# Patient Record
Sex: MALE | Race: WHITE | Employment: OTHER | URBAN - METROPOLITAN AREA
[De-identification: names, ages, dates, MRNs, and addresses within clinical notes are randomized per-mention and may not be internally consistent; named-entity substitution may affect disease eponyms.]

---

## 2017-01-03 ENCOUNTER — ALLIED HEALTH/NURSE VISIT (OUTPATIENT)
Dept: CARDIOLOGY | Facility: CLINIC | Age: 82
End: 2017-01-03
Payer: COMMERCIAL

## 2017-01-03 DIAGNOSIS — Z95.0 CARDIAC PACEMAKER IN SITU: Primary | ICD-10-CM

## 2017-01-03 PROCEDURE — 93293 PM PHONE R-STRIP DEVICE EVAL: CPT | Performed by: INTERNAL MEDICINE

## 2017-01-03 NOTE — PROGRESS NOTES
~ 90 day office teletrace ~   and VS at time of check. Chronic Afib, taking Pradaxa. Magnet response WNL. Annual threshold scheduled in March. Justin VEGA

## 2017-01-04 DIAGNOSIS — N40.0 BENIGN PROSTATIC HYPERPLASIA WITHOUT LOWER URINARY TRACT SYMPTOMS, UNSPECIFIED MORPHOLOGY: Primary | ICD-10-CM

## 2017-01-05 ENCOUNTER — OFFICE VISIT (OUTPATIENT)
Dept: UROLOGY | Facility: CLINIC | Age: 82
End: 2017-01-05
Payer: COMMERCIAL

## 2017-01-05 VITALS
DIASTOLIC BLOOD PRESSURE: 64 MMHG | HEART RATE: 62 BPM | WEIGHT: 230 LBS | BODY MASS INDEX: 30.48 KG/M2 | SYSTOLIC BLOOD PRESSURE: 122 MMHG | HEIGHT: 73 IN

## 2017-01-05 DIAGNOSIS — N40.0 BENIGN PROSTATIC HYPERPLASIA WITHOUT LOWER URINARY TRACT SYMPTOMS, UNSPECIFIED MORPHOLOGY: ICD-10-CM

## 2017-01-05 LAB
ALBUMIN UR-MCNC: ABNORMAL MG/DL
APPEARANCE UR: CLEAR
BILIRUB UR QL STRIP: NEGATIVE
COLOR UR AUTO: YELLOW
GLUCOSE UR STRIP-MCNC: NEGATIVE MG/DL
HGB UR QL STRIP: NEGATIVE
KETONES UR STRIP-MCNC: NEGATIVE MG/DL
LEUKOCYTE ESTERASE UR QL STRIP: NEGATIVE
NITRATE UR QL: NEGATIVE
PH UR STRIP: 6 PH (ref 5–7)
RESIDUAL VOLUME (RV) (EXTERNAL): 20
SP GR UR STRIP: 1.01 (ref 1–1.03)
URN SPEC COLLECT METH UR: ABNORMAL
UROBILINOGEN UR STRIP-ACNC: 0.2 EU/DL (ref 0.2–1)

## 2017-01-05 PROCEDURE — 99213 OFFICE O/P EST LOW 20 MIN: CPT | Mod: 25 | Performed by: UROLOGY

## 2017-01-05 PROCEDURE — 51798 US URINE CAPACITY MEASURE: CPT | Performed by: UROLOGY

## 2017-01-05 PROCEDURE — 81003 URINALYSIS AUTO W/O SCOPE: CPT | Performed by: UROLOGY

## 2017-01-05 ASSESSMENT — PAIN SCALES - GENERAL: PAINLEVEL: NO PAIN (0)

## 2017-01-05 NOTE — PROGRESS NOTES
Office Visit Note  [facility]  [phone]    UROLOGIC DIAGNOSES:   BPH    CURRENT INTERVENTIONS:   Flomax and finasteride, S/P TUNA    HISTORY:   omayra is an 84-year-old gentleman who had a TUNA performed in 1998 and now takes Flomax and finasteride.  He was previously seeing Dr. Chavez.  He has had stable symptoms for many years.  His PSAs were stable and he has now stopped checking the PSA.  He has had problems with incomplete bladder emptying in the past but today he is emptying his bladder well. He has no complaints today.  He has no nocturia.  He reports normal urinary stream.   He is very happy with his urinary symptoms.      PAST MEDICAL HISTORY:   Past Medical History   Diagnosis Date     Inguinal hernia without mention of obstruction or gangrene, recurrent unilateral or unspecified      Other lymphedema      GROIN AND THIGHS     Hydrocele, unspecified      Unilateral or unspecified femoral hernia without mention of obstruction or gangrene, unilateral or unspecified      Scrotal varices      Hypersomnia with sleep apnea, unspecified      Prostatic hypertrophy, benign      Unspecified hypothyroidism      Acute, but ill-defined, cerebrovascular disease      NO RESIDUALS     Esophageal reflux      Other and unspecified hyperlipidemia      Coronary atherosclerosis of unspecified type of vessel, native or graft      S/P PTCA, EF 50%     Thrombocytopenia, unspecified (H)      Other specified anemias      Other pancytopenia (H)      Persistent atrial fibrillation (H)      VVI PM for long pauses       PAST SURGICAL HISTORY:   Past Surgical History   Procedure Laterality Date     Surgical history of -        Thumb surgery     Surgical history of -   1990s     Left total knee replacement     Surgical history of -   3/11     [facility] total knee replacement     Eye surgery       CATARACT/BLEPHAROPLASTY     Genitourinary surgery       TUNA     Implant pacemaker  8/2009     single chamber     Hernia repair        RIGHT INGUINAL     Herniorrhaphy inguinal  8/14/2012     Procedure: HERNIORRHAPHY INGUINAL;  right inguinal hernia repair;  Surgeon: Kareem Torrez MD;  Location: [facility]     Orthopedic surgery       [facility]     Orthopedic surgery  2010     right TKR, left TKR       FAMILY HISTORY:   Family History   Problem Relation Age of Onset     Cancer - colorectal No family hx of      DIABETES No family hx of      Coronary Artery Disease No family hx of      Hypertension No family hx of      Hyperlipidemia No family hx of      CEREBROVASCULAR DISEASE No family hx of      Breast Cancer No family hx of      Colon Cancer No family hx of      Prostate Cancer No family hx of      Other Cancer No family hx of      Depression No family hx of      Anxiety Disorder No family hx of      MENTAL ILLNESS No family hx of      Substance Abuse No family hx of      Anesthesia Reaction No family hx of      Asthma No family hx of      OSTEOPOROSIS No family hx of      Genetic Disorder No family hx of      Thyroid Disease No family hx of      Obesity No family hx of      Cardiovascular Father      C.A.D. Father      CANCER Sister      lung     Unknown/Adopted Mother        SOCIAL HISTORY:   Social History   Substance Use Topics     Smoking status: Never Smoker      Smokeless tobacco: Never Used      Comment: per encounter 2/12/07     Alcohol Use: 0.0 oz/week     0 Standard drinks or equivalent per week      Comment: occasionally       Current Outpatient Prescriptions   Medication     clindamycin (CLINDAMAX) 1 % lotion     furosemide (LASIX) 40 MG tablet     methylcellulose, laxative, POWD     levothyroxine (SYNTHROID,LEVOTHROID) 100 MCG tablet     tamsulosin (FLOMAX) 0.4 MG 24 hr capsule     simvastatin (ZOCOR) 20 MG tablet     carvedilol (COREG) 6.25 MG tablet     potassium chloride SA (POTASSIUM CHLORIDE) 20 MEQ tablet     omeprazole (PRILOSEC) 20 MG capsule     sucralfate (CARAFATE) 1 GM tablet     finasteride (PROSCAR) 5 MG tablet      "furosemide (LASIX) 20 MG tablet     buPROPion (WELLBUTRIN XL) 150 MG 24 hr tablet     HYDROcodone-acetaminophen (NORCO) 5-325 MG per tablet     LORazepam (ATIVAN) 1 MG tablet     Multiple Vitamins-Minerals (MULTIVITAMIN OR)     ORDER FOR DME     dabigatran ANTICOAGULANT (PRADAXA ANTICOAGULANT) 75 MG CAPS BLISTER PACK     Glucosamine-Chondroitin 3008-5243 MG/30ML LIQD     Omega-3 Fatty Acids (FISH OIL) 1200 MG capsule     Cholecalciferol (VITAMIN D3) 5000 UNIT TABS     No current facility-administered medications for this visit.         PHYSICAL EXAM:    /64 mmHg  Pulse 62  Ht 1.854 m (6' 1\")  Wt 104.327 kg (230 lb)  BMI 30.35 kg/m2    HEENT: Normocephalic and atraumatic   Cardiac: Not done  Back/Flank: Not done  CNS/PNS: Not done  Respiratory: Normal non-labored breathing  Abdomen: Soft nontender and nondistended  Peripheral Vascular: Not done  Mental Status: Not done    Penis: Not done  Scrotal Skin: Not done  Testicles: Not done  Epididymis: Not done  Digital Rectal Exam:     Cystoscopy: Not done    Imaging: None    Urinalysis: UA RESULTS:  Recent Labs   Lab Test  01/05/17   0909  08/25/16   0931   COLOR  Yellow  Yellow   APPEARANCE  Clear  Clear   URINEGLC  Negative  Negative   URINEBILI  Negative  Negative   URINEKETONE  Negative  Negative   SG  1.015  1.020   UBLD  Negative  Negative   URINEPH  6.0  6.0   PROTEIN  Trace*  Negative   UROBILINOGEN  0.2  0.2   NITRITE  Negative  Negative   LEUKEST  Negative  Negative   RBCU   --   O - 2   WBCU   --   O - 2       PSA:     Post Void Residual: 20 mL postvoid    Other labs: None today      IMPRESSION:  Doing well    PLAN:  He is doing very well.  He has had no problems for years and has had stable symptoms for years. He will continue the Flomax and the finasteride. He will continues to see his primary care provider annually and he is welcome to come back and see us if any problems should arise in the future.    Total Time: 15 min                              "         Total in Consultation: 15 min      Cooper Gallego M.D.

## 2017-01-05 NOTE — Clinical Note
1/5/2017       RE: Bairon Foster  [address]    [facility] [address]     Dear Colleague,    Thank you for referring your patient, Bairon Foster, to the [facility] [place] [facility]. Please see a copy of my visit note below.    Office Visit Note  [facility]  [phone]    UROLOGIC DIAGNOSES:   BPH    CURRENT INTERVENTIONS:   Flomax and finasteride, S/P TUNA    HISTORY:   omayra is an 84-year-old gentleman who had a TUNA performed in 1998 and now takes Flomax and finasteride.  He was previously seeing Dr. Chavez.  He has had stable symptoms for many years.  His PSAs were stable and he has now stopped checking the PSA.  He has had problems with incomplete bladder emptying in the past but today he is emptying his bladder well. He has no complaints today.  He has no nocturia.  He reports normal urinary stream.   He is very happy with his urinary symptoms.      PAST MEDICAL HISTORY:   Past Medical History   Diagnosis Date     Inguinal hernia without mention of obstruction or gangrene, recurrent unilateral or unspecified      Other lymphedema      GROIN AND THIGHS     Hydrocele, unspecified      Unilateral or unspecified femoral hernia without mention of obstruction or gangrene, unilateral or unspecified      Scrotal varices      Hypersomnia with sleep apnea, unspecified      Prostatic hypertrophy, benign      Unspecified hypothyroidism      Acute, but ill-defined, cerebrovascular disease      NO RESIDUALS     Esophageal reflux      Other and unspecified hyperlipidemia      Coronary atherosclerosis of unspecified type of vessel, native or graft      S/P PTCA, EF 50%     Thrombocytopenia, unspecified (H)      Other specified anemias      Other pancytopenia (H)      Persistent atrial fibrillation (H)      VVI PM for long pauses       PAST SURGICAL HISTORY:   Past Surgical History   Procedure Laterality Date      Surgical history of -        Thumb surgery     Surgical history of -   1990s     Left total knee replacement     Surgical history of -   3/11     [facility] total knee replacement     Eye surgery       CATARACT/BLEPHAROPLASTY     Genitourinary surgery       TUNA     Implant pacemaker  8/2009     single chamber     Hernia repair       RIGHT INGUINAL     Herniorrhaphy inguinal  8/14/2012     Procedure: HERNIORRHAPHY INGUINAL;  right inguinal hernia repair;  Surgeon: Kareem Torrez MD;  Location: [facility]     Orthopedic surgery       [facility] [place]     Orthopedic surgery  2010     right TKR, left TKR       FAMILY HISTORY:   Family History   Problem Relation Age of Onset     Cancer - colorectal No family hx of      DIABETES No family hx of      Coronary Artery Disease No family hx of      Hypertension No family hx of      Hyperlipidemia No family hx of      CEREBROVASCULAR DISEASE No family hx of      Breast Cancer No family hx of      Colon Cancer No family hx of      Prostate Cancer No family hx of      Other Cancer No family hx of      Depression No family hx of      Anxiety Disorder No family hx of      MENTAL ILLNESS No family hx of      Substance Abuse No family hx of      Anesthesia Reaction No family hx of      Asthma No family hx of      OSTEOPOROSIS No family hx of      Genetic Disorder No family hx of      Thyroid Disease No family hx of      Obesity No family hx of      Cardiovascular Father      C.A.D. Father      CANCER Sister      lung     Unknown/Adopted Mother        SOCIAL HISTORY:   Social History   Substance Use Topics     Smoking status: Never Smoker      Smokeless tobacco: Never Used      Comment: per encounter 2/12/07     Alcohol Use: 0.0 oz/week     0 Standard drinks or equivalent per week      Comment: occasionally       Current Outpatient Prescriptions   Medication     clindamycin (CLINDAMAX) 1 % lotion     furosemide (LASIX) 40 MG tablet     methylcellulose, laxative, POWD     levothyroxine  "(SYNTHROID,LEVOTHROID) 100 MCG tablet     tamsulosin (FLOMAX) 0.4 MG 24 hr capsule     simvastatin (ZOCOR) 20 MG tablet     carvedilol (COREG) 6.25 MG tablet     potassium chloride SA (POTASSIUM CHLORIDE) 20 MEQ tablet     omeprazole (PRILOSEC) 20 MG capsule     sucralfate (CARAFATE) 1 GM tablet     finasteride (PROSCAR) 5 MG tablet     furosemide (LASIX) 20 MG tablet     buPROPion (WELLBUTRIN XL) 150 MG 24 hr tablet     HYDROcodone-acetaminophen (NORCO) 5-325 MG per tablet     LORazepam (ATIVAN) 1 MG tablet     Multiple Vitamins-Minerals (MULTIVITAMIN OR)     ORDER FOR DME     dabigatran ANTICOAGULANT (PRADAXA ANTICOAGULANT) 75 MG CAPS BLISTER PACK     Glucosamine-Chondroitin 6132-5496 MG/30ML LIQD     Omega-3 Fatty Acids (FISH OIL) 1200 MG capsule     Cholecalciferol (VITAMIN D3) 5000 UNIT TABS     No current facility-administered medications for this visit.         PHYSICAL EXAM:    /64 mmHg  Pulse 62  Ht 1.854 m (6' 1\")  Wt 104.327 kg (230 lb)  BMI 30.35 kg/m2    HEENT: Normocephalic and atraumatic   Cardiac: Not done  Back/Flank: Not done  CNS/PNS: Not done  Respiratory: Normal non-labored breathing  Abdomen: Soft nontender and nondistended  Peripheral Vascular: Not done  Mental Status: Not done    Penis: Not done  Scrotal Skin: Not done  Testicles: Not done  Epididymis: Not done  Digital Rectal Exam:     Cystoscopy: Not done    Imaging: None    Urinalysis: UA RESULTS:  Recent Labs   Lab Test  01/05/17   0909  08/25/16   0931   COLOR  Yellow  Yellow   APPEARANCE  Clear  Clear   URINEGLC  Negative  Negative   URINEBILI  Negative  Negative   URINEKETONE  Negative  Negative   SG  1.015  1.020   UBLD  Negative  Negative   URINEPH  6.0  6.0   PROTEIN  Trace*  Negative   UROBILINOGEN  0.2  0.2   NITRITE  Negative  Negative   LEUKEST  Negative  Negative   RBCU   --   O - 2   WBCU   --   O - 2       PSA:     Post Void Residual: 20 mL postvoid    Other labs: None today      IMPRESSION:  Doing well    PLAN:  He " is doing very well.  He has had no problems for years and has had stable symptoms for years. He will continue the Flomax and the finasteride. He will continues to see his primary care provider annually and he is welcome to come back and see us if any problems should arise in the future.    Total Time: 15 min                                      Total in Consultation: 15 min      Cooper Gallego M.D.

## 2017-01-12 DIAGNOSIS — I25.5 ISCHEMIC CARDIOMYOPATHY: Primary | ICD-10-CM

## 2017-01-12 DIAGNOSIS — E78.00 HYPERCHOLESTEROLEMIA: ICD-10-CM

## 2017-01-12 DIAGNOSIS — K21.9 ESOPHAGEAL REFLUX: ICD-10-CM

## 2017-01-13 RX ORDER — SIMVASTATIN 20 MG
TABLET ORAL
Qty: 90 TABLET | Refills: 3 | Status: SHIPPED | OUTPATIENT
Start: 2017-01-13 | End: 2017-10-30

## 2017-01-13 RX ORDER — SUCRALFATE 1 G/1
TABLET ORAL
Qty: 180 TABLET | Refills: 3 | Status: SHIPPED | OUTPATIENT
Start: 2017-01-13 | End: 2017-02-20

## 2017-01-13 RX ORDER — POTASSIUM CHLORIDE 1.5 G/1.58G
POWDER, FOR SOLUTION ORAL
Qty: 90 PACKET | Refills: 3 | Status: SHIPPED | OUTPATIENT
Start: 2017-01-13 | End: 2017-03-10

## 2017-01-13 RX ORDER — LEVOTHYROXINE SODIUM 100 UG/1
TABLET ORAL
Qty: 90 TABLET | Refills: 0 | OUTPATIENT
Start: 2017-01-13

## 2017-01-13 NOTE — TELEPHONE ENCOUNTER
Levothyroxine     Last Written Prescription Date: 5/3/16  Last Quantity: 90, # refills: 3  Last Office Visit with [facility], [facility] or [facility] prescribing provider: 1/5/17  Next 5 appointments (look out 90 days)     Mar 10, 2017  7:45 AM   Return Visit with Segundo Hope MD   [facility])    [address] [place] [address] [address]  [facility] [address]   [phone]                   TSH   Date Value Ref Range Status   08/25/2016 1.20 0.40 - 5.00 mU/L Final   Declined- has refills until May 2017    Carafate     Last Written Prescription Date: 1/6/16  Last Fill Quantity: 180,  # refills: 3   Last Office Visit with [facility], [facility] or [facility] prescribing provider: 1/5/17                                         Next 5 appointments (look out 90 days)     Mar 10, 2017  7:45 AM   Return Visit with Segundo Hope MD   [facility] ([facility])    [address] [place], [place] [address]  [facility] [address]   [phone]                  potassium chloride SA (POTASSIUM CHLORIDE) 20 MEQ tablet  Last Written Prescription Date: 1/19/16  Last Fill Quantity: 90, # refills: 3  Last Office Visit with [facility], [facility] or [facility] prescribing provider: 1/5/17   Next 5 appointments (look out 90 days)     Mar 10, 2017  7:45 AM   Return Visit with Segundo Hope MD   [facility]    [address] [place] [address] [phone]                   POTASSIUM   Date Value Ref Range Status   12/30/2016 4.0 3.4 - 5.3 mmol/L Final     CREATININE   Date Value Ref Range Status   08/25/2016 1.02 0.66 - 1.25 mg/dL Final     BP Readings from Last 3 Encounters:   01/05/17 122/64   12/30/16 110/70   12/10/16 130/78     Simvastatin       Last Written Prescription Date: 4/18/16  Last Fill Quantity: 90, # refills: 2    Last Office Visit with [facility], [facility] or [facility] prescribing  provider:  1/5/17   Future Office Visit:    Next 5 appointments (look out 90 days)     Mar 10, 2017  7:45 AM   Return Visit with Segundo Hope MD   [facility] ([facility])    [address] [place] [address] [phone]                  CHOLESTEROL   Date Value Ref Range Status   12/30/2016 107 <200 mg/dL Final     HDL CHOLESTEROL   Date Value Ref Range Status   12/30/2016 54 >39 mg/dL Final     LDL CHOLESTEROL CALCULATED   Date Value Ref Range Status   12/30/2016 48 <100 mg/dL Final     Comment:     Desirable:       <100 mg/dl     LDL CHOLESTEROL DIRECT   Date Value Ref Range Status   01/25/2012 62.4 0.0-100.0 mg/dL Final     TRIGLYCERIDES   Date Value Ref Range Status   12/30/2016 24 <150 mg/dL Final     Comment:     Non Fasting     CHOLESTEROL/HDL RATIO   Date Value Ref Range Status   06/15/2015 2.1 0.0 - 5.0 Final     ALT   Date Value Ref Range Status   12/30/2016 26 0 - 70 U/L Final        Prescription approved per [facility] Refill Protocol.  Felicia Payne RN- Triage [employer]

## 2017-01-21 DIAGNOSIS — K21.9 GASTROESOPHAGEAL REFLUX DISEASE WITHOUT ESOPHAGITIS: Primary | Chronic | ICD-10-CM

## 2017-01-21 NOTE — TELEPHONE ENCOUNTER
Reason for Call:  Medication or medication refill:  Do you use a [facility]?  Name of the pharmacy and phone number for the current request:  [facility] - [place], [place] - [address]  Name of the medication requested: omeprazole (PRILOSEC) 20 MG capsule  Other request: none  Can we leave a detailed message on this number? YES  Phone number patient can be reached at: Home number on file [phone] (home)  Best Time: any  Call taken on 1/21/2017 at 10:20 AM by JENIFER THOMPSON

## 2017-01-23 NOTE — TELEPHONE ENCOUNTER
omeprazole (PRILOSEC) 20 MG capsule     Last Written Prescription Date: 1/11/16  Last Fill Quantity: 180,  # refills: 3   Last Office Visit with [facility] or [facility] prescribing provider: 1/5/17                                         Next 5 appointments (look out 90 days)     Mar 10, 2017  7:45 AM   Return Visit with Segundo Hope MD   [facility] ([facility])    [address] [place] [address] [phone]

## 2017-01-24 DIAGNOSIS — I25.5 ISCHEMIC CARDIOMYOPATHY: Primary | ICD-10-CM

## 2017-01-24 NOTE — TELEPHONE ENCOUNTER
Reason for Call:  Medication or medication refill:    Do you use a [facility]?  Name of the pharmacy and phone number for the current request:  [facility]    Name of the medication requested: potassium chloride SA (POTASSIUM CHLORIDE) 20 MEQ tablet      Other request:    none    Can we leave a detailed message on this number? YES    Phone number patient can be reached at: Home number on file [phone] (home)    Best Time: Anytime    Call taken on 1/24/2017 at 12:00 PM by ADA DELAROSA

## 2017-01-25 RX ORDER — POTASSIUM CHLORIDE 1500 MG/1
20 TABLET, EXTENDED RELEASE ORAL DAILY
Qty: 90 TABLET | Refills: 3 | OUTPATIENT
Start: 2017-01-25

## 2017-01-25 NOTE — TELEPHONE ENCOUNTER
Potassium Chloride SA (Potassium chloride ) 20 meq    Last Written Prescription Date: 1/13/17  Last Fill Quantity: 90  # refills: 3Last Office Visit with [facility], [facility] or [facility] prescribing provider:1/5/17   Next 5 appointments (look out 90 days)     Mar 10, 2017  7:45 AM   Return Visit with Segundo Hope MD   [facility] ([facility])    [address] [place] [address] [phone]                   CREATININE   Date Value Ref Range Status   08/25/2016 1.02 0.66 - 1.25 mg/dL Final

## 2017-02-06 DIAGNOSIS — N40.0 BPH (BENIGN PROSTATIC HYPERPLASIA): Primary | ICD-10-CM

## 2017-02-07 NOTE — TELEPHONE ENCOUNTER
TAMSULOSIN HCL 0.4 MG Capsule         Last Written Prescription Date: 4/18/2016  Last Fill Quantity: 90, # refills: 2    Last Office Visit with [facility], [facility] or [facility] prescribing provider:  1/5/2017   Future Office Visit:    Next 5 appointments (look out 90 days)     Mar 10, 2017  7:45 AM   Return Visit with Segundo Hope MD   [facility]IC[facility] ([facility])    [address] [place] [id number] [phone]                  BP Readings from Last 3 Encounters:   01/05/17 122/64   12/30/16 110/70   12/10/16 130/78

## 2017-02-09 RX ORDER — TAMSULOSIN HYDROCHLORIDE 0.4 MG/1
CAPSULE ORAL
Qty: 90 CAPSULE | Refills: 2 | Status: SHIPPED | OUTPATIENT
Start: 2017-02-09 | End: 2017-10-30

## 2017-02-15 DIAGNOSIS — I25.5 ISCHEMIC CARDIOMYOPATHY: ICD-10-CM

## 2017-02-15 RX ORDER — FUROSEMIDE 20 MG
TABLET ORAL
Qty: 45 TABLET | Refills: 2 | Status: SHIPPED | OUTPATIENT
Start: 2017-02-15 | End: 2017-08-30 | Stop reason: DRUGHIGH

## 2017-02-15 RX ORDER — POTASSIUM CHLORIDE 1500 MG/1
TABLET, EXTENDED RELEASE ORAL
Qty: 90 TABLET | Refills: 2 | Status: SHIPPED | OUTPATIENT
Start: 2017-02-15 | End: 2017-10-30

## 2017-02-15 NOTE — TELEPHONE ENCOUNTER
furosemide (LASIX) 20 MG     Last Written Prescription Date: 12/8/16  Last Fill Quantity: 45, # refills: 2  Last Office Visit with [facility], [facility] or  Health prescribing provider: 12/30/16  Next 5 appointments (look out 90 days)     Mar 10, 2017  7:45 AM CST   Return Visit with Segundo Hope MD   [facility] ([facility])    [address] [place] [address] [address]  [facility] [address]   [phone]                   Potassium   Date Value Ref Range Status   12/30/2016 4.0 3.4 - 5.3 mmol/L Final     Creatinine   Date Value Ref Range Status   08/25/2016 1.02 0.66 - 1.25 mg/dL Final     BP Readings from Last 3 Encounters:   01/05/17 122/64   12/30/16 110/70   12/10/16 130/78     potassium chloride SA (K-DUR/KLOR-CON M) 20 MEQ      Last Written Prescription Date: 1/19/16  Last Fill Quantity: 90, # refills: 3  Last Office Visit with [facility], [facility] or [facility] prescribing provider: 12/30/16  Next 5 appointments (look out 90 days)     Mar 10, 2017  7:45 AM CST   Return Visit with Segundo Hope MD   [facility] ([facility])    [address] [place] [id number] [phone]                   Potassium   Date Value Ref Range Status   12/30/2016 4.0 3.4 - 5.3 mmol/L Final     Creatinine   Date Value Ref Range Status   08/25/2016 1.02 0.66 - 1.25 mg/dL Final     BP Readings from Last 3 Encounters:   01/05/17 122/64   12/30/16 110/70   12/10/16 130/78

## 2017-02-15 NOTE — TELEPHONE ENCOUNTER
Prescription approved per [facility] Refill Protocol.  Felicia Payne RN- Triage [employer]

## 2017-02-20 DIAGNOSIS — N40.0 BPH (BENIGN PROSTATIC HYPERPLASIA): ICD-10-CM

## 2017-02-20 DIAGNOSIS — K21.9 ESOPHAGEAL REFLUX: ICD-10-CM

## 2017-02-20 DIAGNOSIS — I25.5 ISCHEMIC CARDIOMYOPATHY: ICD-10-CM

## 2017-02-20 DIAGNOSIS — E03.9 HYPOTHYROIDISM: Primary | ICD-10-CM

## 2017-02-20 NOTE — TELEPHONE ENCOUNTER
Reason for Call:  Medication or medication refill:  Do you use a [facility]?  Name of the pharmacy and phone number for the current request:  [facility] - [place], [place] - [address]  Name of the medication requested: carvedilol 6.25mg, finasteride 5 mg, levothyroxine 100 mg and sucralfate 1 gm tablet  Other request: none  Can we leave a detailed message on this number? YES  Phone number patient can be reached at: Home number on file [phone] (home)  Best Time: any  Call taken on 2/20/2017 at 1:32 PM by JENIFER THOMPSON

## 2017-02-21 RX ORDER — LEVOTHYROXINE SODIUM 100 UG/1
TABLET ORAL
Qty: 90 TABLET | Refills: 1 | Status: SHIPPED | OUTPATIENT
Start: 2017-02-21 | End: 2017-05-15

## 2017-02-21 RX ORDER — FINASTERIDE 5 MG/1
5 TABLET, FILM COATED ORAL DAILY
Qty: 90 TABLET | Refills: 1 | Status: SHIPPED | OUTPATIENT
Start: 2017-02-21 | End: 2017-05-15

## 2017-02-21 RX ORDER — CARVEDILOL 6.25 MG/1
6.25 TABLET ORAL 2 TIMES DAILY WITH MEALS
Qty: 180 TABLET | Refills: 1 | Status: SHIPPED | OUTPATIENT
Start: 2017-02-21 | End: 2017-09-13

## 2017-02-21 RX ORDER — SUCRALFATE 1 G/1
1 TABLET ORAL 2 TIMES DAILY
Qty: 180 TABLET | Refills: 1 | Status: SHIPPED | OUTPATIENT
Start: 2017-02-21 | End: 2018-04-18

## 2017-03-09 ENCOUNTER — HOSPITAL ENCOUNTER (OUTPATIENT)
Dept: CARDIOLOGY | Facility: CLINIC | Age: 82
Discharge: HOME | End: 2017-03-09
Attending: INTERNAL MEDICINE | Admitting: INTERNAL MEDICINE
Payer: MEDICARE

## 2017-03-09 DIAGNOSIS — I25.5 ISCHEMIC CARDIOMYOPATHY: ICD-10-CM

## 2017-03-09 DIAGNOSIS — I25.10 CORONARY ARTERY DISEASE INVOLVING NATIVE CORONARY ARTERY OF NATIVE HEART WITHOUT ANGINA PECTORIS: ICD-10-CM

## 2017-03-09 DIAGNOSIS — Z95.0 CARDIAC PACEMAKER IN SITU: ICD-10-CM

## 2017-03-09 DIAGNOSIS — I48.19 PERSISTENT ATRIAL FIBRILLATION (H): ICD-10-CM

## 2017-03-09 PROCEDURE — 93306 TTE W/DOPPLER COMPLETE: CPT

## 2017-03-09 PROCEDURE — 93306 TTE W/DOPPLER COMPLETE: CPT | Mod: 26 | Performed by: INTERNAL MEDICINE

## 2017-03-10 ENCOUNTER — OFFICE VISIT (OUTPATIENT)
Dept: CARDIOLOGY | Facility: CLINIC | Age: 82
End: 2017-03-10
Attending: INTERNAL MEDICINE
Payer: COMMERCIAL

## 2017-03-10 ENCOUNTER — ALLIED HEALTH/NURSE VISIT (OUTPATIENT)
Dept: CARDIOLOGY | Facility: CLINIC | Age: 82
End: 2017-03-10
Payer: COMMERCIAL

## 2017-03-10 VITALS
HEIGHT: 73 IN | BODY MASS INDEX: 29.16 KG/M2 | HEART RATE: 68 BPM | WEIGHT: 220 LBS | DIASTOLIC BLOOD PRESSURE: 70 MMHG | SYSTOLIC BLOOD PRESSURE: 120 MMHG

## 2017-03-10 DIAGNOSIS — I25.10 CORONARY ARTERY DISEASE INVOLVING NATIVE CORONARY ARTERY OF NATIVE HEART WITHOUT ANGINA PECTORIS: ICD-10-CM

## 2017-03-10 DIAGNOSIS — Z95.0 CARDIAC PACEMAKER IN SITU: ICD-10-CM

## 2017-03-10 DIAGNOSIS — I25.5 ISCHEMIC CARDIOMYOPATHY: ICD-10-CM

## 2017-03-10 DIAGNOSIS — Z95.0 CARDIAC PACEMAKER IN SITU: Primary | ICD-10-CM

## 2017-03-10 DIAGNOSIS — R07.89 CHEST TIGHTNESS OR PRESSURE: Primary | ICD-10-CM

## 2017-03-10 DIAGNOSIS — I48.19 PERSISTENT ATRIAL FIBRILLATION (H): ICD-10-CM

## 2017-03-10 PROCEDURE — 99214 OFFICE O/P EST MOD 30 MIN: CPT | Performed by: INTERNAL MEDICINE

## 2017-03-10 PROCEDURE — 93279 PRGRMG DEV EVAL PM/LDLS PM: CPT

## 2017-03-10 NOTE — LETTER
3/10/2017    Mg Nelson MD  [facility]   [address]  [place] [address]    RE: Bairon DOWNS Cristian       Dear Colleague,    I had the pleasure of seeing Bairon Foster in the [facility].    Mr. Foster is a very pleasant 86-year-old gentleman with history of ischemic cardiomyopathy, ventricular tachycardia, coronary artery disease, history of coronary stenting of the obtuse marginal branch back in 2009 with mitral regurgitation.  He comes to clinic today with complaints of some exertional chest tightness over the past couple of months.  He says if he walks down a long hallway, he will get some chest tightness and shortness of breath and then need to stop.  This is new onset, but it is always relieved with rest.  He has had no syncope, near-syncope, PND or orthopnea.  He does have pedal edema and has had his Lasix increased by his primary physician.  He is also complaining of some paresthesias and tingling in his bilateral feet.      He has been compliant with his medications.  His echocardiogram shows normal left ventricular ejection fraction with mild to moderate mitral regurgitation.  His fasting lipid profile appears excellent.     Outpatient Encounter Prescriptions as of 3/10/2017   Medication Sig Dispense Refill     Misc Natural Products (FIBER 7) POWD As directed       carvedilol (COREG) 6.25 MG tablet Take 1 tablet (6.25 mg) by mouth 2 times daily (with meals) 180 tablet 1     finasteride (PROSCAR) 5 MG tablet Take 1 tablet (5 mg) by mouth daily 90 tablet 1     levothyroxine (SYNTHROID/LEVOTHROID) 100 MCG tablet TAKE 1 TABLET EVERY DAY (NEED LAB APPOINTMENT) 90 tablet 1     sucralfate (CARAFATE) 1 GM tablet Take 1 tablet (1 g) by mouth 2 times daily 180 tablet 1     furosemide (LASIX) 20 MG tablet TAKE 1 TABLET EVERY OTHER DAY (ALTERNATE WITH LASIX 40MG) 45 tablet 2     potassium chloride SA (K-DUR/KLOR-CON M) 20 MEQ CR tablet TAKE 1 TABLET (20  MEQ) BY MOUTH DAILY 90 tablet 2     tamsulosin (FLOMAX) 0.4 MG capsule TAKE 1 CAPSULE (0.4 MG) BY MOUTH DAILY 90 capsule 2     omeprazole (PRILOSEC) 20 MG CR capsule Take 1 capsule (20 mg) by mouth 2 times daily 180 capsule 3     simvastatin (ZOCOR) 20 MG tablet TAKE 1 TABLET AT BEDTIME 90 tablet 3     clindamycin (CLINDAMAX) 1 % lotion Apply topically 2 times [phone], re faxed order from 12/15/16. 60 mL 1     furosemide (LASIX) 40 MG tablet TAKE 1 TABLET EVERY OTHER DAY  (ALTERNATE  WITH  LASIX 20MG TABLET) 45 tablet 2     buPROPion (WELLBUTRIN XL) 150 MG 24 hr tablet TAKE ONE TABLET BY MOUTH IN THE MORNING 90 tablet 3     LORazepam (ATIVAN) 1 MG tablet Take 0.5-1 tablets (0.5-1 mg) by mouth every 8 hours as needed for anxiety 30 tablet 5     Multiple Vitamins-Minerals (MULTIVITAMIN OR) Take  by mouth.       dabigatran ANTICOAGULANT (PRADAXA ANTICOAGULANT) 75 MG CAPS BLISTER PACK Take 150 mg by mouth 2 times daily. Store in original 's bottle or blister pack; use within 120 days of opening.       Glucosamine-Chondroitin 7650-9415 MG/30ML LIQD Take 2 tablets by mouth daily        Omega-3 Fatty Acids (FISH OIL) 1200 MG capsule Take 1 capsule by mouth 2 times daily.       [DISCONTINUED] potassium chloride (KLOR-CON) 20 MEQ Packet MIX 1 PACKET IN LIQUID AS DIRECTED AND DRINK DAILY 90 packet 3     [DISCONTINUED] methylcellulose, laxative, POWD Take 0.85 g (3 teaspoonful) by mouth daily       HYDROcodone-acetaminophen (NORCO) 5-325 MG per tablet Take 1-2 tablets by mouth every 4 hours as needed for other (Moderate to Severe Pain) 40 tablet 0     ORDER FOR DME Auto-CPAP:Max 9 cm H2OMin 9 cm H2O  Continuous Lifetime need and heated humidity.    1 Device 0     [DISCONTINUED] Cholecalciferol (VITAMIN D3) 5000 UNIT TABS Take  by mouth daily.       No facility-administered encounter medications on file as of 3/10/2017.       ASSESSMENT/PLAN:  New exertional chest discomfort consistent with  progressive angina pectoris.  I am having him undergo a nuclear stress test.  I talked about proceeding directly with angiography, but we will screen him with the stress test first, then proceed from there.        Regarding his paresthesias, I am wondering whether or not this might be due to potential venous stasis changes.  He certainly has skin changes as well as some edema.  I am sending him to vascular specialists to give us their opinion and recommendations.     Again, thank you for allowing me to participate in the care of your patient.      Sincerely,    SUE BOONE MD     [facility]

## 2017-03-10 NOTE — PROGRESS NOTES
HISTORY OF PRESENT ILLNESS:  Mr. Ibarra is a very pleasant 86-year-old gentleman with history of ischemic cardiomyopathy, ventricular tachycardia, coronary artery disease, history of coronary stenting of the obtuse marginal branch back in  with mitral regurgitation.  He comes to clinic today with complaints of some exertional chest tightness over the past couple of months.  He says if he walks down a long hallway, he will get some chest tightness and shortness of breath and then need to stop.  This is new onset, but it is always relieved with rest.  He has had no syncope, near-syncope, PND or orthopnea.  He does have pedal edema and has had his Lasix increased by his primary physician.  He is also complaining of some paresthesias and tingling in his bilateral feet.      He has been compliant with his medications.  His echocardiogram shows normal left ventricular ejection fraction with mild to moderate mitral regurgitation.  His fasting lipid profile appears excellent.      ASSESSMENT/PLAN:  New exertional chest discomfort consistent with progressive angina pectoris.  I am having him undergo a nuclear stress test.  I talked about proceeding directly with angiography, but we will screen him with the stress test first, then proceed from there.        Regarding his paresthesias, I am wondering whether or not this might be due to potential venous stasis changes.  He certainly has skin changes as well as some edema.  I am sending him to vascular specialists to give us their opinion and recommendations.         SUE BOONE MD [facility]             D: 03/10/2017 08:23   T: 03/10/2017 13:48   MT: JORGE      Name:     HIRO IBARRA   MRN:      [id number]        Account:      [id number]   :      1931           Service Date: 03/10/2017      Document: [id number]

## 2017-03-10 NOTE — PROGRESS NOTES
HPI and Plan:   See dictation    No orders of the defined types were placed in this encounter.    Orders Placed This Encounter   Medications     Misc Natural Products (FIBER 7) POWD     Sig: As directed     Medications Discontinued During This Encounter   Medication Reason     Cholecalciferol (VITAMIN D3) 5000 UNIT TABS Stopped by Patient     potassium chloride (KLOR-CON) 20 MEQ Packet Medication Reconciliation Clean Up     methylcellulose, laxative, POWD Stopped by Patient         Encounter Diagnoses   Name Primary?     Cardiac pacemaker in situ      Ischemic cardiomyopathy      Coronary artery disease involving native coronary artery of native heart without angina pectoris      Persistent atrial fibrillation (H)      Chest tightness or pressure Yes       CURRENT MEDICATIONS:  Current Outpatient Prescriptions   Medication Sig Dispense Refill     Misc Natural Products (FIBER 7) POWD As directed       carvedilol (COREG) 6.25 MG tablet Take 1 tablet (6.25 mg) by mouth 2 times daily (with meals) 180 tablet 1     finasteride (PROSCAR) 5 MG tablet Take 1 tablet (5 mg) by mouth daily 90 tablet 1     levothyroxine (SYNTHROID/LEVOTHROID) 100 MCG tablet TAKE 1 TABLET EVERY DAY (NEED LAB APPOINTMENT) 90 tablet 1     sucralfate (CARAFATE) 1 GM tablet Take 1 tablet (1 g) by mouth 2 times daily 180 tablet 1     furosemide (LASIX) 20 MG tablet TAKE 1 TABLET EVERY OTHER DAY (ALTERNATE WITH LASIX 40MG) 45 tablet 2     potassium chloride SA (K-DUR/KLOR-CON M) 20 MEQ CR tablet TAKE 1 TABLET (20 MEQ) BY MOUTH DAILY 90 tablet 2     tamsulosin (FLOMAX) 0.4 MG capsule TAKE 1 CAPSULE (0.4 MG) BY MOUTH DAILY 90 capsule 2     omeprazole (PRILOSEC) 20 MG CR capsule Take 1 capsule (20 mg) by mouth 2 times daily 180 capsule 3     simvastatin (ZOCOR) 20 MG tablet TAKE 1 TABLET AT BEDTIME 90 tablet 3     clindamycin (CLINDAMAX) 1 % lotion Apply topically 2 times daily [phone], re faxed order from 12/15/16. 60 mL 1     furosemide  (LASIX) 40 MG tablet TAKE 1 TABLET EVERY OTHER DAY  (ALTERNATE  WITH  LASIX 20MG TABLET) 45 tablet 2     buPROPion (WELLBUTRIN XL) 150 MG 24 hr tablet TAKE ONE TABLET BY MOUTH IN THE MORNING 90 tablet 3     LORazepam (ATIVAN) 1 MG tablet Take 0.5-1 tablets (0.5-1 mg) by mouth every 8 hours as needed for anxiety 30 tablet 5     Multiple Vitamins-Minerals (MULTIVITAMIN OR) Take  by mouth.       dabigatran ANTICOAGULANT (PRADAXA ANTICOAGULANT) 75 MG CAPS BLISTER PACK Take 150 mg by mouth 2 times daily. Store in original 's bottle or blister pack; use within 120 days of opening.       Glucosamine-Chondroitin 3658-4820 MG/30ML LIQD Take 2 tablets by mouth daily        Omega-3 Fatty Acids (FISH OIL) 1200 MG capsule Take 1 capsule by mouth 2 times daily.       HYDROcodone-acetaminophen (NORCO) 5-325 MG per tablet Take 1-2 tablets by mouth every 4 hours as needed for other (Moderate to Severe Pain) (Patient not taking: Reported on 3/10/2017) 40 tablet 0     ORDER FOR DME Auto-CPAP:Max 9 cm H2OMin 9 cm H2O  Continuous Lifetime need and heated humidity.    1 Device 0       ALLERGIES   No Known Allergies    PAST MEDICAL HISTORY:  Past Medical History   Diagnosis Date     Acute, but ill-defined, cerebrovascular disease      NO RESIDUALS     Coronary atherosclerosis of unspecified type of vessel, native or graft      S/P PTCA, EF 50%     Esophageal reflux      Hydrocele, unspecified      Hypersomnia with sleep apnea, unspecified      Inguinal hernia without mention of obstruction or gangrene, recurrent unilateral or unspecified      Other and unspecified hyperlipidemia      Other lymphedema      GROIN AND THIGHS     Other pancytopenia (H)      Other specified anemias      Persistent atrial fibrillation (H)      VVI PM for long pauses     Prostatic hypertrophy, benign      Scrotal varices      Thrombocytopenia, unspecified (H)      Unilateral or unspecified femoral hernia without mention of obstruction or gangrene,  unilateral or unspecified      Unspecified hypothyroidism        PAST SURGICAL HISTORY:  Past Surgical History   Procedure Laterality Date     Surgical history of -        Thumb surgery     Surgical history of -   1990s     Left total knee replacement     Surgical history of -   3/11     [facility] total knee replacement     Eye surgery       CATARACT/BLEPHAROPLASTY     Genitourinary surgery       TUNA     Implant pacemaker  8/2009     single chamber     Hernia repair       RIGHT INGUINAL     Herniorrhaphy inguinal  8/14/2012     Procedure: HERNIORRHAPHY INGUINAL;  right inguinal hernia repair;  Surgeon: Kareem Torrez MD;  Location: [facility]     Orthopedic surgery       [facility]     Orthopedic surgery  2010     right TKR, left TKR       FAMILY HISTORY:  Family History   Problem Relation Age of Onset     Cancer - colorectal No family hx of      DIABETES No family hx of      Coronary Artery Disease No family hx of      Hypertension No family hx of      Hyperlipidemia No family hx of      CEREBROVASCULAR DISEASE No family hx of      Breast Cancer No family hx of      Colon Cancer No family hx of      Prostate Cancer No family hx of      Other Cancer No family hx of      Depression No family hx of      Anxiety Disorder No family hx of      MENTAL ILLNESS No family hx of      Substance Abuse No family hx of      Anesthesia Reaction No family hx of      Asthma No family hx of      OSTEOPOROSIS No family hx of      Genetic Disorder No family hx of      Thyroid Disease No family hx of      Obesity No family hx of      Cardiovascular Father      C.A.D. Father      CANCER Sister      lung     Unknown/Adopted Mother        SOCIAL HISTORY:  Social History     Social History     Marital status:      Spouse name: N/A     Number of children: N/A     Years of education: N/A     Occupational History     Teacher, author, speaker Retired     Social History Main Topics     Smoking status: Never Smoker     Smokeless tobacco: Never Used      " Comment: per encounter 2/12/07     Alcohol use 0.0 oz/week     0 Standard drinks or equivalent per week      Comment: occasionally     Drug use: No     Sexual activity: No     Other Topics Concern     Parent/Sibling W/ Cabg, Mi Or Angioplasty Before 65f 55m? No     Caffeine Concern No     1-2 cups coffee a day     Sleep Concern No     Stress Concern Yes     due to wifes health condition     Weight Concern No     Special Diet No     Exercise Yes     states he uses his tredmill on and off     Seat Belt Yes     Social History Narrative       Review of Systems:  Skin:  Negative     Eyes:  Positive for glasses;cataracts  ENT:  Negative    Respiratory:  Positive for CPAP;sleep apnea  Cardiovascular:    Positive for (heaviness when walking a distance)  Gastroenterology: Negative    Genitourinary:  not assessed    Musculoskeletal:  Negative    Neurologic:  Negative    Psychiatric:  Negative    Heme/Lymph/Imm:  Negative    Endocrine:  Positive for thyroid disorder    Physical Exam:  Vitals: /70  Pulse 68  Ht 1.854 m (6' 1\")  Wt 99.8 kg (220 lb)  BMI 29.03 kg/m2    Constitutional:           Skin:           Head:           Eyes:           ENT:           Neck:           Chest:           Cardiac:                    Abdomen:           Vascular:                                        Extremities and Back:           Neurological:             Recent Lab Results:  LIPID RESULTS:  Lab Results   Component Value Date    CHOL 107 12/30/2016    HDL 54 12/30/2016    LDL 48 12/30/2016    LDL 62.4 01/25/2012    TRIG 24 12/30/2016    CHOLHDLRATIO 2.1 06/15/2015       LIVER ENZYME RESULTS:  Lab Results   Component Value Date    AST 25 12/08/2015    ALT 26 12/30/2016       CBC RESULTS:  Lab Results   Component Value Date    WBC 3.0 (L) 08/25/2016    RBC 3.55 (L) 08/25/2016    HGB 11.5 (L) 08/25/2016    HCT 35.4 (L) 08/25/2016     08/25/2016    MCH 32.4 08/25/2016    MCHC 32.5 08/25/2016    RDW 14.7 08/25/2016    PLT 80 (L) " 08/25/2016       BMP RESULTS:  Lab Results   Component Value Date     08/25/2016    POTASSIUM 4.0 12/30/2016    CHLORIDE 105 08/25/2016    CO2 31 08/25/2016    ANIONGAP 5.5 08/25/2016     (H) 08/25/2016    BUN 20 08/25/2016    BUN 18.2 09/05/2012    CR 1.02 08/25/2016    GFRESTIMATED 69 08/25/2016    GFRESTBLACK 84 08/25/2016    TEODORO 9.0 08/25/2016        A1C RESULTS:  No results found for: A1C    INR RESULTS:  Lab Results   Component Value Date    INR 1.38 (H) 03/19/2011    INR 1.40 (H) 03/18/2011           CC  Segundo Hope MD   [facility]  [address]  [place] [id number]

## 2017-03-10 NOTE — MR AVS SNAPSHOT
After Visit Summary   3/10/2017    Bairon Foster    MRN: [id number]           Patient Information     Date Of Birth          2/6/1931        Visit Information        Provider Department      3/10/2017 7:45 AM Segundo Hope MD [facility] [place]        Today's Diagnoses     Chest tightness or pressure    -  1    Cardiac pacemaker in situ        Ischemic cardiomyopathy        Coronary artery disease involving native coronary artery of native heart without angina pectoris        Persistent atrial fibrillation (H)           Follow-ups after your visit        Additional Services     Follow-Up with Vascular Cardiologist       parathesias of lower extremities with evidence of edema and venous stasis changes                  Your next 10 appointments already scheduled     Mar 20, 2017 11:00 AM CDT   New Vascular Visit with London Carrizales MD   [facility] ([facility])    [address] [place], [place] [address]  [facility] [id number] [phone]            Jun 13, 2017 10:30 AM CDT   Phone Device Check with ARRIETA TECH1   [facility] ([facility])    [address] [place], [place] [address]  [facility] [id number] [phone]              Who to contact     If you have questions or need follow up information about today's clinic visit or your schedule please contact [facility] directly at [phone].  Normal or non-critical lab and imaging results will be communicated to you by MyChart, letter or phone within 4 business days after the clinic has received the results. If you do not hear from us within 7 days, please contact the clinic through MyChart or phone. If you have a critical or abnormal lab result, we will notify you by phone as soon as possible.  Submit refill requests through Node[employer] or call your pharmacy and they will forward the  "refill request to us. Please allow 3 business days for your refill to be completed.          Additional Information About Your Visit        revoPThart Information     [employer] gives you secure access to your electronic health record. If you see a primary care provider, you can also send messages to your care team and make appointments. If you have questions, please call your primary care clinic.  If you do not have a primary care provider, please call [phone] and they will assist you.        Care EveryWhere ID     This is your Care EveryWhere ID. This could be used by other organizations to access your [place] medical records  [id number]        Your Vitals Were     Pulse Height BMI (Body Mass Index)             68 1.854 m (6' 1\") 29.03 kg/m2          Blood Pressure from Last 3 Encounters:   No data found for BP    Weight from Last 3 Encounters:   No data found for Wt              Today, you had the following     No orders found for display       Primary Care Provider Office Phone # Fax #    Mg Nelson [phone]       [facility] [address] [facility] [address]        Goals        General    I will find a caregiver support group to attend (pt-stated)     Notes - Note created  4/17/2014  8:49 AM by Beth Morales RN    As of today's date 4/17/2014 goal is met at 0 - 25%.   Goal Status:  Active        Thank you!     Thank you for choosing [facility] [place]  for your care. Our goal is always to provide you with excellent care. Hearing back from our patients is one way we can continue to improve our services. Please take a few minutes to complete the written survey that you may receive in the mail after your visit with us. Thank you!             Your Updated Medication List - Protect others around you: Learn how to safely use, store and throw away your medicines at www.disposemymeds.org.          This list is accurate as of: 3/10/17 " 11:59 PM.  Always use your most recent med list.                   Brand Name Dispense Instructions for use    buPROPion 150 MG 24 hr tablet    WELLBUTRIN XL    90 tablet    TAKE ONE TABLET BY MOUTH IN THE MORNING       carvedilol 6.25 MG tablet    COREG    180 tablet    Take 1 tablet (6.25 mg) by mouth 2 times daily (with meals)       clindamycin 1 % lotion    CLINDAMAX    60 mL    Apply topically 2 times daily [phone], re faxed order from 12/15/16.       FIBER 7 Powd      As directed       finasteride 5 MG tablet    PROSCAR    90 tablet    Take 1 tablet (5 mg) by mouth daily       * furosemide 40 MG tablet    LASIX    45 tablet    TAKE 1 TABLET EVERY OTHER DAY  (ALTERNATE  WITH  LASIX 20MG TABLET)       * furosemide 20 MG tablet    LASIX    45 tablet    TAKE 1 TABLET EVERY OTHER DAY (ALTERNATE WITH LASIX 40MG)       glucosamine-chondroitinoitin 8969-0601 MG/30ML Liqd      Take 2 tablets by mouth daily       HYDROcodone-acetaminophen 5-325 MG per tablet    NORCO    40 tablet    Take 1-2 tablets by mouth every 4 hours as needed for other (Moderate to Severe Pain)       levothyroxine 100 MCG tablet    SYNTHROID/LEVOTHROID    90 tablet    TAKE 1 TABLET EVERY DAY (NEED LAB APPOINTMENT)       LORazepam 1 MG tablet    ATIVAN    30 tablet    Take 0.5-1 tablets (0.5-1 mg) by mouth every 8 hours as needed for anxiety       MULTIVITAMIN PO      Take  by mouth.       omega-3 fatty acids 1200 MG capsule      Take 1 capsule by mouth 2 times daily.       omeprazole 20 MG CR capsule    priLOSEC    180 capsule    Take 1 capsule (20 mg) by mouth 2 times daily       order for DME     1 Device    Auto-CPAP:Max 9 cm H2OMin 9 cm H2O Continuous Lifetime need and heated humidity.       potassium chloride SA 20 MEQ CR tablet    K-DUR/KLOR-CON M    90 tablet    TAKE 1 TABLET (20 MEQ) BY MOUTH DAILY       PRADAXA ANTICOAGULANT 75 MG Caps capsule   Generic drug:  dabigatran ANTICOAGULANT      Take 150 mg by mouth 2 times  daily. Store in original 's bottle or blister pack; use within 120 days of opening.       simvastatin 20 MG tablet    ZOCOR    90 tablet    TAKE 1 TABLET AT BEDTIME       sucralfate 1 GM tablet    CARAFATE    180 tablet    Take 1 tablet (1 g) by mouth 2 times daily       tamsulosin 0.4 MG capsule    FLOMAX    90 capsule    TAKE 1 CAPSULE (0.4 MG) BY MOUTH DAILY       * Notice:  This list has 2 medication(s) that are the same as other medications prescribed for you. Read the directions carefully, and ask your doctor or other care provider to review them with you.

## 2017-03-10 NOTE — PROGRESS NOTES
[employer] (S) Pacemaker Device Check  AP: NA % : 76 %  Mode: SSIR  bpm        Underlying Rhythm: Chronic AF with a controlled ventricular rate.  Heart Rate: Heart rate stable with adequate variability. Patient denies any activity intolerance.  Sensing: WNL    Pacing Threshold: WNL   Impedance: WNL  Battery Status: Approximately 2.5 years longevity.  Atrial Arrhythmia: Chronic Af. Patient is on Pradaxa.  Ventricular Arrhythmia: 3 episodes logged 3 stored EGMs for review showed 10-19 beats of VS, rate 200 bpm suggestive of NSVT.  Setting Change: 0    Care Plan: Saw Dr. Hope is morning. Follow up with Q 3 month phone teletrace. LYUDMILA Cuevas RN

## 2017-03-10 NOTE — MR AVS SNAPSHOT
After Visit Summary   3/10/2017    Bairon Foster    MRN: [id number]           Patient Information     Date Of Birth          2/6/1931        Visit Information        Provider Department      3/10/2017 8:15 AM BERNY YA [facility] PHYSICIANS HEART AT [place]        Today's Diagnoses     Cardiac pacemaker in situ    -  1       Follow-ups after your visit        Your next 10 appointments already scheduled     Mar 16, 2017 12:00 PM CDT   NM SH CV MPI WITH CHEIKH 1 DAY with SCINM1   [facility] CV Nuclear Medicine (Cardiovascular Imaging at [facility])    [address]  [address]  [place] [address] [phone]           For a ONE day exam: Allow 3-4 hours for test. For a TWO day exam: Allow 2 hours PER day for test.  You may need to stop some medicines before the test. Follow your doctor s orders. - If you take a beta blocker: Follow your doctor s specific instructions on taking it prior to and on the day of your exam. - If you take Aggrenox or dipyridamole (Persantine, Permole), stop taking it 48 hours before your test. - If you take Viagra, Cialis or Levitra, stop taking it 48 hours before your test. - If you take theophylline or aminophylline, stop taking it 12 hours before your test.  For patients with diabetes: - If you take insulin, call your diabetes care team. Ask if you should take a 1/2 dose the morning of your test. - If you take diabetes medicine by mouth, don t take it on the morning of your test. Bring it with you to take after the test. (If you have questions, call your diabetes care team.)  Do not take nitrates on the day of your test. Do not wear your Nitro-Patch.  Stop all caffeine 12 hours before the test. This includes coffee, tea, soda pop, chocolate and certain medicines (such as Anacin, Excedrin and NoDoz). Also avoid decaf coffee and tea, as these contain small amounts of caffeine.  No alcohol, smoking or other tobacco for 12 hours  before the test.  Stop eating 3 hours before the test. You may drink water.  Please wear a loose two-piece outfit. If you will have an exercise test, bring rubber-soled walking shoes.  When you arrive, please tell us if you: - Have diabetes - Are breastfeeding - May be pregnant - Have a pacemaker of ICD (implantable defibrillator).  Please call your Imaging Department at your exam site with any questions.            Mar 20, 2017 11:00 AM CDT   New Visit with London Carrizales MD   [facility] HEART AT [place] ([facility])    [address]  [place] [address] [phone]            Jun 13, 2017 10:30 AM CDT   Phone Device Check with BERNY DELEON   [facility] ([facility])    [address]  [place] [address] [phone]              Future tests that were ordered for you today     Open Future Orders        Priority Expected Expires Ordered    Follow-Up with Vascular Cardiologist Routine 3/24/2017 3/10/2018 3/10/2017    NM Lexiscan stress test (nuc card) Routine 3/17/2017 3/10/2018 3/10/2017            Who to contact     If you have questions or need follow up information about today's clinic visit or your schedule please contact [facility] directly at [phone].  Normal or non-critical lab and imaging results will be communicated to you by Extreme DAhart, letter or phone within 4 business days after the clinic has received the results. If you do not hear from us within 7 days, please contact the clinic through Extreme DAhart or phone. If you have a critical or abnormal lab result, we will notify you by phone as soon as possible.  Submit refill requests through [email or web address] or call your pharmacy and they will forward the refill request to us. Please allow 3 business days for your refill to be completed.          Additional Information About Your Visit        [email or web address]  Information     [employer] gives you secure access to your electronic health record. If you see a primary care provider, you can also send messages to your care team and make appointments. If you have questions, please call your primary care clinic.  If you do not have a primary care provider, please call [phone] and they will assist you.        Care EveryWhere ID     This is your Care EveryWhere ID. This could be used by other organizations to access your [place] medical records  [id number]         Blood Pressure from Last 3 Encounters:   03/10/17 120/70   01/05/17 122/64   12/30/16 110/70    Weight from Last 3 Encounters:   03/10/17 99.8 kg (220 lb)   01/05/17 104.3 kg (230 lb)   12/30/16 103 kg (227 lb)              We Performed the Following     PM DEVICE PROGRAMMING EVAL, SINGLE LEAD PACER ([id number])        Primary Care Provider Office Phone # Fax #    Mg Bairon Nelson [phone]       [facility] [address] [facility] [address]        Goals        General    I will find a caregiver support group to attend (pt-stated)     Notes - Note created  4/17/2014  8:49 AM by Beth Morales RN    As of today's date 4/17/2014 goal is met at 0 - 25%.   Goal Status:  Active        Thank you!     Thank you for choosing [facility] PHYSICIANS HEART AT [place]  for your care. Our goal is always to provide you with excellent care. Hearing back from our patients is one way we can continue to improve our services. Please take a few minutes to complete the written survey that you may receive in the mail after your visit with us. Thank you!             Your Updated Medication List - Protect others around you: Learn how to safely use, store and throw away your medicines at www.disposemymeds.org.          This list is accurate as of: 3/10/17  8:53 AM.  Always use your most recent med list.                   Brand Name Dispense Instructions for use    buPROPion 150 MG 24 hr  tablet    WELLBUTRIN XL    90 tablet    TAKE ONE TABLET BY MOUTH IN THE MORNING       carvedilol 6.25 MG tablet    COREG    180 tablet    Take 1 tablet (6.25 mg) by mouth 2 times daily (with meals)       clindamycin 1 % lotion    CLINDAMAX    60 mL    Apply topically 2 times daily [phone], re faxed order from 12/15/16.       FIBER 7 Powd      As directed       finasteride 5 MG tablet    PROSCAR    90 tablet    Take 1 tablet (5 mg) by mouth daily       * furosemide 40 MG tablet    LASIX    45 tablet    TAKE 1 TABLET EVERY OTHER DAY  (ALTERNATE  WITH  LASIX 20MG TABLET)       * furosemide 20 MG tablet    LASIX    45 tablet    TAKE 1 TABLET EVERY OTHER DAY (ALTERNATE WITH LASIX 40MG)       glucosamine-chondroitinoitin 0118-1550 MG/30ML Liqd      Take 2 tablets by mouth daily       HYDROcodone-acetaminophen 5-325 MG per tablet    NORCO    40 tablet    Take 1-2 tablets by mouth every 4 hours as needed for other (Moderate to Severe Pain)       levothyroxine 100 MCG tablet    SYNTHROID/LEVOTHROID    90 tablet    TAKE 1 TABLET EVERY DAY (NEED LAB APPOINTMENT)       LORazepam 1 MG tablet    ATIVAN    30 tablet    Take 0.5-1 tablets (0.5-1 mg) by mouth every 8 hours as needed for anxiety       MULTIVITAMIN PO      Take  by mouth.       omega-3 fatty acids 1200 MG capsule      Take 1 capsule by mouth 2 times daily.       omeprazole 20 MG CR capsule    priLOSEC    180 capsule    Take 1 capsule (20 mg) by mouth 2 times daily       order for DME     1 Device    Auto-CPAP:Max 9 cm H2OMin 9 cm H2O Continuous Lifetime need and heated humidity.       potassium chloride SA 20 MEQ CR tablet    K-DUR/KLOR-CON M    90 tablet    TAKE 1 TABLET (20 MEQ) BY MOUTH DAILY       PRADAXA ANTICOAGULANT 75 MG Caps capsule   Generic drug:  dabigatran ANTICOAGULANT      Take 150 mg by mouth 2 times daily. Store in original 's bottle or blister pack; use within 120 days of opening.       simvastatin 20 MG tablet    ZOCOR     90 tablet    TAKE 1 TABLET AT BEDTIME       sucralfate 1 GM tablet    CARAFATE    180 tablet    Take 1 tablet (1 g) by mouth 2 times daily       tamsulosin 0.4 MG capsule    FLOMAX    90 capsule    TAKE 1 CAPSULE (0.4 MG) BY MOUTH DAILY       * Notice:  This list has 2 medication(s) that are the same as other medications prescribed for you. Read the directions carefully, and ask your doctor or other care provider to review them with you.

## 2017-03-16 ENCOUNTER — HOSPITAL ENCOUNTER (OUTPATIENT)
Dept: CARDIOLOGY | Facility: CLINIC | Age: 82
Discharge: HOME | End: 2017-03-16
Attending: INTERNAL MEDICINE | Admitting: INTERNAL MEDICINE
Payer: MEDICARE

## 2017-03-16 DIAGNOSIS — I48.19 PERSISTENT ATRIAL FIBRILLATION (H): ICD-10-CM

## 2017-03-16 DIAGNOSIS — I25.5 ISCHEMIC CARDIOMYOPATHY: ICD-10-CM

## 2017-03-16 DIAGNOSIS — I25.10 CORONARY ARTERY DISEASE INVOLVING NATIVE CORONARY ARTERY OF NATIVE HEART WITHOUT ANGINA PECTORIS: ICD-10-CM

## 2017-03-16 DIAGNOSIS — Z95.0 CARDIAC PACEMAKER IN SITU: ICD-10-CM

## 2017-03-16 DIAGNOSIS — R07.89 CHEST TIGHTNESS OR PRESSURE: ICD-10-CM

## 2017-03-16 PROCEDURE — 34300033 ZZH RX 343: Performed by: INTERNAL MEDICINE

## 2017-03-16 PROCEDURE — A9502 TC99M TETROFOSMIN: HCPCS | Performed by: INTERNAL MEDICINE

## 2017-03-16 PROCEDURE — 25000128 H RX IP 250 OP 636: Performed by: INTERNAL MEDICINE

## 2017-03-16 PROCEDURE — 93018 CV STRESS TEST I&R ONLY: CPT | Performed by: INTERNAL MEDICINE

## 2017-03-16 PROCEDURE — 93017 CV STRESS TEST TRACING ONLY: CPT

## 2017-03-16 PROCEDURE — 93016 CV STRESS TEST SUPVJ ONLY: CPT | Performed by: INTERNAL MEDICINE

## 2017-03-16 PROCEDURE — 78452 HT MUSCLE IMAGE SPECT MULT: CPT | Mod: 26 | Performed by: INTERNAL MEDICINE

## 2017-03-16 RX ORDER — ALBUTEROL SULFATE 90 UG/1
2 AEROSOL, METERED RESPIRATORY (INHALATION) EVERY 5 MIN PRN
Status: DISCONTINUED | OUTPATIENT
Start: 2017-03-16 | End: 2017-03-17 | Stop reason: HOSPADM

## 2017-03-16 RX ORDER — AMINOPHYLLINE 25 MG/ML
50-100 INJECTION, SOLUTION INTRAVENOUS
Status: DISCONTINUED | OUTPATIENT
Start: 2017-03-16 | End: 2017-03-17 | Stop reason: HOSPADM

## 2017-03-16 RX ORDER — REGADENOSON 0.08 MG/ML
0.4 INJECTION, SOLUTION INTRAVENOUS ONCE
Status: COMPLETED | OUTPATIENT
Start: 2017-03-16 | End: 2017-03-16

## 2017-03-16 RX ORDER — ACYCLOVIR 200 MG/1
0-1 CAPSULE ORAL
Status: DISCONTINUED | OUTPATIENT
Start: 2017-03-16 | End: 2017-03-17 | Stop reason: HOSPADM

## 2017-03-16 RX ADMIN — TETROFOSMIN 4.53 MCI.: 0.23 INJECTION, POWDER, LYOPHILIZED, FOR SOLUTION INTRAVENOUS at 11:48

## 2017-03-16 RX ADMIN — TETROFOSMIN 13.03 MCI.: 0.23 INJECTION, POWDER, LYOPHILIZED, FOR SOLUTION INTRAVENOUS at 13:44

## 2017-03-16 RX ADMIN — REGADENOSON 0.4 MG: 0.08 INJECTION, SOLUTION INTRAVENOUS at 13:39

## 2017-03-20 ENCOUNTER — OFFICE VISIT (OUTPATIENT)
Dept: CARDIOLOGY | Facility: CLINIC | Age: 82
End: 2017-03-20
Attending: INTERNAL MEDICINE
Payer: COMMERCIAL

## 2017-03-20 VITALS
SYSTOLIC BLOOD PRESSURE: 110 MMHG | DIASTOLIC BLOOD PRESSURE: 68 MMHG | WEIGHT: 221 LBS | HEART RATE: 60 BPM | HEIGHT: 73 IN | BODY MASS INDEX: 29.29 KG/M2

## 2017-03-20 DIAGNOSIS — I48.19 PERSISTENT ATRIAL FIBRILLATION (H): ICD-10-CM

## 2017-03-20 DIAGNOSIS — Z95.0 CARDIAC PACEMAKER IN SITU: ICD-10-CM

## 2017-03-20 DIAGNOSIS — I25.10 CORONARY ARTERY DISEASE INVOLVING NATIVE CORONARY ARTERY OF NATIVE HEART WITHOUT ANGINA PECTORIS: ICD-10-CM

## 2017-03-20 DIAGNOSIS — R07.89 CHEST TIGHTNESS OR PRESSURE: ICD-10-CM

## 2017-03-20 DIAGNOSIS — I25.5 ISCHEMIC CARDIOMYOPATHY: ICD-10-CM

## 2017-03-20 DIAGNOSIS — I83.813 VARICOSE VEINS OF BILATERAL LOWER EXTREMITIES WITH PAIN: Primary | ICD-10-CM

## 2017-03-20 PROCEDURE — 99214 OFFICE O/P EST MOD 30 MIN: CPT | Performed by: INTERNAL MEDICINE

## 2017-03-20 NOTE — MR AVS SNAPSHOT
After Visit Summary   3/20/2017    Bairon Foster    MRN: [id number]           Patient Information     Date Of Birth          2/6/1931        Visit Information        Provider Department      3/20/2017 11:00 AM London Carrizales MD [facility]        Today's Diagnoses     Varicose 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    -  1    Cardiac pacemaker in situ        Ischemic cardiomyopathy        Coronary artery disease involving native coronary artery of native heart without angina pectoris        Persistent atrial fibrillation (H)        Chest tightness or pressure          Care Instructions    We can check the artery flow to the legs at any time, especially if you notice heaviness in the legs when walking in the future. For now, I think it's okay.    Let's check the veins with an ultrasound to see if leakage in the veins from valves that don't seal properly are contributing. We will do compression stockings as well and have you see us back in vein clinic 2-3 months with Anushka, my nurse practitioner.    Try a barrier cream like AQUAPHOR for the dry skin. You might consider LAMASIL either cream or pills (prescription for the pills) with Dr. Villegas for the toenails.            Follow-ups after your visit        Additional Services     Follow-Up with Cardiac Advanced Practice Provider       SCHEDULE IN VEIN CLINIC                  Your next 10 appointments already scheduled     Jun 13, 2017 10:30 AM CDT   Phone Device Check with ARRIETA TECH1   [facility] ([facility])    [address] [place] [id number] [phone]              Future tests that were ordered for you today     Open Future Orders        Priority Expected Expires Ordered    Follow-Up with Cardiac Advanced Practice Provider Routine 6/18/2017 3/20/2018 3/20/2017    US Venous Competency Bilateral Routine 4/19/2017 3/20/2018  "3/20/2017            Who to contact     If you have questions or need follow up information about today's clinic visit or your schedule please contact [facility] PHYSICIANS HEART AT [place] directly at [phone].  Normal or non-critical lab and imaging results will be communicated to you by MyChart, letter or phone within 4 business days after the clinic has received the results. If you do not hear from us within 7 days, please contact the clinic through MyChart or phone. If you have a critical or abnormal lab result, we will notify you by phone as soon as possible.  Submit refill requests through [email or web address] or call your pharmacy and they will forward the refill request to us. Please allow 3 business days for your refill to be completed.          Additional Information About Your Visit        "[employer] Information     [email or web address] gives you secure access to your electronic health record. If you see a primary care provider, you can also send messages to your care team and make appointments. If you have questions, please call your primary care clinic.  If you do not have a primary care provider, please call [phone] and they will assist you.        Care EveryWhere ID     This is your Care EveryWhere ID. This could be used by other organizations to access your [place] medical records  [id number]        Your Vitals Were     Pulse Height BMI (Body Mass Index)             60 1.854 m (6' 0.99\") 29.16 kg/m2          Blood Pressure from Last 3 Encounters:   03/20/17 110/68   03/10/17 120/70   01/05/17 122/64    Weight from Last 3 Encounters:   03/20/17 100.2 kg (221 lb)   03/10/17 99.8 kg (220 lb)   01/05/17 104.3 kg (230 lb)              We Performed the Following     Follow-Up with Vascular Cardiologist        Primary Care Provider Office Phone # Fax #    Mg Nelson [phone]       [facility] [address] [facility] [address]        Goals        General    I " will find a caregiver support group to attend (pt-stated)     Notes - Note created  4/17/2014  8:49 AM by Beth Morales RN    As of today's date 4/17/2014 goal is met at 0 - 25%.   Goal Status:  Active        Thank you!     Thank you for choosing [facility] PHYSICIANS HEART AT [place]  for your care. Our goal is always to provide you with excellent care. Hearing back from our patients is one way we can continue to improve our services. Please take a few minutes to complete the written survey that you may receive in the mail after your visit with us. Thank you!             Your Updated Medication List - Protect others around you: Learn how to safely use, store and throw away your medicines at www.disposemymeds.org.          This list is accurate as of: 3/20/17 11:29 AM.  Always use your most recent med list.                   Brand Name Dispense Instructions for use    buPROPion 150 MG 24 hr tablet    WELLBUTRIN XL    90 tablet    TAKE ONE TABLET BY MOUTH IN THE MORNING       carvedilol 6.25 MG tablet    COREG    180 tablet    Take 1 tablet (6.25 mg) by mouth 2 times daily (with meals)       clindamycin 1 % lotion    CLINDAMAX    60 mL    Apply topically 2 times daily [facility], [phone], re faxed order from 12/15/16.       FIBER 7 Powd      As directed       finasteride 5 MG tablet    PROSCAR    90 tablet    Take 1 tablet (5 mg) by mouth daily       * furosemide 40 MG tablet    LASIX    45 tablet    TAKE 1 TABLET EVERY OTHER DAY  (ALTERNATE  WITH  LASIX 20MG TABLET)       * furosemide 20 MG tablet    LASIX    45 tablet    TAKE 1 TABLET EVERY OTHER DAY (ALTERNATE WITH LASIX 40MG)       glucosamine-chondroitinoitin 5666-5602 MG/30ML Liqd      Take 2 tablets by mouth daily       HYDROcodone-acetaminophen 5-325 MG per tablet    NORCO    40 tablet    Take 1-2 tablets by mouth every 4 hours as needed for other (Moderate to Severe Pain)       levothyroxine 100 MCG tablet    SYNTHROID/LEVOTHROID    90  tablet    TAKE 1 TABLET EVERY DAY (NEED LAB APPOINTMENT)       LORazepam 1 MG tablet    ATIVAN    30 tablet    Take 0.5-1 tablets (0.5-1 mg) by mouth every 8 hours as needed for anxiety       MULTIVITAMIN PO      Take  by mouth.       omega-3 fatty acids 1200 MG capsule      Take 1 capsule by mouth 2 times daily.       omeprazole 20 MG CR capsule    priLOSEC    180 capsule    Take 1 capsule (20 mg) by mouth 2 times daily       order for DME     1 Device    Auto-CPAP:Max 9 cm H2OMin 9 cm H2O Continuous Lifetime need and heated humidity.       potassium chloride SA 20 MEQ CR tablet    K-DUR/KLOR-CON M    90 tablet    TAKE 1 TABLET (20 MEQ) BY MOUTH DAILY       PRADAXA ANTICOAGULANT 75 MG Caps capsule   Generic drug:  dabigatran ANTICOAGULANT      Take 150 mg by mouth 2 times daily. Store in original 's bottle or blister pack; use within 120 days of opening.       simvastatin 20 MG tablet    ZOCOR    90 tablet    TAKE 1 TABLET AT BEDTIME       sucralfate 1 GM tablet    CARAFATE    180 tablet    Take 1 tablet (1 g) by mouth 2 times daily       tamsulosin 0.4 MG capsule    FLOMAX    90 capsule    TAKE 1 CAPSULE (0.4 MG) BY MOUTH DAILY       * Notice:  This list has 2 medication(s) that are the same as other medications prescribed for you. Read the directions carefully, and ask your doctor or other care provider to review them with you.

## 2017-03-20 NOTE — LETTER
3/20/2017    Mg Neslon MD  [facility]   [address]  [place] [address]    RE: Bairon Foster       Dear Colleague,    I had the pleasure of seeing Bairon Foster in the [facility].    Vascular Cardiology Progress Note          Assessment and Plan:     1. Venous varicosities and lower extremity discomfort, nonexertional with edema and pigmentation, CEAP class IV    Trial of compression stockings, measured and given today.    Venous competency testing and follow-up in 2-3 months with Anushka Tejada to consider venous ablation.  If venous ablation considered, may do rest JACY prior to procedure to rule out severe PAD.    2. Ischemic cardiomyopathy, followed by Dr. Hope    3. Onychomycosis bilateral lower extremities    Follow with Dr. Villegas to discuss if oral terbinafine might be an option.    This note was transcribed using electronic voice recognition software and there may be typographical errors present.                Interval History:   The patient is a very pleasant and sharp 86 year old whom I'm meeting for the first time.  He has ischemic cardiomyopathy and some lower extremity discomfort and skin changes.  He gets some intermittent edema that improves with elevation.  He has some numbness and tingling of his feet as well.  Doppler portable suggest triphasic right DP and biphasic left DP.  Patient denies any claudication with exertion.  He has concerns about his toenails which appear to have onychomycosis.                      Review of Systems:   Review of Systems:  Skin:  Positive for     Eyes:  Positive for glasses;cataracts  ENT:  Negative    Respiratory:  Positive for CPAP;sleep apnea  Cardiovascular:    Positive for;edema (heaviness when walking a distance)  Gastroenterology: Negative    Genitourinary:  Negative    Musculoskeletal:  Positive for arthritis  Neurologic:  Positive for stroke;numbness or tingling of feet  Psychiatric:   "Positive for anxiety  Heme/Lymph/Imm:  Negative    Endocrine:  Positive for thyroid disorder              Physical Exam:     Vitals: /68  Pulse 60  Ht 1.854 m (6' 0.99\")  Wt 100.2 kg (221 lb)  BMI 29.16 kg/m2  Constitutional:  cooperative, alert and oriented, well developed, well nourished, in no acute distress        Skin:  warm and dry to the touch, no apparent skin lesions or masses noted        Head:  normocephalic, no masses or lesions        Eyes:  pupils equal and round, conjunctivae and lids unremarkable, sclera white, no xanthalasma, EOMS intact, no nystagmus        ENT:  no pallor or cyanosis, dentition good        Neck:  carotid pulses are full and equal bilaterally, JVP normal, no carotid bruit, no thyromegaly        Chest:  normal breath sounds, clear to auscultation, normal A-P diameter, normal symmetry, normal respiratory excursion, no use of accessory muscles        Cardiac: regular rhythm       grade 1;LLSB;early systolic murmur          Abdomen:      benign    Vascular:                                   Doppler triphasic R DP, Blunted biphasic L DP on portable to auscultation.    Extremities and Back:  no deformities, clubbing, cyanosis, erythema observed stasis pigmentation venous varicosities    Neurological:  affect appropriate, oriented to time, person and place;no gross motor deficits                 Medications:     Current Outpatient Prescriptions   Medication Sig Dispense Refill     Misc Natural Products (FIBER 7) POWD As directed       carvedilol (COREG) 6.25 MG tablet Take 1 tablet (6.25 mg) by mouth 2 times daily (with meals) 180 tablet 1     finasteride (PROSCAR) 5 MG tablet Take 1 tablet (5 mg) by mouth daily 90 tablet 1     levothyroxine (SYNTHROID/LEVOTHROID) 100 MCG tablet TAKE 1 TABLET EVERY DAY (NEED LAB APPOINTMENT) 90 tablet 1     sucralfate (CARAFATE) 1 GM tablet Take 1 tablet (1 g) by mouth 2 times daily 180 tablet 1     furosemide (LASIX) 20 MG tablet TAKE 1 TABLET " EVERY OTHER DAY (ALTERNATE WITH LASIX 40MG) 45 tablet 2     potassium chloride SA (K-DUR/KLOR-CON M) 20 MEQ CR tablet TAKE 1 TABLET (20 MEQ) BY MOUTH DAILY 90 tablet 2     tamsulosin (FLOMAX) 0.4 MG capsule TAKE 1 CAPSULE (0.4 MG) BY MOUTH DAILY 90 capsule 2     omeprazole (PRILOSEC) 20 MG CR capsule Take 1 capsule (20 mg) by mouth 2 times daily 180 capsule 3     simvastatin (ZOCOR) 20 MG tablet TAKE 1 TABLET AT BEDTIME 90 tablet 3     clindamycin (CLINDAMAX) 1 % lotion Apply topically 2 times daily [facility], [phone], re faxed order from 12/15/16. 60 mL 1     furosemide (LASIX) 40 MG tablet TAKE 1 TABLET EVERY OTHER DAY  (ALTERNATE  WITH  LASIX 20MG TABLET) 45 tablet 2     buPROPion (WELLBUTRIN XL) 150 MG 24 hr tablet TAKE ONE TABLET BY MOUTH IN THE MORNING 90 tablet 3     HYDROcodone-acetaminophen (NORCO) 5-325 MG per tablet Take 1-2 tablets by mouth every 4 hours as needed for other (Moderate to Severe Pain) 40 tablet 0     LORazepam (ATIVAN) 1 MG tablet Take 0.5-1 tablets (0.5-1 mg) by mouth every 8 hours as needed for anxiety 30 tablet 5     Multiple Vitamins-Minerals (MULTIVITAMIN OR) Take  by mouth.       dabigatran ANTICOAGULANT (PRADAXA ANTICOAGULANT) 75 MG CAPS BLISTER PACK Take 150 mg by mouth 2 times daily. Store in original 's bottle or blister pack; use within 120 days of opening.       Glucosamine-Chondroitin 5941-9294 MG/30ML LIQD Take 2 tablets by mouth daily        Omega-3 Fatty Acids (FISH OIL) 1200 MG capsule Take 1 capsule by mouth 2 times daily.       ORDER FOR DME Auto-CPAP:Max 9 cm H2OMin 9 cm H2O  Continuous Lifetime need and heated humidity.    1 Device 0                Data:   All laboratory data reviewed  No results found for this or any previous visit (from the past 24 hour(s)).    All laboratory data reviewed  Lab Results   Component Value Date    CHOL 107 12/30/2016     Lab Results   Component Value Date    HDL 54 12/30/2016     Lab Results   Component Value Date     LDL 48 12/30/2016    LDL 62.4 01/25/2012     Lab Results   Component Value Date    TRIG 24 12/30/2016     Lab Results   Component Value Date    CHOLHDLRATIO 2.1 06/15/2015     TSH   Date Value Ref Range Status   08/25/2016 1.20 0.40 - 5.00 mU/L Final     Last Basic Metabolic Panel:  Lab Results   Component Value Date     08/25/2016      Lab Results   Component Value Date    POTASSIUM 4.0 12/30/2016     Lab Results   Component Value Date    CHLORIDE 105 08/25/2016     Lab Results   Component Value Date    TEODORO 9.0 08/25/2016     Lab Results   Component Value Date    CO2 31 08/25/2016     Lab Results   Component Value Date    BUN 20 08/25/2016    BUN 18.2 09/05/2012     Lab Results   Component Value Date    CR 1.02 08/25/2016     Lab Results   Component Value Date     08/25/2016     Lab Results   Component Value Date    WBC 3.0 08/25/2016     Lab Results   Component Value Date    RBC 3.55 08/25/2016     Lab Results   Component Value Date    HGB 11.5 08/25/2016     Lab Results   Component Value Date    HCT 35.4 08/25/2016     Lab Results   Component Value Date     08/25/2016     Lab Results   Component Value Date    MCH 32.4 08/25/2016     Lab Results   Component Value Date    MCHC 32.5 08/25/2016     Lab Results   Component Value Date    RDW 14.7 08/25/2016     Lab Results   Component Value Date    PLT 80 08/25/2016     Thank you for allowing me to participate in the care of your patient.    Sincerely,     London Carrizales MD     [facility]

## 2017-03-20 NOTE — PROGRESS NOTES
"Vascular Cardiology Progress Note          Assessment and Plan:     1. Venous varicosities and lower extremity discomfort, nonexertional with edema and pigmentation, CEAP class IV    Trial of compression stockings, measured and given today.    Venous competency testing and follow-up in 2-3 months with Anushka Tejada to consider venous ablation.  If venous ablation considered, may do rest JACY prior to procedure to rule out severe PAD.    2. Ischemic cardiomyopathy, followed by Dr. Hope    3. Onychomycosis bilateral lower extremities    Follow with Dr. Villegas to discuss if oral terbinafine might be an option.    This note was transcribed using electronic voice recognition software and there may be typographical errors present.                Interval History:   The patient is a very pleasant and sharp 86 year old whom I'm meeting for the first time.  He has ischemic cardiomyopathy and some lower extremity discomfort and skin changes.  He gets some intermittent edema that improves with elevation.  He has some numbness and tingling of his feet as well.  Doppler portable suggest triphasic right DP and biphasic left DP.  Patient denies any claudication with exertion.  He has concerns about his toenails which appear to have onychomycosis.                      Review of Systems:   Review of Systems:  Skin:  Positive for     Eyes:  Positive for glasses;cataracts  ENT:  Negative    Respiratory:  Positive for CPAP;sleep apnea  Cardiovascular:    Positive for;edema (heaviness when walking a distance)  Gastroenterology: Negative    Genitourinary:  Negative    Musculoskeletal:  Positive for arthritis  Neurologic:  Positive for stroke;numbness or tingling of feet  Psychiatric:  Positive for anxiety  Heme/Lymph/Imm:  Negative    Endocrine:  Positive for thyroid disorder              Physical Exam:     Vitals: /68  Pulse 60  Ht 1.854 m (6' 0.99\")  Wt 100.2 kg (221 lb)  BMI 29.16 kg/m2  Constitutional:  cooperative, alert " and oriented, well developed, well nourished, in no acute distress        Skin:  warm and dry to the touch, no apparent skin lesions or masses noted        Head:  normocephalic, no masses or lesions        Eyes:  pupils equal and round, conjunctivae and lids unremarkable, sclera white, no xanthalasma, EOMS intact, no nystagmus        ENT:  no pallor or cyanosis, dentition good        Neck:  carotid pulses are full and equal bilaterally, JVP normal, no carotid bruit, no thyromegaly        Chest:  normal breath sounds, clear to auscultation, normal A-P diameter, normal symmetry, normal respiratory excursion, no use of accessory muscles        Cardiac: regular rhythm       grade 1;LLSB;early systolic murmur          Abdomen:      benign    Vascular:                                   Doppler triphasic R DP, Blunted biphasic L DP on portable to auscultation.    Extremities and Back:  no deformities, clubbing, cyanosis, erythema observed stasis pigmentation venous varicosities    Neurological:  affect appropriate, oriented to time, person and place;no gross motor deficits                 Medications:     Current Outpatient Prescriptions   Medication Sig Dispense Refill     Misc Natural Products (FIBER 7) POWD As directed       carvedilol (COREG) 6.25 MG tablet Take 1 tablet (6.25 mg) by mouth 2 times daily (with meals) 180 tablet 1     finasteride (PROSCAR) 5 MG tablet Take 1 tablet (5 mg) by mouth daily 90 tablet 1     levothyroxine (SYNTHROID/LEVOTHROID) 100 MCG tablet TAKE 1 TABLET EVERY DAY (NEED LAB APPOINTMENT) 90 tablet 1     sucralfate (CARAFATE) 1 GM tablet Take 1 tablet (1 g) by mouth 2 times daily 180 tablet 1     furosemide (LASIX) 20 MG tablet TAKE 1 TABLET EVERY OTHER DAY (ALTERNATE WITH LASIX 40MG) 45 tablet 2     potassium chloride SA (K-DUR/KLOR-CON M) 20 MEQ CR tablet TAKE 1 TABLET (20 MEQ) BY MOUTH DAILY 90 tablet 2     tamsulosin (FLOMAX) 0.4 MG capsule TAKE 1 CAPSULE (0.4 MG) BY MOUTH DAILY 90 capsule  2     omeprazole (PRILOSEC) 20 MG CR capsule Take 1 capsule (20 mg) by mouth 2 times daily 180 capsule 3     simvastatin (ZOCOR) 20 MG tablet TAKE 1 TABLET AT BEDTIME 90 tablet 3     clindamycin (CLINDAMAX) 1 % lotion Apply topically 2 times [phone], re faxed order from 12/15/16. 60 mL 1     furosemide (LASIX) 40 MG tablet TAKE 1 TABLET EVERY OTHER DAY  (ALTERNATE  WITH  LASIX 20MG TABLET) 45 tablet 2     buPROPion (WELLBUTRIN XL) 150 MG 24 hr tablet TAKE ONE TABLET BY MOUTH IN THE MORNING 90 tablet 3     HYDROcodone-acetaminophen (NORCO) 5-325 MG per tablet Take 1-2 tablets by mouth every 4 hours as needed for other (Moderate to Severe Pain) 40 tablet 0     LORazepam (ATIVAN) 1 MG tablet Take 0.5-1 tablets (0.5-1 mg) by mouth every 8 hours as needed for anxiety 30 tablet 5     Multiple Vitamins-Minerals (MULTIVITAMIN OR) Take  by mouth.       dabigatran ANTICOAGULANT (PRADAXA ANTICOAGULANT) 75 MG CAPS BLISTER PACK Take 150 mg by mouth 2 times daily. Store in original 's bottle or blister pack; use within 120 days of opening.       Glucosamine-Chondroitin 3124-9937 MG/30ML LIQD Take 2 tablets by mouth daily        Omega-3 Fatty Acids (FISH OIL) 1200 MG capsule Take 1 capsule by mouth 2 times daily.       ORDER FOR DME Auto-CPAP:Max 9 cm H2OMin 9 cm H2O  Continuous Lifetime need and heated humidity.    1 Device 0                Data:   All laboratory data reviewed  No results found for this or any previous visit (from the past 24 hour(s)).    All laboratory data reviewed  Lab Results   Component Value Date    CHOL 107 12/30/2016     Lab Results   Component Value Date    HDL 54 12/30/2016     Lab Results   Component Value Date    LDL 48 12/30/2016    LDL 62.4 01/25/2012     Lab Results   Component Value Date    TRIG 24 12/30/2016     Lab Results   Component Value Date    CHOLHDLRATIO 2.1 06/15/2015     TSH   Date Value Ref Range Status   08/25/2016 1.20 0.40 - 5.00 mU/L Final      Last Basic Metabolic Panel:  Lab Results   Component Value Date     08/25/2016      Lab Results   Component Value Date    POTASSIUM 4.0 12/30/2016     Lab Results   Component Value Date    CHLORIDE 105 08/25/2016     Lab Results   Component Value Date    TEODORO 9.0 08/25/2016     Lab Results   Component Value Date    CO2 31 08/25/2016     Lab Results   Component Value Date    BUN 20 08/25/2016    BUN 18.2 09/05/2012     Lab Results   Component Value Date    CR 1.02 08/25/2016     Lab Results   Component Value Date     08/25/2016     Lab Results   Component Value Date    WBC 3.0 08/25/2016     Lab Results   Component Value Date    RBC 3.55 08/25/2016     Lab Results   Component Value Date    HGB 11.5 08/25/2016     Lab Results   Component Value Date    HCT 35.4 08/25/2016     Lab Results   Component Value Date     08/25/2016     Lab Results   Component Value Date    MCH 32.4 08/25/2016     Lab Results   Component Value Date    MCHC 32.5 08/25/2016     Lab Results   Component Value Date    RDW 14.7 08/25/2016     Lab Results   Component Value Date    PLT 80 08/25/2016

## 2017-03-20 NOTE — PATIENT INSTRUCTIONS
We can check the artery flow to the legs at any time, especially if you notice heaviness in the legs when walking in the future. For now, I think it's okay.    Let's check the veins with an ultrasound to see if leakage in the veins from valves that don't seal properly are contributing. We will do compression stockings as well and have you see us back in vein clinic 2-3 months with Anushka, my nurse practitioner.    Try a barrier cream like AQUAPHOR for the dry skin. You might consider LAMASIL either cream or pills (prescription for the pills) with Dr. Villegas for the toenails.

## 2017-03-22 ENCOUNTER — TELEPHONE (OUTPATIENT)
Dept: CARDIOLOGY | Facility: CLINIC | Age: 82
End: 2017-03-22

## 2017-03-22 NOTE — TELEPHONE ENCOUNTER
"Patient calling for NM lexiscan results..   \" Tomographic Findings  Overall, the study quality is adequate . On stress images there is  mild intensity distal inferolateral wall photopenic defect. On both  stress and rest images there is mild to moderate intensity basal  inferior wall photopenic defect. The basal inferior wall photopenic  defect appears worse on rest images compared to stress images  indicating this is likely attenuation artifact. Gated images  demonstrated no regional wall motion abnormalities. The left  ventricular ejection fraction was calculated to be 47% with stress.  TID was visually absent.\"      Review results with the patient..  Informed patient that I will review the results with Dr. Hope and update him if there are any changes. Patient requesting a emailed/mychart result of the test.     Dr. Hope has reviewed nuc results. No cardiac issues.  Patient states he has been having problems walking a straight line.  I instructed him to go see his PCP.  "

## 2017-03-24 ENCOUNTER — OFFICE VISIT (OUTPATIENT)
Dept: FAMILY MEDICINE | Facility: CLINIC | Age: 82
End: 2017-03-24
Payer: COMMERCIAL

## 2017-03-24 VITALS
HEART RATE: 60 BPM | DIASTOLIC BLOOD PRESSURE: 84 MMHG | SYSTOLIC BLOOD PRESSURE: 132 MMHG | BODY MASS INDEX: 29.09 KG/M2 | RESPIRATION RATE: 14 BRPM | OXYGEN SATURATION: 96 % | HEIGHT: 73 IN | TEMPERATURE: 97.9 F | WEIGHT: 219.5 LBS

## 2017-03-24 DIAGNOSIS — B35.1 ONYCHOMYCOSIS: Primary | ICD-10-CM

## 2017-03-24 DIAGNOSIS — E66.3 OVERWEIGHT (BMI 25.0-29.9): ICD-10-CM

## 2017-03-24 PROCEDURE — 99213 OFFICE O/P EST LOW 20 MIN: CPT | Performed by: FAMILY MEDICINE

## 2017-03-24 NOTE — PROGRESS NOTES
"  SUBJECTIVE:                                                    Bairon Foster is a 86 year old male who presents to clinic today for the following health issues:        Toe nail Fungus and dry skin      Duration: Ongoing    Description (location/character/radiation): Bilateral feet- all toes -thick nails     Intensity:  moderate    Accompanying signs and symptoms: Toes are numb    History (similar episodes/previous evaluation): None    Precipitating or alleviating factors: None    Therapies tried and outcome: None       OVERWEIGHT    -has considerable comorbidities   -sedentary from them     Problem list and histories reviewed & adjusted, as indicated.  Additional history: as documented    Labs reviewed in EPIC    Reviewed and updated as needed this visit by clinical staff  Tobacco  Allergies  Meds  Med Hx  Surg Hx  Fam Hx  Soc Hx      Reviewed and updated as needed this visit by Provider         ROS:  C: NEGATIVE for fever, chills, change in weight  I: NEGATIVE for worrisome rashes, moles or lesions--all toes- severe nail fungus   E: NEGATIVE for vision changes or irritation  E/M: NEGATIVE for ear, mouth and throat problems  R: NEGATIVE for significant cough or SOB  B: NEGATIVE for masses, tenderness or discharge  CV: NEGATIVE for chest pain, palpitations or peripheral edema  GI: NEGATIVE for nausea, abdominal pain, heartburn, or change in bowel habits  : NEGATIVE for frequency, dysuria, or hematuria  M: NEGATIVE for significant arthralgias or myalgia  N: NEGATIVE for weakness, dizziness or paresthesias  E: NEGATIVE for temperature intolerance, skin/hair changes  H: NEGATIVE for bleeding problems  P: NEGATIVE for changes in mood or affect    OBJECTIVE:                                                    /84 (BP Location: Left arm, Patient Position: Chair, Cuff Size: Adult Regular)  Pulse 60  Temp 97.9  F (36.6  C)  Resp 14  Ht 6' 1\" (1.854 m)  Wt 219 lb 8 oz (99.6 kg)  SpO2 96%  BMI 28.96 " kg/m2  Body mass index is 28.96 kg/(m^2).  GENERAL: healthy, alert and no distress  EYES: Eyes grossly normal to inspection, PERRL and conjunctivae and sclerae normal  RESP: lungs clear to auscultation - no rales, rhonchi or wheezes  MS: no gross musculoskeletal defects noted, no edema  SKIN: no suspicious lesions or rashes-- all toes : severe thickened nails   NEURO: Normal strength and tone, mentation intact and speech normal  PSYCH: mentation appears normal, affect normal/bright    Diagnostic Test Results:  none      ASSESSMENT/PLAN:                                                              ICD-10-CM    1. Onychomycosis B35.1    2. Overweight (BMI 25.0-29.9) E66.3        Patient Instructions   1. Apply mentholatum ie Deacon's vaporub  2 x  A day  And cover --sock  Discussed all with pt  And the patient expresses understanding  The risk of taking the antifungals manju with his mult medical diseases and that they are not that effectiv   2.  Weight Loss Tips  1. Do not eat after 6 hrs before your expected bedtime  2. Have your heaviest meal for breakfast, a slightly lighter meal at lunch and a snack 6 hrs before bed  3. No sugar/calorie drinks except milk ie no fruit juice, pop, alcohol.  4. Drink milk 30min before meals to decrease your hunger. Also it is excellent as part of your last meal of the day snack  5. Drink lots of water  6. Increase fiber in diet: all bran cereal, salads, popcorn etc  7. Have only one small serving of fruit a day about 1/2 cup (as this is high in sugar)  8. EXERCISE is the bottom line. Without it, you will gain weight even on a low calorie diet. Best if done 2-3X a day as can    Being overweight contributes to high blood pressure and high cholesterol, both of which cause heart attacks, strokes and kidney failure, prediabetes and diabetes, arthritis, and liver disease         Aura Villegas MD  [facility]

## 2017-03-24 NOTE — PATIENT INSTRUCTIONS
1. Apply mentholatum ie Deacon's vaporub  2 x  A day  And cover --sock    2.  Weight Loss Tips  1. Do not eat after 6 hrs before your expected bedtime  2. Have your heaviest meal for breakfast, a slightly lighter meal at lunch and a snack 6 hrs before bed  3. No sugar/calorie drinks except milk ie no fruit juice, pop, alcohol.  4. Drink milk 30min before meals to decrease your hunger. Also it is excellent as part of your last meal of the day snack  5. Drink lots of water  6. Increase fiber in diet: all bran cereal, salads, popcorn etc  7. Have only one small serving of fruit a day about 1/2 cup (as this is high in sugar)  8. EXERCISE is the bottom line. Without it, you will gain weight even on a low calorie diet. Best if done 2-3X a day as can    Being overweight contributes to high blood pressure and high cholesterol, both of which cause heart attacks, strokes and kidney failure, prediabetes and diabetes, arthritis, and liver disease

## 2017-03-24 NOTE — MR AVS SNAPSHOT
After Visit Summary   3/24/2017    Bairon Foster    MRN: [id number]           Patient Information     Date Of Birth          2/6/1931        Visit Information        Provider Department      3/24/2017 4:00 PM Aura Villegas MD [facility]        Today's Diagnoses     Onychomycosis    -  1      Care Instructions    1. Apply mentholatum ie Deacon's vaporub  2 x  A day  And cover --sock    2.  Weight Loss Tips  1. Do not eat after 6 hrs before your expected bedtime  2. Have your heaviest meal for breakfast, a slightly lighter meal at lunch and a snack 6 hrs before bed  3. No sugar/calorie drinks except milk ie no fruit juice, pop, alcohol.  4. Drink milk 30min before meals to decrease your hunger. Also it is excellent as part of your last meal of the day snack  5. Drink lots of water  6. Increase fiber in diet: all bran cereal, salads, popcorn etc  7. Have only one small serving of fruit a day about 1/2 cup (as this is high in sugar)  8. EXERCISE is the bottom line. Without it, you will gain weight even on a low calorie diet. Best if done 2-3X a day as can    Being overweight contributes to high blood pressure and high cholesterol, both of which cause heart attacks, strokes and kidney failure, prediabetes and diabetes, arthritis, and liver disease           Follow-ups after your visit        Your next 10 appointments already scheduled     Mar 28, 2017  1:00 PM CDT   US LOWER EXTREMITY VENOUS COMPETENCY BILATERAL with SUVUS1   [facility] AT [place] ([facility])    [address] [place] [address] [phone]           Please bring a list of your medicines (including vitamins, minerals and over-the-counter drugs). Also, tell your doctor about any allergies you may have. Wear comfortable clothes and leave your valuables at home.  You do not need to do anything special to prepare for your exam.  Please  call the Imaging Department at your exam site with any questions.            Jun 13, 2017 10:30 AM CDT   Phone Device Check with ARRIETA TECH1   [facility] AT [place] ([facility])    [address]  [place] [id number] [phone]            Jun 16, 2017 10:10 AM CDT   Return Visit with WILLIS Spence CNP   [facility]ART AT [place] ([facility])    [address] [place] [address] [address]  [place] [id number] [phone]              Who to contact     If you have questions or need follow up information about today's clinic visit or your schedule please contact [facility] directly at [phone].  Normal or non-critical lab and imaging results will be communicated to you by Covertixhart, letter or phone within 4 business days after the clinic has received the results. If you do not hear from us within 7 days, please contact the clinic through Covertixhart or phone. If you have a critical or abnormal lab result, we will notify you by phone as soon as possible.  Submit refill requests through [email or web address] or call your pharmacy and they will forward the refill request to us. Please allow 3 business days for your refill to be completed.          Additional Information About Your Visit        [email or web address] Information     [email or web address] gives you secure access to your electronic health record. If you see a primary care provider, you can also send messages to your care team and make appointments. If you have questions, please call your primary care clinic.  If you do not have a primary care provider, please call [phone] and they will assist you.        Care EveryWhere ID     This is your Care EveryWhere ID. This could be used by other organizations to access your [facility] medical records  [id number]        Your Vitals Were     Pulse Temperature Respirations Height Pulse Oximetry BMI (Body Mass Index)    60  "97.9  F (36.6  C) 14 6' 1\" (1.854 m) 96% 28.96 kg/m2       Blood Pressure from Last 3 Encounters:   03/24/17 132/84   03/20/17 110/68   03/10/17 120/70    Weight from Last 3 Encounters:   03/24/17 219 lb 8 oz (99.6 kg)   03/20/17 221 lb (100.2 kg)   03/10/17 220 lb (99.8 kg)              Today, you had the following     No orders found for display       Primary Care Provider Office Phone # Fax #    Mg Nelson [phone]       [facility] [address] [facility] [address]        Goals        General    I will find a caregiver support group to attend (pt-stated)     Notes - Note created  4/17/2014  8:49 AM by Beth Morales RN    As of today's date 4/17/2014 goal is met at 0 - 25%.   Goal Status:  Active        Thank you!     Thank you for choosing [facility]  for your care. Our goal is always to provide you with excellent care. Hearing back from our patients is one way we can continue to improve our services. Please take a few minutes to complete the written survey that you may receive in the mail after your visit with us. Thank you!             Your Updated Medication List - Protect others around you: Learn how to safely use, store and throw away your medicines at www.disposemymeds.org.          This list is accurate as of: 3/24/17  4:41 PM.  Always use your most recent med list.                   Brand Name Dispense Instructions for use    buPROPion 150 MG 24 hr tablet    WELLBUTRIN XL    90 tablet    TAKE ONE TABLET BY MOUTH IN THE MORNING       carvedilol 6.25 MG tablet    COREG    180 tablet    Take 1 tablet (6.25 mg) by mouth 2 times daily (with meals)       clindamycin 1 % lotion    CLINDAMAX    60 mL    Apply topically 2 times daily [phone], re faxed order from 12/15/16.       COMPRESSION STOCKINGS     2 each    1 each daily       FIBER 7 Powd      As directed       finasteride 5 MG tablet    PROSCAR    90 tablet    " Take 1 tablet (5 mg) by mouth daily       * furosemide 40 MG tablet    LASIX    45 tablet    TAKE 1 TABLET EVERY OTHER DAY  (ALTERNATE  WITH  LASIX 20MG TABLET)       * furosemide 20 MG tablet    LASIX    45 tablet    TAKE 1 TABLET EVERY OTHER DAY (ALTERNATE WITH LASIX 40MG)       glucosamine-chondroitinoitin 5066-7496 MG/30ML Liqd      Take 2 tablets by mouth daily       HYDROcodone-acetaminophen 5-325 MG per tablet    NORCO    40 tablet    Take 1-2 tablets by mouth every 4 hours as needed for other (Moderate to Severe Pain)       levothyroxine 100 MCG tablet    SYNTHROID/LEVOTHROID    90 tablet    TAKE 1 TABLET EVERY DAY (NEED LAB APPOINTMENT)       LORazepam 1 MG tablet    ATIVAN    30 tablet    Take 0.5-1 tablets (0.5-1 mg) by mouth every 8 hours as needed for anxiety       MULTIVITAMIN PO      Take  by mouth.       omega-3 fatty acids 1200 MG capsule      Take 1 capsule by mouth 2 times daily.       omeprazole 20 MG CR capsule    priLOSEC    180 capsule    Take 1 capsule (20 mg) by mouth 2 times daily       order for DME     1 Device    Auto-CPAP:Max 9 cm H2OMin 9 cm H2O Continuous Lifetime need and heated humidity.       potassium chloride SA 20 MEQ CR tablet    K-DUR/KLOR-CON M    90 tablet    TAKE 1 TABLET (20 MEQ) BY MOUTH DAILY       PRADAXA ANTICOAGULANT 75 MG Caps capsule   Generic drug:  dabigatran ANTICOAGULANT      Take 150 mg by mouth 2 times daily. Store in original 's bottle or blister pack; use within 120 days of opening.       simvastatin 20 MG tablet    ZOCOR    90 tablet    TAKE 1 TABLET AT BEDTIME       sucralfate 1 GM tablet    CARAFATE    180 tablet    Take 1 tablet (1 g) by mouth 2 times daily       tamsulosin 0.4 MG capsule    FLOMAX    90 capsule    TAKE 1 CAPSULE (0.4 MG) BY MOUTH DAILY       * Notice:  This list has 2 medication(s) that are the same as other medications prescribed for you. Read the directions carefully, and ask your doctor or other care provider to review  them with you.

## 2017-03-24 NOTE — NURSING NOTE
"Chief Complaint   Patient presents with     Toenail     Toenail fungus and dry skin, ongoing       Initial /84 (BP Location: Left arm, Patient Position: Chair, Cuff Size: Adult Regular)  Pulse 60  Temp 97.9  F (36.6  C)  Resp 14  Ht 6' 1\" (1.854 m)  Wt 219 lb 8 oz (99.6 kg)  SpO2 96%  BMI 28.96 kg/m2 Estimated body mass index is 28.96 kg/(m^2) as calculated from the following:    Height as of this encounter: 6' 1\" (1.854 m).    Weight as of this encounter: 219 lb 8 oz (99.6 kg).  Medication Reconciliation: complete     Roxanna Ocampo LPN  "

## 2017-03-28 ENCOUNTER — RADIANT APPOINTMENT (OUTPATIENT)
Dept: VASCULAR ULTRASOUND | Facility: CLINIC | Age: 82
End: 2017-03-28
Attending: INTERNAL MEDICINE
Payer: COMMERCIAL

## 2017-03-28 DIAGNOSIS — I83.813 VARICOSE VEINS OF BILATERAL LOWER EXTREMITIES WITH PAIN: ICD-10-CM

## 2017-03-28 PROCEDURE — 93970 EXTREMITY STUDY: CPT | Performed by: INTERNAL MEDICINE

## 2017-05-15 DIAGNOSIS — N40.0 BPH (BENIGN PROSTATIC HYPERPLASIA): ICD-10-CM

## 2017-05-15 DIAGNOSIS — E03.9 HYPOTHYROIDISM: ICD-10-CM

## 2017-05-15 NOTE — TELEPHONE ENCOUNTER
FINASTERIDE 5 MG Tablet  Last Written Prescription Date: 02/21/17  Last Fill Quantity: 90,  # refills: 1   Last Office Visit with [facility], [facility] or [facility] prescribing provider: 03/24/17                                         Next 5 appointments (look out 90 days)     Jun 16, 2017 10:10 AM CDT   Return Visit with WILLIS Spence CNP   [facility] ([facility])    [id number]  [facility] [address]   [phone]                  LEVOTHYROXINE SODIUM 100 MCG Tablet  Last Written Prescription Date: 02/21/17  Last Quantity: 90, # refills: 1  Last Office Visit with [facility], [facility] or [facility] prescribing provider: 03/24/17   Next 5 appointments (look out 90 days)     Jun 16, 2017 10:10 AM CDT   Return Visit with WILLIS Spence CNP   [facility] AT [place] ([facility])    [facility] [id number]  [place] [address]   [phone]                   TSH   Date Value Ref Range Status   08/25/2016 1.20 0.40 - 5.00 mU/L Final

## 2017-05-16 DIAGNOSIS — N40.0 BPH (BENIGN PROSTATIC HYPERPLASIA): ICD-10-CM

## 2017-05-16 RX ORDER — FINASTERIDE 5 MG/1
TABLET, FILM COATED ORAL
Qty: 90 TABLET | Refills: 2 | Status: SHIPPED | OUTPATIENT
Start: 2017-05-16 | End: 2017-05-16

## 2017-05-16 RX ORDER — LEVOTHYROXINE SODIUM 100 UG/1
TABLET ORAL
Qty: 90 TABLET | Refills: 0 | Status: SHIPPED | OUTPATIENT
Start: 2017-05-16 | End: 2017-09-21

## 2017-05-16 NOTE — TELEPHONE ENCOUNTER
Prescription approved per [facility] Refill Protocol.  Felicia Payne RN- Triage [employer]

## 2017-05-17 RX ORDER — FINASTERIDE 5 MG/1
TABLET, FILM COATED ORAL
Qty: 90 TABLET | Refills: 2 | Status: SHIPPED | OUTPATIENT
Start: 2017-05-17 | End: 2017-10-30

## 2017-05-17 NOTE — TELEPHONE ENCOUNTER
finasteride (PROSCAR) 5 MG     Last Written Prescription Date: 5/16/17  Last Fill Quantity: 90, # refills: 2    Last Office Visit with [facility] [place] prescribing provider:  3/24/17   Future Office Visit:    Next 5 appointments (look out 90 days)     Jun 16, 2017 10:10 AM CDT   Return Visit with WLILIS Spence CNP   [facility] [place] ([facility])    [address] [place] [id number] [phone]                  BP Readings from Last 3 Encounters:   03/24/17 132/84   03/20/17 110/68   03/10/17 120/70

## 2017-06-13 ENCOUNTER — ALLIED HEALTH/NURSE VISIT (OUTPATIENT)
Dept: CARDIOLOGY | Facility: CLINIC | Age: 82
End: 2017-06-13
Payer: COMMERCIAL

## 2017-06-13 DIAGNOSIS — Z95.0 CARDIAC PACEMAKER IN SITU: Primary | ICD-10-CM

## 2017-06-13 PROCEDURE — 93293 PM PHONE R-STRIP DEVICE EVAL: CPT | Performed by: INTERNAL MEDICINE

## 2017-06-13 NOTE — PROGRESS NOTES
~ 90 day phone teletrace ~  Appropriate  at time of check. Chronic Afib, taking Pradaxa. Magnet response WNL. F/U scheduled q 3 months. Justin VEGA

## 2017-06-13 NOTE — MR AVS SNAPSHOT
After Visit Summary   6/13/2017    Bairon Foster    MRN: [id number]           Patient Information     Date Of Birth          2/6/1931        Visit Information        Provider Department      6/13/2017 10:30 AM BERNY DELEON [facility]        Today's Diagnoses     Cardiac pacemaker in situ    -  1       Follow-ups after your visit        Your next 10 appointments already scheduled     Jun 16, 2017 10:10 AM CDT   Return Visit with WILLIS Spence CNP   [facility] ([facility])    [address] [place] [address] [address]  [facility] [id number] [phone]            Sep 19, 2017 10:30 AM CDT   Phone Device Check with BERNY DELEON   [facility] ([facility])    [address] [place] [address] [address]  [facility] [id number] [phone]              Who to contact     If you have questions or need follow up information about today's clinic visit or your schedule please contact [facility] directly at [phone].  Normal or non-critical lab and imaging results will be communicated to you by Today Tixhart, letter or phone within 4 business days after the clinic has received the results. If you do not hear from us within 7 days, please contact the clinic through [employer] or phone. If you have a critical or abnormal lab result, we will notify you by phone as soon as possible.  Submit refill requests through Stockle[employer] or call your pharmacy and they will forward the refill request to us. Please allow 3 business days for your refill to be completed.          Additional Information About Your Visit        Today Tixhart Information     Stockleap gives you secure access to your electronic health record. If you see a primary care provider, you can also send messages to your care team and make appointments. If you have questions, please call your primary care  clinic.  If you do not have a primary care provider, please call [phone] and they will assist you.        Care EveryWhere ID     This is your Care EveryWhere ID. This could be used by other organizations to access your [facility] medical records  [id number]         Blood Pressure from Last 3 Encounters:   03/24/17 132/84   03/20/17 110/68   03/10/17 120/70    Weight from Last 3 Encounters:   03/24/17 99.6 kg (219 lb 8 oz)   03/20/17 100.2 kg (221 lb)   03/10/17 99.8 kg (220 lb)              We Performed the Following     PM PHONE R STRIP EVAL UP TO 90 DAYS ([phone])        Primary Care Provider Office Phone # Fax #    Mg Bairon Nelson [phone]       [facility] [address]  [facility] [address]        Goals        General    I will find a caregiver support group to attend (pt-stated)     Notes - Note created  4/17/2014  8:49 AM by Beth Morales RN    As of today's date 4/17/2014 goal is met at 0 - 25%.   Goal Status:  Active        Thank you!     Thank you for choosing [facility] [place]  for your care. Our goal is always to provide you with excellent care. Hearing back from our patients is one way we can continue to improve our services. Please take a few minutes to complete the written survey that you may receive in the mail after your visit with us. Thank you!             Your Updated Medication List - Protect others around you: Learn how to safely use, store and throw away your medicines at www.disposemymeds.org.          This list is accurate as of: 6/13/17 10:55 AM.  Always use your most recent med list.                   Brand Name Dispense Instructions for use    buPROPion 150 MG 24 hr tablet    WELLBUTRIN XL    90 tablet    TAKE ONE TABLET BY MOUTH IN THE MORNING       carvedilol 6.25 MG tablet    COREG    180 tablet    Take 1 tablet (6.25 mg) by mouth 2 times daily (with meals)       clindamycin 1 % lotion    CLINDAMAX    60  mL    Apply topically 2 times daily [facility], [phone], re faxed order from 12/15/16.       COMPRESSION STOCKINGS     2 each    1 each daily       FIBER 7 Powd      As directed       finasteride 5 MG tablet    PROSCAR    90 tablet    TAKE 1 TABLET EVERY DAY       * furosemide 40 MG tablet    LASIX    45 tablet    TAKE 1 TABLET EVERY OTHER DAY  (ALTERNATE  WITH  LASIX 20MG TABLET)       * furosemide 20 MG tablet    LASIX    45 tablet    TAKE 1 TABLET EVERY OTHER DAY (ALTERNATE WITH LASIX 40MG)       glucosamine-chondroitinoitin 6036-6962 MG/30ML Liqd      Take 2 tablets by mouth daily       HYDROcodone-acetaminophen 5-325 MG per tablet    NORCO    40 tablet    Take 1-2 tablets by mouth every 4 hours as needed for other (Moderate to Severe Pain)       levothyroxine 100 MCG tablet    SYNTHROID/LEVOTHROID    90 tablet    TAKE 1 TABLET EVERY DAY (NEED LAB APPOINTMENT)       LORazepam 1 MG tablet    ATIVAN    30 tablet    Take 0.5-1 tablets (0.5-1 mg) by mouth every 8 hours as needed for anxiety       MULTIVITAMIN PO      Take  by mouth.       omega-3 fatty acids 1200 MG capsule      Take 1 capsule by mouth 2 times daily.       omeprazole 20 MG CR capsule    priLOSEC    180 capsule    Take 1 capsule (20 mg) by mouth 2 times daily       order for DME     1 Device    Auto-CPAP:Max 9 cm H2OMin 9 cm H2O Continuous Lifetime need and heated humidity.       potassium chloride SA 20 MEQ CR tablet    K-DUR/KLOR-CON M    90 tablet    TAKE 1 TABLET (20 MEQ) BY MOUTH DAILY       PRADAXA ANTICOAGULANT 75 MG capsule   Generic drug:  dabigatran ANTICOAGULANT      Take 150 mg by mouth 2 times daily. Store in original 's bottle or blister pack; use within 120 days of opening.       simvastatin 20 MG tablet    ZOCOR    90 tablet    TAKE 1 TABLET AT BEDTIME       sucralfate 1 GM tablet    CARAFATE    180 tablet    Take 1 tablet (1 g) by mouth 2 times daily       tamsulosin 0.4 MG capsule    FLOMAX    90 capsule    TAKE 1  CAPSULE (0.4 MG) BY MOUTH DAILY       * Notice:  This list has 2 medication(s) that are the same as other medications prescribed for you. Read the directions carefully, and ask your doctor or other care provider to review them with you.

## 2017-06-16 ENCOUNTER — OFFICE VISIT (OUTPATIENT)
Dept: CARDIOLOGY | Facility: CLINIC | Age: 82
End: 2017-06-16
Attending: INTERNAL MEDICINE
Payer: COMMERCIAL

## 2017-06-16 VITALS
HEART RATE: 64 BPM | SYSTOLIC BLOOD PRESSURE: 118 MMHG | HEIGHT: 73 IN | BODY MASS INDEX: 29.01 KG/M2 | DIASTOLIC BLOOD PRESSURE: 66 MMHG | WEIGHT: 218.9 LBS

## 2017-06-16 DIAGNOSIS — I25.10 CORONARY ARTERY DISEASE INVOLVING NATIVE CORONARY ARTERY OF NATIVE HEART WITHOUT ANGINA PECTORIS: Primary | ICD-10-CM

## 2017-06-16 DIAGNOSIS — I83.813 VARICOSE VEINS OF BILATERAL LOWER EXTREMITIES WITH PAIN: ICD-10-CM

## 2017-06-16 PROCEDURE — 99213 OFFICE O/P EST LOW 20 MIN: CPT | Performed by: NURSE PRACTITIONER

## 2017-06-16 NOTE — PROGRESS NOTES
HPI and Plan:   See dictation    Orders Placed This Encounter   Procedures     Follow-Up with Cardiologist       No orders of the defined types were placed in this encounter.      Medications Discontinued During This Encounter   Medication Reason     clindamycin (CLINDAMAX) 1 % lotion Stopped by Patient         Encounter Diagnoses   Name Primary?     Varicose 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      Coronary artery disease involving native coronary artery of native heart without angina pectoris Yes     Atrial fibrillation (H)        CURRENT MEDICATIONS:  Current Outpatient Prescriptions   Medication Sig Dispense Refill     finasteride (PROSCAR) 5 MG tablet TAKE 1 TABLET EVERY DAY 90 tablet 2     levothyroxine (SYNTHROID/LEVOTHROID) 100 MCG tablet TAKE 1 TABLET EVERY DAY (NEED LAB APPOINTMENT) 90 tablet 0     COMPRESSION STOCKINGS 1 each daily 2 each 0     Misc Natural Products (FIBER 7) POWD As directed       carvedilol (COREG) 6.25 MG tablet Take 1 tablet (6.25 mg) by mouth 2 times daily (with meals) 180 tablet 1     sucralfate (CARAFATE) 1 GM tablet Take 1 tablet (1 g) by mouth 2 times daily 180 tablet 1     furosemide (LASIX) 20 MG tablet TAKE 1 TABLET EVERY OTHER DAY (ALTERNATE WITH LASIX 40MG) 45 tablet 2     potassium chloride SA (K-DUR/KLOR-CON M) 20 MEQ CR tablet TAKE 1 TABLET (20 MEQ) BY MOUTH DAILY 90 tablet 2     tamsulosin (FLOMAX) 0.4 MG capsule TAKE 1 CAPSULE (0.4 MG) BY MOUTH DAILY 90 capsule 2     omeprazole (PRILOSEC) 20 MG CR capsule Take 1 capsule (20 mg) by mouth 2 times daily 180 capsule 3     simvastatin (ZOCOR) 20 MG tablet TAKE 1 TABLET AT BEDTIME 90 tablet 3     furosemide (LASIX) 40 MG tablet TAKE 1 TABLET EVERY OTHER DAY  (ALTERNATE  WITH  LASIX 20MG TABLET) 45 tablet 2     buPROPion (WELLBUTRIN XL) 150 MG 24 hr tablet TAKE ONE TABLET BY MOUTH IN THE MORNING 90 tablet 3     HYDROcodone-acetaminophen (NORCO) 5-325 MG per tablet Take 1-2 tablets by mouth every 4 hours as needed  for other (Moderate to Severe Pain) 40 tablet 0     LORazepam (ATIVAN) 1 MG tablet Take 0.5-1 tablets (0.5-1 mg) by mouth every 8 hours as needed for anxiety 30 tablet 5     Multiple Vitamins-Minerals (MULTIVITAMIN OR) Take  by mouth.       dabigatran ANTICOAGULANT (PRADAXA ANTICOAGULANT) 75 MG CAPS BLISTER PACK Take 150 mg by mouth 2 times daily. Store in original 's bottle or blister pack; use within 120 days of opening.       Glucosamine-Chondroitin 2293-9064 MG/30ML LIQD Take 2 tablets by mouth daily        Omega-3 Fatty Acids (FISH OIL) 1200 MG capsule Take 1 capsule by mouth 2 times daily.       ORDER FOR DME Auto-CPAP:Max 9 cm H2OMin 9 cm H2O  Continuous Lifetime need and heated humidity.    1 Device 0       ALLERGIES   No Known Allergies    PAST MEDICAL HISTORY:  Past Medical History:   Diagnosis Date     Acute, but ill-defined, cerebrovascular disease     NO RESIDUALS     Coronary atherosclerosis of unspecified type of vessel, native or graft     S/P PTCA, EF 50%     Esophageal reflux      Hydrocele, unspecified      Hypersomnia with sleep apnea, unspecified      Inguinal hernia without mention of obstruction or gangrene, recurrent unilateral or unspecified      Other and unspecified hyperlipidemia      Other lymphedema     GROIN AND THIGHS     Other pancytopenia (H)      Other specified anemias      Persistent atrial fibrillation (H)     VVI PM for long pauses     Prostatic hypertrophy, benign      Scrotal varices      Thrombocytopenia, unspecified (H)      Unilateral or unspecified femoral hernia without mention of obstruction or gangrene, unilateral or unspecified      Unspecified hypothyroidism        PAST SURGICAL HISTORY:  Past Surgical History:   Procedure Laterality Date     EYE SURGERY      CATARACT/BLEPHAROPLASTY     GENITOURINARY SURGERY      TUNA     HERNIA REPAIR      RIGHT INGUINAL     HERNIORRHAPHY INGUINAL  8/14/2012    Procedure: HERNIORRHAPHY INGUINAL;  right inguinal hernia  repair;  Surgeon: Kareem Torrez MD;  Location:  [facility]     IMPLANT PACEMAKER  8/2009    single chamber     ORTHOPEDIC SURGERY      [facility] RIGHT     ORTHOPEDIC SURGERY  2010    right TKR, left TKR     SURGICAL HISTORY OF -       Thumb surgery     SURGICAL HISTORY OF -   1990s    Left total knee replacement     SURGICAL HISTORY OF -   3/11    Rig total knee replacement       FAMILY HISTORY:  Family History   Problem Relation Age of Onset     Cardiovascular Father      C.A.D. Father      CANCER Sister      lung     Unknown/Adopted Mother      Cancer - colorectal No family hx of      DIABETES No family hx of      Coronary Artery Disease No family hx of      Hypertension No family hx of      Hyperlipidemia No family hx of      CEREBROVASCULAR DISEASE No family hx of      Breast Cancer No family hx of      Colon Cancer No family hx of      Prostate Cancer No family hx of      Other Cancer No family hx of      Depression No family hx of      Anxiety Disorder No family hx of      MENTAL ILLNESS No family hx of      Substance Abuse No family hx of      Anesthesia Reaction No family hx of      Asthma No family hx of      OSTEOPOROSIS No family hx of      Genetic Disorder No family hx of      Thyroid Disease No family hx of      Obesity No family hx of        SOCIAL HISTORY:  Social History     Social History     Marital status:      Spouse name: N/A     Number of children: N/A     Years of education: N/A     Occupational History     Teacher, author, speaker Retired     Social History Main Topics     Smoking status: Never Smoker     Smokeless tobacco: Never Used      Comment: per encounter 2/12/07     Alcohol use 0.0 oz/week     0 Standard drinks or equivalent per week      Comment: occasionally     Drug use: No     Sexual activity: No     Other Topics Concern     Parent/Sibling W/ Cabg, Mi Or Angioplasty Before 65f 55m? No     Caffeine Concern No     1-2 cups coffee a day     Sleep Concern No     Stress Concern Yes     due  "to wifes health condition     Weight Concern No     Special Diet No     Exercise Yes     states he uses his tredmill on and off     Seat Belt Yes     Social History Narrative       Review of Systems:  Skin:  Positive for   dry skin    Eyes:  Positive for glasses;cataracts cataract surgery on both eyes  ENT:  Negative      Respiratory:  Positive for CPAP;sleep apnea     Cardiovascular:  Negative for;edema  (heaviness when walking a distance)    Gastroenterology: Negative      Genitourinary:  Negative      Musculoskeletal:  Positive for arthritis    Neurologic:  Positive for stroke;numbness or tingling of feet stroke 15 years ago; numbness in toes  Psychiatric:  Positive for anxiety stress due to wifes health   Heme/Lymph/Imm:  Negative      Endocrine:  Positive for thyroid disorder      Physical Exam:  Vitals: /66  Pulse 64  Ht 1.854 m (6' 0.99\")  Wt 99.3 kg (218 lb 14.4 oz)  BMI 28.89 kg/m2    Constitutional:  cooperative, alert and oriented, well developed, well nourished, in no acute distress        Skin:  warm and dry to the touch, no apparent skin lesions or masses noted        Head:  normocephalic, no masses or lesions        Eyes:  pupils equal and round, conjunctivae and lids unremarkable, sclera white, no xanthalasma, EOMS intact, no nystagmus        ENT:  no pallor or cyanosis, dentition good        Neck:  JVP normal        Chest:  normal breath sounds, clear to auscultation, normal A-P diameter, normal symmetry, normal respiratory excursion, no use of accessory muscles          Cardiac: regular rhythm       grade 1;LLSB;early systolic murmur          Abdomen:      benign    Vascular:                                          Extremities and Back:  no deformities, clubbing, cyanosis, erythema observed;no edema stasis pigmentation       venous varicosities    Neurological:  affect appropriate, oriented to time, person and place;no gross motor deficits              CC  London Carrizales MD  [facility]  [address]  [place] [address]

## 2017-06-16 NOTE — LETTER
6/16/2017    Mg Nelson MD  [address]  [facility] [address]    RE: Bairon DOWNS Cristian       Dear Colleague,    I had the pleasure of seeing Bairon Foster in the [facility].    I had the pleasure of meeting Mr. Bairon Foster today to discuss results of his venous competency studies and possible venous ablation.  He is a very pleasant 86-year-old patient who follows with Dr. Hope for Cardiology.  He was referred to us for venous varicosities and lower extremity edema.  He saw Dr. Carrizales on 03/20 for this, at which time he was given compression stockings and recommended to have venous competency studies.      Today we reviewed the results of his venous competency studies which do show venous insufficiency bilaterally.  On the left he had 4.9 seconds of reflux in the distal thigh, and on the right he had up to 4 seconds of reflux in his proximal calf.  Since he saw Dr. Carrizales and actually since January of this year, he has had a significant weight loss of nearly 15 pounds.  He started wearing the compression stockings and has had an enormous amount of relief with them to the extent that his swelling has gone down and he has had to buy a smaller size because the ones that we provided for him started to fall off.  He does have some venous stasis changes, but they are not particularly concerning to him.  He is mostly just satisfied that the lower extremity edema is gone.  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      PHYSICAL EXAMINATION:   GENERAL:  An 86-year-old male, appears comfortable.   VITAL SIGNS:  Blood pressure 118/66, heart rate 64, weight 218 pounds, BMI 29.   LUNGS:  Bilaterally clear to auscultation.   CARDIAC:  Rate regular, normal S1, S2.   EXTREMITIES:  No lower extremity edema.  He has venous stasis changes and mild varicosities.     Outpatient Encounter Prescriptions as of 6/16/2017   Medication Sig Dispense  Refill     finasteride (PROSCAR) 5 MG tablet TAKE 1 TABLET EVERY DAY 90 tablet 2     levothyroxine (SYNTHROID/LEVOTHROID) 100 MCG tablet TAKE 1 TABLET EVERY DAY (NEED LAB APPOINTMENT) 90 tablet 0     COMPRESSION STOCKINGS 1 each daily 2 each 0     Misc Natural Products (FIBER 7) POWD As directed       carvedilol (COREG) 6.25 MG tablet Take 1 tablet (6.25 mg) by mouth 2 times daily (with meals) 180 tablet 1     sucralfate (CARAFATE) 1 GM tablet Take 1 tablet (1 g) by mouth 2 times daily 180 tablet 1     potassium chloride SA (K-DUR/KLOR-CON M) 20 MEQ CR tablet TAKE 1 TABLET (20 MEQ) BY MOUTH DAILY 90 tablet 2     [DISCONTINUED] furosemide (LASIX) 20 MG tablet TAKE 1 TABLET EVERY OTHER DAY (ALTERNATE WITH LASIX 40MG) 45 tablet 2     tamsulosin (FLOMAX) 0.4 MG capsule TAKE 1 CAPSULE (0.4 MG) BY MOUTH DAILY 90 capsule 2     omeprazole (PRILOSEC) 20 MG CR capsule Take 1 capsule (20 mg) by mouth 2 times daily 180 capsule 3     simvastatin (ZOCOR) 20 MG tablet TAKE 1 TABLET AT BEDTIME 90 tablet 3     [DISCONTINUED] furosemide (LASIX) 40 MG tablet TAKE 1 TABLET EVERY OTHER DAY  (ALTERNATE  WITH  LASIX 20MG TABLET) 45 tablet 2     buPROPion (WELLBUTRIN XL) 150 MG 24 hr tablet TAKE ONE TABLET BY MOUTH IN THE MORNING 90 tablet 3     HYDROcodone-acetaminophen (NORCO) 5-325 MG per tablet Take 1-2 tablets by mouth every 4 hours as needed for other (Moderate to Severe Pain) 40 tablet 0     LORazepam (ATIVAN) 1 MG tablet Take 0.5-1 tablets (0.5-1 mg) by mouth every 8 hours as needed for anxiety 30 tablet 5     Multiple Vitamins-Minerals (MULTIVITAMIN OR) Take  by mouth.       dabigatran ANTICOAGULANT (PRADAXA ANTICOAGULANT) 75 MG CAPS BLISTER PACK Take 150 mg by mouth 2 times daily. Store in original 's bottle or blister pack; use within 120 days of opening.       Glucosamine-Chondroitin 0460-7926 MG/30ML LIQD Take 2 tablets by mouth daily        Omega-3 Fatty Acids (FISH OIL) 1200 MG capsule Take 1 capsule by mouth 2  times daily.       [DISCONTINUED] clindamycin (CLINDAMAX) 1 % lotion Apply topically 2 times daily [facility], [phone], re faxed order from 12/15/16. 60 mL 1     ORDER FOR DME Auto-CPAP:Max 9 cm H2OMin 9 cm H2O  Continuous Lifetime need and heated humidity.    1 Device 0     No facility-administered encounter medications on file as of 6/16/2017.       ASSESSMENT AND PLAN:   1.  Venous insufficiency.  Venous competency studies confirm bilateral venous insufficiency, left greater than right.  He has been wearing compression stockings for nearly 3 months, and he has had excellent results with them.  He has ended up buying more stockings online that her toeless because he finds them to be a little bit more comfortable.  He is not having any difficulty pulling them up.  We talked about the procedure for radiofrequency ablation of the veins, and at this point he is satisfied with his results from compression stockings and prefers to continue with conservative management.  He has lost, like I said, 15 pounds and I suspect that also helps with his reflux.  If he has worsening symptoms or in the future would like to meet to discuss with Dr. Carrizales or myself a venous ablation, that would be appropriate and we would be happy to see him.  For now he can just continue to wear compression.  He should follow up with Dr. Hope as previously recommended.     Again, thank you for allowing me to participate in the care of your patient.      Sincerely,    WILLIS Smith [facility]

## 2017-06-16 NOTE — MR AVS SNAPSHOT
After Visit Summary   6/16/2017    Bairon Foster    MRN: [id number]           Patient Information     Date Of Birth          2/6/1931        Visit Information        Provider Department      6/16/2017 10:10 AM Mackenzie Tejada APRN CNP [facility] HEART AT [place]        Today's Diagnoses     Coronary artery disease involving native coronary artery of native heart without angina pectoris    -  1    Varicose 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           Follow-ups after your visit        Additional Services     Follow-Up with Cardiologist                 Your next 10 appointments already scheduled     Sep 19, 2017 10:30 AM CDT   Phone Device Check with ARRIETA TECH1   [facility] ([facility])    [address] [place], [place] [id number]  [facility] [address] [phone]              Future tests that were ordered for you today     Open Future Orders        Priority Expected Expires Ordered    Follow-Up with Cardiologist Routine 3/13/2018 6/16/2018 6/16/2017            Who to contact     If you have questions or need follow up information about today's clinic visit or your schedule please contact [facility] directly at [phone].  Normal or non-critical lab and imaging results will be communicated to you by MyChart, letter or phone within 4 business days after the clinic has received the results. If you do not hear from us within 7 days, please contact the clinic through MyChart or phone. If you have a critical or abnormal lab result, we will notify you by phone as soon as possible.  Submit refill requests through Netaplan or call your pharmacy and they will forward the refill request to us. Please allow 3 business days for your refill to be completed.          Additional Information About Your Visit        MyChart Information     Netaplan gives you secure access to your  "electronic health record. If you see a primary care provider, you can also send messages to your care team and make appointments. If you have questions, please call your primary care clinic.  If you do not have a primary care provider, please call [phone] and they will assist you.        Care EveryWhere ID     This is your Care EveryWhere ID. This could be used by other organizations to access your [facility] medical records  [id number]        Your Vitals Were     Pulse Height BMI (Body Mass Index)             64 1.854 m (6' 0.99\") 28.89 kg/m2          Blood Pressure from Last 3 Encounters:   06/16/17 118/66   03/24/17 132/84   03/20/17 110/68    Weight from Last 3 Encounters:   06/16/17 99.3 kg (218 lb 14.4 oz)   03/24/17 99.6 kg (219 lb 8 oz)   03/20/17 100.2 kg (221 lb)              We Performed the Following     Follow-Up with Cardiac Advanced Practice Provider        Primary Care Provider Office Phone # Fax #    Mg Bairon Nelson [phone]       [facility] [address] [address] [facility] [address]        Goals        General    I will find a caregiver support group to attend (pt-stated)     Notes - Note created  4/17/2014  8:49 AM by Beth Morales RN    As of today's date 4/17/2014 goal is met at 0 - 25%.   Goal Status:  Active        Thank you!     Thank you for choosing [facility] AT [place]  for your care. Our goal is always to provide you with excellent care. Hearing back from our patients is one way we can continue to improve our services. Please take a few minutes to complete the written survey that you may receive in the mail after your visit with us. Thank you!             Your Updated Medication List - Protect others around you: Learn how to safely use, store and throw away your medicines at www.disposemymeds.org.          This list is accurate as of: 6/16/17 11:09 AM.  Always use your most recent med list.                   Brand " Name Dispense Instructions for use    buPROPion 150 MG 24 hr tablet    WELLBUTRIN XL    90 tablet    TAKE ONE TABLET BY MOUTH IN THE MORNING       carvedilol 6.25 MG tablet    COREG    180 tablet    Take 1 tablet (6.25 mg) by mouth 2 times daily (with meals)       COMPRESSION STOCKINGS     2 each    1 each daily       FIBER 7 Powd      As directed       finasteride 5 MG tablet    PROSCAR    90 tablet    TAKE 1 TABLET EVERY DAY       * furosemide 40 MG tablet    LASIX    45 tablet    TAKE 1 TABLET EVERY OTHER DAY  (ALTERNATE  WITH  LASIX 20MG TABLET)       * furosemide 20 MG tablet    LASIX    45 tablet    TAKE 1 TABLET EVERY OTHER DAY (ALTERNATE WITH LASIX 40MG)       glucosamine-chondroitinoitin 0018-4348 MG/30ML Liqd      Take 2 tablets by mouth daily       HYDROcodone-acetaminophen 5-325 MG per tablet    NORCO    40 tablet    Take 1-2 tablets by mouth every 4 hours as needed for other (Moderate to Severe Pain)       levothyroxine 100 MCG tablet    SYNTHROID/LEVOTHROID    90 tablet    TAKE 1 TABLET EVERY DAY (NEED LAB APPOINTMENT)       LORazepam 1 MG tablet    ATIVAN    30 tablet    Take 0.5-1 tablets (0.5-1 mg) by mouth every 8 hours as needed for anxiety       MULTIVITAMIN PO      Take  by mouth.       omega-3 fatty acids 1200 MG capsule      Take 1 capsule by mouth 2 times daily.       omeprazole 20 MG CR capsule    priLOSEC    180 capsule    Take 1 capsule (20 mg) by mouth 2 times daily       order for DME     1 Device    Auto-CPAP:Max 9 cm H2OMin 9 cm H2O Continuous Lifetime need and heated humidity.       potassium chloride SA 20 MEQ CR tablet    K-DUR/KLOR-CON M    90 tablet    TAKE 1 TABLET (20 MEQ) BY MOUTH DAILY       PRADAXA ANTICOAGULANT 75 MG capsule   Generic drug:  dabigatran ANTICOAGULANT      Take 150 mg by mouth 2 times daily. Store in original 's bottle or blister pack; use within 120 days of opening.       simvastatin 20 MG tablet    ZOCOR    90 tablet    TAKE 1 TABLET AT BEDTIME        sucralfate 1 GM tablet    CARAFATE    180 tablet    Take 1 tablet (1 g) by mouth 2 times daily       tamsulosin 0.4 MG capsule    FLOMAX    90 capsule    TAKE 1 CAPSULE (0.4 MG) BY MOUTH DAILY       * Notice:  This list has 2 medication(s) that are the same as other medications prescribed for you. Read the directions carefully, and ask your doctor or other care provider to review them with you.

## 2017-06-17 NOTE — PROGRESS NOTES
HISTORY OF PRESENT ILLNESS:  I had the pleasure of meeting Mr. Bairon Foster today to discuss results of his venous competency studies and possible venous ablation.  He is a very pleasant 86-year-old patient who follows with Dr. Hope for Cardiology.  He was referred to us for venous varicosities and lower extremity edema.  He saw Dr. Carrizales on 03/20 for this, at which time he was given compression stockings and recommended to have venous competency studies.      Today we reviewed the results of his venous competency studies which do show venous insufficiency bilaterally.  On the left he had 4.9 seconds of reflux in the distal thigh, and on the right he had up to 4 seconds of reflux in his proximal calf.  Since he saw Dr. Carrizales and actually since January of this year, he has had a significant weight loss of nearly 15 pounds.  He started wearing the compression stockings and has had an enormous amount of relief with them to the extent that his swelling has gone down and he has had to buy a smaller size because the ones that we provided for him started to fall off.  He does have some venous stasis changes, but they are not particularly concerning to him.  He is mostly just satisfied that the lower extremity edema is gone.  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      PHYSICAL EXAMINATION:   GENERAL:  An 86-year-old male, appears comfortable.   VITAL SIGNS:  Blood pressure 118/66, heart rate 64, weight 218 pounds, BMI 29.   LUNGS:  Bilaterally clear to auscultation.   CARDIAC:  Rate regular, normal S1, S2.   EXTREMITIES:  No lower extremity edema.  He has venous stasis changes and mild varicosities.      ASSESSMENT AND PLAN:   1.  Venous insufficiency.  Venous competency studies confirm bilateral venous insufficiency, left greater than right.  He has been wearing compression stockings for nearly 3 months, and he has had excellent results with them.  He has ended up buying more  stockings online that her toeless because he finds them to be a little bit more comfortable.  He is not having any difficulty pulling them up.  We talked about the procedure for radiofrequency ablation of the veins, and at this point he is satisfied with his results from compression stockings and prefers to continue with conservative management.  He has lost, like I said, 15 pounds and I suspect that also helps with his reflux.  If he has worsening symptoms or in the future would like to meet to discuss with Dr. Carrizales or myself a venous ablation, that would be appropriate and we would be happy to see him.  For now he can just continue to wear compression.  He should follow up with Dr. Hope as previously recommended.      WILLIS CR, CNP             D: 2017 11:09   T: 2017 21:02   MT: MORENO      Name:     HIRO IBARRA   MRN:      [id number]        Account:      [id number]   :      1931           Service Date: 2017      Document: [id number]

## 2017-06-26 ENCOUNTER — TELEPHONE (OUTPATIENT)
Dept: CARDIOLOGY | Facility: CLINIC | Age: 82
End: 2017-06-26

## 2017-06-26 NOTE — TELEPHONE ENCOUNTER
Pt called stating that he would like to make an appointment to discuss ablation. Pt scheduled for OV 7/10/17 with Dr. Carrizales.     OV 3/16/17 with Anushka OLGUIN   ASSESSMENT AND PLAN:   1.  Venous insufficiency.  Venous competency studies confirm bilateral venous insufficiency, left greater than right.  He has been wearing compression stockings for nearly 3 months, and he has had excellent results with them.  He has ended up buying more stockings online that her toeless because he finds them to be a little bit more comfortable.  He is not having any difficulty pulling them up.  We talked about the procedure for radiofrequency ablation of the veins, and at this point he is satisfied with his results from compression stockings and prefers to continue with conservative management.  He has lost, like I said, 15 pounds and I suspect that also helps with his reflux.  If he has worsening symptoms or in the future would like to meet to discuss with Dr. Carrizales or myself a venous ablation, that would be appropriate and we would be happy to see him.  For now he can just continue to wear compression.  He should follow up with Dr. Hope as previously recommended.

## 2017-07-10 ENCOUNTER — OFFICE VISIT (OUTPATIENT)
Dept: CARDIOLOGY | Facility: CLINIC | Age: 82
End: 2017-07-10
Payer: COMMERCIAL

## 2017-07-10 VITALS
HEART RATE: 59 BPM | HEIGHT: 72 IN | WEIGHT: 225 LBS | DIASTOLIC BLOOD PRESSURE: 75 MMHG | SYSTOLIC BLOOD PRESSURE: 131 MMHG | BODY MASS INDEX: 30.48 KG/M2

## 2017-07-10 DIAGNOSIS — I83.892 SYMPTOMATIC VARICOSE VEINS OF LEFT LOWER EXTREMITY: ICD-10-CM

## 2017-07-10 DIAGNOSIS — R06.09 DYSPNEA ON EXERTION: ICD-10-CM

## 2017-07-10 DIAGNOSIS — I25.83 CORONARY ARTERY DISEASE DUE TO LIPID RICH PLAQUE: ICD-10-CM

## 2017-07-10 DIAGNOSIS — I27.20 PULMONARY HYPERTENSION (H): ICD-10-CM

## 2017-07-10 DIAGNOSIS — Z98.890 STATUS POST ENDOVENOUS RADIOFREQUENCY ABLATION (RFA) OF SAPHENOUS VEIN: ICD-10-CM

## 2017-07-10 DIAGNOSIS — I25.10 CORONARY ARTERY DISEASE DUE TO LIPID RICH PLAQUE: ICD-10-CM

## 2017-07-10 DIAGNOSIS — E78.00 HYPERCHOLESTEROLEMIA: ICD-10-CM

## 2017-07-10 DIAGNOSIS — I25.5 ISCHEMIC CARDIOMYOPATHY: Primary | ICD-10-CM

## 2017-07-10 PROCEDURE — 99214 OFFICE O/P EST MOD 30 MIN: CPT | Performed by: INTERNAL MEDICINE

## 2017-07-10 RX ORDER — DIAZEPAM 5 MG
5 TABLET ORAL SEE ADMIN INSTRUCTIONS
Qty: 2 TABLET | Refills: 0 | Status: SHIPPED | OUTPATIENT
Start: 2017-07-10 | End: 2017-08-30

## 2017-07-10 NOTE — PATIENT INSTRUCTIONS
Try increasing the FUROSEMIDE to 40mg daily.  Try looser belt until the swelling improves.  If the shortness of breath gets worse, please let us know.  Let's check labs in a week.  Please schedule the ablation for the left leg with me at your convenience.  I will give you a prescription for 2 pills of Valium that you'll bring with you: DO NOT TAKE THE PILLS BEFOREHAND, BUT BRING IT WITH YOU.    You may skip the FUROSEMIDE that day of the procedure.  Please keep taking your PRADAXA / dabigatran.

## 2017-07-10 NOTE — MR AVS SNAPSHOT
After Visit Summary   7/10/2017    Bairon Foster    MRN: [id number]           Patient Information     Date Of Birth          2/6/1931        Visit Information        Provider Department      7/10/2017 7:45 AM London Carrizales MD [facility] HEART [facility]        Today's Diagnoses     Ischemic cardiomyopathy    -  1    Hypercholesterolemia        Coronary artery disease due to lipid rich plaque        Pulmonary hypertension (H)        Dyspnea on exertion        Status post endovenous radiofrequency ablation (RFA) of saphenous vein        Symptomatic varicose veins of left lower extremity          Care Instructions    Try increasing the FUROSEMIDE to 40mg daily.  Try looser belt until the swelling improves.  If the shortness of breath gets worse, please let us know.  Let's check labs in a week.  Please schedule the ablation for the left leg with me at your convenience.  I will give you a prescription for 2 pills of Valium that you'll bring with you: DO NOT TAKE THE PILLS BEFOREHAND, BUT BRING IT WITH YOU.    You may skip the FUROSEMIDE that day of the procedure.  Please keep taking your PRADAXA / dabigatran.             Follow-ups after your visit        Your next 10 appointments already scheduled     Sep 19, 2017 10:30 AM CDT   Phone Device Check with ARRIETA TECH1   [facility] HEART [facility] ([facility])    [address] [place] [id number] [phone]              Future tests that were ordered for you today     Open Future Orders        Priority Expected Expires Ordered    US Endovenous Ablat Thpy Incmpnt 1Vein L Routine 7/17/2017 7/10/2018 7/10/2017    US Lower Extremity Venous Duplex Left Routine 7/19/2017 7/10/2018 7/10/2017    Basic metabolic panel Routine 7/17/2017 7/10/2018 7/10/2017            Who to contact     If you have questions or need follow up information about today's clinic visit or your schedule  please contact [facility] PHYS[facility] HE[facility] AT [place] directly at [phone].  Normal or non-critical lab and imaging results will be communicated to you by MyChart, letter or phone within 4 business days after the clinic has received the results. If you do not hear from us within 7 days, please contact the clinic through Daily Secrethart or phone. If you have a critical or abnormal lab result, we will notify you by phone as soon as possible.  Submit refill requests through IRI or call your pharmacy and they will forward the refill request to us. Please allow 3 business days for your refill to be completed.          Additional Information About Your Visit        Daily [email or web address] Information     IRI gives you secure access to your electronic health record. If you see a primary care provider, you can also send messages to your care team and make appointments. If you have questions, please call your primary care clinic.  If you do not have a primary care provider, please call [phone] and they will assist you.        Care EveryWhere ID     This is your Care EveryWhere ID. This could be used by other organizations to access your [facility] medical records  [id number]        Your Vitals Were     Pulse Height BMI (Body Mass Index)             59 1.829 m (6') 30.52 kg/m2          Blood Pressure from Last 3 Encounters:   07/10/17 131/75   06/16/17 118/66   03/24/17 132/84    Weight from Last 3 Encounters:   07/10/17 102.1 kg (225 lb)   06/16/17 99.3 kg (218 lb 14.4 oz)   03/24/17 99.6 kg (219 lb 8 oz)                 Today's Medication Changes          These changes are accurate as of: 7/10/17  8:27 AM.  If you have any questions, ask your nurse or doctor.               Start taking these medicines.        Dose/Directions    diazepam 5 MG tablet   Commonly known as:  VALIUM   Used for:  Status post endovenous radiofrequency ablation (RFA) of saphenous vein   Started by:  London Carrizales MD        Dose:  5 mg    Take 1 tablet (5 mg) by mouth See Admin Instructions Bring with you to the procedure   Quantity:  2 tablet   Refills:  0            Where to get your medicines      Some of these will need a paper prescription and others can be bought over the counter.  Ask your nurse if you have questions.     Bring a paper prescription for each of these medications     diazepam 5 MG tablet                Primary Care Provider Office Phone # Fax #    Mg Bairon Nelson [phone]       [facility] [address] [address] [facility] [address]        Goals        General    I will find a caregiver support group to attend (pt-stated)     Notes - Note created  4/17/2014  8:49 AM by Beth Morales RN    As of today's date 4/17/2014 goal is met at 0 - 25%.   Goal Status:  Active        Equal Access to Services     [facility]IST [facility] : Hadii silvestre padilla hadasho Soomaali, waaxda luqadaha, qaybta kaalmada adeegyada, kathryn camilo . So [facility] [phone].    ATENCIÓN: Si habla español, tiene a malone disposición servicios gratuitos de asistencia lingüística. Jose al [phone].    We comply with applicable federal civil rights laws and [place] laws. We do not discriminate on the basis of race, color, national origin, age, disability sex, sexual orientation or gender identity.            Thank you!     Thank you for choosing [facility] PHYSICIANS HEART AT [place]  for your care. Our goal is always to provide you with excellent care. Hearing back from our patients is one way we can continue to improve our services. Please take a few minutes to complete the written survey that you may receive in the mail after your visit with us. Thank you!             Your Updated Medication List - Protect others around you: Learn how to safely use, store and throw away your medicines at www.disposemymeds.org.          This list is accurate as of: 7/10/17  8:27 AM.  Always use your most recent med  list.                   Brand Name Dispense Instructions for use Diagnosis    buPROPion 150 MG 24 hr tablet    WELLBUTRIN XL    90 tablet    TAKE ONE TABLET BY MOUTH IN THE MORNING    Dysthymic disorder       carvedilol 6.25 MG tablet    COREG    180 tablet    Take 1 tablet (6.25 mg) by mouth 2 times daily (with meals)    Ischemic cardiomyopathy       COMPRESSION STOCKINGS     2 each    1 each daily    Varicose 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       diazepam 5 MG tablet    VALIUM    2 tablet    Take 1 tablet (5 mg) by mouth See Admin Instructions Bring with you to the procedure    Status post endovenous radiofrequency ablation (RFA) of saphenous vein       FIBER 7 Powd      As directed        finasteride 5 MG tablet    PROSCAR    90 tablet    TAKE 1 TABLET EVERY DAY    BPH (benign prostatic hyperplasia)       * furosemide 20 MG tablet    LASIX    45 tablet    TAKE 1 TABLET EVERY OTHER DAY (ALTERNATE WITH LASIX 40MG)    Ischemic cardiomyopathy       * furosemide 40 MG tablet    LASIX    45 tablet    TAKE 1 TABLET EVERY OTHER DAY (ALTERNATE  WITH FUROSEMIDE 20MG TABLET)    Ischemic cardiomyopathy       glucosamine-chondroitinoitin 5062-1666 MG/30ML Liqd      Take 2 tablets by mouth daily        HYDROcodone-acetaminophen 5-325 MG per tablet    NORCO    40 tablet    Take 1-2 tablets by mouth every 4 hours as needed for other (Moderate to Severe Pain)    Inguinal hernia       levothyroxine 100 MCG tablet    SYNTHROID/LEVOTHROID    90 tablet    TAKE 1 TABLET EVERY DAY (NEED LAB APPOINTMENT)    Hypothyroidism       LORazepam 1 MG tablet    ATIVAN    30 tablet    Take 0.5-1 tablets (0.5-1 mg) by mouth every 8 hours as needed for anxiety    Acute stress reaction       MULTIVITAMIN PO      Take  by mouth.        omega-3 fatty acids 1200 MG capsule      Take 1 capsule by mouth 2 times daily.        omeprazole 20 MG CR capsule    priLOSEC    180 capsule    Take 1 capsule (20 mg) by mouth 2 times daily     Gastroesophageal reflux disease without esophagitis       order for DME     1 Device    Auto-CPAP:Max 9 cm H2OMin 9 cm H2O Continuous Lifetime need and heated humidity.    Obstructive sleep apnea (adult) (pediatric), Other dyspnea and respiratory abnormality       potassium chloride SA 20 MEQ CR tablet    K-DUR/KLOR-CON M    90 tablet    TAKE 1 TABLET (20 MEQ) BY MOUTH DAILY    Ischemic cardiomyopathy       PRADAXA ANTICOAGULANT 75 MG capsule   Generic drug:  dabigatran ANTICOAGULANT      Take 150 mg by mouth 2 times daily. Store in original 's bottle or blister pack; use within 120 days of opening.        simvastatin 20 MG tablet    ZOCOR    90 tablet    TAKE 1 TABLET AT BEDTIME    Hypercholesterolemia       sucralfate 1 GM tablet    CARAFATE    180 tablet    Take 1 tablet (1 g) by mouth 2 times daily    Esophageal reflux       tamsulosin 0.4 MG capsule    FLOMAX    90 capsule    TAKE 1 CAPSULE (0.4 MG) BY MOUTH DAILY    BPH (benign prostatic hyperplasia)       * Notice:  This list has 2 medication(s) that are the same as other medications prescribed for you. Read the directions carefully, and ask your doctor or other care provider to review them with you.

## 2017-07-10 NOTE — LETTER
7/10/2017    Mg Nelson MD  [facility]   [address]  [facility] [address]    RE: Bairon Foster       Dear Colleague,    I had the pleasure of seeing Bairon Foster in the [facility].  Vascular Cardiology Progress Note          Assessment and Plan:     1. 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  Patient is more symptomatic on the left, we'll start with radiofrequency ablation of the left greater saphenous vein.    Increase furosemide from 20 mg alternating with 40 mg up to 40 mg daily and labs in one week given his increased swelling and mild dyspnea.  He will notify us if any additional dyspnea.  He has had relatively stable nuclear stress test in March.      This note was transcribed using electronic voice recognition software and there may be typographical errors present.                Interval History:   The patient is a very pleasant 86 year old whom I'm following for lower extremity edema.  He uses the compression stockings that are calf high.  He has found this has increased the amount of thigh and scrotal edema.  He is interested in having more definitive treatment for his venous disease.  He does have nocturnal discomfort and skin changes.                       Review of Systems:   Review of Systems:  Skin:  Positive for     Eyes:  Positive for glasses;cataracts  ENT:  Negative    Respiratory:  Positive for    Cardiovascular:    Positive for;edema (bilateral legs. wears  compression socks.)  Gastroenterology: Negative    Genitourinary:  not assessed    Musculoskeletal:  Positive for arthritis  Neurologic:  Positive for stroke;numbness or tingling of feet  Psychiatric:  Positive for anxiety  Heme/Lymph/Imm:  Negative    Endocrine:  Positive for thyroid disorder              Physical Exam:      Vitals: /75  Pulse 59  Ht 1.829 m (6')  Wt 102.1 kg (225 lb)  BMI 30.52 kg/m2  Constitutional:  cooperative, alert and oriented, well developed, well nourished, in no acute distress        Skin:  warm and dry to the touch, no apparent skin lesions or masses noted        Head:  normocephalic, no masses or lesions        Eyes:  pupils equal and round, conjunctivae and lids unremarkable, sclera white, no xanthalasma, EOMS intact, no nystagmus        ENT:  no pallor or cyanosis, dentition good        Neck:  JVP normal        Chest:  normal breath sounds, clear to auscultation, normal A-P diameter, normal symmetry, normal respiratory excursion, no use of accessory muscles        Cardiac: regular rhythm       grade 1;LLSB;early systolic murmur          Abdomen:      benign      Extremities and Back:  no deformities, clubbing, cyanosis, erythema observed;1+ edema, stasis pigmentation venous varicosities    Neurological:  affect appropriate, oriented to time, person and place;no gross motor deficits               Medications:     Current Outpatient Prescriptions   Medication Sig Dispense Refill     diazepam (VALIUM) 5 MG tablet Take 1 tablet (5 mg) by mouth See Admin Instructions Bring with you to the procedure 2 tablet 0     furosemide (LASIX) 40 MG tablet TAKE 1 TABLET EVERY OTHER DAY (ALTERNATE  WITH FUROSEMIDE 20MG TABLET) 45 tablet 2     finasteride (PROSCAR) 5 MG tablet TAKE 1 TABLET EVERY DAY 90 tablet 2     levothyroxine (SYNTHROID/LEVOTHROID) 100 MCG tablet TAKE 1 TABLET EVERY DAY (NEED LAB APPOINTMENT) 90 tablet 0     Misc Natural Products (FIBER 7) POWD As directed       carvedilol (COREG) 6.25 MG tablet Take 1 tablet (6.25 mg) by mouth 2 times daily (with meals) 180 tablet 1     sucralfate (CARAFATE) 1 GM tablet Take 1 tablet (1 g) by mouth 2 times daily 180 tablet 1     furosemide (LASIX) 20 MG tablet TAKE 1 TABLET EVERY OTHER DAY (ALTERNATE WITH LASIX 40MG) 45 tablet 2     potassium chloride SA  (K-DUR/KLOR-CON M) 20 MEQ CR tablet TAKE 1 TABLET (20 MEQ) BY MOUTH DAILY 90 tablet 2     tamsulosin (FLOMAX) 0.4 MG capsule TAKE 1 CAPSULE (0.4 MG) BY MOUTH DAILY 90 capsule 2     omeprazole (PRILOSEC) 20 MG CR capsule Take 1 capsule (20 mg) by mouth 2 times daily 180 capsule 3     simvastatin (ZOCOR) 20 MG tablet TAKE 1 TABLET AT BEDTIME 90 tablet 3     buPROPion (WELLBUTRIN XL) 150 MG 24 hr tablet TAKE ONE TABLET BY MOUTH IN THE MORNING 90 tablet 3     HYDROcodone-acetaminophen (NORCO) 5-325 MG per tablet Take 1-2 tablets by mouth every 4 hours as needed for other (Moderate to Severe Pain) 40 tablet 0     LORazepam (ATIVAN) 1 MG tablet Take 0.5-1 tablets (0.5-1 mg) by mouth every 8 hours as needed for anxiety 30 tablet 5     Multiple Vitamins-Minerals (MULTIVITAMIN OR) Take  by mouth.       dabigatran ANTICOAGULANT (PRADAXA ANTICOAGULANT) 75 MG CAPS BLISTER PACK Take 150 mg by mouth 2 times daily. Store in original 's bottle or blister pack; use within 120 days of opening.       Glucosamine-Chondroitin 2701-6085 MG/30ML LIQD Take 2 tablets by mouth daily        Omega-3 Fatty Acids (FISH OIL) 1200 MG capsule Take 1 capsule by mouth 2 times daily.       COMPRESSION STOCKINGS 1 each daily 2 each 0     ORDER FOR DME Auto-CPAP:Max 9 cm H2OMin 9 cm H2O  Continuous Lifetime need and heated humidity.    1 Device 0              Data:   All laboratory data reviewed  No results found for this or any previous visit (from the past 24 hour(s)).    All laboratory data reviewed  Lab Results   Component Value Date    CHOL 107 12/30/2016     Lab Results   Component Value Date    HDL 54 12/30/2016     Lab Results   Component Value Date    LDL 48 12/30/2016     Lab Results   Component Value Date    TRIG 24 12/30/2016     Lab Results   Component Value Date    CHOLHDLRATIO 2.1 06/15/2015     TSH   Date Value Ref Range Status   08/25/2016 1.20 0.40 - 5.00 mU/L Final     Last Basic Metabolic Panel:  Lab Results   Component  Value Date     08/25/2016      Lab Results   Component Value Date    POTASSIUM 4.0 12/30/2016     Lab Results   Component Value Date    CHLORIDE 105 08/25/2016     Lab Results   Component Value Date    TEODORO 9.0 08/25/2016     Lab Results   Component Value Date    CO2 31 08/25/2016     Lab Results   Component Value Date    BUN 20 08/25/2016     Lab Results   Component Value Date    CR 1.02 08/25/2016     Lab Results   Component Value Date     08/25/2016     Lab Results   Component Value Date    WBC 3.0 08/25/2016     Lab Results   Component Value Date    RBC 3.55 08/25/2016     Lab Results   Component Value Date    HGB 11.5 08/25/2016     Lab Results   Component Value Date    HCT 35.4 08/25/2016     Lab Results   Component Value Date     08/25/2016     Lab Results   Component Value Date    MCH 32.4 08/25/2016     Lab Results   Component Value Date    MCHC 32.5 08/25/2016     Lab Results   Component Value Date    RDW 14.7 08/25/2016     Lab Results   Component Value Date    PLT 80 08/25/2016     Thank you for allowing me to participate in the care of your patient.    Sincerely,     London Carrizales MD     [facility]

## 2017-07-10 NOTE — PROGRESS NOTES
Vascular Cardiology Progress Note          Assessment and Plan:     1. 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  Patient is more symptomatic on the left, we'll start with radiofrequency ablation of the left greater saphenous vein.    Increase furosemide from 20 mg alternating with 40 mg up to 40 mg daily and labs in one week given his increased swelling and mild dyspnea.  He will notify us if any additional dyspnea.  He has had relatively stable nuclear stress test in March.      This note was transcribed using electronic voice recognition software and there may be typographical errors present.                Interval History:   The patient is a very pleasant 86 year old whom I'm following for lower extremity edema.  He uses the compression stockings that are calf high.  He has found this has increased the amount of thigh and scrotal edema.  He is interested in having more definitive treatment for his venous disease.  He does have nocturnal discomfort and skin changes.                       Review of Systems:   Review of Systems:  Skin:  Positive for     Eyes:  Positive for glasses;cataracts  ENT:  Negative    Respiratory:  Positive for    Cardiovascular:    Positive for;edema (bilateral legs. wears  compression socks.)  Gastroenterology: Negative    Genitourinary:  not assessed    Musculoskeletal:  Positive for arthritis  Neurologic:  Positive for stroke;numbness or tingling of feet  Psychiatric:  Positive for anxiety  Heme/Lymph/Imm:  Negative    Endocrine:  Positive for thyroid disorder              Physical Exam:     Vitals: /75  Pulse 59  Ht 1.829 m (6')  Wt 102.1 kg (225 lb)  BMI 30.52 kg/m2  Constitutional:  cooperative, alert and oriented, well developed, well nourished, in no acute distress        Skin:  warm and dry to the touch, no apparent skin  lesions or masses noted        Head:  normocephalic, no masses or lesions        Eyes:  pupils equal and round, conjunctivae and lids unremarkable, sclera white, no xanthalasma, EOMS intact, no nystagmus        ENT:  no pallor or cyanosis, dentition good        Neck:  JVP normal        Chest:  normal breath sounds, clear to auscultation, normal A-P diameter, normal symmetry, normal respiratory excursion, no use of accessory muscles        Cardiac: regular rhythm       grade 1;LLSB;early systolic murmur          Abdomen:      benign      Extremities and Back:  no deformities, clubbing, cyanosis, erythema observed;1+ edema, stasis pigmentation venous varicosities    Neurological:  affect appropriate, oriented to time, person and place;no gross motor deficits                 Medications:     Current Outpatient Prescriptions   Medication Sig Dispense Refill     diazepam (VALIUM) 5 MG tablet Take 1 tablet (5 mg) by mouth See Admin Instructions Bring with you to the procedure 2 tablet 0     furosemide (LASIX) 40 MG tablet TAKE 1 TABLET EVERY OTHER DAY (ALTERNATE  WITH FUROSEMIDE 20MG TABLET) 45 tablet 2     finasteride (PROSCAR) 5 MG tablet TAKE 1 TABLET EVERY DAY 90 tablet 2     levothyroxine (SYNTHROID/LEVOTHROID) 100 MCG tablet TAKE 1 TABLET EVERY DAY (NEED LAB APPOINTMENT) 90 tablet 0     Misc Natural Products (FIBER 7) POWD As directed       carvedilol (COREG) 6.25 MG tablet Take 1 tablet (6.25 mg) by mouth 2 times daily (with meals) 180 tablet 1     sucralfate (CARAFATE) 1 GM tablet Take 1 tablet (1 g) by mouth 2 times daily 180 tablet 1     furosemide (LASIX) 20 MG tablet TAKE 1 TABLET EVERY OTHER DAY (ALTERNATE WITH LASIX 40MG) 45 tablet 2     potassium chloride SA (K-DUR/KLOR-CON M) 20 MEQ CR tablet TAKE 1 TABLET (20 MEQ) BY MOUTH DAILY 90 tablet 2     tamsulosin (FLOMAX) 0.4 MG capsule TAKE 1 CAPSULE (0.4 MG) BY MOUTH DAILY 90 capsule 2     omeprazole (PRILOSEC) 20 MG CR capsule Take 1 capsule (20 mg) by mouth 2  times daily 180 capsule 3     simvastatin (ZOCOR) 20 MG tablet TAKE 1 TABLET AT BEDTIME 90 tablet 3     buPROPion (WELLBUTRIN XL) 150 MG 24 hr tablet TAKE ONE TABLET BY MOUTH IN THE MORNING 90 tablet 3     HYDROcodone-acetaminophen (NORCO) 5-325 MG per tablet Take 1-2 tablets by mouth every 4 hours as needed for other (Moderate to Severe Pain) 40 tablet 0     LORazepam (ATIVAN) 1 MG tablet Take 0.5-1 tablets (0.5-1 mg) by mouth every 8 hours as needed for anxiety 30 tablet 5     Multiple Vitamins-Minerals (MULTIVITAMIN OR) Take  by mouth.       dabigatran ANTICOAGULANT (PRADAXA ANTICOAGULANT) 75 MG CAPS BLISTER PACK Take 150 mg by mouth 2 times daily. Store in original 's bottle or blister pack; use within 120 days of opening.       Glucosamine-Chondroitin 8465-2978 MG/30ML LIQD Take 2 tablets by mouth daily        Omega-3 Fatty Acids (FISH OIL) 1200 MG capsule Take 1 capsule by mouth 2 times daily.       COMPRESSION STOCKINGS 1 each daily 2 each 0     ORDER FOR DME Auto-CPAP:Max 9 cm H2OMin 9 cm H2O  Continuous Lifetime need and heated humidity.    1 Device 0                Data:   All laboratory data reviewed  No results found for this or any previous visit (from the past 24 hour(s)).    All laboratory data reviewed  Lab Results   Component Value Date    CHOL 107 12/30/2016     Lab Results   Component Value Date    HDL 54 12/30/2016     Lab Results   Component Value Date    LDL 48 12/30/2016     Lab Results   Component Value Date    TRIG 24 12/30/2016     Lab Results   Component Value Date    CHOLHDLRATIO 2.1 06/15/2015     TSH   Date Value Ref Range Status   08/25/2016 1.20 0.40 - 5.00 mU/L Final     Last Basic Metabolic Panel:  Lab Results   Component Value Date     08/25/2016      Lab Results   Component Value Date    POTASSIUM 4.0 12/30/2016     Lab Results   Component Value Date    CHLORIDE 105 08/25/2016     Lab Results   Component Value Date    TEODORO 9.0 08/25/2016     Lab Results    Component Value Date    CO2 31 08/25/2016     Lab Results   Component Value Date    BUN 20 08/25/2016     Lab Results   Component Value Date    CR 1.02 08/25/2016     Lab Results   Component Value Date     08/25/2016     Lab Results   Component Value Date    WBC 3.0 08/25/2016     Lab Results   Component Value Date    RBC 3.55 08/25/2016     Lab Results   Component Value Date    HGB 11.5 08/25/2016     Lab Results   Component Value Date    HCT 35.4 08/25/2016     Lab Results   Component Value Date     08/25/2016     Lab Results   Component Value Date    MCH 32.4 08/25/2016     Lab Results   Component Value Date    MCHC 32.5 08/25/2016     Lab Results   Component Value Date    RDW 14.7 08/25/2016     Lab Results   Component Value Date    PLT 80 08/25/2016

## 2017-07-20 ENCOUNTER — CARE COORDINATION (OUTPATIENT)
Dept: CARDIOLOGY | Facility: CLINIC | Age: 82
End: 2017-07-20

## 2017-07-20 DIAGNOSIS — I25.10 CORONARY ARTERY DISEASE DUE TO LIPID RICH PLAQUE: ICD-10-CM

## 2017-07-20 DIAGNOSIS — I27.20 PULMONARY HYPERTENSION (H): ICD-10-CM

## 2017-07-20 DIAGNOSIS — E78.00 HYPERCHOLESTEROLEMIA: ICD-10-CM

## 2017-07-20 DIAGNOSIS — I25.5 ISCHEMIC CARDIOMYOPATHY: ICD-10-CM

## 2017-07-20 DIAGNOSIS — R06.09 DYSPNEA ON EXERTION: ICD-10-CM

## 2017-07-20 DIAGNOSIS — I25.83 CORONARY ARTERY DISEASE DUE TO LIPID RICH PLAQUE: ICD-10-CM

## 2017-07-20 LAB
ANION GAP SERPL CALCULATED.3IONS-SCNC: 9.1 MMOL/L (ref 6–17)
BUN SERPL-MCNC: 14 MG/DL (ref 7–30)
CALCIUM SERPL-MCNC: 9.8 MG/DL (ref 8.5–10.5)
CHLORIDE SERPL-SCNC: 104 MMOL/L (ref 98–107)
CO2 SERPL-SCNC: 30 MMOL/L (ref 23–29)
CREAT SERPL-MCNC: 1.15 MG/DL (ref 0.7–1.3)
GFR SERPL CREATININE-BSD FRML MDRD: 60 ML/MIN/1.7M2
GLUCOSE SERPL-MCNC: 104 MG/DL (ref 70–105)
POTASSIUM SERPL-SCNC: 4.1 MMOL/L (ref 3.5–5.1)
SODIUM SERPL-SCNC: 139 MMOL/L (ref 136–145)

## 2017-07-20 PROCEDURE — 36415 COLL VENOUS BLD VENIPUNCTURE: CPT | Performed by: INTERNAL MEDICINE

## 2017-07-20 PROCEDURE — 80048 BASIC METABOLIC PNL TOTAL CA: CPT | Performed by: INTERNAL MEDICINE

## 2017-07-20 NOTE — PROGRESS NOTES
Patient walking from [facility] to our office for lab draw was SOB on exertion.  Once he stopped he felt fine.  States these are the same symptoms he experienced prior to stenting the OM in 8/20/2009.  He states this has been happening more often.  Had nuclear stress test 3/2017 showing small size mild intensity distal inferolateral ischemia. Compared to the prior study dated 01/28/2015, in prior study a  small-to-moderate size inferior wall ischemia was reported. In present study in addition to mild intensity distal inferolateral reversible  defect a fixed basal inferior wall defect is also seen.   The latter defect likely represents attenuation artifact.  He has not tried NTG.  Does not have current date of NTG at home.  Will have him see PA tomorrow.   Instructed him to go to ER is symptoms develope and do not go away with rest.

## 2017-07-21 ENCOUNTER — OFFICE VISIT (OUTPATIENT)
Dept: CARDIOLOGY | Facility: CLINIC | Age: 82
End: 2017-07-21
Payer: COMMERCIAL

## 2017-07-21 VITALS
BODY MASS INDEX: 29.8 KG/M2 | WEIGHT: 220 LBS | HEART RATE: 62 BPM | HEIGHT: 72 IN | SYSTOLIC BLOOD PRESSURE: 110 MMHG | DIASTOLIC BLOOD PRESSURE: 68 MMHG

## 2017-07-21 DIAGNOSIS — I34.0 MITRAL VALVE INSUFFICIENCY, UNSPECIFIED ETIOLOGY: ICD-10-CM

## 2017-07-21 DIAGNOSIS — I27.20 PULMONARY HYPERTENSION (H): ICD-10-CM

## 2017-07-21 DIAGNOSIS — E78.00 HYPERCHOLESTEROLEMIA: ICD-10-CM

## 2017-07-21 DIAGNOSIS — R06.02 SOB (SHORTNESS OF BREATH): Primary | ICD-10-CM

## 2017-07-21 DIAGNOSIS — I25.10 CORONARY ARTERY DISEASE INVOLVING NATIVE CORONARY ARTERY OF NATIVE HEART WITHOUT ANGINA PECTORIS: ICD-10-CM

## 2017-07-21 DIAGNOSIS — I25.5 ISCHEMIC CARDIOMYOPATHY: ICD-10-CM

## 2017-07-21 PROCEDURE — 99214 OFFICE O/P EST MOD 30 MIN: CPT | Performed by: PHYSICIAN ASSISTANT

## 2017-07-21 RX ORDER — ISOSORBIDE MONONITRATE 30 MG/1
TABLET, EXTENDED RELEASE ORAL
Qty: 10 TABLET | Refills: 0 | Status: SHIPPED | OUTPATIENT
Start: 2017-07-21 | End: 2017-07-21

## 2017-07-21 RX ORDER — ISOSORBIDE MONONITRATE 30 MG/1
30 TABLET, EXTENDED RELEASE ORAL DAILY
Qty: 12 TABLET | Refills: 0 | Status: SHIPPED | OUTPATIENT
Start: 2017-07-21 | End: 2017-07-27

## 2017-07-21 NOTE — MR AVS SNAPSHOT
After Visit Summary   7/21/2017    Bairon Foster    MRN: [id number]           Patient Information     Date Of Birth          2/6/1931        Visit Information        Provider Department      7/21/2017 12:30 PM Rocky Platt PA-C [facility] AT [place]        Today's Diagnoses     SOB (shortness of breath)    -  1      Care Instructions    Today's Plan:   1) I discussed your case with Dr. Hope and he does not recommend angiogram at this time.  2) Let's try small dose of Isosorbide - take one tablet one time daily.   3) I will have my nurse, Leyla, give you a call next week to see if this improves your SOB.   4) If your shortness of breath does not improve, we can talk about these other studies.     If you have questions or concerns please call my nurse at [phone].     Scheduling phone number: [phone]  Reminder: Please bring in all current medications, over the counter supplements and vitamin bottles to your next appointment.    It was a pleasure seeing you today!     Rocky Platt  7/21/2017              Follow-ups after your visit        Your next 10 appointments already scheduled     Aug 07, 2017 12:30 PM CDT   US ENDOVENOUS ABLATION THERAPY INCOMPETENT VEIN LT with SUVUS1   [facility] ([facility])    [address] [place], [place] [address]  [facility] [address] [phone]            Aug 09, 2017 10:00 AM CDT   US LOWER EXTREMITY VENOUS DUPLEX LEFT with SUVUS1   [facility] ([facility])    [address] [place], [place] [address]  [facility] [id number] [phone]           Please bring a list of your medicines (including vitamins, minerals and over-the-counter drugs). Also, tell your doctor about any allergies you may have. Wear comfortable clothes and leave your valuables at home.  You do not need to do anything special to prepare for your exam.  Please  call the Imaging Department at your exam site with any questions.            Sep 19, 2017 10:30 AM CDT   Phone Device Check with ARRIETA TECH1   [facility] [place] ([facility])    [id number]  [place] [address] [phone]              Who to contact     If you have questions or need follow up information about today's clinic visit or your schedule please contact [facility] [place] directly at [phone].  Normal or non-critical lab and imaging results will be communicated to you by [employer], letter or phone within 4 business days after the clinic has received the results. If you do not hear from us within 7 days, please contact the clinic through [employer] or phone. If you have a critical or abnormal lab result, we will notify you by phone as soon as possible.  Submit refill requests through [employer] or call your pharmacy and they will forward the refill request to us. Please allow 3 business days for your refill to be completed.          Additional Information About Your Visit        Overwatch Information     [employer] gives you secure access to your electronic health record. If you see a primary care provider, you can also send messages to your care team and make appointments. If you have questions, please call your primary care clinic.  If you do not have a primary care provider, please call [phone] and they will assist you.        Care EveryWhere ID     This is your Care EveryWhere ID. This could be used by other organizations to access your [place] medical records  [id number]        Your Vitals Were     Pulse Height BMI (Body Mass Index)             62 1.829 m (6') 29.84 kg/m2          Blood Pressure from Last 3 Encounters:   07/21/17 110/68   07/10/17 131/75   06/16/17 118/66    Weight from Last 3 Encounters:   07/21/17 99.8 kg (220 lb)   07/10/17 102.1 kg (225 lb)   06/16/17 99.3 kg (218 lb 14.4 oz)               Today, you had the following     No orders found for display         Today's Medication Changes          These changes are accurate as of: 7/21/17  1:31 PM.  If you have any questions, ask your nurse or doctor.               Start taking these medicines.        Dose/Directions    isosorbide mononitrate 30 MG 24 hr tablet   Commonly known as:  IMDUR   Used for:  SOB (shortness of breath)   Started by:  Rocky Platt PA-C        Take half tablet one time daily.   Quantity:  10 tablet   Refills:  0            Where to get your medicines      These medications were sent to [facility] [address] [place] [address] [address] [facility]  [address], [facility] [address]    Hours:  24-hours Phone:  [phone]     isosorbide mononitrate 30 MG 24 hr tablet                Primary Care Provider Office Phone # Fax #    Mg Nelson [phone]       [facility] [address] [facility] [address]        Goals        General    I will find a caregiver support group to attend (pt-stated)     Notes - Note created  4/17/2014  8:49 AM by Beth Morales RN    As of today's date 4/17/2014 goal is met at 0 - 25%.   Goal Status:  Active        Equal Access to Services     [facility] AH: Hadii aad ku hadasho Soomaali, waaxda luqadaha, qaybta kaalmada adeegyada, waxay fady haylaura camilo . So [facility] [phone].    ATENCIÓN: Si habla español, tiene a malone disposición servicios gratuitos de asistencia lingüística. Llame al [phone].    We comply with applicable federal civil rights laws and [place] laws. We do not discriminate on the basis of race, color, national origin, age, disability sex, sexual orientation or gender identity.            Thank you!     Thank you for choosing [facility] [place]  for your care. Our goal is always to provide you with excellent care. Hearing back from our patients is one  way we can continue to improve our services. Please take a few minutes to complete the written survey that you may receive in the mail after your visit with us. Thank you!             Your Updated Medication List - Protect others around you: Learn how to safely use, store and throw away your medicines at www.disposemymeds.org.          This list is accurate as of: 7/21/17  1:31 PM.  Always use your most recent med list.                   Brand Name Dispense Instructions for use Diagnosis    buPROPion 150 MG 24 hr tablet    WELLBUTRIN XL    90 tablet    TAKE ONE TABLET BY MOUTH IN THE MORNING    Dysthymic disorder       carvedilol 6.25 MG tablet    COREG    180 tablet    Take 1 tablet (6.25 mg) by mouth 2 times daily (with meals)    Ischemic cardiomyopathy       COMPRESSION STOCKINGS     2 each    1 each daily    Varicose 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       diazepam 5 MG tablet    VALIUM    2 tablet    Take 1 tablet (5 mg) by mouth See Admin Instructions Bring with you to the procedure    Status post endovenous radiofrequency ablation (RFA) of saphenous vein       FIBER 7 Powd      As directed        finasteride 5 MG tablet    PROSCAR    90 tablet    TAKE 1 TABLET EVERY DAY    BPH (benign prostatic hyperplasia)       * furosemide 20 MG tablet    LASIX    45 tablet    TAKE 1 TABLET EVERY OTHER DAY (ALTERNATE WITH LASIX 40MG)    Ischemic cardiomyopathy       * furosemide 40 MG tablet    LASIX    45 tablet    TAKE 1 TABLET EVERY OTHER DAY (ALTERNATE  WITH FUROSEMIDE 20MG TABLET)    Ischemic cardiomyopathy       glucosamine-chondroitinoitin 2086-3548 MG/30ML Liqd      Take 2 tablets by mouth daily        HYDROcodone-acetaminophen 5-325 MG per tablet    NORCO    40 tablet    Take 1-2 tablets by mouth every 4 hours as needed for other (Moderate to Severe Pain)    Inguinal hernia       isosorbide mononitrate 30 MG 24 hr tablet    IMDUR    10 tablet    Take half tablet one time daily.    SOB (shortness of  breath)       levothyroxine 100 MCG tablet    SYNTHROID/LEVOTHROID    90 tablet    TAKE 1 TABLET EVERY DAY (NEED LAB APPOINTMENT)    Hypothyroidism       LORazepam 1 MG tablet    ATIVAN    30 tablet    Take 0.5-1 tablets (0.5-1 mg) by mouth every 8 hours as needed for anxiety    Acute stress reaction       MULTIVITAMIN PO      Take  by mouth.        omega-3 fatty acids 1200 MG capsule      Take 1 capsule by mouth 2 times daily.        omeprazole 20 MG CR capsule    priLOSEC    180 capsule    Take 1 capsule (20 mg) by mouth 2 times daily    Gastroesophageal reflux disease without esophagitis       order for DME     1 Device    Auto-CPAP:Max 9 cm H2OMin 9 cm H2O Continuous Lifetime need and heated humidity.    Obstructive sleep apnea (adult) (pediatric), Other dyspnea and respiratory abnormality       potassium chloride SA 20 MEQ CR tablet    K-DUR/KLOR-CON M    90 tablet    TAKE 1 TABLET (20 MEQ) BY MOUTH DAILY    Ischemic cardiomyopathy       PRADAXA ANTICOAGULANT 75 MG capsule   Generic drug:  dabigatran ANTICOAGULANT      Take 150 mg by mouth 2 times daily. Store in original 's bottle or blister pack; use within 120 days of opening.        simvastatin 20 MG tablet    ZOCOR    90 tablet    TAKE 1 TABLET AT BEDTIME    Hypercholesterolemia       sucralfate 1 GM tablet    CARAFATE    180 tablet    Take 1 tablet (1 g) by mouth 2 times daily    Esophageal reflux       tamsulosin 0.4 MG capsule    FLOMAX    90 capsule    TAKE 1 CAPSULE (0.4 MG) BY MOUTH DAILY    BPH (benign prostatic hyperplasia)       * Notice:  This list has 2 medication(s) that are the same as other medications prescribed for you. Read the directions carefully, and ask your doctor or other care provider to review them with you.

## 2017-07-21 NOTE — LETTER
7/21/2017    Mg Nelson MD  [address]  [facility] [address]    RE: Bairon DOWNS Cristian       Dear Colleague,    I had the pleasure of seeing Bairon Foster in the [facility].    History of present illness:  This is a pleasant 86-year-old male who comes in for evaluation of shortness of breath.  He follows with Dr. Hope.  His past medical history is notable for coronary artery disease (stenting to obtuse marginal branch in 2009), mitral regurgitation, ischemic cardiomyopathy (preserved ejection fraction on most recent echocardiogram), hyperlipidemia, venous insufficiency (follows with Dr. Carrizales), persistent atrial fibrillation, and sick sinus syndrome (single-chamber pacemaker implantation in 2009).    The patient saw Dr. Hope 4 months ago for routine follow up. At that time he had reports of exertional shortness of breath and chest discomfort.  He was set up for nuclear stress study which showed small size mild intensity distal inferolateral ischemia.  There were no regional wall motion abnormalities.  There was also fixed basal inferior wall defect noted which likely represented an attenuation artifact (this was noted to be of reversible defect in nuclear stress study that was performed in 2015).  ECG demonstrated atrial fibrillation with frequent PVCs during stress.  Patient experienced lightheadedness during stress.    His most recent echocardiogram was performed in 3/2017 showing EF at 55-60% with mild to moderately dilated right ventricle and elevated pressures suggesting moderate pulmonary hypertension.  Compared to his previous study in 2016 his LVEF appeared to have improved.    His most recent remote device check was performed in 6/2017 without any abnormalities. His full interrogation was checked at the time of his visit with Dr. Hope.  At that time the patient had ventricle pacing of 76%.  He was also noted to have 3 episodes of 10-19 beats of NSVT with  rate of 200 bpm. At that time patient was having symptoms of palpitations.  In the past his carvedilol was increased by Dr. Rico to suppress these short bursts of NSVT.    About a week ago the patient was seen by Dr. Carrizales for evaluation of venous insufficiency.  His Lasix was increased to 40 mg daily due to significant edema and slight dyspnea.  Patient was encouraged to contact us if shortness of breath worsened.    The patient presents to clinic today due to persistent shortness of breath.  He states that he gets short of breath after walking long distances such as walking from the parking lot across from the hospital.  He denies chest discomfort, nausea, or diaphoresis.  He denies any resting symptoms.  He continues to have edema which is slightly better with increase in Lasix.  He denies any palpitations or fluttering sensation.  No lightheadedness dizziness or near-syncope or syncope.  No bleeding issues.  No fever or chills.  He does not feel the increase in Lasix has helped with his shortness of breath. His weight has come down by 5 Ibs.    Physical examination:   General-NAD, appears to be younger than stated age  Neck-normal JVP no carotid bruit  Respiratory-clear to auscultation bilaterally  Cardiac- irregularly irregular rhythm with 1/6 holosystolic murmur auscultated along the left sternal border  Abdomen-soft nontender  Extremities-1+ pitting edema of lower extremities bilaterally  Skin-venous stasis changes to his lower extremities bilaterally with some venous varicosities    Assessment and plan:  This is a delightful 86-year-old male who comes in for evaluation of shortness of breath.  His past medical history is notable for coronary artery disease, persistent atrial fibrillation, history of sick sinus syndrome, ischemic cardiomyopathy, venous insufficiency, hyperlipidemia, and mitral regurgitation.    His most recent nuclear study was performed 4 months ago when he was seen by Dr. Zhang for routine  follow-up and symptoms of exertional chest discomfort and shortness of breath.  This showed small area of distal lateral ischemia.  This was reviewed with Dr. Hope and medical therapy was recommended.  Given patient's persistent peripheral edema he was also referred to vascular clinic.  He was seen by Dr. Carrizales last week for evaluation of this.  At that time his Lasix was up titrated to 40 mg daily due to persistent edema as well as dyspnea.    The patient tells me that he continues to have shortness of breath.  This will come on after a long distance of walk.  He tells me that he usually parry across the street from her hospital and by the time he gets here is quite short of breath.  He denies any chest discomfort palpitations fluttering lightheadedness dizziness near syncope or syncope.  He feels that increased the Lasix has helped slightly with the edema but not with shortness of breath.  He denies any resting symptoms.  He denies orthopnea or PND.  His weight has come down by 5 pounds since last week.    I did review his case with Dr. Hope today.  He did not recommend proceeding with coronary angiogram at this time given the fairly stable findings from nuclear stress study a few months ago.  If dyspnea persists patient was recommended to undergo chest CT scan.  At this time we will add 30 mg of Imdur to his regimen to see if this improves his symptoms.  I will have our nurse team contact patient next week to assess his symptoms.  On exam he does not have overt signs of heart failure.  His weight has come down by 5 pounds.  It is unclear if his shortness of breath is related to hypervolemia or ischemia.  We will continue with Imdur trial.  If his symptoms persist we may continue workup with chest x-ray, hemoglobin, chest CT scan, and possibly a repeat nuclear stress study/coronary angiogram.    Patient verbalized understanding.    Thank you for allowing me to participate in the care of this delightful patient  today.    This note was completed in part using Dragon voice recognition software. Although reviewed after completion, some word and grammatical errors may occur.    Orders this Visit:  No orders of the defined types were placed in this encounter.    Orders Placed This Encounter   Medications     DISCONTD: isosorbide mononitrate (IMDUR) 30 MG 24 hr tablet     Sig: Take half tablet one time daily.     Dispense:  10 tablet     Refill:  0     isosorbide mononitrate (IMDUR) 30 MG 24 hr tablet     Sig: Take 1 tablet (30 mg) by mouth daily     Dispense:  12 tablet     Refill:  0     Medications Discontinued During This Encounter   Medication Reason     isosorbide mononitrate (IMDUR) 30 MG 24 hr tablet          Encounter Diagnosis   Name Primary?     SOB (shortness of breath) Yes       CURRENT MEDICATIONS:  Current Outpatient Prescriptions   Medication Sig Dispense Refill     isosorbide mononitrate (IMDUR) 30 MG 24 hr tablet Take 1 tablet (30 mg) by mouth daily 12 tablet 0     diazepam (VALIUM) 5 MG tablet Take 1 tablet (5 mg) by mouth See Admin Instructions Bring with you to the procedure 2 tablet 0     furosemide (LASIX) 40 MG tablet TAKE 1 TABLET EVERY OTHER DAY (ALTERNATE  WITH FUROSEMIDE 20MG TABLET) 45 tablet 2     finasteride (PROSCAR) 5 MG tablet TAKE 1 TABLET EVERY DAY 90 tablet 2     levothyroxine (SYNTHROID/LEVOTHROID) 100 MCG tablet TAKE 1 TABLET EVERY DAY (NEED LAB APPOINTMENT) 90 tablet 0     COMPRESSION STOCKINGS 1 each daily 2 each 0     Misc Natural Products (FIBER 7) POWD As directed       carvedilol (COREG) 6.25 MG tablet Take 1 tablet (6.25 mg) by mouth 2 times daily (with meals) 180 tablet 1     sucralfate (CARAFATE) 1 GM tablet Take 1 tablet (1 g) by mouth 2 times daily 180 tablet 1     furosemide (LASIX) 20 MG tablet TAKE 1 TABLET EVERY OTHER DAY (ALTERNATE WITH LASIX 40MG) 45 tablet 2     potassium chloride SA (K-DUR/KLOR-CON M) 20 MEQ CR tablet TAKE 1 TABLET (20 MEQ) BY MOUTH DAILY 90 tablet 2      tamsulosin (FLOMAX) 0.4 MG capsule TAKE 1 CAPSULE (0.4 MG) BY MOUTH DAILY 90 capsule 2     omeprazole (PRILOSEC) 20 MG CR capsule Take 1 capsule (20 mg) by mouth 2 times daily 180 capsule 3     simvastatin (ZOCOR) 20 MG tablet TAKE 1 TABLET AT BEDTIME 90 tablet 3     buPROPion (WELLBUTRIN XL) 150 MG 24 hr tablet TAKE ONE TABLET BY MOUTH IN THE MORNING 90 tablet 3     HYDROcodone-acetaminophen (NORCO) 5-325 MG per tablet Take 1-2 tablets by mouth every 4 hours as needed for other (Moderate to Severe Pain) 40 tablet 0     LORazepam (ATIVAN) 1 MG tablet Take 0.5-1 tablets (0.5-1 mg) by mouth every 8 hours as needed for anxiety 30 tablet 5     Multiple Vitamins-Minerals (MULTIVITAMIN OR) Take  by mouth.       ORDER FOR DME Auto-CPAP:Max 9 cm H2OMin 9 cm H2O  Continuous Lifetime need and heated humidity.    1 Device 0     dabigatran ANTICOAGULANT (PRADAXA ANTICOAGULANT) 75 MG CAPS BLISTER PACK Take 150 mg by mouth 2 times daily. Store in original 's bottle or blister pack; use within 120 days of opening.       Glucosamine-Chondroitin 0016-4770 MG/30ML LIQD Take 2 tablets by mouth daily        Omega-3 Fatty Acids (FISH OIL) 1200 MG capsule Take 1 capsule by mouth 2 times daily.         ALLERGIES   No Known Allergies    PAST MEDICAL HISTORY:  Past Medical History:   Diagnosis Date     Acute, but ill-defined, cerebrovascular disease     NO RESIDUALS     Coronary atherosclerosis of unspecified type of vessel, native or graft     S/P PTCA, EF 50%     Esophageal reflux      Hydrocele, unspecified      Hypersomnia with sleep apnea, unspecified      Inguinal hernia without mention of obstruction or gangrene, recurrent unilateral or unspecified      Other and unspecified hyperlipidemia      Other lymphedema     GROIN AND THIGHS     Other pancytopenia (H)      Other specified anemias      Persistent atrial fibrillation (H)     VVI PM for long pauses     Prostatic hypertrophy, benign      Scrotal varices       Thrombocytopenia, unspecified (H)      Unilateral or unspecified femoral hernia without mention of obstruction or gangrene, unilateral or unspecified      Unspecified hypothyroidism        PAST SURGICAL HISTORY:  Past Surgical History:   Procedure Laterality Date     EYE SURGERY      CATARACT/BLEPHAROPLASTY     GENITOURINARY SURGERY      TUNA     HERNIA REPAIR      RIGHT INGUINAL     HERNIORRHAPHY INGUINAL  8/14/2012    Procedure: HERNIORRHAPHY INGUINAL;  right inguinal hernia repair;  Surgeon: Kareem Torrez MD;  Location: [facility]     IMPLANT PACEMAKER  8/2009    single chamber     ORTHOPEDIC SURGERY      [facility] RIGHT     ORTHOPEDIC SURGERY  2010    right TKR, left TKR     SURGICAL HISTORY OF -       Thumb surgery     SURGICAL HISTORY OF -   1990s    Left total knee replacement     SURGICAL HISTORY OF -   3/11    [facility] total knee replacement       FAMILY HISTORY:  Family History   Problem Relation Age of Onset     Cardiovascular Father      C.A.D. Father      CANCER Sister      lung     Unknown/Adopted Mother      Cancer - colorectal No family hx of      DIABETES No family hx of      Coronary Artery Disease No family hx of      Hypertension No family hx of      Hyperlipidemia No family hx of      CEREBROVASCULAR DISEASE No family hx of      Breast Cancer No family hx of      Colon Cancer No family hx of      Prostate Cancer No family hx of      Other Cancer No family hx of      Depression No family hx of      Anxiety Disorder No family hx of      MENTAL ILLNESS No family hx of      Substance Abuse No family hx of      Anesthesia Reaction No family hx of      Asthma No family hx of      OSTEOPOROSIS No family hx of      Genetic Disorder No family hx of      Thyroid Disease No family hx of      Obesity No family hx of        SOCIAL HISTORY:  Social History     Social History     Marital status:      Spouse name: N/A     Number of children: N/A     Years of education: N/A     Occupational History     Teacher, author,  speaker Retired     Social History Main Topics     Smoking status: Never Smoker     Smokeless tobacco: Never Used      Comment: per encounter 2/12/07     Alcohol use 0.0 oz/week     0 Standard drinks or equivalent per week      Comment: occasionally     Drug use: No     Sexual activity: No     Other Topics Concern     Parent/Sibling W/ Cabg, Mi Or Angioplasty Before 65f 55m? No     Caffeine Concern No     1-2 cups coffee a day     Sleep Concern No     Stress Concern Yes     due to wifes health condition     Weight Concern No     Special Diet No     Exercise Yes     states he uses his tredmill on and off     Seat Belt Yes     Social History Narrative       Review of Systems:  Skin:  Positive for     Eyes:  Positive for glasses;cataracts  ENT:  Negative    Respiratory:  Positive for CPAP;sleep apnea;dyspnea on exertion (sob with walking)  Cardiovascular:    Positive for;edema;lightheadedness  Gastroenterology: Negative    Genitourinary:       Musculoskeletal:  Positive for arthritis  Neurologic:  Positive for stroke;numbness or tingling of feet  Psychiatric:  Positive for anxiety  Heme/Lymph/Imm:  Negative    Endocrine:  Positive for thyroid disorder    Physical Exam:  Vitals: /68  Pulse 62  Ht 1.829 m (6')  Wt 99.8 kg (220 lb)  BMI 29.84 kg/m2   Please refer to dictation for physical exam    Recent Lab Results:  LIPID RESULTS:  Lab Results   Component Value Date    CHOL 107 12/30/2016    HDL 54 12/30/2016    LDL 48 12/30/2016    TRIG 24 12/30/2016    CHOLHDLRATIO 2.1 06/15/2015       LIVER ENZYME RESULTS:  Lab Results   Component Value Date    AST 25 12/08/2015    ALT 26 12/30/2016       CBC RESULTS:  Lab Results   Component Value Date    WBC 3.0 (L) 08/25/2016    RBC 3.55 (L) 08/25/2016    HGB 11.5 (L) 08/25/2016    HCT 35.4 (L) 08/25/2016     08/25/2016    MCH 32.4 08/25/2016    MCHC 32.5 08/25/2016    RDW 14.7 08/25/2016    PLT 80 (L) 08/25/2016       BMP RESULTS:  Lab Results   Component Value  Date     07/20/2017    POTASSIUM 4.1 07/20/2017    CHLORIDE 104 07/20/2017    CO2 30 (H) 07/20/2017    ANIONGAP 9.1 07/20/2017     07/20/2017    BUN 14 07/20/2017    CR 1.15 07/20/2017    GFRESTIMATED 60 (L) 07/20/2017    GFRESTBLACK 73 07/20/2017    TEODORO 9.8 07/20/2017        A1C RESULTS:  No results found for: A1C    INR RESULTS:  Lab Results   Component Value Date    INR 1.38 (H) 03/19/2011    INR 1.40 (H) 03/18/2011       Thank you for allowing me to participate in the care of your patient.    Sincerely,     Rocky Platt PA-C     [facility]

## 2017-07-21 NOTE — PATIENT INSTRUCTIONS
Today's Plan:   1) I discussed your case with Dr. Hope and he does not recommend angiogram at this time.  2) Let's try small dose of Isosorbide - take one tablet one time daily.   3) I will have my nurse, Leyla, give you a call next week to see if this improves your SOB.   4) If your shortness of breath does not improve, we can talk about these other studies.     If you have questions or concerns please call my nurse at [phone].     Scheduling phone number: [phone]  Reminder: Please bring in all current medications, over the counter supplements and vitamin bottles to your next appointment.    It was a pleasure seeing you today!     Rocky Platt  7/21/2017

## 2017-07-25 NOTE — PROGRESS NOTES
History of present illness:  This is a pleasant 86-year-old male who comes in for evaluation of shortness of breath.  He follows with Dr. Hope.  His past medical history is notable for coronary artery disease (stenting to obtuse marginal branch in 2009), mitral regurgitation, ischemic cardiomyopathy (preserved ejection fraction on most recent echocardiogram), hyperlipidemia, venous insufficiency (follows with Dr. Carrizales), persistent atrial fibrillation, and sick sinus syndrome (single-chamber pacemaker implantation in 2009).    The patient saw Dr. Hope 4 months ago for routine follow up. At that time he had reports of exertional shortness of breath and chest discomfort.  He was set up for nuclear stress study which showed small size mild intensity distal inferolateral ischemia.  There were no regional wall motion abnormalities.  There was also fixed basal inferior wall defect noted which likely represented an attenuation artifact (this was noted to be of reversible defect in nuclear stress study that was performed in 2015).  ECG demonstrated atrial fibrillation with frequent PVCs during stress.  Patient experienced lightheadedness during stress.    His most recent echocardiogram was performed in 3/2017 showing EF at 55-60% with mild to moderately dilated right ventricle and elevated pressures suggesting moderate pulmonary hypertension.  Compared to his previous study in 2016 his LVEF appeared to have improved.    His most recent remote device check was performed in 6/2017 without any abnormalities. His full interrogation was checked at the time of his visit with Dr. Hope.  At that time the patient had ventricle pacing of 76%.  He was also noted to have 3 episodes of 10-19 beats of NSVT with rate of 200 bpm. At that time patient was having symptoms of palpitations.  In the past his carvedilol was increased by Dr. Rico to suppress these short bursts of NSVT.    About a week ago the patient was seen by Dr. Carrizales for  evaluation of venous insufficiency.  His Lasix was increased to 40 mg daily due to significant edema and slight dyspnea.  Patient was encouraged to contact us if shortness of breath worsened.    The patient presents to clinic today due to persistent shortness of breath.  He states that he gets short of breath after walking long distances such as walking from the parking lot across from the hospital.  He denies chest discomfort, nausea, or diaphoresis.  He denies any resting symptoms.  He continues to have edema which is slightly better with increase in Lasix.  He denies any palpitations or fluttering sensation.  No lightheadedness dizziness or near-syncope or syncope.  No bleeding issues.  No fever or chills.  He does not feel the increase in Lasix has helped with his shortness of breath. His weight has come down by 5 Ibs.    Physical examination:   General-NAD, appears to be younger than stated age  Neck-normal JVP no carotid bruit  Respiratory-clear to auscultation bilaterally  Cardiac- irregularly irregular rhythm with 1/6 holosystolic murmur auscultated along the left sternal border  Abdomen-soft nontender  Extremities-1+ pitting edema of lower extremities bilaterally  Skin-venous stasis changes to his lower extremities bilaterally with some venous varicosities    Assessment and plan:  This is a delightful 86-year-old male who comes in for evaluation of shortness of breath.  His past medical history is notable for coronary artery disease, persistent atrial fibrillation, history of sick sinus syndrome, ischemic cardiomyopathy, venous insufficiency, hyperlipidemia, and mitral regurgitation.    His most recent nuclear study was performed 4 months ago when he was seen by Dr. Zhang for routine follow-up and symptoms of exertional chest discomfort and shortness of breath.  This showed small area of distal lateral ischemia.  This was reviewed with Dr. Hope and medical therapy was recommended.  Given patient's  persistent peripheral edema he was also referred to vascular clinic.  He was seen by Dr. Carrizales last week for evaluation of this.  At that time his Lasix was up titrated to 40 mg daily due to persistent edema as well as dyspnea.    The patient tells me that he continues to have shortness of breath.  This will come on after a long distance of walk.  He tells me that he usually parry across the street from her hospital and by the time he gets here is quite short of breath.  He denies any chest discomfort palpitations fluttering lightheadedness dizziness near syncope or syncope.  He feels that increased the Lasix has helped slightly with the edema but not with shortness of breath.  He denies any resting symptoms.  He denies orthopnea or PND.  His weight has come down by 5 pounds since last week.    I did review his case with Dr. Hope today.  He did not recommend proceeding with coronary angiogram at this time given the fairly stable findings from nuclear stress study a few months ago.  If dyspnea persists patient was recommended to undergo chest CT scan.  At this time we will add 30 mg of Imdur to his regimen to see if this improves his symptoms.  I will have our nurse team contact patient next week to assess his symptoms.  On exam he does not have overt signs of heart failure.  His weight has come down by 5 pounds.  It is unclear if his shortness of breath is related to hypervolemia or ischemia.  We will continue with Imdur trial.  If his symptoms persist we may continue workup with chest x-ray, hemoglobin, chest CT scan, and possibly a repeat nuclear stress study/coronary angiogram.    Patient verbalized understanding.    Thank you for allowing me to participate in the care of this delightful patient today.    This note was completed in part using Dragon voice recognition software. Although reviewed after completion, some word and grammatical errors may occur.    Orders this Visit:  No orders of the defined types were  placed in this encounter.    Orders Placed This Encounter   Medications     DISCONTD: isosorbide mononitrate (IMDUR) 30 MG 24 hr tablet     Sig: Take half tablet one time daily.     Dispense:  10 tablet     Refill:  0     isosorbide mononitrate (IMDUR) 30 MG 24 hr tablet     Sig: Take 1 tablet (30 mg) by mouth daily     Dispense:  12 tablet     Refill:  0     Medications Discontinued During This Encounter   Medication Reason     isosorbide mononitrate (IMDUR) 30 MG 24 hr tablet          Encounter Diagnosis   Name Primary?     SOB (shortness of breath) Yes       CURRENT MEDICATIONS:  Current Outpatient Prescriptions   Medication Sig Dispense Refill     isosorbide mononitrate (IMDUR) 30 MG 24 hr tablet Take 1 tablet (30 mg) by mouth daily 12 tablet 0     diazepam (VALIUM) 5 MG tablet Take 1 tablet (5 mg) by mouth See Admin Instructions Bring with you to the procedure 2 tablet 0     furosemide (LASIX) 40 MG tablet TAKE 1 TABLET EVERY OTHER DAY (ALTERNATE  WITH FUROSEMIDE 20MG TABLET) 45 tablet 2     finasteride (PROSCAR) 5 MG tablet TAKE 1 TABLET EVERY DAY 90 tablet 2     levothyroxine (SYNTHROID/LEVOTHROID) 100 MCG tablet TAKE 1 TABLET EVERY DAY (NEED LAB APPOINTMENT) 90 tablet 0     COMPRESSION STOCKINGS 1 each daily 2 each 0     Misc Natural Products (FIBER 7) POWD As directed       carvedilol (COREG) 6.25 MG tablet Take 1 tablet (6.25 mg) by mouth 2 times daily (with meals) 180 tablet 1     sucralfate (CARAFATE) 1 GM tablet Take 1 tablet (1 g) by mouth 2 times daily 180 tablet 1     furosemide (LASIX) 20 MG tablet TAKE 1 TABLET EVERY OTHER DAY (ALTERNATE WITH LASIX 40MG) 45 tablet 2     potassium chloride SA (K-DUR/KLOR-CON M) 20 MEQ CR tablet TAKE 1 TABLET (20 MEQ) BY MOUTH DAILY 90 tablet 2     tamsulosin (FLOMAX) 0.4 MG capsule TAKE 1 CAPSULE (0.4 MG) BY MOUTH DAILY 90 capsule 2     omeprazole (PRILOSEC) 20 MG CR capsule Take 1 capsule (20 mg) by mouth 2 times daily 180 capsule 3     simvastatin (ZOCOR) 20 MG  tablet TAKE 1 TABLET AT BEDTIME 90 tablet 3     buPROPion (WELLBUTRIN XL) 150 MG 24 hr tablet TAKE ONE TABLET BY MOUTH IN THE MORNING 90 tablet 3     HYDROcodone-acetaminophen (NORCO) 5-325 MG per tablet Take 1-2 tablets by mouth every 4 hours as needed for other (Moderate to Severe Pain) 40 tablet 0     LORazepam (ATIVAN) 1 MG tablet Take 0.5-1 tablets (0.5-1 mg) by mouth every 8 hours as needed for anxiety 30 tablet 5     Multiple Vitamins-Minerals (MULTIVITAMIN OR) Take  by mouth.       ORDER FOR DME Auto-CPAP:Max 9 cm H2OMin 9 cm H2O  Continuous Lifetime need and heated humidity.    1 Device 0     dabigatran ANTICOAGULANT (PRADAXA ANTICOAGULANT) 75 MG CAPS BLISTER PACK Take 150 mg by mouth 2 times daily. Store in original 's bottle or blister pack; use within 120 days of opening.       Glucosamine-Chondroitin 6884-2600 MG/30ML LIQD Take 2 tablets by mouth daily        Omega-3 Fatty Acids (FISH OIL) 1200 MG capsule Take 1 capsule by mouth 2 times daily.         ALLERGIES   No Known Allergies    PAST MEDICAL HISTORY:  Past Medical History:   Diagnosis Date     Acute, but ill-defined, cerebrovascular disease     NO RESIDUALS     Coronary atherosclerosis of unspecified type of vessel, native or graft     S/P PTCA, EF 50%     Esophageal reflux      Hydrocele, unspecified      Hypersomnia with sleep apnea, unspecified      Inguinal hernia without mention of obstruction or gangrene, recurrent unilateral or unspecified      Other and unspecified hyperlipidemia      Other lymphedema     GROIN AND THIGHS     Other pancytopenia (H)      Other specified anemias      Persistent atrial fibrillation (H)     VVI PM for long pauses     Prostatic hypertrophy, benign      Scrotal varices      Thrombocytopenia, unspecified (H)      Unilateral or unspecified femoral hernia without mention of obstruction or gangrene, unilateral or unspecified      Unspecified hypothyroidism        PAST SURGICAL HISTORY:  Past Surgical  History:   Procedure Laterality Date     EYE SURGERY      CATARACT/BLEPHAROPLASTY     GENITOURINARY SURGERY      TUNA     HERNIA REPAIR      RIGHT INGUINAL     HERNIORRHAPHY INGUINAL  8/14/2012    Procedure: HERNIORRHAPHY INGUINAL;  right inguinal hernia repair;  Surgeon: Kareem Torrez MD;  Location: [facility]     IMPLANT PACEMAKER  8/2009    single chamber     ORTHOPEDIC SURGERY      [facility] RIGHT     ORTHOPEDIC SURGERY  2010    right TKR, left TKR     SURGICAL HISTORY OF -       Thumb surgery     SURGICAL HISTORY OF -   1990s    Left total knee replacement     SURGICAL HISTORY OF -   3/11    Rig total knee replacement       FAMILY HISTORY:  Family History   Problem Relation Age of Onset     Cardiovascular Father      C.A.D. Father      CANCER Sister      lung     Unknown/Adopted Mother      Cancer - colorectal No family hx of      DIABETES No family hx of      Coronary Artery Disease No family hx of      Hypertension No family hx of      Hyperlipidemia No family hx of      CEREBROVASCULAR DISEASE No family hx of      Breast Cancer No family hx of      Colon Cancer No family hx of      Prostate Cancer No family hx of      Other Cancer No family hx of      Depression No family hx of      Anxiety Disorder No family hx of      MENTAL ILLNESS No family hx of      Substance Abuse No family hx of      Anesthesia Reaction No family hx of      Asthma No family hx of      OSTEOPOROSIS No family hx of      Genetic Disorder No family hx of      Thyroid Disease No family hx of      Obesity No family hx of        SOCIAL HISTORY:  Social History     Social History     Marital status:      Spouse name: N/A     Number of children: N/A     Years of education: N/A     Occupational History     Teacher, author, speaker Retired     Social History Main Topics     Smoking status: Never Smoker     Smokeless tobacco: Never Used      Comment: per encounter 2/12/07     Alcohol use 0.0 oz/week     0 Standard drinks or equivalent per week       Comment: occasionally     Drug use: No     Sexual activity: No     Other Topics Concern     Parent/Sibling W/ Cabg, Mi Or Angioplasty Before 65f 55m? No     Caffeine Concern No     1-2 cups coffee a day     Sleep Concern No     Stress Concern Yes     due to wifes health condition     Weight Concern No     Special Diet No     Exercise Yes     states he uses his tredmill on and off     Seat Belt Yes     Social History Narrative       Review of Systems:  Skin:  Positive for     Eyes:  Positive for glasses;cataracts  ENT:  Negative    Respiratory:  Positive for CPAP;sleep apnea;dyspnea on exertion (sob with walking)  Cardiovascular:    Positive for;edema;lightheadedness  Gastroenterology: Negative    Genitourinary:       Musculoskeletal:  Positive for arthritis  Neurologic:  Positive for stroke;numbness or tingling of feet  Psychiatric:  Positive for anxiety  Heme/Lymph/Imm:  Negative    Endocrine:  Positive for thyroid disorder    Physical Exam:  Vitals: /68  Pulse 62  Ht 1.829 m (6')  Wt 99.8 kg (220 lb)  BMI 29.84 kg/m2   Please refer to dictation for physical exam    Recent Lab Results:  LIPID RESULTS:  Lab Results   Component Value Date    CHOL 107 12/30/2016    HDL 54 12/30/2016    LDL 48 12/30/2016    TRIG 24 12/30/2016    CHOLHDLRATIO 2.1 06/15/2015       LIVER ENZYME RESULTS:  Lab Results   Component Value Date    AST 25 12/08/2015    ALT 26 12/30/2016       CBC RESULTS:  Lab Results   Component Value Date    WBC 3.0 (L) 08/25/2016    RBC 3.55 (L) 08/25/2016    HGB 11.5 (L) 08/25/2016    HCT 35.4 (L) 08/25/2016     08/25/2016    MCH 32.4 08/25/2016    MCHC 32.5 08/25/2016    RDW 14.7 08/25/2016    PLT 80 (L) 08/25/2016       BMP RESULTS:  Lab Results   Component Value Date     07/20/2017    POTASSIUM 4.1 07/20/2017    CHLORIDE 104 07/20/2017    CO2 30 (H) 07/20/2017    ANIONGAP 9.1 07/20/2017     07/20/2017    BUN 14 07/20/2017    CR 1.15 07/20/2017    GFRESTIMATED 60 (L)  07/20/2017    GFRESTBLACK 73 07/20/2017    TEODORO 9.8 07/20/2017        A1C RESULTS:  No results found for: A1C    INR RESULTS:  Lab Results   Component Value Date    INR 1.38 (H) 03/19/2011    INR 1.40 (H) 03/18/2011           Rocky Platt PA-C   July 25, 2017

## 2017-07-27 DIAGNOSIS — R06.02 SOB (SHORTNESS OF BREATH): ICD-10-CM

## 2017-07-27 RX ORDER — ISOSORBIDE MONONITRATE 30 MG/1
30 TABLET, EXTENDED RELEASE ORAL DAILY
Qty: 90 TABLET | Refills: 3 | Status: SHIPPED | OUTPATIENT
Start: 2017-07-27 | End: 2017-08-03

## 2017-07-27 NOTE — NURSING NOTE
Called patient to see how he is doing since starting Imdur 30 mg daily.  He states he feels much better.  No longer SOB with activity.  Will escribe 3 months supple to [facility] per patient request.

## 2017-08-02 ENCOUNTER — TELEPHONE (OUTPATIENT)
Dept: CARDIOLOGY | Facility: CLINIC | Age: 82
End: 2017-08-02

## 2017-08-02 DIAGNOSIS — I83.813 VARICOSE VEINS OF BILATERAL LOWER EXTREMITIES WITH PAIN: Primary | ICD-10-CM

## 2017-08-02 DIAGNOSIS — R06.02 SOB (SHORTNESS OF BREATH): ICD-10-CM

## 2017-08-02 RX ORDER — DIAZEPAM 5 MG
5 TABLET ORAL SEE ADMIN INSTRUCTIONS
Qty: 2 TABLET | Refills: 0 | Status: SHIPPED | OUTPATIENT
Start: 2017-08-02 | End: 2017-08-30

## 2017-08-02 NOTE — TELEPHONE ENCOUNTER
Pt scheduled for venous ablation 8/7/17. Will route to Dr. Carrizales for valium prescription.     Will call patient with instructions and see what pharmacy patient would like valium faxed to. Pt also needs 1 mo f/u scheduled. Order placed.

## 2017-08-03 RX ORDER — ISOSORBIDE MONONITRATE 30 MG/1
30 TABLET, EXTENDED RELEASE ORAL DAILY
Qty: 90 TABLET | Refills: 3 | Status: SHIPPED | OUTPATIENT
Start: 2017-08-03 | End: 2017-08-07

## 2017-08-03 NOTE — TELEPHONE ENCOUNTER
Spoke to patient about pre EVLA instructions. Pt verbalized understanding. Pt has prescription for Valium from OV 7/10/17 so does not need new prescription sent. Pt aware to bring pills with and not take prior. Pt aware of time and locations. Pt stated he will set up 1 mo OV on 8/7/17

## 2017-08-07 ENCOUNTER — RADIANT APPOINTMENT (OUTPATIENT)
Dept: VASCULAR ULTRASOUND | Facility: CLINIC | Age: 82
End: 2017-08-07
Attending: INTERNAL MEDICINE
Payer: COMMERCIAL

## 2017-08-07 VITALS
DIASTOLIC BLOOD PRESSURE: 83 MMHG | RESPIRATION RATE: 18 BRPM | OXYGEN SATURATION: 97 % | SYSTOLIC BLOOD PRESSURE: 168 MMHG | HEART RATE: 69 BPM

## 2017-08-07 DIAGNOSIS — R06.02 SOB (SHORTNESS OF BREATH): ICD-10-CM

## 2017-08-07 DIAGNOSIS — I83.892 SYMPTOMATIC VARICOSE VEINS OF LEFT LOWER EXTREMITY: ICD-10-CM

## 2017-08-07 PROCEDURE — 36475 ENDOVENOUS RF 1ST VEIN: CPT | Mod: LT | Performed by: INTERNAL MEDICINE

## 2017-08-07 RX ORDER — ISOSORBIDE MONONITRATE 30 MG/1
30 TABLET, EXTENDED RELEASE ORAL DAILY
Qty: 30 TABLET | Refills: 11 | Status: SHIPPED | OUTPATIENT
Start: 2017-08-07 | End: 2018-04-12

## 2017-08-09 ENCOUNTER — RADIANT APPOINTMENT (OUTPATIENT)
Dept: VASCULAR ULTRASOUND | Facility: CLINIC | Age: 82
End: 2017-08-09
Attending: INTERNAL MEDICINE
Payer: COMMERCIAL

## 2017-08-09 DIAGNOSIS — I83.892 SYMPTOMATIC VARICOSE VEINS OF LEFT LOWER EXTREMITY: ICD-10-CM

## 2017-08-09 PROCEDURE — 93971 EXTREMITY STUDY: CPT | Mod: LT | Performed by: INTERNAL MEDICINE

## 2017-08-30 ENCOUNTER — OFFICE VISIT (OUTPATIENT)
Dept: CARDIOLOGY | Facility: CLINIC | Age: 82
End: 2017-08-30
Attending: INTERNAL MEDICINE
Payer: COMMERCIAL

## 2017-08-30 VITALS
BODY MASS INDEX: 27.98 KG/M2 | SYSTOLIC BLOOD PRESSURE: 132 MMHG | WEIGHT: 206.6 LBS | HEART RATE: 80 BPM | HEIGHT: 72 IN | DIASTOLIC BLOOD PRESSURE: 72 MMHG

## 2017-08-30 DIAGNOSIS — I25.10 CORONARY ARTERY DISEASE INVOLVING NATIVE CORONARY ARTERY OF NATIVE HEART WITHOUT ANGINA PECTORIS: Primary | ICD-10-CM

## 2017-08-30 DIAGNOSIS — I25.5 ISCHEMIC CARDIOMYOPATHY: ICD-10-CM

## 2017-08-30 DIAGNOSIS — I83.813 VARICOSE VEINS OF BILATERAL LOWER EXTREMITIES WITH PAIN: ICD-10-CM

## 2017-08-30 PROCEDURE — 99214 OFFICE O/P EST MOD 30 MIN: CPT | Performed by: NURSE PRACTITIONER

## 2017-08-30 RX ORDER — FUROSEMIDE 40 MG
40 TABLET ORAL DAILY
COMMUNITY
End: 2018-01-09

## 2017-08-30 NOTE — LETTER
8/30/2017    Mg Nelson MD  [address]  [facility] [address]    RE: Bairon Foster       Dear Colleague,    I had the pleasure of seeing Bairon Foster in the [facility].    HPI and Plan:   Vein and Edema Clinic Progress Note  Bairon Foster MRN# [id number]   YOB: 1931 Age: 86 year old     Reason for visit: Follow-up of a left GSV ablation.              Assessment and Plan:     1. Venous insufficiency of the left lower extremity    He is venous competency studies demonstrated up to 4.9 seconds of reflux in the left GSV.    Status post radiofrequency ablation of the great saphenous vein on August 7.    He has had good results following his ablation, he reports his left leg feels lighter, he has less swelling, he notices less numbness in his toes.      2. Venous insufficiency of the right lower extremity    Venous competency studies demonstrated up to four seconds of reflux in the proximal calf    He has done well with compression therapy, he would like to try ablation.    CEAP 4    We will go ahead and schedule him for a radiofrequency ablation of the right great saphenous vein      3. Ischemic cardiomyopathy    He has lost about 14 pounds since mid July when his diuretics were adjusted    He is not having any signs of heart failure today    He does have some mitral regurgitation    Continue current medications including carvedilol, Imdur, Lasix      4. Coronary artery disease    Previous stenting of the OM in 2009    Stress test in March of this year demonstrated a fixed basal wall inferior defect likely attenuation, and a stable mild intensity distal inferior lateral reversible defect with EF of 47%    Continue medical management               History of Presenting Illness:    Bairon Foster is a  pleasant 86 year old patient of Dr. Hope who has more recently been seen in pain clinic by Dr. Carrizales, he underwent a left great saphenous vein ablation  after three minutes of compression therapy following venous competency studies which demonstrated significant venous insufficiency and bilateral reflux.  He sees Dr. Kasper for his ischemic cardiomyopathy and his coronary disease, I would refer the reader to Dr. Charles last note for more in-depth discussion of his coronary disease and cardiomyopathy.    Today Mr. Foster is very happy with the results of his venous ablation.  He feels that his left leg is feeling later, he has decreased swelling, and he notices improvement in the numbness in his toes.  He also says his shortness of breath which she had when he saw Dr. Carrizales in clinic has significantly improved since his diuretic dose was changed.  In that same period time he has lost about 19 pounds, some of this he thinks his due to decreased appetite, though slow but massively due to diuretic use.            This note was completed in part using Dragon voice recognition software. Although reviewed after completion, some word and grammatical errors may occur       Review of Systems:   Review of Systems:  Skin:  Positive for     Eyes:  Positive for glasses;cataracts  ENT:  Negative    Respiratory:  Positive for CPAP;sleep apnea  Cardiovascular:  Negative    Gastroenterology: Negative    Genitourinary:  Negative    Musculoskeletal:  Positive for arthritis  Neurologic:  Positive for stroke;numbness or tingling of feet  Psychiatric:  Positive for anxiety  Heme/Lymph/Imm:  Negative    Endocrine:  Positive for thyroid disorder              Physical Exam:     Vitals: /72  Pulse 80  Ht 1.829 m (6')  Wt 93.7 kg (206 lb 9.6 oz)  BMI 28.02 kg/m2  Constitutional:  cooperative, alert and oriented, well developed, well nourished, in no acute distress        Skin:  warm and dry to the touch, no apparent skin lesions or masses noted        Head:  normocephalic, no masses or lesions        Eyes:  pupils equal and round, conjunctivae and lids unremarkable, sclera white, no  xanthalasma, EOMS intact, no nystagmus        ENT:  no pallor or cyanosis, dentition good        Neck:  JVP normal        Chest:  normal breath sounds, clear to auscultation, normal A-P diameter, normal symmetry, normal respiratory excursion, no use of accessory muscles        Cardiac: regular rhythm       grade 1;LLSB;early systolic murmur          Abdomen:  abdomen soft   benign    Vascular: pulses full and equal venous stasis changes bilaterally                                    Extremities and Back:  no deformities, clubbing, cyanosis, erythema observed;no edema stasis pigmentation venous varicosities    Neurological:  affect appropriate, oriented to time, person and place;no gross motor deficits               Medications:     Current Outpatient Prescriptions   Medication Sig Dispense Refill     furosemide (LASIX) 40 MG tablet Take 40 mg by mouth daily       isosorbide mononitrate (IMDUR) 30 MG 24 hr tablet Take 1 tablet (30 mg) by mouth daily 30 tablet 11     finasteride (PROSCAR) 5 MG tablet TAKE 1 TABLET EVERY DAY 90 tablet 2     levothyroxine (SYNTHROID/LEVOTHROID) 100 MCG tablet TAKE 1 TABLET EVERY DAY (NEED LAB APPOINTMENT) 90 tablet 0     Misc Natural Products (FIBER 7) POWD As directed       carvedilol (COREG) 6.25 MG tablet Take 1 tablet (6.25 mg) by mouth 2 times daily (with meals) 180 tablet 1     sucralfate (CARAFATE) 1 GM tablet Take 1 tablet (1 g) by mouth 2 times daily 180 tablet 1     potassium chloride SA (K-DUR/KLOR-CON M) 20 MEQ CR tablet TAKE 1 TABLET (20 MEQ) BY MOUTH DAILY 90 tablet 2     tamsulosin (FLOMAX) 0.4 MG capsule TAKE 1 CAPSULE (0.4 MG) BY MOUTH DAILY 90 capsule 2     omeprazole (PRILOSEC) 20 MG CR capsule Take 1 capsule (20 mg) by mouth 2 times daily 180 capsule 3     simvastatin (ZOCOR) 20 MG tablet TAKE 1 TABLET AT BEDTIME 90 tablet 3     buPROPion (WELLBUTRIN XL) 150 MG 24 hr tablet TAKE ONE TABLET BY MOUTH IN THE MORNING 90 tablet 3     HYDROcodone-acetaminophen (NORCO)  5-325 MG per tablet Take 1-2 tablets by mouth every 4 hours as needed for other (Moderate to Severe Pain) 40 tablet 0     LORazepam (ATIVAN) 1 MG tablet Take 0.5-1 tablets (0.5-1 mg) by mouth every 8 hours as needed for anxiety 30 tablet 5     Multiple Vitamins-Minerals (MULTIVITAMIN OR) Take  by mouth.       dabigatran ANTICOAGULANT (PRADAXA ANTICOAGULANT) 75 MG CAPS BLISTER PACK Take 150 mg by mouth 2 times daily. Store in original 's bottle or blister pack; use within 120 days of opening.       Glucosamine-Chondroitin 1204-9273 MG/30ML LIQD Take 2 tablets by mouth daily        Omega-3 Fatty Acids (FISH OIL) 1200 MG capsule Take 1 capsule by mouth 2 times daily.       COMPRESSION STOCKINGS 1 each daily 2 each 0     ORDER FOR DME Auto-CPAP:Max 9 cm H2OMin 9 cm H2O  Continuous Lifetime need and heated humidity.    1 Device 0           Family History   Problem Relation Age of Onset     Cardiovascular Father      C.A.D. Father      CANCER Sister      lung     Unknown/Adopted Mother      Cancer - colorectal No family hx of      DIABETES No family hx of      Coronary Artery Disease No family hx of      Hypertension No family hx of      Hyperlipidemia No family hx of      CEREBROVASCULAR DISEASE No family hx of      Breast Cancer No family hx of      Colon Cancer No family hx of      Prostate Cancer No family hx of      Other Cancer No family hx of      Depression No family hx of      Anxiety Disorder No family hx of      MENTAL ILLNESS No family hx of      Substance Abuse No family hx of      Anesthesia Reaction No family hx of      Asthma No family hx of      OSTEOPOROSIS No family hx of      Genetic Disorder No family hx of      Thyroid Disease No family hx of      Obesity No family hx of        Social History     Social History     Marital status:      Spouse name: N/A     Number of children: N/A     Years of education: N/A     Occupational History     Teacher, author, speaker Retired     Social  History Main Topics     Smoking status: Never Smoker     Smokeless tobacco: Never Used      Comment: per encounter 2/12/07     Alcohol use 0.0 oz/week     0 Standard drinks or equivalent per week      Comment: occasionally     Drug use: No     Sexual activity: No     Other Topics Concern     Parent/Sibling W/ Cabg, Mi Or Angioplasty Before 65f 55m? No     Caffeine Concern No     1-2 cups coffee a day     Sleep Concern No     Stress Concern Yes     due to wifes health condition     Weight Concern No     Special Diet No     Exercise Yes     states he uses his tredmill on and off     Seat Belt Yes     Social History Narrative            Past Medical History:     Past Medical History:   Diagnosis Date     Acute, but ill-defined, cerebrovascular disease     NO RESIDUALS     Coronary atherosclerosis of unspecified type of vessel, native or graft     S/P PTCA, EF 50%     Esophageal reflux      Hydrocele, unspecified      Hypersomnia with sleep apnea, unspecified      Inguinal hernia without mention of obstruction or gangrene, recurrent unilateral or unspecified      Other and unspecified hyperlipidemia      Other lymphedema     GROIN AND THIGHS     Other pancytopenia (H)      Other specified anemias      Persistent atrial fibrillation (H)     VVI PM for long pauses     Prostatic hypertrophy, benign      Scrotal varices      Thrombocytopenia, unspecified (H)      Unilateral or unspecified femoral hernia without mention of obstruction or gangrene, unilateral or unspecified      Unspecified hypothyroidism               Past Surgical History:     Past Surgical History:   Procedure Laterality Date     EYE SURGERY      CATARACT/BLEPHAROPLASTY     GENITOURINARY SURGERY      TUNA     HERNIA REPAIR      RIGHT INGUINAL     HERNIORRHAPHY INGUINAL  8/14/2012    Procedure: HERNIORRHAPHY INGUINAL;  right inguinal hernia repair;  Surgeon: Kareem Torrez MD;  Location: [facility]     IMPLANT PACEMAKER  8/2009    single chamber     ORTHOPEDIC  SURGERY      [facility] RIGHT     ORTHOPEDIC SURGERY  2010    right TKR, left TKR     SURGICAL HISTORY OF -       Thumb surgery     SURGICAL HISTORY OF -   1990s    Left total knee replacement     SURGICAL HISTORY OF -   3/11    Rig total knee replacement              Allergies:   Review of patient's allergies indicates no known allergies.       Data:   All laboratory data reviewed:    LAST CHOLESTEROL:  Lab Results   Component Value Date    CHOL 107 12/30/2016     Lab Results   Component Value Date    HDL 54 12/30/2016     Lab Results   Component Value Date    LDL 48 12/30/2016     Lab Results   Component Value Date    TRIG 24 12/30/2016     Lab Results   Component Value Date    CHOLHDLRATIO 2.1 06/15/2015       LAST BMP:  Lab Results   Component Value Date     07/20/2017      Lab Results   Component Value Date    POTASSIUM 4.1 07/20/2017     Lab Results   Component Value Date    CHLORIDE 104 07/20/2017     Lab Results   Component Value Date    TEODORO 9.8 07/20/2017     Lab Results   Component Value Date    CO2 30 07/20/2017     Lab Results   Component Value Date    BUN 14 07/20/2017     Lab Results   Component Value Date    CR 1.15 07/20/2017     Lab Results   Component Value Date     07/20/2017       LAST CBC:  Lab Results   Component Value Date    WBC 3.0 08/25/2016     Lab Results   Component Value Date    RBC 3.55 08/25/2016     Lab Results   Component Value Date    HGB 11.5 08/25/2016     Lab Results   Component Value Date    HCT 35.4 08/25/2016     Lab Results   Component Value Date     08/25/2016     Lab Results   Component Value Date    MCH 32.4 08/25/2016     Lab Results   Component Value Date    MCHC 32.5 08/25/2016     Lab Results   Component Value Date    RDW 14.7 08/25/2016     Lab Results   Component Value Date    PLT 80 08/25/2016         Mackenzie Tejada, APRN, CNP    Orders Placed This Encounter   Procedures     US Endovenous Ablat Thpy Incmpnt 1Vein R1Vn     US Lower Extremity  Venous Duplex Right       Orders Placed This Encounter   Medications     furosemide (LASIX) 40 MG tablet     Sig: Take 40 mg by mouth daily       Medications Discontinued During This Encounter   Medication Reason     diazepam (VALIUM) 5 MG tablet Therapy completed     diazepam (VALIUM) 5 MG tablet Therapy completed     furosemide (LASIX) 20 MG tablet Dose adjustment     furosemide (LASIX) 40 MG tablet Dose adjustment         Encounter Diagnoses   Name Primary?     Varicose 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      Coronary artery disease involving native coronary artery of native heart without angina pectoris Yes     Ischemic cardiomyopathy        CURRENT MEDICATIONS:  Current Outpatient Prescriptions   Medication Sig Dispense Refill     furosemide (LASIX) 40 MG tablet Take 40 mg by mouth daily       isosorbide mononitrate (IMDUR) 30 MG 24 hr tablet Take 1 tablet (30 mg) by mouth daily 30 tablet 11     finasteride (PROSCAR) 5 MG tablet TAKE 1 TABLET EVERY DAY 90 tablet 2     levothyroxine (SYNTHROID/LEVOTHROID) 100 MCG tablet TAKE 1 TABLET EVERY DAY (NEED LAB APPOINTMENT) 90 tablet 0     Misc Natural Products (FIBER 7) POWD As directed       carvedilol (COREG) 6.25 MG tablet Take 1 tablet (6.25 mg) by mouth 2 times daily (with meals) 180 tablet 1     sucralfate (CARAFATE) 1 GM tablet Take 1 tablet (1 g) by mouth 2 times daily 180 tablet 1     potassium chloride SA (K-DUR/KLOR-CON M) 20 MEQ CR tablet TAKE 1 TABLET (20 MEQ) BY MOUTH DAILY 90 tablet 2     tamsulosin (FLOMAX) 0.4 MG capsule TAKE 1 CAPSULE (0.4 MG) BY MOUTH DAILY 90 capsule 2     omeprazole (PRILOSEC) 20 MG CR capsule Take 1 capsule (20 mg) by mouth 2 times daily 180 capsule 3     simvastatin (ZOCOR) 20 MG tablet TAKE 1 TABLET AT BEDTIME 90 tablet 3     buPROPion (WELLBUTRIN XL) 150 MG 24 hr tablet TAKE ONE TABLET BY MOUTH IN THE MORNING 90 tablet 3     HYDROcodone-acetaminophen (NORCO) 5-325 MG per tablet Take 1-2 tablets by mouth every 4  hours as needed for other (Moderate to Severe Pain) 40 tablet 0     LORazepam (ATIVAN) 1 MG tablet Take 0.5-1 tablets (0.5-1 mg) by mouth every 8 hours as needed for anxiety 30 tablet 5     Multiple Vitamins-Minerals (MULTIVITAMIN OR) Take  by mouth.       dabigatran ANTICOAGULANT (PRADAXA ANTICOAGULANT) 75 MG CAPS BLISTER PACK Take 150 mg by mouth 2 times daily. Store in original 's bottle or blister pack; use within 120 days of opening.       Glucosamine-Chondroitin 3061-5204 MG/30ML LIQD Take 2 tablets by mouth daily        Omega-3 Fatty Acids (FISH OIL) 1200 MG capsule Take 1 capsule by mouth 2 times daily.       COMPRESSION STOCKINGS 1 each daily 2 each 0     ORDER FOR DME Auto-CPAP:Max 9 cm H2OMin 9 cm H2O  Continuous Lifetime need and heated humidity.    1 Device 0       ALLERGIES   No Known Allergies    PAST MEDICAL HISTORY:  Past Medical History:   Diagnosis Date     Acute, but ill-defined, cerebrovascular disease     NO RESIDUALS     Coronary atherosclerosis of unspecified type of vessel, native or graft     S/P PTCA, EF 50%     Esophageal reflux      Hydrocele, unspecified      Hypersomnia with sleep apnea, unspecified      Inguinal hernia without mention of obstruction or gangrene, recurrent unilateral or unspecified      Other and unspecified hyperlipidemia      Other lymphedema     GROIN AND THIGHS     Other pancytopenia (H)      Other specified anemias      Persistent atrial fibrillation (H)     VVI PM for long pauses     Prostatic hypertrophy, benign      Scrotal varices      Thrombocytopenia, unspecified (H)      Unilateral or unspecified femoral hernia without mention of obstruction or gangrene, unilateral or unspecified      Unspecified hypothyroidism        PAST SURGICAL HISTORY:  Past Surgical History:   Procedure Laterality Date     EYE SURGERY      CATARACT/BLEPHAROPLASTY     GENITOURINARY SURGERY      TUNA     HERNIA REPAIR      RIGHT INGUINAL     HERNIORRHAPHY INGUINAL  8/14/2012     Procedure: HERNIORRHAPHY INGUINAL;  right inguinal hernia repair;  Surgeon: Kareem Torrez MD;  Location:  [facility]     IMPLANT PACEMAKER  8/2009    single chamber     ORTHOPEDIC SURGERY      [facility] RIGHT     ORTHOPEDIC SURGERY  2010    right TKR, left TKR     SURGICAL HISTORY OF -       Thumb surgery     SURGICAL HISTORY OF -   1990s    Left total knee replacement     SURGICAL HISTORY OF -   3/11    Rig total knee replacement       FAMILY HISTORY:  Family History   Problem Relation Age of Onset     Cardiovascular Father      C.A.D. Father      CANCER Sister      lung     Unknown/Adopted Mother      Cancer - colorectal No family hx of      DIABETES No family hx of      Coronary Artery Disease No family hx of      Hypertension No family hx of      Hyperlipidemia No family hx of      CEREBROVASCULAR DISEASE No family hx of      Breast Cancer No family hx of      Colon Cancer No family hx of      Prostate Cancer No family hx of      Other Cancer No family hx of      Depression No family hx of      Anxiety Disorder No family hx of      MENTAL ILLNESS No family hx of      Substance Abuse No family hx of      Anesthesia Reaction No family hx of      Asthma No family hx of      OSTEOPOROSIS No family hx of      Genetic Disorder No family hx of      Thyroid Disease No family hx of      Obesity No family hx of        SOCIAL HISTORY:  Social History     Social History     Marital status:      Spouse name: N/A     Number of children: N/A     Years of education: N/A     Occupational History     Teacher, author, speaker Retired     Social History Main Topics     Smoking status: Never Smoker     Smokeless tobacco: Never Used      Comment: per encounter 2/12/07     Alcohol use 0.0 oz/week     0 Standard drinks or equivalent per week      Comment: occasionally     Drug use: No     Sexual activity: No     Other Topics Concern     Parent/Sibling W/ Cabg, Mi Or Angioplasty Before 65f 55m? No     Caffeine Concern No     1-2 cups  coffee a day     Sleep Concern No     Stress Concern Yes     due to wifes health condition     Weight Concern No     Special Diet No     Exercise Yes     states he uses his tredmill on and off     Seat Belt Yes     Social History Narrative       Review of Systems:  Skin:  Positive for   dry skin    Eyes:  Positive for glasses;cataracts cataract surgery on both eyes  ENT:  Negative      Respiratory:  Positive for CPAP;sleep apnea     Cardiovascular:  Negative      Gastroenterology: Negative      Genitourinary:  Negative      Musculoskeletal:  Positive for arthritis    Neurologic:  Positive for stroke;numbness or tingling of feet stroke 15 years ago; numbness in toes  Psychiatric:  Positive for anxiety stress due to wifes health   Heme/Lymph/Imm:  Negative      Endocrine:  Positive for thyroid disorder      Physical Exam:  Vitals: /72  Pulse 80  Ht 1.829 m (6')  Wt 93.7 kg (206 lb 9.6 oz)  BMI 28.02 kg/m2    Constitutional:  cooperative, alert and oriented, well developed, well nourished, in no acute distress        Skin:  warm and dry to the touch, no apparent skin lesions or masses noted        Head:  normocephalic, no masses or lesions        Eyes:  pupils equal and round, conjunctivae and lids unremarkable, sclera white, no xanthalasma, EOMS intact, no nystagmus        ENT:  no pallor or cyanosis, dentition good        Neck:  JVP normal        Chest:  normal breath sounds, clear to auscultation, normal A-P diameter, normal symmetry, normal respiratory excursion, no use of accessory muscles          Cardiac: regular rhythm       grade 1;LLSB;early systolic murmur          Abdomen:  abdomen soft   benign    Vascular: pulses full and equal venous stasis changes bilaterally                                      Extremities and Back:  no deformities, clubbing, cyanosis, erythema observed;no edema stasis pigmentation   1+ trace venous varicosities    Neurological:  affect appropriate, oriented to time, person  and place;no gross motor deficits        Thank you for allowing me to participate in the care of your patient.    Sincerely,     WILLIS Smith [facility]

## 2017-08-30 NOTE — PROGRESS NOTES
HPI and Plan:   [facility] Progress Note  Bairon Foster MRN# [id number]   YOB: 1931 Age: 86 year old     Reason for visit: Follow-up of a left GSV ablation.              Assessment and Plan:     1. Venous insufficiency of the left lower extremity    He is venous competency studies demonstrated up to 4.9 seconds of reflux in the left GSV.    Status post radiofrequency ablation of the great saphenous vein on August 7.    He has had good results following his ablation, he reports his left leg feels lighter, he has less swelling, he notices less numbness in his toes.      2. Venous insufficiency of the right lower extremity    Venous competency studies demonstrated up to four seconds of reflux in the proximal calf    He has done well with compression therapy, he would like to try ablation.    CEAP 4    We will go ahead and schedule him for a radiofrequency ablation of the right great saphenous vein      3. Ischemic cardiomyopathy    He has lost about 14 pounds since mid July when his diuretics were adjusted    He is not having any signs of heart failure today    He does have some mitral regurgitation    Continue current medications including carvedilol, Imdur, Lasix      4. Coronary artery disease    Previous stenting of the OM in 2009    Stress test in March of this year demonstrated a fixed basal wall inferior defect likely attenuation, and a stable mild intensity distal inferior lateral reversible defect with EF of 47%    Continue medical management               History of Presenting Illness:    Bairon Foster is a  pleasant 86 year old patient of Dr. Hope who has more recently been seen in pain clinic by Dr. Carrizales, he underwent a left great saphenous vein ablation after three minutes of compression therapy following venous competency studies which demonstrated significant venous insufficiency and bilateral reflux.  He sees Dr. Kasper for his ischemic cardiomyopathy and his coronary  disease, I would refer the reader to Dr. Charles last note for more in-depth discussion of his coronary disease and cardiomyopathy.    Today Mr. Foster is very happy with the results of his venous ablation.  He feels that his left leg is feeling later, he has decreased swelling, and he notices improvement in the numbness in his toes.  He also says his shortness of breath which she had when he saw Dr. Carrizales in clinic has significantly improved since his diuretic dose was changed.  In that same period time he has lost about 19 pounds, some of this he thinks his due to decreased appetite, though slow but massively due to diuretic use.            This note was completed in part using Dragon voice recognition software. Although reviewed after completion, some word and grammatical errors may occur       Review of Systems:   Review of Systems:  Skin:  Positive for     Eyes:  Positive for glasses;cataracts  ENT:  Negative    Respiratory:  Positive for CPAP;sleep apnea  Cardiovascular:  Negative    Gastroenterology: Negative    Genitourinary:  Negative    Musculoskeletal:  Positive for arthritis  Neurologic:  Positive for stroke;numbness or tingling of feet  Psychiatric:  Positive for anxiety  Heme/Lymph/Imm:  Negative    Endocrine:  Positive for thyroid disorder              Physical Exam:     Vitals: /72  Pulse 80  Ht 1.829 m (6')  Wt 93.7 kg (206 lb 9.6 oz)  BMI 28.02 kg/m2  Constitutional:  cooperative, alert and oriented, well developed, well nourished, in no acute distress        Skin:  warm and dry to the touch, no apparent skin lesions or masses noted        Head:  normocephalic, no masses or lesions        Eyes:  pupils equal and round, conjunctivae and lids unremarkable, sclera white, no xanthalasma, EOMS intact, no nystagmus        ENT:  no pallor or cyanosis, dentition good        Neck:  JVP normal        Chest:  normal breath sounds, clear to auscultation, normal A-P diameter, normal symmetry, normal  respiratory excursion, no use of accessory muscles        Cardiac: regular rhythm       grade 1;LLSB;early systolic murmur          Abdomen:  abdomen soft   benign    Vascular: pulses full and equal venous stasis changes bilaterally                                    Extremities and Back:  no deformities, clubbing, cyanosis, erythema observed;no edema stasis pigmentation venous varicosities    Neurological:  affect appropriate, oriented to time, person and place;no gross motor deficits               Medications:     Current Outpatient Prescriptions   Medication Sig Dispense Refill     furosemide (LASIX) 40 MG tablet Take 40 mg by mouth daily       isosorbide mononitrate (IMDUR) 30 MG 24 hr tablet Take 1 tablet (30 mg) by mouth daily 30 tablet 11     finasteride (PROSCAR) 5 MG tablet TAKE 1 TABLET EVERY DAY 90 tablet 2     levothyroxine (SYNTHROID/LEVOTHROID) 100 MCG tablet TAKE 1 TABLET EVERY DAY (NEED LAB APPOINTMENT) 90 tablet 0     Misc Natural Products (FIBER 7) POWD As directed       carvedilol (COREG) 6.25 MG tablet Take 1 tablet (6.25 mg) by mouth 2 times daily (with meals) 180 tablet 1     sucralfate (CARAFATE) 1 GM tablet Take 1 tablet (1 g) by mouth 2 times daily 180 tablet 1     potassium chloride SA (K-DUR/KLOR-CON M) 20 MEQ CR tablet TAKE 1 TABLET (20 MEQ) BY MOUTH DAILY 90 tablet 2     tamsulosin (FLOMAX) 0.4 MG capsule TAKE 1 CAPSULE (0.4 MG) BY MOUTH DAILY 90 capsule 2     omeprazole (PRILOSEC) 20 MG CR capsule Take 1 capsule (20 mg) by mouth 2 times daily 180 capsule 3     simvastatin (ZOCOR) 20 MG tablet TAKE 1 TABLET AT BEDTIME 90 tablet 3     buPROPion (WELLBUTRIN XL) 150 MG 24 hr tablet TAKE ONE TABLET BY MOUTH IN THE MORNING 90 tablet 3     HYDROcodone-acetaminophen (NORCO) 5-325 MG per tablet Take 1-2 tablets by mouth every 4 hours as needed for other (Moderate to Severe Pain) 40 tablet 0     LORazepam (ATIVAN) 1 MG tablet Take 0.5-1 tablets (0.5-1 mg) by mouth every 8 hours as needed for  anxiety 30 tablet 5     Multiple Vitamins-Minerals (MULTIVITAMIN OR) Take  by mouth.       dabigatran ANTICOAGULANT (PRADAXA ANTICOAGULANT) 75 MG CAPS BLISTER PACK Take 150 mg by mouth 2 times daily. Store in original 's bottle or blister pack; use within 120 days of opening.       Glucosamine-Chondroitin 9922-0585 MG/30ML LIQD Take 2 tablets by mouth daily        Omega-3 Fatty Acids (FISH OIL) 1200 MG capsule Take 1 capsule by mouth 2 times daily.       COMPRESSION STOCKINGS 1 each daily 2 each 0     ORDER FOR DME Auto-CPAP:Max 9 cm H2OMin 9 cm H2O  Continuous Lifetime need and heated humidity.    1 Device 0           Family History   Problem Relation Age of Onset     Cardiovascular Father      C.A.D. Father      CANCER Sister      lung     Unknown/Adopted Mother      Cancer - colorectal No family hx of      DIABETES No family hx of      Coronary Artery Disease No family hx of      Hypertension No family hx of      Hyperlipidemia No family hx of      CEREBROVASCULAR DISEASE No family hx of      Breast Cancer No family hx of      Colon Cancer No family hx of      Prostate Cancer No family hx of      Other Cancer No family hx of      Depression No family hx of      Anxiety Disorder No family hx of      MENTAL ILLNESS No family hx of      Substance Abuse No family hx of      Anesthesia Reaction No family hx of      Asthma No family hx of      OSTEOPOROSIS No family hx of      Genetic Disorder No family hx of      Thyroid Disease No family hx of      Obesity No family hx of        Social History     Social History     Marital status:      Spouse name: N/A     Number of children: N/A     Years of education: N/A     Occupational History     Teacher, author, speaker Retired     Social History Main Topics     Smoking status: Never Smoker     Smokeless tobacco: Never Used      Comment: per encounter 2/12/07     Alcohol use 0.0 oz/week     0 Standard drinks or equivalent per week      Comment: occasionally      Drug use: No     Sexual activity: No     Other Topics Concern     Parent/Sibling W/ Cabg, Mi Or Angioplasty Before 65f 55m? No     Caffeine Concern No     1-2 cups coffee a day     Sleep Concern No     Stress Concern Yes     due to wifes health condition     Weight Concern No     Special Diet No     Exercise Yes     states he uses his tredmill on and off     Seat Belt Yes     Social History Narrative            Past Medical History:     Past Medical History:   Diagnosis Date     Acute, but ill-defined, cerebrovascular disease     NO RESIDUALS     Coronary atherosclerosis of unspecified type of vessel, native or graft     S/P PTCA, EF 50%     Esophageal reflux      Hydrocele, unspecified      Hypersomnia with sleep apnea, unspecified      Inguinal hernia without mention of obstruction or gangrene, recurrent unilateral or unspecified      Other and unspecified hyperlipidemia      Other lymphedema     GROIN AND THIGHS     Other pancytopenia (H)      Other specified anemias      Persistent atrial fibrillation (H)     VVI PM for long pauses     Prostatic hypertrophy, benign      Scrotal varices      Thrombocytopenia, unspecified (H)      Unilateral or unspecified femoral hernia without mention of obstruction or gangrene, unilateral or unspecified      Unspecified hypothyroidism               Past Surgical History:     Past Surgical History:   Procedure Laterality Date     EYE SURGERY      CATARACT/BLEPHAROPLASTY     GENITOURINARY SURGERY      TUNA     HERNIA REPAIR      RIGHT INGUINAL     HERNIORRHAPHY INGUINAL  8/14/2012    Procedure: HERNIORRHAPHY INGUINAL;  right inguinal hernia repair;  Surgeon: Kareem Torrez MD;  Location: [facility]     IMPLANT PACEMAKER  8/2009    single chamber     ORTHOPEDIC SURGERY      [facility] [place] RIGHT     ORTHOPEDIC SURGERY  2010    right TKR, left TKR     SURGICAL HISTORY OF -       Thumb surgery     SURGICAL HISTORY OF -   1990s    Left total knee replacement     SURGICAL HISTORY OF -   3/11    [facility]  total knee replacement              Allergies:   Review of patient's allergies indicates no known allergies.       Data:   All laboratory data reviewed:    LAST CHOLESTEROL:  Lab Results   Component Value Date    CHOL 107 12/30/2016     Lab Results   Component Value Date    HDL 54 12/30/2016     Lab Results   Component Value Date    LDL 48 12/30/2016     Lab Results   Component Value Date    TRIG 24 12/30/2016     Lab Results   Component Value Date    CHOLHDLRATIO 2.1 06/15/2015       LAST BMP:  Lab Results   Component Value Date     07/20/2017      Lab Results   Component Value Date    POTASSIUM 4.1 07/20/2017     Lab Results   Component Value Date    CHLORIDE 104 07/20/2017     Lab Results   Component Value Date    TEODORO 9.8 07/20/2017     Lab Results   Component Value Date    CO2 30 07/20/2017     Lab Results   Component Value Date    BUN 14 07/20/2017     Lab Results   Component Value Date    CR 1.15 07/20/2017     Lab Results   Component Value Date     07/20/2017       LAST CBC:  Lab Results   Component Value Date    WBC 3.0 08/25/2016     Lab Results   Component Value Date    RBC 3.55 08/25/2016     Lab Results   Component Value Date    HGB 11.5 08/25/2016     Lab Results   Component Value Date    HCT 35.4 08/25/2016     Lab Results   Component Value Date     08/25/2016     Lab Results   Component Value Date    MCH 32.4 08/25/2016     Lab Results   Component Value Date    MCHC 32.5 08/25/2016     Lab Results   Component Value Date    RDW 14.7 08/25/2016     Lab Results   Component Value Date    PLT 80 08/25/2016         Mackenzie Tejada, APRN, CNP    Orders Placed This Encounter   Procedures     US Endovenous Ablat Thpy Incmpnt 1Vein R1Vn     US Lower Extremity Venous Duplex Right       Orders Placed This Encounter   Medications     furosemide (LASIX) 40 MG tablet     Sig: Take 40 mg by mouth daily       Medications Discontinued During This Encounter   Medication Reason     diazepam  (VALIUM) 5 MG tablet Therapy completed     diazepam (VALIUM) 5 MG tablet Therapy completed     furosemide (LASIX) 20 MG tablet Dose adjustment     furosemide (LASIX) 40 MG tablet Dose adjustment         Encounter Diagnoses   Name Primary?     Varicose 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      Coronary artery disease involving native coronary artery of native heart without angina pectoris Yes     Ischemic cardiomyopathy        CURRENT MEDICATIONS:  Current Outpatient Prescriptions   Medication Sig Dispense Refill     furosemide (LASIX) 40 MG tablet Take 40 mg by mouth daily       isosorbide mononitrate (IMDUR) 30 MG 24 hr tablet Take 1 tablet (30 mg) by mouth daily 30 tablet 11     finasteride (PROSCAR) 5 MG tablet TAKE 1 TABLET EVERY DAY 90 tablet 2     levothyroxine (SYNTHROID/LEVOTHROID) 100 MCG tablet TAKE 1 TABLET EVERY DAY (NEED LAB APPOINTMENT) 90 tablet 0     Misc Natural Products (FIBER 7) POWD As directed       carvedilol (COREG) 6.25 MG tablet Take 1 tablet (6.25 mg) by mouth 2 times daily (with meals) 180 tablet 1     sucralfate (CARAFATE) 1 GM tablet Take 1 tablet (1 g) by mouth 2 times daily 180 tablet 1     potassium chloride SA (K-DUR/KLOR-CON M) 20 MEQ CR tablet TAKE 1 TABLET (20 MEQ) BY MOUTH DAILY 90 tablet 2     tamsulosin (FLOMAX) 0.4 MG capsule TAKE 1 CAPSULE (0.4 MG) BY MOUTH DAILY 90 capsule 2     omeprazole (PRILOSEC) 20 MG CR capsule Take 1 capsule (20 mg) by mouth 2 times daily 180 capsule 3     simvastatin (ZOCOR) 20 MG tablet TAKE 1 TABLET AT BEDTIME 90 tablet 3     buPROPion (WELLBUTRIN XL) 150 MG 24 hr tablet TAKE ONE TABLET BY MOUTH IN THE MORNING 90 tablet 3     HYDROcodone-acetaminophen (NORCO) 5-325 MG per tablet Take 1-2 tablets by mouth every 4 hours as needed for other (Moderate to Severe Pain) 40 tablet 0     LORazepam (ATIVAN) 1 MG tablet Take 0.5-1 tablets (0.5-1 mg) by mouth every 8 hours as needed for anxiety 30 tablet 5     Multiple Vitamins-Minerals (MULTIVITAMIN  OR) Take  by mouth.       dabigatran ANTICOAGULANT (PRADAXA ANTICOAGULANT) 75 MG CAPS BLISTER PACK Take 150 mg by mouth 2 times daily. Store in original 's bottle or blister pack; use within 120 days of opening.       Glucosamine-Chondroitin 4542-5475 MG/30ML LIQD Take 2 tablets by mouth daily        Omega-3 Fatty Acids (FISH OIL) 1200 MG capsule Take 1 capsule by mouth 2 times daily.       COMPRESSION STOCKINGS 1 each daily 2 each 0     ORDER FOR DME Auto-CPAP:Max 9 cm H2OMin 9 cm H2O  Continuous Lifetime need and heated humidity.    1 Device 0       ALLERGIES   No Known Allergies    PAST MEDICAL HISTORY:  Past Medical History:   Diagnosis Date     Acute, but ill-defined, cerebrovascular disease     NO RESIDUALS     Coronary atherosclerosis of unspecified type of vessel, native or graft     S/P PTCA, EF 50%     Esophageal reflux      Hydrocele, unspecified      Hypersomnia with sleep apnea, unspecified      Inguinal hernia without mention of obstruction or gangrene, recurrent unilateral or unspecified      Other and unspecified hyperlipidemia      Other lymphedema     GROIN AND THIGHS     Other pancytopenia (H)      Other specified anemias      Persistent atrial fibrillation (H)     VVI PM for long pauses     Prostatic hypertrophy, benign      Scrotal varices      Thrombocytopenia, unspecified (H)      Unilateral or unspecified femoral hernia without mention of obstruction or gangrene, unilateral or unspecified      Unspecified hypothyroidism        PAST SURGICAL HISTORY:  Past Surgical History:   Procedure Laterality Date     EYE SURGERY      CATARACT/BLEPHAROPLASTY     GENITOURINARY SURGERY      TUNA     HERNIA REPAIR      RIGHT INGUINAL     HERNIORRHAPHY INGUINAL  8/14/2012    Procedure: HERNIORRHAPHY INGUINAL;  right inguinal hernia repair;  Surgeon: Kareem Torrez MD;  Location: [facility]     IMPLANT PACEMAKER  8/2009    single chamber     [place]      [facility] [place]THOPEDIC SURGERY  2010     right TKR, left TKR     SURGICAL HISTORY OF -       Thumb surgery     SURGICAL HISTORY OF -   1990s    Left total knee replacement     SURGICAL HISTORY OF -   3/11    [facility] total knee replacement       FAMILY HISTORY:  Family History   Problem Relation Age of Onset     Cardiovascular Father      C.A.D. Father      CANCER Sister      lung     Unknown/Adopted Mother      Cancer - colorectal No family hx of      DIABETES No family hx of      Coronary Artery Disease No family hx of      Hypertension No family hx of      Hyperlipidemia No family hx of      CEREBROVASCULAR DISEASE No family hx of      Breast Cancer No family hx of      Colon Cancer No family hx of      Prostate Cancer No family hx of      Other Cancer No family hx of      Depression No family hx of      Anxiety Disorder No family hx of      MENTAL ILLNESS No family hx of      Substance Abuse No family hx of      Anesthesia Reaction No family hx of      Asthma No family hx of      OSTEOPOROSIS No family hx of      Genetic Disorder No family hx of      Thyroid Disease No family hx of      Obesity No family hx of        SOCIAL HISTORY:  Social History     Social History     Marital status:      Spouse name: N/A     Number of children: N/A     Years of education: N/A     Occupational History     Teacher, author, speaker Retired     Social History Main Topics     Smoking status: Never Smoker     Smokeless tobacco: Never Used      Comment: per encounter 2/12/07     Alcohol use 0.0 oz/week     0 Standard drinks or equivalent per week      Comment: occasionally     Drug use: No     Sexual activity: No     Other Topics Concern     Parent/Sibling W/ Cabg, Mi Or Angioplasty Before 65f 55m? No     Caffeine Concern No     1-2 cups coffee a day     Sleep Concern No     Stress Concern Yes     due to wifes health condition     Weight Concern No     Special Diet No     Exercise Yes     states he uses his tredmill on and off     Seat Belt Yes     Social History  Narrative       Review of Systems:  Skin:  Positive for   dry skin    Eyes:  Positive for glasses;cataracts cataract surgery on both eyes  ENT:  Negative      Respiratory:  Positive for CPAP;sleep apnea     Cardiovascular:  Negative      Gastroenterology: Negative      Genitourinary:  Negative      Musculoskeletal:  Positive for arthritis    Neurologic:  Positive for stroke;numbness or tingling of feet stroke 15 years ago; numbness in toes  Psychiatric:  Positive for anxiety stress due to wifes health   Heme/Lymph/Imm:  Negative      Endocrine:  Positive for thyroid disorder      Physical Exam:  Vitals: /72  Pulse 80  Ht 1.829 m (6')  Wt 93.7 kg (206 lb 9.6 oz)  BMI 28.02 kg/m2    Constitutional:  cooperative, alert and oriented, well developed, well nourished, in no acute distress        Skin:  warm and dry to the touch, no apparent skin lesions or masses noted        Head:  normocephalic, no masses or lesions        Eyes:  pupils equal and round, conjunctivae and lids unremarkable, sclera white, no xanthalasma, EOMS intact, no nystagmus        ENT:  no pallor or cyanosis, dentition good        Neck:  JVP normal        Chest:  normal breath sounds, clear to auscultation, normal A-P diameter, normal symmetry, normal respiratory excursion, no use of accessory muscles          Cardiac: regular rhythm       grade 1;LLSB;early systolic murmur          Abdomen:  abdomen soft   benign    Vascular: pulses full and equal venous stasis changes bilaterally                                      Extremities and Back:  no deformities, clubbing, cyanosis, erythema observed;no edema stasis pigmentation   1+ trace venous varicosities    Neurological:  affect appropriate, oriented to time, person and place;no gross motor deficits              CC  London Carrizales MD  [id number]  [place] [id number]

## 2017-08-30 NOTE — MR AVS SNAPSHOT
After Visit Summary   8/30/2017    Bairon Foster    MRN: [id number]           Patient Information     Date Of Birth          2/6/1931        Visit Information        Provider Department      8/30/2017 10:10 AM Mackenzie Tejada APRN CNP [facility]        Today's Diagnoses     Coronary artery disease involving native coronary artery of native heart without angina pectoris    -  1    Varicose 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        Ischemic cardiomyopathy           Follow-ups after your visit        Your next 10 appointments already scheduled     Sep 19, 2017 10:30 AM CDT   Phone Device Check with ARRIETA TECH1   [facility] ([facility])    [address]  [facility] [address] [phone]            Oct 16, 2017 12:30 PM CDT   US ENDOVENOUS ABLATION THERAPY INCOMPETENT VEIN RT with SUVUS1   [facility] ([facility])    [address]  [facility] [id number] [phone]              Future tests that were ordered for you today     Open Future Orders        Priority Expected Expires Ordered    US Endovenous Ablat Thpy Incmpnt 1Vein R1Vn Routine 9/6/2017 8/30/2018 8/30/2017    US Lower Extremity Venous Duplex Right Routine 9/8/2017 8/30/2018 8/30/2017            Who to contact     If you have questions or need follow up information about today's clinic visit or your schedule please contact [facility] directly at [phone].  Normal or non-critical lab and imaging results will be communicated to you by MyChart, letter or phone within 4 business days after the clinic has received the results. If you do not hear from us within 7 days, please contact the clinic through MyChart or phone. If you have a critical or abnormal lab result, we will notify you by phone as soon as  possible.  Submit refill requests through [employer] or call your pharmacy and they will forward the refill request to us. Please allow 3 business days for your refill to be completed.          Additional Information About Your Visit        [id number] Information     [employer] gives you secure access to your electronic health record. If you see a primary care provider, you can also send messages to your care team and make appointments. If you have questions, please call your primary care clinic.  If you do not have a primary care provider, please call [phone] and they will assist you.        Care EveryWhere ID     This is your Care EveryWhere ID. This could be used by other organizations to access your [facility] medical records  [id number]        Your Vitals Were     Pulse Height BMI (Body Mass Index)             80 1.829 m (6') 28.02 kg/m2          Blood Pressure from Last 3 Encounters:   08/30/17 132/72   08/07/17 168/83   07/21/17 110/68    Weight from Last 3 Encounters:   08/30/17 93.7 kg (206 lb 9.6 oz)   07/21/17 99.8 kg (220 lb)   07/10/17 102.1 kg (225 lb)              We Performed the Following     Follow-Up with Cardiac Advanced Practice Provider          Today's Medication Changes          These changes are accurate as of: 8/30/17 10:56 AM.  If you have any questions, ask your nurse or doctor.               These medicines have changed or have updated prescriptions.        Dose/Directions    LASIX 40 MG tablet   This may have changed:  Another medication with the same name was removed. Continue taking this medication, and follow the directions you see here.   Generic drug:  furosemide   Changed by:  Mackenzie Tejada APRN CNP        Dose:  40 mg   Take 40 mg by mouth daily   Refills:  0                Primary Care Provider Office Phone # Fax #    Mg Nelson [phone] [address]  [facility] [address]        Goals        General    I will find a  caregiver support group to attend (pt-stated)     Notes - Note created  4/17/2014  8:49 AM by Beth Morales RN    As of today's date 4/17/2014 goal is met at 0 - 25%.   Goal Status:  Active        Equal Access to Services     EVANGELIST KRISTEN : Hadtonny silvestre padilla macrina Sosuman, waaxda luqadaha, qaybta kaalmada adelee, kathryn acevedo christinekurt farris laLoydlaura . So [facility] [phone].    ATENCIÓN: Si habla español, tiene a malone disposición servicios gratuitos de asistencia lingüística. Llame al [phone].    We comply with applicable federal civil rights laws and [place] laws. We do not discriminate on the basis of race, color, national origin, age, disability sex, sexual orientation or gender identity.            Thank you!     Thank you for choosing [facility] [place]  for your care. Our goal is always to provide you with excellent care. Hearing back from our patients is one way we can continue to improve our services. Please take a few minutes to complete the written survey that you may receive in the mail after your visit with us. Thank you!             Your Updated Medication List - Protect others around you: Learn how to safely use, store and throw away your medicines at www.disposemymeds.org.          This list is accurate as of: 8/30/17 10:56 AM.  Always use your most recent med list.                   Brand Name Dispense Instructions for use Diagnosis    buPROPion 150 MG 24 hr tablet    WELLBUTRIN XL    90 tablet    TAKE ONE TABLET BY MOUTH IN THE MORNING    Dysthymic disorder       carvedilol 6.25 MG tablet    COREG    180 tablet    Take 1 tablet (6.25 mg) by mouth 2 times daily (with meals)    Ischemic cardiomyopathy       COMPRESSION STOCKINGS     2 each    1 each daily    Varicose 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       FIBER 7 Powd      As directed        finasteride 5 MG tablet    PROSCAR    90 tablet    TAKE 1 TABLET EVERY DAY    BPH (benign prostatic  hyperplasia)       glucosamine-chondroitinoitin 3214-9952 MG/30ML Liqd      Take 2 tablets by mouth daily        HYDROcodone-acetaminophen 5-325 MG per tablet    NORCO    40 tablet    Take 1-2 tablets by mouth every 4 hours as needed for other (Moderate to Severe Pain)    Inguinal hernia       isosorbide mononitrate 30 MG 24 hr tablet    IMDUR    30 tablet    Take 1 tablet (30 mg) by mouth daily    SOB (shortness of breath)       LASIX 40 MG tablet   Generic drug:  furosemide      Take 40 mg by mouth daily        levothyroxine 100 MCG tablet    SYNTHROID/LEVOTHROID    90 tablet    TAKE 1 TABLET EVERY DAY (NEED LAB APPOINTMENT)    Hypothyroidism       LORazepam 1 MG tablet    ATIVAN    30 tablet    Take 0.5-1 tablets (0.5-1 mg) by mouth every 8 hours as needed for anxiety    Acute stress reaction       MULTIVITAMIN PO      Take  by mouth.        omega-3 fatty acids 1200 MG capsule      Take 1 capsule by mouth 2 times daily.        omeprazole 20 MG CR capsule    priLOSEC    180 capsule    Take 1 capsule (20 mg) by mouth 2 times daily    Gastroesophageal reflux disease without esophagitis       order for DME     1 Device    Auto-CPAP:Max 9 cm H2OMin 9 cm H2O Continuous Lifetime need and heated humidity.    Obstructive sleep apnea (adult) (pediatric), Other dyspnea and respiratory abnormality       potassium chloride SA 20 MEQ CR tablet    K-DUR/KLOR-CON M    90 tablet    TAKE 1 TABLET (20 MEQ) BY MOUTH DAILY    Ischemic cardiomyopathy       PRADAXA ANTICOAGULANT 75 MG capsule   Generic drug:  dabigatran ANTICOAGULANT      Take 150 mg by mouth 2 times daily. Store in original 's bottle or blister pack; use within 120 days of opening.        simvastatin 20 MG tablet    ZOCOR    90 tablet    TAKE 1 TABLET AT BEDTIME    Hypercholesterolemia       sucralfate 1 GM tablet    CARAFATE    180 tablet    Take 1 tablet (1 g) by mouth 2 times daily    Esophageal reflux       tamsulosin 0.4 MG capsule    FLOMAX    90  capsule    TAKE 1 CAPSULE (0.4 MG) BY MOUTH DAILY    BPH (benign prostatic hyperplasia)

## 2017-09-08 ENCOUNTER — OFFICE VISIT (OUTPATIENT)
Dept: FAMILY MEDICINE | Facility: CLINIC | Age: 82
End: 2017-09-08
Payer: COMMERCIAL

## 2017-09-08 VITALS
BODY MASS INDEX: 27.5 KG/M2 | WEIGHT: 203 LBS | TEMPERATURE: 96.8 F | SYSTOLIC BLOOD PRESSURE: 120 MMHG | HEIGHT: 72 IN | OXYGEN SATURATION: 98 % | DIASTOLIC BLOOD PRESSURE: 80 MMHG | RESPIRATION RATE: 14 BRPM | HEART RATE: 61 BPM

## 2017-09-08 DIAGNOSIS — E03.9 ACQUIRED HYPOTHYROIDISM: ICD-10-CM

## 2017-09-08 DIAGNOSIS — Z12.11 SPECIAL SCREENING FOR MALIGNANT NEOPLASMS, COLON: ICD-10-CM

## 2017-09-08 DIAGNOSIS — E78.00 HYPERCHOLESTEROLEMIA: ICD-10-CM

## 2017-09-08 DIAGNOSIS — K59.00 CONSTIPATION, UNSPECIFIED CONSTIPATION TYPE: ICD-10-CM

## 2017-09-08 DIAGNOSIS — Z79.01 LONG TERM CURRENT USE OF ANTICOAGULANT THERAPY: ICD-10-CM

## 2017-09-08 DIAGNOSIS — K92.1 BLOOD IN STOOL: ICD-10-CM

## 2017-09-08 DIAGNOSIS — R19.4 CHANGE IN STOOL HABITS: ICD-10-CM

## 2017-09-08 DIAGNOSIS — I25.10 CORONARY ARTERY DISEASE INVOLVING NATIVE CORONARY ARTERY OF NATIVE HEART WITHOUT ANGINA PECTORIS: ICD-10-CM

## 2017-09-08 DIAGNOSIS — Z12.5 SCREENING FOR PROSTATE CANCER: ICD-10-CM

## 2017-09-08 DIAGNOSIS — E66.3 OVERWEIGHT (BMI 25.0-29.9): ICD-10-CM

## 2017-09-08 DIAGNOSIS — K42.9 UMBILICAL HERNIA WITHOUT OBSTRUCTION AND WITHOUT GANGRENE: Primary | ICD-10-CM

## 2017-09-08 DIAGNOSIS — F33.1 MODERATE EPISODE OF RECURRENT MAJOR DEPRESSIVE DISORDER (H): ICD-10-CM

## 2017-09-08 DIAGNOSIS — R73.01 IMPAIRED FASTING GLUCOSE: ICD-10-CM

## 2017-09-08 DIAGNOSIS — I48.19 PERSISTENT ATRIAL FIBRILLATION (H): ICD-10-CM

## 2017-09-08 PROBLEM — I73.9 PAD (PERIPHERAL ARTERY DISEASE) (H): Status: ACTIVE | Noted: 2017-09-08

## 2017-09-08 PROCEDURE — G0103 PSA SCREENING: HCPCS | Performed by: FAMILY MEDICINE

## 2017-09-08 PROCEDURE — 80048 BASIC METABOLIC PNL TOTAL CA: CPT | Performed by: FAMILY MEDICINE

## 2017-09-08 PROCEDURE — 99214 OFFICE O/P EST MOD 30 MIN: CPT | Performed by: FAMILY MEDICINE

## 2017-09-08 PROCEDURE — 80061 LIPID PANEL: CPT | Performed by: FAMILY MEDICINE

## 2017-09-08 PROCEDURE — 84460 ALANINE AMINO (ALT) (SGPT): CPT | Performed by: FAMILY MEDICINE

## 2017-09-08 PROCEDURE — 36415 COLL VENOUS BLD VENIPUNCTURE: CPT | Performed by: FAMILY MEDICINE

## 2017-09-08 PROCEDURE — 84443 ASSAY THYROID STIM HORMONE: CPT | Performed by: FAMILY MEDICINE

## 2017-09-08 RX ORDER — BUPROPION HYDROCHLORIDE 300 MG/1
TABLET ORAL
COMMUNITY
Start: 2017-09-08 | End: 2017-10-30

## 2017-09-08 ASSESSMENT — PATIENT HEALTH QUESTIONNAIRE - PHQ9: SUM OF ALL RESPONSES TO PHQ QUESTIONS 1-9: 11

## 2017-09-08 NOTE — PATIENT INSTRUCTIONS
1.  Weight Loss Tips  1. Do not eat after 6 hrs before your expected bedtime  2. Have your heaviest meal for breakfast, a slightly lighter meal at lunch and a snack 6 hrs before bed  3. No sugar/calorie drinks except milk ie no fruit juice, pop, alcohol.  4. Drink milk 30min before meals to decrease your hunger. Also it is excellent as part of your last meal of the day snack  5. Drink lots of water  6. Increase fiber in diet: all bran cereal, salads, popcorn etc  7. Have only one small serving of fruit a day about 1/2 cup (as this is high in sugar)  8. EXERCISE is the bottom line. Without it, you will gain weight even on a low calorie diet. Best if done 2-3X a day as can    Being overweight contributes to high blood pressure and high cholesterol, both of which cause heart attacks, strokes and kidney failure, prediabetes and diabetes, arthritis, and liver disease     2. No difference was  Noted by patients in a double blind study when given codeine, tylenol ( acetaminophen) or ibuprofen (all in identical pills). They felt no difference in pain relief. Since ibuprofen and the NSAIDs  causes kidney damage, esophageal damage with heartburn, and can increase the risk of esophageal and stomach cancer and ulcers,and colonic strictures. They also cause increased risk of heart attack .   I recommend that you use tylenol(acetaminophen) for pain. Use the acetaminophen ES  Which has 500mgm/tablet You can take up to 2 tablets 4 times a day as need for pain.  If this is not enough, you can add in ibuprofen or aleve(naprosyn) with 2 glasses of fluid and some food-to protect the stomach and esophagus. Please let us know if you are continuing to take ibuprofen or aleve, as we will need to periodically check your kidney function with a blood test.    3. miralax   A dose 2 x  A DAY    And increase to 2  twic to 3 dose twice -till good     4. Increase the bupropion 150 mgm to 2 tabs a day  And send me a PHQ-9 in 3-5 weeks      Then get  the Rx  Per [facility]      If not enuff for the depression , the I would add citalopram

## 2017-09-08 NOTE — LETTER
September 11, 2017      Bairon DOWNS Cristian  [address]   [facility] [address]        Dear ,    We are writing to inform you of your test results.    They are all normal     THE FOLLOWING ARE EXPLANATIONS OF SOME OF OUR LAB TESTS     YOU DID NOT NECESSARILY HAVE ALL OF THESE DONE     Hgb is the blood iron level   WBC means White Blood Cells   Platelets are small blood cells that help with forming the blood clots along with other blood factors.   Electrolytes are Sodium, Potassium, Calcium, Magnesium, Phosphorus.   Liver tests are: AST, ALT, Bilirubin, Alkaline Phosphatase.   Kidney tests are Creatinine, GFR.   HDL Cholesterol - is the good cholesterol and it is good to have it high.   LDL cholesterol is the bad cholesterol and it is good to have it low.   It is recommended to have LDL less than 130 for people with hypertension and to have it less than 100 for people with heart disease, diabetes and chronic kidney disease.   Triglycerides are another type of lipid that can cause heart disease, like the cholesterol and should be kept low   Thyroid tests are TSH, T4, T3   Glucose is sugar.##just a little high###   A1c is a test that gives us an idea about how well was controlled the diabetes for the last 3 months.   PSA stands for Prostate Specific Antigen and it can be elevated with prostate cancer or prostate inflammation.     Please continue on the same medications     Resulted Orders   Prostate spec antigen screen   Result Value Ref Range    PSA 1.29 0 - 4 ug/L      Comment:      Assay Method:  Chemiluminescence using Siemens Vista analyzer   Lipid panel reflex to direct LDL   Result Value Ref Range    Cholesterol 106 <200 mg/dL    Triglycerides 71 <150 mg/dL      Comment:      Non Fasting    HDL Cholesterol 54 >39 mg/dL    LDL Cholesterol Calculated 38 <100 mg/dL      Comment:      Desirable:       <100 mg/dl    Non HDL Cholesterol 52 <130 mg/dL   ALT   Result Value Ref Range    ALT 22 0 - 70  U/L   TSH with free T4 reflex   Result Value Ref Range    TSH 1.08 0.40 - 4.00 mU/L   Basic metabolic panel   Result Value Ref Range    Sodium 141 133 - 144 mmol/L    Potassium 3.7 3.4 - 5.3 mmol/L    Chloride 105 94 - 109 mmol/L    Carbon Dioxide 31 20 - 32 mmol/L    Anion Gap 5 3 - 14 mmol/L    Glucose 102 (H) 70 - 99 mg/dL      Comment:      Non Fasting    Urea Nitrogen 17 7 - 30 mg/dL    Creatinine 0.98 0.66 - 1.25 mg/dL    GFR Estimate 73 >60 mL/min/1.7m2      Comment:      Non  GFR Calc    GFR Estimate If Black 88 >60 mL/min/1.7m2      Comment:       GFR Calc    Calcium 9.2 8.5 - 10.1 mg/dL       If you have any questions or concerns, please call the clinic at the number listed above.       Sincerely,        Aura Villegas MD

## 2017-09-08 NOTE — LETTER
My Depression Action Plan  Name: Bairon Foster   Date of Birth 2/6/1931  Date: 9/8/2017    My doctor: Mg Nelson   My clinic: [address] [address]  [phone]          GREEN    ZONE   Good Control    What it looks like:     Things are going generally well. You have normal up s and down s. You may even feel depressed from time to time, but bad moods usually last less than a day.   What you need to do:  1. Continue to care for yourself (see self care plan)  2. Check your depression survival kit and update it as needed  3. Follow your physician s recommendations including any medication.  4. Do not stop taking medication unless you consult with your physician first.           YELLOW         ZONE Getting Worse    What it looks like:     Depression is starting to interfere with your life.     It may be hard to get out of bed; you may be starting to isolate yourself from others.    Symptoms of depression are starting to last most all day and this has happened for several days.     You may have suicidal thoughts but they are not constant.   What you need to do:     1. Call your care team, your response to treatment will improve if you keep your care team informed of your progress. Yellow periods are signs an adjustment may need to be made.     2. Continue your self-care, even if you have to fake it!    3. Talk to someone in your support network    4. Open up your depression survival kit           RED    ZONE Medical Alert - Get Help    What it looks like:     Depression is seriously interfering with your life.     You may experience these or other symptoms: You can t get out of bed most days, can t work or engage in other necessary activities, you have trouble taking care of basic hygiene, or basic responsibilities, thoughts of suicide or death that will not go away, self-injurious behavior.     What you need to  do:  1. Call your care team and request a same-day appointment. If they are not available (weekends or after hours) call your local crisis line, emergency room or 911.      Electronically signed by: Aura Villegas, September 8, 2017    Depression Self Care Plan / Survival Kit    Self-Care for Depression  Here s the deal. Your body and mind are really not as separate as most people think.  What you do and think affects how you feel and how you feel influences what you do and think. This means if you do things that people who feel good do, it will help you feel better.  Sometimes this is all it takes.  There is also a place for medication and therapy depending on how severe your depression is, so be sure to consult with your medical provider and/ or Behavioral Health Consultant if your symptoms are worsening or not improving.     In order to better manage my stress, I will:    Exercise  Get some form of exercise, every day. This will help reduce pain and release endorphins, the  feel good  chemicals in your brain. This is almost as good as taking antidepressants!  This is not the same as joining a gym and then never going! (they count on that by the way ) It can be as simple as just going for a walk or doing some gardening, anything that will get you moving.      Hygiene   Maintain good hygiene (Get out of bed in the morning, Make your bed, Brush your teeth, Take a shower, and Get dressed like you were going to work, even if you are unemployed).  If your clothes don't fit try to get ones that do.    Diet  I will strive to eat foods that are good for me, drink plenty of water, and avoid excessive sugar, caffeine, alcohol, and other mood-altering substances.  Some foods that are helpful in depression are: complex carbohydrates, B vitamins, flaxseed, fish or fish oil, fresh fruits and vegetables.    Psychotherapy  I agree to participate in Individual Therapy (if recommended).    Medication  If prescribed medications, I  agree to take them.  Missing doses can result in serious side effects.  I understand that drinking alcohol, or other illicit drug use, may cause potential side effects.  I will not stop my medication abruptly without first discussing it with my provider.    Staying Connected With Others  I will stay in touch with my friends, family members, and my primary care provider/team.    Use your imagination  Be creative.  We all have a creative side; it doesn t matter if it s oil painting, sand castles, or mud pies! This will also kick up the endorphins.    Witness Beauty  (AKA stop and smell the roses) Take a look outside, even in mid-winter. Notice colors, textures. Watch the squirrels and birds.     Service to others  Be of service to others.  There is always someone else in need.  By helping others we can  get out of ourselves  and remember the really important things.  This also provides opportunities for practicing all the other parts of the program.    Humor  Laugh and be silly!  Adjust your TV habits for less news and crime-drama and more comedy.    Control your stress  Try breathing deep, massage therapy, biofeedback, and meditation. Find time to relax each day.     My support system    Clinic Contact:  Phone number:    Contact 1:  Phone number:    Contact 2:  Phone number:    Jehovah's witness/:  Phone number:    Therapist:  Phone number:    Local crisis center:    Phone number:    Other community support:  Phone number:

## 2017-09-08 NOTE — NURSING NOTE
Chief Complaint   Patient presents with     Derm Problem     /80  Pulse 61  Temp 96.8  F (36  C) (Tympanic)  Resp 14  Ht 6' (1.829 m)  Wt 203 lb (92.1 kg)  SpO2 98%  BMI 27.53 kg/m2 Estimated body mass index is 27.53 kg/(m^2) as calculated from the following:    Height as of this encounter: 6' (1.829 m).    Weight as of this encounter: 203 lb (92.1 kg).  BP completed using cuff size: regular   Yamileth Allen CMA    Health Maintenance Due   Topic Date Due     PHQ-9 Q6 MONTHS  06/30/2017     TSH W/ FREE T4 REFLEX Q1 YEAR  08/25/2017     INFLUENZA VACCINE (SYSTEM ASSIGNED)  09/01/2017     Health Maintenance reviewed at today's visit patient asked to schedule/complete:   Depression:  Patient agrees to schedule  Immunizations:  Patient agrees to schedule

## 2017-09-08 NOTE — MR AVS SNAPSHOT
After Visit Summary   9/8/2017    Bairon Foster    MRN: [id number]           Patient Information     Date Of Birth          2/6/1931        Visit Information        Provider Department      9/8/2017 2:40 PM Aura Villegas MD [facility]        Today's Diagnoses     Umbilical hernia without obstruction and without gangrene    -  1    Coronary artery disease involving native coronary artery of native heart without angina pectoris        Persistent atrial fibrillation (H)        Long term current use of anticoagulant therapy        Screening for prostate cancer        Hypercholesterolemia        Acquired hypothyroidism        Impaired fasting glucose        Constipation, unspecified constipation type since 2015        Blood in stool        Change in stool habits          Care Instructions    1.  Weight Loss Tips  1. Do not eat after 6 hrs before your expected bedtime  2. Have your heaviest meal for breakfast, a slightly lighter meal at lunch and a snack 6 hrs before bed  3. No sugar/calorie drinks except milk ie no fruit juice, pop, alcohol.  4. Drink milk 30min before meals to decrease your hunger. Also it is excellent as part of your last meal of the day snack  5. Drink lots of water  6. Increase fiber in diet: all bran cereal, salads, popcorn etc  7. Have only one small serving of fruit a day about 1/2 cup (as this is high in sugar)  8. EXERCISE is the bottom line. Without it, you will gain weight even on a low calorie diet. Best if done 2-3X a day as can    Being overweight contributes to high blood pressure and high cholesterol, both of which cause heart attacks, strokes and kidney failure, prediabetes and diabetes, arthritis, and liver disease     2. No difference was  Noted by patients in a double blind study when given codeine, tylenol ( acetaminophen) or ibuprofen (all in identical pills). They felt no difference in pain relief. Since ibuprofen and the  NSAIDs  causes kidney damage, esophageal damage with heartburn, and can increase the risk of esophageal and stomach cancer and ulcers,and colonic strictures. They also cause increased risk of heart attack .   I recommend that you use tylenol(acetaminophen) for pain. Use the acetaminophen ES  Which has 500mgm/tablet You can take up to 2 tablets 4 times a day as need for pain.  If this is not enough, you can add in ibuprofen or aleve(naprosyn) with 2 glasses of fluid and some food-to protect the stomach and esophagus. Please let us know if you are continuing to take ibuprofen or aleve, as we will need to periodically check your kidney function with a blood test.    3. miralax   A dose 2 x  A DAY    And increase to 2  twic to 3 dose twice -till good     4. Increase the bupropion 150 mgm to 2 tabs a day  And send me a PHQ-9 in 3-5 weeks      Then get the Rx  Per [facility]      If not enuff for the depression , the I would add citalopram               Follow-ups after your visit        Follow-up notes from your care team     Return in about 1 month (around 10/8/2017) for PHQ-9.      Your next 10 appointments already scheduled     Sep 19, 2017 10:30 AM CDT   Phone Device Check with ARRIETA TECH1   [facility] PHYSICIANS HEART [facility] ([facility])    [address] [place] [address] [address]  [place] [address]   [phone]            Oct 16, 2017 12:30 PM CDT   US ENDOVENOUS ABLATION THERAPY INCOMPETENT VEIN RT with SUVUS1   [facility] [place] ([facility])    [address] [place], [place] [id number]  [facility] [address]   [phone]            Oct 18, 2017 11:00 AM CDT   US LOWER EXTREMITY VENOUS DUPLEX RIGHT with SUVUS1   [facility] ([facility])    [id number]  [place] [address]   [phone]           Please bring a list of your medicines (including vitamins, minerals and over-the-counter drugs). Also,  tell your doctor about any allergies you may have. Wear comfortable clothes and leave your valuables at home.  You do not need to do anything special to prepare for your exam.  Please call the Imaging Department at your exam site with any questions.              Who to contact     If you have questions or need follow up information about today's clinic visit or your schedule please contact [facility] directly at [phone].  Normal or non-critical lab and imaging results will be communicated to you by [facility] [employer], letter or phone within 4 business days after the clinic has received the results. If you do not hear from us within 7 days, please contact the clinic through [facility] [place] or phone. If you have a critical or abnormal lab result, we will notify you by phone as soon as possible.  Submit refill requests through [facility] [place] or call your pharmacy and they will forward the refill request to us. Please allow 3 business days for your refill to be completed.          Additional Information About Your Visit        [facility] [place] Information     [facility] gives you secure access to your electronic health record. If you see a primary care provider, you can also send messages to your care team and make appointments. If you have questions, please call your primary care clinic.  If you do not have a primary care provider, please call [phone] and they will assist you.        Care EveryWhere ID     This is your Care EveryWhere ID. This could be used by other organizations to access your [facility] medical records  [id number]        Your Vitals Were     Pulse Temperature Respirations Height Pulse Oximetry BMI (Body Mass Index)    61 96.8  F (36  C) (Tympanic) 14 6' (1.829 m) 98% 27.53 kg/m2       Blood Pressure from Last 3 Encounters:   09/08/17 120/80   08/30/17 132/72   08/07/17 168/83    Weight from Last 3 Encounters:   09/08/17 203 lb (92.1 kg)   08/30/17 206 lb 9.6 oz (93.7 kg)   07/21/17 220 lb (99.8 kg)               We Performed the Following     ALT     Basic metabolic panel     DEPRESSION ACTION PLAN (DAP)     Lipid panel reflex to direct LDL     Prostate spec antigen screen     TSH with free T4 reflex          Today's Medication Changes          These changes are accurate as of: 9/8/17  3:57 PM.  If you have any questions, ask your nurse or doctor.               Start taking these medicines.        Dose/Directions    ASPIRIN NOT PRESCRIBED   Commonly known as:  INTENTIONAL   Used for:  Coronary artery disease involving native coronary artery of native heart without angina pectoris, Persistent atrial fibrillation (H), Long term current use of anticoagulant therapy   Started by:  Aura Villegas MD        Please choose reason not prescribed, below   Quantity:  1 each   Refills:  0         These medicines have changed or have updated prescriptions.        Dose/Directions    buPROPion 300 MG 24 hr tablet   Commonly known as:  WELLBUTRIN XL   This may have changed:  medication strength   Changed by:  Aura Villegas MD        TAKE ONE TABLET BY MOUTH IN THE MORNING   Refills:  0            Where to get your medicines      Some of these will need a paper prescription and others can be bought over the counter.  Ask your nurse if you have questions.     You don't need a prescription for these medications     ASPIRIN NOT PRESCRIBED                Primary Care Provider Office Phone # Fax #    Mg Nelson [phone] [address] [facility] [address]        Goals        General    I will find a caregiver support group to attend (pt-stated)     Notes - Note created  4/17/2014  8:49 AM by Beth Morales RN    As of today's date 4/17/2014 goal is met at 0 - 25%.   Goal Status:  Active        Equal Access to Services     [facility] : Hadtonny sterlingo Sosuman, waaxda luqadaha, qaybta kaalmada felicitas, kathryn camilo . So [facility]  [phone].    ATENCIÓN: Si magda pradhan, tiene a malone disposición servicios gratuitos de asistencia lingüística. Jose terry [phone].    We comply with applicable federal civil rights laws and [place] laws. We do not discriminate on the basis of race, color, national origin, age, disability sex, sexual orientation or gender identity.            Thank you!     Thank you for choosing [facility]  for your care. Our goal is always to provide you with excellent care. Hearing back from our patients is one way we can continue to improve our services. Please take a few minutes to complete the written survey that you may receive in the mail after your visit with us. Thank you!             Your Updated Medication List - Protect others around you: Learn how to safely use, store and throw away your medicines at www.disposemymeds.org.          This list is accurate as of: 9/8/17  3:57 PM.  Always use your most recent med list.                   Brand Name Dispense Instructions for use Diagnosis    ASPIRIN NOT PRESCRIBED    INTENTIONAL    1 each    Please choose reason not prescribed, below    Coronary artery disease involving native coronary artery of native heart without angina pectoris, Persistent atrial fibrillation (H), Long term current use of anticoagulant therapy       buPROPion 300 MG 24 hr tablet    WELLBUTRIN XL     TAKE ONE TABLET BY MOUTH IN THE MORNING        carvedilol 6.25 MG tablet    COREG    180 tablet    Take 1 tablet (6.25 mg) by mouth 2 times daily (with meals)    Ischemic cardiomyopathy       COMPRESSION STOCKINGS     2 each    1 each daily    Varicose 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       FIBER 7 Powd      As directed        finasteride 5 MG tablet    PROSCAR    90 tablet    TAKE 1 TABLET EVERY DAY    BPH (benign prostatic hyperplasia)       glucosamine-chondroitinoitin 8692-0399 MG/30ML Liqd      Take 2 tablets by mouth daily         HYDROcodone-acetaminophen 5-325 MG per tablet    NORCO    40 tablet    Take 1-2 tablets by mouth every 4 hours as needed for other (Moderate to Severe Pain)    Inguinal hernia       isosorbide mononitrate 30 MG 24 hr tablet    IMDUR    30 tablet    Take 1 tablet (30 mg) by mouth daily    SOB (shortness of breath)       LASIX 40 MG tablet   Generic drug:  furosemide      Take 40 mg by mouth daily        levothyroxine 100 MCG tablet    SYNTHROID/LEVOTHROID    90 tablet    TAKE 1 TABLET EVERY DAY (NEED LAB APPOINTMENT)    Hypothyroidism       LORazepam 1 MG tablet    ATIVAN    30 tablet    Take 0.5-1 tablets (0.5-1 mg) by mouth every 8 hours as needed for anxiety    Acute stress reaction       MULTIVITAMIN PO      Take  by mouth.        omega-3 fatty acids 1200 MG capsule      Take 1 capsule by mouth 2 times daily.        omeprazole 20 MG CR capsule    priLOSEC    180 capsule    Take 1 capsule (20 mg) by mouth 2 times daily    Gastroesophageal reflux disease without esophagitis       order for DME     1 Device    Auto-CPAP:Max 9 cm H2OMin 9 cm H2O Continuous Lifetime need and heated humidity.    Obstructive sleep apnea (adult) (pediatric), Other dyspnea and respiratory abnormality       potassium chloride SA 20 MEQ CR tablet    K-DUR/KLOR-CON M    90 tablet    TAKE 1 TABLET (20 MEQ) BY MOUTH DAILY    Ischemic cardiomyopathy       PRADAXA ANTICOAGULANT 75 MG capsule   Generic drug:  dabigatran ANTICOAGULANT      Take 150 mg by mouth 2 times daily. Store in original 's bottle or blister pack; use within 120 days of opening.        simvastatin 20 MG tablet    ZOCOR    90 tablet    TAKE 1 TABLET AT BEDTIME    Hypercholesterolemia       sucralfate 1 GM tablet    CARAFATE    180 tablet    Take 1 tablet (1 g) by mouth 2 times daily    Esophageal reflux       tamsulosin 0.4 MG capsule    FLOMAX    90 capsule    TAKE 1 CAPSULE (0.4 MG) BY MOUTH DAILY    BPH (benign prostatic hyperplasia)

## 2017-09-08 NOTE — PROGRESS NOTES
SUBJECTIVE:   Bairon Foster is a 86 year old male who presents to clinic today for the following health issues:    Umbilical Hernia       Duration: 1st noted  X 1 WEEK    Description (location/character/radiation): Lump of belly button  Not tender or red     Intensity:  moderate    Accompanying signs and symptoms: 0    History (similar episodes/previous evaluation): None    Precipitating or alleviating factors: None    Normal consitipatin since 2011    Therapies tried and outcome: None     OVERWEIGHT    - sedentary   - comorbid glu intol, hi LDL , CAD     Constipation      Duration: since 2011     Description:       Frequency of bowel movements: 2-3 days       Consistency of stool: hard    Intensity:  moderate    Accompanying signs and symptoms: 0       Abdominal pain: no        Rectal pain: no        Blood in stool: YES       Nausea/vomitting: no     History:        Similar problems in past: no     Precipitating or alleviating factors: 0       Medications worsening symptoms: no     Therapies tried and outcome: None       Chronic laxative use: no; also has hemorrhoids     Glucose Intolerance      Patient is checking blood sugars: not at all      hi FBS     Diabetic concerns: None     Symptoms of hypoglycemia (low blood sugar): none     Paresthesias (numbness or burning in feet) or sores: No     Date of last diabetic eye exam: 2016    Hyperlipidemia:LDL Follow-Up      Rate your low fat/cholesterol diet?: not monitoring fat    Taking statin?  Yes, no muscle aches from 20mgm simvastatin    Other lipid medications/supplements?:  Fish oil/Omega 3, dose 1000 without side effects    Vascular Disease Follow-up:  Coronary Artery Disease (CAD)      Chest pain or pressure, left side neck or arm pain: No    Shortness of breath/increased sweats/nausea with exertion: No    Pain in calves walking 1-2 blocks: No    Worsened or new symptoms since last visit: No    Nitroglycerin use: no    Daily aspirin use: No     On coumadin  for A Fib     Depression and Anxiety Follow-Up      Status since last visit: Worsened on 150mgm XR wellbutrin     Other associated symptoms:None    Complicating factors:     Significant life event: No     Current substance abuse: None    PHQ-9 SCORE 8/24/2016 12/30/2016 9/8/2017   Total Score - - -   Total Score 4 4 11     No flowsheet data found.    PHQ-9  English  PHQ-9   Any Language  GAD7      Hypothyroidism Follow-up      Since last visit, patient describes the following symptoms: Weight stable, no hair loss, no skin changes, no constipation, no loose stools    On levothyroxine 100mcg    Last TSH > a yr ago = wnl              Problem list and histories reviewed & adjusted, as indicated.  Additional history: as documented    Labs reviewed in EPIC    Reviewed and updated as needed this visit by clinical staffTobacco  Allergies  Meds  Problems  Med Hx  Surg Hx  Fam Hx  Soc Hx        Reviewed and updated as needed this visit by Provider         ROS:  C: NEGATIVE for fever, chills, change in weight  I: NEGATIVE for worrisome rashes, moles or lesions  E: NEGATIVE for vision changes or irritation  E/M: NEGATIVE for ear, mouth and throat problems  R: NEGATIVE for significant cough or SOB  B: NEGATIVE for masses, tenderness or discharge  CV: NEGATIVE for chest pain, palpitations or peripheral edema  GI: NEGATIVE for nausea, abdominal pain, heartburn, or change in bowel habits--constipation and umbil hernia  : NEGATIVE for frequency, dysuria, or hematuria  M: NEGATIVE for significant arthralgias or myalgia  N: NEGATIVE for weakness, dizziness or paresthesias  E: NEGATIVE for temperature intolerance, skin/hair changes  H: NEGATIVE for bleeding problems  P: NEGATIVE for changes in mood or affect    OBJECTIVE:     /80  Pulse 61  Temp 96.8  F (36  C) (Tympanic)  Resp 14  Ht 6' (1.829 m)  Wt 203 lb (92.1 kg)  SpO2 98%  BMI 27.53 kg/m2  Body mass index is 27.53 kg/(m^2).  GENERAL: healthy, alert, no  distress, over weight and elderly  EYES: Eyes grossly normal to inspection, PERRL and conjunctivae and sclerae normal  RESP: lungs clear to auscultation - no rales, rhonchi or wheezes  CV: regular rate and rhythm, normal S1 S2, no S3 or S4, no murmur, click or rub, no peripheral edema and peripheral pulses strong  ABDOMEN: soft, nontender, no hepatosplenomegaly, no masses and bowel sounds normal; umbilicus nontender bulging with spongy feel x 3 cm diam slightly reducible with wnl skin overlying  MS: no gross musculoskeletal defects noted, no edema  SKIN: no suspicious lesions or rashes  NEURO: Normal strength and tone, mentation intact and speech normal  PSYCH: mentation appears normal, affect normal/bright    Diagnostic Test Results:  Results for orders placed or performed in visit on 09/08/17   Prostate spec antigen screen   Result Value Ref Range    PSA 1.29 0 - 4 ug/L   Lipid panel reflex to direct LDL   Result Value Ref Range    Cholesterol 106 <200 mg/dL    Triglycerides 71 <150 mg/dL    HDL Cholesterol 54 >39 mg/dL    LDL Cholesterol Calculated 38 <100 mg/dL    Non HDL Cholesterol 52 <130 mg/dL   ALT   Result Value Ref Range    ALT 22 0 - 70 U/L   TSH with free T4 reflex   Result Value Ref Range    TSH 1.08 0.40 - 4.00 mU/L   Basic metabolic panel   Result Value Ref Range    Sodium 141 133 - 144 mmol/L    Potassium 3.7 3.4 - 5.3 mmol/L    Chloride 105 94 - 109 mmol/L    Carbon Dioxide 31 20 - 32 mmol/L    Anion Gap 5 3 - 14 mmol/L    Glucose 102 (H) 70 - 99 mg/dL    Urea Nitrogen 17 7 - 30 mg/dL    Creatinine 0.98 0.66 - 1.25 mg/dL    GFR Estimate 73 >60 mL/min/1.7m2    GFR Estimate If Black 88 >60 mL/min/1.7m2    Calcium 9.2 8.5 - 10.1 mg/dL       ASSESSMENT/PLAN:               ICD-10-CM    1. Umbilical hernia without obstruction and without gangrene K42.9    2. Constipation, unspecified constipation type since 2015 K59.00    3. Change in stool habits R19.4 Occult blood fecal HGB immuno   4. Blood in stool  K92.1 Occult blood fecal HGB immuno   5. Moderate episode of recurrent major depressive disorder (H) isssue of increasing meds  F33.1    6. Coronary artery disease involving native coronary artery of native heart without angina pectoris I25.10 ASPIRIN NOT PRESCRIBED (INTENTIONAL)     Basic metabolic panel   7. Persistent atrial fibrillation (H) I48.1 ASPIRIN NOT PRESCRIBED (INTENTIONAL)   8. Hypercholesterolemia E78.00 Lipid panel reflex to direct LDL     ALT   9. Impaired fasting glucose R73.01 Basic metabolic panel   10. Acquired hypothyroidism E03.9 TSH with free T4 reflex   11. Long term current use of anticoagulant therapy Z79.01 ASPIRIN NOT PRESCRIBED (INTENTIONAL)   12. Overweight (BMI 25.0-29.9) E66.3    13. Screening for prostate cancer Z12.5 Prostate spec antigen screen   14. Special screening for malignant neoplasms, colon Z12.11        Patient Instructions   1.  Weight Loss Tips  1. Do not eat after 6 hrs before your expected bedtime  2. Have your heaviest meal for breakfast, a slightly lighter meal at lunch and a snack 6 hrs before bed  3. No sugar/calorie drinks except milk ie no fruit juice, pop, alcohol.  4. Drink milk 30min before meals to decrease your hunger. Also it is excellent as part of your last meal of the day snack  5. Drink lots of water  6. Increase fiber in diet: all bran cereal, salads, popcorn etc  7. Have only one small serving of fruit a day about 1/2 cup (as this is high in sugar)  8. EXERCISE is the bottom line. Without it, you will gain weight even on a low calorie diet. Best if done 2-3X a day as can    Being overweight contributes to high blood pressure and high cholesterol, both of which cause heart attacks, strokes and kidney failure, prediabetes and diabetes, arthritis, and liver disease     2. No difference was  Noted by patients in a double blind study when given codeine, tylenol ( acetaminophen) or ibuprofen (all in identical pills). They felt no difference in pain relief.  Since ibuprofen and the NSAIDs  causes kidney damage, esophageal damage with heartburn, and can increase the risk of esophageal and stomach cancer and ulcers,and colonic strictures. They also cause increased risk of heart attack .   I recommend that you use tylenol(acetaminophen) for pain. Use the acetaminophen ES  Which has 500mgm/tablet You can take up to 2 tablets 4 times a day as need for pain.  If this is not enough, you can add in ibuprofen or aleve(naprosyn) with 2 glasses of fluid and some food-to protect the stomach and esophagus. Please let us know if you are continuing to take ibuprofen or aleve, as we will need to periodically check your kidney function with a blood test.    3. miralax   A dose 2 x  A DAY    And increase to 2  twic to 3 dose twice -till good     4. Increase the bupropion 150 mgm to 2 tabs a day  And send me a PHQ-9 in 3-5 weeks      Then get the Rx  Per [facility]      If not enuff for the depression , the I would add citalopram           Aura Villegas MD  [facility]    Weight management plan: Discussed healthy diet and exercise guidelines and patient will follow up in 3 months in clinic to re-evaluate.    Aura Villegas MD

## 2017-09-09 PROBLEM — K42.9 UMBILICAL HERNIA WITHOUT OBSTRUCTION AND WITHOUT GANGRENE: Status: ACTIVE | Noted: 2017-09-09

## 2017-09-09 LAB
ALT SERPL W P-5'-P-CCNC: 22 U/L (ref 0–70)
ANION GAP SERPL CALCULATED.3IONS-SCNC: 5 MMOL/L (ref 3–14)
BUN SERPL-MCNC: 17 MG/DL (ref 7–30)
CALCIUM SERPL-MCNC: 9.2 MG/DL (ref 8.5–10.1)
CHLORIDE SERPL-SCNC: 105 MMOL/L (ref 94–109)
CHOLEST SERPL-MCNC: 106 MG/DL
CO2 SERPL-SCNC: 31 MMOL/L (ref 20–32)
CREAT SERPL-MCNC: 0.98 MG/DL (ref 0.66–1.25)
GFR SERPL CREATININE-BSD FRML MDRD: 73 ML/MIN/1.7M2
GLUCOSE SERPL-MCNC: 102 MG/DL (ref 70–99)
HDLC SERPL-MCNC: 54 MG/DL
LDLC SERPL CALC-MCNC: 38 MG/DL
NONHDLC SERPL-MCNC: 52 MG/DL
POTASSIUM SERPL-SCNC: 3.7 MMOL/L (ref 3.4–5.3)
PSA SERPL-ACNC: 1.29 UG/L (ref 0–4)
SODIUM SERPL-SCNC: 141 MMOL/L (ref 133–144)
TRIGL SERPL-MCNC: 71 MG/DL
TSH SERPL DL<=0.005 MIU/L-ACNC: 1.08 MU/L (ref 0.4–4)

## 2017-09-11 NOTE — PROGRESS NOTES
.  Please see attached lab results  They are all normal     THE FOLLOWING ARE EXPLANATIONS OF SOME OF OUR LAB TESTS     YOU DID NOT NECESSARILY HAVE ALL OF THESE DONE     Hgb is the blood iron level  WBC means White Blood Cells  Platelets are small blood cells that help with forming the blood clots along with other blood factors.  Electrolytes are Sodium, Potassium, Calcium, Magnesium, Phosphorus.  Liver tests are: AST, ALT, Bilirubin, Alkaline Phosphatase.  Kidney tests are Creatinine, GFR.  HDL Cholesterol - is the good cholesterol and it is good to have it high.  LDL cholesterol is the bad cholesterol and it is good to have it low.  It is recommended to have LDL less than 130 for people with hypertension and to have it less than 100 for people with heart disease, diabetes and chronic kidney disease.  Triglycerides are another type of lipid that can cause heart disease, like the cholesterol and should be kept low   Thyroid tests are TSH, T4, T3  Glucose is sugar.##just a little high###  A1c is a test that gives us an idea about how well was controlled the diabetes for the last 3 months.   PSA stands for Prostate Specific Antigen and it can be elevated with prostate cancer or prostate inflammation.    Please continue on the same medications

## 2017-09-12 DIAGNOSIS — K92.1 BLOOD IN STOOL: ICD-10-CM

## 2017-09-12 DIAGNOSIS — R19.4 CHANGE IN STOOL HABITS: ICD-10-CM

## 2017-09-12 PROCEDURE — 82274 ASSAY TEST FOR BLOOD FECAL: CPT | Performed by: FAMILY MEDICINE

## 2017-09-13 ENCOUNTER — OFFICE VISIT (OUTPATIENT)
Dept: FAMILY MEDICINE | Facility: CLINIC | Age: 82
End: 2017-09-13
Payer: COMMERCIAL

## 2017-09-13 VITALS
TEMPERATURE: 98 F | HEART RATE: 71 BPM | SYSTOLIC BLOOD PRESSURE: 110 MMHG | BODY MASS INDEX: 27.67 KG/M2 | DIASTOLIC BLOOD PRESSURE: 60 MMHG | RESPIRATION RATE: 16 BRPM | WEIGHT: 204 LBS

## 2017-09-13 DIAGNOSIS — K42.9 UMBILICAL HERNIA WITHOUT OBSTRUCTION AND WITHOUT GANGRENE: Primary | ICD-10-CM

## 2017-09-13 DIAGNOSIS — I25.5 ISCHEMIC CARDIOMYOPATHY: ICD-10-CM

## 2017-09-13 LAB — HEMOCCULT STL QL IA: NEGATIVE

## 2017-09-13 PROCEDURE — 99213 OFFICE O/P EST LOW 20 MIN: CPT | Performed by: FAMILY MEDICINE

## 2017-09-13 NOTE — MR AVS SNAPSHOT
After Visit Summary   9/13/2017    Bairon Foster    MRN: [id number]           Patient Information     Date Of Birth          2/6/1931        Visit Information        Provider Department      9/13/2017 1:30 PM Mg Nelson MD [facility]        Today's Diagnoses     Umbilical hernia without obstruction and without gangrene    -  1       Follow-ups after your visit        Additional Services     GENERAL SURG ADULT REFERRAL       Your provider has referred you to: [facility]: [facility] [place] [phone]   [email or web address]    Please be aware that coverage of these services is subject to the terms and limitations of your health insurance plan.  Call member services at your health plan with any benefit or coverage questions.      Please bring the following with you to your appointment:    (1) Any X-Rays, CTs or MRIs which have been performed.  Contact the facility where they were done to arrange for  prior to your scheduled appointment.   (2) List of current medications   (3) This referral request   (4) Any documents/labs given to you for this referral                  Your next 10 appointments already scheduled     Sep 19, 2017 10:30 AM CDT   Phone Device Check with ARRIETA TECH1   [facility]    [address]  [place] [address] [phone]            Oct 16, 2017 12:30 PM CDT   US ENDOVENOUS ABLATION THERAPY INCOMPETENT VEIN RT with SUVUS1   [facility])    [address]  [place] [address] [phone]            Oct 18, 2017 11:00 AM CDT   US LOWER EXTREMITY VENOUS DUPLEX RIGHT with SUVUS1   [facility] ([facility])    [address]  [place] [address] [phone]            Please bring a list of your medicines (including vitamins, minerals and over-the-counter drugs). Also, tell your doctor about any allergies you may have. Wear comfortable clothes and leave your valuables at home.  You do not need to do anything special to prepare for your exam.  Please call the Imaging Department at your exam site with any questions.              Who to contact     If you have questions or need follow up information about today's clinic visit or your schedule please contact [facility] directly at [phone].  Normal or non-critical lab and imaging results will be communicated to you by [employer], letter or phone within 4 business days after the clinic has received the results. If you do not hear from us within 7 days, please contact the clinic through [employer] or phone. If you have a critical or abnormal lab result, we will notify you by phone as soon as possible.  Submit refill requests through [employer] or call your pharmacy and they will forward the refill request to us. Please allow 3 business days for your refill to be completed.          Additional Information About Your Visit        [employer] Information     [employer] gives you secure access to your electronic health record. If you see a primary care provider, you can also send messages to your care team and make appointments. If you have questions, please call your primary care clinic.  If you do not have a primary care provider, please call [phone] and they will assist you.        Care EveryWhere ID     This is your Care EveryWhere ID. This could be used by other organizations to access your [place] medical records  [id number]        Your Vitals Were     Pulse Temperature Respirations BMI (Body Mass Index)          71 98  F (36.7  C) (Tympanic) 16 27.67 kg/m2         Blood Pressure from Last 3 Encounters:   09/13/17 110/60   09/08/17 120/80   08/30/17 132/72    Weight from Last 3 Encounters:   09/13/17 204  lb (92.5 kg)   09/08/17 203 lb (92.1 kg)   08/30/17 206 lb 9.6 oz (93.7 kg)              We Performed the Following     GENERAL SURG ADULT REFERRAL        Primary Care Provider Office Phone # Fax #    Mg Nelson [phone] [address]  [facility] [address]        Goals        General    I will find a caregiver support group to attend (pt-stated)     Notes - Note created  4/17/2014  8:49 AM by Beth Morales, RN    As of today's date 4/17/2014 goal is met at 0 - 25%.   Goal Status:  Active        Equal Access to Services     DEJAH [facility] Hospital[facility]AKIRA : Hadii silvestre padilla hadjino Sosuman, waaxda luqadaha, qaybta kaalmada adekurtyada, kathryn camilo . So [facility] [phone].    ATENCIÓN: Si habla español, tiene a malone disposición servicios gratuitos de asistencia lingüística. Llame al [phone].    We comply with applicable federal civil rights laws and [place] laws. We do not discriminate on the basis of race, color, national origin, age, disability sex, sexual orientation or gender identity.            Thank you!     Thank you for choosing [facility]  for your care. Our goal is always to provide you with excellent care. Hearing back from our patients is one way we can continue to improve our services. Please take a few minutes to complete the written survey that you may receive in the mail after your visit with us. Thank you!             Your Updated Medication List - Protect others around you: Learn how to safely use, store and throw away your medicines at www.disposemymeds.org.          This list is accurate as of: 9/13/17  1:51 PM.  Always use your most recent med list.                   Brand Name Dispense Instructions for use Diagnosis    ASPIRIN NOT PRESCRIBED    INTENTIONAL    1 each    Please choose reason not prescribed, below    Coronary artery disease involving native coronary artery of native heart without angina pectoris,  Persistent atrial fibrillation (H), Long term current use of anticoagulant therapy       buPROPion 300 MG 24 hr tablet    WELLBUTRIN XL     TAKE ONE TABLET BY MOUTH IN THE MORNING        carvedilol 6.25 MG tablet    COREG    180 tablet    Take 1 tablet (6.25 mg) by mouth 2 times daily (with meals)    Ischemic cardiomyopathy       COMPRESSION STOCKINGS     2 each    1 each daily    Varicose 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       FIBER 7 Powd      As directed        finasteride 5 MG tablet    PROSCAR    90 tablet    TAKE 1 TABLET EVERY DAY    BPH (benign prostatic hyperplasia)       glucosamine-chondroitinoitin 1457-7813 MG/30ML Liqd      Take 2 tablets by mouth daily        HYDROcodone-acetaminophen 5-325 MG per tablet    NORCO    40 tablet    Take 1-2 tablets by mouth every 4 hours as needed for other (Moderate to Severe Pain)    Inguinal hernia       isosorbide mononitrate 30 MG 24 hr tablet    IMDUR    30 tablet    Take 1 tablet (30 mg) by mouth daily    SOB (shortness of breath)       LASIX 40 MG tablet   Generic drug:  furosemide      Take 40 mg by mouth daily        levothyroxine 100 MCG tablet    SYNTHROID/LEVOTHROID    90 tablet    TAKE 1 TABLET EVERY DAY (NEED LAB APPOINTMENT)    Hypothyroidism       LORazepam 1 MG tablet    ATIVAN    30 tablet    Take 0.5-1 tablets (0.5-1 mg) by mouth every 8 hours as needed for anxiety    Acute stress reaction       MULTIVITAMIN PO      Take  by mouth.        omega-3 fatty acids 1200 MG capsule      Take 1 capsule by mouth 2 times daily.        omeprazole 20 MG CR capsule    priLOSEC    180 capsule    Take 1 capsule (20 mg) by mouth 2 times daily    Gastroesophageal reflux disease without esophagitis       order for DME     1 Device    Auto-CPAP:Max 9 cm H2OMin 9 cm H2O Continuous Lifetime need and heated humidity.    Obstructive sleep apnea (adult) (pediatric), Other dyspnea and respiratory abnormality       potassium chloride SA 20 MEQ CR tablet     K-DUR/KLOR-CON M    90 tablet    TAKE 1 TABLET (20 MEQ) BY MOUTH DAILY    Ischemic cardiomyopathy       PRADAXA ANTICOAGULANT 75 MG capsule   Generic drug:  dabigatran ANTICOAGULANT      Take 150 mg by mouth 2 times daily. Store in original 's bottle or blister pack; use within 120 days of opening.        simvastatin 20 MG tablet    ZOCOR    90 tablet    TAKE 1 TABLET AT BEDTIME    Hypercholesterolemia       sucralfate 1 GM tablet    CARAFATE    180 tablet    Take 1 tablet (1 g) by mouth 2 times daily    Esophageal reflux       tamsulosin 0.4 MG capsule    FLOMAX    90 capsule    TAKE 1 CAPSULE (0.4 MG) BY MOUTH DAILY    BPH (benign prostatic hyperplasia)

## 2017-09-13 NOTE — PROGRESS NOTES
SUBJECTIVE:   Bairon Foster is a 86 year old male who presents to clinic today for the following health issues:      Hernia ??      Duration: 1 month    Description (location/character/radiation): possible umbilical hernia    Intensity:  moderate    Accompanying signs and symptoms: gradually swelling    History (similar episodes/previous evaluation): None    Precipitating or alleviating factors: None    Therapies tried and outcome: None             Problem list and histories reviewed & adjusted, as indicated.  Additional history: as documented    Patient Active Problem List   Diagnosis     Atrial fibrillation (H)     Pulmonary hypertension (H)     CAD (coronary artery disease)     BPH (benign prostatic hyperplasia)     Ischemic cardiomyopathy     Pancytopenia (H)     JIM (obstructive sleep apnea)- mild/moderate     Acute stress reaction     Health Care Home     Vitamin D deficiency     Advance Care Planning     Gastroesophageal reflux disease without esophagitis     Hydrocele, unspecified hydrocele type     Slow transit constipation     Hypercholesterolemia     Non morbid obesity due to excess calories w comorbid CAD< hi LDL      Venous stasis dermatitis of both lower extremities     Lymphedema of both lower extremities     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      Coronary artery disease due to lipid rich plaque     Acquired hypothyroidism     Onychomycosis     Overweight (BMI 25.0-29.9)     PAD (peripheral artery disease) (H)     Screening for prostate cancer     Blood in stool     Umbilical hernia without obstruction and without gangrene     Past Surgical History:   Procedure Laterality Date     EYE SURGERY      CATARACT/BLEPHAROPLASTY     GENITOURINARY SURGERY      TUNA     HERNIA REPAIR      RIGHT INGUINAL     HERNIORRHAPHY INGUINAL  8/14/2012    Procedure: HERNIORRHAPHY INGUINAL;  right inguinal hernia repair;  Surgeon: Kareem Torrez MD;  [facility]     IMPLANT PACEMAKER  8/2009    single  [facility]     ORTHOPEDIC SURGERY      [facility] RIGHT     ORTHOPEDIC SURGERY  2010    right TKR, left TKR     SURGICAL HISTORY OF -       Thumb surgery     SURGICAL HISTORY OF -   1990s    Left total knee replacement     SURGICAL HISTORY OF -   3/11    Righ total knee replacement       Social History   Substance Use Topics     Smoking status: Never Smoker     Smokeless tobacco: Never Used      Comment: per encounter 2/12/07     Alcohol use 0.0 oz/week     0 Standard drinks or equivalent per week      Comment: occasionally     Family History   Problem Relation Age of Onset     Cardiovascular Father      C.A.D. Father      CANCER Sister      lung     Unknown/Adopted Mother      Cancer - colorectal No family hx of      DIABETES No family hx of      Coronary Artery Disease No family hx of      Hypertension No family hx of      Hyperlipidemia No family hx of      CEREBROVASCULAR DISEASE No family hx of      Breast Cancer No family hx of      Colon Cancer No family hx of      Prostate Cancer No family hx of      Other Cancer No family hx of      Depression No family hx of      Anxiety Disorder No family hx of      MENTAL ILLNESS No family hx of      Substance Abuse No family hx of      Anesthesia Reaction No family hx of      Asthma No family hx of      OSTEOPOROSIS No family hx of      Genetic Disorder No family hx of      Thyroid Disease No family hx of      Obesity No family hx of              Reviewed and updated as needed this visit by clinical staff     Reviewed and updated as needed this visit by Provider         ROS:  Constitutional, HEENT, cardiovascular, pulmonary, gi and gu systems are negative, except as otherwise noted.  GI: NEGATIVE for nausea, abdominal pain, heartburn, or change in bowel habits and POSITIVE for bulge around the belly button      OBJECTIVE:                                                    /60  Pulse 71  Temp 98  F (36.7  C) (Tympanic)  Resp 16  Wt 204 lb (92.5 kg)  BMI 27.67  kg/m2  Body mass index is 27.67 kg/(m^2).  GENERAL APPEARANCE: healthy, alert and no distress  ABDOMEN: bowel sounds normal and  There is a very easily reducible umbilical hernia present.  There is no tenderness to it.         ASSESSMENT/PLAN:                                                        ICD-10-CM    1. Umbilical hernia without obstruction and without gangrene K42.9 GENERAL SURG ADULT REFERRAL       There are no Patient Instructions on file for this visit.    Mg Nelson MD  [facility]

## 2017-09-13 NOTE — NURSING NOTE
Chief Complaint   Patient presents with     Hernia       Initial /60  Pulse 71  Temp 98  F (36.7  C) (Tympanic)  Resp 16  Wt 204 lb (92.5 kg)  BMI 27.67 kg/m2 Estimated body mass index is 27.67 kg/(m^2) as calculated from the following:    Height as of 9/8/17: 6' (1.829 m).    Weight as of this encounter: 204 lb (92.5 kg).  Medication Reconciliation: complete     Anabel Rinaldi, CMA

## 2017-09-13 NOTE — TELEPHONE ENCOUNTER
carvedilol (COREG) 6.25 MG tablet      Last Written Prescription Date: 2/21/17  Last Fill Quantity: 180, # refills: 1    Last Office Visit with [facility], [facility] or [facility] prescribing provider:  9/13/17   Future Office Visit:        BP Readings from Last 3 Encounters:   09/13/17 110/60   09/08/17 120/80   08/30/17 132/72

## 2017-09-13 NOTE — PATIENT INSTRUCTIONS
I referred the patient to general surgery for a consult about his umbilical hernia.  He will follow-up with us as needed most likely for his preop.

## 2017-09-13 NOTE — TELEPHONE ENCOUNTER
coreg      Last Written Prescription Date: 2-21-17  Last Fill Quantity: 180, # refills: 1    Last Office Visit with [facility] or [facility] prescribing provider:  9-13-17   Future Office Visit:        BP Readings from Last 3 Encounters:   09/13/17 110/60   09/08/17 120/80   08/30/17 132/72     Routing refill request to provider for review/approval because:  Drug interaction warning betw Coreg and Pradaxa

## 2017-09-15 RX ORDER — CARVEDILOL 6.25 MG/1
6.25 TABLET ORAL 2 TIMES DAILY WITH MEALS
Qty: 180 TABLET | Refills: 3 | Status: SHIPPED | OUTPATIENT
Start: 2017-09-15 | End: 2018-09-02

## 2017-09-19 ENCOUNTER — ALLIED HEALTH/NURSE VISIT (OUTPATIENT)
Dept: CARDIOLOGY | Facility: CLINIC | Age: 82
End: 2017-09-19
Payer: COMMERCIAL

## 2017-09-19 DIAGNOSIS — Z95.0 CARDIAC PACEMAKER IN SITU: Primary | ICD-10-CM

## 2017-09-19 PROCEDURE — 93293 PM PHONE R-STRIP DEVICE EVAL: CPT | Performed by: INTERNAL MEDICINE

## 2017-09-19 NOTE — MR AVS SNAPSHOT
After Visit Summary   9/19/2017    Bairon Foster    MRN: [id number]           Patient Information     Date Of Birth          2/6/1931        Visit Information        Provider Department      9/19/2017 10:30 AM ARRIETA TECH1 [facility]        Today's Diagnoses     Cardiac pacemaker in situ    -  1       Follow-ups after your visit        Your next 10 appointments already scheduled     Sep 19, 2017 10:30 AM CDT   Phone Device Check with ARRIETA TECH1   [facility]    [address]  [place] [address]   [phone]            Sep 25, 2017 10:00 AM CDT   CONSULT with Scar Harrington MD   [facility] [place] [facility] [place]    [address] [place] [address] [address]  [place] [address]   [phone]            Oct 16, 2017 12:30 PM CDT   US ENDOVENOUS ABLATION THERAPY INCOMPETENT VEIN RT with SUVUS1   [facility]    [address] [place] [address] [address]  [place] [address]   [phone]            Oct 18, 2017 11:00 AM CDT   US LOWER EXTREMITY VENOUS DUPLEX RIGHT with SUVUS1   [facility]    [address]  [place] [address] [phone]           Please bring a list of your medicines (including vitamins, minerals and over-the-counter drugs). Also, tell your doctor about any allergies you may have. Wear comfortable clothes and leave your valuables at home.  You do not need to do anything special to prepare for your exam.  Please call the Imaging Department at your exam site with any questions.            Dec 19, 2017  9:30 AM CST   Phone Device Check with ARRIETA TECH1   [facility]    [address]  [place] [address] [phone]              Who to contact      If you have questions or need follow up information about today's clinic visit or your schedule please contact [facility] [place] directly at [phone].  Normal or non-critical lab and imaging results will be communicated to you by MyChart, letter or phone within 4 business days after the clinic has received the results. If you do not hear from us within 7 days, please contact the clinic through [employer] or phone. If you have a critical or abnormal lab result, we will notify you by phone as soon as possible.  Submit refill requests through [employer] or call your pharmacy and they will forward the refill request to us. Please allow 3 business days for your refill to be completed.          Additional Information About Your Visit        [employer] Information     [employer] gives you secure access to your electronic health record. If you see a primary care provider, you can also send messages to your care team and make appointments. If you have questions, please call your primary care clinic.  If you do not have a primary care provider, please call [phone] and they will assist you.        Care EveryWhere ID     This is your Care EveryWhere ID. This could be used by other organizations to access your [place] medical records  [id number]         Blood Pressure from Last 3 Encounters:   09/13/17 110/60   09/08/17 120/80   08/30/17 132/72    Weight from Last 3 Encounters:   09/13/17 92.5 kg (204 lb)   09/08/17 92.1 kg (203 lb)   08/30/17 93.7 kg (206 lb 9.6 oz)              We Performed the Following     PM PHONE R STRIP EVAL UP TO 90 DAYS ([phone])        Primary Care Provider Office Phone # Fax #    Mg Nelson [phone]       [address] [facility] [address]        Goals        General    I will find a caregiver support group to attend (pt-stated)     Notes - Note created  4/17/2014  8:49 AM by Beth Morales, RN    As of today's date 4/17/2014 goal  is met at 0 - 25%.   Goal Status:  Active        Equal Access to Services     SHAHEENDEJAH KRISTEN : Hadii silvestre padilla macrina Bairdali, ariajennifer ambrzisiriaha, hyun siennaafiajennifer castlecarojennifer, waxay idiin haysabinaharini hensleynolanelliott camilo . So [facility] [phone].    ATENCIÓN: Si habla español, tiene a malone disposición servicios gratuitos de asistencia lingüística. Llame al [phone].    We comply with applicable federal civil rights laws and [place] laws. We do not discriminate on the basis of race, color, national origin, age, disability sex, sexual orientation or gender identity.            Thank you!     Thank you for choosing [facility] [place]  for your care. Our goal is always to provide you with excellent care. Hearing back from our patients is one way we can continue to improve our services. Please take a few minutes to complete the written survey that you may receive in the mail after your visit with us. Thank you!             Your Updated Medication List - Protect others around you: Learn how to safely use, store and throw away your medicines at www.disposemymeds.org.          This list is accurate as of: 9/19/17 10:23 AM.  Always use your most recent med list.                   Brand Name Dispense Instructions for use Diagnosis    ASPIRIN NOT PRESCRIBED    INTENTIONAL    1 each    Please choose reason not prescribed, below    Coronary artery disease involving native coronary artery of native heart without angina pectoris, Persistent atrial fibrillation (H), Long term current use of anticoagulant therapy       buPROPion 300 MG 24 hr tablet    WELLBUTRIN XL     TAKE ONE TABLET BY MOUTH IN THE MORNING        carvedilol 6.25 MG tablet    COREG    180 tablet    Take 1 tablet (6.25 mg) by mouth 2 times daily (with meals)    Ischemic cardiomyopathy       COMPRESSION STOCKINGS     2 each    1 each daily    Varicose 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       FIBER 7 Powd      As directed         finasteride 5 MG tablet    PROSCAR    90 tablet    TAKE 1 TABLET EVERY DAY    BPH (benign prostatic hyperplasia)       glucosamine-chondroitinoitin 7531-8603 MG/30ML Liqd      Take 2 tablets by mouth daily        HYDROcodone-acetaminophen 5-325 MG per tablet    NORCO    40 tablet    Take 1-2 tablets by mouth every 4 hours as needed for other (Moderate to Severe Pain)    Inguinal hernia       isosorbide mononitrate 30 MG 24 hr tablet    IMDUR    30 tablet    Take 1 tablet (30 mg) by mouth daily    SOB (shortness of breath)       LASIX 40 MG tablet   Generic drug:  furosemide      Take 40 mg by mouth daily        levothyroxine 100 MCG tablet    SYNTHROID/LEVOTHROID    90 tablet    TAKE 1 TABLET EVERY DAY (NEED LAB APPOINTMENT)    Hypothyroidism       LORazepam 1 MG tablet    ATIVAN    30 tablet    Take 0.5-1 tablets (0.5-1 mg) by mouth every 8 hours as needed for anxiety    Acute stress reaction       MULTIVITAMIN PO      Take  by mouth.        omega-3 fatty acids 1200 MG capsule      Take 1 capsule by mouth 2 times daily.        omeprazole 20 MG CR capsule    priLOSEC    180 capsule    Take 1 capsule (20 mg) by mouth 2 times daily    Gastroesophageal reflux disease without esophagitis       order for DME     1 Device    Auto-CPAP:Max 9 cm H2OMin 9 cm H2O Continuous Lifetime need and heated humidity.    Obstructive sleep apnea (adult) (pediatric), Other dyspnea and respiratory abnormality       potassium chloride SA 20 MEQ CR tablet    K-DUR/KLOR-CON M    90 tablet    TAKE 1 TABLET (20 MEQ) BY MOUTH DAILY    Ischemic cardiomyopathy       PRADAXA ANTICOAGULANT 75 MG capsule   Generic drug:  dabigatran ANTICOAGULANT      Take 150 mg by mouth 2 times daily. Store in original 's bottle or blister pack; use within 120 days of opening.        simvastatin 20 MG tablet    ZOCOR    90 tablet    TAKE 1 TABLET AT BEDTIME    Hypercholesterolemia       sucralfate 1 GM tablet    CARAFATE    180 tablet    Take 1 tablet  (1 g) by mouth 2 times daily    Esophageal reflux       tamsulosin 0.4 MG capsule    FLOMAX    90 capsule    TAKE 1 CAPSULE (0.4 MG) BY MOUTH DAILY    BPH (benign prostatic hyperplasia)

## 2017-09-19 NOTE — PROGRESS NOTES
~90 day phone teletrace  Appropriate  at time of check.  Magnet response WNL. F/U scheduled q 3 months. SANJUANA HamiltonT

## 2017-09-21 DIAGNOSIS — E03.9 ACQUIRED HYPOTHYROIDISM: Primary | ICD-10-CM

## 2017-09-21 RX ORDER — LEVOTHYROXINE SODIUM 100 UG/1
100 TABLET ORAL DAILY
Qty: 90 TABLET | Refills: 3 | Status: SHIPPED | OUTPATIENT
Start: 2017-09-21 | End: 2018-09-02

## 2017-09-21 NOTE — TELEPHONE ENCOUNTER
levothyroxine (SYNTHROID/LEVOTHROID) 100 MCG tablet     Last Written Prescription Date: 5/16/17  Last Quantity: 90, # refills: 0  Last Office Visit with [facility], [facility] or [facility] prescribing provider: 9/13/17        TSH   Date Value Ref Range Status   09/08/2017 1.08 0.40 - 4.00 mU/L Final

## 2017-09-25 ENCOUNTER — OFFICE VISIT (OUTPATIENT)
Dept: SURGERY | Facility: CLINIC | Age: 82
End: 2017-09-25
Payer: COMMERCIAL

## 2017-09-25 VITALS
WEIGHT: 204 LBS | SYSTOLIC BLOOD PRESSURE: 114 MMHG | HEIGHT: 72 IN | BODY MASS INDEX: 27.63 KG/M2 | HEART RATE: 60 BPM | DIASTOLIC BLOOD PRESSURE: 60 MMHG

## 2017-09-25 DIAGNOSIS — K42.9 UMBILICAL HERNIA WITHOUT OBSTRUCTION AND WITHOUT GANGRENE: Primary | ICD-10-CM

## 2017-09-25 PROCEDURE — 99203 OFFICE O/P NEW LOW 30 MIN: CPT | Performed by: SURGERY

## 2017-09-25 NOTE — MR AVS SNAPSHOT
After Visit Summary   9/25/2017    Bairon Foster    MRN: [id number]           Patient Information     Date Of Birth          2/6/1931        Visit Information        Provider Department      9/25/2017 10:00 AM Scar Harrington MD [facility] [place] [facility]       Follow-ups after your visit        Your next 10 appointments already scheduled     Oct 16, 2017 12:30 PM CDT   US ENDOVENOUS ABLATION THERAPY INCOMPETENT VEIN RT with SUVUS1   [facility] ([facility])    [address] [place] [address] [address]  [place] [address]   [phone]            Oct 18, 2017 11:00 AM CDT   US LOWER EXTREMITY VENOUS DUPLEX RIGHT with SUVUS1   [facility] ([facility])    [address] [place] [address] [address]  [place] [address] [phone]           Please bring a list of your medicines (including vitamins, minerals and over-the-counter drugs). Also, tell your doctor about any allergies you may have. Wear comfortable clothes and leave your valuables at home.  You do not need to do anything special to prepare for your exam.  Please call the Imaging Department at your exam site with any questions.            Dec 19, 2017  9:30 AM CST   Phone Device Check with ARRIETA TECH1   [facility] ([facility])    [address] [place] [address] [address]  [place] [address]   [phone]              Who to contact     If you have questions or need follow up information about today's clinic visit or your schedule please contact [facility] [place] directly at [phone].  Normal or non-critical lab and imaging results will be communicated to you by MyChart, letter or phone within 4 business days after the clinic has received the results. If you do not hear from us within 7 days, please contact the clinic through MyChart or phone. If you have a critical  or abnormal lab result, we will notify you by phone as soon as possible.  Submit refill requests through [employer] or call your pharmacy and they will forward the refill request to us. Please allow 3 business days for your refill to be completed.          Additional Information About Your Visit        [employer] Information     [employer] gives you secure access to your electronic health record. If you see a primary care provider, you can also send messages to your care team and make appointments. If you have questions, please call your primary care clinic.  If you do not have a primary care provider, please call [phone] and they will assist you.        Care EveryWhere ID     This is your Care EveryWhere ID. This could be used by other organizations to access your [place] medical records  [id number]        Your Vitals Were     Pulse Height BMI (Body Mass Index)             60 6' (1.829 m) 27.67 kg/m2          Blood Pressure from Last 3 Encounters:   09/25/17 114/60   09/13/17 110/60   09/08/17 120/80    Weight from Last 3 Encounters:   09/25/17 204 lb (92.5 kg)   09/13/17 204 lb (92.5 kg)   09/08/17 203 lb (92.1 kg)              Today, you had the following     No orders found for display       Primary Care Provider Office Phone # Fax #    Mg Nelson [phone] [address] [facility] [address]        Goals        General    I will find a caregiver support group to attend (pt-stated)     Notes - Note created  4/17/2014  8:49 AM by Beth Morales RN    As of today's date 4/17/2014 goal is met at 0 - 25%.   Goal Status:  Active        Equal Access to Services     EVANGELIST PETERSON : Hadii silvestre Viramontes, ariada karolina, qaybta kathryn oquendo . So [facility] [phone].    ATENCIÓN: Si habla español, tiene a malone disposición servicios gratuitos de asistencia lingüística. Llame al [phone].    We comply with applicable federal  civil rights laws and [place] laws. We do not discriminate on the basis of race, color, national origin, age, disability sex, sexual orientation or gender identity.            Thank you!     Thank you for choosing [facility] [place]  for your care. Our goal is always to provide you with excellent care. Hearing back from our patients is one way we can continue to improve our services. Please take a few minutes to complete the written survey that you may receive in the mail after your visit with us. Thank you!             Your Updated Medication List - Protect others around you: Learn how to safely use, store and throw away your medicines at www.disposemymeds.org.          This list is accurate as of: 9/25/17 11:35 AM.  Always use your most recent med list.                   Brand Name Dispense Instructions for use Diagnosis    ASPIRIN NOT PRESCRIBED    INTENTIONAL    1 each    Please choose reason not prescribed, below    Coronary artery disease involving native coronary artery of native heart without angina pectoris, Persistent atrial fibrillation (H), Long term current use of anticoagulant therapy       buPROPion 300 MG 24 hr tablet    WELLBUTRIN XL     TAKE ONE TABLET BY MOUTH IN THE MORNING        carvedilol 6.25 MG tablet    COREG    180 tablet    Take 1 tablet (6.25 mg) by mouth 2 times daily (with meals)    Ischemic cardiomyopathy       COMPRESSION STOCKINGS     2 each    1 each daily    Varicose 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       FIBER 7 Powd      As directed        finasteride 5 MG tablet    PROSCAR    90 tablet    TAKE 1 TABLET EVERY DAY    BPH (benign prostatic hyperplasia)       glucosamine-chondroitinoitin 2886-3415 MG/30ML Liqd      Take 2 tablets by mouth daily        HYDROcodone-acetaminophen 5-325 MG per tablet    NORCO    40 tablet    Take 1-2 tablets by mouth every 4 hours as needed for other (Moderate to Severe Pain)    Inguinal hernia       isosorbide mononitrate 30 MG  24 hr tablet    IMDUR    30 tablet    Take 1 tablet (30 mg) by mouth daily    SOB (shortness of breath)       LASIX 40 MG tablet   Generic drug:  furosemide      Take 40 mg by mouth daily        levothyroxine 100 MCG tablet    SYNTHROID/LEVOTHROID    90 tablet    Take 1 tablet (100 mcg) by mouth daily    Acquired hypothyroidism       LORazepam 1 MG tablet    ATIVAN    30 tablet    Take 0.5-1 tablets (0.5-1 mg) by mouth every 8 hours as needed for anxiety    Acute stress reaction       MULTIVITAMIN PO      Take  by mouth.        omega-3 fatty acids 1200 MG capsule      Take 1 capsule by mouth 2 times daily.        omeprazole 20 MG CR capsule    priLOSEC    180 capsule    Take 1 capsule (20 mg) by mouth 2 times daily    Gastroesophageal reflux disease without esophagitis       order for DME     1 Device    Auto-CPAP:Max 9 cm H2OMin 9 cm H2O Continuous Lifetime need and heated humidity.    Obstructive sleep apnea (adult) (pediatric), Other dyspnea and respiratory abnormality       potassium chloride SA 20 MEQ CR tablet    K-DUR/KLOR-CON M    90 tablet    TAKE 1 TABLET (20 MEQ) BY MOUTH DAILY    Ischemic cardiomyopathy       PRADAXA ANTICOAGULANT 75 MG capsule   Generic drug:  dabigatran ANTICOAGULANT      Take 150 mg by mouth 2 times daily. Store in original 's bottle or blister pack; use within 120 days of opening.        simvastatin 20 MG tablet    ZOCOR    90 tablet    TAKE 1 TABLET AT BEDTIME    Hypercholesterolemia       sucralfate 1 GM tablet    CARAFATE    180 tablet    Take 1 tablet (1 g) by mouth 2 times daily    Esophageal reflux       tamsulosin 0.4 MG capsule    FLOMAX    90 capsule    TAKE 1 CAPSULE (0.4 MG) BY MOUTH DAILY    BPH (benign prostatic hyperplasia)

## 2017-09-25 NOTE — LETTER
2017    RE:  Bairon Foster-:  [birth year]    HISTORY OF PRESENT ILLNESS:  Bairon Foster is a 86 year old male who is seen in consultation at the request of Dr. Nelson for evaluation of enlarging umbilical hernia.  He has had this hernia for many years, it remain unchanged until recently when it grew in size.  His gastrointestinal function has remain proper and pain has not being an issue.     REVIEW OF SYSTEMS:  Constitutional:  Negative for chills, fatigue, fever and has lost some weight recently partly due to decrease caloric intake.  Eyes:  Negative for new vision problems.  ENT:  Negative for ENT pain.  Cardiovascular:  Negative for chest pain, palpitations.  Respiratory:  Negative for cough, dyspnea.  Gastrointestinal:  Negative for abdominal pain.  Musculoskeletal:  Negative for new arthralgias or myalgias.     Vitals: /60  Pulse 60  Ht 6' (1.829 m)  Wt 204 lb (92.5 kg)  BMI 27.67 kg/m2  BMI= Body mass index is 27.67 kg/(m^2).     EXAM:  GENERAL: healthy, alert and no distress   HEENT: moist mucus membranes, no scleral icterus  CARDIOVASCULAR:  RRR, No JVD  RESPIRATORY: non labored breathing  NECK: Neck supple. No noticeable masses.  ABDOMEN: soft, nontender, nondistended, reducible hernia, about 2.5 cm in size.  Extremities: warm and well perfused, no edema  SKIN: No suspicious lesions or rashes .        LABS/Imaging: none at this time.     ASSESSMENT:  Bairon Foster suffers from enlarging umbilical hernia.     PLAN:  Open umbilical hernia repair.  Bairon Foster understands the risk, benefits, hopeful outcomes, and possible complications, both in the short and in the long term.  All his questions answered, he will like to proceed with the propose procedure in the near future.     It is my pleasure to participate in the care of Bairon Foster. Thank you for this consultation.      If you have any questions please give me a call.     Best regards,    Scar [place] MD

## 2017-09-26 NOTE — PROGRESS NOTES
[facility] Consultation    CHIEF COMPLAINT:  Chief Complaint   Patient presents with     Consult     Umbilical hernia        HISTORY OF PRESENT ILLNESS:  Bairon Foster is a 86 year old male who is seen in consultation at the request of Dr. Nelson for evaluation of enlarging umbilical hernia.  He has had this hernia for many years, it remain unchanged until recently when it grew in size.  His gastrointestinal function has remain proper and pain has not being an issue.    REVIEW OF SYSTEMS:  Constitutional:  Negative for chills, fatigue, fever and has lost some weight recently partly due to decrease caloric intake.  Eyes:  Negative for new vision problems.  ENT:  Negative for ENT pain.  Cardiovascular:  Negative for chest pain, palpitations.  Respiratory:  Negative for cough, dyspnea.  Gastrointestinal:  Negative for abdominal pain.  Musculoskeletal:  Negative for new arthralgias or myalgias.    Past Medical History:   Diagnosis Date     Acute, but ill-defined, cerebrovascular disease     NO RESIDUALS     Coronary atherosclerosis of unspecified type of vessel, native or graft     S/P PTCA, EF 50%     Esophageal reflux      Hydrocele, unspecified      Hypersomnia with sleep apnea, unspecified      Inguinal hernia without mention of obstruction or gangrene, recurrent unilateral or unspecified      Other and unspecified hyperlipidemia      Other lymphedema     GROIN AND THIGHS     Other pancytopenia (H)      Other specified anemias      Persistent atrial fibrillation (H)     VVI PM for long pauses     Prostatic hypertrophy, benign      Scrotal varices      Thrombocytopenia, unspecified (H)      Unilateral or unspecified femoral hernia without mention of obstruction or gangrene, unilateral or unspecified      Unspecified hypothyroidism        Past Surgical History:   Procedure Laterality Date     EYE SURGERY      CATARACT/BLEPHAROPLASTY     GENITOURINARY SURGERY      TUNA     HERNIA REPAIR       RIGHT INGUINAL     HERNIORRHAPHY INGUINAL  8/14/2012    Procedure: HERNIORRHAPHY INGUINAL;  right inguinal hernia repair;  Surgeon: Kareem Torrez MD;  Location:  [facility]     IMPLANT PACEMAKER  8/2009    single chamber     ORTHOPEDIC SURGERY      [facility] RIGHT     ORTHOPEDIC SURGERY  2010    right TKR, left TKR     SURGICAL HISTORY OF -       Thumb surgery     SURGICAL HISTORY OF -   1990s    Left total knee replacement     SURGICAL HISTORY OF -   3/11    Righ total knee replacement       Family History   Problem Relation Age of Onset     Cardiovascular Father      C.A.D. Father      CANCER Sister      lung     Unknown/Adopted Mother      Cancer - colorectal No family hx of      DIABETES No family hx of      Coronary Artery Disease No family hx of      Hypertension No family hx of      Hyperlipidemia No family hx of      CEREBROVASCULAR DISEASE No family hx of      Breast Cancer No family hx of      Colon Cancer No family hx of      Prostate Cancer No family hx of      Other Cancer No family hx of      Depression No family hx of      Anxiety Disorder No family hx of      MENTAL ILLNESS No family hx of      Substance Abuse No family hx of      Anesthesia Reaction No family hx of      Asthma No family hx of      OSTEOPOROSIS No family hx of      Genetic Disorder No family hx of      Thyroid Disease No family hx of      Obesity No family hx of        Social History   Substance Use Topics     Smoking status: Never Smoker     Smokeless tobacco: Never Used      Comment: per encounter 2/12/07     Alcohol use 0.0 oz/week     0 Standard drinks or equivalent per week      Comment: occasionally       Patient Active Problem List   Diagnosis     Atrial fibrillation (H)     Pulmonary hypertension (H)     CAD (coronary artery disease)     BPH (benign prostatic hyperplasia)     Ischemic cardiomyopathy     Pancytopenia (H)     JIM (obstructive sleep apnea)- mild/moderate     Acute stress reaction     Health Care Home     Vitamin D deficiency      Advance Care Planning     Gastroesophageal reflux disease without esophagitis     Hydrocele, unspecified hydrocele type     Slow transit constipation     Hypercholesterolemia     Non morbid obesity due to excess calories w comorbid CAD< hi LDL      Venous stasis dermatitis of both lower extremities     Lymphedema of both lower extremities     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      Coronary artery disease due to lipid rich plaque     Acquired hypothyroidism     Onychomycosis     Overweight (BMI 25.0-29.9)     PAD (peripheral artery disease) (H)     Screening for prostate cancer     Blood in stool     Umbilical hernia without obstruction and without gangrene       No Known Allergies    Current Outpatient Prescriptions   Medication Sig Dispense Refill     levothyroxine (SYNTHROID/LEVOTHROID) 100 MCG tablet Take 1 tablet (100 mcg) by mouth daily 90 tablet 3     carvedilol (COREG) 6.25 MG tablet Take 1 tablet (6.25 mg) by mouth 2 times daily (with meals) 180 tablet 3     ASPIRIN NOT PRESCRIBED (INTENTIONAL) Please choose reason not prescribed, below 1 each 0     buPROPion (WELLBUTRIN XL) 300 MG 24 hr tablet TAKE ONE TABLET BY MOUTH IN THE MORNING       furosemide (LASIX) 40 MG tablet Take 40 mg by mouth daily       isosorbide mononitrate (IMDUR) 30 MG 24 hr tablet Take 1 tablet (30 mg) by mouth daily 30 tablet 11     finasteride (PROSCAR) 5 MG tablet TAKE 1 TABLET EVERY DAY 90 tablet 2     COMPRESSION STOCKINGS 1 each daily 2 each 0     Misc Natural Products (FIBER 7) POWD As directed       sucralfate (CARAFATE) 1 GM tablet Take 1 tablet (1 g) by mouth 2 times daily 180 tablet 1     potassium chloride SA (K-DUR/KLOR-CON M) 20 MEQ CR tablet TAKE 1 TABLET (20 MEQ) BY MOUTH DAILY 90 tablet 2     tamsulosin (FLOMAX) 0.4 MG capsule TAKE 1 CAPSULE (0.4 MG) BY MOUTH DAILY 90 capsule 2     omeprazole (PRILOSEC) 20 MG CR capsule Take 1 capsule (20 mg) by mouth 2 times daily 180 capsule 3     simvastatin  (ZOCOR) 20 MG tablet TAKE 1 TABLET AT BEDTIME 90 tablet 3     HYDROcodone-acetaminophen (NORCO) 5-325 MG per tablet Take 1-2 tablets by mouth every 4 hours as needed for other (Moderate to Severe Pain) 40 tablet 0     LORazepam (ATIVAN) 1 MG tablet Take 0.5-1 tablets (0.5-1 mg) by mouth every 8 hours as needed for anxiety 30 tablet 5     Multiple Vitamins-Minerals (MULTIVITAMIN OR) Take  by mouth.       ORDER FOR DME Auto-CPAP:Max 9 cm H2OMin 9 cm H2O  Continuous Lifetime need and heated humidity.    1 Device 0     dabigatran ANTICOAGULANT (PRADAXA ANTICOAGULANT) 75 MG CAPS BLISTER PACK Take 150 mg by mouth 2 times daily. Store in original 's bottle or blister pack; use within 120 days of opening.       Glucosamine-Chondroitin 8775-3485 MG/30ML LIQD Take 2 tablets by mouth daily        Omega-3 Fatty Acids (FISH OIL) 1200 MG capsule Take 1 capsule by mouth 2 times daily.         Vitals: /60  Pulse 60  Ht 6' (1.829 m)  Wt 204 lb (92.5 kg)  BMI 27.67 kg/m2  BMI= Body mass index is 27.67 kg/(m^2).    EXAM:  GENERAL: healthy, alert and no distress   HEENT: moist mucus membranes, no scleral icterus  CARDIOVASCULAR:  RRR, No JVD  RESPIRATORY: non labored breathing  NECK: Neck supple. No noticeable masses.  ABDOMEN: soft, nontender, nondistended, reducible hernia, about 2.5 cm in size.  Extremities: warm and well perfused, no edema  SKIN: No suspicious lesions or rashes .      LABS/Imaging: none at this time.    ASSESSMENT:  Bairon Foster suffers from enlarging umbilical hernia.    PLAN:  Open umbilical hernia repair.  Bairon Foster understands the risk, benefits, hopeful outcomes, and possible complications, both in the short and in the long term.  All his questions answered, he will like to proceed with the propose procedure in the near future.    It is my pleasure to participate in the care of Bairon Foster. Thank you for this consultation.     If you have any questions please give me a  call.    Best regards,  Scar [place] MD    Please route or send letter to:  Primary Care Provider (PCP), Referring Provider and Include Progress Note    Total time with patient visit: 30 minutes more than half spent in counseling, explanation of procedures and coordination of care.

## 2017-10-06 ENCOUNTER — TELEPHONE (OUTPATIENT)
Dept: CARDIOLOGY | Facility: CLINIC | Age: 82
End: 2017-10-06

## 2017-10-06 NOTE — LETTER
October 6, 2017       TO: Bairon DOWNS Cristian   [address]   [facility] [address]       Dear Mr. Foster,    You are scheduled for the Venous Ablation on October 16, 2017 at 12:30 pm.  This will take place in the [facility], same as the previous venous ablation.    You have a follow-up ultrasound October 18, 2017 at 11:00 am in the same location.    Please do the following as you prepare for the procedure:  - Increase fluid intake Sunday  -Do not wear compression stockings Sunday or Monday   -Do not take your Lasix Monday until you return home from the procedure   -Take Tylenol before coming to the procedure Monday  -Make sure you have a drive from the procedure  -Pick-up the Valium and bring it to the procedure with you.  Dr. Carrizales will do the consent and then we will have you take the Valium.   -Please schedule a 1 month follow-up with Mackenzie Calvin CNP following the procedure.    The Valium will be called into [facility] and you will be notified when it is ready for pick-up.  If you have any questions or concerns please call [phone].       Sincerely,    Sofiya BLOUNT  [facility]

## 2017-10-06 NOTE — TELEPHONE ENCOUNTER
Patient called and requested a letter with the pre-procedure instructions for his venous ablation to be typed up and mail to him.  Letter written and mailed. Informed him writer would call him next week with the Valium order. Patient states understanding. No further questions. Letter sent.

## 2017-10-10 ENCOUNTER — TELEPHONE (OUTPATIENT)
Dept: CARDIOLOGY | Facility: CLINIC | Age: 82
End: 2017-10-10

## 2017-10-10 DIAGNOSIS — Z98.890 STATUS POST ENDOVENOUS RADIOFREQUENCY ABLATION (RFA) OF SAPHENOUS VEIN: Primary | ICD-10-CM

## 2017-10-11 RX ORDER — DIAZEPAM 5 MG
5 TABLET ORAL SEE ADMIN INSTRUCTIONS
Qty: 2 TABLET | Refills: 0 | Status: ON HOLD | OUTPATIENT
Start: 2017-10-11 | End: 2017-10-31

## 2017-10-12 ENCOUNTER — TRANSFERRED RECORDS (OUTPATIENT)
Dept: HEALTH INFORMATION MANAGEMENT | Facility: CLINIC | Age: 82
End: 2017-10-12

## 2017-10-12 NOTE — TELEPHONE ENCOUNTER
Tried to call patient to see where he would like the Valium script sent. Pre procedures instructions previous reviewed (see tele enc 10/6/17). No answer. Phone kept ringing. Will try to call back later.

## 2017-10-12 NOTE — TELEPHONE ENCOUNTER
Spoke to patient and valium faxed to pharmacy of choice. Pt stated that he has not received the instructions in the mail yet but will check the mail for today and give team 4 a call back if he would like them read to him over the phone. Pt verbalized time and location.

## 2017-10-16 ENCOUNTER — RADIANT APPOINTMENT (OUTPATIENT)
Dept: VASCULAR ULTRASOUND | Facility: CLINIC | Age: 82
End: 2017-10-16
Attending: NURSE PRACTITIONER
Payer: COMMERCIAL

## 2017-10-16 VITALS
DIASTOLIC BLOOD PRESSURE: 87 MMHG | RESPIRATION RATE: 16 BRPM | SYSTOLIC BLOOD PRESSURE: 135 MMHG | OXYGEN SATURATION: 94 % | HEART RATE: 60 BPM

## 2017-10-16 DIAGNOSIS — I83.813 VARICOSE VEINS OF BILATERAL LOWER EXTREMITIES WITH PAIN: ICD-10-CM

## 2017-10-16 PROCEDURE — 36475 ENDOVENOUS RF 1ST VEIN: CPT | Mod: RT | Performed by: INTERNAL MEDICINE

## 2017-10-16 NOTE — PROGRESS NOTES
Pt stable throughout right GSV ablation. Writer reviewed post procedure instructions. Pts verbalized understanding. Post procedure US scheduled. Order placed for 1 month KI OV. Pt will scheduled prior to leaving for clinic.  No further questions or concerns.

## 2017-10-18 ENCOUNTER — RADIANT APPOINTMENT (OUTPATIENT)
Dept: VASCULAR ULTRASOUND | Facility: CLINIC | Age: 82
End: 2017-10-18
Attending: NURSE PRACTITIONER
Payer: COMMERCIAL

## 2017-10-18 DIAGNOSIS — I83.813 VARICOSE VEINS OF BILATERAL LOWER EXTREMITIES WITH PAIN: ICD-10-CM

## 2017-10-18 PROCEDURE — 93971 EXTREMITY STUDY: CPT | Mod: RT | Performed by: INTERNAL MEDICINE

## 2017-10-23 ENCOUNTER — OFFICE VISIT (OUTPATIENT)
Dept: FAMILY MEDICINE | Facility: CLINIC | Age: 82
End: 2017-10-23
Payer: COMMERCIAL

## 2017-10-23 VITALS
TEMPERATURE: 98 F | BODY MASS INDEX: 27.9 KG/M2 | RESPIRATION RATE: 16 BRPM | DIASTOLIC BLOOD PRESSURE: 70 MMHG | WEIGHT: 206 LBS | HEART RATE: 60 BPM | OXYGEN SATURATION: 98 % | SYSTOLIC BLOOD PRESSURE: 120 MMHG | HEIGHT: 72 IN

## 2017-10-23 DIAGNOSIS — K42.9 UMBILICAL HERNIA WITHOUT OBSTRUCTION AND WITHOUT GANGRENE: ICD-10-CM

## 2017-10-23 DIAGNOSIS — Z01.818 PREOP GENERAL PHYSICAL EXAM: Primary | ICD-10-CM

## 2017-10-23 DIAGNOSIS — G47.33 OSA (OBSTRUCTIVE SLEEP APNEA): ICD-10-CM

## 2017-10-23 DIAGNOSIS — I25.10 CORONARY ARTERY DISEASE INVOLVING NATIVE CORONARY ARTERY OF NATIVE HEART WITHOUT ANGINA PECTORIS: ICD-10-CM

## 2017-10-23 DIAGNOSIS — F32.5 MAJOR DEPRESSION IN COMPLETE REMISSION (H): ICD-10-CM

## 2017-10-23 DIAGNOSIS — I25.5 ISCHEMIC CARDIOMYOPATHY: ICD-10-CM

## 2017-10-23 LAB
ALBUMIN UR-MCNC: NEGATIVE MG/DL
APPEARANCE UR: CLEAR
BASOPHILS # BLD AUTO: 0.1 10E9/L (ref 0–0.2)
BASOPHILS NFR BLD AUTO: 2 %
BILIRUB UR QL STRIP: NEGATIVE
COLOR UR AUTO: YELLOW
DIFFERENTIAL METHOD BLD: ABNORMAL
EOSINOPHIL # BLD AUTO: 0.2 10E9/L (ref 0–0.7)
EOSINOPHIL NFR BLD AUTO: 7.4 %
ERYTHROCYTE [DISTWIDTH] IN BLOOD BY AUTOMATED COUNT: 15.6 % (ref 10–15)
GLUCOSE UR STRIP-MCNC: NEGATIVE MG/DL
HCT VFR BLD AUTO: 34 % (ref 40–53)
HGB BLD-MCNC: 11.1 G/DL (ref 13.3–17.7)
HGB UR QL STRIP: NEGATIVE
KETONES UR STRIP-MCNC: NEGATIVE MG/DL
LEUKOCYTE ESTERASE UR QL STRIP: NEGATIVE
LYMPHOCYTES # BLD AUTO: 0.8 10E9/L (ref 0.8–5.3)
LYMPHOCYTES NFR BLD AUTO: 30.5 %
MCH RBC QN AUTO: 32.7 PG (ref 26.5–33)
MCHC RBC AUTO-ENTMCNC: 32.6 G/DL (ref 31.5–36.5)
MCV RBC AUTO: 100 FL (ref 78–100)
MONOCYTES # BLD AUTO: 0.3 10E9/L (ref 0–1.3)
MONOCYTES NFR BLD AUTO: 11.7 %
NEUTROPHILS # BLD AUTO: 1.2 10E9/L (ref 1.6–8.3)
NEUTROPHILS NFR BLD AUTO: 48.4 %
NITRATE UR QL: NEGATIVE
PH UR STRIP: 5 PH (ref 5–7)
PLATELET # BLD AUTO: 73 10E9/L (ref 150–450)
RBC # BLD AUTO: 3.39 10E12/L (ref 4.4–5.9)
RBC #/AREA URNS AUTO: NORMAL /HPF
SOURCE: NORMAL
SP GR UR STRIP: <=1.005 (ref 1–1.03)
UROBILINOGEN UR STRIP-ACNC: 0.2 EU/DL (ref 0.2–1)
WBC # BLD AUTO: 2.6 10E9/L (ref 4–11)
WBC #/AREA URNS AUTO: NORMAL /HPF

## 2017-10-23 PROCEDURE — 99214 OFFICE O/P EST MOD 30 MIN: CPT | Performed by: FAMILY MEDICINE

## 2017-10-23 PROCEDURE — 82565 ASSAY OF CREATININE: CPT | Performed by: FAMILY MEDICINE

## 2017-10-23 PROCEDURE — 84520 ASSAY OF UREA NITROGEN: CPT | Performed by: FAMILY MEDICINE

## 2017-10-23 PROCEDURE — 93000 ELECTROCARDIOGRAM COMPLETE: CPT | Performed by: FAMILY MEDICINE

## 2017-10-23 PROCEDURE — 81001 URINALYSIS AUTO W/SCOPE: CPT | Performed by: FAMILY MEDICINE

## 2017-10-23 PROCEDURE — 80051 ELECTROLYTE PANEL: CPT | Performed by: FAMILY MEDICINE

## 2017-10-23 PROCEDURE — 36415 COLL VENOUS BLD VENIPUNCTURE: CPT | Performed by: FAMILY MEDICINE

## 2017-10-23 PROCEDURE — 85025 COMPLETE CBC W/AUTO DIFF WBC: CPT | Performed by: FAMILY MEDICINE

## 2017-10-23 NOTE — MR AVS SNAPSHOT
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 This includes signs of a cold or flu (such as a sore throat, runny nose, cough, rash or fever).    Do not smoke, drink alcohol or take over the counter medicine (unless your surgeon or primary care doctor tells you to) for the 24 hours before and after surgery.    If you take prescribed drugs: Follow your doctor s orders about which medicines to take and which to stop until after surgery.    Eating and drinking prior to surgery: follow the instructions from your surgeon    Take a shower or bath the night before surgery. Use the soap your surgeon gave you to gently clean your skin. If you do not have soap from your surgeon, use your regular soap. Do not shave or scrub the surgery site.  Wear clean pajamas and have clean sheets on your bed.           Follow-ups after your visit        Your next 10 appointments already scheduled     Oct 31, 2017 12:00 PM CDT   [facility] Same Day Surgery with Scar Harrington MD   [facility] ([facility])    [facility] ([facility])   [phone]            Oct 31, 2017   Procedure with Sacr Harrington MD   [facility]  [facility] (--)    [address]  [place] [address]   [phone]            Nov 16, 2017  2:30 PM CST   Return Visit with WILLIS Spence CNP   [facility] [place] ([facility])    [address]  [place] [address] [phone]            Dec 19, 2017  9:30 AM CST   Phone Device Check with ARRIETA TECH1   [facility] [place] ([facility])    [address]  [place] [address] [phone]              Who to contact     If you have questions or need follow up information about today's clinic visit or your schedule please contact [facility] directly at [phone].  Normal or non-critical lab and imaging results will be communicated to you by MyChart, letter or phone within 4 business days after the clinic has received the results. If you do not hear from us within 7 days, please contact the clinic through [employer] or phone. If you have a critical or abnormal lab result, we will notify you by phone as soon as possible.  Submit refill requests through [employer] or call your pharmacy and they will forward the refill request to us. Please allow 3 business days for your refill to be completed.          Additional Information About Your Visit        MyChart Information     [employer] gives you secure access to your electronic health record. If you see a primary care provider, you can also send messages to your care team and make appointments. If you have questions, please call your primary care clinic.  If you do not have a primary care provider, please call [phone] and they will assist you.        Care EveryWhere ID     This is your Care EveryWhere ID. This could be used by other organizations to access your [facility] medical records  [id number]        Your Vitals Were     Pulse Temperature Respirations Height Pulse Oximetry BMI (Body  Mass Index)    60 98  F (36.7  C) (Tympanic) 16 6' (1.829 m) 98% 27.94 kg/m2       Blood Pressure from Last 3 Encounters:   10/23/17 120/70   10/16/17 135/87   09/25/17 114/60    Weight from Last 3 Encounters:   10/23/17 206 lb (93.4 kg)   09/25/17 204 lb (92.5 kg)   09/13/17 204 lb (92.5 kg)              We Performed the Following     CBC with platelets differential     Creatinine     EKG 12-lead complete w/read - Clinics     Electrolyte panel (Na, K, Cl, CO2, Anion gap)     UA with Microscopic     Urea nitrogen        Primary Care Provider Office Phone # Fax #    Mg Nelson [phone]       [address] [address]        Goals        General    I will find a caregiver support group to attend (pt-stated)     Notes - Note created  4/17/2014  8:49 AM by Beth Morales RN    As of today's date 4/17/2014 goal is met at 0 - 25%.   Goal Status:  Active        Equal Access to Services     [facility] AH: Hadii aad ku hadasho Soomaali, waaxda luqadaha, qaybta kaalmada adeegyada, kathryn camilo . So [facility] [phone].    ATENCIÓN: Si habla español, tiene a malone disposición servicios gratuitos de asistencia lingüística. [facility] [phone].    We comply with applicable federal civil rights laws and [place] laws. We do not discriminate on the basis of race, color, national origin, age, disability, sex, sexual orientation, or gender identity.            Thank you!     Thank you for choosing [facility]  for your care. Our goal is always to provide you with excellent care. Hearing back from our patients is one way we can continue to improve our services. Please take a few minutes to complete the written survey that you may receive in the mail after your visit with us. Thank you!             Your Updated Medication List - Protect others around you: Learn how to safely use, store and throw away your medicines at  www.disposemymeds.org.          This list is accurate as of: 10/23/17  5:25 PM.  Always use your most recent med list.                   Brand Name Dispense Instructions for use Diagnosis    ASPIRIN NOT PRESCRIBED    INTENTIONAL    1 each    Please choose reason not prescribed, below    Coronary artery disease involving native coronary artery of native heart without angina pectoris, Persistent atrial fibrillation (H), Long term current use of anticoagulant therapy       buPROPion 300 MG 24 hr tablet    WELLBUTRIN XL     TAKE ONE TABLET BY MOUTH IN THE MORNING        carvedilol 6.25 MG tablet    COREG    180 tablet    Take 1 tablet (6.25 mg) by mouth 2 times daily (with meals)    Ischemic cardiomyopathy       COMPRESSION STOCKINGS     2 each    1 each daily    Varicose 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       diazepam 5 MG tablet    VALIUM    2 tablet    Take 1 tablet (5 mg) by mouth See Admin Instructions Please bring to procedure    Status post endovenous radiofrequency ablation (RFA) of saphenous vein       FIBER 7 Powd      As directed        finasteride 5 MG tablet    PROSCAR    90 tablet    TAKE 1 TABLET EVERY DAY    BPH (benign prostatic hyperplasia)       glucosamine-chondroitinoitin 9572-4988 MG/30ML Liqd      Take 2 tablets by mouth daily        HYDROcodone-acetaminophen 5-325 MG per tablet    NORCO    40 tablet    Take 1-2 tablets by mouth every 4 hours as needed for other (Moderate to Severe Pain)    Inguinal hernia       isosorbide mononitrate 30 MG 24 hr tablet    IMDUR    30 tablet    Take 1 tablet (30 mg) by mouth daily    SOB (shortness of breath)       LASIX 40 MG tablet   Generic drug:  furosemide      Take 40 mg by mouth daily        levothyroxine 100 MCG tablet    SYNTHROID/LEVOTHROID    90 tablet    Take 1 tablet (100 mcg) by mouth daily    Acquired hypothyroidism       LORazepam 1 MG tablet    ATIVAN    30 tablet    Take 0.5-1 tablets (0.5-1 mg) by mouth every 8 hours as needed for  anxiety    Acute stress reaction       MULTIVITAMIN PO      Take  by mouth.        omega-3 fatty acids 1200 MG capsule      Take 1 capsule by mouth 2 times daily.        omeprazole 20 MG CR capsule    priLOSEC    180 capsule    Take 1 capsule (20 mg) by mouth 2 times daily    Gastroesophageal reflux disease without esophagitis       order for DME     1 Device    Auto-CPAP:Max 9 cm H2OMin 9 cm H2O Continuous Lifetime need and heated humidity.    Obstructive sleep apnea (adult) (pediatric), Other dyspnea and respiratory abnormality       potassium chloride SA 20 MEQ CR tablet    K-DUR/KLOR-CON M    90 tablet    TAKE 1 TABLET (20 MEQ) BY MOUTH DAILY    Ischemic cardiomyopathy       PRADAXA ANTICOAGULANT 75 MG capsule   Generic drug:  dabigatran ANTICOAGULANT      Take 150 mg by mouth 2 times daily. Store in original 's bottle or blister pack; use within 120 days of opening.        simvastatin 20 MG tablet    ZOCOR    90 tablet    TAKE 1 TABLET AT BEDTIME    Hypercholesterolemia       sucralfate 1 GM tablet    CARAFATE    180 tablet    Take 1 tablet (1 g) by mouth 2 times daily    Esophageal reflux       tamsulosin 0.4 MG capsule    FLOMAX    90 capsule    TAKE 1 CAPSULE (0.4 MG) BY MOUTH DAILY    BPH (benign prostatic hyperplasia)

## 2017-10-23 NOTE — NURSING NOTE
Chief Complaint   Patient presents with     Pre-Op Exam       Initial /70  Pulse 60  Temp 98  F (36.7  C) (Tympanic)  Resp 16  Ht 6' (1.829 m)  Wt 206 lb (93.4 kg)  SpO2 98%  BMI 27.94 kg/m2 Estimated body mass index is 27.94 kg/(m^2) as calculated from the following:    Height as of this encounter: 6' (1.829 m).    Weight as of this encounter: 206 lb (93.4 kg).  Medication Reconciliation: complete     Anabel Rinaldi, ALVERTO

## 2017-10-23 NOTE — PROGRESS NOTES
[facility]  [address]  [facility] [place] [address]  [phone]  Dept: [phone]    PRE-OP EVALUATION:  Today's date: 10/23/2017    Bairon Foster (: 1931) presents for pre-operative evaluation assessment as requested by Dr. Harrington.  He requires evaluation and anesthesia risk assessment prior to undergoing surgery/procedure for treatment of umbilical hernia .  Proposed procedure: HERNIORRHAPHY UMBILICAL    Date of Surgery/ Procedure: 10/31/17  Time of Surgery/ Procedure:   Hospital/Surgical Facility: [facility]  Fax number for surgical facility:   Primary Physician: Mg Nelson  Type of Anesthesia Anticipated: General    Patient has a Health Care Directive or Living Will:  YES     Preop Questions 10/23/2017   1.  Do you have a history of heart attack, stroke, stent, bypass or surgery on an artery in the head, neck, heart or legs? No   2.  Do you ever have any pain or discomfort in your chest? No   3.  Do you have a history of  Heart Failure? No   4.   Are you troubled by shortness of breath when:  walking on a level surface, or up a slight hill, or at night? No   5.  Do you currently have a cold, bronchitis or other respiratory infection? No   6.  Do you have a cough, shortness of breath, or wheezing? No   7.  Do you sometimes get pains in the calves of your legs when you walk? No   8. Do you or anyone in your family have previous history of blood clots? No   9.  Do you or does anyone in your family have a serious bleeding problem such as prolonged bleeding following surgeries or cuts? No   10. Have you ever had problems with anemia or been told to take iron pills? YES - many years ago   11. Have you had any abnormal blood loss such as black, tarry or bloody stools? No   12. Have you ever had a blood transfusion? No   13. Have you or any of your relatives ever had problems with anesthesia? No   14. Do you have sleep apnea, excessive snoring or  daytime drowsiness? YES - JIM on CPAP   15. Do you have any prosthetic heart valves? No   16. Do you have prosthetic joints? YES - 2 knees           HPI:                                                      Brief HPI related to upcoming procedure: umbilical hernia is causing discomfort interrmittently      See problem list for active medical problems.  Problems all longstanding and stable, except as noted/documented.  See ROS for pertinent symptoms related to these conditions.                                                                                                  .    MEDICAL HISTORY:                                                    Patient Active Problem List    Diagnosis Date Noted     CAD (coronary artery disease) 02/08/2012     Priority: High     [place] s/p Stent 8/09       Ischemic cardiomyopathy 02/08/2012     Priority: High     No hospitalizations for CHF. EF 40-45% by stress echo 12/09. Echo 1/12- EF 45-50%, severe pulmonary hypertension, afib/paced, moderate MR.        Umbilical hernia without obstruction and without gangrene 09/09/2017     Priority: Medium     PAD (peripheral artery disease) (H) 09/08/2017     Priority: Medium     Screening for prostate cancer 09/08/2017     Priority: Medium     Blood in stool 09/08/2017     Priority: Medium     Onychomycosis 03/24/2017     Priority: Medium     Overweight (BMI 25.0-29.9) 03/24/2017     Priority: Medium     Hypercholesterolemia 12/30/2016     Priority: Medium     Non morbid obesity due to excess calories w comorbid CAD< hi LDL  12/30/2016     Priority: Medium     Venous stasis dermatitis of both lower extremities 12/30/2016     Priority: Medium     Lymphedema of both lower extremities 12/30/2016     Priority: Medium     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  12/30/2016     Priority: Medium     Coronary artery disease due to lipid rich plaque 12/30/2016     Priority: Medium     Acquired hypothyroidism 12/30/2016     Priority: Medium      Hydrocele, unspecified hydrocele type 08/24/2016     Priority: Medium     Slow transit constipation 08/24/2016     Priority: Medium     Gastroesophageal reflux disease without esophagitis 01/11/2016     Priority: Medium     Advance Care Planning 11/10/2015     Priority: Medium     Advance Care Planning 11/10/2015: Receipt of ACP document:  Received: Health Care Directive which was witnessed or notarized on 5/5/10.  Document previously scanned on 10/20/15.  Validation form completed and sent to be scanned.  Code Status needs to be updated to reflect choices in most recent ACP document.  Confirmed/documented designated decision maker(s).  Added by ANJEL INTERIANO             Vitamin D deficiency 12/31/2014     Priority: Medium     Problem list name updated by automated process. Provider to review       Health Care Home 04/17/2014     Priority: Medium     State Tier Level:  Tier 1  Status:  Closed  Care Coordinator:  Beth Morales RN CCM    See Letters for [facility] Care Plan           Acute stress reaction 02/13/2013     Priority: Medium     JIM (obstructive sleep apnea)- mild/moderate 02/29/2012     Priority: Medium     Sleep study 2/14- AHI 16.4 and RDI of 17.4. Lo2 73%. This study is suggestive of moderate obstructive sleep apnea, worse while in the supine position. The patient did have some evidence of oxygen desaturations into the upper 80s without discrete respiratory events. PLM index was 87.4. CPAP titration 3/12:   CPAP titrated to 9 cm with elimination of apneas, hypopneas and desaturations. Supine REM was noted at this pressure briefly. He had mild desaturations to 89% on CPAP 9cm, without clear respiratory events.  PLMI 127.2       Atrial fibrillation (H) 02/08/2012     Priority: Medium     Dx 1970s, cardioversion 1990s complicated by CVA. Now chronic. 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 Started nightime oxygen 1/12       Pancytopenia (H) 02/08/2012     Priority: Medium     BPH (benign prostatic hyperplasia) 02/08/2012     Priority: Low      Past Medical History:   Diagnosis Date     Acute, but ill-defined, cerebrovascular disease     NO RESIDUALS     Coronary atherosclerosis of unspecified type of vessel, native or graft     S/P PTCA, EF 50%     Esophageal reflux      Hydrocele, unspecified      Hypersomnia with sleep apnea, unspecified      Inguinal hernia without mention of obstruction or gangrene, recurrent unilateral or unspecified      Other and unspecified hyperlipidemia      Other lymphedema     GROIN AND THIGHS     Other pancytopenia (H)      Other specified anemias      Persistent atrial fibrillation (H)     VVI PM for long pauses     Prostatic hypertrophy, benign      Scrotal varices      Thrombocytopenia, unspecified      Unilateral or unspecified femoral hernia without mention of obstruction or gangrene, unilateral or unspecified      Unspecified hypothyroidism      Past Surgical History:   Procedure Laterality Date     EYE SURGERY      CATARACT/BLEPHAROPLASTY     GENITOURINARY SURGERY      TUNA     HERNIA REPAIR      RIGHT INGUINAL     HERNIORRHAPHY INGUINAL  8/14/2012    Procedure: HERNIORRHAPHY INGUINAL;  right inguinal hernia repair;  Surgeon: Kareem Torrez MD;  [facility]: [facility]     IMPLANT PACEMAKER  8/2009    single chamber     ORTHOPEDIC SURGERY      [facility] RIGHT     ORTHOPEDIC SURGERY  2010    right TKR, left TKR     SURGICAL HISTORY OF -       Thumb surgery     SURGICAL HISTORY OF -   1990s    Left total knee replacement     SURGICAL HISTORY OF -   3/11    [facility] total knee replacement     Current Outpatient Prescriptions   Medication Sig Dispense Refill     diazepam (VALIUM) 5 MG tablet Take 1 tablet (5 mg) by mouth See Admin Instructions Please bring to procedure 2 tablet 0     levothyroxine (SYNTHROID/LEVOTHROID) 100 MCG tablet Take 1 tablet (100 mcg) by mouth daily 90  tablet 3     carvedilol (COREG) 6.25 MG tablet Take 1 tablet (6.25 mg) by mouth 2 times daily (with meals) 180 tablet 3     ASPIRIN NOT PRESCRIBED (INTENTIONAL) Please choose reason not prescribed, below 1 each 0     buPROPion (WELLBUTRIN XL) 300 MG 24 hr tablet TAKE ONE TABLET BY MOUTH IN THE MORNING       furosemide (LASIX) 40 MG tablet Take 40 mg by mouth daily       isosorbide mononitrate (IMDUR) 30 MG 24 hr tablet Take 1 tablet (30 mg) by mouth daily 30 tablet 11     finasteride (PROSCAR) 5 MG tablet TAKE 1 TABLET EVERY DAY 90 tablet 2     COMPRESSION STOCKINGS 1 each daily 2 each 0     Misc Natural Products (FIBER 7) POWD As directed       sucralfate (CARAFATE) 1 GM tablet Take 1 tablet (1 g) by mouth 2 times daily 180 tablet 1     potassium chloride SA (K-DUR/KLOR-CON M) 20 MEQ CR tablet TAKE 1 TABLET (20 MEQ) BY MOUTH DAILY 90 tablet 2     tamsulosin (FLOMAX) 0.4 MG capsule TAKE 1 CAPSULE (0.4 MG) BY MOUTH DAILY 90 capsule 2     omeprazole (PRILOSEC) 20 MG CR capsule Take 1 capsule (20 mg) by mouth 2 times daily 180 capsule 3     simvastatin (ZOCOR) 20 MG tablet TAKE 1 TABLET AT BEDTIME 90 tablet 3     HYDROcodone-acetaminophen (NORCO) 5-325 MG per tablet Take 1-2 tablets by mouth every 4 hours as needed for other (Moderate to Severe Pain) 40 tablet 0     LORazepam (ATIVAN) 1 MG tablet Take 0.5-1 tablets (0.5-1 mg) by mouth every 8 hours as needed for anxiety 30 tablet 5     Multiple Vitamins-Minerals (MULTIVITAMIN OR) Take  by mouth.       ORDER FOR DME Auto-CPAP:Max 9 cm H2OMin 9 cm H2O  Continuous Lifetime need and heated humidity.    1 Device 0     dabigatran ANTICOAGULANT (PRADAXA ANTICOAGULANT) 75 MG CAPS BLISTER PACK Take 150 mg by mouth 2 times daily. Store in original 's bottle or blister pack; use within 120 days of opening.       Glucosamine-Chondroitin 3919-0915 MG/30ML LIQD Take 2 tablets by mouth daily        Omega-3 Fatty Acids (FISH OIL) 1200 MG capsule Take 1 capsule by mouth 2  times daily.       OTC products: None, except as noted above    No Known Allergies   Latex Allergy: NO    Social History   Substance Use Topics     Smoking status: Never Smoker     Smokeless tobacco: Never Used      Comment: per encounter 2/12/07     Alcohol use 0.0 oz/week     0 Standard drinks or equivalent per week      Comment: occasionally     History   Drug Use No       REVIEW OF SYSTEMS:                                                    C: NEGATIVE for fever, chills, change in weight  I: NEGATIVE for worrisome rashes, moles or lesions  E: NEGATIVE for vision changes or irritation  E/M: NEGATIVE for ear, mouth and throat problems  R: NEGATIVE for significant cough or SOB  B: NEGATIVE for masses, tenderness or discharge  CV: NEGATIVE for chest pain, palpitations or peripheral edema  GI: POSITIVE for umbilical hernia  : NEGATIVE for frequency, dysuria, or hematuria  M: NEGATIVE for significant arthralgias or myalgia  N: NEGATIVE for weakness, dizziness or paresthesias  E: NEGATIVE for temperature intolerance, skin/hair changes  H: NEGATIVE for bleeding problems  P: NEGATIVE for changes in mood or affect    EXAM:                                                    /70  Pulse 60  Temp 98  F (36.7  C) (Tympanic)  Resp 16  Ht 6' (1.829 m)  Wt 206 lb (93.4 kg)  SpO2 98%  BMI 27.94 kg/m2    GENERAL APPEARANCE: healthy, alert and no distress     EYES: EOMI,  PERRL     HENT: ear canals and TM's normal and nose and mouth without ulcers or lesions     NECK: no adenopathy, no asymmetry, masses, or scars and thyroid normal to palpation     RESP: lungs clear to auscultation - no rales, rhonchi or wheezes     CV: regular rates and rhythm, normal S1 S2, no S3 or S4 and no murmur, click or rub     ABDOMEN:  soft, nontender, no HSM or masses and bowel sounds normal     ABDOMEN: umbilical hernia     MS: extremities normal- no gross deformities noted, no evidence of inflammation in joints, FROM in all extremities.      SKIN: no suspicious lesions or rashes     NEURO: Normal strength and tone, sensory exam grossly normal, mentation intact and speech normal     PSYCH: mentation appears normal. and affect normal/bright     LYMPHATICS: No axillary, cervical, or supraclavicular nodes    DIAGNOSTICS:                                                    EKG was done and seems unchanged from the one described at his nuclear stress test.    Recent Labs   Lab Test  09/08/17   1600  07/20/17   1352   08/25/16   0931  12/08/15   0904   03/19/11   0617   03/18/11   0633   HGB   --    --    --   11.5*  12.7*   < >   --    < >  9.5*   PLT   --    --    --   80*  92*   < >   --    < >   --    INR   --    --    --    --    --    --   1.38*   --   1.40*   NA  141  139   --   141  145*   < >   --    < >   --    POTASSIUM  3.7  4.1   < >  3.6  3.7   < >   --    < >   --    CR  0.98  1.15   --   1.02  1.20   < >   --    < >   --     < > = values in this interval not displayed.        IMPRESSION:                                                    Reason for surgery/procedure: symptomatic umbilical hernia    The proposed surgical procedure is considered INTERMEDIATE risk.    REVISED CARDIAC RISK INDEX  The patient has the following serious cardiovascular risks for perioperative complications such as (MI, PE, VFib and 3  AV Block):  No serious cardiac risks  INTERPRETATION: 1 risks: Class II (low risk - 0.9% complication rate)    The patient has the following additional risks for perioperative complications:  No identified additional risks      ICD-10-CM    1. Preop general physical exam Z01.818    2. Umbilical hernia without obstruction and without gangrene K42.9    3. JIM (obstructive sleep apnea)- mild/moderate G47.33    4. 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 Coronary artery disease involving native coronary artery of native heart without angina pectoris I25.10    6. Ischemic cardiomyopathy I25.5        RECOMMENDATIONS:                                                               APPROVAL GIVEN to proceed with proposed procedure, without further diagnostic evaluation       Signed Electronically by: Mg Nelson MD    Copy of this evaluation report is provided to requesting physician.    [place] Preop Guidelines

## 2017-10-24 LAB
ANION GAP SERPL CALCULATED.3IONS-SCNC: 6 MMOL/L (ref 3–14)
BUN SERPL-MCNC: 14 MG/DL (ref 7–30)
CHLORIDE SERPL-SCNC: 105 MMOL/L (ref 94–109)
CO2 SERPL-SCNC: 30 MMOL/L (ref 20–32)
CREAT SERPL-MCNC: 0.97 MG/DL (ref 0.66–1.25)
GFR SERPL CREATININE-BSD FRML MDRD: 73 ML/MIN/1.7M2
POTASSIUM SERPL-SCNC: 3.9 MMOL/L (ref 3.4–5.3)
SODIUM SERPL-SCNC: 141 MMOL/L (ref 133–144)

## 2017-10-26 NOTE — H&P (VIEW-ONLY)
[facility]  [address]  [facility] [address]  [phone]  Dept: [phone]    PRE-OP EVALUATION:  Today's date: 10/23/2017    Bairon Foster (: 1931) presents for pre-operative evaluation assessment as requested by Dr. Harrington.  He requires evaluation and anesthesia risk assessment prior to undergoing surgery/procedure for treatment of umbilical hernia .  Proposed procedure: HERNIORRHAPHY UMBILICAL    Date of Surgery/ Procedure: 10/31/17  Time of Surgery/ Procedure:   Hospital/Surgical Facility: [facility]  Fax number for surgical facility:   Primary Physician: Mg Nelson  Type of Anesthesia Anticipated: General    Patient has a Health Care Directive or Living Will:  YES     Preop Questions 10/23/2017   1.  Do you have a history of heart attack, stroke, stent, bypass or surgery on an artery in the head, neck, heart or legs? No   2.  Do you ever have any pain or discomfort in your chest? No   3.  Do you have a history of  Heart Failure? No   4.   Are you troubled by shortness of breath when:  walking on a level surface, or up a slight hill, or at night? No   5.  Do you currently have a cold, bronchitis or other respiratory infection? No   6.  Do you have a cough, shortness of breath, or wheezing? No   7.  Do you sometimes get pains in the calves of your legs when you walk? No   8. Do you or anyone in your family have previous history of blood clots? No   9.  Do you or does anyone in your family have a serious bleeding problem such as prolonged bleeding following surgeries or cuts? No   10. Have you ever had problems with anemia or been told to take iron pills? YES - many years ago   11. Have you had any abnormal blood loss such as black, tarry or bloody stools? No   12. Have you ever had a blood transfusion? No   13. Have you or any of your relatives ever had problems with anesthesia? No   14. Do you have sleep apnea, excessive snoring or  daytime drowsiness? YES - JIM on CPAP   15. Do you have any prosthetic heart valves? No   16. Do you have prosthetic joints? YES - 2 knees           HPI:                                                      Brief HPI related to upcoming procedure: umbilical hernia is causing discomfort interrmittently      See problem list for active medical problems.  Problems all longstanding and stable, except as noted/documented.  See ROS for pertinent symptoms related to these conditions.                                                                                                  .    MEDICAL HISTORY:                                                    Patient Active Problem List    Diagnosis Date Noted     CAD (coronary artery disease) 02/08/2012     Priority: High     [place] s/p Stent 8/09       Ischemic cardiomyopathy 02/08/2012     Priority: High     No hospitalizations for CHF. EF 40-45% by stress echo 12/09. Echo 1/12- EF 45-50%, severe pulmonary hypertension, afib/paced, moderate MR.        Umbilical hernia without obstruction and without gangrene 09/09/2017     Priority: Medium     PAD (peripheral artery disease) (H) 09/08/2017     Priority: Medium     Screening for prostate cancer 09/08/2017     Priority: Medium     Blood in stool 09/08/2017     Priority: Medium     Onychomycosis 03/24/2017     Priority: Medium     Overweight (BMI 25.0-29.9) 03/24/2017     Priority: Medium     Hypercholesterolemia 12/30/2016     Priority: Medium     Non morbid obesity due to excess calories w comorbid CAD< hi LDL  12/30/2016     Priority: Medium     Venous stasis dermatitis of both lower extremities 12/30/2016     Priority: Medium     Lymphedema of both lower extremities 12/30/2016     Priority: Medium     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  12/30/2016     Priority: Medium     Coronary artery disease due to lipid rich plaque 12/30/2016     Priority: Medium     Acquired hypothyroidism 12/30/2016     Priority: Medium      Hydrocele, unspecified hydrocele type 08/24/2016     Priority: Medium     Slow transit constipation 08/24/2016     Priority: Medium     Gastroesophageal reflux disease without esophagitis 01/11/2016     Priority: Medium     Advance Care Planning 11/10/2015     Priority: Medium     Advance Care Planning 11/10/2015: Receipt of ACP document:  Received: Health Care Directive which was witnessed or notarized on 5/5/10.  Document previously scanned on 10/20/15.  Validation form completed and sent to be scanned.  Code Status needs to be updated to reflect choices in most recent ACP document.  Confirmed/documented designated decision maker(s).  Added by ANJEL INTERIANO             Vitamin D deficiency 12/31/2014     Priority: Medium     Problem list name updated by automated process. Provider to review       Health Care Home 04/17/2014     Priority: Medium     State Tier Level:  Tier 1  Status:  Closed  Care Coordinator:  Beth Morales RN CCM    See Letters for [facility] Care Plan           Acute stress reaction 02/13/2013     Priority: Medium     JIM (obstructive sleep apnea)- mild/moderate 02/29/2012     Priority: Medium     Sleep study 2/14- AHI 16.4 and RDI of 17.4. Lo2 73%. This study is suggestive of moderate obstructive sleep apnea, worse while in the supine position. The patient did have some evidence of oxygen desaturations into the upper 80s without discrete respiratory events. PLM index was 87.4. CPAP titration 3/12:   CPAP titrated to 9 cm with elimination of apneas, hypopneas and desaturations. Supine REM was noted at this pressure briefly. He had mild desaturations to 89% on CPAP 9cm, without clear respiratory events.  PLMI 127.2       Atrial fibrillation (H) 02/08/2012     Priority: Medium     Dx 1970s, cardioversion 1990s complicated by CVA. Now chronic. 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 Started nightime oxygen 1/12       Pancytopenia (H) 02/08/2012     Priority: Medium     BPH (benign prostatic hyperplasia) 02/08/2012     Priority: Low      Past Medical History:   Diagnosis Date     Acute, but ill-defined, cerebrovascular disease     NO RESIDUALS     Coronary atherosclerosis of unspecified type of vessel, native or graft     S/P PTCA, EF 50%     Esophageal reflux      Hydrocele, unspecified      Hypersomnia with sleep apnea, unspecified      Inguinal hernia without mention of obstruction or gangrene, recurrent unilateral or unspecified      Other and unspecified hyperlipidemia      Other lymphedema     GROIN AND THIGHS     Other pancytopenia (H)      Other specified anemias      Persistent atrial fibrillation (H)     VVI PM for long pauses     Prostatic hypertrophy, benign      Scrotal varices      Thrombocytopenia, unspecified      Unilateral or unspecified femoral hernia without mention of obstruction or gangrene, unilateral or unspecified      Unspecified hypothyroidism      Past Surgical History:   Procedure Laterality Date     EYE SURGERY      CATARACT/BLEPHAROPLASTY     GENITOURINARY SURGERY      TUNA     HERNIA REPAIR      RIGHT INGUINAL     HERNIORRHAPHY INGUINAL  8/14/2012    Procedure: HERNIORRHAPHY INGUINAL;  right inguinal hernia repair;  Surgeon: Kareem Torrez MD;  Location: [facility]     IMPLANT PACEMAKER  8/2009    single chamber     [place]      [facility] [place]     ORTHOPEDIC SURGERY  2010    right TKR, left TKR     SURGICAL HISTORY OF -       Thumb surgery     SURGICAL HISTORY OF -   1990s    Left total knee replacement     SURGICAL HISTORY OF -   3/11    [facility] total knee replacement     Current Outpatient Prescriptions   Medication Sig Dispense Refill     diazepam (VALIUM) 5 MG tablet Take 1 tablet (5 mg) by mouth See Admin Instructions Please bring to procedure 2 tablet 0     levothyroxine (SYNTHROID/LEVOTHROID) 100 MCG tablet Take 1 tablet (100 mcg) by mouth daily 90  tablet 3     carvedilol (COREG) 6.25 MG tablet Take 1 tablet (6.25 mg) by mouth 2 times daily (with meals) 180 tablet 3     ASPIRIN NOT PRESCRIBED (INTENTIONAL) Please choose reason not prescribed, below 1 each 0     buPROPion (WELLBUTRIN XL) 300 MG 24 hr tablet TAKE ONE TABLET BY MOUTH IN THE MORNING       furosemide (LASIX) 40 MG tablet Take 40 mg by mouth daily       isosorbide mononitrate (IMDUR) 30 MG 24 hr tablet Take 1 tablet (30 mg) by mouth daily 30 tablet 11     finasteride (PROSCAR) 5 MG tablet TAKE 1 TABLET EVERY DAY 90 tablet 2     COMPRESSION STOCKINGS 1 each daily 2 each 0     Misc Natural Products (FIBER 7) POWD As directed       sucralfate (CARAFATE) 1 GM tablet Take 1 tablet (1 g) by mouth 2 times daily 180 tablet 1     potassium chloride SA (K-DUR/KLOR-CON M) 20 MEQ CR tablet TAKE 1 TABLET (20 MEQ) BY MOUTH DAILY 90 tablet 2     tamsulosin (FLOMAX) 0.4 MG capsule TAKE 1 CAPSULE (0.4 MG) BY MOUTH DAILY 90 capsule 2     omeprazole (PRILOSEC) 20 MG CR capsule Take 1 capsule (20 mg) by mouth 2 times daily 180 capsule 3     simvastatin (ZOCOR) 20 MG tablet TAKE 1 TABLET AT BEDTIME 90 tablet 3     HYDROcodone-acetaminophen (NORCO) 5-325 MG per tablet Take 1-2 tablets by mouth every 4 hours as needed for other (Moderate to Severe Pain) 40 tablet 0     LORazepam (ATIVAN) 1 MG tablet Take 0.5-1 tablets (0.5-1 mg) by mouth every 8 hours as needed for anxiety 30 tablet 5     Multiple Vitamins-Minerals (MULTIVITAMIN OR) Take  by mouth.       ORDER FOR DME Auto-CPAP:Max 9 cm H2OMin 9 cm H2O  Continuous Lifetime need and heated humidity.    1 Device 0     dabigatran ANTICOAGULANT (PRADAXA ANTICOAGULANT) 75 MG CAPS BLISTER PACK Take 150 mg by mouth 2 times daily. Store in original 's bottle or blister pack; use within 120 days of opening.       Glucosamine-Chondroitin 3075-5130 MG/30ML LIQD Take 2 tablets by mouth daily        Omega-3 Fatty Acids (FISH OIL) 1200 MG capsule Take 1 capsule by mouth 2  times daily.       OTC products: None, except as noted above    No Known Allergies   Latex Allergy: NO    Social History   Substance Use Topics     Smoking status: Never Smoker     Smokeless tobacco: Never Used      Comment: per encounter 2/12/07     Alcohol use 0.0 oz/week     0 Standard drinks or equivalent per week      Comment: occasionally     History   Drug Use No       REVIEW OF SYSTEMS:                                                    C: NEGATIVE for fever, chills, change in weight  I: NEGATIVE for worrisome rashes, moles or lesions  E: NEGATIVE for vision changes or irritation  E/M: NEGATIVE for ear, mouth and throat problems  R: NEGATIVE for significant cough or SOB  B: NEGATIVE for masses, tenderness or discharge  CV: NEGATIVE for chest pain, palpitations or peripheral edema  GI: POSITIVE for umbilical hernia  : NEGATIVE for frequency, dysuria, or hematuria  M: NEGATIVE for significant arthralgias or myalgia  N: NEGATIVE for weakness, dizziness or paresthesias  E: NEGATIVE for temperature intolerance, skin/hair changes  H: NEGATIVE for bleeding problems  P: NEGATIVE for changes in mood or affect    EXAM:                                                    /70  Pulse 60  Temp 98  F (36.7  C) (Tympanic)  Resp 16  Ht 6' (1.829 m)  Wt 206 lb (93.4 kg)  SpO2 98%  BMI 27.94 kg/m2    GENERAL APPEARANCE: healthy, alert and no distress     EYES: EOMI,  PERRL     HENT: ear canals and TM's normal and nose and mouth without ulcers or lesions     NECK: no adenopathy, no asymmetry, masses, or scars and thyroid normal to palpation     RESP: lungs clear to auscultation - no rales, rhonchi or wheezes     CV: regular rates and rhythm, normal S1 S2, no S3 or S4 and no murmur, click or rub     ABDOMEN:  soft, nontender, no HSM or masses and bowel sounds normal     ABDOMEN: umbilical hernia     MS: extremities normal- no gross deformities noted, no evidence of inflammation in joints, FROM in all extremities.      SKIN: no suspicious lesions or rashes     NEURO: Normal strength and tone, sensory exam grossly normal, mentation intact and speech normal     PSYCH: mentation appears normal. and affect normal/bright     LYMPHATICS: No axillary, cervical, or supraclavicular nodes    DIAGNOSTICS:                                                    EKG was done and seems unchanged from the one described at his nuclear stress test.    Recent Labs   Lab Test  09/08/17   1600  07/20/17   1352   08/25/16   0931  12/08/15   0904   03/19/11   0617   03/18/11   0633   HGB   --    --    --   11.5*  12.7*   < >   --    < >  9.5*   PLT   --    --    --   80*  92*   < >   --    < >   --    INR   --    --    --    --    --    --   1.38*   --   1.40*   NA  141  139   --   141  145*   < >   --    < >   --    POTASSIUM  3.7  4.1   < >  3.6  3.7   < >   --    < >   --    CR  0.98  1.15   --   1.02  1.20   < >   --    < >   --     < > = values in this interval not displayed.        IMPRESSION:                                                    Reason for surgery/procedure: symptomatic umbilical hernia    The proposed surgical procedure is considered INTERMEDIATE risk.    REVISED CARDIAC RISK INDEX  The patient has the following serious cardiovascular risks for perioperative complications such as (MI, PE, VFib and 3  AV Block):  No serious cardiac risks  INTERPRETATION: 1 risks: Class II (low risk - 0.9% complication rate)    The patient has the following additional risks for perioperative complications:  No identified additional risks      ICD-10-CM    1. Preop general physical exam Z01.818    2. Umbilical hernia without obstruction and without gangrene K42.9    3. JIM (obstructive sleep apnea)- mild/moderate G47.33    4. 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 Coronary artery disease involving native coronary artery of native heart without angina pectoris I25.10    6. Ischemic cardiomyopathy I25.5        RECOMMENDATIONS:                                                               APPROVAL GIVEN to proceed with proposed procedure, without further diagnostic evaluation       Signed Electronically by: Mg Nelson MD    Copy of this evaluation report is provided to requesting physician.    [facility] Preop Guidelines

## 2017-10-31 ENCOUNTER — HOSPITAL ENCOUNTER (OUTPATIENT)
Facility: CLINIC | Age: 82
Discharge: HOME | End: 2017-10-31
Attending: SURGERY | Admitting: SURGERY
Payer: MEDICARE

## 2017-10-31 ENCOUNTER — ANESTHESIA EVENT (OUTPATIENT)
Dept: SURGERY | Facility: CLINIC | Age: 82
End: 2017-10-31
Payer: MEDICARE

## 2017-10-31 ENCOUNTER — APPOINTMENT (OUTPATIENT)
Dept: SURGERY | Facility: PHYSICIAN GROUP | Age: 82
End: 2017-10-31
Payer: COMMERCIAL

## 2017-10-31 ENCOUNTER — SURGERY (OUTPATIENT)
Age: 82
End: 2017-10-31

## 2017-10-31 ENCOUNTER — ANESTHESIA (OUTPATIENT)
Dept: SURGERY | Facility: CLINIC | Age: 82
End: 2017-10-31
Payer: MEDICARE

## 2017-10-31 VITALS
SYSTOLIC BLOOD PRESSURE: 123 MMHG | OXYGEN SATURATION: 95 % | WEIGHT: 204.2 LBS | HEART RATE: 54 BPM | HEIGHT: 74 IN | TEMPERATURE: 97.4 F | BODY MASS INDEX: 26.21 KG/M2 | DIASTOLIC BLOOD PRESSURE: 70 MMHG | RESPIRATION RATE: 16 BRPM

## 2017-10-31 DIAGNOSIS — G89.18 POST-OP PAIN: Primary | ICD-10-CM

## 2017-10-31 PROCEDURE — 25000125 ZZHC RX 250: Performed by: NURSE ANESTHETIST, CERTIFIED REGISTERED

## 2017-10-31 PROCEDURE — 25000125 ZZHC RX 250: Performed by: SURGERY

## 2017-10-31 PROCEDURE — 36000052 ZZH SURGERY LEVEL 2 EA 15 ADDTL MIN: Performed by: SURGERY

## 2017-10-31 PROCEDURE — 49585 ZZHC REPAIR UMBILICAL HERN,5+Y/O,REDUC: CPT | Mod: AS | Performed by: PHYSICIAN ASSISTANT

## 2017-10-31 PROCEDURE — 25000128 H RX IP 250 OP 636: Performed by: NURSE ANESTHETIST, CERTIFIED REGISTERED

## 2017-10-31 PROCEDURE — 36000050 ZZH SURGERY LEVEL 2 1ST 30 MIN: Performed by: SURGERY

## 2017-10-31 PROCEDURE — 25000132 ZZH RX MED GY IP 250 OP 250 PS 637: Mod: GY | Performed by: SURGERY

## 2017-10-31 PROCEDURE — 40000170 ZZH STATISTIC PRE-PROCEDURE ASSESSMENT II: Performed by: SURGERY

## 2017-10-31 PROCEDURE — 37000008 ZZH ANESTHESIA TECHNICAL FEE, 1ST 30 MIN: Performed by: SURGERY

## 2017-10-31 PROCEDURE — 71000027 ZZH RECOVERY PHASE 2 EACH 15 MINS: Performed by: SURGERY

## 2017-10-31 PROCEDURE — 25000128 H RX IP 250 OP 636: Performed by: SURGERY

## 2017-10-31 PROCEDURE — 71000012 ZZH RECOVERY PHASE 1 LEVEL 1 FIRST HR: Performed by: SURGERY

## 2017-10-31 PROCEDURE — A9270 NON-COVERED ITEM OR SERVICE: HCPCS | Mod: GY | Performed by: SURGERY

## 2017-10-31 PROCEDURE — 37000009 ZZH ANESTHESIA TECHNICAL FEE, EACH ADDTL 15 MIN: Performed by: SURGERY

## 2017-10-31 PROCEDURE — 49585 ZZHC REPAIR UMBILICAL HERN,5+Y/O,REDUC: CPT | Performed by: SURGERY

## 2017-10-31 PROCEDURE — 27210794 ZZH OR GENERAL SUPPLY STERILE: Performed by: SURGERY

## 2017-10-31 RX ORDER — SODIUM CHLORIDE, SODIUM LACTATE, POTASSIUM CHLORIDE, CALCIUM CHLORIDE 600; 310; 30; 20 MG/100ML; MG/100ML; MG/100ML; MG/100ML
INJECTION, SOLUTION INTRAVENOUS CONTINUOUS PRN
Status: DISCONTINUED | OUTPATIENT
Start: 2017-10-31 | End: 2017-10-31

## 2017-10-31 RX ORDER — MULTIVIT WITH MINERALS/LUTEIN
1 TABLET ORAL DAILY
COMMUNITY

## 2017-10-31 RX ORDER — FENTANYL CITRATE 50 UG/ML
25-50 INJECTION, SOLUTION INTRAMUSCULAR; INTRAVENOUS EVERY 5 MIN PRN
Status: DISCONTINUED | OUTPATIENT
Start: 2017-10-31 | End: 2017-10-31 | Stop reason: HOSPADM

## 2017-10-31 RX ORDER — ONDANSETRON 2 MG/ML
INJECTION INTRAMUSCULAR; INTRAVENOUS PRN
Status: DISCONTINUED | OUTPATIENT
Start: 2017-10-31 | End: 2017-10-31

## 2017-10-31 RX ORDER — CEFAZOLIN SODIUM 2 G/100ML
2 INJECTION, SOLUTION INTRAVENOUS
Status: DISCONTINUED | OUTPATIENT
Start: 2017-10-31 | End: 2017-10-31 | Stop reason: HOSPADM

## 2017-10-31 RX ORDER — SODIUM CHLORIDE, SODIUM LACTATE, POTASSIUM CHLORIDE, CALCIUM CHLORIDE 600; 310; 30; 20 MG/100ML; MG/100ML; MG/100ML; MG/100ML
INJECTION, SOLUTION INTRAVENOUS CONTINUOUS
Status: DISCONTINUED | OUTPATIENT
Start: 2017-10-31 | End: 2017-10-31 | Stop reason: HOSPADM

## 2017-10-31 RX ORDER — BUPIVACAINE HYDROCHLORIDE 2.5 MG/ML
INJECTION, SOLUTION EPIDURAL; INFILTRATION; INTRACAUDAL
Status: DISCONTINUED
Start: 2017-10-31 | End: 2017-10-31 | Stop reason: HOSPADM

## 2017-10-31 RX ORDER — HYDROMORPHONE HYDROCHLORIDE 1 MG/ML
.3-.5 INJECTION, SOLUTION INTRAMUSCULAR; INTRAVENOUS; SUBCUTANEOUS EVERY 10 MIN PRN
Status: DISCONTINUED | OUTPATIENT
Start: 2017-10-31 | End: 2017-10-31 | Stop reason: HOSPADM

## 2017-10-31 RX ORDER — ONDANSETRON 4 MG/1
4 TABLET, ORALLY DISINTEGRATING ORAL EVERY 30 MIN PRN
Status: DISCONTINUED | OUTPATIENT
Start: 2017-10-31 | End: 2017-10-31 | Stop reason: HOSPADM

## 2017-10-31 RX ORDER — NALOXONE HYDROCHLORIDE 0.4 MG/ML
.1-.4 INJECTION, SOLUTION INTRAMUSCULAR; INTRAVENOUS; SUBCUTANEOUS
Status: DISCONTINUED | OUTPATIENT
Start: 2017-10-31 | End: 2017-10-31 | Stop reason: HOSPADM

## 2017-10-31 RX ORDER — LIDOCAINE HYDROCHLORIDE 10 MG/ML
INJECTION, SOLUTION INFILTRATION; PERINEURAL
Status: DISCONTINUED
Start: 2017-10-31 | End: 2017-10-31 | Stop reason: HOSPADM

## 2017-10-31 RX ORDER — MEPERIDINE HYDROCHLORIDE 25 MG/ML
12.5 INJECTION INTRAMUSCULAR; INTRAVENOUS; SUBCUTANEOUS
Status: DISCONTINUED | OUTPATIENT
Start: 2017-10-31 | End: 2017-10-31 | Stop reason: HOSPADM

## 2017-10-31 RX ORDER — CEFAZOLIN SODIUM 1 G/3ML
1 INJECTION, POWDER, FOR SOLUTION INTRAMUSCULAR; INTRAVENOUS SEE ADMIN INSTRUCTIONS
Status: DISCONTINUED | OUTPATIENT
Start: 2017-10-31 | End: 2017-10-31 | Stop reason: HOSPADM

## 2017-10-31 RX ORDER — LIDOCAINE HYDROCHLORIDE 20 MG/ML
INJECTION, SOLUTION INFILTRATION; PERINEURAL PRN
Status: DISCONTINUED | OUTPATIENT
Start: 2017-10-31 | End: 2017-10-31

## 2017-10-31 RX ORDER — HYDROCODONE BITARTRATE AND ACETAMINOPHEN 5; 325 MG/1; MG/1
1-2 TABLET ORAL
Status: COMPLETED | OUTPATIENT
Start: 2017-10-31 | End: 2017-10-31

## 2017-10-31 RX ORDER — PROPOFOL 10 MG/ML
INJECTION, EMULSION INTRAVENOUS CONTINUOUS PRN
Status: DISCONTINUED | OUTPATIENT
Start: 2017-10-31 | End: 2017-10-31

## 2017-10-31 RX ORDER — HYDROCODONE BITARTRATE AND ACETAMINOPHEN 5; 325 MG/1; MG/1
1-2 TABLET ORAL EVERY 4 HOURS PRN
Qty: 20 TABLET | Refills: 0 | Status: SHIPPED | OUTPATIENT
Start: 2017-10-31 | End: 2017-11-16

## 2017-10-31 RX ORDER — FENTANYL CITRATE 50 UG/ML
INJECTION, SOLUTION INTRAMUSCULAR; INTRAVENOUS PRN
Status: DISCONTINUED | OUTPATIENT
Start: 2017-10-31 | End: 2017-10-31

## 2017-10-31 RX ORDER — EPINEPHRINE 1 MG/ML
INJECTION, SOLUTION INTRAMUSCULAR; SUBCUTANEOUS
Status: DISCONTINUED
Start: 2017-10-31 | End: 2017-10-31 | Stop reason: HOSPADM

## 2017-10-31 RX ORDER — FENTANYL CITRATE 50 UG/ML
25-50 INJECTION, SOLUTION INTRAMUSCULAR; INTRAVENOUS
Status: DISCONTINUED | OUTPATIENT
Start: 2017-10-31 | End: 2017-10-31 | Stop reason: HOSPADM

## 2017-10-31 RX ORDER — EPHEDRINE SULFATE 50 MG/ML
INJECTION, SOLUTION INTRAMUSCULAR; INTRAVENOUS; SUBCUTANEOUS PRN
Status: DISCONTINUED | OUTPATIENT
Start: 2017-10-31 | End: 2017-10-31

## 2017-10-31 RX ORDER — ONDANSETRON 2 MG/ML
4 INJECTION INTRAMUSCULAR; INTRAVENOUS EVERY 30 MIN PRN
Status: DISCONTINUED | OUTPATIENT
Start: 2017-10-31 | End: 2017-10-31 | Stop reason: HOSPADM

## 2017-10-31 RX ORDER — DEXAMETHASONE SODIUM PHOSPHATE 4 MG/ML
INJECTION, SOLUTION INTRA-ARTICULAR; INTRALESIONAL; INTRAMUSCULAR; INTRAVENOUS; SOFT TISSUE PRN
Status: DISCONTINUED | OUTPATIENT
Start: 2017-10-31 | End: 2017-10-31

## 2017-10-31 RX ADMIN — PROPOFOL 60 MCG/KG/MIN: 10 INJECTION, EMULSION INTRAVENOUS at 12:20

## 2017-10-31 RX ADMIN — FENTANYL CITRATE 50 MCG: 50 INJECTION, SOLUTION INTRAMUSCULAR; INTRAVENOUS at 12:17

## 2017-10-31 RX ADMIN — ONDANSETRON 4 MG: 2 INJECTION INTRAMUSCULAR; INTRAVENOUS at 12:35

## 2017-10-31 RX ADMIN — DEXAMETHASONE SODIUM PHOSPHATE 4 MG: 4 INJECTION, SOLUTION INTRA-ARTICULAR; INTRALESIONAL; INTRAMUSCULAR; INTRAVENOUS; SOFT TISSUE at 12:41

## 2017-10-31 RX ADMIN — SODIUM CHLORIDE, POTASSIUM CHLORIDE, SODIUM LACTATE AND CALCIUM CHLORIDE: 600; 310; 30; 20 INJECTION, SOLUTION INTRAVENOUS at 12:14

## 2017-10-31 RX ADMIN — LIDOCAINE HYDROCHLORIDE 20 ML: 10 INJECTION, SOLUTION EPIDURAL; INFILTRATION; INTRACAUDAL; PERINEURAL at 13:11

## 2017-10-31 RX ADMIN — Medication 5 MG: at 12:53

## 2017-10-31 RX ADMIN — Medication 5 MG: at 13:07

## 2017-10-31 RX ADMIN — DEXMEDETOMIDINE HYDROCHLORIDE 4 MCG: 100 INJECTION, SOLUTION INTRAVENOUS at 12:24

## 2017-10-31 RX ADMIN — Medication 5 MG: at 13:00

## 2017-10-31 RX ADMIN — HYDROCODONE BITARTRATE AND ACETAMINOPHEN 1 TABLET: 5; 325 TABLET ORAL at 13:50

## 2017-10-31 RX ADMIN — LIDOCAINE HYDROCHLORIDE 60 MG: 20 INJECTION, SOLUTION INFILTRATION; PERINEURAL at 12:20

## 2017-10-31 RX ADMIN — CEFAZOLIN 2 G: 1 INJECTION, POWDER, FOR SOLUTION INTRAMUSCULAR; INTRAVENOUS at 12:25

## 2017-10-31 ASSESSMENT — ENCOUNTER SYMPTOMS: DYSRHYTHMIAS: 1

## 2017-10-31 NOTE — ANESTHESIA CARE TRANSFER NOTE
Patient: Bairon Foster    Procedure(s):  UMBILICAL HERNIA REPAIR WITH MESH - Wound Class: I-Clean    Diagnosis: umbilical hernia   Diagnosis Additional Information: No value filed.    Anesthesia Type:   MAC     Note:  Airway :Face Mask  Patient transferred to:PACU  Comments: Pt to PACU with O2 via mask, airway patent, VSS.  Report to RN.Handoff Report: Identifed the Patient, Identified the Reponsible Provider, Reviewed the pertinent medical history, Discussed the surgical course, Reviewed Intra-OP anesthesia mangement and issues during anesthesia, Set expectations for post-procedure period and Allowed opportunity for questions and acknowledgement of understanding      Vitals: (Last set prior to Anesthesia Care Transfer)    CRNA VITALS  10/31/2017 1245 - 10/31/2017 1321      10/31/2017             Pulse: 60    SpO2: 95 %    Resp Rate (set): 10    EKG: V Paced;Atrial fibrillation                Electronically Signed By: WILLIS Ibanez CRNA  October 31, 2017  1:21 PM

## 2017-10-31 NOTE — OR NURSING
In PACU had an 11 beat run V Tach- completely asymtomatic- denies CP SOB or other c/o- Dr Jackson aware- OK to discharge to home. Dr Harrington called- restart ASA and pradaza blood thinner medications tomorrow

## 2017-10-31 NOTE — ANESTHESIA POSTPROCEDURE EVALUATION
Patient: Bairon Foster    Procedure(s):  UMBILICAL HERNIA REPAIR WITH MESH - Wound Class: I-Clean    Diagnosis:umbilical hernia   Diagnosis Additional Information: No value filed.    Anesthesia Type:  MAC    Note:  Anesthesia Post Evaluation    Patient location during evaluation: PACU  Patient participation: Able to fully participate in evaluation  Level of consciousness: awake  Pain management: adequate  Airway patency: patent  Cardiovascular status: acceptable  Respiratory status: acceptable  Hydration status: acceptable  PONV: controlled     Anesthetic complications: None          Last vitals:  Vitals:    10/31/17 1105 10/31/17 1318 10/31/17 1330   BP: 126/70 101/65 115/72   Pulse: 54     Resp: 16 14 15   Temp: 36.5  C (97.7  F) 36.3  C (97.4  F)    SpO2: 96% 96% 94%         Electronically Signed By: Cooper Armenta MD  October 31, 2017  1:33 PM

## 2017-10-31 NOTE — OP NOTE
Surgeon: Scar Harrington MD  1st Assistant: Khushi Purvis PA-C, The physicians assistant was medically necessary for their expertise in hemostasis, suturing, and retraction.    PREOPERATIVE DIAGNOSIS: Umbilical hernia.   POSTOPERATIVE DIAGNOSIS: Umbilical hernia.   PROCEDURES:   1. Umbilical hernia repair with mesh.    ANESTHESIA: MAC plus local.   OPERATIVE PROCEDURE: After intravenous sedation was provided, Bairon Foster abdomen was prepped and draped in the usual sterile fashion. Sharp infraumbilical incision was made, sharp dissection was utilized to dissect the subcutaneous tissue down to the abdominal wall fascia. At this point, we noted a single defect at the umbilicus.  Preperitoneal dissection carried under the hernia defect bluntly, the defect measured about 2 cm. We then closed the hernia with multiple interrupted 0 Nurolon sutures. The tissue came together nicely. Hemostasis was secured with bipolar and neli. The wound was then closed in layers with 3-0 Vicryl and 4-0 vicryl and Steri-Strips. Sterile dressing applied. Sponge and needle count reported correct. No immediate complications.

## 2017-10-31 NOTE — ANESTHESIA PREPROCEDURE EVALUATION
Anesthesia Evaluation     . Pt has had prior anesthetic. Type: General and MAC    No history of anesthetic complications          ROS/MED HX    ENT/Pulmonary:     (+)sleep apnea, uses CPAP , . .    Neurologic:       Cardiovascular:     (+) Dyslipidemia, -Peripheral Vascular Disease-CAD, --. Taking blood thinners : . . . pacemaker :. dysrhythmias a-fib, . pulmonary hypertension,       METS/Exercise Tolerance:     Hematologic:         Musculoskeletal:         GI/Hepatic:     (+) GERD       Renal/Genitourinary:     (+) BPH,       Endo:     (+) thyroid problem hypothyroidism, Obesity, .      Psychiatric:     (+) psychiatric history depression      Infectious Disease:         Malignancy:         Other:                     Physical Exam  Normal systems: cardiovascular    Airway   Mallampati: II  TM distance: >3 FB  Neck ROM: full    Dental     Cardiovascular   Rhythm and rate: irregular and normal      Pulmonary    breath sounds clear to auscultation                    Anesthesia Plan      History & Physical Review  History and physical reviewed and following examination; no interval change.    ASA Status:  3 .    NPO Status:  > 8 hours    Plan for MAC with Intravenous induction.   PONV prophylaxis:  Ondansetron (or other 5HT-3) and Dexamethasone or Solumedrol       Postoperative Care  Postoperative pain management:  IV analgesics and Oral pain medications.      Consents  Anesthetic plan, risks, benefits and alternatives discussed with:  Patient..                          .

## 2017-10-31 NOTE — IP AVS SNAPSHOT
[facility]    [address]    [place] [address]    Phone:  [phone]    Fax:  [phone]                                       After Visit Summary   10/31/2017    Bairon Foster    MRN: [id number]           After Visit Summary Signature Page     I have received my discharge instructions, and my questions have been answered. I have discussed any challenges I see with this plan with the nurse or doctor.    ..........................................................................................................................................  Patient/Patient Representative Signature      ..........................................................................................................................................  Patient Representative Print Name and Relationship to Patient    ..................................................               ................................................  Date                                            Time    ..........................................................................................................................................  Reviewed by Signature/Title    ...................................................              ..............................................  Date                                                            Time

## 2017-10-31 NOTE — IP AVS SNAPSHOT
MRN:[id number]                      After Visit Summary   10/31/2017    Bairon Foster    MRN: [id number]           Thank you!     Thank you for choosing [facility] for your care. Our goal is always to provide you with excellent care. Hearing back from our patients is one way we can continue to improve our services. Please take a few minutes to complete the written survey that you may receive in the mail after you visit with us. Thank you!        Patient Information     Date Of Birth          2/6/1931        About your hospital stay     You were admitted on:  October 31, 2017 You last received care in the:  [facility]    You were discharged on:  October 31, 2017       Who to Call     For medical emergencies, please call 911.  For non-urgent questions about your medical care, please call your primary care provider or clinic, [phone]  For questions related to your surgery, please call your surgery clinic        Attending Provider     Provider Specialty    Scar Harrington MD Surgery       Primary Care Provider Office Phone # Fax #    Mg Nelson [phone]      After Care Instructions     Diet Instructions       Resume pre-procedure diet            Ice to affected area       Ice to operative site PRN            No lifting        No lifting over 20 lbs and no strenuous physical activity for 4 weeks                  Your next 10 appointments already scheduled     Nov 16, 2017  2:30 PM CST   Return Visit with WILLIS [facility] ([facility])    [address] [place] [id number] [phone]            Dec 19, 2017  9:30 AM CST   Phone Device Check with ARRIETA TECH1   [facility] ([facility])    [address] [place] [id number] [phone]              Further instructions from your  care team       You may restart your aspirin and pradaxa blood thinner medications tomorrow    Same Day Surgery Discharge Instructions for  Sedation and General Anesthesia       It's not unusual to feel dizzy, light-headed or faint for up to 24 hours after surgery or while taking pain medication.  If you have these symptoms: sit for a few minutes before standing and have someone assist you when you get up to walk or use the bathroom.      You should rest and relax for the next 24 hours. We recommend you make arrangements to have an adult stay with you for at least 24 hours after your discharge.  Avoid hazardous and strenuous activity.      DO NOT DRIVE any vehicle or operate mechanical equipment for 24 hours following the end of your surgery.  Even though you may feel normal, your reactions may be affected by the medication you have received.      Do not drink alcoholic beverages for 24 hours following surgery.       Slowly progress to your regular diet as you feel able. It's not unusual to feel nauseated and/or vomit after receiving anesthesia.  If you develop these symptoms, drink clear liquids (apple juice, ginger ale, broth, 7-up, etc. ) until you feel better.  If your nausea and vomiting persists for 24 hours, please notify your surgeon.        All narcotic pain medications, along with inactivity and anesthesia, can cause constipation. Drinking plenty of liquids and increasing fiber intake will help.      For any questions of a medical nature, call your surgeon.      Do not make important decisions for 24 hours.      If you had general anesthesia, you may have a sore throat for a couple of days related to the breathing tube used during surgery.  You may use Cepacol lozenges to help with this discomfort.  If it worsens or if you develop a fever, contact your surgeon.       If you feel your pain is not well managed with the pain medications prescribed by your surgeon, please contact your surgeon's office to let  them know so they can address your concerns.                 **Because you had anesthesia today and your history of sleep apnea, it is extremely important that you use your CPAP machine for the next 24 hours while napping or sleeping.**      [facility]  Discharge Instructions: Post-Operative Open Inguinal Hernia    ACTIVITY    Increase your activity gradually.  Avoid strenuous physical activity or heavy lifting greater than 15 lbs. for 3-4 weeks.  You may climb stairs.    You may drive without restrictions when you are not using any prescription pain medication and comfortable in a car.    You may return to work/school when you are comfortable without any prescription pain medication.    WOUND CARE    You may remove your bandage and shower 48 hours after the surgery.  Pat your incisions dry and leave open to air.  Re-apply dressing (Band-aid or gauze/tape) as needed for drainage.    You may have steri-strips (looks like tape) or Dermabond (looks like glue) on your incision.  Leave it alone, it will peel up and fall off on its own.     Do not soak your incisions in a tub or pool for 2 weeks.     DIET    Return to the diet you were on before surgery.    Pain medications can cause constipation.  Limit use when possible.  Take over the counter stool softener/stimulant, such as Colace or Senna, with plenty of water.      PAIN    Expect some tenderness and discomfort at the incision site(s).  Use the prescribed pain medication at your discretion.  Expect gradual resolution of your pain over several days.    You may take ibuprofen with food (unless you have been told not to) instead of or in addition to your prescribed pain medication.  If you are taking Norco or Percocet, do not take any additional acetaminophen/APAP/Tylenol.    Do not drink alcohol or drive while you are taking pain medications.    You may apply ice to your incisions in 20 minute intervals as needed for the next 48  "hours.  After that time, consider switching to heat if you prefer.    EXPECTATIONS    A lump or ridge under the incision is normal and will gradually resolve.    Male patients can expect some swelling, bruising possibly involving the testicles and penis, and/or some numbness.  These symptoms are expected.  Use ice to help with the swelling.  Try placing a rolled hand towel below your scrotum to help alleviate your scrotal discomfort.     RETURN APPOINTMENT    Follow up with your surgeon or a PA in 2 weeks.  Please call the office at [phone] to schedule your appointment.    CALL OUR OFFICE IF YOU HAVE:     Chills or fever above 101.5 F.    Increased redness or drainage at your incisions.    Significant bleeding.    Pain not relieved by your pain medication or rest.    Increasing pain after the first 48 hours.    Any other concerns or questions.    Revised August 2017      **If you have questions or concerns about your procedure,  call Dr. Harrington at [phone]**    Pending Results     No orders found from 10/29/2017 to 11/1/2017.            Admission Information     Date & Time Provider Department Dept. Phone    10/31/2017 Scar Harrington MD [facility] Same Day Surgery [phone]      Your Vitals Were     Blood Pressure Pulse Temperature Respirations Height Weight    126/77 54 97.4  F (36.3  C) 18 1.88 m (6' 2\") 92.6 kg (204 lb 3.2 oz)    Pulse Oximetry BMI (Body Mass Index)                94% 26.22 kg/m2          MyChart Information     [employer] gives you secure access to your electronic health record. If you see a primary care provider, you can also send messages to your care team and make appointments. If you have questions, please call your primary care clinic.  If you do not have a primary care provider, please call [phone] and they will assist you.        Care EveryWhere ID     This is your Care EveryWhere ID. This could be used by other organizations to access your [facility]" records  [id number]        Equal Access to Services     EVANGELIST PETERSON : Hadii aad ku hadjinjameson Viramontes, waaxda lulance, qaybta jackie polk, kathryn pathak. So [facility] [phone].    ATENCIÓN: Si habla español, tiene a malone disposición servicios gratuitos de asistencia lingüística. Llame al [phone].    We comply with applicable federal civil rights laws and [place] laws. We do not discriminate on the basis of race, color, national origin, age, disability, sex, sexual orientation, or gender identity.               Review of your medicines      START taking        Dose / Directions    HYDROcodone-acetaminophen 5-325 MG per tablet   Commonly known as:  NORCO   Used for:  Post-op pain   Notes to Patient:  One norco pain pill given at 1:50 PM        Dose:  1-2 tablet   Take 1-2 tablets by mouth every 4 hours as needed for other (Moderate to Severe Pain)   Quantity:  20 tablet   Refills:  0         CONTINUE these medicines which have NOT CHANGED        Dose / Directions    ASPIRIN NOT PRESCRIBED   Commonly known as:  INTENTIONAL   Used for:  Coronary artery disease involving native coronary artery of native heart without angina pectoris, Persistent atrial fibrillation (H), Long term current use of anticoagulant therapy   Notes to Patient:  restart tomorrow        Please choose reason not prescribed, below   Quantity:  1 each   Refills:  0       Carboxymethylcellulose Sod PF 1 % ophthalmic solution   Commonly known as:  CELLUVISC/REFRESH LIQUIGEL        Dose:  1 drop   Place 1 drop into both eyes 3 times daily as needed for dry eyes   Refills:  0       carvedilol 6.25 MG tablet   Commonly known as:  COREG   Used for:  Ischemic cardiomyopathy        Dose:  6.25 mg   Take 1 tablet (6.25 mg) by mouth 2 times daily (with meals)   Quantity:  180 tablet   Refills:  3       CENTRUM SILVER per tablet        Dose:  1 tablet   Take 1 tablet by mouth daily   Refills:  0       COMPRESSION  STOCKINGS   Used for:  Varicose 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        Dose:  1 each   1 each daily   Quantity:  2 each   Refills:  0       FINASTERIDE PO        Dose:  5 mg   Take 5 mg by mouth daily   Refills:  0       FLOMAX PO        Dose:  0.4 mg   Take 0.4 mg by mouth daily   Refills:  0       GLUCOSAMINE-CHONDROITIN PO        Dose:  2 tablet   Take 2 tablets by mouth daily   Refills:  0       isosorbide mononitrate 30 MG 24 hr tablet   Commonly known as:  IMDUR   Used for:  SOB (shortness of breath)        Dose:  30 mg   Take 1 tablet (30 mg) by mouth daily   Quantity:  30 tablet   Refills:  11       K-DUR PO        Dose:  20 mEq   Take 20 mEq by mouth daily   Refills:  0       LASIX 40 MG tablet   Generic drug:  furosemide        Dose:  40 mg   Take 40 mg by mouth daily   Refills:  0       levothyroxine 100 MCG tablet   Commonly known as:  SYNTHROID/LEVOTHROID   Used for:  Acquired hypothyroidism        Dose:  100 mcg   Take 1 tablet (100 mcg) by mouth daily   Quantity:  90 tablet   Refills:  3       LORazepam 1 MG tablet   Commonly known as:  ATIVAN   Used for:  Acute stress reaction        Dose:  0.5-1 mg   Take 0.5-1 tablets (0.5-1 mg) by mouth every 8 hours as needed for anxiety   Quantity:  30 tablet   Refills:  5       MIRALAX PO        Dose:  1 packet   Take 1 packet by mouth At Bedtime   Refills:  0       omega-3 fatty acids 1200 MG capsule        Dose:  1 capsule   Take 1 capsule by mouth daily   Refills:  0       omeprazole 20 MG CR capsule   Commonly known as:  priLOSEC   Used for:  Gastroesophageal reflux disease without esophagitis        Dose:  20 mg   Take 1 capsule (20 mg) by mouth 2 times daily   Quantity:  180 capsule   Refills:  3       order for DME   Used for:  Obstructive sleep apnea (adult) (pediatric), Other dyspnea and respiratory abnormality        Auto-CPAP:Max 9 cm H2OMin 9 cm H2O Continuous Lifetime need and heated humidity.   Quantity:  1 Device   Refills:  0        PRADAXA ANTICOAGULANT 75 MG capsule   Generic drug:  dabigatran ANTICOAGULANT   Notes to Patient:  Restart tomorrow        Dose:  150 mg   Take 150 mg by mouth 2 times daily. Store in original 's bottle or blister pack; use within 120 days of opening.   Refills:  0       SIMVASTATIN PO        Dose:  20 mg   Take 20 mg by mouth daily   Refills:  0       sucralfate 1 GM tablet   Commonly known as:  CARAFATE   Used for:  Esophageal reflux        Dose:  1 g   Take 1 tablet (1 g) by mouth 2 times daily   Quantity:  180 tablet   Refills:  1       UNKNOWN TO PATIENT        Dose:  1 drop   Place 1 drop Into the left eye daily EYE DROP PRESCRIBED FOR CONJUNCTIVITIS   Refills:  0       WELLBUTRIN XL PO        Dose:  150 mg   Take 150 mg by mouth every morning   Refills:  0            Where to get your medicines      Some of these will need a paper prescription and others can be bought over the counter. Ask your nurse if you have questions.     Bring a paper prescription for each of these medications     HYDROcodone-acetaminophen 5-325 MG per tablet                Protect others around you: Learn how to safely use, store and throw away your medicines at www.disposemymeds.org.             Medication List: This is a list of all your medications and when to take them. Check marks below indicate your daily home schedule. Keep this list as a reference.      Medications           Morning Afternoon Evening Bedtime As Needed    ASPIRIN NOT PRESCRIBED   Commonly known as:  INTENTIONAL   Please choose reason not prescribed, below   Notes to Patient:  restart tomorrow                                Carboxymethylcellulose Sod PF 1 % ophthalmic solution   Commonly known as:  CELLUVISC/REFRESH LIQUIGEL   Place 1 drop into both eyes 3 times daily as needed for dry eyes                                carvedilol 6.25 MG tablet   Commonly known as:  COREG   Take 1 tablet (6.25 mg) by mouth 2 times daily (with meals)                                 CENTRUM SILVER per tablet   Take 1 tablet by mouth daily                                COMPRESSION STOCKINGS   1 each daily                                FINASTERIDE PO   Take 5 mg by mouth daily                                FLOMAX PO   Take 0.4 mg by mouth daily                                GLUCOSAMINE-CHONDROITIN PO   Take 2 tablets by mouth daily                                HYDROcodone-acetaminophen 5-325 MG per tablet   Commonly known as:  NORCO   Take 1-2 tablets by mouth every 4 hours as needed for other (Moderate to Severe Pain)   Last time this was given:  1 tablet on 10/31/2017  1:50 PM   Notes to Patient:  One norco pain pill given at 1:50 PM                                isosorbide mononitrate 30 MG 24 hr tablet   Commonly known as:  IMDUR   Take 1 tablet (30 mg) by mouth daily                                K-DUR PO   Take 20 mEq by mouth daily                                LASIX 40 MG tablet   Take 40 mg by mouth daily   Generic drug:  furosemide                                levothyroxine 100 MCG tablet   Commonly known as:  SYNTHROID/LEVOTHROID   Take 1 tablet (100 mcg) by mouth daily                                LORazepam 1 MG tablet   Commonly known as:  ATIVAN   Take 0.5-1 tablets (0.5-1 mg) by mouth every 8 hours as needed for anxiety                                MIRALAX PO   Take 1 packet by mouth At Bedtime                                omega-3 fatty acids 1200 MG capsule   Take 1 capsule by mouth daily                                omeprazole 20 MG CR capsule   Commonly known as:  priLOSEC   Take 1 capsule (20 mg) by mouth 2 times daily                                order for DME   Auto-CPAP:Max 9 cm H2OMin 9 cm H2O Continuous Lifetime need and heated humidity.                                PRADAXA ANTICOAGULANT 75 MG capsule   Take 150 mg by mouth 2 times daily. Store in original 's bottle or blister pack; use within 120 days of opening.    Generic drug:  dabigatran ANTICOAGULANT   Notes to Patient:  Restart tomorrow                                SIMVASTATIN PO   Take 20 mg by mouth daily                                sucralfate 1 GM tablet   Commonly known as:  CARAFATE   Take 1 tablet (1 g) by mouth 2 times daily                                UNKNOWN TO PATIENT   Place 1 drop Into the left eye daily EYE DROP PRESCRIBED FOR CONJUNCTIVITIS                                WELLBUTRIN XL PO   Take 150 mg by mouth every morning

## 2017-10-31 NOTE — PROGRESS NOTES
Admission medication history interview status for the 10/31/2017  admission is complete. See EPIC admission navigator for prior to admission medications     Medication history source reliability:Moderate    Medication history interview source(s):Patient    Medication history resources (including written lists, pill bottles, clinic record):Patient mailed in a medication list prior to surgery    [facility]    Additional medication history information not noted on PTA med list :None    Time spent in this activity: 60 minutes    Prior to Admission medications    Medication Sig Last Dose Taking? Auth Provider   Multiple Vitamins-Minerals (CENTRUM SILVER) per tablet Take 1 tablet by mouth daily 10/31/2017 at 0745 Yes Reported, Patient   Carboxymethylcellulose Sod PF (CELLUVISC/REFRESH LIQUIGEL) 1 % ophthalmic solution Place 1 drop into both eyes 3 times daily as needed for dry eyes 10/31/2017 at 0745 Yes Reported, Patient   UNKNOWN TO PATIENT Place 1 drop Into the left eye daily EYE DROP PRESCRIBED FOR CONJUNCTIVITIS 10/30/2017 at AM Yes Reported, Patient   BuPROPion HCl (WELLBUTRIN XL PO) Take 150 mg by mouth every morning 10/31/2017 at 0745 Yes Reported, Patient   FINASTERIDE PO Take 5 mg by mouth daily 10/31/2017 at 0745 Yes Reported, Patient   GLUCOSAMINE-CHONDROITIN PO Take 2 tablets by mouth daily 10/31/2017 at 0745 Yes Reported, Patient   Polyethylene Glycol 3350 (MIRALAX PO) Take 1 packet by mouth At Bedtime  10/30/2017 at PM Yes Reported, Patient   Potassium Chloride Sylvia ER (K-DUR PO) Take 20 mEq by mouth daily 10/31/2017 at 0745 Yes Reported, Patient   SIMVASTATIN PO Take 20 mg by mouth daily 10/31/2017 at 0745 Yes Reported, Patient   Tamsulosin HCl (FLOMAX PO) Take 0.4 mg by mouth daily 10/31/2017 at 0745 Yes Reported, Patient   levothyroxine (SYNTHROID/LEVOTHROID) 100 MCG tablet Take 1 tablet (100 mcg) by mouth daily 10/31/2017 at 0745 Yes Leslie  Mg Waddell MD   carvedilol (COREG) 6.25 MG tablet Take 1 tablet (6.25 mg) by mouth 2 times daily (with meals) 10/31/2017 at 0745 Yes Mg Nelson MD   furosemide (LASIX) 40 MG tablet Take 40 mg by mouth daily 10/31/2017 at 0745 Yes Reported, Patient   isosorbide mononitrate (IMDUR) 30 MG 24 hr tablet Take 1 tablet (30 mg) by mouth daily 10/31/2017 at 0745 Yes London Carrizales MD   sucralfate (CARAFATE) 1 GM tablet Take 1 tablet (1 g) by mouth 2 times daily 10/31/2017 at 0745 Yes Mg Nelson MD   omeprazole (PRILOSEC) 20 MG CR capsule Take 1 capsule (20 mg) by mouth 2 times daily 10/31/2017 at 0745 Yes Mg Nelson MD   LORazepam (ATIVAN) 1 MG tablet Take 0.5-1 tablets (0.5-1 mg) by mouth every 8 hours as needed for anxiety One Month Ago at PRN Yes Lilian Bailey MD   Omega-3 Fatty Acids (FISH OIL) 1200 MG capsule Take 1 capsule by mouth daily  10/31/2017 at 0745 Yes Reported, Patient   ASPIRIN NOT PRESCRIBED (INTENTIONAL) Please choose reason not prescribed, below   Aura Villegas MD   COMPRESSION STOCKINGS 1 each daily   London Carrizales MD   ORDER FOR DME Auto-CPAP:Max 9 cm H2OMin 9 cm H2O  Continuous Lifetime need and heated humidity.      Alan Estrada MD   dabigatran ANTICOAGULANT (PRADAXA ANTICOAGULANT) 75 MG CAPS BLISTER PACK Take 150 mg by mouth 2 times daily. Store in original 's bottle or blister pack; use within 120 days of opening. 10/29/2017 at PM  Reported, Patient

## 2017-10-31 NOTE — DISCHARGE INSTRUCTIONS
You may restart your aspirin and pradaxa blood thinner medications tomorrow    Same Day Surgery Discharge Instructions for  Sedation and General Anesthesia       It's not unusual to feel dizzy, light-headed or faint for up to 24 hours after surgery or while taking pain medication.  If you have these symptoms: sit for a few minutes before standing and have someone assist you when you get up to walk or use the bathroom.      You should rest and relax for the next 24 hours. We recommend you make arrangements to have an adult stay with you for at least 24 hours after your discharge.  Avoid hazardous and strenuous activity.      DO NOT DRIVE any vehicle or operate mechanical equipment for 24 hours following the end of your surgery.  Even though you may feel normal, your reactions may be affected by the medication you have received.      Do not drink alcoholic beverages for 24 hours following surgery.       Slowly progress to your regular diet as you feel able. It's not unusual to feel nauseated and/or vomit after receiving anesthesia.  If you develop these symptoms, drink clear liquids (apple juice, ginger ale, broth, 7-up, etc. ) until you feel better.  If your nausea and vomiting persists for 24 hours, please notify your surgeon.        All narcotic pain medications, along with inactivity and anesthesia, can cause constipation. Drinking plenty of liquids and increasing fiber intake will help.      For any questions of a medical nature, call your surgeon.      Do not make important decisions for 24 hours.      If you had general anesthesia, you may have a sore throat for a couple of days related to the breathing tube used during surgery.  You may use Cepacol lozenges to help with this discomfort.  If it worsens or if you develop a fever, contact your surgeon.       If you feel your pain is not well managed with the pain medications prescribed by your surgeon, please contact your surgeon's office to let them know so they  can address your concerns.                 **Because you had anesthesia today and your history of sleep apnea, it is extremely important that you use your CPAP machine for the next 24 hours while napping or sleeping.**      [facility]  Discharge Instructions: Post-Operative Open Inguinal Hernia    ACTIVITY    Increase your activity gradually.  Avoid strenuous physical activity or heavy lifting greater than 15 lbs. for 3-4 weeks.  You may climb stairs.    You may drive without restrictions when you are not using any prescription pain medication and comfortable in a car.    You may return to work/school when you are comfortable without any prescription pain medication.    WOUND CARE    You may remove your bandage and shower 48 hours after the surgery.  Pat your incisions dry and leave open to air.  Re-apply dressing (Band-aid or gauze/tape) as needed for drainage.    You may have steri-strips (looks like tape) or Dermabond (looks like glue) on your incision.  Leave it alone, it will peel up and fall off on its own.     Do not soak your incisions in a tub or pool for 2 weeks.     DIET    Return to the diet you were on before surgery.    Pain medications can cause constipation.  Limit use when possible.  Take over the counter stool softener/stimulant, such as Colace or Senna, with plenty of water.      PAIN    Expect some tenderness and discomfort at the incision site(s).  Use the prescribed pain medication at your discretion.  Expect gradual resolution of your pain over several days.    You may take ibuprofen with food (unless you have been told not to) instead of or in addition to your prescribed pain medication.  If you are taking Norco or Percocet, do not take any additional acetaminophen/APAP/Tylenol.    Do not drink alcohol or drive while you are taking pain medications.    You may apply ice to your incisions in 20 minute intervals as needed for the next 48 hours.  After  that time, consider switching to heat if you prefer.    EXPECTATIONS    A lump or ridge under the incision is normal and will gradually resolve.    Male patients can expect some swelling, bruising possibly involving the testicles and penis, and/or some numbness.  These symptoms are expected.  Use ice to help with the swelling.  Try placing a rolled hand towel below your scrotum to help alleviate your scrotal discomfort.     RETURN APPOINTMENT    Follow up with your surgeon or a PA in 2 weeks.  Please call the office at [phone] to schedule your appointment.    CALL OUR OFFICE IF YOU HAVE:     Chills or fever above 101.5 F.    Increased redness or drainage at your incisions.    Significant bleeding.    Pain not relieved by your pain medication or rest.    Increasing pain after the first 48 hours.    Any other concerns or questions.    Revised August 2017      **If you have questions or concerns about your procedure,  call Dr. Harrington at [phone]**

## 2017-11-03 ENCOUNTER — TELEPHONE (OUTPATIENT)
Dept: SURGERY | Facility: CLINIC | Age: 82
End: 2017-11-03

## 2017-11-03 NOTE — TELEPHONE ENCOUNTER
Name of caller: Patient    Reason for Call:  questions    Surgeon:  Dr. Harrington    Recent Surgery:  Yes.    If yes, when & what type:  Umbilical hernia repair      Best phone number to reach pt at is: [phone]  Ok to leave a message with medical info? Yes.    Pharmacy preferred (if calling for a refill): na

## 2017-11-03 NOTE — TELEPHONE ENCOUNTER
Called patient back and discussed removal of Tegaderm and umbilical dressing today and that he could shower normally, do not scrub area or apply any lotions. Patient verbalized understanding of this and agreed. Encouraged them to call back if they had further questions or concerns.    Cherri Martino RN BSN

## 2017-11-10 ENCOUNTER — OFFICE VISIT (OUTPATIENT)
Dept: URGENT CARE | Facility: URGENT CARE | Age: 82
End: 2017-11-10
Payer: COMMERCIAL

## 2017-11-10 VITALS
DIASTOLIC BLOOD PRESSURE: 75 MMHG | BODY MASS INDEX: 26.76 KG/M2 | SYSTOLIC BLOOD PRESSURE: 132 MMHG | HEART RATE: 62 BPM | TEMPERATURE: 98.8 F | WEIGHT: 208.4 LBS | RESPIRATION RATE: 16 BRPM | OXYGEN SATURATION: 97 %

## 2017-11-10 DIAGNOSIS — S61.239A PUNCTURE WOUND OF FINGER OF LEFT HAND, INITIAL ENCOUNTER: Primary | ICD-10-CM

## 2017-11-10 DIAGNOSIS — Z79.01 CHRONIC ANTICOAGULATION: ICD-10-CM

## 2017-11-10 PROCEDURE — 99213 OFFICE O/P EST LOW 20 MIN: CPT | Performed by: FAMILY MEDICINE

## 2017-11-10 NOTE — MR AVS SNAPSHOT
After Visit Summary   11/10/2017    Bairon Foster    MRN: [id number]           Patient Information     Date Of Birth          2/6/1931        Visit Information        Provider Department      11/10/2017 7:45 PM Edilia Serrano MD [facility]        Care Instructions      Keep dressing in place overnight, remove in am, return if start bleeding again, redness, increase pain/swelling                 Wound Closure and Wound Care  What is wound closure?   Wounds heal more quickly and with less risk of infection and scarring when the wound is cleaned and the wound edges are held together (closed). Scrapes, scratches, puncture wounds, and shallow cuts may need only cleaning, ointment, and a bandage. Some cuts may need to be closed with tape strips called Steri-Strips or tissue adhesive liquid (skin glue). If a cut or surgical incision is deep, very long, jagged, or under a lot of tension (such as a cut over a joint), stitches (also called sutures) or staples may be needed to close the wound.   How do I take care of my wound and sutures?   If you get an accidental cut, put pressure on the wound with a gauze pad or clean cloth right away to stop the bleeding. Then gently but thoroughly wash it with soap and cool water. Soapy water can be used around, but not in the cut. Try to remove all dirt and debris but do not scrub vigorously. If you decide to get medical treatment, cover the wound and apply pressure as needed to control bleeding while traveling to your healthcare provider's office, urgent care clinic, or emergency room.   After a wound is closed by your healthcare provider, the wound and the area around it must be kept clean and dry. The care of a stapled wound is similar to the care of a sutured wound. There are minor differences in caring for a wound closed with skin glue.   Do not let a wound closed with stitches or staples get wet for the first 24 hours. After 24 hours,  "you can shower or you can clean the wound with hydrogen peroxide or gently wash it with soap and warm water twice a day.   If your wound was closed with skin glue, keep the wound dry for the first 4 hours after the skin glue was put on. After the first 4 hours, you may occasionally and briefly wet the wound in the shower. You can clean the wound with hydrogen peroxide or gently wash it with soap and water twice a day.   If your wound is closed with Steri-Strips, they may be more likely to separate if they get wet. Keep them dry for the first few days while you're in the shower or bath.   Do not soak or scrub the wound. Do not take a bath, go swimming, or use a hot tub.   If recommended by your healthcare provider, you may put a small amount of antibiotic ointment on the wound each time you clean it. This can prevent infection. It will also help keep bandages from sticking to the wound. If a rash appears, stop using the ointment. If your wound is closed with skin glue, do not put liquid, antibiotic ointment, or any other product on the wound while the adhesive is in place. It may loosen the film before the wound is healed.   Make sure the wound is kept dry between washings. After showering or bathing, gently pat the wound dry with a soft towel.   Your healthcare provider may recommend that you cover the wound with gauze or a new, bandage to keep it from getting dirty. Be sure to keep the bandage dry. Put on a new bandage after cleaning the wound of if the old one gets dirty or wet.   Your healthcare provider may recommend leaving the wound \"open to air\" and not covered by a bandage while you sleep to help speed up the healing process. If the wound was closed with skin glue, you do not need a bandage.   For the first 1 or 2 days keep the area propped up higher than your heart. This will help lessen your pain and any swelling.   Protect the wound from repeat injury until the skin has had time to heal.   Protect the " wound from a lot of exposure to sunlight or tanning lamps while skin glue is in place. Wounds exposed to the sun can become red, while scars that have not been exposed to the sun usually turn white after a period of time.   Do not scratch, rub, or pick at your stitches, staples, or skin glue. This may cause them to loosen before the wound is healed.   Avoid activities that will make you sweat a lot until the skin glue has naturally fallen off or the stitches or staples have been removed.   Any wound can become infected. When you are cleaning your wound, look for these signs of infection:   increased redness   red streaks   increased swelling   increased pain or tenderness   pus or other drainage   warmth in the area of the wound   fever.   Contact your provider if you see any signs of infection.   If your wound was accidental, be sure to ask if a tetanus booster is needed. Treatment of accidental wounds may include taking an oral antibiotic to help prevent infection. Be sure to take the medicine until it is completely gone. Do not stop taking it just because the wound looks like it is healing well.   When are stitches, staples, or other types of wound closures removed?   Steri-Strips are usually left on until they fall off. If they have not fallen off after 2 weeks, they should be removed. Skin glue usually falls off on its own in 5 to 10 days.   For deep cuts the first stitches are placed under the skin. These stitches are made of materials that dissolve and do not need to be removed. Sutures or staples on the surface of the skin need to be removed by your healthcare provider 5 to 14 days after they are put in. The length of time depends on where the cut is. Sutures in wounds on the face usually can be removed after 5 to 7 days. In areas of high stress, such as hands, knees, or elbows, the sutures must stay in 10 to 14 days. Your provider will tell you when you should come to the office for removal of your sutures  or staples. Do NOT remove sutures or staples yourself unless your provider instructs you to do so. Staples are removed using a special tool. If you don't have the tool, don't try to remove the staples.   When should I call my healthcare provider?   Some swelling, redness, and pain are common with all wounds and normally go away as the wound heals.   Call your provider right away if:   You start to have any signs or symptoms of infection. These include:   Your skin is redder or more painful.   You have red streaks from the wound going toward your heart.   The wound area is very warm to touch.   You have pus or other fluid coming from the wound area.   You have a fever higher than 101.5? F (38.6? C).   You have chills, nausea, vomiting, or muscle aches.   The wound seems to be opening up or you notice any drainage.   The wound bleeds for more than 10 minutes.   The stitches or staples are loose.   The skin glue is loosening before it is supposed to.   You have any symptoms that worry you.     Published by "[employer].  This content is reviewed periodically and is subject to change as new health information becomes available. The information is intended to inform and educate and is not a replacement for medical evaluation, advice, diagnosis or treatment by a healthcare professional.   Written by Jose Blanco MD.   ? 2010 expressor softwareUniversity [facility]als [facility] and/or its affiliates. All Rights Reserved.   Copyright   [employer] 2011              Follow-ups after your visit        Your next 10 appointments already scheduled     Nov 16, 2017  2:30 PM CST   Return Visit with WILLIS Spence [facility] ([facility])    [address] [place] [address] [address]  [facility] [address]   [phone]            Dec 19, 2017  9:30 AM CST   Phone Device Check with ARRIETA TECH1   [facility] ([facility])    [address] [place] [address]  [address]  [place] [address] [phone]              Who to contact     If you have questions or need follow up information about today's clinic visit or your schedule please contact [facility] directly at [phone].  Normal or non-critical lab and imaging results will be communicated to you by MyChart, letter or phone within 4 business days after the clinic has received the results. If you do not hear from us within 7 days, please contact the clinic through [employer] or phone. If you have a critical or abnormal lab result, we will notify you by phone as soon as possible.  Submit refill requests through [employer] or call your pharmacy and they will forward the refill request to us. Please allow 3 business days for your refill to be completed.          Additional Information About Your Visit        MyChart Information     [employer] gives you secure access to your electronic health record. If you see a primary care provider, you can also send messages to your care team and make appointments. If you have questions, please call your primary care clinic.  If you do not have a primary care provider, please call [phone] and they will assist you.        Care EveryWhere ID     This is your Care EveryWhere ID. This could be used by other organizations to access your [place] medical records  [id number]        Your Vitals Were     Pulse Temperature Respirations Pulse Oximetry BMI (Body Mass Index)       62 98.8  F (37.1  C) (Oral) 16 97% 26.76 kg/m2        Blood Pressure from Last 3 Encounters:   11/10/17 132/75   10/31/17 123/70   10/23/17 120/70    Weight from Last 3 Encounters:   11/10/17 208 lb 6.4 oz (94.5 kg)   10/31/17 204 lb 3.2 oz (92.6 kg)   10/23/17 206 lb (93.4 kg)              Today, you had the following     No orders found for display       Primary Care Provider Office Phone # Fax #    Mg Nelson [phone] [address]  [facility]  [id number]        Goals        General    I will find a caregiver support group to attend (pt-stated)     Notes - Note created  4/17/2014  8:49 AM by Beth Morales RN    As of today's date 4/17/2014 goal is met at 0 - 25%.   Goal Status:  Active        Equal Access to Services     EVANGELIST PETERSON : Hadii aad ku hadasho Soomaali, waaxda luqadaha, qaybta kaalmada adeegyada, waxdanielle cyrusin hayaan christinekurt farris layogeshharini . So [facility] [phone].    ATENCIÓN: Si habla español, tiene a malone disposición servicios gratuitos de asistencia lingüística. Llame al [phone].    We comply with applicable federal civil rights laws and [place] laws. We do not discriminate on the basis of race, color, national origin, age, disability, sex, sexual orientation, or gender identity.            Thank you!     Thank you for choosing [facility]  for your care. Our goal is always to provide you with excellent care. Hearing back from our patients is one way we can continue to improve our services. Please take a few minutes to complete the written survey that you may receive in the mail after your visit with us. Thank you!             Your Updated Medication List - Protect others around you: Learn how to safely use, store and throw away your medicines at www.disposemymeds.org.          This list is accurate as of: 11/10/17  8:25 PM.  Always use your most recent med list.                   Brand Name Dispense Instructions for use Diagnosis    ASPIRIN NOT PRESCRIBED    INTENTIONAL    1 each    Please choose reason not prescribed, below    Coronary artery disease involving native coronary artery of native heart without angina pectoris, Persistent atrial fibrillation (H), Long term current use of anticoagulant therapy       Carboxymethylcellulose Sod PF 1 % ophthalmic solution    CELLUVISC/REFRESH LIQUIGEL     Place 1 drop into both eyes 3 times daily as needed for dry eyes        carvedilol 6.25 MG tablet    COREG    180 tablet     Take 1 tablet (6.25 mg) by mouth 2 times daily (with meals)    Ischemic cardiomyopathy       CENTRUM SILVER per tablet      Take 1 tablet by mouth daily        COMPRESSION STOCKINGS     2 each    1 each daily    Varicose 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       FINASTERIDE PO      Take 5 mg by mouth daily        FLOMAX PO      Take 0.4 mg by mouth daily        GLUCOSAMINE-CHONDROITIN PO      Take 2 tablets by mouth daily        HYDROcodone-acetaminophen 5-325 MG per tablet    NORCO    20 tablet    Take 1-2 tablets by mouth every 4 hours as needed for other (Moderate to Severe Pain)    Post-op pain       isosorbide mononitrate 30 MG 24 hr tablet    IMDUR    30 tablet    Take 1 tablet (30 mg) by mouth daily    SOB (shortness of breath)       K-DUR PO      Take 20 mEq by mouth daily        LASIX 40 MG tablet   Generic drug:  furosemide      Take 40 mg by mouth daily        levothyroxine 100 MCG tablet    SYNTHROID/LEVOTHROID    90 tablet    Take 1 tablet (100 mcg) by mouth daily    Acquired hypothyroidism       LORazepam 1 MG tablet    ATIVAN    30 tablet    Take 0.5-1 tablets (0.5-1 mg) by mouth every 8 hours as needed for anxiety    Acute stress reaction       MIRALAX PO      Take 1 packet by mouth At Bedtime        omega-3 fatty acids 1200 MG capsule      Take 1 capsule by mouth daily        omeprazole 20 MG CR capsule    priLOSEC    180 capsule    Take 1 capsule (20 mg) by mouth 2 times daily    Gastroesophageal reflux disease without esophagitis       order for DME     1 Device    Auto-CPAP:Max 9 cm H2OMin 9 cm H2O Continuous Lifetime need and heated humidity.    Obstructive sleep apnea (adult) (pediatric), Other dyspnea and respiratory abnormality       PRADAXA ANTICOAGULANT 75 MG capsule   Generic drug:  dabigatran ANTICOAGULANT      Take 150 mg by mouth 2 times daily. Store in original 's bottle or blister pack; use within 120 days of opening.        SIMVASTATIN PO      Take 20 mg by  mouth daily        sucralfate 1 GM tablet    CARAFATE    180 tablet    Take 1 tablet (1 g) by mouth 2 times daily    Esophageal reflux       UNKNOWN TO PATIENT      Place 1 drop Into the left eye daily EYE DROP PRESCRIBED FOR CONJUNCTIVITIS        WELLBUTRIN XL PO      Take 150 mg by mouth every morning

## 2017-11-11 NOTE — NURSING NOTE
"Chief Complaint   Patient presents with     Urgent Care     pt states laceration right hand index finger x 15 min        Initial /75  Pulse 62  Temp 98.8  F (37.1  C) (Oral)  Resp 16  Wt 208 lb 6.4 oz (94.5 kg)  SpO2 97%  BMI 26.76 kg/m2 Estimated body mass index is 26.76 kg/(m^2) as calculated from the following:    Height as of 10/31/17: 6' 2\" (1.88 m).    Weight as of this encounter: 208 lb 6.4 oz (94.5 kg).  Medication Reconciliation: complete      "

## 2017-11-11 NOTE — PATIENT INSTRUCTIONS
Keep dressing in place overnight, remove in am, return if start bleeding again, redness, increase pain/swelling                 Wound Closure and Wound Care  What is wound closure?   Wounds heal more quickly and with less risk of infection and scarring when the wound is cleaned and the wound edges are held together (closed). Scrapes, scratches, puncture wounds, and shallow cuts may need only cleaning, ointment, and a bandage. Some cuts may need to be closed with tape strips called Steri-Strips or tissue adhesive liquid (skin glue). If a cut or surgical incision is deep, very long, jagged, or under a lot of tension (such as a cut over a joint), stitches (also called sutures) or staples may be needed to close the wound.   How do I take care of my wound and sutures?   If you get an accidental cut, put pressure on the wound with a gauze pad or clean cloth right away to stop the bleeding. Then gently but thoroughly wash it with soap and cool water. Soapy water can be used around, but not in the cut. Try to remove all dirt and debris but do not scrub vigorously. If you decide to get medical treatment, cover the wound and apply pressure as needed to control bleeding while traveling to your healthcare provider's office, urgent care clinic, or emergency room.   After a wound is closed by your healthcare provider, the wound and the area around it must be kept clean and dry. The care of a stapled wound is similar to the care of a sutured wound. There are minor differences in caring for a wound closed with skin glue.   Do not let a wound closed with stitches or staples get wet for the first 24 hours. After 24 hours, you can shower or you can clean the wound with hydrogen peroxide or gently wash it with soap and warm water twice a day.   If your wound was closed with skin glue, keep the wound dry for the first 4 hours after the skin glue was put on. After the first 4 hours, you may occasionally and briefly wet the wound in the  "shower. You can clean the wound with hydrogen peroxide or gently wash it with soap and water twice a day.   If your wound is closed with Steri-Strips, they may be more likely to separate if they get wet. Keep them dry for the first few days while you're in the shower or bath.   Do not soak or scrub the wound. Do not take a bath, go swimming, or use a hot tub.   If recommended by your healthcare provider, you may put a small amount of antibiotic ointment on the wound each time you clean it. This can prevent infection. It will also help keep bandages from sticking to the wound. If a rash appears, stop using the ointment. If your wound is closed with skin glue, do not put liquid, antibiotic ointment, or any other product on the wound while the adhesive is in place. It may loosen the film before the wound is healed.   Make sure the wound is kept dry between washings. After showering or bathing, gently pat the wound dry with a soft towel.   Your healthcare provider may recommend that you cover the wound with gauze or a new, bandage to keep it from getting dirty. Be sure to keep the bandage dry. Put on a new bandage after cleaning the wound of if the old one gets dirty or wet.   Your healthcare provider may recommend leaving the wound \"open to air\" and not covered by a bandage while you sleep to help speed up the healing process. If the wound was closed with skin glue, you do not need a bandage.   For the first 1 or 2 days keep the area propped up higher than your heart. This will help lessen your pain and any swelling.   Protect the wound from repeat injury until the skin has had time to heal.   Protect the wound from a lot of exposure to sunlight or tanning lamps while skin glue is in place. Wounds exposed to the sun can become red, while scars that have not been exposed to the sun usually turn white after a period of time.   Do not scratch, rub, or pick at your stitches, staples, or skin glue. This may cause them to " loosen before the wound is healed.   Avoid activities that will make you sweat a lot until the skin glue has naturally fallen off or the stitches or staples have been removed.   Any wound can become infected. When you are cleaning your wound, look for these signs of infection:   increased redness   red streaks   increased swelling   increased pain or tenderness   pus or other drainage   warmth in the area of the wound   fever.   Contact your provider if you see any signs of infection.   If your wound was accidental, be sure to ask if a tetanus booster is needed. Treatment of accidental wounds may include taking an oral antibiotic to help prevent infection. Be sure to take the medicine until it is completely gone. Do not stop taking it just because the wound looks like it is healing well.   When are stitches, staples, or other types of wound closures removed?   Steri-Strips are usually left on until they fall off. If they have not fallen off after 2 weeks, they should be removed. Skin glue usually falls off on its own in 5 to 10 days.   For deep cuts the first stitches are placed under the skin. These stitches are made of materials that dissolve and do not need to be removed. Sutures or staples on the surface of the skin need to be removed by your healthcare provider 5 to 14 days after they are put in. The length of time depends on where the cut is. Sutures in wounds on the face usually can be removed after 5 to 7 days. In areas of high stress, such as hands, knees, or elbows, the sutures must stay in 10 to 14 days. Your provider will tell you when you should come to the office for removal of your sutures or staples. Do NOT remove sutures or staples yourself unless your provider instructs you to do so. Staples are removed using a special tool. If you don't have the tool, don't try to remove the staples.   When should I call my healthcare provider?   Some swelling, redness, and pain are common with all wounds and  normally go away as the wound heals.   Call your provider right away if:   You start to have any signs or symptoms of infection. These include:   Your skin is redder or more painful.   You have red streaks from the wound going toward your heart.   The wound area is very warm to touch.   You have pus or other fluid coming from the wound area.   You have a fever higher than 101.5? F (38.6? C).   You have chills, nausea, vomiting, or muscle aches.   The wound seems to be opening up or you notice any drainage.   The wound bleeds for more than 10 minutes.   The stitches or staples are loose.   The skin glue is loosening before it is supposed to.   You have any symptoms that worry you.     Published by [employer].  This content is reviewed periodically and is subject to change as new health information becomes available. The information is intended to inform and educate and is not a replacement for medical evaluation, advice, diagnosis or treatment by a healthcare professional.   Written by Jose Blanco MD.   ? 2010 [employer] and/or its affiliates. All Rights Reserved.   Copyright   [employer] 2011

## 2017-11-11 NOTE — PROGRESS NOTES
SUBJECTIVE:   Bairon Foster is a 86 year old male who presents to clinic today for the following health issues:      Puncture wound      Duration: 15 minutes prior to arrival    Description  Location: base of left second digit of hand, punctured with a paring knife, patient on chronic anticoagulation/pradaxa, copious amount of bleeding at time of injury, patient applied direct pressure, bleeding did not stop, and to urgent care for evaluation, no longer bleeding when arrived, kept direct pressure on wound en route to clinic    Intensity:  moderate    Accompanying signs and symptoms: None-no weakness/deformity/numbness of finger    History (similar episodes/previous evaluation): None    Precipitating or alleviating factors:  As noted    Therapies tried and outcome: as noted          Problem list and histories reviewed & adjusted, as indicated.  Additional history: as documented    Patient Active Problem List   Diagnosis     Atrial fibrillation (H)     Pulmonary hypertension     CAD (coronary artery disease)     BPH (benign prostatic hyperplasia)     Ischemic cardiomyopathy     Pancytopenia (H)     JIM (obstructive sleep apnea)- mild/moderate     Acute stress reaction     Health Care Home     Vitamin D deficiency     Advance Care Planning     Gastroesophageal reflux disease without esophagitis     Hydrocele, unspecified hydrocele type     Slow transit constipation     Hypercholesterolemia     Non morbid obesity due to excess calories w comorbid CAD< hi LDL      Venous stasis dermatitis of both lower extremities     Lymphedema of both lower extremities     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      Coronary artery disease due to lipid rich plaque     Acquired hypothyroidism     Onychomycosis     Overweight (BMI 25.0-29.9)     PAD (peripheral artery disease) (H)     Screening for prostate cancer     Blood in stool     Umbilical hernia without obstruction and without gangrene     Past Surgical History:    Procedure Laterality Date     EYE SURGERY      CATARACT/BLEPHAROPLASTY     GENITOURINARY SURGERY      TUNA     HERNIA REPAIR      RIGHT INGUINAL     HERNIORRHAPHY INGUINAL  8/14/2012    Procedure: HERNIORRHAPHY INGUINAL;  right inguinal hernia repair;  Surgeon: Kareem Torrez MD;  Location:  [facility]     HERNIORRHAPHY UMBILICAL N/A 10/31/2017    Procedure: HERNIORRHAPHY UMBILICAL;  UMBILICAL HERNIA REPAIR WITH MESH;  Surgeon: Scar Harrington MD;  Location: [facility]     IMPLANT PACEMAKER  8/2009    single chamber     ORTHOPEDIC SURGERY      [facility] RIGHT     ORTHOPEDIC SURGERY  2010    right TKR, left TKR     SURGICAL HISTORY OF -       Thumb surgery     SURGICAL HISTORY OF -   1990s    Left total knee replacement     SURGICAL HISTORY OF -   3/11    [facility] total knee replacement       Social History   Substance Use Topics     Smoking status: Never Smoker     Smokeless tobacco: Never Used      Comment: per encounter 2/12/07     Alcohol use 0.0 oz/week     0 Standard drinks or equivalent per week      Comment: 1/month     Family History   Problem Relation Age of Onset     Cardiovascular Father      C.A.D. Father      CANCER Sister      lung     Unknown/Adopted Mother      Cancer - colorectal No family hx of      DIABETES No family hx of      Coronary Artery Disease No family hx of      Hypertension No family hx of      Hyperlipidemia No family hx of      CEREBROVASCULAR DISEASE No family hx of      Breast Cancer No family hx of      Colon Cancer No family hx of      Prostate Cancer No family hx of      Other Cancer No family hx of      Depression No family hx of      Anxiety Disorder No family hx of      MENTAL ILLNESS No family hx of      Substance Abuse No family hx of      Anesthesia Reaction No family hx of      Asthma No family hx of      OSTEOPOROSIS No family hx of      Genetic Disorder No family hx of      Thyroid Disease No family hx of      Obesity No family hx of          Current Outpatient Prescriptions   Medication Sig  Dispense Refill     Carboxymethylcellulose Sod PF (CELLUVISC/REFRESH LIQUIGEL) 1 % ophthalmic solution Place 1 drop into both eyes 3 times daily as needed for dry eyes       HYDROcodone-acetaminophen (NORCO) 5-325 MG per tablet Take 1-2 tablets by mouth every 4 hours as needed for other (Moderate to Severe Pain) 20 tablet 0     BuPROPion HCl (WELLBUTRIN XL PO) Take 150 mg by mouth every morning       GLUCOSAMINE-CHONDROITIN PO Take 2 tablets by mouth daily       Polyethylene Glycol 3350 (MIRALAX PO) Take 1 packet by mouth At Bedtime        Potassium Chloride Sylvia ER (K-DUR PO) Take 20 mEq by mouth daily       SIMVASTATIN PO Take 20 mg by mouth daily       Tamsulosin HCl (FLOMAX PO) Take 0.4 mg by mouth daily       levothyroxine (SYNTHROID/LEVOTHROID) 100 MCG tablet Take 1 tablet (100 mcg) by mouth daily 90 tablet 3     carvedilol (COREG) 6.25 MG tablet Take 1 tablet (6.25 mg) by mouth 2 times daily (with meals) 180 tablet 3     ASPIRIN NOT PRESCRIBED (INTENTIONAL) Please choose reason not prescribed, below 1 each 0     furosemide (LASIX) 40 MG tablet Take 40 mg by mouth daily       isosorbide mononitrate (IMDUR) 30 MG 24 hr tablet Take 1 tablet (30 mg) by mouth daily 30 tablet 11     LORazepam (ATIVAN) 1 MG tablet Take 0.5-1 tablets (0.5-1 mg) by mouth every 8 hours as needed for anxiety 30 tablet 5     dabigatran ANTICOAGULANT (PRADAXA ANTICOAGULANT) 75 MG CAPS BLISTER PACK Take 150 mg by mouth 2 times daily. Store in original 's bottle or blister pack; use within 120 days of opening.       Multiple Vitamins-Minerals (CENTRUM SILVER) per tablet Take 1 tablet by mouth daily       UNKNOWN TO PATIENT Place 1 drop Into the left eye daily EYE DROP PRESCRIBED FOR CONJUNCTIVITIS       FINASTERIDE PO Take 5 mg by mouth daily       COMPRESSION STOCKINGS 1 each daily 2 each 0     sucralfate (CARAFATE) 1 GM tablet Take 1 tablet (1 g) by mouth 2 times daily 180 tablet 1     omeprazole (PRILOSEC) 20 MG CR capsule  Take 1 capsule (20 mg) by mouth 2 times daily 180 capsule 3     ORDER FOR DME Auto-CPAP:Max 9 cm H2OMin 9 cm H2O  Continuous Lifetime need and heated humidity.    1 Device 0     Omega-3 Fatty Acids (FISH OIL) 1200 MG capsule Take 1 capsule by mouth daily        No Known Allergies  Recent Labs   Lab Test  10/23/17   1411  09/08/17   1600   12/30/16   1021  08/25/16   0931   12/08/15   0904   LDL   --   38   --   48   --    --   68   HDL   --   54   --   54   --    --   59   TRIG   --   71   --   24   --    --   44   ALT   --   22   --   26   --    --   24   CR  0.97  0.98   < >   --   1.02   --   1.20   GFRESTIMATED  73  73   < >   --   69   --   58*   GFRESTBLACK  88  88   < >   --   84   --   70   POTASSIUM  3.9  3.7   < >  4.0  3.6   --   3.7   TSH   --   1.08   --    --   1.20   < >   --     < > = values in this interval not displayed.      BP Readings from Last 3 Encounters:   11/10/17 132/75   10/31/17 123/70   10/23/17 120/70    Wt Readings from Last 3 Encounters:   11/10/17 208 lb 6.4 oz (94.5 kg)   10/31/17 204 lb 3.2 oz (92.6 kg)   10/23/17 206 lb (93.4 kg)                  Labs reviewed in EPIC          Reviewed and updated as needed this visit by clinical staffTobacco  Allergies  Meds       Reviewed and updated as needed this visit by Provider         ROS:  Constitutional, HEENT, cardiovascular, pulmonary, gi and gu systems are negative, except as otherwise noted.      OBJECTIVE:   /75  Pulse 62  Temp 98.8  F (37.1  C) (Oral)  Resp 16  Wt 208 lb 6.4 oz (94.5 kg)  SpO2 97%  BMI 26.76 kg/m2  Body mass index is 26.76 kg/(m^2).   GENERAL: healthy, alert and no distress  MS: left hand second digit, puncture wound at base of finger above the mcp on radial side, no active bleeding, full motion of finger, no obvious tendon laceration or defect, normal sensation    Diagnostic Test Results:  none     ASSESSMENT/PLAN:     Problem List Items Addressed This Visit     None      Visit Diagnoses      Puncture wound of finger of left hand, initial encounter    -  Primary    Chronic anticoagulation             Area washed, pressure dressing placed by nursing staff      ASSESSMENT/PLAN:      ICD-10-CM    1. Puncture wound of finger of left hand, initial encounter S61.239A    2. Chronic anticoagulation Z79.01        Patient Instructions     Keep dressing in place overnight, remove in am, return if start bleeding again, redness, increase pain/swelling                 Wound Closure and Wound Care  What is wound closure?   Wounds heal more quickly and with less risk of infection and scarring when the wound is cleaned and the wound edges are held together (closed). Scrapes, scratches, puncture wounds, and shallow cuts may need only cleaning, ointment, and a bandage. Some cuts may need to be closed with tape strips called Steri-Strips or tissue adhesive liquid (skin glue). If a cut or surgical incision is deep, very long, jagged, or under a lot of tension (such as a cut over a joint), stitches (also called sutures) or staples may be needed to close the wound.   How do I take care of my wound and sutures?   If you get an accidental cut, put pressure on the wound with a gauze pad or clean cloth right away to stop the bleeding. Then gently but thoroughly wash it with soap and cool water. Soapy water can be used around, but not in the cut. Try to remove all dirt and debris but do not scrub vigorously. If you decide to get medical treatment, cover the wound and apply pressure as needed to control bleeding while traveling to your healthcare provider's office, urgent care clinic, or emergency room.   After a wound is closed by your healthcare provider, the wound and the area around it must be kept clean and dry. The care of a stapled wound is similar to the care of a sutured wound. There are minor differences in caring for a wound closed with skin glue.   Do not let a wound closed with stitches or staples get wet for the first  "24 hours. After 24 hours, you can shower or you can clean the wound with hydrogen peroxide or gently wash it with soap and warm water twice a day.   If your wound was closed with skin glue, keep the wound dry for the first 4 hours after the skin glue was put on. After the first 4 hours, you may occasionally and briefly wet the wound in the shower. You can clean the wound with hydrogen peroxide or gently wash it with soap and water twice a day.   If your wound is closed with Steri-Strips, they may be more likely to separate if they get wet. Keep them dry for the first few days while you're in the shower or bath.   Do not soak or scrub the wound. Do not take a bath, go swimming, or use a hot tub.   If recommended by your healthcare provider, you may put a small amount of antibiotic ointment on the wound each time you clean it. This can prevent infection. It will also help keep bandages from sticking to the wound. If a rash appears, stop using the ointment. If your wound is closed with skin glue, do not put liquid, antibiotic ointment, or any other product on the wound while the adhesive is in place. It may loosen the film before the wound is healed.   Make sure the wound is kept dry between washings. After showering or bathing, gently pat the wound dry with a soft towel.   Your healthcare provider may recommend that you cover the wound with gauze or a new, bandage to keep it from getting dirty. Be sure to keep the bandage dry. Put on a new bandage after cleaning the wound of if the old one gets dirty or wet.   Your healthcare provider may recommend leaving the wound \"open to air\" and not covered by a bandage while you sleep to help speed up the healing process. If the wound was closed with skin glue, you do not need a bandage.   For the first 1 or 2 days keep the area propped up higher than your heart. This will help lessen your pain and any swelling.   Protect the wound from repeat injury until the skin has had time " to heal.   Protect the wound from a lot of exposure to sunlight or tanning lamps while skin glue is in place. Wounds exposed to the sun can become red, while scars that have not been exposed to the sun usually turn white after a period of time.   Do not scratch, rub, or pick at your stitches, staples, or skin glue. This may cause them to loosen before the wound is healed.   Avoid activities that will make you sweat a lot until the skin glue has naturally fallen off or the stitches or staples have been removed.   Any wound can become infected. When you are cleaning your wound, look for these signs of infection:   increased redness   red streaks   increased swelling   increased pain or tenderness   pus or other drainage   warmth in the area of the wound   fever.   Contact your provider if you see any signs of infection.   If your wound was accidental, be sure to ask if a tetanus booster is needed. Treatment of accidental wounds may include taking an oral antibiotic to help prevent infection. Be sure to take the medicine until it is completely gone. Do not stop taking it just because the wound looks like it is healing well.   When are stitches, staples, or other types of wound closures removed?   Steri-Strips are usually left on until they fall off. If they have not fallen off after 2 weeks, they should be removed. Skin glue usually falls off on its own in 5 to 10 days.   For deep cuts the first stitches are placed under the skin. These stitches are made of materials that dissolve and do not need to be removed. Sutures or staples on the surface of the skin need to be removed by your healthcare provider 5 to 14 days after they are put in. The length of time depends on where the cut is. Sutures in wounds on the face usually can be removed after 5 to 7 days. In areas of high stress, such as hands, knees, or elbows, the sutures must stay in 10 to 14 days. Your provider will tell you when you should come to the office for  removal of your sutures or staples. Do NOT remove sutures or staples yourself unless your provider instructs you to do so. Staples are removed using a special tool. If you don't have the tool, don't try to remove the staples.   When should I call my healthcare provider?   Some swelling, redness, and pain are common with all wounds and normally go away as the wound heals.   Call your provider right away if:   You start to have any signs or symptoms of infection. These include:   Your skin is redder or more painful.   You have red streaks from the wound going toward your heart.   The wound area is very warm to touch.   You have pus or other fluid coming from the wound area.   You have a fever higher than 101.5? F (38.6? C).   You have chills, nausea, vomiting, or muscle aches.   The wound seems to be opening up or you notice any drainage.   The wound bleeds for more than 10 minutes.   The stitches or staples are loose.   The skin glue is loosening before it is supposed to.   You have any symptoms that worry you.     Published by [employer].  This content is reviewed periodically and is subject to change as new health information becomes available. The information is intended to inform and educate and is not a replacement for medical evaluation, advice, diagnosis or treatment by a healthcare professional.   Written by Jose Blanco MD.   ? 2010 [employer] and/or its affiliates. All Rights Reserved.   Copyright   [employer] 2011              Edilia Serrano MD  [facility]

## 2017-11-16 ENCOUNTER — OFFICE VISIT (OUTPATIENT)
Dept: CARDIOLOGY | Facility: CLINIC | Age: 82
End: 2017-11-16
Attending: NURSE PRACTITIONER
Payer: COMMERCIAL

## 2017-11-16 VITALS
DIASTOLIC BLOOD PRESSURE: 73 MMHG | HEIGHT: 72 IN | SYSTOLIC BLOOD PRESSURE: 123 MMHG | WEIGHT: 204 LBS | HEART RATE: 60 BPM | BODY MASS INDEX: 27.63 KG/M2

## 2017-11-16 DIAGNOSIS — I73.9 PAD (PERIPHERAL ARTERY DISEASE) (H): ICD-10-CM

## 2017-11-16 DIAGNOSIS — I83.813 VARICOSE VEINS OF BILATERAL LOWER EXTREMITIES WITH PAIN: ICD-10-CM

## 2017-11-16 DIAGNOSIS — I25.5 ISCHEMIC CARDIOMYOPATHY: Primary | ICD-10-CM

## 2017-11-16 PROCEDURE — 99213 OFFICE O/P EST LOW 20 MIN: CPT | Performed by: NURSE PRACTITIONER

## 2017-11-16 NOTE — LETTER
11/16/2017    Mg Nelson MD  [address]  [facility] [address]    RE: Bairon DOWNS Cirstian       Dear Colleague,    I had the pleasure of seeing Bairon Foster in the [facility].    HPI and Plan:   I had the pleasure of seeing Mr. Bairon Foster today in cardiology in vascular clinic following his most recent venous ablation of his right great saphenous vein.  He is a pleasant 86-year-old patient of Dr. Hope's of the history of ischemic cardiomyopathy, ventricular tachycardia, coronary disease with stenting of the OM in 2009 and mitral regurgitation.      More recently he has been seen in vein clinic for his venous insufficiency.  He underwent radiofrequency ablation of the left GSV in August and had good results and felt significantly better with less numbness in his toes.  More recently he underwent an ablation of the right GSV October 16.  Follow-up ultrasound demonstrated the GSV was closed.  The proximal tributary off the GSV was not closed with ablation pf the proximal segment of the GSV. It was recommended that if he still had symptoms of reflux sclerotherapy be considered.      Mr. Foster is doing well.  He has moved and is not really liking his new situation.  The move was quite stressful and he lost about 19 pounds which he has been trying to keep off since that time and has been successful with this.  Since his ablation he had an hernia repair which went well for him.  He thinks that his legs feel great, he is not having any more muscle aches swelling or leg pain.  He still has some discoloration which we discussed may or may not ever go away.  Overall he is very satisfied also feels his left lower extremity edema.  He denies any exertional chest pain, shortness of breath, PND or orthopnea.    Physical Exam  Please see below    Assessment and plan  1. Bilateral venous insufficiency now status post right and left G S/P ablations.  He is doing very well post  procedure and not having any symptoms of leg fatigue or muscle aches and the tingling is improved.  We discussed the possibility of sclerotherapy, but as his symptoms have all but resolved this is something he does not want to pursue at this time.    Thank you for allowing us to see Mr. Foster, he should follow-up with his primary cardiologist in February or March.\    [facility]    No orders of the defined types were placed in this encounter.      No orders of the defined types were placed in this encounter.      Medications Discontinued During This Encounter   Medication Reason     HYDROcodone-acetaminophen (NORCO) 5-325 MG per tablet Therapy completed         Encounter Diagnoses   Name Primary?     Varicose 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      Ischemic cardiomyopathy Yes     PAD (peripheral artery disease) (H)        CURRENT MEDICATIONS:  Current Outpatient Prescriptions   Medication Sig Dispense Refill     Multiple Vitamins-Minerals (CENTRUM SILVER) per tablet Take 1 tablet by mouth daily       BuPROPion HCl (WELLBUTRIN XL PO) Take 150 mg by mouth every morning       FINASTERIDE PO Take 5 mg by mouth daily       GLUCOSAMINE-CHONDROITIN PO Take 2 tablets by mouth daily       Polyethylene Glycol 3350 (MIRALAX PO) Take 1 packet by mouth At Bedtime        Potassium Chloride Sylvia ER (K-DUR PO) Take 20 mEq by mouth daily       SIMVASTATIN PO Take 20 mg by mouth daily       Tamsulosin HCl (FLOMAX PO) Take 0.4 mg by mouth daily       levothyroxine (SYNTHROID/LEVOTHROID) 100 MCG tablet Take 1 tablet (100 mcg) by mouth daily 90 tablet 3     carvedilol (COREG) 6.25 MG tablet Take 1 tablet (6.25 mg) by mouth 2 times daily (with meals) 180 tablet 3     furosemide (LASIX) 40 MG tablet Take 40 mg by mouth daily       isosorbide mononitrate (IMDUR) 30 MG 24 hr tablet Take 1 tablet (30 mg) by mouth daily 30 tablet 11     sucralfate (CARAFATE) 1 GM tablet Take 1 tablet (1 g) by mouth 2 times daily 180 tablet 1      omeprazole (PRILOSEC) 20 MG CR capsule Take 1 capsule (20 mg) by mouth 2 times daily 180 capsule 3     LORazepam (ATIVAN) 1 MG tablet Take 0.5-1 tablets (0.5-1 mg) by mouth every 8 hours as needed for anxiety 30 tablet 5     dabigatran ANTICOAGULANT (PRADAXA ANTICOAGULANT) 75 MG CAPS BLISTER PACK Take 150 mg by mouth 2 times daily. Store in original 's bottle or blister pack; use within 120 days of opening.       Omega-3 Fatty Acids (FISH OIL) 1200 MG capsule Take 1 capsule by mouth daily        Carboxymethylcellulose Sod PF (CELLUVISC/REFRESH LIQUIGEL) 1 % ophthalmic solution Place 1 drop into both eyes 3 times daily as needed for dry eyes       UNKNOWN TO PATIENT Place 1 drop Into the left eye daily EYE DROP PRESCRIBED FOR CONJUNCTIVITIS       ASPIRIN NOT PRESCRIBED (INTENTIONAL) Please choose reason not prescribed, below 1 each 0     COMPRESSION STOCKINGS 1 each daily 2 each 0     ORDER FOR DME Auto-CPAP:Max 9 cm H2OMin 9 cm H2O  Continuous Lifetime need and heated humidity.    1 Device 0       ALLERGIES   No Known Allergies    PAST MEDICAL HISTORY:  Past Medical History:   Diagnosis Date     Acute, but ill-defined, cerebrovascular disease     NO RESIDUALS     Antiplatelet or antithrombotic long-term use      Arrhythmia     a fib     Blood in stool      Coronary atherosclerosis of unspecified type of vessel, native or graft     S/P PTCA, EF 50%     Esophageal reflux      Hydrocele, unspecified      Hypercholesterolemia      Hypersomnia with sleep apnea, unspecified      Inguinal hernia without mention of obstruction or gangrene, recurrent unilateral or unspecified      Ischemic cardiomyopathy      Major depression in complete remission (H)      Obese      Onychomycosis      JIM on CPAP      Other and unspecified hyperlipidemia      Other lymphedema     GROIN AND THIGHS     Other pancytopenia (H)      Other specified anemias      Overweight      Pacemaker      PAD (peripheral artery disease) (H)       Pancytopenia (H)      Persistent atrial fibrillation (H)     VVI PM for long pauses     Prostatic hypertrophy, benign      Pulmonary hypertension      Scrotal varices      Thrombocytopenia, unspecified      Umbilical hernia without obstruction and without gangrene      Unilateral or unspecified femoral hernia without mention of obstruction or gangrene, unilateral or unspecified      Unspecified hypothyroidism      Venous stasis dermatitis of both lower extremities      Vitamin D deficiency        PAST SURGICAL HISTORY:  Past Surgical History:   Procedure Laterality Date     EYE SURGERY      CATARACT/BLEPHAROPLASTY     GENITOURINARY SURGERY      TUNA     HERNIA REPAIR      RIGHT INGUINAL     HERNIORRHAPHY INGUINAL  8/14/2012    Procedure: HERNIORRHAPHY INGUINAL;  right inguinal hernia repair;  Surgeon: Kareem Torrez MD;  Location: [facility]     HERNIORRHAPHY UMBILICAL N/A 10/31/2017    Procedure: HERNIORRHAPHY UMBILICAL;  UMBILICAL HERNIA REPAIR WITH MESH;  Surgeon: Scar Harrington MD;  Location: [facility]     IMPLANT PACEMAKER  8/2009    single chamber     ORTHOPEDIC SURGERY      [facility] RIGHT     ORTHOPEDIC SURGERY  2010    right TKR, left TKR     SURGICAL HISTORY OF -       Thumb surgery     SURGICAL HISTORY OF -   1990s    Left total knee replacement     SURGICAL HISTORY OF -   3/11    [facility] total knee replacement       FAMILY HISTORY:  Family History   Problem Relation Age of Onset     Cardiovascular Father      C.A.D. Father      CANCER Sister      lung     Unknown/Adopted Mother      Cancer - colorectal No family hx of      DIABETES No family hx of      Coronary Artery Disease No family hx of      Hypertension No family hx of      Hyperlipidemia No family hx of      CEREBROVASCULAR DISEASE No family hx of      Breast Cancer No family hx of      Colon Cancer No family hx of      Prostate Cancer No family hx of      Other Cancer No family hx of      Depression No family hx of      Anxiety Disorder No family hx of       MENTAL ILLNESS No family hx of      Substance Abuse No family hx of      Anesthesia Reaction No family hx of      Asthma No family hx of      OSTEOPOROSIS No family hx of      Genetic Disorder No family hx of      Thyroid Disease No family hx of      Obesity No family hx of        SOCIAL HISTORY:  Social History     Social History     Marital status:      Spouse name: N/A     Number of children: N/A     Years of education: N/A     Occupational History     Teacher, author, speaker Retired     Social History Main Topics     Smoking status: Never Smoker     Smokeless tobacco: Never Used      Comment: per encounter 2/12/07     Alcohol use 0.0 oz/week     0 Standard drinks or equivalent per week      Comment: 1/month     Drug use: No     Sexual activity: No     Other Topics Concern     Parent/Sibling W/ Cabg, Mi Or Angioplasty Before 65f 55m? No     Caffeine Concern No     1-2 cups coffee a day     Sleep Concern No     Stress Concern Yes     due to wifes health condition     Weight Concern No     Special Diet No     Exercise Yes     states he uses his tredmill on and off     Seat Belt Yes     Social History Narrative       Review of Systems:  Skin:  Positive for   dry skin    Eyes:  Positive for glasses;cataracts cataract surgery on both eyes  ENT:  Negative      Respiratory:  Positive for CPAP;sleep apnea     Cardiovascular:  Negative      Gastroenterology: Negative      Genitourinary:  not assessed      Musculoskeletal:  Positive for arthritis    Neurologic:  Positive for stroke;numbness or tingling of feet  (balance problems)  Psychiatric:  Positive for anxiety stress due to wifes health   Heme/Lymph/Imm:  Negative      Endocrine:  Positive for thyroid disorder      Physical Exam:  Vitals: /73  Pulse 60  Ht 1.829 m (6')  Wt 92.5 kg (204 lb)  BMI 27.67 kg/m2    Constitutional:  cooperative, alert and oriented, well developed, well nourished, in no acute distress        Skin:  warm and dry to the touch,  no apparent skin lesions or masses noted          Head:  normocephalic, no masses or lesions        Eyes:  pupils equal and round        Lymph:      ENT:  no pallor or cyanosis        Neck:  JVP normal        Respiratory:            Cardiac: regular rhythm, normal S1/S2, no S3 or S4, apical impulse not displaced, no murmurs, gallops or rubs                  venous stasis changes bilaterally                                      GI:  abdomen soft        Extremities and Muscular Skeletal:    stasis pigmentation bilateral LE edema;trace          Neurological:  no gross motor deficits        Psych:  Alert and Oriented x 3      Thank you for allowing me to participate in the care of your patient.    Sincerely,     WILLIS Smith [facility]

## 2017-11-16 NOTE — PROGRESS NOTES
HPI and Plan:   I had the pleasure of seeing Mr. Bairon Foster today in cardiology in vascular clinic following his most recent venous ablation of his right great saphenous vein.  He is a pleasant 86-year-old patient of Dr. Hope's of the history of ischemic cardiomyopathy, ventricular tachycardia, coronary disease with stenting of the OM in 2009 and mitral regurgitation.      More recently he has been seen in vein clinic for his venous insufficiency.  He underwent radiofrequency ablation of the left GSV in August and had good results and felt significantly better with less numbness in his toes.  More recently he underwent an ablation of the right GSV October 16.  Follow-up ultrasound demonstrated the GSV was closed.  The proximal tributary off the GSV was not closed with ablation pf the proximal segment of the GSV. It was recommended that if he still had symptoms of reflux sclerotherapy be considered.      Mr. Foster is doing well.  He has moved and is not really liking his new situation.  The move was quite stressful and he lost about 19 pounds which he has been trying to keep off since that time and has been successful with this.  Since his ablation he had an hernia repair which went well for him.  He thinks that his legs feel great, he is not having any more muscle aches swelling or leg pain.  He still has some discoloration which we discussed may or may not ever go away.  Overall he is very satisfied also feels his left lower extremity edema.  He denies any exertional chest pain, shortness of breath, PND or orthopnea.    Physical Exam  Please see below    Assessment and plan  1. Bilateral venous insufficiency now status post right and left G S/P ablations.  He is doing very well post procedure and not having any symptoms of leg fatigue or muscle aches and the tingling is improved.  We discussed the possibility of sclerotherapy, but as his symptoms have all but resolved this is something he does not want to  pursue at this time.    Thank you for allowing us to see Mr. Foster, he should follow-up with his primary cardiologist in February or March.\    [facility]    No orders of the defined types were placed in this encounter.      No orders of the defined types were placed in this encounter.      Medications Discontinued During This Encounter   Medication Reason     HYDROcodone-acetaminophen (NORCO) 5-325 MG per tablet Therapy completed         Encounter Diagnoses   Name Primary?     Varicose 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      Ischemic cardiomyopathy Yes     PAD (peripheral artery disease) (H)        CURRENT MEDICATIONS:  Current Outpatient Prescriptions   Medication Sig Dispense Refill     Multiple Vitamins-Minerals (CENTRUM SILVER) per tablet Take 1 tablet by mouth daily       BuPROPion HCl (WELLBUTRIN XL PO) Take 150 mg by mouth every morning       FINASTERIDE PO Take 5 mg by mouth daily       GLUCOSAMINE-CHONDROITIN PO Take 2 tablets by mouth daily       Polyethylene Glycol 3350 (MIRALAX PO) Take 1 packet by mouth At Bedtime        Potassium Chloride Sylvia ER (K-DUR PO) Take 20 mEq by mouth daily       SIMVASTATIN PO Take 20 mg by mouth daily       Tamsulosin HCl (FLOMAX PO) Take 0.4 mg by mouth daily       levothyroxine (SYNTHROID/LEVOTHROID) 100 MCG tablet Take 1 tablet (100 mcg) by mouth daily 90 tablet 3     carvedilol (COREG) 6.25 MG tablet Take 1 tablet (6.25 mg) by mouth 2 times daily (with meals) 180 tablet 3     furosemide (LASIX) 40 MG tablet Take 40 mg by mouth daily       isosorbide mononitrate (IMDUR) 30 MG 24 hr tablet Take 1 tablet (30 mg) by mouth daily 30 tablet 11     sucralfate (CARAFATE) 1 GM tablet Take 1 tablet (1 g) by mouth 2 times daily 180 tablet 1     omeprazole (PRILOSEC) 20 MG CR capsule Take 1 capsule (20 mg) by mouth 2 times daily 180 capsule 3     LORazepam (ATIVAN) 1 MG tablet Take 0.5-1 tablets (0.5-1 mg) by mouth every 8 hours as needed for anxiety 30 tablet 5      dabigatran ANTICOAGULANT (PRADAXA ANTICOAGULANT) 75 MG CAPS BLISTER PACK Take 150 mg by mouth 2 times daily. Store in original 's bottle or blister pack; use within 120 days of opening.       Omega-3 Fatty Acids (FISH OIL) 1200 MG capsule Take 1 capsule by mouth daily        Carboxymethylcellulose Sod PF (CELLUVISC/REFRESH LIQUIGEL) 1 % ophthalmic solution Place 1 drop into both eyes 3 times daily as needed for dry eyes       UNKNOWN TO PATIENT Place 1 drop Into the left eye daily EYE DROP PRESCRIBED FOR CONJUNCTIVITIS       ASPIRIN NOT PRESCRIBED (INTENTIONAL) Please choose reason not prescribed, below 1 each 0     COMPRESSION STOCKINGS 1 each daily 2 each 0     ORDER FOR DME Auto-CPAP:Max 9 cm H2OMin 9 cm H2O  Continuous Lifetime need and heated humidity.    1 Device 0       ALLERGIES   No Known Allergies    PAST MEDICAL HISTORY:  Past Medical History:   Diagnosis Date     Acute, but ill-defined, cerebrovascular disease     NO RESIDUALS     Antiplatelet or antithrombotic long-term use      Arrhythmia     a fib     Blood in stool      Coronary atherosclerosis of unspecified type of vessel, native or graft     S/P PTCA, EF 50%     Esophageal reflux      Hydrocele, unspecified      Hypercholesterolemia      Hypersomnia with sleep apnea, unspecified      Inguinal hernia without mention of obstruction or gangrene, recurrent unilateral or unspecified      Ischemic cardiomyopathy      Major depression in complete remission (H)      Obese      Onychomycosis      JIM on CPAP      Other and unspecified hyperlipidemia      Other lymphedema     GROIN AND THIGHS     Other pancytopenia (H)      Other specified anemias      Overweight      Pacemaker      PAD (peripheral artery disease) (H)      Pancytopenia (H)      Persistent atrial fibrillation (H)     VVI PM for long pauses     Prostatic hypertrophy, benign      Pulmonary hypertension      Scrotal varices      Thrombocytopenia, unspecified      Umbilical  hernia without obstruction and without gangrene      Unilateral or unspecified femoral hernia without mention of obstruction or gangrene, unilateral or unspecified      Unspecified hypothyroidism      Venous stasis dermatitis of both lower extremities      Vitamin D deficiency        PAST SURGICAL HISTORY:  Past Surgical History:   Procedure Laterality Date     EYE SURGERY      CATARACT/BLEPHAROPLASTY     GENITOURINARY SURGERY      TUNA     HERNIA REPAIR      RIGHT INGUINAL     HERNIORRHAPHY INGUINAL  8/14/2012    Procedure: HERNIORRHAPHY INGUINAL;  right inguinal hernia repair;  Surgeon: Kareem Torrez MD;  Location: [facility]     HERNIORRHAPHY UMBILICAL N/A 10/31/2017    Procedure: HERNIORRHAPHY UMBILICAL;  UMBILICAL HERNIA REPAIR WITH MESH;  Surgeon: Scar Harrington MD;  Location: [facility]     IMPLANT PACEMAKER  8/2009    single chamber     ORTHOPEDIC SURGERY      [facility] RIGHT     ORTHOPEDIC SURGERY  2010    right TKR, left TKR     SURGICAL HISTORY OF -       Thumb surgery     SURGICAL HISTORY OF -   1990s    Left total knee replacement     SURGICAL HISTORY OF -   3/11    [facility] total knee replacement       FAMILY HISTORY:  Family History   Problem Relation Age of Onset     Cardiovascular Father      C.A.D. Father      CANCER Sister      lung     Unknown/Adopted Mother      Cancer - colorectal No family hx of      DIABETES No family hx of      Coronary Artery Disease No family hx of      Hypertension No family hx of      Hyperlipidemia No family hx of      CEREBROVASCULAR DISEASE No family hx of      Breast Cancer No family hx of      Colon Cancer No family hx of      Prostate Cancer No family hx of      Other Cancer No family hx of      Depression No family hx of      Anxiety Disorder No family hx of      MENTAL ILLNESS No family hx of      Substance Abuse No family hx of      Anesthesia Reaction No family hx of      Asthma No family hx of      OSTEOPOROSIS No family hx of      Genetic Disorder No family hx of      Thyroid  Disease No family hx of      Obesity No family hx of        SOCIAL HISTORY:  Social History     Social History     Marital status:      Spouse name: N/A     Number of children: N/A     Years of education: N/A     Occupational History     Teacher, author, speaker Retired     Social History Main Topics     Smoking status: Never Smoker     Smokeless tobacco: Never Used      Comment: per encounter 2/12/07     Alcohol use 0.0 oz/week     0 Standard drinks or equivalent per week      Comment: 1/month     Drug use: No     Sexual activity: No     Other Topics Concern     Parent/Sibling W/ Cabg, Mi Or Angioplasty Before 65f 55m? No     Caffeine Concern No     1-2 cups coffee a day     Sleep Concern No     Stress Concern Yes     due to wifes health condition     Weight Concern No     Special Diet No     Exercise Yes     states he uses his tredmill on and off     Seat Belt Yes     Social History Narrative       Review of Systems:  Skin:  Positive for   dry skin    Eyes:  Positive for glasses;cataracts cataract surgery on both eyes  ENT:  Negative      Respiratory:  Positive for CPAP;sleep apnea     Cardiovascular:  Negative      Gastroenterology: Negative      Genitourinary:  not assessed      Musculoskeletal:  Positive for arthritis    Neurologic:  Positive for stroke;numbness or tingling of feet  (balance problems)  Psychiatric:  Positive for anxiety stress due to wifes health   Heme/Lymph/Imm:  Negative      Endocrine:  Positive for thyroid disorder      Physical Exam:  Vitals: /73  Pulse 60  Ht 1.829 m (6')  Wt 92.5 kg (204 lb)  BMI 27.67 kg/m2    Constitutional:  cooperative, alert and oriented, well developed, well nourished, in no acute distress        Skin:  warm and dry to the touch, no apparent skin lesions or masses noted          Head:  normocephalic, no masses or lesions        Eyes:  pupils equal and round        Lymph:      ENT:  no pallor or cyanosis        Neck:  JVP normal        Respiratory:             Cardiac: regular rhythm, normal S1/S2, no S3 or S4, apical impulse not displaced, no murmurs, gallops or rubs                  venous stasis changes bilaterally                                      GI:  abdomen soft        Extremities and Muscular Skeletal:    stasis pigmentation bilateral LE edema;trace          Neurological:  no gross motor deficits        Psych:  Alert and Oriented x 3        CC  Mackenzie Tejada, WILLIS CNP  [address]  [place] [address]

## 2017-11-16 NOTE — MR AVS SNAPSHOT
After Visit Summary   11/16/2017    Bairon Foster    MRN: [id number]           Patient Information     Date Of Birth          2/6/1931        Visit Information        Provider Department      11/16/2017 2:30 PM Mackenzie Tejada APRN CNP [facility]   [facility]        Today's Diagnoses     Ischemic cardiomyopathy    -  1    Varicose 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        PAD (peripheral artery disease) (H)           Follow-ups after your visit        Your next 10 appointments already scheduled     Dec 19, 2017  9:30 AM CST   Phone Device Check with ARRIETA TECH1   [facility]   [facility] ([facility])    [facility] [facility] [address]  [facility] [address] [phone]              Who to contact     If you have questions or need follow up information about today's clinic visit or your schedule please contact [facility] directly at [phone].  Normal or non-critical lab and imaging results will be communicated to you by [employer], letter or phone within 4 business days after the clinic has received the results. If you do not hear from us within 7 days, please contact the clinic through [employer] or phone. If you have a critical or abnormal lab result, we will notify you by phone as soon as possible.  Submit refill requests through Listen Up or call your pharmacy and they will forward the refill request to us. Please allow 3 business days for your refill to be completed.          Additional Information About Your Visit        The Shared [employer] Information     Listen Up gives you secure access to your electronic health record. If you see a primary care provider, you can also send messages to your care team and make appointments. If you have questions, please call your primary care clinic.  If you do not have a primary care provider, please call [phone] and they will assist you.        Care  EveryWhere ID     This is your Care EveryWhere ID. This could be used by other organizations to access your [facility] medical records  [id number]        Your Vitals Were     Pulse Height BMI (Body Mass Index)             60 1.829 m (6') 27.67 kg/m2          Blood Pressure from Last 3 Encounters:   11/16/17 123/73   11/10/17 132/75   10/31/17 123/70    Weight from Last 3 Encounters:   11/16/17 92.5 kg (204 lb)   11/10/17 94.5 kg (208 lb 6.4 oz)   10/31/17 92.6 kg (204 lb 3.2 oz)              We Performed the Following     Follow-Up with Cardiac Advanced Practice Provider        Primary Care Provider Office Phone # Fax #    Mg Nelson [phone] [address] [facility] [address]        Goals        General    I will find a caregiver support group to attend (pt-stated)     Notes - Note created  4/17/2014  8:49 AM by Beth Morales RN    As of today's date 4/17/2014 goal is met at 0 - 25%.   Goal Status:  Active        Equal Access to Services     [facility] AH: Hadii aad ku hadasho Soomaali, waaxda luqadaha, qaybta kaalmada adeegyada, kathryn shrestha haysabinan tin camilo . So [facility] [phone].    ATENCIÓN: Si habla español, tiene a malone disposición servicios gratuitos de asistencia lingüística. Llame al [phone].    We comply with applicable federal civil rights laws and [place] laws. We do not discriminate on the basis of race, color, national origin, age, disability, sex, sexual orientation, or gender identity.            Thank you!     Thank you for choosing [facility]  for your care. Our goal is always to provide you with excellent care. Hearing back from our patients is one way we can continue to improve our services. Please take a few minutes to complete the written survey that you may receive in the mail after your visit with us. Thank you!             Your Updated Medication List - Protect others around you: Learn how to  safely use, store and throw away your medicines at www.disposemymeds.org.          This list is accurate as of: 11/16/17  3:06 PM.  Always use your most recent med list.                   Brand Name Dispense Instructions for use Diagnosis    ASPIRIN NOT PRESCRIBED    INTENTIONAL    1 each    Please choose reason not prescribed, below    Coronary artery disease involving native coronary artery of native heart without angina pectoris, Persistent atrial fibrillation (H), Long term current use of anticoagulant therapy       Carboxymethylcellulose Sod PF 1 % ophthalmic solution    CELLUVISC/REFRESH LIQUIGEL     Place 1 drop into both eyes 3 times daily as needed for dry eyes        carvedilol 6.25 MG tablet    COREG    180 tablet    Take 1 tablet (6.25 mg) by mouth 2 times daily (with meals)    Ischemic cardiomyopathy       CENTRUM SILVER per tablet      Take 1 tablet by mouth daily        COMPRESSION STOCKINGS     2 each    1 each daily    Varicose 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       FINASTERIDE PO      Take 5 mg by mouth daily        FLOMAX PO      Take 0.4 mg by mouth daily        GLUCOSAMINE-CHONDROITIN PO      Take 2 tablets by mouth daily        isosorbide mononitrate 30 MG 24 hr tablet    IMDUR    30 tablet    Take 1 tablet (30 mg) by mouth daily    SOB (shortness of breath)       K-DUR PO      Take 20 mEq by mouth daily        LASIX 40 MG tablet   Generic drug:  furosemide      Take 40 mg by mouth daily        levothyroxine 100 MCG tablet    SYNTHROID/LEVOTHROID    90 tablet    Take 1 tablet (100 mcg) by mouth daily    Acquired hypothyroidism       LORazepam 1 MG tablet    ATIVAN    30 tablet    Take 0.5-1 tablets (0.5-1 mg) by mouth every 8 hours as needed for anxiety    Acute stress reaction       MIRALAX PO      Take 1 packet by mouth At Bedtime        omega-3 fatty acids 1200 MG capsule      Take 1 capsule by mouth daily        omeprazole 20 MG CR capsule    priLOSEC    180 capsule    Take 1  capsule (20 mg) by mouth 2 times daily    Gastroesophageal reflux disease without esophagitis       order for DME     1 Device    Auto-CPAP:Max 9 cm H2OMin 9 cm H2O Continuous Lifetime need and heated humidity.    Obstructive sleep apnea (adult) (pediatric), Other dyspnea and respiratory abnormality       PRADAXA ANTICOAGULANT 75 MG capsule   Generic drug:  dabigatran ANTICOAGULANT      Take 150 mg by mouth 2 times daily. Store in original 's bottle or blister pack; use within 120 days of opening.        SIMVASTATIN PO      Take 20 mg by mouth daily        sucralfate 1 GM tablet    CARAFATE    180 tablet    Take 1 tablet (1 g) by mouth 2 times daily    Esophageal reflux       UNKNOWN TO PATIENT      Place 1 drop Into the left eye daily EYE DROP PRESCRIBED FOR CONJUNCTIVITIS        WELLBUTRIN XL PO      Take 150 mg by mouth every morning

## 2017-12-19 ENCOUNTER — ALLIED HEALTH/NURSE VISIT (OUTPATIENT)
Dept: CARDIOLOGY | Facility: CLINIC | Age: 82
End: 2017-12-19
Payer: COMMERCIAL

## 2017-12-19 DIAGNOSIS — Z95.0 CARDIAC PACEMAKER IN SITU: Primary | ICD-10-CM

## 2017-12-19 PROCEDURE — 93293 PM PHONE R-STRIP DEVICE EVAL: CPT | Performed by: INTERNAL MEDICINE

## 2017-12-19 NOTE — MR AVS SNAPSHOT
After Visit Summary   12/19/2017    Bairon Foster    MRN: [id number]           Patient Information     Date Of Birth          2/6/1931        Visit Information        Provider Department      12/19/2017 9:30 AM BERNY TECH1 [facility]        Today's Diagnoses     Cardiac pacemaker in situ    -  1       Follow-ups after your visit        Your next 10 appointments already scheduled     Mar 27, 2018 10:30 AM CDT   Pacemaker Check with BERNY LEIN   [facility] ([facility])    [address] [place] [address] [address]  [facility] [address] [phone]              Who to contact     If you have questions or need follow up information about today's clinic visit or your schedule please contact [facility] directly at [phone].  Normal or non-critical lab and imaging results will be communicated to you by [employer], letter or phone within 4 business days after the clinic has received the results. If you do not hear from us within 7 days, please contact the clinic through [employer] or phone. If you have a critical or abnormal lab result, we will notify you by phone as soon as possible.  Submit refill requests through Almondy or call your pharmacy and they will forward the refill request to us. Please allow 3 business days for your refill to be completed.          Additional Information About Your Visit        [employer] Information     Almond[employer] gives you secure access to your electronic health record. If you see a primary care provider, you can also send messages to your care team and make appointments. If you have questions, please call your primary care clinic.  If you do not have a primary care provider, please call [phone] and they will assist you.        Care EveryWhere ID     This is your Care EveryWhere ID. This could be used by other organizations to access your [facility]  records  [id number]         Blood Pressure from Last 3 Encounters:   11/16/17 123/73   11/10/17 132/75   10/31/17 123/70    Weight from Last 3 Encounters:   11/16/17 92.5 kg (204 lb)   11/10/17 94.5 kg (208 lb 6.4 oz)   10/31/17 92.6 kg (204 lb 3.2 oz)              We Performed the Following     PM PHONE R STRIP EVAL UP TO 90 DAYS ([id number])        Primary Care Provider Office Phone # Fax #    Mg Bairon Nelson [phone]       [address] [facility] [address]        Goals        General    I will find a caregiver support group to attend (pt-stated)     Notes - Note created  4/17/2014  8:49 AM by Beth Morales RN    As of today's date 4/17/2014 goal is met at 0 - 25%.   Goal Status:  Active        Equal Access to Services     [facility] AH: Hadii aad ku hadasho Soomaali, waaxda luqadaha, qaybta kaalmada adeegyada, kathryn camilo . So [facility] [phone].    ATENCIÓN: Si habla español, tiene a malone disposición servicios gratuitos de asistencia lingüística. Llame al [phone].    We comply with applicable federal civil rights laws and [place] laws. We do not discriminate on the basis of race, color, national origin, age, disability, sex, sexual orientation, or gender identity.            Thank you!     Thank you for choosing [facility]  for your care. Our goal is always to provide you with excellent care. Hearing back from our patients is one way we can continue to improve our services. Please take a few minutes to complete the written survey that you may receive in the mail after your visit with us. Thank you!             Your Updated Medication List - Protect others around you: Learn how to safely use, store and throw away your medicines at www.disposemymeds.org.          This list is accurate as of: 12/19/17  9:40 AM.  Always use your most recent med list.                   Brand Name Dispense Instructions for use  Diagnosis    ASPIRIN NOT PRESCRIBED    INTENTIONAL    1 each    Please choose reason not prescribed, below    Coronary artery disease involving native coronary artery of native heart without angina pectoris, Persistent atrial fibrillation (H), Long term current use of anticoagulant therapy       Carboxymethylcellulose Sod PF 1 % ophthalmic solution    CELLUVISC/REFRESH LIQUIGEL     Place 1 drop into both eyes 3 times daily as needed for dry eyes        carvedilol 6.25 MG tablet    COREG    180 tablet    Take 1 tablet (6.25 mg) by mouth 2 times daily (with meals)    Ischemic cardiomyopathy       CENTRUM SILVER per tablet      Take 1 tablet by mouth daily        COMPRESSION STOCKINGS     2 each    1 each daily    Varicose 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       FINASTERIDE PO      Take 5 mg by mouth daily        FLOMAX PO      Take 0.4 mg by mouth daily        GLUCOSAMINE-CHONDROITIN PO      Take 2 tablets by mouth daily        isosorbide mononitrate 30 MG 24 hr tablet    IMDUR    30 tablet    Take 1 tablet (30 mg) by mouth daily    SOB (shortness of breath)       K-DUR PO      Take 20 mEq by mouth daily        LASIX 40 MG tablet   Generic drug:  furosemide      Take 40 mg by mouth daily        levothyroxine 100 MCG tablet    SYNTHROID/LEVOTHROID    90 tablet    Take 1 tablet (100 mcg) by mouth daily    Acquired hypothyroidism       LORazepam 1 MG tablet    ATIVAN    30 tablet    Take 0.5-1 tablets (0.5-1 mg) by mouth every 8 hours as needed for anxiety    Acute stress reaction       MIRALAX PO      Take 1 packet by mouth At Bedtime        omega-3 fatty acids 1200 MG capsule      Take 1 capsule by mouth daily        omeprazole 20 MG CR capsule    priLOSEC    180 capsule    Take 1 capsule (20 mg) by mouth 2 times daily    Gastroesophageal reflux disease without esophagitis       order for DME     1 Device    Auto-CPAP:Max 9 cm H2OMin 9 cm H2O Continuous Lifetime need and heated humidity.    Obstructive  sleep apnea (adult) (pediatric), Other dyspnea and respiratory abnormality       PRADAXA ANTICOAGULANT 75 MG capsule   Generic drug:  dabigatran ANTICOAGULANT      Take 150 mg by mouth 2 times daily. Store in original 's bottle or blister pack; use within 120 days of opening.        SIMVASTATIN PO      Take 20 mg by mouth daily        sucralfate 1 GM tablet    CARAFATE    180 tablet    Take 1 tablet (1 g) by mouth 2 times daily    Esophageal reflux       UNKNOWN TO PATIENT      Place 1 drop Into the left eye daily EYE DROP PRESCRIBED FOR CONJUNCTIVITIS        WELLBUTRIN XL PO      Take 150 mg by mouth every morning

## 2017-12-19 NOTE — PROGRESS NOTES
~90 day phone teletrace ~  Appropriate VS &  at time of check. Chronic Afib, taking Pradaxa. Magnet response WNL. Annual threshold scheduled in March. Justin VEGA

## 2018-01-08 ENCOUNTER — OFFICE VISIT (OUTPATIENT)
Dept: FAMILY MEDICINE | Facility: CLINIC | Age: 83
End: 2018-01-08
Payer: COMMERCIAL

## 2018-01-08 VITALS
HEART RATE: 62 BPM | TEMPERATURE: 97 F | SYSTOLIC BLOOD PRESSURE: 124 MMHG | BODY MASS INDEX: 29.16 KG/M2 | WEIGHT: 215 LBS | DIASTOLIC BLOOD PRESSURE: 70 MMHG | RESPIRATION RATE: 16 BRPM

## 2018-01-08 DIAGNOSIS — R60.9 DEPENDENT EDEMA: Primary | ICD-10-CM

## 2018-01-08 DIAGNOSIS — L85.3 XEROSIS CUTIS: ICD-10-CM

## 2018-01-08 PROCEDURE — 36415 COLL VENOUS BLD VENIPUNCTURE: CPT | Performed by: FAMILY MEDICINE

## 2018-01-08 PROCEDURE — 82565 ASSAY OF CREATININE: CPT | Performed by: FAMILY MEDICINE

## 2018-01-08 PROCEDURE — 83880 ASSAY OF NATRIURETIC PEPTIDE: CPT | Performed by: FAMILY MEDICINE

## 2018-01-08 PROCEDURE — 80051 ELECTROLYTE PANEL: CPT | Performed by: FAMILY MEDICINE

## 2018-01-08 PROCEDURE — 84520 ASSAY OF UREA NITROGEN: CPT | Performed by: FAMILY MEDICINE

## 2018-01-08 PROCEDURE — 99213 OFFICE O/P EST LOW 20 MIN: CPT | Performed by: FAMILY MEDICINE

## 2018-01-08 NOTE — PROGRESS NOTES
SUBJECTIVE:   Bairon Foster is a 86 year old male who presents to clinic today for the following health issues:      Musculoskeletal problem/pain      Duration: 2-3 weeks    Description  Location: both legs    Intensity:  moderate    Accompanying signs and symptoms: swelling    History  Previous similar problem: no   Previous evaluation:  Had an ablation in both legs this past fall    Precipitating or alleviating factors:  Trauma or overuse: no   Aggravating factors include: none    Therapies tried and outcome: none    Rash      Duration: 1 year    Description  Location: all over body  Itching: moderate    Intensity:  moderate    Accompanying signs and symptoms: very irritating    History (similar episodes/previous evaluation): Seen at Dermatology given an ointment which didn't work    Precipitating or alleviating factors:  New exposures:  None  Recent travel: no      Therapies tried and outcome: ointment               Problem list and histories reviewed & adjusted, as indicated.  Additional history: as documented    Patient Active Problem List   Diagnosis     Atrial fibrillation (H)     Pulmonary hypertension     CAD (coronary artery disease)     BPH (benign prostatic hyperplasia)     Ischemic cardiomyopathy     Pancytopenia (H)     JIM (obstructive sleep apnea)- mild/moderate     Acute stress reaction     Health Care Home     Vitamin D deficiency     Advance Care Planning     Gastroesophageal reflux disease without esophagitis     Hydrocele, unspecified hydrocele type     Slow transit constipation     Hypercholesterolemia     Non morbid obesity due to excess calories w comorbid CAD< hi LDL      Venous stasis dermatitis of both lower extremities     Lymphedema of both lower extremities     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      Coronary artery disease due to lipid rich plaque     Acquired hypothyroidism     Onychomycosis     Overweight (BMI 25.0-29.9)     PAD (peripheral artery disease) (H)      Screening for prostate cancer     Blood in stool     Umbilical hernia without obstruction and without gangrene     Xerosis cutis     Past Surgical History:   Procedure Laterality Date     EYE SURGERY      CATARACT/BLEPHAROPLASTY     GENITOURINARY SURGERY      TUNA     HERNIA REPAIR      RIGHT INGUINAL     HERNIORRHAPHY INGUINAL  8/14/2012    Procedure: HERNIORRHAPHY INGUINAL;  right inguinal hernia repair;  Surgeon: Kareem Torrez MD;  Location: [facility]     HERNIORRHAPHY UMBILICAL N/A 10/31/2017    Procedure: HERNIORRHAPHY UMBILICAL;  UMBILICAL HERNIA REPAIR WITH MESH;  Surgeon: Scar Harrington MD;  Location: [facility]     IMPLANT PACEMAKER  8/2009    single chamber     [place]      [facility] [place]     ORTHOPEDIC SURGERY  2010    right TKR, left TKR     SURGICAL HISTORY OF -       Thumb surgery     SURGICAL HISTORY OF -   1990s    Left total knee replacement     SURGICAL HISTORY OF -   3/11    [facility] total knee replacement       Social History   Substance Use Topics     Smoking status: Never Smoker     Smokeless tobacco: Never Used      Comment: per encounter 2/12/07     Alcohol use 0.0 oz/week     0 Standard drinks or equivalent per week      Comment: 1/month     Family History   Problem Relation Age of Onset     Cardiovascular Father      C.A.D. Father      CANCER Sister      lung     Unknown/Adopted Mother      Cancer - colorectal No family hx of      DIABETES No family hx of      Coronary Artery Disease No family hx of      Hypertension No family hx of      Hyperlipidemia No family hx of      CEREBROVASCULAR DISEASE No family hx of      Breast Cancer No family hx of      Colon Cancer No family hx of      Prostate Cancer No family hx of      Other Cancer No family hx of      Depression No family hx of      Anxiety Disorder No family hx of      MENTAL ILLNESS No family hx of      Substance Abuse No family hx of      Anesthesia Reaction No family hx of      Asthma No family hx of      OSTEOPOROSIS No family hx of       Genetic Disorder No family hx of      Thyroid Disease No family hx of      Obesity No family hx of              Reviewed and updated as needed this visit by clinical staffTobacco  Allergies  Meds  Med Hx  Surg Hx  Fam Hx  Soc Hx      Reviewed and updated as needed this visit by Provider         ROS:  Constitutional, HEENT, cardiovascular, pulmonary, gi and gu systems are negative, except as otherwise noted.      OBJECTIVE:                                                    /70  Pulse 62  Temp 97  F (36.1  C) (Tympanic)  Resp 16  Wt 215 lb (97.5 kg)  BMI 29.16 kg/m2  Body mass index is 29.16 kg/(m^2).  GENERAL APPEARANCE: healthy, alert and no distress  RESP: lungs clear to auscultation - no rales, rhonchi or wheezes  CV: regular rates and rhythm, normal S1 S2, no S3 or S4 and no murmur, click or rub  MS: There is pitting edema of both lower extremities left greater than right.  2+ on the right 3+ on the left.  The left goes up just above mid calf the right goes just below mid calf.  SKIN: diffuse, patchy erythematous  areas     ASSESSMENT/PLAN:                                                        ICD-10-CM    1. Dependent edema R60.9 Electrolyte panel (Na, K, Cl, CO2, Anion gap)     Creatinine     Urea nitrogen     BNP-N terminal pro     furosemide (LASIX) 40 MG tablet   2. Xerosis cutis L85.3        Patient Instructions   We will increase furosemide to 60 mg daily.  We will check an electrolyte panel, BUN and creatinine and a BNP.  Follow-up will be in 1 week, sooner as needed if he has further issues.  He will continue with support stockings which he is currently wearing.  I increased his furosemide prescription at the pharmacy.    I recommended Cerave and 1%HC cream for his rash.      Mg Nelson MD  [facility]

## 2018-01-08 NOTE — NURSING NOTE
Chief Complaint   Patient presents with     Derm Problem     Lipids     ? about statins     Leg Swelling     bilateral       Initial /70  Pulse 62  Temp 97  F (36.1  C) (Tympanic)  Resp 16  Wt 215 lb (97.5 kg)  BMI 29.16 kg/m2 Estimated body mass index is 29.16 kg/(m^2) as calculated from the following:    Height as of 11/16/17: 6' (1.829 m).    Weight as of this encounter: 215 lb (97.5 kg).  Medication Reconciliation: complete     Anabel Rinaldi, ALVERTO

## 2018-01-09 ENCOUNTER — TELEPHONE (OUTPATIENT)
Dept: FAMILY MEDICINE | Facility: CLINIC | Age: 83
End: 2018-01-09

## 2018-01-09 LAB
ANION GAP SERPL CALCULATED.3IONS-SCNC: 8 MMOL/L (ref 3–14)
BUN SERPL-MCNC: 17 MG/DL (ref 7–30)
CHLORIDE SERPL-SCNC: 108 MMOL/L (ref 94–109)
CO2 SERPL-SCNC: 27 MMOL/L (ref 20–32)
CREAT SERPL-MCNC: 0.97 MG/DL (ref 0.66–1.25)
GFR SERPL CREATININE-BSD FRML MDRD: 73 ML/MIN/1.7M2
NT-PROBNP SERPL-MCNC: 356 PG/ML (ref 0–450)
POTASSIUM SERPL-SCNC: 3.9 MMOL/L (ref 3.4–5.3)
SODIUM SERPL-SCNC: 143 MMOL/L (ref 133–144)

## 2018-01-09 RX ORDER — FUROSEMIDE 40 MG
60 TABLET ORAL DAILY
Qty: 45 TABLET | Refills: 3 | Status: SHIPPED | OUTPATIENT
Start: 2018-01-09 | End: 2018-03-27

## 2018-01-09 NOTE — TELEPHONE ENCOUNTER
Pt called and states that a script for furosemide was never sent to his [facility] on [address]. Please advise.

## 2018-01-10 ENCOUNTER — TELEPHONE (OUTPATIENT)
Dept: CARDIOLOGY | Facility: CLINIC | Age: 83
End: 2018-01-10

## 2018-01-10 ENCOUNTER — OFFICE VISIT (OUTPATIENT)
Dept: CARDIOLOGY | Facility: CLINIC | Age: 83
End: 2018-01-10
Payer: COMMERCIAL

## 2018-01-10 VITALS
DIASTOLIC BLOOD PRESSURE: 69 MMHG | WEIGHT: 218 LBS | HEIGHT: 72 IN | SYSTOLIC BLOOD PRESSURE: 123 MMHG | BODY MASS INDEX: 29.53 KG/M2 | HEART RATE: 69 BPM

## 2018-01-10 DIAGNOSIS — R06.02 SOB (SHORTNESS OF BREATH): Primary | ICD-10-CM

## 2018-01-10 PROCEDURE — 99214 OFFICE O/P EST MOD 30 MIN: CPT | Mod: 24 | Performed by: NURSE PRACTITIONER

## 2018-01-10 NOTE — MR AVS SNAPSHOT
After Visit Summary   1/10/2018    Bairon Foster    MRN: [id number]           Patient Information     Date Of Birth          2/6/1931        Visit Information        Provider Department      1/10/2018 3:30 PM Mackenzie Tejada APRN CNP [facility]        Today's Diagnoses     SOB (shortness of breath)    -  1      Care Instructions    Continue with the lasix 60 and follow up with Dr. Nelson.    If you still have swelling in the legs, then keep your follow up office visit with me and we can talk about foam sclerotherapy.    [facility]  [phone]          Follow-ups after your visit        Your next 10 appointments already scheduled     Reynaldo 15, 2018  1:45 PM CST   SHORT with Mg Nelson MD   [facility]    [address] [place] [id number]   [phone]            Jan 24, 2018  1:30 PM CST   Return Visit with WILLIS Spence [facility] ([facility])    [address] [place], [place] [id number]  [facility] [id number] [phone]            Mar 06, 2018  1:45 PM CST   Return Visit with Segundo Hope MD   [facility] ([facility])    [address] [place], [place] [address]  [facility] [id number] [phone]            Mar 06, 2018  3:15 PM CST   Pacemaker Check with BERNY YA   [facility] ([facility])    [address] [place] [id number] [phone]              Who to contact     If you have questions or need follow up information about today's clinic visit or your schedule please contact [facility] directly at [phone].  Normal or non-critical lab and imaging results will be communicated to you by MyChart, letter or phone  within 4 business days after the clinic has received the results. If you do not hear from us within 7 days, please contact the clinic through [employer] or phone. If you have a critical or abnormal lab result, we will notify you by phone as soon as possible.  Submit refill requests through [employer] or call your pharmacy and they will forward the refill request to us. Please allow 3 business days for your refill to be completed.          Additional Information About Your Visit        [employer] Information     [employer] gives you secure access to your electronic health record. If you see a primary care provider, you can also send messages to your care team and make appointments. If you have questions, please call your primary care clinic.  If you do not have a primary care provider, please call [phone] and they will assist you.        Care EveryWhere ID     This is your Care EveryWhere ID. This could be used by other organizations to access your [facility] medical records  [id number]        Your Vitals Were     Pulse Height BMI (Body Mass Index)             69 1.829 m (6') 29.57 kg/m2          Blood Pressure from Last 3 Encounters:   01/10/18 123/69   01/08/18 124/70   11/16/17 123/73    Weight from Last 3 Encounters:   01/10/18 98.9 kg (218 lb)   01/08/18 97.5 kg (215 lb)   11/16/17 92.5 kg (204 lb)              Today, you had the following     No orders found for display       Primary Care Provider Office Phone # Fax #    Mg Bairon Nelson [phone]       [address] [facility] [address]        Goals        General    I will find a caregiver support group to attend (pt-stated)     Notes - Note created  4/17/2014  8:49 AM by Beth Morales, RN    As of today's date 4/17/2014 goal is met at 0 - 25%.   Goal Status:  Active        Equal Access to Services     EVANGELIST PETERSON AH: Leydi Viramontes, waaxda luqadaha, qaybta kaalmada felicitas, kathryn pathak. So  [facility] [phone].    ATENCIÓN: Si magda pradhan, tiene a malone disposición servicios gratuitos de asistencia lingüística. Jose terry [phone].    We comply with applicable federal civil rights laws and [place] laws. We do not discriminate on the basis of race, color, national origin, age, disability, sex, sexual orientation, or gender identity.            Thank you!     Thank you for choosing [facility]  for your care. Our goal is always to provide you with excellent care. Hearing back from our patients is one way we can continue to improve our services. Please take a few minutes to complete the written survey that you may receive in the mail after your visit with us. Thank you!             Your Updated Medication List - Protect others around you: Learn how to safely use, store and throw away your medicines at www.disposemymeds.org.          This list is accurate as of: 1/10/18  4:21 PM.  Always use your most recent med list.                   Brand Name Dispense Instructions for use Diagnosis    ASPIRIN NOT PRESCRIBED    INTENTIONAL    1 each    Please choose reason not prescribed, below    Coronary artery disease involving native coronary artery of native heart without angina pectoris, Persistent atrial fibrillation (H), Long term current use of anticoagulant therapy       Carboxymethylcellulose Sod PF 1 % ophthalmic solution    CELLUVISC/REFRESH LIQUIGEL     Place 1 drop into both eyes 3 times daily as needed for dry eyes        carvedilol 6.25 MG tablet    COREG    180 tablet    Take 1 tablet (6.25 mg) by mouth 2 times daily (with meals)    Ischemic cardiomyopathy       CENTRUM SILVER per tablet      Take 1 tablet by mouth daily        COMPRESSION STOCKINGS     2 each    1 each daily    Varicose 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       FINASTERIDE PO      Take 5 mg by mouth daily        FLOMAX PO      Take 0.4 mg by mouth daily        furosemide 40 MG tablet     LASIX    45 tablet    Take 1.5 tablets (60 mg) by mouth daily    Dependent edema       GLUCOSAMINE-CHONDROITIN PO      Take 2 tablets by mouth daily        isosorbide mononitrate 30 MG 24 hr tablet    IMDUR    30 tablet    Take 1 tablet (30 mg) by mouth daily    SOB (shortness of breath)       K-DUR PO      Take 20 mEq by mouth daily        levothyroxine 100 MCG tablet    SYNTHROID/LEVOTHROID    90 tablet    Take 1 tablet (100 mcg) by mouth daily    Acquired hypothyroidism       LORazepam 1 MG tablet    ATIVAN    30 tablet    Take 0.5-1 tablets (0.5-1 mg) by mouth every 8 hours as needed for anxiety    Acute stress reaction       MIRALAX PO      Take 1 packet by mouth At Bedtime        omega-3 fatty acids 1200 MG capsule      Take 1 capsule by mouth daily        omeprazole 20 MG CR capsule    priLOSEC    180 capsule    Take 1 capsule (20 mg) by mouth 2 times daily    Gastroesophageal reflux disease without esophagitis       order for DME     1 Device    Auto-CPAP:Max 9 cm H2OMin 9 cm H2O Continuous Lifetime need and heated humidity.    Obstructive sleep apnea (adult) (pediatric), Other dyspnea and respiratory abnormality       PRADAXA ANTICOAGULANT 75 MG capsule   Generic drug:  dabigatran ANTICOAGULANT      Take 150 mg by mouth 2 times daily. Store in original 's bottle or blister pack; use within 120 days of opening.        SIMVASTATIN PO      Take 20 mg by mouth daily        sucralfate 1 GM tablet    CARAFATE    180 tablet    Take 1 tablet (1 g) by mouth 2 times daily    Esophageal reflux       UNKNOWN TO PATIENT      Place 1 drop Into the left eye daily EYE DROP PRESCRIBED FOR CONJUNCTIVITIS        WELLBUTRIN XL PO      Take 150 mg by mouth every morning

## 2018-01-10 NOTE — PROGRESS NOTES
Dear Bairon,    Your tests were all normal. A copy of your tests are available in My Chart.    Glad to see you at your appointment.  If you have any questions feel free to call.      Sincerely,      SHADY Ashraf.

## 2018-01-10 NOTE — TELEPHONE ENCOUNTER
Writer spoke to patient to see why he made an OV with Anushka as he has been seen since his EVLA. Pt stated that his legs have been swelling again and would like to get them looked at. Writer agreed patient should come in for OV. Pt confirmed time and location.

## 2018-01-10 NOTE — PATIENT INSTRUCTIONS
Continue with the lasix 60 and follow up with Dr. Nelson.    If you still have swelling in the legs, then keep your follow up office visit with me and we can talk about foam sclerotherapy.    [facility]  [phone]

## 2018-01-10 NOTE — PATIENT INSTRUCTIONS
We will increase furosemide to 60 mg daily.  We will check an electrolyte panel, BUN and creatinine and a BNP.  Follow-up will be in 1 week, sooner as needed if he has further issues.  He will continue with support stockings which he is currently wearing.  I increased his furosemide prescription at the pharmacy.    I recommended Cerave and 1%HC cream for his rash.

## 2018-01-10 NOTE — LETTER
1/10/2018    Mg Nelson MD  [address]  [facility] [address]    RE: Bairon DOWNS Cristian       Dear Colleague,    I had the pleasure of seeing Bairon Foster in the [facility].    HPI and Plan:   I had the pleasure of seeing Bairon Foster today at his request in vascular clinic for increased lower extremity edema.  He follows with Dr. Hope for his general cardiology, he has a history of ischemic cardiomyopathy, ventricular tachycardia, mitral regurgitation and coronary artery disease with OM stenting in 2009.  More recently he's undergone a left and right GSV ablation with good results.  I saw him in follow-up of his ablations and his edema had subsided and he was feeling quite well.  After his right GSV ablation, the follow-up ultrasound demonstrated the proximal tributary off the GSV was not closed with the ablation and it was recommended, if he still had symptoms, that foam sclerotherapy could be considered.  As I said, in follow-up he was doing well and did not want to pursue further treatment.  His last echocardiogram in March 2017 showed EF of 55-60% his RV was mild to moderately dilated, he had moderate pulmonary hypertension.  His nuclear stress test in 2017 demonstrated no wall motion abnormalities, he had a small mild intensity distal inferior lateral area of ischemia.    On Monday Mr. Foster tells me he saw his primary care physician Dr. Nelson for lower extremity edema.  His Lasix was increased from 40-60 mg daily.  Labs from that day shows sodium 143, potassium 3.9, creatinine 0.97, NT proBNP of 356.    Mr. Foster asked to be seen in vascular clinic today to see if his symptoms could be return of his venous insufficiency.  Since starting the Lasix dose increased, his weight has come down three pounds on his home scale from 211 to 208 pounds.  He does not notice much change in his lower extremity edema.  Today in our clinic, his weight is still up.  On  November 16 when I saw him he was 204 pounds, today he is 215 pounds.  He has noticed the last couple of months some dyspnea on exertion such that he had to stop in the tunel to rest a little bit when he walked over.  He has not had any chest pain, PND or orthopnea.      Physical exam  Please see below.  Briefly he does have 1+ bilateral lower extremity edema to the upper shin.  His lungs are clear without wheezing, crackles.    Assessment and plan  1. Lower extremity edema.  The last couple of months he's had what appears to be an 11 pound weight gain.  He's had an increase in his lower extremity edema, it's better in the morning and worse at the end of the day.  He does have some shortness of breath but denies PND and chest pain.  He started to respond to the diuretic with a 3 pound weight loss in three days on his home scale, though personally he does not notice an improvement.  At this point I recommended that he continue to follow with Dr. Nelson who plans to see him next Monday.  We tentatively set up a follow-up appointment with me in a couple of weeks.  I think that this is unlikely to be heart failure given his completely normal nt BNP and normal respiratory exam.  If he continues to have lower extremity edema, I will see him and we can talk about foam scleotherapyl.  However at this point I'm hopeful that a little bit of diuretic will help with his symptoms and he can cancel his follow-up appointment.  He should see Dr. Hope in March.    WILLIS Bullard, CNP        No orders of the defined types were placed in this encounter.      No orders of the defined types were placed in this encounter.      There are no discontinued medications.      Encounter Diagnosis   Name Primary?     SOB (shortness of breath) Yes       CURRENT MEDICATIONS:  Current Outpatient Prescriptions   Medication Sig Dispense Refill     furosemide (LASIX) 40 MG tablet Take 1.5 tablets (60 mg) by mouth daily 45 tablet 3      Multiple Vitamins-Minerals (CENTRUM SILVER) per tablet Take 1 tablet by mouth daily       Carboxymethylcellulose Sod PF (CELLUVISC/REFRESH LIQUIGEL) 1 % ophthalmic solution Place 1 drop into both eyes 3 times daily as needed for dry eyes       UNKNOWN TO PATIENT Place 1 drop Into the left eye daily EYE DROP PRESCRIBED FOR CONJUNCTIVITIS       BuPROPion HCl (WELLBUTRIN XL PO) Take 150 mg by mouth every morning       FINASTERIDE PO Take 5 mg by mouth daily       GLUCOSAMINE-CHONDROITIN PO Take 2 tablets by mouth daily       Polyethylene Glycol 3350 (MIRALAX PO) Take 1 packet by mouth At Bedtime        Potassium Chloride Sylvia ER (K-DUR PO) Take 20 mEq by mouth daily       SIMVASTATIN PO Take 20 mg by mouth daily       Tamsulosin HCl (FLOMAX PO) Take 0.4 mg by mouth daily       levothyroxine (SYNTHROID/LEVOTHROID) 100 MCG tablet Take 1 tablet (100 mcg) by mouth daily 90 tablet 3     carvedilol (COREG) 6.25 MG tablet Take 1 tablet (6.25 mg) by mouth 2 times daily (with meals) 180 tablet 3     ASPIRIN NOT PRESCRIBED (INTENTIONAL) Please choose reason not prescribed, below 1 each 0     isosorbide mononitrate (IMDUR) 30 MG 24 hr tablet Take 1 tablet (30 mg) by mouth daily 30 tablet 11     COMPRESSION STOCKINGS 1 each daily 2 each 0     sucralfate (CARAFATE) 1 GM tablet Take 1 tablet (1 g) by mouth 2 times daily 180 tablet 1     omeprazole (PRILOSEC) 20 MG CR capsule Take 1 capsule (20 mg) by mouth 2 times daily 180 capsule 3     LORazepam (ATIVAN) 1 MG tablet Take 0.5-1 tablets (0.5-1 mg) by mouth every 8 hours as needed for anxiety 30 tablet 5     ORDER FOR DME Auto-CPAP:Max 9 cm H2OMin 9 cm H2O  Continuous Lifetime need and heated humidity.    1 Device 0     dabigatran ANTICOAGULANT (PRADAXA ANTICOAGULANT) 75 MG CAPS BLISTER PACK Take 150 mg by mouth 2 times daily. Store in original 's bottle or blister pack; use within 120 days of opening.       Omega-3 Fatty Acids (FISH OIL) 1200 MG capsule Take 1 capsule by  mouth daily          ALLERGIES   No Known Allergies    PAST MEDICAL HISTORY:  Past Medical History:   Diagnosis Date     Acute, but ill-defined, cerebrovascular disease     NO RESIDUALS     Antiplatelet or antithrombotic long-term use      Arrhythmia     a fib     Blood in stool      Coronary atherosclerosis of unspecified type of vessel, native or graft     S/P PTCA, EF 50%     Esophageal reflux      Hydrocele, unspecified      Hypercholesterolemia      Hypersomnia with sleep apnea, unspecified      Inguinal hernia without mention of obstruction or gangrene, recurrent unilateral or unspecified      Ischemic cardiomyopathy      Major depression in complete remission (H)      Obese      Onychomycosis      JIM on CPAP      Other and unspecified hyperlipidemia      Other lymphedema     GROIN AND THIGHS     Other pancytopenia (H)      Other specified anemias      Overweight      Pacemaker      PAD (peripheral artery disease) (H)      Pancytopenia (H)      Persistent atrial fibrillation (H)     VVI PM for long pauses     Prostatic hypertrophy, benign      Pulmonary hypertension      Scrotal varices      Thrombocytopenia, unspecified      Umbilical hernia without obstruction and without gangrene      Unilateral or unspecified femoral hernia without mention of obstruction or gangrene, unilateral or unspecified      Unspecified hypothyroidism      Venous stasis dermatitis of both lower extremities      Vitamin D deficiency        PAST SURGICAL HISTORY:  Past Surgical History:   Procedure Laterality Date     EYE SURGERY      CATARACT/BLEPHAROPLASTY     GENITOURINARY SURGERY      TUNA     HERNIA REPAIR      RIGHT INGUINAL     HERNIORRHAPHY INGUINAL  8/14/2012    Procedure: HERNIORRHAPHY INGUINAL;  right inguinal hernia repair;  Surgeon: Kareem Torrez MD;  Location: [facility]     HERNIORRHAPHY UMBILICAL N/A 10/31/2017    Procedure: HERNIORRHAPHY UMBILICAL;  UMBILICAL HERNIA REPAIR WITH MESH;  Surgeon: Scar Harrington MD;  Location:  [facility]     IMPLANT PACEMAKER  8/2009    single chamber     ORTHOPEDIC SURGERY      [facility] RIGHT     ORTHOPEDIC SURGERY  2010    right TKR, left TKR     SURGICAL HISTORY OF -       Thumb surgery     SURGICAL HISTORY OF -   1990s    Left total knee replacement     SURGICAL HISTORY OF -   3/11    [facility] total knee replacement       FAMILY HISTORY:  Family History   Problem Relation Age of Onset     Cardiovascular Father      C.A.D. Father      CANCER Sister      lung     Unknown/Adopted Mother      Cancer - colorectal No family hx of      DIABETES No family hx of      Coronary Artery Disease No family hx of      Hypertension No family hx of      Hyperlipidemia No family hx of      CEREBROVASCULAR DISEASE No family hx of      Breast Cancer No family hx of      Colon Cancer No family hx of      Prostate Cancer No family hx of      Other Cancer No family hx of      Depression No family hx of      Anxiety Disorder No family hx of      MENTAL ILLNESS No family hx of      Substance Abuse No family hx of      Anesthesia Reaction No family hx of      Asthma No family hx of      OSTEOPOROSIS No family hx of      Genetic Disorder No family hx of      Thyroid Disease No family hx of      Obesity No family hx of        SOCIAL HISTORY:  Social History     Social History     Marital status:      Spouse name: N/A     Number of children: N/A     Years of education: N/A     Occupational History     Teacher, author, speaker Retired     Social History Main Topics     Smoking status: Never Smoker     Smokeless tobacco: Never Used      Comment: per encounter 2/12/07     Alcohol use 0.0 oz/week     0 Standard drinks or equivalent per week      Comment: 1/month     Drug use: No     Sexual activity: No     Other Topics Concern     Parent/Sibling W/ Cabg, Mi Or Angioplasty Before 65f 55m? No     Caffeine Concern No     1-2 cups coffee a day     Sleep Concern No     Stress Concern Yes     due to wifes health condition     Weight Concern No      Special Diet No     Exercise Yes     states he uses his tredmill on and off     Seat Belt Yes     Social History Narrative       Review of Systems:  Skin:  not assessed       Eyes:  Positive for glasses;cataracts    ENT:  Negative      Respiratory:  Positive for sleep apnea;CPAP;dyspnea on exertion     Cardiovascular:    Positive for;edema;lightheadedness    Gastroenterology: Negative      Genitourinary:  not assessed      Musculoskeletal:  Positive for arthritis    Neurologic:  Positive for stroke;numbness or tingling of feet    Psychiatric:  Positive for anxiety    Heme/Lymph/Imm:  Negative      Endocrine:  Positive for thyroid disorder      Physical Exam:  Vitals: /69  Pulse 69  Ht 1.829 m (6')  Wt 98.9 kg (218 lb)  BMI 29.57 kg/m2    Constitutional:  cooperative, alert and oriented, well developed, well nourished, in no acute distress        Skin:  warm and dry to the touch, no apparent skin lesions or masses noted          Head:  normocephalic, no masses or lesions        Eyes:  pupils equal and round        Lymph:      ENT:  no pallor or cyanosis        Neck:  JVP normal        Respiratory:            Cardiac: regular rhythm, normal S1/S2, no S3 or S4, apical impulse not displaced, no murmurs, gallops or rubs                  venous stasis changes bilaterally                                      GI:  abdomen soft        Extremities and Muscular Skeletal:    stasis pigmentation bilateral LE edema;1+;pitting          Neurological:  no gross motor deficits        Psych:  Alert and Oriented x 3        Thank you for allowing me to participate in the care of your patient.    Sincerely,     WILLIS Smith [facility]

## 2018-01-10 NOTE — PROGRESS NOTES
HPI and Plan:   I had the pleasure of seeing Bairon Foster today at his request in vascular clinic for increased lower extremity edema.  He follows with Dr. Hope for his general cardiology, he has a history of ischemic cardiomyopathy, ventricular tachycardia, mitral regurgitation and coronary artery disease with OM stenting in 2009.  More recently he's undergone a left and right GSV ablation with good results.  I saw him in follow-up of his ablations and his edema had subsided and he was feeling quite well.  After his right GSV ablation, the follow-up ultrasound demonstrated the proximal tributary off the GSV was not closed with the ablation and it was recommended, if he still had symptoms, that foam sclerotherapy could be considered.  As I said, in follow-up he was doing well and did not want to pursue further treatment.  His last echocardiogram in March 2017 showed EF of 55-60% his RV was mild to moderately dilated, he had moderate pulmonary hypertension.  His nuclear stress test in 2017 demonstrated no wall motion abnormalities, he had a small mild intensity distal inferior lateral area of ischemia.    On Monday Mr. Foster tells me he saw his primary care physician Dr. Nelson for lower extremity edema.  His Lasix was increased from 40-60 mg daily.  Labs from that day shows sodium 143, potassium 3.9, creatinine 0.97, NT proBNP of 356.    Mr. Foster asked to be seen in vascular clinic today to see if his symptoms could be return of his venous insufficiency.  Since starting the Lasix dose increased, his weight has come down three pounds on his home scale from 211 to 208 pounds.  He does not notice much change in his lower extremity edema.  Today in our clinic, his weight is still up.  On November 16 when I saw him he was 204 pounds, today he is 215 pounds.  He has noticed the last couple of months some dyspnea on exertion such that he had to stop in the tunel to rest a little bit when he walked over.  He has  not had any chest pain, PND or orthopnea.      Physical exam  Please see below.  Briefly he does have 1+ bilateral lower extremity edema to the upper shin.  His lungs are clear without wheezing, crackles.    Assessment and plan  1. Lower extremity edema.  The last couple of months he's had what appears to be an 11 pound weight gain.  He's had an increase in his lower extremity edema, it's better in the morning and worse at the end of the day.  He does have some shortness of breath but denies PND and chest pain.  He started to respond to the diuretic with a 3 pound weight loss in three days on his home scale, though personally he does not notice an improvement.  At this point I recommended that he continue to follow with Dr. Nelson who plans to see him next Monday.  We tentatively set up a follow-up appointment with me in a couple of weeks.  I think that this is unlikely to be heart failure given his completely normal nt BNP and normal respiratory exam.  If he continues to have lower extremity edema, I will see him and we can talk about foam scleotherapyl.  However at this point I'm hopeful that a little bit of diuretic will help with his symptoms and he can cancel his follow-up appointment.  He should see Dr. Hope in March.    WILLIS Bullard, CNP        No orders of the defined types were placed in this encounter.      No orders of the defined types were placed in this encounter.      There are no discontinued medications.      Encounter Diagnosis   Name Primary?     SOB (shortness of breath) Yes       CURRENT MEDICATIONS:  Current Outpatient Prescriptions   Medication Sig Dispense Refill     furosemide (LASIX) 40 MG tablet Take 1.5 tablets (60 mg) by mouth daily 45 tablet 3     Multiple Vitamins-Minerals (CENTRUM SILVER) per tablet Take 1 tablet by mouth daily       Carboxymethylcellulose Sod PF (CELLUVISC/REFRESH LIQUIGEL) 1 % ophthalmic solution Place 1 drop into both eyes 3 times daily as needed for  dry eyes       UNKNOWN TO PATIENT Place 1 drop Into the left eye daily EYE DROP PRESCRIBED FOR CONJUNCTIVITIS       BuPROPion HCl (WELLBUTRIN XL PO) Take 150 mg by mouth every morning       FINASTERIDE PO Take 5 mg by mouth daily       GLUCOSAMINE-CHONDROITIN PO Take 2 tablets by mouth daily       Polyethylene Glycol 3350 (MIRALAX PO) Take 1 packet by mouth At Bedtime        Potassium Chloride Sylvia ER (K-DUR PO) Take 20 mEq by mouth daily       SIMVASTATIN PO Take 20 mg by mouth daily       Tamsulosin HCl (FLOMAX PO) Take 0.4 mg by mouth daily       levothyroxine (SYNTHROID/LEVOTHROID) 100 MCG tablet Take 1 tablet (100 mcg) by mouth daily 90 tablet 3     carvedilol (COREG) 6.25 MG tablet Take 1 tablet (6.25 mg) by mouth 2 times daily (with meals) 180 tablet 3     ASPIRIN NOT PRESCRIBED (INTENTIONAL) Please choose reason not prescribed, below 1 each 0     isosorbide mononitrate (IMDUR) 30 MG 24 hr tablet Take 1 tablet (30 mg) by mouth daily 30 tablet 11     COMPRESSION STOCKINGS 1 each daily 2 each 0     sucralfate (CARAFATE) 1 GM tablet Take 1 tablet (1 g) by mouth 2 times daily 180 tablet 1     omeprazole (PRILOSEC) 20 MG CR capsule Take 1 capsule (20 mg) by mouth 2 times daily 180 capsule 3     LORazepam (ATIVAN) 1 MG tablet Take 0.5-1 tablets (0.5-1 mg) by mouth every 8 hours as needed for anxiety 30 tablet 5     ORDER FOR DME Auto-CPAP:Max 9 cm H2OMin 9 cm H2O  Continuous Lifetime need and heated humidity.    1 Device 0     dabigatran ANTICOAGULANT (PRADAXA ANTICOAGULANT) 75 MG CAPS BLISTER PACK Take 150 mg by mouth 2 times daily. Store in original 's bottle or blister pack; use within 120 days of opening.       Omega-3 Fatty Acids (FISH OIL) 1200 MG capsule Take 1 capsule by mouth daily          ALLERGIES   No Known Allergies    PAST MEDICAL HISTORY:  Past Medical History:   Diagnosis Date     Acute, but ill-defined, cerebrovascular disease     NO RESIDUALS     Antiplatelet or antithrombotic  long-term use      Arrhythmia     a fib     Blood in stool      Coronary atherosclerosis of unspecified type of vessel, native or graft     S/P PTCA, EF 50%     Esophageal reflux      Hydrocele, unspecified      Hypercholesterolemia      Hypersomnia with sleep apnea, unspecified      Inguinal hernia without mention of obstruction or gangrene, recurrent unilateral or unspecified      Ischemic cardiomyopathy      Major depression in complete remission (H)      Obese      Onychomycosis      JIM on CPAP      Other and unspecified hyperlipidemia      Other lymphedema     GROIN AND THIGHS     Other pancytopenia (H)      Other specified anemias      Overweight      Pacemaker      PAD (peripheral artery disease) (H)      Pancytopenia (H)      Persistent atrial fibrillation (H)     VVI PM for long pauses     Prostatic hypertrophy, benign      Pulmonary hypertension      Scrotal varices      Thrombocytopenia, unspecified      Umbilical hernia without obstruction and without gangrene      Unilateral or unspecified femoral hernia without mention of obstruction or gangrene, unilateral or unspecified      Unspecified hypothyroidism      Venous stasis dermatitis of both lower extremities      Vitamin D deficiency        PAST SURGICAL HISTORY:  Past Surgical History:   Procedure Laterality Date     EYE SURGERY      CATARACT/BLEPHAROPLASTY     GENITOURINARY SURGERY      TUNA     HERNIA REPAIR      RIGHT INGUINAL     HERNIORRHAPHY INGUINAL  8/14/2012    Procedure: HERNIORRHAPHY INGUINAL;  right inguinal hernia repair;  Surgeon: Kareem Torrez MD;  Location:  [facility]     HERNIORRHAPHY UMBILICAL N/A 10/31/2017    Procedure: HERNIORRHAPHY UMBILICAL;  UMBILICAL HERNIA REPAIR WITH MESH;  Surgeon: Scar Harrington MD;  Location: [facility]     IMPLANT PACEMAKER  8/2009    single chamber     ORTHOPEDIC SURGERY      [facility] [place] [place]     ORTHOPEDIC SURGERY  2010    right TKR, left TKR     SURGICAL HISTORY OF -       Thumb surgery     SURGICAL HISTORY OF -    1990s    Left total knee replacement     SURGICAL HISTORY OF -   3/11    [facility] total knee replacement       FAMILY HISTORY:  Family History   Problem Relation Age of Onset     Cardiovascular Father      C.A.D. Father      CANCER Sister      lung     Unknown/Adopted Mother      Cancer - colorectal No family hx of      DIABETES No family hx of      Coronary Artery Disease No family hx of      Hypertension No family hx of      Hyperlipidemia No family hx of      CEREBROVASCULAR DISEASE No family hx of      Breast Cancer No family hx of      Colon Cancer No family hx of      Prostate Cancer No family hx of      Other Cancer No family hx of      Depression No family hx of      Anxiety Disorder No family hx of      MENTAL ILLNESS No family hx of      Substance Abuse No family hx of      Anesthesia Reaction No family hx of      Asthma No family hx of      OSTEOPOROSIS No family hx of      Genetic Disorder No family hx of      Thyroid Disease No family hx of      Obesity No family hx of        SOCIAL HISTORY:  Social History     Social History     Marital status:      Spouse name: N/A     Number of children: N/A     Years of education: N/A     Occupational History     Teacher, author, speaker Retired     Social History Main Topics     Smoking status: Never Smoker     Smokeless tobacco: Never Used      Comment: per encounter 2/12/07     Alcohol use 0.0 oz/week     0 Standard drinks or equivalent per week      Comment: 1/month     Drug use: No     Sexual activity: No     Other Topics Concern     Parent/Sibling W/ Cabg, Mi Or Angioplasty Before 65f 55m? No     Caffeine Concern No     1-2 cups coffee a day     Sleep Concern No     Stress Concern Yes     due to wifes health condition     Weight Concern No     Special Diet No     Exercise Yes     states he uses his tredmill on and off     Seat Belt Yes     Social History Narrative       Review of Systems:  Skin:  not assessed       Eyes:  Positive for glasses;cataracts     ENT:  Negative      Respiratory:  Positive for sleep apnea;CPAP;dyspnea on exertion     Cardiovascular:    Positive for;edema;lightheadedness    Gastroenterology: Negative      Genitourinary:  not assessed      Musculoskeletal:  Positive for arthritis    Neurologic:  Positive for stroke;numbness or tingling of feet    Psychiatric:  Positive for anxiety    Heme/Lymph/Imm:  Negative      Endocrine:  Positive for thyroid disorder      Physical Exam:  Vitals: /69  Pulse 69  Ht 1.829 m (6')  Wt 98.9 kg (218 lb)  BMI 29.57 kg/m2    Constitutional:  cooperative, alert and oriented, well developed, well nourished, in no acute distress        Skin:  warm and dry to the touch, no apparent skin lesions or masses noted          Head:  normocephalic, no masses or lesions        Eyes:  pupils equal and round        Lymph:      ENT:  no pallor or cyanosis        Neck:  JVP normal        Respiratory:            Cardiac: regular rhythm, normal S1/S2, no S3 or S4, apical impulse not displaced, no murmurs, gallops or rubs                  venous stasis changes bilaterally                                      GI:  abdomen soft        Extremities and Muscular Skeletal:    stasis pigmentation bilateral LE edema;1+;pitting          Neurological:  no gross motor deficits        Psych:  Alert and Oriented x 3        CC  No referring provider defined for this encounter.

## 2018-01-12 PROBLEM — L85.3 XEROSIS CUTIS: Status: ACTIVE | Noted: 2018-01-12

## 2018-01-15 ENCOUNTER — OFFICE VISIT (OUTPATIENT)
Dept: FAMILY MEDICINE | Facility: CLINIC | Age: 83
End: 2018-01-15
Payer: COMMERCIAL

## 2018-01-15 VITALS
TEMPERATURE: 96.4 F | BODY MASS INDEX: 28.62 KG/M2 | SYSTOLIC BLOOD PRESSURE: 120 MMHG | DIASTOLIC BLOOD PRESSURE: 70 MMHG | RESPIRATION RATE: 16 BRPM | HEART RATE: 66 BPM | WEIGHT: 211 LBS

## 2018-01-15 DIAGNOSIS — I87.2 VENOUS STASIS DERMATITIS OF BOTH LOWER EXTREMITIES: Primary | ICD-10-CM

## 2018-01-15 DIAGNOSIS — I89.0 LYMPHEDEMA OF BOTH LOWER EXTREMITIES: ICD-10-CM

## 2018-01-15 PROCEDURE — 82565 ASSAY OF CREATININE: CPT | Performed by: FAMILY MEDICINE

## 2018-01-15 PROCEDURE — 84520 ASSAY OF UREA NITROGEN: CPT | Performed by: FAMILY MEDICINE

## 2018-01-15 PROCEDURE — 36415 COLL VENOUS BLD VENIPUNCTURE: CPT | Performed by: FAMILY MEDICINE

## 2018-01-15 PROCEDURE — 99213 OFFICE O/P EST LOW 20 MIN: CPT | Performed by: FAMILY MEDICINE

## 2018-01-15 PROCEDURE — 80051 ELECTROLYTE PANEL: CPT | Performed by: FAMILY MEDICINE

## 2018-01-15 NOTE — MR AVS SNAPSHOT
After Visit Summary   1/15/2018    Bairon Foster    MRN: [id number]           Patient Information     Date Of Birth          2/6/1931        Visit Information        Provider Department      1/15/2018 1:45 PM Mg Nelson MD [facility]        Today's Diagnoses     Venous stasis dermatitis of both lower extremities    -  1    Lymphedema of both lower extremities          Care Instructions    Will check labs. Follow up will be determined after review.          Follow-ups after your visit        Your next 10 appointments already scheduled     Jan 24, 2018  1:30 PM CST   Return Visit with WILLIS Spence CNP   [facility] ([facility])    [address]  [place] [address] [phone]            Mar 06, 2018  1:45 PM CST   Return Visit with Segundo Hope MD   [facility] ([facility])    [address]  [place] [address] [phone]            Mar 06, 2018  3:15 PM CST   Pacemaker Check with BERNY YA   [facility] ([facility])    [address]  [place] [address] [phone]              Who to contact     If you have questions or need follow up information about today's clinic visit or your schedule please contact [facility] directly at [phone].  Normal or non-critical lab and imaging results will be communicated to you by MyChart, letter or phone within 4 business days after the clinic has received the results. If you do not hear from us within 7 days, please contact the clinic through MyChart or phone. If you have a critical or abnormal lab result, we will notify you by phone as soon as possible.  Submit refill requests through [employer] or call your pharmacy and they will forward the refill request to  us. Please allow 3 business days for your refill to be completed.          Additional Information About Your Visit        MyChart Information     [employer] gives you secure access to your electronic health record. If you see a primary care provider, you can also send messages to your care team and make appointments. If you have questions, please call your primary care clinic.  If you do not have a primary care provider, please call [phone] and they will assist you.        Care EveryWhere ID     This is your Care EveryWhere ID. This could be used by other organizations to access your [place] medical records  [id number]        Your Vitals Were     Pulse Temperature Respirations BMI (Body Mass Index)          66 96.4  F (35.8  C) (Tympanic) 16 28.62 kg/m2         Blood Pressure from Last 3 Encounters:   01/15/18 120/70   01/10/18 123/69   01/08/18 124/70    Weight from Last 3 Encounters:   01/15/18 211 lb (95.7 kg)   01/10/18 218 lb (98.9 kg)   01/08/18 215 lb (97.5 kg)              We Performed the Following     Creatinine     Electrolyte panel (Na, K, Cl, CO2, Anion gap)     Urea nitrogen        Primary Care Provider Office Phone # Fax #    Mg Nelson [phone]       [address] [facility] [address]        Goals        General    I will find a caregiver support group to attend (pt-stated)     Notes - Note created  4/17/2014  8:49 AM by Beth Morales RN    As of today's date 4/17/2014 goal is met at 0 - 25%.   Goal Status:  Active        Equal Access to Services     [facility]: Hadii aad ku hadasho Soomaali, waaxda luqadaha, qaybta kaalmada kathryn polk . So [facility] [phone].    ATENCIÓN: Si habla español, tiene a malone disposición servicios gratuitos de asistencia lingüística. Llame al [phone].    We comply with applicable federal civil rights laws and [place] laws. We do not discriminate on the basis of race, color, national  origin, age, disability, sex, sexual orientation, or gender identity.            Thank you!     Thank you for choosing [facility]  for your care. Our goal is always to provide you with excellent care. Hearing back from our patients is one way we can continue to improve our services. Please take a few minutes to complete the written survey that you may receive in the mail after your visit with us. Thank you!             Your Updated Medication List - Protect others around you: Learn how to safely use, store and throw away your medicines at www.disposemymeds.org.          This list is accurate as of: 1/15/18  2:43 PM.  Always use your most recent med list.                   Brand Name Dispense Instructions for use Diagnosis    ASPIRIN NOT PRESCRIBED    INTENTIONAL    1 each    Please choose reason not prescribed, below    Coronary artery disease involving native coronary artery of native heart without angina pectoris, Persistent atrial fibrillation (H), Long term current use of anticoagulant therapy       Carboxymethylcellulose Sod PF 1 % ophthalmic solution    CELLUVISC/REFRESH LIQUIGEL     Place 1 drop into both eyes 3 times daily as needed for dry eyes        carvedilol 6.25 MG tablet    COREG    180 tablet    Take 1 tablet (6.25 mg) by mouth 2 times daily (with meals)    Ischemic cardiomyopathy       CENTRUM SILVER per tablet      Take 1 tablet by mouth daily        COMPRESSION STOCKINGS     2 each    1 each daily    Varicose 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       FINASTERIDE PO      Take 5 mg by mouth daily        FLOMAX PO      Take 0.4 mg by mouth daily        furosemide 40 MG tablet    LASIX    45 tablet    Take 1.5 tablets (60 mg) by mouth daily    Dependent edema       GLUCOSAMINE-CHONDROITIN PO      Take 2 tablets by mouth daily        isosorbide mononitrate 30 MG 24 hr tablet    IMDUR    30 tablet    Take 1 tablet (30 mg) by mouth daily    SOB (shortness of  breath)       K-DUR PO      Take 20 mEq by mouth daily        levothyroxine 100 MCG tablet    SYNTHROID/LEVOTHROID    90 tablet    Take 1 tablet (100 mcg) by mouth daily    Acquired hypothyroidism       LORazepam 1 MG tablet    ATIVAN    30 tablet    Take 0.5-1 tablets (0.5-1 mg) by mouth every 8 hours as needed for anxiety    Acute stress reaction       MIRALAX PO      Take 1 packet by mouth At Bedtime        omega-3 fatty acids 1200 MG capsule      Take 1 capsule by mouth daily        omeprazole 20 MG CR capsule    priLOSEC    180 capsule    Take 1 capsule (20 mg) by mouth 2 times daily    Gastroesophageal reflux disease without esophagitis       order for DME     1 Device    Auto-CPAP:Max 9 cm H2OMin 9 cm H2O Continuous Lifetime need and heated humidity.    Obstructive sleep apnea (adult) (pediatric), Other dyspnea and respiratory abnormality       PRADAXA ANTICOAGULANT 75 MG capsule   Generic drug:  dabigatran ANTICOAGULANT      Take 150 mg by mouth 2 times daily. Store in original 's bottle or blister pack; use within 120 days of opening.        SIMVASTATIN PO      Take 20 mg by mouth daily        sucralfate 1 GM tablet    CARAFATE    180 tablet    Take 1 tablet (1 g) by mouth 2 times daily    Esophageal reflux       UNKNOWN TO PATIENT      Place 1 drop Into the left eye daily EYE DROP PRESCRIBED FOR CONJUNCTIVITIS        WELLBUTRIN XL PO      Take 150 mg by mouth every morning

## 2018-01-15 NOTE — PROGRESS NOTES
SUBJECTIVE:   Bairon Foster is a 86 year old male who presents to clinic today for the following health issues:      Edema      Duration: 3 weeks    Description (location/character/radiation): swelling in both legs    Intensity:  mild    Accompanying signs and symptoms: improving since ov 1/8/18    History (similar episodes/previous evaluation): wt down 7 lbs    Precipitating or alleviating factors: None    Therapies tried and outcome: Furosemide- effective             Problem list and histories reviewed & adjusted, as indicated.  Additional history: as documented    Patient Active Problem List   Diagnosis     Atrial fibrillation (H)     Pulmonary hypertension     CAD (coronary artery disease)     BPH (benign prostatic hyperplasia)     Ischemic cardiomyopathy     Pancytopenia (H)     JIM (obstructive sleep apnea)- mild/moderate     Acute stress reaction     Health Care Home     Vitamin D deficiency     Advance Care Planning     Gastroesophageal reflux disease without esophagitis     Hydrocele, unspecified hydrocele type     Slow transit constipation     Hypercholesterolemia     Non morbid obesity due to excess calories w comorbid CAD< hi LDL      Venous stasis dermatitis of both lower extremities     Lymphedema of both lower extremities     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      Coronary artery disease due to lipid rich plaque     Acquired hypothyroidism     Onychomycosis     Overweight (BMI 25.0-29.9)     PAD (peripheral artery disease) (H)     Screening for prostate cancer     Blood in stool     Umbilical hernia without obstruction and without gangrene     Xerosis cutis     Past Surgical History:   Procedure Laterality Date     EYE SURGERY      CATARACT/BLEPHAROPLASTY     GENITOURINARY SURGERY      TUNA     HERNIA REPAIR      RIGHT INGUINAL     HERNIORRHAPHY INGUINAL  8/14/2012    Procedure: HERNIORRHAPHY INGUINAL;  right inguinal hernia repair;  Surgeon: Kareem Torrez MD;  Location: [facility]      HERNIORRHAPHY UMBILICAL N/A 10/31/2017    Procedure: HERNIORRHAPHY UMBILICAL;  UMBILICAL HERNIA REPAIR WITH MESH;  Surgeon: Scar Harrington MD;  Location:  [facility]     IMPLANT PACEMAKER  8/2009    single chamber     ORTHOPEDIC SURGERY      [facility] RIGHT     ORTHOPEDIC SURGERY  2010    right TKR, left TKR     SURGICAL HISTORY OF -       Thumb surgery     SURGICAL HISTORY OF -   1990s    Left total knee replacement     SURGICAL HISTORY OF -   3/11    Rig total knee replacement       Social History   Substance Use Topics     Smoking status: Never Smoker     Smokeless tobacco: Never Used      Comment: per encounter 2/12/07     Alcohol use 0.0 oz/week     0 Standard drinks or equivalent per week      Comment: 1/month     Family History   Problem Relation Age of Onset     Cardiovascular Father      C.A.D. Father      CANCER Sister      lung     Unknown/Adopted Mother      Cancer - colorectal No family hx of      DIABETES No family hx of      Coronary Artery Disease No family hx of      Hypertension No family hx of      Hyperlipidemia No family hx of      CEREBROVASCULAR DISEASE No family hx of      Breast Cancer No family hx of      Colon Cancer No family hx of      Prostate Cancer No family hx of      Other Cancer No family hx of      Depression No family hx of      Anxiety Disorder No family hx of      MENTAL ILLNESS No family hx of      Substance Abuse No family hx of      Anesthesia Reaction No family hx of      Asthma No family hx of      OSTEOPOROSIS No family hx of      Genetic Disorder No family hx of      Thyroid Disease No family hx of      Obesity No family hx of              Reviewed and updated as needed this visit by clinical staffTobacco  Allergies  Meds  Med Hx  Surg Hx  Fam Hx  Soc Hx      Reviewed and updated as needed this visit by Provider         ROS:  Constitutional, HEENT, cardiovascular, pulmonary, gi and gu systems are negative, except as otherwise noted.      OBJECTIVE:                                                     /70  Pulse 66  Temp 96.4  F (35.8  C) (Tympanic)  Resp 16  Wt 211 lb (95.7 kg)  BMI 28.62 kg/m2  Body mass index is 28.62 kg/(m^2).  GENERAL APPEARANCE: healthy, alert and no distress  MS: pitting 1+ lower extremity right and 2+ edema left  Diagnostic test results:  Results for orders placed or performed in visit on 01/08/18   Electrolyte panel (Na, K, Cl, CO2, Anion gap)   Result Value Ref Range    Sodium 143 133 - 144 mmol/L    Potassium 3.9 3.4 - 5.3 mmol/L    Chloride 108 94 - 109 mmol/L    Carbon Dioxide 27 20 - 32 mmol/L    Anion Gap 8 3 - 14 mmol/L   Creatinine   Result Value Ref Range    Creatinine 0.97 0.66 - 1.25 mg/dL    GFR Estimate 73 >60 mL/min/1.7m2    GFR Estimate If Black 89 >60 mL/min/1.7m2   Urea nitrogen   Result Value Ref Range    Urea Nitrogen 17 7 - 30 mg/dL   BNP-N terminal pro   Result Value Ref Range    N-Terminal Pro Bnp 356 0 - 450 pg/mL          ASSESSMENT/PLAN:                                                        ICD-10-CM    1. Venous stasis dermatitis of both lower extremities I87.2 Electrolyte panel (Na, K, Cl, CO2, Anion gap)     Creatinine     Urea nitrogen   2. Lymphedema of both lower extremities I89.0 Electrolyte panel (Na, K, Cl, CO2, Anion gap)     Creatinine     Urea nitrogen       Patient Instructions   Will check labs. Follow up will be determined after review.      Mg Nelson MD  [facility]

## 2018-01-16 LAB
ANION GAP SERPL CALCULATED.3IONS-SCNC: 9 MMOL/L (ref 3–14)
BUN SERPL-MCNC: 15 MG/DL (ref 7–30)
CHLORIDE SERPL-SCNC: 107 MMOL/L (ref 94–109)
CO2 SERPL-SCNC: 27 MMOL/L (ref 20–32)
CREAT SERPL-MCNC: 1.01 MG/DL (ref 0.66–1.25)
GFR SERPL CREATININE-BSD FRML MDRD: 70 ML/MIN/1.7M2
POTASSIUM SERPL-SCNC: 3.6 MMOL/L (ref 3.4–5.3)
SODIUM SERPL-SCNC: 143 MMOL/L (ref 133–144)

## 2018-01-16 NOTE — PROGRESS NOTES
Dear Alfa,    Your tests were all normal. A copy of your tests are available in My Chart.    Glad to see you at your appointment.  If you have any questions feel free to call.      Sincerely,      SHADY Ashraf.

## 2018-01-18 DIAGNOSIS — R39.11 BENIGN PROSTATIC HYPERPLASIA WITH URINARY HESITANCY: Primary | ICD-10-CM

## 2018-01-18 DIAGNOSIS — N40.1 BENIGN PROSTATIC HYPERPLASIA WITH URINARY HESITANCY: Primary | ICD-10-CM

## 2018-01-18 DIAGNOSIS — I27.20 PULMONARY HYPERTENSION (H): ICD-10-CM

## 2018-01-18 RX ORDER — POTASSIUM CHLORIDE 1500 MG/1
20 TABLET, EXTENDED RELEASE ORAL DAILY
Qty: 90 TABLET | Refills: 3 | Status: SHIPPED | OUTPATIENT
Start: 2018-01-18 | End: 2018-07-25

## 2018-01-18 RX ORDER — TAMSULOSIN HYDROCHLORIDE 0.4 MG/1
0.4 CAPSULE ORAL DAILY
Qty: 30 CAPSULE | Refills: 3 | Status: SHIPPED | OUTPATIENT
Start: 2018-01-18 | End: 2018-04-13

## 2018-01-18 NOTE — TELEPHONE ENCOUNTER
Requested Prescriptions   Pending Prescriptions Disp Refills     potassium chloride SA (KLOR-CON) 20 MEQ CR tablet  Last Written Prescription Date:     Last Fill Quantity:  ,  # refills:  Reported by patient   Last Office Visit  1/15/2018        with  [facility] or [facility] prescribing provider:     Future Office Visit:    Next 5 appointments (look out 90 days)     Jan 24, 2018  1:30 PM CST   Return Visit with WILLIS Spence CNP   [facility])    [address] [place] [address]   [phone]            Mar 06, 2018  1:45 PM CST   Return Visit with Segundo Hope MD   [facility] ([facility])    [address] [place] [address]   [phone]                    90 tablet 3     Sig: Take 1 tablet (20 mEq) by mouth daily    There is no refill protocol information for this order

## 2018-01-18 NOTE — TELEPHONE ENCOUNTER
Pt left message with Edilia at receptions stating he is out of medication.  Routing refill request to provider for review/approval because:  Medication is reported/historical      Last OV 01/15/2018

## 2018-01-18 NOTE — TELEPHONE ENCOUNTER
"Requested Prescriptions   Pending Prescriptions Disp Refills     potassium chloride SA (KLOR-CON) 20 MEQ CR tablet 90 tablet 3     Sig: Take 1 tablet (20 mEq) by mouth daily    Potassium Supplements Protocol Passed    1/18/2018  2:56 PM       Passed - Recent or future visit with authorizing provider's specialty    Patient had office visit in the last year or has a visit in the next 30 days with authorizing provider.  See \"Patient Info\" tab in inbasket, or \"Choose Columns\" in Meds & Orders section of the refill encounter.            Passed - Patient is age 18 or older       Passed - Normal serum potassium in past 12 months    Recent Labs   Lab Test  01/15/18   1445   POTASSIUM  3.6                    tamsulosin (FLOMAX) 0.4 MG capsule 30 capsule 1     Sig: Take 1 capsule (0.4 mg) by mouth daily    Alpha Blockers Passed    1/18/2018  2:56 PM       Passed - BP is less than 140/90    BP Readings from Last 3 Encounters:   01/15/18 120/70   01/10/18 123/69   01/08/18 124/70                Passed - Recent or future visit with authorizing provider's specialty    Patient had office visit in the last year or has a visit in the next 30 days with authorizing provider.  See \"Patient Info\" tab in inbasket, or \"Choose Columns\" in Meds & Orders section of the refill encounter.            Passed - Patient does not have Tadalafil, Vardenafil, or Sildenafil on their medication list       Passed - Patient is 18 years of age or older          "

## 2018-01-22 ENCOUNTER — TELEPHONE (OUTPATIENT)
Dept: CARDIOLOGY | Facility: CLINIC | Age: 83
End: 2018-01-22

## 2018-01-22 NOTE — TELEPHONE ENCOUNTER
Pt called stating that he would like to cancel his OV with Anushka for 1/24/18 as he states that everything is looking good and he is feeling well. Pt confirmed OV with Dr. Hope 3/6/18.     OV 1/10/18 with Anushka NP   We tentatively set up a follow-up appointment with me in a couple of weeks.  I think that this is unlikely to be heart failure given his completely normal nt BNP and normal respiratory exam.  If he continues to have lower extremity edema, I will see him and we can talk about foam scleotherapyl.  However at this point I'm hopeful that a little bit of diuretic will help with his symptoms and he can cancel his follow-up appointment.  He should see Dr. Hope in March.

## 2018-03-01 ENCOUNTER — PRE VISIT (OUTPATIENT)
Dept: CARDIOLOGY | Facility: CLINIC | Age: 83
End: 2018-03-01

## 2018-03-06 ENCOUNTER — OFFICE VISIT (OUTPATIENT)
Dept: CARDIOLOGY | Facility: CLINIC | Age: 83
End: 2018-03-06
Attending: NURSE PRACTITIONER
Payer: COMMERCIAL

## 2018-03-06 VITALS
DIASTOLIC BLOOD PRESSURE: 77 MMHG | SYSTOLIC BLOOD PRESSURE: 127 MMHG | HEART RATE: 62 BPM | BODY MASS INDEX: 28.92 KG/M2 | WEIGHT: 213.5 LBS | HEIGHT: 72 IN

## 2018-03-06 DIAGNOSIS — I83.813 VARICOSE VEINS OF BILATERAL LOWER EXTREMITIES WITH PAIN: ICD-10-CM

## 2018-03-06 DIAGNOSIS — I27.20 PULMONARY HYPERTENSION (H): ICD-10-CM

## 2018-03-06 DIAGNOSIS — I25.10 CORONARY ARTERY DISEASE INVOLVING NATIVE CORONARY ARTERY OF NATIVE HEART WITHOUT ANGINA PECTORIS: ICD-10-CM

## 2018-03-06 DIAGNOSIS — Z95.0 CARDIAC PACEMAKER IN SITU: ICD-10-CM

## 2018-03-06 DIAGNOSIS — R06.02 SOB (SHORTNESS OF BREATH): Primary | ICD-10-CM

## 2018-03-06 DIAGNOSIS — I20.0 UNSTABLE ANGINA (H): ICD-10-CM

## 2018-03-06 PROCEDURE — 93005 ELECTROCARDIOGRAM TRACING: CPT | Performed by: INTERNAL MEDICINE

## 2018-03-06 PROCEDURE — 99215 OFFICE O/P EST HI 40 MIN: CPT | Performed by: INTERNAL MEDICINE

## 2018-03-06 NOTE — PROGRESS NOTES
Service Date: 03/06/2018      HISTORY OF PRESENT ILLNESS:  Mr. Foster is a very pleasant 87-year-old gentleman with history of known coronary artery disease, ischemic cardiomyopathy, ventricular tachycardia, history of coronary stenting back in 2009 to the obtuse marginal branch.  He also has venous insufficiency for which he has seen Dr. London Carrizales and Mackenzie.  He has 1-2+ mitral regurgitation.  He comes in with complaints today of exertional chest tightness when walking to the tunnels to get to the hospital or clinic.  He does not go for walks otherwise.  It is described as a tightness or pressure sensation across his chest with some lightheadedness.  He has had no syncope or near-syncope.  He denies any PND or orthopnea.  He does have pedal edema which has somewhat increased as well and he has been seeing Mackenzie for this as well.  He has otherwise been compliant with his medications.  His review of systems is otherwise positive for some dyspnea on exertion as well as some rash which he is referred to Dermatology for.  We placed him on Imdur last year for similar symptoms where he had some temporary improvement; however, it has now only returned with mild activity.      ASSESSMENT AND PLAN:  Overall, Mr. Foster is a very pleasant gentleman with known history of coronary disease, history of ventricular arrhythmia status post permanent pacer, venous insufficiency, mild to moderate mitral regurgitation who presents with crescendo type symptoms consistent with possible angina pectoris.  A previous nuclear stress test showed only a small area of ischemia.  Given his symptoms, I feel it is most prudent to proceed with cardiac catheterization, coronary angiogram and possible coronary intervention.  The risks and benefits of the procedure have been explained to the patient who agrees to proceed.  I will also have him follow back up with Mackenzie and Dr. Carrizales to see whether or not he needs recurrent venous therapy as his  edema is clearly increased.         SUE BOONE MD [facility]             D: 2018   T: 2018   MT: JORGE      Name:     HIRO IBARRA   MRN:      [id number]        Account:      [id number]   :      1931           Service Date: 2018      Document: [id number]

## 2018-03-06 NOTE — MR AVS SNAPSHOT
After Visit Summary   3/6/2018    Bairon Foster    MRN: [id number]           Patient Information     Date Of Birth          2/6/1931        Visit Information        Provider Department      3/6/2018 1:45 PM Segundo Hope MD [facility]        Today's Diagnoses     SOB (shortness of breath)    -  1    Coronary artery disease involving native coronary artery of native heart without angina pectoris        Cardiac pacemaker in situ        Varicose 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        Pulmonary hypertension        Unstable angina (H)           Follow-ups after your visit        Additional Services     Follow-Up with Cardiac Advanced Practice Provider       Mackenzie for venous disease                  Your next 10 appointments already scheduled     Mar 21, 2018  8:30 AM CDT   Cath 90 Minute with SHCVR1   [facility] ([facility])    [address]  [place] [address] [phone]            Apr 12, 2018  8:30 AM CDT   Return Visit with WILLIS Spence [facility] ([facility])    [address]  [place] [address] [phone]            Jun 14, 2018 10:15 AM CDT   Phone Device Check with ARRIETA TECH2   [facility] ([facility])    [id number]  [facility] [address] [phone]              Who to contact     If you have questions or need follow up information about today's clinic visit or your schedule please contact [facility] directly at [phone].  Normal or non-critical lab and imaging results will be communicated to you by MyChart, letter or phone within 4 business days after the clinic has received the results. If you do not hear from us within 7 days, please contact the clinic through  "MyChart or phone. If you have a critical or abnormal lab result, we will notify you by phone as soon as possible.  Submit refill requests through drumbi or call your pharmacy and they will forward the refill request to us. Please allow 3 business days for your refill to be completed.          Additional Information About Your Visit        RegaloCardhart Information     drumbi gives you secure access to your electronic health record. If you see a primary care provider, you can also send messages to your care team and make appointments. If you have questions, please call your primary care clinic.  If you do not have a primary care provider, please call [phone] and they will assist you.        Care EveryWhere ID     This is your Care EveryWhere ID. This could be used by other organizations to access your [facility] medical records  [id number]        Your Vitals Were     Pulse Height BMI (Body Mass Index)             62 1.829 m (6' 0.01\") 28.95 kg/m2          Blood Pressure from Last 3 Encounters:   No data found for BP    Weight from Last 3 Encounters:   No data found for Wt              Today, you had the following     No orders found for display       Primary Care Provider Office Phone # Fax #    Mg Nelson [phone]       [address] [facility] [address]        Goals        General    I will find a caregiver support group to attend (pt-stated)     Notes - Note created  4/17/2014  8:49 AM by Beth Morales RN    As of today's date 4/17/2014 goal is met at 0 - 25%.   Goal Status:  Active        Equal Access to Services     [facility] AH: Hadii aad ku hadasho Soomaali, waaxda luqadaha, qaybta kaalmada adeegyada, kathryn pathak. So [facility] [phone].    ATENCIÓN: Si habla español, tiene a malone disposición servicios gratuitos de asistencia lingüística. Llame al [phone].    We comply with applicable federal civil rights laws and [place] laws. We do not " discriminate on the basis of race, color, national origin, age, disability, sex, sexual orientation, or gender identity.            Thank you!     Thank you for choosing [facility]  for your care. Our goal is always to provide you with excellent care. Hearing back from our patients is one way we can continue to improve our services. Please take a few minutes to complete the written survey that you may receive in the mail after your visit with us. Thank you!             Your Updated Medication List - Protect others around you: Learn how to safely use, store and throw away your medicines at www.disposemymeds.org.          This list is accurate as of 3/6/18 11:59 PM.  Always use your most recent med list.                   Brand Name Dispense Instructions for use Diagnosis    Carboxymethylcellulose Sod PF 1 % ophthalmic solution    CELLUVISC/REFRESH LIQUIGEL     Place 1 drop into both eyes 3 times daily as needed for dry eyes        carvedilol 6.25 MG tablet    COREG    180 tablet    Take 1 tablet (6.25 mg) by mouth 2 times daily (with meals)    Ischemic cardiomyopathy       CENTRUM SILVER per tablet      Take 1 tablet by mouth daily        COMPRESSION STOCKINGS     2 each    1 each daily    Varicose 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       FINASTERIDE PO      Take 5 mg by mouth daily        furosemide 40 MG tablet    LASIX    45 tablet    Take 1.5 tablets (60 mg) by mouth daily    Dependent edema       GLUCOSAMINE-CHONDROITIN PO      Take 2 tablets by mouth daily        isosorbide mononitrate 30 MG 24 hr tablet    IMDUR    30 tablet    Take 1 tablet (30 mg) by mouth daily    SOB (shortness of breath)       levothyroxine 100 MCG tablet    SYNTHROID/LEVOTHROID    90 tablet    Take 1 tablet (100 mcg) by mouth daily    Acquired hypothyroidism       LORazepam 1 MG tablet    ATIVAN    30 tablet    Take 0.5-1 tablets (0.5-1 mg) by mouth every 8 hours as needed for anxiety     Acute stress reaction       MIRALAX PO      Take 1 packet by mouth At Bedtime        omega-3 fatty acids 1200 MG capsule      Take 1 capsule by mouth daily        omeprazole 20 MG CR capsule    priLOSEC    180 capsule    Take 1 capsule (20 mg) by mouth 2 times daily    Gastroesophageal reflux disease without esophagitis       order for DME     1 Device    Auto-CPAP:Max 9 cm H2OMin 9 cm H2O Continuous Lifetime need and heated humidity.    Obstructive sleep apnea (adult) (pediatric), Other dyspnea and respiratory abnormality       potassium chloride SA 20 MEQ CR tablet    KLOR-CON    90 tablet    Take 1 tablet (20 mEq) by mouth daily    Pulmonary hypertension       PRADAXA ANTICOAGULANT 75 MG capsule   Generic drug:  dabigatran ANTICOAGULANT      Take 150 mg by mouth 2 times daily. Store in original 's bottle or blister pack; use within 120 days of opening.        SIMVASTATIN PO      Take 20 mg by mouth daily        sucralfate 1 GM tablet    CARAFATE    180 tablet    Take 1 tablet (1 g) by mouth 2 times daily    Esophageal reflux       tamsulosin 0.4 MG capsule    FLOMAX    30 capsule    Take 1 capsule (0.4 mg) by mouth daily    Benign prostatic hyperplasia with urinary hesitancy       UNKNOWN TO PATIENT      Place 1 drop Into the left eye daily EYE DROP PRESCRIBED FOR CONJUNCTIVITIS        WELLBUTRIN XL PO      Take 150 mg by mouth every morning

## 2018-03-06 NOTE — LETTER
3/6/2018      Mg Nelson MD  [address]  [facility] [address]      RE: Bairon DOWNS Cristian       Dear Colleague,    I had the pleasure of seeing Bairon Foster in the [facility].    Service Date: 03/06/2018      HISTORY OF PRESENT ILLNESS:  Mr. Foster is a very pleasant 87-year-old gentleman with history of known coronary artery disease, ischemic cardiomyopathy, ventricular tachycardia, history of coronary stenting back in 2009 to the obtuse marginal branch.  He also has venous insufficiency for which he has seen Dr. London Carrizales and Mackenzie.  He has 1-2+ mitral regurgitation.  He comes in with complaints today of exertional chest tightness when walking to the tunnels to get to the hospital or clinic.  He does not go for walks otherwise.  It is described as a tightness or pressure sensation across his chest with some lightheadedness.  He has had no syncope or near-syncope.  He denies any PND or orthopnea.  He does have pedal edema which has somewhat increased as well and he has been seeing Mackenzie for this as well.  He has otherwise been compliant with his medications.  His review of systems is otherwise positive for some dyspnea on exertion as well as some rash which he is referred to Dermatology for.  We placed him on Imdur last year for similar symptoms where he had some temporary improvement; however, it has now only returned with mild activity.      ASSESSMENT AND PLAN:  Overall, Mr. Foster is a very pleasant gentleman with known history of coronary disease, history of ventricular arrhythmia status post permanent pacer, venous insufficiency, mild to moderate mitral regurgitation who presents with crescendo type symptoms consistent with possible angina pectoris.  A previous nuclear stress test showed only a small area of ischemia.  Given his symptoms, I feel it is most prudent to proceed with cardiac catheterization, coronary angiogram and possible coronary  intervention.  The risks and benefits of the procedure have been explained to the patient who agrees to proceed.  I will also have him follow back up with Mackenzie and Dr. Carrizales to see whether or not he needs recurrent venous therapy as his edema is clearly increased.         SUE BOONE MD [facility]             D: 2018   T: 2018   MT: JORGE      Name:     HIRO IBARRA   MRN:      [id number]        Account:      [id number]   :      1931           Service Date: 2018      Document: [id number]         Outpatient Encounter Prescriptions as of 3/6/2018   Medication Sig Dispense Refill     potassium chloride SA (KLOR-CON) 20 MEQ CR tablet Take 1 tablet (20 mEq) by mouth daily 90 tablet 3     tamsulosin (FLOMAX) 0.4 MG capsule Take 1 capsule (0.4 mg) by mouth daily 30 capsule 3     furosemide (LASIX) 40 MG tablet Take 1.5 tablets (60 mg) by mouth daily 45 tablet 3     Multiple Vitamins-Minerals (CENTRUM SILVER) per tablet Take 1 tablet by mouth daily       Carboxymethylcellulose Sod PF (CELLUVISC/REFRESH LIQUIGEL) 1 % ophthalmic solution Place 1 drop into both eyes 3 times daily as needed for dry eyes       BuPROPion HCl (WELLBUTRIN XL PO) Take 150 mg by mouth every morning       FINASTERIDE PO Take 5 mg by mouth daily       GLUCOSAMINE-CHONDROITIN PO Take 2 tablets by mouth daily       Polyethylene Glycol 3350 (MIRALAX PO) Take 1 packet by mouth At Bedtime        SIMVASTATIN PO Take 20 mg by mouth daily       levothyroxine (SYNTHROID/LEVOTHROID) 100 MCG tablet Take 1 tablet (100 mcg) by mouth daily 90 tablet 3     carvedilol (COREG) 6.25 MG tablet Take 1 tablet (6.25 mg) by mouth 2 times daily (with meals) 180 tablet 3     isosorbide mononitrate (IMDUR) 30 MG 24 hr tablet Take 1 tablet (30 mg) by mouth daily 30 tablet 11     sucralfate (CARAFATE) 1 GM tablet Take 1 tablet (1 g) by mouth 2 times daily 180 tablet 1     omeprazole (PRILOSEC) 20 MG CR capsule Take 1 capsule (20 mg) by mouth 2 times  daily 180 capsule 3     LORazepam (ATIVAN) 1 MG tablet Take 0.5-1 tablets (0.5-1 mg) by mouth every 8 hours as needed for anxiety 30 tablet 5     dabigatran ANTICOAGULANT (PRADAXA ANTICOAGULANT) 75 MG CAPS BLISTER PACK Take 150 mg by mouth 2 times daily. Store in original 's bottle or blister pack; use within 120 days of opening.       Omega-3 Fatty Acids (FISH OIL) 1200 MG capsule Take 1 capsule by mouth daily        UNKNOWN TO PATIENT Place 1 drop Into the left eye daily EYE DROP PRESCRIBED FOR CONJUNCTIVITIS       [DISCONTINUED] Potassium Chloride Sylvia ER (K-DUR PO) Take 20 mEq by mouth daily       [DISCONTINUED] Tamsulosin HCl (FLOMAX PO) Take 0.4 mg by mouth daily       [DISCONTINUED] ASPIRIN NOT PRESCRIBED (INTENTIONAL) Please choose reason not prescribed, below 1 each 0     COMPRESSION STOCKINGS 1 each daily 2 each 0     ORDER FOR DME Auto-CPAP:Max 9 cm H2OMin 9 cm H2O  Continuous Lifetime need and heated humidity.    1 Device 0     No facility-administered encounter medications on file as of 3/6/2018.        Again, thank you for allowing me to participate in the care of your patient.      Sincerely,    SUE BOONE MD     [facility]

## 2018-03-06 NOTE — PROGRESS NOTES
HPI and Plan:   See dictation    Orders Placed This Encounter   Procedures     Follow-Up with Cardiac Advanced Practice Provider     EKG 12-lead complete w/read - Clinics (performed today)     No orders of the defined types were placed in this encounter.    Medications Discontinued During This Encounter   Medication Reason     Potassium Chloride Sylvia ER (K-DUR PO) Medication Reconciliation Clean Up     Tamsulosin HCl (FLOMAX PO) Medication Reconciliation Clean Up         Encounter Diagnoses   Name Primary?     Coronary artery disease involving native coronary artery of native heart without angina pectoris      SOB (shortness of breath) Yes     Cardiac pacemaker in situ      Varicose 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      Pulmonary hypertension      Unstable angina (H)        CURRENT MEDICATIONS:  Current Outpatient Prescriptions   Medication Sig Dispense Refill     potassium chloride SA (KLOR-CON) 20 MEQ CR tablet Take 1 tablet (20 mEq) by mouth daily 90 tablet 3     tamsulosin (FLOMAX) 0.4 MG capsule Take 1 capsule (0.4 mg) by mouth daily 30 capsule 3     furosemide (LASIX) 40 MG tablet Take 1.5 tablets (60 mg) by mouth daily 45 tablet 3     Multiple Vitamins-Minerals (CENTRUM SILVER) per tablet Take 1 tablet by mouth daily       Carboxymethylcellulose Sod PF (CELLUVISC/REFRESH LIQUIGEL) 1 % ophthalmic solution Place 1 drop into both eyes 3 times daily as needed for dry eyes       BuPROPion HCl (WELLBUTRIN XL PO) Take 150 mg by mouth every morning       FINASTERIDE PO Take 5 mg by mouth daily       GLUCOSAMINE-CHONDROITIN PO Take 2 tablets by mouth daily       Polyethylene Glycol 3350 (MIRALAX PO) Take 1 packet by mouth At Bedtime        SIMVASTATIN PO Take 20 mg by mouth daily       levothyroxine (SYNTHROID/LEVOTHROID) 100 MCG tablet Take 1 tablet (100 mcg) by mouth daily 90 tablet 3     carvedilol (COREG) 6.25 MG tablet Take 1 tablet (6.25 mg) by mouth 2 times daily (with meals) 180 tablet 3      isosorbide mononitrate (IMDUR) 30 MG 24 hr tablet Take 1 tablet (30 mg) by mouth daily 30 tablet 11     sucralfate (CARAFATE) 1 GM tablet Take 1 tablet (1 g) by mouth 2 times daily 180 tablet 1     omeprazole (PRILOSEC) 20 MG CR capsule Take 1 capsule (20 mg) by mouth 2 times daily 180 capsule 3     LORazepam (ATIVAN) 1 MG tablet Take 0.5-1 tablets (0.5-1 mg) by mouth every 8 hours as needed for anxiety 30 tablet 5     dabigatran ANTICOAGULANT (PRADAXA ANTICOAGULANT) 75 MG CAPS BLISTER PACK Take 150 mg by mouth 2 times daily. Store in original 's bottle or blister pack; use within 120 days of opening.       Omega-3 Fatty Acids (FISH OIL) 1200 MG capsule Take 1 capsule by mouth daily        UNKNOWN TO PATIENT Place 1 drop Into the left eye daily EYE DROP PRESCRIBED FOR CONJUNCTIVITIS       ASPIRIN NOT PRESCRIBED (INTENTIONAL) Please choose reason not prescribed, below 1 each 0     COMPRESSION STOCKINGS 1 each daily 2 each 0     ORDER FOR DME Auto-CPAP:Max 9 cm H2OMin 9 cm H2O  Continuous Lifetime need and heated humidity.    1 Device 0       ALLERGIES   No Known Allergies    PAST MEDICAL HISTORY:  Past Medical History:   Diagnosis Date     Acute, but ill-defined, cerebrovascular disease     NO RESIDUALS     Antiplatelet or antithrombotic long-term use      Arrhythmia     a fib     Blood in stool      Coronary atherosclerosis of unspecified type of vessel, native or graft     S/P PTCA, EF 50%     Esophageal reflux      Hydrocele, unspecified      Hypercholesterolemia      Hypersomnia with sleep apnea, unspecified      Inguinal hernia without mention of obstruction or gangrene, recurrent unilateral or unspecified      Ischemic cardiomyopathy      Major depression in complete remission (H)      Obese      Onychomycosis      JIM on CPAP      Other and unspecified hyperlipidemia      Other lymphedema     GROIN AND THIGHS     Other pancytopenia (H)      Other specified anemias      Overweight      Pacemaker       PAD (peripheral artery disease) (H)      Pancytopenia (H)      Persistent atrial fibrillation (H)     VVI PM for long pauses     Prostatic hypertrophy, benign      Pulmonary hypertension      Scrotal varices      Thrombocytopenia, unspecified      Umbilical hernia without obstruction and without gangrene      Unilateral or unspecified femoral hernia without mention of obstruction or gangrene, unilateral or unspecified      Unspecified hypothyroidism      Venous stasis dermatitis of both lower extremities      Vitamin D deficiency        PAST SURGICAL HISTORY:  Past Surgical History:   Procedure Laterality Date     EYE SURGERY      CATARACT/BLEPHAROPLASTY     GENITOURINARY SURGERY      TUNA     HERNIA REPAIR      RIGHT INGUINAL     HERNIORRHAPHY INGUINAL  8/14/2012    Procedure: HERNIORRHAPHY INGUINAL;  right inguinal hernia repair;  Surgeon: Kareem Torrez MD;  Location: [facility]     HERNIORRHAPHY UMBILICAL N/A 10/31/2017    Procedure: HERNIORRHAPHY UMBILICAL;  UMBILICAL HERNIA REPAIR WITH MESH;  Surgeon: Scar Harrington MD;  Location: [facility]     IMPLANT PACEMAKER  8/2009    single chamber     ORTHOPEDIC SURGERY      [facility] [place] RIGHT     ORTHOPEDIC SURGERY  2010    right TKR, left TKR     SURGICAL HISTORY OF -       Thumb surgery     SURGICAL HISTORY OF -   1990s    Left total knee replacement     SURGICAL HISTORY OF -   3/11    [facility] total knee replacement       FAMILY HISTORY:  Family History   Problem Relation Age of Onset     Cardiovascular Father      C.A.D. Father      CANCER Sister      lung     Unknown/Adopted Mother      Cancer - colorectal No family hx of      DIABETES No family hx of      Coronary Artery Disease No family hx of      Hypertension No family hx of      Hyperlipidemia No family hx of      CEREBROVASCULAR DISEASE No family hx of      Breast Cancer No family hx of      Colon Cancer No family hx of      Prostate Cancer No family hx of      Other Cancer No family hx of      Depression No family hx of       "Anxiety Disorder No family hx of      MENTAL ILLNESS No family hx of      Substance Abuse No family hx of      Anesthesia Reaction No family hx of      Asthma No family hx of      OSTEOPOROSIS No family hx of      Genetic Disorder No family hx of      Thyroid Disease No family hx of      Obesity No family hx of        SOCIAL HISTORY:  Social History     Social History     Marital status:      Spouse name: N/A     Number of children: N/A     Years of education: N/A     Occupational History     Teacher, author, speaker Retired     Social History Main Topics     Smoking status: Never Smoker     Smokeless tobacco: Never Used      Comment: per encounter 2/12/07     Alcohol use 0.0 oz/week     0 Standard drinks or equivalent per week      Comment: 1/month     Drug use: No     Sexual activity: No     Other Topics Concern     Parent/Sibling W/ Cabg, Mi Or Angioplasty Before 65f 55m? No     Caffeine Concern No     1-2 cups coffee a day     Sleep Concern No     Stress Concern Yes     due to wifes health condition     Weight Concern No     Special Diet No     Exercise Yes     states he uses his tredmill on and off     Seat Belt Yes     Social History Narrative       Review of Systems:  Skin:  not assessed     Eyes:  Positive for glasses;cataracts  ENT:  Negative    Respiratory:  Positive for sleep apnea;CPAP;dyspnea on exertion  Cardiovascular:    Positive for;edema;heaviness  Gastroenterology: Negative    Genitourinary:  not assessed    Musculoskeletal:  Positive for arthritis  Neurologic:  Positive for stroke;numbness or tingling of feet  Psychiatric:  Positive for anxiety  Heme/Lymph/Imm:  Negative    Endocrine:  Positive for thyroid disorder    Physical Exam:  Vitals: /77  Pulse 62  Ht 1.829 m (6' 0.01\")  Wt 96.8 kg (213 lb 8 oz)  BMI 28.95 kg/m2    Constitutional:  cooperative, alert and oriented, well developed, well nourished, in no acute distress        Skin:  warm and dry to the touch, no apparent " skin lesions or masses noted          Head:  normocephalic, no masses or lesions        Eyes:  pupils equal and round        Lymph:      ENT:  no pallor or cyanosis        Neck:  JVP normal        Respiratory:  normal breath sounds, clear to auscultation, normal A-P diameter, normal symmetry, normal respiratory excursion, no use of accessory muscles         Cardiac: regular rhythm, normal S1/S2, no S3 or S4, apical impulse not displaced, no murmurs, gallops or rubs       grade 1;LLSB;early systolic murmur        pulses full and equal venous stasis changes bilaterally                                 Doppler triphasic R DP, Blunted biphasic L DP on portable to auscultation.    GI:  abdomen soft   benign    Extremities and Muscular Skeletal:  no deformities, clubbing, cyanosis, erythema observed;no edema stasis pigmentation bilateral LE edema;pitting;2+;L greater than R     venous varicosities    Neurological:  no gross motor deficits        Psych:  Alert and Oriented x 3        Recent Lab Results:  LIPID RESULTS:  Lab Results   Component Value Date    CHOL 106 09/08/2017    HDL 54 09/08/2017    LDL 38 09/08/2017    TRIG 71 09/08/2017    CHOLHDLRATIO 2.1 06/15/2015       LIVER ENZYME RESULTS:  Lab Results   Component Value Date    AST 25 12/08/2015    ALT 22 09/08/2017       CBC RESULTS:  Lab Results   Component Value Date    WBC 2.6 (L) 10/23/2017    RBC 3.39 (L) 10/23/2017    HGB 11.1 (L) 10/23/2017    HCT 34.0 (L) 10/23/2017     10/23/2017    MCH 32.7 10/23/2017    MCHC 32.6 10/23/2017    RDW 15.6 (H) 10/23/2017    PLT 73 (L) 10/23/2017       BMP RESULTS:  Lab Results   Component Value Date     01/15/2018    POTASSIUM 3.6 01/15/2018    CHLORIDE 107 01/15/2018    CO2 27 01/15/2018    ANIONGAP 9 01/15/2018     (H) 09/08/2017    BUN 15 01/15/2018    CR 1.01 01/15/2018    GFRESTIMATED 70 01/15/2018    GFRESTBLACK 85 01/15/2018    TEODORO 9.2 09/08/2017        A1C RESULTS:  No results found for:  A1C    INR RESULTS:  Lab Results   Component Value Date    INR 1.38 (H) 03/19/2011    INR 1.40 (H) 03/18/2011           CC  Mackenzie Tejada, APRN CNP  [address]  [place] [phone]

## 2018-03-06 NOTE — LETTER
3/6/2018    Mg Nelson MD  [address]  [facility] [address]    RE: Bairon Foster       Dear Colleague,    I had the pleasure of seeing Bairon Foster in the [facility].    HPI and Plan:   See dictation    Orders Placed This Encounter   Procedures     Follow-Up with Cardiac Advanced Practice Provider     EKG 12-lead complete w/read - Clinics (performed today)     No orders of the defined types were placed in this encounter.    Medications Discontinued During This Encounter   Medication Reason     Potassium Chloride Sylvia ER (K-DUR PO) Medication Reconciliation Clean Up     Tamsulosin HCl (FLOMAX PO) Medication Reconciliation Clean Up         Encounter Diagnoses   Name Primary?     Coronary artery disease involving native coronary artery of native heart without angina pectoris      SOB (shortness of breath) Yes     Cardiac pacemaker in situ      Varicose 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      Pulmonary hypertension      Unstable angina (H)        CURRENT MEDICATIONS:  Current Outpatient Prescriptions   Medication Sig Dispense Refill     potassium chloride SA (KLOR-CON) 20 MEQ CR tablet Take 1 tablet (20 mEq) by mouth daily 90 tablet 3     tamsulosin (FLOMAX) 0.4 MG capsule Take 1 capsule (0.4 mg) by mouth daily 30 capsule 3     furosemide (LASIX) 40 MG tablet Take 1.5 tablets (60 mg) by mouth daily 45 tablet 3     Multiple Vitamins-Minerals (CENTRUM SILVER) per tablet Take 1 tablet by mouth daily       Carboxymethylcellulose Sod PF (CELLUVISC/REFRESH LIQUIGEL) 1 % ophthalmic solution Place 1 drop into both eyes 3 times daily as needed for dry eyes       BuPROPion HCl (WELLBUTRIN XL PO) Take 150 mg by mouth every morning       FINASTERIDE PO Take 5 mg by mouth daily       GLUCOSAMINE-CHONDROITIN PO Take 2 tablets by mouth daily       Polyethylene Glycol 3350 (MIRALAX PO) Take 1 packet by mouth At Bedtime        SIMVASTATIN PO Take 20 mg by mouth daily        levothyroxine (SYNTHROID/LEVOTHROID) 100 MCG tablet Take 1 tablet (100 mcg) by mouth daily 90 tablet 3     carvedilol (COREG) 6.25 MG tablet Take 1 tablet (6.25 mg) by mouth 2 times daily (with meals) 180 tablet 3     isosorbide mononitrate (IMDUR) 30 MG 24 hr tablet Take 1 tablet (30 mg) by mouth daily 30 tablet 11     sucralfate (CARAFATE) 1 GM tablet Take 1 tablet (1 g) by mouth 2 times daily 180 tablet 1     omeprazole (PRILOSEC) 20 MG CR capsule Take 1 capsule (20 mg) by mouth 2 times daily 180 capsule 3     LORazepam (ATIVAN) 1 MG tablet Take 0.5-1 tablets (0.5-1 mg) by mouth every 8 hours as needed for anxiety 30 tablet 5     dabigatran ANTICOAGULANT (PRADAXA ANTICOAGULANT) 75 MG CAPS BLISTER PACK Take 150 mg by mouth 2 times daily. Store in original 's bottle or blister pack; use within 120 days of opening.       Omega-3 Fatty Acids (FISH OIL) 1200 MG capsule Take 1 capsule by mouth daily        UNKNOWN TO PATIENT Place 1 drop Into the left eye daily EYE DROP PRESCRIBED FOR CONJUNCTIVITIS       ASPIRIN NOT PRESCRIBED (INTENTIONAL) Please choose reason not prescribed, below 1 each 0     COMPRESSION STOCKINGS 1 each daily 2 each 0     ORDER FOR DME Auto-CPAP:Max 9 cm H2OMin 9 cm H2O  Continuous Lifetime need and heated humidity.    1 Device 0       ALLERGIES   No Known Allergies    PAST MEDICAL HISTORY:  Past Medical History:   Diagnosis Date     Acute, but ill-defined, cerebrovascular disease     NO RESIDUALS     Antiplatelet or antithrombotic long-term use      Arrhythmia     a fib     Blood in stool      Coronary atherosclerosis of unspecified type of vessel, native or graft     S/P PTCA, EF 50%     Esophageal reflux      Hydrocele, unspecified      Hypercholesterolemia      Hypersomnia with sleep apnea, unspecified      Inguinal hernia without mention of obstruction or gangrene, recurrent unilateral or unspecified      Ischemic cardiomyopathy      Major depression in complete remission (H)       Obese      Onychomycosis      JIM on CPAP      Other and unspecified hyperlipidemia      Other lymphedema     GROIN AND THIGHS     Other pancytopenia (H)      Other specified anemias      Overweight      Pacemaker      PAD (peripheral artery disease) (H)      Pancytopenia (H)      Persistent atrial fibrillation (H)     VVI PM for long pauses     Prostatic hypertrophy, benign      Pulmonary hypertension      Scrotal varices      Thrombocytopenia, unspecified      Umbilical hernia without obstruction and without gangrene      Unilateral or unspecified femoral hernia without mention of obstruction or gangrene, unilateral or unspecified      Unspecified hypothyroidism      Venous stasis dermatitis of both lower extremities      Vitamin D deficiency        PAST SURGICAL HISTORY:  Past Surgical History:   Procedure Laterality Date     EYE SURGERY      CATARACT/BLEPHAROPLASTY     GENITOURINARY SURGERY      TUNA     HERNIA REPAIR      RIGHT INGUINAL     HERNIORRHAPHY INGUINAL  8/14/2012    Procedure: HERNIORRHAPHY INGUINAL;  right inguinal hernia repair;  Surgeon: Kareem Torrez MD;  Location: [facility]     HERNIORRHAPHY UMBILICAL N/A 10/31/2017    Procedure: HERNIORRHAPHY UMBILICAL;  UMBILICAL HERNIA REPAIR WITH MESH;  Surgeon: Scar Harrington MD;  Location: [facility]     IMPLANT PACEMAKER  8/2009    single chamber     ORTHOPEDIC SURGERY      [facility] RIGHT     ORTHOPEDIC SURGERY  2010    right TKR, left TKR     SURGICAL HISTORY OF -       Thumb surgery     SURGICAL HISTORY OF -   1990s    Left total knee replacement     SURGICAL HISTORY OF -   3/11    [facility] total knee replacement       FAMILY HISTORY:  Family History   Problem Relation Age of Onset     Cardiovascular Father      C.A.D. Father      CANCER Sister      lung     Unknown/Adopted Mother      Cancer - colorectal No family hx of      DIABETES No family hx of      Coronary Artery Disease No family hx of      Hypertension No family hx of      Hyperlipidemia No family hx of       CEREBROVASCULAR DISEASE No family hx of      Breast Cancer No family hx of      Colon Cancer No family hx of      Prostate Cancer No family hx of      Other Cancer No family hx of      Depression No family hx of      Anxiety Disorder No family hx of      MENTAL ILLNESS No family hx of      Substance Abuse No family hx of      Anesthesia Reaction No family hx of      Asthma No family hx of      OSTEOPOROSIS No family hx of      Genetic Disorder No family hx of      Thyroid Disease No family hx of      Obesity No family hx of        SOCIAL HISTORY:  Social History     Social History     Marital status:      Spouse name: N/A     Number of children: N/A     Years of education: N/A     Occupational History     Teacher, author, speaker Retired     Social History Main Topics     Smoking status: Never Smoker     Smokeless tobacco: Never Used      Comment: per encounter 2/12/07     Alcohol use 0.0 oz/week     0 Standard drinks or equivalent per week      Comment: 1/month     Drug use: No     Sexual activity: No     Other Topics Concern     Parent/Sibling W/ Cabg, Mi Or Angioplasty Before 65f 55m? No     Caffeine Concern No     1-2 cups coffee a day     Sleep Concern No     Stress Concern Yes     due to wifes health condition     Weight Concern No     Special Diet No     Exercise Yes     states he uses his tredmill on and off     Seat Belt Yes     Social History Narrative       Review of Systems:  Skin:  not assessed     Eyes:  Positive for glasses;cataracts  ENT:  Negative    Respiratory:  Positive for sleep apnea;CPAP;dyspnea on exertion  Cardiovascular:    Positive for;edema;heaviness  Gastroenterology: Negative    Genitourinary:  not assessed    Musculoskeletal:  Positive for arthritis  Neurologic:  Positive for stroke;numbness or tingling of feet  Psychiatric:  Positive for anxiety  Heme/Lymph/Imm:  Negative    Endocrine:  Positive for thyroid disorder    Physical Exam:  Vitals: /77  Pulse 62  Ht 1.829 m  "(6' 0.01\")  Wt 96.8 kg (213 lb 8 oz)  BMI 28.95 kg/m2    Constitutional:  cooperative, alert and oriented, well developed, well nourished, in no acute distress        Skin:  warm and dry to the touch, no apparent skin lesions or masses noted          Head:  normocephalic, no masses or lesions        Eyes:  pupils equal and round        Lymph:      ENT:  no pallor or cyanosis        Neck:  JVP normal        Respiratory:  normal breath sounds, clear to auscultation, normal A-P diameter, normal symmetry, normal respiratory excursion, no use of accessory muscles         Cardiac: regular rhythm, normal S1/S2, no S3 or S4, apical impulse not displaced, no murmurs, gallops or rubs       grade 1;LLSB;early systolic murmur        pulses full and equal venous stasis changes bilaterally                                 Doppler triphasic R DP, Blunted biphasic L DP on portable to auscultation.    GI:  abdomen soft   benign    Extremities and Muscular Skeletal:  no deformities, clubbing, cyanosis, erythema observed;no edema stasis pigmentation bilateral LE edema;pitting;2+;L greater than R     venous varicosities    Neurological:  no gross motor deficits        Psych:  Alert and Oriented x 3        Recent Lab Results:  LIPID RESULTS:  Lab Results   Component Value Date    CHOL 106 09/08/2017    HDL 54 09/08/2017    LDL 38 09/08/2017    TRIG 71 09/08/2017    CHOLHDLRATIO 2.1 06/15/2015       LIVER ENZYME RESULTS:  Lab Results   Component Value Date    AST 25 12/08/2015    ALT 22 09/08/2017       CBC RESULTS:  Lab Results   Component Value Date    WBC 2.6 (L) 10/23/2017    RBC 3.39 (L) 10/23/2017    HGB 11.1 (L) 10/23/2017    HCT 34.0 (L) 10/23/2017     10/23/2017    MCH 32.7 10/23/2017    MCHC 32.6 10/23/2017    RDW 15.6 (H) 10/23/2017    PLT 73 (L) 10/23/2017       BMP RESULTS:  Lab Results   Component Value Date     01/15/2018    POTASSIUM 3.6 01/15/2018    CHLORIDE 107 01/15/2018    CO2 27 01/15/2018    ANIONGAP " 9 01/15/2018     (H) 09/08/2017    BUN 15 01/15/2018    CR 1.01 01/15/2018    GFRESTIMATED 70 01/15/2018    GFRESTBLACK 85 01/15/2018    TEODORO 9.2 09/08/2017        A1C RESULTS:  No results found for: A1C    INR RESULTS:  Lab Results   Component Value Date    INR 1.38 (H) 03/19/2011    INR 1.40 (H) 03/18/2011           CC  Mackenzie Tejada, WILLIS CNP  [address]  [place] [id number]                  Thank you for allowing me to participate in the care of your patient.      Sincerely,     SUE BOONE MD     [facility]    cc:   WILLIS Spence CNP  [address]  [place] [id number]

## 2018-03-09 ENCOUNTER — ALLIED HEALTH/NURSE VISIT (OUTPATIENT)
Dept: CARDIOLOGY | Facility: CLINIC | Age: 83
End: 2018-03-09
Payer: COMMERCIAL

## 2018-03-09 DIAGNOSIS — Z95.0 CARDIAC PACEMAKER IN SITU: Primary | ICD-10-CM

## 2018-03-09 PROCEDURE — 93279 PRGRMG DEV EVAL PM/LDLS PM: CPT | Performed by: INTERNAL MEDICINE

## 2018-03-09 NOTE — MR AVS SNAPSHOT
After Visit Summary   3/9/2018    Bairon Foster    MRN: [id number]           Patient Information     Date Of Birth          2/6/1931        Visit Information        Provider Department      3/9/2018 11:15 AM ARRIETA DON [facility]        Today's Diagnoses     Cardiac pacemaker in situ    -  1       Follow-ups after your visit        Your next 10 appointments already scheduled     Mar 21, 2018  8:30 AM CDT   Cath 90 Minute with [email or web address]   [facility] Cardiac Catheterization [facility] ([facility])    [address] [place] [address] [phone]            Apr 12, 2018  8:30 AM CDT   Return Visit with WILLIS Spence CNP   [facility] ([facility])    [address]  [facility] [address] [phone]            Jun 14, 2018 10:15 AM CDT   Phone Device Check with ARRIETA TECH2   [facility] ([facility])    [address]  [facility] [address] [phone]              Who to contact     If you have questions or need follow up information about today's clinic visit or your schedule please contact [facility] directly at [phone].  Normal or non-critical lab and imaging results will be communicated to you by Gametimehart, letter or phone within 4 business days after the clinic has received the results. If you do not hear from us within 7 days, please contact the clinic through MyChart or phone. If you have a critical or abnormal lab result, we will notify you by phone as soon as possible.  Submit refill requests through beatlab or call your pharmacy and they will forward the refill request to us. Please allow 3 business days for your refill to be completed.          Additional Information About Your Visit        beatlab Information     beatlab gives you secure access to  your electronic health record. If you see a primary care provider, you can also send messages to your care team and make appointments. If you have questions, please call your primary care clinic.  If you do not have a primary care provider, please call [phone] and they will assist you.        Care EveryWhere ID     This is your Care EveryWhere ID. This could be used by other organizations to access your [place] medical records  [id number]         Blood Pressure from Last 3 Encounters:   03/06/18 127/77   01/15/18 120/70   01/10/18 123/69    Weight from Last 3 Encounters:   03/06/18 96.8 kg (213 lb 8 oz)   01/15/18 95.7 kg (211 lb)   01/10/18 98.9 kg (218 lb)              We Performed the Following     PM DEVICE PROGRAMMING EVAL, SINGLE LEAD PACER ([id number])        Primary Care Provider Office Phone # Fax #    Mg Nelson [phone]       [address] [facility] [address]        Goals        General    I will find a caregiver support group to attend (pt-stated)     Notes - Note created  4/17/2014  8:49 AM by Beth Morales RN    As of today's date 4/17/2014 goal is met at 0 - 25%.   Goal Status:  Active        Equal Access to Services     EVANGELIST PETERSON AH: Hadii aad ku hadasho Soomaali, waaxda luqadaha, qaybta kaalmada adeegyada, kathryn shrestha haysabinan tin camilo . So [facility] [phone].    ATENCIÓN: Si habla español, tiene a malone disposición servicios gratuitos de asistencia lingüística. Llame al [phone].    We comply with applicable federal civil rights laws and [place] laws. We do not discriminate on the basis of race, color, national origin, age, disability, sex, sexual orientation, or gender identity.            Thank you!     Thank you for choosing [facility] HEART [facility]  for your care. Our goal is always to provide you with excellent care. Hearing back from our patients is one way we can continue to improve our services. Please take a  few minutes to complete the written survey that you may receive in the mail after your visit with us. Thank you!             Your Updated Medication List - Protect others around you: Learn how to safely use, store and throw away your medicines at www.disposemymeds.org.          This list is accurate as of 3/9/18 11:37 AM.  Always use your most recent med list.                   Brand Name Dispense Instructions for use Diagnosis    ASPIRIN NOT PRESCRIBED    INTENTIONAL    1 each    Please choose reason not prescribed, below    Coronary artery disease involving native coronary artery of native heart without angina pectoris, Persistent atrial fibrillation (H), Long term current use of anticoagulant therapy       Carboxymethylcellulose Sod PF 1 % ophthalmic solution    CELLUVISC/REFRESH LIQUIGEL     Place 1 drop into both eyes 3 times daily as needed for dry eyes        carvedilol 6.25 MG tablet    COREG    180 tablet    Take 1 tablet (6.25 mg) by mouth 2 times daily (with meals)    Ischemic cardiomyopathy       CENTRUM SILVER per tablet      Take 1 tablet by mouth daily        COMPRESSION STOCKINGS     2 each    1 each daily    Varicose 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       FINASTERIDE PO      Take 5 mg by mouth daily        furosemide 40 MG tablet    LASIX    45 tablet    Take 1.5 tablets (60 mg) by mouth daily    Dependent edema       GLUCOSAMINE-CHONDROITIN PO      Take 2 tablets by mouth daily        isosorbide mononitrate 30 MG 24 hr tablet    IMDUR    30 tablet    Take 1 tablet (30 mg) by mouth daily    SOB (shortness of breath)       levothyroxine 100 MCG tablet    SYNTHROID/LEVOTHROID    90 tablet    Take 1 tablet (100 mcg) by mouth daily    Acquired hypothyroidism       LORazepam 1 MG tablet    ATIVAN    30 tablet    Take 0.5-1 tablets (0.5-1 mg) by mouth every 8 hours as needed for anxiety    Acute stress reaction       MIRALAX PO      Take 1 packet by mouth At Bedtime        omega-3 fatty  acids 1200 MG capsule      Take 1 capsule by mouth daily        omeprazole 20 MG CR capsule    priLOSEC    180 capsule    Take 1 capsule (20 mg) by mouth 2 times daily    Gastroesophageal reflux disease without esophagitis       order for DME     1 Device    Auto-CPAP:Max 9 cm H2OMin 9 cm H2O Continuous Lifetime need and heated humidity.    Obstructive sleep apnea (adult) (pediatric), Other dyspnea and respiratory abnormality       potassium chloride SA 20 MEQ CR tablet    KLOR-CON    90 tablet    Take 1 tablet (20 mEq) by mouth daily    Pulmonary hypertension       PRADAXA ANTICOAGULANT 75 MG capsule   Generic drug:  dabigatran ANTICOAGULANT      Take 150 mg by mouth 2 times daily. Store in original 's bottle or blister pack; use within 120 days of opening.        SIMVASTATIN PO      Take 20 mg by mouth daily        sucralfate 1 GM tablet    CARAFATE    180 tablet    Take 1 tablet (1 g) by mouth 2 times daily    Esophageal reflux       tamsulosin 0.4 MG capsule    FLOMAX    30 capsule    Take 1 capsule (0.4 mg) by mouth daily    Benign prostatic hyperplasia with urinary hesitancy       UNKNOWN TO PATIENT      Place 1 drop Into the left eye daily EYE DROP PRESCRIBED FOR CONJUNCTIVITIS        WELLBUTRIN XL PO      Take 150 mg by mouth every morning

## 2018-03-09 NOTE — PROGRESS NOTES
Daniel Ford (S) Pacemaker Device Check  : 100 %  Mode: VVIR        Underlying Rhythm: AF/ slow VR in the 40's  Heart Rate: Histogram is stable with adequate HR  Sensing: Stable  Pacing Threshold: Stable   Impedance: WNL  Battery Status: 1.5 years estimated longevity  Device Site: No issues  Atrial Arrhythmia: permanent AF/ Warfarin  Ventricular Arrhythmia: NONE  Setting Change: NONE    Care Plan: Home teletrace in 3 months [facility]

## 2018-03-12 DIAGNOSIS — I25.10 CORONARY ARTERY DISEASE: Primary | ICD-10-CM

## 2018-03-12 DIAGNOSIS — I27.20 PULMONARY HYPERTENSION (H): ICD-10-CM

## 2018-03-12 RX ORDER — POTASSIUM CHLORIDE 1500 MG/1
20 TABLET, EXTENDED RELEASE ORAL
Status: CANCELLED | OUTPATIENT
Start: 2018-03-12

## 2018-03-12 RX ORDER — SODIUM CHLORIDE 9 MG/ML
INJECTION, SOLUTION INTRAVENOUS CONTINUOUS
Status: CANCELLED | OUTPATIENT
Start: 2018-03-12

## 2018-03-12 RX ORDER — LIDOCAINE 40 MG/G
CREAM TOPICAL
Status: CANCELLED | OUTPATIENT
Start: 2018-03-12

## 2018-03-20 ENCOUNTER — OFFICE VISIT (OUTPATIENT)
Dept: FAMILY MEDICINE | Facility: CLINIC | Age: 83
End: 2018-03-20
Payer: COMMERCIAL

## 2018-03-20 VITALS
RESPIRATION RATE: 16 BRPM | DIASTOLIC BLOOD PRESSURE: 70 MMHG | TEMPERATURE: 97.1 F | HEART RATE: 60 BPM | SYSTOLIC BLOOD PRESSURE: 116 MMHG | WEIGHT: 226 LBS | BODY MASS INDEX: 30.64 KG/M2

## 2018-03-20 DIAGNOSIS — N50.89 SCROTAL EDEMA: ICD-10-CM

## 2018-03-20 DIAGNOSIS — I89.0 LYMPHEDEMA OF BOTH LOWER EXTREMITIES: Primary | ICD-10-CM

## 2018-03-20 PROCEDURE — 99213 OFFICE O/P EST LOW 20 MIN: CPT | Performed by: FAMILY MEDICINE

## 2018-03-20 NOTE — MR AVS SNAPSHOT
After Visit Summary   3/20/2018    Bairon Foster    MRN: [id number]           Patient Information     Date Of Birth          2/6/1931        Visit Information        Provider Department      3/20/2018 1:30 PM Mg Nelson MD [facility]        Today's Diagnoses     Lymphedema of both lower extremities    -  1    Scrotal edema          Care Instructions    Will increase furosemide to 80 mg daily and return in one week.          Follow-ups after your visit        Follow-up notes from your care team     Return in about 1 week (around 3/27/2018) for Routine Visit.      Your next 10 appointments already scheduled     Mar 21, 2018  8:30 AM CDT   Cath 90 Minute with [email or web address]   [facility] Cardiac Catheterization Lab ([facility])    [address]  [place] [address] [phone]            Mar 27, 2018  1:45 PM CDT   SHORT with Mg Nelson MD   [facility] ([facility])    [address] [address] [phone]            Apr 12, 2018  8:30 AM CDT   Return Visit with WILLIS Spence [facility] ([facility])    [id number]  [facility] [address] [phone]            Jun 14, 2018 10:15 AM CDT   Phone Device Check with ARRIETA TECH2   [facility] ([facility])    [address] [place], [place] [address]  [facility] [address] [phone]              Who to contact     If you have questions or need follow up information about today's clinic visit or your schedule please contact [facility] directly at [phone].  Normal or non-critical lab and imaging results will be communicated to you by MyChart, letter or phone within 4 business days after the clinic has received the results. If  you do not hear from us within 7 days, please contact the clinic through Krauttools or phone. If you have a critical or abnormal lab result, we will notify you by phone as soon as possible.  Submit refill requests through Krauttools or call your pharmacy and they will forward the refill request to us. Please allow 3 business days for your refill to be completed.          Additional Information About Your Visit        Kolo Technologieshart Information     Krauttools gives you secure access to your electronic health record. If you see a primary care provider, you can also send messages to your care team and make appointments. If you have questions, please call your primary care clinic.  If you do not have a primary care provider, please call [phone] and they will assist you.        Care EveryWhere ID     This is your Care EveryWhere ID. This could be used by other organizations to access your [place] medical records  [id number]        Your Vitals Were     Pulse Temperature Respirations BMI (Body Mass Index)          60 97.1  F (36.2  C) (Tympanic) 16 30.64 kg/m2         Blood Pressure from Last 3 Encounters:   03/20/18 116/70   03/06/18 127/77   01/15/18 120/70    Weight from Last 3 Encounters:   03/20/18 226 lb (102.5 kg)   03/06/18 213 lb 8 oz (96.8 kg)   01/15/18 211 lb (95.7 kg)              Today, you had the following     No orders found for display       Primary Care Provider Office Phone # Fax #    Mg Nelson [phone]       [address] [facility] [address]        Goals        General    I will find a caregiver support group to attend (pt-stated)     Notes - Note created  4/17/2014  8:49 AM by Beth Morales, RN    As of today's date 4/17/2014 goal is met at 0 - 25%.   Goal Status:  Active        Equal Access to Services     [facility] KRISTEN : Leydi Viramontes, waaxda luqadaha, qaybta kaalmada felicitas, kathryn pathak. So [facility] [phone].    ATENCIÓN:  Si habla lorne, tiene a malone disposición servicios gratuitos de asistencia lingüística. Jose terry [phone].    We comply with applicable federal civil rights laws and [place] laws. We do not discriminate on the basis of race, color, national origin, age, disability, sex, sexual orientation, or gender identity.            Thank you!     Thank you for choosing [facility]  for your care. Our goal is always to provide you with excellent care. Hearing back from our patients is one way we can continue to improve our services. Please take a few minutes to complete the written survey that you may receive in the mail after your visit with us. Thank you!             Your Updated Medication List - Protect others around you: Learn how to safely use, store and throw away your medicines at www.disposemymeds.org.          This list is accurate as of 3/20/18  4:48 PM.  Always use your most recent med list.                   Brand Name Dispense Instructions for use Diagnosis    Carboxymethylcellulose Sod PF 1 % ophthalmic solution    CELLUVISC/REFRESH LIQUIGEL     Place 1 drop into both eyes 3 times daily as needed for dry eyes        carvedilol 6.25 MG tablet    COREG    180 tablet    Take 1 tablet (6.25 mg) by mouth 2 times daily (with meals)    Ischemic cardiomyopathy       CENTRUM SILVER per tablet      Take 1 tablet by mouth daily        COMPRESSION STOCKINGS     2 each    1 each daily    Varicose 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       FINASTERIDE PO      Take 5 mg by mouth daily        furosemide 40 MG tablet    LASIX    45 tablet    Take 1.5 tablets (60 mg) by mouth daily    Dependent edema       GLUCOSAMINE-CHONDROITIN PO      Take 2 tablets by mouth daily        isosorbide mononitrate 30 MG 24 hr tablet    IMDUR    30 tablet    Take 1 tablet (30 mg) by mouth daily    SOB (shortness of breath)       levothyroxine 100 MCG tablet    SYNTHROID/LEVOTHROID    90 tablet    Take 1  tablet (100 mcg) by mouth daily    Acquired hypothyroidism       LORazepam 1 MG tablet    ATIVAN    30 tablet    Take 0.5-1 tablets (0.5-1 mg) by mouth every 8 hours as needed for anxiety    Acute stress reaction       MIRALAX PO      Take 1 packet by mouth At Bedtime        omega-3 fatty acids 1200 MG capsule      Take 1 capsule by mouth daily        omeprazole 20 MG CR capsule    priLOSEC    180 capsule    Take 1 capsule (20 mg) by mouth 2 times daily    Gastroesophageal reflux disease without esophagitis       order for DME     1 Device    Auto-CPAP:Max 9 cm H2OMin 9 cm H2O Continuous Lifetime need and heated humidity.    Obstructive sleep apnea (adult) (pediatric), Other dyspnea and respiratory abnormality       potassium chloride SA 20 MEQ CR tablet    KLOR-CON    90 tablet    Take 1 tablet (20 mEq) by mouth daily    Pulmonary hypertension       PRADAXA ANTICOAGULANT 75 MG capsule   Generic drug:  dabigatran ANTICOAGULANT      Take 150 mg by mouth 2 times daily. Store in original 's bottle or blister pack; use within 120 days of opening.        SIMVASTATIN PO      Take 20 mg by mouth daily        sucralfate 1 GM tablet    CARAFATE    180 tablet    Take 1 tablet (1 g) by mouth 2 times daily    Esophageal reflux       tamsulosin 0.4 MG capsule    FLOMAX    30 capsule    Take 1 capsule (0.4 mg) by mouth daily    Benign prostatic hyperplasia with urinary hesitancy       UNKNOWN TO PATIENT      Place 1 drop Into the left eye daily EYE DROP PRESCRIBED FOR CONJUNCTIVITIS        WELLBUTRIN XL PO      Take 150 mg by mouth every morning

## 2018-03-20 NOTE — NURSING NOTE
"Chief Complaint   Patient presents with     Swelling       Initial /70 (Cuff Size: Adult Large)  Pulse 60  Temp 97.1  F (36.2  C) (Tympanic)  Resp 16  Wt 226 lb (102.5 kg)  BMI 30.64 kg/m2 Estimated body mass index is 30.64 kg/(m^2) as calculated from the following:    Height as of 3/6/18: 6' 0.01\" (1.829 m).    Weight as of this encounter: 226 lb (102.5 kg).  Medication Reconciliation: complete     Anabel Rinaldi CMA      "

## 2018-03-20 NOTE — PROGRESS NOTES
SUBJECTIVE:   Bairon Foster is a 87 year old male who presents to clinic today for the following health issues:      Genitourinary symptoms      Duration: 2 months    Description:  Testicular swelling     Intensity:  mild    Accompanying signs and symptoms (fever/discharge/nausea/vomiting/back or abdominal pain):  None    History (frequent UTI's/kidney stones/prostate problems): None  Sexually active: no     Precipitating or alleviating factors: None    Therapies tried and outcome: none   Outcome: na      Weight gain      Duration: Past 1-2 months    Description (location/character/radiation): N/A    Intensity:  moderate    Accompanying signs and symptoms: None    History (similar episodes/previous evaluation): None    Precipitating or alleviating factors: None    Therapies tried and outcome: Recent increase in furosemide by cardiology to 60 mg daily.       Problem list and histories reviewed & adjusted, as indicated.  Additional history: as documented    Patient Active Problem List   Diagnosis     Atrial fibrillation (H)     Pulmonary hypertension     CAD (coronary artery disease)     BPH (benign prostatic hyperplasia)     Ischemic cardiomyopathy     Pancytopenia (H)     JIM (obstructive sleep apnea)- mild/moderate     Acute stress reaction     Health Care Home     Vitamin D deficiency     Advance Care Planning     Gastroesophageal reflux disease without esophagitis     Hydrocele, unspecified hydrocele type     Slow transit constipation     Hypercholesterolemia     Non morbid obesity due to excess calories w comorbid CAD< hi LDL      Venous stasis dermatitis of both lower extremities     Lymphedema of both lower extremities     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      Coronary artery disease due to lipid rich plaque     Acquired hypothyroidism     Onychomycosis     Overweight (BMI 25.0-29.9)     PAD (peripheral artery disease) (H)     Screening for prostate cancer     Blood in stool     Umbilical  hernia without obstruction and without gangrene     Xerosis cutis     Past Surgical History:   Procedure Laterality Date     EYE SURGERY      CATARACT/BLEPHAROPLASTY     GENITOURINARY SURGERY      TUNA     HERNIA REPAIR      RIGHT INGUINAL     HERNIORRHAPHY INGUINAL  8/14/2012    Procedure: HERNIORRHAPHY INGUINAL;  right inguinal hernia repair;  Surgeon: Kareem Torrez MD;  Location: [facility]     HERNIORRHAPHY UMBILICAL N/A 10/31/2017    Procedure: HERNIORRHAPHY UMBILICAL;  UMBILICAL HERNIA REPAIR WITH MESH;  Surgeon: Scar Harrington MD;  Location: [facility]     IMPLANT PACEMAKER  8/2009    single chamber     ORTHOPEDIC SURGERY      [facility] RIGHT     ORTHOPEDIC SURGERY  2010    right TKR, left TKR     SURGICAL HISTORY OF -       Thumb surgery     SURGICAL HISTORY OF -   1990s    Left total knee replacement     SURGICAL HISTORY OF -   3/11    [facility] total knee replacement       Social History   Substance Use Topics     Smoking status: Never Smoker     Smokeless tobacco: Never Used      Comment: per encounter 2/12/07     Alcohol use 0.0 oz/week     0 Standard drinks or equivalent per week      Comment: 1/month     Family History   Problem Relation Age of Onset     Cardiovascular Father      C.A.D. Father      CANCER Sister      lung     Unknown/Adopted Mother      Cancer - colorectal No family hx of            Reviewed and updated as needed this visit by clinical staff  Tobacco  Allergies  Meds       Reviewed and updated as needed this visit by Provider         ROS:  Constitutional, HEENT, cardiovascular, pulmonary, gi and gu systems are negative, except as otherwise noted.    OBJECTIVE:                                                    /70 (Cuff Size: Adult Large)  Pulse 60  Temp 97.1  F (36.2  C) (Tympanic)  Resp 16  Wt 226 lb (102.5 kg)  BMI 30.64 kg/m2  Body mass index is 30.64 kg/(m^2).  GENERAL APPEARANCE: healthy, alert and no distress  RESP: lungs clear to auscultation - no rales, rhonchi or wheezes  CV:  regular rates and rhythm, normal S1 S2, no S3 or S4 and no murmur, click or rub   (male): penis normal without urethral discharge and marked scrotal edema  SKIN: There is edema from the waist on down in both legs.         ASSESSMENT/PLAN:                                                        ICD-10-CM    1. Lymphedema of both lower extremities I89.0    2. Scrotal edema N50.89        Patient Instructions   Will increase furosemide to 80 mg daily and return in one week.      Mg Nelson MD  [facility]

## 2018-03-21 ENCOUNTER — HOSPITAL ENCOUNTER (OUTPATIENT)
Facility: CLINIC | Age: 83
Discharge: HOME | End: 2018-03-21
Attending: INTERNAL MEDICINE | Admitting: INTERNAL MEDICINE
Payer: MEDICARE

## 2018-03-21 ENCOUNTER — APPOINTMENT (OUTPATIENT)
Dept: CARDIOLOGY | Facility: CLINIC | Age: 83
End: 2018-03-21
Attending: INTERNAL MEDICINE
Payer: MEDICARE

## 2018-03-21 VITALS
SYSTOLIC BLOOD PRESSURE: 110 MMHG | TEMPERATURE: 98 F | RESPIRATION RATE: 16 BRPM | WEIGHT: 225.6 LBS | HEART RATE: 60 BPM | DIASTOLIC BLOOD PRESSURE: 72 MMHG | HEIGHT: 74 IN | OXYGEN SATURATION: 95 % | BODY MASS INDEX: 28.95 KG/M2

## 2018-03-21 DIAGNOSIS — I83.813 VARICOSE VEINS OF BILATERAL LOWER EXTREMITIES WITH PAIN: ICD-10-CM

## 2018-03-21 DIAGNOSIS — Z98.890 STATUS POST CORONARY ANGIOGRAM: Primary | ICD-10-CM

## 2018-03-21 DIAGNOSIS — I25.10 CORONARY ARTERY DISEASE INVOLVING NATIVE CORONARY ARTERY OF NATIVE HEART WITHOUT ANGINA PECTORIS: ICD-10-CM

## 2018-03-21 DIAGNOSIS — Z95.0 CARDIAC PACEMAKER IN SITU: ICD-10-CM

## 2018-03-21 DIAGNOSIS — I27.20 PULMONARY HYPERTENSION (H): ICD-10-CM

## 2018-03-21 DIAGNOSIS — R06.02 SOB (SHORTNESS OF BREATH): ICD-10-CM

## 2018-03-21 DIAGNOSIS — I20.0 UNSTABLE ANGINA (H): ICD-10-CM

## 2018-03-21 LAB
ALBUMIN SERPL-MCNC: 3.6 G/DL (ref 3.4–5)
ANION GAP SERPL CALCULATED.3IONS-SCNC: 7 MMOL/L (ref 3–14)
APTT PPP: 34 SEC (ref 22–37)
BUN SERPL-MCNC: 19 MG/DL (ref 7–30)
CALCIUM SERPL-MCNC: 9 MG/DL (ref 8.5–10.1)
CHLORIDE SERPL-SCNC: 107 MMOL/L (ref 94–109)
CO2 SERPL-SCNC: 27 MMOL/L (ref 20–32)
CREAT SERPL-MCNC: 0.96 MG/DL (ref 0.66–1.25)
ERYTHROCYTE [DISTWIDTH] IN BLOOD BY AUTOMATED COUNT: 16.3 % (ref 10–15)
GFR SERPL CREATININE-BSD FRML MDRD: 75 ML/MIN/1.7M2
GLUCOSE SERPL-MCNC: 91 MG/DL (ref 70–99)
HCT VFR BLD AUTO: 31.2 % (ref 40–53)
HGB BLD-MCNC: 10.2 G/DL (ref 13.3–17.7)
INR PPP: 1.48 (ref 0.86–1.14)
MCH RBC QN AUTO: 31.6 PG (ref 26.5–33)
MCHC RBC AUTO-ENTMCNC: 32.7 G/DL (ref 31.5–36.5)
MCV RBC AUTO: 97 FL (ref 78–100)
PLATELET # BLD AUTO: 75 10E9/L (ref 150–450)
POTASSIUM SERPL-SCNC: 3.6 MMOL/L (ref 3.4–5.3)
PROT SERPL-MCNC: 7.5 G/DL (ref 6.8–8.8)
RBC # BLD AUTO: 3.23 10E12/L (ref 4.4–5.9)
SODIUM SERPL-SCNC: 141 MMOL/L (ref 133–144)
WBC # BLD AUTO: 2.8 10E9/L (ref 4–11)

## 2018-03-21 PROCEDURE — 99152 MOD SED SAME PHYS/QHP 5/>YRS: CPT

## 2018-03-21 PROCEDURE — 27210946 ZZH KIT HC TOTES DISP CR8

## 2018-03-21 PROCEDURE — 85027 COMPLETE CBC AUTOMATED: CPT | Performed by: INTERNAL MEDICINE

## 2018-03-21 PROCEDURE — 80048 BASIC METABOLIC PNL TOTAL CA: CPT | Performed by: INTERNAL MEDICINE

## 2018-03-21 PROCEDURE — 40000852 ZZH STATISTIC HEART CATH LAB OR EP LAB

## 2018-03-21 PROCEDURE — 99152 MOD SED SAME PHYS/QHP 5/>YRS: CPT | Mod: GC | Performed by: INTERNAL MEDICINE

## 2018-03-21 PROCEDURE — 82040 ASSAY OF SERUM ALBUMIN: CPT | Performed by: INTERNAL MEDICINE

## 2018-03-21 PROCEDURE — C1769 GUIDE WIRE: HCPCS

## 2018-03-21 PROCEDURE — 84155 ASSAY OF PROTEIN SERUM: CPT | Performed by: INTERNAL MEDICINE

## 2018-03-21 PROCEDURE — 25000132 ZZH RX MED GY IP 250 OP 250 PS 637: Mod: GY | Performed by: INTERNAL MEDICINE

## 2018-03-21 PROCEDURE — 93005 ELECTROCARDIOGRAM TRACING: CPT

## 2018-03-21 PROCEDURE — 93010 ELECTROCARDIOGRAM REPORT: CPT | Mod: 59 | Performed by: INTERNAL MEDICINE

## 2018-03-21 PROCEDURE — 25000128 H RX IP 250 OP 636: Performed by: INTERNAL MEDICINE

## 2018-03-21 PROCEDURE — 85730 THROMBOPLASTIN TIME PARTIAL: CPT | Performed by: INTERNAL MEDICINE

## 2018-03-21 PROCEDURE — 25000125 ZZHC RX 250: Performed by: INTERNAL MEDICINE

## 2018-03-21 PROCEDURE — 40000235 ZZH STATISTIC TELEMETRY

## 2018-03-21 PROCEDURE — A9270 NON-COVERED ITEM OR SERVICE: HCPCS | Mod: GY | Performed by: INTERNAL MEDICINE

## 2018-03-21 PROCEDURE — 93458 L HRT ARTERY/VENTRICLE ANGIO: CPT

## 2018-03-21 PROCEDURE — 27210856 ZZH ACCESS HEART CATH CR2

## 2018-03-21 PROCEDURE — 27211089 ZZH KIT ACIST INJECTOR CR3

## 2018-03-21 PROCEDURE — 85610 PROTHROMBIN TIME: CPT | Performed by: INTERNAL MEDICINE

## 2018-03-21 PROCEDURE — 27210914 ZZH SHEATH CR8

## 2018-03-21 PROCEDURE — 99153 MOD SED SAME PHYS/QHP EA: CPT

## 2018-03-21 PROCEDURE — 40000065 ZZH STATISTIC EKG NON-CHARGEABLE

## 2018-03-21 PROCEDURE — 27210787 ZZH MANIFOLD CR2

## 2018-03-21 PROCEDURE — 93458 L HRT ARTERY/VENTRICLE ANGIO: CPT | Mod: 26 | Performed by: INTERNAL MEDICINE

## 2018-03-21 PROCEDURE — 27210795 ZZH PAD DEFIB QUICK CR4

## 2018-03-21 RX ORDER — FLUMAZENIL 0.1 MG/ML
0.2 INJECTION, SOLUTION INTRAVENOUS
Status: DISCONTINUED | OUTPATIENT
Start: 2018-03-21 | End: 2018-03-21 | Stop reason: HOSPADM

## 2018-03-21 RX ORDER — PROTAMINE SULFATE 10 MG/ML
25-100 INJECTION, SOLUTION INTRAVENOUS EVERY 5 MIN PRN
Status: DISCONTINUED | OUTPATIENT
Start: 2018-03-21 | End: 2018-03-21 | Stop reason: HOSPADM

## 2018-03-21 RX ORDER — POTASSIUM CHLORIDE 29.8 MG/ML
20 INJECTION INTRAVENOUS
Status: DISCONTINUED | OUTPATIENT
Start: 2018-03-21 | End: 2018-03-21 | Stop reason: HOSPADM

## 2018-03-21 RX ORDER — LIDOCAINE 40 MG/G
CREAM TOPICAL
Status: DISCONTINUED | OUTPATIENT
Start: 2018-03-21 | End: 2018-03-21 | Stop reason: HOSPADM

## 2018-03-21 RX ORDER — NICARDIPINE HYDROCHLORIDE 2.5 MG/ML
100 INJECTION INTRAVENOUS
Status: DISCONTINUED | OUTPATIENT
Start: 2018-03-21 | End: 2018-03-21 | Stop reason: HOSPADM

## 2018-03-21 RX ORDER — NITROGLYCERIN 5 MG/ML
100-500 VIAL (ML) INTRAVENOUS
Status: COMPLETED | OUTPATIENT
Start: 2018-03-21 | End: 2018-03-21

## 2018-03-21 RX ORDER — NITROGLYCERIN 0.4 MG/1
0.4 TABLET SUBLINGUAL EVERY 5 MIN PRN
Status: DISCONTINUED | OUTPATIENT
Start: 2018-03-21 | End: 2018-03-21 | Stop reason: HOSPADM

## 2018-03-21 RX ORDER — LIDOCAINE HYDROCHLORIDE 10 MG/ML
30 INJECTION, SOLUTION EPIDURAL; INFILTRATION; INTRACAUDAL; PERINEURAL
Status: DISCONTINUED | OUTPATIENT
Start: 2018-03-21 | End: 2018-03-21 | Stop reason: HOSPADM

## 2018-03-21 RX ORDER — SPIRONOLACTONE 25 MG/1
25 TABLET ORAL DAILY
Qty: 90 TABLET | Refills: 3 | Status: SHIPPED | OUTPATIENT
Start: 2018-03-21 | End: 2018-04-02

## 2018-03-21 RX ORDER — LORAZEPAM 2 MG/ML
.5-2 INJECTION INTRAMUSCULAR EVERY 4 HOURS PRN
Status: DISCONTINUED | OUTPATIENT
Start: 2018-03-21 | End: 2018-03-21 | Stop reason: HOSPADM

## 2018-03-21 RX ORDER — ENALAPRILAT 1.25 MG/ML
1.25-2.5 INJECTION INTRAVENOUS
Status: DISCONTINUED | OUTPATIENT
Start: 2018-03-21 | End: 2018-03-21 | Stop reason: HOSPADM

## 2018-03-21 RX ORDER — NIFEDIPINE 10 MG/1
10 CAPSULE ORAL
Status: DISCONTINUED | OUTPATIENT
Start: 2018-03-21 | End: 2018-03-21 | Stop reason: HOSPADM

## 2018-03-21 RX ORDER — ADENOSINE 3 MG/ML
12-12000 INJECTION, SOLUTION INTRAVENOUS
Status: DISCONTINUED | OUTPATIENT
Start: 2018-03-21 | End: 2018-03-21 | Stop reason: HOSPADM

## 2018-03-21 RX ORDER — HYDROCODONE BITARTRATE AND ACETAMINOPHEN 5; 325 MG/1; MG/1
1-2 TABLET ORAL EVERY 4 HOURS PRN
Status: DISCONTINUED | OUTPATIENT
Start: 2018-03-21 | End: 2018-03-21 | Stop reason: HOSPADM

## 2018-03-21 RX ORDER — HEPARIN SODIUM 1000 [USP'U]/ML
1000-10000 INJECTION, SOLUTION INTRAVENOUS; SUBCUTANEOUS EVERY 5 MIN PRN
Status: DISCONTINUED | OUTPATIENT
Start: 2018-03-21 | End: 2018-03-21 | Stop reason: HOSPADM

## 2018-03-21 RX ORDER — SODIUM NITROPRUSSIDE 25 MG/ML
100-200 INJECTION INTRAVENOUS
Status: DISCONTINUED | OUTPATIENT
Start: 2018-03-21 | End: 2018-03-21 | Stop reason: HOSPADM

## 2018-03-21 RX ORDER — POTASSIUM CHLORIDE 7.45 MG/ML
10 INJECTION INTRAVENOUS
Status: DISCONTINUED | OUTPATIENT
Start: 2018-03-21 | End: 2018-03-21 | Stop reason: HOSPADM

## 2018-03-21 RX ORDER — ASPIRIN 325 MG
325 TABLET ORAL
Status: DISCONTINUED | OUTPATIENT
Start: 2018-03-21 | End: 2018-03-21 | Stop reason: HOSPADM

## 2018-03-21 RX ORDER — ATROPINE SULFATE 0.1 MG/ML
0.5 INJECTION INTRAVENOUS EVERY 5 MIN PRN
Status: DISCONTINUED | OUTPATIENT
Start: 2018-03-21 | End: 2018-03-21 | Stop reason: HOSPADM

## 2018-03-21 RX ORDER — DIPHENHYDRAMINE HYDROCHLORIDE 50 MG/ML
25-50 INJECTION INTRAMUSCULAR; INTRAVENOUS
Status: DISCONTINUED | OUTPATIENT
Start: 2018-03-21 | End: 2018-03-21 | Stop reason: HOSPADM

## 2018-03-21 RX ORDER — PHENYLEPHRINE HCL IN 0.9% NACL 1 MG/10 ML
20-100 SYRINGE (ML) INTRAVENOUS
Status: DISCONTINUED | OUTPATIENT
Start: 2018-03-21 | End: 2018-03-21 | Stop reason: HOSPADM

## 2018-03-21 RX ORDER — DEXTROSE MONOHYDRATE 25 G/50ML
12.5-5 INJECTION, SOLUTION INTRAVENOUS EVERY 30 MIN PRN
Status: DISCONTINUED | OUTPATIENT
Start: 2018-03-21 | End: 2018-03-21 | Stop reason: HOSPADM

## 2018-03-21 RX ORDER — NALOXONE HYDROCHLORIDE 0.4 MG/ML
.2-.4 INJECTION, SOLUTION INTRAMUSCULAR; INTRAVENOUS; SUBCUTANEOUS
Status: DISCONTINUED | OUTPATIENT
Start: 2018-03-21 | End: 2018-03-21 | Stop reason: HOSPADM

## 2018-03-21 RX ORDER — EPINEPHRINE 1 MG/ML
0.3 INJECTION, SOLUTION, CONCENTRATE INTRAVENOUS
Status: DISCONTINUED | OUTPATIENT
Start: 2018-03-21 | End: 2018-03-21 | Stop reason: HOSPADM

## 2018-03-21 RX ORDER — IOPAMIDOL 755 MG/ML
105 INJECTION, SOLUTION INTRAVASCULAR ONCE
Status: COMPLETED | OUTPATIENT
Start: 2018-03-21 | End: 2018-03-21

## 2018-03-21 RX ORDER — SODIUM CHLORIDE 9 MG/ML
INJECTION, SOLUTION INTRAVENOUS CONTINUOUS
Status: DISCONTINUED | OUTPATIENT
Start: 2018-03-21 | End: 2018-03-21 | Stop reason: HOSPADM

## 2018-03-21 RX ORDER — PROTAMINE SULFATE 10 MG/ML
1-5 INJECTION, SOLUTION INTRAVENOUS
Status: DISCONTINUED | OUTPATIENT
Start: 2018-03-21 | End: 2018-03-21 | Stop reason: HOSPADM

## 2018-03-21 RX ORDER — ONDANSETRON 2 MG/ML
4 INJECTION INTRAMUSCULAR; INTRAVENOUS EVERY 4 HOURS PRN
Status: DISCONTINUED | OUTPATIENT
Start: 2018-03-21 | End: 2018-03-21 | Stop reason: HOSPADM

## 2018-03-21 RX ORDER — DOPAMINE HYDROCHLORIDE 160 MG/100ML
2-20 INJECTION, SOLUTION INTRAVENOUS CONTINUOUS PRN
Status: DISCONTINUED | OUTPATIENT
Start: 2018-03-21 | End: 2018-03-21 | Stop reason: HOSPADM

## 2018-03-21 RX ORDER — FENTANYL CITRATE 50 UG/ML
25-50 INJECTION, SOLUTION INTRAMUSCULAR; INTRAVENOUS
Status: DISCONTINUED | OUTPATIENT
Start: 2018-03-21 | End: 2018-03-21 | Stop reason: HOSPADM

## 2018-03-21 RX ORDER — CLOPIDOGREL 300 MG/1
300-600 TABLET, FILM COATED ORAL
Status: DISCONTINUED | OUTPATIENT
Start: 2018-03-21 | End: 2018-03-21 | Stop reason: HOSPADM

## 2018-03-21 RX ORDER — VERAPAMIL HYDROCHLORIDE 2.5 MG/ML
1-2.5 INJECTION, SOLUTION INTRAVENOUS
Status: COMPLETED | OUTPATIENT
Start: 2018-03-21 | End: 2018-03-21

## 2018-03-21 RX ORDER — POTASSIUM CHLORIDE 1500 MG/1
20 TABLET, EXTENDED RELEASE ORAL
Status: COMPLETED | OUTPATIENT
Start: 2018-03-21 | End: 2018-03-21

## 2018-03-21 RX ORDER — MORPHINE SULFATE 2 MG/ML
1-2 INJECTION, SOLUTION INTRAMUSCULAR; INTRAVENOUS EVERY 5 MIN PRN
Status: DISCONTINUED | OUTPATIENT
Start: 2018-03-21 | End: 2018-03-21 | Stop reason: HOSPADM

## 2018-03-21 RX ORDER — NALOXONE HYDROCHLORIDE 0.4 MG/ML
0.4 INJECTION, SOLUTION INTRAMUSCULAR; INTRAVENOUS; SUBCUTANEOUS EVERY 5 MIN PRN
Status: DISCONTINUED | OUTPATIENT
Start: 2018-03-21 | End: 2018-03-21 | Stop reason: HOSPADM

## 2018-03-21 RX ORDER — DOBUTAMINE HYDROCHLORIDE 200 MG/100ML
2-20 INJECTION INTRAVENOUS CONTINUOUS PRN
Status: DISCONTINUED | OUTPATIENT
Start: 2018-03-21 | End: 2018-03-21 | Stop reason: HOSPADM

## 2018-03-21 RX ORDER — NALOXONE HYDROCHLORIDE 0.4 MG/ML
.1-.4 INJECTION, SOLUTION INTRAMUSCULAR; INTRAVENOUS; SUBCUTANEOUS
Status: DISCONTINUED | OUTPATIENT
Start: 2018-03-21 | End: 2018-03-21 | Stop reason: HOSPADM

## 2018-03-21 RX ORDER — ACETAMINOPHEN 325 MG/1
325-650 TABLET ORAL EVERY 4 HOURS PRN
Status: DISCONTINUED | OUTPATIENT
Start: 2018-03-21 | End: 2018-03-21 | Stop reason: HOSPADM

## 2018-03-21 RX ORDER — METOPROLOL TARTRATE 1 MG/ML
5 INJECTION, SOLUTION INTRAVENOUS EVERY 5 MIN PRN
Status: DISCONTINUED | OUTPATIENT
Start: 2018-03-21 | End: 2018-03-21 | Stop reason: HOSPADM

## 2018-03-21 RX ORDER — PRASUGREL 10 MG/1
10-60 TABLET, FILM COATED ORAL
Status: DISCONTINUED | OUTPATIENT
Start: 2018-03-21 | End: 2018-03-21 | Stop reason: HOSPADM

## 2018-03-21 RX ORDER — FUROSEMIDE 10 MG/ML
20-100 INJECTION INTRAMUSCULAR; INTRAVENOUS
Status: DISCONTINUED | OUTPATIENT
Start: 2018-03-21 | End: 2018-03-21 | Stop reason: HOSPADM

## 2018-03-21 RX ORDER — PROMETHAZINE HYDROCHLORIDE 25 MG/ML
6.25-25 INJECTION, SOLUTION INTRAMUSCULAR; INTRAVENOUS EVERY 4 HOURS PRN
Status: DISCONTINUED | OUTPATIENT
Start: 2018-03-21 | End: 2018-03-21 | Stop reason: HOSPADM

## 2018-03-21 RX ORDER — ATROPINE SULFATE 0.1 MG/ML
.5-1 INJECTION INTRAVENOUS
Status: DISCONTINUED | OUTPATIENT
Start: 2018-03-21 | End: 2018-03-21 | Stop reason: HOSPADM

## 2018-03-21 RX ORDER — BUPIVACAINE HYDROCHLORIDE 2.5 MG/ML
1-10 INJECTION, SOLUTION EPIDURAL; INFILTRATION; INTRACAUDAL
Status: DISCONTINUED | OUTPATIENT
Start: 2018-03-21 | End: 2018-03-21 | Stop reason: HOSPADM

## 2018-03-21 RX ORDER — NITROGLYCERIN 5 MG/ML
100-200 VIAL (ML) INTRAVENOUS
Status: DISCONTINUED | OUTPATIENT
Start: 2018-03-21 | End: 2018-03-21 | Stop reason: HOSPADM

## 2018-03-21 RX ORDER — ASPIRIN 81 MG/1
81-324 TABLET, CHEWABLE ORAL
Status: DISCONTINUED | OUTPATIENT
Start: 2018-03-21 | End: 2018-03-21 | Stop reason: HOSPADM

## 2018-03-21 RX ORDER — NITROGLYCERIN 20 MG/100ML
.07-2 INJECTION INTRAVENOUS CONTINUOUS PRN
Status: DISCONTINUED | OUTPATIENT
Start: 2018-03-21 | End: 2018-03-21 | Stop reason: HOSPADM

## 2018-03-21 RX ORDER — HYDRALAZINE HYDROCHLORIDE 20 MG/ML
10-20 INJECTION INTRAMUSCULAR; INTRAVENOUS
Status: DISCONTINUED | OUTPATIENT
Start: 2018-03-21 | End: 2018-03-21 | Stop reason: HOSPADM

## 2018-03-21 RX ORDER — METHYLPREDNISOLONE SODIUM SUCCINATE 125 MG/2ML
125 INJECTION, POWDER, LYOPHILIZED, FOR SOLUTION INTRAMUSCULAR; INTRAVENOUS
Status: DISCONTINUED | OUTPATIENT
Start: 2018-03-21 | End: 2018-03-21 | Stop reason: HOSPADM

## 2018-03-21 RX ORDER — CLOPIDOGREL BISULFATE 75 MG/1
75 TABLET ORAL
Status: DISCONTINUED | OUTPATIENT
Start: 2018-03-21 | End: 2018-03-21 | Stop reason: HOSPADM

## 2018-03-21 RX ORDER — LIDOCAINE HYDROCHLORIDE 10 MG/ML
1-10 INJECTION, SOLUTION EPIDURAL; INFILTRATION; INTRACAUDAL; PERINEURAL
Status: COMPLETED | OUTPATIENT
Start: 2018-03-21 | End: 2018-03-21

## 2018-03-21 RX ADMIN — NITROGLYCERIN 200 MCG: 5 INJECTION, SOLUTION INTRAVENOUS at 08:58

## 2018-03-21 RX ADMIN — SODIUM CHLORIDE: 9 INJECTION, SOLUTION INTRAVENOUS at 07:30

## 2018-03-21 RX ADMIN — VERAPAMIL HYDROCHLORIDE 2.5 MG: 2.5 INJECTION, SOLUTION INTRAVENOUS at 08:58

## 2018-03-21 RX ADMIN — IOPAMIDOL 105 ML: 755 INJECTION, SOLUTION INTRAVASCULAR at 09:30

## 2018-03-21 RX ADMIN — MIDAZOLAM 1 MG: 1 INJECTION INTRAMUSCULAR; INTRAVENOUS at 08:57

## 2018-03-21 RX ADMIN — FENTANYL CITRATE 50 MCG: 50 INJECTION, SOLUTION INTRAMUSCULAR; INTRAVENOUS at 09:20

## 2018-03-21 RX ADMIN — LIDOCAINE HYDROCHLORIDE 1 ML: 10 INJECTION, SOLUTION EPIDURAL; INFILTRATION; INTRACAUDAL; PERINEURAL at 08:57

## 2018-03-21 RX ADMIN — FENTANYL CITRATE 50 MCG: 50 INJECTION, SOLUTION INTRAMUSCULAR; INTRAVENOUS at 08:57

## 2018-03-21 RX ADMIN — MIDAZOLAM 1 MG: 1 INJECTION INTRAMUSCULAR; INTRAVENOUS at 09:20

## 2018-03-21 RX ADMIN — Medication 5000 UNITS: at 09:00

## 2018-03-21 RX ADMIN — POTASSIUM CHLORIDE 20 MEQ: 1500 TABLET, EXTENDED RELEASE ORAL at 08:08

## 2018-03-21 ASSESSMENT — PATIENT HEALTH QUESTIONNAIRE - PHQ9: SUM OF ALL RESPONSES TO PHQ QUESTIONS 1-9: 3

## 2018-03-21 NOTE — PROGRESS NOTES
Discharged to home per w/c with family. (L) radial site has remained stable. VSS. Ambulated to BR to void. Instructions were reviewed and take home med given by Birgit BLOUNT.

## 2018-03-21 NOTE — DISCHARGE INSTRUCTIONS
Cardiac Angiogram Discharge Instructions - Radial    After you go home:      Have an adult stay with you until tomorrow.    Drink extra fluids for 2 days.    You may resume your normal diet.    No smoking       For 24 hours - due to the sedation you received:    Relax and take it easy.    Do NOT make any important or legal decisions.    Do NOT drive or operate machines at home or at work.    Do NOT drink alcohol.    Care of Wrist Puncture Site:      For the first 24 hrs - check the puncture site every 1-2 hours while awake.    It is normal to have soreness at the puncture site and mild tingling in your hand for up to 3 days.    Remove the bandaid after 24 hours. If there is minor oozing, apply another bandaid and remove it after 12 hours.    You may shower tomorrow.  Do NOT take a bath, or use a hot tub or pool for at least 3 days. Do NOT scrub the site. Do not use lotion or powder near the puncture site.           Activity:        For 2 days:     do not use your hand or arm to support your weight (such as rising from a chair)     do not bend your wrist (such as lifting a garage door).    do not lift more than 5 pounds or exercise your arm (such as tennis, golf or bowling).    Do NOT do any heavy activity such as exercise, lifting, or straining.     Bleeding:      If you start bleeding from the site in your wrist, sit down and press firmly on/above the site for 10 minutes.     Once bleeding stops, keep arm still for 2 hours.     Call [facility] as soon as you can.       Call 911 right away if you have heavy bleeding or bleeding that does not stop.      Medicines:      If you are taking antiplatelet medications such as Plavix, Brilinta or Effient, do not stop taking it until you talk to your cardiologist.        If you are on Metformin (Glucophage), do not restart it until you have blood tests (within 2 to 3 days after discharge).  After you have your blood drawn, you may restart the Metformin.     Take your  medications, including blood thinners, unless your provider tells you not to.  If you take Coumadin (Warfarin), have your INR checked by your provider in  3-5 days. Call your clinic to schedule this.    If you have stopped any medicines, check with your provider about when to restart them.    Follow Up Appointments:      Follow up with [facility] Nurse Practitioner at [facility] of patient preference in 7-10 days.    Call the clinic if:      You have a large or growing hard lump around the site.    The site is red, swollen, hot or tender.    Blood or fluid is draining from the site.    You have chills or a fever greater than 101 F (38 C).    Your arm feels numb, cool or changes color.    You have hives, a rash or unusual itching.    Any questions or concerns.          [facility] [place]:    [phone] [facility] (7 days a week)

## 2018-03-21 NOTE — PROGRESS NOTES
Pt back from cath lab at 0940, left radial CDI, no hematoma no bleeding, pt denies pain, VSS, discharge instructions reviewed with pt and wife, questions answered and both state understanding. Spironolactone script sent to pharmacy to be filled.

## 2018-03-21 NOTE — IP AVS SNAPSHOT
MRN:[id number]                      After Visit Summary   3/21/2018    Bairon Foster    MRN: [id number]           Visit Information        Department      3/21/2018  6:27 AM [facility]          Review of your medicines      UNREVIEWED medicines. Ask your doctor about these medicines        Dose / Directions    ASPIRIN PO   Indication:  pt staes takes twice a day        Dose:  81 mg   Take 81 mg by mouth daily   Refills:  0       Carboxymethylcellulose Sod PF 1 % ophthalmic solution   Commonly known as:  CELLUVISC/REFRESH LIQUIGEL        Dose:  1 drop   Place 1 drop into both eyes 3 times daily as needed for dry eyes   Refills:  0       carvedilol 6.25 MG tablet   Commonly known as:  COREG   Used for:  Ischemic cardiomyopathy        Dose:  6.25 mg   Take 1 tablet (6.25 mg) by mouth 2 times daily (with meals)   Quantity:  180 tablet   Refills:  3       CENTRUM SILVER per tablet        Dose:  1 tablet   Take 1 tablet by mouth daily   Refills:  0       FINASTERIDE PO        Dose:  5 mg   Take 5 mg by mouth daily   Refills:  0       furosemide 40 MG tablet   Commonly known as:  LASIX   Used for:  Dependent edema        Dose:  60 mg   Take 1.5 tablets (60 mg) by mouth daily   Quantity:  45 tablet   Refills:  3       GLUCOSAMINE-CHONDROITIN PO        Dose:  2 tablet   Take 2 tablets by mouth daily   Refills:  0       isosorbide mononitrate 30 MG 24 hr tablet   Commonly known as:  IMDUR   Used for:  SOB (shortness of breath)        Dose:  30 mg   Take 1 tablet (30 mg) by mouth daily   Quantity:  30 tablet   Refills:  11       levothyroxine 100 MCG tablet   Commonly known as:  SYNTHROID/LEVOTHROID   Used for:  Acquired hypothyroidism        Dose:  100 mcg   Take 1 tablet (100 mcg) by mouth daily   Quantity:  90 tablet   Refills:  3       LORazepam 1 MG tablet   Commonly known as:  ATIVAN   Used for:  Acute stress reaction        Dose:  0.5-1 mg   Take 0.5-1 tablets (0.5-1 mg) by mouth  every 8 hours as needed for anxiety   Quantity:  30 tablet   Refills:  5       MIRALAX PO        Dose:  1 packet   Take 1 packet by mouth At Bedtime   Refills:  0       omega-3 fatty acids 1200 MG capsule        Dose:  1 capsule   Take 1 capsule by mouth daily   Refills:  0       omeprazole 20 MG CR capsule   Commonly known as:  priLOSEC   Used for:  Gastroesophageal reflux disease without esophagitis        Dose:  20 mg   Take 1 capsule (20 mg) by mouth 2 times daily   Quantity:  180 capsule   Refills:  3       potassium chloride SA 20 MEQ CR tablet   Commonly known as:  KLOR-CON   Used for:  Pulmonary hypertension        Dose:  20 mEq   Take 1 tablet (20 mEq) by mouth daily   Quantity:  90 tablet   Refills:  3       PRADAXA ANTICOAGULANT 75 MG capsule   Generic drug:  dabigatran ANTICOAGULANT        Dose:  150 mg   Take 150 mg by mouth 2 times daily. Store in original 's bottle or blister pack; use within 120 days of opening.   Refills:  0       SIMVASTATIN PO        Dose:  20 mg   Take 20 mg by mouth daily   Refills:  0       sucralfate 1 GM tablet   Commonly known as:  CARAFATE   Used for:  Esophageal reflux        Dose:  1 g   Take 1 tablet (1 g) by mouth 2 times daily   Quantity:  180 tablet   Refills:  1       tamsulosin 0.4 MG capsule   Commonly known as:  FLOMAX   Used for:  Benign prostatic hyperplasia with urinary hesitancy        Dose:  0.4 mg   Take 1 capsule (0.4 mg) by mouth daily   Quantity:  30 capsule   Refills:  3       UNKNOWN TO PATIENT        Dose:  1 drop   Place 1 drop Into the left eye daily EYE DROP PRESCRIBED FOR CONJUNCTIVITIS   Refills:  0       WELLBUTRIN XL PO        Dose:  150 mg   Take 150 mg by mouth every morning   Refills:  0         START taking        Dose / Directions    spironolactone 25 MG tablet   Commonly known as:  ALDACTONE   Used for:  SOB (shortness of breath)        Dose:  25 mg   Take 1 tablet (25 mg) by mouth daily   Quantity:  90 tablet   Refills:  3          CONTINUE these medicines which have NOT CHANGED        Dose / Directions    COMPRESSION STOCKINGS   Used for:  Varicose 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        Dose:  1 each   1 each daily   Quantity:  2 each   Refills:  0       order for DME   Used for:  Obstructive sleep apnea (adult) (pediatric), Other dyspnea and respiratory abnormality        Auto-CPAP:Max 9 cm H2OMin 9 cm H2O Continuous Lifetime need and heated humidity.   Quantity:  1 Device   Refills:  0            Where to get your medicines      Some of these will need a paper prescription and others can be bought over the counter. Ask your nurse if you have questions.     Bring a paper prescription for each of these medications     spironolactone 25 MG tablet               Prescriptions were sent or printed at these locations (1 Prescription)                   Other Prescriptions                Printed at Department/Unit printer (1 of 1)         spironolactone (ALDACTONE) 25 MG tablet                 Protect others around you: Learn how to safely use, store and throw away your medicines at www.disposemymeds.org.         Follow-ups after your visit        Your next 10 appointments already scheduled     Mar 27, 2018  1:45 PM CDT   [facility] with Mg Nelson MD   [facility]    [address] [address]   [phone]            Apr 12, 2018  8:30 AM CDT   Return Visit with WILLIS Spence CNP   [facility] ([facility])    [address] [place] [id number] [phone] OPT 2            Jun 14, 2018 10:15 AM CDT   Phone Device Check with ARRIETA TECH2   [facility] ([facility])    [address] [place] [id number] [phone] OPT 2              Future tests that were ordered for  you     Echocardiogram       Administration of IV contrast will be tailored to this examination per the appropriate written protocol listed in the Echocardiography department Protocol Book, or by the supervising Cardiologist. This may result in an order change.    Use of contrast is at the discretion of the supervising Cardiologist.                   Care Instructions        After Care Instructions     Discharge Instructions - IF on Metformin (Glucophage or Glucovance) or Metformin containing medications       IF on Metformin (Glucophage or Glucovance) or Metformin containing medications , schedule a Basic Metabolic Panel at [facility] or Primary Clinic in 48 - 72 hours post procedure and PRIOR TO resuming the Metformin or Metformin containing medications.  Hold Metformin (Glucophage or Glucovance) or Metformin containing medications until after the Basic Metabolic Panel on the 2nd or 3rd day following the procedure.  May resume after blood draw is complete.                  Further instructions from your care team       Cardiac Angiogram Discharge Instructions - Radial    After you go home:      Have an adult stay with you until tomorrow.    Drink extra fluids for 2 days.    You may resume your normal diet.    No smoking       For 24 hours - due to the sedation you received:    Relax and take it easy.    Do NOT make any important or legal decisions.    Do NOT drive or operate machines at home or at work.    Do NOT drink alcohol.    Care of Wrist Puncture Site:      For the first 24 hrs - check the puncture site every 1-2 hours while awake.    It is normal to have soreness at the puncture site and mild tingling in your hand for up to 3 days.    Remove the bandaid after 24 hours. If there is minor oozing, apply another bandaid and remove it after 12 hours.    You may shower tomorrow.  Do NOT take a bath, or use a hot tub or pool for at least 3 days. Do NOT scrub the site. Do not use lotion or powder near the puncture  site.           Activity:        For 2 days:     do not use your hand or arm to support your weight (such as rising from a chair)     do not bend your wrist (such as lifting a garage door).    do not lift more than 5 pounds or exercise your arm (such as tennis, golf or bowling).    Do NOT do any heavy activity such as exercise, lifting, or straining.     Bleeding:      If you start bleeding from the site in your wrist, sit down and press firmly on/above the site for 10 minutes.     Once bleeding stops, keep arm still for 2 hours.     Call [facility] as soon as you can.       Call 911 right away if you have heavy bleeding or bleeding that does not stop.      Medicines:      If you are taking antiplatelet medications such as Plavix, Brilinta or Effient, do not stop taking it until you talk to your cardiologist.        If you are on Metformin (Glucophage), do not restart it until you have blood tests (within 2 to 3 days after discharge).  After you have your blood drawn, you may restart the Metformin.     Take your medications, including blood thinners, unless your provider tells you not to.  If you take Coumadin (Warfarin), have your INR checked by your provider in  3-5 days. Call your clinic to schedule this.    If you have stopped any medicines, check with your provider about when to restart them.    Follow Up Appointments:      Follow up with [facility] Nurse Practitioner at [facility] of patient preference in 7-10 days.    Call the clinic if:      You have a large or growing hard lump around the site.    The site is red, swollen, hot or tender.    Blood or fluid is draining from the site.    You have chills or a fever greater than 101 F (38 C).    Your arm feels numb, cool or changes color.    You have hives, a rash or unusual itching.    Any questions or concerns.          [facility] [place]:    [phone] [facility] (7 days a week)             Additional Information About Your  "Visit        [employer] Information     [employer] gives you secure access to your electronic health record. If you see a primary care provider, you can also send messages to your care team and make appointments. If you have questions, please call your primary care clinic.  If you do not have a primary care provider, please call [phone] and they will assist you.        Care EveryWhere ID     This is your Care EveryWhere ID. This could be used by other organizations to access your [facility] medical records  [id number]        Your Vitals Were     Blood Pressure Pulse Temperature Respirations Height Weight    147/49 60 98  F (36.7  C) (Oral) 16 1.88 m (6' 2\") 102.3 kg (225 lb 9.6 oz)    Pulse Oximetry BMI (Body Mass Index)                97% 28.97 kg/m2           Primary Care Provider Office Phone # Fax #    Mg Nelson [phone]      Equal Access to Services     EVANGELIST PETERSON : Hadii silvestre sterlingo Sosuman, waaxda luqadaha, qaybta kaalmada adeegyada, kathryn camilo . So [facility] [phone].    ATENCIÓN: Si habla español, tiene a malone disposición servicios gratuitos de asistencia lingüística. Llame al [phone].    We comply with applicable federal civil rights laws and [place] laws. We do not discriminate on the basis of race, color, national origin, age, disability, sex, sexual orientation, or gender identity.            Thank you!     Thank you for choosing [facility] for your care. Our goal is always to provide you with excellent care. Hearing back from our patients is one way we can continue to improve our services. Please take a few minutes to complete the written survey that you may receive in the mail after you visit with us. Thank you!             Medication List: This is a list of all your medications and when to take them. Check marks below indicate your daily home schedule. Keep this list as a reference.      Medications           Morning Afternoon " Evening Bedtime As Needed    ASPIRIN PO   Take 81 mg by mouth daily                                Carboxymethylcellulose Sod PF 1 % ophthalmic solution   Commonly known as:  CELLUVISC/REFRESH LIQUIGEL   Place 1 drop into both eyes 3 times daily as needed for dry eyes                                carvedilol 6.25 MG tablet   Commonly known as:  COREG   Take 1 tablet (6.25 mg) by mouth 2 times daily (with meals)                                CENTRUM SILVER per tablet   Take 1 tablet by mouth daily                                COMPRESSION STOCKINGS   1 each daily                                FINASTERIDE PO   Take 5 mg by mouth daily                                furosemide 40 MG tablet   Commonly known as:  LASIX   Take 1.5 tablets (60 mg) by mouth daily                                GLUCOSAMINE-CHONDROITIN PO   Take 2 tablets by mouth daily                                isosorbide mononitrate 30 MG 24 hr tablet   Commonly known as:  IMDUR   Take 1 tablet (30 mg) by mouth daily                                levothyroxine 100 MCG tablet   Commonly known as:  SYNTHROID/LEVOTHROID   Take 1 tablet (100 mcg) by mouth daily                                LORazepam 1 MG tablet   Commonly known as:  ATIVAN   Take 0.5-1 tablets (0.5-1 mg) by mouth every 8 hours as needed for anxiety                                MIRALAX PO   Take 1 packet by mouth At Bedtime                                omega-3 fatty acids 1200 MG capsule   Take 1 capsule by mouth daily                                omeprazole 20 MG CR capsule   Commonly known as:  priLOSEC   Take 1 capsule (20 mg) by mouth 2 times daily                                order for DME   Auto-CPAP:Max 9 cm H2OMin 9 cm H2O Continuous Lifetime need and heated humidity.                                potassium chloride SA 20 MEQ CR tablet   Commonly known as:  KLOR-CON   Take 1 tablet (20 mEq) by mouth daily   Last time this was given:  20 mEq on 3/21/2018  8:08 AM                                 PRADAXA ANTICOAGULANT 75 MG capsule   Take 150 mg by mouth 2 times daily. Store in original 's bottle or blister pack; use within 120 days of opening.   Generic drug:  dabigatran ANTICOAGULANT                                SIMVASTATIN PO   Take 20 mg by mouth daily                                spironolactone 25 MG tablet   Commonly known as:  ALDACTONE   Take 1 tablet (25 mg) by mouth daily                                sucralfate 1 GM tablet   Commonly known as:  CARAFATE   Take 1 tablet (1 g) by mouth 2 times daily                                tamsulosin 0.4 MG capsule   Commonly known as:  FLOMAX   Take 1 capsule (0.4 mg) by mouth daily                                UNKNOWN TO PATIENT   Place 1 drop Into the left eye daily EYE DROP PRESCRIBED FOR CONJUNCTIVITIS                                WELLBUTRIN XL PO   Take 150 mg by mouth every morning

## 2018-03-21 NOTE — IP AVS SNAPSHOT
[place] [address] [place] [address]    Phone:  [phone]                                       After Visit Summary   3/21/2018    Bairon Foster    MRN: [id number]           After Visit Summary Signature Page     I have received my discharge instructions, and my questions have been answered. I have discussed any challenges I see with this plan with the nurse or doctor.    ..........................................................................................................................................  Patient/Patient Representative Signature      ..........................................................................................................................................  Patient Representative Print Name and Relationship to Patient    ..................................................               ................................................  Date                                            Time    ..........................................................................................................................................  Reviewed by Signature/Title    ...................................................              ..............................................  Date                                                            Time

## 2018-03-21 NOTE — PROGRESS NOTES
Pt being admitted for heart angiogram, VSS, denies pain, pre procedure instructions  Reviewed questions answered and pt and wife state understanding.

## 2018-03-23 LAB — INTERPRETATION ECG - MUSE: NORMAL

## 2018-03-27 ENCOUNTER — OFFICE VISIT (OUTPATIENT)
Dept: FAMILY MEDICINE | Facility: CLINIC | Age: 83
End: 2018-03-27
Payer: COMMERCIAL

## 2018-03-27 ENCOUNTER — TELEPHONE (OUTPATIENT)
Dept: CARDIOLOGY | Facility: CLINIC | Age: 83
End: 2018-03-27

## 2018-03-27 VITALS
SYSTOLIC BLOOD PRESSURE: 112 MMHG | WEIGHT: 217 LBS | HEART RATE: 60 BPM | BODY MASS INDEX: 27.85 KG/M2 | DIASTOLIC BLOOD PRESSURE: 76 MMHG | HEIGHT: 74 IN | OXYGEN SATURATION: 97 %

## 2018-03-27 DIAGNOSIS — R60.9 DEPENDENT EDEMA: ICD-10-CM

## 2018-03-27 PROCEDURE — 99213 OFFICE O/P EST LOW 20 MIN: CPT | Performed by: FAMILY MEDICINE

## 2018-03-27 RX ORDER — FUROSEMIDE 40 MG
80 TABLET ORAL DAILY
Qty: 60 TABLET | Refills: 3 | Status: SHIPPED | OUTPATIENT
Start: 2018-03-27 | End: 2018-03-29

## 2018-03-27 NOTE — TELEPHONE ENCOUNTER
"Patient called to report he was seen by his PMD today.  His weight was down but his \"water retention was up\".  His PMD suggested that he move up his 4/11/18 OV with Anushka to be seen this week.  Informed him Anushka is on vacation for 2 weeks but I could schedule him with a different KI, but instead he'd like to see an MD if possible.  Patient's Cardiologist is Dr. Hope however he can't get into him until the end of April therefore he stated he'd like to see Dr. Carrizales.  OV scheduled. Team 4 number given for further questions or concerns.   "

## 2018-03-27 NOTE — PATIENT INSTRUCTIONS
"The patient has lost a significant amount of weight since last visit.  However, he cannot remember if he had his coat on last time or not.  I will see him back in 1 week and he will try and address the same way as he is today to see if his weight in fact is starting to come down.  He has an appointment set up next week with cardiology for some sort of what he is calling a \"body scan\".  "

## 2018-03-27 NOTE — MR AVS SNAPSHOT
"              After Visit Summary   3/27/2018    Bairon Foster    MRN: [id number]           Patient Information     Date Of Birth          2/6/1931        Visit Information        Provider Department      3/27/2018 1:45 PM Mg Nelson MD [facility]        Today's Diagnoses     Dependent edema          Care Instructions    The patient has lost a significant amount of weight since last visit.  However, he cannot remember if he had his coat on last time or not.  I will see him back in 1 week and he will try and address the same way as he is today to see if his weight in fact is starting to come down.  He has an appointment set up next week with cardiology for some sort of what he is calling a \"body scan\".          Follow-ups after your visit        Your next 10 appointments already scheduled     Apr 02, 2018  2:00 PM CDT   SHORT with Mg Nelson MD   [facility] ([facility]    [address]  [facility] [address]   [phone]            Apr 04, 2018 12:45 PM CDT   Ech Complete with [id number]   [facility]ch[facility] ([facility])    [address] [place] [address]  [address]  [facility] [address] [phone]           1. Please bring or wear a comfortable two-piece outfit. 2. You may eat, drink and take your normal medicines. 3. For any questions that cannot be answered, please contact the ordering physician            Apr 12, 2018  8:30 AM CDT   Return Visit with WILLIS Spence [facility] ([facility])    [address] [place] [address] [address]  [facility] [address] [phone] OPT 2            Jun 14, 2018 10:15 AM CDT   Phone Device Check with ARRIETA TECH2   [facility] ([facility] [place] [facility])    [address] [place] [address] " "[address]  [place] [id number] [phone] OPT 2              Who to contact     If you have questions or need follow up information about today's clinic visit or your schedule please contact [facility] directly at [phone].  Normal or non-critical lab and imaging results will be communicated to you by MyChart, letter or phone within 4 business days after the clinic has received the results. If you do not hear from us within 7 days, please contact the clinic through [employer] or phone. If you have a critical or abnormal lab result, we will notify you by phone as soon as possible.  Submit refill requests through [employer] or call your pharmacy and they will forward the refill request to us. Please allow 3 business days for your refill to be completed.          Additional Information About Your Visit        MyChart Information     [employer] gives you secure access to your electronic health record. If you see a primary care provider, you can also send messages to your care team and make appointments. If you have questions, please call your primary care clinic.  If you do not have a primary care provider, please call [phone] and they will assist you.        Care EveryWhere ID     This is your Care EveryWhere ID. This could be used by other organizations to access your [place] medical records  [id number]        Your Vitals Were     Pulse Height Pulse Oximetry BMI (Body Mass Index)          60 6' 2\" (1.88 m) 97% 27.86 kg/m2         Blood Pressure from Last 3 Encounters:   03/27/18 112/76   03/21/18 110/72   03/20/18 116/70    Weight from Last 3 Encounters:   03/27/18 217 lb (98.4 kg)   03/21/18 225 lb 9.6 oz (102.3 kg)   03/20/18 226 lb (102.5 kg)              Today, you had the following     No orders found for display         Today's Medication Changes          These changes are accurate as of 3/27/18  2:35 PM.  If you have any questions, ask your nurse or doctor.               These " medicines have changed or have updated prescriptions.        Dose/Directions    furosemide 40 MG tablet   Commonly known as:  LASIX   This may have changed:  how much to take   Used for:  Dependent edema   Changed by:  Mg Nelson MD        Dose:  80 mg   Take 2 tablets (80 mg) by mouth daily   Quantity:  60 tablet   Refills:  3            Where to get your medicines      These medications were sent to [id number] - [facility] [address], [facility] [address]    Hours:  24-hours Phone:  [phone]     furosemide 40 MG tablet                Primary Care Provider Office Phone # Fax #    Mg Nelson [phone] [address]  [facility] [address]        Goals        General    I will find a caregiver support group to attend (pt-stated)     Notes - Note created  4/17/2014  8:49 AM by Beth Morales RN    As of today's date 4/17/2014 goal is met at 0 - 25%.   Goal Status:  Active        Equal Access to Services     [facility] AH: Hadii aad ku hadasho Soomaali, waaxda luqadaha, qaybta kaalmada adekurtyada, kathryn camilo . So [facility] [phone].    ATENCIÓN: Si habla español, tiene a malone disposición servicios gratuitos de asistencia lingüística. [facility] [phone].    We comply with applicable federal civil rights laws and [place] laws. We do not discriminate on the basis of race, color, national origin, age, disability, sex, sexual orientation, or gender identity.            Thank you!     Thank you for choosing [facility]  for your care. Our goal is always to provide you with excellent care. Hearing back from our patients is one way we can continue to improve our services. Please take a few minutes to complete the written survey that you may receive in the mail after your visit with us. Thank you!             Your Updated Medication List  - Protect others around you: Learn how to safely use, store and throw away your medicines at www.disposemymeds.org.          This list is accurate as of 3/27/18  2:35 PM.  Always use your most recent med list.                   Brand Name Dispense Instructions for use Diagnosis    ASPIRIN PO      Take 81 mg by mouth daily        Carboxymethylcellulose Sod PF 1 % ophthalmic solution    CELLUVISC/REFRESH LIQUIGEL     Place 1 drop into both eyes 3 times daily as needed for dry eyes        carvedilol 6.25 MG tablet    COREG    180 tablet    Take 1 tablet (6.25 mg) by mouth 2 times daily (with meals)    Ischemic cardiomyopathy       CENTRUM SILVER per tablet      Take 1 tablet by mouth daily        COMPRESSION STOCKINGS     2 each    1 each daily    Varicose 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       FINASTERIDE PO      Take 5 mg by mouth daily        furosemide 40 MG tablet    LASIX    60 tablet    Take 2 tablets (80 mg) by mouth daily    Dependent edema       GLUCOSAMINE-CHONDROITIN PO      Take 2 tablets by mouth daily        isosorbide mononitrate 30 MG 24 hr tablet    IMDUR    30 tablet    Take 1 tablet (30 mg) by mouth daily    SOB (shortness of breath)       levothyroxine 100 MCG tablet    SYNTHROID/LEVOTHROID    90 tablet    Take 1 tablet (100 mcg) by mouth daily    Acquired hypothyroidism       LORazepam 1 MG tablet    ATIVAN    30 tablet    Take 0.5-1 tablets (0.5-1 mg) by mouth every 8 hours as needed for anxiety    Acute stress reaction       MIRALAX PO      Take 1 packet by mouth At Bedtime        omega-3 fatty acids 1200 MG capsule      Take 1 capsule by mouth daily        omeprazole 20 MG CR capsule    priLOSEC    180 capsule    Take 1 capsule (20 mg) by mouth 2 times daily    Gastroesophageal reflux disease without esophagitis       order for DME     1 Device    Auto-CPAP:Max 9 cm H2OMin 9 cm H2O Continuous Lifetime need and heated humidity.    Obstructive sleep apnea (adult) (pediatric), Other  dyspnea and respiratory abnormality       potassium chloride SA 20 MEQ CR tablet    KLOR-CON    90 tablet    Take 1 tablet (20 mEq) by mouth daily    Pulmonary hypertension       PRADAXA ANTICOAGULANT 75 MG capsule   Generic drug:  dabigatran ANTICOAGULANT      Take 150 mg by mouth 2 times daily. Store in original 's bottle or blister pack; use within 120 days of opening.        SIMVASTATIN PO      Take 20 mg by mouth daily        spironolactone 25 MG tablet    ALDACTONE    90 tablet    Take 1 tablet (25 mg) by mouth daily    SOB (shortness of breath)       sucralfate 1 GM tablet    CARAFATE    180 tablet    Take 1 tablet (1 g) by mouth 2 times daily    Esophageal reflux       tamsulosin 0.4 MG capsule    FLOMAX    30 capsule    Take 1 capsule (0.4 mg) by mouth daily    Benign prostatic hyperplasia with urinary hesitancy       UNKNOWN TO PATIENT      Place 1 drop Into the left eye daily EYE DROP PRESCRIBED FOR CONJUNCTIVITIS        WELLBUTRIN XL PO      Take 150 mg by mouth every morning

## 2018-03-27 NOTE — PROGRESS NOTES
SUBJECTIVE:   Bairon Foster is a 87 year old male who presents to clinic today for the following health issues:    Hypertension Follow-up      Outpatient blood pressures are not being checked.    Low Salt Diet: low salt      Amount of exercise or physical activity: None    Problems taking medications regularly: No    Medication side effects: none    Diet: regular (no restrictions) and low salt        Edema      Duration: Months    Description (location/character/radiation): Edema in the legs and scrotum.    Intensity:  moderate    Accompanying signs and symptoms: None    History (similar episodes/previous evaluation): None    Precipitating or alleviating factors: Furosemide is may be helped a little tiny bit.    Therapies tried and outcome: See above       Problem list and histories reviewed & adjusted, as indicated.  Additional history: as documented    Patient Active Problem List   Diagnosis     Atrial fibrillation (H)     Pulmonary hypertension     CAD (coronary artery disease)     BPH (benign prostatic hyperplasia)     Ischemic cardiomyopathy     Pancytopenia (H)     JIM (obstructive sleep apnea)- mild/moderate     Acute stress reaction     Health Care Home     Vitamin D deficiency     Advance Care Planning     Gastroesophageal reflux disease without esophagitis     Hydrocele, unspecified hydrocele type     Slow transit constipation     Hypercholesterolemia     Non morbid obesity due to excess calories w comorbid CAD< hi LDL      Venous stasis dermatitis of both lower extremities     Lymphedema of both lower extremities     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      Coronary artery disease due to lipid rich plaque     Acquired hypothyroidism     Onychomycosis     Overweight (BMI 25.0-29.9)     PAD (peripheral artery disease) (H)     Screening for prostate cancer     Blood in stool     Umbilical hernia without obstruction and without gangrene     Xerosis cutis     Status post coronary angiogram  "    Past Surgical History:   Procedure Laterality Date     EYE SURGERY      CATARACT/BLEPHAROPLASTY     GENITOURINARY SURGERY      TUNA     HERNIA REPAIR      RIGHT INGUINAL     HERNIORRHAPHY INGUINAL  8/14/2012    Procedure: HERNIORRHAPHY INGUINAL;  right inguinal hernia repair;  Surgeon: Kareem Torrez MD;  Location: [facility]     HERNIORRHAPHY UMBILICAL N/A 10/31/2017    Procedure: HERNIORRHAPHY UMBILICAL;  UMBILICAL HERNIA REPAIR WITH MESH;  Surgeon: Scar Harrington MD;  Location: [facility]     IMPLANT PACEMAKER  8/2009    single chamber     ORTHOPEDIC SURGERY      [facility] RIGHT     ORTHOPEDIC SURGERY  2010    right TKR, left TKR     SURGICAL HISTORY OF -       Thumb surgery     SURGICAL HISTORY OF -   1990s    Left total knee replacement     SURGICAL HISTORY OF -   3/11    [facility] total knee replacement       Social History   Substance Use Topics     Smoking status: Never Smoker     Smokeless tobacco: Never Used      Comment: per encounter 2/12/07     Alcohol use 0.0 oz/week     0 Standard drinks or equivalent per week      Comment: 1/month     Family History   Problem Relation Age of Onset     Cardiovascular Father      C.A.D. Father      CANCER Sister      lung     Unknown/Adopted Mother      Cancer - colorectal No family hx of            Reviewed and updated as needed this visit by clinical staff  Tobacco  Allergies  Meds  Med Hx  Surg Hx  Fam Hx  Soc Hx      Reviewed and updated as needed this visit by Provider         ROS:  Constitutional, HEENT, cardiovascular, pulmonary, gi and gu systems are negative, except as otherwise noted.    OBJECTIVE:                                                    /76 (BP Location: Right arm, Patient Position: Chair, Cuff Size: Adult Regular)  Pulse 60  Ht 6' 2\" (1.88 m)  Wt 217 lb (98.4 kg)  SpO2 97%  BMI 27.86 kg/m2  Body mass index is 27.86 kg/(m^2).  GENERAL APPEARANCE: healthy, alert and no distress         ASSESSMENT/PLAN:                                                  " "      ICD-10-CM    1. Dependent edema R60.9 furosemide (LASIX) 40 MG tablet       Patient Instructions   The patient has lost a significant amount of weight since last visit.  However, he cannot remember if he had his code on last time or not.  I will see him back in 1 week and he will try and address the same way as he is today to see if his weight in fact is starting to come down.  He has an appointment set up next week with cardiology for some sort of what he is calling a \"body scan\".      Mg Nelson MD  [facility]  "

## 2018-03-29 DIAGNOSIS — R60.9 DEPENDENT EDEMA: ICD-10-CM

## 2018-03-29 RX ORDER — FUROSEMIDE 40 MG
80 TABLET ORAL DAILY
Qty: 180 TABLET | Refills: 2 | Status: SHIPPED | OUTPATIENT
Start: 2018-03-29 | End: 2018-03-29

## 2018-03-29 RX ORDER — FUROSEMIDE 40 MG
80 TABLET ORAL DAILY
Qty: 28 TABLET | Refills: 0 | Status: SHIPPED | OUTPATIENT
Start: 2018-03-29 | End: 2018-04-02

## 2018-03-29 NOTE — TELEPHONE ENCOUNTER
"Requested Prescriptions   Pending Prescriptions Disp Refills     furosemide (LASIX) 40 MG tablet  Last Written Prescription Date:  3/27/18  Last Fill Quantity: 60 TABLET,  # refills: 3   Last office visit: 3/27/2018 with prescribing provider:  RICA   Future Office Visit:   Next 5 appointments (look out 90 days)     Mar 30, 2018 12:45 PM CDT   Return Visit with London Carrizales MD   [facility] ([facility])    [address] [place] [address]   [phone] OPT 2            Apr 02, 2018  2:00 PM CDT   SHORT with Mg Nelson MD   [facility]    [address] [facility]  [facility] [address]   [phone]            Apr 12, 2018  8:30 AM CDT   Return Visit with WILLIS Spence CNP   [facility] ([facility])    [id number]  [facility] [address]   [phone] OPT 2                  60 tablet 3     Sig: Take 2 tablets (80 mg) by mouth daily    Diuretics (Including Combos) Protocol Passed    3/29/2018  3:17 PM       Passed - Blood pressure under 140/90 in past 12 months    BP Readings from Last 3 Encounters:   03/27/18 112/76   03/21/18 110/72   03/20/18 116/70                Passed - Recent (12 mo) or future (30 days) visit within the authorizing provider's specialty    Patient had office visit in the last 12 months or has a visit in the next 30 days with authorizing provider or within the authorizing provider's specialty.  See \"Patient Info\" tab in inbasket, or \"Choose Columns\" in Meds & Orders section of the refill encounter.           Passed - Patient is age 18 or older       Passed - Normal serum creatinine on file in past 12 months    Recent Labs   Lab Test  03/21/18   0714   CR  0.96             Passed - Normal serum potassium on file in past 12 months    " Recent Labs   Lab Test  03/21/18   0714   POTASSIUM  3.6                   Passed - Normal serum sodium on file in past 12 months    Recent Labs   Lab Test  03/21/18   0714   NA  141

## 2018-03-29 NOTE — TELEPHONE ENCOUNTER
Prescription approved per [facility] Refill Protocol for 12 months of refills since last appointment, which was 3/27/18

## 2018-03-29 NOTE — TELEPHONE ENCOUNTER
Patient is out of prescription and asks that a mail order amount be sent to [facility] to get him through until his prescription can be mailed to him.

## 2018-03-30 ENCOUNTER — OFFICE VISIT (OUTPATIENT)
Dept: CARDIOLOGY | Facility: CLINIC | Age: 83
End: 2018-03-30
Payer: COMMERCIAL

## 2018-03-30 VITALS
HEART RATE: 62 BPM | SYSTOLIC BLOOD PRESSURE: 104 MMHG | DIASTOLIC BLOOD PRESSURE: 64 MMHG | BODY MASS INDEX: 27.59 KG/M2 | WEIGHT: 215 LBS | HEIGHT: 74 IN

## 2018-03-30 DIAGNOSIS — E78.00 HYPERCHOLESTEROLEMIA: ICD-10-CM

## 2018-03-30 DIAGNOSIS — I73.9 PAD (PERIPHERAL ARTERY DISEASE) (H): ICD-10-CM

## 2018-03-30 DIAGNOSIS — I25.83 CORONARY ARTERY DISEASE DUE TO LIPID RICH PLAQUE: ICD-10-CM

## 2018-03-30 DIAGNOSIS — I25.5 ISCHEMIC CARDIOMYOPATHY: Primary | ICD-10-CM

## 2018-03-30 DIAGNOSIS — I27.20 PULMONARY HYPERTENSION (H): ICD-10-CM

## 2018-03-30 DIAGNOSIS — I25.10 CORONARY ARTERY DISEASE DUE TO LIPID RICH PLAQUE: ICD-10-CM

## 2018-03-30 DIAGNOSIS — R60.0 LOWER EXTREMITY EDEMA: ICD-10-CM

## 2018-03-30 PROCEDURE — 99214 OFFICE O/P EST MOD 30 MIN: CPT | Performed by: INTERNAL MEDICINE

## 2018-03-30 NOTE — PATIENT INSTRUCTIONS
We can try decreasing the FUROSEMIDE and increasing the SPIRONOLACTONE.  Take 40mg of the FUROSEMIDE and 50mg of the SPIRONOLACTONE.    Check labs in a week and see Anushka.    If the scrotum doesn't reduce and feel better, then we might do a CT scan of the belly at that time.

## 2018-03-30 NOTE — MR AVS SNAPSHOT
After Visit Summary   3/30/2018    Bairon Foster    MRN: [id number]           Patient Information     Date Of Birth          2/6/1931        Visit Information        Provider Department      3/30/2018 12:45 PM London Carrizales MD [facility]        Today's Diagnoses     Ischemic cardiomyopathy    -  1    PAD (peripheral artery disease) (H)        Hypercholesterolemia        Coronary artery disease due to lipid rich plaque        Pulmonary hypertension        Lower extremity edema          Care Instructions    We can try decreasing the FUROSEMIDE and increasing the SPIRONOLACTONE.  Take 40mg of the FUROSEMIDE and 50mg of the SPIRONOLACTONE.    Check labs in a week and see Anushka.    If the scrotum doesn't reduce and feel better, then we might do a CT scan of the belly at that time.                Follow-ups after your visit        Additional Services     Follow-Up with Cardiac Advanced Practice Provider                 Your next 10 appointments already scheduled     Apr 02, 2018  2:00 PM CDT   SHORT with Mg Nelson MD   [facility] ([facility])    [address]  [place] [place] [facility] [place] [address]   [phone]            Apr 04, 2018 12:45 PM CDT   Ech Complete with [id number]   [facility] CV Echocardiography ([facility] at [facility])    [address] [place] [id number] [phone]           1. Please bring or wear a comfortable two-piece outfit. 2. You may eat, drink and take your normal medicines. 3. For any questions that cannot be answered, please contact the ordering physician            Apr 12, 2018  8:30 AM CDT   Return Visit with WILLIS Spence [facility] ([facility])    [address]  [facility] [place] [id number] [phone]  "OPT 2            Jun 14, 2018 10:15 AM CDT   Phone Device Check with ARRIETA TECH2   [facility]   [place] ([facility])    [address]  [place] [address] [phone] OPT 2              Future tests that were ordered for you today     Open Future Orders        Priority Expected Expires Ordered    Basic metabolic panel Routine 4/6/2018 3/30/2019 3/30/2018    Follow-Up with Cardiac Advanced Practice Provider Routine 4/6/2018 3/30/2019 3/30/2018            Who to contact     If you have questions or need follow up information about today's clinic visit or your schedule please contact [facility] directly at [phone].  Normal or non-critical lab and imaging results will be communicated to you by Keycoopthart, letter or phone within 4 business days after the clinic has received the results. If you do not hear from us within 7 days, please contact the clinic through [employer]coopthart or phone. If you have a critical or abnormal lab result, we will notify you by phone as soon as possible.  Submit refill requests through ZAPR or call your pharmacy and they will forward the refill request to us. Please allow 3 business days for your refill to be completed.          Additional Information About Your Visit        ZAPR Information     ZAPR gives you secure access to your electronic health record. If you see a primary care provider, you can also send messages to your care team and make appointments. If you have questions, please call your primary care clinic.  If you do not have a primary care provider, please call [phone] and they will assist you.        Care EveryWhere ID     This is your Care EveryWhere ID. This could be used by other organizations to access your [facility] medical records  [id number]        Your Vitals Were     Pulse Height BMI (Body Mass Index)             62 1.88 m (6' 2\") 27.6 kg/m2          Blood Pressure " from Last 3 Encounters:   03/30/18 104/64   03/27/18 112/76   03/21/18 110/72    Weight from Last 3 Encounters:   03/30/18 97.5 kg (215 lb)   03/27/18 98.4 kg (217 lb)   03/21/18 102.3 kg (225 lb 9.6 oz)               Primary Care Provider Office Phone # Fax #    Mg Bairon Nelson [phone]       [address] [facility] [address]        Goals        General    I will find a caregiver support group to attend (pt-stated)     Notes - Note created  4/17/2014  8:49 AM by Beth Morales RN    As of today's date 4/17/2014 goal is met at 0 - 25%.   Goal Status:  Active        Equal Access to Services     [facility] : Hadii silvestre schrader Sosuman, waaxda luqadaha, qaybta kaalmada felicitas, kathryn camilo . So [facility] [phone].    ATENCIÓN: Si habla español, tiene a malone disposición servicios gratuitos de asistencia lingüística. Jose al [phone].    We comply with applicable federal civil rights laws and [place] laws. We do not discriminate on the basis of race, color, national origin, age, disability, sex, sexual orientation, or gender identity.            Thank you!     Thank you for choosing [facility]  for your care. Our goal is always to provide you with excellent care. Hearing back from our patients is one way we can continue to improve our services. Please take a few minutes to complete the written survey that you may receive in the mail after your visit with us. Thank you!             Your Updated Medication List - Protect others around you: Learn how to safely use, store and throw away your medicines at www.disposemymeds.org.          This list is accurate as of 3/30/18  1:11 PM.  Always use your most recent med list.                   Brand Name Dispense Instructions for use Diagnosis    Carboxymethylcellulose Sod PF 1 % ophthalmic solution    CELLUVISC/REFRESH LIQUIGEL     Place 1 drop into both eyes 3 times  daily as needed for dry eyes        carvedilol 6.25 MG tablet    COREG    180 tablet    Take 1 tablet (6.25 mg) by mouth 2 times daily (with meals)    Ischemic cardiomyopathy       CENTRUM SILVER per tablet      Take 1 tablet by mouth daily        COMPRESSION STOCKINGS     2 each    1 each daily    Varicose 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       FINASTERIDE PO      Take 5 mg by mouth daily        furosemide 40 MG tablet    LASIX    28 tablet    Take 2 tablets (80 mg) by mouth daily    Dependent edema       GLUCOSAMINE-CHONDROITIN PO      Take 2 tablets by mouth daily        isosorbide mononitrate 30 MG 24 hr tablet    IMDUR    30 tablet    Take 1 tablet (30 mg) by mouth daily    SOB (shortness of breath)       levothyroxine 100 MCG tablet    SYNTHROID/LEVOTHROID    90 tablet    Take 1 tablet (100 mcg) by mouth daily    Acquired hypothyroidism       LORazepam 1 MG tablet    ATIVAN    30 tablet    Take 0.5-1 tablets (0.5-1 mg) by mouth every 8 hours as needed for anxiety    Acute stress reaction       MIRALAX PO      Take 1 packet by mouth At Bedtime        omega-3 fatty acids 1200 MG capsule      Take 1 capsule by mouth daily        omeprazole 20 MG CR capsule    priLOSEC    180 capsule    Take 1 capsule (20 mg) by mouth 2 times daily    Gastroesophageal reflux disease without esophagitis       order for DME     1 Device    Auto-CPAP:Max 9 cm H2OMin 9 cm H2O Continuous Lifetime need and heated humidity.    Obstructive sleep apnea (adult) (pediatric), Other dyspnea and respiratory abnormality       potassium chloride SA 20 MEQ CR tablet    KLOR-CON    90 tablet    Take 1 tablet (20 mEq) by mouth daily    Pulmonary hypertension       PRADAXA ANTICOAGULANT 75 MG capsule   Generic drug:  dabigatran ANTICOAGULANT      Take 150 mg by mouth 2 times daily. Store in original 's bottle or blister pack; use within 120 days of opening.        SIMVASTATIN PO      Take 20 mg by mouth daily         spironolactone 25 MG tablet    ALDACTONE    90 tablet    Take 1 tablet (25 mg) by mouth daily    SOB (shortness of breath)       sucralfate 1 GM tablet    CARAFATE    180 tablet    Take 1 tablet (1 g) by mouth 2 times daily    Esophageal reflux       tamsulosin 0.4 MG capsule    FLOMAX    30 capsule    Take 1 capsule (0.4 mg) by mouth daily    Benign prostatic hyperplasia with urinary hesitancy       UNKNOWN TO PATIENT      Place 1 drop Into the left eye daily EYE DROP PRESCRIBED FOR CONJUNCTIVITIS        WELLBUTRIN XL PO      Take 150 mg by mouth every morning

## 2018-03-30 NOTE — PROGRESS NOTES
Cardiology Progress Note          Assessment and Plan:     1. Lower extremity edema, possibly from tricuspid regurgitation and dietary indiscretion    Echo in the near future to see if pacemaker-induced tricuspid regurgitation is contributing.  Reiterated the importance of avoiding high salt foods.  He will also decreased the amount of fluid he drinks to help as well.    To new compression stockings.  Status post GSV bilateral ablation.    Increase the spironolactone 50 mg daily and decrease the furosemide to 40 mg daily to try and help with his inconvenience of frequent urination.  We did discuss that if he gets more scrotal edema, we may consider a short course of very aggressive diuresis in which we even add a thiazide diuretic to potentiate the loop diuretic.      We will have him get basic metabolic panel in 1-2 weeks and keep his follow-up appointment with Anushka.    I am concerned that some of his edema is secondary to a central source given the scrotal edema and large V wave on physical exam.  We will pay close attention to the tricuspid valve on the upcoming echocardiogram.    Future follow-up to revisit edema and medication titration.      2. Chronic atrial fibrillation, pacemaker implantation.  Now 100% paced which may be contributing.      3. Coronary artery disease with stable disease recent catheterization, LV function 55-60% on LV gram      This note was transcribed using electronic voice recognition software and there may be typographical errors present.                Interval History:   The patient is a very pleasant 87 year old whom I saw in vein clinic for lower extremity edema.  He had left and right GSV ablation.  Currently, he is having lower extremity and scrotal edema that may be secondary to dietary indiscretion.    He admits to eating a large amounts of garlic toast.  He has since cut back.  He also had cardiac catheterization without any further obstruction.  Recent device check showed  "chronic atrial fibrillation.  He is anticoagulated on Pradaxa.    He has been on furosemide 80 mg daily and spironolactone 25 mg daily.  He finds the frequent urination very bothersome to him.  The scrotal edema is the most uncomfortable for him.  He states it does not reduce very much even overnight.  He has echocardiogram and follow-up with Anushka already scheduled in the near future.                     Review of Systems:   Review of Systems:  Skin:  Negative     Eyes:  Positive for glasses;cataracts  ENT:  Negative    Respiratory:  Positive for sleep apnea;CPAP;dyspnea on exertion  Cardiovascular:    Positive for;edema;lower extremity symptoms  Gastroenterology: Negative    Genitourinary:  not assessed    Musculoskeletal:  Positive for arthritis  Neurologic:  Positive for stroke;numbness or tingling of feet  Psychiatric:  Positive for anxiety;excessive stress  Heme/Lymph/Imm:  Negative    Endocrine:  Positive for thyroid disorder              Physical Exam:     Vitals: /64  Pulse 62  Ht 1.88 m (6' 2\")  Wt 97.5 kg (215 lb)  BMI 27.6 kg/m2  Constitutional:  cooperative, alert and oriented, well developed, well nourished, in no acute distress        Skin:  warm and dry to the touch, no apparent skin lesions or masses noted        Head:  normocephalic, no masses or lesions        Eyes:  pupils equal and round        ENT:  no pallor or cyanosis        Neck:      Large V wave consistent with tricuspid regurgitation    Chest:  normal breath sounds, clear to auscultation, normal A-P diameter, normal symmetry, normal respiratory excursion, no use of accessory muscles        Cardiac: regular rhythm       grade 1;LLSB;early systolic murmur     Paced    Abdomen:      benign    Vascular:   venous stasis changes bilaterally                                   Extremities and Back:  no deformities, clubbing, cyanosis, erythema observed;no edema stasis pigmentation venous varicosities, scrotal edema    Neurological:  no " gross motor deficits;affect appropriate                 Medications:     Current Outpatient Prescriptions   Medication Sig Dispense Refill     furosemide (LASIX) 40 MG tablet Take 2 tablets (80 mg) by mouth daily 28 tablet 0     spironolactone (ALDACTONE) 25 MG tablet Take 1 tablet (25 mg) by mouth daily 90 tablet 3     potassium chloride SA (KLOR-CON) 20 MEQ CR tablet Take 1 tablet (20 mEq) by mouth daily 90 tablet 3     tamsulosin (FLOMAX) 0.4 MG capsule Take 1 capsule (0.4 mg) by mouth daily 30 capsule 3     Multiple Vitamins-Minerals (CENTRUM SILVER) per tablet Take 1 tablet by mouth daily       Carboxymethylcellulose Sod PF (CELLUVISC/REFRESH LIQUIGEL) 1 % ophthalmic solution Place 1 drop into both eyes 3 times daily as needed for dry eyes       BuPROPion HCl (WELLBUTRIN XL PO) Take 150 mg by mouth every morning       FINASTERIDE PO Take 5 mg by mouth daily       GLUCOSAMINE-CHONDROITIN PO Take 2 tablets by mouth daily       Polyethylene Glycol 3350 (MIRALAX PO) Take 1 packet by mouth At Bedtime        SIMVASTATIN PO Take 20 mg by mouth daily       levothyroxine (SYNTHROID/LEVOTHROID) 100 MCG tablet Take 1 tablet (100 mcg) by mouth daily 90 tablet 3     carvedilol (COREG) 6.25 MG tablet Take 1 tablet (6.25 mg) by mouth 2 times daily (with meals) 180 tablet 3     isosorbide mononitrate (IMDUR) 30 MG 24 hr tablet Take 1 tablet (30 mg) by mouth daily 30 tablet 11     COMPRESSION STOCKINGS 1 each daily 2 each 0     sucralfate (CARAFATE) 1 GM tablet Take 1 tablet (1 g) by mouth 2 times daily 180 tablet 1     omeprazole (PRILOSEC) 20 MG CR capsule Take 1 capsule (20 mg) by mouth 2 times daily 180 capsule 3     LORazepam (ATIVAN) 1 MG tablet Take 0.5-1 tablets (0.5-1 mg) by mouth every 8 hours as needed for anxiety 30 tablet 5     dabigatran ANTICOAGULANT (PRADAXA ANTICOAGULANT) 75 MG CAPS BLISTER PACK Take 150 mg by mouth 2 times daily. Store in original 's bottle or blister pack; use within 120 days of  opening.       Omega-3 Fatty Acids (FISH OIL) 1200 MG capsule Take 1 capsule by mouth daily        UNKNOWN TO PATIENT Place 1 drop Into the left eye daily EYE DROP PRESCRIBED FOR CONJUNCTIVITIS       ORDER FOR DME Auto-CPAP:Max 9 cm H2OMin 9 cm H2O  Continuous Lifetime need and heated humidity.    1 Device 0                Data:   All laboratory data reviewed  No results found for this or any previous visit (from the past 24 hour(s)).    All laboratory data reviewed  Lab Results   Component Value Date    CHOL 106 09/08/2017     Lab Results   Component Value Date    HDL 54 09/08/2017     Lab Results   Component Value Date    LDL 38 09/08/2017     Lab Results   Component Value Date    TRIG 71 09/08/2017     Lab Results   Component Value Date    CHOLHDLRATIO 2.1 06/15/2015     TSH   Date Value Ref Range Status   09/08/2017 1.08 0.40 - 4.00 mU/L Final     Last Basic Metabolic Panel:  Lab Results   Component Value Date     03/21/2018      Lab Results   Component Value Date    POTASSIUM 3.6 03/21/2018     Lab Results   Component Value Date    CHLORIDE 107 03/21/2018     Lab Results   Component Value Date    TEODORO 9.0 03/21/2018     Lab Results   Component Value Date    CO2 27 03/21/2018     Lab Results   Component Value Date    BUN 19 03/21/2018     Lab Results   Component Value Date    CR 0.96 03/21/2018     Lab Results   Component Value Date    GLC 91 03/21/2018     Lab Results   Component Value Date    WBC 2.8 03/21/2018     Lab Results   Component Value Date    RBC 3.23 03/21/2018     Lab Results   Component Value Date    HGB 10.2 03/21/2018     Lab Results   Component Value Date    HCT 31.2 03/21/2018     Lab Results   Component Value Date    MCV 97 03/21/2018     Lab Results   Component Value Date    MCH 31.6 03/21/2018     Lab Results   Component Value Date    MCHC 32.7 03/21/2018     Lab Results   Component Value Date    RDW 16.3 03/21/2018     Lab Results   Component Value Date    PLT 75 03/21/2018

## 2018-03-30 NOTE — LETTER
3/30/2018    Mg Nelson MD  [address]  [facility] [address]    RE: Bairon HIMANSHU Foster       Dear Colleague,    I had the pleasure of seeing Bairon Foster in the [facility].    Cardiology Progress Note          Assessment and Plan:     1. Lower extremity edema, possibly from tricuspid regurgitation and dietary indiscretion    Echo in the near future to see if pacemaker-induced tricuspid regurgitation is contributing.  Reiterated the importance of avoiding high salt foods.  He will also decreased the amount of fluid he drinks to help as well.    To new compression stockings.  Status post GSV bilateral ablation.    Increase the spironolactone 50 mg daily and decrease the furosemide to 40 mg daily to try and help with his inconvenience of frequent urination.  We did discuss that if he gets more scrotal edema, we may consider a short course of very aggressive diuresis in which we even add a thiazide diuretic to potentiate the loop diuretic.      We will have him get basic metabolic panel in 1-2 weeks and keep his follow-up appointment with Anushka.    I am concerned that some of his edema is secondary to a central source given the scrotal edema and large V wave on physical exam.  We will pay close attention to the tricuspid valve on the upcoming echocardiogram.    Future follow-up to revisit edema and medication titration.      2. Chronic atrial fibrillation, pacemaker implantation.  Now 100% paced which may be contributing.      3. Coronary artery disease with stable disease recent catheterization, LV function 55-60% on LV gram      This note was transcribed using electronic voice recognition software and there may be typographical errors present.                Interval History:   The patient is a very pleasant 87 year old whom I saw in vein clinic for lower extremity edema.  He had left and right GSV ablation.  Currently, he is having lower extremity and scrotal edema  "that may be secondary to dietary indiscretion.    He admits to eating a large amounts of garlic toast.  He has since cut back.  He also had cardiac catheterization without any further obstruction.  Recent device check showed chronic atrial fibrillation.  He is anticoagulated on Pradaxa.    He has been on furosemide 80 mg daily and spironolactone 25 mg daily.  He finds the frequent urination very bothersome to him.  The scrotal edema is the most uncomfortable for him.  He states it does not reduce very much even overnight.  He has echocardiogram and follow-up with Anushka already scheduled in the near future.                     Review of Systems:   Review of Systems:  Skin:  Negative     Eyes:  Positive for glasses;cataracts  ENT:  Negative    Respiratory:  Positive for sleep apnea;CPAP;dyspnea on exertion  Cardiovascular:    Positive for;edema;lower extremity symptoms  Gastroenterology: Negative    Genitourinary:  not assessed    Musculoskeletal:  Positive for arthritis  Neurologic:  Positive for stroke;numbness or tingling of feet  Psychiatric:  Positive for anxiety;excessive stress  Heme/Lymph/Imm:  Negative    Endocrine:  Positive for thyroid disorder              Physical Exam:     Vitals: /64  Pulse 62  Ht 1.88 m (6' 2\")  Wt 97.5 kg (215 lb)  BMI 27.6 kg/m2  Constitutional:  cooperative, alert and oriented, well developed, well nourished, in no acute distress        Skin:  warm and dry to the touch, no apparent skin lesions or masses noted        Head:  normocephalic, no masses or lesions        Eyes:  pupils equal and round        ENT:  no pallor or cyanosis        Neck:      Large V wave consistent with tricuspid regurgitation    Chest:  normal breath sounds, clear to auscultation, normal A-P diameter, normal symmetry, normal respiratory excursion, no use of accessory muscles        Cardiac: regular rhythm       grade 1;LLSB;early systolic murmur     Paced    Abdomen:      benign    Vascular:   " venous stasis changes bilaterally                                   Extremities and Back:  no deformities, clubbing, cyanosis, erythema observed;no edema stasis pigmentation venous varicosities, scrotal edema    Neurological:  no gross motor deficits;affect appropriate                 Medications:     Current Outpatient Prescriptions   Medication Sig Dispense Refill     furosemide (LASIX) 40 MG tablet Take 2 tablets (80 mg) by mouth daily 28 tablet 0     spironolactone (ALDACTONE) 25 MG tablet Take 1 tablet (25 mg) by mouth daily 90 tablet 3     potassium chloride SA (KLOR-CON) 20 MEQ CR tablet Take 1 tablet (20 mEq) by mouth daily 90 tablet 3     tamsulosin (FLOMAX) 0.4 MG capsule Take 1 capsule (0.4 mg) by mouth daily 30 capsule 3     Multiple Vitamins-Minerals (CENTRUM SILVER) per tablet Take 1 tablet by mouth daily       Carboxymethylcellulose Sod PF (CELLUVISC/REFRESH LIQUIGEL) 1 % ophthalmic solution Place 1 drop into both eyes 3 times daily as needed for dry eyes       BuPROPion HCl (WELLBUTRIN XL PO) Take 150 mg by mouth every morning       FINASTERIDE PO Take 5 mg by mouth daily       GLUCOSAMINE-CHONDROITIN PO Take 2 tablets by mouth daily       Polyethylene Glycol 3350 (MIRALAX PO) Take 1 packet by mouth At Bedtime        SIMVASTATIN PO Take 20 mg by mouth daily       levothyroxine (SYNTHROID/LEVOTHROID) 100 MCG tablet Take 1 tablet (100 mcg) by mouth daily 90 tablet 3     carvedilol (COREG) 6.25 MG tablet Take 1 tablet (6.25 mg) by mouth 2 times daily (with meals) 180 tablet 3     isosorbide mononitrate (IMDUR) 30 MG 24 hr tablet Take 1 tablet (30 mg) by mouth daily 30 tablet 11     COMPRESSION STOCKINGS 1 each daily 2 each 0     sucralfate (CARAFATE) 1 GM tablet Take 1 tablet (1 g) by mouth 2 times daily 180 tablet 1     omeprazole (PRILOSEC) 20 MG CR capsule Take 1 capsule (20 mg) by mouth 2 times daily 180 capsule 3     LORazepam (ATIVAN) 1 MG tablet Take 0.5-1 tablets (0.5-1 mg) by mouth every 8  hours as needed for anxiety 30 tablet 5     dabigatran ANTICOAGULANT (PRADAXA ANTICOAGULANT) 75 MG CAPS BLISTER PACK Take 150 mg by mouth 2 times daily. Store in original 's bottle or blister pack; use within 120 days of opening.       Omega-3 Fatty Acids (FISH OIL) 1200 MG capsule Take 1 capsule by mouth daily        UNKNOWN TO PATIENT Place 1 drop Into the left eye daily EYE DROP PRESCRIBED FOR CONJUNCTIVITIS       ORDER FOR DME Auto-CPAP:Max 9 cm H2OMin 9 cm H2O  Continuous Lifetime need and heated humidity.    1 Device 0                Data:   All laboratory data reviewed  No results found for this or any previous visit (from the past 24 hour(s)).    All laboratory data reviewed  Lab Results   Component Value Date    CHOL 106 09/08/2017     Lab Results   Component Value Date    HDL 54 09/08/2017     Lab Results   Component Value Date    LDL 38 09/08/2017     Lab Results   Component Value Date    TRIG 71 09/08/2017     Lab Results   Component Value Date    CHOLHDLRATIO 2.1 06/15/2015     TSH   Date Value Ref Range Status   09/08/2017 1.08 0.40 - 4.00 mU/L Final     Last Basic Metabolic Panel:  Lab Results   Component Value Date     03/21/2018      Lab Results   Component Value Date    POTASSIUM 3.6 03/21/2018     Lab Results   Component Value Date    CHLORIDE 107 03/21/2018     Lab Results   Component Value Date    TEODORO 9.0 03/21/2018     Lab Results   Component Value Date    CO2 27 03/21/2018     Lab Results   Component Value Date    BUN 19 03/21/2018     Lab Results   Component Value Date    CR 0.96 03/21/2018     Lab Results   Component Value Date    GLC 91 03/21/2018     Lab Results   Component Value Date    WBC 2.8 03/21/2018     Lab Results   Component Value Date    RBC 3.23 03/21/2018     Lab Results   Component Value Date    HGB 10.2 03/21/2018     Lab Results   Component Value Date    HCT 31.2 03/21/2018     Lab Results   Component Value Date    MCV 97 03/21/2018     Lab Results    Component Value Date    MCH 31.6 03/21/2018     Lab Results   Component Value Date    MCHC 32.7 03/21/2018     Lab Results   Component Value Date    RDW 16.3 03/21/2018     Lab Results   Component Value Date    PLT 75 03/21/2018       Thank you for allowing me to participate in the care of your patient.    Sincerely,     London Carrizales MD     [facility]

## 2018-04-02 ENCOUNTER — OFFICE VISIT (OUTPATIENT)
Dept: FAMILY MEDICINE | Facility: CLINIC | Age: 83
End: 2018-04-02
Payer: COMMERCIAL

## 2018-04-02 VITALS
HEART RATE: 60 BPM | RESPIRATION RATE: 16 BRPM | TEMPERATURE: 98 F | BODY MASS INDEX: 27.6 KG/M2 | WEIGHT: 215 LBS | DIASTOLIC BLOOD PRESSURE: 74 MMHG | SYSTOLIC BLOOD PRESSURE: 122 MMHG

## 2018-04-02 DIAGNOSIS — N50.89 SCROTAL EDEMA: Primary | ICD-10-CM

## 2018-04-02 DIAGNOSIS — R60.9 DEPENDENT EDEMA: ICD-10-CM

## 2018-04-02 DIAGNOSIS — R06.02 SOB (SHORTNESS OF BREATH): ICD-10-CM

## 2018-04-02 PROCEDURE — 99213 OFFICE O/P EST LOW 20 MIN: CPT | Performed by: FAMILY MEDICINE

## 2018-04-02 RX ORDER — FUROSEMIDE 40 MG
40 TABLET ORAL DAILY
Qty: 90 TABLET | Refills: 1 | Status: SHIPPED | OUTPATIENT
Start: 2018-04-02 | End: 2018-07-05 | Stop reason: ALTCHOICE

## 2018-04-02 RX ORDER — SPIRONOLACTONE 50 MG/1
50 TABLET, FILM COATED ORAL DAILY
Qty: 90 TABLET | Refills: 1 | Status: SHIPPED | OUTPATIENT
Start: 2018-04-02 | End: 2018-05-25

## 2018-04-02 NOTE — PROGRESS NOTES
SUBJECTIVE:   Bairon Foster is a 87 year old male who presents to clinic today for the following health issues:      Edema      Duration: follow up from last ov    Description (location/character/radiation): weight down 2 pounds, slight improvement    Intensity:  mild    Accompanying signs and symptoms: see above    History (similar episodes/previous evaluation): None     Precipitating or alleviating factors: None    Therapies tried and outcome: spironolactone, furosemide and compression stocking- effective           Problem list and histories reviewed & adjusted, as indicated.  Additional history: The patient has had improvement in his swelling in the scrotum and his legs.  He is having much less chafing.    Patient Active Problem List   Diagnosis     Atrial fibrillation (H)     Pulmonary hypertension     CAD (coronary artery disease)     BPH (benign prostatic hyperplasia)     Ischemic cardiomyopathy     Pancytopenia (H)     JIM (obstructive sleep apnea)- mild/moderate     Acute stress reaction     Health Care Home     Vitamin D deficiency     Advance Care Planning     Gastroesophageal reflux disease without esophagitis     Hydrocele, unspecified hydrocele type     Slow transit constipation     Hypercholesterolemia     Non morbid obesity due to excess calories w comorbid CAD< hi LDL      Venous stasis dermatitis of both lower extremities     Lymphedema of both lower extremities     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      Coronary artery disease due to lipid rich plaque     Acquired hypothyroidism     Onychomycosis     Overweight (BMI 25.0-29.9)     PAD (peripheral artery disease) (H)     Screening for prostate cancer     Blood in stool     Umbilical hernia without obstruction and without gangrene     Xerosis cutis     Status post coronary angiogram     Past Surgical History:   Procedure Laterality Date     EYE SURGERY      CATARACT/BLEPHAROPLASTY     GENITOURINARY SURGERY      TUNA     HERNIA  REPAIR      RIGHT INGUINAL     HERNIORRHAPHY INGUINAL  8/14/2012    Procedure: HERNIORRHAPHY INGUINAL;  right inguinal hernia repair;  Surgeon: Kareem Torrez MD;  Location: [facility]     HERNIORRHAPHY UMBILICAL N/A 10/31/2017    Procedure: HERNIORRHAPHY UMBILICAL;  UMBILICAL HERNIA REPAIR WITH MESH;  Surgeon: Scar Harrington MD;  Location: [facility]     IMPLANT PACEMAKER  8/2009    single chamber     ORTHOPEDIC SURGERY      [facility] RIGHT     ORTHOPEDIC SURGERY  2010    right TKR, left TKR     SURGICAL HISTORY OF -       Thumb surgery     SURGICAL HISTORY OF -   1990s    Left total knee replacement     SURGICAL HISTORY OF -   3/11    Righ total knee replacement       Social History   Substance Use Topics     Smoking status: Never Smoker     Smokeless tobacco: Never Used      Comment: per encounter 2/12/07     Alcohol use 0.0 oz/week     0 Standard drinks or equivalent per week      Comment: 1/month     Family History   Problem Relation Age of Onset     Cardiovascular Father      C.A.D. Father      CANCER Sister      lung     Unknown/Adopted Mother      Cancer - colorectal No family hx of            Reviewed and updated as needed this visit by clinical staff  Tobacco  Allergies  Meds  Med Hx  Surg Hx  Fam Hx  Soc Hx      Reviewed and updated as needed this visit by Provider         ROS:  Constitutional, HEENT, cardiovascular, pulmonary, gi and gu systems are negative, except as otherwise noted.    OBJECTIVE:                                                    /74 (Cuff Size: Adult Regular)  Pulse 60  Temp 98  F (36.7  C) (Tympanic)  Resp 16  Wt 215 lb (97.5 kg)  BMI 27.6 kg/m2  Body mass index is 27.6 kg/(m^2).  GENERAL APPEARANCE: healthy, alert and no distress  RESP: lungs clear to auscultation - no rales, rhonchi or wheezes  CV: regular rates and rhythm, normal S1 S2, no S3 or S4 and no murmur, click or rub  MS: pitting 1+ lower extremity edema bilaterally         ASSESSMENT/PLAN:                                                         ICD-10-CM    1. Scrotal edema N50.89    2. Dependent edema R60.9 furosemide (LASIX) 40 MG tablet   3. SOB (shortness of breath) R06.02 spironolactone (ALDACTONE) 50 MG tablet       Patient Instructions   In reviewing the notes from cardiology last visit on Friday, his Spironolactone was increased to 50 mg and his furosemide was decreased to 40 mg.  That was not noted on his medication sheet.  I did write him a new prescription for Spironolactone 50 mg to take 1 daily.  I also change the medication sheet to reflect his going back to the 40 mg daily of the furosemide.  He will follow-up with me in about a month.  He sees cardiology next week for an echocardiogram.  He has been feeling fair amount better even though his weight is only down 2 pounds on our scale he is not having the chafing that he was getting from his scrotal edema.  He thinks the improvement has been pretty good.  His breathing is actually been very good of late.  He is not complaining of being short of breath.  We will need to check his kidney function and electrolytes in the next week or so.      Mg Nelson MD  [facility]

## 2018-04-02 NOTE — NURSING NOTE
"Chief Complaint   Patient presents with     Edema     weight check       Initial /74 (Cuff Size: Adult Regular)  Pulse 60  Temp 98  F (36.7  C) (Tympanic)  Resp 16  Wt 215 lb (97.5 kg)  BMI 27.6 kg/m2 Estimated body mass index is 27.6 kg/(m^2) as calculated from the following:    Height as of 3/30/18: 6' 2\" (1.88 m).    Weight as of this encounter: 215 lb (97.5 kg).  Medication Reconciliation: complete     Anabel Rinaldi CMA      "

## 2018-04-02 NOTE — MR AVS SNAPSHOT
After Visit Summary   4/2/2018    Bairon Foster    MRN: [id number]           Patient Information     Date Of Birth          2/6/1931        Visit Information        Provider Department      4/2/2018 2:00 PM Mg Nelson MD [facility]        Today's Diagnoses     Scrotal edema    -  1    Dependent edema        SOB (shortness of breath)          Care Instructions    In reviewing the notes from cardiology last visit on Friday, his Spironolactone was increased to 50 mg and his furosemide was decreased to 40 mg.  That was not noted on his medication sheet.  I did write him a new prescription for Spironolactone 50 mg to take 1 daily.  I also change the medication sheet to reflect his going back to the 40 mg daily of the furosemide.  He will follow-up with me in about a month.  He sees cardiology next week for an echocardiogram.  He has been feeling fair amount better even though his weight is only down 2 pounds on our scale he is not having the chafing that he was getting from his scrotal edema.  He thinks the improvement has been pretty good.  His breathing is actually been very good of late.  He is not complaining of being short of breath.  We will need to check his kidney function and electrolytes in the next week or so.          Follow-ups after your visit        Your next 10 appointments already scheduled     Apr 04, 2018 12:45 PM CDT   Ech Complete with [id number]   [facility] CV Echocardiography (Card[facility] at [facility])    [address] [place] [id number] [phone]           1. Please bring or wear a comfortable two-piece outfit. 2. You may eat, drink and take your normal medicines. 3. For any questions that cannot be answered, please contact the ordering physician            Apr 04, 2018  1:50 PM CDT   LAB with [facility]   [facility] [place] ([facility])    [id number]  [facility] [address]   [phone]           Please do not eat 10-12 hours before your appointment if you are coming in fasting for labs on lipids, cholesterol, or glucose (sugar). This does not apply to pregnant women. Water, hot tea and black coffee (with nothing added) are okay. Do not drink other fluids, diet soda or chew gum.            Apr 12, 2018  8:30 AM CDT   Return Visit with WILLIS Spence CNP   [facility] ([facility])    [address] [place], [place] [id number]  [facility] [address]   [phone] OPT 2            Apr 24, 2018  2:30 PM CDT   SHORT with Mg Nelson MD   [facility]    [address] [address]   [phone]            Jun 14, 2018 10:15 AM CDT   Phone Device Check with ARRIETA TECH2   [facility] ([facility])    [address] [place], [place] [id number]  [facility] [address]   [phone] OPT 2              Who to contact     If you have questions or need follow up information about today's clinic visit or your schedule please contact [facility] directly at [phone].  Normal or non-critical lab and imaging results will be communicated to you by MyChart, letter or phone within 4 business days after the clinic has received the results. If you do not hear from us within 7 days, please contact the clinic through [employer] or phone. If you have a critical or abnormal lab result, we will notify you by phone as soon as possible.  Submit refill requests through [employer] or call your pharmacy and they will forward the refill request to us. Please allow 3 business days for your refill to be completed.          Additional Information About Your Visit        [employer] Information     [employer] gives you secure access to your  electronic health record. If you see a primary care provider, you can also send messages to your care team and make appointments. If you have questions, please call your primary care clinic.  If you do not have a primary care provider, please call [phone] and they will assist you.        Care EveryWhere ID     This is your Care EveryWhere ID. This could be used by other organizations to access your [facility] medical records  [id number]        Your Vitals Were     Pulse Temperature Respirations BMI (Body Mass Index)          60 98  F (36.7  C) (Tympanic) 16 27.6 kg/m2         Blood Pressure from Last 3 Encounters:   04/02/18 122/74   03/30/18 104/64   03/27/18 112/76    Weight from Last 3 Encounters:   04/02/18 215 lb (97.5 kg)   03/30/18 215 lb (97.5 kg)   03/27/18 217 lb (98.4 kg)              Today, you had the following     No orders found for display         Today's Medication Changes          These changes are accurate as of 4/2/18  4:19 PM.  If you have any questions, ask your nurse or doctor.               These medicines have changed or have updated prescriptions.        Dose/Directions    furosemide 40 MG tablet   Commonly known as:  LASIX   This may have changed:  how much to take   Used for:  Dependent edema   Changed by:  Mg Nelson MD        Dose:  40 mg   Take 1 tablet (40 mg) by mouth daily   Quantity:  90 tablet   Refills:  1       spironolactone 50 MG tablet   Commonly known as:  ALDACTONE   This may have changed:    - medication strength  - how much to take   Used for:  SOB (shortness of breath)   Changed by:  Mg Nelson MD        Dose:  50 mg   Take 1 tablet (50 mg) by mouth daily   Quantity:  90 tablet   Refills:  1            Where to get your medicines      These medications were sent to [facility] [address] [address]     Phone:  [phone]     furosemide 40 MG tablet    spironolactone 50  MG tablet                Primary Care Provider Office Phone # Fax #    Mg Nelson [phone]       [address] [facility] [address]        Goals        General    I will find a caregiver support group to attend (pt-stated)     Notes - Note created  4/17/2014  8:49 AM by Beth Morales RN    As of today's date 4/17/2014 goal is met at 0 - 25%.   Goal Status:  Active        Equal Access to Services     DEJAH [facility] Center[facility]IRA : Hadii aad ku hadasho Soomaali, waaxda luqadaha, qaybta kaalmada adeegyada, waxay idiin hayaan adeeg kharash la'aan . So [facility] [phone].    ATENCIÓN: Si habla español, tiene a malone disposición servicios gratuitos de asistencia lingüística. Ll[facility] [phone].    We comply with applicable federal civil rights laws and [place] laws. We do not discriminate on the basis of race, color, national origin, age, disability, sex, sexual orientation, or gender identity.            Thank you!     Thank you for choosing [facility]  for your care. Our goal is always to provide you with excellent care. Hearing back from our patients is one way we can continue to improve our services. Please take a few minutes to complete the written survey that you may receive in the mail after your visit with us. Thank you!             Your Updated Medication List - Protect others around you: Learn how to safely use, store and throw away your medicines at www.disposemymeds.org.          This list is accurate as of 4/2/18  4:19 PM.  Always use your most recent med list.                   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       FINASTERIDE PO      Take 5 mg by mouth daily        furosemide 40 MG tablet    LASIX    90 tablet    Take 1 tablet (40 mg) by mouth daily    Dependent edema       GLUCOSAMINE-CHONDROITIN PO      Take 2 tablets by mouth daily        isosorbide mononitrate 30 MG 24 hr tablet    IMDUR    30 tablet    Take 1 tablet (30 mg) by mouth daily    SOB (shortness of breath)       levothyroxine 100 MCG tablet    SYNTHROID/LEVOTHROID    90 tablet    Take 1 tablet (100 mcg) by mouth daily    Acquired hypothyroidism       LORazepam 1 MG tablet    ATIVAN    30 tablet    Take 0.5-1 tablets (0.5-1 mg) by mouth every 8 hours as needed for anxiety    Acute stress reaction       MIRALAX PO      Take 1 packet by mouth At Bedtime        omega-3 fatty acids 1200 MG capsule      Take 1 capsule by mouth daily        omeprazole 20 MG CR capsule    priLOSEC    180 capsule    Take 1 capsule (20 mg) by mouth 2 times daily    Gastroesophageal reflux disease without esophagitis       order for DME     1 Device    Auto-CPAP:Max 9 cm H2OMin 9 cm H2O Continuous Lifetime need and heated humidity.    Obstructive sleep apnea (adult) (pediatric), Other dyspnea and respiratory abnormality       potassium chloride SA 20 MEQ CR tablet    KLOR-CON    90 tablet    Take 1 tablet (20 mEq) by mouth daily    Pulmonary hypertension       PRADAXA ANTICOAGULANT 75 MG capsule   Generic drug:  dabigatran ANTICOAGULANT      Take 150 mg by mouth 2 times daily. Store in original 's bottle or blister pack; use within 120 days of opening.        SIMVASTATIN PO      Take 20 mg by mouth daily        spironolactone 50 MG tablet    ALDACTONE    90 tablet    Take 1 tablet (50 mg) by mouth daily    SOB (shortness of breath)       sucralfate 1 GM tablet    CARAFATE    180 tablet    Take 1 tablet (1 g) by mouth 2  times daily    Esophageal reflux       tamsulosin 0.4 MG capsule    FLOMAX    30 capsule    Take 1 capsule (0.4 mg) by mouth daily    Benign prostatic hyperplasia with urinary hesitancy       UNKNOWN TO PATIENT      Place 1 drop Into the left eye daily EYE DROP PRESCRIBED FOR CONJUNCTIVITIS        WELLBUTRIN XL PO      Take 150 mg by mouth every morning

## 2018-04-04 ENCOUNTER — HOSPITAL ENCOUNTER (OUTPATIENT)
Dept: CARDIOLOGY | Facility: CLINIC | Age: 83
Discharge: HOME | End: 2018-04-04
Payer: MEDICARE

## 2018-04-04 DIAGNOSIS — R06.02 SOB (SHORTNESS OF BREATH): ICD-10-CM

## 2018-04-04 DIAGNOSIS — R60.0 LOWER EXTREMITY EDEMA: ICD-10-CM

## 2018-04-04 LAB
ANION GAP SERPL CALCULATED.3IONS-SCNC: 11.4 MMOL/L (ref 6–17)
BUN SERPL-MCNC: 16 MG/DL (ref 7–30)
CALCIUM SERPL-MCNC: 9.9 MG/DL (ref 8.5–10.5)
CHLORIDE SERPL-SCNC: 102 MMOL/L (ref 98–107)
CO2 SERPL-SCNC: 31 MMOL/L (ref 23–29)
CREAT SERPL-MCNC: 1.33 MG/DL (ref 0.7–1.3)
GFR SERPL CREATININE-BSD FRML MDRD: 51 ML/MIN/1.7M2
GLUCOSE SERPL-MCNC: 89 MG/DL (ref 70–105)
POTASSIUM SERPL-SCNC: 4.4 MMOL/L (ref 3.5–5.1)
SODIUM SERPL-SCNC: 140 MMOL/L (ref 136–145)

## 2018-04-04 PROCEDURE — 80048 BASIC METABOLIC PNL TOTAL CA: CPT | Performed by: INTERNAL MEDICINE

## 2018-04-04 PROCEDURE — 93306 TTE W/DOPPLER COMPLETE: CPT

## 2018-04-04 PROCEDURE — 36415 COLL VENOUS BLD VENIPUNCTURE: CPT | Performed by: INTERNAL MEDICINE

## 2018-04-04 PROCEDURE — 93306 TTE W/DOPPLER COMPLETE: CPT | Mod: 26 | Performed by: INTERNAL MEDICINE

## 2018-04-12 ENCOUNTER — OFFICE VISIT (OUTPATIENT)
Dept: CARDIOLOGY | Facility: CLINIC | Age: 83
End: 2018-04-12
Attending: INTERNAL MEDICINE
Payer: COMMERCIAL

## 2018-04-12 ENCOUNTER — TELEPHONE (OUTPATIENT)
Dept: CARDIOLOGY | Facility: CLINIC | Age: 83
End: 2018-04-12

## 2018-04-12 VITALS
BODY MASS INDEX: 25.91 KG/M2 | WEIGHT: 201.9 LBS | HEIGHT: 74 IN | HEART RATE: 60 BPM | SYSTOLIC BLOOD PRESSURE: 133 MMHG | DIASTOLIC BLOOD PRESSURE: 73 MMHG

## 2018-04-12 DIAGNOSIS — R06.02 SOB (SHORTNESS OF BREATH): ICD-10-CM

## 2018-04-12 DIAGNOSIS — I83.813 VARICOSE VEINS OF BILATERAL LOWER EXTREMITIES WITH PAIN: ICD-10-CM

## 2018-04-12 LAB
ANION GAP SERPL CALCULATED.3IONS-SCNC: 10.2 MMOL/L (ref 6–17)
BUN SERPL-MCNC: 21 MG/DL (ref 7–30)
CALCIUM SERPL-MCNC: 9.4 MG/DL (ref 8.5–10.5)
CHLORIDE SERPL-SCNC: 103 MMOL/L (ref 98–107)
CO2 SERPL-SCNC: 30 MMOL/L (ref 23–29)
CREAT SERPL-MCNC: 1.19 MG/DL (ref 0.7–1.3)
GFR SERPL CREATININE-BSD FRML MDRD: 58 ML/MIN/1.7M2
GLUCOSE SERPL-MCNC: 113 MG/DL (ref 70–105)
HGB BLD-MCNC: 10.4 G/DL (ref 13.3–17.7)
NT-PROBNP SERPL-MCNC: 357 PG/ML (ref 0–450)
POTASSIUM SERPL-SCNC: 4.2 MMOL/L (ref 3.5–5.1)
SODIUM SERPL-SCNC: 139 MMOL/L (ref 136–145)

## 2018-04-12 PROCEDURE — 99215 OFFICE O/P EST HI 40 MIN: CPT | Performed by: NURSE PRACTITIONER

## 2018-04-12 PROCEDURE — 36415 COLL VENOUS BLD VENIPUNCTURE: CPT | Performed by: NURSE PRACTITIONER

## 2018-04-12 PROCEDURE — 85018 HEMOGLOBIN: CPT | Performed by: NURSE PRACTITIONER

## 2018-04-12 PROCEDURE — 80048 BASIC METABOLIC PNL TOTAL CA: CPT | Performed by: NURSE PRACTITIONER

## 2018-04-12 PROCEDURE — 83880 ASSAY OF NATRIURETIC PEPTIDE: CPT | Performed by: NURSE PRACTITIONER

## 2018-04-12 RX ORDER — SIMVASTATIN 20 MG
20 TABLET ORAL DAILY
COMMUNITY
End: 2020-06-02

## 2018-04-12 RX ORDER — ISOSORBIDE MONONITRATE 30 MG/1
30 TABLET, EXTENDED RELEASE ORAL DAILY
Qty: 90 TABLET | Refills: 3 | Status: ON HOLD | OUTPATIENT
Start: 2018-04-12 | End: 2019-04-22

## 2018-04-12 NOTE — PROGRESS NOTES
HPI and Plan:   The pleasure of seeing Bairon Foster today to review the results of his echocardiogram.  He is a pleasant 87-year-old patient who follows with Dr. Hope for general cardiology and Dr. Carrizales for his venous insufficiency.  He has a history of known coronary artery disease, ischemic cardiomyopathy, ventricular tachycardia, atrial fibrillation anticoagulated with Pradaxa, coronary stenting in 2009 to the OM and venous insufficiency status post bilateral lower extremity ablations.  He has 1-2+ mitral regurgitation.  His device check demonstrated that he continues in atrial fibrillation.    He saw Dr. Hope in March and complained of exertional chest pain and dyspnea.  He went on to have a coronary angiogram which showed no flow-limiting disease, his OM stent was patent.  His LVEF was 55-60%, the LV end-diastolic pressure was 23 mmHg and he had trace MR.  And follow-up Dr. Carrizales ordered echocardiogram and adjusted his diuretics, he decrease his Lasix to 40 mg daily and increased his Spironolactone to 50 mg daily.  Of note Mr. Foster has had a significant weight gain in the previous couple of months.    His echocardiogram done April 4 was overall stable compared to the previous, though he had more MR described.  He had mild to moderate tricuspid regurgitation with RVSP stable at 23 mmHg plus right atrial pressure.  The IVC was dilated and failed to collapse with respiration, suggesting elevated central venous pressure.  He had moderate to severe 3+ MR, this was previously described as mild to moderate at 1-2+.  He has subsequently lost 14 pounds since the change in his diuretics.  Today Mr. Foster continues to say he has some shortness of breath when he walks through the tunnel to clinic, such that he stops and sits in the hallway to rest where in the past he did not have to do that.  He describes some chest heaviness as well.  He says he was told to try Imdur 30 mg as needed for shortness of breath, he has  not really used it or noticed any difference when he does use it.  He does think the edema, including the scrotal edema is significantly improved and on exam he appears close to euvolemic.  He complains of nosebleeds, he has had one to 2 a week for a while now, he has had one for the last 3 hours, it appears packed and a slow trickle.  He uses a nasal lubrication with his CPAP at night.  He is on Pradaxa.    Labs Reviewed: Sodium 140, potassium 4.4, creatinine 1.33, GFR 51, hemoglobin 10.2    Physical Exam  Please see Below     Assessment and Plan  1.  Lower extremity edema.  This appears to have resolved almost completely with 15 pound weight loss he has had in the last 2 weeks.  His legs look back to near normal by my exam.  His labs suggest he is tolerating the diuretics.  I do not believe this lower extremity edema was due to venous insufficiency but more likely due to diastolic heart failure which appears to have resolved.  I did recommend we recheck a BMP, NT proBNP and a hemoglobin before he leaves today.  He continues to wear the compression stockings.  2.  Exertional shortness of breath.  Mr. Foster continues to say he gets a little short of breath walking through the tunnel, he is admittedly not very active and at times he appears mildly confused, not remembering a conversation we had moments before.  His coronary angiogram showed no flow-limiting disease.  He was recommended to try M Flora, he has been taking it as needed and cannot really say if it has helped or not.  I recommended that he use it daily and then he come back and see me in a couple weeks and see how he is doing.  3.  Nosebleeds.  He is on Pradaxa.  He is complained of 1-2 nosebleeds a week, he says he is using a nasal lubricant when he wears his CPAP at night.  His nose is currently packed and has no obvious bleeding, but he says he has had a nosebleed since earlier this morning.  His hemoglobin did drop one-point, I will recheck it again  today.  4.  Coronary artery disease.  He just had an angiogram last month which showed no flow-limiting disease and a patent OM stent.  5. Mitral regurgitation.  On his echocardiogram it looks like this is may be increased over the last year, though his coronary angiogram describes only mild MR.  He has since diuresed quite significantly.  We will continue to monitor this with echocardiograms.  6.  Atrial fibrillation.  Status post pacemaker implantation, he is 100% paced.  He is anticoagulated and I said having nosebleeds.  It does not look like the TR has significantly changed based on his echo report.    Greater than half of this 45 minute appointment was spent in counseling and coronation of care  WILLIS Bullard CNP      Orders Placed This Encounter   Procedures     Basic metabolic panel     Hemoglobin     N terminal pro BNP outpatient     Follow-Up with Cardiac Advanced Practice Provider     Orders Placed This Encounter   Medications     simvastatin (ZOCOR) 20 MG tablet     Sig: Take by mouth At Bedtime     isosorbide mononitrate (IMDUR) 30 MG 24 hr tablet     Sig: Take 1 tablet (30 mg) by mouth daily     Dispense:  90 tablet     Refill:  3     Medications Discontinued During This Encounter   Medication Reason     SIMVASTATIN PO Medication Reconciliation Clean Up     isosorbide mononitrate (IMDUR) 30 MG 24 hr tablet Reorder         CURRENT MEDICATIONS:  Current Outpatient Prescriptions   Medication Sig Dispense Refill     simvastatin (ZOCOR) 20 MG tablet Take by mouth At Bedtime       isosorbide mononitrate (IMDUR) 30 MG 24 hr tablet Take 1 tablet (30 mg) by mouth daily 90 tablet 3     furosemide (LASIX) 40 MG tablet Take 1 tablet (40 mg) by mouth daily 90 tablet 1     spironolactone (ALDACTONE) 50 MG tablet Take 1 tablet (50 mg) by mouth daily 90 tablet 1     potassium chloride SA (KLOR-CON) 20 MEQ CR tablet Take 1 tablet (20 mEq) by mouth daily 90 tablet 3     tamsulosin (FLOMAX) 0.4 MG capsule Take 1  capsule (0.4 mg) by mouth daily 30 capsule 3     Multiple Vitamins-Minerals (CENTRUM SILVER) per tablet Take 1 tablet by mouth daily       Carboxymethylcellulose Sod PF (CELLUVISC/REFRESH LIQUIGEL) 1 % ophthalmic solution Place 1 drop into both eyes 3 times daily as needed for dry eyes       UNKNOWN TO PATIENT Place 1 drop Into the left eye daily EYE DROP PRESCRIBED FOR CONJUNCTIVITIS       BuPROPion HCl (WELLBUTRIN XL PO) Take 150 mg by mouth every morning       FINASTERIDE PO Take 5 mg by mouth daily       GLUCOSAMINE-CHONDROITIN PO Take 2 tablets by mouth daily       Polyethylene Glycol 3350 (MIRALAX PO) Take 1 packet by mouth At Bedtime        levothyroxine (SYNTHROID/LEVOTHROID) 100 MCG tablet Take 1 tablet (100 mcg) by mouth daily 90 tablet 3     carvedilol (COREG) 6.25 MG tablet Take 1 tablet (6.25 mg) by mouth 2 times daily (with meals) 180 tablet 3     COMPRESSION STOCKINGS 1 each daily 2 each 0     sucralfate (CARAFATE) 1 GM tablet Take 1 tablet (1 g) by mouth 2 times daily 180 tablet 1     omeprazole (PRILOSEC) 20 MG CR capsule Take 1 capsule (20 mg) by mouth 2 times daily 180 capsule 3     LORazepam (ATIVAN) 1 MG tablet Take 0.5-1 tablets (0.5-1 mg) by mouth every 8 hours as needed for anxiety 30 tablet 5     ORDER FOR DME Auto-CPAP:Max 9 cm H2OMin 9 cm H2O  Continuous Lifetime need and heated humidity.    1 Device 0     dabigatran ANTICOAGULANT (PRADAXA ANTICOAGULANT) 75 MG CAPS BLISTER PACK Take 150 mg by mouth 2 times daily. Store in original 's bottle or blister pack; use within 120 days of opening.       Omega-3 Fatty Acids (FISH OIL) 1200 MG capsule Take 1 capsule by mouth daily        [DISCONTINUED] isosorbide mononitrate (IMDUR) 30 MG 24 hr tablet Take 1 tablet (30 mg) by mouth daily (Patient taking differently: Take 30 mg by mouth daily as needed ) 30 tablet 11       ALLERGIES   No Known Allergies    PAST MEDICAL HISTORY:  Past Medical History:   Diagnosis Date     Acute, but  ill-defined, cerebrovascular disease     NO RESIDUALS     Antiplatelet or antithrombotic long-term use      Arrhythmia     a fib     Blood in stool      Coronary atherosclerosis of unspecified type of vessel, native or graft     S/P PTCA, EF 50%     Esophageal reflux      Hydrocele, unspecified      Hypercholesterolemia      Hypersomnia with sleep apnea, unspecified      Inguinal hernia without mention of obstruction or gangrene, recurrent unilateral or unspecified      Ischemic cardiomyopathy      Major depression in complete remission (H)      Obese      Onychomycosis      JIM on CPAP      Other and unspecified hyperlipidemia      Other lymphedema     GROIN AND THIGHS     Other pancytopenia (H)      Other specified anemias      Overweight      Pacemaker      PAD (peripheral artery disease) (H)      Pancytopenia (H)      Persistent atrial fibrillation (H)     VVI PM for long pauses     Prostatic hypertrophy, benign      Pulmonary hypertension      Scrotal varices      Thrombocytopenia, unspecified      Umbilical hernia without obstruction and without gangrene      Unilateral or unspecified femoral hernia without mention of obstruction or gangrene, unilateral or unspecified      Unspecified hypothyroidism      Venous stasis dermatitis of both lower extremities      Vitamin D deficiency        PAST SURGICAL HISTORY:  Past Surgical History:   Procedure Laterality Date     EYE SURGERY      CATARACT/BLEPHAROPLASTY     GENITOURINARY SURGERY      TUNA     HERNIA REPAIR      RIGHT INGUINAL     HERNIORRHAPHY INGUINAL  8/14/2012    Procedure: HERNIORRHAPHY INGUINAL;  right inguinal hernia repair;  Surgeon: Kareem Torrez MD;  Location: [facility]     HERNIORRHAPHY UMBILICAL N/A 10/31/2017    Procedure: HERNIORRHAPHY UMBILICAL;  UMBILICAL HERNIA REPAIR WITH MESH;  Surgeon: Scar Harrington MD;  Location: [facility]     IMPLANT PACEMAKER  8/2009    single chamber     ORTHOPEDIC SURGERY      [facility]     ORTHOPEDIC SURGERY  2010    right  TKR, left TKR     SURGICAL HISTORY OF -       Thumb surgery     SURGICAL HISTORY OF -   1990s    Left total knee replacement     SURGICAL HISTORY OF -   3/11    [facility] total knee replacement       FAMILY HISTORY:  Family History   Problem Relation Age of Onset     Cardiovascular Father      C.A.D. Father      CANCER Sister      lung     Unknown/Adopted Mother      Cancer - colorectal No family hx of        SOCIAL HISTORY:  Social History     Social History     Marital status:      Spouse name: N/A     Number of children: N/A     Years of education: N/A     Occupational History     Teacher, author, speaker Retired     Social History Main Topics     Smoking status: Never Smoker     Smokeless tobacco: Never Used      Comment: per encounter 2/12/07     Alcohol use 0.0 oz/week     0 Standard drinks or equivalent per week      Comment: 1/month     Drug use: No     Sexual activity: No     Other Topics Concern     Parent/Sibling W/ Cabg, Mi Or Angioplasty Before 65f 55m? No     Caffeine Concern No     1-2 cups coffee a day     Sleep Concern No     Stress Concern Yes     due to wifes health condition     Weight Concern No     Special Diet No     Exercise Yes     states he uses his tredmill on and off     Seat Belt Yes     Social History Narrative       Review of Systems:  Skin:  Negative       Eyes:  Positive for glasses    ENT:  Positive for   nosebleed 1-2X week  Respiratory:  Positive for sleep apnea;CPAP;dyspnea on exertion     Cardiovascular:  Negative;syncope or near-syncope;lightheadedness;dizziness;cyanosis Positive for;fatigue chest heaviness when walking, edema, improved  Gastroenterology:        Genitourinary:  Positive for urinary frequency    Musculoskeletal:  Positive for arthritis    Neurologic:  Positive for stroke    Psychiatric:  Positive for anxiety;depression;excessive stress    Heme/Lymph/Imm:  Positive for weight loss;easy bruising    Endocrine:  Positive for thyroid disorder      Physical  "Exam:  Vitals: /73 (BP Location: Right arm, Cuff Size: Adult Large)  Pulse 60  Ht 1.88 m (6' 2\")  Wt 91.6 kg (201 lb 14.4 oz)  BMI 25.92 kg/m2    Constitutional:  cooperative, alert and oriented, well developed, well nourished, in no acute distress        Skin:  warm and dry to the touch, no apparent skin lesions or masses noted          Head:  normocephalic, no masses or lesions        Eyes:  pupils equal and round        Lymph:      ENT:  no pallor or cyanosis        Neck:      Large V wave consistent with tricuspid regurgitation    Respiratory:  normal breath sounds, clear to auscultation, normal A-P diameter, normal symmetry, normal respiratory excursion, no use of accessory muscles         Cardiac: regular rhythm       grade 1;LLSB;early systolic murmur     Paced    venous stasis changes bilaterally                                      GI:  abdomen soft   benign    Extremities and Muscular Skeletal:  no deformities, clubbing, cyanosis, erythema observed stasis pigmentation bilateral LE edema;L greater than R;1+;trace     venous varicosities, scrotal edema    Neurological:  no gross motor deficits;affect appropriate        Psych:  Alert and Oriented x 3    Encounter Diagnoses   Name Primary?     Varicose 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      SOB (shortness of breath)        Recent Lab Results:  LIPID RESULTS:  Lab Results   Component Value Date    CHOL 106 09/08/2017    HDL 54 09/08/2017    LDL 38 09/08/2017    TRIG 71 09/08/2017    CHOLHDLRATIO 2.1 06/15/2015       LIVER ENZYME RESULTS:  Lab Results   Component Value Date    AST 25 12/08/2015    ALT 22 09/08/2017       CBC RESULTS:  Lab Results   Component Value Date    WBC 2.8 (L) 03/21/2018    RBC 3.23 (L) 03/21/2018    HGB 10.2 (L) 03/21/2018    HCT 31.2 (L) 03/21/2018    MCV 97 03/21/2018    MCH 31.6 03/21/2018    MCHC 32.7 03/21/2018    RDW 16.3 (H) 03/21/2018    PLT 75 (L) 03/21/2018       BMP RESULTS:  Lab Results   Component " Value Date     04/04/2018    POTASSIUM 4.4 04/04/2018    CHLORIDE 102 04/04/2018    CO2 31 (H) 04/04/2018    ANIONGAP 11.4 04/04/2018    GLC 89 04/04/2018    BUN 16 04/04/2018    CR 1.33 (H) 04/04/2018    GFRESTIMATED 51 (L) 04/04/2018    GFRESTBLACK 62 04/04/2018    TEODORO 9.9 04/04/2018        A1C RESULTS:  No results found for: A1C    INR RESULTS:  Lab Results   Component Value Date    INR 1.48 (H) 03/21/2018    INR 1.38 (H) 03/19/2011           CC  Segundo Hope MD  [address]  [place] [id number]

## 2018-04-12 NOTE — LETTER
4/12/2018    Mg Nelson MD  [address]  [facility] [address]    RE: Bairon DOWNS Cristian       Dear Colleague,    I had the pleasure of seeing Bairon Foster in the [facility].    HPI and Plan:   The pleasure of seeing Bairon Foster today to review the results of his echocardiogram.  He is a pleasant 87-year-old patient who follows with Dr. Hope for general cardiology and Dr. Carrizales for his venous insufficiency.  He has a history of known coronary artery disease, ischemic cardiomyopathy, ventricular tachycardia, atrial fibrillation anticoagulated with Pradaxa, coronary stenting in 2009 to the OM and venous insufficiency status post bilateral lower extremity ablations.  He has 1-2+ mitral regurgitation.  His device check demonstrated that he continues in atrial fibrillation.    He saw Dr. Hope in March and complained of exertional chest pain and dyspnea.  He went on to have a coronary angiogram which showed no flow-limiting disease, his OM stent was patent.  His LVEF was 55-60%, the LV end-diastolic pressure was 23 mmHg and he had trace MR.  And follow-up Dr. Carrizales ordered echocardiogram and adjusted his diuretics, he decrease his Lasix to 40 mg daily and increased his Spironolactone to 50 mg daily.  Of note Mr. Foster has had a significant weight gain in the previous couple of months.    His echocardiogram done April 4 was overall stable compared to the previous, though he had more MR described.  He had mild to moderate tricuspid regurgitation with RVSP stable at 23 mmHg plus right atrial pressure.  The IVC was dilated and failed to collapse with respiration, suggesting elevated central venous pressure.  He had moderate to severe 3+ MR, this was previously described as mild to moderate at 1-2+.  He has subsequently lost 14 pounds since the change in his diuretics.  Today Mr. Foster continues to say he has some shortness of breath when he walks through the tunnel to  clinic, such that he stops and sits in the hallway to rest where in the past he did not have to do that.  He describes some chest heaviness as well.  He says he was told to try Imdur 30 mg as needed for shortness of breath, he has not really used it or noticed any difference when he does use it.  He does think the edema, including the scrotal edema is significantly improved and on exam he appears close to euvolemic.  He complains of nosebleeds, he has had one to 2 a week for a while now, he has had one for the last 3 hours, it appears packed and a slow trickle.  He uses a nasal lubrication with his CPAP at night.  He is on Pradaxa.    Labs Reviewed: Sodium 140, potassium 4.4, creatinine 1.33, GFR 51, hemoglobin 10.2    Physical Exam  Please see Below     Assessment and Plan  1.  Lower extremity edema.  This appears to have resolved almost completely with 15 pound weight loss he has had in the last 2 weeks.  His legs look back to near normal by my exam.  His labs suggest he is tolerating the diuretics.  I do not believe this lower extremity edema was due to venous insufficiency but more likely due to diastolic heart failure which appears to have resolved.  I did recommend we recheck a BMP, NT proBNP and a hemoglobin before he leaves today.  He continues to wear the compression stockings.  2.  Exertional shortness of breath.  Mr. Foster continues to say he gets a little short of breath walking through the tunnel, he is admittedly not very active and at times he appears mildly confused, not remembering a conversation we had moments before.  His coronary angiogram showed no flow-limiting disease.  He was recommended to try M Flora, he has been taking it as needed and cannot really say if it has helped or not.  I recommended that he use it daily and then he come back and see me in a couple weeks and see how he is doing.  3.  Nosebleeds.  He is on Pradaxa.  He is complained of 1-2 nosebleeds a week, he says he is using a  nasal lubricant when he wears his CPAP at night.  His nose is currently packed and has no obvious bleeding, but he says he has had a nosebleed since earlier this morning.  His hemoglobin did drop one-point, I will recheck it again today.  4.  Coronary artery disease.  He just had an angiogram last month which showed no flow-limiting disease and a patent OM stent.  5. Mitral regurgitation.  On his echocardiogram it looks like this is may be increased over the last year, though his coronary angiogram describes only mild MR.  He has since diuresed quite significantly.  We will continue to monitor this with echocardiograms.  6.  Atrial fibrillation.  Status post pacemaker implantation, he is 100% paced.  He is anticoagulated and I said having nosebleeds.  It does not look like the TR has significantly changed based on his echo report.    Greater than half of this 45 minute appointment was spent in counseling and coronation of care  WILLIS Bullard CNP      Orders Placed This Encounter   Procedures     Basic metabolic panel     Hemoglobin     N terminal pro BNP outpatient     Follow-Up with Cardiac Advanced Practice Provider     Orders Placed This Encounter   Medications     simvastatin (ZOCOR) 20 MG tablet     Sig: Take by mouth At Bedtime     isosorbide mononitrate (IMDUR) 30 MG 24 hr tablet     Sig: Take 1 tablet (30 mg) by mouth daily     Dispense:  90 tablet     Refill:  3     Medications Discontinued During This Encounter   Medication Reason     SIMVASTATIN PO Medication Reconciliation Clean Up     isosorbide mononitrate (IMDUR) 30 MG 24 hr tablet Reorder         CURRENT MEDICATIONS:  Current Outpatient Prescriptions   Medication Sig Dispense Refill     simvastatin (ZOCOR) 20 MG tablet Take by mouth At Bedtime       isosorbide mononitrate (IMDUR) 30 MG 24 hr tablet Take 1 tablet (30 mg) by mouth daily 90 tablet 3     furosemide (LASIX) 40 MG tablet Take 1 tablet (40 mg) by mouth daily 90 tablet 1      spironolactone (ALDACTONE) 50 MG tablet Take 1 tablet (50 mg) by mouth daily 90 tablet 1     potassium chloride SA (KLOR-CON) 20 MEQ CR tablet Take 1 tablet (20 mEq) by mouth daily 90 tablet 3     tamsulosin (FLOMAX) 0.4 MG capsule Take 1 capsule (0.4 mg) by mouth daily 30 capsule 3     Multiple Vitamins-Minerals (CENTRUM SILVER) per tablet Take 1 tablet by mouth daily       Carboxymethylcellulose Sod PF (CELLUVISC/REFRESH LIQUIGEL) 1 % ophthalmic solution Place 1 drop into both eyes 3 times daily as needed for dry eyes       UNKNOWN TO PATIENT Place 1 drop Into the left eye daily EYE DROP PRESCRIBED FOR CONJUNCTIVITIS       BuPROPion HCl (WELLBUTRIN XL PO) Take 150 mg by mouth every morning       FINASTERIDE PO Take 5 mg by mouth daily       GLUCOSAMINE-CHONDROITIN PO Take 2 tablets by mouth daily       Polyethylene Glycol 3350 (MIRALAX PO) Take 1 packet by mouth At Bedtime        levothyroxine (SYNTHROID/LEVOTHROID) 100 MCG tablet Take 1 tablet (100 mcg) by mouth daily 90 tablet 3     carvedilol (COREG) 6.25 MG tablet Take 1 tablet (6.25 mg) by mouth 2 times daily (with meals) 180 tablet 3     COMPRESSION STOCKINGS 1 each daily 2 each 0     sucralfate (CARAFATE) 1 GM tablet Take 1 tablet (1 g) by mouth 2 times daily 180 tablet 1     omeprazole (PRILOSEC) 20 MG CR capsule Take 1 capsule (20 mg) by mouth 2 times daily 180 capsule 3     LORazepam (ATIVAN) 1 MG tablet Take 0.5-1 tablets (0.5-1 mg) by mouth every 8 hours as needed for anxiety 30 tablet 5     ORDER FOR DME Auto-CPAP:Max 9 cm H2OMin 9 cm H2O  Continuous Lifetime need and heated humidity.    1 Device 0     dabigatran ANTICOAGULANT (PRADAXA ANTICOAGULANT) 75 MG CAPS BLISTER PACK Take 150 mg by mouth 2 times daily. Store in original 's bottle or blister pack; use within 120 days of opening.       Omega-3 Fatty Acids (FISH OIL) 1200 MG capsule Take 1 capsule by mouth daily        [DISCONTINUED] isosorbide mononitrate (IMDUR) 30 MG 24 hr tablet  Take 1 tablet (30 mg) by mouth daily (Patient taking differently: Take 30 mg by mouth daily as needed ) 30 tablet 11       ALLERGIES   No Known Allergies    PAST MEDICAL HISTORY:  Past Medical History:   Diagnosis Date     Acute, but ill-defined, cerebrovascular disease     NO RESIDUALS     Antiplatelet or antithrombotic long-term use      Arrhythmia     a fib     Blood in stool      Coronary atherosclerosis of unspecified type of vessel, native or graft     S/P PTCA, EF 50%     Esophageal reflux      Hydrocele, unspecified      Hypercholesterolemia      Hypersomnia with sleep apnea, unspecified      Inguinal hernia without mention of obstruction or gangrene, recurrent unilateral or unspecified      Ischemic cardiomyopathy      Major depression in complete remission (H)      Obese      Onychomycosis      JIM on CPAP      Other and unspecified hyperlipidemia      Other lymphedema     GROIN AND THIGHS     Other pancytopenia (H)      Other specified anemias      Overweight      Pacemaker      PAD (peripheral artery disease) (H)      Pancytopenia (H)      Persistent atrial fibrillation (H)     VVI PM for long pauses     Prostatic hypertrophy, benign      Pulmonary hypertension      Scrotal varices      Thrombocytopenia, unspecified      Umbilical hernia without obstruction and without gangrene      Unilateral or unspecified femoral hernia without mention of obstruction or gangrene, unilateral or unspecified      Unspecified hypothyroidism      Venous stasis dermatitis of both lower extremities      Vitamin D deficiency        PAST SURGICAL HISTORY:  Past Surgical History:   Procedure Laterality Date     EYE SURGERY      CATARACT/BLEPHAROPLASTY     GENITOURINARY SURGERY      TUNA     HERNIA REPAIR      RIGHT INGUINAL     HERNIORRHAPHY INGUINAL  8/14/2012    Procedure: HERNIORRHAPHY INGUINAL;  right inguinal hernia repair;  Surgeon: Kareem Torrez MD;  Location: [facility]     HERNIORRHAPHY UMBILICAL N/A 10/31/2017    Procedure:  HERNIORRHAPHY UMBILICAL;  UMBILICAL HERNIA REPAIR WITH MESH;  Surgeon: Scar Harrington MD;  Location: [facility]     IMPLANT PACEMAKER  8/2009    single chamber     ORTHOPEDIC SURGERY      [facility] RIGHT     ORTHOPEDIC SURGERY  2010    right TKR, left TKR     SURGICAL HISTORY OF -       Thumb surgery     SURGICAL HISTORY OF -   1990s    Left total knee replacement     SURGICAL HISTORY OF -   3/11    Rig total knee replacement       FAMILY HISTORY:  Family History   Problem Relation Age of Onset     Cardiovascular Father      C.A.D. Father      CANCER Sister      lung     Unknown/Adopted Mother      Cancer - colorectal No family hx of        SOCIAL HISTORY:  Social History     Social History     Marital status:      Spouse name: N/A     Number of children: N/A     Years of education: N/A     Occupational History     Teacher, author, speaker Retired     Social History Main Topics     Smoking status: Never Smoker     Smokeless tobacco: Never Used      Comment: per encounter 2/12/07     Alcohol use 0.0 oz/week     0 Standard drinks or equivalent per week      Comment: 1/month     Drug use: No     Sexual activity: No     Other Topics Concern     Parent/Sibling W/ Cabg, Mi Or Angioplasty Before 65f 55m? No     Caffeine Concern No     1-2 cups coffee a day     Sleep Concern No     Stress Concern Yes     due to wifes health condition     Weight Concern No     Special Diet No     Exercise Yes     states he uses his tredmill on and off     Seat Belt Yes     Social History Narrative       Review of Systems:  Skin:  Negative       Eyes:  Positive for glasses    ENT:  Positive for   nosebleed 1-2X week  Respiratory:  Positive for sleep apnea;CPAP;dyspnea on exertion     Cardiovascular:  Negative;syncope or near-syncope;lightheadedness;dizziness;cyanosis Positive for;fatigue chest heaviness when walking, edema, improved  Gastroenterology:        Genitourinary:  Positive for urinary frequency    Musculoskeletal:  Positive for  "arthritis    Neurologic:  Positive for stroke    Psychiatric:  Positive for anxiety;depression;excessive stress    Heme/Lymph/Imm:  Positive for weight loss;easy bruising    Endocrine:  Positive for thyroid disorder      Physical Exam:  Vitals: /73 (BP Location: Right arm, Cuff Size: Adult Large)  Pulse 60  Ht 1.88 m (6' 2\")  Wt 91.6 kg (201 lb 14.4 oz)  BMI 25.92 kg/m2    Constitutional:  cooperative, alert and oriented, well developed, well nourished, in no acute distress        Skin:  warm and dry to the touch, no apparent skin lesions or masses noted          Head:  normocephalic, no masses or lesions        Eyes:  pupils equal and round        Lymph:      ENT:  no pallor or cyanosis        Neck:      Large V wave consistent with tricuspid regurgitation    Respiratory:  normal breath sounds, clear to auscultation, normal A-P diameter, normal symmetry, normal respiratory excursion, no use of accessory muscles         Cardiac: regular rhythm       grade 1;LLSB;early systolic murmur     Paced    venous stasis changes bilaterally                                      GI:  abdomen soft   benign    Extremities and Muscular Skeletal:  no deformities, clubbing, cyanosis, erythema observed stasis pigmentation bilateral LE edema;L greater than R;1+;trace     venous varicosities, scrotal edema    Neurological:  no gross motor deficits;affect appropriate        Psych:  Alert and Oriented x 3    Encounter Diagnoses   Name Primary?     Varicose 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      SOB (shortness of breath)        Recent Lab Results:  LIPID RESULTS:  Lab Results   Component Value Date    CHOL 106 09/08/2017    HDL 54 09/08/2017    LDL 38 09/08/2017    TRIG 71 09/08/2017    CHOLHDLRATIO 2.1 06/15/2015       LIVER ENZYME RESULTS:  Lab Results   Component Value Date    AST 25 12/08/2015    ALT 22 09/08/2017       CBC RESULTS:  Lab Results   Component Value Date    WBC 2.8 (L) 03/21/2018    RBC 3.23 (L) " 03/21/2018    HGB 10.2 (L) 03/21/2018    HCT 31.2 (L) 03/21/2018    MCV 97 03/21/2018    MCH 31.6 03/21/2018    MCHC 32.7 03/21/2018    RDW 16.3 (H) 03/21/2018    PLT 75 (L) 03/21/2018       BMP RESULTS:  Lab Results   Component Value Date     04/04/2018    POTASSIUM 4.4 04/04/2018    CHLORIDE 102 04/04/2018    CO2 31 (H) 04/04/2018    ANIONGAP 11.4 04/04/2018    GLC 89 04/04/2018    BUN 16 04/04/2018    CR 1.33 (H) 04/04/2018    GFRESTIMATED 51 (L) 04/04/2018    GFRESTBLACK 62 04/04/2018    TEODORO 9.9 04/04/2018        A1C RESULTS:  No results found for: A1C    INR RESULTS:  Lab Results   Component Value Date    INR 1.48 (H) 03/21/2018    INR 1.38 (H) 03/19/2011         Thank you for allowing me to participate in the care of your patient.    Sincerely,     WILLIS Smith [facility]

## 2018-04-12 NOTE — PATIENT INSTRUCTIONS
Please take Imdur/Isosorbide mononitrate once a day in the am and see if that helps with your shortness of breath when you walk/exert yourself.    Continue lasix 40 and spironolactone 50.    We will check some labs before you leave, including hemaglobin and electrolytes and kidney function    If your nose continues to bleed, see if Dr. Nelson can treat it in his clinic or if he thinks ED/Urgent care is better.    See me in a couple of weeks to see how you are doing on Imdur.    Anushka

## 2018-04-12 NOTE — MR AVS SNAPSHOT
After Visit Summary   4/12/2018    Bairon Foster    MRN: [id number]           Patient Information     Date Of Birth          2/6/1931        Visit Information        Provider Department      4/12/2018 8:30 AM Mackenzie Tejada APRN [facility]        Today's Diagnoses     Varicose 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        SOB (shortness of breath)          Care Instructions    Please take Imdur/Isosorbide mononitrate once a day in the am and see if that helps with your shortness of breath when you walk/exert yourself.    Continue lasix 40 and spironolactone 50.    We will check some labs before you leave, including hemaglobin and electrolytes and kidney function    If your nose continues to bleed, see if Dr. Nelson can treat it in his clinic or if he thinks ED/Urgent care is better.    See me in a couple of weeks to see how you are doing on Imdur.    Anushka            Follow-ups after your visit        Additional Services     Follow-Up with Cardiac Advanced Practice Provider                 Your next 10 appointments already scheduled     Apr 24, 2018  2:30 PM CDT   SHORT with Mg Nelson MD   [facility] ([facility])    [address]  [place] [address]   [phone]            Jun 14, 2018 10:15 AM CDT   Phone Device Check with ARRIETA TECH2   [facility] ([facility])    [address]  [place] [address] [phone] [address]              Future tests that were ordered for you today     Open Future Orders        Priority Expected Expires Ordered    Follow-Up with Cardiac Advanced Practice Provider Routine 4/30/2018 4/12/2019 4/12/2018    Basic metabolic panel Routine 4/12/2018 4/12/2019 4/12/2018    Hemoglobin Routine 4/12/2018 4/12/2019 4/12/2018    N terminal pro BNP  "outpatient Routine 4/12/2018 4/12/2019 4/12/2018            Who to contact     If you have questions or need follow up information about today's clinic visit or your schedule please contact [facility]   SUSHMA directly at [phone].  Normal or non-critical lab and imaging results will be communicated to you by MyChart, letter or phone within 4 business days after the clinic has received the results. If you do not hear from us within 7 days, please contact the clinic through [employer] or phone. If you have a critical or abnormal lab result, we will notify you by phone as soon as possible.  Submit refill requests through [employer] or call your pharmacy and they will forward the refill request to us. Please allow 3 business days for your refill to be completed.          Additional Information About Your Visit        [employer] Information     [employer] gives you secure access to your electronic health record. If you see a primary care provider, you can also send messages to your care team and make appointments. If you have questions, please call your primary care clinic.  If you do not have a primary care provider, please call [phone] and they will assist you.        Care EveryWhere ID     This is your Care EveryWhere ID. This could be used by other organizations to access your [facility] medical records  [id number]        Your Vitals Were     Pulse Height BMI (Body Mass Index)             60 1.88 m (6' 2\") 25.92 kg/m2          Blood Pressure from Last 3 Encounters:   04/12/18 133/73   04/02/18 122/74   03/30/18 104/64    Weight from Last 3 Encounters:   04/12/18 91.6 kg (201 lb 14.4 oz)   04/02/18 97.5 kg (215 lb)   03/30/18 97.5 kg (215 lb)              We Performed the Following     Follow-Up with Cardiac Advanced Practice Provider          Today's Medication Changes          These changes are accurate as of 4/12/18  9:08 AM.  If you have any questions, ask your nurse or doctor.          "      These medicines have changed or have updated prescriptions.        Dose/Directions    isosorbide mononitrate 30 MG 24 hr tablet   Commonly known as:  IMDUR   This may have changed:    - when to take this  - reasons to take this   Used for:  SOB (shortness of breath)        Dose:  30 mg   Take 1 tablet (30 mg) by mouth daily   Quantity:  90 tablet   Refills:  3            Where to get your medicines      These medications were sent to [facility] - [place], [place] - [address]  [address] [place] [address]     Phone:  [phone]     isosorbide mononitrate 30 MG 24 hr tablet                Primary Care Provider Office Phone # Fax #    Mg Bairon Nelson [phone] [address]  [facility] [address]        Goals        General    I will find a caregiver support group to attend (pt-stated)     Notes - Note created  4/17/2014  8:49 AM by Beth Morales RN    As of today's date 4/17/2014 goal is met at 0 - 25%.   Goal Status:  Active        Equal Access to Services     [facility]: Hadii silvestre ku hadasho Sosuman, waaxda luqadaha, qaybta kaalmada adeegyajennifer, kathryn camilo . So [facility] [phone].    ATENCIÓN: Si habla español, tiene a malone disposición servicios gratuitos de asistencia lingüística. Ll[facility] [phone].    We comply with applicable federal civil rights laws and [place] laws. We do not discriminate on the basis of race, color, national origin, age, disability, sex, sexual orientation, or gender identity.            Thank you!     Thank you for choosing [facility]  for your care. Our goal is always to provide you with excellent care. Hearing back from our patients is one way we can continue to improve our services. Please take a few minutes to complete the written survey that you may receive in the mail after your visit with us. Thank you!             Your Updated  Medication List - Protect others around you: Learn how to safely use, store and throw away your medicines at www.disposemymeds.org.          This list is accurate as of 4/12/18  9:08 AM.  Always use your most recent med list.                   Brand Name Dispense Instructions for use Diagnosis    Carboxymethylcellulose Sod PF 1 % ophthalmic solution    CELLUVISC/REFRESH LIQUIGEL     Place 1 drop into both eyes 3 times daily as needed for dry eyes        carvedilol 6.25 MG tablet    COREG    180 tablet    Take 1 tablet (6.25 mg) by mouth 2 times daily (with meals)    Ischemic cardiomyopathy       CENTRUM SILVER per tablet      Take 1 tablet by mouth daily        COMPRESSION STOCKINGS     2 each    1 each daily    Varicose 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       FINASTERIDE PO      Take 5 mg by mouth daily        furosemide 40 MG tablet    LASIX    90 tablet    Take 1 tablet (40 mg) by mouth daily    Dependent edema       GLUCOSAMINE-CHONDROITIN PO      Take 2 tablets by mouth daily        isosorbide mononitrate 30 MG 24 hr tablet    IMDUR    90 tablet    Take 1 tablet (30 mg) by mouth daily    SOB (shortness of breath)       levothyroxine 100 MCG tablet    SYNTHROID/LEVOTHROID    90 tablet    Take 1 tablet (100 mcg) by mouth daily    Acquired hypothyroidism       LORazepam 1 MG tablet    ATIVAN    30 tablet    Take 0.5-1 tablets (0.5-1 mg) by mouth every 8 hours as needed for anxiety    Acute stress reaction       MIRALAX PO      Take 1 packet by mouth At Bedtime        omega-3 fatty acids 1200 MG capsule      Take 1 capsule by mouth daily        omeprazole 20 MG CR capsule    priLOSEC    180 capsule    Take 1 capsule (20 mg) by mouth 2 times daily    Gastroesophageal reflux disease without esophagitis       order for DME     1 Device    Auto-CPAP:Max 9 cm H2OMin 9 cm H2O Continuous Lifetime need and heated humidity.    Obstructive sleep apnea (adult) (pediatric), Other dyspnea and respiratory  abnormality       potassium chloride SA 20 MEQ CR tablet    KLOR-CON    90 tablet    Take 1 tablet (20 mEq) by mouth daily    Pulmonary hypertension       PRADAXA ANTICOAGULANT 75 MG capsule   Generic drug:  dabigatran ANTICOAGULANT      Take 150 mg by mouth 2 times daily. Store in original 's bottle or blister pack; use within 120 days of opening.        simvastatin 20 MG tablet    ZOCOR     Take by mouth At Bedtime        spironolactone 50 MG tablet    ALDACTONE    90 tablet    Take 1 tablet (50 mg) by mouth daily    SOB (shortness of breath)       sucralfate 1 GM tablet    CARAFATE    180 tablet    Take 1 tablet (1 g) by mouth 2 times daily    Esophageal reflux       tamsulosin 0.4 MG capsule    FLOMAX    30 capsule    Take 1 capsule (0.4 mg) by mouth daily    Benign prostatic hyperplasia with urinary hesitancy       UNKNOWN TO PATIENT      Place 1 drop Into the left eye daily EYE DROP PRESCRIBED FOR CONJUNCTIVITIS        WELLBUTRIN XL PO      Take 150 mg by mouth every morning

## 2018-04-12 NOTE — LETTER
4/12/2018    Mg Nelson MD  [address]  [facility] [address]    RE: Bairon DOWNS Cristian       Dear Colleague,    I had the pleasure of seeing Bairon Foster in the [facility].    HPI and Plan:   The pleasure of seeing Bairon Foster today to review the results of his echocardiogram.  He is a pleasant 87-year-old patient who follows with Dr. Hope for general cardiology and Dr. Carrizales for his venous insufficiency.  He has a history of known coronary artery disease, ischemic cardiomyopathy, ventricular tachycardia, atrial fibrillation anticoagulated with Pradaxa, coronary stenting in 2009 to the OM and venous insufficiency status post bilateral lower extremity ablations.  He has 1-2+ mitral regurgitation.  His device check demonstrated that he continues in atrial fibrillation.    He saw Dr. Hope in March and complained of exertional chest pain and dyspnea.  He went on to have a coronary angiogram which showed no flow-limiting disease, his OM stent was patent.  His LVEF was 55-60%, the LV end-diastolic pressure was 23 mmHg and he had trace MR.  And follow-up Dr. Carrizales ordered echocardiogram and adjusted his diuretics, he decrease his Lasix to 40 mg daily and increased his Spironolactone to 50 mg daily.  Of note Mr. Foster has had a significant weight gain in the previous couple of months.    His echocardiogram done April 4 was overall stable compared to the previous, though he had more MR described.  He had mild to moderate tricuspid regurgitation with RVSP stable at 23 mmHg plus right atrial pressure.  The IVC was dilated and failed to collapse with respiration, suggesting elevated central venous pressure.  He had moderate to severe 3+ MR, this was previously described as mild to moderate at 1-2+.  He has subsequently lost 14 pounds since the change in his diuretics.  Today Mr. Foster continues to say he has some shortness of breath when he walks through the tunnel to  clinic, such that he stops and sits in the hallway to rest where in the past he did not have to do that.  He describes some chest heaviness as well.  He says he was told to try Imdur 30 mg as needed for shortness of breath, he has not really used it or noticed any difference when he does use it.  He does think the edema, including the scrotal edema is significantly improved and on exam he appears close to euvolemic.  He complains of nosebleeds, he has had one to 2 a week for a while now, he has had one for the last 3 hours, it appears packed and a slow trickle.  He uses a nasal lubrication with his CPAP at night.  He is on Pradaxa.    Labs Reviewed: Sodium 140, potassium 4.4, creatinine 1.33, GFR 51, hemoglobin 10.2    Physical Exam  Please see Below     Assessment and Plan  1.  Lower extremity edema.  This appears to have resolved almost completely with 15 pound weight loss he has had in the last 2 weeks.  His legs look back to near normal by my exam.  His labs suggest he is tolerating the diuretics.  I do not believe this lower extremity edema was due to venous insufficiency but more likely due to diastolic heart failure which appears to have resolved.  I did recommend we recheck a BMP, NT proBNP and a hemoglobin before he leaves today.  He continues to wear the compression stockings.  2.  Exertional shortness of breath.  Mr. Foster continues to say he gets a little short of breath walking through the tunnel, he is admittedly not very active and at times he appears mildly confused, not remembering a conversation we had moments before.  His coronary angiogram showed no flow-limiting disease.  He was recommended to try M Flora, he has been taking it as needed and cannot really say if it has helped or not.  I recommended that he use it daily and then he come back and see me in a couple weeks and see how he is doing.  3.  Nosebleeds.  He is on Pradaxa.  He is complained of 1-2 nosebleeds a week, he says he is using a  nasal lubricant when he wears his CPAP at night.  His nose is currently packed and has no obvious bleeding, but he says he has had a nosebleed since earlier this morning.  His hemoglobin did drop one-point, I will recheck it again today.  4.  Coronary artery disease.  He just had an angiogram last month which showed no flow-limiting disease and a patent OM stent.  5. Mitral regurgitation.  On his echocardiogram it looks like this is may be increased over the last year, though his coronary angiogram describes only mild MR.  He has since diuresed quite significantly.  We will continue to monitor this with echocardiograms.  6.  Atrial fibrillation.  Status post pacemaker implantation, he is 100% paced.  He is anticoagulated and I said having nosebleeds.  It does not look like the TR has significantly changed based on his echo report.    Greater than half of this 45 minute appointment was spent in counseling and coronation of care  WILLIS Bullard CNP      Orders Placed This Encounter   Procedures     Basic metabolic panel     Hemoglobin     N terminal pro BNP outpatient     Follow-Up with Cardiac Advanced Practice Provider     Orders Placed This Encounter   Medications     simvastatin (ZOCOR) 20 MG tablet     Sig: Take by mouth At Bedtime     isosorbide mononitrate (IMDUR) 30 MG 24 hr tablet     Sig: Take 1 tablet (30 mg) by mouth daily     Dispense:  90 tablet     Refill:  3     Medications Discontinued During This Encounter   Medication Reason     SIMVASTATIN PO Medication Reconciliation Clean Up     isosorbide mononitrate (IMDUR) 30 MG 24 hr tablet Reorder         CURRENT MEDICATIONS:  Current Outpatient Prescriptions   Medication Sig Dispense Refill     simvastatin (ZOCOR) 20 MG tablet Take by mouth At Bedtime       isosorbide mononitrate (IMDUR) 30 MG 24 hr tablet Take 1 tablet (30 mg) by mouth daily 90 tablet 3     furosemide (LASIX) 40 MG tablet Take 1 tablet (40 mg) by mouth daily 90 tablet 1      spironolactone (ALDACTONE) 50 MG tablet Take 1 tablet (50 mg) by mouth daily 90 tablet 1     potassium chloride SA (KLOR-CON) 20 MEQ CR tablet Take 1 tablet (20 mEq) by mouth daily 90 tablet 3     tamsulosin (FLOMAX) 0.4 MG capsule Take 1 capsule (0.4 mg) by mouth daily 30 capsule 3     Multiple Vitamins-Minerals (CENTRUM SILVER) per tablet Take 1 tablet by mouth daily       Carboxymethylcellulose Sod PF (CELLUVISC/REFRESH LIQUIGEL) 1 % ophthalmic solution Place 1 drop into both eyes 3 times daily as needed for dry eyes       UNKNOWN TO PATIENT Place 1 drop Into the left eye daily EYE DROP PRESCRIBED FOR CONJUNCTIVITIS       BuPROPion HCl (WELLBUTRIN XL PO) Take 150 mg by mouth every morning       FINASTERIDE PO Take 5 mg by mouth daily       GLUCOSAMINE-CHONDROITIN PO Take 2 tablets by mouth daily       Polyethylene Glycol 3350 (MIRALAX PO) Take 1 packet by mouth At Bedtime        levothyroxine (SYNTHROID/LEVOTHROID) 100 MCG tablet Take 1 tablet (100 mcg) by mouth daily 90 tablet 3     carvedilol (COREG) 6.25 MG tablet Take 1 tablet (6.25 mg) by mouth 2 times daily (with meals) 180 tablet 3     COMPRESSION STOCKINGS 1 each daily 2 each 0     sucralfate (CARAFATE) 1 GM tablet Take 1 tablet (1 g) by mouth 2 times daily 180 tablet 1     omeprazole (PRILOSEC) 20 MG CR capsule Take 1 capsule (20 mg) by mouth 2 times daily 180 capsule 3     LORazepam (ATIVAN) 1 MG tablet Take 0.5-1 tablets (0.5-1 mg) by mouth every 8 hours as needed for anxiety 30 tablet 5     ORDER FOR DME Auto-CPAP:Max 9 cm H2OMin 9 cm H2O  Continuous Lifetime need and heated humidity.    1 Device 0     dabigatran ANTICOAGULANT (PRADAXA ANTICOAGULANT) 75 MG CAPS BLISTER PACK Take 150 mg by mouth 2 times daily. Store in original 's bottle or blister pack; use within 120 days of opening.       Omega-3 Fatty Acids (FISH OIL) 1200 MG capsule Take 1 capsule by mouth daily        [DISCONTINUED] isosorbide mononitrate (IMDUR) 30 MG 24 hr tablet  Take 1 tablet (30 mg) by mouth daily (Patient taking differently: Take 30 mg by mouth daily as needed ) 30 tablet 11       ALLERGIES   No Known Allergies    PAST MEDICAL HISTORY:  Past Medical History:   Diagnosis Date     Acute, but ill-defined, cerebrovascular disease     NO RESIDUALS     Antiplatelet or antithrombotic long-term use      Arrhythmia     a fib     Blood in stool      Coronary atherosclerosis of unspecified type of vessel, native or graft     S/P PTCA, EF 50%     Esophageal reflux      Hydrocele, unspecified      Hypercholesterolemia      Hypersomnia with sleep apnea, unspecified      Inguinal hernia without mention of obstruction or gangrene, recurrent unilateral or unspecified      Ischemic cardiomyopathy      Major depression in complete remission (H)      Obese      Onychomycosis      JIM on CPAP      Other and unspecified hyperlipidemia      Other lymphedema     GROIN AND THIGHS     Other pancytopenia (H)      Other specified anemias      Overweight      Pacemaker      PAD (peripheral artery disease) (H)      Pancytopenia (H)      Persistent atrial fibrillation (H)     VVI PM for long pauses     Prostatic hypertrophy, benign      Pulmonary hypertension      Scrotal varices      Thrombocytopenia, unspecified      Umbilical hernia without obstruction and without gangrene      Unilateral or unspecified femoral hernia without mention of obstruction or gangrene, unilateral or unspecified      Unspecified hypothyroidism      Venous stasis dermatitis of both lower extremities      Vitamin D deficiency        PAST SURGICAL HISTORY:  Past Surgical History:   Procedure Laterality Date     EYE SURGERY      CATARACT/BLEPHAROPLASTY     GENITOURINARY SURGERY      TUNA     HERNIA REPAIR      RIGHT INGUINAL     HERNIORRHAPHY INGUINAL  8/14/2012    Procedure: HERNIORRHAPHY INGUINAL;  right inguinal hernia repair;  Surgeon: Kareem Torrez MD;  Location: [facility]     HERNIORRHAPHY UMBILICAL N/A 10/31/2017    Procedure:  HERNIORRHAPHY UMBILICAL;  UMBILICAL HERNIA REPAIR WITH MESH;  Surgeon: Scar Harrington MD;  Location: [facility]     IMPLANT PACEMAKER  8/2009    single chamber     ORTHOPEDIC SURGERY      [facility] RIGHT     ORTHOPEDIC SURGERY  2010    right TKR, left TKR     SURGICAL HISTORY OF -       Thumb surgery     SURGICAL HISTORY OF -   1990s    Left total knee replacement     SURGICAL HISTORY OF -   3/11    Rig total knee replacement       FAMILY HISTORY:  Family History   Problem Relation Age of Onset     Cardiovascular Father      C.A.D. Father      CANCER Sister      lung     Unknown/Adopted Mother      Cancer - colorectal No family hx of        SOCIAL HISTORY:  Social History     Social History     Marital status:      Spouse name: N/A     Number of children: N/A     Years of education: N/A     Occupational History     Teacher, author, speaker Retired     Social History Main Topics     Smoking status: Never Smoker     Smokeless tobacco: Never Used      Comment: per encounter 2/12/07     Alcohol use 0.0 oz/week     0 Standard drinks or equivalent per week      Comment: 1/month     Drug use: No     Sexual activity: No     Other Topics Concern     Parent/Sibling W/ Cabg, Mi Or Angioplasty Before 65f 55m? No     Caffeine Concern No     1-2 cups coffee a day     Sleep Concern No     Stress Concern Yes     due to wifes health condition     Weight Concern No     Special Diet No     Exercise Yes     states he uses his tredmill on and off     Seat Belt Yes     Social History Narrative       Review of Systems:  Skin:  Negative       Eyes:  Positive for glasses    ENT:  Positive for   nosebleed 1-2X week  Respiratory:  Positive for sleep apnea;CPAP;dyspnea on exertion     Cardiovascular:  Negative;syncope or near-syncope;lightheadedness;dizziness;cyanosis Positive for;fatigue chest heaviness when walking, edema, improved  Gastroenterology:        Genitourinary:  Positive for urinary frequency    Musculoskeletal:  Positive for  "arthritis    Neurologic:  Positive for stroke    Psychiatric:  Positive for anxiety;depression;excessive stress    Heme/Lymph/Imm:  Positive for weight loss;easy bruising    Endocrine:  Positive for thyroid disorder      Physical Exam:  Vitals: /73 (BP Location: Right arm, Cuff Size: Adult Large)  Pulse 60  Ht 1.88 m (6' 2\")  Wt 91.6 kg (201 lb 14.4 oz)  BMI 25.92 kg/m2    Constitutional:  cooperative, alert and oriented, well developed, well nourished, in no acute distress        Skin:  warm and dry to the touch, no apparent skin lesions or masses noted          Head:  normocephalic, no masses or lesions        Eyes:  pupils equal and round        Lymph:      ENT:  no pallor or cyanosis        Neck:      Large V wave consistent with tricuspid regurgitation    Respiratory:  normal breath sounds, clear to auscultation, normal A-P diameter, normal symmetry, normal respiratory excursion, no use of accessory muscles         Cardiac: regular rhythm       grade 1;LLSB;early systolic murmur     Paced    venous stasis changes bilaterally                                      GI:  abdomen soft   benign    Extremities and Muscular Skeletal:  no deformities, clubbing, cyanosis, erythema observed stasis pigmentation bilateral LE edema;L greater than R;1+;trace     venous varicosities, scrotal edema    Neurological:  no gross motor deficits;affect appropriate        Psych:  Alert and Oriented x 3    Encounter Diagnoses   Name Primary?     Varicose 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      SOB (shortness of breath)        Recent Lab Results:  LIPID RESULTS:  Lab Results   Component Value Date    CHOL 106 09/08/2017    HDL 54 09/08/2017    LDL 38 09/08/2017    TRIG 71 09/08/2017    CHOLHDLRATIO 2.1 06/15/2015       LIVER ENZYME RESULTS:  Lab Results   Component Value Date    AST 25 12/08/2015    ALT 22 09/08/2017       CBC RESULTS:  Lab Results   Component Value Date    WBC 2.8 (L) 03/21/2018    RBC 3.23 (L) " 03/21/2018    HGB 10.2 (L) 03/21/2018    HCT 31.2 (L) 03/21/2018    MCV 97 03/21/2018    MCH 31.6 03/21/2018    MCHC 32.7 03/21/2018    RDW 16.3 (H) 03/21/2018    PLT 75 (L) 03/21/2018       BMP RESULTS:  Lab Results   Component Value Date     04/04/2018    POTASSIUM 4.4 04/04/2018    CHLORIDE 102 04/04/2018    CO2 31 (H) 04/04/2018    ANIONGAP 11.4 04/04/2018    GLC 89 04/04/2018    BUN 16 04/04/2018    CR 1.33 (H) 04/04/2018    GFRESTIMATED 51 (L) 04/04/2018    GFRESTBLACK 62 04/04/2018    TEODORO 9.9 04/04/2018        A1C RESULTS:  No results found for: A1C    INR RESULTS:  Lab Results   Component Value Date    INR 1.48 (H) 03/21/2018    INR 1.38 (H) 03/19/2011           CC  Segundo Hope MD  [address]  [place] [id number]                  Thank you for allowing me to participate in the care of your patient.      Sincerely,     WILLIS [facility]    cc:   Segundo Hope MD  [address]  [place] [id number]

## 2018-04-13 DIAGNOSIS — N40.1 BENIGN PROSTATIC HYPERPLASIA WITH URINARY HESITANCY: ICD-10-CM

## 2018-04-13 DIAGNOSIS — R39.11 BENIGN PROSTATIC HYPERPLASIA WITH URINARY HESITANCY: ICD-10-CM

## 2018-04-13 RX ORDER — TAMSULOSIN HYDROCHLORIDE 0.4 MG/1
0.4 CAPSULE ORAL DAILY
Qty: 30 CAPSULE | Refills: 3 | Status: SHIPPED | OUTPATIENT
Start: 2018-04-13 | End: 2018-07-08

## 2018-04-13 NOTE — TELEPHONE ENCOUNTER
"Requested Prescriptions   Pending Prescriptions Disp Refills     tamsulosin (FLOMAX) 0.4 MG capsule  Last Written Prescription Date:  1/18/2018  Last Fill Quantity: 30,  # refills: 3   Last office visit: 4/2/2018 with prescribing provider:  Dr Nelson   Future Office Visit:   Next 5 appointments (look out 90 days)     Apr 24, 2018  2:30 PM CDT   SHORT with Mg Nelson MD   [facility] ([facility])    [address]  [place] [address]   [phone]            Apr 27, 2018  3:00 PM CDT   Return Visit with WILLIS Spence CNP   [facility] ([facility])    [address]  [place] [address] [phone] OPT 2                  30 capsule 3     Sig: Take 1 capsule (0.4 mg) by mouth daily    Alpha Blockers Passed    4/13/2018  3:11 PM       Passed - Blood pressure under 140/90 in past 12 months    BP Readings from Last 3 Encounters:   04/12/18 133/73   04/02/18 122/74   03/30/18 104/64                Passed - Recent (12 mo) or future (30 days) visit within the authorizing provider's specialty    Patient had office visit in the last 12 months or has a visit in the next 30 days with authorizing provider or within the authorizing provider's specialty.  See \"Patient Info\" tab in inbasket, or \"Choose Columns\" in Meds & Orders section of the refill encounter.           Passed - Patient does not have Tadalafil, Vardenafil, or Sildenafil on their medication list       Passed - Patient is 18 years of age or older          "

## 2018-04-16 DIAGNOSIS — K21.9 GASTROESOPHAGEAL REFLUX DISEASE WITHOUT ESOPHAGITIS: Chronic | ICD-10-CM

## 2018-04-16 DIAGNOSIS — K21.9 ESOPHAGEAL REFLUX: ICD-10-CM

## 2018-04-16 NOTE — TELEPHONE ENCOUNTER
Reason for Call:  Medication or medication refill:    Do you use a [facility]?  Name of the pharmacy and phone number for the current request:  [facility]     Name of the medication requested:   omeprazole (PRILOSEC) 20 MG CR capsule    Other request:   Patient states [facility] has been contacting us Cannot find documentation    He is out of this medication    You can fax refill to [phone]    Can we leave a detailed message on this number? YES    Phone number patient can be reached at: Home number on file [phone] (home)    Best Time:  anytime    Call taken on 4/16/2018 at 2:19 PM by ADA DELAROSA

## 2018-04-16 NOTE — TELEPHONE ENCOUNTER
"Requested Prescriptions   Pending Prescriptions Disp Refills     omeprazole (PRILOSEC) 20 MG CR capsule  Last Written Prescription Date:  1/23/17  Last Fill Quantity: 180,  # refills: 3   Last office visit: 4/2/2018 with prescribing provider:  Leslie   Future Office Visit:   Next 5 appointments (look out 90 days)     Apr 24, 2018  2:30 PM CDT   SHORT with Mg Nelson MD   [facility] ([facility])    [address]  [place] [address]   [phone]            Apr 27, 2018  3:00 PM CDT   Return Visit with WILLIS Spence CNP   [facility] ([facility])    [address]  [place] [facility] [address] [phone] OPT 2                  180 capsule 3     Sig: Take 1 capsule (20 mg) by mouth 2 times daily    PPI Protocol Passed    4/16/2018  2:22 PM       Passed - Not on Clopidogrel (unless Pantoprazole ordered)       Passed - No diagnosis of osteoporosis on record       Passed - Recent (12 mo) or future (30 days) visit within the authorizing provider's specialty    Patient had office visit in the last 12 months or has a visit in the next 30 days with authorizing provider or within the authorizing provider's specialty.  See \"Patient Info\" tab in inbasket, or \"Choose Columns\" in Meds & Orders section of the refill encounter.           Passed - Patient is age 18 or older          "

## 2018-04-18 DIAGNOSIS — K21.9 ESOPHAGEAL REFLUX: ICD-10-CM

## 2018-04-18 RX ORDER — SUCRALFATE 1 G/1
1 TABLET ORAL 2 TIMES DAILY
Qty: 180 TABLET | Refills: 3 | Status: SHIPPED | OUTPATIENT
Start: 2018-04-18 | End: 2019-05-14

## 2018-04-18 RX ORDER — SUCRALFATE 1 G/1
1 TABLET ORAL 2 TIMES DAILY
Qty: 180 TABLET | Refills: 1 | Status: CANCELLED | OUTPATIENT
Start: 2018-04-18

## 2018-04-18 NOTE — TELEPHONE ENCOUNTER
Reason for Call:  Medication or medication refill:  Do you use a [facility]?  Name of the pharmacy and phone number for the current request:  [facility] - [place], [place] - [address]  Name of the medication requested: sucralfate (CARAFATE) 1 GM tablet  Other request: none  Can we leave a detailed message on this number? YES  Phone number patient can be reached at: Home number on file [phone] (home)  Best Time: any  Call taken on 4/18/2018 at 12:33 PM by JENIFER THOMPSON

## 2018-04-18 NOTE — TELEPHONE ENCOUNTER
"Prescription approved per [facility] Refill Protocol.    sucralfate (CARAFATE) 1 GM tablet  Last Written Prescription Date:  2/21/17  Last Fill Quantity: 180,  # refills: 1   Last office visit: 4/2/2018 with prescribing provider:  Dr. Nelson    Future Office Visit:   Next 5 appointments (look out 90 days)     Apr 24, 2018  2:30 PM CDT   SHORT with Mg Nelson MD   [facility] ([facility])    [address]  [place] [address]   [phone]            Apr 27, 2018  3:00 PM CDT   Return Visit with WILLIS Spence CNP   [facility] ([facility])    [address]  [place] [address] [phone] OPT 2                 Requested Prescriptions   Pending Prescriptions Disp Refills     sucralfate (CARAFATE) 1 GM tablet 180 tablet 1     Sig: Take 1 tablet (1 g) by mouth 2 times daily    Miscellaneous Gastrointestinal Agents Passed    4/18/2018 12:35 PM       Passed - Recent (12 mo) or future (30 days) visit within the authorizing provider's specialty    Patient had office visit in the last 12 months or has a visit in the next 30 days with authorizing provider or within the authorizing provider's specialty.  See \"Patient Info\" tab in inbasket, or \"Choose Columns\" in Meds & Orders section of the refill encounter.           Passed - Patient is 18 years of age or older          "

## 2018-04-24 ENCOUNTER — OFFICE VISIT (OUTPATIENT)
Dept: FAMILY MEDICINE | Facility: CLINIC | Age: 83
End: 2018-04-24
Payer: COMMERCIAL

## 2018-04-24 VITALS
DIASTOLIC BLOOD PRESSURE: 72 MMHG | SYSTOLIC BLOOD PRESSURE: 114 MMHG | OXYGEN SATURATION: 97 % | WEIGHT: 189 LBS | RESPIRATION RATE: 14 BRPM | HEIGHT: 74 IN | TEMPERATURE: 98 F | BODY MASS INDEX: 24.26 KG/M2 | HEART RATE: 61 BPM

## 2018-04-24 DIAGNOSIS — I87.2 VENOUS STASIS DERMATITIS OF BOTH LOWER EXTREMITIES: Primary | ICD-10-CM

## 2018-04-24 DIAGNOSIS — N50.89 SCROTAL EDEMA: ICD-10-CM

## 2018-04-24 DIAGNOSIS — D18.01 ANGIOMA OF SKIN: ICD-10-CM

## 2018-04-24 DIAGNOSIS — I89.0 LYMPHEDEMA OF BOTH LOWER EXTREMITIES: ICD-10-CM

## 2018-04-24 DIAGNOSIS — L57.0 AK (ACTINIC KERATOSIS): ICD-10-CM

## 2018-04-24 PROCEDURE — 99214 OFFICE O/P EST MOD 30 MIN: CPT | Performed by: FAMILY MEDICINE

## 2018-04-24 NOTE — PATIENT INSTRUCTIONS
I opted not to change any of the patient's medications today.  He is not due to have any blood testing done as he was just at cardiologists and had his tests completed there.  He has an appointment tomorrow with [facility] dermatology department.  I asked him to ask them to send us copies of his notes.  Apparently they are intending to do some biopsies of some of his AK's.  I will see the patient back in 3 months sooner as needed.  I suspect that the patient is currently just about at his dry weight at 189.  He still complaining of a decrease in his appetite so we will have to monitor his weight closely over the next few months.  If he continues to have weight loss that we do not think his fluids he will need further workup.

## 2018-04-24 NOTE — MR AVS SNAPSHOT
After Visit Summary   4/24/2018    Bairon Foster    MRN: [id number]           Patient Information     Date Of Birth          2/6/1931        Visit Information        Provider Department      4/24/2018 2:30 PM Mg Nelson MD [facility]        Today's Diagnoses     Venous stasis dermatitis of both lower extremities    -  1    Lymphedema of both lower extremities        Scrotal edema        AK (actinic keratosis)        Angioma of skin          Care Instructions    I opted not to change any of the patient's medications today.  He is not due to have any blood testing done as he was just at cardiologists and had his tests completed there.  He has an appointment tomorrow with [facility] dermatology department.  I asked him to ask them to send us copies of his notes.  Apparently they are intending to do some biopsies of some of his AK's.  I will see the patient back in 3 months sooner as needed.  I suspect that the patient is currently just about at his dry weight at 189.  He still complaining of a decrease in his appetite so we will have to monitor his weight closely over the next few months.  If he continues to have weight loss that we do not think his fluids he will need further workup.          Follow-ups after your visit        Your next 10 appointments already scheduled     Apr 27, 2018  3:00 PM CDT   Return Visit with WILLIS Spence CNP   [facility] ([facility])    [address] [place] [id number]   [phone] OPT 2            Jun 14, 2018 10:15 AM CDT   Phone Device Check with ARRIETA TECH2   [facility] ([facility])    [address] [place] [id number]   [phone] OPT 2            Jul 17, 2018 11:00 AM CDT   SHORT with Mg Nelson MD   [facility] ([facility]  "[facility])    [address] [id number] [phone]              Who to contact     If you have questions or need follow up information about today's clinic visit or your schedule please contact [facility] directly at [phone].  Normal or non-critical lab and imaging results will be communicated to you by MyChart, letter or phone within 4 business days after the clinic has received the results. If you do not hear from us within 7 days, please contact the clinic through MyChart or phone. If you have a critical or abnormal lab result, we will notify you by phone as soon as possible.  Submit refill requests through [employer] or call your pharmacy and they will forward the refill request to us. Please allow 3 business days for your refill to be completed.          Additional Information About Your Visit        brand eins [employer] Information     [employer] gives you secure access to your electronic health record. If you see a primary care provider, you can also send messages to your care team and make appointments. If you have questions, please call your primary care clinic.  If you do not have a primary care provider, please call [phone] and they will assist you.        Care EveryWhere ID     This is your Care EveryWhere ID. This could be used by other organizations to access your [facility] medical records  [id number]        Your Vitals Were     Pulse Temperature Respirations Height Pulse Oximetry BMI (Body Mass Index)    61 98  F (36.7  C) (Tympanic) 14 6' 2\" (1.88 m) 97% 24.27 kg/m2       Blood Pressure from Last 3 Encounters:   04/24/18 114/72   04/12/18 133/73   04/02/18 122/74    Weight from Last 3 Encounters:   04/24/18 189 lb (85.7 kg)   04/12/18 201 lb 14.4 oz (91.6 kg)   04/02/18 215 lb (97.5 kg)              Today, you had the following     No orders found for display       Primary Care Provider Office Phone # Fax #    Mg Waddell " MD Leslie [phone]       [address]  [place] [address]        Goals        General    I will find a caregiver support group to attend (pt-stated)     Notes - Note created  4/17/2014  8:49 AM by Beth Morales RN    As of today's date 4/17/2014 goal is met at 0 - 25%.   Goal Status:  Active        Equal Access to Services     EVANGELIST PETERSON AH: Hadii aad ku hadasho Soomaali, waaxda luqadaha, qaybta kaalmada adeegyada, waxay idiin hayaan adeeg khnolansh layogeshn . So [facility] [phone].    ATENCIÓN: Si habla español, tiene a malone disposición servicios gratuitos de asistencia lingüística. Llame al [phone].    We comply with applicable federal civil rights laws and [place] laws. We do not discriminate on the basis of race, color, national origin, age, disability, sex, sexual orientation, or gender identity.            Thank you!     [facility] you for [facility] your [facility]. Our goal is always to provide you with excellent care. Hearing back from our patients is one way we can continue to improve our services. Please take a few minutes to complete the written survey that you may receive in the mail after your visit with us. Thank you!             Your Updated Medication List - Protect others around you: Learn how to safely use, store and throw away your medicines at www.disposemymeds.org.          This list is accurate as of 4/24/18  4:35 PM.  Always use your most recent med list.                   Brand Name Dispense Instructions for use Diagnosis    Carboxymethylcellulose Sod PF 1 % ophthalmic solution    CELLUVISC/REFRESH LIQUIGEL     Place 1 drop into both eyes 3 times daily as needed for dry eyes        carvedilol 6.25 MG tablet    COREG    180 tablet    Take 1 tablet (6.25 mg) by mouth 2 times daily (with meals)    Ischemic cardiomyopathy       CENTRUM SILVER per tablet      Take 1 tablet by mouth daily        COMPRESSION STOCKINGS     2 each    1  each daily    Varicose 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       FINASTERIDE PO      Take 5 mg by mouth daily        furosemide 40 MG tablet    LASIX    90 tablet    Take 1 tablet (40 mg) by mouth daily    Dependent edema       GLUCOSAMINE-CHONDROITIN PO      Take 2 tablets by mouth daily        isosorbide mononitrate 30 MG 24 hr tablet    IMDUR    90 tablet    Take 1 tablet (30 mg) by mouth daily    SOB (shortness of breath)       levothyroxine 100 MCG tablet    SYNTHROID/LEVOTHROID    90 tablet    Take 1 tablet (100 mcg) by mouth daily    Acquired hypothyroidism       LORazepam 1 MG tablet    ATIVAN    30 tablet    Take 0.5-1 tablets (0.5-1 mg) by mouth every 8 hours as needed for anxiety    Acute stress reaction       MIRALAX PO      Take 1 packet by mouth At Bedtime        omega-3 fatty acids 1200 MG capsule      Take 1 capsule by mouth daily        omeprazole 20 MG CR capsule    priLOSEC    180 capsule    Take 1 capsule (20 mg) by mouth 2 times daily    Gastroesophageal reflux disease without esophagitis       order for DME     1 Device    Auto-CPAP:Max 9 cm H2OMin 9 cm H2O Continuous Lifetime need and heated humidity.    Obstructive sleep apnea (adult) (pediatric), Other dyspnea and respiratory abnormality       potassium chloride SA 20 MEQ CR tablet    KLOR-CON    90 tablet    Take 1 tablet (20 mEq) by mouth daily    Pulmonary hypertension       PRADAXA ANTICOAGULANT 75 MG capsule   Generic drug:  dabigatran ANTICOAGULANT      Take 150 mg by mouth 2 times daily. Store in original 's bottle or blister pack; use within 120 days of opening.        simvastatin 20 MG tablet    ZOCOR     Take by mouth At Bedtime        spironolactone 50 MG tablet    ALDACTONE    90 tablet    Take 1 tablet (50 mg) by mouth daily    SOB (shortness of breath)       sucralfate 1 GM tablet    CARAFATE    180 tablet    Take 1 tablet (1 g) by mouth 2 times daily    Esophageal reflux       tamsulosin 0.4 MG capsule     FLOMAX    30 capsule    Take 1 capsule (0.4 mg) by mouth daily    Benign prostatic hyperplasia with urinary hesitancy       UNKNOWN TO PATIENT      Place 1 drop Into the left eye daily EYE DROP PRESCRIBED FOR CONJUNCTIVITIS        WELLBUTRIN XL PO      Take 150 mg by mouth every morning

## 2018-04-24 NOTE — NURSING NOTE
"Chief Complaint   Patient presents with     Edema     Follow up on leg and ankle edema     Derm Problem     Follow up on rash.       Initial /72 (BP Location: Right arm, Patient Position: Sitting, Cuff Size: Adult Regular)  Pulse 61  Temp 98  F (36.7  C) (Tympanic)  Resp 14  Ht 6' 2\" (1.88 m)  Wt 189 lb (85.7 kg)  SpO2 97%  BMI 24.27 kg/m2 Estimated body mass index is 24.27 kg/(m^2) as calculated from the following:    Height as of this encounter: 6' 2\" (1.88 m).    Weight as of this encounter: 189 lb (85.7 kg).  Medication Reconciliation: complete     Roxanna Ocampo LPN  "

## 2018-04-24 NOTE — PROGRESS NOTES
SUBJECTIVE:   Bairon Foster is a 87 year old male who presents to clinic today for the following health issues:        Rash      Duration: Ongoing O2sulsdf    Description  Location: Upper Chest  Itching: mild    Intensity:  mild    Accompanying signs and symptoms: None    History (similar episodes/previous evaluation): Yes    Precipitating or alleviating factors:  New exposures:  None  Recent travel: no      Therapies tried and outcome: hydrocortisone cream -  not effective but then he went to the [facility] and they give him a stronger cream which has worked very well.  We do not have any notes in our [facility] from the [facility].    Follow up on Edema      Duration: Edema started last fall    Description (location/character/radiation): Lower extremeties and scrotum    Intensity:  resolved    Accompanying signs and symptoms: None    History (similar episodes/previous evaluation): Yes    Precipitating or alleviating factors: None    Therapies tried and outcome: meds           Problem list and histories reviewed & adjusted, as indicated.  Additional history: as documented    Patient Active Problem List   Diagnosis     Atrial fibrillation (H)     Pulmonary hypertension     CAD (coronary artery disease)     BPH (benign prostatic hyperplasia)     Ischemic cardiomyopathy     Pancytopenia (H)     JIM (obstructive sleep apnea)- mild/moderate     Acute stress reaction     Health Care Home     Vitamin D deficiency     Advance Care Planning     Gastroesophageal reflux disease without esophagitis     Hydrocele, unspecified hydrocele type     Slow transit constipation     Hypercholesterolemia     Non morbid obesity due to excess calories w comorbid CAD< hi LDL      Venous stasis dermatitis of both lower extremities     Lymphedema of both lower extremities     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      Coronary artery disease due to lipid rich plaque     Acquired hypothyroidism     Onychomycosis      Overweight (BMI 25.0-29.9)     PAD (peripheral artery disease) (H)     Screening for prostate cancer     Blood in stool     Umbilical hernia without obstruction and without gangrene     Xerosis cutis     Status post coronary angiogram     AK (actinic keratosis)     Angioma of skin     Past Surgical History:   Procedure Laterality Date     EYE SURGERY      CATARACT/BLEPHAROPLASTY     GENITOURINARY SURGERY      TUNA     HERNIA REPAIR      RIGHT INGUINAL     HERNIORRHAPHY INGUINAL  8/14/2012    Procedure: HERNIORRHAPHY INGUINAL;  right inguinal hernia repair;  Surgeon: Kareem Torrez MD;  Location: [facility]     HERNIORRHAPHY UMBILICAL N/A 10/31/2017    Procedure: HERNIORRHAPHY UMBILICAL;  UMBILICAL HERNIA REPAIR WITH MESH;  Surgeon: Scar Harrington MD;  Location: [facility]     IMPLANT PACEMAKER  8/2009    single chamber     ORTHOPEDIC SURGERY      [facility] RIGHT     ORTHOPEDIC SURGERY  2010    right TKR, left TKR     SURGICAL HISTORY OF -       Thumb surgery     SURGICAL HISTORY OF -   1990s    Left total knee replacement     SURGICAL HISTORY OF -   3/11    Rig total knee replacement       Social History   Substance Use Topics     Smoking status: Never Smoker     Smokeless tobacco: Never Used      Comment: per encounter 2/12/07     Alcohol use 0.0 oz/week     0 Standard drinks or equivalent per week      Comment: 1/month     Family History   Problem Relation Age of Onset     Cardiovascular Father      C.A.D. Father      CANCER Sister      lung     Unknown/Adopted Mother      Cancer - colorectal No family hx of            Reviewed and updated as needed this visit by clinical staff  Tobacco  Allergies  Meds  Med Hx  Surg Hx  Fam Hx  Soc Hx      Reviewed and updated as needed this visit by Provider         ROS:  Constitutional, HEENT, cardiovascular, pulmonary, gi and gu systems are negative, except as otherwise noted.    OBJECTIVE:                                                    /72 (BP Location: Right arm, Patient  "Position: Sitting, Cuff Size: Adult Regular)  Pulse 61  Temp 98  F (36.7  C) (Tympanic)  Resp 14  Ht 6' 2\" (1.88 m)  Wt 189 lb (85.7 kg)  SpO2 97%  BMI 24.27 kg/m2  Body mass index is 24.27 kg/(m^2).  GENERAL APPEARANCE: healthy, alert and no distress  RESP: lungs clear to auscultation - no rales, rhonchi or wheezes  SKIN: There are numerous capillary angiomata on the trunk.  Just below the left nipple and to the left a little bit there is a very friable, almost appearing, like a blood blister that measures 1 cm x 2 cm.  There are numerous actinic keratoses on both arms.         ASSESSMENT/PLAN:                                                        ICD-10-CM    1. Venous stasis dermatitis of both lower extremities I87.2    2. Lymphedema of both lower extremities I89.0    3. Scrotal edema N50.89     Resolved   4. AK (actinic keratosis) L57.0    5. Angioma of skin D18.01        Patient Instructions   I opted not to change any of the patient's medications today.  He is not due to have any blood testing done as he was just at cardiologists and had his tests completed there.  He has an appointment tomorrow with [facility] dermatology department.  I asked him to ask them to send us copies of his notes.  Apparently they are intending to do some biopsies of some of his AK's.  I will see the patient back in 3 months sooner as needed.  I suspect that the patient is currently just about at his dry weight at 189.  He still complaining of a decrease in his appetite so we will have to monitor his weight closely over the next few months.  If he continues to have weight loss that we do not think his fluids he will need further workup.      Mg Nelson MD  [facility]  "

## 2018-05-10 ENCOUNTER — DOCUMENTATION ONLY (OUTPATIENT)
Dept: CARDIOLOGY | Facility: CLINIC | Age: 83
End: 2018-05-10

## 2018-05-10 ENCOUNTER — OFFICE VISIT (OUTPATIENT)
Dept: CARDIOLOGY | Facility: CLINIC | Age: 83
End: 2018-05-10
Payer: COMMERCIAL

## 2018-05-10 VITALS
BODY MASS INDEX: 24.23 KG/M2 | HEART RATE: 62 BPM | WEIGHT: 188.8 LBS | DIASTOLIC BLOOD PRESSURE: 60 MMHG | SYSTOLIC BLOOD PRESSURE: 116 MMHG | HEIGHT: 74 IN

## 2018-05-10 DIAGNOSIS — I25.83 CORONARY ARTERY DISEASE DUE TO LIPID RICH PLAQUE: ICD-10-CM

## 2018-05-10 DIAGNOSIS — I25.5 ISCHEMIC CARDIOMYOPATHY: Primary | ICD-10-CM

## 2018-05-10 DIAGNOSIS — Z98.890 STATUS POST CORONARY ANGIOGRAM: ICD-10-CM

## 2018-05-10 DIAGNOSIS — R06.02 SOB (SHORTNESS OF BREATH): ICD-10-CM

## 2018-05-10 DIAGNOSIS — E78.00 HYPERCHOLESTEROLEMIA: ICD-10-CM

## 2018-05-10 DIAGNOSIS — I27.20 PULMONARY HYPERTENSION (H): ICD-10-CM

## 2018-05-10 DIAGNOSIS — I73.9 PAD (PERIPHERAL ARTERY DISEASE) (H): ICD-10-CM

## 2018-05-10 DIAGNOSIS — I25.10 CORONARY ARTERY DISEASE DUE TO LIPID RICH PLAQUE: ICD-10-CM

## 2018-05-10 DIAGNOSIS — I25.5 ISCHEMIC CARDIOMYOPATHY: ICD-10-CM

## 2018-05-10 LAB
ANION GAP SERPL CALCULATED.3IONS-SCNC: 10.3 MMOL/L (ref 6–17)
BUN SERPL-MCNC: 19 MG/DL (ref 7–30)
CALCIUM SERPL-MCNC: 9.9 MG/DL (ref 8.5–10.5)
CHLORIDE SERPL-SCNC: 104 MMOL/L (ref 98–107)
CO2 SERPL-SCNC: 29 MMOL/L (ref 23–29)
CREAT SERPL-MCNC: 1.23 MG/DL (ref 0.7–1.3)
GFR SERPL CREATININE-BSD FRML MDRD: 56 ML/MIN/1.7M2
GLUCOSE SERPL-MCNC: 101 MG/DL (ref 70–105)
POTASSIUM SERPL-SCNC: 4.3 MMOL/L (ref 3.5–5.1)
SODIUM SERPL-SCNC: 139 MMOL/L (ref 136–145)

## 2018-05-10 PROCEDURE — 99214 OFFICE O/P EST MOD 30 MIN: CPT | Performed by: NURSE PRACTITIONER

## 2018-05-10 PROCEDURE — 80048 BASIC METABOLIC PNL TOTAL CA: CPT | Performed by: NURSE PRACTITIONER

## 2018-05-10 PROCEDURE — 36415 COLL VENOUS BLD VENIPUNCTURE: CPT | Performed by: NURSE PRACTITIONER

## 2018-05-10 NOTE — PROGRESS NOTES
HPI and Plan:   I had the pleasure of seeing Bairon Foster today in cardiology clinic follow up. He is a pleasant 87 year old patient of Dr. Hope for his general cardiology and Dr. Carrizales for his venous insufficiency.    Mr. Foster has a history of coronary artery disease, atrial fibrillation, ventricular tachycardia and mitral insufficiency.  His last echocardiogram in April 2018 showed EF of 53%, he had moderately severe 3+ mitral regurgitation and 1-2+ tricuspid regurgitation with RVSP of 23+ right atrial pressure.  He had a coronary angiogram on March 21, 2018 which demonstrated no flow-limiting lesions.  He had single-vessel coronary artery disease with a stent in 2009 in the OM, his LV end-diastolic pressure was 23 mmHg, he was started on spironolactone 25 mg daily.  Following his coronary angiogram he develops with symptoms of heart failure with weight gain.  He was subsequently diuresed, his spironolactone increased to 50 mg daily and his Lasix dose was adjusted, and one time he was on 80 mg daily.  He subsequently lost  nearly 30 pounds.    He underwent radiofrequency ablation of his right and left lower extremities, he has had good results since then,   Apart from in March of this year when he was in some heart failure.    Today Mr. Foster is doing okay.  At her last visit I started him on Imdur as he complained of some exertional shortness of breath.  He says that that has all resolved, he was able to walk through the tunnel today without any shortness of breath.  [facility] azucena chi and has been started on antidepressant.  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      Labs Reviewed: Sodium 139, potassium 4.2, creatinine 1.19, GFR 58    Physical Exam  Please see Below   He appears euvolemic on exam    Assessment and Plan  1.   Venous Insufficiency status post bilateral radiofrequency ablation of the lower extremities.  His legs look good today, they are significantly more skinny than they were when I first met him for evaluation over a year ago.  2.  Coronary artery disease.  He had a coronary angiogram in March 2018 which demonstrated no flow-limiting disease.  He has single-vessel stenting.  He continues on good medical management with statin and aspirin therapy and Imdur.  3.  Atrial fibrillation.  His last device check showed permanent A. fib with a slow ventricular response in the 40s.  He has an estimated 1-1/2 years left of battery on his device, he is scheduled to have it checked in a couple of months.  He continues to be anticoagulated and rate controlled   With beta-blocker.  4.  Mitral regurgitation.  This is described as moderate to severe MR and his echocardiogram this year, this was more than it was a year ago at 1-2+.  However he has diuresed significantly since then.  We will continue to monitor this closely, I will repeat echocardiogram before he sees Dr. Hope next year.  5.  Diastolic heart failure.  He has had symptomatic improvement since being put on Spironolactone he continues on, carvedilol 6.25 twice daily, Fuller 30 and Lasix 40.  His weight is down 30 pounds.  I did ask him to repeat a BMP today.    He will follow-up next year with Dr. Hope with echocardiogram, BNP and lipids prior.    Thank you for allowing me to care for Bairon Foster today.  I am happy to see him on an as-needed basis.    WILLIS Bullard, CNP  Cardiology    Voice recognition software was used for this note, I have reviewed this note, but errors may have been missed.    Orders Placed This Encounter   Procedures     Basic metabolic panel     Basic metabolic panel     Lipid Profile     ALT     Follow-Up with Cardiologist     No orders of the defined types were placed in this encounter.    There are no discontinued  medications.      CURRENT MEDICATIONS:  Current Outpatient Prescriptions   Medication Sig Dispense Refill     BuPROPion HCl (WELLBUTRIN XL PO) Take 150 mg by mouth every morning       Carboxymethylcellulose Sod PF (CELLUVISC/REFRESH LIQUIGEL) 1 % ophthalmic solution Place 1 drop into both eyes 3 times daily as needed for dry eyes       carvedilol (COREG) 6.25 MG tablet Take 1 tablet (6.25 mg) by mouth 2 times daily (with meals) 180 tablet 3     COMPRESSION STOCKINGS 1 each daily 2 each 0     dabigatran ANTICOAGULANT (PRADAXA ANTICOAGULANT) 75 MG CAPS BLISTER PACK Take 150 mg by mouth 2 times daily. Store in original 's bottle or blister pack; use within 120 days of opening.       FINASTERIDE PO Take 5 mg by mouth daily       furosemide (LASIX) 40 MG tablet Take 1 tablet (40 mg) by mouth daily 90 tablet 1     GLUCOSAMINE-CHONDROITIN PO Take 2 tablets by mouth daily       isosorbide mononitrate (IMDUR) 30 MG 24 hr tablet Take 1 tablet (30 mg) by mouth daily 90 tablet 3     levothyroxine (SYNTHROID/LEVOTHROID) 100 MCG tablet Take 1 tablet (100 mcg) by mouth daily 90 tablet 3     LORazepam (ATIVAN) 1 MG tablet Take 0.5-1 tablets (0.5-1 mg) by mouth every 8 hours as needed for anxiety 30 tablet 5     Multiple Vitamins-Minerals (CENTRUM SILVER) per tablet Take 1 tablet by mouth daily       Omega-3 Fatty Acids (FISH OIL) 1200 MG capsule Take 1 capsule by mouth daily        omeprazole (PRILOSEC) 20 MG CR capsule Take 1 capsule (20 mg) by mouth 2 times daily 180 capsule 3     Polyethylene Glycol 3350 (MIRALAX PO) Take 1 packet by mouth At Bedtime        potassium chloride SA (KLOR-CON) 20 MEQ CR tablet Take 1 tablet (20 mEq) by mouth daily 90 tablet 3     simvastatin (ZOCOR) 20 MG tablet Take by mouth At Bedtime       spironolactone (ALDACTONE) 50 MG tablet Take 1 tablet (50 mg) by mouth daily 90 tablet 1     sucralfate (CARAFATE) 1 GM tablet Take 1 tablet (1 g) by mouth 2 times daily 180 tablet 3     tamsulosin  (FLOMAX) 0.4 MG capsule Take 1 capsule (0.4 mg) by mouth daily 30 capsule 3     UNKNOWN TO PATIENT Place 1 drop Into the left eye daily EYE DROP PRESCRIBED FOR CONJUNCTIVITIS       ORDER FOR DME Auto-CPAP:Max 9 cm H2OMin 9 cm H2O  Continuous Lifetime need and heated humidity.    1 Device 0       ALLERGIES   No Known Allergies    PAST MEDICAL HISTORY:  Past Medical History:   Diagnosis Date     Acute, but ill-defined, cerebrovascular disease     NO RESIDUALS     Antiplatelet or antithrombotic long-term use      Arrhythmia     a fib     Blood in stool      Coronary atherosclerosis of unspecified type of vessel, native or graft     S/P PTCA, EF 50%     Esophageal reflux      Hydrocele, unspecified      Hypercholesterolemia      Hypersomnia with sleep apnea, unspecified      Inguinal hernia without mention of obstruction or gangrene, recurrent unilateral or unspecified      Ischemic cardiomyopathy      Major depression in complete remission (H)      Obese      Onychomycosis      JIM on CPAP      Other and unspecified hyperlipidemia      Other lymphedema     GROIN AND THIGHS     Other pancytopenia (H)      Other specified anemias      Overweight      Pacemaker      PAD (peripheral artery disease) (H)      Pancytopenia (H)      Persistent atrial fibrillation (H)     VVI PM for long pauses     Prostatic hypertrophy, benign      Pulmonary hypertension      Scrotal varices      Thrombocytopenia, unspecified      Umbilical hernia without obstruction and without gangrene      Unilateral or unspecified femoral hernia without mention of obstruction or gangrene, unilateral or unspecified      Unspecified hypothyroidism      Venous stasis dermatitis of both lower extremities      Vitamin D deficiency        PAST SURGICAL HISTORY:  Past Surgical History:   Procedure Laterality Date     EYE SURGERY      CATARACT/BLEPHAROPLASTY     GENITOURINARY SURGERY      TUNA     HERNIA REPAIR      RIGHT INGUINAL     HERNIORRHAPHY INGUINAL   8/14/2012    Procedure: HERNIORRHAPHY INGUINAL;  right inguinal hernia repair;  Surgeon: Kareem Torrez MD;  Location: [facility]     HERNIORRHAPHY UMBILICAL N/A 10/31/2017    Procedure: HERNIORRHAPHY UMBILICAL;  UMBILICAL HERNIA REPAIR WITH MESH;  Surgeon: Scar Harrington MD;  Location: [facility]     IMPLANT PACEMAKER  8/2009    single chamber     ORTHOPEDIC SURGERY      [facility] RIGHT     ORTHOPEDIC SURGERY  2010    right TKR, left TKR     SURGICAL HISTORY OF -       Thumb surgery     SURGICAL HISTORY OF -   1990s    Left total knee replacement     SURGICAL HISTORY OF -   3/11    Righ total knee replacement       FAMILY HISTORY:  Family History   Problem Relation Age of Onset     Cardiovascular Father      C.A.D. Father      CANCER Sister      lung     Unknown/Adopted Mother      Cancer - colorectal No family hx of        SOCIAL HISTORY:  Social History     Social History     Marital status:      Spouse name: N/A     Number of children: N/A     Years of education: N/A     Occupational History     Teacher, author, speaker Retired     Social History Main Topics     Smoking status: Never Smoker     Smokeless tobacco: Never Used      Comment: per encounter 2/12/07     Alcohol use 0.0 oz/week     0 Standard drinks or equivalent per week      Comment: 1/month     Drug use: No     Sexual activity: No     Other Topics Concern     Parent/Sibling W/ Cabg, Mi Or Angioplasty Before 65f 55m? No     Caffeine Concern No     1-2 cups coffee a day     Sleep Concern No     Stress Concern Yes     due to wifes health condition     Weight Concern No     Special Diet No     Exercise Yes     states he uses his tredmill on and off     Seat Belt Yes     Social History Narrative       Review of Systems:  Skin:  Positive for bruising     Eyes:  Positive for glasses    ENT:  Negative   nosebleed   Respiratory:  Positive for sleep apnea;CPAP SOB has subsided   Cardiovascular:  Negative      Gastroenterology: Negative      Genitourinary:  Negative     "  Musculoskeletal:  Positive for arthritis    Neurologic:  Positive for stroke    Psychiatric:  Positive for anxiety;depression    Heme/Lymph/Imm:  Positive for weight loss;easy bruising    Endocrine:  Positive for thyroid disorder      Physical Exam:  Vitals: /60  Pulse 62  Ht 1.88 m (6' 2\")  Wt 85.6 kg (188 lb 12.8 oz)  BMI 24.24 kg/m2    Constitutional:  cooperative, alert and oriented, well developed, well nourished, in no acute distress        Skin:  warm and dry to the touch, no apparent skin lesions or masses noted          Head:  normocephalic, no masses or lesions        Eyes:  pupils equal and round        Lymph:      ENT:  no pallor or cyanosis        Neck:  JVP normal   Large V wave consistent with tricuspid regurgitation    Respiratory:  normal breath sounds, clear to auscultation, normal A-P diameter, normal symmetry, normal respiratory excursion, no use of accessory muscles         Cardiac: regular rhythm       grade 1;LLSB;early systolic murmur     Paced    venous stasis changes bilaterally                                      GI:  abdomen soft   benign    Extremities and Muscular Skeletal:  no deformities, clubbing, cyanosis, erythema observed stasis pigmentation bilateral LE edema;L greater than R;1+;trace     venous varicosities, scrotal edema    Neurological:  no gross motor deficits;affect appropriate        Psych:  Alert and Oriented x 3    Encounter Diagnoses   Name Primary?     SOB (shortness of breath)      Ischemic cardiomyopathy Yes     PAD (peripheral artery disease) (H)      Hypercholesterolemia      Coronary artery disease due to lipid rich plaque      Pulmonary hypertension      Status post coronary angiogram        Recent Lab Results:  LIPID RESULTS:  Lab Results   Component Value Date    CHOL 106 09/08/2017    HDL 54 09/08/2017    LDL 38 09/08/2017    TRIG 71 09/08/2017    CHOLHDLRATIO 2.1 06/15/2015       LIVER ENZYME RESULTS:  Lab Results   Component Value Date    AST 25 " 12/08/2015    ALT 22 09/08/2017       CBC RESULTS:  Lab Results   Component Value Date    WBC 2.8 (L) 03/21/2018    RBC 3.23 (L) 03/21/2018    HGB 10.4 (L) 04/12/2018    HCT 31.2 (L) 03/21/2018    MCV 97 03/21/2018    MCH 31.6 03/21/2018    MCHC 32.7 03/21/2018    RDW 16.3 (H) 03/21/2018    PLT 75 (L) 03/21/2018       BMP RESULTS:  Lab Results   Component Value Date     04/12/2018    POTASSIUM 4.2 04/12/2018    CHLORIDE 103 04/12/2018    CO2 30 (H) 04/12/2018    ANIONGAP 10.2 04/12/2018     (H) 04/12/2018    BUN 21 04/12/2018    CR 1.19 04/12/2018    GFRESTIMATED 58 (L) 04/12/2018    GFRESTBLACK 70 04/12/2018    TEODORO 9.4 04/12/2018        A1C RESULTS:  No results found for: A1C    INR RESULTS:  Lab Results   Component Value Date    INR 1.48 (H) 03/21/2018    INR 1.38 (H) 03/19/2011           CC  Mackenzie Tejada, APRN CNP  [address]  [place] [id number]

## 2018-05-10 NOTE — PROGRESS NOTES
Notes Recorded by Mackenzie Tejada, WILLIS CNP on 5/10/2018 at 2:39 PM  Please mail him his labs, tell him they look good, no change in plan. Thanks, Anushka    Results letter prepared and sent to patient.

## 2018-05-10 NOTE — MR AVS SNAPSHOT
After Visit Summary   5/10/2018    Bairon Foster    MRN: [id number]           Patient Information     Date Of Birth          2/6/1931        Visit Information        Provider Department      5/10/2018 1:00 PM Mackenzie Tejada APRN CNP [facility]        Today's Diagnoses     Ischemic cardiomyopathy    -  1    SOB (shortness of breath)        PAD (peripheral artery disease) (H)        Hypercholesterolemia        Coronary artery disease due to lipid rich plaque        Pulmonary hypertension        Status post coronary angiogram           Follow-ups after your visit        Additional Services     Follow-Up with Cardiologist                 Your next 10 appointments already scheduled     Jun 14, 2018 10:15 AM CDT   Phone Device Check with ARRIETA TECH2   [facility]   [facility] ([facility])    [address]  [facility] [address] [phone] OPT 2            Jul 17, 2018 11:00 AM CDT   SHORT with Mg Nelson MD   [facility] ([facility]    [address]  [place] [address] [phone]              Future tests that were ordered for you today     Open Future Orders        Priority Expected Expires Ordered    Follow-Up with Cardiologist Routine 5/10/2019 5/11/2019 5/10/2018    Basic metabolic panel Routine 5/10/2019 5/11/2019 5/10/2018    Lipid Profile Routine 5/10/2019 5/10/2019 5/10/2018    ALT Routine 5/10/2019 5/10/2019 5/10/2018            Who to contact     If you have questions or need follow up information about today's clinic visit or your schedule please contact [facility]   [facility] directly at [phone].  Normal or non-critical lab and imaging results will be communicated to you by MyChart, letter or phone within 4 business days after the clinic has received the  "results. If you do not hear from us within 7 days, please contact the clinic through [employer] or phone. If you have a critical or abnormal lab result, we will notify you by phone as soon as possible.  Submit refill requests through [employer] or call your pharmacy and they will forward the refill request to us. Please allow 3 business days for your refill to be completed.          Additional Information About Your Visit        [facility] Health Information     [employer]i gives you secure access to your electronic health record. If you see a primary care provider, you can also send messages to your care team and make appointments. If you have questions, please call your primary care clinic.  If you do not have a primary care provider, please call [phone] and they will assist you.        Care EveryWhere ID     This is your Care EveryWhere ID. This could be used by other organizations to access your [place] medical records  [id number]        Your Vitals Were     Pulse Height BMI (Body Mass Index)             62 1.88 m (6' 2\") 24.24 kg/m2          Blood Pressure from Last 3 Encounters:   05/10/18 116/60   04/24/18 114/72   04/12/18 133/73    Weight from Last 3 Encounters:   05/10/18 85.6 kg (188 lb 12.8 oz)   04/24/18 85.7 kg (189 lb)   04/12/18 91.6 kg (201 lb 14.4 oz)              We Performed the Following     Follow-Up with Cardiac Advanced Practice Provider        Primary Care Provider Office Phone # Fax #    Mg Nelson [phone]       [address] [address]        Goals        General    I will find a caregiver support group to attend (pt-stated)     Notes - Note created  4/17/2014  8:49 AM by Beth Morales RN    As of today's date 4/17/2014 goal is met at 0 - 25%.   Goal Status:  Active        Equal Access to Services     EVANGELIST PETERSON AH: Leydi Viramontes, waaxda luqadaha, qaybta kaalmada felicitas, kathryn pathak. So wac " [phone].    ATENCIÓN: Si magda pradhan, tiene a malone disposición servicios gratuitos de asistencia lingüística. Jose terry [phone].    We comply with applicable federal civil rights laws and [place] laws. We do not discriminate on the basis of race, color, national origin, age, disability, sex, sexual orientation, or gender identity.            Thank you!     Thank you for choosing [facility]  for your care. Our goal is always to provide you with excellent care. Hearing back from our patients is one way we can continue to improve our services. Please take a few minutes to complete the written survey that you may receive in the mail after your visit with us. Thank you!             Your Updated Medication List - Protect others around you: Learn how to safely use, store and throw away your medicines at www.disposemymeds.org.          This list is accurate as of 5/10/18  2:36 PM.  Always use your most recent med list.                   Brand Name Dispense Instructions for use Diagnosis    Carboxymethylcellulose Sod PF 1 % ophthalmic solution    CELLUVISC/REFRESH LIQUIGEL     Place 1 drop into both eyes 3 times daily as needed for dry eyes        carvedilol 6.25 MG tablet    COREG    180 tablet    Take 1 tablet (6.25 mg) by mouth 2 times daily (with meals)    Ischemic cardiomyopathy       CENTRUM SILVER per tablet      Take 1 tablet by mouth daily        COMPRESSION STOCKINGS     2 each    1 each daily    Varicose 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       FINASTERIDE PO      Take 5 mg by mouth daily        furosemide 40 MG tablet    LASIX    90 tablet    Take 1 tablet (40 mg) by mouth daily    Dependent edema       GLUCOSAMINE-CHONDROITIN PO      Take 2 tablets by mouth daily        isosorbide mononitrate 30 MG 24 hr tablet    IMDUR    90 tablet    Take 1 tablet (30 mg) by mouth daily    SOB (shortness of breath)       levothyroxine 100 MCG tablet     SYNTHROID/LEVOTHROID    90 tablet    Take 1 tablet (100 mcg) by mouth daily    Acquired hypothyroidism       LORazepam 1 MG tablet    ATIVAN    30 tablet    Take 0.5-1 tablets (0.5-1 mg) by mouth every 8 hours as needed for anxiety    Acute stress reaction       MIRALAX PO      Take 1 packet by mouth At Bedtime        omega-3 fatty acids 1200 MG capsule      Take 1 capsule by mouth daily        omeprazole 20 MG CR capsule    priLOSEC    180 capsule    Take 1 capsule (20 mg) by mouth 2 times daily    Gastroesophageal reflux disease without esophagitis       order for DME     1 Device    Auto-CPAP:Max 9 cm H2OMin 9 cm H2O Continuous Lifetime need and heated humidity.    Obstructive sleep apnea (adult) (pediatric), Other dyspnea and respiratory abnormality       potassium chloride SA 20 MEQ CR tablet    KLOR-CON    90 tablet    Take 1 tablet (20 mEq) by mouth daily    Pulmonary hypertension       PRADAXA ANTICOAGULANT 75 MG capsule   Generic drug:  dabigatran ANTICOAGULANT      Take 150 mg by mouth 2 times daily. Store in original 's bottle or blister pack; use within 120 days of opening.        simvastatin 20 MG tablet    ZOCOR     Take by mouth At Bedtime        spironolactone 50 MG tablet    ALDACTONE    90 tablet    Take 1 tablet (50 mg) by mouth daily    SOB (shortness of breath)       sucralfate 1 GM tablet    CARAFATE    180 tablet    Take 1 tablet (1 g) by mouth 2 times daily    Esophageal reflux       tamsulosin 0.4 MG capsule    FLOMAX    30 capsule    Take 1 capsule (0.4 mg) by mouth daily    Benign prostatic hyperplasia with urinary hesitancy       UNKNOWN TO PATIENT      Place 1 drop Into the left eye daily EYE DROP PRESCRIBED FOR CONJUNCTIVITIS        WELLBUTRIN XL PO      Take 150 mg by mouth every morning

## 2018-05-10 NOTE — LETTER
May 10, 2018       TO: Bairon DOWNS Cristian   [address]   [facility] [facility] [address]       Dear Mr. Foster,    The results of your recent labs are below.    Results for orders placed or performed in visit on 05/10/18   Basic metabolic panel   Result Value Ref Range    Sodium 139 136 - 145 mmol/L    Potassium 4.3 3.5 - 5.1 mmol/L    Chloride 104 98 - 107 mmol/L    Carbon Dioxide 29 23 - 29 mmol/L    Anion Gap 10.3 6 - 17 mmol/L    Glucose 101 70 - 105 mg/dL    Urea Nitrogen 19 7 - 30 mg/dL    Creatinine 1.23 0.70 - 1.30 mg/dL    GFR Estimate 56 (L) >60 mL/min/1.7m2    GFR Estimate If Black 67 >60 mL/min/1.7m2    Calcium 9.9 8.5 - 10.5 mg/dL     Anushka reviewed your labs and said they look great! She does not want to make any changes at this point. If you have any additional questions, please feel free to call [phone].        Sincerely,    Team 4 Nurses  [facility]

## 2018-05-10 NOTE — LETTER
5/10/2018    Mg Nelson MD  [address]  [facility] [address]    RE: Bairon DOWNS Cristian       Dear Colleague,    I had the pleasure of seeing Bairon Foster in the [facility].    HPI and Plan:   I had the pleasure of seeing Bairon Foster today in cardiology clinic follow up. He is a pleasant 87 year old patient of Dr. Hope for his general cardiology and Dr. Carrizales for his venous insufficiency.    Mr. Foster has a history of coronary artery disease, atrial fibrillation, ventricular tachycardia and mitral insufficiency.  His last echocardiogram in April 2018 showed EF of 53%, he had moderately severe 3+ mitral regurgitation and 1-2+ tricuspid regurgitation with RVSP of 23+ right atrial pressure.  He had a coronary angiogram on March 21, 2018 which demonstrated no flow-limiting lesions.  He had single-vessel coronary artery disease with a stent in 2009 in the [facility], his LV end-diastolic pressure was 23 mmHg, he was started on spironolactone 25 mg daily.  Following his coronary angiogram he develops with symptoms of heart failure with weight gain.  He was subsequently diuresed, his spironolactone increased to 50 mg daily and his Lasix dose was adjusted, and one time he was on 80 mg daily.  He subsequently lost  nearly 30 pounds.    He underwent radiofrequency ablation of his right and left lower extremities, he has had good results since then,   Apart from in March of this year when he was in some heart failure.    Today Mr. Foster is doing okay.  At her last visit I started him on Imdur as he complained of some exertional shortness of breath.  He says that that has all resolved, he was able to walk through the tunnel today without any shortness of breath.  [facility] azucena [facility] and has been started on antidepressant.  He and his wife moved into their new assisted living last fall, since then he has been her primary caretaker and  has had struggling with some anxiety and depression over this.      Labs Reviewed: Sodium 139, potassium 4.2, creatinine 1.19, GFR 58    Physical Exam  Please see Below   He appears euvolemic on exam    Assessment and Plan  1.  Venous Insufficiency status post bilateral radiofrequency ablation of the lower extremities.  His legs look good today, they are significantly more skinny than they were when I first met him for evaluation over a year ago.  2.  Coronary artery disease.  He had a coronary angiogram in March 2018 which demonstrated no flow-limiting disease.  He has single-vessel stenting.  He continues on good medical management with statin and aspirin therapy and Imdur.  3.  Atrial fibrillation.  His last device check showed permanent A. fib with a slow ventricular response in the 40s.  He has an estimated 1-1/2 years left of battery on his device, he is scheduled to have it checked in a couple of months.  He continues to be anticoagulated and rate controlled   With beta-blocker.  4.  Mitral regurgitation.  This is described as moderate to severe MR and his echocardiogram this year, this was more than it was a year ago at 1-2+.  However he has diuresed significantly since then.  We will continue to monitor this closely, I will repeat echocardiogram before he sees Dr. Hope next year.  5.  Diastolic heart failure.  He has had symptomatic improvement since being put on Spironolactone he continues on, carvedilol 6.25 twice daily, Fuller 30 and Lasix 40.  His weight is down 30 pounds.  I did ask him to repeat a BMP today.    He will follow-up next year with Dr. Hope with echocardiogram, BNP and lipids prior.    Thank you for allowing me to care for Bairon Foster today.  I am happy to see him on an as-needed basis.    WILLIS Bullard, CNP  Cardiology    Voice recognition software was used for this note, I have reviewed this note, but errors may have been missed.    Orders Placed This Encounter    Procedures     Basic metabolic panel     Basic metabolic panel     Lipid Profile     ALT     Follow-Up with Cardiologist     No orders of the defined types were placed in this encounter.    There are no discontinued medications.      CURRENT MEDICATIONS:  Current Outpatient Prescriptions   Medication Sig Dispense Refill     BuPROPion HCl (WELLBUTRIN XL PO) Take 150 mg by mouth every morning       Carboxymethylcellulose Sod PF (CELLUVISC/REFRESH LIQUIGEL) 1 % ophthalmic solution Place 1 drop into both eyes 3 times daily as needed for dry eyes       carvedilol (COREG) 6.25 MG tablet Take 1 tablet (6.25 mg) by mouth 2 times daily (with meals) 180 tablet 3     COMPRESSION STOCKINGS 1 each daily 2 each 0     dabigatran ANTICOAGULANT (PRADAXA ANTICOAGULANT) 75 MG CAPS BLISTER PACK Take 150 mg by mouth 2 times daily. Store in original 's bottle or blister pack; use within 120 days of opening.       FINASTERIDE PO Take 5 mg by mouth daily       furosemide (LASIX) 40 MG tablet Take 1 tablet (40 mg) by mouth daily 90 tablet 1     GLUCOSAMINE-CHONDROITIN PO Take 2 tablets by mouth daily       isosorbide mononitrate (IMDUR) 30 MG 24 hr tablet Take 1 tablet (30 mg) by mouth daily 90 tablet 3     levothyroxine (SYNTHROID/LEVOTHROID) 100 MCG tablet Take 1 tablet (100 mcg) by mouth daily 90 tablet 3     LORazepam (ATIVAN) 1 MG tablet Take 0.5-1 tablets (0.5-1 mg) by mouth every 8 hours as needed for anxiety 30 tablet 5     Multiple Vitamins-Minerals (CENTRUM SILVER) per tablet Take 1 tablet by mouth daily       Omega-3 Fatty Acids (FISH OIL) 1200 MG capsule Take 1 capsule by mouth daily        omeprazole (PRILOSEC) 20 MG CR capsule Take 1 capsule (20 mg) by mouth 2 times daily 180 capsule 3     Polyethylene Glycol 3350 (MIRALAX PO) Take 1 packet by mouth At Bedtime        potassium chloride SA (KLOR-CON) 20 MEQ CR tablet Take 1 tablet (20 mEq) by mouth daily 90 tablet 3     simvastatin (ZOCOR) 20 MG tablet Take by  mouth At Bedtime       spironolactone (ALDACTONE) 50 MG tablet Take 1 tablet (50 mg) by mouth daily 90 tablet 1     sucralfate (CARAFATE) 1 GM tablet Take 1 tablet (1 g) by mouth 2 times daily 180 tablet 3     tamsulosin (FLOMAX) 0.4 MG capsule Take 1 capsule (0.4 mg) by mouth daily 30 capsule 3     UNKNOWN TO PATIENT Place 1 drop Into the left eye daily EYE DROP PRESCRIBED FOR CONJUNCTIVITIS       ORDER FOR DME Auto-CPAP:Max 9 cm H2OMin 9 cm H2O  Continuous Lifetime need and heated humidity.    1 Device 0       ALLERGIES   No Known Allergies    PAST MEDICAL HISTORY:  Past Medical History:   Diagnosis Date     Acute, but ill-defined, cerebrovascular disease     NO RESIDUALS     Antiplatelet or antithrombotic long-term use      Arrhythmia     a fib     Blood in stool      Coronary atherosclerosis of unspecified type of vessel, native or graft     S/P PTCA, EF 50%     Esophageal reflux      Hydrocele, unspecified      Hypercholesterolemia      Hypersomnia with sleep apnea, unspecified      Inguinal hernia without mention of obstruction or gangrene, recurrent unilateral or unspecified      Ischemic cardiomyopathy      Major depression in complete remission (H)      Obese      Onychomycosis      JIM on CPAP      Other and unspecified hyperlipidemia      Other lymphedema     GROIN AND THIGHS     Other pancytopenia (H)      Other specified anemias      Overweight      Pacemaker      PAD (peripheral artery disease) (H)      Pancytopenia (H)      Persistent atrial fibrillation (H)     VVI PM for long pauses     Prostatic hypertrophy, benign      Pulmonary hypertension      Scrotal varices      Thrombocytopenia, unspecified      Umbilical hernia without obstruction and without gangrene      Unilateral or unspecified femoral hernia without mention of obstruction or gangrene, unilateral or unspecified      Unspecified hypothyroidism      Venous stasis dermatitis of both lower extremities      Vitamin D deficiency        PAST  SURGICAL HISTORY:  Past Surgical History:   Procedure Laterality Date     EYE SURGERY      CATARACT/BLEPHAROPLASTY     GENITOURINARY SURGERY      TUNA     HERNIA REPAIR      RIGHT INGUINAL     HERNIORRHAPHY INGUINAL  8/14/2012    Procedure: HERNIORRHAPHY INGUINAL;  right inguinal hernia repair;  Surgeon: Kareem Torrez MD;  Location: [facility]     HERNIORRHAPHY UMBILICAL N/A 10/31/2017    Procedure: HERNIORRHAPHY UMBILICAL;  UMBILICAL HERNIA REPAIR WITH MESH;  Surgeon: Scar Harrington MD;  Location: [facility]     IMPLANT PACEMAKER  8/2009    single chamber     ORTHOPEDIC SURGERY      [facility] RIGHT     ORTHOPEDIC SURGERY  2010    right TKR, left TKR     SURGICAL HISTORY OF -       Thumb surgery     SURGICAL HISTORY OF -   1990s    Left total knee replacement     SURGICAL HISTORY OF -   3/11    [facility] total knee replacement       FAMILY HISTORY:  Family History   Problem Relation Age of Onset     Cardiovascular Father      C.A.D. Father      CANCER Sister      lung     Unknown/Adopted Mother      Cancer - colorectal No family hx of        SOCIAL HISTORY:  Social History     Social History     Marital status:      Spouse name: N/A     Number of children: N/A     Years of education: N/A     Occupational History     Teacher, author, speaker Retired     Social History Main Topics     Smoking status: Never Smoker     Smokeless tobacco: Never Used      Comment: per encounter 2/12/07     Alcohol use 0.0 oz/week     0 Standard drinks or equivalent per week      Comment: 1/month     Drug use: No     Sexual activity: No     Other Topics Concern     Parent/Sibling W/ Cabg, Mi Or Angioplasty Before 65f 55m? No     Caffeine Concern No     1-2 cups coffee a day     Sleep Concern No     Stress Concern Yes     due to wifes health condition     Weight Concern No     Special Diet No     Exercise Yes     states he uses his tredmill on and off     Seat Belt Yes     Social History Narrative       Review of Systems:  Skin:  Positive for bruising    "  Eyes:  Positive for glasses    ENT:  Negative   nosebleed   Respiratory:  Positive for sleep apnea;CPAP SOB has subsided   Cardiovascular:  Negative      Gastroenterology: Negative      Genitourinary:  Negative      Musculoskeletal:  Positive for arthritis    Neurologic:  Positive for stroke    Psychiatric:  Positive for anxiety;depression    Heme/Lymph/Imm:  Positive for weight loss;easy bruising    Endocrine:  Positive for thyroid disorder      Physical Exam:  Vitals: /60  Pulse 62  Ht 1.88 m (6' 2\")  Wt 85.6 kg (188 lb 12.8 oz)  BMI 24.24 kg/m2    Constitutional:  cooperative, alert and oriented, well developed, well nourished, in no acute distress        Skin:  warm and dry to the touch, no apparent skin lesions or masses noted          Head:  normocephalic, no masses or lesions        Eyes:  pupils equal and round        Lymph:      ENT:  no pallor or cyanosis        Neck:  JVP normal   Large V wave consistent with tricuspid regurgitation    Respiratory:  normal breath sounds, clear to auscultation, normal A-P diameter, normal symmetry, normal respiratory excursion, no use of accessory muscles         Cardiac: regular rhythm       grade 1;LLSB;early systolic murmur     Paced    venous stasis changes bilaterally                                      GI:  abdomen soft   benign    Extremities and Muscular Skeletal:  no deformities, clubbing, cyanosis, erythema observed stasis pigmentation bilateral LE edema;L greater than R;1+;trace     venous varicosities, scrotal edema    Neurological:  no gross motor deficits;affect appropriate        Psych:  Alert and Oriented x 3    Encounter Diagnoses   Name Primary?     SOB (shortness of breath)      Ischemic cardiomyopathy Yes     PAD (peripheral artery disease) (H)      Hypercholesterolemia      Coronary artery disease due to lipid rich plaque      Pulmonary hypertension      Status post coronary angiogram        Recent Lab Results:  LIPID RESULTS:  Lab Results "   Component Value Date    CHOL 106 09/08/2017    HDL 54 09/08/2017    LDL 38 09/08/2017    TRIG 71 09/08/2017    CHOLHDLRATIO 2.1 06/15/2015       LIVER ENZYME RESULTS:  Lab Results   Component Value Date    AST 25 12/08/2015    ALT 22 09/08/2017       CBC RESULTS:  Lab Results   Component Value Date    WBC 2.8 (L) 03/21/2018    RBC 3.23 (L) 03/21/2018    HGB 10.4 (L) 04/12/2018    HCT 31.2 (L) 03/21/2018    MCV 97 03/21/2018    MCH 31.6 03/21/2018    MCHC 32.7 03/21/2018    RDW 16.3 (H) 03/21/2018    PLT 75 (L) 03/21/2018       BMP RESULTS:  Lab Results   Component Value Date     04/12/2018    POTASSIUM 4.2 04/12/2018    CHLORIDE 103 04/12/2018    CO2 30 (H) 04/12/2018    ANIONGAP 10.2 04/12/2018     (H) 04/12/2018    BUN 21 04/12/2018    CR 1.19 04/12/2018    GFRESTIMATED 58 (L) 04/12/2018    GFRESTBLACK 70 04/12/2018    TEODORO 9.4 04/12/2018        A1C RESULTS:  No results found for: A1C    INR RESULTS:  Lab Results   Component Value Date    INR 1.48 (H) 03/21/2018    INR 1.38 (H) 03/19/2011       Thank you for allowing me to participate in the care of your patient.    Sincerely,     WILLIS Smith CNP     [facility]

## 2018-05-10 NOTE — LETTER
5/10/2018    Mg Nelson MD  [address]  [facility] [address]    RE: Bairon DOWNS Cristian       Dear Colleague,    I had the pleasure of seeing Bairon Foster in the [facility].    HPI and Plan:   I had the pleasure of seeing Bairon Foster today in cardiology clinic follow up. He is a pleasant 87 year old patient of Dr. Hope for his general cardiology and Dr. Carrizales for his venous insufficiency.    Mr. Foster has a history of coronary artery disease, atrial fibrillation, ventricular tachycardia and mitral insufficiency.  His last echocardiogram in April 2018 showed EF of 53%, he had moderately severe 3+ mitral regurgitation and 1-2+ tricuspid regurgitation with RVSP of 23+ right atrial pressure.  He had a coronary angiogram on March 21, 2018 which demonstrated no flow-limiting lesions.  He had single-vessel coronary artery disease with a stent in 2009 in the OM, his LV end-diastolic pressure was 23 mmHg, he was started on spironolactone 25 mg daily.  Following his coronary angiogram he develops with symptoms of heart failure with weight gain.  He was subsequently diuresed, his spironolactone increased to 50 mg daily and his Lasix dose was adjusted, and one time he was on 80 mg daily.  He subsequently lost  nearly 30 pounds.    He underwent radiofrequency ablation of his right and left lower extremities, he has had good results since then,   Apart from in March of this year when he was in some heart failure.    Today Mr. Foster is doing okay.  At her last visit I started him on Imdur as he complained of some exertional shortness of breath.  He says that that has all resolved, he was able to walk through the tunnel today without any shortness of breath.  [facility] azucena [facility] and has been started on antidepressant.  He and his wife moved into their new assisted living last fall, since then he has been her primary caretaker and  has had struggling with some anxiety and depression over this.      Labs Reviewed: Sodium 139, potassium 4.2, creatinine 1.19, GFR 58    Physical Exam  Please see Below   He appears euvolemic on exam    Assessment and Plan  1.  Venous Insufficiency status post bilateral radiofrequency ablation of the lower extremities.  His legs look good today, they are significantly more skinny than they were when I first met him for evaluation over a year ago.  2.  Coronary artery disease.  He had a coronary angiogram in March 2018 which demonstrated no flow-limiting disease.  He has single-vessel stenting.  He continues on good medical management with statin and aspirin therapy and Imdur.  3.  Atrial fibrillation.  His last device check showed permanent A. fib with a slow ventricular response in the 40s.  He has an estimated 1-1/2 years left of battery on his device, he is scheduled to have it checked in a couple of months.  He continues to be anticoagulated and rate controlled   With beta-blocker.  4.  Mitral regurgitation.  This is described as moderate to severe MR and his echocardiogram this year, this was more than it was a year ago at 1-2+.  However he has diuresed significantly since then.  We will continue to monitor this closely, I will repeat echocardiogram before he sees Dr. Hope next year.  5.  Diastolic heart failure.  He has had symptomatic improvement since being put on Spironolactone he continues on, carvedilol 6.25 twice daily, Fuller 30 and Lasix 40.  His weight is down 30 pounds.  I did ask him to repeat a BMP today.    He will follow-up next year with Dr. Hope with echocardiogram, BNP and lipids prior.    Thank you for allowing me to care for Bairon Foster today.  I am happy to see him on an as-needed basis.    WILLIS Bullard, CNP  Cardiology    Voice recognition software was used for this note, I have reviewed this note, but errors may have been missed.    Orders Placed This Encounter    Procedures     Basic metabolic panel     Basic metabolic panel     Lipid Profile     ALT     Follow-Up with Cardiologist     No orders of the defined types were placed in this encounter.    There are no discontinued medications.      CURRENT MEDICATIONS:  Current Outpatient Prescriptions   Medication Sig Dispense Refill     BuPROPion HCl (WELLBUTRIN XL PO) Take 150 mg by mouth every morning       Carboxymethylcellulose Sod PF (CELLUVISC/REFRESH LIQUIGEL) 1 % ophthalmic solution Place 1 drop into both eyes 3 times daily as needed for dry eyes       carvedilol (COREG) 6.25 MG tablet Take 1 tablet (6.25 mg) by mouth 2 times daily (with meals) 180 tablet 3     COMPRESSION STOCKINGS 1 each daily 2 each 0     dabigatran ANTICOAGULANT (PRADAXA ANTICOAGULANT) 75 MG CAPS BLISTER PACK Take 150 mg by mouth 2 times daily. Store in original 's bottle or blister pack; use within 120 days of opening.       FINASTERIDE PO Take 5 mg by mouth daily       furosemide (LASIX) 40 MG tablet Take 1 tablet (40 mg) by mouth daily 90 tablet 1     GLUCOSAMINE-CHONDROITIN PO Take 2 tablets by mouth daily       isosorbide mononitrate (IMDUR) 30 MG 24 hr tablet Take 1 tablet (30 mg) by mouth daily 90 tablet 3     levothyroxine (SYNTHROID/LEVOTHROID) 100 MCG tablet Take 1 tablet (100 mcg) by mouth daily 90 tablet 3     LORazepam (ATIVAN) 1 MG tablet Take 0.5-1 tablets (0.5-1 mg) by mouth every 8 hours as needed for anxiety 30 tablet 5     Multiple Vitamins-Minerals (CENTRUM SILVER) per tablet Take 1 tablet by mouth daily       Omega-3 Fatty Acids (FISH OIL) 1200 MG capsule Take 1 capsule by mouth daily        omeprazole (PRILOSEC) 20 MG CR capsule Take 1 capsule (20 mg) by mouth 2 times daily 180 capsule 3     Polyethylene Glycol 3350 (MIRALAX PO) Take 1 packet by mouth At Bedtime        potassium chloride SA (KLOR-CON) 20 MEQ CR tablet Take 1 tablet (20 mEq) by mouth daily 90 tablet 3     simvastatin (ZOCOR) 20 MG tablet Take by  mouth At Bedtime       spironolactone (ALDACTONE) 50 MG tablet Take 1 tablet (50 mg) by mouth daily 90 tablet 1     sucralfate (CARAFATE) 1 GM tablet Take 1 tablet (1 g) by mouth 2 times daily 180 tablet 3     tamsulosin (FLOMAX) 0.4 MG capsule Take 1 capsule (0.4 mg) by mouth daily 30 capsule 3     UNKNOWN TO PATIENT Place 1 drop Into the left eye daily EYE DROP PRESCRIBED FOR CONJUNCTIVITIS       ORDER FOR DME Auto-CPAP:Max 9 cm H2OMin 9 cm H2O  Continuous Lifetime need and heated humidity.    1 Device 0       ALLERGIES   No Known Allergies    PAST MEDICAL HISTORY:  Past Medical History:   Diagnosis Date     Acute, but ill-defined, cerebrovascular disease     NO RESIDUALS     Antiplatelet or antithrombotic long-term use      Arrhythmia     a fib     Blood in stool      Coronary atherosclerosis of unspecified type of vessel, native or graft     S/P PTCA, EF 50%     Esophageal reflux      Hydrocele, unspecified      Hypercholesterolemia      Hypersomnia with sleep apnea, unspecified      Inguinal hernia without mention of obstruction or gangrene, recurrent unilateral or unspecified      Ischemic cardiomyopathy      Major depression in complete remission (H)      Obese      Onychomycosis      JIM on CPAP      Other and unspecified hyperlipidemia      Other lymphedema     GROIN AND THIGHS     Other pancytopenia (H)      Other specified anemias      Overweight      Pacemaker      PAD (peripheral artery disease) (H)      Pancytopenia (H)      Persistent atrial fibrillation (H)     VVI PM for long pauses     Prostatic hypertrophy, benign      Pulmonary hypertension      Scrotal varices      Thrombocytopenia, unspecified      Umbilical hernia without obstruction and without gangrene      Unilateral or unspecified femoral hernia without mention of obstruction or gangrene, unilateral or unspecified      Unspecified hypothyroidism      Venous stasis dermatitis of both lower extremities      Vitamin D deficiency        PAST  SURGICAL HISTORY:  Past Surgical History:   Procedure Laterality Date     EYE SURGERY      CATARACT/BLEPHAROPLASTY     GENITOURINARY SURGERY      TUNA     HERNIA REPAIR      RIGHT INGUINAL     HERNIORRHAPHY INGUINAL  8/14/2012    Procedure: HERNIORRHAPHY INGUINAL;  right inguinal hernia repair;  Surgeon: Kareem Torrez MD;  Location: [facility]     HERNIORRHAPHY UMBILICAL N/A 10/31/2017    Procedure: HERNIORRHAPHY UMBILICAL;  UMBILICAL HERNIA REPAIR WITH MESH;  Surgeon: Scar Harrington MD;  Location: [facility]     IMPLANT PACEMAKER  8/2009    single chamber     ORTHOPEDIC SURGERY      [facility] RIGHT     ORTHOPEDIC SURGERY  2010    right TKR, left TKR     SURGICAL HISTORY OF -       Thumb surgery     SURGICAL HISTORY OF -   1990s    Left total knee replacement     SURGICAL HISTORY OF -   3/11    [facility] total knee replacement       FAMILY HISTORY:  Family History   Problem Relation Age of Onset     Cardiovascular Father      C.A.D. Father      CANCER Sister      lung     Unknown/Adopted Mother      Cancer - colorectal No family hx of        SOCIAL HISTORY:  Social History     Social History     Marital status:      Spouse name: N/A     Number of children: N/A     Years of education: N/A     Occupational History     Teacher, author, speaker Retired     Social History Main Topics     Smoking status: Never Smoker     Smokeless tobacco: Never Used      Comment: per encounter 2/12/07     Alcohol use 0.0 oz/week     0 Standard drinks or equivalent per week      Comment: 1/month     Drug use: No     Sexual activity: No     Other Topics Concern     Parent/Sibling W/ Cabg, Mi Or Angioplasty Before 65f 55m? No     Caffeine Concern No     1-2 cups coffee a day     Sleep Concern No     Stress Concern Yes     due to wifes health condition     Weight Concern No     Special Diet No     Exercise Yes     states he uses his tredmill on and off     Seat Belt Yes     Social History Narrative       Review of Systems:  Skin:  Positive for bruising    "  Eyes:  Positive for glasses    ENT:  Negative   nosebleed   Respiratory:  Positive for sleep apnea;CPAP SOB has subsided   Cardiovascular:  Negative      Gastroenterology: Negative      Genitourinary:  Negative      Musculoskeletal:  Positive for arthritis    Neurologic:  Positive for stroke    Psychiatric:  Positive for anxiety;depression    Heme/Lymph/Imm:  Positive for weight loss;easy bruising    Endocrine:  Positive for thyroid disorder      Physical Exam:  Vitals: /60  Pulse 62  Ht 1.88 m (6' 2\")  Wt 85.6 kg (188 lb 12.8 oz)  BMI 24.24 kg/m2    Constitutional:  cooperative, alert and oriented, well developed, well nourished, in no acute distress        Skin:  warm and dry to the touch, no apparent skin lesions or masses noted          Head:  normocephalic, no masses or lesions        Eyes:  pupils equal and round        Lymph:      ENT:  no pallor or cyanosis        Neck:  JVP normal   Large V wave consistent with tricuspid regurgitation    Respiratory:  normal breath sounds, clear to auscultation, normal A-P diameter, normal symmetry, normal respiratory excursion, no use of accessory muscles         Cardiac: regular rhythm       grade 1;LLSB;early systolic murmur     Paced    venous stasis changes bilaterally                                      GI:  abdomen soft   benign    Extremities and Muscular Skeletal:  no deformities, clubbing, cyanosis, erythema observed stasis pigmentation bilateral LE edema;L greater than R;1+;trace     venous varicosities, scrotal edema    Neurological:  no gross motor deficits;affect appropriate        Psych:  Alert and Oriented x 3    Encounter Diagnoses   Name Primary?     SOB (shortness of breath)      Ischemic cardiomyopathy Yes     PAD (peripheral artery disease) (H)      Hypercholesterolemia      Coronary artery disease due to lipid rich plaque      Pulmonary hypertension      Status post coronary angiogram        Recent Lab Results:  LIPID RESULTS:  Lab Results "   Component Value Date    CHOL 106 09/08/2017    HDL 54 09/08/2017    LDL 38 09/08/2017    TRIG 71 09/08/2017    CHOLHDLRATIO 2.1 06/15/2015       LIVER ENZYME RESULTS:  Lab Results   Component Value Date    AST 25 12/08/2015    ALT 22 09/08/2017       CBC RESULTS:  Lab Results   Component Value Date    WBC 2.8 (L) 03/21/2018    RBC 3.23 (L) 03/21/2018    HGB 10.4 (L) 04/12/2018    HCT 31.2 (L) 03/21/2018    MCV 97 03/21/2018    MCH 31.6 03/21/2018    MCHC 32.7 03/21/2018    RDW 16.3 (H) 03/21/2018    PLT 75 (L) 03/21/2018       BMP RESULTS:  Lab Results   Component Value Date     04/12/2018    POTASSIUM 4.2 04/12/2018    CHLORIDE 103 04/12/2018    CO2 30 (H) 04/12/2018    ANIONGAP 10.2 04/12/2018     (H) 04/12/2018    BUN 21 04/12/2018    CR 1.19 04/12/2018    GFRESTIMATED 58 (L) 04/12/2018    GFRESTBLACK 70 04/12/2018    TEODORO 9.4 04/12/2018        A1C RESULTS:  No results found for: A1C    INR RESULTS:  Lab Results   Component Value Date    INR 1.48 (H) 03/21/2018    INR 1.38 (H) 03/19/2011           CC  WILLIS Spence CNP  [address]  [place] [id number]                    Thank you for allowing me to participate in the care of your patient.      Sincerely,     WILLIS Smith CNP     [facility]    cc:   WILLIS Spence CNP  [address]  [place] [address]

## 2018-05-21 DIAGNOSIS — N40.0 BENIGN PROSTATIC HYPERPLASIA, UNSPECIFIED WHETHER LOWER URINARY TRACT SYMPTOMS PRESENT: Primary | ICD-10-CM

## 2018-05-21 NOTE — TELEPHONE ENCOUNTER
FINASTERIDE 5 MG Tablet  Last Written Prescription Date:  Reported Historical  Last Fill Quantity: na,   # refills: na  Last Office Visit: 04/24/2018  Future Office visit:    Next 5 appointments (look out 90 days)     May 25, 2018  2:30 PM CDT   Return Visit with WILLIS Spence CNP   [facility] ([facility])    [address]  [place] [address]   [phone] OPT 2            Jul 17, 2018 11:00 AM CDT   SHORT with Mg Nelson MD   [facility]    [address]  [place] [facility] [address] [phone]                   Routing refill request to provider for review/approval because:  Medication is reported/historical

## 2018-05-22 RX ORDER — FINASTERIDE 5 MG/1
TABLET, FILM COATED ORAL
Qty: 90 TABLET | Refills: 3 | Status: SHIPPED | OUTPATIENT
Start: 2018-05-22 | End: 2019-07-17

## 2018-05-25 ENCOUNTER — OFFICE VISIT (OUTPATIENT)
Dept: CARDIOLOGY | Facility: CLINIC | Age: 83
End: 2018-05-25
Payer: COMMERCIAL

## 2018-05-25 VITALS
DIASTOLIC BLOOD PRESSURE: 74 MMHG | BODY MASS INDEX: 24.13 KG/M2 | WEIGHT: 188 LBS | SYSTOLIC BLOOD PRESSURE: 136 MMHG | HEART RATE: 60 BPM | HEIGHT: 74 IN

## 2018-05-25 DIAGNOSIS — I25.5 ISCHEMIC CARDIOMYOPATHY: ICD-10-CM

## 2018-05-25 DIAGNOSIS — I25.10 CORONARY ARTERY DISEASE DUE TO LIPID RICH PLAQUE: Primary | ICD-10-CM

## 2018-05-25 DIAGNOSIS — I25.83 CORONARY ARTERY DISEASE DUE TO LIPID RICH PLAQUE: Primary | ICD-10-CM

## 2018-05-25 PROCEDURE — 99214 OFFICE O/P EST MOD 30 MIN: CPT | Performed by: NURSE PRACTITIONER

## 2018-05-25 NOTE — LETTER
5/25/2018    Mg Nelson MD  [address]  [facility] [id number]    RE: Bairon DOWNS Cristian       Dear Colleague,    I had the pleasure of seeing Bairon Foster in the [facility].    HPI and Plan:   I had the pleasure of seeing Bairon Foster today in cardiology clinic follow up. He is a pleasant 87 year old patient of Dr. Hope for his general cardiology and Dr. Carrizales for his venous insufficiency.     Mr. Foster has a history of coronary artery disease, atrial fibrillation, ventricular tachycardia and mitral insufficiency.  His last echocardiogram in April 2018 showed EF of 53%, he had moderately severe 3+ mitral regurgitation and 1-2+ tricuspid regurgitation with RVSP of 23+ right atrial pressure.  He had a coronary angiogram on March 21, 2018 which demonstrated no flow-limiting lesions.  He had single-vessel coronary artery disease with a stent in 2009 in the [facility], his LV end-diastolic pressure was 23 mmHg, he was started on spironolactone 25 mg daily.  Following his coronary angiogram he develops with symptoms of heart failure with weight gain.  He was subsequently diuresed, his spironolactone increased to 50 mg daily and his Lasix dose was adjusted, and one time he was on 80 mg daily.  He subsequently lost  nearly 30 pounds.     He underwent radiofrequency ablation of his right and left lower extremities, he has had good results since then,   Apart from in March of this year when he was in some heart failure.     Today Mr. Foster recently took his wife to see Dr. Carrizales, he mentioned to Dr. Carrizales that he keeps losing weight and was recommended to see me today.  Bairon has discussed his weight loss with me and also with his primary Dr. Nelson now for little while.  He has also been on spironolactone and Lasix for his weight increase that he had earlier in the year.  Today he just says he is not very hungry.  Today he also mentioned that he has really bad nipple tenderness  when he washes himself and he is developed a rash that is covering his upper torso front and back and onto his arms a little bit, [facility] and  prescribed cream for this which he thinks might be helping.  On exam he has what looks like a resolving uretic rash.     Physical Exam  Please see Below     Assessment and Plan  1.  Diastolic heart failure.  He is not in heart failure today.  Since being put on spironolactone 50, beta-blocker, Imdur and Lasix,   His shortness of breath and edema have gone away and he has lost a significant amount of weight, about 30 pounds.  He appears euvolemic today.  He is complaining of rash and nipple tenderness.  My recommendation is as we discontinue the spironolactone.  I have asked him to continue to monitor his edema and weight and see me back in a few weeks.   3.  Venous Insufficiency status post bilateral radiofrequency ablation of the lower extremities.  His legs look good today, they are significantly more skinny than they were when I first met him for evaluation over a year ago.  3.  Coronary artery disease.  He had a coronary angiogram in March 2018 which demonstrated no flow-limiting disease.  He has single-vessel stenting.  He continues on good medical management with statin and aspirin therapy and Imdur.  4.  Atrial fibrillation.  His last device check showed permanent A. fib with a slow ventricular response in the 40s.  He has an estimated 1-1/2 years left of battery on his device, he is scheduled to have it checked in a couple of months.  He continues to be anticoagulated and rate controlled   With beta-blocker.  5.  Mitral regurgitation.  This is described as moderate to severe MR and his echocardiogram this year, this was more than it was a year ago at 1-2+.  However he has diuresed significantly since then.  We will continue to monitor this closely, I will repeat echocardiogram before he sees Dr. Hope next year.    Thank you for  allowing me to care for Bairon Foster today.    WILLIS Bullard, CNP  Cardiology    Voice recognition software was used for this note, I have reviewed this note, but errors may have been missed.    Orders Placed This Encounter   Procedures     Follow-Up with Cardiac Advanced Practice Provider     No orders of the defined types were placed in this encounter.    Medications Discontinued During This Encounter   Medication Reason     spironolactone (ALDACTONE) 50 MG tablet          CURRENT MEDICATIONS:  Current Outpatient Prescriptions   Medication Sig Dispense Refill     BuPROPion HCl (WELLBUTRIN XL PO) Take 150 mg by mouth every morning       Carboxymethylcellulose Sod PF (CELLUVISC/REFRESH LIQUIGEL) 1 % ophthalmic solution Place 1 drop into both eyes 3 times daily as needed for dry eyes       carvedilol (COREG) 6.25 MG tablet Take 1 tablet (6.25 mg) by mouth 2 times daily (with meals) 180 tablet 3     COMPRESSION STOCKINGS 1 each daily 2 each 0     dabigatran ANTICOAGULANT (PRADAXA ANTICOAGULANT) 75 MG CAPS BLISTER PACK Take 150 mg by mouth 2 times daily. Store in original 's bottle or blister pack; use within 120 days of opening.       finasteride (PROSCAR) 5 MG tablet TAKE 1 TABLET EVERY DAY 90 tablet 3     furosemide (LASIX) 40 MG tablet Take 1 tablet (40 mg) by mouth daily 90 tablet 1     GLUCOSAMINE-CHONDROITIN PO Take 2 tablets by mouth daily       isosorbide mononitrate (IMDUR) 30 MG 24 hr tablet Take 1 tablet (30 mg) by mouth daily 90 tablet 3     levothyroxine (SYNTHROID/LEVOTHROID) 100 MCG tablet Take 1 tablet (100 mcg) by mouth daily 90 tablet 3     LORazepam (ATIVAN) 1 MG tablet Take 0.5-1 tablets (0.5-1 mg) by mouth every 8 hours as needed for anxiety 30 tablet 5     Multiple Vitamins-Minerals (CENTRUM SILVER) per tablet Take 1 tablet by mouth daily       Omega-3 Fatty Acids (FISH OIL) 1200 MG capsule Take 1 capsule by mouth daily        omeprazole (PRILOSEC) 20 MG CR capsule Take 1  capsule (20 mg) by mouth 2 times daily 180 capsule 3     ORDER FOR DME Auto-CPAP:Max 9 cm H2OMin 9 cm H2O  Continuous Lifetime need and heated humidity.    1 Device 0     Polyethylene Glycol 3350 (MIRALAX PO) Take 1 packet by mouth At Bedtime        potassium chloride SA (KLOR-CON) 20 MEQ CR tablet Take 1 tablet (20 mEq) by mouth daily 90 tablet 3     simvastatin (ZOCOR) 20 MG tablet Take by mouth At Bedtime       sucralfate (CARAFATE) 1 GM tablet Take 1 tablet (1 g) by mouth 2 times daily 180 tablet 3     tamsulosin (FLOMAX) 0.4 MG capsule Take 1 capsule (0.4 mg) by mouth daily 30 capsule 3     UNKNOWN TO PATIENT Place 1 drop Into the left eye daily EYE DROP PRESCRIBED FOR CONJUNCTIVITIS         ALLERGIES   No Known Allergies    PAST MEDICAL HISTORY:  Past Medical History:   Diagnosis Date     Acute, but ill-defined, cerebrovascular disease     NO RESIDUALS     Antiplatelet or antithrombotic long-term use      Arrhythmia     a fib     Blood in stool      Coronary atherosclerosis of unspecified type of vessel, native or graft     S/P PTCA, EF 50%     Esophageal reflux      Hydrocele, unspecified      Hypercholesterolemia      Hypersomnia with sleep apnea, unspecified      Inguinal hernia without mention of obstruction or gangrene, recurrent unilateral or unspecified      Ischemic cardiomyopathy      Major depression in complete remission (H)      Obese      Onychomycosis      JIM on CPAP      Other and unspecified hyperlipidemia      Other lymphedema     GROIN AND THIGHS     Other pancytopenia (H)      Other specified anemias      Overweight      Pacemaker      PAD (peripheral artery disease) (H)      Pancytopenia (H)      Persistent atrial fibrillation (H)     VVI PM for long pauses     Prostatic hypertrophy, benign      Pulmonary hypertension      Scrotal varices      Thrombocytopenia, unspecified      Umbilical hernia without obstruction and without gangrene      Unilateral or unspecified femoral hernia without  mention of obstruction or gangrene, unilateral or unspecified      Unspecified hypothyroidism      Venous stasis dermatitis of both lower extremities      Vitamin D deficiency        PAST SURGICAL HISTORY:  Past Surgical History:   Procedure Laterality Date     EYE SURGERY      CATARACT/BLEPHAROPLASTY     GENITOURINARY SURGERY      TUNA     HERNIA REPAIR      RIGHT INGUINAL     HERNIORRHAPHY INGUINAL  8/14/2012    Procedure: HERNIORRHAPHY INGUINAL;  right inguinal hernia repair;  Surgeon: Kareem Torrez MD;  Location: [facility]     HERNIORRHAPHY UMBILICAL N/A 10/31/2017    Procedure: HERNIORRHAPHY UMBILICAL;  UMBILICAL HERNIA REPAIR WITH MESH;  Surgeon: Scar Harrington MD;  Location: [facility]     IMPLANT PACEMAKER  8/2009    single chamber     ORTHOPEDIC SURGERY      [facility] RIGHT     ORTHOPEDIC SURGERY  2010    right TKR, left TKR     SURGICAL HISTORY OF -       Thumb surgery     SURGICAL HISTORY OF -   1990s    Left total knee replacement     SURGICAL HISTORY OF -   3/11    [facility] total knee replacement       FAMILY HISTORY:  Family History   Problem Relation Age of Onset     Cardiovascular Father      C.A.D. Father      CANCER Sister      lung     Unknown/Adopted Mother      Cancer - colorectal No family hx of        SOCIAL HISTORY:  Social History     Social History     Marital status:      Spouse name: N/A     Number of children: N/A     Years of education: N/A     Occupational History     Teacher, author, speaker Retired     Social History Main Topics     Smoking status: Never Smoker     Smokeless tobacco: Never Used      Comment: per encounter 2/12/07     Alcohol use 0.0 oz/week     0 Standard drinks or equivalent per week      Comment: 1/month     Drug use: No     Sexual activity: No     Other Topics Concern     Parent/Sibling W/ Cabg, Mi Or Angioplasty Before 65f 55m? No     Caffeine Concern No     1-2 cups coffee a day     Sleep Concern No     Stress Concern Yes     due to wifes health condition     Weight Concern  "No     Special Diet No     Exercise Yes     states he uses his tredmill on and off     Seat Belt Yes     Social History Narrative       Review of Systems:  Skin:  Negative       Eyes:  Positive for glasses    ENT:  Negative      Respiratory:  Negative       Cardiovascular:  Negative      Gastroenterology: Positive for poor appetite lossing weight.  Genitourinary:  Negative      Musculoskeletal:  Negative      Neurologic:  Negative      Psychiatric:  Positive for sleep disturbances;anxiety;excessive stress    Heme/Lymph/Imm:  Negative      Endocrine:  Positive for thyroid disorder      Physical Exam:  Vitals: /74  Pulse 60  Ht 1.88 m (6' 2\")  Wt 85.3 kg (188 lb)  BMI 24.14 kg/m2    Constitutional:  cooperative, alert and oriented, well developed, well nourished, in no acute distress        Skin:  warm and dry to the touch, no apparent skin lesions or masses noted          Head:  normocephalic, no masses or lesions        Eyes:  pupils equal and round        Lymph:      ENT:  no pallor or cyanosis        Neck:  JVP normal   Large V wave consistent with tricuspid regurgitation    Respiratory:  normal breath sounds, clear to auscultation, normal A-P diameter, normal symmetry, normal respiratory excursion, no use of accessory muscles         Cardiac: regular rhythm       grade 1;LLSB;early systolic murmur     Paced    venous stasis changes bilaterally                                      GI:  abdomen soft   benign    Extremities and Muscular Skeletal:  no deformities, clubbing, cyanosis, erythema observed;no edema stasis pigmentation       venous varicosities, scrotal edema    Neurological:  no gross motor deficits;affect appropriate        Psych:  Alert and Oriented x 3    Encounter Diagnoses   Name Primary?     Coronary artery disease due to lipid rich plaque Yes     Ischemic cardiomyopathy        Recent Lab Results:  LIPID RESULTS:  Lab Results   Component Value Date    CHOL 106 09/08/2017    HDL 54 " 09/08/2017    LDL 38 09/08/2017    TRIG 71 09/08/2017    CHOLHDLRATIO 2.1 06/15/2015       LIVER ENZYME RESULTS:  Lab Results   Component Value Date    AST 25 12/08/2015    ALT 22 09/08/2017       CBC RESULTS:  Lab Results   Component Value Date    WBC 2.8 (L) 03/21/2018    RBC 3.23 (L) 03/21/2018    HGB 10.4 (L) 04/12/2018    HCT 31.2 (L) 03/21/2018    MCV 97 03/21/2018    MCH 31.6 03/21/2018    MCHC 32.7 03/21/2018    RDW 16.3 (H) 03/21/2018    PLT 75 (L) 03/21/2018       BMP RESULTS:  Lab Results   Component Value Date     05/10/2018    POTASSIUM 4.3 05/10/2018    CHLORIDE 104 05/10/2018    CO2 29 05/10/2018    ANIONGAP 10.3 05/10/2018     05/10/2018    BUN 19 05/10/2018    CR 1.23 05/10/2018    GFRESTIMATED 56 (L) 05/10/2018    GFRESTBLACK 67 05/10/2018    TEODORO 9.9 05/10/2018        A1C RESULTS:  No results found for: A1C    INR RESULTS:  Lab Results   Component Value Date    INR 1.48 (H) 03/21/2018    INR 1.38 (H) 03/19/2011           CC  No referring provider defined for this encounter.                    Thank you for allowing me to participate in the care of your patient.      Sincerely,     WILLIS Smith [facility]    cc:   No referring provider defined for this encounter.

## 2018-05-25 NOTE — LETTER
5/25/2018    Mg Nelson MD  [address]  [facility] [address]    RE: Bairon DOWNS Crisitan       Dear Colleague,    I had the pleasure of seeing Bairon Foster in the [facility].    HPI and Plan:   I had the pleasure of seeing Bairon Foster today in cardiology clinic follow up. He is a pleasant 87 year old patient of Dr. Hope for his general cardiology and Dr. Carrizales for his venous insufficiency.     Mr. Foster has a history of coronary artery disease, atrial fibrillation, ventricular tachycardia and mitral insufficiency.  His last echocardiogram in April 2018 showed EF of 53%, he had moderately severe 3+ mitral regurgitation and 1-2+ tricuspid regurgitation with RVSP of 23+ right atrial pressure.  He had a coronary angiogram on March 21, 2018 which demonstrated no flow-limiting lesions.  He had single-vessel coronary artery disease with a stent in 2009 in the OM, his LV end-diastolic pressure was 23 mmHg, he was started on spironolactone 25 mg daily.  Following his coronary angiogram he develops with symptoms of heart failure with weight gain.  He was subsequently diuresed, his spironolactone increased to 50 mg daily and his Lasix dose was adjusted, and one time he was on 80 mg daily.  He subsequently lost  nearly 30 pounds.     He underwent radiofrequency ablation of his right and left lower extremities, he has had good results since then,   Apart from in March of this year when he was in some heart failure.     Today Mr. Foster recently took his wife to see Dr. Carrizales, he mentioned to Dr. Carrizales that he keeps losing weight and was recommended to see me today.  Bairon has discussed his weight loss with me and also with his primary Dr. Nelson now for little while.  He has also been on spironolactone and Lasix for his weight increase that he had earlier in the year.  Today he just says he is not very hungry.  Today he also mentioned that he has really bad nipple tenderness  when he washes himself and he is developed a rash that is covering his upper torso front and back and onto his arms a little bit, [facility]  prescribed cream for this which he thinks might be helping.  On exam he has what looks like a resolving uretic rash.     Physical Exam  Please see Below     Assessment and Plan  1.  Diastolic heart failure.  He is not in heart failure today.  Since being put on spironolactone 50, beta-blocker, Imdur and Lasix,   His shortness of breath and edema have gone away and he has lost a significant amount of weight, about 30 pounds.  He appears euvolemic today.  He is complaining of rash and nipple tenderness.  My recommendation is as we discontinue the spironolactone.  I have asked him to continue to monitor his edema and weight and see me back in a few weeks.   3.  Venous Insufficiency status post bilateral radiofrequency ablation of the lower extremities.  His legs look good today, they are significantly more skinny than they were when I first met him for evaluation over a year ago.  3.  Coronary artery disease.  He had a coronary angiogram in March 2018 which demonstrated no flow-limiting disease.  He has single-vessel stenting.  He continues on good medical management with statin and aspirin therapy and Imdur.  4.  Atrial fibrillation.  His last device check showed permanent A. fib with a slow ventricular response in the 40s.  He has an estimated 1-1/2 years left of battery on his device, he is scheduled to have it checked in a couple of months.  He continues to be anticoagulated and rate controlled   With beta-blocker.  5.  Mitral regurgitation.  This is described as moderate to severe MR and his echocardiogram this year, this was more than it was a year ago at 1-2+.  However he has diuresed significantly since then.  We will continue to monitor this closely, I will repeat echocardiogram before he sees Dr. Hope next year.    Thank you for  allowing me to care for Bairon Foster today.    WILLIS Bullard, CNP  Cardiology    Voice recognition software was used for this note, I have reviewed this note, but errors may have been missed.    Orders Placed This Encounter   Procedures     Follow-Up with Cardiac Advanced Practice Provider     No orders of the defined types were placed in this encounter.    Medications Discontinued During This Encounter   Medication Reason     spironolactone (ALDACTONE) 50 MG tablet          CURRENT MEDICATIONS:  Current Outpatient Prescriptions   Medication Sig Dispense Refill     BuPROPion HCl (WELLBUTRIN XL PO) Take 150 mg by mouth every morning       Carboxymethylcellulose Sod PF (CELLUVISC/REFRESH LIQUIGEL) 1 % ophthalmic solution Place 1 drop into both eyes 3 times daily as needed for dry eyes       carvedilol (COREG) 6.25 MG tablet Take 1 tablet (6.25 mg) by mouth 2 times daily (with meals) 180 tablet 3     COMPRESSION STOCKINGS 1 each daily 2 each 0     dabigatran ANTICOAGULANT (PRADAXA ANTICOAGULANT) 75 MG CAPS BLISTER PACK Take 150 mg by mouth 2 times daily. Store in original 's bottle or blister pack; use within 120 days of opening.       finasteride (PROSCAR) 5 MG tablet TAKE 1 TABLET EVERY DAY 90 tablet 3     furosemide (LASIX) 40 MG tablet Take 1 tablet (40 mg) by mouth daily 90 tablet 1     GLUCOSAMINE-CHONDROITIN PO Take 2 tablets by mouth daily       isosorbide mononitrate (IMDUR) 30 MG 24 hr tablet Take 1 tablet (30 mg) by mouth daily 90 tablet 3     levothyroxine (SYNTHROID/LEVOTHROID) 100 MCG tablet Take 1 tablet (100 mcg) by mouth daily 90 tablet 3     LORazepam (ATIVAN) 1 MG tablet Take 0.5-1 tablets (0.5-1 mg) by mouth every 8 hours as needed for anxiety 30 tablet 5     Multiple Vitamins-Minerals (CENTRUM SILVER) per tablet Take 1 tablet by mouth daily       Omega-3 Fatty Acids (FISH OIL) 1200 MG capsule Take 1 capsule by mouth daily        omeprazole (PRILOSEC) 20 MG CR capsule Take 1  capsule (20 mg) by mouth 2 times daily 180 capsule 3     ORDER FOR DME Auto-CPAP:Max 9 cm H2OMin 9 cm H2O  Continuous Lifetime need and heated humidity.    1 Device 0     Polyethylene Glycol 3350 (MIRALAX PO) Take 1 packet by mouth At Bedtime        potassium chloride SA (KLOR-CON) 20 MEQ CR tablet Take 1 tablet (20 mEq) by mouth daily 90 tablet 3     simvastatin (ZOCOR) 20 MG tablet Take by mouth At Bedtime       sucralfate (CARAFATE) 1 GM tablet Take 1 tablet (1 g) by mouth 2 times daily 180 tablet 3     tamsulosin (FLOMAX) 0.4 MG capsule Take 1 capsule (0.4 mg) by mouth daily 30 capsule 3     UNKNOWN TO PATIENT Place 1 drop Into the left eye daily EYE DROP PRESCRIBED FOR CONJUNCTIVITIS         ALLERGIES   No Known Allergies    PAST MEDICAL HISTORY:  Past Medical History:   Diagnosis Date     Acute, but ill-defined, cerebrovascular disease     NO RESIDUALS     Antiplatelet or antithrombotic long-term use      Arrhythmia     a fib     Blood in stool      Coronary atherosclerosis of unspecified type of vessel, native or graft     S/P PTCA, EF 50%     Esophageal reflux      Hydrocele, unspecified      Hypercholesterolemia      Hypersomnia with sleep apnea, unspecified      Inguinal hernia without mention of obstruction or gangrene, recurrent unilateral or unspecified      Ischemic cardiomyopathy      Major depression in complete remission (H)      Obese      Onychomycosis      JIM on CPAP      Other and unspecified hyperlipidemia      Other lymphedema     GROIN AND THIGHS     Other pancytopenia (H)      Other specified anemias      Overweight      Pacemaker      PAD (peripheral artery disease) (H)      Pancytopenia (H)      Persistent atrial fibrillation (H)     VVI PM for long pauses     Prostatic hypertrophy, benign      Pulmonary hypertension      Scrotal varices      Thrombocytopenia, unspecified      Umbilical hernia without obstruction and without gangrene      Unilateral or unspecified femoral hernia without  mention of obstruction or gangrene, unilateral or unspecified      Unspecified hypothyroidism      Venous stasis dermatitis of both lower extremities      Vitamin D deficiency        PAST SURGICAL HISTORY:  Past Surgical History:   Procedure Laterality Date     EYE SURGERY      CATARACT/BLEPHAROPLASTY     GENITOURINARY SURGERY      TUNA     HERNIA REPAIR      RIGHT INGUINAL     HERNIORRHAPHY INGUINAL  8/14/2012    Procedure: HERNIORRHAPHY INGUINAL;  right inguinal hernia repair;  Surgeon: Kareem Torrez MD;  Location: [facility]     HERNIORRHAPHY UMBILICAL N/A 10/31/2017    Procedure: HERNIORRHAPHY UMBILICAL;  UMBILICAL HERNIA REPAIR WITH MESH;  Surgeon: Scar Harrington MD;  Location: [facility]     IMPLANT PACEMAKER  8/2009    single chamber     ORTHOPEDIC SURGERY      [facility] RIGHT     ORTHOPEDIC SURGERY  2010    right TKR, left TKR     SURGICAL HISTORY OF -       Thumb surgery     SURGICAL HISTORY OF -   1990s    Left total knee replacement     SURGICAL HISTORY OF -   3/11    [facility] total knee replacement       FAMILY HISTORY:  Family History   Problem Relation Age of Onset     Cardiovascular Father      C.A.D. Father      CANCER Sister      lung     Unknown/Adopted Mother      Cancer - colorectal No family hx of        SOCIAL HISTORY:  Social History     Social History     Marital status:      Spouse name: N/A     Number of children: N/A     Years of education: N/A     Occupational History     Teacher, author, speaker Retired     Social History Main Topics     Smoking status: Never Smoker     Smokeless tobacco: Never Used      Comment: per encounter 2/12/07     Alcohol use 0.0 oz/week     0 Standard drinks or equivalent per week      Comment: 1/month     Drug use: No     Sexual activity: No     Other Topics Concern     Parent/Sibling W/ Cabg, Mi Or Angioplasty Before 65f 55m? No     Caffeine Concern No     1-2 cups coffee a day     Sleep Concern No     Stress Concern Yes     due to wifes health condition     Weight Concern  "No     Special Diet No     Exercise Yes     states he uses his tredmill on and off     Seat Belt Yes     Social History Narrative       Review of Systems:  Skin:  Negative       Eyes:  Positive for glasses    ENT:  Negative      Respiratory:  Negative       Cardiovascular:  Negative      Gastroenterology: Positive for poor appetite lossing weight.  Genitourinary:  Negative      Musculoskeletal:  Negative      Neurologic:  Negative      Psychiatric:  Positive for sleep disturbances;anxiety;excessive stress    Heme/Lymph/Imm:  Negative      Endocrine:  Positive for thyroid disorder      Physical Exam:  Vitals: /74  Pulse 60  Ht 1.88 m (6' 2\")  Wt 85.3 kg (188 lb)  BMI 24.14 kg/m2    Constitutional:  cooperative, alert and oriented, well developed, well nourished, in no acute distress        Skin:  warm and dry to the touch, no apparent skin lesions or masses noted          Head:  normocephalic, no masses or lesions        Eyes:  pupils equal and round        Lymph:      ENT:  no pallor or cyanosis        Neck:  JVP normal   Large V wave consistent with tricuspid regurgitation    Respiratory:  normal breath sounds, clear to auscultation, normal A-P diameter, normal symmetry, normal respiratory excursion, no use of accessory muscles         Cardiac: regular rhythm       grade 1;LLSB;early systolic murmur     Paced    venous stasis changes bilaterally                                      GI:  abdomen soft   benign    Extremities and Muscular Skeletal:  no deformities, clubbing, cyanosis, erythema observed;no edema stasis pigmentation       venous varicosities, scrotal edema    Neurological:  no gross motor deficits;affect appropriate        Psych:  Alert and Oriented x 3    Encounter Diagnoses   Name Primary?     Coronary artery disease due to lipid rich plaque Yes     Ischemic cardiomyopathy        Recent Lab Results:  LIPID RESULTS:  Lab Results   Component Value Date    CHOL 106 09/08/2017    HDL 54 " 09/08/2017    LDL 38 09/08/2017    TRIG 71 09/08/2017    CHOLHDLRATIO 2.1 06/15/2015       LIVER ENZYME RESULTS:  Lab Results   Component Value Date    AST 25 12/08/2015    ALT 22 09/08/2017       CBC RESULTS:  Lab Results   Component Value Date    WBC 2.8 (L) 03/21/2018    RBC 3.23 (L) 03/21/2018    HGB 10.4 (L) 04/12/2018    HCT 31.2 (L) 03/21/2018    MCV 97 03/21/2018    MCH 31.6 03/21/2018    MCHC 32.7 03/21/2018    RDW 16.3 (H) 03/21/2018    PLT 75 (L) 03/21/2018       BMP RESULTS:  Lab Results   Component Value Date     05/10/2018    POTASSIUM 4.3 05/10/2018    CHLORIDE 104 05/10/2018    CO2 29 05/10/2018    ANIONGAP 10.3 05/10/2018     05/10/2018    BUN 19 05/10/2018    CR 1.23 05/10/2018    GFRESTIMATED 56 (L) 05/10/2018    GFRESTBLACK 67 05/10/2018    TEODORO 9.9 05/10/2018        A1C RESULTS:  No results found for: A1C    INR RESULTS:  Lab Results   Component Value Date    INR 1.48 (H) 03/21/2018    INR 1.38 (H) 03/19/2011       Thank you for allowing me to participate in the care of your patient.    Sincerely,     WILLIS Smith CNP     [facility]

## 2018-05-25 NOTE — PATIENT INSTRUCTIONS
Stop spironolactone, see if this helps with the nipple tenderness and the rash. This medicine has a little diuretic in it, keep an eye on any swelling, a little swelling is expected.    See me in two-three weeks to see how you are doing.    Anushka  [phone]

## 2018-05-25 NOTE — MR AVS SNAPSHOT
After Visit Summary   5/25/2018    Bairon Foster    MRN: [id number]           Patient Information     Date Of Birth          2/6/1931        Visit Information        Provider Department      5/25/2018 2:30 PM Mackenzie Tejada APRN CNP [facility]        Today's Diagnoses     Coronary artery disease due to lipid rich plaque    -  1    Ischemic cardiomyopathy          Care Instructions    Stop spironolactone, see if this helps with the nipple tenderness and the rash. This medicine has a little diuretic in it, keep an eye on any swelling, a little swelling is expected.    See me in two-three weeks to see how you are doing.    Anushka  [phone]                  Follow-ups after your visit        Additional Services     Follow-Up with Cardiac Advanced Practice Provider                 Your next 10 appointments already scheduled     Jun 14, 2018 10:15 AM CDT   Phone Device Check with ARRIETA TECH2   [facility] ([facility])    [address] [place] [address] [address]  [facility] [address] [phone] OPT 2            Jul 17, 2018 11:00 AM CDT   [facility] with Mg Nelson MD   [facility]    [address]  [address]  [facility] [address] [phone]              Future tests that were ordered for you today     Open Future Orders        Priority Expected Expires Ordered    Follow-Up with Cardiac Advanced Practice Provider Routine 6/24/2018 5/25/2019 5/25/2018            Who to contact     If you have questions or need follow up information about today's clinic visit or your schedule please contact [facility] directly at [phone].  Normal or non-critical lab and imaging results will be communicated to you by MyChart, letter or phone within 4 business days after the clinic has  "received the results. If you do not hear from us within 7 days, please contact the clinic through [employer] or phone. If you have a critical or abnormal lab result, we will notify you by phone as soon as possible.  Submit refill requests through [employer] or call your pharmacy and they will forward the refill request to us. Please allow 3 business days for your refill to be completed.          Additional Information About Your Visit        [facility] Information     [employer] gives you secure access to your electronic health record. If you see a primary care provider, you can also send messages to your care team and make appointments. If you have questions, please call your primary care clinic.  If you do not have a primary care provider, please call [phone] and they will assist you.        Care EveryWhere ID     This is your Care EveryWhere ID. This could be used by other organizations to access your [facility] medical records  [id number]        Your Vitals Were     Pulse Height BMI (Body Mass Index)             60 1.88 m (6' 2\") 24.14 kg/m2          Blood Pressure from Last 3 Encounters:   05/25/18 136/74   05/10/18 116/60   04/24/18 114/72    Weight from Last 3 Encounters:   05/25/18 85.3 kg (188 lb)   05/10/18 85.6 kg (188 lb 12.8 oz)   04/24/18 85.7 kg (189 lb)                 Today's Medication Changes          These changes are accurate as of 5/25/18  2:56 PM.  If you have any questions, ask your nurse or doctor.               Stop taking these medicines if you haven't already. Please contact your care team if you have questions.     spironolactone 50 MG tablet   Commonly known as:  ALDACTONE   Stopped by:  Mackenzie Tejada APRN CNP                    Primary Care Provider Office Phone # Fax #    Mg Nelson [phone] [address]  [facility] [address]        Goals        General    I will find a caregiver support group to attend (pt-stated)     Notes - Note " created  4/17/2014  8:49 AM by Beth Morales, RN    As of today's date 4/17/2014 goal is met at 0 - 25%.   Goal Status:  Active        Equal Access to Services     EVANGELIST PETERSON : Leydi silvestre padilla macrina Viramontes, waganeshda luqdanny, qaybta kaglenda polk, kathryn acevedo christinekurt farris laLoydlaura . So [facility] [phone].    ATENCIÓN: Si habla español, tiene a malone disposición servicios gratuitos de asistencia lingüística. Llame al [phone].    We comply with applicable federal civil rights laws and [place] laws. We do not discriminate on the basis of race, color, national origin, age, disability, sex, sexual orientation, or gender identity.            Thank you!     Thank you for choosing [facility]  for your care. Our goal is always to provide you with excellent care. Hearing back from our patients is one way we can continue to improve our services. Please take a few minutes to complete the written survey that you may receive in the mail after your visit with us. Thank you!             Your Updated Medication List - Protect others around you: Learn how to safely use, store and throw away your medicines at www.disposemymeds.org.          This list is accurate as of 5/25/18  2:56 PM.  Always use your most recent med list.                   Brand Name Dispense Instructions for use Diagnosis    Carboxymethylcellulose Sod PF 1 % ophthalmic solution    CELLUVISC/REFRESH LIQUIGEL     Place 1 drop into both eyes 3 times daily as needed for dry eyes        carvedilol 6.25 MG tablet    COREG    180 tablet    Take 1 tablet (6.25 mg) by mouth 2 times daily (with meals)    Ischemic cardiomyopathy       CENTRUM SILVER per tablet      Take 1 tablet by mouth daily        COMPRESSION STOCKINGS     2 each    1 each daily    Varicose 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       finasteride 5 MG tablet    PROSCAR    90 tablet    TAKE 1 TABLET EVERY DAY    Benign prostatic hyperplasia,  unspecified whether lower urinary tract symptoms present       furosemide 40 MG tablet    LASIX    90 tablet    Take 1 tablet (40 mg) by mouth daily    Dependent edema       GLUCOSAMINE-CHONDROITIN PO      Take 2 tablets by mouth daily        isosorbide mononitrate 30 MG 24 hr tablet    IMDUR    90 tablet    Take 1 tablet (30 mg) by mouth daily    SOB (shortness of breath)       levothyroxine 100 MCG tablet    SYNTHROID/LEVOTHROID    90 tablet    Take 1 tablet (100 mcg) by mouth daily    Acquired hypothyroidism       LORazepam 1 MG tablet    ATIVAN    30 tablet    Take 0.5-1 tablets (0.5-1 mg) by mouth every 8 hours as needed for anxiety    Acute stress reaction       MIRALAX PO      Take 1 packet by mouth At Bedtime        omega-3 fatty acids 1200 MG capsule      Take 1 capsule by mouth daily        omeprazole 20 MG CR capsule    priLOSEC    180 capsule    Take 1 capsule (20 mg) by mouth 2 times daily    Gastroesophageal reflux disease without esophagitis       order for DME     1 Device    Auto-CPAP:Max 9 cm H2OMin 9 cm H2O Continuous Lifetime need and heated humidity.    Obstructive sleep apnea (adult) (pediatric), Other dyspnea and respiratory abnormality       potassium chloride SA 20 MEQ CR tablet    KLOR-CON    90 tablet    Take 1 tablet (20 mEq) by mouth daily    Pulmonary hypertension       PRADAXA ANTICOAGULANT 75 MG capsule   Generic drug:  dabigatran ANTICOAGULANT      Take 150 mg by mouth 2 times daily. Store in original 's bottle or blister pack; use within 120 days of opening.        simvastatin 20 MG tablet    ZOCOR     Take by mouth At Bedtime        sucralfate 1 GM tablet    CARAFATE    180 tablet    Take 1 tablet (1 g) by mouth 2 times daily    Esophageal reflux       tamsulosin 0.4 MG capsule    FLOMAX    30 capsule    Take 1 capsule (0.4 mg) by mouth daily    Benign prostatic hyperplasia with urinary hesitancy       UNKNOWN TO PATIENT      Place 1 drop Into the left eye daily EYE  DROP PRESCRIBED FOR CONJUNCTIVITIS        WELLBUTRIN XL PO      Take 150 mg by mouth every morning

## 2018-05-25 NOTE — PROGRESS NOTES
HPI and Plan:   I had the pleasure of seeing Bairon Foster today in cardiology clinic follow up. He is a pleasant 87 year old patient of Dr. Hope for his general cardiology and Dr. Carrizales for his venous insufficiency.     Mr. Foster has a history of coronary artery disease, atrial fibrillation, ventricular tachycardia and mitral insufficiency.  His last echocardiogram in April 2018 showed EF of 53%, he had moderately severe 3+ mitral regurgitation and 1-2+ tricuspid regurgitation with RVSP of 23+ right atrial pressure.  He had a coronary angiogram on March 21, 2018 which demonstrated no flow-limiting lesions.  He had single-vessel coronary artery disease with a stent in 2009 in the , his LV end-diastolic pressure was 23 mmHg, he was started on spironolactone 25 mg daily.  Following his coronary angiogram he develops with symptoms of heart failure with weight gain.  He was subsequently diuresed, his spironolactone increased to 50 mg daily and his Lasix dose was adjusted, and one time he was on 80 mg daily.  He subsequently lost  nearly 30 pounds.     He underwent radiofrequency ablation of his right and left lower extremities, he has had good results since then,   Apart from in March of this year when he was in some heart failure.     Today Mr. Foster recently took his wife to see Dr. Carrizales, he mentioned to Dr. Carrizales that he keeps losing weight and was recommended to see me today.  Bairon has discussed his weight loss with me and also with his primary Dr. Nelson now for little while.  He has also been on spironolactone and Lasix for his weight increase that he had earlier in the year.  Today he just says he is not very hungry.  Today he also mentioned that he has really bad nipple tenderness when he washes himself and he is developed a rash that is covering his upper torso front and back and onto his arms a little bit, [facility]  prescribed cream for this which he thinks might be  helping.  On exam he has what looks like a resolving uretic rash.     Physical Exam  Please see Below     Assessment and Plan  1.  Diastolic heart failure.  He is not in heart failure today.  Since being put on spironolactone 50, beta-blocker, Imdur and Lasix,   His shortness of breath and edema have gone away and he has lost a significant amount of weight, about 30 pounds.  He appears euvolemic today.  He is complaining of rash and nipple tenderness.  My recommendation is as we discontinue the spironolactone.  I have asked him to continue to monitor his edema and weight and see me back in a few weeks.   3.  Venous Insufficiency status post bilateral radiofrequency ablation of the lower extremities.  His legs look good today, they are significantly more skinny than they were when I first met him for evaluation over a year ago.  3.  Coronary artery disease.  He had a coronary angiogram in March 2018 which demonstrated no flow-limiting disease.  He has single-vessel stenting.  He continues on good medical management with statin and aspirin therapy and Imdur.  4.  Atrial fibrillation.  His last device check showed permanent A. fib with a slow ventricular response in the 40s.  He has an estimated 1-1/2 years left of battery on his device, he is scheduled to have it checked in a couple of months.  He continues to be anticoagulated and rate controlled   With beta-blocker.  5.  Mitral regurgitation.  This is described as moderate to severe MR and his echocardiogram this year, this was more than it was a year ago at 1-2+.  However he has diuresed significantly since then.  We will continue to monitor this closely, I will repeat echocardiogram before he sees Dr. Hope next year.    Thank you for allowing me to care for Bairon Foster today.    WILLIS Bullard, CNP  Cardiology    Voice recognition software was used for this note, I have reviewed this note, but errors may have been missed.    Orders Placed This  Encounter   Procedures     Follow-Up with Cardiac Advanced Practice Provider     No orders of the defined types were placed in this encounter.    Medications Discontinued During This Encounter   Medication Reason     spironolactone (ALDACTONE) 50 MG tablet          CURRENT MEDICATIONS:  Current Outpatient Prescriptions   Medication Sig Dispense Refill     BuPROPion HCl (WELLBUTRIN XL PO) Take 150 mg by mouth every morning       Carboxymethylcellulose Sod PF (CELLUVISC/REFRESH LIQUIGEL) 1 % ophthalmic solution Place 1 drop into both eyes 3 times daily as needed for dry eyes       carvedilol (COREG) 6.25 MG tablet Take 1 tablet (6.25 mg) by mouth 2 times daily (with meals) 180 tablet 3     COMPRESSION STOCKINGS 1 each daily 2 each 0     dabigatran ANTICOAGULANT (PRADAXA ANTICOAGULANT) 75 MG CAPS BLISTER PACK Take 150 mg by mouth 2 times daily. Store in original 's bottle or blister pack; use within 120 days of opening.       finasteride (PROSCAR) 5 MG tablet TAKE 1 TABLET EVERY DAY 90 tablet 3     furosemide (LASIX) 40 MG tablet Take 1 tablet (40 mg) by mouth daily 90 tablet 1     GLUCOSAMINE-CHONDROITIN PO Take 2 tablets by mouth daily       isosorbide mononitrate (IMDUR) 30 MG 24 hr tablet Take 1 tablet (30 mg) by mouth daily 90 tablet 3     levothyroxine (SYNTHROID/LEVOTHROID) 100 MCG tablet Take 1 tablet (100 mcg) by mouth daily 90 tablet 3     LORazepam (ATIVAN) 1 MG tablet Take 0.5-1 tablets (0.5-1 mg) by mouth every 8 hours as needed for anxiety 30 tablet 5     Multiple Vitamins-Minerals (CENTRUM SILVER) per tablet Take 1 tablet by mouth daily       Omega-3 Fatty Acids (FISH OIL) 1200 MG capsule Take 1 capsule by mouth daily        omeprazole (PRILOSEC) 20 MG CR capsule Take 1 capsule (20 mg) by mouth 2 times daily 180 capsule 3     ORDER FOR DME Auto-CPAP:Max 9 cm H2OMin 9 cm H2O  Continuous Lifetime need and heated humidity.    1 Device 0     Polyethylene Glycol 3350 (MIRALAX PO) Take 1 packet by  mouth At Bedtime        potassium chloride SA (KLOR-CON) 20 MEQ CR tablet Take 1 tablet (20 mEq) by mouth daily 90 tablet 3     simvastatin (ZOCOR) 20 MG tablet Take by mouth At Bedtime       sucralfate (CARAFATE) 1 GM tablet Take 1 tablet (1 g) by mouth 2 times daily 180 tablet 3     tamsulosin (FLOMAX) 0.4 MG capsule Take 1 capsule (0.4 mg) by mouth daily 30 capsule 3     UNKNOWN TO PATIENT Place 1 drop Into the left eye daily EYE DROP PRESCRIBED FOR CONJUNCTIVITIS         ALLERGIES   No Known Allergies    PAST MEDICAL HISTORY:  Past Medical History:   Diagnosis Date     Acute, but ill-defined, cerebrovascular disease     NO RESIDUALS     Antiplatelet or antithrombotic long-term use      Arrhythmia     a fib     Blood in stool      Coronary atherosclerosis of unspecified type of vessel, native or graft     S/P PTCA, EF 50%     Esophageal reflux      Hydrocele, unspecified      Hypercholesterolemia      Hypersomnia with sleep apnea, unspecified      Inguinal hernia without mention of obstruction or gangrene, recurrent unilateral or unspecified      Ischemic cardiomyopathy      Major depression in complete remission (H)      Obese      Onychomycosis      JIM on CPAP      Other and unspecified hyperlipidemia      Other lymphedema     GROIN AND THIGHS     Other pancytopenia (H)      Other specified anemias      Overweight      Pacemaker      PAD (peripheral artery disease) (H)      Pancytopenia (H)      Persistent atrial fibrillation (H)     VVI PM for long pauses     Prostatic hypertrophy, benign      Pulmonary hypertension      Scrotal varices      Thrombocytopenia, unspecified      Umbilical hernia without obstruction and without gangrene      Unilateral or unspecified femoral hernia without mention of obstruction or gangrene, unilateral or unspecified      Unspecified hypothyroidism      Venous stasis dermatitis of both lower extremities      Vitamin D deficiency        PAST SURGICAL HISTORY:  Past Surgical  History:   Procedure Laterality Date     EYE SURGERY      CATARACT/BLEPHAROPLASTY     GENITOURINARY SURGERY      TUNA     HERNIA REPAIR      RIGHT INGUINAL     HERNIORRHAPHY INGUINAL  8/14/2012    Procedure: HERNIORRHAPHY INGUINAL;  right inguinal hernia repair;  Surgeon: Kareem Torrez MD;  Location: [facility]     HERNIORRHAPHY UMBILICAL N/A 10/31/2017    Procedure: HERNIORRHAPHY UMBILICAL;  UMBILICAL HERNIA REPAIR WITH MESH;  Surgeon: Scar Harrington MD;  Location: [facility]     IMPLANT PACEMAKER  8/2009    single chamber     ORTHOPEDIC SURGERY      [facility] RIGHT     ORTHOPEDIC SURGERY  2010    right TKR, left TKR     SURGICAL HISTORY OF -       Thumb surgery     SURGICAL HISTORY OF -   1990s    Left total knee replacement     SURGICAL HISTORY OF -   3/11    Rig total knee replacement       FAMILY HISTORY:  Family History   Problem Relation Age of Onset     Cardiovascular Father      C.A.D. Father      CANCER Sister      lung     Unknown/Adopted Mother      Cancer - colorectal No family hx of        SOCIAL HISTORY:  Social History     Social History     Marital status:      Spouse name: N/A     Number of children: N/A     Years of education: N/A     Occupational History     Teacher, author, speaker Retired     Social History Main Topics     Smoking status: Never Smoker     Smokeless tobacco: Never Used      Comment: per encounter 2/12/07     Alcohol use 0.0 oz/week     0 Standard drinks or equivalent per week      Comment: 1/month     Drug use: No     Sexual activity: No     Other Topics Concern     Parent/Sibling W/ Cabg, Mi Or Angioplasty Before 65f 55m? No     Caffeine Concern No     1-2 cups coffee a day     Sleep Concern No     Stress Concern Yes     due to wifes health condition     Weight Concern No     Special Diet No     Exercise Yes     states he uses his tredmill on and off     Seat Belt Yes     Social History Narrative       Review of Systems:  Skin:  Negative       Eyes:  Positive for glasses    ENT:   "Negative      Respiratory:  Negative       Cardiovascular:  Negative      Gastroenterology: Positive for poor appetite lossing weight.  Genitourinary:  Negative      Musculoskeletal:  Negative      Neurologic:  Negative      Psychiatric:  Positive for sleep disturbances;anxiety;excessive stress    Heme/Lymph/Imm:  Negative      Endocrine:  Positive for thyroid disorder      Physical Exam:  Vitals: /74  Pulse 60  Ht 1.88 m (6' 2\")  Wt 85.3 kg (188 lb)  BMI 24.14 kg/m2    Constitutional:  cooperative, alert and oriented, well developed, well nourished, in no acute distress        Skin:  warm and dry to the touch, no apparent skin lesions or masses noted          Head:  normocephalic, no masses or lesions        Eyes:  pupils equal and round        Lymph:      ENT:  no pallor or cyanosis        Neck:  JVP normal   Large V wave consistent with tricuspid regurgitation    Respiratory:  normal breath sounds, clear to auscultation, normal A-P diameter, normal symmetry, normal respiratory excursion, no use of accessory muscles         Cardiac: regular rhythm       grade 1;LLSB;early systolic murmur     Paced    venous stasis changes bilaterally                                      GI:  abdomen soft   benign    Extremities and Muscular Skeletal:  no deformities, clubbing, cyanosis, erythema observed;no edema stasis pigmentation       venous varicosities, scrotal edema    Neurological:  no gross motor deficits;affect appropriate        Psych:  Alert and Oriented x 3    Encounter Diagnoses   Name Primary?     Coronary artery disease due to lipid rich plaque Yes     Ischemic cardiomyopathy        Recent Lab Results:  LIPID RESULTS:  Lab Results   Component Value Date    CHOL 106 09/08/2017    HDL 54 09/08/2017    LDL 38 09/08/2017    TRIG 71 09/08/2017    CHOLHDLRATIO 2.1 06/15/2015       LIVER ENZYME RESULTS:  Lab Results   Component Value Date    AST 25 12/08/2015    ALT 22 09/08/2017       CBC RESULTS:  Lab Results "   Component Value Date    WBC 2.8 (L) 03/21/2018    RBC 3.23 (L) 03/21/2018    HGB 10.4 (L) 04/12/2018    HCT 31.2 (L) 03/21/2018    MCV 97 03/21/2018    MCH 31.6 03/21/2018    MCHC 32.7 03/21/2018    RDW 16.3 (H) 03/21/2018    PLT 75 (L) 03/21/2018       BMP RESULTS:  Lab Results   Component Value Date     05/10/2018    POTASSIUM 4.3 05/10/2018    CHLORIDE 104 05/10/2018    CO2 29 05/10/2018    ANIONGAP 10.3 05/10/2018     05/10/2018    BUN 19 05/10/2018    CR 1.23 05/10/2018    GFRESTIMATED 56 (L) 05/10/2018    GFRESTBLACK 67 05/10/2018    TEODORO 9.9 05/10/2018        A1C RESULTS:  No results found for: A1C    INR RESULTS:  Lab Results   Component Value Date    INR 1.48 (H) 03/21/2018    INR 1.38 (H) 03/19/2011           CC  No referring provider defined for this encounter.

## 2018-06-14 ENCOUNTER — ALLIED HEALTH/NURSE VISIT (OUTPATIENT)
Dept: CARDIOLOGY | Facility: CLINIC | Age: 83
End: 2018-06-14
Payer: COMMERCIAL

## 2018-06-14 DIAGNOSIS — Z95.0 CARDIAC PACEMAKER IN SITU: Primary | ICD-10-CM

## 2018-06-14 PROCEDURE — 93293 PM PHONE R-STRIP DEVICE EVAL: CPT | Performed by: INTERNAL MEDICINE

## 2018-06-14 NOTE — MR AVS SNAPSHOT
After Visit Summary   6/14/2018    Bairon Foster    MRN: [id number]           Patient Information     Date Of Birth          2/6/1931        Visit Information        Provider Department      6/14/2018 10:15 AM BERNY LOPEZ [facility]        Today's Diagnoses     Cardiac pacemaker in situ    -  1       Follow-ups after your visit        Your next 10 appointments already scheduled     Jun 22, 2018  2:30 PM CDT   Return Visit with WILLIS Spence CNP   [facility] ([facility])    [id number]  [facility] [address]   [phone] OPT 2            Jul 17, 2018 11:00 AM CDT   SHORT with Mg Nelson MD   [facility]    [address]  [address]  [place] [facility] [place] [address]   [phone]            Sep 20, 2018 10:15 AM CDT   Phone Device Check with BERNY LOPEZ   [facility] ([facility])    [address]  [facility] [address]   [phone] OPT 2              Who to contact     If you have questions or need follow up information about today's clinic visit or your schedule please contact [facility] directly at [phone].  Normal or non-critical lab and imaging results will be communicated to you by MyChart, letter or phone within 4 business days after the clinic has received the results. If you do not hear from us within 7 days, please contact the clinic through MyChart or phone. If you have a critical or abnormal lab result, we will notify you by phone as soon as possible.  Submit refill requests through [employer] or call your pharmacy and they will forward the refill request to us. Please allow 3 business days for your refill to be completed.          Additional Information About Your Visit         [employer] Information     [employer] gives you secure access to your electronic health record. If you see a primary care provider, you can also send messages to your care team and make appointments. If you have questions, please call your primary care clinic.  If you do not have a primary care provider, please call [phone] and they will assist you.        Care EveryWhere ID     This is your Care EveryWhere ID. This could be used by other organizations to access your [place] medical records  [id number]         Blood Pressure from Last 3 Encounters:   05/25/18 136/74   05/10/18 116/60   04/24/18 114/72    Weight from Last 3 Encounters:   05/25/18 85.3 kg (188 lb)   05/10/18 85.6 kg (188 lb 12.8 oz)   04/24/18 85.7 kg (189 lb)              We Performed the Following     PM PHONE R STRIP EVAL UP TO 90 DAYS ([phone])        Primary Care Provider Office Phone # Fax #    Mg Nelson [phone] [address] [facility] [address]        Goals        General    I will find a caregiver support group to attend (pt-stated)     Notes - Note created  4/17/2014  8:49 AM by Beth Morales RN    As of today's date 4/17/2014 goal is met at 0 - 25%.   Goal Status:  Active        Equal Access to Services     EVANGELIST PETERSON AH: Hadii aad ku hadasho Soomaali, waaxda luqadaha, qaybta kaalmada laryda, kathryn pathak. So [facility] [phone].    ATENCIÓN: Si habla español, tiene a malone disposición servicios gratuitos de asistencia lingüística. Llame al [phone].    We comply with applicable federal civil rights laws and [place] laws. We do not discriminate on the basis of race, color, national origin, age, disability, sex, sexual orientation, or gender identity.            Thank you!     Thank you for choosing [facility]  for your care. Our goal is always to provide you with excellent care. Hearing back from our patients is one way we  can continue to improve our services. Please take a few minutes to complete the written survey that you may receive in the mail after your visit with us. Thank you!             Your Updated Medication List - Protect others around you: Learn how to safely use, store and throw away your medicines at www.disposemymeds.org.          This list is accurate as of 6/14/18 10:34 AM.  Always use your most recent med list.                   Brand Name Dispense Instructions for use Diagnosis    Carboxymethylcellulose Sod PF 1 % ophthalmic solution    CELLUVISC/REFRESH LIQUIGEL     Place 1 drop into both eyes 3 times daily as needed for dry eyes        carvedilol 6.25 MG tablet    COREG    180 tablet    Take 1 tablet (6.25 mg) by mouth 2 times daily (with meals)    Ischemic cardiomyopathy       CENTRUM SILVER per tablet      Take 1 tablet by mouth daily        COMPRESSION STOCKINGS     2 each    1 each daily    Varicose 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       finasteride 5 MG tablet    PROSCAR    90 tablet    TAKE 1 TABLET EVERY DAY    Benign prostatic hyperplasia, unspecified whether lower urinary tract symptoms present       furosemide 40 MG tablet    LASIX    90 tablet    Take 1 tablet (40 mg) by mouth daily    Dependent edema       GLUCOSAMINE-CHONDROITIN PO      Take 2 tablets by mouth daily        isosorbide mononitrate 30 MG 24 hr tablet    IMDUR    90 tablet    Take 1 tablet (30 mg) by mouth daily    SOB (shortness of breath)       levothyroxine 100 MCG tablet    SYNTHROID/LEVOTHROID    90 tablet    Take 1 tablet (100 mcg) by mouth daily    Acquired hypothyroidism       LORazepam 1 MG tablet    ATIVAN    30 tablet    Take 0.5-1 tablets (0.5-1 mg) by mouth every 8 hours as needed for anxiety    Acute stress reaction       MIRALAX PO      Take 1 packet by mouth At Bedtime        omega-3 fatty acids 1200 MG capsule      Take 1 capsule by mouth daily        omeprazole 20 MG CR capsule    priLOSEC    180 capsule     Take 1 capsule (20 mg) by mouth 2 times daily    Gastroesophageal reflux disease without esophagitis       order for DME     1 Device    Auto-CPAP:Max 9 cm H2OMin 9 cm H2O Continuous Lifetime need and heated humidity.    Obstructive sleep apnea (adult) (pediatric), Other dyspnea and respiratory abnormality       potassium chloride SA 20 MEQ CR tablet    KLOR-CON    90 tablet    Take 1 tablet (20 mEq) by mouth daily    Pulmonary hypertension       PRADAXA ANTICOAGULANT 75 MG capsule   Generic drug:  dabigatran ANTICOAGULANT      Take 150 mg by mouth 2 times daily. Store in original 's bottle or blister pack; use within 120 days of opening.        simvastatin 20 MG tablet    ZOCOR     Take by mouth At Bedtime        sucralfate 1 GM tablet    CARAFATE    180 tablet    Take 1 tablet (1 g) by mouth 2 times daily    Esophageal reflux       tamsulosin 0.4 MG capsule    FLOMAX    30 capsule    Take 1 capsule (0.4 mg) by mouth daily    Benign prostatic hyperplasia with urinary hesitancy       UNKNOWN TO PATIENT      Place 1 drop Into the left eye daily EYE DROP PRESCRIBED FOR CONJUNCTIVITIS        WELLBUTRIN XL PO      Take 150 mg by mouth every morning

## 2018-06-22 ENCOUNTER — OFFICE VISIT (OUTPATIENT)
Dept: CARDIOLOGY | Facility: CLINIC | Age: 83
End: 2018-06-22
Attending: NURSE PRACTITIONER
Payer: COMMERCIAL

## 2018-06-22 VITALS
BODY MASS INDEX: 26.31 KG/M2 | HEART RATE: 60 BPM | HEIGHT: 74 IN | SYSTOLIC BLOOD PRESSURE: 124 MMHG | DIASTOLIC BLOOD PRESSURE: 64 MMHG | WEIGHT: 205 LBS

## 2018-06-22 DIAGNOSIS — I25.10 CORONARY ARTERY DISEASE DUE TO LIPID RICH PLAQUE: ICD-10-CM

## 2018-06-22 DIAGNOSIS — I25.5 ISCHEMIC CARDIOMYOPATHY: ICD-10-CM

## 2018-06-22 DIAGNOSIS — I50.32 CHRONIC DIASTOLIC CONGESTIVE HEART FAILURE (H): Primary | ICD-10-CM

## 2018-06-22 DIAGNOSIS — I25.83 CORONARY ARTERY DISEASE DUE TO LIPID RICH PLAQUE: ICD-10-CM

## 2018-06-22 PROCEDURE — 99214 OFFICE O/P EST MOD 30 MIN: CPT | Performed by: NURSE PRACTITIONER

## 2018-06-22 RX ORDER — FUROSEMIDE 20 MG
20 TABLET ORAL DAILY
Qty: 90 TABLET | Refills: 3 | Status: SHIPPED | OUTPATIENT
Start: 2018-06-22 | End: 2018-07-05 | Stop reason: ALTCHOICE

## 2018-06-22 NOTE — LETTER
6/22/2018    Mg Nelson MD  [address]  [facility] [address]    RE: Bairon DOWNS Cristian       Dear Colleague,    I had the pleasure of seeing Bairon Foster in the [facility].    HPI and Plan:   I had the pleasure of seeing Bairon Foster today in cardiology clinic follow up. He is a pleasant 87 year old patient of Dr. Hope for his general cardiology and Dr. Carrizales for his venous insufficiency.      Mr. Foster has a history of coronary artery disease, atrial fibrillation, ventricular tachycardia and mitral insufficiency.  His last echocardiogram in April 2018 showed EF of 53%, he had moderately severe 3+ mitral regurgitation and 1-2+ tricuspid regurgitation with RVSP of 23+ right atrial pressure.  He had a coronary angiogram on March 21, 2018 which demonstrated no flow-limiting lesions.  He had single-vessel coronary artery disease with a stent in 2009 in the OM, his LV end-diastolic pressure was 23 mmHg, he was started on spironolactone 25 mg daily.  Following his coronary angiogram he develops with symptoms of heart failure with weight gain.  He was subsequently diuresed, his spironolactone increased to 50 mg daily and his Lasix dose was adjusted, and one time he was on 80 mg daily.  He subsequently lost  nearly 30 pounds.      He underwent radiofrequency ablation of his right and left lower extremities, he has had good results since then,   Apart from in March of this year when he was in some heart failure.      At her last visit Mr. Foster complained of nipple tenderness, and upper body rash and decreased appetite and weight.  I asked him to stop his spironolactone, since doing that his nipple tenderness has resolved.  [facility], he does not think that improved very much.  He has been closely weighing himself and monitoring his weight.  In the last 3-1/2 weeks he has gained 12 pounds since stopping the  spironolactone, despite continuing Lasix 40.  He denies any shortness of breath, PND or orthopnea.  He has more lower extremity edema and abdominal fullness today on exam. He is not having any chest pain    Physical Exam  Please see Below   2+ lower extremity edema, mildly distended abdomen    Assessment and Plan  1.  Diastolic heart failure.  He developed nipple tenderness on Spironolactone, this was subsequently stopped but unfortunately resulted in a 12 pound weight gain in less than 4 weeks.  He is not having symptoms of heart failure, but on exam he is demonstrating worsening lower extremity edema and abdominal fullness.  I have asked him to continue his Lasix 40 in the morning, I like to add an additional 20 in the afternoon.  I gave him written instructions, if his weight does not start to come back down into the 180s, then I have asked him to call my office and I would switch him to  torsemide, I would give him 2 doses of 40 mg and then reduce it to 20 mg daily.  I plan to see him back in 3 weeks time with a BMP prior.  2. Coronary artery disease.  Stable both by symptoms and by his coronary angiogram in March which demonstrated no flow-limiting disease.  He is on medical management with statin, aspirin, beta-blocker, Imdur.  3.  Permanent atrial fibrillation.  His last device check shows A. fib with a slow ventricular response in the 40s and estimated battery life of 1-1/2 years.  He continues to be rate controlled and anticoagulated with  Pradaxa.  4.  Mitral regurgitation.  This was moderate to severe on his echocardiogram, which was a progression from the previous year.  He has diuresed since then.  We will monitor him closely and repeat the echo before he follows up with Dr. Hope next year.    Thank you for allowing me to care for Bairon Foster today.    WILLIS Bullard, CNP  Cardiology    Voice recognition software was used for this note, I have reviewed this note, but errors may have been  missed.    Orders Placed This Encounter   Procedures     Basic metabolic panel     Follow-Up with Cardiac Advanced Practice Provider     Orders Placed This Encounter   Medications     furosemide (LASIX) 20 MG tablet     Sig: Take 1 tablet (20 mg) by mouth daily At noon or early afternoon     Dispense:  90 tablet     Refill:  3     There are no discontinued medications.      CURRENT MEDICATIONS:  Current Outpatient Prescriptions   Medication Sig Dispense Refill     BuPROPion HCl (WELLBUTRIN XL PO) Take 150 mg by mouth every morning       Carboxymethylcellulose Sod PF (CELLUVISC/REFRESH LIQUIGEL) 1 % ophthalmic solution Place 1 drop into both eyes 3 times daily as needed for dry eyes       carvedilol (COREG) 6.25 MG tablet Take 1 tablet (6.25 mg) by mouth 2 times daily (with meals) 180 tablet 3     COMPRESSION STOCKINGS 1 each daily 2 each 0     dabigatran ANTICOAGULANT (PRADAXA ANTICOAGULANT) 75 MG CAPS BLISTER PACK Take 150 mg by mouth 2 times daily. Store in original 's bottle or blister pack; use within 120 days of opening.       finasteride (PROSCAR) 5 MG tablet TAKE 1 TABLET EVERY DAY 90 tablet 3     furosemide (LASIX) 20 MG tablet Take 1 tablet (20 mg) by mouth daily At noon or early afternoon 90 tablet 3     furosemide (LASIX) 40 MG tablet Take 1 tablet (40 mg) by mouth daily 90 tablet 1     GLUCOSAMINE-CHONDROITIN PO Take 2 tablets by mouth daily       isosorbide mononitrate (IMDUR) 30 MG 24 hr tablet Take 1 tablet (30 mg) by mouth daily 90 tablet 3     levothyroxine (SYNTHROID/LEVOTHROID) 100 MCG tablet Take 1 tablet (100 mcg) by mouth daily 90 tablet 3     LORazepam (ATIVAN) 1 MG tablet Take 0.5-1 tablets (0.5-1 mg) by mouth every 8 hours as needed for anxiety 30 tablet 5     Multiple Vitamins-Minerals (CENTRUM SILVER) per tablet Take 1 tablet by mouth daily       Omega-3 Fatty Acids (FISH OIL) 1200 MG capsule Take 1 capsule by mouth daily        omeprazole (PRILOSEC) 20 MG CR capsule Take 1  capsule (20 mg) by mouth 2 times daily 180 capsule 3     Polyethylene Glycol 3350 (MIRALAX PO) Take 1 packet by mouth At Bedtime        potassium chloride SA (KLOR-CON) 20 MEQ CR tablet Take 1 tablet (20 mEq) by mouth daily 90 tablet 3     simvastatin (ZOCOR) 20 MG tablet Take by mouth At Bedtime       sucralfate (CARAFATE) 1 GM tablet Take 1 tablet (1 g) by mouth 2 times daily 180 tablet 3     tamsulosin (FLOMAX) 0.4 MG capsule Take 1 capsule (0.4 mg) by mouth daily 30 capsule 3     ORDER FOR DME Auto-CPAP:Max 9 cm H2OMin 9 cm H2O  Continuous Lifetime need and heated humidity.    1 Device 0     UNKNOWN TO PATIENT Place 1 drop Into the left eye daily EYE DROP PRESCRIBED FOR CONJUNCTIVITIS         ALLERGIES   No Known Allergies    PAST MEDICAL HISTORY:  Past Medical History:   Diagnosis Date     Acute, but ill-defined, cerebrovascular disease     NO RESIDUALS     Antiplatelet or antithrombotic long-term use      Arrhythmia     a fib     Blood in stool      Coronary atherosclerosis of unspecified type of vessel, native or graft     S/P PTCA, EF 50%     Esophageal reflux      Hydrocele, unspecified      Hypercholesterolemia      Hypersomnia with sleep apnea, unspecified      Inguinal hernia without mention of obstruction or gangrene, recurrent unilateral or unspecified      Ischemic cardiomyopathy      Major depression in complete remission (H)      Obese      Onychomycosis      JIM on CPAP      Other and unspecified hyperlipidemia      Other lymphedema     GROIN AND THIGHS     Other pancytopenia (H)      Other specified anemias      Overweight      Pacemaker      PAD (peripheral artery disease) (H)      Pancytopenia (H)      Persistent atrial fibrillation (H)     VVI PM for long pauses     Prostatic hypertrophy, benign      Pulmonary hypertension      Scrotal varices      Thrombocytopenia, unspecified      Umbilical hernia without obstruction and without gangrene      Unilateral or unspecified femoral hernia without  mention of obstruction or gangrene, unilateral or unspecified      Unspecified hypothyroidism      Venous stasis dermatitis of both lower extremities      Vitamin D deficiency        PAST SURGICAL HISTORY:  Past Surgical History:   Procedure Laterality Date     EYE SURGERY      CATARACT/BLEPHAROPLASTY     GENITOURINARY SURGERY      TUNA     HERNIA REPAIR      RIGHT INGUINAL     HERNIORRHAPHY INGUINAL  8/14/2012    Procedure: HERNIORRHAPHY INGUINAL;  right inguinal hernia repair;  Surgeon: Kareem Torrez MD;  Location: [facility]     HERNIORRHAPHY UMBILICAL N/A 10/31/2017    Procedure: HERNIORRHAPHY UMBILICAL;  UMBILICAL HERNIA REPAIR WITH MESH;  Surgeon: Scar Harrington MD;  Location: [facility]     IMPLANT PACEMAKER  8/2009    single chamber     ORTHOPEDIC SURGERY      [facility] RIGHT     ORTHOPEDIC SURGERY  2010    right TKR, left TKR     SURGICAL HISTORY OF -       Thumb surgery     SURGICAL HISTORY OF -   1990s    Left total knee replacement     SURGICAL HISTORY OF -   3/11    [facility] total knee replacement       FAMILY HISTORY:  Family History   Problem Relation Age of Onset     Cardiovascular Father      C.A.D. Father      Cancer Sister      lung     Unknown/Adopted Mother      Cancer - colorectal No family hx of        SOCIAL HISTORY:  Social History     Social History     Marital status:      Spouse name: N/A     Number of children: N/A     Years of education: N/A     Occupational History     Teacher, author, speaker Retired     Social History Main Topics     Smoking status: Never Smoker     Smokeless tobacco: Never Used      Comment: per encounter 2/12/07     Alcohol use 0.0 oz/week     0 Standard drinks or equivalent per week      Comment: 1/month     Drug use: No     Sexual activity: No     Other Topics Concern     Parent/Sibling W/ Cabg, Mi Or Angioplasty Before 65f 55m? No     Caffeine Concern No     1-2 cups coffee a day     Sleep Concern No     Stress Concern Yes     due to wifes health condition     Weight Concern  "No     Special Diet No     Exercise Yes     states he uses his tredmill on and off     Seat Belt Yes     Social History Narrative       Review of Systems:  Skin:  Negative       Eyes:  Positive for glasses    ENT:  Negative      Respiratory:  Positive for sleep apnea;CPAP     Cardiovascular:    Positive for;edema    Gastroenterology: Negative      Genitourinary:  not assessed      Musculoskeletal:  Negative      Neurologic:  Negative      Psychiatric:  Positive for   stress due to wifes health   Heme/Lymph/Imm:  Negative      Endocrine:  Positive for thyroid disorder      Physical Exam:  Vitals: /64  Pulse 60  Ht 1.88 m (6' 2\")  Wt 93 kg (205 lb)  BMI 26.32 kg/m2    Constitutional:  cooperative, alert and oriented, well developed, well nourished, in no acute distress        Skin:  warm and dry to the touch, no apparent skin lesions or masses noted          Head:  normocephalic, no masses or lesions        Eyes:  pupils equal and round        Lymph:      ENT:  no pallor or cyanosis        Neck:  JVP normal   Large V wave consistent with tricuspid regurgitation    Respiratory:  normal breath sounds, clear to auscultation, normal A-P diameter, normal symmetry, normal respiratory excursion, no use of accessory muscles         Cardiac: regular rhythm       grade 1;LLSB;early systolic murmur     Paced    venous stasis changes bilaterally                                      GI:  abdomen soft   benign    Extremities and Muscular Skeletal:  no deformities, clubbing, cyanosis, erythema observed stasis pigmentation 1+;bilateral LE edema;2+          Neurological:  no gross motor deficits;affect appropriate        Psych:  Alert and Oriented x 3    Encounter Diagnoses   Name Primary?     Coronary artery disease due to lipid rich plaque      Ischemic cardiomyopathy      Chronic diastolic congestive heart failure (H) Yes       Recent Lab Results:  LIPID RESULTS:  Lab Results   Component Value Date    CHOL 106 09/08/2017 "    HDL 54 09/08/2017    LDL 38 09/08/2017    TRIG 71 09/08/2017    CHOLHDLRATIO 2.1 06/15/2015       LIVER ENZYME RESULTS:  Lab Results   Component Value Date    AST 25 12/08/2015    ALT 22 09/08/2017       CBC RESULTS:  Lab Results   Component Value Date    WBC 2.8 (L) 03/21/2018    RBC 3.23 (L) 03/21/2018    HGB 10.4 (L) 04/12/2018    HCT 31.2 (L) 03/21/2018    MCV 97 03/21/2018    MCH 31.6 03/21/2018    MCHC 32.7 03/21/2018    RDW 16.3 (H) 03/21/2018    PLT 75 (L) 03/21/2018       BMP RESULTS:  Lab Results   Component Value Date     05/10/2018    POTASSIUM 4.3 05/10/2018    CHLORIDE 104 05/10/2018    CO2 29 05/10/2018    ANIONGAP 10.3 05/10/2018     05/10/2018    BUN 19 05/10/2018    CR 1.23 05/10/2018    GFRESTIMATED 56 (L) 05/10/2018    GFRESTBLACK 67 05/10/2018    TEODORO 9.9 05/10/2018        A1C RESULTS:  No results found for: A1C    INR RESULTS:  Lab Results   Component Value Date    INR 1.48 (H) 03/21/2018    INR 1.38 (H) 03/19/2011           CC  WILLIS Spence CNP  [address]  [place] [address]                    Thank you for allowing me to participate in the care of your patient.      Sincerely,     WILLIS Smith CNP     [facility]    cc:   WILLIS Spence CNP  [address]  [place] [address]

## 2018-06-22 NOTE — LETTER
6/22/2018    Mg Nelson MD  [address]  [facility] [address]    RE: Bairon DOWNS Cristian       Dear Colleague,    I had the pleasure of seeing Bairon Foster in the [facility].    HPI and Plan:   I had the pleasure of seeing Bairon Foster today in cardiology clinic follow up. He is a pleasant 87 year old patient of Dr. Hope for his general cardiology and Dr. Carrizales for his venous insufficiency.      Mr. Foster has a history of coronary artery disease, atrial fibrillation, ventricular tachycardia and mitral insufficiency.  His last echocardiogram in April 2018 showed EF of 53%, he had moderately severe 3+ mitral regurgitation and 1-2+ tricuspid regurgitation with RVSP of 23+ right atrial pressure.  He had a coronary angiogram on March 21, 2018 which demonstrated no flow-limiting lesions.  He had single-vessel coronary artery disease with a stent in 2009 in the OM, his LV end-diastolic pressure was 23 mmHg, he was started on spironolactone 25 mg daily.  Following his coronary angiogram he develops with symptoms of heart failure with weight gain.  He was subsequently diuresed, his spironolactone increased to 50 mg daily and his Lasix dose was adjusted, and one time he was on 80 mg daily.  He subsequently lost  nearly 30 pounds.      He underwent radiofrequency ablation of his right and left lower extremities, he has had good results since then,   Apart from in March of this year when he was in some heart failure.      At her last visit Mr. Foster complained of nipple tenderness, and upper body rash and decreased appetite and weight.  I asked him to stop his spironolactone, since doing that his nipple tenderness has resolved.  [facility], he does not think that improved very much.  He has been closely weighing himself and monitoring his weight.  In the last 3-1/2 weeks he has gained 12 pounds since stopping the  spironolactone, despite continuing Lasix 40.  He denies any shortness of breath, PND or orthopnea.  He has more lower extremity edema and abdominal fullness today on exam. He is not having any chest pain    Physical Exam  Please see Below   2+ lower extremity edema, mildly distended abdomen    Assessment and Plan  1.  Diastolic heart failure.  He developed nipple tenderness on Spironolactone, this was subsequently stopped but unfortunately resulted in a 12 pound weight gain in less than 4 weeks.  He is not having symptoms of heart failure, but on exam he is demonstrating worsening lower extremity edema and abdominal fullness.  I have asked him to continue his Lasix 40 in the morning, I like to add an additional 20 in the afternoon.  I gave him written instructions, if his weight does not start to come back down into the 180s, then I have asked him to call my office and I would switch him to  torsemide, I would give him 2 doses of 40 mg and then reduce it to 20 mg daily.  I plan to see him back in 3 weeks time with a BMP prior.  2. Coronary artery disease.  Stable both by symptoms and by his coronary angiogram in March which demonstrated no flow-limiting disease.  He is on medical management with statin, aspirin, beta-blocker, Imdur.  3.  Permanent atrial fibrillation.  His last device check shows A. fib with a slow ventricular response in the 40s and estimated battery life of 1-1/2 years.  He continues to be rate controlled and anticoagulated with  Pradaxa.  4.  Mitral regurgitation.  This was moderate to severe on his echocardiogram, which was a progression from the previous year.  He has diuresed since then.  We will monitor him closely and repeat the echo before he follows up with Dr. Hope next year.    Thank you for allowing me to care for Bairon Foster today.    WILLIS Bullard, CNP  Cardiology    Voice recognition software was used for this note, I have reviewed this note, but errors may have been  missed.    Orders Placed This Encounter   Procedures     Basic metabolic panel     Follow-Up with Cardiac Advanced Practice Provider     Orders Placed This Encounter   Medications     furosemide (LASIX) 20 MG tablet     Sig: Take 1 tablet (20 mg) by mouth daily At noon or early afternoon     Dispense:  90 tablet     Refill:  3     There are no discontinued medications.      CURRENT MEDICATIONS:  Current Outpatient Prescriptions   Medication Sig Dispense Refill     BuPROPion HCl (WELLBUTRIN XL PO) Take 150 mg by mouth every morning       Carboxymethylcellulose Sod PF (CELLUVISC/REFRESH LIQUIGEL) 1 % ophthalmic solution Place 1 drop into both eyes 3 times daily as needed for dry eyes       carvedilol (COREG) 6.25 MG tablet Take 1 tablet (6.25 mg) by mouth 2 times daily (with meals) 180 tablet 3     COMPRESSION STOCKINGS 1 each daily 2 each 0     dabigatran ANTICOAGULANT (PRADAXA ANTICOAGULANT) 75 MG CAPS BLISTER PACK Take 150 mg by mouth 2 times daily. Store in original 's bottle or blister pack; use within 120 days of opening.       finasteride (PROSCAR) 5 MG tablet TAKE 1 TABLET EVERY DAY 90 tablet 3     furosemide (LASIX) 20 MG tablet Take 1 tablet (20 mg) by mouth daily At noon or early afternoon 90 tablet 3     furosemide (LASIX) 40 MG tablet Take 1 tablet (40 mg) by mouth daily 90 tablet 1     GLUCOSAMINE-CHONDROITIN PO Take 2 tablets by mouth daily       isosorbide mononitrate (IMDUR) 30 MG 24 hr tablet Take 1 tablet (30 mg) by mouth daily 90 tablet 3     levothyroxine (SYNTHROID/LEVOTHROID) 100 MCG tablet Take 1 tablet (100 mcg) by mouth daily 90 tablet 3     LORazepam (ATIVAN) 1 MG tablet Take 0.5-1 tablets (0.5-1 mg) by mouth every 8 hours as needed for anxiety 30 tablet 5     Multiple Vitamins-Minerals (CENTRUM SILVER) per tablet Take 1 tablet by mouth daily       Omega-3 Fatty Acids (FISH OIL) 1200 MG capsule Take 1 capsule by mouth daily        omeprazole (PRILOSEC) 20 MG CR capsule Take 1  capsule (20 mg) by mouth 2 times daily 180 capsule 3     Polyethylene Glycol 3350 (MIRALAX PO) Take 1 packet by mouth At Bedtime        potassium chloride SA (KLOR-CON) 20 MEQ CR tablet Take 1 tablet (20 mEq) by mouth daily 90 tablet 3     simvastatin (ZOCOR) 20 MG tablet Take by mouth At Bedtime       sucralfate (CARAFATE) 1 GM tablet Take 1 tablet (1 g) by mouth 2 times daily 180 tablet 3     tamsulosin (FLOMAX) 0.4 MG capsule Take 1 capsule (0.4 mg) by mouth daily 30 capsule 3     ORDER FOR DME Auto-CPAP:Max 9 cm H2OMin 9 cm H2O  Continuous Lifetime need and heated humidity.    1 Device 0     UNKNOWN TO PATIENT Place 1 drop Into the left eye daily EYE DROP PRESCRIBED FOR CONJUNCTIVITIS         ALLERGIES   No Known Allergies    PAST MEDICAL HISTORY:  Past Medical History:   Diagnosis Date     Acute, but ill-defined, cerebrovascular disease     NO RESIDUALS     Antiplatelet or antithrombotic long-term use      Arrhythmia     a fib     Blood in stool      Coronary atherosclerosis of unspecified type of vessel, native or graft     S/P PTCA, EF 50%     Esophageal reflux      Hydrocele, unspecified      Hypercholesterolemia      Hypersomnia with sleep apnea, unspecified      Inguinal hernia without mention of obstruction or gangrene, recurrent unilateral or unspecified      Ischemic cardiomyopathy      Major depression in complete remission (H)      Obese      Onychomycosis      JIM on CPAP      Other and unspecified hyperlipidemia      Other lymphedema     GROIN AND THIGHS     Other pancytopenia (H)      Other specified anemias      Overweight      Pacemaker      PAD (peripheral artery disease) (H)      Pancytopenia (H)      Persistent atrial fibrillation (H)     VVI PM for long pauses     Prostatic hypertrophy, benign      Pulmonary hypertension      Scrotal varices      Thrombocytopenia, unspecified      Umbilical hernia without obstruction and without gangrene      Unilateral or unspecified femoral hernia without  mention of obstruction or gangrene, unilateral or unspecified      Unspecified hypothyroidism      Venous stasis dermatitis of both lower extremities      Vitamin D deficiency        PAST SURGICAL HISTORY:  Past Surgical History:   Procedure Laterality Date     EYE SURGERY      CATARACT/BLEPHAROPLASTY     GENITOURINARY SURGERY      TUNA     HERNIA REPAIR      RIGHT INGUINAL     HERNIORRHAPHY INGUINAL  8/14/2012    Procedure: HERNIORRHAPHY INGUINAL;  right inguinal hernia repair;  Surgeon: Kareem Torrez MD;  Location: [facility]     HERNIORRHAPHY UMBILICAL N/A 10/31/2017    Procedure: HERNIORRHAPHY UMBILICAL;  UMBILICAL HERNIA REPAIR WITH MESH;  Surgeon: Scar Harrington MD;  Location: [facility]     IMPLANT PACEMAKER  8/2009    single chamber     ORTHOPEDIC SURGERY      [facility] RIGHT     ORTHOPEDIC SURGERY  2010    right TKR, left TKR     SURGICAL HISTORY OF -       Thumb surgery     SURGICAL HISTORY OF -   1990s    Left total knee replacement     SURGICAL HISTORY OF -   3/11    [facility] total knee replacement       FAMILY HISTORY:  Family History   Problem Relation Age of Onset     Cardiovascular Father      C.A.D. Father      Cancer Sister      lung     Unknown/Adopted Mother      Cancer - colorectal No family hx of        SOCIAL HISTORY:  Social History     Social History     Marital status:      Spouse name: N/A     Number of children: N/A     Years of education: N/A     Occupational History     Teacher, author, speaker Retired     Social History Main Topics     Smoking status: Never Smoker     Smokeless tobacco: Never Used      Comment: per encounter 2/12/07     Alcohol use 0.0 oz/week     0 Standard drinks or equivalent per week      Comment: 1/month     Drug use: No     Sexual activity: No     Other Topics Concern     Parent/Sibling W/ Cabg, Mi Or Angioplasty Before 65f 55m? No     Caffeine Concern No     1-2 cups coffee a day     Sleep Concern No     Stress Concern Yes     due to wifes health condition     Weight Concern  "No     Special Diet No     Exercise Yes     states he uses his tredmill on and off     Seat Belt Yes     Social History Narrative       Review of Systems:  Skin:  Negative       Eyes:  Positive for glasses    ENT:  Negative      Respiratory:  Positive for sleep apnea;CPAP     Cardiovascular:    Positive for;edema    Gastroenterology: Negative      Genitourinary:  not assessed      Musculoskeletal:  Negative      Neurologic:  Negative      Psychiatric:  Positive for   stress due to wifes health   Heme/Lymph/Imm:  Negative      Endocrine:  Positive for thyroid disorder      Physical Exam:  Vitals: /64  Pulse 60  Ht 1.88 m (6' 2\")  Wt 93 kg (205 lb)  BMI 26.32 kg/m2    Constitutional:  cooperative, alert and oriented, well developed, well nourished, in no acute distress        Skin:  warm and dry to the touch, no apparent skin lesions or masses noted          Head:  normocephalic, no masses or lesions        Eyes:  pupils equal and round        Lymph:      ENT:  no pallor or cyanosis        Neck:  JVP normal   Large V wave consistent with tricuspid regurgitation    Respiratory:  normal breath sounds, clear to auscultation, normal A-P diameter, normal symmetry, normal respiratory excursion, no use of accessory muscles         Cardiac: regular rhythm       grade 1;LLSB;early systolic murmur     Paced    venous stasis changes bilaterally                                      GI:  abdomen soft   benign    Extremities and Muscular Skeletal:  no deformities, clubbing, cyanosis, erythema observed stasis pigmentation 1+;bilateral LE edema;2+          Neurological:  no gross motor deficits;affect appropriate        Psych:  Alert and Oriented x 3    Encounter Diagnoses   Name Primary?     Coronary artery disease due to lipid rich plaque      Ischemic cardiomyopathy      Chronic diastolic congestive heart failure (H) Yes       Recent Lab Results:  LIPID RESULTS:  Lab Results   Component Value Date    CHOL 106 09/08/2017 "    HDL 54 09/08/2017    LDL 38 09/08/2017    TRIG 71 09/08/2017    CHOLHDLRATIO 2.1 06/15/2015       LIVER ENZYME RESULTS:  Lab Results   Component Value Date    AST 25 12/08/2015    ALT 22 09/08/2017       CBC RESULTS:  Lab Results   Component Value Date    WBC 2.8 (L) 03/21/2018    RBC 3.23 (L) 03/21/2018    HGB 10.4 (L) 04/12/2018    HCT 31.2 (L) 03/21/2018    MCV 97 03/21/2018    MCH 31.6 03/21/2018    MCHC 32.7 03/21/2018    RDW 16.3 (H) 03/21/2018    PLT 75 (L) 03/21/2018       BMP RESULTS:  Lab Results   Component Value Date     05/10/2018    POTASSIUM 4.3 05/10/2018    CHLORIDE 104 05/10/2018    CO2 29 05/10/2018    ANIONGAP 10.3 05/10/2018     05/10/2018    BUN 19 05/10/2018    CR 1.23 05/10/2018    GFRESTIMATED 56 (L) 05/10/2018    GFRESTBLACK 67 05/10/2018    TEODORO 9.9 05/10/2018        A1C RESULTS:  No results found for: A1C    INR RESULTS:  Lab Results   Component Value Date    INR 1.48 (H) 03/21/2018    INR 1.38 (H) 03/19/2011         Thank you for allowing me to participate in the care of your patient.    Sincerely,     WILLIS Smith CNP     [facility]

## 2018-06-22 NOTE — PATIENT INSTRUCTIONS
Continue lasix 40 mg in the morning.    Take an additional 20 mg in the afternoon (about 6 hours after first pill)    Monitor your weight, continue to record it, I want it to start going down again, if it doesn't go down, then call my office and I'll switch you to a different diuretic.    See me in a month, we will check your labs again then.    Anushka [id number]

## 2018-06-22 NOTE — PROGRESS NOTES
HPI and Plan:   I had the pleasure of seeing Bairon Foster today in cardiology clinic follow up. He is a pleasant 87 year old patient of Dr. Hope for his general cardiology and Dr. Carrizales for his venous insufficiency.      Mr. Foster has a history of coronary artery disease, atrial fibrillation, ventricular tachycardia and mitral insufficiency.  His last echocardiogram in April 2018 showed EF of 53%, he had moderately severe 3+ mitral regurgitation and 1-2+ tricuspid regurgitation with RVSP of 23+ right atrial pressure.  He had a coronary angiogram on March 21, 2018 which demonstrated no flow-limiting lesions.  He had single-vessel coronary artery disease with a stent in 2009 in the OM, his LV end-diastolic pressure was 23 mmHg, he was started on spironolactone 25 mg daily.  Following his coronary angiogram he develops with symptoms of heart failure with weight gain.  He was subsequently diuresed, his spironolactone increased to 50 mg daily and his Lasix dose was adjusted, and one time he was on 80 mg daily.  He subsequently lost  nearly 30 pounds.      He underwent radiofrequency ablation of his right and left lower extremities, he has had good results since then,   Apart from in March of this year when he was in some heart failure.      At her last visit Mr. Foster complained of nipple tenderness, and upper body rash and decreased appetite and weight.  I asked him to stop his spironolactone, since doing that his nipple tenderness has resolved.  [facility], he does not think that improved very much.  He has been closely weighing himself and monitoring his weight.  In the last 3-1/2 weeks he has gained 12 pounds since stopping the spironolactone, despite continuing Lasix 40.  He denies any shortness of breath, PND or orthopnea.  He has more lower extremity edema and abdominal fullness today on exam. He is not having any chest pain    Physical Exam  Please see  Below   2+ lower extremity edema, mildly distended abdomen    Assessment and Plan  1.  Diastolic heart failure.  He developed nipple tenderness on Spironolactone, this was subsequently stopped but unfortunately resulted in a 12 pound weight gain in less than 4 weeks.  He is not having symptoms of heart failure, but on exam he is demonstrating worsening lower extremity edema and abdominal fullness.  I have asked him to continue his Lasix 40 in the morning, I like to add an additional 20 in the afternoon.  I gave him written instructions, if his weight does not start to come back down into the 180s, then I have asked him to call my office and I would switch him to  torsemide, I would give him 2 doses of 40 mg and then reduce it to 20 mg daily.  I plan to see him back in 3 weeks time with a BMP prior.  2. Coronary artery disease.  Stable both by symptoms and by his coronary angiogram in March which demonstrated no flow-limiting disease.  He is on medical management with statin, aspirin, beta-blocker, Imdur.  3.  Permanent atrial fibrillation.  His last device check shows A. fib with a slow ventricular response in the 40s and estimated battery life of 1-1/2 years.  He continues to be rate controlled and anticoagulated with  Pradaxa.  4.  Mitral regurgitation.  This was moderate to severe on his echocardiogram, which was a progression from the previous year.  He has diuresed since then.  We will monitor him closely and repeat the echo before he follows up with Dr. Hope next year.    Thank you for allowing me to care for Bairon Foster today.    WILLIS Bullard, CNP  Cardiology    Voice recognition software was used for this note, I have reviewed this note, but errors may have been missed.    Orders Placed This Encounter   Procedures     Basic metabolic panel     Follow-Up with Cardiac Advanced Practice Provider     Orders Placed This Encounter   Medications     furosemide (LASIX) 20 MG tablet     Sig: Take 1  tablet (20 mg) by mouth daily At noon or early afternoon     Dispense:  90 tablet     Refill:  3     There are no discontinued medications.      CURRENT MEDICATIONS:  Current Outpatient Prescriptions   Medication Sig Dispense Refill     BuPROPion HCl (WELLBUTRIN XL PO) Take 150 mg by mouth every morning       Carboxymethylcellulose Sod PF (CELLUVISC/REFRESH LIQUIGEL) 1 % ophthalmic solution Place 1 drop into both eyes 3 times daily as needed for dry eyes       carvedilol (COREG) 6.25 MG tablet Take 1 tablet (6.25 mg) by mouth 2 times daily (with meals) 180 tablet 3     COMPRESSION STOCKINGS 1 each daily 2 each 0     dabigatran ANTICOAGULANT (PRADAXA ANTICOAGULANT) 75 MG CAPS BLISTER PACK Take 150 mg by mouth 2 times daily. Store in original 's bottle or blister pack; use within 120 days of opening.       finasteride (PROSCAR) 5 MG tablet TAKE 1 TABLET EVERY DAY 90 tablet 3     furosemide (LASIX) 20 MG tablet Take 1 tablet (20 mg) by mouth daily At noon or early afternoon 90 tablet 3     furosemide (LASIX) 40 MG tablet Take 1 tablet (40 mg) by mouth daily 90 tablet 1     GLUCOSAMINE-CHONDROITIN PO Take 2 tablets by mouth daily       isosorbide mononitrate (IMDUR) 30 MG 24 hr tablet Take 1 tablet (30 mg) by mouth daily 90 tablet 3     levothyroxine (SYNTHROID/LEVOTHROID) 100 MCG tablet Take 1 tablet (100 mcg) by mouth daily 90 tablet 3     LORazepam (ATIVAN) 1 MG tablet Take 0.5-1 tablets (0.5-1 mg) by mouth every 8 hours as needed for anxiety 30 tablet 5     Multiple Vitamins-Minerals (CENTRUM SILVER) per tablet Take 1 tablet by mouth daily       Omega-3 Fatty Acids (FISH OIL) 1200 MG capsule Take 1 capsule by mouth daily        omeprazole (PRILOSEC) 20 MG CR capsule Take 1 capsule (20 mg) by mouth 2 times daily 180 capsule 3     Polyethylene Glycol 3350 (MIRALAX PO) Take 1 packet by mouth At Bedtime        potassium chloride SA (KLOR-CON) 20 MEQ CR tablet Take 1 tablet (20 mEq) by mouth daily 90 tablet 3      simvastatin (ZOCOR) 20 MG tablet Take by mouth At Bedtime       sucralfate (CARAFATE) 1 GM tablet Take 1 tablet (1 g) by mouth 2 times daily 180 tablet 3     tamsulosin (FLOMAX) 0.4 MG capsule Take 1 capsule (0.4 mg) by mouth daily 30 capsule 3     ORDER FOR DME Auto-CPAP:Max 9 cm H2OMin 9 cm H2O  Continuous Lifetime need and heated humidity.    1 Device 0     UNKNOWN TO PATIENT Place 1 drop Into the left eye daily EYE DROP PRESCRIBED FOR CONJUNCTIVITIS         ALLERGIES   No Known Allergies    PAST MEDICAL HISTORY:  Past Medical History:   Diagnosis Date     Acute, but ill-defined, cerebrovascular disease     NO RESIDUALS     Antiplatelet or antithrombotic long-term use      Arrhythmia     a fib     Blood in stool      Coronary atherosclerosis of unspecified type of vessel, native or graft     S/P PTCA, EF 50%     Esophageal reflux      Hydrocele, unspecified      Hypercholesterolemia      Hypersomnia with sleep apnea, unspecified      Inguinal hernia without mention of obstruction or gangrene, recurrent unilateral or unspecified      Ischemic cardiomyopathy      Major depression in complete remission (H)      Obese      Onychomycosis      JIM on CPAP      Other and unspecified hyperlipidemia      Other lymphedema     GROIN AND THIGHS     Other pancytopenia (H)      Other specified anemias      Overweight      Pacemaker      PAD (peripheral artery disease) (H)      Pancytopenia (H)      Persistent atrial fibrillation (H)     VVI PM for long pauses     Prostatic hypertrophy, benign      Pulmonary hypertension      Scrotal varices      Thrombocytopenia, unspecified      Umbilical hernia without obstruction and without gangrene      Unilateral or unspecified femoral hernia without mention of obstruction or gangrene, unilateral or unspecified      Unspecified hypothyroidism      Venous stasis dermatitis of both lower extremities      Vitamin D deficiency        PAST SURGICAL HISTORY:  Past Surgical History:    Procedure Laterality Date     EYE SURGERY      CATARACT/BLEPHAROPLASTY     GENITOURINARY SURGERY      TUNA     HERNIA REPAIR      RIGHT INGUINAL     HERNIORRHAPHY INGUINAL  8/14/2012    Procedure: HERNIORRHAPHY INGUINAL;  right inguinal hernia repair;  Surgeon: Kareem Torrez MD;  Location: [facility]     HERNIORRHAPHY UMBILICAL N/A 10/31/2017    Procedure: HERNIORRHAPHY UMBILICAL;  UMBILICAL HERNIA REPAIR WITH MESH;  Surgeon: Scar Harrington MD;  Location: [facility]     IMPLANT PACEMAKER  8/2009    single chamber     ORTHOPEDIC SURGERY      [facility] RIGHT     ORTHOPEDIC SURGERY  2010    right TKR, left TKR     SURGICAL HISTORY OF -       Thumb surgery     SURGICAL HISTORY OF -   1990s    Left total knee replacement     SURGICAL HISTORY OF -   3/11    [facility] total knee replacement       FAMILY HISTORY:  Family History   Problem Relation Age of Onset     Cardiovascular Father      C.A.D. Father      Cancer Sister      lung     Unknown/Adopted Mother      Cancer - colorectal No family hx of        SOCIAL HISTORY:  Social History     Social History     Marital status:      Spouse name: N/A     Number of children: N/A     Years of education: N/A     Occupational History     Teacher, author, speaker Retired     Social History Main Topics     Smoking status: Never Smoker     Smokeless tobacco: Never Used      Comment: per encounter 2/12/07     Alcohol use 0.0 oz/week     0 Standard drinks or equivalent per week      Comment: 1/month     Drug use: No     Sexual activity: No     Other Topics Concern     Parent/Sibling W/ Cabg, Mi Or Angioplasty Before 65f 55m? No     Caffeine Concern No     1-2 cups coffee a day     Sleep Concern No     Stress Concern Yes     due to wifes health condition     Weight Concern No     Special Diet No     Exercise Yes     states he uses his tredmill on and off     Seat Belt Yes     Social History Narrative       Review of Systems:  Skin:  Negative       Eyes:  Positive for glasses    ENT:  Negative     "  Respiratory:  Positive for sleep apnea;CPAP     Cardiovascular:    Positive for;edema    Gastroenterology: Negative      Genitourinary:  not assessed      Musculoskeletal:  Negative      Neurologic:  Negative      Psychiatric:  Positive for   stress due to wifes health   Heme/Lymph/Imm:  Negative      Endocrine:  Positive for thyroid disorder      Physical Exam:  Vitals: /64  Pulse 60  Ht 1.88 m (6' 2\")  Wt 93 kg (205 lb)  BMI 26.32 kg/m2    Constitutional:  cooperative, alert and oriented, well developed, well nourished, in no acute distress        Skin:  warm and dry to the touch, no apparent skin lesions or masses noted          Head:  normocephalic, no masses or lesions        Eyes:  pupils equal and round        Lymph:      ENT:  no pallor or cyanosis        Neck:  JVP normal   Large V wave consistent with tricuspid regurgitation    Respiratory:  normal breath sounds, clear to auscultation, normal A-P diameter, normal symmetry, normal respiratory excursion, no use of accessory muscles         Cardiac: regular rhythm       grade 1;LLSB;early systolic murmur     Paced    venous stasis changes bilaterally                                      GI:  abdomen soft   benign    Extremities and Muscular Skeletal:  no deformities, clubbing, cyanosis, erythema observed stasis pigmentation 1+;bilateral LE edema;2+          Neurological:  no gross motor deficits;affect appropriate        Psych:  Alert and Oriented x 3    Encounter Diagnoses   Name Primary?     Coronary artery disease due to lipid rich plaque      Ischemic cardiomyopathy      Chronic diastolic congestive heart failure (H) Yes       Recent Lab Results:  LIPID RESULTS:  Lab Results   Component Value Date    CHOL 106 09/08/2017    HDL 54 09/08/2017    LDL 38 09/08/2017    TRIG 71 09/08/2017    CHOLHDLRATIO 2.1 06/15/2015       LIVER ENZYME RESULTS:  Lab Results   Component Value Date    AST 25 12/08/2015    ALT 22 09/08/2017       CBC RESULTS:  Lab " Results   Component Value Date    WBC 2.8 (L) 03/21/2018    RBC 3.23 (L) 03/21/2018    HGB 10.4 (L) 04/12/2018    HCT 31.2 (L) 03/21/2018    MCV 97 03/21/2018    MCH 31.6 03/21/2018    MCHC 32.7 03/21/2018    RDW 16.3 (H) 03/21/2018    PLT 75 (L) 03/21/2018       BMP RESULTS:  Lab Results   Component Value Date     05/10/2018    POTASSIUM 4.3 05/10/2018    CHLORIDE 104 05/10/2018    CO2 29 05/10/2018    ANIONGAP 10.3 05/10/2018     05/10/2018    BUN 19 05/10/2018    CR 1.23 05/10/2018    GFRESTIMATED 56 (L) 05/10/2018    GFRESTBLACK 67 05/10/2018    TEODORO 9.9 05/10/2018        A1C RESULTS:  No results found for: A1C    INR RESULTS:  Lab Results   Component Value Date    INR 1.48 (H) 03/21/2018    INR 1.38 (H) 03/19/2011           GEENA AlcazarTr[facility]-St[facility], APRN CNP  [address]  [place] [address]

## 2018-06-22 NOTE — MR AVS SNAPSHOT
After Visit Summary   6/22/2018    Bairon Foster    MRN: [id number]           Patient Information     Date Of Birth          2/6/1931        Visit Information        Provider Department      6/22/2018 2:30 PM Mackenzie Tejada APRN [facility]        Today's Diagnoses     Chronic diastolic congestive heart failure (H)    -  1    Coronary artery disease due to lipid rich plaque        Ischemic cardiomyopathy          Care Instructions    Continue lasix 40 mg in the morning.    Take an additional 20 mg in the afternoon (about 6 hours after first pill)    Monitor your weight, continue to record it, I want it to start going down again, if it doesn't go down, then call my office and I'll switch you to a different diuretic.    See me in a month, we will check your labs again then.    Anushka  [phone]          Follow-ups after your visit        Additional Services     Follow-Up with Cardiac Advanced Practice Provider                 Your next 10 appointments already scheduled     Jul 17, 2018 11:00 AM CDT   SHORT with Mg Nelson MD   [facility]    [address] [place] [address]  [address]  [facility] [address] [phone]            Sep 20, 2018 10:15 AM CDT   Phone Device Check with ARRIETA TECH2   [facility] ([facility])    [address] [place] [address] [address]  [facility] [address] [phone] OPT 2              Future tests that were ordered for you today     Open Future Orders        Priority Expected Expires Ordered    Basic metabolic panel Routine 7/22/2018 6/22/2019 6/22/2018    Follow-Up with Cardiac Advanced Practice Provider Routine 7/22/2018 6/22/2019 6/22/2018            Who to contact     If you have questions or need follow up information about today's clinic visit or your schedule please contact [place]  [facility] directly at [phone].  Normal or non-critical lab and imaging results will be communicated to you by MyChart, letter or phone within 4 business days after the clinic has received the results. If you do not hear from us within 7 days, please contact the clinic through Muzeek[facility] or phone. If you have a critical or abnormal lab result, we will notify you by phone as soon as possible.  Submit refill requests through ClientShow or call your pharmacy and they will forward the refill request to us. Please allow 3 business days for your refill to be completed.          Additional Information About Your Visit        Muzeekhar[facility]AR Information     ClientShow gives you secure access to your electronic health record. If you see a primary care provider, you can also send messages to your care team and make appointments. If you have questions, please call your primary care clinic.  If you do not have a primary care provider, please call [phone] and they will assist you.        Care EveryWhere ID     This is your Care EveryWhere ID. This could be used by other organizations to access your [facility] medical records  [id number]        Your Vitals Were     Pulse Height BMI (Body Mass Index)             60 1.88 m (6' 2\") 26.32 kg/m2          Blood Pressure from Last 3 Encounters:   06/22/18 124/64   05/25/18 136/74   05/10/18 116/60    Weight from Last 3 Encounters:   06/22/18 93 kg (205 lb)   05/25/18 85.3 kg (188 lb)   05/10/18 85.6 kg (188 lb 12.8 oz)              We Performed the Following     Follow-Up with Cardiac Advanced Practice Provider          Today's Medication Changes          These changes are accurate as of 6/22/18  3:00 PM.  If you have any questions, ask your nurse or doctor.               These medicines have changed or have updated prescriptions.        Dose/Directions    * furosemide 40 MG tablet   Commonly known as:  LASIX   This may have changed:  Another medication with the " same name was added. Make sure you understand how and when to take each.   Used for:  Dependent edema   Changed by:  Mackenzie Tejada APRN CNP        Dose:  40 mg   Take 1 tablet (40 mg) by mouth daily   Quantity:  90 tablet   Refills:  1       * furosemide 20 MG tablet   Commonly known as:  LASIX   This may have changed:  You were already taking a medication with the same name, and this prescription was added. Make sure you understand how and when to take each.   Used for:  Chronic diastolic congestive heart failure (H)   Changed by:  Mackenzie Tejada APRN CNP        Dose:  20 mg   Take 1 tablet (20 mg) by mouth daily At noon or early afternoon   Quantity:  90 tablet   Refills:  3       * Notice:  This list has 2 medication(s) that are the same as other medications prescribed for you. Read the directions carefully, and ask your doctor or other care provider to review them with you.         Where to get your medicines      These medications were sent to [facility] - [facility] [address] [facility]  [address] [facility] [address]     Phone:  [phone]     furosemide 20 MG tablet                Primary Care Provider Office Phone # Fax #    Mg Nelson [phone] [address] [facility] [address]        Goals        General    I will find a caregiver support group to attend (pt-stated)     Notes - Note created  4/17/2014  8:49 AM by Beth Morales RN    As of today's date 4/17/2014 goal is met at 0 - 25%.   Goal Status:  Active        Equal Access to Services     [facility] AH: Hadii silvestre padilla hadjino Sosuman, waaxda luqadaha, qaybta kaalmada adeegyajennifer, kathryn pathak. So [facility] [phone].    ATENCIÓN: Si habla español, tiene a malone disposición servicios gratuitos de asistencia lingüística. Llame al [phone].    We comply with applicable federal civil rights laws and [place] laws. We do not  discriminate on the basis of race, color, national origin, age, disability, sex, sexual orientation, or gender identity.            Thank you!     Thank you for choosing [facility]  for your care. Our goal is always to provide you with excellent care. Hearing back from our patients is one way we can continue to improve our services. Please take a few minutes to complete the written survey that you may receive in the mail after your visit with us. Thank you!             Your Updated Medication List - Protect others around you: Learn how to safely use, store and throw away your medicines at www.disposemymeds.org.          This list is accurate as of 6/22/18  3:00 PM.  Always use your most recent med list.                   Brand Name Dispense Instructions for use Diagnosis    Carboxymethylcellulose Sod PF 1 % ophthalmic solution    CELLUVISC/REFRESH LIQUIGEL     Place 1 drop into both eyes 3 times daily as needed for dry eyes        carvedilol 6.25 MG tablet    COREG    180 tablet    Take 1 tablet (6.25 mg) by mouth 2 times daily (with meals)    Ischemic cardiomyopathy       CENTRUM SILVER per tablet      Take 1 tablet by mouth daily        COMPRESSION STOCKINGS     2 each    1 each daily    Varicose 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       finasteride 5 MG tablet    PROSCAR    90 tablet    TAKE 1 TABLET EVERY DAY    Benign prostatic hyperplasia, unspecified whether lower urinary tract symptoms present       * furosemide 40 MG tablet    LASIX    90 tablet    Take 1 tablet (40 mg) by mouth daily    Dependent edema       * furosemide 20 MG tablet    LASIX    90 tablet    Take 1 tablet (20 mg) by mouth daily At noon or early afternoon    Chronic diastolic congestive heart failure (H)       GLUCOSAMINE-CHONDROITIN PO      Take 2 tablets by mouth daily        isosorbide mononitrate 30 MG 24 hr tablet    IMDUR    90 tablet    Take 1 tablet (30 mg) by mouth daily    SOB  (shortness of breath)       levothyroxine 100 MCG tablet    SYNTHROID/LEVOTHROID    90 tablet    Take 1 tablet (100 mcg) by mouth daily    Acquired hypothyroidism       LORazepam 1 MG tablet    ATIVAN    30 tablet    Take 0.5-1 tablets (0.5-1 mg) by mouth every 8 hours as needed for anxiety    Acute stress reaction       MIRALAX PO      Take 1 packet by mouth At Bedtime        omega-3 fatty acids 1200 MG capsule      Take 1 capsule by mouth daily        omeprazole 20 MG CR capsule    priLOSEC    180 capsule    Take 1 capsule (20 mg) by mouth 2 times daily    Gastroesophageal reflux disease without esophagitis       order for DME     1 Device    Auto-CPAP:Max 9 cm H2OMin 9 cm H2O Continuous Lifetime need and heated humidity.    Obstructive sleep apnea (adult) (pediatric), Other dyspnea and respiratory abnormality       potassium chloride SA 20 MEQ CR tablet    KLOR-CON    90 tablet    Take 1 tablet (20 mEq) by mouth daily    Pulmonary hypertension       PRADAXA ANTICOAGULANT 75 MG capsule   Generic drug:  dabigatran ANTICOAGULANT      Take 150 mg by mouth 2 times daily. Store in original 's bottle or blister pack; use within 120 days of opening.        simvastatin 20 MG tablet    ZOCOR     Take by mouth At Bedtime        sucralfate 1 GM tablet    CARAFATE    180 tablet    Take 1 tablet (1 g) by mouth 2 times daily    Esophageal reflux       tamsulosin 0.4 MG capsule    FLOMAX    30 capsule    Take 1 capsule (0.4 mg) by mouth daily    Benign prostatic hyperplasia with urinary hesitancy       UNKNOWN TO PATIENT      Place 1 drop Into the left eye daily EYE DROP PRESCRIBED FOR CONJUNCTIVITIS        WELLBUTRIN XL PO      Take 150 mg by mouth every morning        * Notice:  This list has 2 medication(s) that are the same as other medications prescribed for you. Read the directions carefully, and ask your doctor or other care provider to review them with you.

## 2018-06-27 ENCOUNTER — OFFICE VISIT (OUTPATIENT)
Dept: URGENT CARE | Facility: URGENT CARE | Age: 83
End: 2018-06-27
Payer: COMMERCIAL

## 2018-06-27 VITALS
OXYGEN SATURATION: 97 % | BODY MASS INDEX: 26.72 KG/M2 | SYSTOLIC BLOOD PRESSURE: 112 MMHG | HEART RATE: 60 BPM | TEMPERATURE: 97.6 F | WEIGHT: 208.1 LBS | RESPIRATION RATE: 16 BRPM | DIASTOLIC BLOOD PRESSURE: 66 MMHG

## 2018-06-27 DIAGNOSIS — T14.8XXA BLOOD BLISTER: Primary | ICD-10-CM

## 2018-06-27 PROCEDURE — 99213 OFFICE O/P EST LOW 20 MIN: CPT | Performed by: PHYSICIAN ASSISTANT

## 2018-06-27 NOTE — PROGRESS NOTES
SUBJECTIVE:  Bairon Foster is a 87 year old male who presents to the clinic today for a blister on his right thigh, onset one day ago.  He states that he drained it with a home sterilized needle.  No the area is full of blood.  He has been treated with a steroid cream for a similar rash on his back, which has been helping.      He denies any fevers, joint pain or other rash.      Past Medical History:   Diagnosis Date     Acute, but ill-defined, cerebrovascular disease     NO RESIDUALS     Antiplatelet or antithrombotic long-term use      Arrhythmia     a fib     Blood in stool      Coronary atherosclerosis of unspecified type of vessel, native or graft     S/P PTCA, EF 50%     Esophageal reflux      Hydrocele, unspecified      Hypercholesterolemia      Hypersomnia with sleep apnea, unspecified      Inguinal hernia without mention of obstruction or gangrene, recurrent unilateral or unspecified      Ischemic cardiomyopathy      Major depression in complete remission (H)      Obese      Onychomycosis      JIM on CPAP      Other and unspecified hyperlipidemia      Other lymphedema     GROIN AND THIGHS     Other pancytopenia (H)      Other specified anemias      Overweight      Pacemaker      PAD (peripheral artery disease) (H)      Pancytopenia (H)      Persistent atrial fibrillation (H)     VVI PM for long pauses     Prostatic hypertrophy, benign      Pulmonary hypertension      Scrotal varices      Thrombocytopenia, unspecified      Umbilical hernia without obstruction and without gangrene      Unilateral or unspecified femoral hernia without mention of obstruction or gangrene, unilateral or unspecified      Unspecified hypothyroidism      Venous stasis dermatitis of both lower extremities      Vitamin D deficiency       No Known Allergies  Social History   Substance Use Topics     Smoking status: Never Smoker     Smokeless tobacco: Never Used      Comment: per encounter 2/12/07     Alcohol use 0.0 oz/week     0  Standard drinks or equivalent per week      Comment: 1/month       ROS:  CONSTITUTIONAL:NEGATIVE for fever, chills, change in weight  INTEGUMENTARY/SKIN: as per HPI  MUSCULOSKELETAL: NEGATIVE for significant arthralgias or myalgia  NEURO: NEGATIVE for weakness, dizziness or paresthesias    EXAM:   /66  Pulse 60  Temp 97.6  F (36.4  C) (Oral)  Resp 16  Wt 208 lb 1.6 oz (94.4 kg)  SpO2 97%  BMI 26.72 kg/m2  GENERAL: alert, no acute distress.  SKIN: ruptured vesicle with blood.  Approximately 1.5 cm in diamter  No other rashes or lesions.    EXT: left upper leg is non tender.       (T14.8XXA) Blood blister  (primary encounter diagnosis)  Comment: currently draining  Plan: local wound care until healed.  [facility]  Advised patient to avoid rupturing vesicles at home.      Patient expresses understanding and agreement with the assessment and plan as above.

## 2018-06-27 NOTE — PATIENT INSTRUCTIONS
(T14.8XXA) Blood blister  (primary encounter diagnosis)  Comment: currently draining  Plan: local wound care until healed.  [facility]

## 2018-06-27 NOTE — MR AVS SNAPSHOT
After Visit Summary   6/27/2018    Bairon Foster    MRN: [id number]           Patient Information     Date Of Birth          2/6/1931        Visit Information        Provider Department      6/27/2018 2:30 PM Roberta Wan PA-C [facility]        Today's Diagnoses     Blood blister    -  1      Care Instructions    (T14.8XXA) Blood blister  (primary encounter diagnosis)  Comment: currently draining  Plan: local wound care until healed.  [facility]              Follow-ups after your visit        Your next 10 appointments already scheduled     Jul 17, 2018 11:00 AM CDT   SHORT with Mg Nelson MD   [facility]    [address] [place] [id number]   [phone]            Jul 25, 2018  2:50 PM CDT   LAB with AR[facility]ETA [facility]   [facility] ([facility])    [address] [place] [id number] [phone]           Please do not eat 10-12 hours before your appointment if you are coming in fasting for labs on lipids, cholesterol, or glucose (sugar). This does not apply to pregnant women. Water, hot tea and black coffee (with nothing added) are okay. Do not drink other fluids, diet soda or chew gum.            Jul 25, 2018  3:50 PM CDT   Return Visit with WILLIS Spence CNP   [facility] ([facility])    [address] [place] [id number] [phone] OPT 2            Sep 20, 2018 10:15 AM CDT   Phone Device Check with ARRIETA TECH2   [facility] ([facility])    [address] [place] [id number] [phone] OPT 2              Who to contact     If you have questions or need follow up information about today's clinic  visit or your schedule please contact [facility] directly at [phone].  Normal or non-critical lab and imaging results will be communicated to you by MyChart, letter or phone within 4 business days after the clinic has received the results. If you do not hear from us within 7 days, please contact the clinic through [employer] or phone. If you have a critical or abnormal lab result, we will notify you by phone as soon as possible.  Submit refill requests through [employer] or call your pharmacy and they will forward the refill request to us. Please allow 3 business days for your refill to be completed.          Additional Information About Your Visit        [employer] Information     [employer] gives you secure access to your electronic health record. If you see a primary care provider, you can also send messages to your care team and make appointments. If you have questions, please call your primary care clinic.  If you do not have a primary care provider, please call [phone] and they will assist you.        Care EveryWhere ID     This is your Care EveryWhere ID. This could be used by other organizations to access your [facility] medical records  [id number]        Your Vitals Were     Pulse Temperature Respirations Pulse Oximetry BMI (Body Mass Index)       60 97.6  F (36.4  C) (Oral) 16 97% 26.72 kg/m2        Blood Pressure from Last 3 Encounters:   06/27/18 112/66   06/22/18 124/64   05/25/18 136/74    Weight from Last 3 Encounters:   06/27/18 208 lb 1.6 oz (94.4 kg)   06/22/18 205 lb (93 kg)   05/25/18 188 lb (85.3 kg)              Today, you had the following     No orders found for display       Primary Care Provider Office Phone # Fax #    Mg Nelson [phone]       [address] [facility] [address]        Goals        General    I will find a caregiver support group to attend (pt-stated)     Notes - Note created  4/17/2014  8:49 AM by Andrew  MINE Campos    As of today's date 4/17/2014 goal is met at 0 - 25%.   Goal Status:  Active        Equal Access to Services     EVANGELIST KRISTEN : Hadii silvestre padilla macrina Viramontes, ariada karolina, hyun kaafiada felicitas, kathryn pathak. So [facility] [phone].    ATENCIÓN: Si habla español, tiene a malone disposición servicios gratuitos de asistencia lingüística. Llame al [phone].    We comply with applicable federal civil rights laws and [place] laws. We do not discriminate on the basis of race, color, national origin, age, disability, sex, sexual orientation, or gender identity.            Thank you!     Thank you for choosing [facility]  for your care. Our goal is always to provide you with excellent care. Hearing back from our patients is one way we can continue to improve our services. Please take a few minutes to complete the written survey that you may receive in the mail after your visit with us. Thank you!             Your Updated Medication List - Protect others around you: Learn how to safely use, store and throw away your medicines at www.disposemymeds.org.          This list is accurate as of 6/27/18  3:46 PM.  Always use your most recent med list.                   Brand Name Dispense Instructions for use Diagnosis    Carboxymethylcellulose Sod PF 1 % ophthalmic solution    CELLUVISC/REFRESH LIQUIGEL     Place 1 drop into both eyes 3 times daily as needed for dry eyes        carvedilol 6.25 MG tablet    COREG    180 tablet    Take 1 tablet (6.25 mg) by mouth 2 times daily (with meals)    Ischemic cardiomyopathy       CENTRUM SILVER per tablet      Take 1 tablet by mouth daily        COMPRESSION STOCKINGS     2 each    1 each daily    Varicose 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       finasteride 5 MG tablet    PROSCAR    90 tablet    TAKE 1 TABLET EVERY DAY    Benign prostatic hyperplasia, unspecified whether lower urinary tract symptoms  present       * furosemide 40 MG tablet    LASIX    90 tablet    Take 1 tablet (40 mg) by mouth daily    Dependent edema       * furosemide 20 MG tablet    LASIX    90 tablet    Take 1 tablet (20 mg) by mouth daily At noon or early afternoon    Chronic diastolic congestive heart failure (H)       GLUCOSAMINE-CHONDROITIN PO      Take 2 tablets by mouth daily        isosorbide mononitrate 30 MG 24 hr tablet    IMDUR    90 tablet    Take 1 tablet (30 mg) by mouth daily    SOB (shortness of breath)       levothyroxine 100 MCG tablet    SYNTHROID/LEVOTHROID    90 tablet    Take 1 tablet (100 mcg) by mouth daily    Acquired hypothyroidism       LORazepam 1 MG tablet    ATIVAN    30 tablet    Take 0.5-1 tablets (0.5-1 mg) by mouth every 8 hours as needed for anxiety    Acute stress reaction       MIRALAX PO      Take 1 packet by mouth At Bedtime        omega-3 fatty acids 1200 MG capsule      Take 1 capsule by mouth daily        omeprazole 20 MG CR capsule    priLOSEC    180 capsule    Take 1 capsule (20 mg) by mouth 2 times daily    Gastroesophageal reflux disease without esophagitis       order for DME     1 Device    Auto-CPAP:Max 9 cm H2OMin 9 cm H2O Continuous Lifetime need and heated humidity.    Obstructive sleep apnea (adult) (pediatric), Other dyspnea and respiratory abnormality       potassium chloride SA 20 MEQ CR tablet    KLOR-CON    90 tablet    Take 1 tablet (20 mEq) by mouth daily    Pulmonary hypertension       PRADAXA ANTICOAGULANT 75 MG capsule   Generic drug:  dabigatran ANTICOAGULANT      Take 150 mg by mouth 2 times daily. Store in original 's bottle or blister pack; use within 120 days of opening.        simvastatin 20 MG tablet    ZOCOR     Take by mouth At Bedtime        sucralfate 1 GM tablet    CARAFATE    180 tablet    Take 1 tablet (1 g) by mouth 2 times daily    Esophageal reflux       tamsulosin 0.4 MG capsule    FLOMAX    30 capsule    Take 1 capsule (0.4 mg) by mouth daily     Benign prostatic hyperplasia with urinary hesitancy       UNKNOWN TO PATIENT      Place 1 drop Into the left eye daily EYE DROP PRESCRIBED FOR CONJUNCTIVITIS        WELLBUTRIN XL PO      Take 150 mg by mouth every morning        * Notice:  This list has 2 medication(s) that are the same as other medications prescribed for you. Read the directions carefully, and ask your doctor or other care provider to review them with you.

## 2018-07-05 ENCOUNTER — TELEPHONE (OUTPATIENT)
Dept: CARDIOLOGY | Facility: CLINIC | Age: 83
End: 2018-07-05

## 2018-07-05 DIAGNOSIS — I50.32 CHRONIC DIASTOLIC CONGESTIVE HEART FAILURE (H): Primary | ICD-10-CM

## 2018-07-05 RX ORDER — TORSEMIDE 20 MG/1
TABLET ORAL
Qty: 92 TABLET | Refills: 3 | Status: SHIPPED | OUTPATIENT
Start: 2018-07-05 | End: 2018-08-22

## 2018-07-05 NOTE — TELEPHONE ENCOUNTER
Spoke to patient and he stated his weight is hanging around 202-199 lbs. Pt does not feel that the lasix is working well and would like to try the other diuretic Anushka had mentioned. Writer ordered Torsemide 40 mg for 2 days and then 20 mg daily. Pt confirmed follow up with BMP for 7/25/18. Prescription for torsemide sent to pharmacy of choice.

## 2018-07-05 NOTE — TELEPHONE ENCOUNTER
Received VM from pt stating that he is on the Lasix his weight has not gone down. Requested a call back. Tried to call patient back. No answer. Left VM to call team 4 back with direct number.     Pt scheduled for BMP and OV with Anushka 7/25/18 6/22/18 OV with Anushka NP   1.  Diastolic heart failure.  He developed nipple tenderness on Spironolactone, this was subsequently stopped but unfortunately resulted in a 12 pound weight gain in less than 4 weeks.  He is not having symptoms of heart failure, but on exam he is demonstrating worsening lower extremity edema and abdominal fullness.  I have asked him to continue his Lasix 40 in the morning, I like to add an additional 20 in the afternoon.  I gave him written instructions, if his weight does not start to come back down into the 180s, then I have asked him to call my office and I would switch him to  torsemide, I would give him 2 doses of 40 mg and then reduce it to 20 mg daily.  I plan to see him back in 3 weeks time with a BMP prior.

## 2018-07-08 DIAGNOSIS — N40.1 BENIGN PROSTATIC HYPERPLASIA WITH URINARY HESITANCY: ICD-10-CM

## 2018-07-08 DIAGNOSIS — R39.11 BENIGN PROSTATIC HYPERPLASIA WITH URINARY HESITANCY: ICD-10-CM

## 2018-07-09 NOTE — TELEPHONE ENCOUNTER
"Requested Prescriptions   Pending Prescriptions Disp Refills     tamsulosin (FLOMAX) 0.4 MG capsule [Pharmacy Med Name: TAMSULOSIN HCL 0.4 MG Capsule]  Last Written Prescription Date:  4/13/2018  Last Fill Quantity: 30,  # refills: 3   Last office visit: 4/24/2018 with prescribing provider:  Dr Nelson   Future Office Visit:   Next 5 appointments (look out 90 days)     Jul 17, 2018 11:00 AM CDT   SHORT with Mg Nelson MD   [facility]    [address]  [facility] [address]   [phone]            Jul 25, 2018  3:50 PM CDT   Return Visit with WILLIS Spence CNP   [facility] ([facility])    [address] [place] [address] [address]  [facility] [id number] [phone] OPT 2                  90 capsule 3     Sig: TAKE 1 CAPSULE (0.4 MG) BY MOUTH DAILY    Alpha Blockers Passed    7/8/2018 11:48 PM       Passed - Blood pressure under 140/90 in past 12 months    BP Readings from Last 3 Encounters:   06/27/18 112/66   06/22/18 124/64   05/25/18 136/74                Passed - Recent (12 mo) or future (30 days) visit within the authorizing provider's specialty    Patient had office visit in the last 12 months or has a visit in the next 30 days with authorizing provider or within the authorizing provider's specialty.  See \"Patient Info\" tab in inbasket, or \"Choose Columns\" in Meds & Orders section of the refill encounter.           Passed - Patient does not have Tadalafil, Vardenafil, or Sildenafil on their medication list       Passed - Patient is 18 years of age or older          "

## 2018-07-11 RX ORDER — TAMSULOSIN HYDROCHLORIDE 0.4 MG/1
CAPSULE ORAL
Qty: 90 CAPSULE | Refills: 1 | Status: SHIPPED | OUTPATIENT
Start: 2018-07-11 | End: 2019-01-21

## 2018-07-24 ENCOUNTER — OFFICE VISIT (OUTPATIENT)
Dept: FAMILY MEDICINE | Facility: CLINIC | Age: 83
End: 2018-07-24
Payer: COMMERCIAL

## 2018-07-24 VITALS
RESPIRATION RATE: 16 BRPM | TEMPERATURE: 98.9 F | BODY MASS INDEX: 26.45 KG/M2 | OXYGEN SATURATION: 92 % | SYSTOLIC BLOOD PRESSURE: 110 MMHG | DIASTOLIC BLOOD PRESSURE: 60 MMHG | HEART RATE: 61 BPM | WEIGHT: 206 LBS

## 2018-07-24 DIAGNOSIS — I89.0 LYMPHEDEMA OF BOTH LOWER EXTREMITIES: Primary | ICD-10-CM

## 2018-07-24 DIAGNOSIS — I87.2 VENOUS STASIS DERMATITIS OF BOTH LOWER EXTREMITIES: ICD-10-CM

## 2018-07-24 PROCEDURE — 99213 OFFICE O/P EST LOW 20 MIN: CPT | Performed by: FAMILY MEDICINE

## 2018-07-24 NOTE — PROGRESS NOTES
SUBJECTIVE:   Bairon Foster is a 87 year old male who presents to clinic today for the following health issues:      Hypertension/Edema Follow-up      Outpatient blood pressures are not being checked. Still having problems with water retention    Low Salt Diet: low salt      Amount of exercise or physical activity: 2-3 days a week    Problems taking medications regularly: No    Medication side effects: none    Diet: regular (no restrictions)            Problem list and histories reviewed & adjusted, as indicated.  Additional history: The patient's switch to torsemide is had no effect on his edema.  His weight has stayed the same.  He is up 12 pounds.    Patient Active Problem List   Diagnosis     Atrial fibrillation (H)     Pulmonary hypertension     CAD (coronary artery disease)     BPH (benign prostatic hyperplasia)     Ischemic cardiomyopathy     Pancytopenia (H)     JIM (obstructive sleep apnea)- mild/moderate     Acute stress reaction     Health Care Home     Vitamin D deficiency     Advance Care Planning     Gastroesophageal reflux disease without esophagitis     Hydrocele, unspecified hydrocele type     Slow transit constipation     Hypercholesterolemia     Non morbid obesity due to excess calories w comorbid CAD< hi LDL      Venous stasis dermatitis of both lower extremities     Lymphedema of both lower extremities     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      Coronary artery disease due to lipid rich plaque     Acquired hypothyroidism     Onychomycosis     Overweight (BMI 25.0-29.9)     PAD (peripheral artery disease) (H)     Screening for prostate cancer     Blood in stool     Umbilical hernia without obstruction and without gangrene     Xerosis cutis     Status post coronary angiogram     AK (actinic keratosis)     Angioma of skin     Past Surgical History:   Procedure Laterality Date     EYE SURGERY      CATARACT/BLEPHAROPLASTY     GENITOURINARY SURGERY      TUNA     HERNIA REPAIR       RIGHT INGUINAL     HERNIORRHAPHY INGUINAL  8/14/2012    Procedure: HERNIORRHAPHY INGUINAL;  right inguinal hernia repair;  Surgeon: Kareem Torrez MD;  Location:  [facility]     HERNIORRHAPHY UMBILICAL N/A 10/31/2017    Procedure: HERNIORRHAPHY UMBILICAL;  UMBILICAL HERNIA REPAIR WITH MESH;  Surgeon: Scar Harrington MD;  Location: [facility]     IMPLANT PACEMAKER  8/2009    single chamber     ORTHOPEDIC SURGERY      [facility] RIGHT     ORTHOPEDIC SURGERY  2010    right TKR, left TKR     SURGICAL HISTORY OF -       Thumb surgery     SURGICAL HISTORY OF -   1990s    Left total knee replacement     SURGICAL HISTORY OF -   3/11    Righ total knee replacement       Social History   Substance Use Topics     Smoking status: Never Smoker     Smokeless tobacco: Never Used      Comment: per encounter 2/12/07     Alcohol use 0.0 oz/week     0 Standard drinks or equivalent per week      Comment: 1/month     Family History   Problem Relation Age of Onset     Cardiovascular Father      C.A.D. Father      Cancer Sister      lung     Unknown/Adopted Mother      Cancer - colorectal No family hx of            Reviewed and updated as needed this visit by clinical staff  Tobacco  Allergies  Meds  Med Hx  Surg Hx  Fam Hx  Soc Hx      Reviewed and updated as needed this visit by Provider         ROS:  Constitutional, HEENT, cardiovascular, pulmonary, gi and gu systems are negative, except as otherwise noted.  INTEGUMENTARY/SKIN: POSITIVE for non-healing lesion in her side of the right lower extremity.  Because of that he has stopped wearing his compression stocking on that side.    OBJECTIVE:                                                    /60 (Cuff Size: Adult Regular)  Pulse 61  Temp 98.9  F (37.2  C) (Tympanic)  Resp 16  Wt 206 lb (93.4 kg)  SpO2 92%  BMI 26.45 kg/m2  Body mass index is 26.45 kg/(m^2).  GENERAL APPEARANCE: healthy, alert and no distress  RESP: lungs clear to auscultation - no rales, rhonchi or wheezes  CV: regular  rates and rhythm, normal S1 S2, no S3 or S4, no murmur, click or rub and has pitting B/L LE edema   SKIN: There is a superficial wound on the right lower extremity medially just above the ankle.         ASSESSMENT/PLAN:                                                        ICD-10-CM    1. Lymphedema of both lower extremities I89.0    2. Venous stasis dermatitis of both lower extremities I87.2        Patient Instructions   The patient had nipple tenderness and diarrhea when he was on Spironolactone.  However, furosemide and torsemide have not worked singularly to decrease his edema.  He has an appointment on 7/25 with cardiology.  I did discuss with him possibly adding back a smaller dose of Spironolactone to see if he does not get side effects and if that does assist with diuresing his excess fluid.  I will leave that decision to cardiology.  He will follow-up with us in 2 weeks.      Mg Nelson MD  [facility]

## 2018-07-25 ENCOUNTER — OFFICE VISIT (OUTPATIENT)
Dept: CARDIOLOGY | Facility: CLINIC | Age: 83
End: 2018-07-25
Attending: NURSE PRACTITIONER
Payer: COMMERCIAL

## 2018-07-25 VITALS
HEIGHT: 74 IN | SYSTOLIC BLOOD PRESSURE: 112 MMHG | OXYGEN SATURATION: 96 % | BODY MASS INDEX: 26.56 KG/M2 | HEART RATE: 60 BPM | DIASTOLIC BLOOD PRESSURE: 66 MMHG | WEIGHT: 207 LBS

## 2018-07-25 DIAGNOSIS — I25.5 ISCHEMIC CARDIOMYOPATHY: Primary | ICD-10-CM

## 2018-07-25 DIAGNOSIS — I50.32 CHRONIC DIASTOLIC CONGESTIVE HEART FAILURE (H): ICD-10-CM

## 2018-07-25 DIAGNOSIS — I27.20 PULMONARY HYPERTENSION (H): ICD-10-CM

## 2018-07-25 DIAGNOSIS — E78.00 HYPERCHOLESTEROLEMIA: ICD-10-CM

## 2018-07-25 LAB
ANION GAP SERPL CALCULATED.3IONS-SCNC: 11 MMOL/L (ref 6–17)
BUN SERPL-MCNC: 21 MG/DL (ref 7–30)
CALCIUM SERPL-MCNC: 9.2 MG/DL (ref 8.5–10.5)
CHLORIDE SERPL-SCNC: 105 MMOL/L (ref 98–107)
CO2 SERPL-SCNC: 30 MMOL/L (ref 23–29)
CREAT SERPL-MCNC: 1.44 MG/DL (ref 0.7–1.3)
GFR SERPL CREATININE-BSD FRML MDRD: 46 ML/MIN/1.7M2
GLUCOSE SERPL-MCNC: 119 MG/DL (ref 70–105)
POTASSIUM SERPL-SCNC: 4 MMOL/L (ref 3.5–5.1)
SODIUM SERPL-SCNC: 142 MMOL/L (ref 136–145)

## 2018-07-25 PROCEDURE — 99214 OFFICE O/P EST MOD 30 MIN: CPT | Performed by: NURSE PRACTITIONER

## 2018-07-25 PROCEDURE — 36415 COLL VENOUS BLD VENIPUNCTURE: CPT | Performed by: NURSE PRACTITIONER

## 2018-07-25 PROCEDURE — 80048 BASIC METABOLIC PNL TOTAL CA: CPT | Performed by: NURSE PRACTITIONER

## 2018-07-25 RX ORDER — EPLERENONE 25 MG/1
25 TABLET, FILM COATED ORAL DAILY
Qty: 30 TABLET | Refills: 3 | Status: SHIPPED | OUTPATIENT
Start: 2018-07-25 | End: 2018-08-07 | Stop reason: ALTCHOICE

## 2018-07-25 RX ORDER — TRIAMCINOLONE ACETONIDE 0.25 MG/G
CREAM TOPICAL 2 TIMES DAILY PRN
COMMUNITY

## 2018-07-25 NOTE — MR AVS SNAPSHOT
After Visit Summary   7/25/2018    Bairon Foster    MRN: [id number]           Patient Information     Date Of Birth          2/6/1931        Visit Information        Provider Department      7/25/2018 3:50 PM Mackenzie Tejada APRN CNP [facility]   [place]        Today's Diagnoses     Ischemic cardiomyopathy    -  1    Chronic diastolic congestive heart failure (H)        Pulmonary hypertension        Hypercholesterolemia          Care Instructions    [facility] 25 [facility] daily[facility] If this is affordable, then start it and we should repeat labs in 7-10 days.    If eplerenone is not affordable, call my nurse and restart spironolactone at a lower dose of 25 mg daily.    Continue to weight yourself daily, hopefully this new med will bring your weight down. If your weight gets back in the 180s, then we can start to wean off torsemide/demadex.    See me in a month.    [place]ka          Follow-ups after your visit        Your next 10 appointments already scheduled     Aug 07, 2018  2:30 PM CDT   Office Visit with Mg Nelson MD   [facility]    [address]  [place] [id number] [phone]           Bring a current list of meds and any records pertaining to this visit. For Physicals, please bring immunization records and any forms needing to be filled out. Please arrive 10 minutes early to complete paperwork.            Sep 20, 2018 10:15 AM CDT   Phone Device Check with ARRIETA TECH2   [facility]   [place] ([facility])    [address]  [place] [id number] [phone] OPT 2              Who to contact     If you have questions or need follow up information about today's clinic visit or your schedule please contact [facility]   [place] directly at  "[phone].  Normal or non-critical lab and imaging results will be communicated to you by MyChart, letter or phone within 4 business days after the clinic has received the results. If you do not hear from us within 7 days, please contact the clinic through Consumer Agent Portal (CAP)hart or phone. If you have a critical or abnormal lab result, we will notify you by phone as soon as possible.  Submit refill requests through [employer] or call your pharmacy and they will forward the refill request to us. Please allow 3 business days for your refill to be completed.          Additional Information About Your Visit        Consumer Agent Portal (CAP)hart Information     [employer] gives you secure access to your electronic health record. If you see a primary care provider, you can also send messages to your care team and make appointments. If you have questions, please call your primary care clinic.  If you do not have a primary care provider, please call [phone] and they will assist you.        Care EveryWhere ID     This is your Care EveryWhere ID. This could be used by other organizations to access your [place] medical records  [id number]        Your Vitals Were     Pulse Height Pulse Oximetry BMI (Body Mass Index)          60 1.88 m (6' 2\") 96% 26.58 kg/m2         Blood Pressure from Last 3 Encounters:   07/25/18 112/66   07/24/18 110/60   06/27/18 112/66    Weight from Last 3 Encounters:   07/25/18 93.9 kg (207 lb)   07/24/18 93.4 kg (206 lb)   06/27/18 94.4 kg (208 lb 1.6 oz)              We Performed the Following     Follow-Up with Cardiac Advanced Practice Provider          Today's Medication Changes          These changes are accurate as of 7/25/18  4:24 PM.  If you have any questions, ask your nurse or doctor.               Start taking these medicines.        Dose/Directions    eplerenone 25 MG tablet   Commonly known as:  INSPRA   Used for:  Chronic diastolic congestive heart failure (H)   Started by:  Mackenzie Tejada APRN CNP        " Dose:  25 mg   Take 1 tablet (25 mg) by mouth daily   Quantity:  30 tablet   Refills:  3         Stop taking these medicines if you haven't already. Please contact your care team if you have questions.     potassium chloride SA 20 MEQ CR tablet   Commonly known as:  KLOR-CON   Stopped by:  Mackenzie Tejada APRN CNP                Where to get your medicines      Some of these will need a paper prescription and others can be bought over the counter.  Ask your nurse if you have questions.     Bring a paper prescription for each of these medications     eplerenone 25 MG tablet                Primary Care Provider Office Phone # Fax #    Mg Bairon Nelson [phone] [address] [facility] [address]        Goals        General    I will find a caregiver support group to attend (pt-stated)     Notes - Note created  4/17/2014  8:49 AM by Beth Morales RN    As of today's date 4/17/2014 goal is met at 0 - 25%.   Goal Status:  Active        Equal Access to Services     [facility]: Hadii aad ku hadasho Soomaali, waaxda luqadaha, qaybta kaalmada adeegyada, waxay idiin hayaan tin camilo . So [facility] [phone].    ATENCIÓN: Si habla español, tiene a malone disposición servicios gratuitos de asistencia lingüística. [facility] [phone].    We comply with applicable federal civil rights laws and [place] laws. We do not discriminate on the basis of race, color, national origin, age, disability, sex, sexual orientation, or gender identity.            Thank you!     Thank you for choosing [facility]  for your care. Our goal is always to provide you with excellent care. Hearing back from our patients is one way we can continue to improve our services. Please take a few minutes to complete the written survey that you may receive in the mail after your visit with us. Thank you!             Your Updated Medication List - Protect others  around you: Learn how to safely use, store and throw away your medicines at www.disposemymeds.org.          This list is accurate as of 7/25/18  4:24 PM.  Always use your most recent med list.                   Brand Name Dispense Instructions for use Diagnosis    Carboxymethylcellulose Sod PF 1 % ophthalmic solution    CELLUVISC/REFRESH LIQUIGEL     Place 1 drop into both eyes 3 times daily as needed for dry eyes        carvedilol 6.25 MG tablet    COREG    180 tablet    Take 1 tablet (6.25 mg) by mouth 2 times daily (with meals)    Ischemic cardiomyopathy       CENTRUM SILVER per tablet      Take 1 tablet by mouth daily        COMPRESSION STOCKINGS     2 each    1 each daily    Varicose 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       eplerenone 25 MG tablet    INSPRA    30 tablet    Take 1 tablet (25 mg) by mouth daily    Chronic diastolic congestive heart failure (H)       finasteride 5 MG tablet    PROSCAR    90 tablet    TAKE 1 TABLET EVERY DAY    Benign prostatic hyperplasia, unspecified whether lower urinary tract symptoms present       GLUCOSAMINE-CHONDROITIN PO      Take 2 tablets by mouth daily        isosorbide mononitrate 30 MG 24 hr tablet    IMDUR    90 tablet    Take 1 tablet (30 mg) by mouth daily    SOB (shortness of breath)       ketotifen 0.025 % Soln ophthalmic solution    ZADITOR    1 Bottle    Place 1 drop into both eyes every 12 hours        levothyroxine 100 MCG tablet    SYNTHROID/LEVOTHROID    90 tablet    Take 1 tablet (100 mcg) by mouth daily    Acquired hypothyroidism       LORazepam 1 MG tablet    ATIVAN    30 tablet    Take 0.5-1 tablets (0.5-1 mg) by mouth every 8 hours as needed for anxiety    Acute stress reaction       MIRALAX PO      Take 1 packet by mouth At Bedtime        omega-3 fatty acids 1200 MG capsule      Take 1 capsule by mouth daily        omeprazole 20 MG CR capsule    priLOSEC    180 capsule    Take 1 capsule (20 mg) by mouth 2 times daily    Gastroesophageal  reflux disease without esophagitis       order for DME     1 Device    Auto-CPAP:Max 9 cm H2OMin 9 cm H2O Continuous Lifetime need and heated humidity.    Obstructive sleep apnea (adult) (pediatric), Other dyspnea and respiratory abnormality       PRADAXA ANTICOAGULANT 75 MG capsule   Generic drug:  dabigatran ANTICOAGULANT      Take 150 mg by mouth 2 times daily. Store in original 's bottle or blister pack; use within 120 days of opening.        simvastatin 20 MG tablet    ZOCOR     Take by mouth At Bedtime        sucralfate 1 GM tablet    CARAFATE    180 tablet    Take 1 tablet (1 g) by mouth 2 times daily    Esophageal reflux       tamsulosin 0.4 MG capsule    FLOMAX    90 capsule    TAKE 1 CAPSULE (0.4 MG) BY MOUTH DAILY    Benign prostatic hyperplasia with urinary hesitancy       torsemide 20 MG tablet    DEMADEX    92 tablet    Take 2 tablets (40 mg) for 2 days. Then take 1 tablet (20 mg) daily    Chronic diastolic congestive heart failure (H)       triamcinolone 0.025 % cream    KENALOG     Apply topically 2 times daily        UNKNOWN TO PATIENT      Place 1 drop Into the left eye daily EYE DROP PRESCRIBED FOR CONJUNCTIVITIS        WELLBUTRIN XL PO      Take 150 mg by mouth every morning

## 2018-07-25 NOTE — PATIENT INSTRUCTIONS
The patient had nipple tenderness and diarrhea when he was on Spironolactone.  However, furosemide and torsemide have not worked singularly to decrease his edema.  He has an appointment on 7/25 with cardiology.  I did discuss with him possibly adding back a smaller dose of Spironolactone to see if he does not get side effects and if that does assist with diuresing his excess fluid.  I will leave that decision to cardiology.  He will follow-up with us in 2 weeks.

## 2018-07-25 NOTE — PROGRESS NOTES
HPI and Plan:   I had the pleasure of seeing Bairon Foster today in cardiology clinic follow up. He is a pleasant 87 year old patient of Dr. Hope and Dr. Carrizales who I am seeing for lower extremity edema.    Mr. Foster has a history of coronary artery disease, atrial fibrillation, ventricular tachycardia and mitral insufficiency.  His echocardiogram in April 2018 showed a EF of 53%, 3+ mitral regurgitation, 1-2+ tricuspid regurgitation and an RVSP of 23+ right atrial pressure.  His coronary angiogram in March of this year showed no flow-limiting lesions.  He has single-vessel coronary artery disease with a stent in 2009 placed in his OM.  His LV end-diastolic pressure during his coronary angiogram was 23 mmHg and he was started on Spironolactone.  Following the coronary angiogram he developed heart failure symptoms.  With diuretic adjustments he lost about 30 pounds.  Unfortunately on 50 mg of spironolactone he developed nipple tenderness.  The nipple tenderness resolved when he stopped the spironolactone but he had a subsequent weight gain of about 12 pounds in 4 weeks.  With this he has had increased lower extremity edema.  He denies any chest pain or shortness of breath.  He has had this rash ongoing now back to March, currently [facility] [place]    At his last visit I attempted to diurese him first by escalating his Lasix, when that did not result in weight loss I switched him to torsemide, but again he did not have any weight loss and he continues today weighing  207 pounds on her scale here.  His home weight in May was 180, today his home weight was 198.  Of note he did have a successful  bilateral radiofrequency ablation in October 2017.     Labs Reviewed: Sodium 142, potassium 4, BUN 21, creatinine 1.44, GFR 46.    Physical Exam  Please see Below     Assessment and Plan  1.  Diastolic heart failure.  His heart failure symptoms developed in March following his coronary  angiogram.  Subsequently he diuresed 30 pounds on a combination of spironolactone with Lasix.  Unfortunately he developed nipple tenderness and 50 mg of spironolactone.  Since discontinuing spironolactone the nipple tenderness has resolved, however he has gained 12 pounds.  Despite escalating his Lasix and then switching him to torsemide, his weight has remained unchanged.  He continues to have bilateral lower extremity edema, worse on the right worse about 2+ up to his knee.  He is not having any shortness of breath, PND or orthopnea.  He continues to weigh himself daily at home.  His labs show that his creatinine has increased from 1.23 up to 1.44, his BUN has gone up slightly from 19-21.  I would like him to start eplerenone 25 mg daily.  I am not sure how affordable this medication will be, and so [facility]  If it is affordable then will repeat labs in 1-2 weeks.  However if it is unaffordable and we discussed he that he could start back on spironolactone, but a lower dose of 25 mg to avoid the nipple tenderness.  Going to repeat his labs in 1-2 weeks, I suspect we will need to back off on the torsemide which he is taking 20 mg daily.  He continues on carvedilol 6.25 mg daily.  2.  Coronary artery disease.  His angiogram in March 2018 showed no flow-limiting disease.  He continues on medical management with statin, aspirin, beta-blocker and Imdur.   3. Permanent atrial fibrillation.  His last device check shows A. fib with a slow ventricular response in the 40s and estimated battery life of 1-1/2 years.  He continues to be rate controlled and anticoagulated with  Pradaxa.  4. Mitral regurgitation.  This was moderate to severe on his echocardiogram, which was a progression from the previous year.  He has diuresed since then.  We will monitor him closely and repeat the echo before he follows up with Dr. Hope next year.    Thank you for allowing me to care for Bairon HIMANSHU Foster today.    [place]  WILLIS Tejada, CNP  Cardiology    Voice recognition software was used for this note, I have reviewed this note, but errors may have been missed.    Orders Placed This Encounter   Procedures     Basic metabolic panel     Follow-Up with Cardiac Advanced Practice Provider     Orders Placed This Encounter   Medications     triamcinolone (KENALOG) 0.025 % cream     Sig: Apply topically 2 times daily     eplerenone (INSPRA) 25 MG tablet     Sig: Take 1 tablet (25 mg) by mouth daily     Dispense:  30 tablet     Refill:  3     Medications Discontinued During This Encounter   Medication Reason     potassium chloride SA (KLOR-CON) 20 MEQ CR tablet          CURRENT MEDICATIONS:  Current Outpatient Prescriptions   Medication Sig Dispense Refill     BuPROPion HCl (WELLBUTRIN XL PO) Take 150 mg by mouth every morning       Carboxymethylcellulose Sod PF (CELLUVISC/REFRESH LIQUIGEL) 1 % ophthalmic solution Place 1 drop into both eyes 3 times daily as needed for dry eyes       carvedilol (COREG) 6.25 MG tablet Take 1 tablet (6.25 mg) by mouth 2 times daily (with meals) 180 tablet 3     COMPRESSION STOCKINGS 1 each daily 2 each 0     dabigatran ANTICOAGULANT (PRADAXA ANTICOAGULANT) 75 MG CAPS BLISTER PACK Take 150 mg by mouth 2 times daily. Store in original 's bottle or blister pack; use within 120 days of opening.       eplerenone (INSPRA) 25 MG tablet Take 1 tablet (25 mg) by mouth daily 30 tablet 3     finasteride (PROSCAR) 5 MG tablet TAKE 1 TABLET EVERY DAY 90 tablet 3     GLUCOSAMINE-CHONDROITIN PO Take 2 tablets by mouth daily       isosorbide mononitrate (IMDUR) 30 MG 24 hr tablet Take 1 tablet (30 mg) by mouth daily 90 tablet 3     ketotifen (ZADITOR) 0.025 % SOLN ophthalmic solution Place 1 drop into both eyes every 12 hours 1 Bottle      levothyroxine (SYNTHROID/LEVOTHROID) 100 MCG tablet Take 1 tablet (100 mcg) by mouth daily 90 tablet 3     LORazepam (ATIVAN) 1 MG tablet Take 0.5-1 tablets (0.5-1 mg) by  mouth every 8 hours as needed for anxiety 30 tablet 5     Multiple Vitamins-Minerals (CENTRUM SILVER) per tablet Take 1 tablet by mouth daily       Omega-3 Fatty Acids (FISH OIL) 1200 MG capsule Take 1 capsule by mouth daily        omeprazole (PRILOSEC) 20 MG CR capsule Take 1 capsule (20 mg) by mouth 2 times daily 180 capsule 3     ORDER FOR DME Auto-CPAP:Max 9 cm H2OMin 9 cm H2O  Continuous Lifetime need and heated humidity.    1 Device 0     Polyethylene Glycol 3350 (MIRALAX PO) Take 1 packet by mouth At Bedtime        simvastatin (ZOCOR) 20 MG tablet Take by mouth At Bedtime       sucralfate (CARAFATE) 1 GM tablet Take 1 tablet (1 g) by mouth 2 times daily 180 tablet 3     tamsulosin (FLOMAX) 0.4 MG capsule TAKE 1 CAPSULE (0.4 MG) BY MOUTH DAILY 90 capsule 1     torsemide (DEMADEX) 20 MG tablet Take 2 tablets (40 mg) for 2 days. Then take 1 tablet (20 mg) daily 92 tablet 3     triamcinolone (KENALOG) 0.025 % cream Apply topically 2 times daily       UNKNOWN TO PATIENT Place 1 drop Into the left eye daily EYE DROP PRESCRIBED FOR CONJUNCTIVITIS         ALLERGIES   No Known Allergies    PAST MEDICAL HISTORY:  Past Medical History:   Diagnosis Date     Acute, but ill-defined, cerebrovascular disease     NO RESIDUALS     Antiplatelet or antithrombotic long-term use      Arrhythmia     a fib     Blood in stool      Coronary atherosclerosis of unspecified type of vessel, native or graft     S/P PTCA, EF 50%     Esophageal reflux      Hydrocele, unspecified      Hypercholesterolemia      Hypersomnia with sleep apnea, unspecified      Inguinal hernia without mention of obstruction or gangrene, recurrent unilateral or unspecified      Ischemic cardiomyopathy      Major depression in complete remission (H)      Obese      Onychomycosis      JIM on CPAP      Other and unspecified hyperlipidemia      Other lymphedema     GROIN AND THIGHS     Other pancytopenia (H)      Other specified anemias      Overweight      Pacemaker       PAD (peripheral artery disease) (H)      Pancytopenia (H)      Persistent atrial fibrillation (H)     VVI PM for long pauses     Prostatic hypertrophy, benign      Pulmonary hypertension      Scrotal varices      Thrombocytopenia, unspecified      Umbilical hernia without obstruction and without gangrene      Unilateral or unspecified femoral hernia without mention of obstruction or gangrene, unilateral or unspecified      Unspecified hypothyroidism      Venous stasis dermatitis of both lower extremities      Vitamin D deficiency        PAST SURGICAL HISTORY:  Past Surgical History:   Procedure Laterality Date     EYE SURGERY      CATARACT/BLEPHAROPLASTY     GENITOURINARY SURGERY      TUNA     HERNIA REPAIR      RIGHT INGUINAL     HERNIORRHAPHY INGUINAL  8/14/2012    Procedure: HERNIORRHAPHY INGUINAL;  right inguinal hernia repair;  Surgeon: Kareem Torrez MD;  Location: [facility]     HERNIORRHAPHY UMBILICAL N/A 10/31/2017    Procedure: HERNIORRHAPHY UMBILICAL;  UMBILICAL HERNIA REPAIR WITH MESH;  Surgeon: Scar Harrington MD;  Location: [facility]     IMPLANT PACEMAKER  8/2009    single chamber     [place]      [facility] [place]THOPEDIC SURGERY  2010    right TKR, left TKR     SURGICAL HISTORY OF -       Thumb surgery     SURGICAL HISTORY OF -   1990s    Left total knee replacement     SURGICAL HISTORY OF -   3/11    [facility] total knee replacement       FAMILY HISTORY:  Family History   Problem Relation Age of Onset     Cardiovascular Father      C.A.D. Father      Cancer Sister      lung     Unknown/Adopted Mother      Cancer - colorectal No family hx of        SOCIAL HISTORY:  Social History     Social History     Marital status:      Spouse name: N/A     Number of children: N/A     Years of education: N/A     Occupational History     Teacher, author, speaker Retired     Social History Main Topics     Smoking status: Never Smoker     Smokeless tobacco: Never Used      Comment: per encounter 2/12/07      "Alcohol use 0.0 oz/week     0 Standard drinks or equivalent per week      Comment: 1/month     Drug use: No     Sexual activity: No     Other Topics Concern     Parent/Sibling W/ Cabg, Mi Or Angioplasty Before 65f 55m? No     Caffeine Concern No     1-2 cups coffee a day     Sleep Concern No     Stress Concern Yes     due to wifes health condition     Weight Concern No     Special Diet No     Exercise Yes     states he uses his tredmill on and off     Seat Belt Yes     Social History Narrative       Review of Systems:  Skin:  Negative bruising dry skin    Eyes:  Positive for glasses cataract surgery on both eyes  ENT:  Negative   nosebleed   Respiratory:  Positive for sleep apnea;CPAP;dyspnea on exertion     Cardiovascular:  Negative Positive for;edema    Gastroenterology: Negative poor appetite lossing weight.  Genitourinary:  not assessed urinary frequency    Musculoskeletal:  Negative arthritis    Neurologic:  Negative stroke;numbness or tingling of feet right foot (balance problems)  Psychiatric:  Positive for sleep disturbances;anxiety;excessive stress stress due to wifes health   Heme/Lymph/Imm:  Negative weight loss;easy bruising    Endocrine:  Positive for thyroid disorder      Physical Exam:  Vitals: /66 (BP Location: Right arm, Patient Position: Chair, Cuff Size: Adult Regular)  Pulse 60  Ht 1.88 m (6' 2\")  Wt 93.9 kg (207 lb)  SpO2 96%  BMI 26.58 kg/m2    Constitutional:  cooperative;well developed        Skin:    rash        Head:  normocephalic        Eyes:  pupils equal and round        Lymph:      ENT:  no pallor or cyanosis        Neck:  JVP normal        Respiratory:  normal breath sounds, clear to auscultation, normal A-P diameter, normal symmetry, normal respiratory excursion, no use of accessory muscles         Cardiac: regular rhythm;normal S1 and S2                                                         GI:  abdomen soft        Extremities and Muscular Skeletal:      bilateral LE " edema;R greater than L;2+          Neurological:  affect appropriate        Psych:  Alert and Oriented x 3    Encounter Diagnoses   Name Primary?     Chronic diastolic congestive heart failure (H)      Ischemic cardiomyopathy Yes     Pulmonary hypertension      Hypercholesterolemia        Recent Lab Results:  LIPID RESULTS:  Lab Results   Component Value Date    CHOL 106 09/08/2017    HDL 54 09/08/2017    LDL 38 09/08/2017    TRIG 71 09/08/2017    CHOLHDLRATIO 2.1 06/15/2015       LIVER ENZYME RESULTS:  Lab Results   Component Value Date    AST 25 12/08/2015    ALT 22 09/08/2017       CBC RESULTS:  Lab Results   Component Value Date    WBC 2.8 (L) 03/21/2018    RBC 3.23 (L) 03/21/2018    HGB 10.4 (L) 04/12/2018    HCT 31.2 (L) 03/21/2018    MCV 97 03/21/2018    MCH 31.6 03/21/2018    MCHC 32.7 03/21/2018    RDW 16.3 (H) 03/21/2018    PLT 75 (L) 03/21/2018       BMP RESULTS:  Lab Results   Component Value Date     07/25/2018    POTASSIUM 4.0 07/25/2018    CHLORIDE 105 07/25/2018    CO2 30 (H) 07/25/2018    ANIONGAP 11 07/25/2018     (H) 07/25/2018    BUN 21 07/25/2018    CR 1.44 (H) 07/25/2018    GFRESTIMATED 46 (L) 07/25/2018    GFRESTBLACK 56 (L) 07/25/2018    TEODORO 9.2 07/25/2018        A1C RESULTS:  No results found for: A1C    INR RESULTS:  Lab Results   Component Value Date    INR 1.48 (H) 03/21/2018    INR 1.38 (H) 03/19/2011           CC  Mackenzie AlcazarSelect Specialty Hospital - IndianapolisDAI cruzN CNP  [address]  [place] [address]

## 2018-07-25 NOTE — LETTER
7/25/2018    Mg Nelson MD  [address]  [facility] [id number]    RE: Bairon DOWNS Cristian       Dear Colleague,    I had the pleasure of seeing Bairon Foster in the [facility].    HPI and Plan:   I had the pleasure of seeing Bairon Foster today in cardiology clinic follow up. He is a pleasant 87 year old patient of Dr. Hope and Dr. Carrizales who I am seeing for lower extremity edema.    Mr. Foster has a history of coronary artery disease, atrial fibrillation, ventricular tachycardia and mitral insufficiency.  His echocardiogram in April 2018 showed a EF of 53%, 3+ mitral regurgitation, 1-2+ tricuspid regurgitation and an RVSP of 23+ right atrial pressure.  His coronary angiogram in March of this year showed no flow-limiting lesions.  He has single-vessel coronary artery disease with a stent in 2009 placed in his OM.  His LV end-diastolic pressure during his coronary angiogram was 23 mmHg and he was started on Spironolactone.  Following the coronary angiogram he developed heart failure symptoms.  With diuretic adjustments he lost about 30 pounds.  Unfortunately on 50 mg of spironolactone he developed nipple tenderness.  The nipple tenderness resolved when he stopped the spironolactone but he had a subsequent weight gain of about 12 pounds in 4 weeks.  With this he has had increased lower extremity edema.  He denies any chest pain or shortness of breath.  He has had this rash ongoing now back to March, currently [facility] [place]    At his last visit I attempted to diurese him first by escalating his Lasix, when that did not result in weight loss I switched him to torsemide, but again he did not have any weight loss and he continues today weighing  207 pounds on her scale here.  His home weight in May was 180, today his home weight was 198.  Of note he did have a successful  bilateral radiofrequency ablation in October 2017.     Labs  Reviewed: Sodium 142, potassium 4, BUN 21, creatinine 1.44, GFR 46.    Physical Exam  Please see Below     Assessment and Plan  1.  Diastolic heart failure.  His heart failure symptoms developed in March following his coronary angiogram.  Subsequently he diuresed 30 pounds on a combination of spironolactone with Lasix.  Unfortunately he developed nipple tenderness and 50 mg of spironolactone.  Since discontinuing spironolactone the nipple tenderness has resolved, however he has gained 12 pounds.  Despite escalating his Lasix and then switching him to torsemide, his weight has remained unchanged.  He continues to have bilateral lower extremity edema, worse on the right worse about 2+ up to his knee.  He is not having any shortness of breath, PND or orthopnea.  He continues to weigh himself daily at home.  His labs show that his creatinine has increased from 1.23 up to 1.44, his BUN has gone up slightly from 19-21.  I would like him to start eplerenone 25 mg daily.  I am not sure how affordable this medication will be, and so [facility]  If it is affordable then will repeat labs in 1-2 weeks.  However if it is unaffordable and we discussed he that he could start back on spironolactone, but a lower dose of 25 mg to avoid the nipple tenderness.  Going to repeat his labs in 1-2 weeks, I suspect we will need to back off on the torsemide which he is taking 20 mg daily.  He continues on carvedilol 6.25 mg daily.  2.  Coronary artery disease.  His angiogram in March 2018 showed no flow-limiting disease.  He continues on medical management with statin, aspirin, beta-blocker and Imdur.   3. Permanent atrial fibrillation.  His last device check shows A. fib with a slow ventricular response in the 40s and estimated battery life of 1-1/2 years.  He continues to be rate controlled and anticoagulated with  Pradaxa.  4. Mitral regurgitation.  This was moderate to severe on his echocardiogram, which was a progression  from the previous year.  He has diuresed since then.  We will monitor him closely and repeat the echo before he follows up with Dr. Hope next year.    Thank you for allowing me to care for Bairon Foster today.    WILLIS Bullard, CNP  Cardiology    Voice recognition software was used for this note, I have reviewed this note, but errors may have been missed.    Orders Placed This Encounter   Procedures     Basic metabolic panel     Follow-Up with Cardiac Advanced Practice Provider     Orders Placed This Encounter   Medications     triamcinolone (KENALOG) 0.025 % cream     Sig: Apply topically 2 times daily     eplerenone (INSPRA) 25 MG tablet     Sig: Take 1 tablet (25 mg) by mouth daily     Dispense:  30 tablet     Refill:  3     Medications Discontinued During This Encounter   Medication Reason     potassium chloride SA (KLOR-CON) 20 MEQ CR tablet          CURRENT MEDICATIONS:  Current Outpatient Prescriptions   Medication Sig Dispense Refill     BuPROPion HCl (WELLBUTRIN XL PO) Take 150 mg by mouth every morning       Carboxymethylcellulose Sod PF (CELLUVISC/REFRESH LIQUIGEL) 1 % ophthalmic solution Place 1 drop into both eyes 3 times daily as needed for dry eyes       carvedilol (COREG) 6.25 MG tablet Take 1 tablet (6.25 mg) by mouth 2 times daily (with meals) 180 tablet 3     COMPRESSION STOCKINGS 1 each daily 2 each 0     dabigatran ANTICOAGULANT (PRADAXA ANTICOAGULANT) 75 MG CAPS BLISTER PACK Take 150 mg by mouth 2 times daily. Store in original 's bottle or blister pack; use within 120 days of opening.       eplerenone (INSPRA) 25 MG tablet Take 1 tablet (25 mg) by mouth daily 30 tablet 3     finasteride (PROSCAR) 5 MG tablet TAKE 1 TABLET EVERY DAY 90 tablet 3     GLUCOSAMINE-CHONDROITIN PO Take 2 tablets by mouth daily       isosorbide mononitrate (IMDUR) 30 MG 24 hr tablet Take 1 tablet (30 mg) by mouth daily 90 tablet 3     ketotifen (ZADITOR) 0.025 % SOLN ophthalmic solution Place 1  drop into both eyes every 12 hours 1 Bottle      levothyroxine (SYNTHROID/LEVOTHROID) 100 MCG tablet Take 1 tablet (100 mcg) by mouth daily 90 tablet 3     LORazepam (ATIVAN) 1 MG tablet Take 0.5-1 tablets (0.5-1 mg) by mouth every 8 hours as needed for anxiety 30 tablet 5     Multiple Vitamins-Minerals (CENTRUM SILVER) per tablet Take 1 tablet by mouth daily       Omega-3 Fatty Acids (FISH OIL) 1200 MG capsule Take 1 capsule by mouth daily        omeprazole (PRILOSEC) 20 MG CR capsule Take 1 capsule (20 mg) by mouth 2 times daily 180 capsule 3     ORDER FOR DME Auto-CPAP:Max 9 cm H2OMin 9 cm H2O  Continuous Lifetime need and heated humidity.    1 Device 0     Polyethylene Glycol 3350 (MIRALAX PO) Take 1 packet by mouth At Bedtime        simvastatin (ZOCOR) 20 MG tablet Take by mouth At Bedtime       sucralfate (CARAFATE) 1 GM tablet Take 1 tablet (1 g) by mouth 2 times daily 180 tablet 3     tamsulosin (FLOMAX) 0.4 MG capsule TAKE 1 CAPSULE (0.4 MG) BY MOUTH DAILY 90 capsule 1     torsemide (DEMADEX) 20 MG tablet Take 2 tablets (40 mg) for 2 days. Then take 1 tablet (20 mg) daily 92 tablet 3     triamcinolone (KENALOG) 0.025 % cream Apply topically 2 times daily       UNKNOWN TO PATIENT Place 1 drop Into the left eye daily EYE DROP PRESCRIBED FOR CONJUNCTIVITIS         ALLERGIES   No Known Allergies    PAST MEDICAL HISTORY:  Past Medical History:   Diagnosis Date     Acute, but ill-defined, cerebrovascular disease     NO RESIDUALS     Antiplatelet or antithrombotic long-term use      Arrhythmia     a fib     Blood in stool      Coronary atherosclerosis of unspecified type of vessel, native or graft     S/P PTCA, EF 50%     Esophageal reflux      Hydrocele, unspecified      Hypercholesterolemia      Hypersomnia with sleep apnea, unspecified      Inguinal hernia without mention of obstruction or gangrene, recurrent unilateral or unspecified      Ischemic cardiomyopathy      Major depression in complete remission (H)       Obese      Onychomycosis      JIM on CPAP      Other and unspecified hyperlipidemia      Other lymphedema     GROIN AND THIGHS     Other pancytopenia (H)      Other specified anemias      Overweight      Pacemaker      PAD (peripheral artery disease) (H)      Pancytopenia (H)      Persistent atrial fibrillation (H)     VVI PM for long pauses     Prostatic hypertrophy, benign      Pulmonary hypertension      Scrotal varices      Thrombocytopenia, unspecified      Umbilical hernia without obstruction and without gangrene      Unilateral or unspecified femoral hernia without mention of obstruction or gangrene, unilateral or unspecified      Unspecified hypothyroidism      Venous stasis dermatitis of both lower extremities      Vitamin D deficiency        PAST SURGICAL HISTORY:  Past Surgical History:   Procedure Laterality Date     EYE SURGERY      CATARACT/BLEPHAROPLASTY     GENITOURINARY SURGERY      TUNA     HERNIA REPAIR      RIGHT INGUINAL     HERNIORRHAPHY INGUINAL  8/14/2012    Procedure: HERNIORRHAPHY INGUINAL;  right inguinal hernia repair;  Surgeon: Kareem Torrez MD;  Location: [facility]     HERNIORRHAPHY UMBILICAL N/A 10/31/2017    Procedure: HERNIORRHAPHY UMBILICAL;  UMBILICAL HERNIA REPAIR WITH MESH;  Surgeon: Scar Harrington MD;  Location: [facility]     IMPLANT PACEMAKER  8/2009    single chamber     [place]      [facility] [place]THOPEDIC SURGERY  2010    right TKR, left TKR     SURGICAL HISTORY OF -       Thumb surgery     SURGICAL HISTORY OF -   1990s    Left total knee replacement     SURGICAL HISTORY OF -   3/11    [facility] total knee replacement       FAMILY HISTORY:  Family History   Problem Relation Age of Onset     Cardiovascular Father      C.A.D. Father      Cancer Sister      lung     Unknown/Adopted Mother      Cancer - colorectal No family hx of        SOCIAL HISTORY:  Social History     Social History     Marital status:      Spouse name: N/A     Number of children: N/A     Years of  "education: N/A     Occupational History     Teacher, author, speaker Retired     Social History Main Topics     Smoking status: Never Smoker     Smokeless tobacco: Never Used      Comment: per encounter 2/12/07     Alcohol use 0.0 oz/week     0 Standard drinks or equivalent per week      Comment: 1/month     Drug use: No     Sexual activity: No     Other Topics Concern     Parent/Sibling W/ Cabg, Mi Or Angioplasty Before 65f 55m? No     Caffeine Concern No     1-2 cups coffee a day     Sleep Concern No     Stress Concern Yes     due to wifes health condition     Weight Concern No     Special Diet No     Exercise Yes     states he uses his tredmill on and off     Seat Belt Yes     Social History Narrative       Review of Systems:  Skin:  Negative bruising dry skin    Eyes:  Positive for glasses cataract surgery on both eyes  ENT:  Negative   nosebleed   Respiratory:  Positive for sleep apnea;CPAP;dyspnea on exertion     Cardiovascular:  Negative Positive for;edema    Gastroenterology: Negative poor appetite lossing weight.  Genitourinary:  not assessed urinary frequency    Musculoskeletal:  Negative arthritis    Neurologic:  Negative stroke;numbness or tingling of feet right foot (balance problems)  Psychiatric:  Positive for sleep disturbances;anxiety;excessive stress stress due to wifes health   Heme/Lymph/Imm:  Negative weight loss;easy bruising    Endocrine:  Positive for thyroid disorder      Physical Exam:  Vitals: /66 (BP Location: Right arm, Patient Position: Chair, Cuff Size: Adult Regular)  Pulse 60  Ht 1.88 m (6' 2\")  Wt 93.9 kg (207 lb)  SpO2 96%  BMI 26.58 kg/m2    Constitutional:  cooperative;well developed        Skin:    rash        Head:  normocephalic        Eyes:  pupils equal and round        Lymph:      ENT:  no pallor or cyanosis        Neck:  JVP normal        Respiratory:  normal breath sounds, clear to auscultation, normal A-P diameter, normal symmetry, normal respiratory " excursion, no use of accessory muscles         Cardiac: regular rhythm;normal S1 and S2                                                         GI:  abdomen soft        Extremities and Muscular Skeletal:      bilateral LE edema;R greater than L;2+          Neurological:  affect appropriate        Psych:  Alert and Oriented x 3    Encounter Diagnoses   Name Primary?     Chronic diastolic congestive heart failure (H)      Ischemic cardiomyopathy Yes     Pulmonary hypertension      Hypercholesterolemia        Recent Lab Results:  LIPID RESULTS:  Lab Results   Component Value Date    CHOL 106 09/08/2017    HDL 54 09/08/2017    LDL 38 09/08/2017    TRIG 71 09/08/2017    CHOLHDLRATIO 2.1 06/15/2015       LIVER ENZYME RESULTS:  Lab Results   Component Value Date    AST 25 12/08/2015    ALT 22 09/08/2017       CBC RESULTS:  Lab Results   Component Value Date    WBC 2.8 (L) 03/21/2018    RBC 3.23 (L) 03/21/2018    HGB 10.4 (L) 04/12/2018    HCT 31.2 (L) 03/21/2018    MCV 97 03/21/2018    MCH 31.6 03/21/2018    MCHC 32.7 03/21/2018    RDW 16.3 (H) 03/21/2018    PLT 75 (L) 03/21/2018       BMP RESULTS:  Lab Results   Component Value Date     07/25/2018    POTASSIUM 4.0 07/25/2018    CHLORIDE 105 07/25/2018    CO2 30 (H) 07/25/2018    ANIONGAP 11 07/25/2018     (H) 07/25/2018    BUN 21 07/25/2018    CR 1.44 (H) 07/25/2018    GFRESTIMATED 46 (L) 07/25/2018    GFRESTBLACK 56 (L) 07/25/2018    TEODORO 9.2 07/25/2018        A1C RESULTS:  No results found for: A1C    INR RESULTS:  Lab Results   Component Value Date    INR 1.48 (H) 03/21/2018    INR 1.38 (H) 03/19/2011           CC  WILLIS Spence CNP  [address]  [place] [id number]                    Thank you for allowing me to participate in the care of your patient.      Sincerely,     WILLIS Smith CNP     [facility]    cc:   WILLIS Spence CNP  [address]  [address]  [place] [id number]

## 2018-07-25 NOTE — PATIENT INSTRUCTIONS
Try eple[facility]one 25 mg daily. [facility] If this is affordable, then start it and we should repeat labs in 7-10 days.    If eplerenone is not affordable, call my nurse and restart spironolactone at a lower dose of 25 mg daily.    Continue to weight yourself daily, hopefully this new med will bring your weight down. If your weight gets back in the 180s, then we can start to wean off torsemide/demadex.    See me in a month.    Anushka

## 2018-07-25 NOTE — LETTER
7/25/2018    Mg Nelson MD  [address]  [facility] [address]    RE: Bairon DOWNS Cristian       Dear Colleague,    I had the pleasure of seeing Bairon Foster in the [facility].    HPI and Plan:   I had the pleasure of seeing Bairon Foster today in cardiology clinic follow up. He is a pleasant 87 year old patient of Dr. Hope and Dr. Carrizales who I am seeing for lower extremity edema.    Mr. Foster has a history of coronary artery disease, atrial fibrillation, ventricular tachycardia and mitral insufficiency.  His echocardiogram in April 2018 showed a EF of 53%, 3+ mitral regurgitation, 1-2+ tricuspid regurgitation and an RVSP of 23+ right atrial pressure.  His coronary angiogram in March of this year showed no flow-limiting lesions.  He has single-vessel coronary artery disease with a stent in 2009 placed in his OM.  His LV end-diastolic pressure during his coronary angiogram was 23 mmHg and he was started on Spironolactone.  Following the coronary angiogram he developed heart failure symptoms.  With diuretic adjustments he lost about 30 pounds.  Unfortunately on 50 mg of spironolactone he developed nipple tenderness.  The nipple tenderness resolved when he stopped the spironolactone but he had a subsequent weight gain of about 12 pounds in 4 weeks.  With this he has had increased lower extremity edema.  He denies any chest pain or shortness of breath.  He has had this rash ongoing now back to March, currently [facility]    At his last visit I attempted to diurese him first by escalating his Lasix, when that did not result in weight loss I switched him to torsemide, but again he did not have any weight loss and he continues today weighing  207 pounds on her scale here.  His home weight in May was 180, today his home weight was 198.  Of note he did have a successful  bilateral radiofrequency ablation in October 2017.     Labs  Reviewed: Sodium 142, potassium 4, BUN 21, creatinine 1.44, GFR 46.    Physical Exam  Please see Below     Assessment and Plan  1.  Diastolic heart failure.  His heart failure symptoms developed in March following his coronary angiogram.  Subsequently he diuresed 30 pounds on a combination of spironolactone with Lasix.  Unfortunately he developed nipple tenderness and 50 mg of spironolactone.  Since discontinuing spironolactone the nipple tenderness has resolved, however he has gained 12 pounds.  Despite escalating his Lasix and then switching him to torsemide, his weight has remained unchanged.  He continues to have bilateral lower extremity edema, worse on the right worse about 2+ up to his knee.  He is not having any shortness of breath, PND or orthopnea.  He continues to weigh himself daily at home.  His labs show that his creatinine has increased from 1.23 up to 1.44, his BUN has gone up slightly from 19-21.  I would like him to start eplerenone 25 mg daily.  I am not sure how affordable this medication will be, and so [facility]  If it is affordable then will repeat labs in 1-2 weeks.  However if it is unaffordable and we discussed he that he could start back on spironolactone, but a lower dose of 25 mg to avoid the nipple tenderness.  Going to repeat his labs in 1-2 weeks, I suspect we will need to back off on the torsemide which he is taking 20 mg daily.  He continues on carvedilol 6.25 mg daily.  2.  Coronary artery disease.  His angiogram in March 2018 showed no flow-limiting disease.  He continues on medical management with statin, aspirin, beta-blocker and Imdur.   3. Permanent atrial fibrillation.  His last device check shows A. fib with a slow ventricular response in the 40s and estimated battery life of 1-1/2 years.  He continues to be rate controlled and anticoagulated with  Pradaxa.  4. Mitral regurgitation.  This was moderate to severe on his echocardiogram, which was a progression  from the previous year.  He has diuresed since then.  We will monitor him closely and repeat the echo before he follows up with Dr. Hope next year.    Thank you for allowing me to care for Bairon Foster today.    WILLIS Bullard, CNP  Cardiology    Voice recognition software was used for this note, I have reviewed this note, but errors may have been missed.    Orders Placed This Encounter   Procedures     Basic metabolic panel     Follow-Up with Cardiac Advanced Practice Provider     Orders Placed This Encounter   Medications     triamcinolone (KENALOG) 0.025 % cream     Sig: Apply topically 2 times daily     eplerenone (INSPRA) 25 MG tablet     Sig: Take 1 tablet (25 mg) by mouth daily     Dispense:  30 tablet     Refill:  3     Medications Discontinued During This Encounter   Medication Reason     potassium chloride SA (KLOR-CON) 20 MEQ CR tablet          CURRENT MEDICATIONS:  Current Outpatient Prescriptions   Medication Sig Dispense Refill     BuPROPion HCl (WELLBUTRIN XL PO) Take 150 mg by mouth every morning       Carboxymethylcellulose Sod PF (CELLUVISC/REFRESH LIQUIGEL) 1 % ophthalmic solution Place 1 drop into both eyes 3 times daily as needed for dry eyes       carvedilol (COREG) 6.25 MG tablet Take 1 tablet (6.25 mg) by mouth 2 times daily (with meals) 180 tablet 3     COMPRESSION STOCKINGS 1 each daily 2 each 0     dabigatran ANTICOAGULANT (PRADAXA ANTICOAGULANT) 75 MG CAPS BLISTER PACK Take 150 mg by mouth 2 times daily. Store in original 's bottle or blister pack; use within 120 days of opening.       eplerenone (INSPRA) 25 MG tablet Take 1 tablet (25 mg) by mouth daily 30 tablet 3     finasteride (PROSCAR) 5 MG tablet TAKE 1 TABLET EVERY DAY 90 tablet 3     GLUCOSAMINE-CHONDROITIN PO Take 2 tablets by mouth daily       isosorbide mononitrate (IMDUR) 30 MG 24 hr tablet Take 1 tablet (30 mg) by mouth daily 90 tablet 3     ketotifen (ZADITOR) 0.025 % SOLN ophthalmic solution Place 1  drop into both eyes every 12 hours 1 Bottle      levothyroxine (SYNTHROID/LEVOTHROID) 100 MCG tablet Take 1 tablet (100 mcg) by mouth daily 90 tablet 3     LORazepam (ATIVAN) 1 MG tablet Take 0.5-1 tablets (0.5-1 mg) by mouth every 8 hours as needed for anxiety 30 tablet 5     Multiple Vitamins-Minerals (CENTRUM SILVER) per tablet Take 1 tablet by mouth daily       Omega-3 Fatty Acids (FISH OIL) 1200 MG capsule Take 1 capsule by mouth daily        omeprazole (PRILOSEC) 20 MG CR capsule Take 1 capsule (20 mg) by mouth 2 times daily 180 capsule 3     ORDER FOR DME Auto-CPAP:Max 9 cm H2OMin 9 cm H2O  Continuous Lifetime need and heated humidity.    1 Device 0     Polyethylene Glycol 3350 (MIRALAX PO) Take 1 packet by mouth At Bedtime        simvastatin (ZOCOR) 20 MG tablet Take by mouth At Bedtime       sucralfate (CARAFATE) 1 GM tablet Take 1 tablet (1 g) by mouth 2 times daily 180 tablet 3     tamsulosin (FLOMAX) 0.4 MG capsule TAKE 1 CAPSULE (0.4 MG) BY MOUTH DAILY 90 capsule 1     torsemide (DEMADEX) 20 MG tablet Take 2 tablets (40 mg) for 2 days. Then take 1 tablet (20 mg) daily 92 tablet 3     triamcinolone (KENALOG) 0.025 % cream Apply topically 2 times daily       UNKNOWN TO PATIENT Place 1 drop Into the left eye daily EYE DROP PRESCRIBED FOR CONJUNCTIVITIS         ALLERGIES   No Known Allergies    PAST MEDICAL HISTORY:  Past Medical History:   Diagnosis Date     Acute, but ill-defined, cerebrovascular disease     NO RESIDUALS     Antiplatelet or antithrombotic long-term use      Arrhythmia     a fib     Blood in stool      Coronary atherosclerosis of unspecified type of vessel, native or graft     S/P PTCA, EF 50%     Esophageal reflux      Hydrocele, unspecified      Hypercholesterolemia      Hypersomnia with sleep apnea, unspecified      Inguinal hernia without mention of obstruction or gangrene, recurrent unilateral or unspecified      Ischemic cardiomyopathy      Major depression in complete remission (H)       Obese      Onychomycosis      JIM on CPAP      Other and unspecified hyperlipidemia      Other lymphedema     GROIN AND THIGHS     Other pancytopenia (H)      Other specified anemias      Overweight      Pacemaker      PAD (peripheral artery disease) (H)      Pancytopenia (H)      Persistent atrial fibrillation (H)     VVI PM for long pauses     Prostatic hypertrophy, benign      Pulmonary hypertension      Scrotal varices      Thrombocytopenia, unspecified      Umbilical hernia without obstruction and without gangrene      Unilateral or unspecified femoral hernia without mention of obstruction or gangrene, unilateral or unspecified      Unspecified hypothyroidism      Venous stasis dermatitis of both lower extremities      Vitamin D deficiency        PAST SURGICAL HISTORY:  Past Surgical History:   Procedure Laterality Date     EYE SURGERY      CATARACT/BLEPHAROPLASTY     GENITOURINARY SURGERY      TUNA     HERNIA REPAIR      RIGHT INGUINAL     HERNIORRHAPHY INGUINAL  8/14/2012    Procedure: HERNIORRHAPHY INGUINAL;  right inguinal hernia repair;  Surgeon: Kareem Torrez MD;  Location: [facility]     HERNIORRHAPHY UMBILICAL N/A 10/31/2017    Procedure: HERNIORRHAPHY UMBILICAL;  UMBILICAL HERNIA REPAIR WITH MESH;  Surgeon: Scar Harrington MD;  Location: [facility]     IMPLANT PACEMAKER  8/2009    single chamber     ORTHOPEDIC SURGERY      [facility] RIGHT     ORTHOPEDIC SURGERY  2010    right TKR, left TKR     SURGICAL HISTORY OF -       Thumb surgery     SURGICAL HISTORY OF -   1990s    Left total knee replacement     SURGICAL HISTORY OF -   3/11    [facility] total knee replacement       FAMILY HISTORY:  Family History   Problem Relation Age of Onset     Cardiovascular Father      C.A.D. Father      Cancer Sister      lung     Unknown/Adopted Mother      Cancer - colorectal No family hx of        SOCIAL HISTORY:  Social History     Social History     Marital status:      Spouse name: N/A     Number of children: N/A     Years of  "education: N/A     Occupational History     Teacher, author, speaker Retired     Social History Main Topics     Smoking status: Never Smoker     Smokeless tobacco: Never Used      Comment: per encounter 2/12/07     Alcohol use 0.0 oz/week     0 Standard drinks or equivalent per week      Comment: 1/month     Drug use: No     Sexual activity: No     Other Topics Concern     Parent/Sibling W/ Cabg, Mi Or Angioplasty Before 65f 55m? No     Caffeine Concern No     1-2 cups coffee a day     Sleep Concern No     Stress Concern Yes     due to wifes health condition     Weight Concern No     Special Diet No     Exercise Yes     states he uses his tredmill on and off     Seat Belt Yes     Social History Narrative       Review of Systems:  Skin:  Negative bruising dry skin    Eyes:  Positive for glasses cataract surgery on both eyes  ENT:  Negative   nosebleed   Respiratory:  Positive for sleep apnea;CPAP;dyspnea on exertion     Cardiovascular:  Negative Positive for;edema    Gastroenterology: Negative poor appetite lossing weight.  Genitourinary:  not assessed urinary frequency    Musculoskeletal:  Negative arthritis    Neurologic:  Negative stroke;numbness or tingling of feet right foot (balance problems)  Psychiatric:  Positive for sleep disturbances;anxiety;excessive stress stress due to wifes health   Heme/Lymph/Imm:  Negative weight loss;easy bruising    Endocrine:  Positive for thyroid disorder      Physical Exam:  Vitals: /66 (BP Location: Right arm, Patient Position: Chair, Cuff Size: Adult Regular)  Pulse 60  Ht 1.88 m (6' 2\")  Wt 93.9 kg (207 lb)  SpO2 96%  BMI 26.58 kg/m2    Constitutional:  cooperative;well developed        Skin:    rash        Head:  normocephalic        Eyes:  pupils equal and round        Lymph:      ENT:  no pallor or cyanosis        Neck:  JVP normal        Respiratory:  normal breath sounds, clear to auscultation, normal A-P diameter, normal symmetry, normal respiratory " excursion, no use of accessory muscles         Cardiac: regular rhythm;normal S1 and S2                                                         GI:  abdomen soft        Extremities and Muscular Skeletal:      bilateral LE edema;R greater than L;2+          Neurological:  affect appropriate        Psych:  Alert and Oriented x 3    Encounter Diagnoses   Name Primary?     Chronic diastolic congestive heart failure (H)      Ischemic cardiomyopathy Yes     Pulmonary hypertension      Hypercholesterolemia        Recent Lab Results:  LIPID RESULTS:  Lab Results   Component Value Date    CHOL 106 09/08/2017    HDL 54 09/08/2017    LDL 38 09/08/2017    TRIG 71 09/08/2017    CHOLHDLRATIO 2.1 06/15/2015       LIVER ENZYME RESULTS:  Lab Results   Component Value Date    AST 25 12/08/2015    ALT 22 09/08/2017       CBC RESULTS:  Lab Results   Component Value Date    WBC 2.8 (L) 03/21/2018    RBC 3.23 (L) 03/21/2018    HGB 10.4 (L) 04/12/2018    HCT 31.2 (L) 03/21/2018    MCV 97 03/21/2018    MCH 31.6 03/21/2018    MCHC 32.7 03/21/2018    RDW 16.3 (H) 03/21/2018    PLT 75 (L) 03/21/2018       BMP RESULTS:  Lab Results   Component Value Date     07/25/2018    POTASSIUM 4.0 07/25/2018    CHLORIDE 105 07/25/2018    CO2 30 (H) 07/25/2018    ANIONGAP 11 07/25/2018     (H) 07/25/2018    BUN 21 07/25/2018    CR 1.44 (H) 07/25/2018    GFRESTIMATED 46 (L) 07/25/2018    GFRESTBLACK 56 (L) 07/25/2018    TEODORO 9.2 07/25/2018        A1C RESULTS:  No results found for: A1C    INR RESULTS:  Lab Results   Component Value Date    INR 1.48 (H) 03/21/2018    INR 1.38 (H) 03/19/2011       Thank you for allowing me to participate in the care of your patient.    Sincerely,     WILLIS Smith [facility]

## 2018-08-07 ENCOUNTER — OFFICE VISIT (OUTPATIENT)
Dept: FAMILY MEDICINE | Facility: CLINIC | Age: 83
End: 2018-08-07
Payer: COMMERCIAL

## 2018-08-07 ENCOUNTER — TELEPHONE (OUTPATIENT)
Dept: CARDIOLOGY | Facility: CLINIC | Age: 83
End: 2018-08-07

## 2018-08-07 VITALS
RESPIRATION RATE: 16 BRPM | SYSTOLIC BLOOD PRESSURE: 120 MMHG | HEART RATE: 60 BPM | BODY MASS INDEX: 26.19 KG/M2 | WEIGHT: 204 LBS | DIASTOLIC BLOOD PRESSURE: 70 MMHG | TEMPERATURE: 97.4 F | OXYGEN SATURATION: 98 %

## 2018-08-07 DIAGNOSIS — I50.30 DIASTOLIC HEART FAILURE (H): Primary | ICD-10-CM

## 2018-08-07 DIAGNOSIS — I50.32 CHRONIC DIASTOLIC CONGESTIVE HEART FAILURE (H): Primary | ICD-10-CM

## 2018-08-07 DIAGNOSIS — L08.9 LOCAL INFECTION OF SKIN AND SUBCUTANEOUS TISSUE: ICD-10-CM

## 2018-08-07 PROCEDURE — 99214 OFFICE O/P EST MOD 30 MIN: CPT | Performed by: FAMILY MEDICINE

## 2018-08-07 RX ORDER — SPIRONOLACTONE 25 MG/1
25 TABLET ORAL DAILY
Qty: 30 TABLET | Refills: 1 | Status: SHIPPED | OUTPATIENT
Start: 2018-08-07 | End: 2019-04-02 | Stop reason: ALTCHOICE

## 2018-08-07 RX ORDER — CEPHALEXIN 500 MG/1
500 CAPSULE ORAL 3 TIMES DAILY
Qty: 15 CAPSULE | Refills: 0 | Status: SHIPPED | OUTPATIENT
Start: 2018-08-07 | End: 2019-02-15

## 2018-08-07 NOTE — PROGRESS NOTES
SUBJECTIVE:   Bairon Foster is a 87 year old male who presents to clinic today for the following health issues:      Heart Failure Follow-up    Symptoms:    Shortness of breath: none    Lower extremity edema: stable     Chest pain: No    Using more pillows than normal: No    Cough at night: No    Weight:    Checking weight daily: Yes    Weight change: none    Cardiology visits, ER/UC, or hospital admissions since last visit: None    Medication side effects: none      Amount of exercise or physical activity: azucena chi at home and [facility]    Problems taking medications regularly: No    Medication side effects: none    Diet: regular (no restrictions)            Problem list and histories reviewed & adjusted, as indicated.  Additional history: Patient was recently prescribed eplerenone and checked to see if he can get that from the [facility].  He was informed that that could be supplied but he has not received it yet.  The patient also went to the [facility] for a biopsy of a blistered lesion on his proximal left thigh.  He thinks there is some mild erythema around that.  Patient Active Problem List   Diagnosis     Atrial fibrillation (H)     Pulmonary hypertension     CAD (coronary artery disease)     BPH (benign prostatic hyperplasia)     Ischemic cardiomyopathy     Pancytopenia (H)     JIM (obstructive sleep apnea)- mild/moderate     Acute stress reaction     Health Care Home     Vitamin D deficiency     Advance Care Planning     Gastroesophageal reflux disease without esophagitis     Hydrocele, unspecified hydrocele type     Slow transit constipation     Hypercholesterolemia     Non morbid obesity due to excess calories w comorbid CAD< hi LDL      Venous stasis dermatitis of both lower extremities     Lymphedema of both lower extremities     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      Coronary artery disease due to lipid rich plaque     Acquired hypothyroidism      Onychomycosis     Overweight (BMI 25.0-29.9)     PAD (peripheral artery disease) (H)     Screening for prostate cancer     Blood in stool     Umbilical hernia without obstruction and without gangrene     Xerosis cutis     Status post coronary angiogram     AK (actinic keratosis)     Angioma of skin     Past Surgical History:   Procedure Laterality Date     EYE SURGERY      CATARACT/BLEPHAROPLASTY     GENITOURINARY SURGERY      TUNA     HERNIA REPAIR      RIGHT INGUINAL     HERNIORRHAPHY INGUINAL  8/14/2012    Procedure: HERNIORRHAPHY INGUINAL;  right inguinal hernia repair;  Surgeon: Kareem Torrez MD;  Location: [facility]     HERNIORRHAPHY UMBILICAL N/A 10/31/2017    Procedure: HERNIORRHAPHY UMBILICAL;  UMBILICAL HERNIA REPAIR WITH MESH;  Surgeon: Scar Harrington MD;  Location: [facility]     IMPLANT PACEMAKER  8/2009    single chamber     ORTHOPEDIC SURGERY      [facility] RIGHT     ORTHOPEDIC SURGERY  2010    right TKR, left TKR     SURGICAL HISTORY OF -       Thumb surgery     SURGICAL HISTORY OF -   1990s    Left total knee replacement     SURGICAL HISTORY OF -   3/11    [facility] total knee replacement       Social History   Substance Use Topics     Smoking status: Never Smoker     Smokeless tobacco: Never Used      Comment: per encounter 2/12/07     Alcohol use 0.0 oz/week     0 Standard drinks or equivalent per week      Comment: 1/month     Family History   Problem Relation Age of Onset     Cardiovascular Father      C.A.D. Father      Cancer Sister      lung     Unknown/Adopted Mother      Cancer - colorectal No family hx of            Reviewed and updated as needed this visit by clinical staff  Tobacco  Allergies  Meds  Med Hx  Surg Hx  Fam Hx  Soc Hx      Reviewed and updated as needed this visit by Provider         ROS:  Constitutional, HEENT, cardiovascular, pulmonary, gi and gu systems are negative, except as otherwise noted.    OBJECTIVE:                                                    /70 (Cuff Size: Adult  Regular)  Pulse 60  Temp 97.4  F (36.3  C) (Tympanic)  Resp 16  Wt 204 lb (92.5 kg)  SpO2 98%  BMI 26.19 kg/m2  Body mass index is 26.19 kg/(m^2).  GENERAL APPEARANCE: healthy, alert and no distress  RESP: lungs clear to auscultation - no rales, rhonchi or wheezes  EXT: Trace edema in both lower extremities.  Compression stockings are working well.  SKIN: At the biopsy site on the proximal left thigh there is about 2 cm of surrounding erythema.       ASSESSMENT/PLAN:                                                        ICD-10-CM    1. Chronic diastolic congestive heart failure (H) I50.32 ASPIRIN NOT PRESCRIBED (INTENTIONAL)   2. Local infection of skin and subcutaneous tissue L08.9 cephALEXin (KEFLEX) 500 MG capsule    biopsy site left thigh       Patient Instructions   I placed the patient on Keflex for the next 5 days 1-3 times daily.  I did not want to change too many variables so I did not add back Spironolactone at present.  I would prefer to have him get on the eplerenone and then see how he does with that before changing any other variables.  Patient was agreeable with plan.  He has an appointment in a couple of weeks with cardiology.  Will follow up with us in about a month.      Mg Nelson MD  [facility]

## 2018-08-07 NOTE — TELEPHONE ENCOUNTER
[facility] advising that E[facility]one is a restricted medication and that patient will need to choose from an alternative. RN reviewed 7/25/18 OV note ([facility] Anushka Tejada) and it states the following:      I would like him to start eplerenone 25 mg daily.  I am not sure how affordable this medication will be, and so [facility].  If it is affordable then will repeat labs in 1-2 weeks.  However if it is unaffordable and we discussed he that he could start back on spironolactone, but a lower dose of 25 mg to avoid the nipple tenderness        RN returned fax with OV note, new rx and advisement that patient to start spironolactone 25mg daily.     RN called patient and left VM advising him of medication change and rationale. RN advised patient to callback with any questions. RN confirmed with patient in VM that he has scheduled f/u apt with labs on 8/22/18 1:30pm for lab work and 2:30pm with [facility] Anushka Tejada.

## 2018-08-07 NOTE — MR AVS SNAPSHOT
After Visit Summary   8/7/2018    Bairon Foster    MRN: [id number]           Patient Information     Date Of Birth          2/6/1931        Visit Information        Provider Department      8/7/2018 2:30 PM [facility]        Today's Diagnoses     Chronic diastolic congestive heart failure (H)    -  1    Local infection of skin and subcutaneous tissue          Care Instructions    I placed the patient on Keflex for the next 5 days 1-3 times daily.  I did not want to change too many variables so I did not add back Spironolactone at present.  I would prefer to have him get on the eplerenone and then see how he does with that before changing any other variables.  Patient was agreeable with plan.  He has an appointment in a couple of weeks with cardiology.  Will follow up with us in about a month.          Follow-ups after your visit        Your next 10 appointments already scheduled     Aug 22, 2018  1:30 PM CDT   LAB with ARRIETA LAB   [facility] [place] ([facility])    [address] [place] [id number] [phone]           Please do not eat 10-12 hours before your appointment if you are coming in fasting for labs on lipids, cholesterol, or glucose (sugar). This does not apply to pregnant women. Water, hot tea and black coffee (with nothing added) are okay. Do not drink other fluids, diet soda or chew gum.            Aug 22, 2018  2:30 PM CDT   Return Visit with WILLIS Spence CNP   [facility] ([facility])    [address] [place] [id number] [phone] OPT 2            Sep 10, 2018  2:30 PM CDT   Office Visit with Mg Nelson MD   [facility] ([facility])    [address] [address]    [phone]           Bring a current list of meds and any records pertaining to this visit. For Physicals, please bring immunization records and any forms needing to be filled out. Please arrive 10 minutes early to complete paperwork.            Sep 20, 2018 10:15 AM CDT   Phone Device Check with ARRIETA TECH2   [facility]   [place] ([facility])    [address] [place], [place] [address]  [place] [address] [phone] OPT 2              Who to contact     If you have questions or need follow up information about today's clinic visit or your schedule please contact [facility] directly at [phone].  Normal or non-critical lab and imaging results will be communicated to you by [employer], letter or phone within 4 business days after the clinic has received the results. If you do not hear from us within 7 days, please contact the clinic through [employer] or phone. If you have a critical or abnormal lab result, we will notify you by phone as soon as possible.  Submit refill requests through Xanga or call your pharmacy and they will forward the refill request to us. Please allow 3 business days for your refill to be completed.          Additional Information About Your Visit        [employer] Information     Xanga gives you secure access to your electronic health record. If you see a primary care provider, you can also send messages to your care team and make appointments. If you have questions, please call your primary care clinic.  If you do not have a primary care provider, please call [phone] and they will assist you.        Care EveryWhere ID     This is your Care EveryWhere ID. This could be used by other organizations to access your [place] medical records  [id number]        Your Vitals Were     Pulse Temperature Respirations Pulse Oximetry BMI (Body Mass Index)       60 97.4  F (36.3  C) (Tympanic) 16 98% 26.19 kg/m2        Blood Pressure from  Last 3 Encounters:   08/07/18 120/70   07/25/18 112/66   07/24/18 110/60    Weight from Last 3 Encounters:   08/07/18 204 lb (92.5 kg)   07/25/18 207 lb (93.9 kg)   07/24/18 206 lb (93.4 kg)              Today, you had the following     No orders found for display         Today's Medication Changes          These changes are accurate as of 8/7/18 11:59 PM.  If you have any questions, ask your nurse or doctor.               Start taking these medicines.        Dose/Directions    cephALEXin 500 MG capsule   Commonly known as:  KEFLEX   Used for:  Local infection of skin and subcutaneous tissue   Started by:  Mg Nelson MD        Dose:  500 mg   Take 1 capsule (500 mg) by mouth 3 times daily for 5 days   Quantity:  15 capsule   Refills:  0       spironolactone 25 MG tablet   Commonly known as:  ALDACTONE   Used for:  Diastolic heart failure (H)   Started by:  Bebeto Sahni RN        Dose:  25 mg   Take 1 tablet (25 mg) by mouth daily   Quantity:  30 tablet   Refills:  1         Stop taking these medicines if you haven't already. Please contact your care team if you have questions.     eplerenone 25 MG tablet   Commonly known as:  INSPRA   Stopped by:  Bebeto Sahni RN                Where to get your medicines      These medications were sent to [facility] [address] [address]     Phone:  [phone]     cephALEXin 500 MG capsule         Some of these will need a paper prescription and others can be bought over the counter.  Ask your nurse if you have questions.     Bring a paper prescription for each of these medications     spironolactone 25 MG tablet                Primary Care Provider Office Phone # Fax #    Mg Nelson [phone]       [address]  [facility] [address]        Goals        General    I will find a caregiver support group to attend (pt-stated)     Notes - Note created   4/17/2014  8:49 AM by Beth Morales, RN    As of today's date 4/17/2014 goal is met at 0 - 25%.   Goal Status:  Active        Equal Access to Services     EVANGELIST PETERSON : Leydi silvestre padilla macrina Viramontes, waganeshda luqadaha, qaybta kaafiada felicitas, kathryn acevedo christinekurt farris laLoydlaura . So [facility] [phone].    ATENCIÓN: Si habla español, tiene a malone disposición servicios gratuitos de asistencia lingüística. Llame al [phone].    We comply with applicable federal civil rights laws and [place] laws. We do not discriminate on the basis of race, color, national origin, age, disability, sex, sexual orientation, or gender identity.            Thank you!     Thank you for choosing [facility]  for your care. Our goal is always to provide you with excellent care. Hearing back from our patients is one way we can continue to improve our services. Please take a few minutes to complete the written survey that you may receive in the mail after your visit with us. Thank you!             Your Updated Medication List - Protect others around you: Learn how to safely use, store and throw away your medicines at www.disposemymeds.org.          This list is accurate as of 8/7/18 11:59 PM.  Always use your most recent med list.                   Brand Name Dispense Instructions for use Diagnosis    ASPIRIN NOT PRESCRIBED    INTENTIONAL    0 each    Please choose reason not prescribed, below    Chronic diastolic congestive heart failure (H)       Carboxymethylcellulose Sod PF 1 % ophthalmic solution    CELLUVISC/REFRESH LIQUIGEL     Place 1 drop into both eyes 3 times daily as needed for dry eyes        carvedilol 6.25 MG tablet    COREG    180 tablet    Take 1 tablet (6.25 mg) by mouth 2 times daily (with meals)    Ischemic cardiomyopathy       CENTRUM SILVER per tablet      Take 1 tablet by mouth daily        cephALEXin 500 MG capsule    KEFLEX    15 capsule    Take 1 capsule (500 mg) by mouth 3 times  daily for 5 days    Local infection of skin and subcutaneous tissue       COMPRESSION STOCKINGS     2 each    1 each daily    Varicose 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       finasteride 5 MG tablet    PROSCAR    90 tablet    TAKE 1 TABLET EVERY DAY    Benign prostatic hyperplasia, unspecified whether lower urinary tract symptoms present       GLUCOSAMINE-CHONDROITIN PO      Take 2 tablets by mouth daily        isosorbide mononitrate 30 MG 24 hr tablet    IMDUR    90 tablet    Take 1 tablet (30 mg) by mouth daily    SOB (shortness of breath)       ketotifen 0.025 % Soln ophthalmic solution    ZADITOR    1 Bottle    Place 1 drop into both eyes every 12 hours        levothyroxine 100 MCG tablet    SYNTHROID/LEVOTHROID    90 tablet    Take 1 tablet (100 mcg) by mouth daily    Acquired hypothyroidism       LORazepam 1 MG tablet    ATIVAN    30 tablet    Take 0.5-1 tablets (0.5-1 mg) by mouth every 8 hours as needed for anxiety    Acute stress reaction       MIRALAX PO      Take 1 packet by mouth At Bedtime        omega-3 fatty acids 1200 MG capsule      Take 1 capsule by mouth daily        omeprazole 20 MG CR capsule    priLOSEC    180 capsule    Take 1 capsule (20 mg) by mouth 2 times daily    Gastroesophageal reflux disease without esophagitis       order for DME     1 Device    Auto-CPAP:Max 9 cm H2OMin 9 cm H2O Continuous Lifetime need and heated humidity.    Obstructive sleep apnea (adult) (pediatric), Other dyspnea and respiratory abnormality       PRADAXA ANTICOAGULANT 75 MG capsule   Generic drug:  dabigatran ANTICOAGULANT      Take 150 mg by mouth 2 times daily. Store in original 's bottle or blister pack; use within 120 days of opening.        simvastatin 20 MG tablet    ZOCOR     Take by mouth At Bedtime        spironolactone 25 MG tablet    ALDACTONE    30 tablet    Take 1 tablet (25 mg) by mouth daily    Diastolic heart failure (H)       sucralfate 1 GM tablet    CARAFATE    180  tablet    Take 1 tablet (1 g) by mouth 2 times daily    Esophageal reflux       tamsulosin 0.4 MG capsule    FLOMAX    90 capsule    TAKE 1 CAPSULE (0.4 MG) BY MOUTH DAILY    Benign prostatic hyperplasia with urinary hesitancy       torsemide 20 MG tablet    DEMADEX    92 tablet    Take 2 tablets (40 mg) for 2 days. Then take 1 tablet (20 mg) daily    Chronic diastolic congestive heart failure (H)       triamcinolone 0.025 % cream    KENALOG     Apply topically 2 times daily        UNKNOWN TO PATIENT      Place 1 drop Into the left eye daily EYE DROP PRESCRIBED FOR CONJUNCTIVITIS        WELLBUTRIN XL PO      Take 150 mg by mouth every morning

## 2018-08-08 NOTE — PATIENT INSTRUCTIONS
I placed the patient on Keflex for the next 5 days 1-3 times daily.  I did not want to change too many variables so I did not add back Spironolactone at present.  I would prefer to have him get on the eplerenone and then see how he does with that before changing any other variables.  Patient was agreeable with plan.  He has an appointment in a couple of weeks with cardiology.  Will follow up with us in about a month.

## 2018-08-17 ENCOUNTER — TELEPHONE (OUTPATIENT)
Dept: CARDIOLOGY | Facility: CLINIC | Age: 83
End: 2018-08-17

## 2018-08-17 NOTE — TELEPHONE ENCOUNTER
Patient has called multiple times and left VM's inquiring about a medication (RN unable to answer phone at that time due to being on another line). RN returned all of patient's calls and left VM's advising patient to call clinic to discuss further or leave a detailed VM with his need and we would be happy to assist him.

## 2018-08-22 ENCOUNTER — OFFICE VISIT (OUTPATIENT)
Dept: CARDIOLOGY | Facility: CLINIC | Age: 83
End: 2018-08-22
Attending: NURSE PRACTITIONER
Payer: COMMERCIAL

## 2018-08-22 VITALS
HEIGHT: 74 IN | WEIGHT: 196.6 LBS | SYSTOLIC BLOOD PRESSURE: 100 MMHG | DIASTOLIC BLOOD PRESSURE: 54 MMHG | BODY MASS INDEX: 25.23 KG/M2 | HEART RATE: 60 BPM

## 2018-08-22 DIAGNOSIS — I50.32 CHRONIC DIASTOLIC CONGESTIVE HEART FAILURE (H): ICD-10-CM

## 2018-08-22 DIAGNOSIS — I25.5 ISCHEMIC CARDIOMYOPATHY: ICD-10-CM

## 2018-08-22 LAB
ANION GAP SERPL CALCULATED.3IONS-SCNC: 11 MMOL/L (ref 6–17)
BUN SERPL-MCNC: 24 MG/DL (ref 7–30)
CALCIUM SERPL-MCNC: 9.5 MG/DL (ref 8.5–10.5)
CHLORIDE SERPL-SCNC: 105 MMOL/L (ref 98–107)
CO2 SERPL-SCNC: 31 MMOL/L (ref 23–29)
CREAT SERPL-MCNC: 1.61 MG/DL (ref 0.7–1.3)
GFR SERPL CREATININE-BSD FRML MDRD: 41 ML/MIN/1.7M2
GLUCOSE SERPL-MCNC: 105 MG/DL (ref 70–105)
POTASSIUM SERPL-SCNC: 4 MMOL/L (ref 3.5–5.1)
SODIUM SERPL-SCNC: 143 MMOL/L (ref 136–145)

## 2018-08-22 PROCEDURE — 99214 OFFICE O/P EST MOD 30 MIN: CPT | Performed by: NURSE PRACTITIONER

## 2018-08-22 PROCEDURE — 80048 BASIC METABOLIC PNL TOTAL CA: CPT | Performed by: NURSE PRACTITIONER

## 2018-08-22 PROCEDURE — 36415 COLL VENOUS BLD VENIPUNCTURE: CPT | Performed by: NURSE PRACTITIONER

## 2018-08-22 RX ORDER — TORSEMIDE 10 MG/1
10 TABLET ORAL DAILY
COMMUNITY
Start: 2018-08-22 | End: 2019-02-27

## 2018-08-22 NOTE — MR AVS SNAPSHOT
After Visit Summary   8/22/2018    Bairon Foster    MRN: [id number]           Patient Information     Date Of Birth          2/6/1931        Visit Information        Provider Department      8/22/2018 2:30 PM Mackenzie Tejada APRN CNP [facility]        Today's Diagnoses     Chronic diastolic congestive heart failure (H)        Ischemic cardiomyopathy          Care Instructions    Decrease torsemide to 10 mg daily (cut your pill in half).    Start spironolactone 25 mg daily (check your bottle, you may need to cut this in half too.    Check labs next Wednesday, I ordered it, you can check it at Dr. VILLAGOMEZ's office.    Monitor your weight, call my nurse Thursday and let them know what your weight is.    Anushka  [phone]              Follow-ups after your visit        Your next 10 appointments already scheduled     Sep 10, 2018  2:30 PM CDT   Office Visit with Mg Nelson MD   [facility] ([facility])    [address]  [facility] [id number] [phone]           Bring a current list of meds and any records pertaining to this visit. For Physicals, please bring immunization records and any forms needing to be filled out. Please arrive 10 minutes early to complete paperwork.            Sep 20, 2018 10:15 AM CDT   Phone Device Check with BERNY LOPEZ   [facility] ([facility])    [address]  [facility] [id number] [phone] OPT 2              Future tests that were ordered for you today     Open Future Orders        Priority Expected Expires Ordered    Basic metabolic panel Routine 8/29/2018 8/22/2019 8/22/2018            Who to contact     If you have questions or need follow up information about today's clinic visit or your schedule please contact [facility]    "SUSHMA directly at [phone].  Normal or non-critical lab and imaging results will be communicated to you by MyChart, letter or phone within 4 business days after the clinic has received the results. If you do not hear from us within 7 days, please contact the clinic through [employer] or phone. If you have a critical or abnormal lab result, we will notify you by phone as soon as possible.  Submit refill requests through [employer] on Screen or call your pharmacy and they will forward the refill request to us. Please allow 3 business days for your refill to be completed.          Additional Information About Your Visit        [employer] Information     [employer] gives you secure access to your electronic health record. If you see a primary care provider, you can also send messages to your care team and make appointments. If you have questions, please call your primary care clinic.  If you do not have a primary care provider, please call [phone] and they will assist you.        Care EveryWhere ID     This is your Care EveryWhere ID. This could be used by other organizations to access your [place] medical records  [id number]        Your Vitals Were     Pulse Height BMI (Body Mass Index)             60 1.88 m (6' 2\") 25.24 kg/m2          Blood Pressure from Last 3 Encounters:   08/22/18 100/54   08/07/18 120/70   07/25/18 112/66    Weight from Last 3 Encounters:   08/22/18 89.2 kg (196 lb 9.6 oz)   08/07/18 92.5 kg (204 lb)   07/25/18 93.9 kg (207 lb)              We Performed the Following     Follow-Up with Cardiac Advanced Practice Provider          Today's Medication Changes          These changes are accurate as of 8/22/18  3:12 PM.  If you have any questions, ask your nurse or doctor.               These medicines have changed or have updated prescriptions.        Dose/Directions    torsemide 10 MG tablet   Commonly known as:  DEMADEX   This may have changed:    - medication strength  - how much to take  - how to take " this  - when to take this  - additional instructions   Used for:  Chronic diastolic congestive heart failure (H)   Changed by:  Mackenzie Tejada APRN CNP        Dose:  10 mg   Take 1 tablet (10 mg) by mouth daily   Refills:  0                Primary Care Provider Office Phone # Fax #    Mg Bairon Nelson [phone]       [address]  [facility] [address]        Goals        General    I will find a caregiver support group to attend (pt-stated)     Notes - Note created  4/17/2014  8:49 AM by Beth Morales, RN    As of today's date 4/17/2014 goal is met at 0 - 25%.   Goal Status:  Active        Equal Access to Services     EVANGELIST [facility] HospitalSHAKIRA : Hadii silvestre sterlingo Sosuman, waaxda luqadaha, qaybta kaalmada adeegyada, kathryn camilo . So [facility] [phone].    ATENCIÓN: Si habla español, tiene a malone disposición servicios gratuitos de asistencia lingüística. [facility] [phone].    We comply with applicable federal civil rights laws and [place] laws. We do not discriminate on the basis of race, color, national origin, age, disability, sex, sexual orientation, or gender identity.            Thank you!     Thank you for choosing [facility]  for your care. Our goal is always to provide you with excellent care. Hearing back from our patients is one way we can continue to improve our services. Please take a few minutes to complete the written survey that you may receive in the mail after your visit with us. Thank you!             Your Updated Medication List - Protect others around you: Learn how to safely use, store and throw away your medicines at www.disposemymeds.org.          This list is accurate as of 8/22/18  3:12 PM.  Always use your most recent med list.                   Brand Name Dispense Instructions for use Diagnosis    ASPIRIN NOT PRESCRIBED    INTENTIONAL    0 each    Please choose reason not prescribed,  below    Chronic diastolic congestive heart failure (H)       Carboxymethylcellulose Sod PF 1 % ophthalmic solution    CELLUVISC/REFRESH LIQUIGEL     Place 1 drop into both eyes 3 times daily as needed for dry eyes        carvedilol 6.25 MG tablet    COREG    180 tablet    Take 1 tablet (6.25 mg) by mouth 2 times daily (with meals)    Ischemic cardiomyopathy       CENTRUM SILVER per tablet      Take 1 tablet by mouth daily        COMPRESSION STOCKINGS     2 each    1 each daily    Varicose 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       finasteride 5 MG tablet    PROSCAR    90 tablet    TAKE 1 TABLET EVERY DAY    Benign prostatic hyperplasia, unspecified whether lower urinary tract symptoms present       GLUCOSAMINE-CHONDROITIN PO      Take 2 tablets by mouth daily        isosorbide mononitrate 30 MG 24 hr tablet    IMDUR    90 tablet    Take 1 tablet (30 mg) by mouth daily    SOB (shortness of breath)       ketotifen 0.025 % Soln ophthalmic solution    ZADITOR    1 Bottle    Place 1 drop into both eyes every 12 hours        levothyroxine 100 MCG tablet    SYNTHROID/LEVOTHROID    90 tablet    Take 1 tablet (100 mcg) by mouth daily    Acquired hypothyroidism       LORazepam 1 MG tablet    ATIVAN    30 tablet    Take 0.5-1 tablets (0.5-1 mg) by mouth every 8 hours as needed for anxiety    Acute stress reaction       MIRALAX PO      Take 1 packet by mouth At Bedtime        omega-3 fatty acids 1200 MG capsule      Take 1 capsule by mouth daily        omeprazole 20 MG CR capsule    priLOSEC    180 capsule    Take 1 capsule (20 mg) by mouth 2 times daily    Gastroesophageal reflux disease without esophagitis       order for DME     1 Device    Auto-CPAP:Max 9 cm H2OMin 9 cm H2O Continuous Lifetime need and heated humidity.    Obstructive sleep apnea (adult) (pediatric), Other dyspnea and respiratory abnormality       PRADAXA ANTICOAGULANT 75 MG capsule   Generic drug:  dabigatran ANTICOAGULANT      Take 150 mg by  mouth 2 times daily. Store in original 's bottle or blister pack; use within 120 days of opening.        simvastatin 20 MG tablet    ZOCOR     Take by mouth At Bedtime        spironolactone 25 MG tablet    ALDACTONE    30 tablet    Take 1 tablet (25 mg) by mouth daily    Diastolic heart failure (H)       sucralfate 1 GM tablet    CARAFATE    180 tablet    Take 1 tablet (1 g) by mouth 2 times daily    Esophageal reflux       tamsulosin 0.4 MG capsule    FLOMAX    90 capsule    TAKE 1 CAPSULE (0.4 MG) BY MOUTH DAILY    Benign prostatic hyperplasia with urinary hesitancy       torsemide 10 MG tablet    DEMADEX     Take 1 tablet (10 mg) by mouth daily    Chronic diastolic congestive heart failure (H)       triamcinolone 0.025 % cream    KENALOG     Apply topically 2 times daily        UNKNOWN TO PATIENT      Place 1 drop Into the left eye daily EYE DROP PRESCRIBED FOR CONJUNCTIVITIS        WELLBUTRIN XL PO      Take 150 mg by mouth every morning

## 2018-08-22 NOTE — PATIENT INSTRUCTIONS
Decrease torsemide to 10 mg daily (cut your pill in half).    Start spironolactone 25 mg daily (check your bottle, you may need to cut this in half too.    Check labs next Wednesday, I ordered it, you can check it at Dr. VILLAGOMEZ's office.    Monitor your weight, call my nurse Thursday and let them know what your weight is.    Anushka  [phone]

## 2018-08-22 NOTE — PROGRESS NOTES
HPI and Plan:   I had the pleasure of seeing Bairon Foster today in cardiology clinic follow up. He is a pleasant 87 year old patient of Dr. Hope and Dr. Carrizales who I have been following for labile lower extremity edema due to venous insufficiency and diastolic heart failure with 1-2+ tricuspid regurgitation and 3+ mitral insufficiency who is status post bilateral GSV ablations from which he initially did quite well.     Mr. Foster has a history of coronary artery disease, atrial fibrillation with a single chamber pacemaker and anticoagulated with pardaxa, ventricular tachycardia and mitral insufficiency.  His echocardiogram in April 2018 showed a EF of 53%, 3+ mitral regurgitation, 1-2+ tricuspid regurgitation and RVSP of 23+ right atrial pressure.  His coronary angiogram in March of this year showed no flow-limiting lesions.  He has single-vessel coronary artery disease with a stent in 2009 placing his OM.  His LV end-diastolic pressure during his coronary angiogram was 23 mmHg and he was started on Spironolactone.  Following the coronary angiogram he developed heart  failure symptoms, with diuretic adjustment he lost about 30 pounds.  Unfortunately on 50 of spironolactone he developed nipple tenderness which resolved when he discontinued it.  However when he discontinued spironolactone he gained 12 pounds in 4 weeks.  He has had rash for which [facility] and was finally diagnosed with Bullous Pemphigoid, his dermatologist has recommended spironolactone 25 mg.    When I saw him in June I tried to escalate his Lasix which did not result in weight loss, subsequently switched him to torsemide but again he did not have any significant weight loss.  I recommended eleperone, but this is unaffordable to [facility] through [facility].  Therefore over the phone and recommended he retry spironolactone but a lower dose to 25 mg, at this dose he did not have the previous noted nipple  tenderness.  He has however not started the spironolactone yet.  He has been continued on torsemide 20 mg daily.  He has had a 10 pound weight  loss which he attributes to the torsemide.  Unfortunately his creatinine has been increasing.  He denies any chest pain or shortness of breath.  He recently moved to a house with his wife, they have been living in some sort of assisted living facility which he did not like.  He is much happier with his new arrangement.      Labs Reviewed: Sodium 143, potassium 4.0, creatinine 1.61    Physical Exam  Please see Below     Assessment and Plan  1.  Diastolic heart failure.  He is really struggled since March with his weight and finding the right diuretic combination for him.  Initially he did really well on spironolactone 25 mg with Lasix, and that was increased to 50 mg he developed nipple tenderness and regained 12 pounds when he discontinued the Spironolactone.  We then tried switching him to torsemide and initially he had no significant change but he is now lost about 10 pounds and has had improvement in his lower extremity edema.  He has a skin condition now which is dermatologist wants him to treat with Spironolactone, and when eleperone was not [facility], I had recommended he go back to, but he has not yet.  His creatinine is starting to rise.  My recommendation is that we restart spironolactone at 25 mg daily.  Because of the rising creatinine I am going to decrease his torsemide to 10 mg daily.  I will recheck his labs next Wednesday and have asked him to call my nurse on Thursdays to see what his weight response is been.  At that point will make follow-up recommendations for him in our clinic.  I will be out of the office from most of September.  He has a previously scheduled appointment with his primary care provider Dr. Nelson in early September.  He is not especially symptomatic with his heart failure, his main symptom is lower extremity edema.  He  continues on carvedilol 6.25 twice daily, isosorbide 30 mg daily.  2.  Coronary artery disease.  His most recent angiogram was in March 2018, he had no flow-limiting disease.  He continues on good medical management with statin, aspirin, beta blockade and isosorbide.  3.  Permanent atrial fibrillation.  He is due for device check next month.  His last check showed A. fib with slow ventricular response in the 40s.  His estimated battery life was 1-1/2 years.  He continues to be rate controlled and anticoagulated with Pradaxa.  He does have a pacemaker for his history of asystole.  4.   Mitral regurgitation.His last echocardiogram was in March, this showed progression of his regurgitation from moderate to severe.  He also has tricuspid regurgitation.  I do wonder if this is starting to be more symptomatic for him and that is why he is having a difficult and labile weight changes.  We may need to refer him to the [facility] in the future.     Thank you for allowing me to care for Bairon Foster today.    WILLIS Bullard, CNP  Cardiology    Voice recognition software was used for this note, I have reviewed this note, but errors may have been missed.    Orders Placed This Encounter   Procedures     Basic metabolic panel     Orders Placed This Encounter   Medications     torsemide (DEMADEX) 10 MG tablet     Sig: Take 1 tablet (10 mg) by mouth daily     Medications Discontinued During This Encounter   Medication Reason     torsemide (DEMADEX) 20 MG tablet Reorder         CURRENT MEDICATIONS:  Current Outpatient Prescriptions   Medication Sig Dispense Refill     ASPIRIN NOT PRESCRIBED (INTENTIONAL) Please choose reason not prescribed, below 0 each 0     BuPROPion HCl (WELLBUTRIN XL PO) Take 150 mg by mouth every morning       Carboxymethylcellulose Sod PF (CELLUVISC/REFRESH LIQUIGEL) 1 % ophthalmic solution Place 1 drop into both eyes 3 times daily as needed for dry eyes       carvedilol (COREG) 6.25 MG tablet Take  1 tablet (6.25 mg) by mouth 2 times daily (with meals) 180 tablet 3     COMPRESSION STOCKINGS 1 each daily 2 each 0     dabigatran ANTICOAGULANT (PRADAXA ANTICOAGULANT) 75 MG CAPS BLISTER PACK Take 150 mg by mouth 2 times daily. Store in original 's bottle or blister pack; use within 120 days of opening.       finasteride (PROSCAR) 5 MG tablet TAKE 1 TABLET EVERY DAY 90 tablet 3     GLUCOSAMINE-CHONDROITIN PO Take 2 tablets by mouth daily       isosorbide mononitrate (IMDUR) 30 MG 24 hr tablet Take 1 tablet (30 mg) by mouth daily 90 tablet 3     ketotifen (ZADITOR) 0.025 % SOLN ophthalmic solution Place 1 drop into both eyes every 12 hours 1 Bottle      levothyroxine (SYNTHROID/LEVOTHROID) 100 MCG tablet Take 1 tablet (100 mcg) by mouth daily 90 tablet 3     LORazepam (ATIVAN) 1 MG tablet Take 0.5-1 tablets (0.5-1 mg) by mouth every 8 hours as needed for anxiety 30 tablet 5     Multiple Vitamins-Minerals (CENTRUM SILVER) per tablet Take 1 tablet by mouth daily       Omega-3 Fatty Acids (FISH OIL) 1200 MG capsule Take 1 capsule by mouth daily        omeprazole (PRILOSEC) 20 MG CR capsule Take 1 capsule (20 mg) by mouth 2 times daily 180 capsule 3     ORDER FOR DME Auto-CPAP:Max 9 cm H2OMin 9 cm H2O  Continuous Lifetime need and heated humidity.    1 Device 0     Polyethylene Glycol 3350 (MIRALAX PO) Take 1 packet by mouth At Bedtime        simvastatin (ZOCOR) 20 MG tablet Take by mouth At Bedtime       spironolactone (ALDACTONE) 25 MG tablet Take 1 tablet (25 mg) by mouth daily 30 tablet 1     sucralfate (CARAFATE) 1 GM tablet Take 1 tablet (1 g) by mouth 2 times daily 180 tablet 3     tamsulosin (FLOMAX) 0.4 MG capsule TAKE 1 CAPSULE (0.4 MG) BY MOUTH DAILY 90 capsule 1     torsemide (DEMADEX) 10 MG tablet Take 1 tablet (10 mg) by mouth daily       triamcinolone (KENALOG) 0.025 % cream Apply topically 2 times daily       UNKNOWN TO PATIENT Place 1 drop Into the left eye daily EYE DROP PRESCRIBED FOR  CONJUNCTIVITIS       [DISCONTINUED] torsemide (DEMADEX) 20 MG tablet Take 2 tablets (40 mg) for 2 days. Then take 1 tablet (20 mg) daily 92 tablet 3       ALLERGIES   No Known Allergies    PAST MEDICAL HISTORY:  Past Medical History:   Diagnosis Date     Acute, but ill-defined, cerebrovascular disease     NO RESIDUALS     Antiplatelet or antithrombotic long-term use      Arrhythmia     a fib     Blood in stool      Coronary atherosclerosis of unspecified type of vessel, native or graft     S/P PTCA, EF 50%     Esophageal reflux      Hydrocele, unspecified      Hypercholesterolemia      Hypersomnia with sleep apnea, unspecified      Inguinal hernia without mention of obstruction or gangrene, recurrent unilateral or unspecified      Ischemic cardiomyopathy      Major depression in complete remission (H)      Obese      Onychomycosis      JIM on CPAP      Other and unspecified hyperlipidemia      Other lymphedema     GROIN AND THIGHS     Other pancytopenia (H)      Other specified anemias      Overweight      Pacemaker      PAD (peripheral artery disease) (H)      Pancytopenia (H)      Persistent atrial fibrillation (H)     VVI PM for long pauses     Prostatic hypertrophy, benign      Pulmonary hypertension      Scrotal varices      Thrombocytopenia, unspecified      Umbilical hernia without obstruction and without gangrene      Unilateral or unspecified femoral hernia without mention of obstruction or gangrene, unilateral or unspecified      Unspecified hypothyroidism      Venous stasis dermatitis of both lower extremities      Vitamin D deficiency        PAST SURGICAL HISTORY:  Past Surgical History:   Procedure Laterality Date     EYE SURGERY      CATARACT/BLEPHAROPLASTY     GENITOURINARY SURGERY      TUNA     HERNIA REPAIR      RIGHT INGUINAL     HERNIORRHAPHY INGUINAL  8/14/2012    Procedure: HERNIORRHAPHY INGUINAL;  right inguinal hernia repair;  Surgeon: Kareem Torrez MD;  Location: [facility]     HERNIORRHAPHY  UMBILICAL N/A 10/31/2017    Procedure: HERNIORRHAPHY UMBILICAL;  UMBILICAL HERNIA REPAIR WITH MESH;  Surgeon: Scar Harrington MD;  Location:  [facility]     IMPLANT PACEMAKER  8/2009    single chamber     ORTHOPEDIC SURGERY      [facility] RIGHT     ORTHOPEDIC SURGERY  2010    right TKR, left TKR     SURGICAL HISTORY OF -       Thumb surgery     SURGICAL HISTORY OF -   1990s    Left total knee replacement     SURGICAL HISTORY OF -   3/11    Righ total knee replacement       FAMILY HISTORY:  Family History   Problem Relation Age of Onset     Cardiovascular Father      C.A.D. Father      Cancer Sister      lung     Unknown/Adopted Mother      Cancer - colorectal No family hx of        SOCIAL HISTORY:  Social History     Social History     Marital status:      Spouse name: N/A     Number of children: N/A     Years of education: N/A     Occupational History     Teacher, author, speaker Retired     Social History Main Topics     Smoking status: Never Smoker     Smokeless tobacco: Never Used      Comment: per encounter 2/12/07     Alcohol use 0.0 oz/week     0 Standard drinks or equivalent per week      Comment: 1/month     Drug use: No     Sexual activity: No     Other Topics Concern     Parent/Sibling W/ Cabg, Mi Or Angioplasty Before 65f 55m? No     Caffeine Concern No     1-2 cups coffee a day     Sleep Concern No     Stress Concern Yes     due to wifes health condition     Weight Concern No     Special Diet No     Exercise Yes     states he uses his tredmill on and off     Seat Belt Yes     Social History Narrative       Review of Systems:  Skin:  Negative       Eyes:  Positive for glasses cataract surgery on both eyes  ENT:  Negative      Respiratory:  Positive for sleep apnea;CPAP;dyspnea on exertion     Cardiovascular:    Positive for;edema (pt feels he has poor balance)    Gastroenterology: Positive for poor appetite lossing weight.  Genitourinary:  Negative      Musculoskeletal:  Negative      Neurologic:  Positive  "for stroke;numbness or tingling of feet right foot  Psychiatric:  Positive for sleep disturbances;anxiety;excessive stress stress due to wifes health   Heme/Lymph/Imm:  Negative      Endocrine:  Positive for thyroid disorder      Physical Exam:  Vitals: /54  Pulse 60  Ht 1.88 m (6' 2\")  Wt 89.2 kg (196 lb 9.6 oz)  BMI 25.24 kg/m2    Constitutional:  cooperative;well developed        Skin:    rash        Head:  normocephalic        Eyes:  pupils equal and round        Lymph:      ENT:  no pallor or cyanosis        Neck:  JVP normal        Respiratory:  normal breath sounds, clear to auscultation, normal A-P diameter, normal symmetry, normal respiratory excursion, no use of accessory muscles         Cardiac: regular rhythm;normal S1 and S2                                                         GI:  abdomen soft        Extremities and Muscular Skeletal:      bilateral LE edema;R greater than L;1+     wearing compression stockings    Neurological:  affect appropriate        Psych:  Alert and Oriented x 3    Encounter Diagnoses   Name Primary?     Chronic diastolic congestive heart failure (H)      Ischemic cardiomyopathy        Recent Lab Results:  LIPID RESULTS:  Lab Results   Component Value Date    CHOL 106 09/08/2017    HDL 54 09/08/2017    LDL 38 09/08/2017    TRIG 71 09/08/2017    CHOLHDLRATIO 2.1 06/15/2015       LIVER ENZYME RESULTS:  Lab Results   Component Value Date    AST 25 12/08/2015    ALT 22 09/08/2017       CBC RESULTS:  Lab Results   Component Value Date    WBC 2.8 (L) 03/21/2018    RBC 3.23 (L) 03/21/2018    HGB 10.4 (L) 04/12/2018    HCT 31.2 (L) 03/21/2018    MCV 97 03/21/2018    MCH 31.6 03/21/2018    MCHC 32.7 03/21/2018    RDW 16.3 (H) 03/21/2018    PLT 75 (L) 03/21/2018       BMP RESULTS:  Lab Results   Component Value Date     08/22/2018    POTASSIUM 4.0 08/22/2018    CHLORIDE 105 08/22/2018    CO2 31 (H) 08/22/2018    ANIONGAP 11 08/22/2018     08/22/2018    BUN 24 " 08/22/2018    CR 1.61 (H) 08/22/2018    GFRESTIMATED 41 (L) 08/22/2018    GFRESTBLACK 49 (L) 08/22/2018    TEODORO 9.5 08/22/2018        A1C RESULTS:  No results found for: A1C    INR RESULTS:  Lab Results   Component Value Date    INR 1.48 (H) 03/21/2018    INR 1.38 (H) 03/19/2011           GEENA Tejada, APRN CNP  [address]  [place] [id number]

## 2018-08-22 NOTE — LETTER
8/22/2018    Mg Nelson MD  [address]  [facility] [address]    RE: Bairon DOWNS Cristian       Dear Colleague,    I had the pleasure of seeing Bairon Foster in the [facility].    HPI and Plan:   I had the pleasure of seeing Bairon Foster today in cardiology clinic follow up. He is a pleasant 87 year old patient of Dr. Hope and Dr. Carrizales who I have been following for labile lower extremity edema due to venous insufficiency and diastolic heart failure with 1-2+ tricuspid regurgitation and 3+ mitral insufficiency who is status post bilateral GSV ablations from which he initially did quite well.     Mr. Foster has a history of coronary artery disease, atrial fibrillation with a single chamber pacemaker and anticoagulated with pardaxa, ventricular tachycardia and mitral insufficiency.  His echocardiogram in April 2018 showed a EF of 53%, 3+ mitral regurgitation, 1-2+ tricuspid regurgitation and RVSP of 23+ right atrial pressure.  His coronary angiogram in March of this year showed no flow-limiting lesions.  He has single-vessel coronary artery disease with a stent in 2009 placing his OM.  His LV end-diastolic pressure during his coronary angiogram was 23 mmHg and he was started on Spironolactone.  Following the coronary angiogram he developed heart  failure symptoms, with diuretic adjustment he lost about 30 pounds.  Unfortunately on 50 of spironolactone he developed nipple tenderness which resolved when he discontinued it.  However when he discontinued spironolactone he gained 12 pounds in 4 weeks.  He has had rash for which [facility] and was finally diagnosed with Bullous Pemphigoid, his dermatologist has recommended spironolactone 25 mg.    When I saw him in June I tried to escalate his Lasix which did not result in weight loss, subsequently switched him to torsemide but again he did not have any significant weight loss.  I  recommended eleperone, but this is unaffordable to [facility].  Therefore over the phone and recommended he retry spironolactone but a lower dose to 25 mg, at this dose he did not have the previous noted nipple tenderness.  He has however not started the spironolactone yet.  He has been continued on torsemide 20 mg daily.  He has had a 10 pound weight  loss which he attributes to the torsemide.  Unfortunately his creatinine has been increasing.  He denies any chest pain or shortness of breath.  He recently moved to a house with his wife, they have been living in some sort of assisted living facility which he did not like.  He is much happier with his new arrangement.      Labs Reviewed: Sodium 143, potassium 4.0, creatinine 1.61    Physical Exam  Please see Below     Assessment and Plan  1.  Diastolic heart failure.  He is really struggled since March with his weight and finding the right diuretic combination for him.  Initially he did really well on spironolactone 25 mg with Lasix, and that was increased to 50 mg he developed nipple tenderness and regained 12 pounds when he discontinued the Spironolactone.  We then tried switching him to torsemide and initially he had no significant change but he is now lost about 10 pounds and has had improvement in his lower extremity edema.  He has a skin condition now which is dermatologist wants him to treat with Spironolactone, and when eleperone was not [facility], I had recommended he go back to, but he has not yet.  His creatinine is starting to rise.  My recommendation is that we restart spironolactone at 25 mg daily.  Because of the rising creatinine I am going to decrease his torsemide to 10 mg daily.  I will recheck his labs next Wednesday and have asked him to call my nurse on Thursdays to see what his weight response is been.  At that point will make follow-up recommendations for him in our clinic.  I will be out of the office from most of September.   He has a previously scheduled appointment with his primary care provider Dr. Nelson in early September.  He is not especially symptomatic with his heart failure, his main symptom is lower extremity edema.  He continues on carvedilol 6.25 twice daily, isosorbide 30 mg daily.  2.  Coronary artery disease.  His most recent angiogram was in March 2018, he had no flow-limiting disease.  He continues on good medical management with statin, aspirin, beta blockade and isosorbide.  3.  Permanent atrial fibrillation.  He is due for device check next month.  His last check showed A. fib with slow ventricular response in the 40s.  His estimated battery life was 1-1/2 years.  He continues to be rate controlled and anticoagulated with Pradaxa.  He does have a pacemaker for his history of asystole.  4.   Mitral regurgitation.His last echocardiogram was in March, this showed progression of his regurgitation from moderate to severe.  He also has tricuspid regurgitation.  I do wonder if this is starting to be more symptomatic for him and that is why he is having a difficult and labile weight changes.  We may need to refer him to the core clinic in the future.     Thank you for allowing me to care for Bairon Foster today.    WILLIS Bullard, CNP  Cardiology    Voice recognition software was used for this note, I have reviewed this note, but errors may have been missed.    Orders Placed This Encounter   Procedures     Basic metabolic panel     Orders Placed This Encounter   Medications     torsemide (DEMADEX) 10 MG tablet     Sig: Take 1 tablet (10 mg) by mouth daily     Medications Discontinued During This Encounter   Medication Reason     torsemide (DEMADEX) 20 MG tablet Reorder         CURRENT MEDICATIONS:  Current Outpatient Prescriptions   Medication Sig Dispense Refill     ASPIRIN NOT PRESCRIBED (INTENTIONAL) Please choose reason not prescribed, below 0 each 0     BuPROPion HCl (WELLBUTRIN XL PO) Take 150 mg by mouth  every morning       Carboxymethylcellulose Sod PF (CELLUVISC/REFRESH LIQUIGEL) 1 % ophthalmic solution Place 1 drop into both eyes 3 times daily as needed for dry eyes       carvedilol (COREG) 6.25 MG tablet Take 1 tablet (6.25 mg) by mouth 2 times daily (with meals) 180 tablet 3     COMPRESSION STOCKINGS 1 each daily 2 each 0     dabigatran ANTICOAGULANT (PRADAXA ANTICOAGULANT) 75 MG CAPS BLISTER PACK Take 150 mg by mouth 2 times daily. Store in original 's bottle or blister pack; use within 120 days of opening.       finasteride (PROSCAR) 5 MG tablet TAKE 1 TABLET EVERY DAY 90 tablet 3     GLUCOSAMINE-CHONDROITIN PO Take 2 tablets by mouth daily       isosorbide mononitrate (IMDUR) 30 MG 24 hr tablet Take 1 tablet (30 mg) by mouth daily 90 tablet 3     ketotifen (ZADITOR) 0.025 % SOLN ophthalmic solution Place 1 drop into both eyes every 12 hours 1 Bottle      levothyroxine (SYNTHROID/LEVOTHROID) 100 MCG tablet Take 1 tablet (100 mcg) by mouth daily 90 tablet 3     LORazepam (ATIVAN) 1 MG tablet Take 0.5-1 tablets (0.5-1 mg) by mouth every 8 hours as needed for anxiety 30 tablet 5     Multiple Vitamins-Minerals (CENTRUM SILVER) per tablet Take 1 tablet by mouth daily       Omega-3 Fatty Acids (FISH OIL) 1200 MG capsule Take 1 capsule by mouth daily        omeprazole (PRILOSEC) 20 MG CR capsule Take 1 capsule (20 mg) by mouth 2 times daily 180 capsule 3     ORDER FOR DME Auto-CPAP:Max 9 cm H2OMin 9 cm H2O  Continuous Lifetime need and heated humidity.    1 Device 0     Polyethylene Glycol 3350 (MIRALAX PO) Take 1 packet by mouth At Bedtime        simvastatin (ZOCOR) 20 MG tablet Take by mouth At Bedtime       spironolactone (ALDACTONE) 25 MG tablet Take 1 tablet (25 mg) by mouth daily 30 tablet 1     sucralfate (CARAFATE) 1 GM tablet Take 1 tablet (1 g) by mouth 2 times daily 180 tablet 3     tamsulosin (FLOMAX) 0.4 MG capsule TAKE 1 CAPSULE (0.4 MG) BY MOUTH DAILY 90 capsule 1     torsemide (DEMADEX)  10 MG tablet Take 1 tablet (10 mg) by mouth daily       triamcinolone (KENALOG) 0.025 % cream Apply topically 2 times daily       UNKNOWN TO PATIENT Place 1 drop Into the left eye daily EYE DROP PRESCRIBED FOR CONJUNCTIVITIS       [DISCONTINUED] torsemide (DEMADEX) 20 MG tablet Take 2 tablets (40 mg) for 2 days. Then take 1 tablet (20 mg) daily 92 tablet 3       ALLERGIES   No Known Allergies    PAST MEDICAL HISTORY:  Past Medical History:   Diagnosis Date     Acute, but ill-defined, cerebrovascular disease     NO RESIDUALS     Antiplatelet or antithrombotic long-term use      Arrhythmia     a fib     Blood in stool      Coronary atherosclerosis of unspecified type of vessel, native or graft     S/P PTCA, EF 50%     Esophageal reflux      Hydrocele, unspecified      Hypercholesterolemia      Hypersomnia with sleep apnea, unspecified      Inguinal hernia without mention of obstruction or gangrene, recurrent unilateral or unspecified      Ischemic cardiomyopathy      Major depression in complete remission (H)      Obese      Onychomycosis      JIM on CPAP      Other and unspecified hyperlipidemia      Other lymphedema     GROIN AND THIGHS     Other pancytopenia (H)      Other specified anemias      Overweight      Pacemaker      PAD (peripheral artery disease) (H)      Pancytopenia (H)      Persistent atrial fibrillation (H)     VVI PM for long pauses     Prostatic hypertrophy, benign      Pulmonary hypertension      Scrotal varices      Thrombocytopenia, unspecified      Umbilical hernia without obstruction and without gangrene      Unilateral or unspecified femoral hernia without mention of obstruction or gangrene, unilateral or unspecified      Unspecified hypothyroidism      Venous stasis dermatitis of both lower extremities      Vitamin D deficiency        PAST SURGICAL HISTORY:  Past Surgical History:   Procedure Laterality Date     EYE SURGERY      CATARACT/BLEPHAROPLASTY     GENITOURINARY SURGERY      TUNA      HERNIA REPAIR      RIGHT INGUINAL     HERNIORRHAPHY INGUINAL  8/14/2012    Procedure: HERNIORRHAPHY INGUINAL;  right inguinal hernia repair;  Surgeon: Kareem Torrez MD;  Location: [facility]     HERNIORRHAPHY UMBILICAL N/A 10/31/2017    Procedure: HERNIORRHAPHY UMBILICAL;  UMBILICAL HERNIA REPAIR WITH MESH;  Surgeon: Scar Harrington MD;  Location: [facility]     IMPLANT PACEMAKER  8/2009    single chamber     ORTHOPEDIC SURGERY      [facility] RIGHT     ORTHOPEDIC SURGERY  2010    right TKR, left TKR     SURGICAL HISTORY OF -       Thumb surgery     SURGICAL HISTORY OF -   1990s    Left total knee replacement     SURGICAL HISTORY OF -   3/11    Righ total knee replacement       FAMILY HISTORY:  Family History   Problem Relation Age of Onset     Cardiovascular Father      C.A.D. Father      Cancer Sister      lung     Unknown/Adopted Mother      Cancer - colorectal No family hx of        SOCIAL HISTORY:  Social History     Social History     Marital status:      Spouse name: N/A     Number of children: N/A     Years of education: N/A     Occupational History     Teacher, author, speaker Retired     Social History Main Topics     Smoking status: Never Smoker     Smokeless tobacco: Never Used      Comment: per encounter 2/12/07     Alcohol use 0.0 oz/week     0 Standard drinks or equivalent per week      Comment: 1/month     Drug use: No     Sexual activity: No     Other Topics Concern     Parent/Sibling W/ Cabg, Mi Or Angioplasty Before 65f 55m? No     Caffeine Concern No     1-2 cups coffee a day     Sleep Concern No     Stress Concern Yes     due to wifes health condition     Weight Concern No     Special Diet No     Exercise Yes     states he uses his tredmill on and off     Seat Belt Yes     Social History Narrative       Review of Systems:  Skin:  Negative       Eyes:  Positive for glasses cataract surgery on both eyes  ENT:  Negative      Respiratory:  Positive for sleep apnea;CPAP;dyspnea on exertion     Cardiovascular:     "Positive for;edema (pt feels he has poor balance)    Gastroenterology: Positive for poor appetite lossing weight.  Genitourinary:  Negative      Musculoskeletal:  Negative      Neurologic:  Positive for stroke;numbness or tingling of feet right foot  Psychiatric:  Positive for sleep disturbances;anxiety;excessive stress stress due to wifes health   Heme/Lymph/Imm:  Negative      Endocrine:  Positive for thyroid disorder      Physical Exam:  Vitals: /54  Pulse 60  Ht 1.88 m (6' 2\")  Wt 89.2 kg (196 lb 9.6 oz)  BMI 25.24 kg/m2    Constitutional:  cooperative;well developed        Skin:    rash        Head:  normocephalic        Eyes:  pupils equal and round        Lymph:      ENT:  no pallor or cyanosis        Neck:  JVP normal        Respiratory:  normal breath sounds, clear to auscultation, normal A-P diameter, normal symmetry, normal respiratory excursion, no use of accessory muscles         Cardiac: regular rhythm;normal S1 and S2                                                         GI:  abdomen soft        Extremities and Muscular Skeletal:      bilateral LE edema;R greater than L;1+     wearing compression stockings    Neurological:  affect appropriate        Psych:  Alert and Oriented x 3    Encounter Diagnoses   Name Primary?     Chronic diastolic congestive heart failure (H)      Ischemic cardiomyopathy        Recent Lab Results:  LIPID RESULTS:  Lab Results   Component Value Date    CHOL 106 09/08/2017    HDL 54 09/08/2017    LDL 38 09/08/2017    TRIG 71 09/08/2017    CHOLHDLRATIO 2.1 06/15/2015       LIVER ENZYME RESULTS:  Lab Results   Component Value Date    AST 25 12/08/2015    ALT 22 09/08/2017       CBC RESULTS:  Lab Results   Component Value Date    WBC 2.8 (L) 03/21/2018    RBC 3.23 (L) 03/21/2018    HGB 10.4 (L) 04/12/2018    HCT 31.2 (L) 03/21/2018    MCV 97 03/21/2018    MCH 31.6 03/21/2018    MCHC 32.7 03/21/2018    RDW 16.3 (H) 03/21/2018    PLT 75 (L) 03/21/2018       BMP " RESULTS:  Lab Results   Component Value Date     08/22/2018    POTASSIUM 4.0 08/22/2018    CHLORIDE 105 08/22/2018    CO2 31 (H) 08/22/2018    ANIONGAP 11 08/22/2018     08/22/2018    BUN 24 08/22/2018    CR 1.61 (H) 08/22/2018    GFRESTIMATED 41 (L) 08/22/2018    GFRESTBLACK 49 (L) 08/22/2018    TEODORO 9.5 08/22/2018        A1C RESULTS:  No results found for: A1C    INR RESULTS:  Lab Results   Component Value Date    INR 1.48 (H) 03/21/2018    INR 1.38 (H) 03/19/2011           CC  WILLIS Spence CNP  [address]  [place] [id number]                    Thank you for allowing me to participate in the care of your patient.      Sincerely,     WILLIS Smith CNP     [facility]    cc:   WILLIS Spence CNP  [address]  [place] [id number]

## 2018-08-22 NOTE — LETTER
8/22/2018    Mg Nelson MD  [address]  [facility] [address]    RE: Bairon DOWNS Cristian       Dear Colleague,    I had the pleasure of seeing Bairon Foster in the [facility].    HPI and Plan:   I had the pleasure of seeing Bairon Foster today in cardiology clinic follow up. He is a pleasant 87 year old patient of Dr. Hope and Dr. Carrizales who I have been following for labile lower extremity edema due to venous insufficiency and diastolic heart failure with 1-2+ tricuspid regurgitation and 3+ mitral insufficiency who is status post bilateral GSV ablations from which he initially did quite well.     Mr. Foster has a history of coronary artery disease, atrial fibrillation with a single chamber pacemaker and anticoagulated with pardaxa, ventricular tachycardia and mitral insufficiency.  His echocardiogram in April 2018 showed a EF of 53%, 3+ mitral regurgitation, 1-2+ tricuspid regurgitation and RVSP of 23+ right atrial pressure.  His coronary angiogram in March of this year showed no flow-limiting lesions.  He has single-vessel coronary artery disease with a stent in 2009 placing his OM.  His LV end-diastolic pressure during his coronary angiogram was 23 mmHg and he was started on Spironolactone.  Following the coronary angiogram he developed heart  failure symptoms, with diuretic adjustment he lost about 30 pounds.  Unfortunately on 50 of spironolactone he developed nipple tenderness which resolved when he discontinued it.  However when he discontinued spironolactone he gained 12 pounds in 4 weeks.  He has had rash for which [facility] and was finally diagnosed with Bullous Pemphigoid, his dermatologist has recommended spironolactone 25 mg.    When I saw him in June I tried to escalate his Lasix which did not result in weight loss, subsequently switched him to torsemide but again he did not have any significant weight loss.  I  recommended eleperone, but this is unaffordable to [facility].  Therefore over the phone and recommended he retry spironolactone but a lower dose to 25 mg, at this dose he did not have the previous noted nipple tenderness.  He has however not started the spironolactone yet.  He has been continued on torsemide 20 mg daily.  He has had a 10 pound weight  loss which he attributes to the torsemide.  Unfortunately his creatinine has been increasing.  He denies any chest pain or shortness of breath.  He recently moved to a house with his wife, they have been living in some sort of assisted living facility which he did not like.  He is much happier with his new arrangement.      Labs Reviewed: Sodium 143, potassium 4.0, creatinine 1.61    Physical Exam  Please see Below     Assessment and Plan  1.  Diastolic heart failure.  He is really struggled since March with his weight and finding the right diuretic combination for him.  Initially he did really well on spironolactone 25 mg with Lasix, and that was increased to 50 mg he developed nipple tenderness and regained 12 pounds when he discontinued the Spironolactone.  We then tried switching him to torsemide and initially he had no significant change but he is now lost about 10 pounds and has had improvement in his lower extremity edema.  He has a skin condition now which is dermatologist wants him to treat with Spironolactone, and when [facility]perone was not [facility], I had recommended he go back to, but he has not yet.  His creatinine is starting to rise.  My recommendation is that we restart spironolactone at 25 mg daily.  Because of the rising creatinine I am going to decrease his torsemide to 10 mg daily.  I will recheck his labs next Wednesday and have asked him to call my nurse on Thursdays to see what his weight response is been.  At that point will make follow-up recommendations for him in our clinic.  I will be out of the office from most of September.   He has a previously scheduled appointment with his primary care provider Dr. Nelson in early September.  He is not especially symptomatic with his heart failure, his main symptom is lower extremity edema.  He continues on carvedilol 6.25 twice daily, isosorbide 30 mg daily.  2.  Coronary artery disease.  His most recent angiogram was in March 2018, he had no flow-limiting disease.  He continues on good medical management with statin, aspirin, beta blockade and isosorbide.  3.  Permanent atrial fibrillation.  He is due for device check next month.  His last check showed A. fib with slow ventricular response in the 40s.  His estimated battery life was 1-1/2 years.  He continues to be rate controlled and anticoagulated with Pradaxa.  He does have a pacemaker for his history of asystole.  4.   Mitral regurgitation.His last echocardiogram was in March, this showed progression of his regurgitation from moderate to severe.  He also has tricuspid regurgitation.  I do wonder if this is starting to be more symptomatic for him and that is why he is having a difficult and labile weight changes.  We may need to refer him to the core clinic in the future.     Thank you for allowing me to care for Bairon Foster today.    WILLIS Bullard, CNP  Cardiology    Voice recognition software was used for this note, I have reviewed this note, but errors may have been missed.    Orders Placed This Encounter   Procedures     Basic metabolic panel     Orders Placed This Encounter   Medications     torsemide (DEMADEX) 10 MG tablet     Sig: Take 1 tablet (10 mg) by mouth daily     Medications Discontinued During This Encounter   Medication Reason     torsemide (DEMADEX) 20 MG tablet Reorder         CURRENT MEDICATIONS:  Current Outpatient Prescriptions   Medication Sig Dispense Refill     ASPIRIN NOT PRESCRIBED (INTENTIONAL) Please choose reason not prescribed, below 0 each 0     BuPROPion HCl (WELLBUTRIN XL PO) Take 150 mg by mouth  every morning       Carboxymethylcellulose Sod PF (CELLUVISC/REFRESH LIQUIGEL) 1 % ophthalmic solution Place 1 drop into both eyes 3 times daily as needed for dry eyes       carvedilol (COREG) 6.25 MG tablet Take 1 tablet (6.25 mg) by mouth 2 times daily (with meals) 180 tablet 3     COMPRESSION STOCKINGS 1 each daily 2 each 0     dabigatran ANTICOAGULANT (PRADAXA ANTICOAGULANT) 75 MG CAPS BLISTER PACK Take 150 mg by mouth 2 times daily. Store in original 's bottle or blister pack; use within 120 days of opening.       finasteride (PROSCAR) 5 MG tablet TAKE 1 TABLET EVERY DAY 90 tablet 3     GLUCOSAMINE-CHONDROITIN PO Take 2 tablets by mouth daily       isosorbide mononitrate (IMDUR) 30 MG 24 hr tablet Take 1 tablet (30 mg) by mouth daily 90 tablet 3     ketotifen (ZADITOR) 0.025 % SOLN ophthalmic solution Place 1 drop into both eyes every 12 hours 1 Bottle      levothyroxine (SYNTHROID/LEVOTHROID) 100 MCG tablet Take 1 tablet (100 mcg) by mouth daily 90 tablet 3     LORazepam (ATIVAN) 1 MG tablet Take 0.5-1 tablets (0.5-1 mg) by mouth every 8 hours as needed for anxiety 30 tablet 5     Multiple Vitamins-Minerals (CENTRUM SILVER) per tablet Take 1 tablet by mouth daily       Omega-3 Fatty Acids (FISH OIL) 1200 MG capsule Take 1 capsule by mouth daily        omeprazole (PRILOSEC) 20 MG CR capsule Take 1 capsule (20 mg) by mouth 2 times daily 180 capsule 3     ORDER FOR DME Auto-CPAP:Max 9 cm H2OMin 9 cm H2O  Continuous Lifetime need and heated humidity.    1 Device 0     Polyethylene Glycol 3350 (MIRALAX PO) Take 1 packet by mouth At Bedtime        simvastatin (ZOCOR) 20 MG tablet Take by mouth At Bedtime       spironolactone (ALDACTONE) 25 MG tablet Take 1 tablet (25 mg) by mouth daily 30 tablet 1     sucralfate (CARAFATE) 1 GM tablet Take 1 tablet (1 g) by mouth 2 times daily 180 tablet 3     tamsulosin (FLOMAX) 0.4 MG capsule TAKE 1 CAPSULE (0.4 MG) BY MOUTH DAILY 90 capsule 1     torsemide (DEMADEX)  10 MG tablet Take 1 tablet (10 mg) by mouth daily       triamcinolone (KENALOG) 0.025 % cream Apply topically 2 times daily       UNKNOWN TO PATIENT Place 1 drop Into the left eye daily EYE DROP PRESCRIBED FOR CONJUNCTIVITIS       [DISCONTINUED] torsemide (DEMADEX) 20 MG tablet Take 2 tablets (40 mg) for 2 days. Then take 1 tablet (20 mg) daily 92 tablet 3       ALLERGIES   No Known Allergies    PAST MEDICAL HISTORY:  Past Medical History:   Diagnosis Date     Acute, but ill-defined, cerebrovascular disease     NO RESIDUALS     Antiplatelet or antithrombotic long-term use      Arrhythmia     a fib     Blood in stool      Coronary atherosclerosis of unspecified type of vessel, native or graft     S/P PTCA, EF 50%     Esophageal reflux      Hydrocele, unspecified      Hypercholesterolemia      Hypersomnia with sleep apnea, unspecified      Inguinal hernia without mention of obstruction or gangrene, recurrent unilateral or unspecified      Ischemic cardiomyopathy      Major depression in complete remission (H)      Obese      Onychomycosis      JIM on CPAP      Other and unspecified hyperlipidemia      Other lymphedema     GROIN AND THIGHS     Other pancytopenia (H)      Other specified anemias      Overweight      Pacemaker      PAD (peripheral artery disease) (H)      Pancytopenia (H)      Persistent atrial fibrillation (H)     VVI PM for long pauses     Prostatic hypertrophy, benign      Pulmonary hypertension      Scrotal varices      Thrombocytopenia, unspecified      Umbilical hernia without obstruction and without gangrene      Unilateral or unspecified femoral hernia without mention of obstruction or gangrene, unilateral or unspecified      Unspecified hypothyroidism      Venous stasis dermatitis of both lower extremities      Vitamin D deficiency        PAST SURGICAL HISTORY:  Past Surgical History:   Procedure Laterality Date     EYE SURGERY      CATARACT/BLEPHAROPLASTY     GENITOURINARY SURGERY      TUNA      HERNIA REPAIR      RIGHT INGUINAL     HERNIORRHAPHY INGUINAL  8/14/2012    Procedure: HERNIORRHAPHY INGUINAL;  right inguinal hernia repair;  Surgeon: Kareem Torrez MD;  Location: [facility]     HERNIORRHAPHY UMBILICAL N/A 10/31/2017    Procedure: HERNIORRHAPHY UMBILICAL;  UMBILICAL HERNIA REPAIR WITH MESH;  Surgeon: Scar Harrington MD;  Location: [facility]     IMPLANT PACEMAKER  8/2009    single chamber     ORTHOPEDIC SURGERY      [facility] RIGHT     ORTHOPEDIC SURGERY  2010    right TKR, left TKR     SURGICAL HISTORY OF -       Thumb surgery     SURGICAL HISTORY OF -   1990s    Left total knee replacement     SURGICAL HISTORY OF -   3/11    Righ total knee replacement       FAMILY HISTORY:  Family History   Problem Relation Age of Onset     Cardiovascular Father      C.A.D. Father      Cancer Sister      lung     Unknown/Adopted Mother      Cancer - colorectal No family hx of        SOCIAL HISTORY:  Social History     Social History     Marital status:      Spouse name: N/A     Number of children: N/A     Years of education: N/A     Occupational History     Teacher, author, speaker Retired     Social History Main Topics     Smoking status: Never Smoker     Smokeless tobacco: Never Used      Comment: per encounter 2/12/07     Alcohol use 0.0 oz/week     0 Standard drinks or equivalent per week      Comment: 1/month     Drug use: No     Sexual activity: No     Other Topics Concern     Parent/Sibling W/ Cabg, Mi Or Angioplasty Before 65f 55m? No     Caffeine Concern No     1-2 cups coffee a day     Sleep Concern No     Stress Concern Yes     due to wifes health condition     Weight Concern No     Special Diet No     Exercise Yes     states he uses his tredmill on and off     Seat Belt Yes     Social History Narrative       Review of Systems:  Skin:  Negative       Eyes:  Positive for glasses cataract surgery on both eyes  ENT:  Negative      Respiratory:  Positive for sleep apnea;CPAP;dyspnea on exertion     Cardiovascular:     "Positive for;edema (pt feels he has poor balance)    Gastroenterology: Positive for poor appetite lossing weight.  Genitourinary:  Negative      Musculoskeletal:  Negative      Neurologic:  Positive for stroke;numbness or tingling of feet right foot  Psychiatric:  Positive for sleep disturbances;anxiety;excessive stress stress due to wifes health   Heme/Lymph/Imm:  Negative      Endocrine:  Positive for thyroid disorder      Physical Exam:  Vitals: /54  Pulse 60  Ht 1.88 m (6' 2\")  Wt 89.2 kg (196 lb 9.6 oz)  BMI 25.24 kg/m2    Constitutional:  cooperative;well developed        Skin:    rash        Head:  normocephalic        Eyes:  pupils equal and round        Lymph:      ENT:  no pallor or cyanosis        Neck:  JVP normal        Respiratory:  normal breath sounds, clear to auscultation, normal A-P diameter, normal symmetry, normal respiratory excursion, no use of accessory muscles         Cardiac: regular rhythm;normal S1 and S2                                                         GI:  abdomen soft        Extremities and Muscular Skeletal:      bilateral LE edema;R greater than L;1+     wearing compression stockings    Neurological:  affect appropriate        Psych:  Alert and Oriented x 3    Encounter Diagnoses   Name Primary?     Chronic diastolic congestive heart failure (H)      Ischemic cardiomyopathy        Recent Lab Results:  LIPID RESULTS:  Lab Results   Component Value Date    CHOL 106 09/08/2017    HDL 54 09/08/2017    LDL 38 09/08/2017    TRIG 71 09/08/2017    CHOLHDLRATIO 2.1 06/15/2015       LIVER ENZYME RESULTS:  Lab Results   Component Value Date    AST 25 12/08/2015    ALT 22 09/08/2017       CBC RESULTS:  Lab Results   Component Value Date    WBC 2.8 (L) 03/21/2018    RBC 3.23 (L) 03/21/2018    HGB 10.4 (L) 04/12/2018    HCT 31.2 (L) 03/21/2018    MCV 97 03/21/2018    MCH 31.6 03/21/2018    MCHC 32.7 03/21/2018    RDW 16.3 (H) 03/21/2018    PLT 75 (L) 03/21/2018       BMP " RESULTS:  Lab Results   Component Value Date     08/22/2018    POTASSIUM 4.0 08/22/2018    CHLORIDE 105 08/22/2018    CO2 31 (H) 08/22/2018    ANIONGAP 11 08/22/2018     08/22/2018    BUN 24 08/22/2018    CR 1.61 (H) 08/22/2018    GFRESTIMATED 41 (L) 08/22/2018    GFRESTBLACK 49 (L) 08/22/2018    TEODORO 9.5 08/22/2018        A1C RESULTS:  No results found for: A1C    INR RESULTS:  Lab Results   Component Value Date    INR 1.48 (H) 03/21/2018    INR 1.38 (H) 03/19/2011             Thank you for allowing me to participate in the care of your patient.    Sincerely,     WILLIS Smith [facility]

## 2018-08-29 DIAGNOSIS — I50.32 CHRONIC DIASTOLIC CONGESTIVE HEART FAILURE (H): ICD-10-CM

## 2018-08-29 PROCEDURE — 36415 COLL VENOUS BLD VENIPUNCTURE: CPT | Performed by: NURSE PRACTITIONER

## 2018-08-29 PROCEDURE — 80048 BASIC METABOLIC PNL TOTAL CA: CPT | Performed by: NURSE PRACTITIONER

## 2018-08-30 ENCOUNTER — TELEPHONE (OUTPATIENT)
Dept: CARDIOLOGY | Facility: CLINIC | Age: 83
End: 2018-08-30

## 2018-08-30 LAB
ANION GAP SERPL CALCULATED.3IONS-SCNC: 5 MMOL/L (ref 3–14)
BUN SERPL-MCNC: 16 MG/DL (ref 7–30)
CALCIUM SERPL-MCNC: 9.3 MG/DL (ref 8.5–10.1)
CHLORIDE SERPL-SCNC: 106 MMOL/L (ref 94–109)
CO2 SERPL-SCNC: 30 MMOL/L (ref 20–32)
CREAT SERPL-MCNC: 1.03 MG/DL (ref 0.66–1.25)
GFR SERPL CREATININE-BSD FRML MDRD: 68 ML/MIN/1.7M2
GLUCOSE SERPL-MCNC: 96 MG/DL (ref 70–99)
POTASSIUM SERPL-SCNC: 4 MMOL/L (ref 3.4–5.3)
SODIUM SERPL-SCNC: 141 MMOL/L (ref 133–144)

## 2018-08-30 NOTE — TELEPHONE ENCOUNTER
Patient had BMP completed Creat 1.03 and GFR 68. RN called patient to inquire about weight and patient reported that yesterday his weight was 188lb and today it is 186lb. Patient confirmed he is taking torsemide 10mg daily and spironolactone 25mg daily. RN advised patient that RN would send to [facility] for review and recommendation and call him with response. Patient verbalized understanding and agreed with plan.       ADDENDUM: RN reviewed with [facility] and she advised patient continue on current medication regiment. RN called patient and reviewed recommendations with patient. Patient verbalized understanding and is agreeable with plan. Patient has follow up with his PMD Dr. Nelson on 9/10/18 and will review medications at that office visit. Patient will call our office in the meantime with any questions/concerns.

## 2018-09-01 ENCOUNTER — OFFICE VISIT (OUTPATIENT)
Dept: URGENT CARE | Facility: URGENT CARE | Age: 83
End: 2018-09-01
Payer: COMMERCIAL

## 2018-09-01 VITALS
BODY MASS INDEX: 24.29 KG/M2 | WEIGHT: 189.19 LBS | TEMPERATURE: 97.8 F | HEART RATE: 72 BPM | SYSTOLIC BLOOD PRESSURE: 106 MMHG | RESPIRATION RATE: 16 BRPM | DIASTOLIC BLOOD PRESSURE: 60 MMHG

## 2018-09-01 DIAGNOSIS — L12.0 BULLOUS PEMPHIGOID (H): Primary | ICD-10-CM

## 2018-09-01 PROCEDURE — 99214 OFFICE O/P EST MOD 30 MIN: CPT | Performed by: FAMILY MEDICINE

## 2018-09-01 RX ORDER — CLOBETASOL PROPIONATE 0.5 MG/G
CREAM TOPICAL
Qty: 60 G | Refills: 11 | Status: ON HOLD | OUTPATIENT
Start: 2018-09-01 | End: 2019-04-13

## 2018-09-01 NOTE — MR AVS SNAPSHOT
After Visit Summary   9/1/2018    Bairon Foster    MRN: [id number]           Patient Information     Date Of Birth          2/6/1931        Visit Information        Provider Department      9/1/2018 6:50 PM Mendez Valencia MD [facility]        Today's Diagnoses     Bullous pemphigoid    -  1       Follow-ups after your visit        Your next 10 appointments already scheduled     Sep 17, 2018  1:30 PM CDT   [facility] MD   [facility]    [address]  [facility] [id number] [phone]           Bring a current list of meds and any records pertaining to this visit. For Physicals, please bring immunization records and any forms needing to be filled out. Please arrive 10 minutes early to complete paperwork.            Sep 20, 2018 10:15 AM CDT   Phone Device Check with ARRIETA TECH2   [facility] ([facility])    [address]  [facility] [id number] [phone] OPT 2              Who to contact     If you have questions or need follow up information about today's clinic visit or your schedule please contact [facility] directly at [phone].  Normal or non-critical lab and imaging results will be communicated to you by MyChart, letter or phone within 4 business days after the clinic has received the results. If you do not hear from us within 7 days, please contact the clinic through MyChart or phone. If you have a critical or abnormal lab result, we will notify you by phone as soon as possible.  Submit refill requests through [employer] or call your pharmacy and they will forward the refill request to us. Please allow 3 business days for your refill to be completed.          Additional Information About Your Visit        MyChart Information     [employer]  gives you secure access to your electronic health record. If you see a primary care provider, you can also send messages to your care team and make appointments. If you have questions, please call your primary care clinic.  If you do not have a primary care provider, please call [phone] and they will assist you.        Care EveryWhere ID     This is your Care EveryWhere ID. This could be used by other organizations to access your [place] medical records  [id number]        Your Vitals Were     Pulse Temperature Respirations BMI (Body Mass Index)          72 97.8  F (36.6  C) (Oral) 16 24.29 kg/m2         Blood Pressure from Last 3 Encounters:   09/01/18 106/60   08/22/18 100/54   08/07/18 120/70    Weight from Last 3 Encounters:   09/01/18 189 lb 3 oz (85.8 kg)   08/22/18 196 lb 9.6 oz (89.2 kg)   08/07/18 204 lb (92.5 kg)              Today, you had the following     No orders found for display         Today's Medication Changes          These changes are accurate as of 9/1/18 11:59 PM.  If you have any questions, ask your nurse or doctor.               Start taking these medicines.        Dose/Directions    clobetasol 0.05 % cream   Commonly known as:  TEMOVATE   Used for:  Bullous pemphigoid   Started by:  Mendez Valencia MD        Apply sparingly to affected area twice daily as needed.  Do not apply to face.   Quantity:  60 g   Refills:  11            Where to get your medicines      Some of these will need a paper prescription and others can be bought over the counter.  Ask your nurse if you have questions.     Bring a paper prescription for each of these medications     clobetasol 0.05 % cream                Primary Care Provider Office Phone # Fax #    Mg Nelson [phone]       [address] [facility] [address]        Goals        General    I will find a caregiver support group to attend (pt-stated)     Notes - Note created  4/17/2014  8:49 AM by Beth Morales  RN    As of today's date 4/17/2014 goal is met at 0 - 25%.   Goal Status:  Active        Equal Access to Services     EVANGELIST ABEBESHAKIRA : Hadii aad ku macrina Viramontes, waganeshda lulance, hyun kaafiada felicitas, kathryn sainzlaura zo. So [facility] [phone].    ATENCIÓN: Si habla español, tiene a malone disposición servicios gratuitos de asistencia lingüística. Llame al [phone].    We comply with applicable federal civil rights laws and [place] laws. We do not discriminate on the basis of race, color, national origin, age, disability, sex, sexual orientation, or gender identity.            Thank you!     Thank you for choosing [facility]  for your care. Our goal is always to provide you with excellent care. Hearing back from our patients is one way we can continue to improve our services. Please take a few minutes to complete the written survey that you may receive in the mail after your visit with us. Thank you!             Your Updated Medication List - Protect others around you: Learn how to safely use, store and throw away your medicines at www.disposemymeds.org.          This list is accurate as of 9/1/18 11:59 PM.  Always use your most recent med list.                   Brand Name Dispense Instructions for use Diagnosis    ASPIRIN NOT PRESCRIBED    INTENTIONAL    0 each    Please choose reason not prescribed, below    Chronic diastolic congestive heart failure (H)       Carboxymethylcellulose Sod PF 1 % ophthalmic solution    CELLUVISC/REFRESH LIQUIGEL     Place 1 drop into both eyes 3 times daily as needed for dry eyes        CENTRUM SILVER per tablet      Take 1 tablet by mouth daily        clobetasol 0.05 % cream    TEMOVATE    60 g    Apply sparingly to affected area twice daily as needed.  Do not apply to face.    Bullous pemphigoid       COMPRESSION STOCKINGS     2 each    1 each daily    Varicose 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        finasteride 5 MG tablet    PROSCAR    90 tablet    TAKE 1 TABLET EVERY DAY    Benign prostatic hyperplasia, unspecified whether lower urinary tract symptoms present       GLUCOSAMINE-CHONDROITIN PO      Take 2 tablets by mouth daily        isosorbide mononitrate 30 MG 24 hr tablet    IMDUR    90 tablet    Take 1 tablet (30 mg) by mouth daily    SOB (shortness of breath)       ketotifen 0.025 % Soln ophthalmic solution    ZADITOR    1 Bottle    Place 1 drop into both eyes every 12 hours        LORazepam 1 MG tablet    ATIVAN    30 tablet    Take 0.5-1 tablets (0.5-1 mg) by mouth every 8 hours as needed for anxiety    Acute stress reaction       MIRALAX PO      Take 1 packet by mouth At Bedtime        omega-3 fatty acids 1200 MG capsule      Take 1 capsule by mouth daily        omeprazole 20 MG CR capsule    priLOSEC    180 capsule    Take 1 capsule (20 mg) by mouth 2 times daily    Gastroesophageal reflux disease without esophagitis       order for DME     1 Device    Auto-CPAP:Max 9 cm H2OMin 9 cm H2O Continuous Lifetime need and heated humidity.    Obstructive sleep apnea (adult) (pediatric), Other dyspnea and respiratory abnormality       PRADAXA ANTICOAGULANT 75 MG capsule   Generic drug:  dabigatran ANTICOAGULANT      Take 150 mg by mouth 2 times daily. Store in original 's bottle or blister pack; use within 120 days of opening.        simvastatin 20 MG tablet    ZOCOR     Take by mouth At Bedtime        spironolactone 25 MG tablet    ALDACTONE    30 tablet    Take 1 tablet (25 mg) by mouth daily    Diastolic heart failure (H)       sucralfate 1 GM tablet    CARAFATE    180 tablet    Take 1 tablet (1 g) by mouth 2 times daily    Esophageal reflux       tamsulosin 0.4 MG capsule    FLOMAX    90 capsule    TAKE 1 CAPSULE (0.4 MG) BY MOUTH DAILY    Benign prostatic hyperplasia with urinary hesitancy       torsemide 10 MG tablet    DEMADEX     Take 1 tablet (10 mg) by mouth daily    Chronic diastolic  congestive heart failure (H)       triamcinolone 0.025 % cream    KENALOG     Apply topically 2 times daily        UNKNOWN TO PATIENT      Place 1 drop Into the left eye daily EYE DROP PRESCRIBED FOR CONJUNCTIVITIS        WELLBUTRIN XL PO      Take 150 mg by mouth every morning

## 2018-09-02 DIAGNOSIS — I25.5 ISCHEMIC CARDIOMYOPATHY: ICD-10-CM

## 2018-09-02 DIAGNOSIS — E03.9 ACQUIRED HYPOTHYROIDISM: ICD-10-CM

## 2018-09-04 NOTE — TELEPHONE ENCOUNTER
Pt called with an update on his weight stating that he has been hanging around the 185-186 lb range. Pt reports feeling well. Pt will see PMD Dr. Nelson on 9/10/18. Agreed with plan to continue current medications. No further questions or concerns.

## 2018-09-04 NOTE — TELEPHONE ENCOUNTER
"Requested Prescriptions   Pending Prescriptions Disp Refills     carvedilol (COREG) 6.25 MG tablet [Pharmacy Med Name: CARVEDILOL 6.25 MG Tablet]  Last Written Prescription Date:  9/15/2017  Last Fill Quantity: 180,  # refills: 3   Last office visit: 8/7/2018 with prescribing provider:  Dr Nelson   Future Office Visit:   Next 5 appointments (look out 90 days)     Sep 10, 2018  2:30 PM CDT   Office Visit with Mg Nelson MD   [facility] ([facility]    [address] [place] [id number]   [phone]                  180 tablet 3     Sig: TAKE 1 TABLET (6.25 MG) BY MOUTH 2 TIMES DAILY (WITH MEALS)    Beta-Blockers Protocol Passed    9/2/2018 11:56 PM       Passed - Blood pressure under 140/90 in past 12 months    BP Readings from Last 3 Encounters:   09/01/18 106/60   08/22/18 100/54   08/07/18 120/70                Passed - Patient is age 6 or older       Passed - Recent (12 mo) or future (30 days) visit within the authorizing provider's specialty    Patient had office visit in the last 12 months or has a visit in the next 30 days with authorizing provider or within the authorizing provider's specialty.  See \"Patient Info\" tab in inbasket, or \"Choose Columns\" in Meds & Orders section of the refill encounter.            levothyroxine (SYNTHROID/LEVOTHROID) 100 MCG tablet [Pharmacy Med Name: LEVOTHYROXINE SODIUM 100 MCG Tablet]  Last Written Prescription Date:  9/21/2017  Last Fill Quantity: 90,  # refills: 3   Last office visit: 8/7/2018 with prescribing provider:  Dr Nelson   Future Office Visit:   Next 5 appointments (look out 90 days)     Sep 10, 2018  2:30 PM CDT   Office Visit with Mg Nelson MD   [facility] ([facility]    [address] [place] [phone]   [phone]                  90 tablet 3     Sig: " "TAKE 1 TABLET EVERY DAY    Thyroid Protocol Passed    9/2/2018 11:56 PM       Passed - Patient is 12 years or older       Passed - Recent (12 mo) or future (30 days) visit within the authorizing provider's specialty    Patient had office visit in the last 12 months or has a visit in the next 30 days with authorizing provider or within the authorizing provider's specialty.  See \"Patient Info\" tab in inbasket, or \"Choose Columns\" in Meds & Orders section of the refill encounter.           Passed - Normal TSH on file in past 12 months    Recent Labs   Lab Test  09/08/17   1600   TSH  1.08                "

## 2018-09-05 NOTE — TELEPHONE ENCOUNTER
Future Office visit:    Next 5 appointments (look out 90 days)     Sep 10, 2018  2:30 PM CDT   Office Visit with Mg Nelson MD   [facility] ([facility])    [address] [place] [phone]   [phone]                   Routing refill request to provider for review/approval because:  [facility] override for Coreg  Will have TSH done at upcoming appt  Felicia Payne RN- Triage [employer]

## 2018-09-06 RX ORDER — LEVOTHYROXINE SODIUM 100 UG/1
TABLET ORAL
Qty: 90 TABLET | Refills: 3 | Status: SHIPPED | OUTPATIENT
Start: 2018-09-06 | End: 2019-10-14

## 2018-09-06 RX ORDER — CARVEDILOL 6.25 MG/1
TABLET ORAL
Qty: 180 TABLET | Refills: 3 | Status: SHIPPED | OUTPATIENT
Start: 2018-09-06 | End: 2019-10-14

## 2018-09-06 NOTE — TELEPHONE ENCOUNTER
Pt called stating yesterday he weighed 178 lbs. Lost about 10- 8 lbs since seeing Anushka. Pt wondering if he is losing too much weight. Pt denies lightheaded, dizziness or feeling dry. Pt scheduled to see PMD 9/10/18. Recommended patient continue to monitor his weight and contact team 4 if he becomes symptomatic or loses 2 lbs over night. Pt agreed to plan.

## 2018-09-11 NOTE — TELEPHONE ENCOUNTER
VON from patient, requesting to set up OV with [facility] on the 19th when she returns. Spoke with patient. Patient states that he would like to make an OV when Anushka returns to discuss his weight loss. Patient denies lightheadedness, dizziness, or feeling dry. Patient states that he missed his OV with his PCP yesterday but has another OV on Monday 9/17. Patient is concerned about losing weight and states that he doesn't want to lose any more. Reviewed with patient that [facility] is not available on 9/19 for an OV and has a booked schedule through the beginning of October. Encouraged patient to keep OV with PCP. Patient expressed concern and would really like to speak with [facility] about his concerns. Will route to [facility] for review.

## 2018-09-13 NOTE — PROGRESS NOTES
SUBJECTIVE:  Bairon Foster is a 87 year old male who presents to the clinic today for a rash.  He was dx with BP several months to years ago and has been using steroid creams with minimal help   [facility].  Pain and swelling and now has an area on his L leg that is bothering him    Associated symptoms include: nothing.    Past Medical History:   Diagnosis Date     Acute, but ill-defined, cerebrovascular disease     NO RESIDUALS     Antiplatelet or antithrombotic long-term use      Arrhythmia     a fib     Blood in stool      Coronary atherosclerosis of unspecified type of vessel, native or graft     S/P PTCA, EF 50%     Esophageal reflux      Hydrocele, unspecified      Hypercholesterolemia      Hypersomnia with sleep apnea, unspecified      Inguinal hernia without mention of obstruction or gangrene, recurrent unilateral or unspecified      Ischemic cardiomyopathy      Major depression in complete remission (H)      Obese      Onychomycosis      JIM on CPAP      Other and unspecified hyperlipidemia      Other lymphedema     GROIN AND THIGHS     Other pancytopenia (H)      Other specified anemias      Overweight      Pacemaker      PAD (peripheral artery disease) (H)      Pancytopenia (H)      Persistent atrial fibrillation (H)     VVI PM for long pauses     Prostatic hypertrophy, benign      Pulmonary hypertension      Scrotal varices      Thrombocytopenia, unspecified      Umbilical hernia without obstruction and without gangrene      Unilateral or unspecified femoral hernia without mention of obstruction or gangrene, unilateral or unspecified      Unspecified hypothyroidism      Venous stasis dermatitis of both lower extremities      Vitamin D deficiency      Current Outpatient Prescriptions   Medication Sig Dispense Refill     ASPIRIN NOT PRESCRIBED (INTENTIONAL) Please choose reason not prescribed, below 0 each 0     BuPROPion HCl (WELLBUTRIN XL PO) Take 150 mg by mouth every morning        Carboxymethylcellulose Sod PF (CELLUVISC/REFRESH LIQUIGEL) 1 % ophthalmic solution Place 1 drop into both eyes 3 times daily as needed for dry eyes       clobetasol (TEMOVATE) 0.05 % cream Apply sparingly to affected area twice daily as needed.  Do not apply to face. 60 g 11     COMPRESSION STOCKINGS 1 each daily 2 each 0     dabigatran ANTICOAGULANT (PRADAXA ANTICOAGULANT) 75 MG CAPS BLISTER PACK Take 150 mg by mouth 2 times daily. Store in original 's bottle or blister pack; use within 120 days of opening.       finasteride (PROSCAR) 5 MG tablet TAKE 1 TABLET EVERY DAY 90 tablet 3     GLUCOSAMINE-CHONDROITIN PO Take 2 tablets by mouth daily       isosorbide mononitrate (IMDUR) 30 MG 24 hr tablet Take 1 tablet (30 mg) by mouth daily 90 tablet 3     ketotifen (ZADITOR) 0.025 % SOLN ophthalmic solution Place 1 drop into both eyes every 12 hours 1 Bottle      LORazepam (ATIVAN) 1 MG tablet Take 0.5-1 tablets (0.5-1 mg) by mouth every 8 hours as needed for anxiety 30 tablet 5     Multiple Vitamins-Minerals (CENTRUM SILVER) per tablet Take 1 tablet by mouth daily       Omega-3 Fatty Acids (FISH OIL) 1200 MG capsule Take 1 capsule by mouth daily        omeprazole (PRILOSEC) 20 MG CR capsule Take 1 capsule (20 mg) by mouth 2 times daily 180 capsule 3     Polyethylene Glycol 3350 (MIRALAX PO) Take 1 packet by mouth At Bedtime        simvastatin (ZOCOR) 20 MG tablet Take by mouth At Bedtime       spironolactone (ALDACTONE) 25 MG tablet Take 1 tablet (25 mg) by mouth daily 30 tablet 1     sucralfate (CARAFATE) 1 GM tablet Take 1 tablet (1 g) by mouth 2 times daily 180 tablet 3     tamsulosin (FLOMAX) 0.4 MG capsule TAKE 1 CAPSULE (0.4 MG) BY MOUTH DAILY 90 capsule 1     torsemide (DEMADEX) 10 MG tablet Take 1 tablet (10 mg) by mouth daily       triamcinolone (KENALOG) 0.025 % cream Apply topically 2 times daily       UNKNOWN TO PATIENT Place 1 drop Into the left eye daily EYE DROP PRESCRIBED FOR  CONJUNCTIVITIS       carvedilol (COREG) 6.25 MG tablet TAKE 1 TABLET (6.25 MG) BY MOUTH 2 TIMES DAILY (WITH MEALS) 180 tablet 3     levothyroxine (SYNTHROID/LEVOTHROID) 100 MCG tablet TAKE 1 TABLET EVERY DAY 90 tablet 3     ORDER FOR DME Auto-CPAP:Max 9 cm H2OMin 9 cm H2O  Continuous Lifetime need and heated humidity.    1 Device 0     Social History   Substance Use Topics     Smoking status: Never Smoker     Smokeless tobacco: Never Used      Comment: per encounter 2/12/07     Alcohol use 0.0 oz/week     0 Standard drinks or equivalent per week      Comment: 1/month       ROS:  CONSTITUTIONAL:NEGATIVE for fever, chills, change in weight  MUSCULOSKELETAL: NEGATIVE for significant arthralgias or myalgia    EXAM:   /60 (Cuff Size: Adult Regular)  Pulse 72  Temp 97.8  F (36.6  C) (Oral)  Resp 16  Wt 189 lb 3 oz (85.8 kg)  BMI 24.29 kg/m2    SKIN: Rash description:    Distribution: localized  Location: L inner thigh    Color: skin color,  Lesion type: bullae, blotchy with tenderness  GENERAL APPEARANCE: healthy, alert and no distress  EYES: EOMI,  PERRL, conjunctiva clear  NECK: supple, non-tender to palpation, no adenopathy noted  RESP: lungs clear to auscultation - no rales, rhonchi or wheezes  CV: regular rates and rhythm, normal S1 S2, no murmur noted  NEURO: non focal   Normal strength    ASSESSMENT:  1. Bullous pemphigoid  Worsening of chronic condition   Will strengthen cream    See back with pcp in one week  - clobetasol (TEMOVATE) 0.05 % cream; Apply sparingly to affected area twice daily as needed.  Do not apply to face.  Dispense: 60 g; Refill: 11     PLAN:  1) See today's orders.  2) Follow-up with primary clinic if not improving

## 2018-09-17 ENCOUNTER — OFFICE VISIT (OUTPATIENT)
Dept: FAMILY MEDICINE | Facility: CLINIC | Age: 83
End: 2018-09-17
Payer: COMMERCIAL

## 2018-09-17 VITALS
WEIGHT: 184 LBS | SYSTOLIC BLOOD PRESSURE: 114 MMHG | TEMPERATURE: 98 F | RESPIRATION RATE: 16 BRPM | HEART RATE: 56 BPM | DIASTOLIC BLOOD PRESSURE: 60 MMHG | BODY MASS INDEX: 23.62 KG/M2

## 2018-09-17 DIAGNOSIS — I89.0 LYMPHEDEMA OF BOTH LOWER EXTREMITIES: Primary | ICD-10-CM

## 2018-09-17 DIAGNOSIS — M19.90 ARTHRITIS: ICD-10-CM

## 2018-09-17 PROCEDURE — 99214 OFFICE O/P EST MOD 30 MIN: CPT | Performed by: FAMILY MEDICINE

## 2018-09-17 NOTE — PATIENT INSTRUCTIONS
The patient is having virtually no edema at present.  The swelling in his legs is gone down enough that his compression stockings that he has actually slide down on their own.  He does have some breast discomfort from the Spironolactone.  However, the eplerenone is not [facility] by [facility] so he is back on torsemide and Spironolactone.  The results are excellent in terms of his swelling.  At this point in time he will continue with the spironolactone and put up with the breast tenderness.      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  That does work in terms of taking care of the discomfort but he is absolutely certain that he gets blisters on his legs when he takes the acetaminophen.  Consequently he is not willing to do that.    Because of his inability to take acetaminophen I did get him a prescription for diclofenac gel to use 2 g to the hands 4 times a day for his arthritic changes.  He will take the prescription to the [facility] for filling.    With respect to his bullous pemphigoid he has been switched by urgent care from triamcinolone to clobetasol with excellent results.  He will continue with clobetasol cream.  He will follow-up in a month for his physical.

## 2018-09-17 NOTE — PROGRESS NOTES
SUBJECTIVE:   Bairon Foster is a 87 year old male who presents to clinic today for the following health issues:      Hypertension Follow-up      Outpatient blood pressures are not being checked.    Low Salt Diet: low salt      Amount of exercise or physical activity: 2-3 days/week for an average of 15-30 minutes    Problems taking medications regularly: No    Medication side effects: none    Diet: regular (no restrictions)        Dependent edema       Duration: Months    Description (location/character/radiation): Swelling from the ankles to the scrotum initially.    Intensity:  moderate    Accompanying signs and symptoms: None    History (similar episodes/previous evaluation): None    Precipitating or alleviating factors: He is on torsemide and Spironolactone with good results    Therapies tried and outcome: See above       Problem list and histories reviewed & adjusted, as indicated.  Additional history: as documented    Patient Active Problem List   Diagnosis     Atrial fibrillation (H)     Pulmonary hypertension     CAD (coronary artery disease)     BPH (benign prostatic hyperplasia)     Ischemic cardiomyopathy     Pancytopenia (H)     JIM (obstructive sleep apnea)- mild/moderate     Acute stress reaction     Health Care Home     Vitamin D deficiency     Advance Care Planning     Gastroesophageal reflux disease without esophagitis     Hydrocele, unspecified hydrocele type     Slow transit constipation     Hypercholesterolemia     Non morbid obesity due to excess calories w comorbid CAD< hi LDL      Venous stasis dermatitis of both lower extremities     Lymphedema of both lower extremities     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      Coronary artery disease due to lipid rich plaque     Acquired hypothyroidism     Onychomycosis     Overweight (BMI 25.0-29.9)     PAD (peripheral artery disease) (H)     Screening for prostate cancer     Blood in stool     Umbilical hernia without obstruction and  without gangrene     Xerosis cutis     Status post coronary angiogram     AK (actinic keratosis)     Angioma of skin     Past Surgical History:   Procedure Laterality Date     EYE SURGERY      CATARACT/BLEPHAROPLASTY     GENITOURINARY SURGERY      TUNA     HERNIA REPAIR      RIGHT INGUINAL     HERNIORRHAPHY INGUINAL  8/14/2012    Procedure: HERNIORRHAPHY INGUINAL;  right inguinal hernia repair;  Surgeon: Kareem Torrez MD;  Location: [facility]     HERNIORRHAPHY UMBILICAL N/A 10/31/2017    Procedure: HERNIORRHAPHY UMBILICAL;  UMBILICAL HERNIA REPAIR WITH MESH;  Surgeon: Scar Harrington MD;  Location: [facility]     IMPLANT PACEMAKER  8/2009    single chamber     ORTHOPEDIC SURGERY      [facility] RIGHT     ORTHOPEDIC SURGERY  2010    right TKR, left TKR     SURGICAL HISTORY OF -       Thumb surgery     SURGICAL HISTORY OF -   1990s    Left total knee replacement     SURGICAL HISTORY OF -   3/11    Rig total knee replacement       Social History   Substance Use Topics     Smoking status: Never Smoker     Smokeless tobacco: Never Used      Comment: per encounter 2/12/07     Alcohol use 0.0 oz/week     0 Standard drinks or equivalent per week      Comment: 1/month     Family History   Problem Relation Age of Onset     Cardiovascular Father      C.A.D. Father      Cancer Sister      lung     Unknown/Adopted Mother      Cancer - colorectal No family hx of            Reviewed and updated as needed this visit by clinical staff  Allergies       Reviewed and updated as needed this visit by Provider         ROS:  Constitutional, HEENT, cardiovascular, pulmonary, gi and gu systems are negative, except as otherwise noted.    OBJECTIVE:                                                    /60 (Cuff Size: Adult Regular)  Pulse 56  Temp 98  F (36.7  C) (Tympanic)  Resp 16  Wt 184 lb (83.5 kg)  BMI 23.62 kg/m2  Body mass index is 23.62 kg/(m^2).  GENERAL APPEARANCE: healthy, alert and no distress  CV: regular rates and rhythm, normal S1 S2,  no S3 or S4, no murmur, click or rub and there is no edema  MS: Hands show marked swelling of the first CMC joints bilaterally.    Diagnostic test results:  Results for orders placed or performed in visit on 08/29/18   Basic metabolic panel   Result Value Ref Range    Sodium 141 133 - 144 mmol/L    Potassium 4.0 3.4 - 5.3 mmol/L    Chloride 106 94 - 109 mmol/L    Carbon Dioxide 30 20 - 32 mmol/L    Anion Gap 5 3 - 14 mmol/L    Glucose 96 70 - 99 mg/dL    Urea Nitrogen 16 7 - 30 mg/dL    Creatinine 1.03 0.66 - 1.25 mg/dL    GFR Estimate 68 >60 mL/min/1.7m2    GFR Estimate If Black 83 >60 mL/min/1.7m2    Calcium 9.3 8.5 - 10.1 mg/dL        ASSESSMENT/PLAN:                                                        ICD-10-CM    1. Arthritis M19.90 diclofenac (VOLTAREN) 1 % GEL topical gel       There are no Patient Instructions on file for this visit.    Mg Nelson MD  [facility]

## 2018-09-17 NOTE — MR AVS SNAPSHOT
After Visit Summary   9/17/2018    Bairon Foster    MRN: [id number]           Patient Information     Date Of Birth          2/6/1931        Visit Information        Provider Department      9/17/2018 1:30 PM Mg Nelson MD [facility]        Today's Diagnoses     Lymphedema of both lower extremities    -  1    Arthritis          Care Instructions    The patient is having virtually no edema at present.  The swelling in his legs is gone down enough that his compression stockings that he has actually slide down on their own.  He does have some breast discomfort from the Spironolactone.  However, the [facility]renone [facility] so he is back on torsemide and Spironolactone.  The results are excellent in terms of his swelling.  At this point in time he will continue with the spironolactone and put up with the breast tenderness.      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  That does work in terms of taking care of the discomfort but he is absolutely certain that he gets blisters on his legs when he takes the acetaminophen.  Consequently he is not willing to do that.    Because of his inability to take acetaminophen I did get him a prescription for diclofenac gel to use 2 g to the hands 4 times a day for his arthritic changes.  He will take the prescription to the [facility] for filling.    With respect to his bullous pemphigoid he has been switched by urgent care from triamcinolone to clobetasol with excellent results.  He will continue with clobetasol cream.  He will follow-up in a month for his physical.          Follow-ups after your visit        Follow-up notes from your care team     Return in about 4 weeks (around 10/15/2018) for Physical Exam.      Your next 10 appointments already scheduled     Sep 20, 2018 10:15 AM CDT   Phone Device Check with ARRIETA TECH2   [facility]  Care   [place] ([facility])    [address]  [place] [address]   [phone] OPT 2            Oct 10, 2018 10:30 AM CDT   [facility]YSICAL with Mg Nelson MD   [facility]    [address]  [place] [address] [phone]              Who to contact     If you have questions or need follow up information about today's clinic visit or your schedule please contact [facility] directly at [phone].  Normal or non-critical lab and imaging results will be communicated to you by MyChart, letter or phone within 4 business days after the clinic has received the results. If you do not hear from us within 7 days, please contact the clinic through [employer] or phone. If you have a critical or abnormal lab result, we will notify you by phone as soon as possible.  Submit refill requests through [employer] or call your pharmacy and they will forward the refill request to us. Please allow 3 business days for your refill to be completed.          Additional Information About Your Visit        MyChart Information     [employer] gives you secure access to your electronic health record. If you see a primary care provider, you can also send messages to your care team and make appointments. If you have questions, please call your primary care clinic.  If you do not have a primary care provider, please call [phone] and they will assist you.        Care EveryWhere ID     This is your Care EveryWhere ID. This could be used by other organizations to access your [facility] medical records  [id number]        Your Vitals Were     Pulse Temperature Respirations BMI (Body Mass Index)          56 98  F (36.7  C) (Tympanic) 16 23.62 kg/m2         Blood Pressure from Last 3 Encounters:   09/17/18 114/60   09/01/18 106/60   08/22/18 100/54    Weight from Last 3 Encounters:   09/17/18  184 lb (83.5 kg)   09/01/18 189 lb 3 oz (85.8 kg)   08/22/18 196 lb 9.6 oz (89.2 kg)              We Performed the Following     DEPRESSION ACTION PLAN (DAP)          Today's Medication Changes          These changes are accurate as of 9/17/18  3:08 PM.  If you have any questions, ask your nurse or doctor.               Start taking these medicines.        Dose/Directions    diclofenac 1 % Gel topical gel   Commonly known as:  VOLTAREN   Used for:  Arthritis   Started by:  Mg Nelson MD        Apply 4 grams to knees or 2 grams to hands four times daily using enclosed dosing card.   Quantity:  100 g   Refills:  11            Where to get your medicines      Some of these will need a paper prescription and others can be bought over the counter.  Ask your nurse if you have questions.     Bring a paper prescription for each of these medications     diclofenac 1 % Gel topical gel                Primary Care Provider Office Phone # Fax #    Mg Nelson [phone]       [address] [address]        Goals        General    I will find a caregiver support group to attend (pt-stated)     Notes - Note created  4/17/2014  8:49 AM by Beth Morales RN    As of today's date 4/17/2014 goal is met at 0 - 25%.   Goal Status:  Active        Equal Access to Services     [facility]EL[facility]: Hadii silvestre padilla hadasho Soomaali, waaxda luqadaha, qaybta kaalmada adeegyada, kathryn shrestha haylaura pathak. So [facility] [phone].    ATENCIÓN: Si habla español, tiene a malone disposición servicios gratuitos de asistencia lingüística. Llame al [phone].    We comply with applicable federal civil rights laws and [place] laws. We do not discriminate on the basis of race, color, national origin, age, disability, sex, sexual orientation, or gender identity.            Thank you!     Thank you for choosing [facility]  for your care. Our goal is always to  provide you with excellent care. Hearing back from our patients is one way we can continue to improve our services. Please take a few minutes to complete the written survey that you may receive in the mail after your visit with us. Thank you!             Your Updated Medication List - Protect others around you: Learn how to safely use, store and throw away your medicines at www.disposemymeds.org.          This list is accurate as of 9/17/18  3:08 PM.  Always use your most recent med list.                   Brand Name Dispense Instructions for use Diagnosis    ASPIRIN NOT PRESCRIBED    INTENTIONAL    0 each    Please choose reason not prescribed, below    Chronic diastolic congestive heart failure (H)       Carboxymethylcellulose Sod PF 1 % ophthalmic solution    CELLUVISC/REFRESH LIQUIGEL     Place 1 drop into both eyes 3 times daily as needed for dry eyes        carvedilol 6.25 MG tablet    COREG    180 tablet    TAKE 1 TABLET (6.25 MG) BY MOUTH 2 TIMES DAILY (WITH MEALS)    Ischemic cardiomyopathy       CENTRUM SILVER per tablet      Take 1 tablet by mouth daily        clobetasol 0.05 % cream    TEMOVATE    60 g    Apply sparingly to affected area twice daily as needed.  Do not apply to face.    Bullous pemphigoid       COMPRESSION STOCKINGS     2 each    1 each daily    Varicose 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       diclofenac 1 % Gel topical gel    VOLTAREN    100 g    Apply 4 grams to knees or 2 grams to hands four times daily using enclosed dosing card.    Arthritis       finasteride 5 MG tablet    PROSCAR    90 tablet    TAKE 1 TABLET EVERY DAY    Benign prostatic hyperplasia, unspecified whether lower urinary tract symptoms present       GLUCOSAMINE-CHONDROITIN PO      Take 2 tablets by mouth daily        isosorbide mononitrate 30 MG 24 hr tablet    IMDUR    90 tablet    Take 1 tablet (30 mg) by mouth daily    SOB (shortness of breath)       ketotifen 0.025 % Soln ophthalmic solution     ZADITOR    1 Bottle    Place 1 drop into both eyes every 12 hours        levothyroxine 100 MCG tablet    SYNTHROID/LEVOTHROID    90 tablet    TAKE 1 TABLET EVERY DAY    Acquired hypothyroidism       LORazepam 1 MG tablet    ATIVAN    30 tablet    Take 0.5-1 tablets (0.5-1 mg) by mouth every 8 hours as needed for anxiety    Acute stress reaction       MIRALAX PO      Take 1 packet by mouth At Bedtime        omega-3 fatty acids 1200 MG capsule      Take 1 capsule by mouth daily        omeprazole 20 MG CR capsule    priLOSEC    180 capsule    Take 1 capsule (20 mg) by mouth 2 times daily    Gastroesophageal reflux disease without esophagitis       order for DME     1 Device    Auto-CPAP:Max 9 cm H2OMin 9 cm H2O Continuous Lifetime need and heated humidity.    Obstructive sleep apnea (adult) (pediatric), Other dyspnea and respiratory abnormality       PRADAXA ANTICOAGULANT 75 MG capsule   Generic drug:  dabigatran ANTICOAGULANT      Take 150 mg by mouth 2 times daily. Store in original 's bottle or blister pack; use within 120 days of opening.        simvastatin 20 MG tablet    ZOCOR     Take by mouth At Bedtime        spironolactone 25 MG tablet    ALDACTONE    30 tablet    Take 1 tablet (25 mg) by mouth daily    Diastolic heart failure (H)       sucralfate 1 GM tablet    CARAFATE    180 tablet    Take 1 tablet (1 g) by mouth 2 times daily    Esophageal reflux       tamsulosin 0.4 MG capsule    FLOMAX    90 capsule    TAKE 1 CAPSULE (0.4 MG) BY MOUTH DAILY    Benign prostatic hyperplasia with urinary hesitancy       torsemide 10 MG tablet    DEMADEX     Take 1 tablet (10 mg) by mouth daily    Chronic diastolic congestive heart failure (H)       triamcinolone 0.025 % cream    KENALOG     Apply topically 2 times daily        UNKNOWN TO PATIENT      Place 1 drop Into the left eye daily EYE DROP PRESCRIBED FOR CONJUNCTIVITIS        WELLBUTRIN XL PO      Take 150 mg by mouth every morning

## 2018-09-18 ASSESSMENT — PATIENT HEALTH QUESTIONNAIRE - PHQ9: SUM OF ALL RESPONSES TO PHQ QUESTIONS 1-9: 1

## 2018-09-19 NOTE — TELEPHONE ENCOUNTER
"I called pt and spoke with him. He saw Dr. Nelson and is doing well. No change for now. He should f/u with Dr. Hope in May. I'm happy to see him sooner if need be. Occasionally he gets some nipple tenderness, probably a side effect of Spironolactone, but it seems to conicide with his \"blister\" eruption that has been ongoing for several months now. Thanks ,Anushka  "

## 2018-09-20 ENCOUNTER — ALLIED HEALTH/NURSE VISIT (OUTPATIENT)
Dept: CARDIOLOGY | Facility: CLINIC | Age: 83
End: 2018-09-20
Payer: COMMERCIAL

## 2018-09-20 DIAGNOSIS — Z95.0 CARDIAC PACEMAKER IN SITU: Primary | ICD-10-CM

## 2018-09-20 PROCEDURE — 93293 PM PHONE R-STRIP DEVICE EVAL: CPT | Performed by: INTERNAL MEDICINE

## 2018-09-20 NOTE — PROGRESS NOTES
90 day phone teletrace ~  Appropriate VS &  at time of check. Chronic Afib, taking Pradaxa. Magnet response WNL. Follow-up 3mo.

## 2018-09-20 NOTE — MR AVS SNAPSHOT
After Visit Summary   9/20/2018    Bairon Foster    MRN: [id number]           Patient Information     Date Of Birth          2/6/1931        Visit Information        Provider Department      9/20/2018 1:45 PM BERNY LOPEZ [facility]        Today's Diagnoses     Cardiac pacemaker in situ    -  1       Follow-ups after your visit        Your next 10 appointments already scheduled     Oct 10, 2018 10:30 AM CDT   PHYSICAL with Mg Nelson MD   [facility]    [address]  [place] [address] [phone]            Dec 20, 2018  1:30 PM CST   Phone Device Check with BERNY LOPEZ   [facility]   [place] ([facility])    [address]  [place] [id number] [phone] OPT 2              Who to contact     If you have questions or need follow up information about today's clinic visit or your schedule please contact [facility] directly at [phone].  Normal or non-critical lab and imaging results will be communicated to you by [email or web address], letter or phone within 4 business days after the clinic has received the results. If you do not hear from us within 7 days, please contact the clinic through [email or web address] or phone. If you have a critical or abnormal lab result, we will notify you by phone as soon as possible.  Submit refill requests through [employer] or call your pharmacy and they will forward the refill request to us. Please allow 3 business days for your refill to be completed.          Additional Information About Your Visit        [email or web address] Information     [employer] gives you secure access to your electronic health record. If you see a primary care provider, you can also send messages to your care team and make appointments. If you have questions, please call your primary care  clinic.  If you do not have a primary care provider, please call [phone] and they will assist you.        Care EveryWhere ID     This is your Care EveryWhere ID. This could be used by other organizations to access your [facility] medical records  [id number]         Blood Pressure from Last 3 Encounters:   09/17/18 114/60   09/01/18 106/60   08/22/18 100/54    Weight from Last 3 Encounters:   09/17/18 83.5 kg (184 lb)   09/01/18 85.8 kg (189 lb 3 oz)   08/22/18 89.2 kg (196 lb 9.6 oz)              We Performed the Following     PM PHONE R STRIP EVAL UP TO 90 DAYS ([id number])        Primary Care Provider Office Phone # Fax #    Mg Nelson [phone] [address]  [place] [address]        Goals        General    I will find a caregiver support group to attend (pt-stated)     Notes - Note created  4/17/2014  8:49 AM by Beth Morales, RN    As of today's date 4/17/2014 goal is met at 0 - 25%.   Goal Status:  Active        Equal Access to Services     [facility]: Hadii aad ku hadasho Soomaali, waaxda luqadaha, qaybta kaalmada adeegyada, kathryn osorion tin camilo . So [facility] [phone].    ATENCIÓN: Si habla español, tiene a malone disposición servicios gratuitos de asistencia lingüística. Llame al [phone].    We comply with applicable federal civil rights laws and [place] laws. We do not discriminate on the basis of race, color, national origin, age, disability, sex, sexual orientation, or gender identity.            Thank you!     Thank you for choosing [facility]  for your care. Our goal is always to provide you with excellent care. Hearing back from our patients is one way we can continue to improve our services. Please take a few minutes to complete the written survey that you may receive in the mail after your visit with us. Thank you!             Your Updated Medication List - Protect others around you: Learn how  to safely use, store and throw away your medicines at www.disposemymeds.org.          This list is accurate as of 9/20/18  1:53 PM.  Always use your most recent med list.                   Brand Name Dispense Instructions for use Diagnosis    ASPIRIN NOT PRESCRIBED    INTENTIONAL    0 each    Please choose reason not prescribed, below    Chronic diastolic congestive heart failure (H)       Carboxymethylcellulose Sod PF 1 % ophthalmic solution    CELLUVISC/REFRESH LIQUIGEL     Place 1 drop into both eyes 3 times daily as needed for dry eyes        carvedilol 6.25 MG tablet    COREG    180 tablet    TAKE 1 TABLET (6.25 MG) BY MOUTH 2 TIMES DAILY (WITH MEALS)    Ischemic cardiomyopathy       CENTRUM SILVER per tablet      Take 1 tablet by mouth daily        clobetasol 0.05 % cream    TEMOVATE    60 g    Apply sparingly to affected area twice daily as needed.  Do not apply to face.    Bullous pemphigoid       COMPRESSION STOCKINGS     2 each    1 each daily    Varicose 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       diclofenac 1 % Gel topical gel    VOLTAREN    100 g    Apply 4 grams to knees or 2 grams to hands four times daily using enclosed dosing card.    Arthritis       finasteride 5 MG tablet    PROSCAR    90 tablet    TAKE 1 TABLET EVERY DAY    Benign prostatic hyperplasia, unspecified whether lower urinary tract symptoms present       GLUCOSAMINE-CHONDROITIN PO      Take 2 tablets by mouth daily        isosorbide mononitrate 30 MG 24 hr tablet    IMDUR    90 tablet    Take 1 tablet (30 mg) by mouth daily    SOB (shortness of breath)       ketotifen 0.025 % Soln ophthalmic solution    ZADITOR    1 Bottle    Place 1 drop into both eyes every 12 hours        levothyroxine 100 MCG tablet    SYNTHROID/LEVOTHROID    90 tablet    TAKE 1 TABLET EVERY DAY    Acquired hypothyroidism       LORazepam 1 MG tablet    ATIVAN    30 tablet    Take 0.5-1 tablets (0.5-1 mg) by mouth every 8 hours as needed for anxiety     Acute stress reaction       MIRALAX PO      Take 1 packet by mouth At Bedtime        omega-3 fatty acids 1200 MG capsule      Take 1 capsule by mouth daily        omeprazole 20 MG CR capsule    priLOSEC    180 capsule    Take 1 capsule (20 mg) by mouth 2 times daily    Gastroesophageal reflux disease without esophagitis       order for DME     1 Device    Auto-CPAP:Max 9 cm H2OMin 9 cm H2O Continuous Lifetime need and heated humidity.    Obstructive sleep apnea (adult) (pediatric), Other dyspnea and respiratory abnormality       PRADAXA ANTICOAGULANT 75 MG capsule   Generic drug:  dabigatran ANTICOAGULANT      Take 150 mg by mouth 2 times daily. Store in original 's bottle or blister pack; use within 120 days of opening.        simvastatin 20 MG tablet    ZOCOR     Take by mouth At Bedtime        spironolactone 25 MG tablet    ALDACTONE    30 tablet    Take 1 tablet (25 mg) by mouth daily    Diastolic heart failure (H)       sucralfate 1 GM tablet    CARAFATE    180 tablet    Take 1 tablet (1 g) by mouth 2 times daily    Esophageal reflux       tamsulosin 0.4 MG capsule    FLOMAX    90 capsule    TAKE 1 CAPSULE (0.4 MG) BY MOUTH DAILY    Benign prostatic hyperplasia with urinary hesitancy       torsemide 10 MG tablet    DEMADEX     Take 1 tablet (10 mg) by mouth daily    Chronic diastolic congestive heart failure (H)       triamcinolone 0.025 % cream    KENALOG     Apply topically 2 times daily        UNKNOWN TO PATIENT      Place 1 drop Into the left eye daily EYE DROP PRESCRIBED FOR CONJUNCTIVITIS        WELLBUTRIN XL PO      Take 150 mg by mouth every morning

## 2018-10-09 ENCOUNTER — TRANSFERRED RECORDS (OUTPATIENT)
Dept: HEALTH INFORMATION MANAGEMENT | Facility: CLINIC | Age: 83
End: 2018-10-09

## 2018-10-09 LAB
ALT SERPL-CCNC: 21 U/L (ref 13–61)
AST SERPL-CCNC: 27 U/L (ref 15–37)

## 2018-10-10 ENCOUNTER — OFFICE VISIT (OUTPATIENT)
Dept: FAMILY MEDICINE | Facility: CLINIC | Age: 83
End: 2018-10-10
Payer: COMMERCIAL

## 2018-10-10 VITALS
TEMPERATURE: 97.6 F | HEIGHT: 74 IN | SYSTOLIC BLOOD PRESSURE: 100 MMHG | OXYGEN SATURATION: 97 % | RESPIRATION RATE: 14 BRPM | WEIGHT: 189 LBS | HEART RATE: 59 BPM | DIASTOLIC BLOOD PRESSURE: 60 MMHG | BODY MASS INDEX: 24.26 KG/M2

## 2018-10-10 DIAGNOSIS — I25.10 CORONARY ARTERY DISEASE INVOLVING NATIVE CORONARY ARTERY OF NATIVE HEART WITHOUT ANGINA PECTORIS: ICD-10-CM

## 2018-10-10 DIAGNOSIS — I27.20 PULMONARY HYPERTENSION (H): ICD-10-CM

## 2018-10-10 DIAGNOSIS — F32.5 MAJOR DEPRESSION IN COMPLETE REMISSION (H): ICD-10-CM

## 2018-10-10 DIAGNOSIS — Z00.00 MEDICARE ANNUAL WELLNESS VISIT, SUBSEQUENT: Primary | ICD-10-CM

## 2018-10-10 DIAGNOSIS — I48.19 PERSISTENT ATRIAL FIBRILLATION (H): ICD-10-CM

## 2018-10-10 DIAGNOSIS — G47.33 OSA (OBSTRUCTIVE SLEEP APNEA): ICD-10-CM

## 2018-10-10 DIAGNOSIS — E78.00 HYPERCHOLESTEROLEMIA: ICD-10-CM

## 2018-10-10 DIAGNOSIS — I89.0 LYMPHEDEMA OF BOTH LOWER EXTREMITIES: ICD-10-CM

## 2018-10-10 DIAGNOSIS — I25.5 ISCHEMIC CARDIOMYOPATHY: ICD-10-CM

## 2018-10-10 DIAGNOSIS — E55.9 VITAMIN D DEFICIENCY: ICD-10-CM

## 2018-10-10 DIAGNOSIS — E03.9 ACQUIRED HYPOTHYROIDISM: ICD-10-CM

## 2018-10-10 DIAGNOSIS — D61.818 PANCYTOPENIA (H): ICD-10-CM

## 2018-10-10 DIAGNOSIS — B35.1 ONYCHOMYCOSIS: ICD-10-CM

## 2018-10-10 DIAGNOSIS — L57.0 AK (ACTINIC KERATOSIS): ICD-10-CM

## 2018-10-10 DIAGNOSIS — Z23 NEED FOR PROPHYLACTIC VACCINATION AND INOCULATION AGAINST INFLUENZA: ICD-10-CM

## 2018-10-10 LAB
ALBUMIN UR-MCNC: NEGATIVE MG/DL
APPEARANCE UR: CLEAR
BASOPHILS # BLD AUTO: 0 10E9/L (ref 0–0.2)
BASOPHILS NFR BLD AUTO: 1.2 %
BILIRUB UR QL STRIP: NEGATIVE
COLOR UR AUTO: YELLOW
DIFFERENTIAL METHOD BLD: ABNORMAL
EOSINOPHIL # BLD AUTO: 0.2 10E9/L (ref 0–0.7)
EOSINOPHIL NFR BLD AUTO: 6.9 %
ERYTHROCYTE [DISTWIDTH] IN BLOOD BY AUTOMATED COUNT: 15.4 % (ref 10–15)
GLUCOSE UR STRIP-MCNC: NEGATIVE MG/DL
HCT VFR BLD AUTO: 34.1 % (ref 40–53)
HGB BLD-MCNC: 11 G/DL
HGB UR QL STRIP: NEGATIVE
KETONES UR STRIP-MCNC: NEGATIVE MG/DL
LEUKOCYTE ESTERASE UR QL STRIP: NEGATIVE
LYMPHOCYTES # BLD AUTO: 1.1 10E9/L (ref 0.8–5.3)
LYMPHOCYTES NFR BLD AUTO: 31.5 %
MCH RBC QN AUTO: 33 PG (ref 26.5–33)
MCHC RBC AUTO-ENTMCNC: 32.3 G/DL (ref 31.5–36.5)
MCV RBC AUTO: 102 FL (ref 78–100)
MONOCYTES # BLD AUTO: 0.4 10E9/L (ref 0–1.3)
MONOCYTES NFR BLD AUTO: 11.4 %
NEUTROPHILS # BLD AUTO: 1.6 10E9/L (ref 1.6–8.3)
NEUTROPHILS NFR BLD AUTO: 49 %
NITRATE UR QL: NEGATIVE
PH UR STRIP: 5.5 PH (ref 5–7)
PLATELET # BLD AUTO: 66 10E9/L (ref 150–450)
RBC # BLD AUTO: 3.33 10E12/L (ref 4.4–5.9)
RBC #/AREA URNS AUTO: NORMAL /HPF
SOURCE: NORMAL
SP GR UR STRIP: 1.01 (ref 1–1.03)
UROBILINOGEN UR STRIP-ACNC: 0.2 EU/DL (ref 0.2–1)
WBC # BLD AUTO: 3.3 10E9/L (ref 4–11)
WBC #/AREA URNS AUTO: NORMAL /HPF

## 2018-10-10 PROCEDURE — G0439 PPPS, SUBSEQ VISIT: HCPCS | Performed by: FAMILY MEDICINE

## 2018-10-10 PROCEDURE — 84460 ALANINE AMINO (ALT) (SGPT): CPT | Performed by: FAMILY MEDICINE

## 2018-10-10 PROCEDURE — 81001 URINALYSIS AUTO W/SCOPE: CPT | Performed by: FAMILY MEDICINE

## 2018-10-10 PROCEDURE — G0008 ADMIN INFLUENZA VIRUS VAC: HCPCS | Performed by: FAMILY MEDICINE

## 2018-10-10 PROCEDURE — 80048 BASIC METABOLIC PNL TOTAL CA: CPT | Performed by: FAMILY MEDICINE

## 2018-10-10 PROCEDURE — 82306 VITAMIN D 25 HYDROXY: CPT | Performed by: FAMILY MEDICINE

## 2018-10-10 PROCEDURE — 36415 COLL VENOUS BLD VENIPUNCTURE: CPT | Performed by: FAMILY MEDICINE

## 2018-10-10 PROCEDURE — 85025 COMPLETE CBC W/AUTO DIFF WBC: CPT | Performed by: FAMILY MEDICINE

## 2018-10-10 PROCEDURE — 90662 IIV NO PRSV INCREASED AG IM: CPT | Performed by: FAMILY MEDICINE

## 2018-10-10 PROCEDURE — 84443 ASSAY THYROID STIM HORMONE: CPT | Performed by: FAMILY MEDICINE

## 2018-10-10 PROCEDURE — 80061 LIPID PANEL: CPT | Performed by: FAMILY MEDICINE

## 2018-10-10 ASSESSMENT — PATIENT HEALTH QUESTIONNAIRE - PHQ9
SUM OF ALL RESPONSES TO PHQ QUESTIONS 1-9: 4
SUM OF ALL RESPONSES TO PHQ QUESTIONS 1-9: 4
10. IF YOU CHECKED OFF ANY PROBLEMS, HOW DIFFICULT HAVE THESE PROBLEMS MADE IT FOR YOU TO DO YOUR WORK, TAKE CARE OF THINGS AT HOME, OR GET ALONG WITH OTHER PEOPLE: SOMEWHAT DIFFICULT

## 2018-10-10 ASSESSMENT — ACTIVITIES OF DAILY LIVING (ADL)
CURRENT_FUNCTION: NO ASSISTANCE NEEDED
I_NEED_ASSISTANCE_FOR_THE_FOLLOWING_DAILY_ACTIVITIES:: NO ASSISTANCE IS NEEDED

## 2018-10-10 NOTE — PROGRESS NOTES

## 2018-10-10 NOTE — NURSING NOTE
"Chief Complaint   Patient presents with     Wellness Visit     /60  Pulse 59  Temp 97.6  F (36.4  C) (Tympanic)  Resp 14  Ht 6' 2\" (1.88 m)  Wt 189 lb (85.7 kg)  SpO2 97%  BMI 24.27 kg/m2 Estimated body mass index is 24.27 kg/(m^2) as calculated from the following:    Height as of this encounter: 6' 2\" (1.88 m).    Weight as of this encounter: 189 lb (85.7 kg).  BP completed using cuff size: houston Allen CMA    Health Maintenance Due   Topic Date Due     INFLUENZA VACCINE (1) 09/01/2018     ALT Q1 YR  09/08/2018     TSH W/ FREE T4 REFLEX Q1 YEAR  09/08/2018     LIPID MONITORING Q1 YEAR  09/08/2018     Health Maintenance reviewed at today's visit patient asked to schedule/complete:   Immunizations:  Patient agrees to schedule    "

## 2018-10-10 NOTE — MR AVS SNAPSHOT
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         Need for prophylactic vaccination and inoculation against influenza        Ischemic cardiomyopathy        Coronary artery disease involving native coronary artery of native heart without angina pectoris        Pulmonary hypertension (H)        JIM (obstructive sleep apnea)- mild/moderate        Vitamin D deficiency        Hypercholesterolemia        Lymphedema of both lower extremities        Acquired hypothyroidism        Onychomycosis        AK (actinic keratosis)          Care Instructions      Preventive Health Recommendations:       Male Ages 65 and over    Yearly exam:             See your health care provider every year in order to  o   Review health changes.   o   Discuss preventive care.    o   Review your medicines if your doctor has prescribed any.    Talk with your health care provider about whether you should have a test to screen for prostate cancer (PSA).    Every 3 years, have a diabetes test (fasting glucose). If you are at risk for diabetes, you should have this test more often.    Every 5 years, have a cholesterol test. Have this test more often if you are at risk for high cholesterol or heart disease.     Every 10 years, have a colonoscopy. Or, have a yearly FIT test (stool test). These exams will check for colon cancer.    Talk to with your health care provider about screening for Abdominal Aortic Aneurysm if you have a family history of AAA or have a history of smoking.  Shots:     Get a flu shot  each year.     Get a tetanus shot every 10 years.     Talk to your doctor about your pneumonia vaccines. There are now two you should receive - Pneumovax (PPSV 23) and Prevnar (PCV 13).    Talk to your pharmacist about a shingles vaccine.     Talk to your doctor about the hepatitis B vaccine.  Nutrition:     Eat at least 5 servings of fruits and vegetables each day.     Eat whole-grain bread, whole-wheat pasta and brown rice instead of white grains and rice.     Get adequate Calcium and Vitamin D.   Lifestyle    Exercise for at least 150 minutes a week (30 minutes a day, 5 days a week). This will help you control your weight and prevent disease.     Limit alcohol to one drink per day.     No smoking.     Wear sunscreen to prevent skin cancer.     See your dentist every six months for an exam and cleaning.     See your eye doctor every 1 to 2 years to screen for conditions such as glaucoma, macular degeneration and cataracts.          Follow-ups after your visit        Your next 10 appointments already scheduled     Dec 20, 2018  1:30 PM CST   Phone Device Check with ARRIETA TECH2   [facility] ([facility])    [address] [place] [id number] [phone] OPT 2              Who to contact     If you have questions or need follow up information about today's clinic visit or your schedule please contact [facility] directly at [phone].  Normal or non-critical lab and imaging results will be communicated to you by MyChart, letter or phone within 4 business days after the clinic has received the results. If you do not hear from us within 7 days, please contact the clinic through MyChart or phone. If you have a critical or abnormal lab result, we will notify you by phone as soon as possible.  Submit refill requests through [employer] or call your pharmacy and they will forward the refill request to us. Please allow 3 business  "days for your refill to be completed.          Additional Information About Your Visit        MyChart Information     [facility] gives you secure access to your electronic health record. If you see a primary care provider, you can also send messages to your care team and make appointments. If you have questions, please call your primary care clinic.  If you do not have a primary care provider, please call [phone] and they will assist you.        Care EveryWhere ID     This is your Care EveryWhere ID. This could be used by other organizations to access your [facility] medical records  [id number]        Your Vitals Were     Pulse Temperature Respirations Height Pulse Oximetry BMI (Body Mass Index)    59 97.6  F (36.4  C) (Tympanic) 14 6' 2\" (1.88 m) 97% 24.27 kg/m2       Blood Pressure from Last 3 Encounters:   10/10/18 100/60   09/17/18 114/60   09/01/18 106/60    Weight from Last 3 Encounters:   10/10/18 189 lb (85.7 kg)   09/17/18 184 lb (83.5 kg)   09/01/18 189 lb 3 oz (85.8 kg)              We Performed the Following     ALT     Basic metabolic panel     CBC with platelets differential     HEART FAILURE ACTION PLAN     Lipid panel reflex to direct LDL Fasting     TSH WITH FREE T4 REFLEX     UA with Microscopic     Vitamin D Deficiency        Primary Care Provider Office Phone # Fax #    Mg Nelson [phone] [facility] [address]        Goals        General    I will find a caregiver support group to attend (pt-stated)     Notes - Note created  4/17/2014  8:49 AM by Beth Morales, RN    As of today's date 4/17/2014 goal is met at 0 - 25%.   Goal Status:  Active        Equal Access to Services     [facility] : Hadii silvestre Viramontes, waganeshda luqadaha, qaybta kaalmada kathryn polk. So [facility] [phone].    ATENCIÓN: Si habla español, tiene a malone disposición servicios gratuitos de asistencia lingüística. Llame al " [phone].    We comply with applicable federal civil rights laws and [place] laws. We do not discriminate on the basis of race, color, national origin, age, disability, sex, sexual orientation, or gender identity.            Thank you!     Thank you for choosing [facility]  for your care. Our goal is always to provide you with excellent care. Hearing back from our patients is one way we can continue to improve our services. Please take a few minutes to complete the written survey that you may receive in the mail after your visit with us. Thank you!             Your Updated Medication List - Protect others around you: Learn how to safely use, store and throw away your medicines at www.disposemymeds.org.          This list is accurate as of 10/10/18 11:10 AM.  Always use your most recent med list.                   Brand Name Dispense Instructions for use Diagnosis    ASPIRIN NOT PRESCRIBED    INTENTIONAL    0 each    Please choose reason not prescribed, below    Chronic diastolic congestive heart failure (H)       Carboxymethylcellulose Sod PF 1 % ophthalmic solution    CELLUVISC/REFRESH LIQUIGEL     Place 1 drop into both eyes 3 times daily as needed for dry eyes        carvedilol 6.25 MG tablet    COREG    180 tablet    TAKE 1 TABLET (6.25 MG) BY MOUTH 2 TIMES DAILY (WITH MEALS)    Ischemic cardiomyopathy       CENTRUM SILVER per tablet      Take 1 tablet by mouth daily        clobetasol 0.05 % cream    TEMOVATE    60 g    Apply sparingly to affected area twice daily as needed.  Do not apply to face.    Bullous pemphigoid       COMPRESSION STOCKINGS     2 each    1 each daily    Varicose 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       diclofenac 1 % Gel topical gel    VOLTAREN    100 g    Apply 4 grams to knees or 2 grams to hands four times daily using enclosed dosing card.    Arthritis       finasteride 5 MG tablet    PROSCAR    90 tablet    TAKE 1 TABLET EVERY DAY     Benign prostatic hyperplasia, unspecified whether lower urinary tract symptoms present       GLUCOSAMINE-CHONDROITIN PO      Take 2 tablets by mouth daily        isosorbide mononitrate 30 MG 24 hr tablet    IMDUR    90 tablet    Take 1 tablet (30 mg) by mouth daily    SOB (shortness of breath)       ketotifen 0.025 % Soln ophthalmic solution    ZADITOR    1 Bottle    Place 1 drop into both eyes every 12 hours        levothyroxine 100 MCG tablet    SYNTHROID/LEVOTHROID    90 tablet    TAKE 1 TABLET EVERY DAY    Acquired hypothyroidism       LORazepam 1 MG tablet    ATIVAN    30 tablet    Take 0.5-1 tablets (0.5-1 mg) by mouth every 8 hours as needed for anxiety    Acute stress reaction       MIRALAX PO      Take 1 packet by mouth At Bedtime        omega-3 fatty acids 1200 MG capsule      Take 1 capsule by mouth daily        omeprazole 20 MG CR capsule    priLOSEC    180 capsule    Take 1 capsule (20 mg) by mouth 2 times daily    Gastroesophageal reflux disease without esophagitis       order for DME     1 Device    Auto-CPAP:Max 9 cm H2OMin 9 cm H2O Continuous Lifetime need and heated humidity.    Obstructive sleep apnea (adult) (pediatric), Other dyspnea and respiratory abnormality       PRADAXA ANTICOAGULANT 75 MG capsule   Generic drug:  dabigatran ANTICOAGULANT      Take 150 mg by mouth 2 times daily. Store in original 's bottle or blister pack; use within 120 days of opening.        simvastatin 20 MG tablet    ZOCOR     Take by mouth At Bedtime        spironolactone 25 MG tablet    ALDACTONE    30 tablet    Take 1 tablet (25 mg) by mouth daily    Diastolic heart failure (H)       sucralfate 1 GM tablet    CARAFATE    180 tablet    Take 1 tablet (1 g) by mouth 2 times daily    Esophageal reflux       tamsulosin 0.4 MG capsule    FLOMAX    90 capsule    TAKE 1 CAPSULE (0.4 MG) BY MOUTH DAILY    Benign prostatic hyperplasia with urinary hesitancy       torsemide 10 MG tablet    DEMADEX     Take 1 tablet  (10 mg) by mouth daily    Chronic diastolic congestive heart failure (H)       triamcinolone 0.025 % cream    KENALOG     Apply topically 2 times daily        UNKNOWN TO PATIENT      Place 1 drop Into the left eye daily EYE DROP PRESCRIBED FOR CONJUNCTIVITIS        WELLBUTRIN XL PO      Take 150 mg by mouth every morning

## 2018-10-10 NOTE — PROGRESS NOTES
SUBJECTIVE:   Bairon Foster is a 87 year old male who presents for Preventive Visit.    Are you in the first 12 months of your Medicare coverage?  No    Physical   Annual:     Getting at least 3 servings of Calcium per day:  Yes    Bi-annual eye exam:  Yes    Dental care twice a year:  Yes    Sleep apnea or symptoms of sleep apnea:  Sleep apnea    Diet:  Low salt    Frequency of exercise:  1 day/week    Duration of exercise:  15-30 minutes    Taking medications regularly:  Yes    Medication side effects:  Other    Ability to successfully perform activities of daily living: no assistance needed    Home Safety:  Throw rugs in the hallway    Hearing Impairment: difficulty following a conversation in a noisy restaurant or crowded room, feel that people are mumbling or not speaking clearly, difficulty following dialogue in the theater and need to ask people to speak up or repeat themselves      Ability to successfully perform activities of daily living: Yes, no assistance needed  Home safety:  none identified   Hearing impairment: Yes, Difficulty following a conversation in a noisy restaurant or crowded room.    Fall risk:  Fallen 2 or more times in the past year?: No  Any fall with injury in the past year?: No        Reviewed and updated as needed this visit by clinical staff  Tobacco  Allergies  Meds  Problems         Reviewed and updated as needed this visit by Provider        Social History   Substance Use Topics     Smoking status: Never Smoker     Smokeless tobacco: Never Used      Comment: per encounter 2/12/07     Alcohol use 0.0 oz/week     0 Standard drinks or equivalent per week      Comment: 1/month       Alcohol Use 10/10/2018   If you drink alcohol do you typically have greater than 3 drinks per day OR greater than 7 drinks per week? No   No flowsheet data found.            Today's PHQ-2 Score:   PHQ-2 ( 1999 [employer]) 10/10/2018   Q1: Little interest or pleasure in doing things 2   Q2: Feeling down,  depressed or hopeless 1   PHQ-2 Score 3   Q1: Little interest or pleasure in doing things More than half the days   Q2: Feeling down, depressed or hopeless Several days   PHQ-2 Score 3       Do you feel safe in your environment - Yes    Do you have a Health Care Directive?: No: Advance care planning was reviewed with patient; patient declined at this time.    Current providers sharing in care for this patient include:   Patient Care Team:  Mg Nelson MD as PCP - General (Family Practice)  Mg Nelson MD as PCP (Family Practice)  Sean Carrera MD as MD (Cardiology)    The following health maintenance items are reviewed in Epic and correct as of today:  Health Maintenance   Topic Date Due     INFLUENZA VACCINE (1) 09/01/2018     ALT Q1 YR  09/08/2018     TSH W/ FREE T4 REFLEX Q1 YEAR  09/08/2018     LIPID MONITORING Q1 YEAR  09/08/2018     BMP Q6 MOS  02/28/2019     PHQ-9 Q6 MONTHS  03/17/2019     CBC Q1 YR  03/21/2019     FALL RISK ASSESSMENT  09/17/2019     DEPRESSION ACTION PLAN Q1 YR  09/17/2019     ADVANCE DIRECTIVE PLANNING Q5 YRS  11/10/2020     HF ACTION PLAN Q3 YR  10/10/2021     TETANUS IMMUNIZATION (SYSTEM ASSIGNED)  01/02/2024     PNEUMOCOCCAL  Completed     Patient Active Problem List   Diagnosis     Atrial fibrillation (H)     Pulmonary hypertension (H)     CAD (coronary artery disease)     BPH (benign prostatic hyperplasia)     Ischemic cardiomyopathy     Pancytopenia (H)     JIM (obstructive sleep apnea)- mild/moderate     Acute stress reaction     Health Care Home     Vitamin D deficiency     Advance Care Planning     Gastroesophageal reflux disease without esophagitis     Hydrocele, unspecified hydrocele type     Slow transit constipation     Hypercholesterolemia     Non morbid obesity due to excess calories w comorbid CAD< hi LDL      Venous stasis dermatitis of both lower extremities     Lymphedema of both lower extremities     Major depression in complete remission (H)  on meds      Coronary artery disease due to lipid rich plaque     Acquired hypothyroidism     Onychomycosis     Overweight (BMI 25.0-29.9)     PAD (peripheral artery disease) (H)     Screening for prostate cancer     Blood in stool     Umbilical hernia without obstruction and without gangrene     Xerosis cutis     Status post coronary angiogram     AK (actinic keratosis)     Angioma of skin     Past Surgical History:   Procedure Laterality Date     EYE SURGERY      CATARACT/BLEPHAROPLASTY     GENITOURINARY SURGERY      TUNA     HERNIA REPAIR      RIGHT INGUINAL     HERNIORRHAPHY INGUINAL  8/14/2012    Procedure: HERNIORRHAPHY INGUINAL;  right inguinal hernia repair;  Surgeon: Kareem Torrez MD;  Location: [facility]     HERNIORRHAPHY UMBILICAL N/A 10/31/2017    Procedure: HERNIORRHAPHY UMBILICAL;  UMBILICAL HERNIA REPAIR WITH MESH;  Surgeon: Scar Harrington MD;  Location: [facility]     IMPLANT PACEMAKER  8/2009    single chamber     [place]      [facility] [place]     ORTHOPEDIC SURGERY  2010    right TKR, left TKR     SURGICAL HISTORY OF -       Thumb surgery     SURGICAL HISTORY OF -   1990s    Left total knee replacement     SURGICAL HISTORY OF -   3/11    [facility] total knee replacement       Social History   Substance Use Topics     Smoking status: Never Smoker     Smokeless tobacco: Never Used      Comment: per encounter 2/12/07     Alcohol use 0.0 oz/week     0 Standard drinks or equivalent per week      Comment: 1/month     Family History   Problem Relation Age of Onset     Cardiovascular Father      C.A.D. Father      Lung Cancer Sister      Unknown/Adopted Mother      Cancer - colorectal No family hx of                Review of Systems  CONSTITUTIONAL: NEGATIVE for fever, chills, change in weight  INTEGUMENTARY/SKIN: NEGATIVE for worrisome rashes, moles or lesions  EYES: NEGATIVE for vision changes or irritation  ENT/MOUTH: NEGATIVE for ear, mouth and throat problems  RESP: NEGATIVE for significant cough or SOB  BREAST:  "NEGATIVE for masses, tenderness or discharge  CV: NEGATIVE for chest pain, palpitations or peripheral edema  GI: NEGATIVE for nausea, abdominal pain, heartburn, or change in bowel habits  : NEGATIVE for frequency, dysuria, or hematuria  MUSCULOSKELETAL: NEGATIVE for significant arthralgias or myalgia  NEURO: NEGATIVE for weakness, dizziness or paresthesias  ENDOCRINE: NEGATIVE for temperature intolerance, skin/hair changes  HEME: NEGATIVE for bleeding problems  PSYCHIATRIC: NEGATIVE for changes in mood or affect    OBJECTIVE:   /60  Pulse 59  Temp 97.6  F (36.4  C) (Tympanic)  Resp 14  Ht 6' 2\" (1.88 m)  Wt 189 lb (85.7 kg)  SpO2 97%  BMI 24.27 kg/m2 Estimated body mass index is 24.27 kg/(m^2) as calculated from the following:    Height as of this encounter: 6' 2\" (1.88 m).    Weight as of this encounter: 189 lb (85.7 kg).  Physical Exam  GENERAL: healthy, alert and no distress  EYES: Eyes grossly normal to inspection, PERRL and conjunctivae and sclerae normal  HENT: ear canals and TM's normal, nose and mouth without ulcers or lesions  NECK: no adenopathy, no asymmetry, masses, or scars and thyroid normal to palpation  RESP: lungs clear to auscultation - no rales, rhonchi or wheezes  CV: regular rate and rhythm, normal S1 S2, no S3 or S4, no murmur, click or rub, no peripheral edema and peripheral pulses strong  ABDOMEN: soft, nontender, no hepatosplenomegaly, no masses and bowel sounds normal  MS: no gross musculoskeletal defects noted, no edema  SKIN: no suspicious lesions or rashes  NEURO: Normal strength and tone, mentation intact and speech normal  PSYCH: mentation appears normal, affect normal/bright        ASSESSMENT / PLAN:       ICD-10-CM    1. Medicare annual wellness visit, subsequent Z00.00    2. Persistent atrial fibrillation (H) I48.1    3. Pancytopenia (H) D61.818 CBC with platelets differential   4. 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 Need for prophylactic " "vaccination and inoculation against influenza Z23    6. Ischemic cardiomyopathy I25.5 HEART FAILURE ACTION PLAN   7. Coronary artery disease involving native coronary artery of native heart without angina pectoris I25.10 UA with Microscopic     Basic metabolic panel   8. Pulmonary hypertension (H) I27.20    9. JIM (obstructive sleep apnea)- mild/moderate G47.33    10. Vitamin D deficiency E55.9 Vitamin D Deficiency   11. Hypercholesterolemia E78.00 ALT     Lipid panel reflex to direct LDL Fasting   12. Lymphedema of both lower extremities I89.0    13. Acquired hypothyroidism E03.9 TSH WITH FREE T4 REFLEX   14. Onychomycosis B35.1    15. AK (actinic keratosis) L57.0        End of Life Planning:  Patient currently has an advanced directive: yes and patient will get us a copy    COUNSELING:  Reviewed preventive health counseling, as reflected in patient instructions       Immunizations    Vaccinated for: Influenza          BP Readings from Last 1 Encounters:   10/10/18 100/60     Estimated body mass index is 24.27 kg/(m^2) as calculated from the following:    Height as of this encounter: 6' 2\" (1.88 m).    Weight as of this encounter: 189 lb (85.7 kg).           reports that he has never smoked. He has never used smokeless tobacco.      Appropriate preventive services were discussed with this patient, including applicable screening as appropriate for cardiovascular disease, diabetes, osteopenia/osteoporosis, and glaucoma.  As appropriate for age/gender, discussed screening for colorectal cancer, prostate cancer, breast cancer, and cervical cancer. Checklist reviewing preventive services available has been given to the patient.    Reviewed patients plan of care and provided an AVS. The Basic Care Plan (routine screening as documented in Health Maintenance) for Bairon meets the Care Plan requirement. This Care Plan has been established and reviewed with the Patient.    Counseling Resources:  ATP IV Guidelines  Pooled " Cohorts Equation Calculator  Breast Cancer Risk Calculator  FRAX Risk Assessment  ICSI Preventive Guidelines  Dietary Guidelines for Americans, 2010  [employer] MyPlate  ASA Prophylaxis  Lung CA Screening    Mg Nelson MD  [facility]  Answers for HPI/ROS submitted by the patient on 10/10/2018   PHQ-2 Score: 3  If you checked off any problems, how difficult have these problems made it for you to do your work, take care of things at home, or get along with other people?: Somewhat difficult  PHQ9 TOTAL SCORE: 4

## 2018-10-11 LAB
ALT SERPL W P-5'-P-CCNC: 23 U/L (ref 0–70)
ANION GAP SERPL CALCULATED.3IONS-SCNC: 6 MMOL/L (ref 3–14)
BUN SERPL-MCNC: 21 MG/DL (ref 7–30)
CALCIUM SERPL-MCNC: 9.7 MG/DL (ref 8.5–10.1)
CHLORIDE SERPL-SCNC: 105 MMOL/L (ref 94–109)
CHOLEST SERPL-MCNC: 121 MG/DL
CO2 SERPL-SCNC: 28 MMOL/L (ref 20–32)
CREAT SERPL-MCNC: 1.05 MG/DL (ref 0.66–1.25)
DEPRECATED CALCIDIOL+CALCIFEROL SERPL-MC: 34 UG/L (ref 20–75)
GFR SERPL CREATININE-BSD FRML MDRD: 67 ML/MIN/1.7M2
GLUCOSE SERPL-MCNC: 97 MG/DL (ref 70–99)
HDLC SERPL-MCNC: 59 MG/DL
LDLC SERPL CALC-MCNC: 55 MG/DL
NONHDLC SERPL-MCNC: 62 MG/DL
POTASSIUM SERPL-SCNC: 4.1 MMOL/L (ref 3.4–5.3)
SODIUM SERPL-SCNC: 139 MMOL/L (ref 133–144)
TRIGL SERPL-MCNC: 34 MG/DL
TSH SERPL DL<=0.005 MIU/L-ACNC: 1.77 MU/L (ref 0.4–4)

## 2018-10-11 ASSESSMENT — PATIENT HEALTH QUESTIONNAIRE - PHQ9: SUM OF ALL RESPONSES TO PHQ QUESTIONS 1-9: 4

## 2018-11-06 ENCOUNTER — TRANSFERRED RECORDS (OUTPATIENT)
Dept: HEALTH INFORMATION MANAGEMENT | Facility: CLINIC | Age: 83
End: 2018-11-06

## 2018-11-06 LAB
CREAT SERPL-MCNC: 1.1 MG/DL (ref 0.7–1.2)
POTASSIUM SERPL-SCNC: 4.1 MMOL/L (ref 3.5–5)
TSH SERPL-ACNC: 1.58 UIU/ML (ref 0.3–5)

## 2018-11-20 ENCOUNTER — OFFICE VISIT (OUTPATIENT)
Dept: URGENT CARE | Facility: URGENT CARE | Age: 83
End: 2018-11-20
Payer: COMMERCIAL

## 2018-11-20 ENCOUNTER — TELEPHONE (OUTPATIENT)
Dept: FAMILY MEDICINE | Facility: CLINIC | Age: 83
End: 2018-11-20

## 2018-11-20 VITALS
DIASTOLIC BLOOD PRESSURE: 64 MMHG | TEMPERATURE: 97.3 F | HEART RATE: 60 BPM | SYSTOLIC BLOOD PRESSURE: 122 MMHG | WEIGHT: 192.5 LBS | OXYGEN SATURATION: 98 % | BODY MASS INDEX: 24.72 KG/M2

## 2018-11-20 DIAGNOSIS — T14.8XXA BRUISING: Primary | ICD-10-CM

## 2018-11-20 DIAGNOSIS — L85.3 DRY SKIN: ICD-10-CM

## 2018-11-20 LAB
ERYTHROCYTE [DISTWIDTH] IN BLOOD BY AUTOMATED COUNT: 15.2 % (ref 10–15)
HCT VFR BLD AUTO: 32.6 % (ref 40–53)
HGB BLD-MCNC: 10.3 G/DL (ref 13.3–17.7)
INR PPP: 1.79 (ref 0.86–1.14)
MCH RBC QN AUTO: 32.6 PG (ref 26.5–33)
MCHC RBC AUTO-ENTMCNC: 31.6 G/DL (ref 31.5–36.5)
MCV RBC AUTO: 103 FL (ref 78–100)
PLATELET # BLD AUTO: 64 10E9/L (ref 150–450)
RBC # BLD AUTO: 3.16 10E12/L (ref 4.4–5.9)
WBC # BLD AUTO: 2.8 10E9/L (ref 4–11)

## 2018-11-20 PROCEDURE — 85610 PROTHROMBIN TIME: CPT | Performed by: PHYSICIAN ASSISTANT

## 2018-11-20 PROCEDURE — 85027 COMPLETE CBC AUTOMATED: CPT | Performed by: PHYSICIAN ASSISTANT

## 2018-11-20 PROCEDURE — 36415 COLL VENOUS BLD VENIPUNCTURE: CPT | Performed by: PHYSICIAN ASSISTANT

## 2018-11-20 PROCEDURE — 99213 OFFICE O/P EST LOW 20 MIN: CPT | Performed by: PHYSICIAN ASSISTANT

## 2018-11-20 NOTE — PATIENT INSTRUCTIONS
(T14.8XXA) Bruising  (primary encounter diagnosis)  Comment: and intermittent   Plan: CBC with platelets, INR          Stop Diclofenac gel for now    Use vaseline on feet and wear socks at bedtime.      Moisturizing lotion on hands, arms, legs.     Vaseline to nasal passages.      Humidifier in bedroom    Follow up with Dr. Nelson

## 2018-11-20 NOTE — PROGRESS NOTES
SUBJECTIVE:  Bairon Foster is a 87 year old male who presents to the clinic today for increased bruising and bleeding over the past month.    Denies any specific injuries.  He started using diclofenac gel about a month ago.  Has been on Pradaxa for over a year.    Has cracked and dry skin on feet and some on hands that bleeds easily.        Past Medical History:   Diagnosis Date     Acute, but ill-defined, cerebrovascular disease     NO RESIDUALS     Antiplatelet or antithrombotic long-term use      Arrhythmia     a fib     Blood in stool      Coronary atherosclerosis of unspecified type of vessel, native or graft     S/P PTCA, EF 50%     Esophageal reflux      Hydrocele, unspecified      Hypercholesterolemia      Hypersomnia with sleep apnea, unspecified      Inguinal hernia without mention of obstruction or gangrene, recurrent unilateral or unspecified      Ischemic cardiomyopathy      Major depression in complete remission (H)      Obese      Onychomycosis      JIM on CPAP      Other and unspecified hyperlipidemia      Other lymphedema     GROIN AND THIGHS     Other pancytopenia (H)      Other specified anemias      Overweight      Pacemaker      PAD (peripheral artery disease) (H)      Pancytopenia (H)      Persistent atrial fibrillation (H)     VVI PM for long pauses     Prostatic hypertrophy, benign      Pulmonary hypertension (H)      Scrotal varices      Thrombocytopenia, unspecified (H)      Umbilical hernia without obstruction and without gangrene      Unilateral or unspecified femoral hernia without mention of obstruction or gangrene, unilateral or unspecified      Unspecified hypothyroidism      Venous stasis dermatitis of both lower extremities      Vitamin D deficiency       No Known Allergies  Social History   Substance Use Topics     Smoking status: Never Smoker     Smokeless tobacco: Never Used      Comment: per encounter 2/12/07     Alcohol use 0.0 oz/week     0 Standard drinks or equivalent per  week      Comment: 1/month       ROS:  CONSTITUTIONAL:NEGATIVE for fever, chills, change in weight  INTEGUMENTARY/SKIN: as per HPIRESP:NEGATIVE for significant cough or SOB  CV: NEGATIVE for chest pain, palpitations or peripheral edema  Review of systems negative except as stated above.    EXAM:   /64  Pulse 60  Temp 97.3  F (36.3  C) (Oral)  Wt 192 lb 8 oz (87.3 kg)  SpO2 98%  BMI 24.72 kg/m2  GENERAL: alert, no acute distress.  SKIN: areas of ecchymosis on both forearms.  A few superficial skin tears, covered easily with bandages.    Dry skin on hands and feet.        (T14.8XXA) Bruising  (primary encounter diagnosis)  Comment:   Plan: CBC with platelets, INR  INR is mildy elevated, similar to previous in March.      WBC is slightly lower than recent lab 10/2018, similar with HGB.  Patient has pancytopenia diagnosis    Stop Diclofenac gel for now        Results for orders placed or performed in visit on 11/20/18   CBC with platelets   Result Value Ref Range    WBC 2.8 (L) 4.0 - 11.0 10e9/L    RBC Count 3.16 (L) 4.4 - 5.9 10e12/L    Hemoglobin 10.3 (L) 13.3 - 17.7 g/dL    Hematocrit 32.6 (L) 40.0 - 53.0 %     (H) 78 - 100 fl    MCH 32.6 26.5 - 33.0 pg    MCHC 31.6 31.5 - 36.5 g/dL    RDW 15.2 (H) 10.0 - 15.0 %    Platelet Count 64 (L) 150 - 450 10e9/L   INR   Result Value Ref Range    INR 1.79 (H) 0.86 - 1.14     Follow up with primary clinic within one week, sooner should symptoms worsen in any way.            (L85.3) Dry skin  Comment:   Plan:  Use vaseline on feet and wear socks at bedtime.      Moisturizing lotion on hands, arms, legs.     Vaseline to nasal passages.      Humidifier in bedroom    Follow up with Dr. Nelson    Patient expresses understanding and agreement with the assessment and plan as above.

## 2018-11-20 NOTE — MR AVS SNAPSHOT
After Visit Summary   11/20/2018    Bairon Foster    MRN: [id number]           Patient Information     Date Of Birth          2/6/1931        Visit Information        Provider Department      11/20/2018 1:25 PM Roberta Wan PA-C [facility]        Today's Diagnoses     Bruising    -  1      Care Instructions    (T14.8XXA) Bruising  (primary encounter diagnosis)  Comment: and intermittent   Plan: CBC with platelets, INR          Stop Diclofenac gel for now    Use vaseline on feet and wear socks at bedtime.      Moisturizing lotion on hands, arms, legs.     Vaseline to nasal passages.      Humidifier in bedroom    Follow up with Dr. Nelson              Follow-ups after your visit        Your next 10 appointments already scheduled     Dec 20, 2018  1:30 PM CST   Phone Device Check with ARRIETA TECH2   [facility] ([facility])    [address] [place] [id number] [phone] OPT 2              Who to contact     If you have questions or need follow up information about today's clinic visit or your schedule please contact [facility] directly at [phone].  Normal or non-critical lab and imaging results will be communicated to you by MyChart, letter or phone within 4 business days after the clinic has received the results. If you do not hear from us within 7 days, please contact the clinic through Spotzerhart or phone. If you have a critical or abnormal lab result, we will notify you by phone as soon as possible.  Submit refill requests through Animal Kingdom or call your pharmacy and they will forward the refill request to us. Please allow 3 business days for your refill to be completed.          Additional Information About Your Visit        MyChart Information     [facility] [employer] gives you secure access to your electronic health record. If you see a primary care provider, you can  also send messages to your care team and make appointments. If you have questions, please call your primary care clinic.  If you do not have a primary care provider, please call [phone] and they will assist you.        Care EveryWhere ID     This is your Care EveryWhere ID. This could be used by other organizations to access your [place] medical records  [id number]        Your Vitals Were     Pulse Temperature Pulse Oximetry BMI (Body Mass Index)          60 97.3  F (36.3  C) (Oral) 98% 24.72 kg/m2         Blood Pressure from Last 3 Encounters:   11/20/18 122/64   10/10/18 100/60   09/17/18 114/60    Weight from Last 3 Encounters:   11/20/18 192 lb 8 oz (87.3 kg)   10/10/18 189 lb (85.7 kg)   09/17/18 184 lb (83.5 kg)              We Performed the Following     CBC with platelets     INR        Primary Care Provider Office Phone # Fax #    Mg Nelson [phone] [address] [facility] [address]        Goals        General    I will find a caregiver support group to attend (pt-stated)     Notes - Note created  4/17/2014  8:49 AM by Beth Morales RN    As of today's date 4/17/2014 goal is met at 0 - 25%.   Goal Status:  Active        Equal Access to Services     DEJAH PETERSON AH: Hadii silvestre ku hadasho Solindaali, waaxda luqadaha, qaybta kaalmada adeegyada, kathryn camilo . So [facility] [phone].    ATENCIÓN: Si habla español, tiene a malone disposición servicios gratuitos de asistencia lingüística. Llame al [phone].    We comply with applicable federal civil rights laws and [place] laws. We do not discriminate on the basis of race, color, national origin, age, disability, sex, sexual orientation, or gender identity.            Thank you!     Thank you for choosing [facility]  for your care. Our goal is always to provide you with excellent care. Hearing back from our patients is one way we can continue to improve our  services. Please take a few minutes to complete the written survey that you may receive in the mail after your visit with us. Thank you!             Your Updated Medication List - Protect others around you: Learn how to safely use, store and throw away your medicines at www.disposemymeds.org.          This list is accurate as of 11/20/18  2:30 PM.  Always use your most recent med list.                   Brand Name Dispense Instructions for use Diagnosis    ASPIRIN NOT PRESCRIBED    INTENTIONAL    0 each    Please choose reason not prescribed, below    Chronic diastolic congestive heart failure (H)       Carboxymethylcellulose Sod PF 1 % ophthalmic solution    CELLUVISC/REFRESH LIQUIGEL     Place 1 drop into both eyes 3 times daily as needed for dry eyes        carvedilol 6.25 MG tablet    COREG    180 tablet    TAKE 1 TABLET (6.25 MG) BY MOUTH 2 TIMES DAILY (WITH MEALS)    Ischemic cardiomyopathy       CENTRUM SILVER tablet      Take 1 tablet by mouth daily        clobetasol 0.05 % cream    TEMOVATE    60 g    Apply sparingly to affected area twice daily as needed.  Do not apply to face.    Bullous pemphigoid       COMPRESSION STOCKINGS     2 each    1 each daily    Varicose 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       * diclofenac 1 % Gel topical gel    VOLTAREN    100 g    Apply 4 grams to knees or 2 grams to hands four times daily using enclosed dosing card.    Arthritis       * diclofenac 1 % Gel topical gel    VOLTAREN     APPLY 2 GRAMS    THREE TIMES A DAY AS NEEDED **USE DOSE CARD IN BOX TO MEASURE DOSE**MAX 32 GM PER DAY * 2-4 GM TOPICALLY AS NEEDED FOR PAIN. 2 GM UPPER EXTREMITY        finasteride 5 MG tablet    PROSCAR    90 tablet    TAKE 1 TABLET EVERY DAY    Benign prostatic hyperplasia, unspecified whether lower urinary tract symptoms present       GLUCOSAMINE-CHONDROITIN PO      Take 2 tablets by mouth daily        isosorbide mononitrate 30 MG 24 hr tablet    IMDUR    90 tablet    Take 1  tablet (30 mg) by mouth daily    SOB (shortness of breath)       ketotifen 0.025 % Soln ophthalmic solution    ZADITOR    1 Bottle    Place 1 drop into both eyes every 12 hours        levothyroxine 100 MCG tablet    SYNTHROID/LEVOTHROID    90 tablet    TAKE 1 TABLET EVERY DAY    Acquired hypothyroidism       LORazepam 1 MG tablet    ATIVAN    30 tablet    Take 0.5-1 tablets (0.5-1 mg) by mouth every 8 hours as needed for anxiety    Acute stress reaction       MIRALAX PO      Take 1 packet by mouth At Bedtime        omega-3 fatty acids 1200 MG capsule      Take 1 capsule by mouth daily        omeprazole 20 MG CR capsule    priLOSEC    180 capsule    Take 1 capsule (20 mg) by mouth 2 times daily    Gastroesophageal reflux disease without esophagitis       order for DME     1 Device    Auto-CPAP:Max 9 cm H2OMin 9 cm H2O Continuous Lifetime need and heated humidity.    Obstructive sleep apnea (adult) (pediatric), Other dyspnea and respiratory abnormality       PRADAXA ANTICOAGULANT 75 MG capsule   Generic drug:  dabigatran ANTICOAGULANT      Take 150 mg by mouth 2 times daily. Store in original 's bottle or blister pack; use within 120 days of opening.        simvastatin 20 MG tablet    ZOCOR     Take by mouth At Bedtime        spironolactone 25 MG tablet    ALDACTONE    30 tablet    Take 1 tablet (25 mg) by mouth daily    Diastolic heart failure (H)       sucralfate 1 GM tablet    CARAFATE    180 tablet    Take 1 tablet (1 g) by mouth 2 times daily    Esophageal reflux       tamsulosin 0.4 MG capsule    FLOMAX    90 capsule    TAKE 1 CAPSULE (0.4 MG) BY MOUTH DAILY    Benign prostatic hyperplasia with urinary hesitancy       torsemide 10 MG tablet    DEMADEX     Take 1 tablet (10 mg) by mouth daily    Chronic diastolic congestive heart failure (H)       triamcinolone 0.025 % cream    KENALOG     Apply topically 2 times daily        UNKNOWN TO PATIENT      Place 1 drop Into the left eye daily EYE DROP  PRESCRIBED FOR CONJUNCTIVITIS        WELLBUTRIN XL PO      Take 150 mg by mouth every morning        * Notice:  This list has 2 medication(s) that are the same as other medications prescribed for you. Read the directions carefully, and ask your doctor or other care provider to review them with you.

## 2018-11-20 NOTE — TELEPHONE ENCOUNTER
"Bairon is calling as he is at  at [facility] as he has had \"bleeding bruises\" on feet, nose and arms. Hard to stop the bleeding at times. This( especially nose bleeds)  has been gradually increasing since he started the diclofenac. Today it was really bad and he couldn't get the bleeding under control so he went to .  Bleeding is stopped now.    They did an INR.    He is on dabigatran ANTICOAGULANT (PRADAXA ANTICOAGULANT)   Compression stockings caused bleeding when he put those on.   He and the  Dr think may be related to diclofenac and pradaxa combination.    Dr Nelson,  Urgent care advised him to see you ASAP to determine cause for bleeding.     Can you work him in today?  He is insistent on being seen today.  Or tomorrow you have a short appt only.       Triage:  He is waiting at the [facility] waiting to hear back from us: [phone]        "

## 2018-11-20 NOTE — TELEPHONE ENCOUNTER
Patient was called with provider's message. He agrees with this plan. Appt scheduled for 3:15 tomorrow.

## 2018-11-21 ENCOUNTER — OFFICE VISIT (OUTPATIENT)
Dept: FAMILY MEDICINE | Facility: CLINIC | Age: 83
End: 2018-11-21
Payer: COMMERCIAL

## 2018-11-21 VITALS
RESPIRATION RATE: 14 BRPM | WEIGHT: 191 LBS | TEMPERATURE: 97.1 F | BODY MASS INDEX: 24.52 KG/M2 | DIASTOLIC BLOOD PRESSURE: 68 MMHG | SYSTOLIC BLOOD PRESSURE: 120 MMHG | OXYGEN SATURATION: 97 % | HEART RATE: 60 BPM

## 2018-11-21 DIAGNOSIS — M19.90 ARTHRITIS: Primary | ICD-10-CM

## 2018-11-21 PROCEDURE — 99213 OFFICE O/P EST LOW 20 MIN: CPT | Performed by: FAMILY MEDICINE

## 2018-11-21 RX ORDER — ACETAMINOPHEN 500 MG
1000 TABLET ORAL EVERY 6 HOURS PRN
Qty: 100 TABLET | Refills: 0 | COMMUNITY
Start: 2018-11-21

## 2018-11-21 NOTE — PATIENT INSTRUCTIONS
Given the patient's issues with bleeding we elected to stop his diclofenac gel.  With that his bleeding has stopped.  Given the fact that he is going to be on  apixaban chronically, we discussed using acetaminophen only for his arthritis pain.  He will take 1000 mg of acetaminophen up to 4 times daily.  Follow-up will be in 2 months sooner as needed.  If he continues to have more problems he will return earlier.

## 2018-11-21 NOTE — MR AVS SNAPSHOT
After Visit Summary   11/21/2018    Bairon Foster    MRN: [id number]           Patient Information     Date Of Birth          2/6/1931        Visit Information        Provider Department      11/21/2018 3:15 PM Mg Nelson MD [facility]        Today's Diagnoses     Arthritis    -  1       Follow-ups after your visit        Follow-up notes from your care team     Return in about 2 months (around 1/21/2019) for arthritis.      Your next 10 appointments already scheduled     Dec 20, 2018  1:30 PM CST   Phone Device Check with ARRIETA TECH2   [facility] ([facility])    [address]  [facility] [address] [phone] OPT 2              Who to contact     If you have questions or need follow up information about today's clinic visit or your schedule please contact [facility] directly at [phone].  Normal or non-critical lab and imaging results will be communicated to you by onefinestayhart, letter or phone within 4 business days after the clinic has received the results. If you do not hear from us within 7 days, please contact the clinic through onefinestayhart or phone. If you have a critical or abnormal lab result, we will notify you by phone as soon as possible.  Submit refill requests through Phobious or call your pharmacy and they will forward the refill request to us. Please allow 3 business days for your refill to be completed.          Additional Information About Your Visit        MyChart Information     [facility] gives you secure access to your electronic health record. If you see a primary care provider, you can also send messages to your care team and make appointments. If you have questions, please call your primary care clinic.  If you do not have a primary care provider, please call [phone] and they will assist you.        Care EveryWhere ID     This is your Care  EveryWhere ID. This could be used by other organizations to access your [place] medical records  [id number]        Your Vitals Were     Pulse Temperature Respirations Pulse Oximetry BMI (Body Mass Index)       60 97.1  F (36.2  C) (Tympanic) 14 97% 24.52 kg/m2        Blood Pressure from Last 3 Encounters:   11/21/18 120/68   11/20/18 122/64   10/10/18 100/60    Weight from Last 3 Encounters:   11/21/18 191 lb (86.6 kg)   11/20/18 192 lb 8 oz (87.3 kg)   10/10/18 189 lb (85.7 kg)              Today, you had the following     No orders found for display         Today's Medication Changes          These changes are accurate as of 11/21/18  3:36 PM.  If you have any questions, ask your nurse or doctor.               Start taking these medicines.        Dose/Directions    acetaminophen 500 MG tablet   Commonly known as:  TYLENOL   Used for:  Arthritis   Started by:  Mg Nelson MD        Dose:  1000 mg   Take 2 tablets (1,000 mg) by mouth every 6 hours as needed for mild pain   Quantity:  100 tablet   Refills:  0         Stop taking these medicines if you haven't already. Please contact your care team if you have questions.     diclofenac 1 % topical gel   Commonly known as:  VOLTAREN   Stopped by:  Mg Nelson MD                Where to get your medicines      Some of these will need a paper prescription and others can be bought over the counter.  Ask your nurse if you have questions.     You don't need a prescription for these medications     acetaminophen 500 MG tablet                Primary Care Provider Office Phone # Fax #    Mg Nelson [phone]       [address] [facility] [address]        Goals        General    I will find a caregiver support group to attend (pt-stated)     Notes - Note created  4/17/2014  8:49 AM by Beth Morales RN    As of today's date 4/17/2014 goal is met at 0 - 25%.   Goal Status:  Active        Equal Access to Services      EVANGELIST PETERSON : Hadii aad ku macrina Viramontes, waaxda luqadaha, qaybta kaalmacie polk, kathryn cyrusin hayaaharini castle elviselliott camilo . So [facility] [phone].    ATENCIÓN: Si habla lorne, tiene a malone disposición servicios gratuitos de asistencia lingüística. Michaelame al [phone].    We comply with applicable federal civil rights laws and [place] laws. We do not discriminate on the basis of race, color, national origin, age, disability, sex, sexual orientation, or gender identity.            Thank you!     Thank you for choosing [facility]  for your care. Our goal is always to provide you with excellent care. Hearing back from our patients is one way we can continue to improve our services. Please take a few minutes to complete the written survey that you may receive in the mail after your visit with us. Thank you!             Your Updated Medication List - Protect others around you: Learn how to safely use, store and throw away your medicines at www.disposemymeds.org.          This list is accurate as of 11/21/18  3:36 PM.  Always use your most recent med list.                   Brand Name Dispense Instructions for use Diagnosis    acetaminophen 500 MG tablet    TYLENOL    100 tablet    Take 2 tablets (1,000 mg) by mouth every 6 hours as needed for mild pain    Arthritis       ASPIRIN NOT PRESCRIBED    INTENTIONAL    0 each    Please choose reason not prescribed, below    Chronic diastolic congestive heart failure (H)       Carboxymethylcellulose Sod PF 1 % ophthalmic solution    CELLUVISC/REFRESH LIQUIGEL     Place 1 drop into both eyes 3 times daily as needed for dry eyes        carvedilol 6.25 MG tablet    COREG    180 tablet    TAKE 1 TABLET (6.25 MG) BY MOUTH 2 TIMES DAILY (WITH MEALS)    Ischemic cardiomyopathy       CENTRUM SILVER tablet      Take 1 tablet by mouth daily        clobetasol 0.05 % cream    TEMOVATE    60 g    Apply sparingly to affected area twice daily as needed.   Do not apply to face.    Bullous pemphigoid       COMPRESSION STOCKINGS     2 each    1 each daily    Varicose 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       finasteride 5 MG tablet    PROSCAR    90 tablet    TAKE 1 TABLET EVERY DAY    Benign prostatic hyperplasia, unspecified whether lower urinary tract symptoms present       GLUCOSAMINE-CHONDROITIN PO      Take 2 tablets by mouth daily        isosorbide mononitrate 30 MG 24 hr tablet    IMDUR    90 tablet    Take 1 tablet (30 mg) by mouth daily    SOB (shortness of breath)       ketotifen 0.025 % Soln ophthalmic solution    ZADITOR    1 Bottle    Place 1 drop into both eyes every 12 hours        levothyroxine 100 MCG tablet    SYNTHROID/LEVOTHROID    90 tablet    TAKE 1 TABLET EVERY DAY    Acquired hypothyroidism       LORazepam 1 MG tablet    ATIVAN    30 tablet    Take 0.5-1 tablets (0.5-1 mg) by mouth every 8 hours as needed for anxiety    Acute stress reaction       MIRALAX PO      Take 1 packet by mouth At Bedtime        omega-3 fatty acids 1200 MG capsule      Take 1 capsule by mouth daily        omeprazole 20 MG CR capsule    priLOSEC    180 capsule    Take 1 capsule (20 mg) by mouth 2 times daily    Gastroesophageal reflux disease without esophagitis       order for DME     1 Device    Auto-CPAP:Max 9 cm H2OMin 9 cm H2O Continuous Lifetime need and heated humidity.    Obstructive sleep apnea (adult) (pediatric), Other dyspnea and respiratory abnormality       PRADAXA ANTICOAGULANT 75 MG capsule   Generic drug:  dabigatran ANTICOAGULANT      Take 150 mg by mouth 2 times daily. Store in original 's bottle or blister pack; use within 120 days of opening.        simvastatin 20 MG tablet    ZOCOR     Take by mouth At Bedtime        spironolactone 25 MG tablet    ALDACTONE    30 tablet    Take 1 tablet (25 mg) by mouth daily    Diastolic heart failure (H)       sucralfate 1 GM tablet    CARAFATE    180 tablet    Take 1 tablet (1 g) by mouth 2  times daily    Esophageal reflux       tamsulosin 0.4 MG capsule    FLOMAX    90 capsule    TAKE 1 CAPSULE (0.4 MG) BY MOUTH DAILY    Benign prostatic hyperplasia with urinary hesitancy       torsemide 10 MG tablet    DEMADEX     Take 1 tablet (10 mg) by mouth daily    Chronic diastolic congestive heart failure (H)       triamcinolone 0.025 % cream    KENALOG     Apply topically 2 times daily        UNKNOWN TO PATIENT      Place 1 drop Into the left eye daily EYE DROP PRESCRIBED FOR CONJUNCTIVITIS        WELLBUTRIN XL PO      Take 150 mg by mouth every morning

## 2018-11-21 NOTE — PROGRESS NOTES
SUBJECTIVE:   Bairon Foster is a 87 year old male who presents to clinic today for the following health issues:      Medication Followup of Diclofenac Pradaxa    Taking Medication as prescribed: yes    Side Effects: Non stop Bleeding     Medication Helping Symptoms:  NO       Musculoskeletal problem/pain      Duration: Months    Description  Location: Diffusely but mostly in the hands    Intensity:  moderate    Accompanying signs and symptoms: none    History  Previous similar problem: YES  Previous evaluation:  none    Precipitating or alleviating factors:  Trauma or overuse: no   Aggravating factors include: Activity    Therapies tried and outcome: Patient had been using diclofenac gel with good results with his arthritis.  However, he then started to have problems with nosebleeds and pockets on his skin that would start to bleed.  He discontinued the diclofenac gel and his bleeding has now stopped.      Problem list and histories reviewed & adjusted, as indicated.  Additional history: as documented    Patient Active Problem List   Diagnosis     Atrial fibrillation (H)     Pulmonary hypertension (H)     CAD (coronary artery disease)     BPH (benign prostatic hyperplasia)     Ischemic cardiomyopathy     Pancytopenia (H)     JIM (obstructive sleep apnea)- mild/moderate     Acute stress reaction     Health Care Home     Vitamin D deficiency     Advance Care Planning     Gastroesophageal reflux disease without esophagitis     Hydrocele, unspecified hydrocele type     Slow transit constipation     Hypercholesterolemia     Non morbid obesity due to excess calories w comorbid CAD< hi LDL      Venous stasis dermatitis of both lower extremities     Lymphedema of both lower extremities     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      Coronary artery disease due to lipid rich plaque     Acquired hypothyroidism     Onychomycosis     Overweight (BMI 25.0-29.9)     PAD (peripheral artery disease) (H)      Screening for prostate cancer     Blood in stool     Umbilical hernia without obstruction and without gangrene     Xerosis cutis     Status post coronary angiogram     AK (actinic keratosis)     Angioma of skin     Past Surgical History:   Procedure Laterality Date     EYE SURGERY      CATARACT/BLEPHAROPLASTY     GENITOURINARY SURGERY      TUNA     HERNIA REPAIR      RIGHT INGUINAL     HERNIORRHAPHY INGUINAL  8/14/2012    Procedure: HERNIORRHAPHY INGUINAL;  right inguinal hernia repair;  Surgeon: Kareem Torrez MD;  Location: [facility]     HERNIORRHAPHY UMBILICAL N/A 10/31/2017    Procedure: HERNIORRHAPHY UMBILICAL;  UMBILICAL HERNIA REPAIR WITH MESH;  Surgeon: Scar Harrington MD;  Location: [facility]     IMPLANT PACEMAKER  8/2009    single chamber     ORTHOPEDIC SURGERY      [facility] RIGHT     ORTHOPEDIC SURGERY  2010    right TKR, left TKR     SURGICAL HISTORY OF -       Thumb surgery     SURGICAL HISTORY OF -   1990s    Left total knee replacement     SURGICAL HISTORY OF -   3/11    Rig total knee replacement       Social History   Substance Use Topics     Smoking status: Never Smoker     Smokeless tobacco: Never Used      Comment: per encounter 2/12/07     Alcohol use 0.0 oz/week     0 Standard drinks or equivalent per week      Comment: 1/month     Family History   Problem Relation Age of Onset     Cardiovascular Father      C.A.D. Father      Lung Cancer Sister      Unknown/Adopted Mother      Cancer - colorectal No family hx of            Reviewed and updated as needed this visit by clinical staff  Tobacco  Allergies  Meds  Med Hx  Surg Hx  Fam Hx  Soc Hx      Reviewed and updated as needed this visit by Provider         ROS:  Constitutional, HEENT, cardiovascular, pulmonary, gi and gu systems are negative, except as otherwise noted.    OBJECTIVE:                                                    /68 (BP Location: Right arm, Patient Position: Sitting, Cuff Size: Adult Regular)  Pulse 60  Temp 97.1  F (36.2   C) (Tympanic)  Resp 14  Wt 191 lb (86.6 kg)  SpO2 97%  BMI 24.52 kg/m2  Body mass index is 24.52 kg/(m^2).  GENERAL APPEARANCE: healthy, alert and no distress  MS: extremities normal- no gross deformities noted         ASSESSMENT/PLAN:                                                        ICD-10-CM    1. Arthritis M19.90 acetaminophen (TYLENOL) 500 MG tablet     Return in about 2 months (around 1/21/2019) for arthritis.  Patient Instructions   Given the patient's issues with bleeding we elected to stop his diclofenac gel.  With that his bleeding has stopped.  Given the fact that he is going to be on  apixaban chronically, we discussed using acetaminophen only for his arthritis pain.  He will take 1000 mg of acetaminophen up to 4 times daily.  Follow-up will be in 2 months sooner as needed.  If he continues to have more problems he will return earlier.      Mg Nelson MD  [facility]

## 2018-12-20 ENCOUNTER — ANCILLARY PROCEDURE (OUTPATIENT)
Dept: CARDIOLOGY | Facility: CLINIC | Age: 83
End: 2018-12-20
Attending: INTERNAL MEDICINE
Payer: COMMERCIAL

## 2018-12-20 DIAGNOSIS — Z95.0 CARDIAC PACEMAKER IN SITU: ICD-10-CM

## 2018-12-20 PROCEDURE — 93293 PM PHONE R-STRIP DEVICE EVAL: CPT | Performed by: INTERNAL MEDICINE

## 2019-01-14 NOTE — NURSING NOTE
Pt sent most recent BGs via [employer]. FBG and 2h pp as follows:   1/7/19: 84, 106, 130, 73.  1/8/19: 87, 80, 135, 137.  1/9/19: 77, -, 146, 132.  1/10/19: 93, 126, 72, 81.  1/11/19: 89, -, 142 (no insulin per pt,) 83.  1/12/19: 76, -, 155, 119.  1/13/19: 86, -, 111, 118.  All FBG but one are goal. Insufficient data to adjust breakfast dose. Send pt a msg via [employer] to adjust a.m NPH up 3 units (constitutes 10% of 25 units) for more than 3 post-lunch >130. Will cont to f/u with pt on outpt basis.    ~90 day phone teletrace ~  Appropriate VS &  at time of check. Chronic Afib, taking Pradaxa. Magnet response WNL.

## 2019-01-22 ENCOUNTER — OFFICE VISIT (OUTPATIENT)
Dept: FAMILY MEDICINE | Facility: CLINIC | Age: 84
End: 2019-01-22
Payer: COMMERCIAL

## 2019-01-22 VITALS
DIASTOLIC BLOOD PRESSURE: 56 MMHG | RESPIRATION RATE: 16 BRPM | HEART RATE: 60 BPM | WEIGHT: 194 LBS | OXYGEN SATURATION: 97 % | BODY MASS INDEX: 24.91 KG/M2 | SYSTOLIC BLOOD PRESSURE: 102 MMHG

## 2019-01-22 DIAGNOSIS — Z79.01 CURRENT USE OF LONG TERM ANTICOAGULATION: ICD-10-CM

## 2019-01-22 DIAGNOSIS — R04.0 EPISTAXIS: Primary | ICD-10-CM

## 2019-01-22 DIAGNOSIS — I48.19 PERSISTENT ATRIAL FIBRILLATION (H): ICD-10-CM

## 2019-01-22 DIAGNOSIS — M19.90 ARTHRITIS: ICD-10-CM

## 2019-01-22 PROCEDURE — 99214 OFFICE O/P EST MOD 30 MIN: CPT | Performed by: FAMILY MEDICINE

## 2019-01-22 NOTE — PATIENT INSTRUCTIONS
The patient's bleeding from his right nostril stopped with direct pressure.  It was not clear to me where the bleeding site was so I referred him off to otolaryngology for definitive treatment.  I also instructed him on how to stop a nosebleed in the future.  He will continue with salt water nasal spray.  He will not place anything either in his nose or blow his nose until he has had definitive treatment by ENT.  The patient will return to using his diclofenac gel topically every day.  May do that up to 4 times a day.  Follow-up will be with [facility] for this issue.  Follow-up will be in 3 months for a complete physical.

## 2019-01-22 NOTE — PROGRESS NOTES
SUBJECTIVE:   Bairon Foster is a 87 year old male who presents to clinic today for the following health issues:      Follow up      Duration:     Description (location/character/radiation): pt is here to follow up on Arthritis    Intensity:  moderate    Accompanying signs and symptoms: none    History (similar episodes/previous evaluation): None    Precipitating or alleviating factors: None    Therapies tried and outcome: None       Musculoskeletal problem/pain      Duration: Months    Description  Location: Mostly in the hands    Intensity:  moderate    Accompanying signs and symptoms: none    History  Previous similar problem: no   Previous evaluation: Patient was seen at the [facility] and placed on what I think is diclofenac gel.  He has been using that and on a as needed basis not on a daily basis.    Precipitating or alleviating factors:  Trauma or overuse: no   Aggravating factors include: Activity    Therapies tried and outcome: Diclofenac gel has helped.    Nosebleeds      Duration: Off and on over the past month.    Description (location/character/radiation): Usually in the right nostril.    Intensity:  moderate    Accompanying signs and symptoms: None    History (similar episodes/previous evaluation): None    Precipitating or alleviating factors: Salt water nasal spray has helped.    Therapies tried and outcome: See above       Problem list and histories reviewed & adjusted, as indicated.  Additional history: as documented    Patient Active Problem List   Diagnosis     Atrial fibrillation (H)     Pulmonary hypertension (H)     CAD (coronary artery disease)     BPH (benign prostatic hyperplasia)     Ischemic cardiomyopathy     Pancytopenia (H)     JIM (obstructive sleep apnea)- mild/moderate     Acute stress reaction     Health Care Home     Vitamin D deficiency     Advance Care Planning     Gastroesophageal reflux disease without esophagitis     Hydrocele, unspecified hydrocele type     Slow transit  constipation     Hypercholesterolemia     Non morbid obesity due to excess calories w comorbid CAD< hi LDL      Venous stasis dermatitis of both lower extremities     Lymphedema of both lower extremities     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      Coronary artery disease due to lipid rich plaque     Acquired hypothyroidism     Onychomycosis     Overweight (BMI 25.0-29.9)     PAD (peripheral artery disease) (H)     Screening for prostate cancer     Blood in stool     Umbilical hernia without obstruction and without gangrene     Xerosis cutis     Status post coronary angiogram     AK (actinic keratosis)     Angioma of skin     Past Surgical History:   Procedure Laterality Date     EYE SURGERY      CATARACT/BLEPHAROPLASTY     GENITOURINARY SURGERY      TUNA     HERNIA REPAIR      RIGHT INGUINAL     HERNIORRHAPHY INGUINAL  8/14/2012    Procedure: HERNIORRHAPHY INGUINAL;  right inguinal hernia repair;  Surgeon: Kareem Torrez MD;  Location: [facility]     HERNIORRHAPHY UMBILICAL N/A 10/31/2017    Procedure: HERNIORRHAPHY UMBILICAL;  UMBILICAL HERNIA REPAIR WITH MESH;  Surgeon: Scar Harrington MD;  Location: [facility]     IMPLANT PACEMAKER  8/2009    single chamber     ORTHOPEDIC SURGERY      [facility] RIGHT     ORTHOPEDIC SURGERY  2010    right TKR, left TKR     SURGICAL HISTORY OF -       Thumb surgery     SURGICAL HISTORY OF -   1990s    Left total knee replacement     SURGICAL HISTORY OF -   3/11    [facility] total knee replacement       Social History     Tobacco Use     Smoking status: Never Smoker     Smokeless tobacco: Never Used     Tobacco comment: per encounter 2/12/07   Substance Use Topics     Alcohol use: Yes     Alcohol/week: 0.0 oz     Comment: 1/month     Family History   Problem Relation Age of Onset     Cardiovascular Father      C.A.D. Father      Lung Cancer Sister      Unknown/Adopted Mother      Cancer - colorectal No family hx of            Reviewed and updated as needed this visit by clinical  staff  Tobacco  Allergies  Meds  Med Hx  Surg Hx  Fam Hx  Soc Hx      Reviewed and updated as needed this visit by Provider         ROS:  Constitutional, HEENT, cardiovascular, pulmonary, gi and gu systems are negative, except as otherwise noted.    OBJECTIVE:                                                    /56   Pulse 60   Resp 16   Wt 88 kg (194 lb)   SpO2 97%   BMI 24.91 kg/m    Body mass index is 24.91 kg/m .  GENERAL APPEARANCE: healthy, alert and no distress  HENT: The right nostril does show a bleeding site septum.  That started to actively bleed while the patient was in the office.  He put direct pressure on it for 5 minutes and the bleeding stopped.  The bleeding site was not clear at that point.  The left nostril was normal.  Oropharynx is negative.         ASSESSMENT/PLAN:                                                        ICD-10-CM    1. Epistaxis R04.0 OTOLARYNGOLOGY REFERRAL   2. Current use of long term anticoagulation Z79.01    3. Persistent atrial fibrillation (H) I48.1    4. Arthritis M19.90 diclofenac (VOLTAREN) 1 % topical gel       Patient Instructions   The patient's bleeding from his right nostril stopped with direct pressure.  It was not clear to me where the bleeding site was so I referred him off to otolaryngology for definitive treatment.  I also instructed him on how to stop a nosebleed in the future.  He will continue with salt water nasal spray.  He will not place anything either in his nose or blow his nose until he has had definitive treatment by ENT.  The patient will return to using his diclofenac gel topically every day.  May do that up to 4 times a day.  Follow-up will be with [facility] for this issue.  Follow-up will be in 3 months for a complete physical.      Mg Nelson MD  [facility]

## 2019-01-28 ENCOUNTER — TRANSFERRED RECORDS (OUTPATIENT)
Dept: HEALTH INFORMATION MANAGEMENT | Facility: CLINIC | Age: 84
End: 2019-01-28

## 2019-02-04 ENCOUNTER — TELEPHONE (OUTPATIENT)
Dept: FAMILY MEDICINE | Facility: CLINIC | Age: 84
End: 2019-02-04

## 2019-02-04 NOTE — TELEPHONE ENCOUNTER
[facility] dentistry was called. PRADAXA 75 MG is on med list as patient reported or historical. Pt is going to have 2 teeth extracted this Friday. They need to know if pt should hold that medication. Written order to be faxed to [phone].

## 2019-02-04 NOTE — TELEPHONE ENCOUNTER
Reason for Call:  Other call back    Detailed comments: need to call dental office ([facility]) [phone] as pt is having a procedure and they need confirmation he is on dabigatran ANTICOAGULANT (PRADAXA ANTICOAGULANT) 75 MG CAPS BLISTER PACK    Phone Number Patient can be reached at: Home number on file [phone] (home)    Best Time:     Can we leave a detailed message on this number? YES    Call taken on 2/4/2019 at 11:02 AM by CHERISE ARROYO

## 2019-02-04 NOTE — TELEPHONE ENCOUNTER
Detailed VM left for the patient, asked to call back clinic to confirm he got this message.     [facility] was called, they will also be contacting patient today.

## 2019-02-05 NOTE — TELEPHONE ENCOUNTER
Talked with patient. Understands to stop Pradaxa. Resume taking the medication in Saturday after dentist appointment.

## 2019-02-15 ENCOUNTER — TELEPHONE (OUTPATIENT)
Dept: FAMILY MEDICINE | Facility: CLINIC | Age: 84
End: 2019-02-15

## 2019-02-15 ENCOUNTER — OFFICE VISIT (OUTPATIENT)
Dept: URGENT CARE | Facility: URGENT CARE | Age: 84
End: 2019-02-15
Payer: COMMERCIAL

## 2019-02-15 VITALS
WEIGHT: 195.06 LBS | SYSTOLIC BLOOD PRESSURE: 126 MMHG | TEMPERATURE: 98.2 F | RESPIRATION RATE: 17 BRPM | OXYGEN SATURATION: 98 % | HEART RATE: 62 BPM | BODY MASS INDEX: 25.04 KG/M2 | DIASTOLIC BLOOD PRESSURE: 71 MMHG

## 2019-02-15 DIAGNOSIS — K08.409 HISTORY OF TOOTH EXTRACTION, UNSPECIFIED EDENTULISM CLASS: ICD-10-CM

## 2019-02-15 DIAGNOSIS — Z51.81 ENCOUNTER FOR MONITORING BRIDGING ANTICOAGULATION THERAPY: ICD-10-CM

## 2019-02-15 DIAGNOSIS — K13.79 ORAL BLEEDING: Primary | ICD-10-CM

## 2019-02-15 DIAGNOSIS — Z79.01 ENCOUNTER FOR MONITORING BRIDGING ANTICOAGULATION THERAPY: ICD-10-CM

## 2019-02-15 PROCEDURE — 99214 OFFICE O/P EST MOD 30 MIN: CPT | Performed by: PHYSICIAN ASSISTANT

## 2019-02-15 NOTE — TELEPHONE ENCOUNTER
Patient has uncontrolled bleeding after a tooth extraction.  He was seen at [facility] and prescribed Tranexamic Acid past to be applied every 6 hours for 1-2 days.    Patient isn't sure if he is supposed to reduce his dose of anticoagulation therapy (looks like it is Pradaxa) or continue as prescribed.    Can someone follow up with patient and let him know?  His phone number is [phone].    Thank you,    Mg Cruz, PharmD  [facility]  [phone]

## 2019-02-15 NOTE — TELEPHONE ENCOUNTER
Call made back to the patient  Advised the patient per MD instructions.     Told can resume pradaxa 24-48 hours after the bleeding is stopped.     Routing to provider, should it be longer than that that he holds the medication? When can he resume it?

## 2019-02-15 NOTE — PROGRESS NOTES
SUBJECTIVE:  Bairon Foster is a 88 year old male with a chief complaint of having bleeding from an extracted tooth on sat.  Onset of symptoms was this morning with moderate bleeding .    Course of illness: worsening.  Severity moderate  Current and Associated symptoms: bleeding from socket  Treatment measures tried include Tea bad.  Predisposing factors include hx of tooth extraction while on anticoagulants .    Past Medical History:   Diagnosis Date     Acute, but ill-defined, cerebrovascular disease     NO RESIDUALS     Antiplatelet or antithrombotic long-term use      Arrhythmia     a fib     Blood in stool      Coronary atherosclerosis of unspecified type of vessel, native or graft     S/P PTCA, EF 50%     Esophageal reflux      Hydrocele, unspecified      Hypercholesterolemia      Hypersomnia with sleep apnea, unspecified      Inguinal hernia without mention of obstruction or gangrene, recurrent unilateral or unspecified      Ischemic cardiomyopathy      Major depression in complete remission (H)      Obese      Onychomycosis      JIM on CPAP      Other and unspecified hyperlipidemia      Other lymphedema     GROIN AND THIGHS     Other pancytopenia (H)      Other specified anemias      Overweight      Pacemaker      PAD (peripheral artery disease) (H)      Pancytopenia (H)      Persistent atrial fibrillation (H)     VVI PM for long pauses     Prostatic hypertrophy, benign      Pulmonary hypertension (H)      Scrotal varices      Thrombocytopenia, unspecified (H)      Umbilical hernia without obstruction and without gangrene      Unilateral or unspecified femoral hernia without mention of obstruction or gangrene, unilateral or unspecified      Unspecified hypothyroidism      Venous stasis dermatitis of both lower extremities      Vitamin D deficiency      ALLERGIES  No Known Allergies     Social History     Tobacco Use     Smoking status: Never Smoker     Smokeless tobacco: Never Used     Tobacco comment: per  encounter 2/12/07   Substance Use Topics     Alcohol use: Yes     Alcohol/week: 0.0 oz     Comment: 1/month     Family History   Problem Relation Age of Onset     Cardiovascular Father      C.A.D. Father      Lung Cancer Sister      Unknown/Adopted Mother      Cancer - colorectal No family hx of          ROS:  CONSTITUTIONAL:NEGATIVE for fever, chills, change in weight  INTEGUMENTARY/SKIN: NEGATIVE for worrisome rashes, moles or lesions  ENT/MOUTH: POSITIVE for left upper tooth extraction, bleeding from area  RESP:NEGATIVE for significant cough or SOB  CV: NEGATIVE for chest pain, palpitations or peripheral edema  NEURO: NEGATIVE for weakness, dizziness or paresthesias  SKIN: Positive for bleeding in oral cavity  NECK: NEGATIVE for neck pain, tenderness  ALLERGIES NKDA    OBJECTIVE:   /71   Pulse 62   Temp 98.2  F (36.8  C) (Tympanic)   Resp 17   Wt 88.5 kg (195 lb 1 oz)   SpO2 98%   BMI 25.04 kg/m    GENERAL APPEARANCE: healthy, alert and no distress  EYES: EOMI,  PERRL, conjunctiva clear  HENT: TM's normal bilaterally and left upper tooth extraction area with bleeding  NECK: supple, non-tender to palpation, no adenopathy noted  RESP: lungs clear to auscultation - no rales, rhonchi or wheezes  CV: regular rates and rhythm, normal S1 S2, no murmur noted  SKIN: no suspicious lesions or rashes    ASSESSMENT/PLAN:      ICD-10-CM    1. Oral bleeding K13.79 tranexamic acid   2. History of tooth extraction, unspecified edentulism class K08.409    3. Encounter for monitoring bridging anticoagulation therapy Z51.81     Z79.01          ICD-10-CM    1. Oral bleeding K13.79 tranexamic acid         Symptomatic treat with gargles, lozenges, and OTC analgesic as needed. Follow-up with primary clinic if not improving.  Orders Placed This Encounter     tranexamic acid     Advised to try this mouth wash coagulant for the next couple days  Go to the ED if this doesn't help or symptoms worsen

## 2019-02-20 LAB
MDC_IDC_LEAD_IMPLANT_DT: NORMAL
MDC_IDC_LEAD_LOCATION: NORMAL
MDC_IDC_LEAD_MFG: NORMAL
MDC_IDC_LEAD_MODEL: NORMAL
MDC_IDC_LEAD_POLARITY_TYPE: NORMAL
MDC_IDC_LEAD_SERIAL: NORMAL
MDC_IDC_PG_IMPLANT_DTM: NORMAL
MDC_IDC_PG_MFG: NORMAL
MDC_IDC_PG_MODEL: NORMAL
MDC_IDC_PG_SERIAL: NORMAL
MDC_IDC_PG_TYPE: NORMAL
MDC_IDC_SESS_CLINIC_NAME: NORMAL
MDC_IDC_SESS_DTM: NORMAL
MDC_IDC_SESS_TYPE: NORMAL

## 2019-02-21 ENCOUNTER — TELEPHONE (OUTPATIENT)
Dept: CARDIOLOGY | Facility: CLINIC | Age: 84
End: 2019-02-21

## 2019-02-27 ENCOUNTER — OFFICE VISIT (OUTPATIENT)
Dept: CARDIOLOGY | Facility: CLINIC | Age: 84
End: 2019-02-27
Payer: COMMERCIAL

## 2019-02-27 VITALS
BODY MASS INDEX: 25.67 KG/M2 | WEIGHT: 200 LBS | HEIGHT: 74 IN | HEART RATE: 62 BPM | DIASTOLIC BLOOD PRESSURE: 60 MMHG | SYSTOLIC BLOOD PRESSURE: 110 MMHG

## 2019-02-27 DIAGNOSIS — R60.0 LOCALIZED EDEMA: Primary | ICD-10-CM

## 2019-02-27 DIAGNOSIS — I25.5 ISCHEMIC CARDIOMYOPATHY: ICD-10-CM

## 2019-02-27 PROCEDURE — 99215 OFFICE O/P EST HI 40 MIN: CPT | Performed by: NURSE PRACTITIONER

## 2019-02-27 RX ORDER — ETHACRYNIC ACID 25 MG/1
25 TABLET ORAL DAILY
Qty: 90 TABLET | Refills: 1 | Status: SHIPPED | OUTPATIENT
Start: 2019-02-27 | End: 2019-03-27

## 2019-02-27 ASSESSMENT — MIFFLIN-ST. JEOR: SCORE: 1646.94

## 2019-02-27 NOTE — LETTER
2/27/2019    Mg Nelson MD  [address]  [facility] [address]    RE: Bairon DOWNS Cristian       Dear Colleague,    I had the pleasure of seeing Bairon Foster in the [facility].    HPI and Plan:   I had the pleasure of seeing Bairon Foster today in cardiology clinic follow up. He is a pleasant 88 year old patient of Dr. Hope and Dr. Carrizales with labile lower extremity edema due to venous insufficiency and diastolic heart failure, he has 1-2+ tricuspid regurgitation and 3+ mitral insufficiency.  He also is status post bilateral GSV ablations, he initially did quite well with that.    Mr. Foster has a history of coronary artery disease, atrial fibrillation with a single-chamber pacemaker and anticoagulated with Pradaxa, ventricular tachycardia and mitral insufficiency.  His echocardiogram in August 2018 showed a EF of 53%, 3+ MR in 1-2+ tricuspid regurgitation with an RVSP of 23+ right atrial pressure.  He had a coronary angiogram in March 2018 showing no flow-limiting lesions.  He had OM stenting in 2009.  His LV end-diastolic pressure during his coronary last March was 23 and he was placed on spironolactone, he developed heart failure and so his spironolactone was increased which caused nipple tenderness.  For a while he was off spironolactone altogether but he iangiogram s now on 25 mg daily and is doing well without nipple tenderness.  He is not having symptoms of heart failure.  He cannot afford eleperone.    For the last year or so he is [facility] Bull[facility] Pemphig[facility].  [facility] recently [facility] has [facility] discontinue Lasix, Bumex and torsemide (sulfa derivatives) and switch to ethacrynic acid as a loop diuretic.  He is currently on torsemide 20 mg daily.     Labs Reviewed: Sodium 139, potassium 4.1, creatinine 1.1, GFR 67, LDL 55    Physical Exam  Please see Below     Assessment and Plan  1.  Lower extremity edema.  This is probably  a combination of biventricular heart failure and venous insufficiency.  His dermatologist is wondering if he is having a sulfa reaction from torsemide that may be causing his itching and dermatitis.  I did go ahead and switch him to ethacrynic acid, 25 mg daily.  We will repeat a BMP next  week.  Of asked him to call me and let me know how his weight is next Wednesday and we will need to adjust the dose depending on how he is doing.  His last echocardiogram was in April 2018 and he does have 3+ MR in 1-2+ TR.  He primarily has seen Dr. Hope in the past, who I will talk to see if he feels Mr. Foster would be a candidate/benefit from mitral clip.  His echo  an coronary angiogram showed evidence of volume overload with the LV end-diastolic pressure 23 and an IVC that fails to collapse. I do think that his lower extremity edema has been bothering him for at least a year and a half and maybe we could help her with the symptoms.  2.  Coronary artery disease.  His most recent angiogram was in March 2018, he had no flow-limiting disease.  He continues on good medical management with statin, aspirin, beta blockade and isosorbide.  3.  Permanent atrial fibrillation.  He is due for device check next month.  His last check showed A. fib with slow ventricular response in the 40s.  His estimated battery life was 1-1/2 years.  He continues to be rate controlled and anticoagulated with Pradaxa.  He does have a pacemaker for his history of asystole.  4.      Tricuspid and mitral mitral regurgitation.His last echocardiogram was in March, this showed progression of his regurgitation from moderate to severe.  He also has tricuspid regurgitation.  I do wonder if this is starting to be more symptomatic for him and that is why he is having a difficult and labile weight changes and edema.   After our OV I discussed with Dr. Hope's nurse and will arrange a CHELLE prior to OV with Dr. Hope.    Thank you for allowing me to care for Bairon  HIMANSHU Foster today.    WILLIS Bullard, CNP  Cardiology  Greater than half of this 45 minute appointment was spent in counseling and coordination of care.      Voice recognition software was used for this note, I have reviewed this note, but errors may have been missed.    Orders Placed This Encounter   Procedures     Basic metabolic panel     Follow-Up with Cardiac Advanced Practice Provider     Orders Placed This Encounter   Medications     ethacrynic acid (EDECRIN) 25 MG tablet     Sig: Take 1 tablet (25 mg) by mouth daily     Dispense:  90 tablet     Refill:  1     Medications Discontinued During This Encounter   Medication Reason     torsemide (DEMADEX) 10 MG tablet          CURRENT MEDICATIONS:  Current Outpatient Medications   Medication Sig Dispense Refill     acetaminophen (TYLENOL) 500 MG tablet Take 2 tablets (1,000 mg) by mouth every 6 hours as needed for mild pain 100 tablet 0     ASPIRIN NOT PRESCRIBED (INTENTIONAL) Please choose reason not prescribed, below 0 each 0     BuPROPion HCl (WELLBUTRIN XL PO) Take 150 mg by mouth every morning       Carboxymethylcellulose Sod PF (CELLUVISC/REFRESH LIQUIGEL) 1 % ophthalmic solution Place 1 drop into both eyes 3 times daily as needed for dry eyes       carvedilol (COREG) 6.25 MG tablet TAKE 1 TABLET (6.25 MG) BY MOUTH 2 TIMES DAILY (WITH MEALS) 180 tablet 3     clobetasol (TEMOVATE) 0.05 % cream Apply sparingly to affected area twice daily as needed.  Do not apply to face. 60 g 11     COMPRESSION STOCKINGS 1 each daily 2 each 0     dabigatran ANTICOAGULANT (PRADAXA ANTICOAGULANT) 75 MG CAPS BLISTER PACK Take 150 mg by mouth 2 times daily. Store in original 's bottle or blister pack; use within 120 days of opening.       diclofenac (VOLTAREN) 1 % topical gel Apply 2 g topically 4 times daily       ethacrynic acid (EDECRIN) 25 MG tablet Take 1 tablet (25 mg) by mouth daily 90 tablet 1     finasteride (PROSCAR) 5 MG tablet TAKE 1 TABLET EVERY DAY 90  tablet 3     GLUCOSAMINE-CHONDROITIN PO Take 2 tablets by mouth daily       isosorbide mononitrate (IMDUR) 30 MG 24 hr tablet Take 1 tablet (30 mg) by mouth daily 90 tablet 3     ketotifen (ZADITOR) 0.025 % SOLN ophthalmic solution Place 1 drop into both eyes every 12 hours 1 Bottle      levothyroxine (SYNTHROID/LEVOTHROID) 100 MCG tablet TAKE 1 TABLET EVERY DAY 90 tablet 3     LORazepam (ATIVAN) 1 MG tablet Take 0.5-1 tablets (0.5-1 mg) by mouth every 8 hours as needed for anxiety 30 tablet 5     Multiple Vitamins-Minerals (CENTRUM SILVER) per tablet Take 1 tablet by mouth daily       Omega-3 Fatty Acids (FISH OIL) 1200 MG capsule Take 1 capsule by mouth daily        omeprazole (PRILOSEC) 20 MG DR capsule TAKE 1 CAPSULE TWICE DAILY 180 capsule 3     ORDER FOR DME Auto-CPAP:Max 9 cm H2OMin 9 cm H2O  Continuous Lifetime need and heated humidity.    1 Device 0     Polyethylene Glycol 3350 (MIRALAX PO) Take 1 packet by mouth At Bedtime        simvastatin (ZOCOR) 20 MG tablet Take by mouth At Bedtime       spironolactone (ALDACTONE) 25 MG tablet Take 1 tablet (25 mg) by mouth daily 30 tablet 1     sucralfate (CARAFATE) 1 GM tablet Take 1 tablet (1 g) by mouth 2 times daily 180 tablet 3     tamsulosin (FLOMAX) 0.4 MG capsule TAKE 1 CAPSULE EVERY DAY 90 capsule 3     triamcinolone (KENALOG) 0.025 % cream Apply topically 2 times daily       tranexamic acid Apply to gauze and apply to localized area of bleeding q 6 hrs for 1 - 2 days 60 mL 0       ALLERGIES   No Known Allergies    PAST MEDICAL HISTORY:  Past Medical History:   Diagnosis Date     Acute, but ill-defined, cerebrovascular disease     NO RESIDUALS     Antiplatelet or antithrombotic long-term use      Arrhythmia     a fib     Blood in stool      Coronary atherosclerosis of unspecified type of vessel, native or graft     S/P PTCA, EF 50%     Esophageal reflux      Hydrocele, unspecified      Hypercholesterolemia      Hypersomnia with sleep apnea, unspecified       Inguinal hernia without mention of obstruction or gangrene, recurrent unilateral or unspecified      Ischemic cardiomyopathy      Major depression in complete remission (H)      Obese      Onychomycosis      JIM on CPAP      Other and unspecified hyperlipidemia      Other lymphedema     GROIN AND THIGHS     Other pancytopenia (H)      Other specified anemias      Overweight      Pacemaker      PAD (peripheral artery disease) (H)      Pancytopenia (H)      Persistent atrial fibrillation (H)     VVI PM for long pauses     Prostatic hypertrophy, benign      Pulmonary hypertension (H)      Scrotal varices      Thrombocytopenia, unspecified (H)      Umbilical hernia without obstruction and without gangrene      Unilateral or unspecified femoral hernia without mention of obstruction or gangrene, unilateral or unspecified      Unspecified hypothyroidism      Venous stasis dermatitis of both lower extremities      Vitamin D deficiency        PAST SURGICAL HISTORY:  Past Surgical History:   Procedure Laterality Date     EYE SURGERY      CATARACT/BLEPHAROPLASTY     GENITOURINARY SURGERY      TUNA     HERNIA REPAIR      RIGHT INGUINAL     HERNIORRHAPHY INGUINAL  8/14/2012    Procedure: HERNIORRHAPHY INGUINAL;  right inguinal hernia repair;  Surgeon: Kareem Torrez MD;  Location: [facility]     HERNIORRHAPHY UMBILICAL N/A 10/31/2017    Procedure: HERNIORRHAPHY UMBILICAL;  UMBILICAL HERNIA REPAIR WITH MESH;  Surgeon: Scar Harrington MD;  Location: [facility]     IMPLANT PACEMAKER  8/2009    single chamber     ORTHOPEDIC SURGERY      [facility] RIGHT     ORTHOPEDIC SURGERY  2010    right TKR, left TKR     SURGICAL HISTORY OF -       Thumb surgery     SURGICAL HISTORY OF -   1990s    Left total knee replacement     SURGICAL HISTORY OF -   3/11    [facility] total knee replacement       FAMILY HISTORY:  Family History   Problem Relation Age of Onset     Cardiovascular Father      C.A.D. Father      Lung Cancer Sister      Unknown/Adopted Mother      Cancer  - colorectal No family hx of        SOCIAL HISTORY:  Social History     Socioeconomic History     Marital status:      Spouse name: None     Number of children: None     Years of education: None     Highest education level: None   Occupational History     Occupation: Teacher, author, speaker     Employer: RETIRED   Social Needs     Financial resource strain: None     Food insecurity:     Worry: None     Inability: None     Transportation needs:     Medical: None     Non-medical: None   Tobacco Use     Smoking status: Never Smoker     Smokeless tobacco: Never Used     Tobacco comment: per encounter 2/12/07   Substance and Sexual Activity     Alcohol use: Yes     Alcohol/week: 0.0 oz     Comment: 1/month     Drug use: No     Sexual activity: No     Partners: Female   Lifestyle     Physical activity:     Days per week: None     Minutes per session: None     Stress: None   Relationships     Social connections:     Talks on phone: None     Gets together: None     Attends [employer] service: None     Active member of club or organization: None     Attends meetings of clubs or organizations: None     Relationship status: None     Intimate partner violence:     Fear of current or ex partner: None     Emotionally abused: None     Physically abused: None     Forced sexual activity: None   Other Topics Concern     Parent/sibling w/ CABG, MI or angioplasty before 65F 55M? No      Service Not Asked     Blood Transfusions Not Asked     Caffeine Concern No     Comment: 1-2 cups coffee a day     Occupational Exposure Not Asked     Hobby Hazards Not Asked     Sleep Concern No     Stress Concern Yes     Comment: due to wifes health condition     Weight Concern No     Special Diet No     Back Care Not Asked     Exercise Yes     Comment: states he uses his tredmill on and off     Bike Helmet Not Asked     Seat Belt Yes     Self-Exams Not Asked   Social History Narrative     None       Review of Systems:  Skin:  Negative    "    Eyes:  Positive for glasses cataract surgery on both eyes  ENT:  Negative      Respiratory:  Positive for sleep apnea;CPAP;dyspnea on exertion     Cardiovascular:    Positive for;edema    Gastroenterology: Positive for poor appetite    Genitourinary:  Negative      Musculoskeletal:  Positive for arthritis    Neurologic:  Positive for stroke;numbness or tingling of feet right foot  Psychiatric:  Positive for sleep disturbances;anxiety;excessive stress stress due to wifes health   Heme/Lymph/Imm:  Negative      Endocrine:  Positive for thyroid disorder      Physical Exam:  Vitals: /60   Pulse 62   Ht 1.88 m (6' 2\")   Wt 90.7 kg (200 lb)   BMI 25.68 kg/m       Constitutional:  cooperative;well developed        Skin:  warm and dry to the touch rash        Head:  normocephalic        Eyes:  pupils equal and round        Lymph:      ENT:  no pallor or cyanosis        Neck:  JVP normal        Respiratory:  normal breath sounds, clear to auscultation, normal A-P diameter, normal symmetry, normal respiratory excursion, no use of accessory muscles         Cardiac: regular rhythm;normal S1 and S2                                                         GI:  abdomen soft        Extremities and Muscular Skeletal:      bilateral LE edema;1+     wearing compression stockings    Neurological:  affect appropriate        Psych:  Alert and Oriented x 3    Encounter Diagnoses   Name Primary?     Localized edema Yes     Ischemic cardiomyopathy        Recent Lab Results:  LIPID RESULTS:  Lab Results   Component Value Date    CHOL 121 10/10/2018    HDL 59 10/10/2018    LDL 55 10/10/2018    TRIG 34 10/10/2018    CHOLHDLRATIO 2.1 06/15/2015       LIVER ENZYME RESULTS:  Lab Results   Component Value Date    AST 27 10/09/2018    ALT 23 10/10/2018       CBC RESULTS:  Lab Results   Component Value Date    WBC 2.8 (L) 11/20/2018    RBC 3.16 (L) 11/20/2018    HGB 10.3 (L) 11/20/2018    HCT 32.6 (L) 11/20/2018     (H) " 11/20/2018    MCH 32.6 11/20/2018    MCHC 31.6 11/20/2018    RDW 15.2 (H) 11/20/2018    PLT 64 (L) 11/20/2018       BMP RESULTS:  Lab Results   Component Value Date     10/10/2018    POTASSIUM 4.1 11/06/2018    CHLORIDE 105 10/10/2018    CO2 28 10/10/2018    ANIONGAP 6 10/10/2018    GLC 97 10/10/2018    BUN 21 10/10/2018    CR 1.1 11/06/2018    GFRESTIMATED 67 10/10/2018    GFRESTBLACK 81 10/10/2018    TEODORO 9.7 10/10/2018        A1C RESULTS:  No results found for: A1C    INR RESULTS:  Lab Results   Component Value Date    INR 1.79 (H) 11/20/2018    INR 1.48 (H) 03/21/2018           CC  No referring provider defined for this encounter.                    Thank you for allowing me to participate in the care of your patient.      Sincerely,     WILLIS Smith [facility]    cc:   No referring provider defined for this encounter.

## 2019-02-27 NOTE — LETTER
2/27/2019    Mg Nelson MD  [address]  [facility] [address]    RE: Bairon DOWNS Cristian       Dear Colleague,    I had the pleasure of seeing Bairon Foster in the [facility].    HPI and Plan:   I had the pleasure of seeing Bairon Foster today in cardiology clinic follow up. He is a pleasant 88 year old patient of Dr. Hope and Dr. Carrizales with labile lower extremity edema due to venous insufficiency and diastolic heart failure, he has 1-2+ tricuspid regurgitation and 3+ mitral insufficiency.  He also is status post bilateral GSV ablations, he initially did quite well with that.    Mr. Foster has a history of coronary artery disease, atrial fibrillation with a single-chamber pacemaker and anticoagulated with Pradaxa, ventricular tachycardia and mitral insufficiency.  His echocardiogram in August 2018 showed a EF of 53%, 3+ MR in 1-2+ tricuspid regurgitation with an RVSP of 23+ right atrial pressure.  He had a coronary angiogram in March 2018 showing no flow-limiting lesions.  He had OM stenting in 2009.  His LV end-diastolic pressure during his coronary last March was 23 and he was placed on spironolactone, he developed heart failure and so his spironolactone was increased which caused nipple tenderness.  For a while he was off spironolactone altogether but he iangiogram s now on 25 mg daily and is doing well without nipple tenderness.  He is not having symptoms of heart failure.  He cannot afford eleperone.    For the last year or so he is [facility] Bull[facility] Pemphig[facility]  [facility] recently [facility] discontinue Lasix, Bumex and torsemide (sulfa derivatives) and switch to ethacrynic acid as a loop diuretic.  He is currently on torsemide 20 mg daily.     Labs Reviewed: Sodium 139, potassium 4.1, creatinine 1.1, GFR 67, LDL 55    Physical Exam  Please see Below     Assessment and Plan  1.  Lower extremity edema.  This is probably  a combination of biventricular heart failure and venous insufficiency.  His dermatologist is wondering if he is having a sulfa reaction from torsemide that may be causing his itching and dermatitis.  I did go ahead and switch him to ethacrynic acid, 25 mg daily.  We will repeat a BMP next  week.  Of asked him to call me and let me know how his weight is next Wednesday and we will need to adjust the dose depending on how he is doing.  His last echocardiogram was in April 2018 and he does have 3+ MR in 1-2+ TR.  He primarily has seen Dr. Hope in the past, who I will talk to see if he feels Mr. Foster would be a candidate/benefit from mitral clip.  His echo  an coronary angiogram showed evidence of volume overload with the LV end-diastolic pressure 23 and an IVC that fails to collapse. I do think that his lower extremity edema has been bothering him for at least a year and a half and maybe we could help her with the symptoms.  2.  Coronary artery disease.  His most recent angiogram was in March 2018, he had no flow-limiting disease.  He continues on good medical management with statin, aspirin, beta blockade and isosorbide.  3.  Permanent atrial fibrillation.  He is due for device check next month.  His last check showed A. fib with slow ventricular response in the 40s.  His estimated battery life was 1-1/2 years.  He continues to be rate controlled and anticoagulated with Pradaxa.  He does have a pacemaker for his history of asystole.  4.      Tricuspid and mitral mitral regurgitation.His last echocardiogram was in March, this showed progression of his regurgitation from moderate to severe.  He also has tricuspid regurgitation.  I do wonder if this is starting to be more symptomatic for him and that is why he is having a difficult and labile weight changes and edema.   After our OV I discussed with Dr. Hope's nurse and will arrange a CHELLE prior to OV with Dr. Hope.    Thank you for allowing me to care for Bairon  HIMANSHU Foster today.    WILLIS Bullard, CNP  Cardiology  Greater than half of this 45 minute appointment was spent in counseling and coordination of care.      Voice recognition software was used for this note, I have reviewed this note, but errors may have been missed.    Orders Placed This Encounter   Procedures     Basic metabolic panel     Follow-Up with Cardiac Advanced Practice Provider     Orders Placed This Encounter   Medications     ethacrynic acid (EDECRIN) 25 MG tablet     Sig: Take 1 tablet (25 mg) by mouth daily     Dispense:  90 tablet     Refill:  1     Medications Discontinued During This Encounter   Medication Reason     torsemide (DEMADEX) 10 MG tablet          CURRENT MEDICATIONS:  Current Outpatient Medications   Medication Sig Dispense Refill     acetaminophen (TYLENOL) 500 MG tablet Take 2 tablets (1,000 mg) by mouth every 6 hours as needed for mild pain 100 tablet 0     ASPIRIN NOT PRESCRIBED (INTENTIONAL) Please choose reason not prescribed, below 0 each 0     BuPROPion HCl (WELLBUTRIN XL PO) Take 150 mg by mouth every morning       Carboxymethylcellulose Sod PF (CELLUVISC/REFRESH LIQUIGEL) 1 % ophthalmic solution Place 1 drop into both eyes 3 times daily as needed for dry eyes       carvedilol (COREG) 6.25 MG tablet TAKE 1 TABLET (6.25 MG) BY MOUTH 2 TIMES DAILY (WITH MEALS) 180 tablet 3     clobetasol (TEMOVATE) 0.05 % cream Apply sparingly to affected area twice daily as needed.  Do not apply to face. 60 g 11     COMPRESSION STOCKINGS 1 each daily 2 each 0     dabigatran ANTICOAGULANT (PRADAXA ANTICOAGULANT) 75 MG CAPS BLISTER PACK Take 150 mg by mouth 2 times daily. Store in original 's bottle or blister pack; use within 120 days of opening.       diclofenac (VOLTAREN) 1 % topical gel Apply 2 g topically 4 times daily       ethacrynic acid (EDECRIN) 25 MG tablet Take 1 tablet (25 mg) by mouth daily 90 tablet 1     finasteride (PROSCAR) 5 MG tablet TAKE 1 TABLET EVERY DAY 90  tablet 3     GLUCOSAMINE-CHONDROITIN PO Take 2 tablets by mouth daily       isosorbide mononitrate (IMDUR) 30 MG 24 hr tablet Take 1 tablet (30 mg) by mouth daily 90 tablet 3     ketotifen (ZADITOR) 0.025 % SOLN ophthalmic solution Place 1 drop into both eyes every 12 hours 1 Bottle      levothyroxine (SYNTHROID/LEVOTHROID) 100 MCG tablet TAKE 1 TABLET EVERY DAY 90 tablet 3     LORazepam (ATIVAN) 1 MG tablet Take 0.5-1 tablets (0.5-1 mg) by mouth every 8 hours as needed for anxiety 30 tablet 5     Multiple Vitamins-Minerals (CENTRUM SILVER) per tablet Take 1 tablet by mouth daily       Omega-3 Fatty Acids (FISH OIL) 1200 MG capsule Take 1 capsule by mouth daily        omeprazole (PRILOSEC) 20 MG DR capsule TAKE 1 CAPSULE TWICE DAILY 180 capsule 3     ORDER FOR DME Auto-CPAP:Max 9 cm H2OMin 9 cm H2O  Continuous Lifetime need and heated humidity.    1 Device 0     Polyethylene Glycol 3350 (MIRALAX PO) Take 1 packet by mouth At Bedtime        simvastatin (ZOCOR) 20 MG tablet Take by mouth At Bedtime       spironolactone (ALDACTONE) 25 MG tablet Take 1 tablet (25 mg) by mouth daily 30 tablet 1     sucralfate (CARAFATE) 1 GM tablet Take 1 tablet (1 g) by mouth 2 times daily 180 tablet 3     tamsulosin (FLOMAX) 0.4 MG capsule TAKE 1 CAPSULE EVERY DAY 90 capsule 3     triamcinolone (KENALOG) 0.025 % cream Apply topically 2 times daily       tranexamic acid Apply to gauze and apply to localized area of bleeding q 6 hrs for 1 - 2 days 60 mL 0       ALLERGIES   No Known Allergies    PAST MEDICAL HISTORY:  Past Medical History:   Diagnosis Date     Acute, but ill-defined, cerebrovascular disease     NO RESIDUALS     Antiplatelet or antithrombotic long-term use      Arrhythmia     a fib     Blood in stool      Coronary atherosclerosis of unspecified type of vessel, native or graft     S/P PTCA, EF 50%     Esophageal reflux      Hydrocele, unspecified      Hypercholesterolemia      Hypersomnia with sleep apnea, unspecified       Inguinal hernia without mention of obstruction or gangrene, recurrent unilateral or unspecified      Ischemic cardiomyopathy      Major depression in complete remission (H)      Obese      Onychomycosis      JIM on CPAP      Other and unspecified hyperlipidemia      Other lymphedema     GROIN AND THIGHS     Other pancytopenia (H)      Other specified anemias      Overweight      Pacemaker      PAD (peripheral artery disease) (H)      Pancytopenia (H)      Persistent atrial fibrillation (H)     VVI PM for long pauses     Prostatic hypertrophy, benign      Pulmonary hypertension (H)      Scrotal varices      Thrombocytopenia, unspecified (H)      Umbilical hernia without obstruction and without gangrene      Unilateral or unspecified femoral hernia without mention of obstruction or gangrene, unilateral or unspecified      Unspecified hypothyroidism      Venous stasis dermatitis of both lower extremities      Vitamin D deficiency        PAST SURGICAL HISTORY:  Past Surgical History:   Procedure Laterality Date     EYE SURGERY      CATARACT/BLEPHAROPLASTY     GENITOURINARY SURGERY      TUNA     HERNIA REPAIR      RIGHT INGUINAL     HERNIORRHAPHY INGUINAL  8/14/2012    Procedure: HERNIORRHAPHY INGUINAL;  right inguinal hernia repair;  Surgeon: Kareem Torrez MD;  Location: [facility]     HERNIORRHAPHY UMBILICAL N/A 10/31/2017    Procedure: HERNIORRHAPHY UMBILICAL;  UMBILICAL HERNIA REPAIR WITH MESH;  Surgeon: Scar Harrington MD;  Location: [facility]     IMPLANT PACEMAKER  8/2009    single chamber     [place]      [facility] [place]OPEDIC SURGERY  2010    right TKR, left TKR     SURGICAL HISTORY OF -       Thumb surgery     SURGICAL HISTORY OF -   1990s    Left total knee replacement     SURGICAL HISTORY OF -   3/11    [facility] total knee replacement       FAMILY HISTORY:  Family History   Problem Relation Age of Onset     Cardiovascular Father      C.A.D. Father      Lung Cancer Sister      Unknown/Adopted Mother      Cancer  - colorectal No family hx of        SOCIAL HISTORY:  Social History     Socioeconomic History     Marital status:      Spouse name: None     Number of children: None     Years of education: None     Highest education level: None   Occupational History     Occupation: Teacher, author, speaker     Employer: RETIRED   Social Needs     Financial resource strain: None     Food insecurity:     Worry: None     Inability: None     Transportation needs:     Medical: None     Non-medical: None   Tobacco Use     Smoking status: Never Smoker     Smokeless tobacco: Never Used     Tobacco comment: per encounter 2/12/07   Substance and Sexual Activity     Alcohol use: Yes     Alcohol/week: 0.0 oz     Comment: 1/month     Drug use: No     Sexual activity: No     Partners: Female   Lifestyle     Physical activity:     Days per week: None     Minutes per session: None     Stress: None   Relationships     Social connections:     Talks on phone: None     Gets together: None     Attends Rastafari service: None     Active member of club or organization: None     Attends meetings of clubs or organizations: None     Relationship status: None     Intimate partner violence:     Fear of current or ex partner: None     Emotionally abused: None     Physically abused: None     Forced sexual activity: None   Other Topics Concern     Parent/sibling w/ CABG, MI or angioplasty before 65F 55M? No      Service Not Asked     Blood Transfusions Not Asked     Caffeine Concern No     Comment: 1-2 cups coffee a day     Occupational Exposure Not Asked     Hobby Hazards Not Asked     Sleep Concern No     Stress Concern Yes     Comment: due to wifes health condition     Weight Concern No     Special Diet No     Back Care Not Asked     Exercise Yes     Comment: states he uses his tredmill on and off     Bike Helmet Not Asked     Seat Belt Yes     Self-Exams Not Asked   Social History Narrative     None       Review of Systems:  Skin:  Negative    "    Eyes:  Positive for glasses cataract surgery on both eyes  ENT:  Negative      Respiratory:  Positive for sleep apnea;CPAP;dyspnea on exertion     Cardiovascular:    Positive for;edema    Gastroenterology: Positive for poor appetite    Genitourinary:  Negative      Musculoskeletal:  Positive for arthritis    Neurologic:  Positive for stroke;numbness or tingling of feet right foot  Psychiatric:  Positive for sleep disturbances;anxiety;excessive stress stress due to wifes health   Heme/Lymph/Imm:  Negative      Endocrine:  Positive for thyroid disorder      Physical Exam:  Vitals: /60   Pulse 62   Ht 1.88 m (6' 2\")   Wt 90.7 kg (200 lb)   BMI 25.68 kg/m       Constitutional:  cooperative;well developed        Skin:  warm and dry to the touch rash        Head:  normocephalic        Eyes:  pupils equal and round        Lymph:      ENT:  no pallor or cyanosis        Neck:  JVP normal        Respiratory:  normal breath sounds, clear to auscultation, normal A-P diameter, normal symmetry, normal respiratory excursion, no use of accessory muscles         Cardiac: regular rhythm;normal S1 and S2                                                         GI:  abdomen soft        Extremities and Muscular Skeletal:      bilateral LE edema;1+     wearing compression stockings    Neurological:  affect appropriate        Psych:  Alert and Oriented x 3    Encounter Diagnoses   Name Primary?     Localized edema Yes     Ischemic cardiomyopathy        Recent Lab Results:  LIPID RESULTS:  Lab Results   Component Value Date    CHOL 121 10/10/2018    HDL 59 10/10/2018    LDL 55 10/10/2018    TRIG 34 10/10/2018    CHOLHDLRATIO 2.1 06/15/2015       LIVER ENZYME RESULTS:  Lab Results   Component Value Date    AST 27 10/09/2018    ALT 23 10/10/2018       CBC RESULTS:  Lab Results   Component Value Date    WBC 2.8 (L) 11/20/2018    RBC 3.16 (L) 11/20/2018    HGB 10.3 (L) 11/20/2018    HCT 32.6 (L) 11/20/2018     (H) " 11/20/2018    MCH 32.6 11/20/2018    MCHC 31.6 11/20/2018    RDW 15.2 (H) 11/20/2018    PLT 64 (L) 11/20/2018       BMP RESULTS:  Lab Results   Component Value Date     10/10/2018    POTASSIUM 4.1 11/06/2018    CHLORIDE 105 10/10/2018    CO2 28 10/10/2018    ANIONGAP 6 10/10/2018    GLC 97 10/10/2018    BUN 21 10/10/2018    CR 1.1 11/06/2018    GFRESTIMATED 67 10/10/2018    GFRESTBLACK 81 10/10/2018    TEODORO 9.7 10/10/2018        A1C RESULTS:  No results found for: A1C    INR RESULTS:  Lab Results   Component Value Date    INR 1.79 (H) 11/20/2018    INR 1.48 (H) 03/21/2018         Thank you for allowing me to participate in the care of your patient.    Sincerely,     WILLIS Smith CNP     [facility]

## 2019-02-27 NOTE — PROGRESS NOTES
HPI and Plan:   I had the pleasure of seeing Bairon Foster today in cardiology clinic follow up. He is a pleasant 88 year old patient of Dr. Hope and Dr. Carrizales with labile lower extremity edema due to venous insufficiency and diastolic heart failure, he has 1-2+ tricuspid regurgitation and 3+ mitral insufficiency.  He also is status post bilateral GSV ablations, he initially did quite well with that.    Mr. Foster has a history of coronary artery disease, atrial fibrillation with a single-chamber pacemaker and anticoagulated with Pradaxa, ventricular tachycardia and mitral insufficiency.  His echocardiogram in August 2018 showed a EF of 53%, 3+ MR in 1-2+ tricuspid regurgitation with an RVSP of 23+ right atrial pressure.  He had a coronary angiogram in March 2018 showing no flow-limiting lesions.  He had OM stenting in 2009.  His LV end-diastolic pressure during his coronary last March was 23 and he was placed on spironolactone, he developed heart failure and so his spironolactone was increased which caused nipple tenderness.  For a while he was off spironolactone altogether but he iangiogram s now on 25 mg daily and is doing well without nipple tenderness.  He is not having symptoms of heart failure.  He cannot afford eleperone.    For the last year or so he is [facility]  [facility] recently [facility] discontinue Lasix, Bumex and torsemide (sulfa derivatives) and switch to ethacrynic acid as a loop diuretic.  He is currently on torsemide 20 mg daily.     Labs Reviewed: Sodium 139, potassium 4.1, creatinine 1.1, GFR 67, LDL 55    Physical Exam  Please see Below     Assessment and Plan  1.  Lower extremity edema.  This is probably a combination of biventricular heart failure and venous insufficiency.  His dermatologist is wondering if he is having a sulfa reaction from torsemide that may be causing his itching and dermatitis.  I did go ahead and switch  him to ethacrynic acid, 25 mg daily.  We will repeat a BMP next  week.  Of asked him to call me and let me know how his weight is next Wednesday and we will need to adjust the dose depending on how he is doing.  His last echocardiogram was in April 2018 and he does have 3+ MR in 1-2+ TR.  He primarily has seen Dr. Hope in the past, who I will talk to see if he feels Mr. Foster would be a candidate/benefit from mitral clip.  His echo  an coronary angiogram showed evidence of volume overload with the LV end-diastolic pressure 23 and an IVC that fails to collapse. I do think that his lower extremity edema has been bothering him for at least a year and a half and maybe we could help her with the symptoms.  2.  Coronary artery disease.  His most recent angiogram was in March 2018, he had no flow-limiting disease.  He continues on good medical management with statin, aspirin, beta blockade and isosorbide.  3.  Permanent atrial fibrillation.  He is due for device check next month.  His last check showed A. fib with slow ventricular response in the 40s.  His estimated battery life was 1-1/2 years.  He continues to be rate controlled and anticoagulated with Pradaxa.  He does have a pacemaker for his history of asystole.  4.     Tricuspid and mitral mitral regurgitation.His last echocardiogram was in March, this showed progression of his regurgitation from moderate to severe.  He also has tricuspid regurgitation.  I do wonder if this is starting to be more symptomatic for him and that is why he is having a difficult and labile weight changes and edema.  After our OV I discussed with Dr. Hope's nurse and will arrange a CHELLE prior to OV with Dr. Hope.    Thank you for allowing me to care for Bairon Foster today.    WILLIS Bullard, CNP  Cardiology  Greater than half of this 45 minute appointment was spent in counseling and coordination of care.      Voice recognition software was used for this note, I have reviewed  this note, but errors may have been missed.    Orders Placed This Encounter   Procedures     Basic metabolic panel     Follow-Up with Cardiac Advanced Practice Provider     Orders Placed This Encounter   Medications     ethacrynic acid (EDECRIN) 25 MG tablet     Sig: Take 1 tablet (25 mg) by mouth daily     Dispense:  90 tablet     Refill:  1     Medications Discontinued During This Encounter   Medication Reason     torsemide (DEMADEX) 10 MG tablet          CURRENT MEDICATIONS:  Current Outpatient Medications   Medication Sig Dispense Refill     acetaminophen (TYLENOL) 500 MG tablet Take 2 tablets (1,000 mg) by mouth every 6 hours as needed for mild pain 100 tablet 0     ASPIRIN NOT PRESCRIBED (INTENTIONAL) Please choose reason not prescribed, below 0 each 0     BuPROPion HCl (WELLBUTRIN XL PO) Take 150 mg by mouth every morning       Carboxymethylcellulose Sod PF (CELLUVISC/REFRESH LIQUIGEL) 1 % ophthalmic solution Place 1 drop into both eyes 3 times daily as needed for dry eyes       carvedilol (COREG) 6.25 MG tablet TAKE 1 TABLET (6.25 MG) BY MOUTH 2 TIMES DAILY (WITH MEALS) 180 tablet 3     clobetasol (TEMOVATE) 0.05 % cream Apply sparingly to affected area twice daily as needed.  Do not apply to face. 60 g 11     COMPRESSION STOCKINGS 1 each daily 2 each 0     dabigatran ANTICOAGULANT (PRADAXA ANTICOAGULANT) 75 MG CAPS BLISTER PACK Take 150 mg by mouth 2 times daily. Store in original 's bottle or blister pack; use within 120 days of opening.       diclofenac (VOLTAREN) 1 % topical gel Apply 2 g topically 4 times daily       ethacrynic acid (EDECRIN) 25 MG tablet Take 1 tablet (25 mg) by mouth daily 90 tablet 1     finasteride (PROSCAR) 5 MG tablet TAKE 1 TABLET EVERY DAY 90 tablet 3     GLUCOSAMINE-CHONDROITIN PO Take 2 tablets by mouth daily       isosorbide mononitrate (IMDUR) 30 MG 24 hr tablet Take 1 tablet (30 mg) by mouth daily 90 tablet 3     ketotifen (ZADITOR) 0.025 % SOLN ophthalmic  solution Place 1 drop into both eyes every 12 hours 1 Bottle      levothyroxine (SYNTHROID/LEVOTHROID) 100 MCG tablet TAKE 1 TABLET EVERY DAY 90 tablet 3     LORazepam (ATIVAN) 1 MG tablet Take 0.5-1 tablets (0.5-1 mg) by mouth every 8 hours as needed for anxiety 30 tablet 5     Multiple Vitamins-Minerals (CENTRUM SILVER) per tablet Take 1 tablet by mouth daily       Omega-3 Fatty Acids (FISH OIL) 1200 MG capsule Take 1 capsule by mouth daily        omeprazole (PRILOSEC) 20 MG DR capsule TAKE 1 CAPSULE TWICE DAILY 180 capsule 3     ORDER FOR DME Auto-CPAP:Max 9 cm H2OMin 9 cm H2O  Continuous Lifetime need and heated humidity.    1 Device 0     Polyethylene Glycol 3350 (MIRALAX PO) Take 1 packet by mouth At Bedtime        simvastatin (ZOCOR) 20 MG tablet Take by mouth At Bedtime       spironolactone (ALDACTONE) 25 MG tablet Take 1 tablet (25 mg) by mouth daily 30 tablet 1     sucralfate (CARAFATE) 1 GM tablet Take 1 tablet (1 g) by mouth 2 times daily 180 tablet 3     tamsulosin (FLOMAX) 0.4 MG capsule TAKE 1 CAPSULE EVERY DAY 90 capsule 3     triamcinolone (KENALOG) 0.025 % cream Apply topically 2 times daily       tranexamic acid Apply to gauze and apply to localized area of bleeding q 6 hrs for 1 - 2 days 60 mL 0       ALLERGIES   No Known Allergies    PAST MEDICAL HISTORY:  Past Medical History:   Diagnosis Date     Acute, but ill-defined, cerebrovascular disease     NO RESIDUALS     Antiplatelet or antithrombotic long-term use      Arrhythmia     a fib     Blood in stool      Coronary atherosclerosis of unspecified type of vessel, native or graft     S/P PTCA, EF 50%     Esophageal reflux      Hydrocele, unspecified      Hypercholesterolemia      Hypersomnia with sleep apnea, unspecified      Inguinal hernia without mention of obstruction or gangrene, recurrent unilateral or unspecified      Ischemic cardiomyopathy      Major depression in complete remission (H)      Obese      Onychomycosis      JIM on CPAP       Other and unspecified hyperlipidemia      Other lymphedema     GROIN AND THIGHS     Other pancytopenia (H)      Other specified anemias      Overweight      Pacemaker      PAD (peripheral artery disease) (H)      Pancytopenia (H)      Persistent atrial fibrillation (H)     VVI PM for long pauses     Prostatic hypertrophy, benign      Pulmonary hypertension (H)      Scrotal varices      Thrombocytopenia, unspecified (H)      Umbilical hernia without obstruction and without gangrene      Unilateral or unspecified femoral hernia without mention of obstruction or gangrene, unilateral or unspecified      Unspecified hypothyroidism      Venous stasis dermatitis of both lower extremities      Vitamin D deficiency        PAST SURGICAL HISTORY:  Past Surgical History:   Procedure Laterality Date     EYE SURGERY      CATARACT/BLEPHAROPLASTY     GENITOURINARY SURGERY      TUNA     HERNIA REPAIR      RIGHT INGUINAL     HERNIORRHAPHY INGUINAL  8/14/2012    Procedure: HERNIORRHAPHY INGUINAL;  right inguinal hernia repair;  Surgeon: Kareem Torrez MD;  Location: [facility]     HERNIORRHAPHY UMBILICAL N/A 10/31/2017    Procedure: HERNIORRHAPHY UMBILICAL;  UMBILICAL HERNIA REPAIR WITH MESH;  Surgeon: Scar Harrington MD;  Location: [facility]     IMPLANT PACEMAKER  8/2009    single chamber     [place]      [facility] [place] [facility] [place]THOPEDIC SURGERY  2010    right TKR, left TKR     SURGICAL HISTORY OF -       Thumb surgery     SURGICAL HISTORY OF -   1990s    Left total knee replacement     SURGICAL HISTORY OF -   3/11    [facility] total knee replacement       FAMILY HISTORY:  Family History   Problem Relation Age of Onset     Cardiovascular Father      C.A.D. Father      Lung Cancer Sister      Unknown/Adopted Mother      Cancer - colorectal No family hx of        SOCIAL HISTORY:  Social History     Socioeconomic History     Marital status:      Spouse name: None     Number of children: None     Years of education: None     Highest  education level: None   Occupational History     Occupation: Teacher, author, speaker     Employer: RETIRED   Social Needs     Financial resource strain: None     Food insecurity:     Worry: None     Inability: None     Transportation needs:     Medical: None     Non-medical: None   Tobacco Use     Smoking status: Never Smoker     Smokeless tobacco: Never Used     Tobacco comment: per encounter 2/12/07   Substance and Sexual Activity     Alcohol use: Yes     Alcohol/week: 0.0 oz     Comment: 1/month     Drug use: No     Sexual activity: No     Partners: Female   Lifestyle     Physical activity:     Days per week: None     Minutes per session: None     Stress: None   Relationships     Social connections:     Talks on phone: None     Gets together: None     Attends Holiness service: None     Active member of club or organization: None     Attends meetings of clubs or organizations: None     Relationship status: None     Intimate partner violence:     Fear of current or ex partner: None     Emotionally abused: None     Physically abused: None     Forced sexual activity: None   Other Topics Concern     Parent/sibling w/ CABG, MI or angioplasty before 65F 55M? No      Service Not Asked     Blood Transfusions Not Asked     Caffeine Concern No     Comment: 1-2 cups coffee a day     Occupational Exposure Not Asked     Hobby Hazards Not Asked     Sleep Concern No     Stress Concern Yes     Comment: due to wifes health condition     Weight Concern No     Special Diet No     Back Care Not Asked     Exercise Yes     Comment: states he uses his tredmill on and off     Bike Helmet Not Asked     Seat Belt Yes     Self-Exams Not Asked   Social History Narrative     None       Review of Systems:  Skin:  Negative       Eyes:  Positive for glasses cataract surgery on both eyes  ENT:  Negative      Respiratory:  Positive for sleep apnea;CPAP;dyspnea on exertion     Cardiovascular:    Positive for;edema    Gastroenterology:  "Positive for poor appetite    Genitourinary:  Negative      Musculoskeletal:  Positive for arthritis    Neurologic:  Positive for stroke;numbness or tingling of feet right foot  Psychiatric:  Positive for sleep disturbances;anxiety;excessive stress stress due to wifes health   Heme/Lymph/Imm:  Negative      Endocrine:  Positive for thyroid disorder      Physical Exam:  Vitals: /60   Pulse 62   Ht 1.88 m (6' 2\")   Wt 90.7 kg (200 lb)   BMI 25.68 kg/m      Constitutional:  cooperative;well developed        Skin:  warm and dry to the touch rash        Head:  normocephalic        Eyes:  pupils equal and round        Lymph:      ENT:  no pallor or cyanosis        Neck:  JVP normal        Respiratory:  normal breath sounds, clear to auscultation, normal A-P diameter, normal symmetry, normal respiratory excursion, no use of accessory muscles         Cardiac: regular rhythm;normal S1 and S2                                                         GI:  abdomen soft        Extremities and Muscular Skeletal:      bilateral LE edema;1+     wearing compression stockings    Neurological:  affect appropriate        Psych:  Alert and Oriented x 3    Encounter Diagnoses   Name Primary?     Localized edema Yes     Ischemic cardiomyopathy        Recent Lab Results:  LIPID RESULTS:  Lab Results   Component Value Date    CHOL 121 10/10/2018    HDL 59 10/10/2018    LDL 55 10/10/2018    TRIG 34 10/10/2018    CHOLHDLRATIO 2.1 06/15/2015       LIVER ENZYME RESULTS:  Lab Results   Component Value Date    AST 27 10/09/2018    ALT 23 10/10/2018       CBC RESULTS:  Lab Results   Component Value Date    WBC 2.8 (L) 11/20/2018    RBC 3.16 (L) 11/20/2018    HGB 10.3 (L) 11/20/2018    HCT 32.6 (L) 11/20/2018     (H) 11/20/2018    MCH 32.6 11/20/2018    MCHC 31.6 11/20/2018    RDW 15.2 (H) 11/20/2018    PLT 64 (L) 11/20/2018       BMP RESULTS:  Lab Results   Component Value Date     10/10/2018    POTASSIUM 4.1 11/06/2018    " CHLORIDE 105 10/10/2018    CO2 28 10/10/2018    ANIONGAP 6 10/10/2018    GLC 97 10/10/2018    BUN 21 10/10/2018    CR 1.1 11/06/2018    GFRESTIMATED 67 10/10/2018    GFRESTBLACK 81 10/10/2018    TEODORO 9.7 10/10/2018        A1C RESULTS:  No results found for: A1C    INR RESULTS:  Lab Results   Component Value Date    INR 1.79 (H) 11/20/2018    INR 1.48 (H) 03/21/2018           CC  No referring provider defined for this encounter.

## 2019-02-27 NOTE — PATIENT INSTRUCTIONS
Stop torsemide 10 mg daily.    Start ethacrynic acid 25 mg daily.    We will check a lab next week to make sure your kidneys/electrolytes are doing okay.    Call me next Wednesday with your weights on your home scale.    See me in a couple of weeks.    Anushka [phone]

## 2019-02-28 ENCOUNTER — TELEPHONE (OUTPATIENT)
Dept: FAMILY MEDICINE | Facility: CLINIC | Age: 84
End: 2019-02-28

## 2019-02-28 ENCOUNTER — TELEPHONE (OUTPATIENT)
Dept: CARDIOLOGY | Facility: CLINIC | Age: 84
End: 2019-02-28

## 2019-02-28 NOTE — TELEPHONE ENCOUNTER
ADDENDUM: patient called again checking on status of PA for ethacrynic acid. RN advised patient that we do not have an update as of yet, but will send reminder to team to touch base with him on Thursday 3/8/19 to update whether we have heard of approval or denial. Patient verbalized understanding and is in agreement with plan.       Patient called to advise that Ethacrynic acid was not covered by [employer]. RN advised patient that a PA has been submitted in an attempt to get this medication approved and it will take a few days before we know if it is approved or not. Patient verbalized understanding and is in agreement with plan.

## 2019-02-28 NOTE — TELEPHONE ENCOUNTER
Prior Authorization Retail Medication Request    Medication/Dose: Ethacrynic acid  ICD code (if different than what is on RX):  R600  Previously Tried and Failed:  [place]  Rationale:  Insurance prefers Bumetanide    Insurance Name:  [employer]  Insurance ID:  [id number]      Pharmacy Information (if different than what is on RX)  Name:  [facility]  Phone:  [phone]    Mg Cruz, PharmD  [facility]  [phone]

## 2019-03-07 NOTE — TELEPHONE ENCOUNTER
CENTRAL PRIOR AUTHORIZATION  [phone]    PA Initiation    Medication: Ethacrynic acid  Insurance Company: [employer] - Phone [phone] Fax [phone]  Pharmacy Filling the Rx: [place], [place] - [address] [place] [address]  Filling Pharmacy Phone: [phone]  Filling Pharmacy Fax:    Start Date: 3/7/2019

## 2019-03-07 NOTE — TELEPHONE ENCOUNTER
Sent request for update to the PA team regarding PA for ethacrynic acid.     Response from PA team:    Mannie Henry,   The request was never routed to the PA team and so it has not been initiated yet. I just routed it to our team a few minutes ago and we will complete it as an urgent request. Please refer to the 02/28/2019 telephone encounter.       Thanks,     Central Prior Authorization Team   [phone]     Will await PA approval or denial from PA department prior to contacting patient.

## 2019-03-07 NOTE — TELEPHONE ENCOUNTER
Prior Authorization Approval    Authorization Effective Date: 3/7/2019  Authorization Expiration Date: 12/31/2019  Medication: Ethacrynic acid - APPROVED 03/07/2019  Approved Dose/Quantity:   Reference #: PA Case: [id number]   Insurance Company: [employer] [phone] Fax [phone]  Expected CoPay:       CoPay Card Available:      Foundation Assistance Needed:    Which Pharmacy is filling the prescription (Not needed for infusion/clinic administered): [facility] [address]  Pharmacy Notified: Yes  Patient Notified: Yes    The pharmacy will have have to order the medication in, but it did go through and get paid.  The Rx is set to call when ready in [place].

## 2019-03-13 ENCOUNTER — OFFICE VISIT (OUTPATIENT)
Dept: CARDIOLOGY | Facility: CLINIC | Age: 84
End: 2019-03-13
Attending: NURSE PRACTITIONER
Payer: COMMERCIAL

## 2019-03-13 VITALS
SYSTOLIC BLOOD PRESSURE: 127 MMHG | HEIGHT: 74 IN | WEIGHT: 218.5 LBS | DIASTOLIC BLOOD PRESSURE: 77 MMHG | BODY MASS INDEX: 28.04 KG/M2 | HEART RATE: 60 BPM | OXYGEN SATURATION: 98 %

## 2019-03-13 DIAGNOSIS — I25.5 ISCHEMIC CARDIOMYOPATHY: ICD-10-CM

## 2019-03-13 DIAGNOSIS — I34.0 NON-RHEUMATIC MITRAL REGURGITATION: ICD-10-CM

## 2019-03-13 DIAGNOSIS — R60.0 LOCALIZED EDEMA: ICD-10-CM

## 2019-03-13 DIAGNOSIS — I36.1 NON-RHEUMATIC TRICUSPID VALVE INSUFFICIENCY: Primary | ICD-10-CM

## 2019-03-13 PROCEDURE — 99215 OFFICE O/P EST HI 40 MIN: CPT | Performed by: NURSE PRACTITIONER

## 2019-03-13 ASSESSMENT — MIFFLIN-ST. JEOR: SCORE: 1730.86

## 2019-03-13 NOTE — LETTER
3/13/2019    Mg Nelson MD  [address]  [facility] [address]    RE: Bairon DOWNS Cristian       Dear Colleague,    I had the pleasure of seeing Bairon Foster in the [facility].    HPI and Plan:   I had the pleasure of seeing Bairon Foster today in cardiology clinic follow up. He is a pleasant 88 year old patient of Dr. Hope who has also seen Dr. Carrizales in Vascular clinic.    Mr. Foster has a history of coronary artery disease, atrial fibrillation with a single-chamber pacemaker and anticoagulated with Pradaxa, ventricular tachycardia, mitral insufficiency and tricuspid regurgitation.  He has a history of bilateral GS V ablations which initially he did quite well with but subsequently has suffered from very fluctuating lower extremity edema while wearing his compression stockings.  In 2009 he had PCI of the OM.    His last coronary angiogram was done in March 2018, at that time he had no flow-limiting lesions.  He did have an LV end-diastolic pressure of 23 mmHg and he was put on spironolactone 25 mg daily.  At that time his echocardiogram showed an increase in his mitral regurgitation, it had gone from showing mild to moderate MR in March 2017 to moderately severe MR with thickening of the mitral valve in a slightly posterior jet as well as some myxomatous changes.  His IVC was dilated and failed to change with respiration.    Since he was evaluated last April and March  he has been seen by myself and also Dr. Carrizales.  His spironolactone was increased to one-point but he developed nipple tenderness and he went back to the 25 mg dose and did well with that.  He suffered from some dermatological problems [facility] if he has sulfa allergy possibly from his Lasix or his torsemide that he has been on intermittently.   At his last office visit it was requested that I switch him to ethacrynic acid.  He is here 2-1/2 weeks later to  follow-up.    Unfortunately his insurance would not pay for the ethacrynic acid, though he has since found out that he can [facility] it up from [facility] for $11 a month and he plans to start it, but they have not prescribed it for him yet.  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  With this weight gain he is feeling pretty short of breath.  [facility] they have recently noticed his low platelets and sent him to he mock and I understand you are planning to do a bone marrow biopsy.  He has had problems with nosebleeds and had 1 of his nares cauterized but it did not help with his symptoms and he continues to have bleeds.    Physical Exam  Please see Below     Assessment and Plan  1.  Diastolic heart failure.  Since his angiogram and echocardiogram last March and April he has been treated for heart failure first by increasing his Spironolactone, then increasing it and using torsemide.  He did quite well between August and February, but has had problems again more recently.  I do wonder if some of his symptoms are due to his mitral valve and so if discussed it with his primary cardiologist, Dr. Hope.  We will do a CHELLE to take a closer look and have him see Dr. Hope in clinic for follow-up of his mitral and tricuspid regurgitation.  I did discuss the procedure for.  Transesophageal echocardiogram including risks and benefits and he would like to proceed with this.  He understands he needs to have somebody who can drive him home as he will be sedated.    In the meantime his weight is up 18 pounds.  He does not think that his rash has improved at all since discontinuing his torsemide and right now he does not have a prescription from [facility] to start ethacrynic acid.  I have asked him to restart his torsemide 20 mg, I will start him on twice daily dosing  for couple of days and talk to him on Friday to see how his weight has changed.  Once he gets his with a chronic acid he can start at 25 mg daily.  We should check a BMP before he sees me in a week and a half.  2.  Permanent atrial fibrillation.  He continues on rate control and anticoagulation with Pradaxa.  He has a pacemaker for history of asystole.  3.  Low platelets.  This is [facility] by [facility], he plans to go ahead with the bone marrow biopsy.  He has had epitaxis which has not resolved despite cauterization.  I will try to talk to his cardiologist for recommendations on continuing Pradaxa.  I do not believe he has met with [facility] onc[facility] yet [facility].  We could also consider whether he would benefit from a Watchman device.    Thank you for allowing me to care for Bairon Foster today.  Greater than half of this 45 minute appointment was spent in counseling and coordination of care.    WILLIS Bullard, ВИАН  Cardiology    Voice recognition software was used for this note, I have reviewed this note, but errors may have been missed.    Orders Placed This Encounter   Procedures     Basic metabolic panel     Transesophageal Echocardiogram     No orders of the defined types were placed in this encounter.    There are no discontinued medications.      CURRENT MEDICATIONS:  Current Outpatient Medications   Medication Sig Dispense Refill     acetaminophen (TYLENOL) 500 MG tablet Take 2 tablets (1,000 mg) by mouth every 6 hours as needed for mild pain 100 tablet 0     BuPROPion HCl (WELLBUTRIN XL PO) Take 150 mg by mouth every morning       Carboxymethylcellulose Sod PF (CELLUVISC/REFRESH LIQUIGEL) 1 % ophthalmic solution Place 1 drop into both eyes 3 times daily as needed for dry eyes       carvedilol (COREG) 6.25 MG tablet TAKE 1 TABLET (6.25 MG) BY MOUTH 2 TIMES DAILY (WITH MEALS) 180 tablet 3     clobetasol (TEMOVATE) 0.05 % cream Apply sparingly to affected area twice daily as needed.  Do not  apply to face. 60 g 11     COMPRESSION STOCKINGS 1 each daily 2 each 0     dabigatran ANTICOAGULANT (PRADAXA ANTICOAGULANT) 75 MG CAPS BLISTER PACK Take 150 mg by mouth 2 times daily. Store in original 's bottle or blister pack; use within 120 days of opening.       diclofenac (VOLTAREN) 1 % topical gel Apply 2 g topically 4 times daily       finasteride (PROSCAR) 5 MG tablet TAKE 1 TABLET EVERY DAY 90 tablet 3     GLUCOSAMINE-CHONDROITIN PO Take 2 tablets by mouth daily       isosorbide mononitrate (IMDUR) 30 MG 24 hr tablet Take 1 tablet (30 mg) by mouth daily 90 tablet 3     ketotifen (ZADITOR) 0.025 % SOLN ophthalmic solution Place 1 drop into both eyes every 12 hours 1 Bottle      levothyroxine (SYNTHROID/LEVOTHROID) 100 MCG tablet TAKE 1 TABLET EVERY DAY 90 tablet 3     LORazepam (ATIVAN) 1 MG tablet Take 0.5-1 tablets (0.5-1 mg) by mouth every 8 hours as needed for anxiety 30 tablet 5     Multiple Vitamins-Minerals (CENTRUM SILVER) per tablet Take 1 tablet by mouth daily       Omega-3 Fatty Acids (FISH OIL) 1200 MG capsule Take 1 capsule by mouth daily        omeprazole (PRILOSEC) 20 MG DR capsule TAKE 1 CAPSULE TWICE DAILY 180 capsule 3     Polyethylene Glycol 3350 (MIRALAX PO) Take 1 packet by mouth At Bedtime        simvastatin (ZOCOR) 20 MG tablet Take by mouth At Bedtime       spironolactone (ALDACTONE) 25 MG tablet Take 1 tablet (25 mg) by mouth daily 30 tablet 1     sucralfate (CARAFATE) 1 GM tablet Take 1 tablet (1 g) by mouth 2 times daily 180 tablet 3     tamsulosin (FLOMAX) 0.4 MG capsule TAKE 1 CAPSULE EVERY DAY 90 capsule 3     triamcinolone (KENALOG) 0.025 % cream Apply topically 2 times daily       ASPIRIN NOT PRESCRIBED (INTENTIONAL) Please choose reason not prescribed, below 0 each 0     ethacrynic acid (EDECRIN) 25 MG tablet Take 1 tablet (25 mg) by mouth daily (Patient not taking: Reported on 3/13/2019) 90 tablet 1     ORDER FOR DME Auto-CPAP:Max 9 cm H2OMin 9 cm H2O  Continuous  Lifetime need and heated humidity.    1 Device 0     tranexamic acid Apply to gauze and apply to localized area of bleeding q 6 hrs for 1 - 2 days (Patient not taking: Reported on 3/13/2019) 60 mL 0       ALLERGIES   No Known Allergies    PAST MEDICAL HISTORY:  Past Medical History:   Diagnosis Date     Acute, but ill-defined, cerebrovascular disease     NO RESIDUALS     Antiplatelet or antithrombotic long-term use      Arrhythmia     a fib     Blood in stool      Coronary atherosclerosis of unspecified type of vessel, native or graft     S/P PTCA, EF 50%     Esophageal reflux      Hydrocele, unspecified      Hypercholesterolemia      Hypersomnia with sleep apnea, unspecified      Inguinal hernia without mention of obstruction or gangrene, recurrent unilateral or unspecified      Ischemic cardiomyopathy      Major depression in complete remission (H)      Obese      Onychomycosis      JIM on CPAP      Other and unspecified hyperlipidemia      Other lymphedema     GROIN AND THIGHS     Other pancytopenia (H)      Other specified anemias      Overweight      Pacemaker      PAD (peripheral artery disease) (H)      Pancytopenia (H)      Persistent atrial fibrillation (H)     VVI PM for long pauses     Prostatic hypertrophy, benign      Pulmonary hypertension (H)      Scrotal varices      Thrombocytopenia, unspecified (H)      Umbilical hernia without obstruction and without gangrene      Unilateral or unspecified femoral hernia without mention of obstruction or gangrene, unilateral or unspecified      Unspecified hypothyroidism      Venous stasis dermatitis of both lower extremities      Vitamin D deficiency        PAST SURGICAL HISTORY:  Past Surgical History:   Procedure Laterality Date     EYE SURGERY      CATARACT/BLEPHAROPLASTY     GENITOURINARY SURGERY      TUNA     HERNIA REPAIR      RIGHT INGUINAL     HERNIORRHAPHY INGUINAL  8/14/2012    Procedure: HERNIORRHAPHY INGUINAL;  right inguinal hernia repair;  Surgeon:  Kareem Torrez MD;  Location: [facility]     HERNIORRHAPHY UMBILICAL N/A 10/31/2017    Procedure: HERNIORRHAPHY UMBILICAL;  UMBILICAL HERNIA REPAIR WITH MESH;  Surgeon: Scar Harrington MD;  Location: [facility]     IMPLANT PACEMAKER  8/2009    single chamber     ORTHOPEDIC SURGERY      [facility] RIGHT     ORTHOPEDIC SURGERY  2010    right TKR, left TKR     SURGICAL HISTORY OF -       Thumb surgery     SURGICAL HISTORY OF -   1990s    Left total knee replacement     SURGICAL HISTORY OF -   3/11    Rig total knee replacement       FAMILY HISTORY:  Family History   Problem Relation Age of Onset     Cardiovascular Father      C.A.D. Father      Lung Cancer Sister      Unknown/Adopted Mother      Cancer - colorectal No family hx of        SOCIAL HISTORY:  Social History     Socioeconomic History     Marital status:      Spouse name: None     Number of children: None     Years of education: None     Highest education level: None   Occupational History     Occupation: Teacher, author, speaker     Employer: RETIRED   Social Needs     Financial resource strain: None     Food insecurity:     Worry: None     Inability: None     Transportation needs:     Medical: None     Non-medical: None   Tobacco Use     Smoking status: Never Smoker     Smokeless tobacco: Never Used     Tobacco comment: per encounter 2/12/07   Substance and Sexual Activity     Alcohol use: Yes     Alcohol/week: 0.0 oz     Comment: 1/month     Drug use: No     Sexual activity: No     Partners: Female   Lifestyle     Physical activity:     Days per week: None     Minutes per session: None     Stress: None   Relationships     Social connections:     Talks on phone: None     Gets together: None     Attends Taoism service: None     Active member of club or organization: None     Attends meetings of clubs or organizations: None     Relationship status: None     Intimate partner violence:     Fear of current or ex partner: None     Emotionally abused: None     Physically  "abused: None     Forced sexual activity: None   Other Topics Concern     Parent/sibling w/ CABG, MI or angioplasty before 65F 55M? No      Service Not Asked     Blood Transfusions Not Asked     Caffeine Concern No     Comment: 1-2 cups coffee a day     Occupational Exposure Not Asked     Hobby Hazards Not Asked     Sleep Concern No     Stress Concern Yes     Comment: due to wifes health condition     Weight Concern No     Special Diet No     Back Care Not Asked     Exercise Yes     Comment: states he uses his tredmill on and off     Bike Helmet Not Asked     Seat Belt Yes     Self-Exams Not Asked   Social History Narrative     None       Review of Systems:  Skin:  Negative bruising dry skin    Eyes:  Positive for glasses cataract surgery on both eyes  ENT:  Negative   nosebleed   Respiratory:  Positive for sleep apnea;CPAP;dyspnea on exertion;shortness of breath SOB has subsided   Cardiovascular:  Negative Positive for;edema chest heaviness when walking, edema,   Gastroenterology: Positive for poor appetite lossing weight.  Genitourinary:  Negative urinary frequency    Musculoskeletal:  Positive for arthritis    Neurologic:  Positive for stroke;numbness or tingling of feet right foot  Psychiatric:  Positive for sleep disturbances;anxiety;excessive stress stress due to wifes health   Heme/Lymph/Imm:  Negative weight loss;easy bruising nose bleeds   Endocrine:  Positive for thyroid disorder      Physical Exam:  Vitals: /77   Pulse 60   Ht 1.88 m (6' 2\")   Wt 99.1 kg (218 lb 8 oz)   SpO2 98%   BMI 28.05 kg/m       Constitutional:  cooperative;in no acute distress frail      Skin:  warm and dry to the touch bruises present   fell twice recently, once on ice, once into a fire place which he can't really explain but resulted in some bruising.    Head:  normocephalic        Eyes:  pupils equal and round        Lymph:      ENT:  no pallor or cyanosis        Neck:    JVP elevated      Respiratory:  clear " to auscultation;normal symmetry         Cardiac: regular rhythm       systolic murmur                                                 GI:           Extremities and Muscular Skeletal:      2+     wearing compression stockings    Neurological:           Psych:  Alert and Oriented x 3    Encounter Diagnoses   Name Primary?     Localized edema      Ischemic cardiomyopathy      Non-rheumatic tricuspid valve insufficiency Yes     Non-rheumatic mitral regurgitation        Recent Lab Results:  LIPID RESULTS:  Lab Results   Component Value Date    CHOL 121 10/10/2018    HDL 59 10/10/2018    LDL 55 10/10/2018    TRIG 34 10/10/2018    CHOLHDLRATIO 2.1 06/15/2015       LIVER ENZYME RESULTS:  Lab Results   Component Value Date    AST 27 10/09/2018    ALT 23 10/10/2018       CBC RESULTS:  Lab Results   Component Value Date    WBC 2.8 (L) 11/20/2018    RBC 3.16 (L) 11/20/2018    HGB 10.3 (L) 11/20/2018    HCT 32.6 (L) 11/20/2018     (H) 11/20/2018    MCH 32.6 11/20/2018    MCHC 31.6 11/20/2018    RDW 15.2 (H) 11/20/2018    PLT 64 (L) 11/20/2018       BMP RESULTS:  Lab Results   Component Value Date     10/10/2018    POTASSIUM 4.1 11/06/2018    CHLORIDE 105 10/10/2018    CO2 28 10/10/2018    ANIONGAP 6 10/10/2018    GLC 97 10/10/2018    BUN 21 10/10/2018    CR 1.1 11/06/2018    GFRESTIMATED 67 10/10/2018    GFRESTBLACK 81 10/10/2018    TEODORO 9.7 10/10/2018        A1C RESULTS:  No results found for: A1C    INR RESULTS:  Lab Results   Component Value Date    INR 1.79 (H) 11/20/2018    INR 1.48 (H) 03/21/2018           CC  WILLIS Spence CNP  [address]  [place] [address]                    Thank you for allowing me to participate in the care of your patient.      Sincerely,     WILLIS Smith CNP     [facility]    cc:   WILLIS Spence CNP  [address]  [place] [address]

## 2019-03-13 NOTE — LETTER
3/13/2019    Mg Nelson MD  [address]  [facility] [address]    RE: Bairon DOWNS Cristian       Dear Colleague,    I had the pleasure of seeing Bairon Foster in the [facility].    HPI and Plan:   I had the pleasure of seeing Bairon Foster today in cardiology clinic follow up. He is a pleasant 88 year old patient of Dr. Hope who has also seen Dr. Carrizales in Vascular clinic.    Mr. Foster has a history of coronary artery disease, atrial fibrillation with a single-chamber pacemaker and anticoagulated with Pradaxa, ventricular tachycardia, mitral insufficiency and tricuspid regurgitation.  He has a history of bilateral GS V ablations which initially he did quite well with but subsequently has suffered from very fluctuating lower extremity edema while wearing his compression stockings.  In 2009 he had PCI of the OM.    His last coronary angiogram was done in March 2018, at that time he had no flow-limiting lesions.  He did have an LV end-diastolic pressure of 23 mmHg and he was put on spironolactone 25 mg daily.  At that time his echocardiogram showed an increase in his mitral regurgitation, it had gone from showing mild to moderate MR in March 2017 to moderately severe MR with thickening of the mitral valve in a slightly posterior jet as well as some myxomatous changes.  His IVC was dilated and failed to change with respiration.    Since he was evaluated last April and March  he has been seen by myself and also Dr. Carrizales.  His spironolactone was increased to one-point but he developed nipple tenderness and he went back to the 25 mg dose and did well with that.  He suffered from some dermatological problems [facility] if he has sulfa allergy possibly from his Lasix or his torsemide that he has been on intermittently.   At his last office visit it was requested that I switch him to ethacrynic acid.  He is here 2-1/2 weeks later to  follow-up.    Unfortunately his insurance would not pay for the ethacrynic acid, though he has since found out that he can [facility] it up from the [facility] for $11 a month and he plans to start it, but they have not prescribed it for him yet.  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  With this weight gain he is feeling pretty short of breath.  [facility] they have recently noticed his low platelets and sent him to he mock and I understand you are planning to do a bone marrow biopsy.  He has had problems with nosebleeds and had 1 of his nares cauterized but it did not help with his symptoms and he continues to have bleeds.    Physical Exam  Please see Below     Assessment and Plan  1.  Diastolic heart failure.  Since his angiogram and echocardiogram last March and April he has been treated for heart failure first by increasing his Spironolactone, then increasing it and using torsemide.  He did quite well between August and February, but has had problems again more recently.  I do wonder if some of his symptoms are due to his mitral valve and so if discussed it with his primary cardiologist, Dr. Hope.  We will do a CHELLE to take a closer look and have him see Dr. Hope in clinic for follow-up of his mitral and tricuspid regurgitation.  I did discuss the procedure for.  Transesophageal echocardiogram including risks and benefits and he would like to proceed with this.  He understands he needs to have somebody who can drive him home as he will be sedated.    In the meantime his weight is up 18 pounds.  He does not think that his rash has improved at all since discontinuing his torsemide and right now he does not have a prescription from [facility] to start ethacrynic acid.  I have asked him to restart his torsemide 20 mg, I will start him on twice daily dosing  for couple of days and talk to him on Friday to see how his weight has changed.  Once he gets his with a chronic acid he can start at 25 mg daily.  We should check a BMP before he sees me in a week and a half.  2.  Permanent atrial fibrillation.  He continues on rate control and anticoagulation with Pradaxa.  He has a pacemaker for history of asystole.  3.  Low platelets.  This is [facility] up by the [facility], he plans to go ahead with the bone marrow biopsy.  He has had epitaxis which has not resolved despite cauterization.  I will try to talk to his cardiologist for recommendations on continuing Pradaxa.  I do not believe he has met with [facility] oncology yet at [facility].  We could also consider whether he would benefit from a Watchman device.    Thank you for allowing me to care for Bairon Foster today.  Greater than half of this 45 minute appointment was spent in counseling and coordination of care.    WILLIS Bullard, ИВАН  Cardiology    Voice recognition software was used for this note, I have reviewed this note, but errors may have been missed.    Orders Placed This Encounter   Procedures     Basic metabolic panel     Transesophageal Echocardiogram     No orders of the defined types were placed in this encounter.    There are no discontinued medications.      CURRENT MEDICATIONS:  Current Outpatient Medications   Medication Sig Dispense Refill     acetaminophen (TYLENOL) 500 MG tablet Take 2 tablets (1,000 mg) by mouth every 6 hours as needed for mild pain 100 tablet 0     BuPROPion HCl (WELLBUTRIN XL PO) Take 150 mg by mouth every morning       Carboxymethylcellulose Sod PF (CELLUVISC/REFRESH LIQUIGEL) 1 % ophthalmic solution Place 1 drop into both eyes 3 times daily as needed for dry eyes       carvedilol (COREG) 6.25 MG tablet TAKE 1 TABLET (6.25 MG) BY MOUTH 2 TIMES DAILY (WITH MEALS) 180 tablet 3     clobetasol (TEMOVATE) 0.05 % cream Apply sparingly to affected area twice daily as needed.  Do not  apply to face. 60 g 11     COMPRESSION STOCKINGS 1 each daily 2 each 0     dabigatran ANTICOAGULANT (PRADAXA ANTICOAGULANT) 75 MG CAPS BLISTER PACK Take 150 mg by mouth 2 times daily. Store in original 's bottle or blister pack; use within 120 days of opening.       diclofenac (VOLTAREN) 1 % topical gel Apply 2 g topically 4 times daily       finasteride (PROSCAR) 5 MG tablet TAKE 1 TABLET EVERY DAY 90 tablet 3     GLUCOSAMINE-CHONDROITIN PO Take 2 tablets by mouth daily       isosorbide mononitrate (IMDUR) 30 MG 24 hr tablet Take 1 tablet (30 mg) by mouth daily 90 tablet 3     ketotifen (ZADITOR) 0.025 % SOLN ophthalmic solution Place 1 drop into both eyes every 12 hours 1 Bottle      levothyroxine (SYNTHROID/LEVOTHROID) 100 MCG tablet TAKE 1 TABLET EVERY DAY 90 tablet 3     LORazepam (ATIVAN) 1 MG tablet Take 0.5-1 tablets (0.5-1 mg) by mouth every 8 hours as needed for anxiety 30 tablet 5     Multiple Vitamins-Minerals (CENTRUM SILVER) per tablet Take 1 tablet by mouth daily       Omega-3 Fatty Acids (FISH OIL) 1200 MG capsule Take 1 capsule by mouth daily        omeprazole (PRILOSEC) 20 MG DR capsule TAKE 1 CAPSULE TWICE DAILY 180 capsule 3     Polyethylene Glycol 3350 (MIRALAX PO) Take 1 packet by mouth At Bedtime        simvastatin (ZOCOR) 20 MG tablet Take by mouth At Bedtime       spironolactone (ALDACTONE) 25 MG tablet Take 1 tablet (25 mg) by mouth daily 30 tablet 1     sucralfate (CARAFATE) 1 GM tablet Take 1 tablet (1 g) by mouth 2 times daily 180 tablet 3     tamsulosin (FLOMAX) 0.4 MG capsule TAKE 1 CAPSULE EVERY DAY 90 capsule 3     triamcinolone (KENALOG) 0.025 % cream Apply topically 2 times daily       ASPIRIN NOT PRESCRIBED (INTENTIONAL) Please choose reason not prescribed, below 0 each 0     ethacrynic acid (EDECRIN) 25 MG tablet Take 1 tablet (25 mg) by mouth daily (Patient not taking: Reported on 3/13/2019) 90 tablet 1     ORDER FOR DME Auto-CPAP:Max 9 cm H2OMin 9 cm H2O  Continuous  Lifetime need and heated humidity.    1 Device 0     tranexamic acid Apply to gauze and apply to localized area of bleeding q 6 hrs for 1 - 2 days (Patient not taking: Reported on 3/13/2019) 60 mL 0       ALLERGIES   No Known Allergies    PAST MEDICAL HISTORY:  Past Medical History:   Diagnosis Date     Acute, but ill-defined, cerebrovascular disease     NO RESIDUALS     Antiplatelet or antithrombotic long-term use      Arrhythmia     a fib     Blood in stool      Coronary atherosclerosis of unspecified type of vessel, native or graft     S/P PTCA, EF 50%     Esophageal reflux      Hydrocele, unspecified      Hypercholesterolemia      Hypersomnia with sleep apnea, unspecified      Inguinal hernia without mention of obstruction or gangrene, recurrent unilateral or unspecified      Ischemic cardiomyopathy      Major depression in complete remission (H)      Obese      Onychomycosis      JIM on CPAP      Other and unspecified hyperlipidemia      Other lymphedema     GROIN AND THIGHS     Other pancytopenia (H)      Other specified anemias      Overweight      Pacemaker      PAD (peripheral artery disease) (H)      Pancytopenia (H)      Persistent atrial fibrillation (H)     VVI PM for long pauses     Prostatic hypertrophy, benign      Pulmonary hypertension (H)      Scrotal varices      Thrombocytopenia, unspecified (H)      Umbilical hernia without obstruction and without gangrene      Unilateral or unspecified femoral hernia without mention of obstruction or gangrene, unilateral or unspecified      Unspecified hypothyroidism      Venous stasis dermatitis of both lower extremities      Vitamin D deficiency        PAST SURGICAL HISTORY:  Past Surgical History:   Procedure Laterality Date     EYE SURGERY      CATARACT/BLEPHAROPLASTY     GENITOURINARY SURGERY      TUNA     HERNIA REPAIR      RIGHT INGUINAL     HERNIORRHAPHY INGUINAL  8/14/2012    Procedure: HERNIORRHAPHY INGUINAL;  right inguinal hernia repair;  Surgeon:  Kareem Torrez MD;  Location: [facility]     HERNIORRHAPHY UMBILICAL N/A 10/31/2017    Procedure: HERNIORRHAPHY UMBILICAL;  UMBILICAL HERNIA REPAIR WITH MESH;  Surgeon: Scar Harrington MD;  Location: [facility]     IMPLANT PACEMAKER  8/2009    single chamber     ORTHOPEDIC SURGERY      [facility] RIGHT     ORTHOPEDIC SURGERY  2010    right TKR, left TKR     SURGICAL HISTORY OF -       Thumb surgery     SURGICAL HISTORY OF -   1990s    Left total knee replacement     SURGICAL HISTORY OF -   3/11    Rig total knee replacement       FAMILY HISTORY:  Family History   Problem Relation Age of Onset     Cardiovascular Father      C.A.D. Father      Lung Cancer Sister      Unknown/Adopted Mother      Cancer - colorectal No family hx of        SOCIAL HISTORY:  Social History     Socioeconomic History     Marital status:      Spouse name: None     Number of children: None     Years of education: None     Highest education level: None   Occupational History     Occupation: Teacher, author, speaker     Employer: RETIRED   Social Needs     Financial resource strain: None     Food insecurity:     Worry: None     Inability: None     Transportation needs:     Medical: None     Non-medical: None   Tobacco Use     Smoking status: Never Smoker     Smokeless tobacco: Never Used     Tobacco comment: per encounter 2/12/07   Substance and Sexual Activity     Alcohol use: Yes     Alcohol/week: 0.0 oz     Comment: 1/month     Drug use: No     Sexual activity: No     Partners: Female   Lifestyle     Physical activity:     Days per week: None     Minutes per session: None     Stress: None   Relationships     Social connections:     Talks on phone: None     Gets together: None     Attends Christianity service: None     Active member of club or organization: None     Attends meetings of clubs or organizations: None     Relationship status: None     Intimate partner violence:     Fear of current or ex partner: None     Emotionally abused: None     Physically  "abused: None     Forced sexual activity: None   Other Topics Concern     Parent/sibling w/ CABG, MI or angioplasty before 65F 55M? No      Service Not Asked     Blood Transfusions Not Asked     Caffeine Concern No     Comment: 1-2 cups coffee a day     Occupational Exposure Not Asked     Hobby Hazards Not Asked     Sleep Concern No     Stress Concern Yes     Comment: due to wifes health condition     Weight Concern No     Special Diet No     Back Care Not Asked     Exercise Yes     Comment: states he uses his tredmill on and off     Bike Helmet Not Asked     Seat Belt Yes     Self-Exams Not Asked   Social History Narrative     None       Review of Systems:  Skin:  Negative bruising dry skin    Eyes:  Positive for glasses cataract surgery on both eyes  ENT:  Negative   nosebleed   Respiratory:  Positive for sleep apnea;CPAP;dyspnea on exertion;shortness of breath SOB has subsided   Cardiovascular:  Negative Positive for;edema chest heaviness when walking, edema,   Gastroenterology: Positive for poor appetite lossing weight.  Genitourinary:  Negative urinary frequency    Musculoskeletal:  Positive for arthritis    Neurologic:  Positive for stroke;numbness or tingling of feet right foot  Psychiatric:  Positive for sleep disturbances;anxiety;excessive stress stress due to wifes health   Heme/Lymph/Imm:  Negative weight loss;easy bruising nose bleeds   Endocrine:  Positive for thyroid disorder      Physical Exam:  Vitals: /77   Pulse 60   Ht 1.88 m (6' 2\")   Wt 99.1 kg (218 lb 8 oz)   SpO2 98%   BMI 28.05 kg/m       Constitutional:  cooperative;in no acute distress frail      Skin:  warm and dry to the touch bruises present   fell twice recently, once on ice, once into a fire place which he can't really explain but resulted in some bruising.    Head:  normocephalic        Eyes:  pupils equal and round        Lymph:      ENT:  no pallor or cyanosis        Neck:    JVP elevated      Respiratory:  clear " to auscultation;normal symmetry         Cardiac: regular rhythm       systolic murmur                                                 GI:           Extremities and Muscular Skeletal:      2+     wearing compression stockings    Neurological:           Psych:  Alert and Oriented x 3    Encounter Diagnoses   Name Primary?     Localized edema      Ischemic cardiomyopathy      Non-rheumatic tricuspid valve insufficiency Yes     Non-rheumatic mitral regurgitation        Recent Lab Results:  LIPID RESULTS:  Lab Results   Component Value Date    CHOL 121 10/10/2018    HDL 59 10/10/2018    LDL 55 10/10/2018    TRIG 34 10/10/2018    CHOLHDLRATIO 2.1 06/15/2015       LIVER ENZYME RESULTS:  Lab Results   Component Value Date    AST 27 10/09/2018    ALT 23 10/10/2018       CBC RESULTS:  Lab Results   Component Value Date    WBC 2.8 (L) 11/20/2018    RBC 3.16 (L) 11/20/2018    HGB 10.3 (L) 11/20/2018    HCT 32.6 (L) 11/20/2018     (H) 11/20/2018    MCH 32.6 11/20/2018    MCHC 31.6 11/20/2018    RDW 15.2 (H) 11/20/2018    PLT 64 (L) 11/20/2018       BMP RESULTS:  Lab Results   Component Value Date     10/10/2018    POTASSIUM 4.1 11/06/2018    CHLORIDE 105 10/10/2018    CO2 28 10/10/2018    ANIONGAP 6 10/10/2018    GLC 97 10/10/2018    BUN 21 10/10/2018    CR 1.1 11/06/2018    GFRESTIMATED 67 10/10/2018    GFRESTBLACK 81 10/10/2018    TEODORO 9.7 10/10/2018        A1C RESULTS:  No results found for: A1C    INR RESULTS:  Lab Results   Component Value Date    INR 1.79 (H) 11/20/2018    INR 1.48 (H) 03/21/2018         Thank you for allowing me to participate in the care of your patient.    Sincerely,     WILLIS Smith [facility]

## 2019-03-13 NOTE — PROGRESS NOTES
HPI and Plan:   I had the pleasure of seeing Bairon Foster today in cardiology clinic follow up. He is a pleasant 88 year old patient of Dr. Hope who has also seen Dr. Carrizales in Vascular clinic.    Mr. Foster has a history of coronary artery disease, atrial fibrillation with a single-chamber pacemaker and anticoagulated with Pradaxa, ventricular tachycardia, mitral insufficiency and tricuspid regurgitation.  He has a history of bilateral GS V ablations which initially he did quite well with but subsequently has suffered from very fluctuating lower extremity edema while wearing his compression stockings.  In 2009 he had PCI of the OM.    His last coronary angiogram was done in March 2018, at that time he had no flow-limiting lesions.  He did have an LV end-diastolic pressure of 23 mmHg and he was put on spironolactone 25 mg daily.  At that time his echocardiogram showed an increase in his mitral regurgitation, it had gone from showing mild to moderate MR in March 2017 to moderately severe MR with thickening of the mitral valve in a slightly posterior jet as well as some myxomatous changes.  His IVC was dilated and failed to change with respiration.    Since he was evaluated last April and March  he has been seen by myself and also Dr. Carrizales.  His spironolactone was increased to one-point but he developed nipple tenderness and he went back to the 25 mg dose and did well with that.  He suffered from some dermatological problems [facility] if he has sulfa allergy possibly from his Lasix or his torsemide that he has been on intermittently.   At his last office visit it was requested that I switch him to ethacrynic acid.  He is here 2-1/2 weeks later to follow-up.    Unfortunately his insurance would not pay for the ethacrynic acid, though he has since found out that he can [facility] it [facility] from [facility] for $11 a month and he plans to start it, but they have not prescribed it for  him yet.  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  With this weight gain he is feeling pretty short of breath.  [facility] they have recently noticed his low platelets and sent him to he mock and I understand you are planning to do a bone marrow biopsy.  He has had problems with nosebleeds and had 1 of his nares cauterized but it did not help with his symptoms and he continues to have bleeds.    Physical Exam  Please see Below     Assessment and Plan  1.  Diastolic heart failure.  Since his angiogram and echocardiogram last March and April he has been treated for heart failure first by increasing his Spironolactone, then increasing it and using torsemide.  He did quite well between August and February, but has had problems again more recently.  I do wonder if some of his symptoms are due to his mitral valve and so if discussed it with his primary cardiologist, Dr. Hope.  We will do a CHELLE to take a closer look and have him see Dr. Hope in clinic for follow-up of his mitral and tricuspid regurgitation.  I did discuss the procedure for.  Transesophageal echocardiogram including risks and benefits and he would like to proceed with this.  He understands he needs to have somebody who can drive him home as he will be sedated.    In the meantime his weight is up 18 pounds.  He does not think that his rash has improved at all since discontinuing his torsemide and right now he does not have a prescription from [facility] to start ethacrynic acid.  I have asked him to restart his torsemide 20 mg, I will start him on twice daily dosing for couple of days and talk to him on Friday to see how his weight has changed.  Once he gets his with a chronic acid he can start at 25 mg daily.  We should check a BMP before he sees me in a week and a half.  2.  Permanent  atrial fibrillation.  He continues on rate control and anticoagulation with Pradaxa.  He has a pacemaker for history of asystole.  3.  Low platelets.  This is [facility] by [facility], he plans to go ahead with the bone marrow biopsy.  He has had epitaxis which has not resolved despite cauterization.  I will try to talk to his cardiologist for recommendations on continuing Pradaxa.  I do not believe he has met with [facility]ology oncology yet at [facility].  We could also consider whether he would benefit from a Watchman device.    Thank you for allowing me to care for Bairon Foster today.  Greater than half of this 45 minute appointment was spent in counseling and coordination of care.    WILLIS Bullard, CNP  Cardiology    Voice recognition software was used for this note, I have reviewed this note, but errors may have been missed.    Orders Placed This Encounter   Procedures     Basic metabolic panel     Transesophageal Echocardiogram     No orders of the defined types were placed in this encounter.    There are no discontinued medications.      CURRENT MEDICATIONS:  Current Outpatient Medications   Medication Sig Dispense Refill     acetaminophen (TYLENOL) 500 MG tablet Take 2 tablets (1,000 mg) by mouth every 6 hours as needed for mild pain 100 tablet 0     BuPROPion HCl (WELLBUTRIN XL PO) Take 150 mg by mouth every morning       Carboxymethylcellulose Sod PF (CELLUVISC/REFRESH LIQUIGEL) 1 % ophthalmic solution Place 1 drop into both eyes 3 times daily as needed for dry eyes       carvedilol (COREG) 6.25 MG tablet TAKE 1 TABLET (6.25 MG) BY MOUTH 2 TIMES DAILY (WITH MEALS) 180 tablet 3     clobetasol (TEMOVATE) 0.05 % cream Apply sparingly to affected area twice daily as needed.  Do not apply to face. 60 g 11     COMPRESSION STOCKINGS 1 each daily 2 each 0     dabigatran ANTICOAGULANT (PRADAXA ANTICOAGULANT) 75 MG CAPS BLISTER PACK Take 150 mg by mouth 2 times daily. Store in original 's bottle or  blister pack; use within 120 days of opening.       diclofenac (VOLTAREN) 1 % topical gel Apply 2 g topically 4 times daily       finasteride (PROSCAR) 5 MG tablet TAKE 1 TABLET EVERY DAY 90 tablet 3     GLUCOSAMINE-CHONDROITIN PO Take 2 tablets by mouth daily       isosorbide mononitrate (IMDUR) 30 MG 24 hr tablet Take 1 tablet (30 mg) by mouth daily 90 tablet 3     ketotifen (ZADITOR) 0.025 % SOLN ophthalmic solution Place 1 drop into both eyes every 12 hours 1 Bottle      levothyroxine (SYNTHROID/LEVOTHROID) 100 MCG tablet TAKE 1 TABLET EVERY DAY 90 tablet 3     LORazepam (ATIVAN) 1 MG tablet Take 0.5-1 tablets (0.5-1 mg) by mouth every 8 hours as needed for anxiety 30 tablet 5     Multiple Vitamins-Minerals (CENTRUM SILVER) per tablet Take 1 tablet by mouth daily       Omega-3 Fatty Acids (FISH OIL) 1200 MG capsule Take 1 capsule by mouth daily        omeprazole (PRILOSEC) 20 MG DR capsule TAKE 1 CAPSULE TWICE DAILY 180 capsule 3     Polyethylene Glycol 3350 (MIRALAX PO) Take 1 packet by mouth At Bedtime        simvastatin (ZOCOR) 20 MG tablet Take by mouth At Bedtime       spironolactone (ALDACTONE) 25 MG tablet Take 1 tablet (25 mg) by mouth daily 30 tablet 1     sucralfate (CARAFATE) 1 GM tablet Take 1 tablet (1 g) by mouth 2 times daily 180 tablet 3     tamsulosin (FLOMAX) 0.4 MG capsule TAKE 1 CAPSULE EVERY DAY 90 capsule 3     triamcinolone (KENALOG) 0.025 % cream Apply topically 2 times daily       ASPIRIN NOT PRESCRIBED (INTENTIONAL) Please choose reason not prescribed, below 0 each 0     ethacrynic acid (EDECRIN) 25 MG tablet Take 1 tablet (25 mg) by mouth daily (Patient not taking: Reported on 3/13/2019) 90 tablet 1     ORDER FOR DME Auto-CPAP:Max 9 cm H2OMin 9 cm H2O  Continuous Lifetime need and heated humidity.    1 Device 0     tranexamic acid Apply to gauze and apply to localized area of bleeding q 6 hrs for 1 - 2 days (Patient not taking: Reported on 3/13/2019) 60 mL 0       ALLERGIES   No Known  Allergies    PAST MEDICAL HISTORY:  Past Medical History:   Diagnosis Date     Acute, but ill-defined, cerebrovascular disease     NO RESIDUALS     Antiplatelet or antithrombotic long-term use      Arrhythmia     a fib     Blood in stool      Coronary atherosclerosis of unspecified type of vessel, native or graft     S/P PTCA, EF 50%     Esophageal reflux      Hydrocele, unspecified      Hypercholesterolemia      Hypersomnia with sleep apnea, unspecified      Inguinal hernia without mention of obstruction or gangrene, recurrent unilateral or unspecified      Ischemic cardiomyopathy      Major depression in complete remission (H)      Obese      Onychomycosis      JIM on CPAP      Other and unspecified hyperlipidemia      Other lymphedema     GROIN AND THIGHS     Other pancytopenia (H)      Other specified anemias      Overweight      Pacemaker      PAD (peripheral artery disease) (H)      Pancytopenia (H)      Persistent atrial fibrillation (H)     VVI PM for long pauses     Prostatic hypertrophy, benign      Pulmonary hypertension (H)      Scrotal varices      Thrombocytopenia, unspecified (H)      Umbilical hernia without obstruction and without gangrene      Unilateral or unspecified femoral hernia without mention of obstruction or gangrene, unilateral or unspecified      Unspecified hypothyroidism      Venous stasis dermatitis of both lower extremities      Vitamin D deficiency        PAST SURGICAL HISTORY:  Past Surgical History:   Procedure Laterality Date     EYE SURGERY      CATARACT/BLEPHAROPLASTY     GENITOURINARY SURGERY      TUNA     HERNIA REPAIR      RIGHT INGUINAL     HERNIORRHAPHY INGUINAL  8/14/2012    Procedure: HERNIORRHAPHY INGUINAL;  right inguinal hernia repair;  Surgeon: Kareem Torrez MD;  Location: [facility]     HERNIORRHAPHY UMBILICAL N/A 10/31/2017    Procedure: HERNIORRHAPHY UMBILICAL;  UMBILICAL HERNIA REPAIR WITH MESH;  Surgeon: Scar Harrington MD;  Location: [facility]     IMPLANT PACEMAKER   8/2009    single chamber     [place]      [facility] [place]THOPEDIC SURGERY  2010    right TKR, left TKR     SURGICAL HISTORY OF -       Thumb surgery     SURGICAL HISTORY OF -   1990s    Left total knee replacement     SURGICAL HISTORY OF -   3/11    [facility] total knee replacement       FAMILY HISTORY:  Family History   Problem Relation Age of Onset     Cardiovascular Father      C.A.D. Father      Lung Cancer Sister      Unknown/Adopted Mother      Cancer - colorectal No family hx of        SOCIAL HISTORY:  Social History     Socioeconomic History     Marital status:      Spouse name: None     Number of children: None     Years of education: None     Highest education level: None   Occupational History     Occupation: Teacher, author, speaker     Employer: RETIRED   Social Needs     Financial resource strain: None     Food insecurity:     Worry: None     Inability: None     Transportation needs:     Medical: None     Non-medical: None   Tobacco Use     Smoking status: Never Smoker     Smokeless tobacco: Never Used     Tobacco comment: per encounter 2/12/07   Substance and Sexual Activity     Alcohol use: Yes     Alcohol/week: 0.0 oz     Comment: 1/month     Drug use: No     Sexual activity: No     Partners: Female   Lifestyle     Physical activity:     Days per week: None     Minutes per session: None     Stress: None   Relationships     Social connections:     Talks on phone: None     Gets together: None     Attends Sikh service: None     Active member of club or organization: None     Attends meetings of clubs or organizations: None     Relationship status: None     Intimate partner violence:     Fear of current or ex partner: None     Emotionally abused: None     Physically abused: None     Forced sexual activity: None   Other Topics Concern     Parent/sibling w/ CABG, MI or angioplasty before 65F 55M? No      Service Not Asked     Blood Transfusions Not Asked     Caffeine Concern No  "    Comment: 1-2 cups coffee a day     Occupational Exposure Not Asked     Hobby Hazards Not Asked     Sleep Concern No     Stress Concern Yes     Comment: due to wifes health condition     Weight Concern No     Special Diet No     Back Care Not Asked     Exercise Yes     Comment: states he uses his tredmill on and off     Bike Helmet Not Asked     Seat Belt Yes     Self-Exams Not Asked   Social History Narrative     None       Review of Systems:  Skin:  Negative bruising dry skin    Eyes:  Positive for glasses cataract surgery on both eyes  ENT:  Negative   nosebleed   Respiratory:  Positive for sleep apnea;CPAP;dyspnea on exertion;shortness of breath SOB has subsided   Cardiovascular:  Negative Positive for;edema chest heaviness when walking, edema,   Gastroenterology: Positive for poor appetite lossing weight.  Genitourinary:  Negative urinary frequency    Musculoskeletal:  Positive for arthritis    Neurologic:  Positive for stroke;numbness or tingling of feet right foot  Psychiatric:  Positive for sleep disturbances;anxiety;excessive stress stress due to wifes health   Heme/Lymph/Imm:  Negative weight loss;easy bruising nose bleeds   Endocrine:  Positive for thyroid disorder      Physical Exam:  Vitals: /77   Pulse 60   Ht 1.88 m (6' 2\")   Wt 99.1 kg (218 lb 8 oz)   SpO2 98%   BMI 28.05 kg/m      Constitutional:  cooperative;in no acute distress frail      Skin:  warm and dry to the touch bruises present   fell twice recently, once on ice, once into a fire place which he can't really explain but resulted in some bruising.    Head:  normocephalic        Eyes:  pupils equal and round        Lymph:      ENT:  no pallor or cyanosis        Neck:    JVP elevated      Respiratory:  clear to auscultation;normal symmetry         Cardiac: regular rhythm       systolic murmur                                                 GI:           Extremities and Muscular Skeletal:      2+     wearing compression " stockings    Neurological:           Psych:  Alert and Oriented x 3    Encounter Diagnoses   Name Primary?     Localized edema      Ischemic cardiomyopathy      Non-rheumatic tricuspid valve insufficiency Yes     Non-rheumatic mitral regurgitation        Recent Lab Results:  LIPID RESULTS:  Lab Results   Component Value Date    CHOL 121 10/10/2018    HDL 59 10/10/2018    LDL 55 10/10/2018    TRIG 34 10/10/2018    CHOLHDLRATIO 2.1 06/15/2015       LIVER ENZYME RESULTS:  Lab Results   Component Value Date    AST 27 10/09/2018    ALT 23 10/10/2018       CBC RESULTS:  Lab Results   Component Value Date    WBC 2.8 (L) 11/20/2018    RBC 3.16 (L) 11/20/2018    HGB 10.3 (L) 11/20/2018    HCT 32.6 (L) 11/20/2018     (H) 11/20/2018    MCH 32.6 11/20/2018    MCHC 31.6 11/20/2018    RDW 15.2 (H) 11/20/2018    PLT 64 (L) 11/20/2018       BMP RESULTS:  Lab Results   Component Value Date     10/10/2018    POTASSIUM 4.1 11/06/2018    CHLORIDE 105 10/10/2018    CO2 28 10/10/2018    ANIONGAP 6 10/10/2018    GLC 97 10/10/2018    BUN 21 10/10/2018    CR 1.1 11/06/2018    GFRESTIMATED 67 10/10/2018    GFRESTBLACK 81 10/10/2018    TEODORO 9.7 10/10/2018        A1C RESULTS:  No results found for: A1C    INR RESULTS:  Lab Results   Component Value Date    INR 1.79 (H) 11/20/2018    INR 1.48 (H) 03/21/2018           GEENA AlcazarFranciscan Health CrawfordsvilleDAI cruzN CNP  [address]  [place] [id number]

## 2019-03-13 NOTE — PATIENT INSTRUCTIONS
Use torsemide 20 mg twice daily until you get your ethacrynic acid. If you loose 10 pounds, cut it down to once a day.    When you get your ethacrynic acid, start with 25 mg daily and then check your labs a week later.

## 2019-03-15 ENCOUNTER — TELEPHONE (OUTPATIENT)
Dept: CARDIOLOGY | Facility: CLINIC | Age: 84
End: 2019-03-15

## 2019-03-15 NOTE — TELEPHONE ENCOUNTER
ADDENDUM: RN reviewed with KI Anushka and confirmed that patient should not be taking ASA. RN called patient to review this recommendation with him and patient advised RN that he was mistaken and thought that his carvedilol was a baby aspirin that the was taking BID. Patient confirmed for RN he is not taking any ASA and is aware that he should not take ASA. RN also reviewed with patient we want him to decrease torsemide to once daily instead of BID until his erythnoic acid arrives since he has lost over 10lbs. Patient acknowledged understanding and is in agreement with plan. Patient will call clinic with any further questions.     RN called patient to check in on how he is doing. Patient reports 4lb weight loss last night and weighed himself this morning and is at 207lb. Patient currently taking torsemide, but reports [facility] and will change from torsemide to Erythnoic acid when it arrives. RN scheduled patient for BMP lab draw after CHELLE on 3/22/19. RN also confirmed with patient his OV with Dr. Hope on 4/15/19 at 1:15 am. RN did confirm with patient that he will be having a consult [facility] ([phone].. Hem/onc) to discuss a bone marrow biopsy d/t hx of low platelets. RN advised patient that we would contact Dr. cMknight office to discuss if ok to continue patient on Pradaxa d/t low platelet count. Patient verbalized understanding and is in agreement with plan. Patient did report to RN he is currently taking ASA 81mg BID. RN advised that this is not on patient's med list and is actually listed as DO NOT PRESCRIBE by patient's PMD. RN advised patient that RN would confirm this with KI Anushka and call him with response.       [facility] and left VM advising them to call back to discuss patient's low platelet count and Pradaxa.     ----- Message from WILLIS Spence CNP sent at 3/13/2019  2:28 PM CDT  -----    Please call pt Friday and check on his weights, and if he is taking torsemide or his new diuretic erythnoic acid. Please let him know at that time that I was able to get him an OV with Dr. Hope 4/15 at 10:15 am.    Please also find out who his [facility] at [facility]. I'm not sure what their thoughts are on Pradaxa given his low platelets. I'd like to ask them but dont have the name or number to call.    Thanks,  Anushka

## 2019-03-19 NOTE — TELEPHONE ENCOUNTER
Received VM from Kay Hem Onc PA confirming that patient is scheduled with Dr. Fernando 3/21/19 to review his low platelets. Dr. Fernando will make recommendations when he sees patient.

## 2019-03-21 ENCOUNTER — TRANSFERRED RECORDS (OUTPATIENT)
Dept: HEALTH INFORMATION MANAGEMENT | Facility: CLINIC | Age: 84
End: 2019-03-21

## 2019-03-21 LAB
ALT SERPL-CCNC: 18 U/L (ref 13–61)
AST SERPL-CCNC: 24 U/L (ref 15–37)
CREAT SERPL-MCNC: 1.3 MG/DL (ref 0.7–1.2)
GFR SERPL CREATININE-BSD FRML MDRD: 52 ML/MIN/1.73M2
GLUCOSE SERPL-MCNC: 101 MG/DL (ref 70–100)
POTASSIUM SERPL-SCNC: 3.8 MMOL/L (ref 3.5–5)

## 2019-03-22 ENCOUNTER — HOSPITAL ENCOUNTER (OUTPATIENT)
Dept: CARDIOLOGY | Facility: CLINIC | Age: 84
End: 2019-03-22
Attending: NURSE PRACTITIONER | Admitting: INTERNAL MEDICINE
Payer: COMMERCIAL

## 2019-03-22 ENCOUNTER — HOSPITAL ENCOUNTER (OUTPATIENT)
Facility: CLINIC | Age: 84
Discharge: HOME | End: 2019-03-22
Attending: INTERNAL MEDICINE | Admitting: INTERNAL MEDICINE
Payer: COMMERCIAL

## 2019-03-22 VITALS
HEART RATE: 60 BPM | RESPIRATION RATE: 20 BRPM | WEIGHT: 210.9 LBS | HEIGHT: 74 IN | BODY MASS INDEX: 27.07 KG/M2 | OXYGEN SATURATION: 98 % | TEMPERATURE: 98.2 F | DIASTOLIC BLOOD PRESSURE: 69 MMHG | SYSTOLIC BLOOD PRESSURE: 123 MMHG

## 2019-03-22 DIAGNOSIS — I25.5 ISCHEMIC CARDIOMYOPATHY: ICD-10-CM

## 2019-03-22 DIAGNOSIS — I36.1 NON-RHEUMATIC TRICUSPID VALVE INSUFFICIENCY: ICD-10-CM

## 2019-03-22 DIAGNOSIS — I34.0 NON-RHEUMATIC MITRAL REGURGITATION: ICD-10-CM

## 2019-03-22 LAB
ANION GAP SERPL CALCULATED.3IONS-SCNC: 5 MMOL/L (ref 3–14)
BUN SERPL-MCNC: 27 MG/DL (ref 7–30)
CALCIUM SERPL-MCNC: 9.2 MG/DL (ref 8.5–10.1)
CHLORIDE SERPL-SCNC: 111 MMOL/L (ref 94–109)
CO2 SERPL-SCNC: 27 MMOL/L (ref 20–32)
CREAT SERPL-MCNC: 1.23 MG/DL (ref 0.66–1.25)
GFR SERPL CREATININE-BSD FRML MDRD: 52 ML/MIN/{1.73_M2}
GLUCOSE SERPL-MCNC: 92 MG/DL (ref 70–99)
POTASSIUM SERPL-SCNC: 3.7 MMOL/L (ref 3.4–5.3)
SODIUM SERPL-SCNC: 143 MMOL/L (ref 133–144)

## 2019-03-22 PROCEDURE — 25000125 ZZHC RX 250: Performed by: INTERNAL MEDICINE

## 2019-03-22 PROCEDURE — 25800025 ZZH RX 258: Performed by: INTERNAL MEDICINE

## 2019-03-22 PROCEDURE — 76376 3D RENDER W/INTRP POSTPROCES: CPT | Mod: 26 | Performed by: INTERNAL MEDICINE

## 2019-03-22 PROCEDURE — 93325 DOPPLER ECHO COLOR FLOW MAPG: CPT | Mod: 26 | Performed by: INTERNAL MEDICINE

## 2019-03-22 PROCEDURE — 25000128 H RX IP 250 OP 636: Performed by: INTERNAL MEDICINE

## 2019-03-22 PROCEDURE — 40000235 ZZH STATISTIC TELEMETRY

## 2019-03-22 PROCEDURE — 93320 DOPPLER ECHO COMPLETE: CPT | Mod: 26 | Performed by: INTERNAL MEDICINE

## 2019-03-22 PROCEDURE — 25800030 ZZH RX IP 258 OP 636: Performed by: INTERNAL MEDICINE

## 2019-03-22 PROCEDURE — 93312 ECHO TRANSESOPHAGEAL: CPT | Mod: 26 | Performed by: INTERNAL MEDICINE

## 2019-03-22 PROCEDURE — 40000857 ZZH STATISTIC TEE INCLUDES SEDATION

## 2019-03-22 PROCEDURE — 76376 3D RENDER W/INTRP POSTPROCES: CPT

## 2019-03-22 PROCEDURE — 80048 BASIC METABOLIC PNL TOTAL CA: CPT | Performed by: INTERNAL MEDICINE

## 2019-03-22 RX ORDER — FENTANYL CITRATE 50 UG/ML
25 INJECTION, SOLUTION INTRAMUSCULAR; INTRAVENOUS
Status: DISCONTINUED | OUTPATIENT
Start: 2019-03-22 | End: 2019-03-22 | Stop reason: HOSPADM

## 2019-03-22 RX ORDER — LIDOCAINE 40 MG/G
CREAM TOPICAL
Status: DISCONTINUED | OUTPATIENT
Start: 2019-03-22 | End: 2019-03-22 | Stop reason: HOSPADM

## 2019-03-22 RX ORDER — FLUMAZENIL 0.1 MG/ML
0.2 INJECTION, SOLUTION INTRAVENOUS
Status: DISCONTINUED | OUTPATIENT
Start: 2019-03-22 | End: 2019-03-22 | Stop reason: HOSPADM

## 2019-03-22 RX ORDER — SODIUM CHLORIDE 9 MG/ML
INJECTION, SOLUTION INTRAVENOUS CONTINUOUS PRN
Status: DISCONTINUED | OUTPATIENT
Start: 2019-03-22 | End: 2019-03-22 | Stop reason: HOSPADM

## 2019-03-22 RX ORDER — LIDOCAINE HYDROCHLORIDE 40 MG/ML
1.5 SOLUTION TOPICAL ONCE
Status: COMPLETED | OUTPATIENT
Start: 2019-03-22 | End: 2019-03-22

## 2019-03-22 RX ORDER — GLYCOPYRROLATE 0.2 MG/ML
0.1 INJECTION, SOLUTION INTRAMUSCULAR; INTRAVENOUS ONCE
Status: COMPLETED | OUTPATIENT
Start: 2019-03-22 | End: 2019-03-22

## 2019-03-22 RX ORDER — FENTANYL CITRATE 50 UG/ML
25-50 INJECTION, SOLUTION INTRAMUSCULAR; INTRAVENOUS
Status: COMPLETED | OUTPATIENT
Start: 2019-03-22 | End: 2019-03-22

## 2019-03-22 RX ORDER — POTASSIUM CHLORIDE 1500 MG/1
20 TABLET, EXTENDED RELEASE ORAL
COMMUNITY
End: 2019-04-08

## 2019-03-22 RX ORDER — NALOXONE HYDROCHLORIDE 0.4 MG/ML
.1-.4 INJECTION, SOLUTION INTRAMUSCULAR; INTRAVENOUS; SUBCUTANEOUS
Status: DISCONTINUED | OUTPATIENT
Start: 2019-03-22 | End: 2019-03-22 | Stop reason: HOSPADM

## 2019-03-22 RX ORDER — DEXTROSE MONOHYDRATE 25 G/50ML
9.5 INJECTION, SOLUTION INTRAVENOUS
Status: DISCONTINUED | OUTPATIENT
Start: 2019-03-22 | End: 2019-03-22 | Stop reason: HOSPADM

## 2019-03-22 RX ADMIN — MIDAZOLAM HYDROCHLORIDE 2 MG: 1 INJECTION, SOLUTION INTRAMUSCULAR; INTRAVENOUS at 08:39

## 2019-03-22 RX ADMIN — DEXTROSE MONOHYDRATE 9.5 ML: 25 INJECTION, SOLUTION INTRAVENOUS at 09:03

## 2019-03-22 RX ADMIN — TOPICAL ANESTHETIC 0.5 ML: 200 SPRAY DENTAL; PERIODONTAL at 08:36

## 2019-03-22 RX ADMIN — FENTANYL CITRATE 50 MCG: 50 INJECTION, SOLUTION INTRAMUSCULAR; INTRAVENOUS at 08:39

## 2019-03-22 RX ADMIN — GLYCOPYRROLATE 0.1 MG: 0.2 INJECTION, SOLUTION INTRAMUSCULAR; INTRAVENOUS at 08:18

## 2019-03-22 RX ADMIN — LIDOCAINE HYDROCHLORIDE 1.5 ML: 40 SOLUTION TOPICAL at 08:17

## 2019-03-22 RX ADMIN — SODIUM CHLORIDE: 9 INJECTION, SOLUTION INTRAVENOUS at 07:45

## 2019-03-22 ASSESSMENT — MIFFLIN-ST. JEOR: SCORE: 1696.39

## 2019-03-22 NOTE — PROGRESS NOTES
Pt able to ambulate safely with stand by assist at time of discharge to home. Pt alert and oriented and VSS.

## 2019-03-22 NOTE — PROGRESS NOTES
Discharge instructions reviewed with pt and spouse prior to discharge to  Home. All questions answered and pt appears to accept and understand. Pt aware to continue all medications and aware of follow up visits as scheduled. BMP drawn today in hospital per OP orders and pt request.

## 2019-03-22 NOTE — PROCEDURES
CHELLE Note    Indication: MR, TR, CHF    Informed consent was signed by the patient.  A timeout was taken.    Sedation:  Versed 2 mg  Fentanyl 50 Mcg    Findings:  LV: Mild to moderate LV dilation, EF 30%, global hypokinesis  RV: Moderate to severe dilation, moderate hypokinesis  LA: Massive biatrial enlargement, no left atrial appendage thrombus  Mitral valve: Moderately myxomatous, no prolapse or flail leaflet, 3-4+ mostly central MR, suspect etiology is largely functional from dilated LV and low EF, probably some myxomatous component as well  Aortic valve: Tricuspid, normal function, no stenosis  Tricuspid valve: At least 3+ TR, appears to be central at the site where the pacemaker crosses the lead  Atrial septum: Intact, no PFO, negative bubble study  Aorta: Normal size, mild plaque for age    No complications.    Demarco Call MD  Cardiology - [facility]  Pager: [phone]  Text Page  March 22, 2019

## 2019-03-22 NOTE — DISCHARGE INSTRUCTIONS
CHELLE  (Transesophageal Echocardiogram)  Discharge Instructions    After you go home:      Have an adult stay with you for 6 hours.       For 24 hours - due to the sedation you received:    Relax and take it easy.    Do NOT make any important or legal decisions.    Do NOT drive or operate machines at home or at work.    Do NOT drink alcohol.    Diet:    You may resume your normal diet, but no scratchy foods for two days.    If your throat is sore, eat cold, bland or soft foods.    You may have heartburn if the tube used in the exam entered your stomach.  If so:   - Do not eat acidic and spicy foods.   - Do not eat three hours before bedtime. Clear liquids are okay.   - When lying down, use two pillows to raise your head.    Medicines:      Take your medications, including blood thinners, unless your provider tells you not to.    If you have stopped any medicines, check with your provider about when to restart them.    You may take Tylenol (Acetaminophen) if your throat is sore.    You may take antacids if you have heartburn.      Follow Up Appointments:      Follow up with your cardiologist at [facility] of patient preference as instructed.    Follow up with your primary care provider as needed.    Call the clinic if:      You have heartburn that is severe or lasts more than 72 hours.    You have a sore throat that feels worse after 72 hours.    You have shortness of breath, neck pain, chest pain, fever, chills, coughing up blood, or other unusual signs.    Questions or concerns      [facility] [place]:    [phone] [facility] (7 days a week)

## 2019-03-22 NOTE — PROGRESS NOTES
Care Suites Discharge Summary    Discharge Criteria:   Discharge Criteria met per MD orders: Yes.   Vital signs stable.     Pt demonstrates ability to ambulate safely: Yes.  (See discharge questionnaire for additional information)    Discharge instructions & education:   Discharge instructions reviewed with patient and spouse. Patient verbalizes  understanding.   Additional patient education provided:  none.     Medications:   Patient will be discharging on new medications- No. Patient verbalizes reason for use, start date, and side effects NA.    Items returned to patient:   Home and hospital acquired medications returned to patient NA   Listed belongings gathered and returned to patient: Yes    Patient discharged to home via wheelchair with family driving.    Zelda Angulo

## 2019-03-25 ENCOUNTER — TELEPHONE (OUTPATIENT)
Dept: CARDIOLOGY | Facility: CLINIC | Age: 84
End: 2019-03-25

## 2019-03-25 NOTE — TELEPHONE ENCOUNTER
Patient scheduled to see Dr. Hope April 15th to discuss possible mitraclip.  He also has severe TR.  Patient did not remember Dr. Foster mentioning this the day of CHELLE.  I explained to wife that was due to medication he had received.  She will come with pt. on day of OV.

## 2019-03-27 ENCOUNTER — TELEPHONE (OUTPATIENT)
Dept: CARDIOLOGY | Facility: CLINIC | Age: 84
End: 2019-03-27

## 2019-03-27 ENCOUNTER — OFFICE VISIT (OUTPATIENT)
Dept: CARDIOLOGY | Facility: CLINIC | Age: 84
End: 2019-03-27
Attending: INTERNAL MEDICINE
Payer: COMMERCIAL

## 2019-03-27 VITALS
SYSTOLIC BLOOD PRESSURE: 120 MMHG | HEIGHT: 74 IN | DIASTOLIC BLOOD PRESSURE: 70 MMHG | BODY MASS INDEX: 27.08 KG/M2 | HEART RATE: 60 BPM | WEIGHT: 211 LBS

## 2019-03-27 DIAGNOSIS — I73.9 PAD (PERIPHERAL ARTERY DISEASE) (H): ICD-10-CM

## 2019-03-27 DIAGNOSIS — I25.10 CORONARY ARTERY DISEASE DUE TO LIPID RICH PLAQUE: ICD-10-CM

## 2019-03-27 DIAGNOSIS — R60.0 LOWER EXTREMITY EDEMA: ICD-10-CM

## 2019-03-27 DIAGNOSIS — I27.20 PULMONARY HYPERTENSION (H): ICD-10-CM

## 2019-03-27 DIAGNOSIS — R60.0 LOCALIZED EDEMA: ICD-10-CM

## 2019-03-27 DIAGNOSIS — I25.83 CORONARY ARTERY DISEASE DUE TO LIPID RICH PLAQUE: ICD-10-CM

## 2019-03-27 DIAGNOSIS — I50.31 ACUTE DIASTOLIC HEART FAILURE (H): ICD-10-CM

## 2019-03-27 DIAGNOSIS — I25.5 ISCHEMIC CARDIOMYOPATHY: ICD-10-CM

## 2019-03-27 DIAGNOSIS — Z95.0 CARDIAC PACEMAKER IN SITU: ICD-10-CM

## 2019-03-27 DIAGNOSIS — I25.5 ISCHEMIC CARDIOMYOPATHY: Primary | ICD-10-CM

## 2019-03-27 LAB
ANION GAP SERPL CALCULATED.3IONS-SCNC: 12.2 MMOL/L (ref 6–17)
BUN SERPL-MCNC: 23 MG/DL (ref 7–30)
CALCIUM SERPL-MCNC: 9.3 MG/DL (ref 8.5–10.5)
CHLORIDE SERPL-SCNC: 106 MMOL/L (ref 98–107)
CO2 SERPL-SCNC: 27 MMOL/L (ref 23–29)
CREAT SERPL-MCNC: 1.31 MG/DL (ref 0.7–1.3)
GFR SERPL CREATININE-BSD FRML MDRD: 52 ML/MIN/{1.73_M2}
GLUCOSE SERPL-MCNC: 92 MG/DL (ref 70–105)
MDC_IDC_LEAD_IMPLANT_DT: NORMAL
MDC_IDC_LEAD_LOCATION: NORMAL
MDC_IDC_LEAD_MFG: NORMAL
MDC_IDC_LEAD_MODEL: NORMAL
MDC_IDC_LEAD_POLARITY_TYPE: NORMAL
MDC_IDC_LEAD_SERIAL: NORMAL
MDC_IDC_MSMT_BATTERY_REMAINING_LONGEVITY: 6 MO
MDC_IDC_MSMT_BATTERY_STATUS: NORMAL
MDC_IDC_MSMT_LEADCHNL_RV_IMPEDANCE_VALUE: 530 OHM
MDC_IDC_MSMT_LEADCHNL_RV_PACING_THRESHOLD_AMPLITUDE: 0.8 V
MDC_IDC_MSMT_LEADCHNL_RV_PACING_THRESHOLD_PULSEWIDTH: 0.4 MS
MDC_IDC_MSMT_LEADCHNL_RV_SENSING_INTR_AMPL: 4.7 MV
MDC_IDC_PG_IMPLANT_DTM: NORMAL
MDC_IDC_PG_MFG: NORMAL
MDC_IDC_PG_MODEL: NORMAL
MDC_IDC_PG_SERIAL: NORMAL
MDC_IDC_PG_TYPE: NORMAL
MDC_IDC_SESS_CLINIC_NAME: NORMAL
MDC_IDC_SESS_DTM: NORMAL
MDC_IDC_SESS_TYPE: NORMAL
MDC_IDC_SET_BRADY_HYSTRATE: NORMAL
MDC_IDC_SET_BRADY_LOWRATE: 60 {BEATS}/MIN
MDC_IDC_SET_BRADY_MAX_SENSOR_RATE: 130 {BEATS}/MIN
MDC_IDC_SET_BRADY_MODE: NORMAL
MDC_IDC_SET_LEADCHNL_RV_PACING_AMPLITUDE: 3.5 V
MDC_IDC_SET_LEADCHNL_RV_PACING_ANODE_ELECTRODE_1: NORMAL
MDC_IDC_SET_LEADCHNL_RV_PACING_ANODE_LOCATION_1: NORMAL
MDC_IDC_SET_LEADCHNL_RV_PACING_CAPTURE_MODE: NORMAL
MDC_IDC_SET_LEADCHNL_RV_PACING_CATHODE_ELECTRODE_1: NORMAL
MDC_IDC_SET_LEADCHNL_RV_PACING_CATHODE_LOCATION_1: NORMAL
MDC_IDC_SET_LEADCHNL_RV_PACING_POLARITY: NORMAL
MDC_IDC_SET_LEADCHNL_RV_PACING_PULSEWIDTH: 0.4 MS
MDC_IDC_SET_LEADCHNL_RV_SENSING_ADAPTATION_MODE: NORMAL
MDC_IDC_SET_LEADCHNL_RV_SENSING_ANODE_ELECTRODE_1: NORMAL
MDC_IDC_SET_LEADCHNL_RV_SENSING_ANODE_LOCATION_1: NORMAL
MDC_IDC_SET_LEADCHNL_RV_SENSING_CATHODE_ELECTRODE_1: NORMAL
MDC_IDC_SET_LEADCHNL_RV_SENSING_CATHODE_LOCATION_1: NORMAL
MDC_IDC_SET_LEADCHNL_RV_SENSING_POLARITY: NORMAL
MDC_IDC_SET_LEADCHNL_RV_SENSING_SENSITIVITY: 2.5 MV
MDC_IDC_STAT_BRADY_DTM_END: NORMAL
MDC_IDC_STAT_BRADY_DTM_START: NORMAL
MDC_IDC_STAT_BRADY_RV_PERCENT_PACED: 84 %
POTASSIUM SERPL-SCNC: 4.2 MMOL/L (ref 3.5–5.1)
SODIUM SERPL-SCNC: 141 MMOL/L (ref 136–145)

## 2019-03-27 PROCEDURE — 93279 PRGRMG DEV EVAL PM/LDLS PM: CPT | Performed by: INTERNAL MEDICINE

## 2019-03-27 PROCEDURE — 99215 OFFICE O/P EST HI 40 MIN: CPT | Mod: 25 | Performed by: NURSE PRACTITIONER

## 2019-03-27 PROCEDURE — 80048 BASIC METABOLIC PNL TOTAL CA: CPT | Performed by: NURSE PRACTITIONER

## 2019-03-27 PROCEDURE — 36415 COLL VENOUS BLD VENIPUNCTURE: CPT | Performed by: NURSE PRACTITIONER

## 2019-03-27 RX ORDER — ETHACRYNIC ACID 25 MG/1
50 TABLET ORAL DAILY
Qty: 180 TABLET | Refills: 3 | Status: ON HOLD | OUTPATIENT
Start: 2019-03-27 | End: 2019-04-13

## 2019-03-27 RX ORDER — ETHACRYNIC ACID 25 MG/1
50 TABLET ORAL DAILY
Qty: 180 TABLET | Refills: 3 | Status: SHIPPED | OUTPATIENT
Start: 2019-03-27 | End: 2019-03-27

## 2019-03-27 ASSESSMENT — MIFFLIN-ST. JEOR: SCORE: 1696.84

## 2019-03-27 NOTE — PATIENT INSTRUCTIONS
Increase ethacrynic acid to 50 mg daily.    Check labs before office visit with Dr. Hope.    Read up on Lila clip for your mitral valve and Watchmen Device for your atrial fibrillation. Dr. Hope will discuss more with your    [facility]  [phone]

## 2019-03-27 NOTE — TELEPHONE ENCOUNTER
Patient called wondering if he needed to bring his equipment from home that he utilizes to send in remote readings of his PPM. Rn advised patient that he does not and that we will check it in clinic today at 1pm.
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
## 2019-03-27 NOTE — LETTER
3/27/2019    Mg Nelson MD  [address]  [facility] [address]    RE: Bairon DOWNS Cristian       Dear Colleague,    I had the pleasure of seeing Bairon Foster in the [facility].    HPI and Plan:   I had the pleasure of seeing Bairon Foster and his wife today in cardiology clinic follow up. He is a pleasant 88 year old patient of Dr. Hope.  He has seen Dr. Carrizales in the past for his venous insufficiency (bilateral GSV ablations in August 2017) and Dr. Rico for his history of VT and chronic atrial fibrillation anticoagulated with Pradaxa.  He has a complicated clinical picture with history of coronary artery disease, progressive worsening MR and TR [facility]iding [facility], and [facility] a [facility] with recent recommendations to avoid sulfa medications as possible contributors, this includes avoiding torsemide and Lasix.    A year ago he had exertional chest tightness, Dr. Hope sent him for a coronary angiogram, this failed to demonstrate flow-limiting disease.  His echocardiogram at that time showed a EF of 53%, mildly dilated RV, 3+ mitral regurgitation and 1-2+ tricuspid regurgitation, previous echo showed mild to moderate MR and mild TR.  He was started on spironolactone, doses above 25 mg daily gave him nipple tenderness.  In follow-up of that angiogram with the use of spironolactone and Lasix he lost nearly 30 pounds and we ended up backing off on his diuretics, then going back up on them.  Overall he was doing well [facility], getting [facility].  He was doing well when I saw him in August and did not appear to be in heart failure.    More recently I saw him in February, at that time his dermatologist asked that he discontinue Lasix and torsemide and switch to ethacrynic acid for diuresis.  I started him on 25 mg daily.  However he had difficulty acquiring the medication, and in the meantime  he discontinued his torsemide and so in follow-up his weight increased 18 pounds.  Interestingly, despite discontinuing his torsemide, he did not improvement in his rash or itchiness making me question whether or not this is a true sulfa allergy.  I put him back on torsemide until he was able to obtain ethacrynic acid, he is down 7 pounds from her last visit.  His creatinine is starting to go up.    Given his significant recent heart failure and changes in his previous echocardiogram I recommended a transesophageal echocardiogram.  This demonstrates progression of his mitral valve and tricuspid valve regurgitation.  He now has moderate to severe to severe MR with a slightly posterior directed jet and moderate myxomatous.  The TR jet appears to come from central coaptation point with a pacemaker lead crosses the valve and is directed at the septum.  In addition to the progression of his valvular disease, he has had a significant decline in his LV function with an EF now described as 30%.    Is a single lead pacemaker, his device check today demonstrated an underlying rhythm of A. fib with rates of 40-50.  He is anticoagulated with Pradaxa.  He has had chronically low platelets, looking back in 2015 they were 85, in October 2017 they were 73.  At his primary in October and November 2018 his platelets were 66 and 64.  At her last visit he told me [facility].  He brought with him his labs from March 11th 2019 and his platelets had dropped to 44,000, the hemoglobin was 9.5.  He says at this time we are just planning on monitoring him.  He has struggled with epistaxis despite recent ENT visit with cauterization.  He saw hematology oncology just last week for progressive pancytopenia, he had a previous episode of pancytopenia in 2013 with bone marrow biopsy at that time.  [facility] would stop Kia Sanchez if his platelets drop below 40,000.  They plan to follow-up with him  in 3 months time.    Physical Exam  Please see Below     Assessment and Plan  1.  Valvular heart disease.  He has had progressive deterioration of his mitral valve, now moderately severe to severe the regurgitant, he also has 3+ tricuspid regurgitation.  He is currently in heart failure.  He is diuresing and will continue to diurese.  He has an appointment in 2 weeks with Dr. Hope to discuss if he would be a mitral clip candidate.  2.  Chronic A. fib on Pradaxa.  He had his device checked today, he is a single chamber pacemaker implanted in August 2009.  His device check today demonstrated an underlying rhythm of atrial fibrillation with heart rates 40-50.  He has less than 0.5 years remaining on his battery.  Plan is to recheck in 1 month's time.  Following [facility] onc[facility] recommendation [facility], I will continue him on Pradaxa, but will stop it if his platelets drop below 40,000.  I did give him information today on the watchman device, this may be something he would benefit from in the future.  He has follow-up in 2 weeks with his cardiologist Dr. Hope.  3.  Systolic and diastolic heart failure.  Most likely secondary to his valvular disease.  Angiogram last year was without new obstructive disease.  He is currently on spironolactone 25 mg daily, escalating this dose in the past is caused him pretty significant nipple tenderness.  He is on carvedilol 12.5 mg twice daily, isosorbide 30 mg daily.  He is not on an ACE or in our, given the creeping up of his creatinine I would not start one at this time.  He has historically diuresed well on torsemide.  His dermatologist recently wondered if his itchy Bullous Phemphigoid was secondary to Lasix or torsemide (sulfa derivatives) and requested that Mr. Foster try ethacrynic acid as a loop diuretic.  At her last visit I started him on 25 mg, but before he was able to obtain the medication he quicdly gained 18 pounds.  I subsequently had him restart torsemide  20 until he could get to ethacrynic acid.  He is now taking 25 mg of daily of ethacrynic acid and says his weight is no longer dropping.  Equivalent dosing would be 50 mg daily, and so I recommend that we increase that to 50 mg daily.  I would like him to meet [facility] to see if this is a true sulfa allergy.  My concern is that he is in heart failure and there may be a time where he requires IV diuresis and it would be nice to be able to give him options.  Currently he is not scheduled to see his dermatologist until August, I have asked him to try to move that up.  We will get a BMP before his next office visit.  4.  Coronary artery disease.  He has a history of OM stenting in 2009.  Coronary angiogram in March 2018 demonstrated no flow-limiting lesions in the elevated l LV end-diastolic pressure of 23 mmHg.  Spironolactone was added to his regimen after his last cath, he was tried on higher doses but developed nipple tenderness return to 25 mg daily.  He is on statin therapy.  He is not on aspirin and given his low platelets I would not start 1.    Thank you for allowing me to care for Bairon Foster today.  Greater than half of this 45 minute appointment was spent in counseling and coordination of care.    WILLIS Bullard, CNP  Cardiology    Voice recognition software was used for this note, I have reviewed this note, but errors may have been missed.    Orders Placed This Encounter   Procedures     Basic metabolic panel     Orders Placed This Encounter   Medications     DISCONTD: ethacrynic acid (EDECRIN) 25 MG tablet     Sig: Take 2 tablets (50 mg) by mouth daily     Dispense:  180 tablet     Refill:  3     ethacrynic acid (EDECRIN) 25 MG tablet     Sig: Take 2 tablets (50 mg) by mouth daily     Dispense:  180 tablet     Refill:  3     Medications Discontinued During This Encounter   Medication Reason     ethacrynic acid (EDECRIN) 25 MG tablet      ethacrynic acid (EDECRIN) 25  MG tablet Reorder         CURRENT MEDICATIONS:  Current Outpatient Medications   Medication Sig Dispense Refill     acetaminophen (TYLENOL) 500 MG tablet Take 2 tablets (1,000 mg) by mouth every 6 hours as needed for mild pain 100 tablet 0     BuPROPion HCl (WELLBUTRIN XL PO) Take 150 mg by mouth every morning       Carboxymethylcellulose Sod PF (CELLUVISC/REFRESH LIQUIGEL) 1 % ophthalmic solution Place 1 drop into both eyes 3 times daily as needed for dry eyes       carvedilol (COREG) 6.25 MG tablet TAKE 1 TABLET (6.25 MG) BY MOUTH 2 TIMES DAILY (WITH MEALS) 180 tablet 3     clobetasol (TEMOVATE) 0.05 % cream Apply sparingly to affected area twice daily as needed.  Do not apply to face. 60 g 11     dabigatran ANTICOAGULANT (PRADAXA ANTICOAGULANT) 75 MG CAPS BLISTER PACK Take 150 mg by mouth 2 times daily. Store in original 's bottle or blister pack; use within 120 days of opening.       diclofenac (VOLTAREN) 1 % topical gel Apply 2 g topically 4 times daily       ethacrynic acid (EDECRIN) 25 MG tablet Take 2 tablets (50 mg) by mouth daily 180 tablet 3     finasteride (PROSCAR) 5 MG tablet TAKE 1 TABLET EVERY DAY 90 tablet 3     GLUCOSAMINE-CHONDROITIN PO Take 2 tablets by mouth daily       isosorbide mononitrate (IMDUR) 30 MG 24 hr tablet Take 1 tablet (30 mg) by mouth daily 90 tablet 3     ketotifen (ZADITOR) 0.025 % SOLN ophthalmic solution Place 1 drop into both eyes every 12 hours 1 Bottle      levothyroxine (SYNTHROID/LEVOTHROID) 100 MCG tablet TAKE 1 TABLET EVERY DAY 90 tablet 3     LORazepam (ATIVAN) 1 MG tablet Take 0.5-1 tablets (0.5-1 mg) by mouth every 8 hours as needed for anxiety 30 tablet 5     Multiple Vitamins-Minerals (CENTRUM SILVER) per tablet Take 1 tablet by mouth daily       omeprazole (PRILOSEC) 20 MG DR capsule TAKE 1 CAPSULE TWICE DAILY 180 capsule 3     Polyethylene Glycol 3350 (MIRALAX PO) Take 1 packet by mouth At Bedtime        potassium chloride ER (K-TAB) 20 MEQ CR tablet  Take 20 mEq by mouth       simvastatin (ZOCOR) 20 MG tablet Take by mouth At Bedtime       spironolactone (ALDACTONE) 25 MG tablet Take 1 tablet (25 mg) by mouth daily 30 tablet 1     sucralfate (CARAFATE) 1 GM tablet Take 1 tablet (1 g) by mouth 2 times daily 180 tablet 3     tamsulosin (FLOMAX) 0.4 MG capsule TAKE 1 CAPSULE EVERY DAY 90 capsule 3     triamcinolone (KENALOG) 0.025 % cream Apply topically 2 times daily       ASPIRIN NOT PRESCRIBED (INTENTIONAL) Please choose reason not prescribed, below 0 each 0     COMPRESSION STOCKINGS 1 each daily 2 each 0     ORDER FOR DME Auto-CPAP:Max 9 cm H2OMin 9 cm H2O  Continuous Lifetime need and heated humidity.    1 Device 0     tranexamic acid Apply to gauze and apply to localized area of bleeding q 6 hrs for 1 - 2 days (Patient not taking: Reported on 3/13/2019) 60 mL 0       ALLERGIES   No Known Allergies    PAST MEDICAL HISTORY:  Past Medical History:   Diagnosis Date     Acute, but ill-defined, cerebrovascular disease     NO RESIDUALS     Antiplatelet or antithrombotic long-term use      Arrhythmia     a fib     Blood in stool      Coronary atherosclerosis of unspecified type of vessel, native or graft     S/P PTCA, EF 50%     Esophageal reflux      Hydrocele, unspecified      Hypercholesterolemia      Hypersomnia with sleep apnea, unspecified      Inguinal hernia without mention of obstruction or gangrene, recurrent unilateral or unspecified      Ischemic cardiomyopathy      Major depression in complete remission (H)      Obese      Onychomycosis      JIM on CPAP      Other and unspecified hyperlipidemia      Other lymphedema     GROIN AND THIGHS     Other pancytopenia (H)      Other specified anemias      Overweight      Pacemaker      PAD (peripheral artery disease) (H)      Pancytopenia (H)      Persistent atrial fibrillation (H)     VVI PM for long pauses     Prostatic hypertrophy, benign      Pulmonary hypertension (H)      Scrotal varices       Thrombocytopenia, unspecified (H)      Umbilical hernia without obstruction and without gangrene      Unilateral or unspecified femoral hernia without mention of obstruction or gangrene, unilateral or unspecified      Unspecified hypothyroidism      Venous stasis dermatitis of both lower extremities      Vitamin D deficiency        PAST SURGICAL HISTORY:  Past Surgical History:   Procedure Laterality Date     EYE SURGERY      CATARACT/BLEPHAROPLASTY     GENITOURINARY SURGERY      TUNA     HERNIA REPAIR      RIGHT INGUINAL     HERNIORRHAPHY INGUINAL  8/14/2012    Procedure: HERNIORRHAPHY INGUINAL;  right inguinal hernia repair;  Surgeon: Kareem Torrez MD;  Location: [facility]     HERNIORRHAPHY UMBILICAL N/A 10/31/2017    Procedure: HERNIORRHAPHY UMBILICAL;  UMBILICAL HERNIA REPAIR WITH MESH;  Surgeon: Scar Harrington MD;  Location: [facility]     IMPLANT PACEMAKER  8/2009    single chamber     ORTHOPEDIC SURGERY      [facility] RIGHT     ORTHOPEDIC SURGERY  2010    right TKR, left TKR     SURGICAL HISTORY OF -       Thumb surgery     SURGICAL HISTORY OF -   1990s    Left total knee replacement     SURGICAL HISTORY OF -   3/11    Rig total knee replacement       FAMILY HISTORY:  Family History   Problem Relation Age of Onset     Cardiovascular Father      C.A.D. Father      Lung Cancer Sister      Unknown/Adopted Mother      Cancer - colorectal No family hx of        SOCIAL HISTORY:  Social History     Socioeconomic History     Marital status:      Spouse name: None     Number of children: None     Years of education: None     Highest education level: None   Occupational History     Occupation: Teacher, author, speaker     Employer: RETIRED   Social Needs     Financial resource strain: None     Food insecurity:     Worry: None     Inability: None     Transportation needs:     Medical: None     Non-medical: None   Tobacco Use     Smoking status: Never Smoker     Smokeless tobacco: Never Used     Tobacco comment: per encounter  2/12/07   Substance and Sexual Activity     Alcohol use: Yes     Alcohol/week: 0.0 oz     Comment: 1/month     Drug use: No     Sexual activity: No     Partners: Female   Lifestyle     Physical activity:     Days per week: None     Minutes per session: None     Stress: None   Relationships     Social connections:     Talks on phone: None     Gets together: None     Attends [employer] witness service: None     Active member of club or organization: None     Attends meetings of clubs or organizations: None     Relationship status: None     Intimate partner violence:     Fear of current or ex partner: None     Emotionally abused: None     Physically abused: None     Forced sexual activity: None   Other Topics Concern     Parent/sibling w/ CABG, MI or angioplasty before 65F 55M? No      Service Not Asked     Blood Transfusions Not Asked     Caffeine Concern No     Comment: 1-2 cups coffee a day     Occupational Exposure Not Asked     Hobby Hazards Not Asked     Sleep Concern No     Stress Concern Yes     Comment: due to wifes health condition     Weight Concern No     Special Diet No     Back Care Not Asked     Exercise Yes     Comment: states he uses his tredmill on and off     Bike Helmet Not Asked     Seat Belt Yes     Self-Exams Not Asked   Social History Narrative     None       Review of Systems:  Skin:  Positive for   dry skin    Eyes:  Positive for glasses cataract surgery on both eyes  ENT:  Negative      Respiratory:  Positive for sleep apnea;CPAP;dyspnea on exertion;shortness of breath     Cardiovascular:    Positive for;edema    Gastroenterology: Negative      Genitourinary:  not assessed      Musculoskeletal:  Positive for arthritis    Neurologic:  Positive for stroke;numbness or tingling of feet    Psychiatric:  Positive for   stress due to wifes health   Heme/Lymph/Imm:  Positive for easy bruising    Endocrine:  Positive for thyroid disorder      Physical Exam:  Vitals: /70   Pulse 60   Ht 1.88 m  "(6' 2\")   Wt 95.7 kg (211 lb)   BMI 27.09 kg/m       Constitutional:  cooperative;in no acute distress frail      Skin:  warm and dry to the touch     fell twice recently, once on ice, once into a fire place which he can't really explain but resulted in some bruising.    Head:  normocephalic        Eyes:  pupils equal and round        Lymph:      ENT:  no pallor or cyanosis        Neck:    JVP elevated      Respiratory:  clear to auscultation;normal symmetry    bilateral basilar crackles    Cardiac: regular rhythm       systolic murmur        pulses full and equal                                        GI:  abdomen soft        Extremities and Muscular Skeletal:      2+     wearing compression stockings, edema in thighs has improved but still there    Neurological:  affect appropriate        Psych:  Alert and Oriented x 3    Encounter Diagnoses   Name Primary?     Lower extremity edema      Ischemic cardiomyopathy Yes     PAD (peripheral artery disease) (H)      Coronary artery disease due to lipid rich plaque      Pulmonary hypertension (H)      Localized edema      Acute diastolic heart failure (H)        Recent Lab Results:  LIPID RESULTS:  Lab Results   Component Value Date    CHOL 121 10/10/2018    HDL 59 10/10/2018    LDL 55 10/10/2018    TRIG 34 10/10/2018    CHOLHDLRATIO 2.1 06/15/2015       LIVER ENZYME RESULTS:  Lab Results   Component Value Date    AST 27 10/09/2018    ALT 23 10/10/2018       CBC RESULTS:  Lab Results   Component Value Date    WBC 2.8 (L) 11/20/2018    RBC 3.16 (L) 11/20/2018    HGB 10.3 (L) 11/20/2018    HCT 32.6 (L) 11/20/2018     (H) 11/20/2018    MCH 32.6 11/20/2018    MCHC 31.6 11/20/2018    RDW 15.2 (H) 11/20/2018    PLT 64 (L) 11/20/2018       BMP RESULTS:  Lab Results   Component Value Date     03/27/2019    POTASSIUM 4.2 03/27/2019    CHLORIDE 106 03/27/2019    CO2 27 03/27/2019    ANIONGAP 12.2 03/27/2019    GLC 92 03/27/2019    BUN 23 03/27/2019    CR 1.31 (H) " 03/27/2019    GFRESTIMATED 52 (L) 03/27/2019    GFRESTBLACK 62 03/27/2019    TEODORO 9.3 03/27/2019        A1C RESULTS:  No results found for: A1C    INR RESULTS:  Lab Results   Component Value Date    INR 1.79 (H) 11/20/2018    INR 1.48 (H) 03/21/2018         Thank you for allowing me to participate in the care of your patient.    Sincerely,     WILLIS Smith [facility]

## 2019-03-29 ENCOUNTER — TELEPHONE (OUTPATIENT)
Dept: CARDIOLOGY | Facility: CLINIC | Age: 84
End: 2019-03-29

## 2019-03-29 DIAGNOSIS — I10 BENIGN ESSENTIAL HYPERTENSION: Primary | ICD-10-CM

## 2019-03-29 NOTE — TELEPHONE ENCOUNTER
Patient called to report that he has not noticed any decrease in weight after doubling his ethacrynic acid from 25mg to 50mg daily. Patient denies any further increase in weight or SOB. Patient in agreement to monitor over the weekend and check in with our office on Monday to advise if weight loss on increased dose. RN sent reminder to team to check in with patient on Monday 4/1/19.           3/27/19  3.  Systolic and diastolic heart failure.  Most likely secondary to his valvular disease.  Angiogram last year was without new obstructive disease.  He is currently on spironolactone 25 mg daily, escalating this dose in the past is caused him pretty significant nipple tenderness.  He is on carvedilol 12.5 mg twice daily, isosorbide 30 mg daily.  He is not on an ACE or in our, given the creeping up of his creatinine I would not start one at this time.  He has historically diuresed well on torsemide.  His dermatologist recently wondered if his itchy Bullous Phemphigoid was secondary to Lasix or torsemide (sulfa derivatives) and requested that Mr. Foster try ethacrynic acid as a loop diuretic.  At her last visit I started him on 25 mg, but before he was able to obtain the medication he quicdly gained 18 pounds.  I subsequently had him restart torsemide 20 until he could get to ethacrynic acid.  He is now taking 25 mg of daily of ethacrynic acid and says his weight is no longer dropping.  Equivalent dosing would be 50 mg daily, and so I recommend that we increase that to 50 mg daily.  I would like [facility] to see if this is a true sulfa allergy.  My concern is that he is in heart failure and there may be a time where he requires IV diuresis and it would be nice to be able to give him options.  Currently he is not scheduled to see his dermatologist until August, I have asked him to try to move that up.  We will get a BMP before his next office visit.3.  Systolic and diastolic heart  failure.  Most likely secondary to his valvular disease.  Angiogram last year was without new obstructive disease.  He is currently on spironolactone 25 mg daily, escalating this dose in the past is caused him pretty significant nipple tenderness.  He is on carvedilol 12.5 mg twice daily, isosorbide 30 mg daily.  He is not on an ACE or in our, given the creeping up of his creatinine I would not start one at this time.  He has historically diuresed well on torsemide.  His dermatologist recently wondered if his itchy Bullous Phemphigoid was secondary to Lasix or torsemide (sulfa derivatives) and requested that Mr. Foster try ethacrynic acid as a loop diuretic.  At her last visit I started him on 25 mg, but before he was able to obtain the medication he quicdly gained 18 pounds.  I subsequently had him restart torsemide 20 until he could get to ethacrynic acid.  He is now taking 25 mg of daily of ethacrynic acid and says his weight is no longer dropping.  Equivalent dosing would be 50 mg daily, and so I recommend that we increase that to 50 mg daily.  I would like him to [facility] to see if this is a true sulfa allergy.  My concern is that he is in heart failure and there may be a time where he requires IV diuresis and it would be nice to be able to give him options.  Currently he is not scheduled to see his dermatologist until August, I have asked him to try to move that up.  We will get a BMP before his next office visit.

## 2019-04-01 NOTE — TELEPHONE ENCOUNTER
Spoke with patient about how his weight has been doing over the weekend. Patient states that his weight still has not gone down. Patient states that he has actually gained weight, his weight today is 212 pounds. Patient states that he has also noticed that his shortness of breath is slightly worse than last week. Patient states that a lot of diuretics have given him side effects (see note below from Anushka's OV). Patient's medication list states that he is taking 25 mg daily of spironolactone, but patient states that he was under the impression that he would only be taking 12.5 mg daily. Patient has only been taking 12.5 mg daily.  Patient currently taking ethacrynic acid 50 mg daily. Will route to Dr. Carrizales for review in Anushka's absence.

## 2019-04-02 RX ORDER — EPLERENONE 50 MG/1
50 TABLET, FILM COATED ORAL DAILY
Qty: 90 TABLET | Refills: 1 | Status: ON HOLD | OUTPATIENT
Start: 2019-04-02 | End: 2019-04-13

## 2019-04-02 NOTE — TELEPHONE ENCOUNTER
RN called patient and reviewed with him Dr. Carrizales's recommendations below. Patient verbalized understanding and is in agreement with plan. RN sent in new rx for patient and updated his med list to reflect changes. RN transferred patient to scheduling to arrange for BMP and F/U next week with [facility]. RN will send reminder to team to check in with patient on Monday 4/8/18 to see how new diuretic is working.       Try changing off his spironolactone to EPLERENONE 50mg daily, may increase to twice daily if weight does not improve. BMP in one week and follow up clinic visit with [facility].  (Dr. Carrizales)

## 2019-04-08 ENCOUNTER — APPOINTMENT (OUTPATIENT)
Dept: GENERAL RADIOLOGY | Facility: CLINIC | Age: 84
DRG: 287 | End: 2019-04-08
Attending: EMERGENCY MEDICINE
Payer: COMMERCIAL

## 2019-04-08 ENCOUNTER — HOSPITAL ENCOUNTER (INPATIENT)
Facility: CLINIC | Age: 84
LOS: 5 days | Discharge: NURSING, REHAB OR LONG-TERM CARE | DRG: 287 | End: 2019-04-13
Attending: EMERGENCY MEDICINE | Admitting: INTERNAL MEDICINE
Payer: COMMERCIAL

## 2019-04-08 DIAGNOSIS — E87.70 HYPERVOLEMIA, UNSPECIFIED HYPERVOLEMIA TYPE: ICD-10-CM

## 2019-04-08 DIAGNOSIS — I50.21 ACUTE SYSTOLIC CHF (CONGESTIVE HEART FAILURE) (H): Primary | ICD-10-CM

## 2019-04-08 DIAGNOSIS — F43.0 ACUTE STRESS REACTION: ICD-10-CM

## 2019-04-08 PROBLEM — I50.9 CHF (CONGESTIVE HEART FAILURE) (H): Status: ACTIVE | Noted: 2019-04-08

## 2019-04-08 LAB
ALBUMIN SERPL-MCNC: 3.4 G/DL (ref 3.4–5)
ALP SERPL-CCNC: 67 U/L (ref 40–150)
ALT SERPL W P-5'-P-CCNC: 17 U/L (ref 0–70)
ANION GAP SERPL CALCULATED.3IONS-SCNC: 4 MMOL/L (ref 3–14)
AST SERPL W P-5'-P-CCNC: 25 U/L (ref 0–45)
BASOPHILS # BLD AUTO: 0 10E9/L (ref 0–0.2)
BASOPHILS NFR BLD AUTO: 0.7 %
BILIRUB SERPL-MCNC: 1.4 MG/DL (ref 0.2–1.3)
BUN SERPL-MCNC: 27 MG/DL (ref 7–30)
CALCIUM SERPL-MCNC: 9.2 MG/DL (ref 8.5–10.1)
CHLORIDE SERPL-SCNC: 109 MMOL/L (ref 94–109)
CO2 SERPL-SCNC: 26 MMOL/L (ref 20–32)
CREAT SERPL-MCNC: 1.24 MG/DL (ref 0.66–1.25)
DIFFERENTIAL METHOD BLD: ABNORMAL
EOSINOPHIL # BLD AUTO: 0.1 10E9/L (ref 0–0.7)
EOSINOPHIL NFR BLD AUTO: 3.8 %
ERYTHROCYTE [DISTWIDTH] IN BLOOD BY AUTOMATED COUNT: 16.5 % (ref 10–15)
GFR SERPL CREATININE-BSD FRML MDRD: 52 ML/MIN/{1.73_M2}
GLUCOSE SERPL-MCNC: 107 MG/DL (ref 70–99)
HCT VFR BLD AUTO: 30.1 % (ref 40–53)
HGB BLD-MCNC: 9.8 G/DL (ref 13.3–17.7)
IMM GRANULOCYTES # BLD: 0 10E9/L (ref 0–0.4)
IMM GRANULOCYTES NFR BLD: 0 %
LYMPHOCYTES # BLD AUTO: 0.7 10E9/L (ref 0.8–5.3)
LYMPHOCYTES NFR BLD AUTO: 25.8 %
MCH RBC QN AUTO: 32.5 PG (ref 26.5–33)
MCHC RBC AUTO-ENTMCNC: 32.6 G/DL (ref 31.5–36.5)
MCV RBC AUTO: 100 FL (ref 78–100)
MONOCYTES # BLD AUTO: 0.3 10E9/L (ref 0–1.3)
MONOCYTES NFR BLD AUTO: 9.8 %
NEUTROPHILS # BLD AUTO: 1.7 10E9/L (ref 1.6–8.3)
NEUTROPHILS NFR BLD AUTO: 59.9 %
NRBC # BLD AUTO: 0 10*3/UL
NRBC BLD AUTO-RTO: 0 /100
NT-PROBNP SERPL-MCNC: 578 PG/ML (ref 0–1800)
PLATELET # BLD AUTO: 70 10E9/L (ref 150–450)
POTASSIUM SERPL-SCNC: 4.6 MMOL/L (ref 3.4–5.3)
PROT SERPL-MCNC: 7.3 G/DL (ref 6.8–8.8)
RBC # BLD AUTO: 3.02 10E12/L (ref 4.4–5.9)
SODIUM SERPL-SCNC: 139 MMOL/L (ref 133–144)
TSH SERPL DL<=0.005 MIU/L-ACNC: 3.09 MU/L (ref 0.4–4)
WBC # BLD AUTO: 2.9 10E9/L (ref 4–11)

## 2019-04-08 PROCEDURE — 99285 EMERGENCY DEPT VISIT HI MDM: CPT | Mod: 25

## 2019-04-08 PROCEDURE — 71046 X-RAY EXAM CHEST 2 VIEWS: CPT

## 2019-04-08 PROCEDURE — 25000132 ZZH RX MED GY IP 250 OP 250 PS 637: Performed by: PHYSICIAN ASSISTANT

## 2019-04-08 PROCEDURE — 96374 THER/PROPH/DIAG INJ IV PUSH: CPT

## 2019-04-08 PROCEDURE — 99223 1ST HOSP IP/OBS HIGH 75: CPT | Mod: AI | Performed by: INTERNAL MEDICINE

## 2019-04-08 PROCEDURE — 99207 ZZC APP CREDIT; MD BILLING SHARED VISIT: CPT | Performed by: PHYSICIAN ASSISTANT

## 2019-04-08 PROCEDURE — 85025 COMPLETE CBC W/AUTO DIFF WBC: CPT | Performed by: EMERGENCY MEDICINE

## 2019-04-08 PROCEDURE — 12000000 ZZH R&B MED SURG/OB

## 2019-04-08 PROCEDURE — 83880 ASSAY OF NATRIURETIC PEPTIDE: CPT | Performed by: EMERGENCY MEDICINE

## 2019-04-08 PROCEDURE — 25000128 H RX IP 250 OP 636: Performed by: EMERGENCY MEDICINE

## 2019-04-08 PROCEDURE — 84443 ASSAY THYROID STIM HORMONE: CPT | Performed by: EMERGENCY MEDICINE

## 2019-04-08 PROCEDURE — G0378 HOSPITAL OBSERVATION PER HR: HCPCS

## 2019-04-08 PROCEDURE — 80053 COMPREHEN METABOLIC PANEL: CPT | Performed by: EMERGENCY MEDICINE

## 2019-04-08 RX ORDER — NALOXONE HYDROCHLORIDE 0.4 MG/ML
.1-.4 INJECTION, SOLUTION INTRAMUSCULAR; INTRAVENOUS; SUBCUTANEOUS
Status: DISCONTINUED | OUTPATIENT
Start: 2019-04-08 | End: 2019-04-12

## 2019-04-08 RX ORDER — LIDOCAINE 40 MG/G
CREAM TOPICAL
Status: DISCONTINUED | OUTPATIENT
Start: 2019-04-08 | End: 2019-04-12

## 2019-04-08 RX ORDER — BUMETANIDE 0.25 MG/ML
1 INJECTION INTRAMUSCULAR; INTRAVENOUS EVERY 8 HOURS
Status: DISCONTINUED | OUTPATIENT
Start: 2019-04-08 | End: 2019-04-09

## 2019-04-08 RX ORDER — LEVOTHYROXINE SODIUM 100 UG/1
100 TABLET ORAL DAILY
Status: DISCONTINUED | OUTPATIENT
Start: 2019-04-09 | End: 2019-04-13 | Stop reason: HOSPADM

## 2019-04-08 RX ORDER — POLYETHYLENE GLYCOL 3350 17 G/17G
17 POWDER, FOR SOLUTION ORAL DAILY PRN
Status: DISCONTINUED | OUTPATIENT
Start: 2019-04-08 | End: 2019-04-13 | Stop reason: HOSPADM

## 2019-04-08 RX ORDER — FINASTERIDE 5 MG/1
5 TABLET, FILM COATED ORAL DAILY
Status: DISCONTINUED | OUTPATIENT
Start: 2019-04-09 | End: 2019-04-13 | Stop reason: HOSPADM

## 2019-04-08 RX ORDER — AMOXICILLIN 250 MG
2 CAPSULE ORAL 2 TIMES DAILY PRN
Status: DISCONTINUED | OUTPATIENT
Start: 2019-04-08 | End: 2019-04-13 | Stop reason: HOSPADM

## 2019-04-08 RX ORDER — SUCRALFATE 1 G/1
1 TABLET ORAL 2 TIMES DAILY
Status: DISCONTINUED | OUTPATIENT
Start: 2019-04-08 | End: 2019-04-13 | Stop reason: HOSPADM

## 2019-04-08 RX ORDER — PROCHLORPERAZINE 25 MG
12.5 SUPPOSITORY, RECTAL RECTAL EVERY 12 HOURS PRN
Status: DISCONTINUED | OUTPATIENT
Start: 2019-04-08 | End: 2019-04-13 | Stop reason: HOSPADM

## 2019-04-08 RX ORDER — SIMVASTATIN 10 MG
20 TABLET ORAL DAILY
Status: DISCONTINUED | OUTPATIENT
Start: 2019-04-09 | End: 2019-04-13 | Stop reason: HOSPADM

## 2019-04-08 RX ORDER — ONDANSETRON 4 MG/1
4 TABLET, ORALLY DISINTEGRATING ORAL EVERY 6 HOURS PRN
Status: DISCONTINUED | OUTPATIENT
Start: 2019-04-08 | End: 2019-04-13 | Stop reason: HOSPADM

## 2019-04-08 RX ORDER — POTASSIUM CHLORIDE 1500 MG/1
20 TABLET, EXTENDED RELEASE ORAL DAILY
Status: DISCONTINUED | OUTPATIENT
Start: 2019-04-09 | End: 2019-04-13 | Stop reason: HOSPADM

## 2019-04-08 RX ORDER — CARBOXYMETHYLCELLULOSE SODIUM 5 MG/ML
1 SOLUTION/ DROPS OPHTHALMIC 3 TIMES DAILY PRN
Status: DISCONTINUED | OUTPATIENT
Start: 2019-04-08 | End: 2019-04-13 | Stop reason: HOSPADM

## 2019-04-08 RX ORDER — ACETAMINOPHEN 325 MG/1
650 TABLET ORAL EVERY 4 HOURS PRN
Status: DISCONTINUED | OUTPATIENT
Start: 2019-04-08 | End: 2019-04-08

## 2019-04-08 RX ORDER — AMOXICILLIN 250 MG
1 CAPSULE ORAL 2 TIMES DAILY PRN
Status: DISCONTINUED | OUTPATIENT
Start: 2019-04-08 | End: 2019-04-13 | Stop reason: HOSPADM

## 2019-04-08 RX ORDER — TAMSULOSIN HYDROCHLORIDE 0.4 MG/1
0.4 CAPSULE ORAL AT BEDTIME
Status: DISCONTINUED | OUTPATIENT
Start: 2019-04-08 | End: 2019-04-13 | Stop reason: HOSPADM

## 2019-04-08 RX ORDER — SPIRONOLACTONE 25 MG
12.5 TABLET ORAL DAILY
Status: DISCONTINUED | OUTPATIENT
Start: 2019-04-09 | End: 2019-04-13 | Stop reason: HOSPADM

## 2019-04-08 RX ORDER — ACETAMINOPHEN 650 MG/1
650 SUPPOSITORY RECTAL EVERY 4 HOURS PRN
Status: DISCONTINUED | OUTPATIENT
Start: 2019-04-08 | End: 2019-04-13 | Stop reason: HOSPADM

## 2019-04-08 RX ORDER — ONDANSETRON 2 MG/ML
4 INJECTION INTRAMUSCULAR; INTRAVENOUS EVERY 6 HOURS PRN
Status: DISCONTINUED | OUTPATIENT
Start: 2019-04-08 | End: 2019-04-13 | Stop reason: HOSPADM

## 2019-04-08 RX ORDER — SPIRONOLACTONE 25 MG/1
12.5 TABLET ORAL DAILY
Status: ON HOLD | COMMUNITY
End: 2019-04-22

## 2019-04-08 RX ORDER — ACETAMINOPHEN 500 MG
1000 TABLET ORAL EVERY 6 HOURS PRN
Status: DISCONTINUED | OUTPATIENT
Start: 2019-04-08 | End: 2019-04-12

## 2019-04-08 RX ORDER — BUPROPION HYDROCHLORIDE 150 MG/1
150 TABLET ORAL EVERY MORNING
Status: DISCONTINUED | OUTPATIENT
Start: 2019-04-09 | End: 2019-04-13 | Stop reason: HOSPADM

## 2019-04-08 RX ORDER — CARVEDILOL 6.25 MG/1
6.25 TABLET ORAL 2 TIMES DAILY WITH MEALS
Status: DISCONTINUED | OUTPATIENT
Start: 2019-04-08 | End: 2019-04-13 | Stop reason: HOSPADM

## 2019-04-08 RX ORDER — BUMETANIDE 0.25 MG/ML
1 INJECTION INTRAMUSCULAR; INTRAVENOUS ONCE
Status: COMPLETED | OUTPATIENT
Start: 2019-04-08 | End: 2019-04-08

## 2019-04-08 RX ORDER — EPLERENONE 25 MG/1
50 TABLET, FILM COATED ORAL DAILY
Status: DISCONTINUED | OUTPATIENT
Start: 2019-04-09 | End: 2019-04-10

## 2019-04-08 RX ORDER — DABIGATRAN ETEXILATE 150 MG/1
150 CAPSULE ORAL 2 TIMES DAILY
Status: DISCONTINUED | OUTPATIENT
Start: 2019-04-08 | End: 2019-04-10

## 2019-04-08 RX ORDER — ISOSORBIDE MONONITRATE 30 MG/1
30 TABLET, EXTENDED RELEASE ORAL DAILY
Status: DISCONTINUED | OUTPATIENT
Start: 2019-04-09 | End: 2019-04-13 | Stop reason: HOSPADM

## 2019-04-08 RX ORDER — NALOXONE HYDROCHLORIDE 0.4 MG/ML
.1-.4 INJECTION, SOLUTION INTRAMUSCULAR; INTRAVENOUS; SUBCUTANEOUS
Status: DISCONTINUED | OUTPATIENT
Start: 2019-04-08 | End: 2019-04-08

## 2019-04-08 RX ORDER — PROCHLORPERAZINE MALEATE 5 MG
5 TABLET ORAL EVERY 6 HOURS PRN
Status: DISCONTINUED | OUTPATIENT
Start: 2019-04-08 | End: 2019-04-13 | Stop reason: HOSPADM

## 2019-04-08 RX ORDER — LORAZEPAM 0.5 MG/1
0.5-1 TABLET ORAL EVERY 8 HOURS PRN
Status: DISCONTINUED | OUTPATIENT
Start: 2019-04-08 | End: 2019-04-13 | Stop reason: HOSPADM

## 2019-04-08 RX ADMIN — DABIGATRAN ETEXILATE MESYLATE 150 MG: 150 CAPSULE ORAL at 21:33

## 2019-04-08 RX ADMIN — KETOTIFEN FUMARATE 1 DROP: 0.35 SOLUTION/ DROPS OPHTHALMIC at 21:33

## 2019-04-08 RX ADMIN — BUMETANIDE 1 MG: 0.25 INJECTION INTRAMUSCULAR; INTRAVENOUS at 14:27

## 2019-04-08 RX ADMIN — SUCRALFATE 1 G: 1 TABLET ORAL at 21:33

## 2019-04-08 RX ADMIN — OMEPRAZOLE 20 MG: 20 CAPSULE, DELAYED RELEASE ORAL at 17:27

## 2019-04-08 RX ADMIN — CARVEDILOL 6.25 MG: 6.25 TABLET, FILM COATED ORAL at 17:27

## 2019-04-08 RX ADMIN — TAMSULOSIN HYDROCHLORIDE 0.4 MG: 0.4 CAPSULE ORAL at 21:33

## 2019-04-08 ASSESSMENT — ENCOUNTER SYMPTOMS
FEVER: 0
COUGH: 0
ABDOMINAL DISTENTION: 1
SHORTNESS OF BREATH: 1
ABDOMINAL PAIN: 0

## 2019-04-08 ASSESSMENT — MIFFLIN-ST. JEOR
SCORE: 1592.51
SCORE: 1724.51

## 2019-04-08 NOTE — Clinical Note
Potential access sites were evaluated for patency using ultrasound.   The right radial vein was selected. Access was obtained under with Sonosite guidance using a standard 18 guage needle with direct visualization of needle entry.

## 2019-04-08 NOTE — PLAN OF CARE
Pt arrived to unit 1530 from ED. A&O ex slightly forgetful. VSS on RA. Tele: 100% V-paced. Up with A1 and GB, can be unsteady. Pt denies pain. LS clear. Denies SOB/CP. Generalized edema +1, scrotal edema +4, BLE edema +3. BLE jordyn colored. Pt with skin tears to left, right and middle chest and left arm. Mepilex applied to Left chest and left arm. Tolerating low NA diet. IV SL. Scant nose bleed.

## 2019-04-08 NOTE — PHARMACY-ADMISSION MEDICATION HISTORY
Admission medication history interview status for the 4/8/2019  admission is complete. See EPIC admission navigator for prior to admission medications     Medication history source reliability:Moderate    Actions taken by pharmacist (provider contacted, etc):None     Additional medication history information not noted on PTA med list : Patient and MD have been modifying diuretic regimen, chart and patient list two different regimens. PTA list reflects what patient is doing at this time.    Medication reconciliation/reorder completed by provider prior to medication history? No    Time spent in this activity: 45 minutes    Prior to Admission medications    Medication Sig Last Dose Taking? Auth Provider   acetaminophen (TYLENOL) 500 MG tablet Take 2 tablets (1,000 mg) by mouth every 6 hours as needed for mild pain Past Month at Unknown time Yes Mg Nelson MD   ASPIRIN NOT PRESCRIBED (INTENTIONAL) Please choose reason not prescribed, below  Yes Mg Nelson MD   BuPROPion HCl (WELLBUTRIN XL PO) Take 150 mg by mouth every morning 4/8/2019 at am Yes Reported, Patient   Carboxymethylcellulose Sod PF (CELLUVISC/REFRESH LIQUIGEL) 1 % ophthalmic solution Place 1 drop into both eyes 3 times daily as needed for dry eyes Past Week Yes Reported, Patient   carvedilol (COREG) 6.25 MG tablet TAKE 1 TABLET (6.25 MG) BY MOUTH 2 TIMES DAILY (WITH MEALS) 4/8/2019 at am Yes Mg Nelson MD   clobetasol (TEMOVATE) 0.05 % cream Apply sparingly to affected area twice daily as needed.  Do not apply to face. Past Month at Unknown time Yes Mendez Valencia MD   dabigatran ANTICOAGULANT (PRADAXA ANTICOAGULANT) 75 MG CAPS BLISTER PACK Take 150 mg by mouth 2 times daily. Store in original 's bottle or blister pack; use within 120 days of opening. 4/8/2019 at am Yes Reported, Patient   diclofenac (VOLTAREN) 1 % topical gel Apply 2 g topically 4 times daily 4/8/2019 at am Yes Mg Nelson  MD   eplerenone (INSPRA) 50 MG tablet Take 1 tablet (50 mg) by mouth daily 4/8/2019 at sm Yes Lonodn Carrizales MD   ethacrynic acid (EDECRIN) 25 MG tablet Take 2 tablets (50 mg) by mouth daily Past Week at Unknown time Yes Mackenzie Tejada APRN CNP   finasteride (PROSCAR) 5 MG tablet TAKE 1 TABLET EVERY DAY 4/8/2019 at am Yes Mg Nelson MD   GLUCOSAMINE-CHONDROITIN PO Take 2 tablets by mouth daily 4/8/2019 at am Yes Reported, Patient   isosorbide mononitrate (IMDUR) 30 MG 24 hr tablet Take 1 tablet (30 mg) by mouth daily 4/8/2019 at am Yes Mackenzie Tejada APRN CNP   ketotifen (ZADITOR) 0.025 % SOLN ophthalmic solution Place 1 drop into both eyes every 12 hours 4/8/2019 at am Yes Mg Nelson MD   KLOR-CON 20 MEQ CR tablet TAKE 1 TABLET EVERY DAY 4/8/2019 at am Yes Elvin Ross MD   levothyroxine (SYNTHROID/LEVOTHROID) 100 MCG tablet TAKE 1 TABLET EVERY DAY 4/8/2019 at am Yes Mg Nelson MD   LORazepam (ATIVAN) 1 MG tablet Take 0.5-1 tablets (0.5-1 mg) by mouth every 8 hours as needed for anxiety Past Month at Unknown time Yes Lilian Bailey MD   Multiple Vitamins-Minerals (CENTRUM SILVER) per tablet Take 1 tablet by mouth daily 4/8/2019 at am Yes Reported, Patient   omeprazole (PRILOSEC) 20 MG DR capsule TAKE 1 CAPSULE TWICE DAILY 4/8/2019 at am Yes Mg Nelson MD   Polyethylene Glycol 3350 (MIRALAX PO) Take 1 packet by mouth At Bedtime  4/7/2019 at pm Yes Reported, Patient   simvastatin (ZOCOR) 20 MG tablet Take 20 mg by mouth daily  4/8/2019 at am Yes Reported, Patient   spironolactone (ALDACTONE) 25 MG tablet Take 12.5 mg by mouth daily 4/8/2019 at am Yes Unknown, Entered By History   sucralfate (CARAFATE) 1 GM tablet Take 1 tablet (1 g) by mouth 2 times daily 4/8/2019 at am Yes Mg Nelson MD   tamsulosin (FLOMAX) 0.4 MG capsule TAKE 1 CAPSULE EVERY DAY 4/7/2019 at pm Yes Mg Nelson MD    triamcinolone (KENALOG) 0.025 % cream Apply topically 2 times daily as needed   Yes Reported, Patient   COMPRESSION STOCKINGS 1 each daily   London Carrizales MD   ORDER FOR DME Auto-CPAP:Max 9 cm H2OMin 9 cm H2O  Continuous Lifetime need and heated humidity.      Alan Estrada MD

## 2019-04-08 NOTE — H&P
Admitted:     04/08/2019      PRIMARY CARE PHYSICIAN:  Mg Nelson MD      CHIEF COMPLAINT:  Edema and shortness of breath.      HISTORY OF PRESENT ILLNESS:  Bairon Foster is a very pleasant 88-year-old male with a past medical history of diastolic and systolic CHF, coronary artery disease, obstructive sleep apnea, hypertension, hyperlipidemia, peripheral arterial disease, atrial fibrillation, pacemaker placement and pancytopenia, who presented to the Emergency Department with complaints of leg swelling and shortness of breath.  The patient follows up regularly with Cardiology and was last seen by Mackenzie Tejada on 03/27/2019 in the Cardiology Clinic.  He has had multiple diuretic changes recently.  Most recently in February, he saw his dermatologist, which had him discontinue his torsemide and switched to ethacrynic acid for diuresis, as it was thought that his torsemide was causing a rash.  The patient did not receive his ethacrynic acid medication due to a delay in delivery and was without his diuretic for what sounds like a couple of weeks.  His weight increased 18 pounds at his followup Cardiology visit.  He was placed back on torsemide until he was able to obtain ethacrynic acid and then was able to lose 7 pounds since that was changed.  Given his significant recent heart failure, he had a transesophageal echocardiogram done on 03/13/2019.  This showed a decreased ejection fraction of 30%, down from previous 53%.  He has moderate regurgitation with a slightly posterior directed jet.  He also has a tricuspid valve regurgitation.  He was planned to have followup later this month with Dr. Hope to discuss a possible mitral clip.  Also, notably, he has a single-lead pacemaker and his device check recently demonstrated underlying rhythm of atrial fibrillation with rates of 40-50.  He is anticoagulated with Pradaxa.  He is due for a pacemaker generator change later this year.      The patient reports  worsening lower extremity edema over the last few weeks.  He has been compliant with his medications.  He denies any dietary indiscretions and follows a low-sodium diet.  He has not had any angina or chest pain symptoms, but does have dyspnea, particularly with mild exertion.  He reports generalized fatigue.  He denies any syncope, lightheadedness, headache, cough, abdominal pain, nausea, vomiting, diarrhea, dysuria or focal weakness.      The patient was seen in the Emergency Department by Dr. Chang.  There, he was found to have an oxygen saturation of 95% on room air and blood pressure 120/66 with a pulse of 56.  He is afebrile.  A chest x-ray shows mild opacity in both lung bases, which is nonspecific.  Cardiac enlargement has increased slightly since the previous exam.  There is mild pulmonary venous congestion, also increased from previous.      LABORATORY DATA:  Showed a proBNP of 578.  CBC with WBC 2.9, hemoglobin 9.8, platelet count 70,000.  CMP with glucose 107, total bilirubin 1.4, otherwise within normal limits.  He was given Bumex 1 mg IV.  He is being registered to observation for further IV diuresis and cardiac consultation.      PAST MEDICAL HISTORY:   1.  CVA.   2.  Atrial fibrillation, status post pacemaker placement.   3.  GERD.   4.  Hydrocele.   5.  Hyperlipidemia.   6.  Inguinal hernia.   7.  Ischemic cardiomyopathy.   8.  Diastolic and systolic congestive heart failure.   9.  Major depression.   10.  Obstructive sleep apnea on CPAP.   11.  Lymphedema.   12.  Pancytopenia.   13.  Anemia.   14.  Peripheral arterial disease.   15.  BPH.   16.  Pulmonary hypertension.   17.  Hernia.   18.  Hypothyroidism.      PAST SURGICAL HISTORY:    Past Surgical History:   Procedure Laterality Date     EYE SURGERY      CATARACT/BLEPHAROPLASTY     GENITOURINARY SURGERY      TUNA     HERNIA REPAIR      RIGHT INGUINAL     HERNIORRHAPHY INGUINAL  8/14/2012    Procedure: HERNIORRHAPHY INGUINAL;  right inguinal  hernia repair;  Surgeon: Kareem Torrez MD;  Location: [facility]     HERNIORRHAPHY UMBILICAL N/A 10/31/2017    Procedure: HERNIORRHAPHY UMBILICAL;  UMBILICAL HERNIA REPAIR WITH MESH;  Surgeon: Scar Harrington MD;  Location: [facility]     IMPLANT PACEMAKER  8/2009    single chamber     ORTHOPEDIC SURGERY      [facility] RIGHT     ORTHOPEDIC SURGERY  2010    right TKR, left TKR     SURGICAL HISTORY OF -       Thumb surgery     SURGICAL HISTORY OF -   1990s    Left total knee replacement     SURGICAL HISTORY OF -   3/11    Rig total knee replacement     FAMILY HISTORY:  Father with coronary artery disease.  Sister with lung cancer.  Otherwise reviewed and noncontributory to this presentation.      SOCIAL HISTORY:  The patient is a lifelong nonsmoker.  He drinks alcohol seldomly.  He is .      PRIOR TO ADMISSION MEDICATIONS:    Medications Prior to Admission   Medication Sig Dispense Refill Last Dose     acetaminophen (TYLENOL) 500 MG tablet Take 2 tablets (1,000 mg) by mouth every 6 hours as needed for mild pain 100 tablet 0 Past Month at Unknown time     ASPIRIN NOT PRESCRIBED (INTENTIONAL) Please choose reason not prescribed, below 0 each 0 Taking     BuPROPion HCl (WELLBUTRIN XL PO) Take 150 mg by mouth every morning   4/8/2019 at am     Carboxymethylcellulose Sod PF (CELLUVISC/REFRESH LIQUIGEL) 1 % ophthalmic solution Place 1 drop into both eyes 3 times daily as needed for dry eyes   Past Week     carvedilol (COREG) 6.25 MG tablet TAKE 1 TABLET (6.25 MG) BY MOUTH 2 TIMES DAILY (WITH MEALS) 180 tablet 3 4/8/2019 at am     clobetasol (TEMOVATE) 0.05 % cream Apply sparingly to affected area twice daily as needed.  Do not apply to face. 60 g 11 Past Month at Unknown time     dabigatran ANTICOAGULANT (PRADAXA ANTICOAGULANT) 75 MG CAPS BLISTER PACK Take 150 mg by mouth 2 times daily. Store in original 's bottle or blister pack; use within 120 days of opening.   4/8/2019 at am     diclofenac (VOLTAREN) 1 % topical gel  Apply 2 g topically 4 times daily   4/8/2019 at am     eplerenone (INSPRA) 50 MG tablet Take 1 tablet (50 mg) by mouth daily 90 tablet 1 4/8/2019 at sm     ethacrynic acid (EDECRIN) 25 MG tablet Take 2 tablets (50 mg) by mouth daily 180 tablet 3 Past Week at Unknown time     finasteride (PROSCAR) 5 MG tablet TAKE 1 TABLET EVERY DAY 90 tablet 3 4/8/2019 at am     GLUCOSAMINE-CHONDROITIN PO Take 2 tablets by mouth daily   4/8/2019 at am     isosorbide mononitrate (IMDUR) 30 MG 24 hr tablet Take 1 tablet (30 mg) by mouth daily 90 tablet 3 4/8/2019 at am     ketotifen (ZADITOR) 0.025 % SOLN ophthalmic solution Place 1 drop into both eyes every 12 hours 1 Bottle  4/8/2019 at am     KLOR-CON 20 MEQ CR tablet TAKE 1 TABLET EVERY DAY 90 tablet 3 4/8/2019 at am     levothyroxine (SYNTHROID/LEVOTHROID) 100 MCG tablet TAKE 1 TABLET EVERY DAY 90 tablet 3 4/8/2019 at am     LORazepam (ATIVAN) 1 MG tablet Take 0.5-1 tablets (0.5-1 mg) by mouth every 8 hours as needed for anxiety 30 tablet 5 Past Month at Unknown time     Multiple Vitamins-Minerals (CENTRUM SILVER) per tablet Take 1 tablet by mouth daily   4/8/2019 at am     omeprazole (PRILOSEC) 20 MG DR capsule TAKE 1 CAPSULE TWICE DAILY 180 capsule 3 4/8/2019 at am     Polyethylene Glycol 3350 (MIRALAX PO) Take 1 packet by mouth At Bedtime    4/7/2019 at pm     simvastatin (ZOCOR) 20 MG tablet Take 20 mg by mouth daily    4/8/2019 at am     spironolactone (ALDACTONE) 25 MG tablet Take 12.5 mg by mouth daily   4/8/2019 at am     sucralfate (CARAFATE) 1 GM tablet Take 1 tablet (1 g) by mouth 2 times daily 180 tablet 3 4/8/2019 at am     tamsulosin (FLOMAX) 0.4 MG capsule TAKE 1 CAPSULE EVERY DAY 90 capsule 3 4/7/2019 at pm     triamcinolone (KENALOG) 0.025 % cream Apply topically 2 times daily as needed    Taking     COMPRESSION STOCKINGS 1 each daily 2 each 0 Taking     ORDER FOR DME Auto-CPAP:Max 9 cm H2OMin 9 cm H2O  Continuous Lifetime need and heated humidity.    1 Device 0  Taking     ALLERGIES:  NO KNOWN DRUG ALLERGIES.      REVIEW OF SYSTEMS:  A complete 10-point review of systems was performed and is negative other than the items previously mentioned above in HPI.      PHYSICAL EXAMINATION:   VITAL SIGNS:  Blood pressure 120/67, heart rate 60 beats per minute, temperature 97.9, respiratory rate 16, oxygen saturation 95% on room air.   GENERAL:  The patient is alert, oriented to person, place, date and situation, cooperative, lying on the gurney in no apparent distress.   HEENT:  Pupils equal, round and reactive to light, extraocular movements intact.  Head normocephalic.  Throat, lips, mucosa and tongue appear moist.   NECK:  Supple.   CARDIOVASCULAR:  Heart regular rate and rhythm, grade 3/6 systolic murmur heard at the left sternal border.  Distal pulses are intact.  He has 2 to 3+ pitting edema up to thighs.   PULMONARY:  Fine bibasilar crackles, otherwise lungs are clear, without wheezes or rhonchi.  Breathing is nonlabored.   GASTROINTESTINAL:  Abdomen is soft, mildly distended, nontender and with normoactive bowel sounds.   MUSCULOSKELETAL:  Bilateral pitting edema to thighs.  Edematous scrotum.  He moves all 4 extremities equally.   NEUROLOGIC:  Alert, cranial nerves II-XII are intact and symmetric.  Motor function is intact.  No focal deficits.   SKIN:  Warm, dry, nondiaphoretic.  No rashes seen on exposed skin.   PSYCHIATRIC:  Normal mood and affect.      LABORATORY DATA:  CMP:  Potassium 4.6, creatinine 1.24, total bilirubin 1.4, glucose 197, otherwise within normal limits.  ProBNP 578.  CBC:  WBC 2.9, hemoglobin 9.8, hematocrit 30.1, platelet count 70,000, RBC 3.02, otherwise within normal limits.      IMAGING:  Chest x-ray:  Mild opacity at both lung bases is nonspecific but could be related to atelectasis or edema.  Cardiac enlargement has increased slightly since the previous exam.  There is mild pulmonary venous congestion, also increased since previous exam.   Single-lead cardiac device in left chest wall with lead tip unchanged in the RV.  No pleural effusions are identified.  Aortic calcification.  Per Radiology.      Recent CHELLE results are reviewed in Epic.      ASSESSMENT AND PLAN:  Bairon Foster is a pleasant 88-year-old male with a complex medical history including systolic and diastolic congestive heart failure, mitral regurgitation, tricuspid regurgitation, chronic atrial fibrillation on Pradaxa, pacemaker placement, coronary artery disease, obstructive sleep apnea and pancytopenia, who presented to the Emergency Department due to complaints of shortness of breath and leg swelling.  He was found to have acute exacerbation of his chronic congestive heart failure and is being registered to observation for IV diuresis and Cardiology evaluation.     1.  Acute on chronic combined systolic and diastolic congestive heart failure exacerbation.  The patient has had multiple diuretic changes recently and had been taken off his torsemide, as there was concern this could be causing a rash.  He was supposed to start ethacrynic acid but had a delay in obtaining this medication and gained 18 pounds.  After being seen in Cardiology followup, his torsemide was restarted until he was able to obtain is ethacrynic acid.  This was started at 25 mg and then increased to 50 mg.  He is also continued on his Inspra and spironolactone.  Despite being medication compliant, he has had worsening edema now with dyspnea on exertion.  He is not hypoxic.  Last CHELLE from March showed a significant drop in his ejection fraction from 53%, now down to 30%.  There is also progression of his mitral valve and tricuspid valve regurgitation with moderate to severe to severe mitral regurgitation.  Valvular disease is likely contributing to his congestive heart failure exacerbation.  He has an appointment with Dr. Hope to discuss if he would be a mitral clip candidate.  IV Bumex has been ordered in the  Emergency Department.  We will have Bumex 1 mg every 8 hours to start.  The goal would be 2 L diuresis within 24 hours.  Monitor intake and output and daily weights.  Continue Inspra and spironolactone.  Cardiology will be consulted.  Continue Coreg.  The patient is not on an ACE or ARB.  We will follow BMP.     2.  Mitral regurgitation/tricuspid regurgitation.  The patient has had progressive deterioration of his mitral valve, now moderately severe to severe regurgitation and has 3+ tricuspid regurgitation.  He is currently in heart failure.  He will be diuresed.  He has followup with Dr. Hope to discuss if he would be a mitral clip candidate.  Cardiology consulted.     3.  Chronic atrial fibrillation, on Pradaxa.  The patient had his device checked during his last Cardiology visit.  He has a single-chamber pacemaker, implanted in 08/2009.  His device check demonstrated an underlying rhythm of atrial fibrillation with heart rate of 40-50.  He has less than 0.5 years remaining on the battery.  The plan is to recheck this in 1 months' time and likely will need generator change.     4.  Coronary artery disease.  The patient has a history of OM stenting in 2009.  Coronary angiogram in 03/2018 demonstrated no flow-limiting lesions.  Spironolactone was added to his regimen after his left cath.  He was trialled on higher doses but developed nipple tenderness.  He is on statin therapy.  He is not on aspirin given his low platelets.  He denies any chest pain.     5.  Pancytopenia.  The patient has had chronically low platelets and [facility]  His platelets had dropped to 44,000 as of 03/11/2019 and hemoglobin was 9.5.  He now has pancytopenia and saw Hematology/Oncology.  He reports he had a bone marrow biopsy, although I do not see the results of this.  We will monitor CBC.  Recommendation is to hold Pradaxa if platelet count drops below 40,000.  Continue close outpatient Oncology/Hematology  followup.     6.  Acute kidney injury, stage 1.  The patient had a recent rise in serum creatinine up to 1.31 after titration of his ethacrynic acid.  Creatinine today is 1.24.  We will monitor BMP while undergoing IV diuresis.     7.  Obstructive sleep apnea.  Continue CPAP while sleeping with home settings.     8.  Deep venous thrombosis prophylaxis.  The patient is on Pradaxa.      CODE STATUS:  Full code, confirmed with him on admission.      This patient was discussed with Dr. Madhav Juares, [facility].  He is in agreement with my assessment and plan of care.         MADHAV JUARES MD       As dictated by SANG DIGGS PA-C            D: 2019   T: 2019   MT: ARABELLA      Name:     HIRO IBARRA   MRN:      [id number]        Account:      [id number]   :      [id number]        Admitted:     2019                   Document: [id number]       cc: Mg Nelson MD

## 2019-04-08 NOTE — TELEPHONE ENCOUNTER
Called patient to see how his weight, swelling and SOB have been after starting the Eplerenone. Pt stated that his weight continues to go up. This is the worst he has felt and he feels he needs to be looked at as his swelling has now travelled from his legs and abdomen to his testicles. Pt stated that his breathing is slightly worse than last week. Pt stated that he is going to ER as he feels he needs more than oral diuretics at this point. RN agreed.

## 2019-04-08 NOTE — ED PROVIDER NOTES
"  History     Chief Complaint:  shortness of breath     HPI   Bairon Foster is a 88 year old male who presents with shortness of breath and leg swelling. The patient states that lately he has had multiple adjustments to the diuretic medication has been on and since the last adjustment 5 days ago he has had increased leg and abdominal edema. The patient also reports shortness of breath, especially going up stairs. He denies any chest pain, abdominal pain, fevers, or cough. He notes that his leg swelling started after an ablation.       Allergies:  No known allergies     Medications:    Tylenol  Wellbutrin  Celluvisc  Coreg  Temovate  Pradaxa  Voltaren  Inspra  Imdur  Zaditor  Klor-con  Synthroid  Ativan  Prilosec  Zocor  Carafate  Flomax  Kenalog  Tranexamic acid     Past Medical History:    Cerebrovascular disease  Arrhythmia  GERD  Hydrocele  Hyperlipidemia  Inguinal hernia  Ischemic cardiomyopathy  Major depression  JIM on CPAP  Lymphedema  Pancytopenia  Anemia  PAD  Persistent atrial fibrillation  Prostatic hypertrophy  Pulmonary HTN  Thrombocytopenia  Hernia  CAD  Hypothyroidism    Past Surgical History:    Eye surgery  TUNA  Right inguinal hernia repair  Umbilical hernia repair  Implant pacemaker  Bilateral total knee replacement  Thumb surgery    Family History:    Cardiovascular disease  CAD  Lung cancer    Social History:  Patient presents with wife  Negative for tobacco use.  Occasional alcohol use  Marital Status:        Review of Systems   Constitutional: Negative for fever.   Respiratory: Positive for shortness of breath. Negative for cough.    Cardiovascular: Positive for leg swelling. Negative for chest pain.   Gastrointestinal: Positive for abdominal distention. Negative for abdominal pain.   All other systems reviewed and are negative.      Physical Exam   First Vitals:  BP: 120/66  Pulse: 56  Heart Rate: 60  Temp: 97.9  F (36.6  C)  Resp: 18  Height: 188 cm (6' 2\")  Weight: 85.3 kg (188 " lb)  SpO2: 95 %      Physical Exam  Vitals: reviewed by me  General: Pt seen on [facility], cooperative, and alert to conversation  Eyes: Tracking well, clear conjunctiva BL  ENT: MMM, midline trachea.   Lungs:   No tachypnea, no accessory muscle use. No respiratory distress.   CV: Rate as above  Abd: Soft, non tender, no guarding, no rebound. Non distended  MSK: 3-4+ pitting edema from the thighs downward into his lower extremities.  This is symmetric.  Also has some pitting edema to his abdomen, consistent with anasarca.  Skin: No rash, normal turgor and temperature  Neuro: Clear speech and no facial droop.  Psych: Not RIS, no e/o AH/VH      Emergency Department Course   Imaging:  Radiographic findings were communicated with the patient who voiced understanding of the findings.  X-ray Chest, 2 views:  Mild opacity at both lung bases is nonspecific, but could  be related to atelectasis or edema. Cardiac enlargement has increased  slightly since the previous exam. There is mild pulmonary venous  congestion, also increased since the previous exam. Single-lead  cardiac device left chest wall, with lead tip unchanged in the RV. No  pleural effusions are identified. Aortic calcification.  Result per radiology.      Laboratory:  CBC: WBC: 2.9 (L), HGB: 9.8 (L), PLT: 70 (L)  CMP: Glucose 107 (H), GFR: 52 (L), Bilirubin: 1.4 (h), o/w WNL   BNP: 578    Interventions:  1427 - Bumex 1 mg IV    Emergency Department Course:  Nursing notes and vitals reviewed.  1205: I performed an exam of the patient as documented above.     1329: I discussed the case with Dr. Walker of cardiology regarding the patient.    1358: Findings and plan explained to the Patient who consents to admission.     1425: Discussed the patient with Dr. Snell, who will admit the patient to a observation bed for further monitoring, evaluation, and treatment.     Impression & Plan    Medical Decision Making:  Bairon Foster is a very pleasant 88  year old male who presents to the emergency room with what appears to be shortness of breath, but also with worsening edema. The patient s main issue is that it is harder to get around with his edema, and indeed on my exam he does have borderline anasarca. Interestingly, he does not seem to have much in the way of pulmonary edema as he de-sats to the 90s on his ambulation trial and does not require oxygen here. I did offer an outpatient avenue for the patient to try a new diuretic, after having spoken with the cardiologist on call. The patient states he does not feel safe at home, would prefer IV diuretics at this time. As he has been compliant and tried numerous drugs as an outpatient for his diuresis, and these appear to have failed, I do think it is reasonable to have him admitted here. I spoke with Dr. Snell who very kindly agreed to accept care of the patient. I will note that the patient is about 25 pounds over his dry weight, main issue is not respiratory distress, but significant edema. His creatinine has been an issue in the past, and this may also be an additional reason to keep him in the hospital overnight. I have ordered 1 mg of IV Bumex, in conjunction with cardiology s recommendations as he is still believed to be sulfa allergic. Will monitor very closely until inpatient bed is available.     Diagnosis:    ICD-10-CM    1. Hypervolemia, unspecified hypervolemia type E87.70        I, Roberto Renner, am serving as a scribe on 4/8/2019 at 12:05 PM to personally document services performed by Lev Chang MD based on my observations and the provider's statements to me.       Roberto Renner  4/8/2019    EMERGENCY DEPARTMENT       Lev Chang MD  04/08/19 [id number]

## 2019-04-08 NOTE — ED TRIAGE NOTES
Pt presents in wheelchair, reports being sent by PCP for fluid retention, pt states feeling short of breath and having swelling in feet, pt states weight has been up

## 2019-04-08 NOTE — ED NOTES
"[facility]  ED Nurse Handoff Report    ED Chief complaint: Shortness of Breath      ED Diagnosis:   Final diagnoses:   None       Code Status: not addressed     Allergies: No Known Allergies    Activity level - Baseline/Home:  Independent    Activity Level - Current:   Stand with Assist     Needed?: No    Isolation: No  Infection: Not Applicable  Bariatric?: No    Vital Signs:   Vitals:    04/08/19 1158   BP: 120/66   Pulse: 56   Resp: 18   Temp: 97.9  F (36.6  C)   TempSrc: Tympanic   SpO2: 95%   Weight: 85.3 kg (188 lb)   Height: 1.88 m (6' 2\")       Cardiac Rhythm: ,        Pain level: 0-10 Pain Scale: 0    Is this patient confused?: No   Does this patient have a guardian?  No         If yes, is there guardianship documents in the Epic \"Code/ACP\" activity?  No         Guardian Notified?  No  [place] - Suicide Severity Rating Scale Completed? Yes  If yes, what color did the patient score?  White    Patient Report: Initial Complaint: bilat leg edema,worsening since Wed,   Focused Assessment: 4+ edema even in lower abd is pitting   Tests Performed: labs, cxr   Abnormal Results: see results   Treatments provided: no orders     Family Comments: wife here     OBS brochure/video discussed/provided to patient/family: yes            Name of person given brochure if not patient: na              Relationship to patient: na    ED Medications: Medications - No data to display    Drips infusing?:  No    For the majority of the shift this patient was Green.   Interventions performed were na.    Severe Sepsis OR Septic Shock Diagnosis Present: No    To be done/followed up on inpatient unit:  none at this time     ED NURSE PHONE NUMBER: [phone]       "

## 2019-04-08 NOTE — PROGRESS NOTES
Observation goals PRIOR TO DISCHARGE     Comments: -diagnostic tests and consults completed and resulted- In progress  -vital signs normal or at patient baseline- met  -returns to baseline functional status -In progress. Up with A1 and GB  -safe disposition plan has been identified -In progress  Nurse to notify provider when observation goals have been met and patient is ready for discharge.

## 2019-04-08 NOTE — PROGRESS NOTES
RECEIVING UNIT ED HANDOFF REVIEW    ED Nurse Handoff Report was reviewed by: Aggie Manning on April 8, 2019 at 3:34 PM

## 2019-04-09 ENCOUNTER — APPOINTMENT (OUTPATIENT)
Dept: OCCUPATIONAL THERAPY | Facility: CLINIC | Age: 84
DRG: 287 | End: 2019-04-09
Attending: PHYSICIAN ASSISTANT
Payer: COMMERCIAL

## 2019-04-09 LAB
ANION GAP SERPL CALCULATED.3IONS-SCNC: 5 MMOL/L (ref 3–14)
ANION GAP SERPL CALCULATED.3IONS-SCNC: 6 MMOL/L (ref 3–14)
BUN SERPL-MCNC: 23 MG/DL (ref 7–30)
BUN SERPL-MCNC: 26 MG/DL (ref 7–30)
CALCIUM SERPL-MCNC: 9.4 MG/DL (ref 8.5–10.1)
CALCIUM SERPL-MCNC: 9.6 MG/DL (ref 8.5–10.1)
CHLORIDE SERPL-SCNC: 105 MMOL/L (ref 94–109)
CHLORIDE SERPL-SCNC: 106 MMOL/L (ref 94–109)
CO2 SERPL-SCNC: 28 MMOL/L (ref 20–32)
CO2 SERPL-SCNC: 29 MMOL/L (ref 20–32)
CREAT SERPL-MCNC: 1.22 MG/DL (ref 0.66–1.25)
CREAT SERPL-MCNC: 1.26 MG/DL (ref 0.66–1.25)
ERYTHROCYTE [DISTWIDTH] IN BLOOD BY AUTOMATED COUNT: 16.5 % (ref 10–15)
GFR SERPL CREATININE-BSD FRML MDRD: 51 ML/MIN/{1.73_M2}
GFR SERPL CREATININE-BSD FRML MDRD: 53 ML/MIN/{1.73_M2}
GLUCOSE SERPL-MCNC: 108 MG/DL (ref 70–99)
GLUCOSE SERPL-MCNC: 98 MG/DL (ref 70–99)
HCT VFR BLD AUTO: 30.2 % (ref 40–53)
HGB BLD-MCNC: 10.1 G/DL (ref 13.3–17.7)
MAGNESIUM SERPL-MCNC: 2.2 MG/DL (ref 1.6–2.3)
MCH RBC QN AUTO: 33 PG (ref 26.5–33)
MCHC RBC AUTO-ENTMCNC: 33.4 G/DL (ref 31.5–36.5)
MCV RBC AUTO: 99 FL (ref 78–100)
PLATELET # BLD AUTO: 67 10E9/L (ref 150–450)
POTASSIUM SERPL-SCNC: 3.9 MMOL/L (ref 3.4–5.3)
POTASSIUM SERPL-SCNC: 4 MMOL/L (ref 3.4–5.3)
RBC # BLD AUTO: 3.06 10E12/L (ref 4.4–5.9)
SODIUM SERPL-SCNC: 139 MMOL/L (ref 133–144)
SODIUM SERPL-SCNC: 140 MMOL/L (ref 133–144)
WBC # BLD AUTO: 2.8 10E9/L (ref 4–11)

## 2019-04-09 PROCEDURE — 12000000 ZZH R&B MED SURG/OB

## 2019-04-09 PROCEDURE — 97535 SELF CARE MNGMENT TRAINING: CPT | Mod: GO

## 2019-04-09 PROCEDURE — 80048 BASIC METABOLIC PNL TOTAL CA: CPT | Performed by: HOSPITALIST

## 2019-04-09 PROCEDURE — 25000132 ZZH RX MED GY IP 250 OP 250 PS 637: Performed by: PHYSICIAN ASSISTANT

## 2019-04-09 PROCEDURE — 36415 COLL VENOUS BLD VENIPUNCTURE: CPT | Performed by: PHYSICIAN ASSISTANT

## 2019-04-09 PROCEDURE — 25000128 H RX IP 250 OP 636: Performed by: PHYSICIAN ASSISTANT

## 2019-04-09 PROCEDURE — 25000132 ZZH RX MED GY IP 250 OP 250 PS 637: Performed by: INTERNAL MEDICINE

## 2019-04-09 PROCEDURE — 99232 SBSQ HOSP IP/OBS MODERATE 35: CPT | Performed by: HOSPITALIST

## 2019-04-09 PROCEDURE — 85027 COMPLETE CBC AUTOMATED: CPT | Performed by: PHYSICIAN ASSISTANT

## 2019-04-09 PROCEDURE — 97110 THERAPEUTIC EXERCISES: CPT | Mod: GO

## 2019-04-09 PROCEDURE — 25000128 H RX IP 250 OP 636: Performed by: INTERNAL MEDICINE

## 2019-04-09 PROCEDURE — 25000125 ZZHC RX 250: Performed by: NURSE PRACTITIONER

## 2019-04-09 PROCEDURE — 83735 ASSAY OF MAGNESIUM: CPT | Performed by: PHYSICIAN ASSISTANT

## 2019-04-09 PROCEDURE — 97166 OT EVAL MOD COMPLEX 45 MIN: CPT | Mod: GO

## 2019-04-09 PROCEDURE — 80048 BASIC METABOLIC PNL TOTAL CA: CPT | Performed by: PHYSICIAN ASSISTANT

## 2019-04-09 PROCEDURE — 99222 1ST HOSP IP/OBS MODERATE 55: CPT | Performed by: INTERNAL MEDICINE

## 2019-04-09 PROCEDURE — 36415 COLL VENOUS BLD VENIPUNCTURE: CPT | Performed by: HOSPITALIST

## 2019-04-09 RX ORDER — HYDRALAZINE HCL 10 MG
5 TABLET ORAL 3 TIMES DAILY
Status: DISCONTINUED | OUTPATIENT
Start: 2019-04-09 | End: 2019-04-09

## 2019-04-09 RX ADMIN — SUCRALFATE 1 G: 1 TABLET ORAL at 20:16

## 2019-04-09 RX ADMIN — BUMETANIDE 1 MG: 0.25 INJECTION INTRAMUSCULAR; INTRAVENOUS at 00:02

## 2019-04-09 RX ADMIN — TAMSULOSIN HYDROCHLORIDE 0.4 MG: 0.4 CAPSULE ORAL at 21:51

## 2019-04-09 RX ADMIN — LEVOTHYROXINE SODIUM 100 MCG: 100 TABLET ORAL at 08:50

## 2019-04-09 RX ADMIN — OMEPRAZOLE 20 MG: 20 CAPSULE, DELAYED RELEASE ORAL at 06:42

## 2019-04-09 RX ADMIN — SUCRALFATE 1 G: 1 TABLET ORAL at 08:50

## 2019-04-09 RX ADMIN — POTASSIUM CHLORIDE 20 MEQ: 1500 TABLET, EXTENDED RELEASE ORAL at 08:50

## 2019-04-09 RX ADMIN — BUMETANIDE 1 MG: 0.25 INJECTION INTRAMUSCULAR; INTRAVENOUS at 06:42

## 2019-04-09 RX ADMIN — KETOTIFEN FUMARATE 1 DROP: 0.35 SOLUTION/ DROPS OPHTHALMIC at 08:50

## 2019-04-09 RX ADMIN — SIMVASTATIN 20 MG: 10 TABLET, FILM COATED ORAL at 08:50

## 2019-04-09 RX ADMIN — FINASTERIDE 5 MG: 5 TABLET, FILM COATED ORAL at 08:50

## 2019-04-09 RX ADMIN — DABIGATRAN ETEXILATE MESYLATE 150 MG: 150 CAPSULE ORAL at 08:49

## 2019-04-09 RX ADMIN — BUMETANIDE 0.25 MG/HR: 0.25 INJECTION INTRAMUSCULAR; INTRAVENOUS at 18:07

## 2019-04-09 RX ADMIN — BUPROPION HYDROCHLORIDE 150 MG: 150 TABLET, FILM COATED, EXTENDED RELEASE ORAL at 08:50

## 2019-04-09 RX ADMIN — SPIRONOLACTONE 12.5 MG: 25 TABLET ORAL at 08:50

## 2019-04-09 RX ADMIN — BUMETANIDE 1 MG: 0.25 INJECTION INTRAMUSCULAR; INTRAVENOUS at 15:09

## 2019-04-09 RX ADMIN — DABIGATRAN ETEXILATE MESYLATE 150 MG: 150 CAPSULE ORAL at 20:16

## 2019-04-09 RX ADMIN — ISOSORBIDE MONONITRATE 30 MG: 30 TABLET, EXTENDED RELEASE ORAL at 08:50

## 2019-04-09 ASSESSMENT — MIFFLIN-ST. JEOR: SCORE: 1759.89

## 2019-04-09 ASSESSMENT — ACTIVITIES OF DAILY LIVING (ADL)
ADLS_ACUITY_SCORE: 16
ADLS_ACUITY_SCORE: 18
ADLS_ACUITY_SCORE: 18
ADLS_ACUITY_SCORE: 17
ADLS_ACUITY_SCORE: 18
ADLS_ACUITY_SCORE: 17

## 2019-04-09 NOTE — PLAN OF CARE
"Discharge Planner OT   Patient plan for discharge: home  Current status: Cardiac rehab orders received on 87yo male admitted with CHF exacerbation. Pt lives with spouse in 1-story home with basement \"mancave\", laundry and shower. He reports he is indep mobility without use of adaptive device, indep in all self-cares, laundry,  med and financial mgmt, driving. His spouse does the cooking but he reports she has memory issues, problem with sight and hearing. He admits to having had \"about\" 3 falls including the one at admit over last few months but denies need for use of adaptive device. Today he amb 400+ft with CGA, slightly unsteady on feet but no LOB, then completed 6 stairs with SBA. Denied symptoms. /76, but HR 39 at completion. Pt required Min A with LB dressing and toileting. Oriented x 3 and to reason for hospitalization and baseline info but did demonstrate some impaired recall/forgetfulness during session. OT/CR to see daily per POC to progress activity with skilled monitoring of cardiac response, for education in CHF management, to further assess cognition as related to safety w/continued IADLs and to progress indep with ADLs.   Barriers to return to prior living situation: impaired balance/fall risk, currently requiring assist with self-cares  Recommendations for discharge: TCU  Rationale for recommendations: pt will benefit from continued OT to further address safety and indep with ADLs/IADLs prior [place] situation. May do better once BLE edema and scrotal edema improved. Anticipate patient will be opposed to TCU as lacks insight regarding his current level of performance.        Entered by: Alan Real 04/09/2019 2:10 PM      "

## 2019-04-09 NOTE — PLAN OF CARE
Pt. A&o, vss, up with one assist, denied any pain, +2 edema on bilateral LE and +4 on scrotum, voiding in b.r, started on Bumex this evening at 1cc/hr continuous, Will continue to monitor.r

## 2019-04-09 NOTE — CONSULTS
Care Transition Initial Assessment - SW     Met with: PATIENT  Active Problems:    Acute systolic CHF (congestive heart failure) (H)    CHF (congestive heart failure) (H)       DATA  Lives With: spouse   Quality of Family Relationships: involved, supportive  Description of Support System: Supportive, Involved  Who is your support system?: Wife  Support Assessment: Adequate family and caregiver support.   Identified issues/concerns regarding health management: SW following for d.c needs  Quality of Family Relationships: involved, supportive     ASSESSMENT  Cognitive Status:  Alert and oriented  Concerns to be addressed: Patient is a 88 year old male who was admitted to the hospital for Acute systolic CHF. Prior to hospitalization patient was living at home with spouse. Patient is currently requiring an assist of 1. PT is to assess patient and will likely make discharge recommendations. Once assessment is complete, SW will discuss further with patient. Patient has [employer] and will require prior auth if TCU is needed.     PLAN  Financial costs for the patient includes   Patient given options and choices for discharge   Patient/family is agreeable to the plan?  YES  Patient Goals and Preferences: [id number]  Patient anticipates discharging to:  [facility]   *[id number]

## 2019-04-09 NOTE — CONSULTS
[facility]    Cardiology Consultation     Date of Admission:  4/8/2019    Assessment & Plan   Bairon Foster is a 88 year old male who was admitted on 4/8/2019. I was asked to see the patient for possible CHF exacerbation.    1. Acute on chronic systolic and diastolic congestive heart failure exacerbation    CHELLE notes an EF at 30 % with a mildly dilated LV and moderate global hypokinesis of the LV. RV moderately dilated with decreased function.    EF down from 53% in 2018 to 30% now    CXR notes mild vascular congestion        I/O negative 2.5 L    Weight 224 pounds ~ 25 pounds above dry weight.     Dry weight ~189 pounds     Diuresing with IV Bumex 1 mg TID and Spironolactone 12.5 mg daily    Renal function stable with diuresis: creatinine 1.22, GFR 53    Carvedilol 6.25 mg BID, Isosorbide 30 mg daily     2. Severe valvular disease consistent with severe mitral and tricuspid regurgitation    CHELLE 03/22/19 notes severe (3-4+) MR. 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 Severe (3-4+) TR. TR jet directed at septum.    Scheduled out patient visit with Dr. Hope 04/15/2019 for evaluation of possible Mitraclip.     Diuresing with IV Bumex 1 mg TID and Spironolactone 12.5 mg daily    Structural heart team notified of the admission     3. Chronic atrial fibrillation with PPM    Pradaxa for anticoagulation    Recent device check noted atrial fibrillation with rates of 40-50.    Due for generator change within the year     4. Coronary artery disease    Single vessel disease of OM with patent stent from 2009 noted on coronary angiogram 03/21/2018    Currently no ASA due to low platelets     Isosorbide 30 mg daily and Simvastatin 20 mg daily    5. Pancytopenia    Hold Pradaxa if platelets are less than 40,000     Follows Hem-onc     6. Acute kidney injury, stage I    Creatinine baseline ~1.2    Elevated to 1.31 at admission    Today- creatinine 1.22, GFR 53    7. Obstructive sleep apnea, CPAP        Plan:  1. Stop IV Bumex q8h  2. Start Bumex drip at 0.25 mg/hr  3. Could Hydralazine 5 mg TID as his blood pressure tolerates for after load reduction   4. Daily weights       Sofiya BurtonWILLIS CNP    Code Status    Full Code    Reason for Consult   Reason for consult: edema and dyspnea on exertion     Primary Care Physician   Mg Nelson    Chief Complaint   Edema and dyspnea on exertion    History is obtained from the patient    History of Present Illness   Bairon Foster is a 88 year old male with a past medical history significant for systolic and diastolic heart failure, coronary artery disease, progressive worsening MR and TR, peripheral artery disease, hyperlipidemia, IJM, PPM placement,  Pancytopenia, atrial fibrillation, and [facility] with recent recommendations to avoid sulfa medications as possible contributors, this includes avoiding torsemide and Lasix. He presents with edema and shortness of breath.    He reports worsening of lower extremity edema over the last five days along with dyspnea on exertion. He has been compliant with medications. He denies a high sodium intake. He denies associated symptoms like chest pain, PND, orthopnea, cough.    The patient follows up regularly with Cardiology and was last seen by Mackenzie Tejada on 03/27/2019 in the Cardiology Clinic.  He has had multiple diuretic changes recently.  Most recently in February, he saw his dermatologist, which had him discontinue his torsemide and switched to ethacrynic acid for diuresis, as it was thought that his torsemide was causing a rash.  The patient did not receive his ethacrynic acid medication due to a delay in delivery and was without his diuretic for what sounds like a couple of weeks.  His weight increased 18 pounds at his followup Cardiology visit.  He was placed back on torsemide until he was able to obtain ethacrynic acid and then was able to lose 7 pounds since that was  changed.      Given his significant recent heart failure, he had a transesophageal echocardiogram done on 03/13/2019.  This showed a decreased ejection fraction of 30%, down from previous 53% in 2018. There is moderate global hypokinesis of the left ventricle. The right ventricle is moderately dilated with a decreased RV function.  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  He also has severe tricuspid valve regurgitation.      He was planned to have followup later this month with Dr. Hope to discuss a possible mitral clip.  Also, notably, he underwent a coronary angiogram on March 21, 2018 for worsening shortness of breath that showed single vessel coronary artery disease with patent stent from 2009 in the obtuse marginal. LVEDP pressure of 23 mmHg at that time. He has a single-lead pacemaker and his device check recently demonstrated underlying rhythm of atrial fibrillation with rates of 40-50.  He is anticoagulated with Pradaxa.  He is due for a pacemaker generator change later this year.     Past Medical History   I have reviewed this patient's medical history and updated it with pertinent information if needed.   Past Medical History:   Diagnosis Date     Acute, but ill-defined, cerebrovascular disease     NO RESIDUALS     Antiplatelet or antithrombotic long-term use      Arrhythmia     a fib     Blood in stool      Coronary atherosclerosis of unspecified type of vessel, native or graft     S/P PTCA, EF 50%     Esophageal reflux      Hydrocele, unspecified      Hypercholesterolemia      Hypersomnia with sleep apnea, unspecified      Inguinal hernia without mention of obstruction or gangrene, recurrent unilateral or unspecified      Ischemic cardiomyopathy      Major depression in complete remission (H)      Obese      Onychomycosis      JIM on CPAP      Other and unspecified hyperlipidemia      Other lymphedema     GROIN AND THIGHS     Other pancytopenia  (H)      Other specified anemias      Overweight      Pacemaker      PAD (peripheral artery disease) (H)      Pancytopenia (H)      Persistent atrial fibrillation (H)     VVI PM for long pauses     Prostatic hypertrophy, benign      Pulmonary hypertension (H)      Scrotal varices      Thrombocytopenia, unspecified (H)      Umbilical hernia without obstruction and without gangrene      Unilateral or unspecified femoral hernia without mention of obstruction or gangrene, unilateral or unspecified      Unspecified hypothyroidism      Venous stasis dermatitis of both lower extremities      Vitamin D deficiency        Past Surgical History   I have reviewed this patient's surgical history and updated it with pertinent information if needed.  Past Surgical History:   Procedure Laterality Date     EYE SURGERY      CATARACT/BLEPHAROPLASTY     GENITOURINARY SURGERY      TUNA     HERNIA REPAIR      RIGHT INGUINAL     HERNIORRHAPHY INGUINAL  2012    Procedure: HERNIORRHAPHY INGUINAL;  right inguinal hernia repair;  Surgeon: Kareem Torrez MD;  Location: [facility]     HERNIORRHAPHY UMBILICAL N/A 10/31/2017    Procedure: HERNIORRHAPHY UMBILICAL;  UMBILICAL HERNIA REPAIR WITH MESH;  Surgeon: Scar Harrington MD;  Location: [facility]     IMPLANT PACEMAKER  2009    single chamber     ORTHOPEDIC SURGERY      [facility] [place]     ORTHOPEDIC SURGERY      right TKR, left TKR     SURGICAL HISTORY OF -       Thumb surgery     SURGICAL HISTORY OF -       Left total knee replacement     SURGICAL HISTORY OF -   3/11    [facility] total knee replacement       Prior to Admission Medications   Prior to Admission Medications   Prescriptions Last Dose Informant Patient Reported? Taking?   ASPIRIN NOT PRESCRIBED (INTENTIONAL)  Self Yes Yes   Sig: Please choose reason not prescribed, below   BuPROPion HCl (WELLBUTRIN XL PO) 2019 at am Self Yes Yes   Sig: Take 150 mg by mouth every morning   COMPRESSION STOCKINGS  Self Yes No   Si each daily    Carboxymethylcellulose Sod PF (CELLUVISC/REFRESH LIQUIGEL) 1 % ophthalmic solution Past Week Self Yes Yes   Sig: Place 1 drop into both eyes 3 times daily as needed for dry eyes   GLUCOSAMINE-CHONDROITIN PO 4/8/2019 at am Self Yes Yes   Sig: Take 2 tablets by mouth daily   KLOR-CON 20 MEQ CR tablet 4/8/2019 at am Self No Yes   Sig: TAKE 1 TABLET EVERY DAY   LORazepam (ATIVAN) 1 MG tablet Past Month at Unknown time Self No Yes   Sig: Take 0.5-1 tablets (0.5-1 mg) by mouth every 8 hours as needed for anxiety   Multiple Vitamins-Minerals (CENTRUM SILVER) per tablet 4/8/2019 at am Self Yes Yes   Sig: Take 1 tablet by mouth daily   ORDER FOR DME  Self No No   Sig: Auto-CPAP:Max 9 cm H2OMin 9 cm H2O  Continuous Lifetime need and heated humidity.      Polyethylene Glycol 3350 (MIRALAX PO) 4/7/2019 at pm Self Yes Yes   Sig: Take 1 packet by mouth At Bedtime    acetaminophen (TYLENOL) 500 MG tablet Past Month at Unknown time Self No Yes   Sig: Take 2 tablets (1,000 mg) by mouth every 6 hours as needed for mild pain   carvedilol (COREG) 6.25 MG tablet 4/8/2019 at am Self No Yes   Sig: TAKE 1 TABLET (6.25 MG) BY MOUTH 2 TIMES DAILY (WITH MEALS)   clobetasol (TEMOVATE) 0.05 % cream Past Month at Unknown time Self No Yes   Sig: Apply sparingly to affected area twice daily as needed.  Do not apply to face.   dabigatran ANTICOAGULANT (PRADAXA ANTICOAGULANT) 75 MG CAPS BLISTER PACK 4/8/2019 at am Self Yes Yes   Sig: Take 150 mg by mouth 2 times daily. Store in original 's bottle or blister pack; use within 120 days of opening.   diclofenac (VOLTAREN) 1 % topical gel 4/8/2019 at am Self No Yes   Sig: Apply 2 g topically 4 times daily   eplerenone (INSPRA) 50 MG tablet 4/8/2019 at sm Self No Yes   Sig: Take 1 tablet (50 mg) by mouth daily   ethacrynic acid (EDECRIN) 25 MG tablet Past Week at Unknown time Self No Yes   Sig: Take 2 tablets (50 mg) by mouth daily   finasteride (PROSCAR) 5 MG tablet 4/8/2019 at am Self No  Yes   Sig: TAKE 1 TABLET EVERY DAY   isosorbide mononitrate (IMDUR) 30 MG 24 hr tablet 4/8/2019 at am Self No Yes   Sig: Take 1 tablet (30 mg) by mouth daily   ketotifen (ZADITOR) 0.025 % SOLN ophthalmic solution 4/8/2019 at am Self No Yes   Sig: Place 1 drop into both eyes every 12 hours   levothyroxine (SYNTHROID/LEVOTHROID) 100 MCG tablet 4/8/2019 at am Self No Yes   Sig: TAKE 1 TABLET EVERY DAY   omeprazole (PRILOSEC) 20 MG DR capsule 4/8/2019 at am Self No Yes   Sig: TAKE 1 CAPSULE TWICE DAILY   simvastatin (ZOCOR) 20 MG tablet 4/8/2019 at am Self Yes Yes   Sig: Take 20 mg by mouth daily    spironolactone (ALDACTONE) 25 MG tablet 4/8/2019 at am Self Yes Yes   Sig: Take 12.5 mg by mouth daily   sucralfate (CARAFATE) 1 GM tablet 4/8/2019 at am Self No Yes   Sig: Take 1 tablet (1 g) by mouth 2 times daily   tamsulosin (FLOMAX) 0.4 MG capsule 4/7/2019 at pm Self No Yes   Sig: TAKE 1 CAPSULE EVERY DAY   triamcinolone (KENALOG) 0.025 % cream  Self Yes Yes   Sig: Apply topically 2 times daily as needed       Facility-Administered Medications: None     Allergies   No Known Allergies    Social History   I have reviewed this patient's social history and updated it with pertinent information if needed. Bairon Foster  reports that he has never smoked. He has never used smokeless tobacco. He reports that he drinks alcohol. He reports that he does not use drugs.    Family History   I have reviewed this patient's family history and updated it with pertinent information if needed.   Family History   Problem Relation Age of Onset     Cardiovascular Father      C.A.D. Father      Lung Cancer Sister      Unknown/Adopted Mother      Cancer - colorectal No family hx of        Review of Systems   The 10 point Review of Systems is negative other than noted in the HPI or here.     Physical Exam   Temp: 97.6  F (36.4  C) Temp src: Oral BP: 119/76 Pulse: 58 Heart Rate: 60 Resp: 16 SpO2: 97 % O2 Device: None (Room air)    Vital Signs  with Ranges  Temp:  [95.3  F (35.2  C)-98.1  F (36.7  C)] 97.6  F (36.4  C)  Pulse:  [58-60] 58  Heart Rate:  [39-62] 60  Resp:  [15-18] 16  BP: (112-122)/(65-81) 119/76  SpO2:  [94 %-97 %] 97 %  224 lbs 14.4 oz    Constitutional: no acute distress  Respiratory: bibasilar crackles  Cardiovascular: irregularly irregular, S1 and S2, grade 2-3/6 systolic holosystolic murmur LLSB to apex   GI: soft, non-tender  Skin: warm and dry  Musculoskeletal: 2+ pitting edema bilateral  Neurologic: AOx3, forgetful       Data   Most Recent 3 CBC's:  Recent Labs   Lab Test 04/09/19  0628 04/08/19 1218 11/20/18  1339   WBC 2.8* 2.9* 2.8*   HGB 10.1* 9.8* 10.3*   MCV 99 100 103*   PLT 67* 70* 64*     Most Recent 3 BMP's:  Recent Labs   Lab Test 04/09/19  0628 04/08/19  1218 03/27/19  1243    139 141   POTASSIUM 4.0 4.6 4.2   CHLORIDE 106 109 106   CO2 28 26 27   BUN 26 27 23   CR 1.22 1.24 1.31*   ANIONGAP 5 4 12.2   TEODORO 9.4 9.2 9.3   GLC 98 107* 92     Most Recent 3 BNP's:  Recent Labs   Lab Test 04/08/19  1218 04/12/18  0919 01/08/18  1545   NTBNPI 578  --   --    NTBNP  --  357 356

## 2019-04-09 NOTE — PROGRESS NOTES
[facility]    Hospitalist Progress Note      Assessment & Plan   Bairon Foster is a 88 year old male who was admitted on 4/8/2019 for edema and shortness of breath, found to be in acute combined systolic/diastolic congestive heart failure.    Acute on chronic combined systolic and diastolic congestive heart failure exacerbation  The patient has had multiple diuretic changes recently and had been taken off his torsemide, as there was concern this could be causing a rash.  He was supposed to start ethacrynic acid but had a delay in obtaining this medication and gained 18 pounds.  After being seen in Cardiology followup, his torsemide was restarted until he was able to obtain is ethacrynic acid.  This was started at 25 mg and then increased to 50 mg.  He is also continued on his Inspra and spironolactone.  Despite being medication compliant, he has had worsening edema now with dyspnea on exertion.  He is not hypoxic.  Last CHELLE from March showed a significant drop in his ejection fraction from 53%, now down to 30%.  There is also progression of his mitral valve and tricuspid valve regurgitation with moderate to severe to severe mitral regurgitation.  Valvular disease is likely contributing to his congestive heart failure exacerbation.  He has an appointment with Dr. Hope to discuss if he would be a mitral clip candidate.  - Continue IV bumex 1mg q8h  - Weight actually reported to increase since yesterday, however is over 2L negative per I/Os.  - Continue close I/Os, with daily weights  - Refused eplerenone this AM.  - Continue coreg, spironolactone, imdur   - Cardiology consulted, defer further adjustments to med regimen to them (as he has had frequent adjustments to his medications over the past few weeks)  - Appreciate nutrition consult, patient has reported intake of foods typically high in sodium and would need to make dietary adjustments     Mitral regurgitation/tricuspid regurgitation  The  patient has had progressive deterioration of his mitral valve, now moderately severe to severe regurgitation and has 3+ tricuspid regurgitation.  He is currently in heart failure.  He will be diuresed.  He has followup with Dr. Hope to discuss if he would be a mitral clip candidate.  Cardiology consulted.      Chronic atrial fibrillation, on Pradaxa  Device checked during his last Cardiology visit.  Single-chamber pacemaker, implanted in 08/2009.  Device check demonstrated an underlying rhythm of atrial fibrillation with heart rate of 40-50.  He has less than 0.5 years remaining on the battery.  The plan is to recheck this in 1 months' time and likely will need generator change.      Coronary artery disease  History of OM stenting in 2009.  Coronary angiogram in 03/2018 demonstrated no flow-limiting lesions.  Spironolactone was added to his regimen after his left cath.  He was trialed on higher doses but developed nipple tenderness.  He is on statin therapy.  He is not on aspirin given his low platelets.     Pancytopenia  Hx chronically low platelets and [facility]  His platelets had dropped to 44,000 as of 03/11/2019 and hemoglobin was 9.5.  He now has pancytopenia and saw Hematology/Oncology.  He reports he had a bone marrow biopsy, although I do not see the results of this.  - Hold pradaxa if platelets <40k  - Outpt hem/onc follow up  - Other counts stable, daily CBC while inpatient     Acute kidney injury, stage 1  The patient had a recent rise in serum creatinine up to 1.31 after titration of his ethacrynic acid. Creat on admit was 1.24, stable at 1.22 on 4/9  - Monitor daily BMP while diuresising     Obstructive sleep apnea  Continue CPAP while sleeping with home settings.     DVT Prophylaxis: Pradaxa  Code Status: Full Code  Expected discharge: 2 - 3 days, recommended to transitional care unit once diuresis adequate    Aggie Akhtar, DO  Text Page (7am - 6pm)    Interval  History   Patient seen and examined. Some confusion about eplerenone (he refused it this morning, then denied that he refused any medications). States his swelling has gone down since yesterday. Denies shortness of breath, chest pain, abdominal pain, nausea, vomiting.    -Data reviewed today: I reviewed all new labs and imaging results over the last 24 hours. I personally reviewed no images or EKG's today.    Physical Exam   Temp: 97.6  F (36.4  C) Temp src: Oral BP: 119/76 Pulse: 58 Heart Rate: 60 Resp: 16 SpO2: 97 % O2 Device: None (Room air)    Vitals:    04/08/19 1158 04/08/19 1605 04/09/19 0030   Weight: 85.3 kg (188 lb) 98.5 kg (217 lb 1.6 oz) 102 kg (224 lb 14.4 oz)     Vital Signs with Ranges  Temp:  [95.3  F (35.2  C)-98.1  F (36.7  C)] 97.6  F (36.4  C)  Pulse:  [58-60] 58  Heart Rate:  [39-62] 60  Resp:  [15-21] 16  BP: (112-133)/(65-82) 119/76  SpO2:  [94 %-97 %] 97 %  I/O last 3 completed shifts:  In: 540 [P.O.:540]  Out: 2110 [Urine:2110]    Constitutional: Awake, alert, cooperative, no apparent distress  Respiratory: Occasional crackles bilaterally posteriorly, clear anteriorly  Cardiovascular: irregularly irregular, normal S1 and S2, and no murmur noted  GI: Normal bowel sounds, soft, non-distended, non-tender  Skin/Integumen: No rashes, no cyanosis, 2+ pitting edema  Other: Scrotal edema    Medications     - MEDICATION INSTRUCTIONS -       ACE/ARB/ARNI NOT PRESCRIBED         bumetanide  1 mg Intravenous Q8H     buPROPion  150 mg Oral QAM     carvedilol  6.25 mg Oral BID w/meals     dabigatran ANTICOAGULANT  150 mg Oral BID     eplerenone  50 mg Oral Daily     finasteride  5 mg Oral Daily     isosorbide mononitrate  30 mg Oral Daily     ketotifen  1 drop Both Eyes BID     levothyroxine  100 mcg Oral Daily     omeprazole  20 mg Oral BID AC     potassium chloride ER  20 mEq Oral Daily     simvastatin  20 mg Oral Daily     sodium chloride (PF)  3 mL Intracatheter Q8H     spironolactone  12.5 mg Oral  Daily     sucralfate  1 g Oral BID     tamsulosin  0.4 mg Oral At Bedtime       Data   Recent Labs   Lab 04/09/19  0628 04/08/19  1218   WBC 2.8* 2.9*   HGB 10.1* 9.8*   MCV 99 100   PLT 67* 70*    139   POTASSIUM 4.0 4.6   CHLORIDE 106 109   CO2 28 26   BUN 26 27   CR 1.22 1.24   ANIONGAP 5 4   TEODORO 9.4 9.2   GLC 98 107*   ALBUMIN  --  3.4   PROTTOTAL  --  7.3   BILITOTAL  --  1.4*   ALKPHOS  --  67   ALT  --  17   AST  --  25       No results found for this or any previous visit (from the past 24 hour(s)).

## 2019-04-09 NOTE — PLAN OF CARE
Pt is A&Ox4, forgetful. Denies pain. C/o discomfort of scrotal and BLE edema.  Diuresing well. Up ao1, gb to void in BR. Incontinent at times. +2 pedal pulses, +3 BLE edema, +4 scrotal edema. LS clear. Tele: 50% V paced, infrequent PVCs. Ambulated with therapy.    Problem: Adult Inpatient Plan of Care  Goal: Absence of Hospital-Acquired Illness or Injury  Outcome: Improving  Goal: Optimal Comfort and Wellbeing  Outcome: Improving     Heart Failure Care Pathway  GOALS TO BE MET BEFORE DISCHARGE:    1. Decrease congestion and/or edema with diuretic therapy to achieve near      optimal volume status.            Dyspnea improved:  Yes            Edema improved:     Yes        Net I/O and Weights since admission:          03/10 2300 - 04/09 2259  In: 780 [P.O.:780]  Out: 3360 [Urine:3360]  Net: -2580            Vitals:    04/08/19 1158 04/08/19 1605 04/09/19 0030   Weight: 85.3 kg (188 lb) 98.5 kg (217 lb 1.6 oz) 102 kg (224 lb 14.4 oz)         2.  O2 sats > 92% on RA or at prior home O2 therapy level.          Current oxygenation status:       SpO2: 97 %         O2 Device: None (Room air),            Able to wean O2 this shift to keep sats > 92%:  Yes       Does patient use Home O2? No    3.  Tolerates ambulation and mobility near baseline: Yes        How many times did the patient ambulate with nursing staff this shift? 2    Please review the Heart Failure Care Pathway for additional HF goal outcomes.    Silvia Robison RN  4/9/2019

## 2019-04-09 NOTE — PROGRESS NOTES
A/O x4, VSS on RA. Fluid restriction maintained, 240ml this shift. Up with A1, GB to BR with adequate UOP and small stool. Tele 100% V-paced.  BLE edema +2-+3. +4 scrotal edema. Skin tears on chest noted on transfer to unit from Previous cardiac monitoring patches.

## 2019-04-09 NOTE — CONSULTS
REASON FOR ASSESSMENT:  CHF Consult for 2 gm NA Diet Education    NUTRITION HISTORY:    Information obtained from the patient:    Living situation:   Pt lives at home with his wife    Grocery shopping/Meal Prep:  Pt's wife does the grocery shopping and meal preparation  -Pt bought a salt substitute on [employer] and uses that when preparing meals    Breakfast:  Typically eats Cheerios with half and half instead of milk    Lunch:  Skips lunch (eats a late breakfast)    Dinner:   Beer Cheese Soup from [employer]  Pizza  Chow Mein    Previous diet instructions:  None    CURRENT DIET:  Combination Diet 2 gm NA Diet; No Caffeine Diet    NUTRITION DIAGNOSIS:  Food- and nutrition-related knowledge deficit R/t never having received cardiac diet education in the past    INTERVENTIONS:  Nutrition Prescription:  Continue on current low Na diet.    Implementation:    Assessed learning needs, learning preferences, and willingness to learn    Nutrition Education (Content):  a) Provided handouts:  1) Tips for Low Na Diet  2) Label Reading  3) Low Na Foods/Drinks  4) Seasoning Your Food Without Salt  b) Discussed rational for limiting Na for CHF and stressed importance of following 2 gm Na guidelines   c) Encouraged patient to keep a daily food record    Nutrition Education (Application):  a) Discussed current eating habits and recommended alternative food choices    Anticipated good compliance    Diet Education - refer to Education Flowsheet    Goals:    Patient verbalizes understanding of diet by listing 3 ways to lower Na intake    All of the above goals met during the education session    Follow Up:    Provided RD contact information for future questions.    Recommend Out-Patient Nutrition Referral, if further diet instructions are needed.    Yvonne Hanley  Dietetic Intern

## 2019-04-09 NOTE — PLAN OF CARE
PRIMARY DIAGNOSIS: CONGESTIVE HEART FAILURE  OUTPATIENT/OBSERVATION GOALS TO BE MET BEFORE DISCHARGE:  Dyspnea improved and O2 sats >88% at RA or at prior home O2 therapy level: Yes        SpO2: 96 %, O2 Device: None (Room air)  Vitals:    04/08/19 1158 04/08/19 1605   Weight: 85.3 kg (188 lb) 98.5 kg (217 lb 1.6 oz)            ECHO and other diagnostic testing complete (if applicable): Yes    Return to near baseline physical activity: Yes    Discharge Planner Nurse   Safe discharge environment identified: No  Barriers to discharge: Yes       Entered by: Celia Mike 04/08/2019 11:10 PM     Please review provider order for any additional goals.   Nurse to notify provider when observation goals have been met and patient is ready for discharge.

## 2019-04-09 NOTE — PROGRESS NOTES
Physician Attestation   I, Selvin Snell, saw and evaluated Bairon Foster as part of a shared visit.  I have reviewed and discussed with the advanced practice provider their history, physical and plan.    I personally reviewed the vital signs, medications, labs and imaging.    My key history or physical exam findings:   See [facility] an d P for details  Hx of significant TR and MR  Nonobstructive CAD per angiogram  Followed by [facility] clinic,[facility], 3/27/19 excellent cardiology note reviewed  Historically done well with Torsemide. Given rash and sulfa component to Torsemide derm thought rash 2/ 2Torsemide but rash not improve with stopping Torsemide outpatient  Changed to ethacrynic acid and increased at last visit to 50mg every day  Aldactone - dose reduced this year given breast tenderness  Also on Coreg 6.25mg bid, imdure, eplerenone  On asa, Pradaxa  CHELLE: echo EF 30%, mod-severe to severe TR  MR 3-4 +    Presents with increase in wt, worsening BLE edema with significant edema to thigh, increase in abdminal girth, increase in LOYOLA  Not hypoxic, NAD, lungs clear. No BHAKTI    MR contributing to loyola, sob, and HF. Major factor contributing to TR?  Suspect TR major contributor to BLE edema and abd girth- ? Ascites      Key management decisions made by me:  Will transfer to inpatient given likely need for prolonged IV diuresis  Cardiology consult  For now, iv bumex 1mg IV q8 hours and follow I/O  Follow bmp, hb, Mg  Check tsh  Fluid restriction  Cont pradaxa, coreg, Imdure  Stop topical nsaid  Low sodium diet  I/O    Selvin Snell  Date of Service (when I saw the patient): 04/08/19

## 2019-04-09 NOTE — PLAN OF CARE
A/Ox3, forgetful, VSS, Ax1 gb, cardiac diet no caffeine and 1800 ml fluid restriction, tele 100% vpaced, denies pain, BLE 2/3+ edema, scrotal edema 4+, skin tears on chest from tele pads and skin tear on left forearm, incontinent at times. Plan to transfer inpatient.

## 2019-04-09 NOTE — PLAN OF CARE
Received report from PM nurse, called report to 55. Pt transported off unit by staff in stable condition to station 55.

## 2019-04-09 NOTE — PROGRESS NOTES
"   04/09/19 1213   Quick Adds   Type of Visit Initial Occupational Therapy Evaluation   Living Environment   Lives With spouse   Living Arrangements house   Home Accessibility stairs within home   Number of Stairs, Within Home, Primary   (13 steps to basement \"mancave\", laundry and shower)   Transportation Anticipated car, drives self  (spouse does not drive)   Living Environment Comment Owns cane, doesn't use, states \"they're for old people\"   Self-Care   Usual Activity Tolerance good   Current Activity Tolerance moderate   Regular Exercise No   Equipment Currently Used at Home grab bar, tub/shower   Functional Level   Ambulation 0-->independent   Transferring 0-->independent   Toileting 0-->independent   Bathing 0-->independent   Dressing 0-->independent   Eating 0-->independent   Communication 0-->understands/communicates without difficulty   Swallowing 0-->swallows foods/liquids without difficulty   Cognition 0 - no cognition issues reported   Fall history within last six months yes   Number of times patient has fallen within last six months 3   Prior Functional Level Comment Pt reports he does driving, laundry in basement, spouse does cooking, he orders groceries on-line, granddaughter does cleaning. Pt also indep meds, finances.  One fall yesterday and he estimates maybe 2 over last 4-5months.    General Information   Onset of Illness/Injury or Date of Surgery - Date 04/08/19   Referring Physician Dr. Selvin Snell   Patient/Family Goals Statement return home    Additional Occupational Profile Info/Pertinent History of Current Problem 89yo male admitted with worsening LOYOLA and BLE swelling. Diagnosed CHF exacerbation.    Precautions/Limitations fall precautions   Cognitive Status Examination   Orientation orientation to person, place and time   Level of Consciousness alert   Follows Commands (Cognition) WFL   Cognitive Comment Pt denies cognitive deficits however demonstrated decreased recall during session. " Will further assess.   Visual Perception   Visual Perception No deficits were identified   Pain Assessment   Patient Currently in Pain No   Integumentary/Edema   Integumentary/Edema Comments BLE edema, scrotal edema   Range of Motion (ROM)   ROM Quick Adds No deficits were identified   Strength   Manual Muscle Testing Quick Adds No deficits were identified   Coordination   Upper Extremity Coordination No deficits were identified   Mobility   Bed Mobility Comments Supine to sit SBA, sit to supine needed Min A with RLE back into bed (legs heavy due to edema)   Transfer Skill: Bed to Chair/Chair to Bed   Level of [place]: Bed to Chair contact guard   Physical Assist/Nonphysical Assist: Bed to Chair 1 person assist   Transfer Skill: Sit to Stand   Level of [place]: Sit/Stand stand-by assist   Physical Assist/Nonphysical Assist: Sit/Stand supervision   Transfer Skill: Toilet Transfer   Level of [place]: Toilet stand-by assist   Physical Assist/Nonphysical Assist: Toilet supervision;verbal cues   Assistive Device grab bars  (very low toilet stool)   Balance   Balance Comments Amb with CGA, unsteady gait but no LOB. Indep static and dynamic sitting balance.    Upper Body Dressing   Level of [place]: Dress Upper Body independent   Physical Assist/Nonphysical Assist: Dress Upper Body set-up required  (seated)   Lower Body Dressing   Level of [place]: Dress Lower Body contact guard   Physical Assist/Nonphysical Assist: Dress Lower Body 1 person assist  (when pulling up clothing)   Toileting   Level of [place]: Toilet stand-by assist   Physical Assist/Nonphysical Assist: Toilet verbal cues   Eating/Self Feeding   Level of [place]: Eating independent   Physical Assist/Nonphysical Assist: Eating set-up required  (seated)   Activities of Daily Living Analysis   Impairments Contributing to Impaired Activities of Daily Living balance impaired;cognition impaired  (endurance)   General Therapy  "Interventions   Planned Therapy Interventions ADL retraining;IADL retraining;cognition;transfer training;home program guidelines;progressive activity/exercise;risk factor education   Clinical Impression   Criteria for Skilled Therapeutic Interventions Met yes, treatment indicated   OT Diagnosis decreased ADL/IADL performance   Influenced by the following impairments activity tolerance/endurance, balance, cognition   Assessment of Occupational Performance 3-5 Performance Deficits   Identified Performance Deficits functional mob, dressing, toileting, bathing, household mgmt, community mob   Clinical Decision Making (Complexity) Moderate complexity   Therapy Frequency daily   Predicted Duration of Therapy Intervention (days/wks) 3 days   Anticipated Discharge Disposition Transitional Care Facility   Risks and Benefits of Treatment have been explained. Yes   Patient, Family & other staff in agreement with plan of care Yes   [facility] TM \"6 Clicks\"   2016, Trustees of [facility], under license to [employer].  All rights reserved.   6 Clicks Short Forms Daily Activity Inpatient Short Form   [facility]  \"6 Clicks\" Daily Activity Inpatient Short Form   1. Putting on and taking off regular lower body clothing? 3 - A Little   2. Bathing (including washing, rinsing, drying)? 3 - A Little   3. Toileting, which includes using toilet, bedpan or urinal? 3 - A Little   4. Putting on and taking off regular upper body clothing? 4 - None   5. Taking care of personal grooming such as brushing teeth? 4 - None   6. Eating meals? 4 - None   Daily Activity Raw Score (Score out of 24.Lower scores equate to lower levels of function) 21   Total Evaluation Time   Total Evaluation Time (Minutes) 15     "

## 2019-04-10 ENCOUNTER — APPOINTMENT (OUTPATIENT)
Dept: OCCUPATIONAL THERAPY | Facility: CLINIC | Age: 84
DRG: 287 | End: 2019-04-10
Payer: COMMERCIAL

## 2019-04-10 ENCOUNTER — TRANSFERRED RECORDS (OUTPATIENT)
Dept: HEALTH INFORMATION MANAGEMENT | Facility: CLINIC | Age: 84
End: 2019-04-10

## 2019-04-10 ENCOUNTER — APPOINTMENT (OUTPATIENT)
Dept: PHYSICAL THERAPY | Facility: CLINIC | Age: 84
DRG: 287 | End: 2019-04-10
Attending: INTERNAL MEDICINE
Payer: COMMERCIAL

## 2019-04-10 LAB
ANION GAP SERPL CALCULATED.3IONS-SCNC: 7 MMOL/L (ref 3–14)
BUN SERPL-MCNC: 27 MG/DL (ref 7–30)
CALCIUM SERPL-MCNC: 9.4 MG/DL (ref 8.5–10.1)
CHLORIDE SERPL-SCNC: 105 MMOL/L (ref 94–109)
CO2 SERPL-SCNC: 28 MMOL/L (ref 20–32)
CREAT SERPL-MCNC: 1.3 MG/DL (ref 0.66–1.25)
ERYTHROCYTE [DISTWIDTH] IN BLOOD BY AUTOMATED COUNT: 16.3 % (ref 10–15)
GFR SERPL CREATININE-BSD FRML MDRD: 49 ML/MIN/{1.73_M2}
GLUCOSE SERPL-MCNC: 94 MG/DL (ref 70–99)
HCT VFR BLD AUTO: 28.2 % (ref 40–53)
HGB BLD-MCNC: 9.4 G/DL (ref 13.3–17.7)
MCH RBC QN AUTO: 32.6 PG (ref 26.5–33)
MCHC RBC AUTO-ENTMCNC: 33.3 G/DL (ref 31.5–36.5)
MCV RBC AUTO: 98 FL (ref 78–100)
PLATELET # BLD AUTO: 69 10E9/L (ref 150–450)
POTASSIUM SERPL-SCNC: 3.7 MMOL/L (ref 3.4–5.3)
RBC # BLD AUTO: 2.88 10E12/L (ref 4.4–5.9)
SODIUM SERPL-SCNC: 140 MMOL/L (ref 133–144)
WBC # BLD AUTO: 3.3 10E9/L (ref 4–11)

## 2019-04-10 PROCEDURE — 97140 MANUAL THERAPY 1/> REGIONS: CPT | Mod: GP

## 2019-04-10 PROCEDURE — 97535 SELF CARE MNGMENT TRAINING: CPT | Mod: GO

## 2019-04-10 PROCEDURE — 25000132 ZZH RX MED GY IP 250 OP 250 PS 637: Performed by: INTERNAL MEDICINE

## 2019-04-10 PROCEDURE — 80048 BASIC METABOLIC PNL TOTAL CA: CPT | Performed by: HOSPITALIST

## 2019-04-10 PROCEDURE — 97116 GAIT TRAINING THERAPY: CPT | Mod: GP

## 2019-04-10 PROCEDURE — 36415 COLL VENOUS BLD VENIPUNCTURE: CPT | Performed by: HOSPITALIST

## 2019-04-10 PROCEDURE — 12000000 ZZH R&B MED SURG/OB

## 2019-04-10 PROCEDURE — 85027 COMPLETE CBC AUTOMATED: CPT | Performed by: HOSPITALIST

## 2019-04-10 PROCEDURE — 99232 SBSQ HOSP IP/OBS MODERATE 35: CPT | Performed by: INTERNAL MEDICINE

## 2019-04-10 PROCEDURE — 97110 THERAPEUTIC EXERCISES: CPT | Mod: GO

## 2019-04-10 PROCEDURE — 25000125 ZZHC RX 250: Performed by: NURSE PRACTITIONER

## 2019-04-10 PROCEDURE — 97110 THERAPEUTIC EXERCISES: CPT | Mod: GP

## 2019-04-10 PROCEDURE — 97161 PT EVAL LOW COMPLEX 20 MIN: CPT | Mod: GP

## 2019-04-10 PROCEDURE — 99232 SBSQ HOSP IP/OBS MODERATE 35: CPT | Performed by: HOSPITALIST

## 2019-04-10 RX ADMIN — OMEPRAZOLE 20 MG: 20 CAPSULE, DELAYED RELEASE ORAL at 06:52

## 2019-04-10 RX ADMIN — SPIRONOLACTONE 12.5 MG: 25 TABLET ORAL at 08:11

## 2019-04-10 RX ADMIN — FINASTERIDE 5 MG: 5 TABLET, FILM COATED ORAL at 08:11

## 2019-04-10 RX ADMIN — DABIGATRAN ETEXILATE MESYLATE 150 MG: 150 CAPSULE ORAL at 08:11

## 2019-04-10 RX ADMIN — POTASSIUM CHLORIDE 20 MEQ: 1500 TABLET, EXTENDED RELEASE ORAL at 08:12

## 2019-04-10 RX ADMIN — BUPROPION HYDROCHLORIDE 150 MG: 150 TABLET, FILM COATED, EXTENDED RELEASE ORAL at 08:10

## 2019-04-10 RX ADMIN — SUCRALFATE 1 G: 1 TABLET ORAL at 20:40

## 2019-04-10 RX ADMIN — ISOSORBIDE MONONITRATE 30 MG: 30 TABLET, EXTENDED RELEASE ORAL at 08:11

## 2019-04-10 RX ADMIN — BUMETANIDE 0.25 MG/HR: 0.25 INJECTION INTRAMUSCULAR; INTRAVENOUS at 17:54

## 2019-04-10 RX ADMIN — CARVEDILOL 6.25 MG: 6.25 TABLET, FILM COATED ORAL at 08:12

## 2019-04-10 RX ADMIN — SUCRALFATE 1 G: 1 TABLET ORAL at 08:11

## 2019-04-10 RX ADMIN — TAMSULOSIN HYDROCHLORIDE 0.4 MG: 0.4 CAPSULE ORAL at 22:10

## 2019-04-10 RX ADMIN — OMEPRAZOLE 20 MG: 20 CAPSULE, DELAYED RELEASE ORAL at 17:53

## 2019-04-10 RX ADMIN — CARVEDILOL 6.25 MG: 6.25 TABLET, FILM COATED ORAL at 18:53

## 2019-04-10 RX ADMIN — SIMVASTATIN 20 MG: 10 TABLET, FILM COATED ORAL at 08:11

## 2019-04-10 RX ADMIN — LEVOTHYROXINE SODIUM 100 MCG: 100 TABLET ORAL at 08:11

## 2019-04-10 ASSESSMENT — ACTIVITIES OF DAILY LIVING (ADL)
ADLS_ACUITY_SCORE: 17

## 2019-04-10 ASSESSMENT — MIFFLIN-ST. JEOR: SCORE: 1671.44

## 2019-04-10 NOTE — PROGRESS NOTES
04/10/19 1330   Quick Adds   Quick Adds Edema Eval   Type of Visit Initial PT Evaluation   Living Environment   Lives With spouse   Living Arrangements house   Home Accessibility stairs within home   Number of Stairs, Within Home, Primary   (1 flight of stairs to basement)   Stair Railings, Within Home, Primary railings on both sides of stairs   Transportation Anticipated car, drives self  (wife does not drive)   Living Environment Comment owns cane but does not use an assisive device.    Self-Care   Usual Activity Tolerance good   Current Activity Tolerance moderate   Regular Exercise No   Equipment Currently Used at Home grab bar, tub/shower   Functional Level Prior   Ambulation 0-->independent   Transferring 0-->independent   Toileting 0-->independent   Bathing 0-->independent   Communication 0-->understands/communicates without difficulty   Swallowing 0-->swallows foods/liquids without difficulty   Cognition 0 - no cognition issues reported   Fall history within last six months yes   Number of times patient has fallen within last six months 3   Which of the above functional risks had a recent onset or change? ambulation   Prior Functional Level Comment Pt reports he does driving, laundry in basement, spouse does cooking, he orders groceries on-line, granddaughter does cleaning. Pt also indep meds, finances.  One fall yesterday and he estimates maybe 2 over last 4-5months.    General Information   Onset of Illness/Injury or Date of Surgery - Date 04/08/19   Referring Physician Aggie Akhtar, DO   Patient/Family Goals Statement to improve swelling and return to baseline with mobility    Pertinent History of Current Problem (include personal factors and/or comorbidities that impact the POC) 89y/o M admitted with acute on chronic systolic and diastolic CHF, currently on a Bumex drip. past medical history significant for systolic and diastolic heart failure, coronary artery disease, progressive  worsening MR and TR, peripheral artery disease, hyperlipidemia, JIM, PPM placement,  Pancytopenia, atrial fibrillation   Precautions/Limitations fall precautions   General Observations pt resting in bed, NAD.    Edema General Information   Onset of Edema   (few months with LE edema, new onset of scrotal edema)   Affected Body Part(s) Right LE;Left LE  (scrotum)   Edema Etiology Insidious  (CHF)   Edema Precautions Cardiac Edema/CHF;Renal Insufficiency;Other (see comments)  (Today- creatinine 1.22)   General Comments/Previous Edema Treatment/Edema Equipment reports was measured for compression garments [facility]   Edema Examination/Assessment   Skin Condition Pitting;Intact;Dryness   Pitting Assessment 2+ to 3+ bilateral LEs and feet   Edema Assessment Comments 4+ scrotal edema (pt reports his scrotum is twice the size as normal.    Cognitive Status Examination   Orientation orientation to person, place and time   Level of Consciousness alert   Follows Commands and Answers Questions 100% of the time   Personal Safety and Judgment at risk behaviors demonstrated   Cognitive Comment pt appears very aware of his currrent medical condition and able to verbalize good understanding of education provided during encounter.    Pain Assessment   Patient Currently in Pain No  (pt offers no complaints of pain)   Range of Motion (ROM)   ROM Comment pt reports improved ability to stand upright following application of scrotal sling   Strength   Strength Comments at least antigravity strength bilateral UEs, noted 4-/5 strength bilateral hip flexion, quad and ankle DF/PF=5/5   Bed Mobility   Bed Mobility Comments supine to sit with increased effort and elevated HOB with railing SBA   Transfer Skills   Transfer Comments sit to stand with min A for initial standing balance without assistive device   Gait   Gait Comments amb 10 ft in his room without assistive device. 1 gross LOB-utilized wall  "support and min A for recovery, decreased bilateral step length and increased lateral gait deviation   Balance   Balance Comments IND sitting balance, CGA with static standing balance with use of the urinal and posterior thigh support on the bed   Sensory Examination   Sensory Perception Comments pt reports chronic numnbess bilateral toes, intact sensation to light touch bilateral toes and feet   Muscle Tone   Muscle Tone no deficits were identified   General Therapy Interventions   Planned Therapy Interventions balance training;bed mobility training;gait training;strengthening;transfer training   Edema: Planned Interventions Manual therapy;Gradient compression bandaging;Precautions to prevent infection/exacerbation;Edema exercises;Education   Clinical Impression   Criteria for Skilled Therapeutic Intervention yes, treatment indicated   PT Diagnosis scrotal and bilateral LE edema, impaired gait   Edema: Patient Presentation Stage 2 Lymphedema   Influenced by the following impairments impaired activity tolerance, impaired balance, impaired strength   Functional limitations due to impairments pain with gait, impaired gait, transfers   Clinical Presentation Stable/Uncomplicated   Clinical Presentation Rationale based on clinical judgement   Clinical Decision Making (Complexity) Low complexity   Therapy Frequency` daily   Predicted Duration of Therapy Intervention (days/wks) 4 days   Anticipated Equipment Needs at Discharge front wheeled walker   Anticipated Discharge Disposition Transitional Care Facility   Risk & Benefits of therapy have been explained Yes   Patient, Family & other staff in agreement with plan of care Yes   [facility] [employer] TM \"6 Clicks\"   2016, Trustees of [facility], under license to [employer].  All rights reserved.   6 Clicks Short Forms Basic Mobility Inpatient Short Form   [facility]  \"6 Clicks\" V.2 Basic Mobility Inpatient Short Form   1. Turning from your back to " your side while in a flat bed without using bedrails? 3 - A Little   2. Moving from lying on your back to sitting on the side of a flat bed without using bedrails? 3 - A Little   3. Moving to and from a bed to a chair (including a wheelchair)? 3 - A Little   4. Standing up from a chair using your arms (e.g., wheelchair, or bedside chair)? 3 - A Little   5. To walk in hospital room? 3 - A Little   6. Climbing 3-5 steps with a railing? 3 - A Little   Basic Mobility Raw Score (Score out of 24.Lower scores equate to lower levels of function) 18   Total Evaluation Time   Total Evaluation Time (Minutes) 15

## 2019-04-10 NOTE — PLAN OF CARE
Discharge Planner OT   Patient plan for discharge: Agreeable to TCU.  Current status: Able to ambulate for 7 min with stable CV response. CG A for balance with 2 LOB requiring MIN A to steady self.   Barriers to return to prior living situation: Fall risk, ?cognitive impairment.  Recommendations for discharge: TCU.  Rationale for recommendations: Patient not at his baseline. Ambulation/balance is limited by swelling in scrotum (PT to see today for lymph). Has difficulty with CHF management concepts.       Entered by: Sherrie Valerio 04/10/2019 11:40 AM

## 2019-04-10 NOTE — PLAN OF CARE
[id number]: A&Ox4, forgetful at times. VSS ex june on RA. Tele: V paced, sinus june. Pacemaker placed. Up with A1 GB. Denies pain. 2 gram Na diet with 1800 fluid restriction. +2 bilateral edema on LE and scrotum +4. IV bumex started last evening, bag expires at 1700 on 4/10. Voiding urinal, ambulating to bathroom. Incontinence at times d/t frequency & urgency. Skin tear covered with mepilex on Left upper anterior shoulder. Also noted smaller skin tears on chest. Continue to monitor

## 2019-04-10 NOTE — PROGRESS NOTES
[facility]    Hospitalist Progress Note      Assessment & Plan   Bairon Foster is a 88 year old male who was admitted on 4/8/2019 for edema and shortness of breath, found to be in acute combined systolic/diastolic congestive heart failure.    Acute on chronic combined systolic and diastolic congestive heart failure exacerbation  The patient has had multiple diuretic changes recently and had been taken off his torsemide, as there was concern this could be causing a rash.  He was supposed to start ethacrynic acid but had a delay in obtaining this medication and gained 18 pounds.  After being seen in Cardiology followup, his torsemide was restarted until he was able to obtain is ethacrynic acid.  This was started at 25 mg and then increased to 50 mg.  He is also continued on his Inspra and spironolactone.  Despite being medication compliant, he has had worsening edema now with dyspnea on exertion.  He is not hypoxic.  Last CHELLE from March showed a significant drop in his ejection fraction from 53%, now down to 30%.  There is also progression of his mitral valve and tricuspid valve regurgitation with moderate to severe to severe mitral regurgitation.  Valvular disease is likely contributing to his congestive heart failure exacerbation.  He has an appointment with Dr. Hope to discuss if he would be a mitral clip candidate.  - Dry weight near 189, today is 205 (was 224 on 4/9)  - Continue close I/Os, with daily weights  - Stop eplerenone (has been refusing)  - Continue coreg, spironolactone, imdur   - Cardiology consulted, added bumex gtt, further adjustments per them  - Appreciate nutrition consult, patient has reported intake of foods typically high in sodium and would need to make dietary adjustments upon discharge'  - If BP elevated there is consideration for addition of hydralazine.     Mitral regurgitation/tricuspid regurgitation  The patient has had progressive deterioration of his mitral valve,  now moderately severe to severe regurgitation and has 3+ tricuspid regurgitation.  He is currently in heart failure.  He will be diuresed.  He has followup with Dr. Hope to discuss if he would be a mitral clip candidate.  Cardiology following     Chronic atrial fibrillation, on Pradaxa  Device checked during his last Cardiology visit.  Single-chamber pacemaker, implanted in 08/2009.  Device check demonstrated an underlying rhythm of atrial fibrillation with heart rate of 40-50.  He has less than 0.5 years remaining on the battery.  The plan is to recheck this in 1 months' time and likely will need generator change.      Coronary artery disease  History of OM stenting in 2009.  Coronary angiogram in 03/2018 demonstrated no flow-limiting lesions.  Spironolactone was added to his regimen after his left cath.  He was trialed on higher doses but developed nipple tenderness.  He is on statin therapy.  He is not on aspirin given his low platelets.     Pancytopenia  Hx chronically low platelets and [facility]  His platelets had dropped to 44,000 as of 03/11/2019 and hemoglobin was 9.5.  He now has pancytopenia and saw Hematology/Oncology.  He reports he had a bone marrow biopsy, although I do not see the results of this.  - Hold pradaxa if platelets <40k  - Outpt hem/onc follow up  - Other counts stable, daily CBC while inpatient     Acute kidney injury, stage 1  The patient had a recent rise in serum creatinine up to 1.31 after titration of his ethacrynic acid. Creat on admit was 1.24  - Monitor daily BMP while diuresing (stable at 1.3 today)     Obstructive sleep apnea  Continue CPAP while sleeping with home settings.     DVT Prophylaxis: Pradaxa  Code Status: Full Code  Expected discharge: 2 - 3 days, recommended to transitional care unit once diuresis adequate    Aggie Akhtar, DO  Text Page (7am - 6pm)    Interval History   Patient seen and examined. Swelling continues to improve on  bumex gtt. Patient without complaints. No shortness of breath, chest pain, abdominal pain. Tolerating diet    -Data reviewed today: I reviewed all new labs and imaging results over the last 24 hours. I personally reviewed no images or EKG's today.    Physical Exam   Temp: 98.2  F (36.8  C) Temp src: Oral BP: 115/67 Pulse: 56 Heart Rate: 59 Resp: 16 SpO2: 93 % O2 Device: None (Room air)    Vitals:    04/08/19 1605 04/09/19 0030 04/10/19 0536   Weight: 98.5 kg (217 lb 1.6 oz) 102 kg (224 lb 14.4 oz) 93.2 kg (205 lb 6.4 oz)     Vital Signs with Ranges  Temp:  [97.6  F (36.4  C)-98.3  F (36.8  C)] 98.2  F (36.8  C)  Pulse:  [56-60] 56  Heart Rate:  [39-60] 59  Resp:  [16] 16  BP: (103-120)/(59-76) 115/67  SpO2:  [92 %-97 %] 93 %  I/O last 3 completed shifts:  In: 1400 [P.O.:1400]  Out: 4625 [Urine:4625]    Constitutional: Awake, alert, cooperative, no apparent distress  Respiratory: clear to auscultation, no rales, wheeze or rhonchi  Cardiovascular: regular rate and rhythm, normal S1 and S2, and + systolic murmur  GI: Normal bowel sounds, soft, non-distended, non-tender  Skin/Integumen: No rashes, no cyanosis, 2+ pitting edema (improving to just below knee)  Other: Scrotal edema    Medications     bumetanide 0.25 mg/hr (04/09/19 4237)     - MEDICATION INSTRUCTIONS -       ACE/ARB/ARNI NOT PRESCRIBED         buPROPion  150 mg Oral QAM     carvedilol  6.25 mg Oral BID w/meals     dabigatran ANTICOAGULANT  150 mg Oral BID     finasteride  5 mg Oral Daily     isosorbide mononitrate  30 mg Oral Daily     ketotifen  1 drop Both Eyes BID     levothyroxine  100 mcg Oral Daily     omeprazole  20 mg Oral BID AC     potassium chloride ER  20 mEq Oral Daily     simvastatin  20 mg Oral Daily     sodium chloride (PF)  3 mL Intracatheter Q8H     spironolactone  12.5 mg Oral Daily     sucralfate  1 g Oral BID     tamsulosin  0.4 mg Oral At Bedtime       Data   Recent Labs   Lab 04/10/19  0634 04/09/19  1829 04/09/19  0628  04/08/19  1218   WBC 3.3*  --  2.8* 2.9*   HGB 9.4*  --  10.1* 9.8*   MCV 98  --  99 100   PLT 69*  --  67* 70*    140 139 139   POTASSIUM 3.7 3.9 4.0 4.6   CHLORIDE 105 105 106 109   CO2 28 29 28 26   BUN 27 23 26 27   CR 1.30* 1.26* 1.22 1.24   ANIONGAP 7 6 5 4   TEODORO 9.4 9.6 9.4 9.2   GLC 94 108* 98 107*   ALBUMIN  --   --   --  3.4   PROTTOTAL  --   --   --  7.3   BILITOTAL  --   --   --  1.4*   ALKPHOS  --   --   --  67   ALT  --   --   --  17   AST  --   --   --  25       No results found for this or any previous visit (from the past 24 hour(s)).

## 2019-04-10 NOTE — PROGRESS NOTES
[facility]  Cardiology Progress Note  Date of Service: 04/10/2019  Primary Cardiologist: Dr. Carrizales         Physician Supervisory Attestation:   I have reviewed and discussed with Sofiya Burton NP the history, physical and plan and independently interviewed and examined Bairon Foster and agree with the plan as stated in the note.    -- Continue IV diuresis and likely transition to orals tomorrow. Dr. Hope to evaluate for possible mitral clip.     Zion Rider MD 4/10/2019 3:52 PM        Assessment & Plan    Bairon Foster is a 88 year old male with past medical history significant for systolic and diastolic heart failure, coronary artery disease, progressive worsening MR and TR, peripheral artery disease, hyperlipidemia, JIM, PPM placement,  Pancytopenia, atrial fibrillation, and [facility] with recent recommendations to avoid sulfa medications as possible contributors, this includes avoiding torsemide and Lasix was admitted on 4/8/2019 with LOYOLA and bilateral LE edema.    1. Acute on chronic systolic and diastolic congestive heart failure exacerbation    CHELLE notes an EF at 30 % with a mildly dilated LV and moderate global hypokinesis of the LV. RV moderately dilated with decreased function.    EF down from 53% in 2018 to 30% now    CXR notes mild vascular congestion        I/O negative 5.9 L    Weight 205 pounds (224 on admit)~ 16 pounds above dry weight.     Dry weight ~189 pounds     Diuresing with Bumex infusion 0.25 mg/hr and Spironolactone 12.5 mg daily    Renal function stable with diuresis: creatinine 1.30, GFR 49    Carvedilol 6.25 mg BID, Isosorbide 30 mg daily     Lymphedema wraps in place     2. Severe valvular disease consistent with severe mitral and tricuspid regurgitation    CHELLE 03/22/19 notes severe (3-4+) MR. 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 Severe (3-4+) TR. TR jet directed at septum.    Scheduled out patient visit with Dr. Hope  04/15/2019 for evaluation of possible Mitraclip.     Diuresing with  Bumex infusion 0.25 mg/hr and Spironolactone 12.5 mg daily    Structural heart team notified of the admission      3. Chronic atrial fibrillation with PPM    Pradaxa for anticoagulation    Recent device check noted atrial fibrillation with rates of 40-50.    Due for generator change within the year      4. Coronary artery disease    Single vessel disease of OM with patent stent from 2009 noted on coronary angiogram 03/21/2018    Currently no ASA due to low platelets     Isosorbide 30 mg daily and Simvastatin 20 mg daily     5. Pancytopenia    Hold Pradaxa if platelets are less than 40,000-- currently 69,000     Follows Hem-onc      6. Acute kidney injury, stage I    Creatinine baseline ~1.2    Elevated to 1.31 at admission    Today- creatinine 1.22, GFR 53     7. Obstructive sleep apnea, CPAP         Plan:  1. Continue diuresing with Bumex infusion. Plan to transition to oral tomorrow if he continues to diurese well.    2. Will likely do a left and right heart cath for evaluation of MitraClip once he diureses, maybe Friday  3. Could Hydralazine 5 mg TID as his blood pressure tolerates for after load reduction   4. Daily weights       WILLIS Rodriguez CNP  Pager:  [phone]   Text Page  (7am - 5pm, M-F)      Interval History   Walking with PT    Physical Exam   Temp: 98.2  F (36.8  C) Temp src: Oral BP: 126/69 Pulse: 58 Heart Rate: 59 Resp: 16 SpO2: 93 % O2 Device: None (Room air)    Vitals:    04/08/19 1605 04/09/19 0030 04/10/19 0536   Weight: 98.5 kg (217 lb 1.6 oz) 102 kg (224 lb 14.4 oz) 93.2 kg (205 lb 6.4 oz)       Constitutional:   NAD   Skin:   Warm and dry   Head:   Nontraumatic   Neck:   supple, symmetrical, trachea midline   Lungs:   symmetric, clear to auscultation   Cardiovascular:   regular rate and rhythm and normal S1 and S2, S3, 2/6 holosystolic murmur LLSB to apex   Abdomen:   Benign   Extremities and Back:   Edema 2+ from  LE to groin, lymphedema wraps in place   Neurological:   Grossly nonfocal       Medications     bumetanide 0.25 mg/hr (04/09/19 1807)     - MEDICATION INSTRUCTIONS -       ACE/ARB/ARNI NOT PRESCRIBED         buPROPion  150 mg Oral QAM     carvedilol  6.25 mg Oral BID w/meals     dabigatran ANTICOAGULANT  150 mg Oral BID     finasteride  5 mg Oral Daily     isosorbide mononitrate  30 mg Oral Daily     ketotifen  1 drop Both Eyes BID     levothyroxine  100 mcg Oral Daily     omeprazole  20 mg Oral BID AC     potassium chloride ER  20 mEq Oral Daily     simvastatin  20 mg Oral Daily     sodium chloride (PF)  3 mL Intracatheter Q8H     spironolactone  12.5 mg Oral Daily     sucralfate  1 g Oral BID     tamsulosin  0.4 mg Oral At Bedtime       Data     Most Recent 3 CBC's:  Recent Labs   Lab Test 04/10/19  0634 04/09/19  0628 04/08/19  1218   WBC 3.3* 2.8* 2.9*   HGB 9.4* 10.1* 9.8*   MCV 98 99 100   PLT 69* 67* 70*     Most Recent 3 BMP's:  Recent Labs   Lab Test 04/10/19  0634 04/09/19  1829 04/09/19  0628    140 139   POTASSIUM 3.7 3.9 4.0   CHLORIDE 105 105 106   CO2 28 29 28   BUN 27 23 26   CR 1.30* 1.26* 1.22   ANIONGAP 7 6 5   TEODORO 9.4 9.6 9.4   GLC 94 108* 98     Most Recent 3 BNP's:  Recent Labs   Lab Test 04/08/19  1218 04/12/18  0919 01/08/18  1545   NTBNPI 578  --   --    NTBNP  --  [phone]

## 2019-04-10 NOTE — PLAN OF CARE
Pt. A&o, vss, up with SBA, voiding adequate amount in urinal, denied any pain, the lymphedema wrap was done by rehab and it need to come out 1230 pm tomorrow and rehab will come at 1330 to assess. Bumex is infusing at 1cc/hr and the current bag need to be changed at 1700 today. Pt. On fluid restriction 1800. Will continue to monitor.

## 2019-04-10 NOTE — PLAN OF CARE
Discharge Planner PT   Patient plan for discharge: home, pt reports considering TCU  Current status: PT consult received for lymphedema bilateral LEs and scrotal edema as well as for physical deconditioning for discharge recommendations. 87y/o M admitted with acute on chronic systolic and diastolic CHF, currently on a Bumex drip. Pt reports was [facility] to [facility]. PLOF: IND with mobility, recent decline in mobility with 3 falls due to LOB. Lives with wife. Stairs to basement with bilateral railings. Pt typically drives.     Supine <> sit with increased effort and elevated HOB with railing SBA. Sit to stand with min A for initial standing balance without assistive device. Amb 50ft with min A without assistive device with 2 episodes of gross LOB-required min A to regain balance and utilized wall for support, amb 50ft back to the room with IV pole-continue to require min A for balance. Discussed with pt to utilize a walker to assist with balance and safety-FWW placed in room at end of session.     Pt demos 2+ to 3+ pitting edema bilateral LEs as well as 4+ edema in his scrotum (pt reports his scrotum is twice the size as normal). Dry skin bilateral LEs, no open wounds noted. Washed and applied lotion to bilateral LEs and applied medium TG soft, followed by short stretch bandages from the base of his toes to below the crease of his knees. Also applied a scrotal sling with the use of TG soft-wrapping it around his waist for comfort. Pt reported significant improvement in comfort with ambulation following application of the scrotal sling. Pt's scrotum was elevated with use of towels when in bed. Reviewed bilateral LE ex to assist with strengthening and assisting with blood flow and edema reduction. Discussed with pt and RN and written on white board in room to remove wraps if pt has any signs of intolerance including:  cold hands or feet, blue toes or  fingers, numbness or tingling, shortness of breath, skin irritation or inability to tolerate wraps, or if they become soiled or wet. Keep legs elevated if wraps need to be removed.       RN to remove lymph wraps at 1230 on 4/11/19 and with plan for PT to assess edema and re-wrap bilateral LEs.     Barriers to return to prior living situation: impaired activity tolerance, impaired balance, limited ability for wife to physically assist the pt  Recommendations for discharge: TCU  Rationale for recommendations: Discussed with pt, recommend TCU to improve balance, gait, strength and continue with management of edema to maximize pt's level of safety with mobility prior to return home. Pt will also benefit from continued PT to promote reduction in LE edema to fit back into his compression socks.      Entered by: Denia Guerrero 04/10/2019 2:59 PM

## 2019-04-11 ENCOUNTER — APPOINTMENT (OUTPATIENT)
Dept: OCCUPATIONAL THERAPY | Facility: CLINIC | Age: 84
DRG: 287 | End: 2019-04-11
Payer: COMMERCIAL

## 2019-04-11 ENCOUNTER — APPOINTMENT (OUTPATIENT)
Dept: PHYSICAL THERAPY | Facility: CLINIC | Age: 84
DRG: 287 | End: 2019-04-11
Payer: COMMERCIAL

## 2019-04-11 LAB
ANION GAP SERPL CALCULATED.3IONS-SCNC: 8 MMOL/L (ref 3–14)
BUN SERPL-MCNC: 29 MG/DL (ref 7–30)
CALCIUM SERPL-MCNC: 9.4 MG/DL (ref 8.5–10.1)
CHLORIDE SERPL-SCNC: 100 MMOL/L (ref 94–109)
CO2 SERPL-SCNC: 31 MMOL/L (ref 20–32)
CREAT SERPL-MCNC: 1.41 MG/DL (ref 0.66–1.25)
ERYTHROCYTE [DISTWIDTH] IN BLOOD BY AUTOMATED COUNT: 16.4 % (ref 10–15)
GFR SERPL CREATININE-BSD FRML MDRD: 44 ML/MIN/{1.73_M2}
GLUCOSE SERPL-MCNC: 90 MG/DL (ref 70–99)
HCT VFR BLD AUTO: 29.3 % (ref 40–53)
HGB BLD-MCNC: 9.9 G/DL (ref 13.3–17.7)
MCH RBC QN AUTO: 33 PG (ref 26.5–33)
MCHC RBC AUTO-ENTMCNC: 33.8 G/DL (ref 31.5–36.5)
MCV RBC AUTO: 98 FL (ref 78–100)
PLATELET # BLD AUTO: 71 10E9/L (ref 150–450)
POTASSIUM SERPL-SCNC: 3.4 MMOL/L (ref 3.4–5.3)
RBC # BLD AUTO: 3 10E12/L (ref 4.4–5.9)
SODIUM SERPL-SCNC: 139 MMOL/L (ref 133–144)
WBC # BLD AUTO: 3.1 10E9/L (ref 4–11)

## 2019-04-11 PROCEDURE — 12000000 ZZH R&B MED SURG/OB

## 2019-04-11 PROCEDURE — 85027 COMPLETE CBC AUTOMATED: CPT | Performed by: HOSPITALIST

## 2019-04-11 PROCEDURE — 97535 SELF CARE MNGMENT TRAINING: CPT | Mod: GO

## 2019-04-11 PROCEDURE — 25000132 ZZH RX MED GY IP 250 OP 250 PS 637: Performed by: INTERNAL MEDICINE

## 2019-04-11 PROCEDURE — 97110 THERAPEUTIC EXERCISES: CPT | Mod: GP

## 2019-04-11 PROCEDURE — 99232 SBSQ HOSP IP/OBS MODERATE 35: CPT | Performed by: INTERNAL MEDICINE

## 2019-04-11 PROCEDURE — 97116 GAIT TRAINING THERAPY: CPT | Mod: GP

## 2019-04-11 PROCEDURE — 97110 THERAPEUTIC EXERCISES: CPT | Mod: GO

## 2019-04-11 PROCEDURE — 97140 MANUAL THERAPY 1/> REGIONS: CPT | Mod: GP

## 2019-04-11 PROCEDURE — 36415 COLL VENOUS BLD VENIPUNCTURE: CPT | Performed by: HOSPITALIST

## 2019-04-11 PROCEDURE — 80048 BASIC METABOLIC PNL TOTAL CA: CPT | Performed by: HOSPITALIST

## 2019-04-11 PROCEDURE — 99232 SBSQ HOSP IP/OBS MODERATE 35: CPT | Performed by: HOSPITALIST

## 2019-04-11 RX ORDER — SODIUM CHLORIDE 9 MG/ML
INJECTION, SOLUTION INTRAVENOUS CONTINUOUS
Status: DISCONTINUED | OUTPATIENT
Start: 2019-04-11 | End: 2019-04-12 | Stop reason: HOSPADM

## 2019-04-11 RX ORDER — BUMETANIDE 1 MG/1
1 TABLET ORAL DAILY
Status: DISCONTINUED | OUTPATIENT
Start: 2019-04-12 | End: 2019-04-13 | Stop reason: HOSPADM

## 2019-04-11 RX ADMIN — LEVOTHYROXINE SODIUM 100 MCG: 100 TABLET ORAL at 08:07

## 2019-04-11 RX ADMIN — OMEPRAZOLE 20 MG: 20 CAPSULE, DELAYED RELEASE ORAL at 06:50

## 2019-04-11 RX ADMIN — Medication 1 MG: at 23:53

## 2019-04-11 RX ADMIN — SIMVASTATIN 20 MG: 10 TABLET, FILM COATED ORAL at 08:07

## 2019-04-11 RX ADMIN — SUCRALFATE 1 G: 1 TABLET ORAL at 08:07

## 2019-04-11 RX ADMIN — TAMSULOSIN HYDROCHLORIDE 0.4 MG: 0.4 CAPSULE ORAL at 20:09

## 2019-04-11 RX ADMIN — CARVEDILOL 6.25 MG: 6.25 TABLET, FILM COATED ORAL at 18:19

## 2019-04-11 RX ADMIN — BUPROPION HYDROCHLORIDE 150 MG: 150 TABLET, FILM COATED, EXTENDED RELEASE ORAL at 08:07

## 2019-04-11 RX ADMIN — KETOTIFEN FUMARATE 1 DROP: 0.35 SOLUTION/ DROPS OPHTHALMIC at 08:12

## 2019-04-11 RX ADMIN — FINASTERIDE 5 MG: 5 TABLET, FILM COATED ORAL at 08:07

## 2019-04-11 RX ADMIN — POTASSIUM CHLORIDE 20 MEQ: 1500 TABLET, EXTENDED RELEASE ORAL at 08:07

## 2019-04-11 RX ADMIN — SUCRALFATE 1 G: 1 TABLET ORAL at 20:09

## 2019-04-11 RX ADMIN — OMEPRAZOLE 20 MG: 20 CAPSULE, DELAYED RELEASE ORAL at 16:33

## 2019-04-11 RX ADMIN — ACETAMINOPHEN 1000 MG: 500 TABLET, FILM COATED ORAL at 23:47

## 2019-04-11 ASSESSMENT — ACTIVITIES OF DAILY LIVING (ADL)
ADLS_ACUITY_SCORE: 15
ADLS_ACUITY_SCORE: 17
ADLS_ACUITY_SCORE: 19
ADLS_ACUITY_SCORE: 19
ADLS_ACUITY_SCORE: 17
ADLS_ACUITY_SCORE: 17

## 2019-04-11 ASSESSMENT — MIFFLIN-ST. JEOR
SCORE: 1631.52
SCORE: 1648.75

## 2019-04-11 NOTE — PROGRESS NOTES
[facility]    Hospitalist Progress Note      Assessment & Plan   Bairon Foster is a 88 year old male who was admitted on 4/8/2019 for edema and shortness of breath, found to be in acute combined systolic/diastolic congestive heart failure.    Acute on chronic combined systolic and diastolic congestive heart failure exacerbation  The patient has had multiple diuretic changes recently and had been taken off his torsemide, as there was concern this could be causing a rash.  He was supposed to start ethacrynic acid but had a delay in obtaining this medication and gained 18 pounds.  After being seen in Cardiology followup, his torsemide was restarted until he was able to obtain is ethacrynic acid.  This was started at 25 mg and then increased to 50 mg.  He is also continued on his Inspra and spironolactone.  Despite being medication compliant, he has had worsening edema now with dyspnea on exertion.  He is not hypoxic.  Last CHELLE from March showed a significant drop in his ejection fraction from 53%, now down to 30%.  There is also progression of his mitral valve and tricuspid valve regurgitation with moderate to severe to severe mitral regurgitation.  Valvular disease is likely contributing to his congestive heart failure exacerbation.  He has an appointment with Dr. Hope to discuss if he would be a mitral clip candidate.  - Dry weight near 189, today is 196 (was 224 on 4/9)  - Continue close I/Os, with daily weights, fluid restriction to 1800ml/day  - Stop eplerenone (has been refusing)  - Continue coreg, spironolactone, imdur   - Cardiology consulted, added bumex gtt, now weaning to 1mg bumex PO daily  - Currently BP cannot tolerate addition of hydralazine (if after transition to PO bumex BPs higher, may be able to add 10mg hydralazine TID)  - Appreciate nutrition consult, patient has reported intake of foods typically high in sodium and would need to make dietary adjustments upon  discharge     Mitral regurgitation/tricuspid regurgitation  The patient has had progressive deterioration of his mitral valve, now moderately severe to severe regurgitation and has 3+ tricuspid regurgitation.  He is currently in heart failure.  He will be diuresed.  He had followup with Dr. Hope to discuss if he would be a MitraClip candidate.  Cardiology following  - Plan for left and right heart cath on 4/12 to evaluate for MitraClip     Chronic atrial fibrillation, on Pradaxa  Device checked during his last Cardiology visit.  Single-chamber pacemaker, implanted in 08/2009.  Device check demonstrated an underlying rhythm of atrial fibrillation with heart rate of 40-50.  He has less than 0.5 years remaining on the battery.  The plan is to recheck this in 1 months' time and likely will need generator change.  - Pradaxa held as plans for right/left heart cath, resume tomorrow night     Coronary artery disease  History of OM stenting in 2009.  Coronary angiogram in 03/2018 demonstrated no flow-limiting lesions. Spironolactone was added to his regimen after his left cath.  He was trialed on higher doses but developed nipple tenderness.  He is on statin therapy.  He is not on aspirin given his low platelets.     Pancytopenia  Hx chronically low platelets and [facility]  His platelets had dropped to 44,000 as of 03/11/2019 and hemoglobin was 9.5.  He now has pancytopenia and saw Hematology/Oncology.  He reports he had a bone marrow biopsy, although I do not see the results of this.  - Outpt hem/onc follow up  - All counts stable, daily CBC while inpatient     Acute kidney injury, stage 1  The patient had a recent rise in serum creatinine up to 1.31 after titration of his ethacrynic acid. Creat on admit was 1.24  - Monitor daily BMP while diuresing, slightly up to 1.41--plan for wean of bumex and then transition to PO bumex in AM     Obstructive sleep apnea  Continue CPAP while sleeping with  home settings.     DVT Prophylaxis: Pradaxa  Code Status: Full Code  Expected discharge: 2 - 3 days, recommended to transitional care unit once diuresis adequate    Aggie Akhtar, DO  Text Page (7am - 6pm)    Interval History   Patient seen and examined. Patient without complaints. No shortness of breath, chest pain, abdominal pain. Tolerating diet. Swelling much improved with lymphedema wraps and bumex gtt.    -Data reviewed today: I reviewed all new labs and imaging results over the last 24 hours. I personally reviewed no images or EKG's today.    Physical Exam   Temp: 97.3  F (36.3  C) Temp src: Axillary BP: 124/66 Pulse: 58 Heart Rate: 58 Resp: 16 SpO2: 97 % O2 Device: None (Room air)    Vitals:    04/09/19 0030 04/10/19 0536 04/11/19 0600   Weight: 102 kg (224 lb 14.4 oz) 93.2 kg (205 lb 6.4 oz) 89.2 kg (196 lb 9.6 oz)     Vital Signs with Ranges  Temp:  [97.3  F (36.3  C)-98.4  F (36.9  C)] 97.3  F (36.3  C)  Pulse:  [58] 58  Heart Rate:  [57-61] 58  Resp:  [16-18] 16  BP: ()/(53-69) 124/66  SpO2:  [95 %-98 %] 97 %  I/O last 3 completed shifts:  In: 870 [P.O.:870]  Out: 4525 [Urine:4525]    Constitutional: Awake, alert, cooperative, no apparent distress  Respiratory: clear to auscultation, no rales, wheeze or rhonchi  Cardiovascular: regular rate and rhythm, normal S1 and S2, and + systolic murmur  GI: Normal bowel sounds, soft, non-distended, non-tender  Skin/Integumen: No rashes, no cyanosis, lower extremities wrapped, difficult to tell if swelling much improved  Other: Scrotal edema improving    Medications     bumetanide 0.25 mg/hr (04/10/19 3744)     - MEDICATION INSTRUCTIONS -       ACE/ARB/ARNI NOT PRESCRIBED         buPROPion  150 mg Oral QAM     carvedilol  6.25 mg Oral BID w/meals     finasteride  5 mg Oral Daily     isosorbide mononitrate  30 mg Oral Daily     ketotifen  1 drop Both Eyes BID     levothyroxine  100 mcg Oral Daily     omeprazole  20 mg Oral BID AC     potassium chloride ER  20  mEq Oral Daily     simvastatin  20 mg Oral Daily     sodium chloride (PF)  3 mL Intracatheter Q8H     spironolactone  12.5 mg Oral Daily     sucralfate  1 g Oral BID     tamsulosin  0.4 mg Oral At Bedtime       Data   Recent Labs   Lab 04/11/19  0655 04/10/19  0634 04/09/19  1829 04/09/19  0628 04/08/19  1218   WBC 3.1* 3.3*  --  2.8* 2.9*   HGB 9.9* 9.4*  --  10.1* 9.8*   MCV 98 98  --  99 100   PLT 71* 69*  --  67* 70*    140 140 139 139   POTASSIUM 3.4 3.7 3.9 4.0 4.6   CHLORIDE 100 105 105 106 109   CO2 31 28 29 28 26   BUN 29 27 23 26 27   CR 1.41* 1.30* 1.26* 1.22 1.24   ANIONGAP 8 7 6 5 4   TEODORO 9.4 9.4 9.6 9.4 9.2   GLC 90 94 108* 98 107*   ALBUMIN  --   --   --   --  3.4   PROTTOTAL  --   --   --   --  7.3   BILITOTAL  --   --   --   --  1.4*   ALKPHOS  --   --   --   --  67   ALT  --   --   --   --  17   AST  --   --   --   --  25       No results found for this or any previous visit (from the past 24 hour(s)).

## 2019-04-11 NOTE — PLAN OF CARE
Pt remains alert and oriented x 4. Denies pain. Bumex IV d/c'd and will start on po tomorrow. Has soft BP and june, few AM meds not given. LEs rewrapped and will come off at 1230 tomorrow. LEs edematous and are elevated on pillows. Ambulated few times in hallway. Po intake adequate. Had 460cc fluid for 12 hour shift (7-7.) Will go for heart cath tomorrow.

## 2019-04-11 NOTE — PROGRESS NOTES
HEIDI  D: Met with pt to discuss TCU choices. HEIDI explained [facility] does not have a contract with [facility]. Reviewed [facility] contracted list. Pt would like referral sent to [facility]. Pt is going to look up additional [facility] TCU's and then let HEIDI know back-up option. HEIDI will send out referral via DOD.   P: Will continue to follow and support a safe discharge plan    Edilia Mcadams MSW, LGSW     ADDENDUM @1601: [facility] can accept pt for admission. They will start trying to get auth from [facility].

## 2019-04-11 NOTE — PLAN OF CARE
Pt A/O x4, can be forgetful at times. CMS intact with +2-3 edema in bilat LEs. Scrotum swollen +4. Lymphedema wraps in place. Tele v-paced w/ occasional PVCs. VSS on RA. Up SBA with GB to the BR. Pt can be incontinent of urine, and has frequency/urgency with voiding. Denies any pain. Bumex continued @ 1mL/hr. Continue to monitor.

## 2019-04-11 NOTE — PROGRESS NOTES
[facility]  Cardiology Progress Note  Date of Service: 04/11/2019  Primary Cardiologist: Dr. Carrizales    Assessment & Plan    Bairon Foster is a 88 year old male with past medical history significant for systolic and diastolic heart failure, coronary artery disease, progressive worsening MR and TR, peripheral artery disease, hyperlipidemia, JIM, PPM placement,  Pancytopenia, atrial fibrillation, and [facility] with recent recommendations to avoid sulfa medications as possible contributors, this includes avoiding torsemide and Lasix was admitted on 4/8/2019 with LOYOLA and bilateral LE edema.    1. Acute on chronic systolic and diastolic congestive heart failure exacerbation    CHELLE notes an EF at 30 % with a mildly dilated LV and moderate global hypokinesis of the LV. RV moderately dilated with decreased function.    EF down from 53% in 2018 to 30% now    CXR notes mild vascular congestion        I/O negative 9.5 L    Weight 196 pounds (224 on admit)    Dry weight ~189 pounds     Diuresing with Bumex infusion 0.25 mg/hr and Spironolactone 12.5 mg daily    Renal function stable with diuresis: creatinine 1.41, GFR 51    Carvedilol 6.25 mg BID, Isosorbide 30 mg daily     Lymphedema wraps in place     2. Severe valvular disease consistent with severe mitral and tricuspid regurgitation    CHELLE 03/22/19 notes severe (3-4+) MR. 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 Severe (3-4+) TR. TR jet directed at septum.    Scheduled out patient visit with Dr. Hope 04/15/2019 for evaluation of possible Mitraclip.     Diuresing with  Bumex infusion 0.25 mg/hr and Spironolactone 12.5 mg daily    Structural heart team notified of the admission      3. Chronic atrial fibrillation with PPM    Pradaxa for anticoagulation    Recent device check noted atrial fibrillation with rates of 40-50.    Due for generator change within the year      4. Coronary artery disease    Single vessel  disease of OM with patent stent from 2009 noted on coronary angiogram 03/21/2018    Currently no ASA due to low platelets     Isosorbide 30 mg daily and Simvastatin 20 mg daily     5. Pancytopenia    Hold Pradaxa if platelets are less than 40,000-- currently 69,000     Currently held for angiogram tomorrow    Follows Hem-onc      6. Acute kidney injury, stage I    Creatinine baseline ~1.2    Elevated to 1.31 at admission    Today- creatinine 1.41, GFR 51     7. Obstructive sleep apnea, CPAP     Plan:  1. Continue to hold Pradaxa. Plan for a left and right heart cath tomorrow as part of the workup for MitraClip  2. Wean Bumex drip off. Plan to transition to oral bumex 1 mg daily once drip is off.        WILLIS Rodriguez CNP  Pager:  [phone]   Text Page  (7am - 5pm, M-F)      Interval History   Resting in bed.     Physical Exam   Temp: 97.3  F (36.3  C) Temp src: Axillary BP: 124/66 Pulse: 58 Heart Rate: 58 Resp: 16 SpO2: 97 % O2 Device: None (Room air)    Vitals:    04/09/19 0030 04/10/19 0536 04/11/19 0600   Weight: 102 kg (224 lb 14.4 oz) 93.2 kg (205 lb 6.4 oz) 89.2 kg (196 lb 9.6 oz)       Constitutional:   NAD   Skin:   Warm and dry   Head:   Nontraumatic   Neck:   supple, symmetrical, trachea midline   Lungs:   symmetric, clear to auscultation   Cardiovascular:   regular rate and rhythm and normal S1 and S2, S3, 2/6 holosystolic murmur LLSB to apex   Abdomen:   Benign   Extremities and Back:   Edema 2+ from LE to groin, lymphedema wraps in place   Neurological:   Grossly nonfocal       Medications     bumetanide 0.25 mg/hr (04/10/19 5164)     - MEDICATION INSTRUCTIONS -       ACE/ARB/ARNI NOT PRESCRIBED         buPROPion  150 mg Oral QAM     carvedilol  6.25 mg Oral BID w/meals     finasteride  5 mg Oral Daily     isosorbide mononitrate  30 mg Oral Daily     ketotifen  1 drop Both Eyes BID     levothyroxine  100 mcg Oral Daily     omeprazole  20 mg Oral BID AC     potassium chloride ER  20 mEq Oral  Daily     simvastatin  20 mg Oral Daily     sodium chloride (PF)  3 mL Intracatheter Q8H     spironolactone  12.5 mg Oral Daily     sucralfate  1 g Oral BID     tamsulosin  0.4 mg Oral At Bedtime       Data     Most Recent 3 CBC's:  Recent Labs   Lab Test 04/11/19  0655 04/10/19  0634 04/09/19  0628   WBC 3.1* 3.3* 2.8*   HGB 9.9* 9.4* 10.1*   MCV 98 98 99   PLT 71* 69* 67*     Most Recent 3 BMP's:  Recent Labs   Lab Test 04/11/19  0655 04/10/19  0634 04/09/19  1829    140 140   POTASSIUM 3.4 3.7 3.9   CHLORIDE 100 105 105   CO2 31 28 29   BUN 29 27 23   CR 1.41* 1.30* 1.26*   ANIONGAP 8 7 6   TEODORO 9.4 9.4 9.6   GLC 90 94 108*     Most Recent 3 BNP's:  Recent Labs   Lab Test 04/08/19  1218 04/12/18  0919 01/08/18  1545   NTBNPI 578  --   --    NTBNP  --  [phone]

## 2019-04-11 NOTE — PLAN OF CARE
Discharge Planner OT   Patient plan for discharge: TCU (hopes to go to [facility])  Current status: pt seen in am for cardiac rehab session. Did not recall any learned info from yesterday's session regarding CHF management. Will continue to reinforce to promote learning given his memory deficits. Pt ambulated x 8min without adaptive device CGA as refused use of walker despite PT recommendation dismissing need (OT could not locate in room, found outside room down benz later). VSS on RA, denied symptoms.   Barriers to return to prior living situation: balance, cognitive impairment  Recommendations for discharge: TCU  Rationale for recommendations: pt will benefit from daily therapies in TCU setting to advance safety and indep with self-cares and mobilities and for more comprehensive assessment of cognitive function as relates to safety with IADLs prior to return home and resumption of indep activities       Entered by: Alan Real 04/11/2019 10:54 AM

## 2019-04-11 NOTE — PLAN OF CARE
Discharge Planner PT   Patient plan for discharge: TCU  Current status: Pt reports tolerated the bilateral lymph wraps for 23/24 hours without issues. Pt reports improvement in his leg swelling as well as his scrotal swelling. Noted decreased swelling in feet and ankles to 2+ pitting with improved viability of bony prominences. Continues with 2+ to 3+ bilateral LEs. Decreased edema in his scrotum notes with improved viability of the shaft of his penis to allow for improved toileting.     Washed and applied lotion to bilateral LEs and applied medium TG soft, followed by short stretch bandages from the base of his toes to below the crease of his knees. Also re-donned scrotal sling with the use of TG soft-wrapping it around his waist for comfort. Discussed with RN how to re-deepali if it falls of with toileting as pt reports had difficulty re-donning today with staff. Pt reported significant improvement in comfort with ambulation following application of the scrotal sling. Pt's scrotum was elevated with use of towels when in bed at end of session.    Please remove wraps if pt has any signs of intolerance including:  cold hands or feet, blue toes or fingers, numbness or tingling, shortness of breath, skin irritation or inability to tolerate wraps, or if they become soiled or wet. Keep legs elevated if wraps need to be removed.     RN to remove lymph wraps at 1230 on 4/12/19 and with plan for PT to assess edema and re-wrap bilateral LEs at 1300.    Supine<> sit mod IND with use of railing and elevated HOB. Sit to stand with CGA with FWW. Pt amb 225ft with FWW with CGA to close SBA for balance. Discussed with pt-recommend continue to use walker for balance and amb 3-4x/day with staff while in the hosp. Pt with good tolerance with standing ex instruction in room for balance with CGA for safety.    Barriers to return to prior living situation: impaired activity tolerance, impaired balance, limited ability for wife to  physically assist the pt  Recommendations for discharge: TCU  Rationale for recommendations: Discussed with pt, recommend TCU to improve balance, gait, strength and continue with management of edema to maximize pt's level of safety with mobility prior to return home. Pt will also benefit from continued PT to promote reduction in LE edema to fit back into his compression socks.            Entered by: Denia Guerrero 04/11/2019 2:19 PM

## 2019-04-11 NOTE — PLAN OF CARE
Pt remains alert and oriented x 4. Denies pain. Up with one assist. LEs are edematous and wrapped. Bumex infusing at 1cc per hour. Is on strict I and O. Po intake adequate. Will cont to monitor.

## 2019-04-12 ENCOUNTER — SURGERY (OUTPATIENT)
Age: 84
End: 2019-04-12
Payer: COMMERCIAL

## 2019-04-12 LAB
ANION GAP SERPL CALCULATED.3IONS-SCNC: 6 MMOL/L (ref 3–14)
BUN SERPL-MCNC: 32 MG/DL (ref 7–30)
CALCIUM SERPL-MCNC: 9 MG/DL (ref 8.5–10.1)
CHLORIDE SERPL-SCNC: 101 MMOL/L (ref 94–109)
CO2 BLD-SCNC: 29 MMOL/L (ref 21–28)
CO2 BLDCOV-SCNC: 31 MMOL/L (ref 21–28)
CO2 SERPL-SCNC: 32 MMOL/L (ref 20–32)
CREAT SERPL-MCNC: 1.51 MG/DL (ref 0.66–1.25)
ERYTHROCYTE [DISTWIDTH] IN BLOOD BY AUTOMATED COUNT: 16.3 % (ref 10–15)
GFR SERPL CREATININE-BSD FRML MDRD: 41 ML/MIN/{1.73_M2}
GLUCOSE SERPL-MCNC: 89 MG/DL (ref 70–99)
HCT VFR BLD AUTO: 28.8 % (ref 40–53)
HGB BLD-MCNC: 9.6 G/DL (ref 13.3–17.7)
MAGNESIUM SERPL-MCNC: 2.1 MG/DL (ref 1.6–2.3)
MCH RBC QN AUTO: 32.5 PG (ref 26.5–33)
MCHC RBC AUTO-ENTMCNC: 33.3 G/DL (ref 31.5–36.5)
MCV RBC AUTO: 98 FL (ref 78–100)
PCO2 BLD: 40 MM HG (ref 35–45)
PCO2 BLDV: 44 MM HG (ref 40–50)
PH BLD: 7.47 PH (ref 7.35–7.45)
PH BLDV: 7.45 PH (ref 7.32–7.43)
PHOSPHATE SERPL-MCNC: 4.1 MG/DL (ref 2.5–4.5)
PLATELET # BLD AUTO: 67 10E9/L (ref 150–450)
PO2 BLD: 72 MM HG (ref 80–105)
PO2 BLDV: 34 MM HG (ref 25–47)
POTASSIUM SERPL-SCNC: 3.3 MMOL/L (ref 3.4–5.3)
RBC # BLD AUTO: 2.95 10E12/L (ref 4.4–5.9)
SAO2 % BLDA FROM PO2: 95 % (ref 92–100)
SAO2 % BLDV FROM PO2: 68 %
SODIUM SERPL-SCNC: 139 MMOL/L (ref 133–144)
WBC # BLD AUTO: 3.2 10E9/L (ref 4–11)

## 2019-04-12 PROCEDURE — 25800030 ZZH RX IP 258 OP 636: Performed by: INTERNAL MEDICINE

## 2019-04-12 PROCEDURE — C1887 CATHETER, GUIDING: HCPCS | Performed by: INTERNAL MEDICINE

## 2019-04-12 PROCEDURE — 84100 ASSAY OF PHOSPHORUS: CPT | Performed by: HOSPITALIST

## 2019-04-12 PROCEDURE — C1894 INTRO/SHEATH, NON-LASER: HCPCS | Performed by: INTERNAL MEDICINE

## 2019-04-12 PROCEDURE — 93456 R HRT CORONARY ARTERY ANGIO: CPT | Performed by: INTERNAL MEDICINE

## 2019-04-12 PROCEDURE — 12000000 ZZH R&B MED SURG/OB

## 2019-04-12 PROCEDURE — 93456 R HRT CORONARY ARTERY ANGIO: CPT | Mod: 26 | Performed by: INTERNAL MEDICINE

## 2019-04-12 PROCEDURE — 25000132 ZZH RX MED GY IP 250 OP 250 PS 637: Performed by: NURSE PRACTITIONER

## 2019-04-12 PROCEDURE — 4A023N8 MEASUREMENT OF CARDIAC SAMPLING AND PRESSURE, BILATERAL, PERCUTANEOUS APPROACH: ICD-10-PCS | Performed by: INTERNAL MEDICINE

## 2019-04-12 PROCEDURE — C1769 GUIDE WIRE: HCPCS | Performed by: INTERNAL MEDICINE

## 2019-04-12 PROCEDURE — 27210794 ZZH OR GENERAL SUPPLY STERILE: Performed by: INTERNAL MEDICINE

## 2019-04-12 PROCEDURE — 99232 SBSQ HOSP IP/OBS MODERATE 35: CPT | Performed by: INTERNAL MEDICINE

## 2019-04-12 PROCEDURE — 25800030 ZZH RX IP 258 OP 636: Performed by: NURSE PRACTITIONER

## 2019-04-12 PROCEDURE — 85027 COMPLETE CBC AUTOMATED: CPT | Performed by: HOSPITALIST

## 2019-04-12 PROCEDURE — 80048 BASIC METABOLIC PNL TOTAL CA: CPT | Performed by: HOSPITALIST

## 2019-04-12 PROCEDURE — 83735 ASSAY OF MAGNESIUM: CPT | Performed by: HOSPITALIST

## 2019-04-12 PROCEDURE — 36415 COLL VENOUS BLD VENIPUNCTURE: CPT | Performed by: HOSPITALIST

## 2019-04-12 PROCEDURE — 99232 SBSQ HOSP IP/OBS MODERATE 35: CPT | Performed by: HOSPITALIST

## 2019-04-12 PROCEDURE — 25000128 H RX IP 250 OP 636: Performed by: INTERNAL MEDICINE

## 2019-04-12 PROCEDURE — 25000132 ZZH RX MED GY IP 250 OP 250 PS 637: Performed by: INTERNAL MEDICINE

## 2019-04-12 PROCEDURE — 99152 MOD SED SAME PHYS/QHP 5/>YRS: CPT

## 2019-04-12 PROCEDURE — B2111ZZ FLUOROSCOPY OF MULTIPLE CORONARY ARTERIES USING LOW OSMOLAR CONTRAST: ICD-10-PCS | Performed by: INTERNAL MEDICINE

## 2019-04-12 PROCEDURE — 25000125 ZZHC RX 250: Performed by: INTERNAL MEDICINE

## 2019-04-12 PROCEDURE — 40000852 ZZH STATISTIC HEART CATH LAB OR EP LAB

## 2019-04-12 PROCEDURE — 25000132 ZZH RX MED GY IP 250 OP 250 PS 637: Performed by: HOSPITALIST

## 2019-04-12 PROCEDURE — 99152 MOD SED SAME PHYS/QHP 5/>YRS: CPT | Performed by: INTERNAL MEDICINE

## 2019-04-12 PROCEDURE — 82803 BLOOD GASES ANY COMBINATION: CPT

## 2019-04-12 RX ORDER — HYDROCODONE BITARTRATE AND ACETAMINOPHEN 5; 325 MG/1; MG/1
1-2 TABLET ORAL EVERY 4 HOURS PRN
Status: DISCONTINUED | OUTPATIENT
Start: 2019-04-12 | End: 2019-04-13 | Stop reason: HOSPADM

## 2019-04-12 RX ORDER — EPTIFIBATIDE 2 MG/ML
1 INJECTION, SOLUTION INTRAVENOUS CONTINUOUS PRN
Status: DISCONTINUED | OUTPATIENT
Start: 2019-04-12 | End: 2019-04-12 | Stop reason: HOSPADM

## 2019-04-12 RX ORDER — ARGATROBAN 1 MG/ML
350 INJECTION, SOLUTION INTRAVENOUS
Status: DISCONTINUED | OUTPATIENT
Start: 2019-04-12 | End: 2019-04-12 | Stop reason: HOSPADM

## 2019-04-12 RX ORDER — EPTIFIBATIDE 2 MG/ML
2 INJECTION, SOLUTION INTRAVENOUS CONTINUOUS PRN
Status: DISCONTINUED | OUTPATIENT
Start: 2019-04-12 | End: 2019-04-12 | Stop reason: HOSPADM

## 2019-04-12 RX ORDER — NITROGLYCERIN 20 MG/100ML
.07-2 INJECTION INTRAVENOUS CONTINUOUS PRN
Status: DISCONTINUED | OUTPATIENT
Start: 2019-04-12 | End: 2019-04-12 | Stop reason: HOSPADM

## 2019-04-12 RX ORDER — POTASSIUM CHLORIDE 1500 MG/1
20 TABLET, EXTENDED RELEASE ORAL
Status: DISCONTINUED | OUTPATIENT
Start: 2019-04-12 | End: 2019-04-12 | Stop reason: HOSPADM

## 2019-04-12 RX ORDER — NALOXONE HYDROCHLORIDE 0.4 MG/ML
.1-.4 INJECTION, SOLUTION INTRAMUSCULAR; INTRAVENOUS; SUBCUTANEOUS
Status: DISCONTINUED | OUTPATIENT
Start: 2019-04-12 | End: 2019-04-13 | Stop reason: HOSPADM

## 2019-04-12 RX ORDER — LORAZEPAM 2 MG/ML
0.5 INJECTION INTRAMUSCULAR
Status: DISCONTINUED | OUTPATIENT
Start: 2019-04-12 | End: 2019-04-12 | Stop reason: HOSPADM

## 2019-04-12 RX ORDER — NALOXONE HYDROCHLORIDE 0.4 MG/ML
.2-.4 INJECTION, SOLUTION INTRAMUSCULAR; INTRAVENOUS; SUBCUTANEOUS
Status: ACTIVE | OUTPATIENT
Start: 2019-04-12 | End: 2019-04-13

## 2019-04-12 RX ORDER — FLUMAZENIL 0.1 MG/ML
0.2 INJECTION, SOLUTION INTRAVENOUS
Status: ACTIVE | OUTPATIENT
Start: 2019-04-12 | End: 2019-04-13

## 2019-04-12 RX ORDER — NITROGLYCERIN 5 MG/ML
VIAL (ML) INTRAVENOUS
Status: DISCONTINUED | OUTPATIENT
Start: 2019-04-12 | End: 2019-04-12

## 2019-04-12 RX ORDER — DOBUTAMINE HYDROCHLORIDE 200 MG/100ML
2-20 INJECTION INTRAVENOUS CONTINUOUS PRN
Status: DISCONTINUED | OUTPATIENT
Start: 2019-04-12 | End: 2019-04-12 | Stop reason: HOSPADM

## 2019-04-12 RX ORDER — VERAPAMIL HYDROCHLORIDE 2.5 MG/ML
INJECTION, SOLUTION INTRAVENOUS
Status: DISCONTINUED | OUTPATIENT
Start: 2019-04-12 | End: 2019-04-12

## 2019-04-12 RX ORDER — EPTIFIBATIDE 2 MG/ML
180 INJECTION, SOLUTION INTRAVENOUS EVERY 10 MIN PRN
Status: DISCONTINUED | OUTPATIENT
Start: 2019-04-12 | End: 2019-04-12 | Stop reason: HOSPADM

## 2019-04-12 RX ORDER — ARGATROBAN 1 MG/ML
150 INJECTION, SOLUTION INTRAVENOUS
Status: DISCONTINUED | OUTPATIENT
Start: 2019-04-12 | End: 2019-04-12 | Stop reason: HOSPADM

## 2019-04-12 RX ORDER — ACETAMINOPHEN 325 MG/1
325-650 TABLET ORAL EVERY 4 HOURS PRN
Status: DISCONTINUED | OUTPATIENT
Start: 2019-04-12 | End: 2019-04-13 | Stop reason: HOSPADM

## 2019-04-12 RX ORDER — ATROPINE SULFATE 0.1 MG/ML
0.5 INJECTION INTRAVENOUS EVERY 5 MIN PRN
Status: ACTIVE | OUTPATIENT
Start: 2019-04-12 | End: 2019-04-13

## 2019-04-12 RX ORDER — FENTANYL CITRATE 50 UG/ML
25-50 INJECTION, SOLUTION INTRAMUSCULAR; INTRAVENOUS
Status: ACTIVE | OUTPATIENT
Start: 2019-04-12 | End: 2019-04-13

## 2019-04-12 RX ORDER — LORAZEPAM 0.5 MG/1
0.5 TABLET ORAL
Status: DISCONTINUED | OUTPATIENT
Start: 2019-04-12 | End: 2019-04-12 | Stop reason: HOSPADM

## 2019-04-12 RX ORDER — TIROFIBAN HYDROCHLORIDE 50 UG/ML
0.07 INJECTION INTRAVENOUS CONTINUOUS PRN
Status: DISCONTINUED | OUTPATIENT
Start: 2019-04-12 | End: 2019-04-12 | Stop reason: HOSPADM

## 2019-04-12 RX ORDER — POTASSIUM CHLORIDE 1500 MG/1
20 TABLET, EXTENDED RELEASE ORAL ONCE
Status: COMPLETED | OUTPATIENT
Start: 2019-04-12 | End: 2019-04-12

## 2019-04-12 RX ORDER — SODIUM CHLORIDE 9 MG/ML
INJECTION, SOLUTION INTRAVENOUS CONTINUOUS
Status: DISCONTINUED | OUTPATIENT
Start: 2019-04-12 | End: 2019-04-13 | Stop reason: HOSPADM

## 2019-04-12 RX ORDER — LIDOCAINE 40 MG/G
CREAM TOPICAL
Status: DISCONTINUED | OUTPATIENT
Start: 2019-04-12 | End: 2019-04-13 | Stop reason: HOSPADM

## 2019-04-12 RX ORDER — IOPAMIDOL 755 MG/ML
INJECTION, SOLUTION INTRAVASCULAR
Status: DISCONTINUED | OUTPATIENT
Start: 2019-04-12 | End: 2019-04-12

## 2019-04-12 RX ORDER — LIDOCAINE 40 MG/G
CREAM TOPICAL
Status: DISCONTINUED | OUTPATIENT
Start: 2019-04-12 | End: 2019-04-12

## 2019-04-12 RX ORDER — FENTANYL CITRATE 50 UG/ML
INJECTION, SOLUTION INTRAMUSCULAR; INTRAVENOUS
Status: DISCONTINUED | OUTPATIENT
Start: 2019-04-12 | End: 2019-04-12

## 2019-04-12 RX ADMIN — ISOSORBIDE MONONITRATE 30 MG: 30 TABLET, EXTENDED RELEASE ORAL at 08:12

## 2019-04-12 RX ADMIN — KETOTIFEN FUMARATE 1 DROP: 0.35 SOLUTION/ DROPS OPHTHALMIC at 08:24

## 2019-04-12 RX ADMIN — SIMVASTATIN 20 MG: 10 TABLET, FILM COATED ORAL at 08:13

## 2019-04-12 RX ADMIN — POTASSIUM CHLORIDE 20 MEQ: 1500 TABLET, EXTENDED RELEASE ORAL at 08:13

## 2019-04-12 RX ADMIN — CARVEDILOL 6.25 MG: 6.25 TABLET, FILM COATED ORAL at 21:02

## 2019-04-12 RX ADMIN — IOPAMIDOL 40 ML: 755 INJECTION, SOLUTION INTRAVENOUS at 18:29

## 2019-04-12 RX ADMIN — FENTANYL CITRATE 25 MCG: 50 INJECTION, SOLUTION INTRAMUSCULAR; INTRAVENOUS at 18:18

## 2019-04-12 RX ADMIN — CARVEDILOL 6.25 MG: 6.25 TABLET, FILM COATED ORAL at 08:13

## 2019-04-12 RX ADMIN — TAMSULOSIN HYDROCHLORIDE 0.4 MG: 0.4 CAPSULE ORAL at 21:01

## 2019-04-12 RX ADMIN — POTASSIUM CHLORIDE 20 MEQ: 1500 TABLET, EXTENDED RELEASE ORAL at 12:21

## 2019-04-12 RX ADMIN — NITROGLYCERIN 100 MCG: 5 INJECTION, SOLUTION INTRAVENOUS at 18:19

## 2019-04-12 RX ADMIN — SUCRALFATE 1 G: 1 TABLET ORAL at 21:01

## 2019-04-12 RX ADMIN — VERAPAMIL HYDROCHLORIDE 1.25 MG: 2.5 INJECTION, SOLUTION INTRAVENOUS at 18:20

## 2019-04-12 RX ADMIN — KETOTIFEN FUMARATE 1 DROP: 0.35 SOLUTION/ DROPS OPHTHALMIC at 21:03

## 2019-04-12 RX ADMIN — LEVOTHYROXINE SODIUM 100 MCG: 100 TABLET ORAL at 08:13

## 2019-04-12 RX ADMIN — BUPROPION HYDROCHLORIDE 150 MG: 150 TABLET, FILM COATED, EXTENDED RELEASE ORAL at 08:13

## 2019-04-12 RX ADMIN — SUCRALFATE 1 G: 1 TABLET ORAL at 08:12

## 2019-04-12 RX ADMIN — FINASTERIDE 5 MG: 5 TABLET, FILM COATED ORAL at 08:13

## 2019-04-12 RX ADMIN — ASPIRIN 325 MG: 325 TABLET, DELAYED RELEASE ORAL at 12:46

## 2019-04-12 RX ADMIN — LIDOCAINE HYDROCHLORIDE 3 ML: 10 INJECTION, SOLUTION EPIDURAL; INFILTRATION; INTRACAUDAL; PERINEURAL at 15:42

## 2019-04-12 RX ADMIN — BUMETANIDE 1 MG: 1 TABLET ORAL at 08:12

## 2019-04-12 RX ADMIN — SODIUM CHLORIDE: 9 INJECTION, SOLUTION INTRAVENOUS at 19:59

## 2019-04-12 RX ADMIN — SODIUM CHLORIDE: 9 INJECTION, SOLUTION INTRAVENOUS at 14:36

## 2019-04-12 ASSESSMENT — ACTIVITIES OF DAILY LIVING (ADL)
ADLS_ACUITY_SCORE: 15

## 2019-04-12 ASSESSMENT — MIFFLIN-ST. JEOR: SCORE: 1608.75

## 2019-04-12 NOTE — PROGRESS NOTES
Nursing note.  Pt was transferred off the unit to CS Rm 4, report called to charge nurse Kelly at 1305.

## 2019-04-12 NOTE — PROGRESS NOTES
HEIDI  D: Received VM that [facility] has given auth #[id number].   P: Will continue to follow and support a safe discharge plan    Edilia MURRY, LGSW

## 2019-04-12 NOTE — PLAN OF CARE
"Nursing note  Pt a/o x 4, up with sba to the BR voiding ok with urinal. Pt denies any dizziness or lightheadedness. Pt denies any SOB/LOYOLA. VSS./60   Pulse 60   Temp 98  F (36.7  C) (Oral)   Resp 18   Ht 1.88 m (6' 2\")   Wt 86.9 kg (191 lb 9.3 oz)   SpO2 99%   BMI 24.60 kg/m    Tele is 100% V paved. Lymphedema wrap removed at 1230 pm. Trace edema bilateral LE's. 2+ edema scrotum. Pt on 1800 FR. Has been NPO since MN for angio today. Will continue to monitor.   "

## 2019-04-12 NOTE — PLAN OF CARE
PT: Unable to see for PT session due to pt at heart cath procedure.Discussed with RN prior to procedure to remove lymph wraps to avoid having them on >24 hours.

## 2019-04-12 NOTE — INTERIM SUMMARY
We were just about to begin RHC/LHC when a STEMI was called from ER. Other lab is occupied and we will need to postpone his procedure.     We did briefly attempt R brachial vein approach but could not advance the catheter into SVC. We were in the process of trying to find another suitable vein from R brachial region without success. No complications. Local pressure applied. We were then going to try a femoral access but had to stop just as we prepped groin.     I explained need to delay to patient and . Will discuss with his wife now. In future will need to do RHC from leg vein or internal jugular and the LHC from radial approach.     Kiran

## 2019-04-12 NOTE — PROGRESS NOTES
[facility]  Cardiology Progress Note  Date of Service: 04/12/2019  Primary Cardiologist: Dr. Carrizales    Assessment & Plan    Bairon Foster is a 88 year old male with past medical history significant for systolic and diastolic heart failure, coronary artery disease, progressive worsening MR and TR, peripheral artery disease, hyperlipidemia, JIM, PPM placement,  Pancytopenia, atrial fibrillation, and [facility] with recent recommendations to avoid sulfa medications as possible contributors, this includes avoiding torsemide and Lasix was admitted on 4/8/2019 with LOYOLA and bilateral LE edema.    1. Acute on chronic systolic and diastolic congestive heart failure exacerbation    CHELLE notes an EF at 30 % with a mildly dilated LV and moderate global hypokinesis of the LV. RV moderately dilated with decreased function.    EF down from 53% in 2018 to 30% now    CXR notes mild vascular congestion        I/O negative 10.3 L    Weight 191 pounds (224 on admit)    Dry weight ~189 pounds     Fluid restriction at 1800 ml/day    Diuresing with Bumex 1 mg daily and Spironolactone 12.5 mg daily    Renal function stable with diuresis: creatinine 1.51, GFR 41    Carvedilol 6.25 mg BID, Isosorbide 30 mg daily     Lymphedema wraps      2. Severe valvular disease consistent with severe mitral and tricuspid regurgitation    CHELLE 03/22/19 notes severe (3-4+) MR. 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 Severe (3-4+) TR. TR jet directed at septum.    Scheduled out patient visit with Dr. Hope 04/15/2019 for evaluation of possible Mitraclip.     Diuresing with Bumex 1 mg daily and Spironolactone 12.5 mg daily    Structural heart team following and recommended left and right heart cath due to drop in EF prior to doing the MitraClip     3. Chronic atrial fibrillation with PPM    Pradaxa for anticoagulation, currently held for angiogram    Recent device check noted atrial fibrillation with  rates of 40-50.    Due for generator change within the year      4. Coronary artery disease    Single vessel disease of OM with patent stent from 2009 noted on coronary angiogram 03/21/2018    Currently no ASA due to low platelets     Isosorbide 30 mg daily and Simvastatin 20 mg daily     5. Pancytopenia    Hold Pradaxa if platelets are less than 40,000-- currently 67,000     Currently held for angiogram tomorrow    Follows Hem-onc      6. Acute kidney injury, stage I    Creatinine baseline ~1.2    Elevated to 1.31 at admission    Today- creatinine 1.41, GFR 51     7. Obstructive sleep apnea, CPAP     Plan:  1. Left and right heart cath today per structural heart team   2. Continue to hold Pradaxa for angiogram. Can resume following   3. Continue current diuresis.  4. Likely discharge home tomorrow if stable     Follow-up with Dr. Hope 04/15/19 in the [facility] clinic at 10:15 with labs prior at 9:20      WILLIS Rodriguez CNP  Pager:  [phone]   Text Page  (7am - 5pm, M-F)      Interval History   Sleeping.     Physical Exam   Temp: 97.8  F (36.6  C) Temp src: Oral BP: 115/61 Pulse: 58 Heart Rate: 57 Resp: 16 SpO2: 93 % O2 Device: None (Room air)    Vitals:    04/11/19 0600 04/11/19 1547 04/12/19 0450   Weight: 89.2 kg (196 lb 9.6 oz) 90.9 kg (200 lb 6.4 oz) 86.9 kg (191 lb 9.3 oz)       Constitutional:   NAD   Skin:   Warm and dry   Head:   Nontraumatic   Neck:   supple, symmetrical, trachea midline   Lungs:   symmetric, clear to auscultation   Cardiovascular:   regular rate and rhythm and normal S1 and S2, S3, 2/6 holosystolic murmur LLSB to apex   Abdomen:   Benign   Extremities and Back:   Edema 2+ from LE to groin, lymphedema wraps in place   Neurological:   Grossly nonfocal       Medications     - MEDICATION INSTRUCTIONS -       ACE/ARB/ARNI NOT PRESCRIBED       sodium chloride         bumetanide  1 mg Oral Daily     buPROPion  150 mg Oral QAM     carvedilol  6.25 mg Oral BID w/meals     finasteride  5 mg  Oral Daily     isosorbide mononitrate  30 mg Oral Daily     ketotifen  1 drop Both Eyes BID     levothyroxine  100 mcg Oral Daily     omeprazole  20 mg Oral BID AC     potassium chloride  20 mEq Oral Once     potassium chloride ER  20 mEq Oral Daily     simvastatin  20 mg Oral Daily     sodium chloride (PF)  3 mL Intracatheter Q8H     sodium chloride (PF)  3 mL Intracatheter Q8H     spironolactone  12.5 mg Oral Daily     sucralfate  1 g Oral BID     tamsulosin  0.4 mg Oral At Bedtime       Data     Most Recent 3 CBC's:  Recent Labs   Lab Test 04/12/19  0626 04/11/19  0655 04/10/19  0634   WBC 3.2* 3.1* 3.3*   HGB 9.6* 9.9* 9.4*   MCV 98 98 98   PLT 67* 71* 69*     Most Recent 3 BMP's:  Recent Labs   Lab Test 04/12/19  0626 04/11/19  0655 04/10/19  0634    139 140   POTASSIUM 3.3* 3.4 3.7   CHLORIDE 101 100 105   CO2 32 31 28   BUN 32* 29 27   CR 1.51* 1.41* 1.30*   ANIONGAP 6 8 7   TEODORO 9.0 9.4 9.4   GLC 89 90 94     Most Recent 3 BNP's:  Recent Labs   Lab Test 04/08/19  1218 04/12/18  0919 01/08/18  1545   NTBNPI 578  --   --    NTBNP  --  [phone]

## 2019-04-12 NOTE — PRE-PROCEDURE
I have examined the patient, reviewed the history, medications and pre procedural tests. Recent worsening acute systolic heart failure with significant primary MR due to degenerative MV disease. He is felt to be a Lila Clip candidate.  I have explained to the patient the risks of death, MI, stroke, hematoma, contrast induced nephropathy potentially resulting in the need for dialysis,  possible urgent bypass surgery for failed PCI, use of stents, thienopyridine agents, possible peripheral vascular complications, arrhythmia, the use of FFR in clinical decision-making and alternative of medical therapy alone in regards to bilateral heart catheterization, coronary angiography, and possible percutaneous coronary intervention. The patient voiced understanding and wishes to proceed. The patient has a good right radial pulse, normal ulnar pulse and a normal Lyndon's sign.

## 2019-04-12 NOTE — PROGRESS NOTES
1318 pt here to wait for heart cath. Denies pain. No questions. Pulses checked and VSS see flow sheets. Iv in place, no fluids from floor. Dr to do consent with pt. Wife here. Aspirin and kcl given per report from [facility]. Npo since last night. No iv fluids ordered,

## 2019-04-12 NOTE — PLAN OF CARE
No events overnight. Tele: 100% v-paced. Lymphedema wraps in place. Reported pain in feet at midnight; Tylenol given, wraps loosened and legs elevated. CMS intact. Reports significant improvement with BLE and scrotal edema.  Lungs clear. NPO for angio today. VSS on room air.

## 2019-04-12 NOTE — PROGRESS NOTES
[facility]    Hospitalist Progress Note      Assessment & Plan   Bairon Foster is a 88 year old male who was admitted on 4/8/2019 for edema and shortness of breath, found to be in acute combined systolic/diastolic congestive heart failure.    Acute on chronic combined systolic and diastolic congestive heart failure exacerbation  The patient has had multiple diuretic changes recently and had been taken off his torsemide, as there was concern this could be causing a rash.  He was supposed to start ethacrynic acid but had a delay in obtaining this medication and gained 18 pounds.  After being seen in Cardiology followup, his torsemide was restarted until he was able to obtain is ethacrynic acid.  This was started at 25 mg and then increased to 50 mg.  He is also continued on his Inspra and spironolactone.  Despite being medication compliant, he has had worsening edema now with dyspnea on exertion.  He is not hypoxic.  Last CHELLE from March showed a significant drop in his ejection fraction from 53%, now down to 30%.  There is also progression of his mitral valve and tricuspid valve regurgitation with moderate to severe to severe mitral regurgitation.  Valvular disease is likely contributing to his congestive heart failure exacerbation.  He has an appointment with Dr. Hope to discuss if he would be a mitral clip candidate.  - Dry weight near 189, today is 191 (was 224 on 4/9)  - Continue close I/Os, with daily weights, fluid restriction to 1800ml/day  - Stop eplerenone (has been refusing)  - Continue coreg, spironolactone, imdur   - Cardiology consulted, added bumex gtt, now weaned to 1mg bumex PO daily (started 4/12)  - Currently BP cannot tolerate addition of hydralazine (if after transition to PO bumex BPs higher, may be able to add 10mg hydralazine TID)  - Appreciate nutrition consult, patient has reported intake of foods typically high in sodium and would need to make dietary adjustments upon  discharge     Mitral regurgitation/tricuspid regurgitation  The patient has had progressive deterioration of his mitral valve, now moderately severe to severe regurgitation and has 3+ tricuspid regurgitation.  - Plan for left and right heart cath today to evaluate for MitraClip (currently NPO)  - Follow up with Dr Hope on 4/15     Chronic atrial fibrillation, on Pradaxa  Device checked during his last Cardiology visit.  Single-chamber pacemaker, implanted in 08/2009.  Device check demonstrated an underlying rhythm of atrial fibrillation with heart rate of 40-50.  He has less than 0.5 years remaining on the battery.  The plan is to recheck this in 1 months' time and likely will need generator change.  - Pradaxa held as plans for right/left heart cath, resume 4/12 evening      Coronary artery disease  History of OM stenting in 2009.  Coronary angiogram in 03/2018 demonstrated no flow-limiting lesions. Spironolactone was added to his regimen after his left cath.  He was trialed on higher doses but developed nipple tenderness.  He is on statin therapy.  He is not on aspirin given his low platelets.     Pancytopenia  Hx chronically low platelets and [facility]  His platelets had dropped to 44,000 as of 03/11/2019 and hemoglobin was 9.5.  He now has pancytopenia and saw Hematology/Oncology.  He reports he had a bone marrow biopsy, although I do not see the results of this.  - Outpt hem/onc follow up  - All counts stable, daily CBC while inpatient     Acute kidney injury  The patient had a recent rise in serum creatinine up to 1.31 after titration of his ethacrynic acid. Creat on admit was 1.24  - Monitor daily BMP while diuresing, now 1.51, weaned to PO Bumex  - Repeat BMP in AM     Obstructive sleep apnea  Continue CPAP while sleeping with home settings.     Hypokalemia  Secondary to IV diuresis with bumex. Receiving 20meq supplement daily  - Given additional 20meq PO supplement today,  repeat BMP in AM    DVT Prophylaxis: Pradaxa  Code Status: Full Code  Expected discharge: Tomorrow, recommended to transitional care unit if BP stable    Aggie Akhtar, DO  Text Page (7am - 6pm)    Interval History   Patient seen and examined. No complaints this AM. Swelling greatly improved. No shortness of breath, chest pain, abdominal pain, nausea, vomiting. Plan for right and left heart cath today, then discharge to TCU tomorrow. Updated patient, wife and son at bedside.    -Data reviewed today: I reviewed all new labs and imaging results over the last 24 hours. I personally reviewed no images or EKG's today.    Physical Exam   Temp: 97.8  F (36.6  C) Temp src: Oral BP: 115/61 Pulse: 58 Heart Rate: 57 Resp: 16 SpO2: 93 % O2 Device: None (Room air)    Vitals:    04/11/19 0600 04/11/19 1547 04/12/19 0450   Weight: 89.2 kg (196 lb 9.6 oz) 90.9 kg (200 lb 6.4 oz) 86.9 kg (191 lb 9.3 oz)     Vital Signs with Ranges  Temp:  [97.8  F (36.6  C)-98  F (36.7  C)] 97.8  F (36.6  C)  Pulse:  [58-60] 58  Heart Rate:  [56-59] 57  Resp:  [14-16] 16  BP: (105-124)/(41-66) 115/61  SpO2:  [93 %-100 %] 93 %  I/O last 3 completed shifts:  In: 440 [P.O.:440]  Out: 2175 [Urine:2175]    Constitutional: Awake, alert, cooperative, no apparent distress  Respiratory: clear to auscultation, no rales, wheeze or rhonchi  Cardiovascular: regular rate and rhythm, normal S1 and S2, and + systolic murmur  GI: Normal bowel sounds, soft, non-distended, non-tender  Skin/Integumen: No rashes, no cyanosis, lower extremities wrapped, swelling much improved  Other: Scrotal edema improving    Medications     - MEDICATION INSTRUCTIONS -       ACE/ARB/ARNI NOT PRESCRIBED       sodium chloride         bumetanide  1 mg Oral Daily     buPROPion  150 mg Oral QAM     carvedilol  6.25 mg Oral BID w/meals     finasteride  5 mg Oral Daily     isosorbide mononitrate  30 mg Oral Daily     ketotifen  1 drop Both Eyes BID     levothyroxine  100 mcg Oral Daily      omeprazole  20 mg Oral BID AC     potassium chloride  20 mEq Oral Once     potassium chloride ER  20 mEq Oral Daily     simvastatin  20 mg Oral Daily     sodium chloride (PF)  3 mL Intracatheter Q8H     sodium chloride (PF)  3 mL Intracatheter Q8H     spironolactone  12.5 mg Oral Daily     sucralfate  1 g Oral BID     tamsulosin  0.4 mg Oral At Bedtime       Data   Recent Labs   Lab 04/12/19  0626 04/11/19  0655 04/10/19  0634  04/08/19  1218   WBC 3.2* 3.1* 3.3*   < > 2.9*   HGB 9.6* 9.9* 9.4*   < > 9.8*   MCV 98 98 98   < > 100   PLT 67* 71* 69*   < > 70*    139 140   < > 139   POTASSIUM 3.3* 3.4 3.7   < > 4.6   CHLORIDE 101 100 105   < > 109   CO2 32 31 28   < > 26   BUN 32* 29 27   < > 27   CR 1.51* 1.41* 1.30*   < > 1.24   ANIONGAP 6 8 7   < > 4   TEODORO 9.0 9.4 9.4   < > 9.2   GLC 89 90 94   < > 107*   ALBUMIN  --   --   --   --  3.4   PROTTOTAL  --   --   --   --  7.3   BILITOTAL  --   --   --   --  1.4*   ALKPHOS  --   --   --   --  67   ALT  --   --   --   --  17   AST  --   --   --   --  25    < > = values in this interval not displayed.       No results found for this or any previous visit (from the past 24 hour(s)).

## 2019-04-13 ENCOUNTER — APPOINTMENT (OUTPATIENT)
Dept: OCCUPATIONAL THERAPY | Facility: CLINIC | Age: 84
DRG: 287 | End: 2019-04-13
Payer: COMMERCIAL

## 2019-04-13 VITALS
OXYGEN SATURATION: 94 % | RESPIRATION RATE: 18 BRPM | SYSTOLIC BLOOD PRESSURE: 141 MMHG | WEIGHT: 188.71 LBS | HEART RATE: 59 BPM | HEIGHT: 74 IN | TEMPERATURE: 97.8 F | DIASTOLIC BLOOD PRESSURE: 73 MMHG | BODY MASS INDEX: 24.22 KG/M2

## 2019-04-13 LAB
ANION GAP SERPL CALCULATED.3IONS-SCNC: 5 MMOL/L (ref 3–14)
BUN SERPL-MCNC: 32 MG/DL (ref 7–30)
CALCIUM SERPL-MCNC: 9 MG/DL (ref 8.5–10.1)
CHLORIDE SERPL-SCNC: 105 MMOL/L (ref 94–109)
CO2 SERPL-SCNC: 31 MMOL/L (ref 20–32)
CREAT SERPL-MCNC: 1.25 MG/DL (ref 0.66–1.25)
ERYTHROCYTE [DISTWIDTH] IN BLOOD BY AUTOMATED COUNT: 16 % (ref 10–15)
GFR SERPL CREATININE-BSD FRML MDRD: 51 ML/MIN/{1.73_M2}
GLUCOSE SERPL-MCNC: 83 MG/DL (ref 70–99)
HCT VFR BLD AUTO: 28.4 % (ref 40–53)
HGB BLD-MCNC: 9.2 G/DL (ref 13.3–17.7)
MCH RBC QN AUTO: 32.2 PG (ref 26.5–33)
MCHC RBC AUTO-ENTMCNC: 32.4 G/DL (ref 31.5–36.5)
MCV RBC AUTO: 99 FL (ref 78–100)
PLATELET # BLD AUTO: 62 10E9/L (ref 150–450)
POTASSIUM SERPL-SCNC: 3.4 MMOL/L (ref 3.4–5.3)
RBC # BLD AUTO: 2.86 10E12/L (ref 4.4–5.9)
SODIUM SERPL-SCNC: 141 MMOL/L (ref 133–144)
WBC # BLD AUTO: 2.7 10E9/L (ref 4–11)

## 2019-04-13 PROCEDURE — 25000132 ZZH RX MED GY IP 250 OP 250 PS 637: Performed by: INTERNAL MEDICINE

## 2019-04-13 PROCEDURE — 97110 THERAPEUTIC EXERCISES: CPT | Mod: GO | Performed by: OCCUPATIONAL THERAPIST

## 2019-04-13 PROCEDURE — 99239 HOSP IP/OBS DSCHRG MGMT >30: CPT | Performed by: HOSPITALIST

## 2019-04-13 PROCEDURE — 80048 BASIC METABOLIC PNL TOTAL CA: CPT | Performed by: HOSPITALIST

## 2019-04-13 PROCEDURE — 25000132 ZZH RX MED GY IP 250 OP 250 PS 637: Performed by: HOSPITALIST

## 2019-04-13 PROCEDURE — 97535 SELF CARE MNGMENT TRAINING: CPT | Mod: GO | Performed by: OCCUPATIONAL THERAPIST

## 2019-04-13 PROCEDURE — 36415 COLL VENOUS BLD VENIPUNCTURE: CPT | Performed by: HOSPITALIST

## 2019-04-13 PROCEDURE — 85027 COMPLETE CBC AUTOMATED: CPT | Performed by: HOSPITALIST

## 2019-04-13 RX ORDER — LORAZEPAM 1 MG/1
0.5-1 TABLET ORAL EVERY 8 HOURS PRN
Qty: 30 TABLET | Refills: 5 | Status: ON HOLD | OUTPATIENT
Start: 2019-04-13 | End: 2019-12-03

## 2019-04-13 RX ORDER — DABIGATRAN ETEXILATE 150 MG/1
150 CAPSULE ORAL 2 TIMES DAILY
Status: DISCONTINUED | OUTPATIENT
Start: 2019-04-13 | End: 2019-04-13 | Stop reason: HOSPADM

## 2019-04-13 RX ORDER — BUMETANIDE 1 MG/1
1 TABLET ORAL DAILY
Qty: 30 TABLET | Refills: 0 | DISCHARGE
Start: 2019-04-14 | End: 2019-04-25

## 2019-04-13 RX ORDER — BUMETANIDE 1 MG/1
1 TABLET ORAL DAILY
Qty: 30 TABLET | Refills: 0 | Status: SHIPPED | OUTPATIENT
Start: 2019-04-14 | End: 2019-04-13

## 2019-04-13 RX ORDER — POTASSIUM CHLORIDE 1500 MG/1
40 TABLET, EXTENDED RELEASE ORAL ONCE
Status: COMPLETED | OUTPATIENT
Start: 2019-04-13 | End: 2019-04-13

## 2019-04-13 RX ADMIN — SPIRONOLACTONE 12.5 MG: 25 TABLET ORAL at 08:16

## 2019-04-13 RX ADMIN — ISOSORBIDE MONONITRATE 30 MG: 30 TABLET, EXTENDED RELEASE ORAL at 08:16

## 2019-04-13 RX ADMIN — POTASSIUM CHLORIDE 40 MEQ: 1500 TABLET, EXTENDED RELEASE ORAL at 10:17

## 2019-04-13 RX ADMIN — OMEPRAZOLE 20 MG: 20 CAPSULE, DELAYED RELEASE ORAL at 06:28

## 2019-04-13 RX ADMIN — BUMETANIDE 1 MG: 1 TABLET ORAL at 08:17

## 2019-04-13 RX ADMIN — LEVOTHYROXINE SODIUM 100 MCG: 100 TABLET ORAL at 08:17

## 2019-04-13 RX ADMIN — POTASSIUM CHLORIDE 20 MEQ: 1500 TABLET, EXTENDED RELEASE ORAL at 08:16

## 2019-04-13 RX ADMIN — FINASTERIDE 5 MG: 5 TABLET, FILM COATED ORAL at 08:16

## 2019-04-13 RX ADMIN — DABIGATRAN ETEXILATE MESYLATE 150 MG: 150 CAPSULE ORAL at 10:17

## 2019-04-13 RX ADMIN — BUPROPION HYDROCHLORIDE 150 MG: 150 TABLET, FILM COATED, EXTENDED RELEASE ORAL at 08:16

## 2019-04-13 RX ADMIN — SIMVASTATIN 20 MG: 10 TABLET, FILM COATED ORAL at 08:17

## 2019-04-13 RX ADMIN — KETOTIFEN FUMARATE 1 DROP: 0.35 SOLUTION/ DROPS OPHTHALMIC at 08:18

## 2019-04-13 RX ADMIN — SUCRALFATE 1 G: 1 TABLET ORAL at 08:16

## 2019-04-13 RX ADMIN — CARVEDILOL 6.25 MG: 6.25 TABLET, FILM COATED ORAL at 08:16

## 2019-04-13 ASSESSMENT — ACTIVITIES OF DAILY LIVING (ADL)
ADLS_ACUITY_SCORE: 16
ADLS_ACUITY_SCORE: 15

## 2019-04-13 ASSESSMENT — MIFFLIN-ST. JEOR: SCORE: 1595.75

## 2019-04-13 NOTE — DISCHARGE SUMMARY
[facility]    Discharge Summary  Hospitalist    Date of Admission:  4/8/2019  Date of Discharge:  4/13/2019  Discharging Provider: Aggie Akhtar DO  Date of Service (when I saw the patient): 04/13/19    Discharge Diagnoses   Acute on chronic combined systolic and diastolic congestive heart failure  Mitral regurgitation/tricuspid regurgitation  Chronic atrial fibrillation, on Pradaxa  Coronary artery disease  Pancytopenia  Acute kidney injury  Obstructive sleep apnea  Hypokalemia    History of Present Illness   Bairon Foster is an 88 year old male who presented with edema and shortness of breath, found to be in acute combined systolic/diastolic congestive heart failure.    Hospital Course   Bairon Foster was admitted on 4/8/2019.  The following problems were addressed during his hospitalization:    Acute on chronic combined systolic and diastolic congestive heart failure  The patient has had multiple diuretic changes recently and had been taken off his torsemide, as there was concern this could be causing a rash.  He was supposed to start ethacrynic acid but had a delay in obtaining this medication and gained 18 pounds.  After being seen in Cardiology followup, his torsemide was restarted until he was able to obtain is ethacrynic acid.  This was started at 25 mg and then increased to 50 mg.  He is also continued on his Inspra and spironolactone.  Despite being medication compliant, he has had worsening edema now with dyspnea on exertion.  He is not hypoxic.  Last CHELLE from March showed a significant drop in his ejection fraction from 53%, now down to 30%.  There is also progression of his mitral valve and tricuspid valve regurgitation with moderate to severe to severe mitral regurgitation.  Valvular disease is likely contributing to his congestive heart failure exacerbation. Was on bumex gtt with improvement in weight and symptoms  - Dry weight near 189, today is 188 (was 224 on 4/9)  - Continue  fluid restriction to 1800ml/day  - Stop eplerenone and ethacrynic acid  - Continue coreg, spironolactone, imdur   - Cardiology consulted, discharged on 1mg Bumex daily  - Consider addition of 10mg hydralazine TID if BP elevated (BPs generally well within range during admission)  - Appreciate nutrition consult, patient has reported intake of foods typically high in sodium and is recommended to follow 2 gram sodium diet on discharge.     Mitral regurgitation/tricuspid regurgitation  The patient has had progressive deterioration of his mitral valve, now moderately severe to severe regurgitation and has 3+ tricuspid regurgitation.  - Underwent RHC and LHC on 4/12 for work up for consideration of MitraClip  - Follow up with Dr Hope on 4/15     Chronic atrial fibrillation, on Pradaxa  Device checked during his last Cardiology visit.  Single-chamber pacemaker, implanted in 08/2009.  Device check demonstrated an underlying rhythm of atrial fibrillation with heart rate of 40-50.  He has less than 0.5 years remaining on the battery.  The plan is to recheck this in 1 months' time and likely will need generator change.  - Pradaxa held for heart cath, resumed 4/13 AM  - Follow up with cardiology     Coronary artery disease  History of OM stenting in 2009.  Coronary angiogram in 03/2018 demonstrated no flow-limiting lesions. Spironolactone was added to his regimen after his left cath.  He was trialed on higher doses but developed nipple tenderness.  He is on statin therapy.  He is not on aspirin given his low platelets.     Pancytopenia  Hx chronically low platelets and [facility]  His platelets had dropped to 44,000 as of 03/11/2019 and hemoglobin was 9.5.  He now has pancytopenia and saw Hematology/Oncology.  He reports he had a bone marrow biopsy  - Outpt hem/onc follow up as regularly scheduled  - All counts stable     Acute kidney injury  The patient had a recent rise in serum creatinine up to  1.31 after titration of his ethacrynic acid. Baseline is probably between 1.1-1.2. Creat on admit was 1.24, creatinine max 1.51 while on bumex gtt during admission  - Cr improved to 1.25 on day of discharge  - Repeat BMP in Monday and again in one week     Obstructive sleep apnea  Continue CPAP while sleeping with home settings.      Hypokalemia  Secondary to IV diuresis with bumex. PTA 20meq supplement daily  - Gave additional 40meq on 4/13 prior to discharge  - Continue PTA supplement, repeat BMP on Monday and again in one week, adjust KCl supplement as needed.    Aggie Akhtar, DO    Significant Results and Procedures    4/12 Right and left heart cath: Right sided filling pressures are mildly elevated. Mild elevated Pulmonary Hypertension. Left sided filling pressures are mildly elevated. Normal cardiac output level.    Pending Results   NA    Code Status   Full Code       Primary Care Physician   Mg Nelson    Physical Exam   Temp: 97.8  F (36.6  C) Temp src: Oral BP: 141/73 Pulse: 59 Heart Rate: 64 Resp: 18 SpO2: 94 % O2 Device: None (Room air) Oxygen Delivery: 3 LPM  Vitals:    04/11/19 1547 04/12/19 0450 04/13/19 0715   Weight: 90.9 kg (200 lb 6.4 oz) 86.9 kg (191 lb 9.3 oz) 85.6 kg (188 lb 11.4 oz)     Vital Signs with Ranges  Temp:  [97.5  F (36.4  C)-98.2  F (36.8  C)] 97.8  F (36.6  C)  Pulse:  [59-60] 59  Heart Rate:  [55-64] 64  Resp:  [16-18] 18  BP: ()/(53-73) 141/73  SpO2:  [92 %-99 %] 94 %  I/O last 3 completed shifts:  In: 180 [P.O.:180]  Out: 350 [Urine:350]    Constitutional: Awake, alert, cooperative, no apparent distress.  Eyes: Conjunctiva and pupils examined and normal.  HEENT: Moist mucous membranes, normal dentition.  Respiratory: Clear to auscultation bilaterally, no crackles or wheezing.  Cardiovascular: Regular rate and rhythm, normal S1 and S2, and no murmur noted.  GI: Soft, non-distended, non-tender, normal bowel sounds.  Lymph/Hematologic: No anterior cervical or  supraclavicular adenopathy.  Skin: No rashes, no cyanosis, trace to +1 edema, visible wrinkles on bilateral lower extremities.  Musculoskeletal: No joint swelling, erythema or tenderness.  Neurologic: Cranial nerves 2-12 intact, normal strength and sensation.  Psychiatric: Alert, oriented to person, place and time, no obvious anxiety or depression. Forgetful    Discharge Disposition   Discharged to TCU  Condition at discharge: Stable    Consultations This Hospital Stay   CARDIAC REHAB IP CONSULT  CARE COORDINATOR IP CONSULT  NUTRITION SERVICES ADULT IP CONSULT  SOCIAL WORK IP CONSULT  PHYSICAL THERAPY ADULT IP CONSULT  CARDIOLOGY IP CONSULT  LYMPHEDEMA THERAPY IP CONSULT  PHARMACY IP CONSULT  PHARMACY IP CONSULT  PHYSICAL THERAPY ADULT IP CONSULT  OCCUPATIONAL THERAPY ADULT IP CONSULT  SMOKING CESSATION PROGRAM IP CONSULT    Time Spent on this Encounter   I, Aggie Akhtar, personally saw the patient today and spent greater than 30 minutes discharging this patient.    Discharge Orders      General info for SNF    Length of Stay Estimate: Short Term Care: Estimated # of Days <30  Condition at Discharge: Improving  Level of care:skilled   Rehabilitation Potential: Good  Admission H&P remains valid and up-to-date: Yes  Recent Chemotherapy: N/A  Use Nursing Home Standing Orders: Yes     Mantoux instructions    Give two-step Mantoux (PPD) Per Facility Policy Yes     Reason for your hospital stay    Acute on chronic congestive heart failure     Daily weights    Call Provider for weight gain of more than 2 pounds per day or 5 pounds per week.     Activity - Up with nursing assistance     Follow Up and recommended labs and tests    Follow up with [facility] physician.  The following labs/tests are recommended: BMP in one week (in addition to labs on Monday with Dr Hope)    Follow up with Dr Hope regarding MitraClip work up on April 15th, labs at 9:20 and visit at 10:15    Follow up with Hematology as regularly  scheduled regarding pancytopenia     Full Code     Physical Therapy Adult Consult    Evaluate and treat as clinically indicated.    Reason:  Impaired balance and activity tolerance     Occupational Therapy Adult Consult    Evaluate and treat as clinically indicated.    Reason:  Impaired balance and activity tolerance     Advance Diet as Tolerated    Follow this diet upon discharge: Fluid restriction 1800 ML FLUID. Low sodium, 2gram diet     Discharge Medications   Current Discharge Medication List      START taking these medications    Details   bumetanide (BUMEX) 1 MG tablet Take 1 tablet (1 mg) by mouth daily  Qty: 30 tablet, Refills: 0    Comments: Future refills by PCP Dr. Mg Nelson with phone number [phone].  Associated Diagnoses: Acute systolic CHF (congestive heart failure) (H)         CONTINUE these medications which have NOT CHANGED    Details   acetaminophen (TYLENOL) 500 MG tablet Take 2 tablets (1,000 mg) by mouth every 6 hours as needed for mild pain  Qty: 100 tablet, Refills: 0    Associated Diagnoses: Arthritis      BuPROPion HCl (WELLBUTRIN XL PO) Take 150 mg by mouth every morning      Carboxymethylcellulose Sod PF (CELLUVISC/REFRESH LIQUIGEL) 1 % ophthalmic solution Place 1 drop into both eyes 3 times daily as needed for dry eyes      carvedilol (COREG) 6.25 MG tablet TAKE 1 TABLET (6.25 MG) BY MOUTH 2 TIMES DAILY (WITH MEALS)  Qty: 180 tablet, Refills: 3    Associated Diagnoses: Ischemic cardiomyopathy      dabigatran ANTICOAGULANT (PRADAXA ANTICOAGULANT) 75 MG CAPS BLISTER PACK Take 150 mg by mouth 2 times daily. Store in original 's bottle or blister pack; use within 120 days of opening.      diclofenac (VOLTAREN) 1 % topical gel Apply 2 g topically 4 times daily    Associated Diagnoses: Arthritis      finasteride (PROSCAR) 5 MG tablet TAKE 1 TABLET EVERY DAY  Qty: 90 tablet, Refills: 3    Associated Diagnoses: Benign prostatic hyperplasia, unspecified whether lower  urinary tract symptoms present      GLUCOSAMINE-CHONDROITIN PO Take 2 tablets by mouth daily      isosorbide mononitrate (IMDUR) 30 MG 24 hr tablet Take 1 tablet (30 mg) by mouth daily  Qty: 90 tablet, Refills: 3    Associated Diagnoses: SOB (shortness of breath)      ketotifen (ZADITOR) 0.025 % SOLN ophthalmic solution Place 1 drop into both eyes every 12 hours  Qty: 1 Bottle      KLOR-CON 20 MEQ CR tablet TAKE 1 TABLET EVERY DAY  Qty: 90 tablet, Refills: 3    Associated Diagnoses: Lymphedema of both lower extremities      levothyroxine (SYNTHROID/LEVOTHROID) 100 MCG tablet TAKE 1 TABLET EVERY DAY  Qty: 90 tablet, Refills: 3    Associated Diagnoses: Acquired hypothyroidism      LORazepam (ATIVAN) 1 MG tablet Take 0.5-1 tablets (0.5-1 mg) by mouth every 8 hours as needed for anxiety  Qty: 30 tablet, Refills: 5    Associated Diagnoses: Acute stress reaction      Multiple Vitamins-Minerals (CENTRUM SILVER) per tablet Take 1 tablet by mouth daily      omeprazole (PRILOSEC) 20 MG DR capsule TAKE 1 CAPSULE TWICE DAILY  Qty: 180 capsule, Refills: 3    Associated Diagnoses: Gastroesophageal reflux disease without esophagitis      Polyethylene Glycol 3350 (MIRALAX PO) Take 1 packet by mouth At Bedtime       simvastatin (ZOCOR) 20 MG tablet Take 20 mg by mouth daily       spironolactone (ALDACTONE) 25 MG tablet Take 12.5 mg by mouth daily      sucralfate (CARAFATE) 1 GM tablet Take 1 tablet (1 g) by mouth 2 times daily  Qty: 180 tablet, Refills: 3    Associated Diagnoses: Esophageal reflux      tamsulosin (FLOMAX) 0.4 MG capsule TAKE 1 CAPSULE EVERY DAY  Qty: 90 capsule, Refills: 3    Associated Diagnoses: Benign prostatic hyperplasia with urinary hesitancy      triamcinolone (KENALOG) 0.025 % cream Apply topically 2 times daily as needed       COMPRESSION STOCKINGS 1 each daily  Qty: 2 each, Refills: 0    Associated Diagnoses: Varicose 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      ORDER FOR DME Auto-CPAP:Max 9 cm  H2OMin 9 cm H2O  Continuous Lifetime need and heated humidity.     Qty: 1 Device, Refills: 0    Associated Diagnoses: Obstructive sleep apnea (adult) (pediatric); Other dyspnea and respiratory abnormality         STOP taking these medications       ASPIRIN NOT PRESCRIBED (INTENTIONAL) Comments:   Reason for Stopping:         clobetasol (TEMOVATE) 0.05 % cream Comments:   Reason for Stopping:         eplerenone (INSPRA) 50 MG tablet Comments:   Reason for Stopping:         ethacrynic acid (EDECRIN) 25 MG tablet Comments:   Reason for Stopping:             Allergies   No Known Allergies  Data   Most Recent 3 CBC's:  Recent Labs   Lab Test 04/13/19  0648 04/12/19  0626 04/11/19  0655   WBC 2.7* 3.2* 3.1*   HGB 9.2* 9.6* 9.9*   MCV 99 98 98   PLT 62* 67* 71*      Most Recent 3 BMP's:  Recent Labs   Lab Test 04/13/19  0648 04/12/19  0626 04/11/19  0655    139 139   POTASSIUM 3.4 3.3* 3.4   CHLORIDE 105 101 100   CO2 31 32 31   BUN 32* 32* 29   CR 1.25 1.51* 1.41*   ANIONGAP 5 6 8   TEODORO 9.0 9.0 9.4   GLC 83 89 90     Most Recent 2 LFT's:  Recent Labs   Lab Test 04/08/19  1218 10/10/18  1107 10/09/18  12/08/15  0904   AST 25  --  27  --  25   ALT 17 23 21   < > 24   ALKPHOS 67  --   --   --  55   BILITOTAL 1.4*  --   --   --  1.1    < > = values in this interval not displayed.     Most Recent INR's and Anticoagulation Dosing History:  Anticoagulation Dose History     Recent Dosing and Labs Latest Ref Rng & Units 12/8/2009 3/16/2011 3/17/2011 3/18/2011 3/19/2011 3/21/2018 11/20/2018    INR 0.86 - 1.14 3.43(H) 1.17(H) 1.21(H) 1.40(H) 1.38(H) 1.48(H) 1.79(H)        Most Recent 3 Troponin's:No lab results found.  Most Recent Cholesterol Panel:  Recent Labs   Lab Test 10/10/18  1107   CHOL 121   LDL 55   HDL 59   TRIG 34     Most Recent 6 Bacteria Isolates From Any Culture (See EPIC Reports for Culture Details):  Recent Labs   Lab Test 03/18/11  1505 03/18/11  1500   CULT No growth after 6 days No growth after 6 days      Most Recent TSH, T4 and A1c Labs:  Recent Labs   Lab Test 04/08/19  1218  08/25/16  0931   TSH 3.09   < > 1.20   T4  --   --  1.24    < > = values in this interval not displayed.     Results for orders placed or performed during the hospital encounter of 04/08/19   XR Chest 2 Views    Narrative    CHEST TWO VIEWS  4/8/2019 12:39 PM     HISTORY:  Pulmonary edema.    COMPARISON: 6/25/2014.      Impression    IMPRESSION: Mild opacity at both lung bases is nonspecific, but could  be related to atelectasis or edema. Cardiac enlargement has increased  slightly since the previous exam. There is mild pulmonary venous  congestion, also increased since the previous exam. Single-lead  cardiac device left chest wall, with lead tip unchanged in the RV. No  pleural effusions are identified. Aortic calcification.    RAZA MAGANA MD

## 2019-04-13 NOTE — DISCHARGE INSTRUCTIONS
Cardiac Angiogram Discharge Instructions - Femoral    After you go home:      Have an adult stay with you until tomorrow.    Drink extra fluids for 2 days.    You may resume your normal diet.    No smoking       For 24 hours - due to the sedation you received:    Relax and take it easy.    Do NOT make any important or legal decisions.    Do NOT drive or operate machines at home or at work.    Do NOT drink alcohol.    Care of Groin Puncture Site:      For the first 24 hrs - check the puncture site every 1-2 hours while awake.    For 2 days, when you cough, sneeze, laugh or move your bowels, hold your hand over the puncture site and press firmly.    Remove the bandaid after 24 hours. If there is minor oozing, apply another bandaid and remove it after 12 hours.    It is normal to have a small bruise or pea size lump at the site.    You may shower tomorrow. Do NOT take a bath, or use a hot tub or pool for at least 3 days. Do NOT scrub the site. Do not use lotion or powder near the puncture site.    Activity:            For 2 days:    No stooping or squatting    Do NOT do any heavy activity such as exercise, lifting, or straining.     No housework, yard work or any activity that make you sweat    Do NOT lift more than 10 pounds    Bleeding:      If you start bleeding from the site in your groin, lie down flat and press firmly on/above the site for 10 minutes.     Once bleeding stops, lay flat for 2 hours.     Call [facility] as soon as you can.       Call 911 right away if you have heavy bleeding or bleeding that does not stop.      Medicines:      If you are taking an antiplatelet medication such as Plavix, Brilinta or Effient, do not stop taking it until you talk to your cardiologist.      If you are on Metformin (Glucophage), do not restart it until you have blood tests (within 2 to 3 days after discharge).  After you have your blood drawn, you may restart the Metformin.     Take your medications, including blood  thinners, unless your provider tells you not to.  If you take Coumadin (Warfarin), have your INR checked by your provider in  3-5 days. Call your clinic to schedule this.    If you have stopped any medicines, check with your provider about when to restart them.    Follow Up Appointments:      Follow up with [facility] Nurse Practitioner at [facility] of patient preference in 7-10 days.    Call the clinic if:      You have increased pain or a large or growing hard lump around the site.    The site is red, swollen, hot or tender.    Blood or fluid is draining from the site.    You have chills or a fever greater than 101 F (38 C).    Your leg feels numb, cool or changes color.    You have hives, a rash or unusual itching.    New pain in the back or belly that you cannot control with Tylenol.    Any questions or concerns.          [facility] [place]:    [phone] [facility] (7 days a week)        Cardiac Angiogram Discharge Instructions - Radial    After you go home:      Have an adult stay with you until tomorrow.    Drink extra fluids for 2 days.    You may resume your normal diet.    No smoking       For 24 hours - due to the sedation you received:    Relax and take it easy.    Do NOT make any important or legal decisions.    Do NOT drive or operate machines at home or at work.    Do NOT drink alcohol.    Care of Wrist Puncture Site:      For the first 24 hrs - check the puncture site every 1-2 hours while awake.    It is normal to have soreness at the puncture site and mild tingling in your hand for up to 3 days.    Remove the bandaid after 24 hours. If there is minor oozing, apply another bandaid and remove it after 12 hours.    You may shower tomorrow.  Do NOT take a bath, or use a hot tub or pool for at least 3 days. Do NOT scrub the site. Do not use lotion or powder near the puncture site.           Activity:        For 2 days:     do not use your hand or arm to support your  weight (such as rising from a chair)     do not bend your wrist (such as lifting a garage door).    do not lift more than 5 pounds or exercise your arm (such as tennis, golf or bowling).    Do NOT do any heavy activity such as exercise, lifting, or straining.     Bleeding:      If you start bleeding from the site in your wrist, sit down and press firmly on/above the site for 10 minutes.     Once bleeding stops, keep arm still for 2 hours.     Call [facility] as soon as you can.       Call 911 right away if you have heavy bleeding or bleeding that does not stop.      Medicines:      If you are taking an antiplatelet medication such as Plavix, Brilinta or Effient, do not stop taking it until you talk to your cardiologist.        If you are on Metformin (Glucophage), do not restart it until you have blood tests (within 2 to 3 days after discharge).  After you have your blood drawn, you may restart the Metformin.     Take your medications, including blood thinners, unless your provider tells you not to.  If you take Coumadin (Warfarin), have your INR checked by your provider in  3-5 days. Call your clinic to schedule this.    If you have stopped any medicines, check with your provider about when to restart them.    Follow Up Appointments:      Follow up with [facility] Nurse Practitioner at [facility] of patient preference in 7-10 days.    Call the clinic if:      You have a large or growing hard lump around the site.    The site is red, swollen, hot or tender.    Blood or fluid is draining from the site.    You have chills or a fever greater than 101 F (38 C).    Your arm feels numb, cool or changes color.    You have hives, a rash or unusual itching.    Any questions or concerns.          [facility] [place]:    [phone] [facility] (7 days a week)

## 2019-04-13 NOTE — PLAN OF CARE
Occupational Therapy Discharge Summary    Reason for therapy discharge:    Discharged to transitional care facility.    Progress towards therapy goal(s). See goals on Care Plan in [facility] electronic health record for goal details.  Goals partially met.  Barriers to achieving goals:   limited tolerance for therapy and discharge from facility.    Therapy recommendation(s):    Continued therapy is recommended.  Rationale/Recommendations:  Continue PT/OT at TCU to maximize (I), strength and endurance.

## 2019-04-13 NOTE — PLAN OF CARE
A&Ox4. VSS on RA, except soft BP at times. Tele 100% V-paced. Denies chest pain or SOB. Refused CPAP overnight. Right Groin and Radial sites intact/soft/no hematoma. CMS intact. On low fat/Na diet w/ 1800cc fluid restriction. Up Ax1 w/ GB to B/R. Voiding. Possible discharge home today. Continue to monitor.

## 2019-04-13 NOTE — PROGRESS NOTES
Pt up from cath lab at 1850. Raidal site with TR band in place. 13ml air. Pulse normal and CMS intact. R groin site soft with tegaderm intact. No bleeding or hematoma noted. Pulses normal.

## 2019-04-13 NOTE — PROGRESS NOTES
Security called-nothing in lost and found. SonLona spoken to, denied having shoes and spoke with patients wife. Message left for Lexy Manager of [employer].   Pt also discharged at this time to door 6-transport NA for escort. Son Camden and pt's wife here to drive patient to [facility] TCU. Packet sent with patient. No questions at this time.

## 2019-04-13 NOTE — PLAN OF CARE
Physical Therapy Discharge Summary    Reason for therapy discharge:    Discharged to transitional care facility. RN reports plans to discharge today to TCU at 1300.     Progress towards therapy goal(s). See goals on Care Plan in [facility] electronic health record for goal details.  Goals partially met.  Barriers to achieving goals:   discharge from facility.    Therapy recommendation(s):    Continued therapy is recommended.  Rationale/Recommendations:  to address lymphedema and mobility to maximize his IND with mobility prior to return home. .

## 2019-04-13 NOTE — PLAN OF CARE
"Discharge Planner OT   Patient plan for discharge: TCU  Current status: CGA for transfers and ambulation x 9 min with stable CV response. See VS flow sheet for details. Declines use of walker, although it is recommended. Reports \"That's for old people.\" Educated on CHF concepts and energy conservation principles.  Barriers to return to prior living situation: Impaired balance and activity tolerance.  Recommendations for discharge: TCU  Rationale for recommendations: Patient would benefit from continued therapies to maximize (I), safety and endurance for eventual discharge home with wife.       Entered by: Lidia Maldonado 04/13/2019 8:58 AM       "

## 2019-04-13 NOTE — PROGRESS NOTES
SW:  D:  Received discharge orders for patient.  Bed available at [facility] for today.  Call placed to patient's wife to discharge transportation options.  Patient's wife states that she will transport patient will her son at 13:00 today.  Patient and wife informed of the plan and in agreement to the plan.  Call placed to update [facility] and faxed the orders and the PAS.      PAS-RR    D: Per DHS regulation, SW completed and submitted PAS-RR to [place] [employer] Direct Connect via the Senior LinkAge Line.  PAS-RR confirmation # is : [id number].    I: SW spoke with patient's wife and they are aware a PAS-RR has been submitted.  SW reviewed with patient's wife that they may be contacted for a follow up appointment within 10 days of hospital discharge if their SNF stay is < 30 days.  Contact information for [facility] was also provided.    A: Patient's wife verbalized understanding.    P: Further questions may be directed to [facility] LinkAge Line at #[phone], option #4 for PAS-RR staff.

## 2019-04-15 ENCOUNTER — OFFICE VISIT (OUTPATIENT)
Dept: CARDIOLOGY | Facility: CLINIC | Age: 84
End: 2019-04-15
Payer: COMMERCIAL

## 2019-04-15 ENCOUNTER — MEDICAL CORRESPONDENCE (OUTPATIENT)
Dept: HEALTH INFORMATION MANAGEMENT | Facility: CLINIC | Age: 84
End: 2019-04-15

## 2019-04-15 ENCOUNTER — CARE COORDINATION (OUTPATIENT)
Dept: CARDIOLOGY | Facility: CLINIC | Age: 84
End: 2019-04-15

## 2019-04-15 VITALS
HEART RATE: 69 BPM | HEIGHT: 74 IN | DIASTOLIC BLOOD PRESSURE: 69 MMHG | SYSTOLIC BLOOD PRESSURE: 113 MMHG | WEIGHT: 196 LBS | BODY MASS INDEX: 25.15 KG/M2

## 2019-04-15 DIAGNOSIS — R60.0 LOWER EXTREMITY EDEMA: ICD-10-CM

## 2019-04-15 DIAGNOSIS — I25.10 CORONARY ARTERY DISEASE DUE TO LIPID RICH PLAQUE: ICD-10-CM

## 2019-04-15 DIAGNOSIS — I73.9 PAD (PERIPHERAL ARTERY DISEASE) (H): ICD-10-CM

## 2019-04-15 DIAGNOSIS — I27.20 PULMONARY HYPERTENSION (H): ICD-10-CM

## 2019-04-15 DIAGNOSIS — Z95.0 CARDIAC PACEMAKER IN SITU: ICD-10-CM

## 2019-04-15 DIAGNOSIS — I10 BENIGN ESSENTIAL HYPERTENSION: Primary | ICD-10-CM

## 2019-04-15 DIAGNOSIS — I50.31 ACUTE DIASTOLIC HEART FAILURE (H): ICD-10-CM

## 2019-04-15 DIAGNOSIS — R09.89 ABNORMAL CHEST SOUNDS: Primary | ICD-10-CM

## 2019-04-15 DIAGNOSIS — I34.0 NON-RHEUMATIC MITRAL REGURGITATION: ICD-10-CM

## 2019-04-15 DIAGNOSIS — I25.83 CORONARY ARTERY DISEASE DUE TO LIPID RICH PLAQUE: ICD-10-CM

## 2019-04-15 LAB
ANION GAP SERPL CALCULATED.3IONS-SCNC: 14 MMOL/L (ref 6–17)
BUN SERPL-MCNC: 30 MG/DL (ref 7–30)
CALCIUM SERPL-MCNC: 10.3 MG/DL (ref 8.5–10.5)
CHLORIDE SERPL-SCNC: 100 MMOL/L (ref 98–107)
CO2 SERPL-SCNC: 30 MMOL/L (ref 23–29)
CREAT SERPL-MCNC: 1.54 MG/DL (ref 0.7–1.3)
GFR SERPL CREATININE-BSD FRML MDRD: 43 ML/MIN/{1.73_M2}
GLUCOSE SERPL-MCNC: 110 MG/DL (ref 70–105)
POTASSIUM SERPL-SCNC: 4 MMOL/L (ref 3.5–5.1)
SODIUM SERPL-SCNC: 140 MMOL/L (ref 136–145)

## 2019-04-15 PROCEDURE — 36415 COLL VENOUS BLD VENIPUNCTURE: CPT | Performed by: NURSE PRACTITIONER

## 2019-04-15 PROCEDURE — 80048 BASIC METABOLIC PNL TOTAL CA: CPT | Performed by: NURSE PRACTITIONER

## 2019-04-15 PROCEDURE — 99215 OFFICE O/P EST HI 40 MIN: CPT | Performed by: INTERNAL MEDICINE

## 2019-04-15 ASSESSMENT — MIFFLIN-ST. JEOR: SCORE: 1629.05

## 2019-04-15 NOTE — PROGRESS NOTES
Service Date: 04/15/2019      [facility]       HISTORY OF PRESENT ILLNESS:  Mr. Foster is a delightful, 88-year-old gentleman who comes to [facility] for discussion of therapeutic options.  He is well known to me and is a patient of our clinic.  He has a history of known coronary artery disease, chronic atrial fibrillation, single pacemaker implantation on chronic Pradaxa, history of ventricular tachycardia, mitral and tricuspid insufficiency.  He has also had venous disease and is status post greater saphenous vein ablations by Dr. Carrizales.  In 2009, he had a PCI of an OM branch performed.  His last angiogram by Dr. Beck earlier this month showed no significant coronary artery disease and mild pulmonary hypertension.  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  Of difficulty of late, he has had problems with fluid retention and heart failure symptoms.  He has also had difficulty with low platelets and has had recurrent epistaxis.  He has a history of pancytopenia [facility]  His STS score depending on repair versus replacement is 11%-13%.      ASSESSMENT AND PLAN:  Forty-five minutes was spent in consultation.  Basically, this is an elderly gentleman whose heart failure has been difficult to control on medical therapy.  He has chronic atrial fibrillation on oral anticoagulants with continued fluid retention with significant mitral and tricuspid regurgitation.  The patient is not interested in nor do I think he would be a good open surgical candidate.  I have reviewed the CHELLE and believe that it is available to be clipped.  The mitral valve area is greater than 4, so certainly we can reduce mitral regurgitation via the [id number] percutaneous repair.  We would also take a greater look at the tricuspid valve to see whether or not this could be  helped as well or if the pacer lead is restricting leaflet closure.  Risks and benefits of this procedure have been explained to the patient, who agrees to proceed.      In addition, he may be a candidate or benefit from the Watchman device.  Depending upon where his pancytopenia workup comes in, one could consider closure of his appendage with the Watchman to get him off Pradaxa and help with the nosebleeds.         SUE BOONE MD [facility]             D: 04/15/2019   T: 04/15/2019   MT: john      Name:     HIRO IBARRA   MRN:      [id number]        Account:      [id number]   :      1931           Service Date: 04/15/2019      Document: [id number]

## 2019-04-15 NOTE — LETTER
4/15/2019    Mg Nelson MD  [address]  [facility] [address]    RE: Bairon Foster       Dear Colleague,    I had the pleasure of seeing Bairon Foster in the [facility].    HPI and Plan:   See dictation    No orders of the defined types were placed in this encounter.    No orders of the defined types were placed in this encounter.    There are no discontinued medications.      Encounter Diagnoses   Name Primary?     Benign essential hypertension Yes     PAD (peripheral artery disease) (H)      Coronary artery disease due to lipid rich plaque      Non-rheumatic mitral regurgitation        CURRENT MEDICATIONS:  Current Outpatient Medications   Medication Sig Dispense Refill     acetaminophen (TYLENOL) 500 MG tablet Take 2 tablets (1,000 mg) by mouth every 6 hours as needed for mild pain 100 tablet 0     bumetanide (BUMEX) 1 MG tablet Take 1 tablet (1 mg) by mouth daily 30 tablet 0     BuPROPion HCl (WELLBUTRIN XL PO) Take 150 mg by mouth every morning       Carboxymethylcellulose Sod PF (CELLUVISC/REFRESH LIQUIGEL) 1 % ophthalmic solution Place 1 drop into both eyes 3 times daily as needed for dry eyes       carvedilol (COREG) 6.25 MG tablet TAKE 1 TABLET (6.25 MG) BY MOUTH 2 TIMES DAILY (WITH MEALS) 180 tablet 3     dabigatran ANTICOAGULANT (PRADAXA ANTICOAGULANT) 75 MG CAPS BLISTER PACK Take 150 mg by mouth 2 times daily. Store in original 's bottle or blister pack; use within 120 days of opening.       diclofenac (VOLTAREN) 1 % topical gel Apply 2 g topically 4 times daily       finasteride (PROSCAR) 5 MG tablet TAKE 1 TABLET EVERY DAY 90 tablet 3     GLUCOSAMINE-CHONDROITIN PO Take 2 tablets by mouth daily       isosorbide mononitrate (IMDUR) 30 MG 24 hr tablet Take 1 tablet (30 mg) by mouth daily 90 tablet 3     ketotifen (ZADITOR) 0.025 % SOLN ophthalmic solution Place 1 drop into both eyes every 12 hours 1 Bottle      KLOR-CON 20 MEQ CR tablet  TAKE 1 TABLET EVERY DAY 90 tablet 3     levothyroxine (SYNTHROID/LEVOTHROID) 100 MCG tablet TAKE 1 TABLET EVERY DAY 90 tablet 3     LORazepam (ATIVAN) 1 MG tablet Take 0.5-1 tablets (0.5-1 mg) by mouth every 8 hours as needed for anxiety 30 tablet 5     Multiple Vitamins-Minerals (CENTRUM SILVER) per tablet Take 1 tablet by mouth daily       omeprazole (PRILOSEC) 20 MG DR capsule TAKE 1 CAPSULE TWICE DAILY 180 capsule 3     Polyethylene Glycol 3350 (MIRALAX PO) Take 1 packet by mouth At Bedtime        simvastatin (ZOCOR) 20 MG tablet Take 20 mg by mouth daily        spironolactone (ALDACTONE) 25 MG tablet Take 12.5 mg by mouth daily       sucralfate (CARAFATE) 1 GM tablet Take 1 tablet (1 g) by mouth 2 times daily 180 tablet 3     tamsulosin (FLOMAX) 0.4 MG capsule TAKE 1 CAPSULE EVERY DAY 90 capsule 3     triamcinolone (KENALOG) 0.025 % cream Apply topically 2 times daily as needed        COMPRESSION STOCKINGS 1 each daily 2 each 0     ORDER FOR DME Auto-CPAP:Max 9 cm H2OMin 9 cm H2O  Continuous Lifetime need and heated humidity.    1 Device 0       ALLERGIES   No Known Allergies    PAST MEDICAL HISTORY:  Past Medical History:   Diagnosis Date     Acute, but ill-defined, cerebrovascular disease     NO RESIDUALS     Antiplatelet or antithrombotic long-term use      Arrhythmia     a fib     Blood in stool      Coronary atherosclerosis of unspecified type of vessel, native or graft     S/P PTCA, EF 50%     Esophageal reflux      Hydrocele, unspecified      Hypercholesterolemia      Hypersomnia with sleep apnea, unspecified      Inguinal hernia without mention of obstruction or gangrene, recurrent unilateral or unspecified      Ischemic cardiomyopathy      Major depression in complete remission (H)      Obese      Onychomycosis      JIM on CPAP      Other and unspecified hyperlipidemia      Other lymphedema     GROIN AND THIGHS     Other pancytopenia (H)      Other specified anemias      Overweight      Pacemaker       PAD (peripheral artery disease) (H)      Pancytopenia (H)      Persistent atrial fibrillation (H)     VVI PM for long pauses     Prostatic hypertrophy, benign      Pulmonary hypertension (H)      Scrotal varices      Thrombocytopenia, unspecified (H)      Umbilical hernia without obstruction and without gangrene      Unilateral or unspecified femoral hernia without mention of obstruction or gangrene, unilateral or unspecified      Unspecified hypothyroidism      Venous stasis dermatitis of both lower extremities      Vitamin D deficiency        PAST SURGICAL HISTORY:  Past Surgical History:   Procedure Laterality Date     EYE SURGERY      CATARACT/BLEPHAROPLASTY     GENITOURINARY SURGERY      TUNA     HERNIA REPAIR      RIGHT INGUINAL     HERNIORRHAPHY INGUINAL  8/14/2012    Procedure: HERNIORRHAPHY INGUINAL;  right inguinal hernia repair;  Surgeon: Kareem Torrez MD;  Location: [facility]     HERNIORRHAPHY UMBILICAL N/A 10/31/2017    Procedure: HERNIORRHAPHY UMBILICAL;  UMBILICAL HERNIA REPAIR WITH MESH;  Surgeon: Scar Harrington MD;  Location: [facility]     IMPLANT PACEMAKER  8/2009    single chamber     [place]THOPED[place]      [facility] [place]     ORTHOPEDIC SURGERY  2010    right TKR, left TKR     SURGICAL HISTORY OF -       Thumb surgery     SURGICAL HISTORY OF -   1990s    Left total knee replacement     SURGICAL HISTORY OF -   3/11    [facility] total knee replacement       FAMILY HISTORY:  Family History   Problem Relation Age of Onset     Cardiovascular Father      C.A.D. Father      Lung Cancer Sister      Unknown/Adopted Mother      Cancer - colorectal No family hx of        SOCIAL HISTORY:  Social History     Socioeconomic History     Marital status:      Spouse name: None     Number of children: None     Years of education: None     Highest education level: None   Occupational History     Occupation: Teacher, author, speaker     Employer: RETIRED   Social Needs     Financial resource strain: None     Food insecurity:      Worry: None     Inability: None     Transportation needs:     Medical: None     Non-medical: None   Tobacco Use     Smoking status: Never Smoker     Smokeless tobacco: Never Used     Tobacco comment: per encounter 2/12/07   Substance and Sexual Activity     Alcohol use: Yes     Alcohol/week: 0.0 oz     Comment: 1/month     Drug use: No     Sexual activity: Never     Partners: Female   Lifestyle     Physical activity:     Days per week: None     Minutes per session: None     Stress: None   Relationships     Social connections:     Talks on phone: None     Gets together: None     Attends Evangelical service: None     Active member of club or organization: None     Attends meetings of clubs or organizations: None     Relationship status: None     Intimate partner violence:     Fear of current or ex partner: None     Emotionally abused: None     Physically abused: None     Forced sexual activity: None   Other Topics Concern     Parent/sibling w/ CABG, MI or angioplasty before 65F 55M? No      Service Not Asked     Blood Transfusions Not Asked     Caffeine Concern No     Comment: 1-2 cups coffee a day     Occupational Exposure Not Asked     Hobby Hazards Not Asked     Sleep Concern No     Stress Concern Yes     Comment: due to wifes health condition     Weight Concern No     Special Diet No     Back Care Not Asked     Exercise Yes     Comment: states he uses his tredmill on and off     Bike Helmet Not Asked     Seat Belt Yes     Self-Exams Not Asked   Social History Narrative     None       Review of Systems:  Skin:  Positive for     Eyes:  Positive for glasses  ENT:  Positive for    Respiratory:  Positive for sleep apnea;CPAP;dyspnea on exertion;shortness of breath  Cardiovascular:    Positive for;edema;lightheadedness  Gastroenterology: Negative    Genitourinary:  not assessed    Musculoskeletal:  Positive for arthritis  Neurologic:  Positive for stroke;numbness or tingling of feet  Psychiatric:  Positive for   "  Heme/Lymph/Imm:  Positive for easy bruising  Endocrine:  Positive for thyroid disorder    Physical Exam:  Vitals: /69   Pulse 69   Ht 1.88 m (6' 2.02\")   Wt 88.9 kg (196 lb)   BMI 25.15 kg/m       Constitutional:           Skin:             Head:           Eyes:           Lymph:      ENT:           Neck:           Respiratory:            Cardiac:                                                           GI:           Extremities and Muscular Skeletal:                 Neurological:           Psych:           Recent Lab Results:  LIPID RESULTS:  Lab Results   Component Value Date    CHOL 121 10/10/2018    HDL 59 10/10/2018    LDL 55 10/10/2018    TRIG 34 10/10/2018    CHOLHDLRATIO 2.1 06/15/2015       LIVER ENZYME RESULTS:  Lab Results   Component Value Date    AST 25 04/08/2019    ALT 17 04/08/2019       CBC RESULTS:  Lab Results   Component Value Date    WBC 2.7 (L) 04/13/2019    RBC 2.86 (L) 04/13/2019    HGB 9.2 (L) 04/13/2019    HCT 28.4 (L) 04/13/2019    MCV 99 04/13/2019    MCH 32.2 04/13/2019    MCHC 32.4 04/13/2019    RDW 16.0 (H) 04/13/2019    PLT 62 (L) 04/13/2019       BMP RESULTS:  Lab Results   Component Value Date     04/13/2019    POTASSIUM 3.4 04/13/2019    CHLORIDE 105 04/13/2019    CO2 31 04/13/2019    ANIONGAP 5 04/13/2019    GLC 83 04/13/2019    BUN 32 (H) 04/13/2019    CR 1.25 04/13/2019    GFRESTIMATED 51 (L) 04/13/2019    GFRESTBLACK 59 (L) 04/13/2019    TEODORO 9.0 04/13/2019        A1C RESULTS:  No results found for: A1C    INR RESULTS:  Lab Results   Component Value Date    INR 1.79 (H) 11/20/2018    INR 1.48 (H) 03/21/2018           CC  Mg Nelson MD  [address]  [facility] [place] [facility] [address]                  Thank you for allowing me to participate in the care of your patient.      Sincerely,     SUE BOONE MD     [facility]    cc:   Mg Nelson MD  [address] [place] [address]        "

## 2019-04-15 NOTE — LETTER
4/15/2019      Mg Nelson MD  [address]  [facility] [address]      RE: Bairon DOWNS Cristian       Dear Colleague,    I had the pleasure of seeing Bairon Foster in the [facility].    Service Date: 04/15/2019      [facility]IC       HISTORY OF PRESENT ILLNESS:  Mr. Foster is a delightful, 88-year-old gentleman who comes to [facility] for discussion of therapeutic options.  He is well known to me and is a patient of our clinic.  He has a history of known coronary artery disease, chronic atrial fibrillation, single pacemaker implantation on chronic Pradaxa, history of ventricular tachycardia, mitral and tricuspid insufficiency.  He has also had venous disease and is status post greater saphenous vein ablations by Dr. Carrizales.  In 2009, he had a PCI of an OM branch performed.  His last angiogram by Dr. Beck earlier this month showed no significant coronary artery disease and mild pulmonary hypertension.  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  Of difficulty of late, he has had problems with fluid retention and heart failure symptoms.  He has also had difficulty with low platelets and has had recurrent epistaxis.  He has a history of pan[facility]topenia [facility].  His STS score depending on repair versus replacement is 11%-13%.      ASSESSMENT AND PLAN:  Forty-five minutes was spent in consultation.  Basically, this is an elderly gentleman whose heart failure has been difficult to control on medical therapy.  He has chronic atrial fibrillation on oral anticoagulants with continued fluid retention with significant mitral and tricuspid regurgitation.  The patient is not interested in nor do I think he would be a good open surgical candidate.  I have reviewed the CHELLE and believe that it is available to be clipped.   The mitral valve area is greater than 4, so certainly we can reduce mitral regurgitation via the [id number] percutaneous repair.  We would also take a greater look at the tricuspid valve to see whether or not this could be helped as well or if the pacer lead is restricting leaflet closure.  Risks and benefits of this procedure have been explained to the patient, who agrees to proceed.      In addition, he may be a candidate or benefit from the Watchman device.  Depending upon where his pancytopenia workup comes in, one could consider closure of his appendage with the Watchman to get him off Pradaxa and help with the nosebleeds.         SUE BOONE MD [facility]             D: 04/15/2019   T: 04/15/2019   MT: john      Name:     HIRO IBARRA   MRN:      [id number]        Account:      [id number]   :      1931           Service Date: 04/15/2019      Document: [id number]         No facility-administered encounter medications on file as of 4/15/2019.      Outpatient Encounter Medications as of 4/15/2019   Medication Sig Dispense Refill     acetaminophen (TYLENOL) 500 MG tablet Take 2 tablets (1,000 mg) by mouth every 6 hours as needed for mild pain 100 tablet 0     bumetanide (BUMEX) 1 MG tablet Take 1 tablet (1 mg) by mouth daily 30 tablet 0     Carboxymethylcellulose Sod PF (CELLUVISC/REFRESH LIQUIGEL) 1 % ophthalmic solution Place 1 drop into both eyes 3 times daily as needed for dry eyes       carvedilol (COREG) 6.25 MG tablet TAKE 1 TABLET (6.25 MG) BY MOUTH 2 TIMES DAILY (WITH MEALS) 180 tablet 3     dabigatran ANTICOAGULANT (PRADAXA ANTICOAGULANT) 75 MG CAPS BLISTER PACK Take 150 mg by mouth 2 times daily. Store in original 's bottle or blister pack; use within 120 days of opening.       diclofenac (VOLTAREN) 1 % topical gel Apply 2 g topically 4 times daily       finasteride (PROSCAR) 5 MG tablet TAKE 1 TABLET EVERY DAY 90 tablet 3     isosorbide mononitrate (IMDUR) 30 MG 24 hr tablet  Take 1 tablet (30 mg) by mouth daily 90 tablet 3     ketotifen (ZADITOR) 0.025 % SOLN ophthalmic solution Place 1 drop into both eyes every 12 hours 1 Bottle      KLOR-CON 20 MEQ CR tablet TAKE 1 TABLET EVERY DAY 90 tablet 3     levothyroxine (SYNTHROID/LEVOTHROID) 100 MCG tablet TAKE 1 TABLET EVERY DAY 90 tablet 3     LORazepam (ATIVAN) 1 MG tablet Take 0.5-1 tablets (0.5-1 mg) by mouth every 8 hours as needed for anxiety 30 tablet 5     Multiple Vitamins-Minerals (CENTRUM SILVER) per tablet Take 1 tablet by mouth daily       omeprazole (PRILOSEC) 20 MG DR capsule TAKE 1 CAPSULE TWICE DAILY 180 capsule 3     Polyethylene Glycol 3350 (MIRALAX PO) Take 1 packet by mouth At Bedtime        simvastatin (ZOCOR) 20 MG tablet Take 20 mg by mouth daily        spironolactone (ALDACTONE) 25 MG tablet Take 12.5 mg by mouth daily       sucralfate (CARAFATE) 1 GM tablet Take 1 tablet (1 g) by mouth 2 times daily 180 tablet 3     tamsulosin (FLOMAX) 0.4 MG capsule TAKE 1 CAPSULE EVERY DAY 90 capsule 3     triamcinolone (KENALOG) 0.025 % cream Apply topically 2 times daily as needed        [DISCONTINUED] BuPROPion HCl (WELLBUTRIN XL PO) Take 150 mg by mouth every morning       [DISCONTINUED] GLUCOSAMINE-CHONDROITIN PO Take 2 tablets by mouth daily       COMPRESSION STOCKINGS 1 each daily 2 each 0     ORDER FOR DME Auto-CPAP:Max 9 cm H2OMin 9 cm H2O  Continuous Lifetime need and heated humidity.    1 Device 0       Again, thank you for allowing me to participate in the care of your patient.      Sincerely,    SUE BOONE MD     [facility]

## 2019-04-15 NOTE — PROGRESS NOTES
HPI and Plan:   See dictation    No orders of the defined types were placed in this encounter.    No orders of the defined types were placed in this encounter.    There are no discontinued medications.      Encounter Diagnoses   Name Primary?     Benign essential hypertension Yes     PAD (peripheral artery disease) (H)      Coronary artery disease due to lipid rich plaque      Non-rheumatic mitral regurgitation        CURRENT MEDICATIONS:  Current Outpatient Medications   Medication Sig Dispense Refill     acetaminophen (TYLENOL) 500 MG tablet Take 2 tablets (1,000 mg) by mouth every 6 hours as needed for mild pain 100 tablet 0     bumetanide (BUMEX) 1 MG tablet Take 1 tablet (1 mg) by mouth daily 30 tablet 0     BuPROPion HCl (WELLBUTRIN XL PO) Take 150 mg by mouth every morning       Carboxymethylcellulose Sod PF (CELLUVISC/REFRESH LIQUIGEL) 1 % ophthalmic solution Place 1 drop into both eyes 3 times daily as needed for dry eyes       carvedilol (COREG) 6.25 MG tablet TAKE 1 TABLET (6.25 MG) BY MOUTH 2 TIMES DAILY (WITH MEALS) 180 tablet 3     dabigatran ANTICOAGULANT (PRADAXA ANTICOAGULANT) 75 MG CAPS BLISTER PACK Take 150 mg by mouth 2 times daily. Store in original 's bottle or blister pack; use within 120 days of opening.       diclofenac (VOLTAREN) 1 % topical gel Apply 2 g topically 4 times daily       finasteride (PROSCAR) 5 MG tablet TAKE 1 TABLET EVERY DAY 90 tablet 3     GLUCOSAMINE-CHONDROITIN PO Take 2 tablets by mouth daily       isosorbide mononitrate (IMDUR) 30 MG 24 hr tablet Take 1 tablet (30 mg) by mouth daily 90 tablet 3     ketotifen (ZADITOR) 0.025 % SOLN ophthalmic solution Place 1 drop into both eyes every 12 hours 1 Bottle      KLOR-CON 20 MEQ CR tablet TAKE 1 TABLET EVERY DAY 90 tablet 3     levothyroxine (SYNTHROID/LEVOTHROID) 100 MCG tablet TAKE 1 TABLET EVERY DAY 90 tablet 3     LORazepam (ATIVAN) 1 MG tablet Take 0.5-1 tablets (0.5-1 mg) by mouth every 8 hours as needed for  anxiety 30 tablet 5     Multiple Vitamins-Minerals (CENTRUM SILVER) per tablet Take 1 tablet by mouth daily       omeprazole (PRILOSEC) 20 MG DR capsule TAKE 1 CAPSULE TWICE DAILY 180 capsule 3     Polyethylene Glycol 3350 (MIRALAX PO) Take 1 packet by mouth At Bedtime        simvastatin (ZOCOR) 20 MG tablet Take 20 mg by mouth daily        spironolactone (ALDACTONE) 25 MG tablet Take 12.5 mg by mouth daily       sucralfate (CARAFATE) 1 GM tablet Take 1 tablet (1 g) by mouth 2 times daily 180 tablet 3     tamsulosin (FLOMAX) 0.4 MG capsule TAKE 1 CAPSULE EVERY DAY 90 capsule 3     triamcinolone (KENALOG) 0.025 % cream Apply topically 2 times daily as needed        COMPRESSION STOCKINGS 1 each daily 2 each 0     ORDER FOR DME Auto-CPAP:Max 9 cm H2OMin 9 cm H2O  Continuous Lifetime need and heated humidity.    1 Device 0       ALLERGIES   No Known Allergies    PAST MEDICAL HISTORY:  Past Medical History:   Diagnosis Date     Acute, but ill-defined, cerebrovascular disease     NO RESIDUALS     Antiplatelet or antithrombotic long-term use      Arrhythmia     a fib     Blood in stool      Coronary atherosclerosis of unspecified type of vessel, native or graft     S/P PTCA, EF 50%     Esophageal reflux      Hydrocele, unspecified      Hypercholesterolemia      Hypersomnia with sleep apnea, unspecified      Inguinal hernia without mention of obstruction or gangrene, recurrent unilateral or unspecified      Ischemic cardiomyopathy      Major depression in complete remission (H)      Obese      Onychomycosis      JIM on CPAP      Other and unspecified hyperlipidemia      Other lymphedema     GROIN AND THIGHS     Other pancytopenia (H)      Other specified anemias      Overweight      Pacemaker      PAD (peripheral artery disease) (H)      Pancytopenia (H)      Persistent atrial fibrillation (H)     VVI PM for long pauses     Prostatic hypertrophy, benign      Pulmonary hypertension (H)      Scrotal varices       Thrombocytopenia, unspecified (H)      Umbilical hernia without obstruction and without gangrene      Unilateral or unspecified femoral hernia without mention of obstruction or gangrene, unilateral or unspecified      Unspecified hypothyroidism      Venous stasis dermatitis of both lower extremities      Vitamin D deficiency        PAST SURGICAL HISTORY:  Past Surgical History:   Procedure Laterality Date     EYE SURGERY      CATARACT/BLEPHAROPLASTY     GENITOURINARY SURGERY      TUNA     HERNIA REPAIR      RIGHT INGUINAL     HERNIORRHAPHY INGUINAL  8/14/2012    Procedure: HERNIORRHAPHY INGUINAL;  right inguinal hernia repair;  Surgeon: Kareem Torrez MD;  [facility]     HERNIORRHAPHY UMBILICAL N/A 10/31/2017    Procedure: HERNIORRHAPHY UMBILICAL;  UMBILICAL HERNIA REPAIR WITH MESH;  Surgeon: Scar Harrington MD;  Location: [facility]     IMPLANT PACEMAKER  8/2009    single chamber     ORTHOPEDIC SURGERY      [facility] RIGHT     ORTHOPEDIC SURGERY  2010    right TKR, left TKR     SURGICAL HISTORY OF -       Thumb surgery     SURGICAL HISTORY OF -   1990s    Left total knee replacement     SURGICAL HISTORY OF -   3/11    Rig total knee replacement       FAMILY HISTORY:  Family History   Problem Relation Age of Onset     Cardiovascular Father      C.A.D. Father      Lung Cancer Sister      Unknown/Adopted Mother      Cancer - colorectal No family hx of        SOCIAL HISTORY:  Social History     Socioeconomic History     Marital status:      Spouse name: None     Number of children: None     Years of education: None     Highest education level: None   Occupational History     Occupation: Teacher, author, speaker     Employer: RETIRED   Social Needs     Financial resource strain: None     Food insecurity:     Worry: None     Inability: None     Transportation needs:     Medical: None     Non-medical: None   Tobacco Use     Smoking status: Never Smoker     Smokeless tobacco: Never Used     Tobacco comment: per encounter  "2/12/07   Substance and Sexual Activity     Alcohol use: Yes     Alcohol/week: 0.0 oz     Comment: 1/month     Drug use: No     Sexual activity: Never     Partners: Female   Lifestyle     Physical activity:     Days per week: None     Minutes per session: None     Stress: None   Relationships     Social connections:     Talks on phone: None     Gets together: None     Attends Zoroastrianism service: None     Active member of club or organization: None     Attends meetings of clubs or organizations: None     Relationship status: None     Intimate partner violence:     Fear of current or ex partner: None     Emotionally abused: None     Physically abused: None     Forced sexual activity: None   Other Topics Concern     Parent/sibling w/ CABG, MI or angioplasty before 65F 55M? No      Service Not Asked     Blood Transfusions Not Asked     Caffeine Concern No     Comment: 1-2 cups coffee a day     Occupational Exposure Not Asked     Hobby Hazards Not Asked     Sleep Concern No     Stress Concern Yes     Comment: due to wifes health condition     Weight Concern No     Special Diet No     Back Care Not Asked     Exercise Yes     Comment: states he uses his tredmill on and off     Bike Helmet Not Asked     Seat Belt Yes     Self-Exams Not Asked   Social History Narrative     None       Review of Systems:  Skin:  Positive for     Eyes:  Positive for glasses  ENT:  Positive for    Respiratory:  Positive for sleep apnea;CPAP;dyspnea on exertion;shortness of breath  Cardiovascular:    Positive for;edema;lightheadedness  Gastroenterology: Negative    Genitourinary:  not assessed    Musculoskeletal:  Positive for arthritis  Neurologic:  Positive for stroke;numbness or tingling of feet  Psychiatric:  Positive for    Heme/Lymph/Imm:  Positive for easy bruising  Endocrine:  Positive for thyroid disorder    Physical Exam:  Vitals: /69   Pulse 69   Ht 1.88 m (6' 2.02\")   Wt 88.9 kg (196 lb)   BMI 25.15 kg/m  "     Constitutional:           Skin:             Head:           Eyes:           Lymph:      ENT:           Neck:           Respiratory:            Cardiac:                                                           GI:           Extremities and Muscular Skeletal:                 Neurological:           Psych:           Recent Lab Results:  LIPID RESULTS:  Lab Results   Component Value Date    CHOL 121 10/10/2018    HDL 59 10/10/2018    LDL 55 10/10/2018    TRIG 34 10/10/2018    CHOLHDLRATIO 2.1 06/15/2015       LIVER ENZYME RESULTS:  Lab Results   Component Value Date    AST 25 04/08/2019    ALT 17 04/08/2019       CBC RESULTS:  Lab Results   Component Value Date    WBC 2.7 (L) 04/13/2019    RBC 2.86 (L) 04/13/2019    HGB 9.2 (L) 04/13/2019    HCT 28.4 (L) 04/13/2019    MCV 99 04/13/2019    MCH 32.2 04/13/2019    MCHC 32.4 04/13/2019    RDW 16.0 (H) 04/13/2019    PLT 62 (L) 04/13/2019       BMP RESULTS:  Lab Results   Component Value Date     04/13/2019    POTASSIUM 3.4 04/13/2019    CHLORIDE 105 04/13/2019    CO2 31 04/13/2019    ANIONGAP 5 04/13/2019    GLC 83 04/13/2019    BUN 32 (H) 04/13/2019    CR 1.25 04/13/2019    GFRESTIMATED 51 (L) 04/13/2019    GFRESTBLACK 59 (L) 04/13/2019    TEODORO 9.0 04/13/2019        A1C RESULTS:  No results found for: A1C    INR RESULTS:  Lab Results   Component Value Date    INR 1.79 (H) 11/20/2018    INR 1.48 (H) 03/21/2018           CC  Mg Nelson MD  [id number]  [place] [address]

## 2019-04-16 ENCOUNTER — HOSPITAL ENCOUNTER (OUTPATIENT)
Facility: CLINIC | Age: 84
Setting detail: OBSERVATION
Discharge: HOME | End: 2019-04-22
Attending: EMERGENCY MEDICINE | Admitting: PATHOLOGY
Payer: COMMERCIAL

## 2019-04-16 DIAGNOSIS — D61.818 PANCYTOPENIA (H): Primary | ICD-10-CM

## 2019-04-16 DIAGNOSIS — Z79.01 LONG TERM CURRENT USE OF ANTICOAGULANT THERAPY: ICD-10-CM

## 2019-04-16 DIAGNOSIS — K92.0 HEMATEMESIS WITH NAUSEA: ICD-10-CM

## 2019-04-16 DIAGNOSIS — I50.21 ACUTE SYSTOLIC CHF (CONGESTIVE HEART FAILURE) (H): ICD-10-CM

## 2019-04-16 DIAGNOSIS — I48.19 PERSISTENT ATRIAL FIBRILLATION (H): ICD-10-CM

## 2019-04-16 DIAGNOSIS — Z78.9: ICD-10-CM

## 2019-04-16 PROCEDURE — 93005 ELECTROCARDIOGRAM TRACING: CPT

## 2019-04-16 PROCEDURE — 96374 THER/PROPH/DIAG INJ IV PUSH: CPT

## 2019-04-16 PROCEDURE — 99285 EMERGENCY DEPT VISIT HI MDM: CPT | Mod: 25

## 2019-04-16 ASSESSMENT — MIFFLIN-ST. JEOR: SCORE: 1597.05

## 2019-04-17 ENCOUNTER — APPOINTMENT (OUTPATIENT)
Dept: OCCUPATIONAL THERAPY | Facility: CLINIC | Age: 84
End: 2019-04-17
Attending: INTERNAL MEDICINE
Payer: COMMERCIAL

## 2019-04-17 ENCOUNTER — HOSPITAL ENCOUNTER (OUTPATIENT)
Facility: CLINIC | Age: 84
End: 2019-04-17
Attending: PATHOLOGY | Admitting: PATHOLOGY
Payer: COMMERCIAL

## 2019-04-17 PROBLEM — K92.2 GI BLEED: Status: ACTIVE | Noted: 2019-04-17

## 2019-04-17 LAB
ABO + RH BLD: NORMAL
ABO + RH BLD: NORMAL
ALBUMIN SERPL-MCNC: 3.1 G/DL (ref 3.4–5)
ALBUMIN UR-MCNC: 10 MG/DL
ALP SERPL-CCNC: 59 U/L (ref 40–150)
ALT SERPL W P-5'-P-CCNC: 20 U/L (ref 0–70)
ANION GAP SERPL CALCULATED.3IONS-SCNC: 6 MMOL/L (ref 3–14)
APPEARANCE UR: CLEAR
AST SERPL W P-5'-P-CCNC: 31 U/L (ref 0–45)
BASOPHILS # BLD AUTO: 0 10E9/L (ref 0–0.2)
BASOPHILS NFR BLD AUTO: 0.5 %
BILIRUB DIRECT SERPL-MCNC: 0.8 MG/DL (ref 0–0.2)
BILIRUB SERPL-MCNC: 1.7 MG/DL (ref 0.2–1.3)
BILIRUB UR QL STRIP: NEGATIVE
BLD GP AB SCN SERPL QL: NORMAL
BLOOD BANK CMNT PATIENT-IMP: NORMAL
BUN SERPL-MCNC: 29 MG/DL (ref 7–30)
CALCIUM SERPL-MCNC: 8 MG/DL (ref 8.5–10.1)
CHLORIDE SERPL-SCNC: 109 MMOL/L (ref 94–109)
CO2 SERPL-SCNC: 27 MMOL/L (ref 20–32)
COLOR UR AUTO: YELLOW
CREAT SERPL-MCNC: 1.14 MG/DL (ref 0.66–1.25)
DIFFERENTIAL METHOD BLD: ABNORMAL
EOSINOPHIL # BLD AUTO: 0 10E9/L (ref 0–0.7)
EOSINOPHIL NFR BLD AUTO: 1.4 %
ERYTHROCYTE [DISTWIDTH] IN BLOOD BY AUTOMATED COUNT: 16.1 % (ref 10–15)
ERYTHROCYTE [DISTWIDTH] IN BLOOD BY AUTOMATED COUNT: 16.3 % (ref 10–15)
GFR SERPL CREATININE-BSD FRML MDRD: 57 ML/MIN/{1.73_M2}
GLUCOSE SERPL-MCNC: 97 MG/DL (ref 70–99)
GLUCOSE UR STRIP-MCNC: NEGATIVE MG/DL
HCT VFR BLD AUTO: 27.8 % (ref 40–53)
HCT VFR BLD AUTO: 28.8 % (ref 40–53)
HGB BLD-MCNC: 9.4 G/DL (ref 13.3–17.7)
HGB BLD-MCNC: 9.7 G/DL (ref 13.3–17.7)
HGB UR QL STRIP: NEGATIVE
IMM GRANULOCYTES # BLD: 0 10E9/L (ref 0–0.4)
IMM GRANULOCYTES NFR BLD: 0.5 %
INTERPRETATION ECG - MUSE: NORMAL
KETONES UR STRIP-MCNC: NEGATIVE MG/DL
LDH SERPL L TO P-CCNC: 231 U/L (ref 85–227)
LEUKOCYTE ESTERASE UR QL STRIP: NEGATIVE
LIPASE SERPL-CCNC: 329 U/L (ref 73–393)
LYMPHOCYTES # BLD AUTO: 0.3 10E9/L (ref 0.8–5.3)
LYMPHOCYTES NFR BLD AUTO: 15.4 %
MCH RBC QN AUTO: 33 PG (ref 26.5–33)
MCH RBC QN AUTO: 33.2 PG (ref 26.5–33)
MCHC RBC AUTO-ENTMCNC: 33.7 G/DL (ref 31.5–36.5)
MCHC RBC AUTO-ENTMCNC: 33.8 G/DL (ref 31.5–36.5)
MCV RBC AUTO: 98 FL (ref 78–100)
MCV RBC AUTO: 98 FL (ref 78–100)
MONOCYTES # BLD AUTO: 0.2 10E9/L (ref 0–1.3)
MONOCYTES NFR BLD AUTO: 8.1 %
MUCOUS THREADS #/AREA URNS LPF: PRESENT /LPF
NEUTROPHILS # BLD AUTO: 1.6 10E9/L (ref 1.6–8.3)
NEUTROPHILS NFR BLD AUTO: 74.1 %
NITRATE UR QL: NEGATIVE
NRBC # BLD AUTO: 0 10*3/UL
NRBC BLD AUTO-RTO: 0 /100
PH UR STRIP: 6 PH (ref 5–7)
PLATELET # BLD AUTO: 67 10E9/L (ref 150–450)
PLATELET # BLD AUTO: 69 10E9/L (ref 150–450)
POTASSIUM SERPL-SCNC: 3.4 MMOL/L (ref 3.4–5.3)
PROT SERPL-MCNC: 6.7 G/DL (ref 6.8–8.8)
RBC # BLD AUTO: 2.83 10E12/L (ref 4.4–5.9)
RBC # BLD AUTO: 2.94 10E12/L (ref 4.4–5.9)
RBC #/AREA URNS AUTO: <1 /HPF (ref 0–2)
SODIUM SERPL-SCNC: 142 MMOL/L (ref 133–144)
SOURCE: ABNORMAL
SP GR UR STRIP: 1.02 (ref 1–1.03)
SPECIMEN EXP DATE BLD: NORMAL
UROBILINOGEN UR STRIP-MCNC: 2 MG/DL (ref 0–2)
WBC # BLD AUTO: 2 10E9/L (ref 4–11)
WBC # BLD AUTO: 2.2 10E9/L (ref 4–11)
WBC #/AREA URNS AUTO: 1 /HPF (ref 0–5)

## 2019-04-17 PROCEDURE — C9113 INJ PANTOPRAZOLE SODIUM, VIA: HCPCS | Performed by: INTERNAL MEDICINE

## 2019-04-17 PROCEDURE — 97166 OT EVAL MOD COMPLEX 45 MIN: CPT | Mod: GO | Performed by: OCCUPATIONAL THERAPIST

## 2019-04-17 PROCEDURE — 81001 URINALYSIS AUTO W/SCOPE: CPT | Performed by: EMERGENCY MEDICINE

## 2019-04-17 PROCEDURE — C9113 INJ PANTOPRAZOLE SODIUM, VIA: HCPCS | Performed by: EMERGENCY MEDICINE

## 2019-04-17 PROCEDURE — 86901 BLOOD TYPING SEROLOGIC RH(D): CPT | Performed by: EMERGENCY MEDICINE

## 2019-04-17 PROCEDURE — 85027 COMPLETE CBC AUTOMATED: CPT | Performed by: INTERNAL MEDICINE

## 2019-04-17 PROCEDURE — 25000128 H RX IP 250 OP 636: Performed by: PHYSICIAN ASSISTANT

## 2019-04-17 PROCEDURE — 83690 ASSAY OF LIPASE: CPT | Performed by: EMERGENCY MEDICINE

## 2019-04-17 PROCEDURE — 97530 THERAPEUTIC ACTIVITIES: CPT | Mod: GO | Performed by: OCCUPATIONAL THERAPIST

## 2019-04-17 PROCEDURE — 99207 ZZC NON-BILLABLE SERV PER CHARTING: CPT | Performed by: INTERNAL MEDICINE

## 2019-04-17 PROCEDURE — 86850 RBC ANTIBODY SCREEN: CPT | Performed by: EMERGENCY MEDICINE

## 2019-04-17 PROCEDURE — 99223 1ST HOSP IP/OBS HIGH 75: CPT | Mod: AI | Performed by: INTERNAL MEDICINE

## 2019-04-17 PROCEDURE — 25000132 ZZH RX MED GY IP 250 OP 250 PS 637: Performed by: INTERNAL MEDICINE

## 2019-04-17 PROCEDURE — 99219 ZZC INITIAL OBSERVATION CARE,LEVL II: CPT | Performed by: INTERNAL MEDICINE

## 2019-04-17 PROCEDURE — 12000000 ZZH R&B MED SURG/OB

## 2019-04-17 PROCEDURE — C9113 INJ PANTOPRAZOLE SODIUM, VIA: HCPCS | Performed by: PHYSICIAN ASSISTANT

## 2019-04-17 PROCEDURE — 83615 LACTATE (LD) (LDH) ENZYME: CPT | Performed by: INTERNAL MEDICINE

## 2019-04-17 PROCEDURE — 80053 COMPREHEN METABOLIC PANEL: CPT | Performed by: EMERGENCY MEDICINE

## 2019-04-17 PROCEDURE — 25000128 H RX IP 250 OP 636: Performed by: INTERNAL MEDICINE

## 2019-04-17 PROCEDURE — 25800030 ZZH RX IP 258 OP 636: Performed by: EMERGENCY MEDICINE

## 2019-04-17 PROCEDURE — 85025 COMPLETE CBC W/AUTO DIFF WBC: CPT | Performed by: EMERGENCY MEDICINE

## 2019-04-17 PROCEDURE — 25800030 ZZH RX IP 258 OP 636: Performed by: INTERNAL MEDICINE

## 2019-04-17 PROCEDURE — 82248 BILIRUBIN DIRECT: CPT | Performed by: INTERNAL MEDICINE

## 2019-04-17 PROCEDURE — G0463 HOSPITAL OUTPT CLINIC VISIT: HCPCS

## 2019-04-17 PROCEDURE — 36415 COLL VENOUS BLD VENIPUNCTURE: CPT | Performed by: INTERNAL MEDICINE

## 2019-04-17 PROCEDURE — 96374 THER/PROPH/DIAG INJ IV PUSH: CPT

## 2019-04-17 PROCEDURE — 97535 SELF CARE MNGMENT TRAINING: CPT | Mod: GO | Performed by: OCCUPATIONAL THERAPIST

## 2019-04-17 PROCEDURE — 86900 BLOOD TYPING SEROLOGIC ABO: CPT | Performed by: EMERGENCY MEDICINE

## 2019-04-17 PROCEDURE — 25000128 H RX IP 250 OP 636: Performed by: EMERGENCY MEDICINE

## 2019-04-17 RX ORDER — LEVOTHYROXINE SODIUM 100 UG/1
100 TABLET ORAL DAILY
Status: DISCONTINUED | OUTPATIENT
Start: 2019-04-17 | End: 2019-04-22 | Stop reason: HOSPADM

## 2019-04-17 RX ORDER — ONDANSETRON 4 MG/1
4 TABLET, ORALLY DISINTEGRATING ORAL EVERY 6 HOURS PRN
Status: DISCONTINUED | OUTPATIENT
Start: 2019-04-17 | End: 2019-04-22 | Stop reason: HOSPADM

## 2019-04-17 RX ORDER — SIMVASTATIN 20 MG
20 TABLET ORAL EVERY EVENING
Status: DISCONTINUED | OUTPATIENT
Start: 2019-04-17 | End: 2019-04-22 | Stop reason: HOSPADM

## 2019-04-17 RX ORDER — POTASSIUM CL/LIDO/0.9 % NACL 10MEQ/0.1L
10 INTRAVENOUS SOLUTION, PIGGYBACK (ML) INTRAVENOUS
Status: DISCONTINUED | OUTPATIENT
Start: 2019-04-17 | End: 2019-04-22 | Stop reason: HOSPADM

## 2019-04-17 RX ORDER — POTASSIUM CHLORIDE 29.8 MG/ML
20 INJECTION INTRAVENOUS
Status: DISCONTINUED | OUTPATIENT
Start: 2019-04-17 | End: 2019-04-22 | Stop reason: HOSPADM

## 2019-04-17 RX ORDER — POTASSIUM CHLORIDE 1.5 G/1.58G
20-40 POWDER, FOR SOLUTION ORAL
Status: DISCONTINUED | OUTPATIENT
Start: 2019-04-17 | End: 2019-04-22 | Stop reason: HOSPADM

## 2019-04-17 RX ORDER — BISACODYL 10 MG
10 SUPPOSITORY, RECTAL RECTAL DAILY PRN
Status: DISCONTINUED | OUTPATIENT
Start: 2019-04-17 | End: 2019-04-22 | Stop reason: HOSPADM

## 2019-04-17 RX ORDER — ACETAMINOPHEN 325 MG/1
650 TABLET ORAL EVERY 4 HOURS PRN
Status: DISCONTINUED | OUTPATIENT
Start: 2019-04-17 | End: 2019-04-22 | Stop reason: HOSPADM

## 2019-04-17 RX ORDER — MAGNESIUM SULFATE HEPTAHYDRATE 40 MG/ML
4 INJECTION, SOLUTION INTRAVENOUS EVERY 4 HOURS PRN
Status: DISCONTINUED | OUTPATIENT
Start: 2019-04-17 | End: 2019-04-22 | Stop reason: HOSPADM

## 2019-04-17 RX ORDER — AMOXICILLIN 250 MG
1 CAPSULE ORAL 2 TIMES DAILY PRN
Status: DISCONTINUED | OUTPATIENT
Start: 2019-04-17 | End: 2019-04-22 | Stop reason: HOSPADM

## 2019-04-17 RX ORDER — LIDOCAINE 40 MG/G
CREAM TOPICAL
Status: DISCONTINUED | OUTPATIENT
Start: 2019-04-17 | End: 2019-04-22 | Stop reason: HOSPADM

## 2019-04-17 RX ORDER — BUPROPION HYDROCHLORIDE 150 MG/1
150 TABLET ORAL EVERY MORNING
Status: DISCONTINUED | OUTPATIENT
Start: 2019-04-17 | End: 2019-04-22 | Stop reason: HOSPADM

## 2019-04-17 RX ORDER — SPIRONOLACTONE 25 MG
12.5 TABLET ORAL DAILY
Status: DISCONTINUED | OUTPATIENT
Start: 2019-04-17 | End: 2019-04-17

## 2019-04-17 RX ORDER — ONDANSETRON 2 MG/ML
4 INJECTION INTRAMUSCULAR; INTRAVENOUS EVERY 6 HOURS PRN
Status: DISCONTINUED | OUTPATIENT
Start: 2019-04-17 | End: 2019-04-22 | Stop reason: HOSPADM

## 2019-04-17 RX ORDER — POTASSIUM CHLORIDE 7.45 MG/ML
10 INJECTION INTRAVENOUS
Status: DISCONTINUED | OUTPATIENT
Start: 2019-04-17 | End: 2019-04-22 | Stop reason: HOSPADM

## 2019-04-17 RX ORDER — POLYETHYLENE GLYCOL 3350 17 G/17G
17 POWDER, FOR SOLUTION ORAL DAILY PRN
Status: DISCONTINUED | OUTPATIENT
Start: 2019-04-17 | End: 2019-04-22 | Stop reason: HOSPADM

## 2019-04-17 RX ORDER — SODIUM PHOSPHATE,MONO-DIBASIC 19G-7G/118
2 ENEMA (ML) RECTAL DAILY
COMMUNITY
End: 2020-02-18

## 2019-04-17 RX ORDER — BUPROPION HYDROCHLORIDE 150 MG/1
150 TABLET ORAL EVERY MORNING
COMMUNITY
End: 2020-01-15

## 2019-04-17 RX ORDER — PROCHLORPERAZINE 25 MG
12.5 SUPPOSITORY, RECTAL RECTAL EVERY 12 HOURS PRN
Status: DISCONTINUED | OUTPATIENT
Start: 2019-04-17 | End: 2019-04-22 | Stop reason: HOSPADM

## 2019-04-17 RX ORDER — TAMSULOSIN HYDROCHLORIDE 0.4 MG/1
0.4 CAPSULE ORAL DAILY
Status: DISCONTINUED | OUTPATIENT
Start: 2019-04-17 | End: 2019-04-22 | Stop reason: HOSPADM

## 2019-04-17 RX ORDER — NALOXONE HYDROCHLORIDE 0.4 MG/ML
.1-.4 INJECTION, SOLUTION INTRAMUSCULAR; INTRAVENOUS; SUBCUTANEOUS
Status: DISCONTINUED | OUTPATIENT
Start: 2019-04-17 | End: 2019-04-22 | Stop reason: HOSPADM

## 2019-04-17 RX ORDER — FINASTERIDE 5 MG/1
5 TABLET, FILM COATED ORAL DAILY
Status: DISCONTINUED | OUTPATIENT
Start: 2019-04-17 | End: 2019-04-22 | Stop reason: HOSPADM

## 2019-04-17 RX ORDER — POTASSIUM CHLORIDE 1500 MG/1
20-40 TABLET, EXTENDED RELEASE ORAL
Status: DISCONTINUED | OUTPATIENT
Start: 2019-04-17 | End: 2019-04-22 | Stop reason: HOSPADM

## 2019-04-17 RX ORDER — PROCHLORPERAZINE MALEATE 5 MG
5 TABLET ORAL EVERY 6 HOURS PRN
Status: DISCONTINUED | OUTPATIENT
Start: 2019-04-17 | End: 2019-04-22 | Stop reason: HOSPADM

## 2019-04-17 RX ORDER — AMOXICILLIN 250 MG
2 CAPSULE ORAL 2 TIMES DAILY PRN
Status: DISCONTINUED | OUTPATIENT
Start: 2019-04-17 | End: 2019-04-22 | Stop reason: HOSPADM

## 2019-04-17 RX ORDER — ONDANSETRON 2 MG/ML
4 INJECTION INTRAMUSCULAR; INTRAVENOUS ONCE
Status: DISCONTINUED | OUTPATIENT
Start: 2019-04-17 | End: 2019-04-17

## 2019-04-17 RX ADMIN — SODIUM CHLORIDE 8 MG/HR: 9 INJECTION, SOLUTION INTRAVENOUS at 04:35

## 2019-04-17 RX ADMIN — BUPROPION HYDROCHLORIDE 150 MG: 150 TABLET, FILM COATED, EXTENDED RELEASE ORAL at 17:27

## 2019-04-17 RX ADMIN — TAMSULOSIN HYDROCHLORIDE 0.4 MG: 0.4 CAPSULE ORAL at 17:28

## 2019-04-17 RX ADMIN — LEVOTHYROXINE SODIUM 100 MCG: 100 TABLET ORAL at 17:28

## 2019-04-17 RX ADMIN — FINASTERIDE 5 MG: 5 TABLET, FILM COATED ORAL at 17:28

## 2019-04-17 RX ADMIN — PANTOPRAZOLE SODIUM 40 MG: 40 INJECTION, POWDER, FOR SOLUTION INTRAVENOUS at 10:26

## 2019-04-17 RX ADMIN — PANTOPRAZOLE SODIUM 40 MG: 40 INJECTION, POWDER, FOR SOLUTION INTRAVENOUS at 17:27

## 2019-04-17 RX ADMIN — PANTOPRAZOLE SODIUM 80 MG: 40 INJECTION, POWDER, FOR SOLUTION INTRAVENOUS at 04:16

## 2019-04-17 RX ADMIN — SIMVASTATIN 20 MG: 20 TABLET, FILM COATED ORAL at 20:05

## 2019-04-17 ASSESSMENT — ENCOUNTER SYMPTOMS
DIARRHEA: 1
NAUSEA: 1
ABDOMINAL PAIN: 0
VOMITING: 1
CONFUSION: 1

## 2019-04-17 ASSESSMENT — ACTIVITIES OF DAILY LIVING (ADL)
ADLS_ACUITY_SCORE: 26
ADLS_ACUITY_SCORE: 15

## 2019-04-17 ASSESSMENT — MIFFLIN-ST. JEOR: SCORE: 1573.75

## 2019-04-17 NOTE — PLAN OF CARE
PT: Orders received, chart reviewed. Attempted to see patient for PT eval. Pt answered all subjective questions but then declined any other activity stating he is tired and wants to just rest in bed this afternoon.

## 2019-04-17 NOTE — ED NOTES
"[facility]  ED Nurse Handoff Report    ED Chief complaint: Nausea, Vomiting, & Diarrhea (Starting this am. Some blood in emesis. ) and Altered Mental Status (Confusion)      ED Diagnosis:   Final diagnoses:   Hematemesis with nausea   Long term current use of anticoagulant therapy   Disoriented to time       Code Status: Full Code    Allergies: No Known Allergies    Activity level - Baseline/Home:  Unable to Assess    Activity Level - Current:   Stand with Assist     Needed?: No    Isolation: No  Infection: Not Applicable  Bariatric?: No    Vital Signs:   Vitals:    04/16/19 2348 04/17/19 0000   BP: 115/69    Resp: 16    Temp:  98.8  F (37.1  C)   TempSrc:  Oral   SpO2: 96%    Weight: 85.7 kg (189 lb)    Height: 1.88 m (6' 2\")        Cardiac Rhythm: ,        Pain level: 0-10 Pain Scale: 0    Is this patient confused?: Yes.   Does this patient have a guardian?  No        [place] - Suicide Severity Rating Scale Completed?  Yes  If yes, what color did the patient score?  White    Patient Report: Initial Complaint: Pt presents from nursing home with hematemesis and diarrhea. Staff reports he is more confused than normal, and has a temporal temperature 100 degrees. Staff also reports his systolic BP was in the 90s.  Focused Assessment: Pt states he feels fine, denies nausea. BP stable in low 100s. Pt oriented when asked, but intermittently makes confused statements. Family at bedside.  Tests Performed: Labs, EKG, UA  Abnormal Results:   Labs Ordered and Resulted from Time of ED Arrival Up to the Time of Departure from the ED   CBC WITH PLATELETS DIFFERENTIAL - Abnormal; Notable for the following components:       Result Value    WBC 2.2 (*)     RBC Count 2.94 (*)     Hemoglobin 9.7 (*)     Hematocrit 28.8 (*)     RDW 16.1 (*)     Platelet Count 67 (*)     Absolute Lymphocytes 0.3 (*)     All other components within normal limits   COMPREHENSIVE METABOLIC PANEL - Abnormal; Notable for the " following components:    GFR Estimate 57 (*)     Calcium 8.0 (*)     Bilirubin Total 1.7 (*)     Albumin 3.1 (*)     Protein Total 6.7 (*)     All other components within normal limits   ROUTINE UA WITH MICROSCOPIC - Abnormal; Notable for the following components:    Protein Albumin Urine 10 (*)     Mucous Urine Present (*)     All other components within normal limits   LIPASE   PERIPHERAL IV CATHETER   PATIENT CARE ORDER       Treatments provided: None    Family Comments: Wife and Son at bedside.        ED Medications:   Medications   ondansetron (ZOFRAN) injection 4 mg (has no administration in time range)       Drips infusing?:  No    For the majority of the shift this patient was Green.   Interventions performed were Explanation.    Severe Sepsis OR Septic Shock Diagnosis Present: No    To be done/followed up on inpatient unit:  na    ED NURSE PHONE NUMBER: [phone]

## 2019-04-17 NOTE — PROGRESS NOTES
Update Note:     Patient seen and examined.  More alert but oriented to person only.  He does know he is in a hospital but does not know which one.  Also believes the year is 1989.  No further hematemesis or bowel movements since arrival.      Seen by GI.  Plan to monitor for now.      - Restarted some of his home mediceines  - Holding BP medicines and diuretics due to soft pressures  - Holding anticoagulation   - Awaiting cardiology and Hem/onc consultations  - PT/OT evaluation   - WOC following   - Delirium precautions    Blas Martínez DO   Hospitalist

## 2019-04-17 NOTE — ED NOTES
Bed: ED23  Expected date: 4/16/19  Expected time: 11:35 PM  Means of arrival: Ambulance  Comments:  HEMS 432 88M N/V/D, confusion

## 2019-04-17 NOTE — PROGRESS NOTES
[facility] Nurse Inpatient Wound Assessment   Reason for consultation: Evaluate and treat back wounds    Assessment  Back wounds due to unclear etiology - pressure/friction vs skin tears vs bullous pemphigoid.  Proximal wound is right on the spine bony prominence.    Status: initial assessment; wounds appear superficial, clean, dry, stable.      Pt states he has hx bullous pemphigoid which he treats with a special cream.  Pt thinks this is the cause of these wounds, as well as a few scabbed dry lesions on his upper chest, but unclear how reliable is this information.  For now will treat the wounds conservatively with foam dressings for protection and continue to monitor.      Per nurse pt has new rash.  Physician has re-ordered use of his topical cream.     Treatment Plan  Rupt blisters back / abdomen: Odd days andn prn  Cleanse any open/draining areas with MicroKlenz or soap and water.  Cover open draining blisters with Optifoam 4 x 4 People Soft:  (#[id number])  4x4s for protection/absorbtion .  Orders updated  Recommended provider order: pt to f/u with his regular provider on discharge  WO Nurse follow-up plan:weekly  Nursing to notify the Provider(s) and re-consult the [facility] Nurse if wound(s) deteriorates or new skin concern.    Patient History  According to provider note(s):  Bairon Foster is a 88 year old male admitted on 4/16/2019. He presents from TCU after recent admission with increased confusion and bloody emesis.    Objective Data  Active Diet Order  Orders Placed This Encounter      Regular Diet Adult    Risk Assessment:   Sensory Perception: 3-->slightly limited  Moisture: 3-->occasionally moist  Activity: 3-->walks occasionally  Mobility: 3-->slightly limited  Nutrition: 3-->adequate  Friction and Shear: 3-->no apparent problem  Dave Score: 18                          Labs:   Recent Labs   Lab 04/17/19  0911 04/17/19  0001   ALBUMIN  --  3.1*   HGB 9.4* 9.7*   WBC 2.0* 2.2*       Physical Exam  Skin  inspection: focused upper back    Wound Location:  Upper mid and right back  Date of last photo 4-17-19        Wound History: unclear etiology or duration; present on admission; pt reports hx bullous pemphigoid and use of triamcinolone cream  Measurements (length x width x depth, in cm):  3 areas, all around 1-1.5cm diameter, no depth  Wound Base: pink lightly scabbed tissue, dry  Palpation of the wound bed: normal   Periwound skin: intact, minimal erythema  Color: normal and consistent with surrounding tissue  Temperature: normal   Drainage: none  Description of drainage: none  Odor: none  Pain: minimal    Interventions  Current support surface: Standard  Atmos Air mattress  Current off-loading measures: pillows  Visual inspection of wound(s) completed  Wound Care: done per plan of care  Supplies: reviewed  Education provided: plan of care, wound progress and Infection prevention   Discussed plan of care with Patient, Family and Nurse    Nicole Oliver RN

## 2019-04-17 NOTE — PLAN OF CARE
A&Ox3, disoriented to situation and forgetful. VSS on RA. Denies pain. IV protonix gtt. Ecchymotic skin. Up with 1-2. Plan for consults today.

## 2019-04-17 NOTE — PHARMACY-ADMISSION MEDICATION HISTORY
Admission medication history interview status for the 4/16/2019  admission is complete. See EPIC admission navigator for prior to admission medications     Medication history source reliability:Good    Actions taken by pharmacist (provider contacted, etc): Spoke with patient. Spoke with nurse at [facility] in [place]. She faxed me a MAR.      Additional medication history information not noted on PTA med list : patient was nauseated and could not hold pills down on 4/16/19 pm    Medication reconciliation/reorder completed by provider prior to medication history? No    Time spent in this activity: 1 hour    Prior to Admission medications    Medication Sig Last Dose Taking? Auth Provider   acetaminophen (TYLENOL) 500 MG tablet Take 2 tablets (1,000 mg) by mouth every 6 hours as needed for mild pain prn at prn Yes Mg Nelson MD   bumetanide (BUMEX) 1 MG tablet Take 1 tablet (1 mg) by mouth daily 4/16/2019 at 0800 Yes Aggie Akhtar,    buPROPion (WELLBUTRIN XL) 150 MG 24 hr tablet Take 150 mg by mouth every morning 4/16/2019 at 0800 Yes Unknown, Entered By History   Carboxymethylcellulose Sod PF (CELLUVISC/REFRESH LIQUIGEL) 1 % ophthalmic solution Place 1 drop into both eyes 3 times daily as needed for dry eyes prn at prn Yes Reported, Patient   carvedilol (COREG) 6.25 MG tablet TAKE 1 TABLET (6.25 MG) BY MOUTH 2 TIMES DAILY (WITH MEALS) 4/16/2019 at 0800 Yes Mg Nelson MD   dabigatran ANTICOAGULANT (PRADAXA ANTICOAGULANT) 75 MG CAPS BLISTER PACK Take 150 mg by mouth 2 times daily. Store in original 's bottle or blister pack; use within 120 days of opening. 4/16/2019 at 0800 Yes Reported, Patient   diclofenac (VOLTAREN) 1 % topical gel Apply 2 g topically 4 times daily 4/16/2019 at 1200 Yes Mg Nelson MD   finasteride (PROSCAR) 5 MG tablet TAKE 1 TABLET EVERY DAY 4/16/2019 at 0800 Yes Mg Nelson MD   glucosamine-chondroitin  500-400 MG CAPS per capsule Take 2 capsules by mouth daily 4/16/2019 at 0800 Yes Unknown, Entered By History   isosorbide mononitrate (IMDUR) 30 MG 24 hr tablet Take 1 tablet (30 mg) by mouth daily 4/16/2019 at 0800 Yes Mackenzie Tejada APRN CNP   ketotifen (ZADITOR) 0.025 % SOLN ophthalmic solution Place 1 drop into both eyes every 12 hours 4/16/2019 at 0800 Yes Mg Nelson MD   KLOR-CON 20 MEQ CR tablet TAKE 1 TABLET EVERY DAY 4/16/2019 at 0800 Yes Elvin Ross MD   levothyroxine (SYNTHROID/LEVOTHROID) 100 MCG tablet TAKE 1 TABLET EVERY DAY 4/16/2019 at 0700 Yes Mg Nelson MD   LORazepam (ATIVAN) 1 MG tablet Take 0.5-1 tablets (0.5-1 mg) by mouth every 8 hours as needed for anxiety prn at prn Yes Aggie Akhtar,    Multiple Vitamins-Minerals (CENTRUM SILVER) per tablet Take 1 tablet by mouth daily 4/16/2019 at 0800 Yes Reported, Patient   omeprazole (PRILOSEC) 20 MG DR capsule TAKE 1 CAPSULE TWICE DAILY 4/16/2019 at 0800 Yes Mg Nelson MD   Polyethylene Glycol 3350 (MIRALAX PO) Take 1 packet by mouth At Bedtime  4/14/2019 at 2000 Yes Reported, Patient   simvastatin (ZOCOR) 20 MG tablet Take 20 mg by mouth daily  4/14/2019 at 2000 Yes Reported, Patient   spironolactone (ALDACTONE) 25 MG tablet Take 12.5 mg by mouth daily 4/16/2019 at 0800 Yes Unknown, Entered By History   sucralfate (CARAFATE) 1 GM tablet Take 1 tablet (1 g) by mouth 2 times daily 4/16/2019 at 0800 Yes Mg Nelson MD   tamsulosin (FLOMAX) 0.4 MG capsule TAKE 1 CAPSULE EVERY DAY 4/16/2019 at 0800 Yes Mg Nelson MD   triamcinolone (KENALOG) 0.025 % cream Apply topically 2 times daily as needed  prn at prn Yes Reported, Patient   COMPRESSION STOCKINGS 1 each daily   London Carrizales MD   ORDER FOR DME Auto-CPAP:Max 9 cm H2OMin 9 cm H2O  Continuous Lifetime need and heated humidity.      Alan Estrada MD

## 2019-04-17 NOTE — ED PROVIDER NOTES
History     Chief Complaint:  Nausea, Vomiting, Diarrhea, and Confusion    HPI   Bairon Foster is a 88 year old male who presents with nausea, hematemesis, diarrhea, and altered mental state- confusion. The patient started living at a new nursing home in [place] 3 days ago. This morning, nursing home staff noticed that the patient was more confused than usual, not knowing the correct day of the week (usually oriented x4), and was also experiencing vomiting. There was some blood in his emesis x3 per notes that came with him.  Given Zofran once this evening. The patient denies any injuries, falls, or abdominal pain.  However he does state he has had some nose bleeds.  No chest pain or shortness of breath.  Stools non-bloody, not black.  Recently admitted to the hospital, shows his bruising from IV attempts and angiogram.    Here with family, the patient thinks that crystal is coming up, instead of easter.    Allergies:  No known drug allergies     Medications:    Tylenol  Bumex  Wellbutrin  Celluvisc  Coreg  Compression Stockings  Pradaxa Anticoagulant  Voltaren Proscar  Glucosamine-Chondroitin  Imdur  Zaditor  Klor-Con  Synthroid  Ativan  Prilosec  Miralax  Zocor  Aldactone  Carafate  Flomax  Kenalog    Past Medical History:    Acute, but ill-defined, cerebrovascular disease   Antiplatelet or antithrombotic long-term use   Arrhythmia   Blood in stool   Coronary atherosclerosis    Esophageal reflux   Hydrocele  Hypercholesterolemia   Hypersomnia with sleep apnea  Inguinal hernia without mention of obstruction or gangrene  Ischemic cardiomyopathy   Major depression in complete remission  Obese   Onychomycosis   JIM on CPAP   Other and unspecified hyperlipidemia   Other lymphedema   Other pancytopenia   Other specified anemias   Overweight   Pacemaker   PAD (peripheral artery disease)    Pancytopenia    Persistent atrial fibrillation   Prostatic hypertrophy  Pulmonary hypertension   Scrotal  "varices   Thrombocytopenia  Umbilical hernia   Unilateral or unspecified femoral hernia    Unspecified hypothyroidism   Venous stasis dermatitis of both lower extremities  Vitamin D deficiency     Past Surgical History:    CV Catheterization   Cataract  Genitourinary Surgery  Hernia Repair  Herniorrhaphy Inguinal  Herniorrhaphy Umbilical  Implant Pacemaker  Orthopedic Surgery- CMS Right, Right TKR, Left TKR    Family History:    Father: CAD  Sister: Lung Cancer    Social History:  Smoking Status: Never Smoker  Alcohol Use: Yes  Patient presents with two other people.  Marital Status:        Review of Systems   Gastrointestinal: Positive for diarrhea, nausea and vomiting. Negative for abdominal pain.   Psychiatric/Behavioral: Positive for confusion.   All other systems reviewed and are negative.    Physical Exam   First Vitals:  Patient Vitals for the past 24 hrs:   BP Temp Temp src Pulse Heart Rate Resp SpO2 Height Weight   04/17/19 0215 -- -- -- -- -- -- 95 % -- --   04/17/19 0214 104/61 -- -- 59 -- -- -- -- --   04/17/19 0153 100/56 -- -- 59 -- -- -- -- --   04/17/19 0000 -- 98.8  F (37.1  C) Oral -- -- -- -- -- --   04/16/19 2348 115/69 -- -- -- 60 16 96 % 1.88 m (6' 2\") 85.7 kg (189 lb)     Physical Exam  Eyes:               Sclera white; Pupils are equal and round  ENT:                External ears and nares normal                          Bilateral nares w/scabs along septum suggestive of recent bleeding  CV:                  Rate as above with regular rhythm                           BLE edema, symmetric  Resp:               Breath sounds clear and equal bilaterally                          Non-labored, no retractions or accessory muscle use  GI:                   Abdomen is soft, non-tender, non-distended  MS:                  Moves all extremities  Skin:                Warm and dry  Neuro:             Speech is normal and fluent. Able to describe recent admission and perform HPI and " KATHIA.     Emergency Department Course   ECG:  ECG (0:23:39):  Rate 62 bpm. ND interval *. QRS duration 188. QT/QTc 530/537. P-R-T axes * -58 51. Ventricular-paced rhythm with occasional premature ventricular complexes. Abnormal ECG. PVCs new versus 3/21/18. Interpreted at 0043 by Priya Maurer.    Laboratory:  CBC: WBC 2.2 (L), HGB 9.7, PLT 67 (L)   CMP: GFR 57 (L), Calcium 8.0 (L), Bilirubin 1.7 (H), Albumin 3.1 (L), Prtoein Total 6.7 o/w WNL  (Creatinine 1.14)  Lipase: 329  UA w/ microscopic: Protein Albumin 10 (A) o/w Negative  ABO/Rh: Pending    Emergency Department Course:  Past medical records, nursing notes, and vitals reviewed.  0000: I performed an exam of the patient and obtained history, as documented above.    Labs were performed.     0146: I rechecked the patient. Explained findings to patient and spouse.    I discussed the case with Dr. Johnson regarding the patient.    Findings and plan explained to the patient who consents to observation.     0205: Discussed the patient with Dr. Johnson, who will admit the patient.    Protonix ordered.     Impression & Plan      Medical Decision Making:  Bairon Foster is here for evaluation of hematemesis on anticoagulation, and new disorientation to time. Exam is suggestive of recent nose bleeding and this could be the source of his hematemesis but I am not sure if this matches the course in time nor would I expect that to be responsible for 3 episodes of hematemesis, without any visual evidence of active nose bleed at this point in time.  Differential also includes ulcer and cristóbal mcpherson tear. Hemoglobin was noted to be at his recent baseline. Pancytopenia chronic.  He was hypotensive at his nursing facility (90 systolic) but here blood pressures have been at his recent baseline of 100-110s on chart review. Etiology of disorientation is not clear as there was no indication of sepsis, severe electrolyte abnormalities, or urinary tract infection. He is at risk  for significant blood loss since he is on blood thinners with his hematemesis and will be brought into the hospital for serial hemoglobins and involvement with gastroenterology if symptoms are worsening.     Diagnosis:    ICD-10-CM    1. Hematemesis with nausea K92.0 ABO/Rh type and screen   2. Long term current use of anticoagulant therapy Z79.01    3. Disoriented to time Z78.9        Disposition:  Admit    Discharge Medications:     Medication List      There are no discharge medications for this visit.         Mara Peterson  4/16/2019    EMERGENCY DEPARTMENT  I, Mara Peterson, am serving as a scribe at 12:00 AM on 4/17/2019 to document services personally performed by Priya Maurer, based on my observations and the provider's statements to me.          Priya Maurer MD  04/18/19 0011

## 2019-04-17 NOTE — PLAN OF CARE
OT order received.  Evaluation completed, treatment initiated.  Patient is an 88 year old male admitted from TCU setting with nausea, diarrhea, AMS.  He was recently hospitalized for CHF exacerbation, and was discharged to TCU setting 3 days prior to this admit.  At discharge from previous hospitalization, patient was Beka with ADLs, and SBA with ambulation with 2WW.    Discharge Planner OT   Patient plan for discharge: return to TCU  Current status: supine to sit SBA.  Poor seated balance, with patient leaning posteriorly and to R, able to self-correct with VC.  Beka LB dressing task, with decreased coordination BUE, and significantly increased time to complete task.  SBA sit to stand and ambulation in room with 2WW, with 3 mild LOB posteriorly.  Patient needing frequent repetition of instructions, and could not locate the BR or his chair (both within 2-3' of patient).  SBA toilet transfer to [facility], Beka with clothing management.  Patient knew [facility] was coming up, but was not oriented to month/day/year; knew current president, did not know previous one. Followed instructions 50-75% of time.  Barriers to return to prior living situation: no barriers to TCU setting  Recommendations for discharge: return to TCU  Rationale for recommendations: patient would benefit from continued therapy to increase safety and independence with ADLs and functional mobility.       Entered by: NELSON HOLBROOK 04/17/2019 12:14 PM

## 2019-04-17 NOTE — ED NOTES
Bed: ED27  Expected date:   Expected time:   Means of arrival:   Comments:  Room 23 for closer observation

## 2019-04-17 NOTE — PLAN OF CARE
DATE & TIME: 4/17/19  [id number]  ORIENTATION: A&Ox3, disoriented to situation. forgetful  BEHAVIOR & AGGRESSION TOOL COLOR: Green  CIWA SCORE: NA  ABNL VS/O2: VSS on RA  MOBILITY: up with assist of 1, walker and GB  PAIN MANAGMENT: Denies  DIET: Regular  BOWEL/BLADDER: urinals  ABNL LAB/BG:Hgb 9.4  DRAIN/DEVICES: IV  Saline locked  TELEMETRY RHYTHM:  NA  SKIN: ecchymotic , bruises and abrasions on back  TESTS/PROCEDURES:Bone Marrow Biopsy tomorrow  D/C DAY/GOALS/PLACE:pending  OTHER IMPORTANT INFO:    Denies pain. IV protonix gtt changed to Iv push.GI following, seen by  Hem/ Onc, plan for  Bone marrow biopsy tomorrow, cardiology consult pending, had an episode of nose bleed.seen by wound nurse for back abrasions,dressings c/d/i

## 2019-04-17 NOTE — ED NOTES
Nurse Saldivar called from [facility], updated on pt's admission plan.

## 2019-04-17 NOTE — PROGRESS NOTES
04/17/19 1100   Quick Adds   Type of Visit Initial Occupational Therapy Evaluation   Living Environment   Lives With spouse;facility resident   Living Arrangements house   Home Accessibility stairs within home   Living Environment Comment Pt was recently discharged to TCU setting from hospital.  Previously he lived with his wife in a house   Self-Care   Usual Activity Tolerance moderate   Current Activity Tolerance fair   Equipment Currently Used at Home walker, standard;grab bar, toilet;grab bar, tub/shower   Activity/Exercise/Self-Care Comment Pt was walking with 2WW at discharge to TCU a few days ago   Functional Level   Ambulation 3-->assistive equipment and person   Transferring 3-->assistive equipment and person   Toileting 3-->assistive equipment and person   Bathing 3-->assistive equipment and person   Dressing 2-->assistive person   Eating 0-->independent   Communication 0-->understands/communicates without difficulty   Swallowing 0-->swallows foods/liquids without difficulty   Cognition 1 - attention or memory deficits   Fall history within last six months no   Which of the above functional risks had a recent onset or change? ambulation;transferring;toileting;bathing;dressing;cognition   Prior Functional Level Comment Pt was completing ADLs with Beka at discharge to TCU a few days ago   General Information   Onset of Illness/Injury or Date of Surgery - Date 04/17/19   Referring Physician Manpreet Baca   Patient/Family Goals Statement not stated   Additional Occupational Profile Info/Pertinent History of Current Problem Pt presents with nausea, hematemesis, diarrhea, and altered mental state- confusion. He was recently hospitalized for CHF exacerbation, and subsequently discharged to TCU setting a few days ago.  Family reports patient with some changes to mental status for a few weeks already.   Precautions/Limitations fall precautions   Weight-Bearing Status - LUE full weight-bearing   Weight-Bearing  "Status - RUE full weight-bearing   Weight-Bearing Status - LLE full weight-bearing   Weight-Bearing Status - RLE full weight-bearing   Heart Disease Risk Factors Lack of physical activity;Medical history;Gender;Age   General Observations Pt supine upon arrival; pt agreeable to OT session   General Info Comments Activity order: ambulate with assist prn   Cognitive Status Examination   Orientation person;place  (Thought it was Friday, did not know year or month)   Follows Commands (Cognition) 50-74% accuracy   Memory impaired   Cognitive Comment Pt needing multiple repetition of instructions for tasks, could not remember where BR or chair were; could name current president, not past president (even with clue of \"first  Am president\"); difficulty following finger-to-nose test instructions, needing verbal and tactile instructions   Visual Perception   Visual Perception Wears glasses   Pain Assessment   Patient Currently in Pain No   Integumentary/Edema   Integumentary/Edema Comments multiple scabs/bruises on BLE, upper back, BUE   Posture   Posture forward head position;protracted shoulders   Posture Comments Pt tends to use significantly FF neck posture   Range of Motion (ROM)   ROM Comment BUE WFL   Strength   Strength Comments BUE generally 4/5   Hand Strength   Hand Strength Comments B hand  4/5   Coordination   Gross Motor Coordination mild difficulty with finger-to-nose, mild tremor   Coordination Comments Could not follow instructions to touch thumb pad to finger pads - touched index and middile, would not touch ring or little.   Mobility   Bed Mobility Bed mobility skill: Supine to sit   Bed Mobility Skill: Supine to Sit   Level of [place]: Supine/Sit stand-by assist   Physical Assist/Nonphysical Assist: Supine/Sit verbal cues;supervision;set-up required   Transfer Skill: Sit to Stand   Level of [place]: Sit/Stand stand-by assist   Physical Assist/Nonphysical Assist: Sit/Stand " supervision;verbal cues;set-up required   Transfer Skill: Sit to Stand full weight-bearing   Assistive Device for Transfer: Sit/Stand standard walker   Transfer Skill: Toilet Transfer   Level of [place]: Toilet stand-by assist   Physical Assist/Nonphysical Assist: Toilet supervision;verbal cues;set-up required   Weight-Bearing Restrictions: Toilet full weight-bearing   Assistive Device standard walker;grab bars   Balance   Balance Comments Pt leaning to R and posteriorly seated EOB, able to self-correct with VC.  Fair standing with 2WW and ambulating in room.  3 mild LOB ambulating, posteriorly.   Lower Body Dressing   Level of [place]: Dress Lower Body minimum assist (75% patients effort)   Physical Assist/Nonphysical Assist: Dress Lower Body 1 person assist;verbal cues;supervision;set-up required   Toileting   Level of [place]: Toilet minimum assist (75% patients effort)   Physical Assist/Nonphysical Assist: Toilet 1 person assist;verbal cues;supervision;set-up required   Instrumental Activities of Daily Living (IADL)   IADL Comments Pt resident at [facility]   Activities of Daily Living Analysis   Impairments Contributing to Impaired Activities of Daily Living balance impaired;cognition impaired;coordination impaired;strength decreased   General Therapy Interventions   Planned Therapy Interventions ADL retraining;transfer training;cognition;strengthening   Clinical Impression   Criteria for Skilled Therapeutic Interventions Met yes, treatment indicated   OT Diagnosis impaired ADLs   Influenced by the following impairments impaired cognition, decreased balance, weakness, incoordination   Assessment of Occupational Performance 3-5 Performance Deficits   Identified Performance Deficits functional mob, dressing, toileting, bathing   Clinical Decision Making (Complexity) Moderate complexity   Therapy Frequency daily   Predicted Duration of Therapy Intervention (days/wks) 3 days   Anticipated Discharge  "Disposition Transitional Care Facility   Risks and Benefits of Treatment have been explained. Yes   Patient, Family & other staff in agreement with plan of care Yes   [facility] AM-PAC  \"6 Clicks\" Daily Activity Inpatient Short Form   1. Putting on and taking off regular lower body clothing? 2 - A Lot   2. Bathing (including washing, rinsing, drying)? 2 - A Lot   3. Toileting, which includes using toilet, bedpan or urinal? 3 - A Little   4. Putting on and taking off regular upper body clothing? 3 - A Little   5. Taking care of personal grooming such as brushing teeth? 3 - A Little   6. Eating meals? 4 - None   Daily Activity Raw Score (Score out of 24.Lower scores equate to lower levels of function) 17   Total Evaluation Time   Total Evaluation Time (Minutes) 8      04/17/19 1100   Quick Adds   Type of Visit Initial Occupational Therapy Evaluation   Living Environment   Lives With spouse;facility resident   Living Arrangements house   Home Accessibility stairs within home   Living Environment Comment Pt was recently discharged to TCU setting from hospital.  Previously he lived with his wife in a house   Self-Care   Usual Activity Tolerance moderate   Current Activity Tolerance fair   Equipment Currently Used at Home walker, standard;grab bar, toilet;grab bar, tub/shower   Activity/Exercise/Self-Care Comment Pt was walking with 2WW at discharge to TCU a few days ago   Functional Level   Ambulation 3-->assistive equipment and person   Transferring 3-->assistive equipment and person   Toileting 3-->assistive equipment and person   Bathing 3-->assistive equipment and person   Dressing 2-->assistive person   Eating 0-->independent   Communication 0-->understands/communicates without difficulty   Swallowing 0-->swallows foods/liquids without difficulty   Cognition 1 - attention or memory deficits   Fall history within last six months no   Which of the above functional risks had a recent onset or change? " "ambulation;transferring;toileting;bathing;dressing;cognition   Prior Functional Level Comment Pt was completing ADLs with Beka at discharge to TCU a few days ago   General Information   Onset of Illness/Injury or Date of Surgery - Date 04/17/19   Referring Physician Manpreet Baca   Patient/Family Goals Statement not stated   Additional Occupational Profile Info/Pertinent History of Current Problem Pt presents with nausea, hematemesis, diarrhea, and altered mental state- confusion. He was recently hospitalized for CHF exacerbation, and subsequently discharged to TCU setting a few days ago.  Family reports patient with some changes to mental status for a few weeks already.   Precautions/Limitations fall precautions   Weight-Bearing Status - LUE full weight-bearing   Weight-Bearing Status - RUE full weight-bearing   Weight-Bearing Status - LLE full weight-bearing   Weight-Bearing Status - RLE full weight-bearing   Heart Disease Risk Factors Lack of physical activity;Medical history;Gender;Age   General Observations Pt supine upon arrival; pt agreeable to OT session   General Info Comments Activity order: ambulate with assist prn   Cognitive Status Examination   Orientation person;place  (Thought it was Friday, did not know year or month)   Follows Commands (Cognition) 50-74% accuracy   Memory impaired   Cognitive Comment Pt needing multiple repetition of instructions for tasks, could not remember where BR or chair were; could name current president, not past president (even with clue of \"first  Am president\"); difficulty following finger-to-nose test instructions, needing verbal and tactile instructions   Visual Perception   Visual Perception Wears glasses   Pain Assessment   Patient Currently in Pain No   Integumentary/Edema   Integumentary/Edema Comments multiple scabs/bruises on BLE, upper back, BUE   Posture   Posture forward head position;protracted shoulders   Posture Comments Pt tends to use " significantly FF neck posture   Range of Motion (ROM)   ROM Comment BUE WFL   Strength   Strength Comments BUE generally 4/5   Hand Strength   Hand Strength Comments B hand  4/5   Coordination   Gross Motor Coordination mild difficulty with finger-to-nose, mild tremor   Coordination Comments Could not follow instructions to touch thumb pad to finger pads - touched index and middile, would not touch ring or little.   Mobility   Bed Mobility Bed mobility skill: Supine to sit   Bed Mobility Skill: Supine to Sit   Level of [place]: Supine/Sit stand-by assist   Physical Assist/Nonphysical Assist: Supine/Sit verbal cues;supervision;set-up required   Transfer Skill: Sit to Stand   Level of [place]: Sit/Stand stand-by assist   Physical Assist/Nonphysical Assist: Sit/Stand supervision;verbal cues;set-up required   Transfer Skill: Sit to Stand full weight-bearing   Assistive Device for Transfer: Sit/Stand standard walker   Transfer Skill: Toilet Transfer   Level of [place]: Toilet stand-by assist   Physical Assist/Nonphysical Assist: Toilet supervision;verbal cues;set-up required   Weight-Bearing Restrictions: Toilet full weight-bearing   Assistive Device standard walker;grab bars   Balance   Balance Comments Pt leaning to R and posteriorly seated EOB, able to self-correct with VC.  Fair standing with 2WW and ambulating in room.  3 mild LOB ambulating, posteriorly.   Lower Body Dressing   Level of [place]: Dress Lower Body minimum assist (75% patients effort)   Physical Assist/Nonphysical Assist: Dress Lower Body 1 person assist;verbal cues;supervision;set-up required   Toileting   Level of [place]: Toilet minimum assist (75% patients effort)   Physical Assist/Nonphysical Assist: Toilet 1 person assist;verbal cues;supervision;set-up required   Instrumental Activities of Daily Living (IADL)   IADL Comments [facility] resident at [facility]   Activities of Daily Living Analysis   Impairments Contributing to  "Impaired Activities of Daily Living balance impaired;cognition impaired;coordination impaired;strength decreased   General Therapy Interventions   Planned Therapy Interventions ADL retraining;transfer training;cognition;strengthening   Clinical Impression   Criteria for Skilled Therapeutic Interventions Met yes, treatment indicated   OT Diagnosis impaired ADLs   Influenced by the following impairments impaired cognition, decreased balance, weakness, incoordination   Assessment of Occupational Performance 3-5 Performance Deficits   Identified Performance Deficits functional mob, dressing, toileting, bathing   Clinical Decision Making (Complexity) Moderate complexity   Therapy Frequency daily   Predicted Duration of Therapy Intervention (days/wks) 3 days   Anticipated Discharge Disposition Transitional Care Facility   Risks and Benefits of Treatment have been explained. Yes   Patient, Family & other staff in agreement with plan of care Yes   [facility] AM-PAC  \"6 Clicks\" Daily Activity Inpatient Short Form   1. Putting on and taking off regular lower body clothing? 2 - A Lot   2. Bathing (including washing, rinsing, drying)? 2 - A Lot   3. Toileting, which includes using toilet, bedpan or urinal? 3 - A Little   4. Putting on and taking off regular upper body clothing? 3 - A Little   5. Taking care of personal grooming such as brushing teeth? 3 - A Little   6. Eating meals? 4 - None   Daily Activity Raw Score (Score out of 24.Lower scores equate to lower levels of function) 17   Total Evaluation Time   Total Evaluation Time (Minutes) 8     "

## 2019-04-17 NOTE — H&P
[facility]    History and Physical - Hospitalist Service       Date of Admission:  4/16/2019    Assessment & Plan   Bairon Foster is a 88 year old male admitted on 4/16/2019. He presents from TCU after recent admission with increased confusion and bloody emesis.    Acute GI hemorrhage: Patient with 3 episodes of bloody emesis at care facility, diarrhea which patient reports is brown over the 2 days prior suggestive of upper GI bleed.  Patient with a vague distant history of GI ulcer on sucralfate and PPI therapy.   -Protonix drip  -Gastroenterology consulted  -Holding prior to admission Pradaxa 150 mg twice daily  -Trending hemoglobin  -Patient has been consented for blood products by myself in the emergency department, conditional orders for blood transfusion are present.    -Prior to admission sucralfate held  -Cardiology consult for consideration of watchman device when heme dynamically stable.    Encephalopathy, most consistent with delirium: Son reports patient has had confusion for the past several weeks, even preceding most recent hospitalization.  Some concern for mild cognitive impairment and subsequent delirium given recent hospitalization and transfer to nursing facility.  Unclear if patient has some underlying delirium  -Reorient as needed    Ischemic cardiomyopathy  mild pulmonary hypertension  Combined systolic and diastolic heart failure: Does not currently appear to be in exacerbation, though recent admission for heart failure exacerbation 4/8-4/13/19.  Significant valvular component to heart failure with 3+ mitral valve regurgitation with plan for mitral clipping  -Currently patient spironolactone and Bumex have been held pending blood pressure trend in the setting of acute GI hemorrhage  -Bilateral lower extremity compression garments; note that patient had been refusing these at care facility, potentially in the setting of delirium.  -Cardiology has been consulted  -2 g sodium  diet when diet is advanced following GI evaluation  -Prior to admission Imdur held in the setting of acute GI bleed.  Patient's goal systolic blood pressure is likely in the 100 range, though until patient's hemoglobin and blood pressure have remained stable, hesitant to resume antihypertensives including Imdur, carvedilol, spironolactone, Bumex    Coronary artery disease: Underwent OM stent in 2009.  Ischemic cardiomyopathy as above.  No flow-limiting lesions on coronary angiography in March 2018.  -Prior to admission Pradaxa held    Chronic atrial fibrillation: 0.5-year battery life remaining on pacemaker (implanted for SSS w/ pauses) with plan for generator change in the coming months.  Chadsvasc score of 6 for age, CHF, CVA, CAD.   -Pradaxa held in the setting of GI bleed  -Cardiology consulted as above given mitral valve insufficiency, need for generator exchange on pacemaker, and for consideration of watchman device given GI bleeding, pancytopenia on chronic anticoagulation.    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  Note also patient with some mild hemolysis potentially related to mitral valve disorder.  [facility], outside hematologist is okay with continuing anticoagulation down to a platelet count of 30,000 given risk of CVA.  White count of 2.2, platelets of 67,000, hemoglobin 9.7.  All stable from this past week during prior admission.  -add on direct bilirubin  -[facility] requested including historic bone marrow biopsy results (family reports he had one 7-10 years ago, is due for a repeat biopsy).  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  Potentially secondary to patient's statin therapy.  -Hematology consulted given pancytopenia and thus far inability to pursue bone marrow biopsy [facility] the [facility] given hospitalizations.  There is some consideration for watchman device to limit duration of anticoagulation,  though this is pending hematologic workup.  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  -Prior to admission Pradaxa held in the setting of GI bleed.  Await endoscopy results.  -When diet is advanced, resume prior to admission simvastatin    Skin tears: Patient with skin tears on his back.  Wife initially thought these were from cardiac leads as he had some skin tears on his anterior chest from this previously.  She does not recall these being present at discharge last hospitalization.  -Wound nurse consulted    Sleep apnea:  -CPAP as per home regimen     Diet: N.p.o. per anesthesia guidelines  DVT Prophylaxis: Pneumatic Compression Devices  Amos Catheter: not present  Code Status: DO NOT RESUSCITATE.  Okay for a time limited trial of intubation.  This was confirmed with patient as well as wife and son at bedside.  Note that this is a change from recent CODE STATUS.    Disposition Plan   Expected discharge: 2 - 3 days, recommended to transitional care unit once Hemoglobin stable, gastroenterology evaluation complete, possible reinitiation on Pradaxa..  Entered: Manpreet Johnson MD 04/17/2019, 3:53 AM     The patient's care was discussed with the Patient and Patient's wife and son at bedside.  Discussed also with Dr. Maurer in the emergency department.    Manpreet Johnson MD  [facility]    ______________________________________________________________________    Chief Complaint   Bloody emesis, confusion    History is obtained from the patient, chart review, review of outside records including recent spur cell hemolytic anemia noted [facility] smear [facility], patient's wife and son at bedside, review of nursing facility records including nursing notations from earlier in the day.  Unfortunately, patient is currently encephalopathic and his ability to provide a history is poor.  He is alert, conversant, though  "varies in his topic.  As he is discussing acute issues, will subsequently jump to distant history without warning talking at one time about his loose stools and within a breath discussing his recommendation for watchman device.  We will go from talking about his discussion with Dr. Hope regarding watchman device and mitral clip recently to stating that he had \"2 of them done\" referring to attempts at angiography, not mitral clipping or watchman placement.  Unfortunately, wife has difficulty clarifying history for patient as well.    History of Present Illness   Bairon Foster is a 88 year old male who presents with 3 episodes of blood-tinged emesis at his TCU earlier today as well as approximately 2 days of loose stools per his report including an episode of diarrhea earlier 4/16/19 a.m.  As above, patient is not able to provide a cohesive history as he is confused, which is also a recent development since discharge from the hospital.    Patient with no known diagnosis of dementia, though does have a history of CVA and his son reports that several weeks ago there was an attempt to explain something to him that the son would have otherwise anticipated patient understand.  Patient was unable to demonstrate understanding in this topic per his son's report.  Son thought this was somewhat unusual.  No prior history of hospital-induced delirium, though did have a recent hospitalization and subsequent transition to TCU where he had previously been living independently.  Patient is not oriented to year, he is not entirely oriented to situation.  He is aware of the month.    Patient recently admitted to [facility] for systolic and diastolic heart failure exacerbation.  Apparently patient was having a rash with torsemide and discontinued this medication with subsequent 18 pound weight gain.  Plan was to transition patient to ethacrynic acid.  Continue to gain weight despite dose increase of torsemide and " Spironolactone.  Underwent right and left heart cath 4/12/19 for consideration of mitral clipping procedure.  Patient was subsequently discharged to TCU, had follow-up with Dr. Hope 4/15/19 where patient was in agreement with pursuing mitral clip.  There is discussion at that time as well for potential watchman device given patient's chronic pancytopenia, recurrent episodes of epistaxis on Pradaxa anticoagulation for atrial fibrillation.  At this visit, decision to pursue watchman was dependent upon a pancytopenia workup/bone marrow biopsy results [facility].  Patient's wife states that patient did have a bone marrow biopsy approximately 7-10 years ago, and he is due for a repeat biopsy [facility], though they have not been able to make this appointment given patient's recent hospitalization and TCU transfer.    4/16/19 at nursing facility, patient was noted to have 3 episodes of blood-tinged emesis prior to 2 PM.  Patient was subsequently noted to have onset of diarrhea.  Again, patient's ability to provide a history is questionable, though he reports he was having loose stools for the 2 days prior and that the stools were brown.  He does not recall diarrheal episode noted in the nursing documentation 4/16 afternoon.  Patient is unable to describe the blood in his emesis.  Patient had minimal oral intake over the course of the day, though in the emergency department consumed a courtesy meal tray.    Patient states that he feels short of breath.  He does not have increased work of breathing, is not hypoxic on room air.  Does have lower extremity edema, though by review of nursing documentation at care facility, patient had been refusing lower extremity compression stockings.    Systolic blood pressure currently in the 100 range.  Will likely require diuretic resumption if blood pressure remains stable and no further bleeding noted.    At this time, patient is not nauseous, no retching, no  further episodes of emesis or diarrhea.  Does not have any evidence of current epistaxis, though does have evidence of somewhat recent epistaxis with scabs in bilateral nares, right greater than left.  These are anterior, and patient does not report any epistaxis over the past 3 days.    Hemoglobin at this time is stable from last admission.    Patient has a vague history of GI ulcer with upper GI bleed in the past.  He is on Carafate 1 g twice daily as well as omeprazole 20 mg twice daily.  He cannot recall when he last underwent EGD or what these findings were.  Family does believe he underwent endoscopy at some point, however.    Review of Systems    The 10 point Review of Systems is negative other than noted in the HPI or here.  Patient feels as though he is short of breath.  Family notes that he is significantly confused and appears more lethargic than his baseline.    Past Medical History    I have reviewed this patient's medical history and updated it with pertinent information if needed.   Past Medical History:   Diagnosis Date     Acute, but ill-defined, cerebrovascular disease     NO RESIDUALS     Antiplatelet or antithrombotic long-term use      Arrhythmia     a fib     Blood in stool      Coronary atherosclerosis of unspecified type of vessel, native or graft     S/P PTCA, EF 50%     Esophageal reflux      Hydrocele, unspecified      Hypercholesterolemia      Hypersomnia with sleep apnea, unspecified      Inguinal hernia without mention of obstruction or gangrene, recurrent unilateral or unspecified      Ischemic cardiomyopathy      Major depression in complete remission (H)      Obese      Onychomycosis      JIM on CPAP      Other and unspecified hyperlipidemia      Other lymphedema     GROIN AND THIGHS     Other pancytopenia (H)      Other specified anemias      Overweight      Pacemaker      PAD (peripheral artery disease) (H)      Pancytopenia (H)      Persistent atrial fibrillation (H)     VVI PM  for long pauses     Prostatic hypertrophy, benign      Pulmonary hypertension (H)      Scrotal varices      Thrombocytopenia, unspecified (H)      Umbilical hernia without obstruction and without gangrene      Unilateral or unspecified femoral hernia without mention of obstruction or gangrene, unilateral or unspecified      Unspecified hypothyroidism      Venous stasis dermatitis of both lower extremities      Vitamin D deficiency        Past Surgical History   I have reviewed this patient's surgical history and updated it with pertinent information if needed.  Past Surgical History:   Procedure Laterality Date     CV HEART CATHETERIZATION WITH POSSIBLE INTERVENTION N/A 4/12/2019    Procedure: Heart Catheterization with possible Intervention;  Surgeon: Lev Beck MD;  Location:  HEART CARDIAC CATH LAB     CV RIGHT HEART CATH N/A 4/12/2019    Procedure: Right Heart Cath;  Surgeon: Lev Beck MD;  Location:  HEART CARDIAC CATH LAB     EYE SURGERY      CATARACT/BLEPHAROPLASTY     GENITOURINARY SURGERY      TUNA     HERNIA REPAIR      RIGHT INGUINAL     HERNIORRHAPHY INGUINAL  8/14/2012    Procedure: HERNIORRHAPHY INGUINAL;  right inguinal hernia repair;  Surgeon: Kareem Torrez MD;  Location: [facility]     HERNIORRHAPHY UMBILICAL N/A 10/31/2017    Procedure: HERNIORRHAPHY UMBILICAL;  UMBILICAL HERNIA REPAIR WITH MESH;  Surgeon: Scar Harrington MD;  Location: [facility]     IMPLANT PACEMAKER  8/2009    single chamber     [place]      [facility] [place]THOPEDIC SURGERY  2010    right TKR, left TKR     SURGICAL HISTORY OF -       Thumb surgery     SURGICAL HISTORY OF -   1990s    Left total knee replacement     SURGICAL HISTORY OF -   3/11    [facility] total knee replacement       Social History   I have reviewed this patient's social history and updated it with pertinent information if needed.  Social History     Tobacco Use     Smoking status: Never Smoker     Smokeless tobacco: Never Used     Tobacco  comment: per encounter 07   Substance Use Topics     Alcohol use: Yes     Alcohol/week: 0.0 oz     Comment: 1/month     Drug use: No       Family History   I have reviewed this patient's family history and updated it with pertinent information if needed.   Family History   Problem Relation Age of Onset     Cardiovascular Father      C.A.D. Father      Lung Cancer Sister      Unknown/Adopted Mother      Cancer - colorectal No family hx of        Prior to Admission Medications   Prior to Admission Medications   Prescriptions Last Dose Informant Patient Reported? Taking?   BuPROPion HCl (WELLBUTRIN XL PO)  Self Yes No   Sig: Take 150 mg by mouth every morning   COMPRESSION STOCKINGS  Self Yes No   Si each daily   Carboxymethylcellulose Sod PF (CELLUVISC/REFRESH LIQUIGEL) 1 % ophthalmic solution  Self Yes No   Sig: Place 1 drop into both eyes 3 times daily as needed for dry eyes   GLUCOSAMINE-CHONDROITIN PO  Self Yes No   Sig: Take 2 tablets by mouth daily   KLOR-CON 20 MEQ CR tablet  Self No No   Sig: TAKE 1 TABLET EVERY DAY   LORazepam (ATIVAN) 1 MG tablet   No No   Sig: Take 0.5-1 tablets (0.5-1 mg) by mouth every 8 hours as needed for anxiety   Multiple Vitamins-Minerals (CENTRUM SILVER) per tablet  Self Yes No   Sig: Take 1 tablet by mouth daily   ORDER FOR DME  Self No No   Sig: Auto-CPAP:Max 9 cm H2OMin 9 cm H2O  Continuous Lifetime need and heated humidity.      Polyethylene Glycol 3350 (MIRALAX PO)  Self Yes No   Sig: Take 1 packet by mouth At Bedtime    acetaminophen (TYLENOL) 500 MG tablet  Self No No   Sig: Take 2 tablets (1,000 mg) by mouth every 6 hours as needed for mild pain   bumetanide (BUMEX) 1 MG tablet   No No   Sig: Take 1 tablet (1 mg) by mouth daily   carvedilol (COREG) 6.25 MG tablet  Self No No   Sig: TAKE 1 TABLET (6.25 MG) BY MOUTH 2 TIMES DAILY (WITH MEALS)   dabigatran ANTICOAGULANT (PRADAXA ANTICOAGULANT) 75 MG CAPS BLISTER PACK  Self Yes No   Sig: Take 150 mg by mouth 2 times  daily. Store in original 's bottle or blister pack; use within 120 days of opening.   diclofenac (VOLTAREN) 1 % topical gel  Self No No   Sig: Apply 2 g topically 4 times daily   finasteride (PROSCAR) 5 MG tablet  Self No No   Sig: TAKE 1 TABLET EVERY DAY   isosorbide mononitrate (IMDUR) 30 MG 24 hr tablet  Self No No   Sig: Take 1 tablet (30 mg) by mouth daily   ketotifen (ZADITOR) 0.025 % SOLN ophthalmic solution  Self No No   Sig: Place 1 drop into both eyes every 12 hours   levothyroxine (SYNTHROID/LEVOTHROID) 100 MCG tablet  Self No No   Sig: TAKE 1 TABLET EVERY DAY   omeprazole (PRILOSEC) 20 MG DR capsule  Self No No   Sig: TAKE 1 CAPSULE TWICE DAILY   simvastatin (ZOCOR) 20 MG tablet  Self Yes No   Sig: Take 20 mg by mouth daily    spironolactone (ALDACTONE) 25 MG tablet  Self Yes No   Sig: Take 12.5 mg by mouth daily   sucralfate (CARAFATE) 1 GM tablet  Self No No   Sig: Take 1 tablet (1 g) by mouth 2 times daily   tamsulosin (FLOMAX) 0.4 MG capsule  Self No No   Sig: TAKE 1 CAPSULE EVERY DAY   triamcinolone (KENALOG) 0.025 % cream  Self Yes No   Sig: Apply topically 2 times daily as needed       Facility-Administered Medications: None     Allergies   No Known Allergies    Physical Exam   Vital Signs: Temp: 98.8  F (37.1  C) Temp src: Oral BP: 104/61 Pulse: 59 Heart Rate: 60 Resp: 16 SpO2: 95 % O2 Device: None (Room air)    Weight: 189 lbs 0 oz    General Appearance: Frail-appearing  HEENT: Wasting of muscles of mastication, left-sided facial droop.  Respiratory: Patient with bibasilar crackles on inspiration, no wheezes, fair air movement throughout lung field.  Cardiovascular: Irregular rate and rhythm.  Rate currently in the 60 range.  Soft blowing systolic murmur across precordium  GI: Abdomen soft, nontender to palpation.  Lymph/Hematologic: 2+ pitting bilateral lower external edema, equal  Skin: Patient with skin tears which are relatively healed on anterior chest, most notable superior to  pacemaker on left anterior chest wall.  Patient also with skin tears on his back over right scapular spine.  See no bruising, actinic keratoses.  Musculoskeletal: Diffuse and significant muscular wasting of bilateral upper extremities, distal greater than proximal.  Neurologic: Patient is disoriented to year stating that it is 2017.  He is disoriented to day believing that it is Friday.  He is aware that it is April.  Left side facial droop and slight decrease in rapid repeated finger tapping on left as compared to right.  Proximal musculature strength intact.  Does not appear to have word finding difficulty, though is tangential in his history jumping from topic to topic.  Psychiatric: Normal affect.    Data   Data reviewed today: I reviewed all medications, new labs and imaging results over the last 24 hours. I personally reviewed no images or EKG's today.    Recent Labs   Lab 04/17/19  0001 04/15/19  0955 04/13/19  0648 04/12/19  0626   WBC 2.2*  --  2.7* 3.2*   HGB 9.7*  --  9.2* 9.6*   MCV 98  --  99 98   PLT 67*  --  62* 67*    140 141 139   POTASSIUM 3.4 4.0 3.4 3.3*   CHLORIDE 109 100 105 101   CO2 27 30* 31 32   BUN 29 30 32* 32*   CR 1.14 1.54* 1.25 1.51*   ANIONGAP 6 14 5 6   TEODORO 8.0* 10.3 9.0 9.0   GLC 97 110* 83 89   ALBUMIN 3.1*  --   --   --    PROTTOTAL 6.7*  --   --   --    BILITOTAL 1.7*  --   --   --    ALKPHOS 59  --   --   --    ALT 20  --   --   --    AST 31  --   --   --    LIPASE 329  --   --   --

## 2019-04-17 NOTE — CONSULTS
GASTROENTEROLOGY CONSULTATION     Bairon Foster   [address] [facility] [address]   88 year old male   Admission Date/Time: 4/16/2019   Encounter Date: 4/17/2019  Primary Care Provider: Mg Nelson     Referring / Attending Physician: Manpreet Johnson   We were asked to see the patient in consultation by Dr. Johnson for evaluation of possible hematemesis.     HPI: Bairon Foster is a 88 year old male with a past medical history significant for peripheral artery disease, pancytopenia, ischemic cardiomyopathy, atrial fibrillation, obstructive sleep apnea.  The patient was recently admitted to [facility] for systolic and diastolic heart failure exacerbation.  He underwent a right and left heart cath on April 12.  He was subsequently discharged to a transitional care facility.  He followed up with cardiology and there was plans to pursue a mitral valve clip.  The patient has been on Pradaxa for anticoagulation for atrial fibrillation.  This is been complicated by recurrent nosebleeds.  Over the last several days the patient has been somewhat more confused and he also developed some diarrhea yesterday.  This is accompanied by 3 episodes of vomiting and according to the TCU there was some blood tinged vomitus.  He was sent to the emergency department for further evaluation.  In the emergency department the patient was found to have a hemoglobin of 9.7 which is right around his baseline.  He has had no hematochezia or melena.  The patient was admitted for further evaluation.  This morning the patient still is somewhat confused.  He denies any abdominal pain, nausea, vomiting, dysphagia or odynophagia.  He is hungry and would like to eat.  He does not recall seeing any blood in his vomit, nor does he recall vomiting.  He does recall frequent nosebleeds though.    Past Medical History  Past Medical History:   Diagnosis Date     Acute, but ill-defined, cerebrovascular disease     NO  RESIDUALS     Antiplatelet or antithrombotic long-term use      Arrhythmia     a fib     Blood in stool      Coronary atherosclerosis of unspecified type of vessel, native or graft     S/P PTCA, EF 50%     Esophageal reflux      Hydrocele, unspecified      Hypercholesterolemia      Hypersomnia with sleep apnea, unspecified      Inguinal hernia without mention of obstruction or gangrene, recurrent unilateral or unspecified      Ischemic cardiomyopathy      Major depression in complete remission (H)      Obese      Onychomycosis      JIM on CPAP      Other and unspecified hyperlipidemia      Other lymphedema     GROIN AND THIGHS     Other pancytopenia (H)      Other specified anemias      Overweight      Pacemaker      PAD (peripheral artery disease) (H)      Pancytopenia (H)      Persistent atrial fibrillation (H)     VVI PM for long pauses     Prostatic hypertrophy, benign      Pulmonary hypertension (H)      Scrotal varices      Thrombocytopenia, unspecified (H)      Umbilical hernia without obstruction and without gangrene      Unilateral or unspecified femoral hernia without mention of obstruction or gangrene, unilateral or unspecified      Unspecified hypothyroidism      Venous stasis dermatitis of both lower extremities      Vitamin D deficiency        Past Surgical History  Past Surgical History:   Procedure Laterality Date     CV HEART CATHETERIZATION WITH POSSIBLE INTERVENTION N/A 4/12/2019    Procedure: Heart Catheterization with possible Intervention;  Surgeon: Lev Beck MD;  Location:  HEART CARDIAC CATH LAB     CV RIGHT HEART CATH N/A 4/12/2019    Procedure: Right Heart Cath;  Surgeon: Lev Beck MD;  Location:  HEART CARDIAC CATH LAB     EYE SURGERY      CATARACT/BLEPHAROPLASTY     GENITOURINARY SURGERY      TUNA     HERNIA REPAIR      RIGHT INGUINAL     HERNIORRHAPHY INGUINAL  8/14/2012    Procedure: HERNIORRHAPHY INGUINAL;  right inguinal hernia repair;  Surgeon: Kareem Torrez,  MD;  Location: [facility]     HERNIORRHAPHY UMBILICAL N/A 10/31/2017    Procedure: HERNIORRHAPHY UMBILICAL;  UMBILICAL HERNIA REPAIR WITH MESH;  Surgeon: Scar Harrington MD;  Location: [facility]     IMPLANT PACEMAKER  8/2009    single chamber     ORTHOPEDIC SURGERY      [facility] RIGHT     ORTHOPEDIC SURGERY  2010    right TKR, left TKR     SURGICAL HISTORY OF -       Thumb surgery     SURGICAL HISTORY OF -   1990s    Left total knee replacement     SURGICAL HISTORY OF -   3/11    Righ total knee replacement       Family History  Family History   Problem Relation Age of Onset     Cardiovascular Father      C.A.D. Father      Lung Cancer Sister      Unknown/Adopted Mother      Cancer - colorectal No family hx of        Social History  Social History     Socioeconomic History     Marital status:      Spouse name: Not on file     Number of children: Not on file     Years of education: Not on file     Highest education level: Not on file   Occupational History     Occupation: Teacher, author, speaker     Employer: RETIRED   Social Needs     Financial resource strain: Not on file     Food insecurity:     Worry: Not on file     Inability: Not on file     Transportation needs:     Medical: Not on file     Non-medical: Not on file   Tobacco Use     Smoking status: Never Smoker     Smokeless tobacco: Never Used     Tobacco comment: per encounter 2/12/07   Substance and Sexual Activity     Alcohol use: Yes     Alcohol/week: 0.0 oz     Comment: 1/month     Drug use: No     Sexual activity: Never     Partners: Female   Lifestyle     Physical activity:     Days per week: Not on file     Minutes per session: Not on file     Stress: Not on file   Relationships     Social connections:     Talks on phone: Not on file     Gets together: Not on file     Attends Evangelical service: Not on file     Active member of club or organization: Not on file     Attends meetings of clubs or organizations: Not on file     Relationship status: Not on file      Intimate partner violence:     Fear of current or ex partner: Not on file     Emotionally abused: Not on file     Physically abused: Not on file     Forced sexual activity: Not on file   Other Topics Concern     Parent/sibling w/ CABG, MI or angioplasty before 65F 55M? No      Service Not Asked     Blood Transfusions Not Asked     Caffeine Concern No     Comment: 1-2 cups coffee a day     Occupational Exposure Not Asked     Hobby Hazards Not Asked     Sleep Concern No     Stress Concern Yes     Comment: due to wifes health condition     Weight Concern No     Special Diet No     Back Care Not Asked     Exercise Yes     Comment: states he uses his tredmill on and off     Bike Helmet Not Asked     Seat Belt Yes     Self-Exams Not Asked   Social History Narrative     Not on file       Medications  Prior to Admission medications    Medication Sig Start Date End Date Taking? Authorizing Provider   acetaminophen (TYLENOL) 500 MG tablet Take 2 tablets (1,000 mg) by mouth every 6 hours as needed for mild pain 11/21/18   Mg Nelson MD   bumetanide (BUMEX) 1 MG tablet Take 1 tablet (1 mg) by mouth daily 4/14/19   Aggie Akhtar,    BuPROPion HCl (WELLBUTRIN XL PO) Take 150 mg by mouth every morning    Reported, Patient   Carboxymethylcellulose Sod PF (CELLUVISC/REFRESH LIQUIGEL) 1 % ophthalmic solution Place 1 drop into both eyes 3 times daily as needed for dry eyes    Reported, Patient   carvedilol (COREG) 6.25 MG tablet TAKE 1 TABLET (6.25 MG) BY MOUTH 2 TIMES DAILY (WITH MEALS) 9/6/18   Mg Nelson MD   COMPRESSION STOCKINGS 1 each daily 3/20/17   London Carrizales MD   dabigatran ANTICOAGULANT (PRADAXA ANTICOAGULANT) 75 MG CAPS BLISTER PACK Take 150 mg by mouth 2 times daily. Store in original 's bottle or blister pack; use within 120 days of opening.    Reported, Patient   diclofenac (VOLTAREN) 1 % topical gel Apply 2 g topically 4 times daily 1/22/19  1/22/20  Mg Nelson MD   finasteride (PROSCAR) 5 MG tablet TAKE 1 TABLET EVERY DAY 5/22/18   Mg Nelson MD   GLUCOSAMINE-CHONDROITIN PO Take 2 tablets by mouth daily    Reported, Patient   isosorbide mononitrate (IMDUR) 30 MG 24 hr tablet Take 1 tablet (30 mg) by mouth daily 4/12/18   Mackenzie Tejada APRN CNP   ketotifen (ZADITOR) 0.025 % SOLN ophthalmic solution Place 1 drop into both eyes every 12 hours 7/24/18   Mg Nelson MD   KLOR-CON 20 MEQ CR tablet TAKE 1 TABLET EVERY DAY 3/30/19   Elvin Ross MD   levothyroxine (SYNTHROID/LEVOTHROID) 100 MCG tablet TAKE 1 TABLET EVERY DAY 9/6/18   Mg Nelson MD   LORazepam (ATIVAN) 1 MG tablet Take 0.5-1 tablets (0.5-1 mg) by mouth every 8 hours as needed for anxiety 4/13/19   Aggie Akhtar,    Multiple Vitamins-Minerals (CENTRUM SILVER) per tablet Take 1 tablet by mouth daily    Reported, Patient   omeprazole (PRILOSEC) 20 MG DR capsule TAKE 1 CAPSULE TWICE DAILY 1/23/19   Mg Nelson MD   ORDER FOR DME Auto-CPAP:Max 9 cm H2OMin 9 cm H2O  Continuous Lifetime need and heated humidity.    3/13/12   Alan Estrada MD   Polyethylene Glycol 3350 (MIRALAX PO) Take 1 packet by mouth At Bedtime     Reported, Patient   simvastatin (ZOCOR) 20 MG tablet Take 20 mg by mouth daily     Reported, Patient   spironolactone (ALDACTONE) 25 MG tablet Take 12.5 mg by mouth daily    Unknown, Entered By History   sucralfate (CARAFATE) 1 GM tablet Take 1 tablet (1 g) by mouth 2 times daily 4/18/18   Mg Nelson MD   tamsulosin (FLOMAX) 0.4 MG capsule TAKE 1 CAPSULE EVERY DAY 1/23/19   Mg Nelson MD   triamcinolone (KENALOG) 0.025 % cream Apply topically 2 times daily as needed     Reported, Patient       Allergies:  Patient has no known allergies.    ROS: A ten point review of systems was obtained and negative other than the symptoms noted above in the HPI.     Physical Exam:  "  BP 99/48   Pulse 56   Temp 98.6  F (37  C) (Oral)   Resp 16   Ht 1.88 m (6' 2\")   Wt 83.4 kg (183 lb 13.8 oz)   SpO2 93%   BMI 23.61 kg/m     Constitutional: elderly, alert, no acute distress  Cardiovascular: regular rate and rhythm, no murmurs,rubs or gallops  Respiratory: clear to auscultation bilaterally  Psychiatric: normal pleasant affect  Head: atraumatic, normocephalic  Neck: supple, no thyromegaly  ENT: mucous membranes are moist, no oral lesions are noted  Abdomen: soft, non-tender, non-distended, normally active bowel sound. No masses or hepatosplenomegaly is appreciated. No rebound tenderness or guarding  Neuro: Neurologically intact grossly  Skin: warm, dry, no rashes are noted    ADDITIONAL COMMENTS:   I reviewed the patient's new clinical lab test results.   Recent Labs   Lab Test 04/17/19  0001 04/13/19  0648 04/12/19  0626  11/20/18  1339  03/21/18  0714  03/19/11  0617   WBC 2.2* 2.7* 3.2*   < > 2.8*   < > 2.8*   < >  --    HGB 9.7* 9.2* 9.6*   < > 10.3*   < > 10.2*   < >  --    MCV 98 99 98   < > 103*   < > 97   < >  --    PLT 67* 62* 67*   < > 64*   < > 75*   < >  --    INR  --   --   --   --  1.79*  --  1.48*  --  1.38*    < > = values in this interval not displayed.      Recent Labs   Lab Test 04/17/19  0001 04/15/19  0955 04/13/19  0648    140 141   POTASSIUM 3.4 4.0 3.4   CHLORIDE 109 100 105   CO2 27 30* 31   BUN 29 30 32*   CR 1.14 1.54* 1.25   ANIONGAP 6 14 5   TEODORO 8.0* 10.3 9.0      Recent Labs   Lab Test 04/17/19  0130 04/17/19  0001 04/08/19  1218 10/10/18  1122 10/10/18  1107 10/09/18 03/21/18  0714 10/23/17  1411  12/08/15  0904   ALBUMIN  --  3.1* 3.4  --   --   --  3.6  --   --  3.9   BILITOTAL  --  1.7* 1.4*  --   --   --   --   --   --  1.1   ALT  --  20 17  --  23 21  --   --    < > 24   AST  --  31 25  --   --  27  --   --   --  25   ALKPHOS  --  59 67  --   --   --   --   --   --  55   PROTEIN 10*  --   --  Negative  --   --   --  Negative   < >  --    LIPASE  --  " 329  --   --   --   --   --   --   --   --     < > = values in this interval not displayed.      I reviewed the patient's new imaging results.     Assessment: 88-year-old male presenting with confusion, diarrhea, vomiting and possible hematemesis.  The patient has had recent episodes of epistaxis as well which could be contributing to the blood-tinged vomitus.  His hemoglobin is around his baseline and there is been no evidence of active GI bleeding.  The differential includes epistaxis, Olga-Osorio tear and peptic ulcer disease.  As he does not appear to be actively bleeding I think for now we can monitor conservatively, however if he does show signs of bleeding upper endoscopy can be arranged emergently if needed. His recent diarrhea/vomiting could be infectious in nature and workup has already been initiated.     Plan:   -Restart diet  -Follow hgb and stool output  -Stool testing for enteric pathogens  -Stop Pantoprazole drip. Start PPI BID  -EGD if pt shows signs of active bleeding  -Cardiology consulted regarding Watchman Device   -GI following. Please call with any questions    I discussed the patient's findings and plan with Dr. Jojo Ortiz who will also independently see and examine the patient.     Shemar Kim PA-C  [facility]  Cell:  [phone] Monday through Friday 5699-3885  Office: [phone]

## 2019-04-17 NOTE — PROGRESS NOTES
Consult dictated    88-year-old gentleman man with chronic pancytopenia.  Most likely he has primary bone marrow pathology like myelodysplastic syndrome.    Plan:  -Labs for workup of pancytopenia.  -Bone marrow aspiration and biopsy.

## 2019-04-17 NOTE — CONSULTS
Consult Date:  04/17/2019      REQUESTING PHYSICIAN:  This consult has been requested by Dr. Johnson for pancytopenia.      HISTORY OF PRESENT ILLNESS:  Mr. Foster is an 88-year-old gentleman with multiple medical problems including atrial fibrillation, hypercholesterolemia, ischemic cardiomyopathy and sleep apnea.  The patient is currently in the Transitional Care Unit.  He was brought to the ER because of nausea, vomiting and some loose stool. He had few episodes of blood-tinged emesis.  The patient denies bleeding from any site.  No blood in the urine or stool.  No bleeding from the nose.  No coughing up blood.  For cardiac indications, the patient was on Pradaxa.  That is on hold now.        The patient had multiple investigations done on admission.   -CBC reveals pancytopenia with WBC of 2.2, hemoglobin of 9.7 and platelets of 67.  MCV normal.  Neutrophil of 75% and lymphocyte of 15%.   -Chemistry panel reveals mild elevated bilirubin of 1.7.  Normal AST, ALT and alkaline phosphatase.  Low protein and albumin.   -UA did not reveal any blood.      I reviewed his previous CBC.  The patient has been pancytopenic for at least 10 years.  On 08/05/2009, WBC of 3.3, hemoglobin of 12.9 and platelets of 111.  The patient said that previously he had a bone marrow biopsy done.  Those records are not available.  Apparently it was done about 10 years ago.  The patient has not been having any complications from pancytopenia.  No recurrent infection.  No bleeding complications.      REVIEW OF SYSTEMS:  The patient feels weak.  No headache.  Some dizziness.  No chest pain or difficulty breathing.  No abdominal pain.  No urinary complaints.  No bowel problem.  No blood in the urine or stool.  No fever, chills or night sweats.  All other review of systems negative.      ALLERGIES:  REVIEWED.      MEDICATIONS:  Reviewed.      PAST MEDICAL HISTORY:   1.  Atrial fibrillation.   2.  Status post pacemaker placement.   3.  Stroke.   4.   GERD.   5.  Hyperlipidemia.   6.  Cardiomyopathy.   7.  Inguinal hernia.   8.  Depression.   9.  Sleep apnea.   10.  Pancytopenia.   11.  Peripheral arterial disease.   12.  BPH   13.  Hypothyroidism.   14.  Cataract surgery.   15.  TUNA.    16.   Hernia repair surgery.   17.  Bilateral knee replacement.      SOCIAL HISTORY:   -No history of smoking.   -Occasional alcohol use.      PHYSICAL EXAMINATION:   GENERAL:  He is alert, oriented x 3.   VITAL SIGNS:  Reviewed.  ECOG PS of 2.   The rest of the systems not examined.      LABORATORY DATA:  Reviewed.      ASSESSMENT:   1.  An 88-year-old gentleman with chronic pancytopenia:  He has pancytopenia almost for 10 years.   2.  Upper gastrointestinal bleed.   3.  Multiple other medical problems including coronary artery disease, atrial fibrillation and cardiomyopathy.      RECOMMENDATIONS:   1.  I had a long discussion with the patient and his wife.  Discussed regarding his pancytopenia.  The patient has been pancytopenic for many years.  Different causes of pancytopenia discussed.  Given his age, most likely he has primary bone marrow pathology like myelodysplastic syndrome.      2.  Discussed regarding further workup.  We will get some labs including vitamin B12, folate and iron studies.  We will also check an SPEP.      Discussed regarding bone marrow biopsy for further evaluation.  He is agreeable to it.  Bone marrow biopsy under sedation will be scheduled.        3.  Complication of pancytopenia discussed.  So far he has done well without any significant complication.      4.  For upper GI bleed, GI is following.  His anticoagulation is on hold        5.  The patient and wife had a few questions, which were all answered.  We will continue to follow him.      Thanks for the consult.      TOTAL TIME SPENT:  60 minutes, more than 50% of the time was spent in counseling and coordination of care.         ANDRES KRISHNA MD             D: 04/17/2019   T: 04/17/2019   MT:        Name:     HIRO IBARRA   MRN:      [id number]        Account:       [id number]   :      1931           Consult Date:  2019      Document: [id number]

## 2019-04-18 ENCOUNTER — ANESTHESIA EVENT (OUTPATIENT)
Dept: SURGERY | Facility: CLINIC | Age: 84
End: 2019-04-18
Payer: COMMERCIAL

## 2019-04-18 ENCOUNTER — ANESTHESIA (OUTPATIENT)
Dept: SURGERY | Facility: CLINIC | Age: 84
End: 2019-04-18
Payer: COMMERCIAL

## 2019-04-18 ENCOUNTER — APPOINTMENT (OUTPATIENT)
Dept: PHYSICAL THERAPY | Facility: CLINIC | Age: 84
End: 2019-04-18
Attending: INTERNAL MEDICINE
Payer: COMMERCIAL

## 2019-04-18 PROBLEM — K92.0 HEMATEMESIS: Status: ACTIVE | Noted: 2019-04-18

## 2019-04-18 LAB
ACANTHOCYTES BLD QL SMEAR: NORMAL
ANION GAP SERPL CALCULATED.3IONS-SCNC: 7 MMOL/L (ref 3–14)
ANISOCYTOSIS BLD QL SMEAR: SLIGHT
BASOPHILS # BLD AUTO: 0 10E9/L (ref 0–0.2)
BASOPHILS NFR BLD AUTO: 0.7 %
BUN SERPL-MCNC: 25 MG/DL (ref 7–30)
CALCIUM SERPL-MCNC: 8.3 MG/DL (ref 8.5–10.1)
CHLORIDE SERPL-SCNC: 111 MMOL/L (ref 94–109)
CO2 SERPL-SCNC: 25 MMOL/L (ref 20–32)
CREAT SERPL-MCNC: 1 MG/DL (ref 0.66–1.25)
DIFFERENTIAL METHOD BLD: NORMAL
ELLIPTOCYTES BLD QL SMEAR: SLIGHT
EOSINOPHIL # BLD AUTO: 0.1 10E9/L (ref 0–0.7)
EOSINOPHIL NFR BLD AUTO: 3 %
ERYTHROCYTE [DISTWIDTH] IN BLOOD BY AUTOMATED COUNT: 16.6 % (ref 10–15)
FERRITIN SERPL-MCNC: 47 NG/ML (ref 26–388)
FOLATE SERPL-MCNC: 21.9 NG/ML
GFR SERPL CREATININE-BSD FRML MDRD: 67 ML/MIN/{1.73_M2}
GLUCOSE SERPL-MCNC: 97 MG/DL (ref 70–99)
HCT VFR BLD AUTO: 30.8 % (ref 40–53)
HGB BLD-MCNC: 10.3 G/DL (ref 13.3–17.7)
IMM GRANULOCYTES # BLD: 0 10E9/L (ref 0–0.4)
IMM GRANULOCYTES NFR BLD: 0 %
IRON SATN MFR SERPL: 14 % (ref 15–46)
IRON SERPL-MCNC: 48 UG/DL (ref 35–180)
LYMPHOCYTES # BLD AUTO: 1 10E9/L (ref 0.8–5.3)
LYMPHOCYTES NFR BLD AUTO: 33.8 %
MCH RBC QN AUTO: 33 PG (ref 26.5–33)
MCHC RBC AUTO-ENTMCNC: 33.4 G/DL (ref 31.5–36.5)
MCV RBC AUTO: 99 FL (ref 78–100)
MONOCYTES # BLD AUTO: 0.3 10E9/L (ref 0–1.3)
MONOCYTES NFR BLD AUTO: 9.8 %
NEUTROPHILS # BLD AUTO: 1.6 10E9/L (ref 1.6–8.3)
NEUTROPHILS NFR BLD AUTO: 52.7 %
NRBC # BLD AUTO: 0 10*3/UL
NRBC BLD AUTO-RTO: 0 /100
PLATELET # BLD AUTO: 83 10E9/L (ref 150–450)
PLATELET # BLD EST: NORMAL 10*3/UL
POTASSIUM SERPL-SCNC: 3.4 MMOL/L (ref 3.4–5.3)
RBC # BLD AUTO: 3.12 10E12/L (ref 4.4–5.9)
RETICS # AUTO: 43.9 10E9/L (ref 25–95)
RETICS/RBC NFR AUTO: 1.4 % (ref 0.5–2)
SODIUM SERPL-SCNC: 143 MMOL/L (ref 133–144)
TIBC SERPL-MCNC: 345 UG/DL (ref 240–430)
VIT B12 SERPL-MCNC: 2336 PG/ML (ref 193–986)
WBC # BLD AUTO: 3.3 10E9/L (ref 4–11)

## 2019-04-18 PROCEDURE — 85027 COMPLETE CBC AUTOMATED: CPT | Performed by: INTERNAL MEDICINE

## 2019-04-18 PROCEDURE — 97116 GAIT TRAINING THERAPY: CPT | Mod: GP

## 2019-04-18 PROCEDURE — G0378 HOSPITAL OBSERVATION PER HR: HCPCS

## 2019-04-18 PROCEDURE — 25000132 ZZH RX MED GY IP 250 OP 250 PS 637: Performed by: INTERNAL MEDICINE

## 2019-04-18 PROCEDURE — 82728 ASSAY OF FERRITIN: CPT | Performed by: INTERNAL MEDICINE

## 2019-04-18 PROCEDURE — C9113 INJ PANTOPRAZOLE SODIUM, VIA: HCPCS | Performed by: PHYSICIAN ASSISTANT

## 2019-04-18 PROCEDURE — 85004 AUTOMATED DIFF WBC COUNT: CPT | Performed by: INTERNAL MEDICINE

## 2019-04-18 PROCEDURE — 97110 THERAPEUTIC EXERCISES: CPT | Mod: GP

## 2019-04-18 PROCEDURE — 00000402 ZZHCL STATISTIC TOTAL PROTEIN: Performed by: INTERNAL MEDICINE

## 2019-04-18 PROCEDURE — 82746 ASSAY OF FOLIC ACID SERUM: CPT | Performed by: INTERNAL MEDICINE

## 2019-04-18 PROCEDURE — 99214 OFFICE O/P EST MOD 30 MIN: CPT | Performed by: INTERNAL MEDICINE

## 2019-04-18 PROCEDURE — 84165 PROTEIN E-PHORESIS SERUM: CPT | Performed by: INTERNAL MEDICINE

## 2019-04-18 PROCEDURE — 36415 COLL VENOUS BLD VENIPUNCTURE: CPT | Performed by: INTERNAL MEDICINE

## 2019-04-18 PROCEDURE — 82607 VITAMIN B-12: CPT | Performed by: INTERNAL MEDICINE

## 2019-04-18 PROCEDURE — 83540 ASSAY OF IRON: CPT | Performed by: INTERNAL MEDICINE

## 2019-04-18 PROCEDURE — 83550 IRON BINDING TEST: CPT | Performed by: INTERNAL MEDICINE

## 2019-04-18 PROCEDURE — 25000128 H RX IP 250 OP 636: Performed by: PHYSICIAN ASSISTANT

## 2019-04-18 PROCEDURE — 97161 PT EVAL LOW COMPLEX 20 MIN: CPT | Mod: GP

## 2019-04-18 PROCEDURE — 80048 BASIC METABOLIC PNL TOTAL CA: CPT | Performed by: INTERNAL MEDICINE

## 2019-04-18 PROCEDURE — 85045 AUTOMATED RETICULOCYTE COUNT: CPT | Performed by: INTERNAL MEDICINE

## 2019-04-18 PROCEDURE — 99224 ZZC SUBSEQUENT OBSERVATION CARE,LEVEL I: CPT | Performed by: INTERNAL MEDICINE

## 2019-04-18 PROCEDURE — 99233 SBSQ HOSP IP/OBS HIGH 50: CPT | Performed by: INTERNAL MEDICINE

## 2019-04-18 RX ORDER — NYSTATIN 100000/ML
500000 SUSPENSION, ORAL (FINAL DOSE FORM) ORAL 4 TIMES DAILY
Status: DISCONTINUED | OUTPATIENT
Start: 2019-04-18 | End: 2019-04-22 | Stop reason: HOSPADM

## 2019-04-18 RX ORDER — CARVEDILOL 3.12 MG/1
3.12 TABLET ORAL 2 TIMES DAILY WITH MEALS
Status: DISCONTINUED | OUTPATIENT
Start: 2019-04-18 | End: 2019-04-22 | Stop reason: HOSPADM

## 2019-04-18 RX ORDER — SUCRALFATE 1 G/1
1 TABLET ORAL 2 TIMES DAILY WITH MEALS
Status: DISCONTINUED | OUTPATIENT
Start: 2019-04-18 | End: 2019-04-22 | Stop reason: HOSPADM

## 2019-04-18 RX ORDER — BUMETANIDE 1 MG/1
1 TABLET ORAL DAILY
Status: DISCONTINUED | OUTPATIENT
Start: 2019-04-19 | End: 2019-04-22 | Stop reason: HOSPADM

## 2019-04-18 RX ORDER — LIDOCAINE HYDROCHLORIDE 10 MG/ML
8-10 INJECTION, SOLUTION INFILTRATION; PERINEURAL
Status: CANCELLED | OUTPATIENT
Start: 2019-04-18

## 2019-04-18 RX ORDER — DABIGATRAN ETEXILATE 150 MG/1
150 CAPSULE ORAL 2 TIMES DAILY
Status: DISCONTINUED | OUTPATIENT
Start: 2019-04-19 | End: 2019-04-19

## 2019-04-18 RX ORDER — SPIRONOLACTONE 25 MG
12.5 TABLET ORAL DAILY
Status: DISCONTINUED | OUTPATIENT
Start: 2019-04-19 | End: 2019-04-18

## 2019-04-18 RX ADMIN — FINASTERIDE 5 MG: 5 TABLET, FILM COATED ORAL at 09:38

## 2019-04-18 RX ADMIN — CARVEDILOL 3.12 MG: 3.12 TABLET, FILM COATED ORAL at 18:07

## 2019-04-18 RX ADMIN — SUCRALFATE 1 G: 1 TABLET ORAL at 18:06

## 2019-04-18 RX ADMIN — TAMSULOSIN HYDROCHLORIDE 0.4 MG: 0.4 CAPSULE ORAL at 09:38

## 2019-04-18 RX ADMIN — NYSTATIN 500000 UNITS: 500000 SUSPENSION ORAL at 22:02

## 2019-04-18 RX ADMIN — PANTOPRAZOLE SODIUM 40 MG: 40 INJECTION, POWDER, FOR SOLUTION INTRAVENOUS at 09:38

## 2019-04-18 RX ADMIN — BUPROPION HYDROCHLORIDE 150 MG: 150 TABLET, FILM COATED, EXTENDED RELEASE ORAL at 09:38

## 2019-04-18 RX ADMIN — SIMVASTATIN 20 MG: 20 TABLET, FILM COATED ORAL at 22:02

## 2019-04-18 RX ADMIN — LEVOTHYROXINE SODIUM 100 MCG: 100 TABLET ORAL at 09:38

## 2019-04-18 RX ADMIN — NYSTATIN 500000 UNITS: 500000 SUSPENSION ORAL at 18:06

## 2019-04-18 ASSESSMENT — ACTIVITIES OF DAILY LIVING (ADL)
ADLS_ACUITY_SCORE: 26
ADLS_ACUITY_SCORE: 25
ADLS_ACUITY_SCORE: 25
ADLS_ACUITY_SCORE: 26

## 2019-04-18 ASSESSMENT — ENCOUNTER SYMPTOMS: DYSRHYTHMIAS: 1

## 2019-04-18 ASSESSMENT — MIFFLIN-ST. JEOR: SCORE: 1580.75

## 2019-04-18 NOTE — CONSULTS
Consult note dictated..    Anticoagulation on hold due GI bleed. He is high risk for re-bleed. Recommend left atrial appendage closure (Watchman). Will arrange the procedure in the outpatient

## 2019-04-18 NOTE — PLAN OF CARE
[id number]:  A/O x4, forgetful.  Denies pain, nausea.  No BM this shift.  Voiding per urinal.  Plan for BMB tomorrow.

## 2019-04-18 NOTE — PLAN OF CARE
PT:  Discharge Planner PT   Patient plan for discharge: Return to TCU  Current status: Orders received, eval completed, treatment initiated. Pt admitted with GI bleed. Prior to admit pt was at [facility] TCU, was using a FWW for mobility and mobilizing with assist of 1. Currently requires SBA for supine to sit, CGA sit to stand with FWW, CGA for gait of 200 ft with FWW. Participated in seated strengthening in the chair. Pt demonstrates dec strength, balance, activity tolerance and difficulty ambulating and transferring and would benefit from skilled PT services in order to improve this.  Barriers to return to prior living situation: Dec balance requiring FWW (does not use an AD at baseline), stairs in the home, needs assist with mobility, falls risk  Recommendations for discharge: Return to TCU  Rationale for recommendations: Pt would benefit from continued PT to improve strength, balance, mobility to increase independence, reduce falls risk and increase safety before returning home.       Entered by: Jojo Mcclellan 04/18/2019 8:27 AM

## 2019-04-18 NOTE — PROGRESS NOTES
Care Coordination:    Provided observation handout to patient and family.     Per MD, he could discharge patient today, whether to TCU or to home.   Per SW, [employer] insurance auth needed to discharge to TCU, even if returning to TCU.     Therapy needs to see patient to determine if home could be a safe disposition.   + PT called back, they did see pt this AM and recommend return to TCU.     SW sending referral so TCU can make auth request to desired TCU.    Zelda Mobley RN, BSN  [facility] Care Coordinator   Mobile Phone: [phone]

## 2019-04-18 NOTE — PLAN OF CARE
DATE & TIME: 4/18/19 2404-0646  ORIENTATION: A&O; intermittent confusion.   BEHAVIOR & AGGRESSION TOOL COLOR: Green  CIWA SCORE: NA  ABNL VS/O2: VSS on RA  MOBILITY: up with assist of 1, walker and GB  PAIN MANAGMENT: Denies  DIET: Regular  BOWEL/BLADDER: urinal  ABNL LAB/BG:Hgb 9.4  DRAIN/DEVICES: IV  Saline locked  TELEMETRY RHYTHM:  NA  SKIN: ecchymotic , bruises and abrasions on back  TESTS/PROCEDURES:Bone Marrow Biopsy today  D/C DAY/GOALS/PLACE:pending  OTHER IMPORTANT INFO: GI, hem onc, cardiology consults. IV protonix gtt changed to Iv push. GI following, seen by  Hem/ Onc. Plan for  Bone marrow biopsy today, cardiology consult pending.

## 2019-04-18 NOTE — CONSULTS
Consult Date:  04/18/2019      REASON FOR CONSULTATION:  GI bleed in the setting of atrial fibrillation.      HISTORY OF PRESENT ILLNESS:  The patient is an 88-year-old gentleman with known coronary artery disease, chronic atrial fibrillation on Pradaxa, status post pacemaker implantation, severe mitral valve insufficiency and tricuspid valve insufficiency.  He was admitted to the hospital with a GI bleed.      He was recently hospitalized with congestive heart failure and found to have significant mitral valve and tricuspid insufficiency.  He was diuresed and discharged home.  He was subsequently seen by Dr. Hope to discuss potential mitral valve repair with a MitraClip.  There was also discussion regarding potential left atrial appendage closure given the patient's known pancytopenia and a history of prior GI bleeds.      Since his discharge from the hospital a few weeks ago for congestive heart failure, the patient has been having frequent nosebleeds. Few days ago he hematemesis and other findings concerning for upper GI bleed while at the nursing home.  He was subsequently brought to the hospital for further evaluation.  He is currently being evaluated by GI.  He was also seen by Hematology and is scheduled to undergo bone marrow biopsy to assess the cause of his pancytopenia.      IMPRESSION AND PLAN:   1.  Chronic atrial fibrillation on chronic Pradaxa.   2.  Gastrointestinal bleed.   3.  Severe mitral valve regurgitation.  Known coronary artery disease, status post prior PCI of the obtuse marginal branch.   4.  Hypertension.   5.  Hyperlipidemia.   6.  History of ventricular tachycardia.      Mr. Foster has been having frequent nosebleeds over the last week.  He is admitted with concerns regarding possible upper GI bleed, currently being evaluated by GI.  We were asked to opine on the need for continued anticoagulation given his nosebleeds and GI bleed.      He is certainly high risk for stroke related to  his atrial fibrillation.  His CHADS-VASc score is 5.  As such, his annual adjusted risk for stroke is 6.7% without therapy.  Given his high risk for stroke and the fact that anticoagulation proved challenging for him, I believe he is a candidate for left atrial appendage closure with Watchman device.  In fact, this was discussed with him by Dr. Hope during his recent visit.      We should continue with Hematology and GI workup.  We can continue to hold off the Pradaxa for now.  I will touch base with Hematology for potentially trying low dose Eliquis until Watchman is placed. If he bleeds on low dose Elquis then we will transition him to Aspirin only.    We will continue to follow this patient with you.      I appreciate the opportunity to be part of this patient's care.         GARFIELD GILLIAM MD             D: 2019   T: 2019   MT: YANIRA      Name:     HIRO IBARRA   MRN:      [id number]        Account:       [id number]   :      1931           Consult Date:  2019      Document: [id number]

## 2019-04-18 NOTE — PROGRESS NOTES
[facility]    Hospitalist Progress Note    Interval History   - Patient comfortable, no abdominal pain. Not obviously confused however he is somewhat tangential.  - Family present, discussed with them that patient appears medically stable to discharge. However they do not want patient to go back to [facility], they are not happy with his care there. I explained to them that the patient is under observation status currently.Discussed with clinical coordinator  - As patient is obs status, CC/SW will see whether bone marrow biopsy is covered by insurance or not. If not, I would defer it to outpatient    Assessment & Plan   Summary: Bairon Foster is a 88 year old male with PMH recent admission 4/8-4/13 for CHF, chronic afib, CAD, chronic pancytopenia, JIM, who was admitted on 4/16/2019 with hematemesis. GI consulted, Hgb stable, suspect likely epistaxis.    Hematemesis probably secondary to epistaxis, resolved: Noted bloody emesis at care facility. Hx of GI bleed. On admission noted relative hypotension. GI consulted--Hgb have been stable actually increasing--suspect epistaxis.  - Given no major bleeding, will plan to resume Pradaxa tomorrow  - Cardiology consulted, pending  - Resume Coreg at half dose this evening  - Resume Bumex tomorrow  - Continue to hold spironolactone    Encephalopathy, improved  Suspected cognitive impairment  Noted delirium on admission, however on 4/18 notably improved, conversing appropriately but tangential at times.  - Monitor    Combined systolic and diastolic heart failure  Mild pulm HTN  CAD  Hospitalized 4/8/2019 to 4/13/2019 for this. Does not appear to be in exacerbation currently. Known valvular component to heart failure with 3+ mitral valve regurgitation with plan for mitral clipping  - Continue to hold spironolactine and Bumex  - Resume compression stockings  - Cards consulted, pending    Chronic afib: 0.5-year battery life remaining on pacemaker (implanted for  SSS w/ pauses) with plan for generator change in the coming months.  Chadsvasc score of 6 for age, CHF, CVA, CAD.   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 Hemonc consulted-- recommend bone marrow biopsy. Last Bmbx was 7-10 years ago.  - SW/CC to see if Bmbx would be covered under observation stay    Skin tears: WOC    JIM: CPAP    DVT Prophylaxis: Pneumatic Compression Devices  Code Status: DNR  PT/OT: ordered    Disposition: Expected discharge tomorrow back to [facility]    Lokesh Waite MD  Text Page  (7am to 6pm)  -Data reviewed today: I reviewed all new labs and imaging results over the last 24 hours.    Physical Exam   Temp: 98.4  F (36.9  C) Temp src: Oral BP: 114/67 Pulse: 56 Heart Rate: 60 Resp: 18 SpO2: 97 % O2 Device: None (Room air)    Vitals:    04/16/19 2348 04/17/19 0500 04/18/19 0700   Weight: 85.7 kg (189 lb) 83.4 kg (183 lb 13.8 oz) 84.1 kg (185 lb 6.5 oz)     Vital Signs with Ranges  Temp:  [98.1  F (36.7  C)-99.2  F (37.3  C)] 98.4  F (36.9  C)  Pulse:  [56-65] 56  Heart Rate:  [59-65] 60  Resp:  [18] 18  BP: (114-125)/(65-74) 114/67  SpO2:  [94 %-97 %] 97 %  I/O last 3 completed shifts:  In: 620 [P.O.:620]  Out: 400 [Urine:400]  O2 requirements: none    Constitutional: Male in NAD  HEENT: Eyes nonicteric, oral mucosa moist  Cardiovascular: Irregular, normal S1/2, no m/r/g  Respiratory: CTAB, no wheezing or crackles  Vascular: 1+ BLE pitting edema  GI: Normoactive bowel sounds, nontender  Neuro/Psych: Appropriate affect and mood. A&Ox3, moves all extremities    Medications       [START ON 4/19/2019] bumetanide  1 mg Oral Daily     buPROPion  150 mg Oral QAM     carvedilol  3.125 mg Oral BID w/meals     [START ON 4/19/2019] dabigatran ANTICOAGULANT  150 mg Oral BID     finasteride  5 mg Oral Daily     levothyroxine  100 mcg Oral Daily     simvastatin  20 mg Oral QPM     sodium chloride (PF)  3 mL Intracatheter Q8H     sucralfate  1 g Oral BID     tamsulosin  0.4 mg Oral Daily        Data   Recent Labs   Lab 04/18/19  0834 04/17/19  0911 04/17/19  0001 04/15/19  0955   WBC 3.3* 2.0* 2.2*  --    HGB 10.3* 9.4* 9.7*  --    MCV 99 98 98  --    PLT 83* 69* 67*  --      --  142 140   POTASSIUM 3.4  --  3.4 4.0   CHLORIDE 111*  --  109 100   CO2 25  --  27 30*   BUN 25  --  29 30   CR 1.00  --  1.14 1.54*   ANIONGAP 7  --  6 14   TEODORO 8.3*  --  8.0* 10.3   GLC 97  --  97 110*   ALBUMIN  --   --  3.1*  --    PROTTOTAL  --   --  6.7*  --    BILITOTAL  --   --  1.7*  --    ALKPHOS  --   --  59  --    ALT  --   --  20  --    AST  --   --  31  --    LIPASE  --   --  329  --        Imaging:   No results found for this or any previous visit (from the past 24 hour(s)).

## 2019-04-18 NOTE — PROGRESS NOTES
[facility] Chart Check    Chart reviewed. Pt out of room for bone marrow biopsy  Discussed case with pt's wife who was in the room  No signs of GI bleeding overnight. Hgb better this morning  No need for EGD at this time. Pt's wife would like to avoid EGD if at all possible  Continue to follow hgb and stool output  Continue PPI BID  No further GI recommendations at this time. Will sign off. Please call with any questions.    Shemar Kim PA-C  [facility]  [phone] Cell ([phone])  [phone] Office (after 1300)

## 2019-04-18 NOTE — PLAN OF CARE
DATE & TIME: 4/18/19 1259-3586  ORIENTATION: A&Ox3, disoriented to situation; intermittent confusion, forgetful  BEHAVIOR & AGGRESSION TOOL COLOR: Green  CIWA SCORE: n/a  ABNL VS/O2: VSS on RA  MOBILITY: up with assist of 1, walker and GB  PAIN MANAGMENT: denies pain   DIET: Regular, tolerating; NPO at midnight   BOWEL/BLADDER: voiding adeq in urinal   ABNL LAB/BG: WBC 3.3, Hgb improved to 10.3  DRAIN/DEVICES: PIV, saline locked  TELEMETRY RHYTHM: n/a  SKIN: bruises; skin tears on back, dressings CDI  TESTS/PROCEDURES: Bone marrow biopsy 0900 tomorrow, NPO at midnight   D/C DAY/GOALS/PLACE: pending  OTHER IMPORTANT INFO: Seen by GI, no EGD, signed off. Hema/onco and cardiology following.

## 2019-04-18 NOTE — UTILIZATION REVIEW
"  Admission Status; Secondary Review Determination         Under the authority of the Utilization Management Committee, the utilization review process indicated a secondary review on the above patient.  The review outcome is based on review of the medical records, discussions with staff, and applying clinical experience noted on the date of the review.          (x) Observation Status Appropriate - This patient does not meet hospital inpatient criteria and is placed in observation status. If this patient's primary payer is Medicare and was admitted as an inpatient, Condition Code 44 should be used and patient status changed to \"observation\".     RATIONALE FOR DETERMINATION   88 year old male admitted on 4/16/2019. He presents from TCU after recent admission with increased confusion and bloody emesis. Patient hemoglobin was at baseline, he is on Pradaxa which was held, trending hemoglobin went up overnight without any transfusion, patient is not receiving any IV fluid.  Hemodynamically stable, no evidence of tachycardia or ongoing bleeding.  Patient did not meet Interqual criteria for GI bleed.  He was seen by gastroenterology, they signed off, was not interested in EGD. The severity of illness, intensity of service provided, expected LOS and risk for adverse outcome make the care appropriate for further observation; however, doesn't meet criteria for hospital inpatient admission. Dr. Waite notified of this determination.    This document was produced using voice recognition software.      The information on this document is developed by the utilization review team in order for the business office to ensure compliance.  This only denotes the appropriateness of proper admission status and does not reflect the quality of care rendered.         The definitions of Inpatient Status and Observation Status used in making the determination above are those provided in the CMS Coverage Manual, Chapter 1 and Chapter 6, section " 70.4.      Sincerely,     MALGORZATA JORDAN MD    System Medical Director  Utilization Management  [facility].

## 2019-04-18 NOTE — PROGRESS NOTES
04/17/19 1500   Quick Adds   Type of Visit Initial PT Evaluation   Living Environment   Lives With facility resident   Living Arrangements extended care facility  ([facility])   Home Accessibility no concerns   Self-Care   Usual Activity Tolerance moderate   Current Activity Tolerance fair   Regular Exercise No   Equipment Currently Used at Home walker, rolling  (no AD at baseline, FWW at TCU)   Functional Level Prior   Ambulation 3-->assistive equipment and person   Transferring 3-->assistive equipment and person   Fall history within last six months no   General Information   Onset of Illness/Injury or Date of Surgery - Date 04/16/19   Referring Physician Manpreet Johnson MD   Patient/Family Goals Statement return to TCU   Pertinent History of Current Problem (include personal factors and/or comorbidities that impact the POC) Admitted with GI bleed. PMH: afib, ICM, sleep apnea, PPM, CVA, PAD, depression.   Precautions/Limitations fall precautions   Weight-Bearing Status - LLE full weight-bearing   Weight-Bearing Status - RLE full weight-bearing   Cognitive Status Examination   Orientation person   Level of Consciousness alert   Follows Commands and Answers Questions 100% of the time;able to follow single-step instructions   Personal Safety and Judgment intact   Memory impaired   Pain Assessment   Patient Currently in Pain No   Posture    Posture Forward head position;Protracted shoulders   Range of Motion (ROM)   ROM Quick Adds No deficits were identified   Strength   Strength Comments B hip flex 4/5, knee ext 4+/5, DF 4+/5   Bed Mobility   Bed Mobility Comments Supine to sit with SBA   Transfer Skills   Transfer Comments Sit to stand with FWW and CGA   Gait   Gait Comments Pt amb 10 ft with FWW and CGA   Balance   Balance Comments Balance dec without AD, requires FWW   Sensory Examination   Sensory Perception Comments denies numbness or tingling   General Therapy Interventions   Planned Therapy  "Interventions bed mobility training;gait training;strengthening;transfer training   Clinical Impression   Criteria for Skilled Therapeutic Intervention yes, treatment indicated   PT Diagnosis Difficulty ambulating   Influenced by the following impairments Dec strength, balance, activity tolerance   Functional limitations due to impairments Difficulty ambulating and transferring   Clinical Presentation Stable/Uncomplicated   Clinical Presentation Rationale medically stable   Clinical Decision Making (Complexity) Low complexity   Therapy Frequency` daily   Predicted Duration of Therapy Intervention (days/wks) 4 days   Anticipated Discharge Disposition Transitional Care Facility   Risk & Benefits of therapy have been explained Yes   Patient, Family & other staff in agreement with plan of care Yes   [facility] TM \"6 Clicks\"   2016, Trustees of [facility], under license to [employer].  All rights reserved.   6 Clicks Short Forms Basic Mobility Inpatient Short Form   [facility]  \"6 Clicks\" V.2 Basic Mobility Inpatient Short Form   1. Turning from your back to your side while in a flat bed without using bedrails? 3 - A Little   2. Moving from lying on your back to sitting on the side of a flat bed without using bedrails? 3 - A Little   3. Moving to and from a bed to a chair (including a wheelchair)? 3 - A Little   4. Standing up from a chair using your arms (e.g., wheelchair, or bedside chair)? 3 - A Little   5. To walk in hospital room? 3 - A Little   6. Climbing 3-5 steps with a railing? 3 - A Little   Basic Mobility Raw Score (Score out of 24.Lower scores equate to lower levels of function) 18   Total Evaluation Time   Total Evaluation Time (Minutes) 15     "

## 2019-04-18 NOTE — ANESTHESIA PREPROCEDURE EVALUATION
Anesthesia Pre-Procedure Evaluation    Patient: Bairon Foster   MRN: [id number] : 1931          Preoperative Diagnosis: PENICIDPENIA    Procedure(s):  BONE MARROW BIOPSY    Past Medical History:   Diagnosis Date     Acute, but ill-defined, cerebrovascular disease     NO RESIDUALS     Antiplatelet or antithrombotic long-term use      Arrhythmia     a fib     Blood in stool      Coronary atherosclerosis of unspecified type of vessel, native or graft     S/P PTCA, EF 50%     Esophageal reflux      Hydrocele, unspecified      Hypercholesterolemia      Hypersomnia with sleep apnea, unspecified      Inguinal hernia without mention of obstruction or gangrene, recurrent unilateral or unspecified      Ischemic cardiomyopathy      Major depression in complete remission (H)      Obese      Onychomycosis      JIM on CPAP      Other and unspecified hyperlipidemia      Other lymphedema     GROIN AND THIGHS     Other pancytopenia (H)      Other specified anemias      Overweight      Pacemaker      PAD (peripheral artery disease) (H)      Pancytopenia (H)      Persistent atrial fibrillation (H)     VVI PM for long pauses     Prostatic hypertrophy, benign      Pulmonary hypertension (H)      Scrotal varices      Thrombocytopenia, unspecified (H)      Umbilical hernia without obstruction and without gangrene      Unilateral or unspecified femoral hernia without mention of obstruction or gangrene, unilateral or unspecified      Unspecified hypothyroidism      Venous stasis dermatitis of both lower extremities      Vitamin D deficiency      Past Surgical History:   Procedure Laterality Date     CV HEART CATHETERIZATION WITH POSSIBLE INTERVENTION N/A 2019    Procedure: Heart Catheterization with possible Intervention;  Surgeon: Lev Beck MD;  Location:  HEART CARDIAC CATH LAB     CV RIGHT HEART CATH N/A 2019    Procedure: Right Heart Cath;  Surgeon: Lev Beck MD;  Location:  HEART CARDIAC  CATH LAB     EYE SURGERY      CATARACT/BLEPHAROPLASTY     GENITOURINARY SURGERY      TUNA     HERNIA REPAIR      RIGHT INGUINAL     HERNIORRHAPHY INGUINAL  8/14/2012    Procedure: HERNIORRHAPHY INGUINAL;  right inguinal hernia repair;  Surgeon: Kareem Torrez MD;  Location: [facility]     HERNIORRHAPHY UMBILICAL N/A 10/31/2017    Procedure: HERNIORRHAPHY UMBILICAL;  UMBILICAL HERNIA REPAIR WITH MESH;  Surgeon: Scar Harrington MD;  Location: [facility]     IMPLANT PACEMAKER  8/2009    single chamber     [place]      [facility] [place]     ORTHOPEDIC SURGERY  2010    right TKR, left TKR     SURGICAL HISTORY OF -       Thumb surgery     SURGICAL HISTORY OF -   1990s    Left total knee replacement     SURGICAL HISTORY OF -   3/11    [facility] total knee replacement       Anesthesia Evaluation     .             ROS/MED HX    ENT/Pulmonary:     (+)sleep apnea, uses CPAP , . .    Neurologic:     (+)delerium dementia, CVA without deficits    Cardiovascular:     (+) Dyslipidemia, -Peripheral Vascular Disease-CAD, --stent,. Taking blood thinners : . CHF etiology: ischemic cardiomyopathy; . . pacemaker Reason placed: SSS;:. dysrhythmias a-fib, valvular problems/murmurs type: MR 3+ MR;:. pulmonary hypertension,       METS/Exercise Tolerance:     Hematologic:     (+) Other Hematologic Disorder-likely myelodysplastic syndrome;      Musculoskeletal:         GI/Hepatic:     (+) GERD Asymptomatic on medication,       Renal/Genitourinary:     (+) BPH,       Endo:     (+) thyroid problem hypothyroidism, .      Psychiatric:         Infectious Disease:         Malignancy:         Other:                          Physical Exam  Normal systems: cardiovascular and pulmonary    Airway   Mallampati: III  TM distance: >3 FB  Neck ROM: full    Dental   (+) missing and partials    Cardiovascular       Pulmonary             Lab Results   Component Value Date    WBC 3.3 (L) 04/18/2019    HGB 10.3 (L) 04/18/2019    HCT 30.8 (L) 04/18/2019    PLT 83 (L) 04/18/2019  "   SED 11 09/23/2013     04/18/2019    POTASSIUM 3.4 04/18/2019    CHLORIDE 111 (H) 04/18/2019    CO2 25 04/18/2019    BUN 25 04/18/2019    CR 1.00 04/18/2019    GLC 97 04/18/2019    TEODORO 8.3 (L) 04/18/2019    PHOS 4.1 04/12/2019    MAG 2.1 04/12/2019    ALBUMIN 3.1 (L) 04/17/2019    PROTTOTAL 6.7 (L) 04/17/2019    ALT 20 04/17/2019    AST 31 04/17/2019    ALKPHOS 59 04/17/2019    BILITOTAL 1.7 (H) 04/17/2019    LIPASE 329 04/17/2019    PTT 34 03/21/2018    INR 1.79 (H) 11/20/2018    TSH 3.09 04/08/2019    T4 1.24 08/25/2016       Preop Vitals  BP Readings from Last 3 Encounters:   04/18/19 125/68   04/15/19 113/69   04/13/19 141/73    Pulse Readings from Last 3 Encounters:   04/18/19 65   04/15/19 69   04/13/19 59      Resp Readings from Last 3 Encounters:   04/18/19 18   04/13/19 18   03/22/19 20    SpO2 Readings from Last 3 Encounters:   04/18/19 94%   04/13/19 94%   03/22/19 98%      Temp Readings from Last 1 Encounters:   04/18/19 37  C (98.6  F) (Oral)    Ht Readings from Last 1 Encounters:   04/17/19 1.88 m (6' 2\")      Wt Readings from Last 1 Encounters:   04/18/19 84.1 kg (185 lb 6.5 oz)    Estimated body mass index is 23.8 kg/m  as calculated from the following:    Height as of this encounter: 1.88 m (6' 2\").    Weight as of this encounter: 84.1 kg (185 lb 6.5 oz).       Anesthesia Plan      History & Physical Review  History and physical reviewed and following examination; no interval change.    ASA Status:  4 .    NPO Status:  > 6 hours    Plan for MAC          Postoperative Care  Postoperative pain management:  Oral pain medications.      Consents  Anesthetic plan, risks, benefits and alternatives discussed with:  Patient..                 Devang Burroughs MD  "

## 2019-04-18 NOTE — PROGRESS NOTES
Service Date: 2019      SUBJECTIVE:  Mr. Ibarra is an 88-year-old gentleman with chronic pancytopenia.  For workup, multiple investigations were ordered.  No deficiency of vitamin B12 or folate.  Iron and ferritin are normal.  SPEP is pending.      Bone marrow biopsy is scheduled for tomorrow.      Overall, patient's condition is stable.  The patient denies bleeding from any site.  He is not coughing or vomiting any blood.  No headache.  No dizziness.  He feels weak.  No fever or chills.      PHYSICAL EXAMINATION:   GENERAL:  He is alert, oriented x3.   VITAL SIGNS:  Reviewed, ECOG PS of 3.   The rest of the systems not examined.      LABORATORY DATA:  Reviewed.      ASSESSMENT:   1.  An 88-year-old gentleman with chronic pancytopenia.   2.  Gastrointestinal bleed, resolved.      PLAN:   1.  Labs were reviewed with the patient.  His pancytopenia is stable.  I explained to him there is no deficiency of vitamin B12, folate or iron.   2.  The patient likely has primary bone marrow pathology.  He is going to get a bone marrow biopsy done tomorrow.  We will see him in clinic in about 2 weeks' time for followup.  We will sign off.  Please call us with any questions.         ANDRES KRISHNA MD             D: 2019   T: 2019   MT: YANIRA      Name:     HIRO IBARRA   MRN:      [id number]        Account:      [id number]   :      1931           Service Date: 2019      Document: [id number]

## 2019-04-19 ENCOUNTER — DOCUMENTATION ONLY (OUTPATIENT)
Dept: CARDIOLOGY | Facility: CLINIC | Age: 84
End: 2019-04-19

## 2019-04-19 LAB
ALBUMIN SERPL ELPH-MCNC: 3.5 G/DL (ref 3.7–5.1)
ALPHA1 GLOB SERPL ELPH-MCNC: 0.3 G/DL (ref 0.2–0.4)
ALPHA2 GLOB SERPL ELPH-MCNC: 0.6 G/DL (ref 0.5–0.9)
B-GLOBULIN SERPL ELPH-MCNC: 1 G/DL (ref 0.6–1)
BASOPHILS # BLD AUTO: 0 10E9/L (ref 0–0.2)
BASOPHILS NFR BLD AUTO: 0.7 %
DIFFERENTIAL METHOD BLD: ABNORMAL
EOSINOPHIL # BLD AUTO: 0.1 10E9/L (ref 0–0.7)
EOSINOPHIL NFR BLD AUTO: 3.3 %
ERYTHROCYTE [DISTWIDTH] IN BLOOD BY AUTOMATED COUNT: 16.5 % (ref 10–15)
GAMMA GLOB SERPL ELPH-MCNC: 1.4 G/DL (ref 0.7–1.6)
HCT VFR BLD AUTO: 29.7 % (ref 40–53)
HGB BLD-MCNC: 9.9 G/DL (ref 13.3–17.7)
IMM GRANULOCYTES # BLD: 0 10E9/L (ref 0–0.4)
IMM GRANULOCYTES NFR BLD: 0 %
LYMPHOCYTES # BLD AUTO: 1 10E9/L (ref 0.8–5.3)
LYMPHOCYTES NFR BLD AUTO: 32.6 %
M PROTEIN SERPL ELPH-MCNC: 0 G/DL
MCH RBC QN AUTO: 33 PG (ref 26.5–33)
MCHC RBC AUTO-ENTMCNC: 33.3 G/DL (ref 31.5–36.5)
MCV RBC AUTO: 99 FL (ref 78–100)
MONOCYTES # BLD AUTO: 0.3 10E9/L (ref 0–1.3)
MONOCYTES NFR BLD AUTO: 8.9 %
NEUTROPHILS # BLD AUTO: 1.7 10E9/L (ref 1.6–8.3)
NEUTROPHILS NFR BLD AUTO: 54.5 %
NRBC # BLD AUTO: 0 10*3/UL
NRBC BLD AUTO-RTO: 0 /100
PLATELET # BLD AUTO: 83 10E9/L (ref 150–450)
POTASSIUM SERPL-SCNC: 3.6 MMOL/L (ref 3.4–5.3)
PROT PATTERN SERPL ELPH-IMP: ABNORMAL
RBC # BLD AUTO: 3 10E12/L (ref 4.4–5.9)
WBC # BLD AUTO: 3 10E9/L (ref 4–11)

## 2019-04-19 PROCEDURE — 40000424 ZZHCL STATISTIC BONE MARROW CORE PERF TC 38221: Performed by: INTERNAL MEDICINE

## 2019-04-19 PROCEDURE — 25000125 ZZHC RX 250: Performed by: NURSE ANESTHETIST, CERTIFIED REGISTERED

## 2019-04-19 PROCEDURE — 88264 CHROMOSOME ANALYSIS 20-25: CPT | Performed by: INTERNAL MEDICINE

## 2019-04-19 PROCEDURE — 25000132 ZZH RX MED GY IP 250 OP 250 PS 637: Performed by: INTERNAL MEDICINE

## 2019-04-19 PROCEDURE — 36415 COLL VENOUS BLD VENIPUNCTURE: CPT | Performed by: ANESTHESIOLOGY

## 2019-04-19 PROCEDURE — 88342 IMHCHEM/IMCYTCHM 1ST ANTB: CPT | Mod: XU | Performed by: INTERNAL MEDICINE

## 2019-04-19 PROCEDURE — 88341 IMHCHEM/IMCYTCHM EA ADD ANTB: CPT | Performed by: INTERNAL MEDICINE

## 2019-04-19 PROCEDURE — 37000008 ZZH ANESTHESIA TECHNICAL FEE, 1ST 30 MIN: Performed by: PATHOLOGY

## 2019-04-19 PROCEDURE — 88305 TISSUE EXAM BY PATHOLOGIST: CPT | Mod: 26,59 | Performed by: INTERNAL MEDICINE

## 2019-04-19 PROCEDURE — 88313 SPECIAL STAINS GROUP 2: CPT | Performed by: INTERNAL MEDICINE

## 2019-04-19 PROCEDURE — 36415 COLL VENOUS BLD VENIPUNCTURE: CPT | Performed by: PATHOLOGY

## 2019-04-19 PROCEDURE — 36000052 ZZH SURGERY LEVEL 2 EA 15 ADDTL MIN: Performed by: PATHOLOGY

## 2019-04-19 PROCEDURE — 25000132 ZZH RX MED GY IP 250 OP 250 PS 637: Performed by: NURSE PRACTITIONER

## 2019-04-19 PROCEDURE — 88305 TISSUE EXAM BY PATHOLOGIST: CPT | Performed by: INTERNAL MEDICINE

## 2019-04-19 PROCEDURE — 38221 DX BONE MARROW BIOPSIES: CPT

## 2019-04-19 PROCEDURE — 25800030 ZZH RX IP 258 OP 636: Performed by: ANESTHESIOLOGY

## 2019-04-19 PROCEDURE — 88305 TISSUE EXAM BY PATHOLOGIST: CPT | Mod: 26 | Performed by: INTERNAL MEDICINE

## 2019-04-19 PROCEDURE — 85060 BLOOD SMEAR INTERPRETATION: CPT | Performed by: INTERNAL MEDICINE

## 2019-04-19 PROCEDURE — 99232 SBSQ HOSP IP/OBS MODERATE 35: CPT | Performed by: INTERNAL MEDICINE

## 2019-04-19 PROCEDURE — 88237 TISSUE CULTURE BONE MARROW: CPT | Performed by: INTERNAL MEDICINE

## 2019-04-19 PROCEDURE — 40000795 ZZHCL STATISTIC DNA PROCESS AND HOLD: Performed by: INTERNAL MEDICINE

## 2019-04-19 PROCEDURE — 25000128 H RX IP 250 OP 636: Performed by: NURSE ANESTHETIST, CERTIFIED REGISTERED

## 2019-04-19 PROCEDURE — 40000847 ZZHCL STATISTIC MORPHOLOGY W/INTERP HISTOLOGY TC 85060: Performed by: INTERNAL MEDICINE

## 2019-04-19 PROCEDURE — 38221 DX BONE MARROW BIOPSIES: CPT | Performed by: INTERNAL MEDICINE

## 2019-04-19 PROCEDURE — 85025 COMPLETE CBC W/AUTO DIFF WBC: CPT | Performed by: PATHOLOGY

## 2019-04-19 PROCEDURE — 88184 FLOWCYTOMETRY/ TC 1 MARKER: CPT | Performed by: INTERNAL MEDICINE

## 2019-04-19 PROCEDURE — 37000009 ZZH ANESTHESIA TECHNICAL FEE, EACH ADDTL 15 MIN: Performed by: PATHOLOGY

## 2019-04-19 PROCEDURE — G0463 HOSPITAL OUTPT CLINIC VISIT: HCPCS

## 2019-04-19 PROCEDURE — 25000125 ZZHC RX 250: Performed by: PATHOLOGY

## 2019-04-19 PROCEDURE — 00000058 ZZHCL STATISTIC BONE MARROW ASP PERF TC 38220: Performed by: INTERNAL MEDICINE

## 2019-04-19 PROCEDURE — 88311 DECALCIFY TISSUE: CPT | Performed by: INTERNAL MEDICINE

## 2019-04-19 PROCEDURE — 40001005 ZZHCL STATISTIC FLOW >15 ABY TC 88189: Performed by: INTERNAL MEDICINE

## 2019-04-19 PROCEDURE — 88280 CHROMOSOME KARYOTYPE STUDY: CPT | Performed by: INTERNAL MEDICINE

## 2019-04-19 PROCEDURE — 40000170 ZZH STATISTIC PRE-PROCEDURE ASSESSMENT II: Performed by: PATHOLOGY

## 2019-04-19 PROCEDURE — 88342 IMHCHEM/IMCYTCHM 1ST ANTB: CPT | Mod: 26 | Performed by: INTERNAL MEDICINE

## 2019-04-19 PROCEDURE — 88311 DECALCIFY TISSUE: CPT | Mod: 26 | Performed by: INTERNAL MEDICINE

## 2019-04-19 PROCEDURE — 71000012 ZZH RECOVERY PHASE 1 LEVEL 1 FIRST HR: Performed by: PATHOLOGY

## 2019-04-19 PROCEDURE — G0378 HOSPITAL OBSERVATION PER HR: HCPCS

## 2019-04-19 PROCEDURE — 88185 FLOWCYTOMETRY/TC ADD-ON: CPT | Performed by: INTERNAL MEDICINE

## 2019-04-19 PROCEDURE — 84132 ASSAY OF SERUM POTASSIUM: CPT | Performed by: ANESTHESIOLOGY

## 2019-04-19 PROCEDURE — 88341 IMHCHEM/IMCYTCHM EA ADD ANTB: CPT | Mod: 26,59 | Performed by: INTERNAL MEDICINE

## 2019-04-19 PROCEDURE — 00000008 ZZHCL STATISTIC ADDL BM ASP PERF PF 38220: Performed by: INTERNAL MEDICINE

## 2019-04-19 PROCEDURE — 36000050 ZZH SURGERY LEVEL 2 1ST 30 MIN: Performed by: PATHOLOGY

## 2019-04-19 PROCEDURE — 85097 BONE MARROW INTERPRETATION: CPT | Performed by: INTERNAL MEDICINE

## 2019-04-19 PROCEDURE — 40000948 ZZHCL STATISTIC BONE MARROW ASP TC 85097: Performed by: INTERNAL MEDICINE

## 2019-04-19 PROCEDURE — 99214 OFFICE O/P EST MOD 30 MIN: CPT | Performed by: INTERNAL MEDICINE

## 2019-04-19 RX ORDER — NALOXONE HYDROCHLORIDE 0.4 MG/ML
.1-.4 INJECTION, SOLUTION INTRAMUSCULAR; INTRAVENOUS; SUBCUTANEOUS
Status: CANCELLED | OUTPATIENT
Start: 2019-04-19 | End: 2019-04-20

## 2019-04-19 RX ORDER — FENTANYL CITRATE 50 UG/ML
25-50 INJECTION, SOLUTION INTRAMUSCULAR; INTRAVENOUS
Status: DISCONTINUED | OUTPATIENT
Start: 2019-04-19 | End: 2019-04-19 | Stop reason: HOSPADM

## 2019-04-19 RX ORDER — FLUMAZENIL 0.1 MG/ML
0.2 INJECTION, SOLUTION INTRAVENOUS
Status: DISCONTINUED | OUTPATIENT
Start: 2019-04-19 | End: 2019-04-19

## 2019-04-19 RX ORDER — LIDOCAINE HYDROCHLORIDE 10 MG/ML
8-10 INJECTION, SOLUTION EPIDURAL; INFILTRATION; INTRACAUDAL; PERINEURAL
Status: DISCONTINUED | OUTPATIENT
Start: 2019-04-19 | End: 2019-04-19 | Stop reason: HOSPADM

## 2019-04-19 RX ORDER — ONDANSETRON 2 MG/ML
4 INJECTION INTRAMUSCULAR; INTRAVENOUS EVERY 30 MIN PRN
Status: DISCONTINUED | OUTPATIENT
Start: 2019-04-19 | End: 2019-04-19 | Stop reason: HOSPADM

## 2019-04-19 RX ORDER — PROPOFOL 10 MG/ML
INJECTION, EMULSION INTRAVENOUS CONTINUOUS PRN
Status: DISCONTINUED | OUTPATIENT
Start: 2019-04-19 | End: 2019-04-19

## 2019-04-19 RX ORDER — SODIUM CHLORIDE, SODIUM LACTATE, POTASSIUM CHLORIDE, CALCIUM CHLORIDE 600; 310; 30; 20 MG/100ML; MG/100ML; MG/100ML; MG/100ML
INJECTION, SOLUTION INTRAVENOUS CONTINUOUS
Status: DISCONTINUED | OUTPATIENT
Start: 2019-04-19 | End: 2019-04-19 | Stop reason: HOSPADM

## 2019-04-19 RX ORDER — LIDOCAINE HYDROCHLORIDE 20 MG/ML
INJECTION, SOLUTION INFILTRATION; PERINEURAL PRN
Status: DISCONTINUED | OUTPATIENT
Start: 2019-04-19 | End: 2019-04-19

## 2019-04-19 RX ORDER — HYDROMORPHONE HYDROCHLORIDE 1 MG/ML
.3-.5 INJECTION, SOLUTION INTRAMUSCULAR; INTRAVENOUS; SUBCUTANEOUS EVERY 5 MIN PRN
Status: DISCONTINUED | OUTPATIENT
Start: 2019-04-19 | End: 2019-04-19 | Stop reason: HOSPADM

## 2019-04-19 RX ORDER — ONDANSETRON 4 MG/1
4 TABLET, ORALLY DISINTEGRATING ORAL EVERY 30 MIN PRN
Status: DISCONTINUED | OUTPATIENT
Start: 2019-04-19 | End: 2019-04-19 | Stop reason: HOSPADM

## 2019-04-19 RX ORDER — TRIAMCINOLONE ACETONIDE 0.25 MG/G
CREAM TOPICAL 2 TIMES DAILY
Status: DISCONTINUED | OUTPATIENT
Start: 2019-04-19 | End: 2019-04-22 | Stop reason: HOSPADM

## 2019-04-19 RX ORDER — NALOXONE HYDROCHLORIDE 0.4 MG/ML
.1-.4 INJECTION, SOLUTION INTRAMUSCULAR; INTRAVENOUS; SUBCUTANEOUS
Status: DISCONTINUED | OUTPATIENT
Start: 2019-04-19 | End: 2019-04-19

## 2019-04-19 RX ADMIN — FINASTERIDE 5 MG: 5 TABLET, FILM COATED ORAL at 11:42

## 2019-04-19 RX ADMIN — BUPROPION HYDROCHLORIDE 150 MG: 150 TABLET, FILM COATED, EXTENDED RELEASE ORAL at 11:41

## 2019-04-19 RX ADMIN — BUMETANIDE 1 MG: 1 TABLET ORAL at 11:41

## 2019-04-19 RX ADMIN — LIDOCAINE HYDROCHLORIDE 40 MG: 20 INJECTION, SOLUTION INFILTRATION; PERINEURAL at 09:12

## 2019-04-19 RX ADMIN — SIMVASTATIN 20 MG: 20 TABLET, FILM COATED ORAL at 20:17

## 2019-04-19 RX ADMIN — TRIAMCINOLONE ACETONIDE: 0.25 CREAM TOPICAL at 18:11

## 2019-04-19 RX ADMIN — SUCRALFATE 1 G: 1 TABLET ORAL at 18:11

## 2019-04-19 RX ADMIN — NYSTATIN 500000 UNITS: 500000 SUSPENSION ORAL at 18:12

## 2019-04-19 RX ADMIN — LEVOTHYROXINE SODIUM 100 MCG: 100 TABLET ORAL at 11:41

## 2019-04-19 RX ADMIN — TAMSULOSIN HYDROCHLORIDE 0.4 MG: 0.4 CAPSULE ORAL at 11:42

## 2019-04-19 RX ADMIN — CARVEDILOL 3.12 MG: 3.12 TABLET, FILM COATED ORAL at 11:41

## 2019-04-19 RX ADMIN — CARVEDILOL 3.12 MG: 3.12 TABLET, FILM COATED ORAL at 18:11

## 2019-04-19 RX ADMIN — SODIUM CHLORIDE, POTASSIUM CHLORIDE, SODIUM LACTATE AND CALCIUM CHLORIDE: 600; 310; 30; 20 INJECTION, SOLUTION INTRAVENOUS at 08:37

## 2019-04-19 RX ADMIN — SUCRALFATE 1 G: 1 TABLET ORAL at 11:42

## 2019-04-19 RX ADMIN — APIXABAN 2.5 MG: 2.5 TABLET, FILM COATED ORAL at 13:26

## 2019-04-19 RX ADMIN — PROPOFOL 75 MCG/KG/MIN: 10 INJECTION, EMULSION INTRAVENOUS at 09:12

## 2019-04-19 RX ADMIN — APIXABAN 2.5 MG: 2.5 TABLET, FILM COATED ORAL at 20:17

## 2019-04-19 NOTE — PROGRESS NOTES
Pt A&O x4, forgetful at times, VSS on room air. No c/o of pain. Bone marrow incision CDI. Pt has multiple skin tears/ abrasion through out body, scabs. Red rash on torso. Activity encouraged. Discharge to TCU possibly today or tomorrow

## 2019-04-19 NOTE — PROGRESS NOTES
[facility]    Hospitalist Progress Note    Interval History   - Feeling better today, appetite improved  - Bone marrow biopsy done this morning  - Medically stable to discharge, awaiting TCU auth    Addendum: Nurse notes bullous/macular rash over trunk with scraped bullae. Patient states EKG tech had put stickers on his bullae and had pulled them off and ripped the bullae. Will get wound nurse to assess patient's erosions. [facility]. Triamcinolone cream for other rash. Will stop Bumex if rash seems to worsen with Bumex.    Assessment & Plan   Summary: Bairon Foster is a 88 year old male with PMH recent admission 4/8-4/13 for CHF, chronic afib, CAD, chronic pancytopenia, JIM, who was admitted on 4/16/2019 with hematemesis. GI consulted, Hgb stable, suspect likely epistaxis, now signed off. Underwent Bone marrow biopsy 4/19 to evaluate for pancytopenia.    Hematemesis probably secondary to epistaxis, resolved: Noted bloody emesis at care facility. Hx of GI bleed. On admission noted relative hypotension. GI consulted--Hgb have been stable actually increasing--suspect epistaxis.  - Cardiology consulted, pending  - Resumed half dose Eliquis    Encephalopathy, improved  Suspected cognitive impairment  Noted delirium on admission, however on 4/18 notably improved, conversing appropriately but tangential at times.  - Monitor    Combined systolic and diastolic heart failure  Mild pulm HTN  CAD  Hospitalized 4/8/2019 to 4/13/2019 for this. Does not appear to be in exacerbation currently. Known valvular component to heart failure with 3+ mitral valve regurgitation with plan for mitral clipping  - Coreg 3.125 mg daily  - Daily Bumex  - Continue to hold spironolactone  - Compression stockings    Chronic afib: 0.5-year battery life remaining on pacemaker (implanted for SSS w/ pauses) with plan for generator change in the coming months.  Chadsvasc score of 6 for age,  CHF, CVA, CAD.   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 Hemonc consulted-- recommend bone marrow biopsy. Last Bmbx was 7-10 years ago. S/p bone marrow biopsy 4/19.  - F/u Hemonc as outpatient    Skin tears: WOC    JIM: CPAP    DVT Prophylaxis: Pneumatic Compression Devices  Code Status: DNR  PT/OT: ordered    Disposition: Expected discharge to TCU pending insurance auth    Lokesh Waite MD  Text Page  (7am to 6pm)  -Data reviewed today: I reviewed all new labs and imaging results over the last 24 hours.    Physical Exam   Temp: 97.6  F (36.4  C) Temp src: Oral BP: 111/68 Pulse: 60 Heart Rate: 63 Resp: 9 SpO2: 96 % O2 Device: None (Room air) Oxygen Delivery: 2 LPM  Vitals:    04/16/19 2348 04/17/19 0500 04/18/19 0700   Weight: 85.7 kg (189 lb) 83.4 kg (183 lb 13.8 oz) 84.1 kg (185 lb 6.5 oz)     Vital Signs with Ranges  Temp:  [97.6  F (36.4  C)-98.6  F (37  C)] 97.6  F (36.4  C)  Pulse:  [59-62] 60  Heart Rate:  [59-65] 63  Resp:  [8-18] 9  BP: (111-128)/(58-73) 111/68  SpO2:  [93 %-98 %] 96 %  I/O last 3 completed shifts:  In: 480 [P.O.:480]  Out: 350 [Urine:350]  O2 requirements: none    Constitutional: Male in NAD  HEENT: Eyes nonicteric, oral mucosa moist  Cardiovascular: Irregular, normal S1/2, no m/r/g  Respiratory: CTAB, no wheezing or crackles  Vascular: 1+ BLE pitting edema  GI: Normoactive bowel sounds, nontender  Neuro/Psych: Appropriate affect and mood. A&Ox3, moves all extremities    Medications       apixaban ANTICOAGULANT  2.5 mg Oral BID     bumetanide  1 mg Oral Daily     buPROPion  150 mg Oral QAM     carvedilol  3.125 mg Oral BID w/meals     finasteride  5 mg Oral Daily     levothyroxine  100 mcg Oral Daily     nystatin  500,000 Units Swish & Spit 4x Daily     simvastatin  20 mg Oral QPM     sodium chloride (PF)  3 mL Intracatheter Q8H     sucralfate  1 g Oral BID w/meals     tamsulosin  0.4 mg Oral  Daily       Data   Recent Labs   Lab 04/19/19  0857 04/18/19  0834 04/17/19  0911 04/17/19  0001 04/15/19  0955   WBC 3.0* 3.3* 2.0* 2.2*  --    HGB 9.9* 10.3* 9.4* 9.7*  --    MCV 99 99 98 98  --    PLT 83* 83* 69* 67*  --    NA  --  143  --  142 140   POTASSIUM 3.6 3.4  --  3.4 4.0   CHLORIDE  --  111*  --  109 100   CO2  --  25  --  27 30*   BUN  --  25  --  29 30   CR  --  1.00  --  1.14 1.54*   ANIONGAP  --  7  --  6 14   TEODORO  --  8.3*  --  8.0* 10.3   GLC  --  97  --  97 110*   ALBUMIN  --   --   --  3.1*  --    PROTTOTAL  --   --   --  6.7*  --    BILITOTAL  --   --   --  1.7*  --    ALKPHOS  --   --   --  59  --    ALT  --   --   --  20  --    AST  --   --   --  31  --    LIPASE  --   --   --  329  --        Imaging:   No results found for this or any previous visit (from the past 24 hour(s)).

## 2019-04-19 NOTE — PROGRESS NOTES
Received letter from spouse.  Patient received a no-show letter for an April 12th visit.  Patient did not no-show-he was an inpatient for the visit.  Letter sent apologizing for the communication error.

## 2019-04-19 NOTE — PROGRESS NOTES
HEIDI  D:  As of 1612, [facility] had not heard back from [employer]. Sometimes they do hear from [employer] on the weekend.  The Saturday SW will check with [facility] on Saturday for an update.

## 2019-04-19 NOTE — ANESTHESIA POSTPROCEDURE EVALUATION
Patient: Bairon Foster    Procedure(s):  BONE MARROW BIOPSY    Diagnosis:PENICIDPENIA  Diagnosis Additional Information: No value filed.    Anesthesia Type:  MAC    Note:  Anesthesia Post Evaluation    Patient location during evaluation: PACU  Patient participation: Able to fully participate in evaluation  Level of consciousness: awake and alert  Pain management: adequate  Airway patency: patent  Cardiovascular status: acceptable  Respiratory status: acceptable  Hydration status: acceptable  PONV: none     Anesthetic complications: None          Last vitals:  Vitals:    04/19/19 1010 04/19/19 1020 04/19/19 1056   BP: 118/63 113/63 111/68   Pulse: 59 59 60   Resp: 13 9    Temp:   36.4  C (97.6  F)   SpO2: 93% 96% 96%         Electronically Signed By: Devang Burroughs MD  April 19, 2019  11:43 AM

## 2019-04-19 NOTE — PROCEDURES
The patient was positively identified and informed consent was obtained (see the completed Affirmation of Consent for Bone Marrow Aspiration and/or Biopsy Procedure(s) form in the patient's chart). The patient was placed in the prone position and the bony landmarks of the pelvis were identified. Medical staff reconfirmed the patient's name, date of birth and procedure. The skin over the posterior iliac crest was scrubbed and draped in a sterile fashion. The local area of the procedure was anesthetized with a total of 10 mL of 1% Lidocaine and a small incision was made.  The patient did receive conscious sedation.    Trephine bone marrow core(s) was/were obtained from the left posterior iliac crest. Bone marrow aspirate was obtained from the left posterior iliac crest for: morphology with possible immunophenotyping and/or cytogenetics and cultures    Direct pressure was applied to the biopsy site with sterile gauze. The biopsy site was cleaned with alcohol and a sterile dressing was placed over the biopsy incision using a pressure bandage. The patient was then placed in the supine position to maintain pressure on the biopsy site. Post-procedure wound care instructions, including routine dressing instructions and analgesia, were given to the patient's caregiver:  The procedure was completed without complication.

## 2019-04-19 NOTE — PLAN OF CARE
DATE & TIME: 04/18/2019 from 2872-2050  ORIENTATION:A&O x 3, disoriented to situation. Forgetful and intermittently confused.   BEHAVIOR & AGGRESSION TOOL COLOR: Green  CIWA SCORE: N/A  ABNL VS/O2:VSS on RA  MOBILITY: up x 1 assist to bathroom with walker/GB.   PAIN MANAGMENT:Denied   DIET: Regular   BOWEL/BLADDER: incontinent at times. Uses bathroom at times  ABNL LAB/BG:WBC 3.3, Hgb 10.3, Plt 83, Vitamin B12 2336,   DRAIN/DEVICES:PIV saline lock  TELEMETRY RHYTHM:N/A  SKIN:skin tear on back, dressing CDI  TESTS/PROCEDURES: Bone marrow tomorrow morning. NPO after midnight.  D/C DAY/GOALS/PLACE:Pending in progress for prior authorization from insurance and for placement.   OTHER IMPORTANT INFO: Hematology/Oncology following, Cardiology following. Pt is intermittently confused.

## 2019-04-19 NOTE — ANESTHESIA CARE TRANSFER NOTE
Patient: Bairon Foster    Procedure(s):  BONE MARROW BIOPSY    Diagnosis: PENICIDPENIA  Diagnosis Additional Information: No value filed.    Anesthesia Type:   MAC     Note:  Airway :Nasal Cannula  Patient transferred to:PACU  Comments: At end of procedure, spontaneous respirations, patient alert to voice, able to follow commands. Oxygen via nasal cannula at 3 liters per minute to PACU. Oxygen tubing connected to wall O2 in PACU, SpO2, NiBP, and EKG monitors and alarms on and functioning, Anabell Hugger warmer connected to patient gown, report on patient's clinical status given to PACU RN, RN questions answered.Handoff Report: Identifed the Patient, Identified the Reponsible Provider, Reviewed the pertinent medical history, Discussed the surgical course, Reviewed Intra-OP anesthesia mangement and issues during anesthesia, Set expectations for post-procedure period and Allowed opportunity for questions and acknowledgement of understanding      Vitals: (Last set prior to Anesthesia Care Transfer)    CRNA VITALS  4/19/2019 0907 - 4/19/2019 0948      4/19/2019             NIBP:  108/58    Pulse:  59    NIBP Mean:  76    Ht Rate:  60    SpO2:  98 %    Resp Rate (set):  10                Electronically Signed By: WILLIS Chacon CRNA  April 19, 2019  9:48 AM

## 2019-04-19 NOTE — PROGRESS NOTES
SW:  D:    Patient may be ready for discharge today based on care coordinator conversation with Dr Waite on 4/18.    Patient has been accepted by [facility]. The TCU has requested SNF authorization from [employer] and is hopeful they will receive authorization today.  They have a private or double room available.      Writer spoke with patient and his spouse.  Spouse's son had called [employer] and was told patient was authorized to stay here till 4/22.  Writer explained patient is here under Observation status and his discharge is determined by the physician and based on medical readiness to leave the hospital.  Shared the availability of [facility], explained the daily charge of $36.00 for a private room.   Patient and wife accepted the vacancy a [facility] and request a private room.  Patient did also talk about his preference to go directly home however writer reviewed rationale for TCU and wife supported the TCU recommendation.    Writer did review the home therapy which he may be eligible for after TCU.    A: Patient/spouse accepting discharge once MD orders and once we have [employer] authorization.  A grandtr is able to transport.  Patient pleasant, engaged in decisions however does need longer time to process information    P:  We will wait to hear from Jennifer and updtate from Dr Waite.    Addendum;  Dewayne Wiley at [facility] was notified patient is not returning to their TCU.

## 2019-04-19 NOTE — PROGRESS NOTES
[facility]    Cardiology Progress Note    Date of Service (when I saw the patient): 04/19/2019       Physician Supervisory Attestation:   I have reviewed and discussed with Mackenzie Tejada NP the history, physical and plan and independently interviewed and examined Bairon Foster and agree with the plan as stated in the note.    -- Will initiate low dose eliquis. GI doesn't believe he had GI bleed. We will see him in the clinic next week for follow up. We will sign off    Zion Rider MD 4/19/2019 4:23 PM        Assessment & Plan   Bairon Foster is a 88 year old male who was admitted on 4/16/2019 for possible GI bleed that turns out more likely was blood swallowed from epistasis. Very complicated clinic pt of mine waiting for Mitraclip and hopefully Watchman as well. Met with structural heart clinic Monday with tentative plans for both in the future.    Chronic afib  -pradaxa held  -does not appear to have GI bleed  -frequent nose bleeds  -ongoing pancytopenia- bone marrow biopsy today  -CHADsVASC is 5  -single chamber PM with 6 months of battery life  Plan  -Switch to eliquis 2.5 BID, if he bleeds on this will switch to aspirin only  -Would likely benefit from Watchman    Systolic and Diastolic heart failure  -recent cath found nonobstructive disease  -MR/TR  -working with structural heart on valvular heart disease with plan for mitraclip    CAD  -recent cath showed stable nonobstructive disease  -previous OM stenting  -stain  Not on asa d/t bleeding and low plateltets    Pancytopenia  -ongoing over 10 years      Interval History   Bone marrow biopsy this am. No further bleeding. Wife present    Physical Exam   Temp: 97.6  F (36.4  C) Temp src: Oral BP: 111/68 Pulse: 60 Heart Rate: 63 Resp: 9 SpO2: 96 % O2 Device: None (Room air) Oxygen Delivery: 2 LPM  Vitals:    04/16/19 2348 04/17/19 0500 04/18/19 0700   Weight: 85.7 kg (189 lb) 83.4 kg (183 lb 13.8 oz) 84.1 kg (185 lb 6.5 oz)     Vital  Signs with Ranges  Temp:  [97.6  F (36.4  C)-98.6  F (37  C)] 97.6  F (36.4  C)  Pulse:  [59-62] 60  Heart Rate:  [59-65] 63  Resp:  [8-18] 9  BP: (111-128)/(58-73) 111/68  SpO2:  [93 %-98 %] 96 %  I/O last 3 completed shifts:  In: 480 [P.O.:480]  Out: 350 [Urine:350]    Constitutional     alert and oriented, in no acute distress.     Skin     warm and dry to touch, bruising    ENT     no pallor or cyanosis    Neck    Supple,     Chest     no tenderness to palpation    Lungs  clear to auscultation     Cardiac  regular rhythm, S1 normal, S2 normal    Abdomen     abdomen soft    Extremities and Back     No edema observed.        Neurological     no gross motor deficits noted, affect appropriate, oriented to time, person and place.    Medications       apixaban ANTICOAGULANT  2.5 mg Oral BID     bumetanide  1 mg Oral Daily     buPROPion  150 mg Oral QAM     carvedilol  3.125 mg Oral BID w/meals     finasteride  5 mg Oral Daily     levothyroxine  100 mcg Oral Daily     nystatin  500,000 Units Swish & Spit 4x Daily     simvastatin  20 mg Oral QPM     sodium chloride (PF)  3 mL Intracatheter Q8H     sucralfate  1 g Oral BID w/meals     tamsulosin  0.4 mg Oral Daily       Data   Results for orders placed or performed during the hospital encounter of 04/16/19 (from the past 24 hour(s))   CBC with platelets differential   Result Value Ref Range    WBC 3.0 (L) 4.0 - 11.0 10e9/L    RBC Count 3.00 (L) 4.4 - 5.9 10e12/L    Hemoglobin 9.9 (L) 13.3 - 17.7 g/dL    Hematocrit 29.7 (L) 40.0 - 53.0 %    MCV 99 78 - 100 fl    MCH 33.0 26.5 - 33.0 pg    MCHC 33.3 31.5 - 36.5 g/dL    RDW 16.5 (H) 10.0 - 15.0 %    Platelet Count 83 (L) 150 - 450 10e9/L    Diff Method Automated Method     % Neutrophils 54.5 %    % Lymphocytes 32.6 %    % Monocytes 8.9 %    % Eosinophils 3.3 %    % Basophils 0.7 %    % Immature Granulocytes 0.0 %    Nucleated RBCs 0 0 /100    Absolute Neutrophil 1.7 1.6 - 8.3 10e9/L    Absolute Lymphocytes 1.0 0.8 - 5.3  10e9/L    Absolute Monocytes 0.3 0.0 - 1.3 10e9/L    Absolute Eosinophils 0.1 0.0 - 0.7 10e9/L    Absolute Basophils 0.0 0.0 - 0.2 10e9/L    Abs Immature Granulocytes 0.0 0 - 0.4 10e9/L    Absolute Nucleated RBC 0.0    Potassium   Result Value Ref Range    Potassium 3.6 3.4 - 5.3 mmol/L       Mackenzie Tejada, WILLIS, CNP  Cardiology  Pager:  [phone]

## 2019-04-19 NOTE — PROVIDER NOTIFICATION
MD notified, pt with multiple oozing blisters on abdomen, spreading over abdomen/ red pinpoint rash on torso

## 2019-04-19 NOTE — PLAN OF CARE
DATE & TIME: 2300-0730  ORIENTATION: A&O x3, d/o situation, forgetful  BEHAVIOR & AGGRESSION TOOL COLOR: green  CIWA SCORE: n/a  ABNL VS/O2: VSS on RA  MOBILITY: A1 to BR  PAIN MANAGMENT: denied pain  DIET: NPO for possible Bone Biopsy  BOWEL/BLADDER: can be incont at times  ABNL LAB/BG: Plat 83. Hgb 10.3.  DRAIN/DEVICES: PIV SL  TELEMETRY RHYTHM: n/a  SKIN: WDL  TESTS/PROCEDURES: Bone Marrow Biopsy today - if covered by insurance, if not then pt can do procedure outpatient. CC looking into insurance  D/C DAY/GOALS/PLACE: TCU - place TBD. Needs prior insurance-auth   OTHER IMPORTANT INFO: Card and Hem/Onc signed off. PT nadia TCU

## 2019-04-20 ENCOUNTER — APPOINTMENT (OUTPATIENT)
Dept: OCCUPATIONAL THERAPY | Facility: CLINIC | Age: 84
End: 2019-04-20
Payer: COMMERCIAL

## 2019-04-20 LAB
CREAT SERPL-MCNC: 0.93 MG/DL (ref 0.66–1.25)
GFR SERPL CREATININE-BSD FRML MDRD: 73 ML/MIN/{1.73_M2}

## 2019-04-20 PROCEDURE — 97530 THERAPEUTIC ACTIVITIES: CPT | Mod: GO | Performed by: OCCUPATIONAL THERAPIST

## 2019-04-20 PROCEDURE — 99232 SBSQ HOSP IP/OBS MODERATE 35: CPT | Performed by: INTERNAL MEDICINE

## 2019-04-20 PROCEDURE — 82565 ASSAY OF CREATININE: CPT | Performed by: INTERNAL MEDICINE

## 2019-04-20 PROCEDURE — 36415 COLL VENOUS BLD VENIPUNCTURE: CPT | Performed by: INTERNAL MEDICINE

## 2019-04-20 PROCEDURE — 25000132 ZZH RX MED GY IP 250 OP 250 PS 637: Performed by: INTERNAL MEDICINE

## 2019-04-20 PROCEDURE — 25000132 ZZH RX MED GY IP 250 OP 250 PS 637: Performed by: NURSE PRACTITIONER

## 2019-04-20 PROCEDURE — G0378 HOSPITAL OBSERVATION PER HR: HCPCS

## 2019-04-20 RX ADMIN — APIXABAN 2.5 MG: 2.5 TABLET, FILM COATED ORAL at 10:00

## 2019-04-20 RX ADMIN — TAMSULOSIN HYDROCHLORIDE 0.4 MG: 0.4 CAPSULE ORAL at 10:00

## 2019-04-20 RX ADMIN — LEVOTHYROXINE SODIUM 100 MCG: 100 TABLET ORAL at 10:00

## 2019-04-20 RX ADMIN — TRIAMCINOLONE ACETONIDE: 0.25 CREAM TOPICAL at 10:01

## 2019-04-20 RX ADMIN — SUCRALFATE 1 G: 1 TABLET ORAL at 18:04

## 2019-04-20 RX ADMIN — CARVEDILOL 3.12 MG: 3.12 TABLET, FILM COATED ORAL at 18:04

## 2019-04-20 RX ADMIN — SIMVASTATIN 20 MG: 20 TABLET, FILM COATED ORAL at 21:18

## 2019-04-20 RX ADMIN — CARVEDILOL 3.12 MG: 3.12 TABLET, FILM COATED ORAL at 10:00

## 2019-04-20 RX ADMIN — BUMETANIDE 1 MG: 1 TABLET ORAL at 10:00

## 2019-04-20 RX ADMIN — APIXABAN 2.5 MG: 2.5 TABLET, FILM COATED ORAL at 21:18

## 2019-04-20 RX ADMIN — BUPROPION HYDROCHLORIDE 150 MG: 150 TABLET, FILM COATED, EXTENDED RELEASE ORAL at 10:00

## 2019-04-20 RX ADMIN — NYSTATIN 500000 UNITS: 500000 SUSPENSION ORAL at 10:00

## 2019-04-20 RX ADMIN — SUCRALFATE 1 G: 1 TABLET ORAL at 10:00

## 2019-04-20 RX ADMIN — NYSTATIN 500000 UNITS: 500000 SUSPENSION ORAL at 18:05

## 2019-04-20 RX ADMIN — FINASTERIDE 5 MG: 5 TABLET, FILM COATED ORAL at 10:00

## 2019-04-20 NOTE — PROGRESS NOTES
[facility]    Hospitalist Progress Note    Interval History   - Rash stable, less itchy with cream  - Pending Bmbx results  - Medically stable to discharge, awaiting TCU auth    Assessment & Plan   Summary: Bairon Foster is a 88 year old male with PMH recent admission 4/8-4/13 for CHF, chronic afib, CAD, chronic pancytopenia, JIM, who was admitted on 4/16/2019 with hematemesis. GI consulted, Hgb stable, suspect likely epistaxis, now signed off. Underwent Bone marrow biopsy 4/19 to evaluate for pancytopenia.    Bullous pemphigoid: Noted bullae over trunk, some scraped off. [facility]  - Continue triamcinolone cream    Hematemesis probably secondary to epistaxis, resolved: Noted bloody emesis at care facility. Hx of GI bleed. On admission noted relative hypotension. GI consulted--Hgb have been stable actually increasing--suspect epistaxis, signed off.  - Resumed half dose Eliquis    Encephalopathy, resolved  Suspected cognitive impairment  Noted delirium on admission, however on 4/18 notably improved, conversing appropriately but tangential at times. Appears fully back to baseline on 4/20.    Combined systolic and diastolic heart failure  Mild pulm HTN  CAD  Hospitalized 4/8/2019 to 4/13/2019 for this. Does not appear to be in exacerbation currently. Known valvular component to heart failure with 3+ mitral valve regurgitation with plan for mitral clipping  - Coreg 3.125 mg daily  - Daily Bumex  - Continue to hold spironolactone  - Compression stockings    Chronic afib: 0.5-year battery life remaining on pacemaker (implanted for SSS w/ pauses) with plan for generator change in the coming months.  Chadsvasc score of 6 for age, CHF, CVA, CAD.   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 Hemonc consulted-- recommend bone marrow biopsy. Last Bmbx was 7-10 years ago. S/p bone  marrow biopsy 4/19.  - F/u Hemonc as outpatient    JIM: CPAP    DVT Prophylaxis: Pneumatic Compression Devices  Code Status: DNR  PT/OT: ordered    Disposition: Expected discharge to TCU pending insurance auth    Lokesh Waite MD  Text Page  (7am to 6pm)  -Data reviewed today: I reviewed all new labs and imaging results over the last 24 hours.    Physical Exam   Temp: 98.5  F (36.9  C) Temp src: Oral BP: 105/53 Pulse: 60 Heart Rate: 59 Resp: 18 SpO2: 93 % O2 Device: None (Room air)    Vitals:    04/16/19 2348 04/17/19 0500 04/18/19 0700   Weight: 85.7 kg (189 lb) 83.4 kg (183 lb 13.8 oz) 84.1 kg (185 lb 6.5 oz)     Vital Signs with Ranges  Temp:  [97.8  F (36.6  C)-99.1  F (37.3  C)] 98.5  F (36.9  C)  Pulse:  [60] 60  Heart Rate:  [56-60] 59  Resp:  [16-18] 18  BP: (105-115)/(53-67) 105/53  SpO2:  [93 %-97 %] 93 %  I/O last 3 completed shifts:  In: 100 [I.V.:100]  Out: 850 [Urine:850]  O2 requirements: none    Constitutional: Male in NAD  HEENT: Eyes nonicteric, oral mucosa moist  Cardiovascular: Irregular, normal S1/2, no m/r/g  Respiratory: CTAB, no wheezing or crackles  Vascular: 1+ BLE pitting edema  GI: Normoactive bowel sounds, nontender  Neuro/Psych: Appropriate affect and mood. A&Ox3, moves all extremities    Medications       apixaban ANTICOAGULANT  2.5 mg Oral BID     bumetanide  1 mg Oral Daily     buPROPion  150 mg Oral QAM     carvedilol  3.125 mg Oral BID w/meals     finasteride  5 mg Oral Daily     levothyroxine  100 mcg Oral Daily     nystatin  500,000 Units Swish & Spit 4x Daily     simvastatin  20 mg Oral QPM     sodium chloride (PF)  3 mL Intracatheter Q8H     sucralfate  1 g Oral BID w/meals     tamsulosin  0.4 mg Oral Daily     triamcinolone   Topical BID       Data   Recent Labs   Lab 04/20/19  0935 04/19/19  0857 04/18/19  0834 04/17/19  0911 04/17/19  0001  04/15/19  0955   WBC  --  3.0* 3.3* 2.0* 2.2*   < >  --    HGB  --  9.9* 10.3* 9.4* 9.7*   < >  --    MCV  --  99 99 98 98   < >   --    PLT  --  83* 83* 69* 67*   < >  --    NA  --   --  143  --  142  --  140   POTASSIUM  --  3.6 3.4  --  3.4  --  4.0   CHLORIDE  --   --  111*  --  109  --  100   CO2  --   --  25  --  27  --  30*   BUN  --   --  25  --  29  --  30   CR 0.93  --  1.00  --  1.14  --  1.54*   ANIONGAP  --   --  7  --  6  --  14   TEODORO  --   --  8.3*  --  8.0*  --  10.3   GLC  --   --  97  --  97  --  110*   ALBUMIN  --   --   --   --  3.1*  --   --    PROTTOTAL  --   --   --   --  6.7*  --   --    BILITOTAL  --   --   --   --  1.7*  --   --    ALKPHOS  --   --   --   --  59  --   --    ALT  --   --   --   --  20  --   --    AST  --   --   --   --  31  --   --    LIPASE  --   --   --   --  329  --   --     < > = values in this interval not displayed.       Imaging:   No results found for this or any previous visit (from the past 24 hour(s)).

## 2019-04-20 NOTE — PLAN OF CARE
Pt A&Ox4, forgetful at times, up w/ 1/ walker gait belt. VSS on room air. Up to chair for meals. Ambulating in halls. Bone marrow biopsy site CDI. Medication applied to rash, bulbular blisters on lower abdomen, cleanses and covered. Waiting for authorization from insurance for discharge to TCU

## 2019-04-20 NOTE — PLAN OF CARE
Discharge Planner OT   Patient plan for discharge: TCU  Current status: wife present, patient agreeable to activity.  Declined ADLs, but agreeable to ambulation to build strength for ADLs.  Supine <> sit SBA.  Beka buckley.  SBA ambulation with 2WW x125', with 2 LOB. Patient thought the holiday tomorrow was a birthday party.   Barriers to return to prior living situation: cognition, decreased balance, poor activity tolerance  Recommendations for discharge: TCU  Rationale for recommendations: patient would benefit from continued therapy to increase safety and independence with ADLs       Entered by: NELSON HOLBROOK 04/20/2019 2:36 PM

## 2019-04-20 NOTE — PROGRESS NOTES
HEIDI    D: HEIDI following for discharge needs. [facility] did not obtain authorization from [employer] at this time. HEIDI spoke with patient's spouse on the phone to update. Kristi shared that she received authorization for observation stay at . HEIDI informed patient that [facility] did not receive authorization at this time and the authorization for observation doesn't apply to TCU coverage. Kristi states that she will call SW back as she believes that she has authorization from [employer] for TCU coverage.     D: HEIDI will wait to hear back from patient's wife and [facility] regarding SNF authorization.

## 2019-04-20 NOTE — PLAN OF CARE
Pt is A/Ox3 d/o to situation slightly forgetful, VSS on RA, denied pain. BS active, +flatus, AUO. Tolerating regular diet. IV SL. Possible D/C in AM to [facility] pending insurance.

## 2019-04-20 NOTE — PLAN OF CARE
DATE & TIME: 4/19/19 2059  ORIENTATION: A/Ox3, disoriented to situation, forgetful  BEHAVIOR & AGGRESSION TOOL COLOR: Green  ABNL VS/O2: VSS on RA  MOBILITY: Assist x1 with walker and gait belt  PAIN MANAGMENT: Denies  DIET: Regular  BOWEL/BLADDER: Continent  ABNL LAB/BG: Hgb stable 9.9; platelet 83  DRAIN/DEVICES: IV SL  SKIN: Rash on trunk, bone marrow biopsy incision, scabs, bruises and skin tears  TESTS/PROCEDURES: Bone marrow biopsy earlier today  D/C DAY/GOALS/PLACE: Plan for discharge to TCU ([facility]) possibly tomorrow pending insurance

## 2019-04-21 PROCEDURE — 99232 SBSQ HOSP IP/OBS MODERATE 35: CPT | Performed by: INTERNAL MEDICINE

## 2019-04-21 PROCEDURE — 99207 ZZC CDG-MDM COMPONENT: MEETS MODERATE - UP CODED: CPT | Performed by: INTERNAL MEDICINE

## 2019-04-21 PROCEDURE — 25000132 ZZH RX MED GY IP 250 OP 250 PS 637: Performed by: INTERNAL MEDICINE

## 2019-04-21 PROCEDURE — G0378 HOSPITAL OBSERVATION PER HR: HCPCS

## 2019-04-21 PROCEDURE — 25000132 ZZH RX MED GY IP 250 OP 250 PS 637: Performed by: NURSE PRACTITIONER

## 2019-04-21 RX ADMIN — NYSTATIN 500000 UNITS: 500000 SUSPENSION ORAL at 18:27

## 2019-04-21 RX ADMIN — NYSTATIN 500000 UNITS: 500000 SUSPENSION ORAL at 09:44

## 2019-04-21 RX ADMIN — SIMVASTATIN 20 MG: 20 TABLET, FILM COATED ORAL at 21:21

## 2019-04-21 RX ADMIN — APIXABAN 2.5 MG: 2.5 TABLET, FILM COATED ORAL at 21:21

## 2019-04-21 RX ADMIN — FINASTERIDE 5 MG: 5 TABLET, FILM COATED ORAL at 09:43

## 2019-04-21 RX ADMIN — SUCRALFATE 1 G: 1 TABLET ORAL at 18:27

## 2019-04-21 RX ADMIN — LEVOTHYROXINE SODIUM 100 MCG: 100 TABLET ORAL at 09:43

## 2019-04-21 RX ADMIN — TAMSULOSIN HYDROCHLORIDE 0.4 MG: 0.4 CAPSULE ORAL at 09:43

## 2019-04-21 RX ADMIN — CARVEDILOL 3.12 MG: 3.12 TABLET, FILM COATED ORAL at 18:27

## 2019-04-21 RX ADMIN — CARVEDILOL 3.12 MG: 3.12 TABLET, FILM COATED ORAL at 09:43

## 2019-04-21 RX ADMIN — TRIAMCINOLONE ACETONIDE: 0.25 CREAM TOPICAL at 09:44

## 2019-04-21 RX ADMIN — BUMETANIDE 1 MG: 1 TABLET ORAL at 09:43

## 2019-04-21 RX ADMIN — BUPROPION HYDROCHLORIDE 150 MG: 150 TABLET, FILM COATED, EXTENDED RELEASE ORAL at 09:43

## 2019-04-21 RX ADMIN — SUCRALFATE 1 G: 1 TABLET ORAL at 09:43

## 2019-04-21 RX ADMIN — APIXABAN 2.5 MG: 2.5 TABLET, FILM COATED ORAL at 09:43

## 2019-04-21 NOTE — PLAN OF CARE
DATE & TIME: 4/20/19, 3926 - 8811  ORIENTATION: A&O x 3. Disoriented to situation  BEHAVIOR & AGGRESSION TOOL COLOR: Green  CIWA SCORE: N/a  ABNL VS/O2: VSS on room air  MOBILITY: Up assist x 1 with walker and gait belt  PAIN MANAGMENT: Denied pain  DIET: Regular  BOWEL/BLADDER: Continent  ABNL LAB/BG: N/a  DRAIN/DEVICES: PIV SL  TELEMETRY RHYTHM: N/a  SKIN: Rash, bruises and blisters to lower abdomen. Bone biopsy site CDI  TESTS/PROCEDURES: N/a  D/C DAY/GOALS/PLACE: Discharge to TCU pending prior authorization from insurance

## 2019-04-21 NOTE — PROGRESS NOTES
[facility]    Hospitalist Progress Note    Interval History   - Rash stable, less itchy with cream  - Pending Bmbx results  - No new issues today. Medically stable to discharge, awaiting TCU auth    Assessment & Plan   Summary: Bairon Foster is a 88 year old male with PMH recent admission 4/8-4/13 for CHF, chronic afib, CAD, chronic pancytopenia, JIM, who was admitted on 4/16/2019 with hematemesis. GI consulted, Hgb stable, suspect likely epistaxis, now signed off. Underwent Bone marrow biopsy 4/19 to evaluate for pancytopenia.    Hematemesis probably secondary to epistaxis, resolved: Noted bloody emesis at care facility. Hx of GI bleed. On admission noted relative hypotension. GI consulted--Hgb have been stable actually increasing--suspect epistaxis, signed off.  - Resumed half dose Eliquis    Bullous pemphigoid: Noted bullae over trunk, some scraped off. [facility]  - Continue triamcinolone cream    Encephalopathy, resolved  Suspected cognitive impairment  Noted delirium on admission, however on 4/18 notably improved, conversing appropriately but tangential at times. Appears fully back to baseline on 4/20.    Combined systolic and diastolic heart failure  Mild pulm HTN  CAD  Hospitalized 4/8/2019 to 4/13/2019 for this. Does not appear to be in exacerbation currently. Known valvular component to heart failure with 3+ mitral valve regurgitation with plan for mitral clipping  - Coreg 3.125 mg daily  - Daily Bumex  - Continue to hold spironolactone  - Compression stockings    Chronic afib: 0.5-year battery life remaining on pacemaker (implanted for SSS w/ pauses) with plan for generator change in the coming months.  Chadsvasc score of 6 for age, CHF, CVA, CAD.   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 Hemonc consulted-- recommend bone marrow biopsy. Last Bmbx was 7-10  years ago. S/p bone marrow biopsy 4/19.  - F/u Hemonc as outpatient    JIM: CPAP    DVT Prophylaxis: Pneumatic Compression Devices  Code Status: DNR  PT/OT: ordered    Disposition: Expected discharge to TCU pending insurance auth    Lokesh Waite MD  Text Page  (7am to 6pm)  -Data reviewed today: I reviewed all new labs and imaging results over the last 24 hours.    Physical Exam   Temp: 97.4  F (36.3  C) Temp src: Oral BP: 112/60   Heart Rate: 59 Resp: 16 SpO2: 96 % O2 Device: None (Room air)    Vitals:    04/16/19 2348 04/17/19 0500 04/18/19 0700   Weight: 85.7 kg (189 lb) 83.4 kg (183 lb 13.8 oz) 84.1 kg (185 lb 6.5 oz)     Vital Signs with Ranges  Temp:  [97.4  F (36.3  C)-98.7  F (37.1  C)] 97.4  F (36.3  C)  Heart Rate:  [59-60] 59  Resp:  [16] 16  BP: (105-113)/(56-69) 112/60  SpO2:  [95 %-100 %] 96 %  I/O last 3 completed shifts:  In: 120 [P.O.:120]  Out: 1525 [Urine:1525]  O2 requirements: none    Constitutional: Male in NAD  HEENT: Eyes nonicteric, oral mucosa moist  Cardiovascular: Irregular, normal S1/2, no m/r/g  Respiratory: CTAB, no wheezing or crackles  Vascular: 1+ BLE pitting edema  GI: Normoactive bowel sounds, nontender  Neuro/Psych: Appropriate affect and mood. A&Ox3, moves all extremities    Medications       apixaban ANTICOAGULANT  2.5 mg Oral BID     bumetanide  1 mg Oral Daily     buPROPion  150 mg Oral QAM     carvedilol  3.125 mg Oral BID w/meals     finasteride  5 mg Oral Daily     levothyroxine  100 mcg Oral Daily     nystatin  500,000 Units Swish & Spit 4x Daily     simvastatin  20 mg Oral QPM     sodium chloride (PF)  3 mL Intracatheter Q8H     sucralfate  1 g Oral BID w/meals     tamsulosin  0.4 mg Oral Daily     triamcinolone   Topical BID       Data   Recent Labs   Lab 04/20/19  0935 04/19/19  0857 04/18/19  0834 04/17/19  0911 04/17/19  0001  04/15/19  0955   WBC  --  3.0* 3.3* 2.0* 2.2*   < >  --    HGB  --  9.9* 10.3* 9.4* 9.7*   < >  --    MCV  --  99 99 98 98   < >  --     PLT  --  83* 83* 69* 67*   < >  --    NA  --   --  143  --  142  --  140   POTASSIUM  --  3.6 3.4  --  3.4  --  4.0   CHLORIDE  --   --  111*  --  109  --  100   CO2  --   --  25  --  27  --  30*   BUN  --   --  25  --  29  --  30   CR 0.93  --  1.00  --  1.14  --  1.54*   ANIONGAP  --   --  7  --  6  --  14   TEODORO  --   --  8.3*  --  8.0*  --  10.3   GLC  --   --  97  --  97  --  110*   ALBUMIN  --   --   --   --  3.1*  --   --    PROTTOTAL  --   --   --   --  6.7*  --   --    BILITOTAL  --   --   --   --  1.7*  --   --    ALKPHOS  --   --   --   --  59  --   --    ALT  --   --   --   --  20  --   --    AST  --   --   --   --  31  --   --    LIPASE  --   --   --   --  329  --   --     < > = values in this interval not displayed.       Imaging:   No results found for this or any previous visit (from the past 24 hour(s)).

## 2019-04-21 NOTE — PLAN OF CARE
Pt A&Ox4, VSS on room air, up w/ SBA, ambulating in halls, no c/o of pain. Rash improving, dressings to blisters changed. Up to chair for meals. Discharge tomorrow morning pending insurance.

## 2019-04-21 NOTE — PLAN OF CARE
DATE & TIME: 4/20/19 2201  ORIENTATION: A/Ox4, forgetful  BEHAVIOR & AGGRESSION TOOL COLOR: Green  ABNL VS/O2: VSS on RA  MOBILITY: Assist x1 with walker and gait belt  PAIN MANAGMENT: Denies  DIET: Regular  BOWEL/BLADDER: Urinal, ambulating to bathroom  DRAIN/DEVICES: IV SL  SKIN: Bruising, rash, bone marrow biopsy site CDI, blisters on lower abdomen.  D/C DAY/GOALS/PLACE: Discharge to TCU pending authorization from insurance

## 2019-04-21 NOTE — PROGRESS NOTES
"SW    KRISTI) Patient was accepted at [facility] but is awaiting authorization from his [employer] insurance for coverage.    I) Received  Notification from Admission rep, Jennifer at [facility], as follows:    \"Our doctor has reviewed your request for Bairon Foster,  31, and has decided on a Peer to Peer consultation w/either the NP, PA or MD currently treating him. Deadline is 4:00pm Central Time, Monday, . They need to call in to [phone], prompt 5, to get into the Peer to Peer area. They will need 2 identifiers at that time, his name and .\"    Will place a sticky note in the chart for MD.    P) Following.  "

## 2019-04-22 ENCOUNTER — TELEPHONE (OUTPATIENT)
Dept: FAMILY MEDICINE | Facility: CLINIC | Age: 84
End: 2019-04-22

## 2019-04-22 VITALS
TEMPERATURE: 98.3 F | BODY MASS INDEX: 23.79 KG/M2 | HEART RATE: 60 BPM | DIASTOLIC BLOOD PRESSURE: 51 MMHG | OXYGEN SATURATION: 96 % | RESPIRATION RATE: 16 BRPM | SYSTOLIC BLOOD PRESSURE: 103 MMHG | WEIGHT: 185.41 LBS | HEIGHT: 74 IN

## 2019-04-22 LAB
COPATH REPORT: NORMAL
COPATH REPORT: NORMAL

## 2019-04-22 PROCEDURE — 99239 HOSP IP/OBS DSCHRG MGMT >30: CPT | Performed by: INTERNAL MEDICINE

## 2019-04-22 PROCEDURE — 25000132 ZZH RX MED GY IP 250 OP 250 PS 637: Performed by: NURSE PRACTITIONER

## 2019-04-22 PROCEDURE — G0463 HOSPITAL OUTPT CLINIC VISIT: HCPCS

## 2019-04-22 PROCEDURE — G0378 HOSPITAL OBSERVATION PER HR: HCPCS

## 2019-04-22 PROCEDURE — 25000132 ZZH RX MED GY IP 250 OP 250 PS 637: Performed by: INTERNAL MEDICINE

## 2019-04-22 RX ADMIN — APIXABAN 2.5 MG: 2.5 TABLET, FILM COATED ORAL at 08:24

## 2019-04-22 RX ADMIN — TAMSULOSIN HYDROCHLORIDE 0.4 MG: 0.4 CAPSULE ORAL at 08:25

## 2019-04-22 RX ADMIN — BUMETANIDE 1 MG: 1 TABLET ORAL at 08:24

## 2019-04-22 RX ADMIN — TRIAMCINOLONE ACETONIDE: 0.25 CREAM TOPICAL at 08:29

## 2019-04-22 RX ADMIN — SUCRALFATE 1 G: 1 TABLET ORAL at 08:25

## 2019-04-22 RX ADMIN — CARVEDILOL 3.12 MG: 3.12 TABLET, FILM COATED ORAL at 08:24

## 2019-04-22 RX ADMIN — FINASTERIDE 5 MG: 5 TABLET, FILM COATED ORAL at 08:24

## 2019-04-22 RX ADMIN — NYSTATIN 500000 UNITS: 500000 SUSPENSION ORAL at 08:24

## 2019-04-22 RX ADMIN — BUPROPION HYDROCHLORIDE 150 MG: 150 TABLET, FILM COATED, EXTENDED RELEASE ORAL at 08:24

## 2019-04-22 RX ADMIN — LEVOTHYROXINE SODIUM 100 MCG: 100 TABLET ORAL at 08:24

## 2019-04-22 NOTE — PLAN OF CARE
PT: Patient discharged home with home PT. Was denied coverage for a TCU stay by [employer]. PT goals not met.    Physical Therapy Discharge Summary    Reason for therapy discharge:    Discharged to home with home therapy.    Progress towards therapy goal(s). See goals on Care Plan in [facility] electronic health record for goal details.  Goals not met.  Barriers to achieving goals:   discharge from facility.    Therapy recommendation(s):    Continued therapy is recommended.  Rationale/Recommendations:  eval and treat with home PT recommended as pt cannot go to TCU.

## 2019-04-22 NOTE — DISCHARGE SUMMARY
[facility]    Hospitalist Discharge Summary       Date of Admission:  4/16/2019  Date of Discharge:  4/22/2019  Discharging Provider: Lokesh Waite MD      Discharge Diagnoses   Hematemesis probably secondary to epistaxis, resolved  Bullous pemphigoid  Encephalopathy, resolved  Suspected cognitive impairment  Combined systolic and diastolic heart failure    Follow-ups Needed After Discharge   Follow-up Appointments     Follow-up and recommended labs and tests       Follow up with primary care provider, Mg Nelson, within 7   days for hospital follow- up.  No follow up labs or test are needed.  - Follow up with cardiology clinic in 1-2 weeks  - Follow up with HemOnc clinic in 1-2 weeks  - Follow up with GI clinic in about a month             Unresulted Labs Ordered in the Past 30 Days of this Admission     Date and Time Order Name Status Description    4/19/2019 0932 Bone marrow biopsy In process     4/17/2019 1220 Leukemia Lymphoma Evaluation (Flow Cytometry) In process     4/17/2019 1220 CHROMOSOME BONE MARROW With Professional Interpretation In process       These results will be followed up by HemOnc clinic    Hospital Course   Bairon Foster is a 88 year old male with PMH recent admission 4/8-4/13 for acute systolic CHF, chronic afib, CAD, chronic pancytopenia, JIM, who was admitted on 4/16/2019 with hematemesis. GI consulted, Hgb stable, suspect likely epistaxis, now signed off. Underwent Bone marrow biopsy 4/19 to evaluate for pancytopenia. Patient to be discharged on low dose apixaban until cardiology follow up for consideration of watchman device. Also consideration for mitral clip for his congestive heart failure.    Hematemesis probably secondary to epistaxis, resolved: Noted bloody emesis at care facility. Hx of GI bleed. On admission noted relative hypotension. GI consulted--Hgb have been stable actually increasing--suspect epistaxis, signed off. Resumed half dose  Eliquis prior to discharge, no bleeding.    Bullous pemphigoid: Noted bullae over trunk, some scraped off. [facility]  - Follow up with outpatient dermatologist.    Combined systolic and diastolic heart failure  Mild pulm HTN  CAD  Hospitalized 4/8/2019 to 4/13/2019 for this. Does not appear to be in exacerbation currently. Known valvular component to heart failure with 3+ mitral valve regurgitation with plan for mitral clipping  - Continue to hold spironolactone at discharge due to soft blood pressures    Chronic afib: 0.5-year battery life remaining on pacemaker (implanted for SSS w/ pauses) with plan for generator change in the coming months.  Chadsvasc score of 6 for age, CHF, CVA, CAD. 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 Hemonc consulted-- s/p bone marrow biopsy 4/19. Results pending  - F/u Hemonc as outpatient    Consultations This Hospital Stay   GASTROENTEROLOGY IP CONSULT  CARDIOLOGY IP CONSULT  PHYSICAL THERAPY ADULT IP CONSULT  OCCUPATIONAL THERAPY ADULT IP CONSULT  HEMATOLOGY & ONCOLOGY IP CONSULT  WOUND OSTOMY CONTINENCE NURSE  IP CONSULT  PHYSICAL THERAPY ADULT IP CONSULT  WOUND OSTOMY CONTINENCE NURSE  IP CONSULT    Code Status   DNR    Time Spent on this Encounter   I, Lokesh Waite, personally saw the patient today and spent approximately 45 minutes discharging this patient.       Lokesh Waite MD  [facility]  ______________________________________________________________________    Physical Exam   Vital Signs: Temp: 98.3  F (36.8  C) Temp src: Oral BP: 103/51 Pulse: 60 Heart Rate: 60 Resp: 16 SpO2: 96 % O2 Device: None (Room air)    Weight: 185 lbs 6.51 oz    Constitutional: Male in NAD  HEENT: Eyes nonicteric, oral mucosa moist  Cardiovascular: Irregular, normal S1/2, no m/r/g  Respiratory: CTAB, no wheezing or crackles  Vascular: 1+ BLE pitting  edema  GI: Normoactive bowel sounds, nontender  Neuro/Psych: Appropriate affect and mood. A&Ox3, moves all extremities       Primary Care Physician   Mg Nelson    Discharge Disposition   Discharged to home  Condition at discharge: Stable    Significant Results and Procedures   Most Recent 3 CBC's:  Recent Labs   Lab Test 04/19/19  0857 04/18/19  0834 04/17/19  0911   WBC 3.0* 3.3* 2.0*   HGB 9.9* 10.3* 9.4*   MCV 99 99 98   PLT 83* 83* 69*     Most Recent 3 BMP's:  Recent Labs   Lab Test 04/20/19  0935 04/19/19  0857 04/18/19  0834 04/17/19  0001 04/15/19  0955   NA  --   --  143 142 140   POTASSIUM  --  3.6 3.4 3.4 4.0   CHLORIDE  --   --  111* 109 100   CO2  --   --  25 27 30*   BUN  --   --  25 29 30   CR 0.93  --  1.00 1.14 1.54*   ANIONGAP  --   --  7 6 14   TEODORO  --   --  8.3* 8.0* 10.3   GLC  --   --  97 97 110*     Most Recent 2 LFT's:  Recent Labs   Lab Test 04/17/19  0001 04/08/19  1218   AST 31 25   ALT 20 17   ALKPHOS 59 67   BILITOTAL 1.7* 1.4*     Most Recent 3 INR's:  Recent Labs   Lab Test 11/20/18  1339 03/21/18  0714 03/19/11  0617   INR 1.79* 1.48* 1.38*     Most Recent INR's and Anticoagulation Dosing History:  Anticoagulation Dose History     Recent Dosing and Labs Latest Ref Rng & Units 12/8/2009 3/16/2011 3/17/2011 3/18/2011 3/19/2011 3/21/2018 11/20/2018    INR 0.86 - 1.14 3.43(H) 1.17(H) 1.21(H) 1.40(H) 1.38(H) 1.48(H) 1.79(H)      ,   Results for orders placed or performed during the hospital encounter of 04/08/19   XR Chest 2 Views    Narrative    CHEST TWO VIEWS  4/8/2019 12:39 PM     HISTORY:  Pulmonary edema.    COMPARISON: 6/25/2014.      Impression    IMPRESSION: Mild opacity at both lung bases is nonspecific, but could  be related to atelectasis or edema. Cardiac enlargement has increased  slightly since the previous exam. There is mild pulmonary venous  congestion, also increased since the previous exam. Single-lead  cardiac device left chest wall, with lead tip unchanged  in the RV. No  pleural effusions are identified. Aortic calcification.    RAZA MAGANA MD       Discharge Orders      Home Care PT Referral for Hospital Discharge      Home Care OT Referral for Hospital Discharge      Home care nursing referral      Reason for your hospital stay    You were hospitalized for vomiting blood--likely related to a nosebleed.     Activity    Your activity upon discharge: ambulate in house     Monitor and record    weight every day     When to contact your care team    Call your primary doctor if you have any of the following: increased leg swellling, weight gain of 5 pounds in two days, or increased shortness of breath, or dark/maroon stools     MD face to face encounter    Documentation of Face to Face and Certification for Home Health Services    I certify that patient: Bairon Foster is under my care and that I, or a nurse practitioner or physician's assistant working with me, had a face-to-face encounter that meets the physician face-to-face encounter requirements with this patient on: 4/22/2019.    This encounter with the patient was in whole, or in part, for the following medical condition, which is the primary reason for home health care: CHF, pancytopenia.    I certify that, based on my findings, the following services are medically necessary home health services: Nursing, Occupational Therapy and Physical Therapy.    My clinical findings support the need for the above services because: Nurse is needed: To assess heart failure after changes in medications or other medical regimen. and To provide assessment and oversight required in the home to assure adherence to the medical plan due to: CHF, pancytopenia.., Occupational Therapy Services are needed to assess and treat cognitive ability and address ADL safety due to impairment in deconditioning, ambulation. and Physical Therapy Services are needed to assess and treat the following functional impairments: deconditioning,  ambulation.    Further, I certify that my clinical findings support that this patient is homebound (i.e. absences from home require considerable and taxing effort and are for medical reasons or [employer] services or infrequently or of short duration when for other reasons) because: Leaving home is medically contraindicated for the following reason(s): Dyspnea on exertion that makes it so they cannot leave their home for needed services without clinical deterioration...    Based on the above findings. I certify that this patient is confined to the home and needs intermittent skilled nursing care, physical therapy and/or speech therapy.  The patient is under my care, and I have initiated the establishment of the plan of care.  This patient will be followed by a physician who will periodically review the plan of care.  Physician/Provider to provide follow up care: Mg Nelson    [facility] physician (the Medicare certified [place] provider): Lokesh Waite  Physician Signature: See electronic signature associated with these discharge orders.  Date: 4/22/2019     Follow-up and recommended labs and tests     Follow up with primary care provider, Mg Nelson, within 7 days for hospital follow- up.  No follow up labs or test are needed.  - Follow up with cardiology clinic in 1-2 weeks  - Follow up with HemOnc clinic in 1-2 weeks  - Follow up with GI clinic in about a month     DNR (Do Not Resuscitate)     Diet    Follow this diet upon discharge: Orders Placed This Encounter      Cardiac diet, limit sodium to 2 grams or less per day     Discharge Medications   Current Discharge Medication List      START taking these medications    Details   apixaban ANTICOAGULANT (ELIQUIS) 2.5 MG tablet Take 1 tablet (2.5 mg) by mouth 2 times daily  Qty: 60 tablet, Refills: 0    Associated Diagnoses: Persistent atrial fibrillation (H)         CONTINUE these medications which have NOT CHANGED    Details    acetaminophen (TYLENOL) 500 MG tablet Take 2 tablets (1,000 mg) by mouth every 6 hours as needed for mild pain  Qty: 100 tablet, Refills: 0    Associated Diagnoses: Arthritis      bumetanide (BUMEX) 1 MG tablet Take 1 tablet (1 mg) by mouth daily  Qty: 30 tablet, Refills: 0    Comments: Future refills by PCP Dr. Mg Nelson with phone number [phone].  Associated Diagnoses: Acute systolic CHF (congestive heart failure) (H)      buPROPion (WELLBUTRIN XL) 150 MG 24 hr tablet Take 150 mg by mouth every morning      Carboxymethylcellulose Sod PF (CELLUVISC/REFRESH LIQUIGEL) 1 % ophthalmic solution Place 1 drop into both eyes 3 times daily as needed for dry eyes      carvedilol (COREG) 6.25 MG tablet TAKE 1 TABLET (6.25 MG) BY MOUTH 2 TIMES DAILY (WITH MEALS)  Qty: 180 tablet, Refills: 3    Associated Diagnoses: Ischemic cardiomyopathy      diclofenac (VOLTAREN) 1 % topical gel Apply 2 g topically 4 times daily    Associated Diagnoses: Arthritis      finasteride (PROSCAR) 5 MG tablet TAKE 1 TABLET EVERY DAY  Qty: 90 tablet, Refills: 3    Associated Diagnoses: Benign prostatic hyperplasia, unspecified whether lower urinary tract symptoms present      glucosamine-chondroitin 500-400 MG CAPS per capsule Take 2 capsules by mouth daily      ketotifen (ZADITOR) 0.025 % SOLN ophthalmic solution Place 1 drop into both eyes every 12 hours  Qty: 1 Bottle      KLOR-CON 20 MEQ CR tablet TAKE 1 TABLET EVERY DAY  Qty: 90 tablet, Refills: 3    Associated Diagnoses: Lymphedema of both lower extremities      levothyroxine (SYNTHROID/LEVOTHROID) 100 MCG tablet TAKE 1 TABLET EVERY DAY  Qty: 90 tablet, Refills: 3    Associated Diagnoses: Acquired hypothyroidism      LORazepam (ATIVAN) 1 MG tablet Take 0.5-1 tablets (0.5-1 mg) by mouth every 8 hours as needed for anxiety  Qty: 30 tablet, Refills: 5    Associated Diagnoses: Acute stress reaction      Multiple Vitamins-Minerals (CENTRUM SILVER) per tablet Take 1 tablet by mouth  daily      omeprazole (PRILOSEC) 20 MG DR capsule TAKE 1 CAPSULE TWICE DAILY  Qty: 180 capsule, Refills: 3    Associated Diagnoses: Gastroesophageal reflux disease without esophagitis      Polyethylene Glycol 3350 (MIRALAX PO) Take 1 packet by mouth At Bedtime       simvastatin (ZOCOR) 20 MG tablet Take 20 mg by mouth daily       sucralfate (CARAFATE) 1 GM tablet Take 1 tablet (1 g) by mouth 2 times daily  Qty: 180 tablet, Refills: 3    Associated Diagnoses: Esophageal reflux      tamsulosin (FLOMAX) 0.4 MG capsule TAKE 1 CAPSULE EVERY DAY  Qty: 90 capsule, Refills: 3    Associated Diagnoses: Benign prostatic hyperplasia with urinary hesitancy      triamcinolone (KENALOG) 0.025 % cream Apply topically 2 times daily as needed       COMPRESSION STOCKINGS 1 each daily  Qty: 2 each, Refills: 0    Associated Diagnoses: Varicose 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      ORDER FOR DME Auto-CPAP:Max 9 cm H2OMin 9 cm H2O  Continuous Lifetime need and heated humidity.     Qty: 1 Device, Refills: 0    Associated Diagnoses: Obstructive sleep apnea (adult) (pediatric); Other dyspnea and respiratory abnormality         STOP taking these medications       dabigatran ANTICOAGULANT (PRADAXA ANTICOAGULANT) 75 MG CAPS BLISTER PACK Comments:   Reason for Stopping:         isosorbide mononitrate (IMDUR) 30 MG 24 hr tablet Comments:   Reason for Stopping:         spironolactone (ALDACTONE) 25 MG tablet Comments:   Reason for Stopping:             Allergies   No Known Allergies

## 2019-04-22 NOTE — PLAN OF CARE
Occupational Therapy Discharge Summary    Reason for therapy discharge:    Discharged to home with home therapy.    Progress towards therapy goal(s). See goals on Care Plan in [facility] electronic health record for goal details.  Goals not met.  Barriers to achieving goals:   discharge from facility.    Therapy recommendation(s):    Continued therapy is recommended.  Rationale/Recommendations:  TCU was rec however patient was denied by insurance. Will have home care.

## 2019-04-22 NOTE — PROGRESS NOTES
SW  KRISTI) Patient has been denied TCU coverage from [facility] and now has discharge orders for home with home care for RN, PT and OT.    I) Met with patient. He states he lives in a 1 level home with his spouse and he feels he can manage adequately at home. We discussed home care. Offered a choice of agencies and he would like to use [facility]. A referral was sent. Patient's spouse and son will be arriving soon, to bring him home.    Cancelled the bed at [facility].    A) No further needs identified at this time.    P) Discharge home today with HC to follow.

## 2019-04-22 NOTE — PLAN OF CARE
DATE & TIME: 4/22/19 0556  ORIENTATION: Pt A/Ox4, forgetful  BEHAVIOR & AGGRESSION TOOL COLOR: Green  ABNL VS/O2: VSS on RA  MOBILITY: SBA with walker and gait belt  PAIN MANAGMENT: Denies  DIET: Regular  BOWEL/BLADDER: Ambulates to bathroom  DRAIN/DEVICES: IV SL  SKIN: Rash, blisters on abdomen, dressings changed. Skin tears. Bone marrow biopsy site, CDI.  D/C DAY/GOALS/PLACE: Discharge pending authorization from health insurance to TCU

## 2019-04-22 NOTE — TELEPHONE ENCOUNTER
Reason for Call:  Home Health Care    Shae with [facility] called regarding (reason for call): orders    Orders are needed for this patient. Skilled nursing    PT: na    OT: na    Skilled Nursing: will Dr Nelson follow pt for home care services via phone/fax?    Pt Provider: Dr Nelson    Phone Number Homecare Nurse can be reached at: [phone]    Can we leave a detailed message on this number? YES    Phone number patient can be reached at: Home number on file [phone] (home)    Best Time: any    Call taken on 4/22/2019 at 3:23 PM by DARYN FRANK

## 2019-04-22 NOTE — PROGRESS NOTES
Peer to peer with [employer] done. Patient does not meet the minimum CMS requirements for patient to be admitted to a skilled nursing facility, which is either ambulating less than 50 feet with 2WW, or minimal assist or more with transfers or ambulation. Will discuss this with family and plant to discharge patient home with home therapies today.

## 2019-04-22 NOTE — DISCHARGE INSTRUCTIONS
[facility] phone: [phone] or [phone]        Plan of care for wounds on upper back and lateral abdominal areas:  Every other day, after showerin. Clean in shower with soap and water, using a fresh, clean washcloth.  Rinse and dry  2. Cover with Mepilex Border dressings if wounds are draining-  Want to encourage drying out so leave open to the air when able    - follow any plans for skin care as laid out by [facility] MD's.  Continue to follow up with your regular [facility] MDs, etc upon discharge

## 2019-04-22 NOTE — PROGRESS NOTES
[facility] Nurse Inpatient Wound Assessment   Reason for consultation:  Follow up assessment of wounds on the back and new consult for bilateral, lateral abdominal wounds    Assessment  Pt states wounds on the bilateral, lateral abdomen are due to skin stripping from removing EKG stickers.  He says his pemphigoid wounds are not that big, the pemphigoid are the small more pin point looking little red rash.  Wound beds appear clean.  Will cover every other day after showering and encourage to leave open to air when they crust over    Back wounds due to unclear etiology - pressure/friction vs skin tears vs bullous pemphigoid.  Proximal wound is right on the spine bony prominence.    Status: initial assessment; wounds appear superficial, clean, dry, stable.      Pt states he has hx bullous pemphigoid which he treats with a special cream.  Pt thinks this is the cause of these wounds, as well as a few scabbed dry lesions on his upper chest, but unclear how reliable is this information.  For now will treat the wounds conservatively with foam dressings for protection and continue to monitor.      Per nurse pt has new rash.  Physician has re-ordered use of his topical cream.     Treatment Plan  Plan of care for wounds on upper back and lateral abdominal areas:  Every other day, after showerin. Clean in shower with soap and water, using a fresh, clean washcloth.  Rinse and dry  2. Cover with Mepilex Border dressings if wounds are draining-  Want to encourage drying out so leave open to the air when able  - follow any plans for skin care as laid out by [facility] MD's.  Continue to follow up with your regular [facility] MDs, etc upon discharge    Patient History  According to provider note(s):  Bairon Foster is a 88 year old male admitted on 2019. He presents from TCU after recent admission with increased confusion and bloody emesis.    Objective Data  Active Diet Order  Orders Placed This Encounter      Advance Diet as Tolerated:  Regular Diet Adult      Diet  Dave Risk Assessment    Sensory Perception: 4-->no impairment    Moisture: 4-->rarely moist   Activity: 3-->walks occasionally     Mobility: 4-->no limitation   Nutrition: 3-->adequate   Friction and Shear: 3-->no apparent problem  Dave Score: 21                           Labs:   Recent Labs   Lab 04/19/19  0857  04/17/19  0001   ALBUMIN  --   --  3.1*   HGB 9.9*   < > 9.7*   WBC 3.0*   < > 2.2*    < > = values in this interval not displayed.       Physical Exam  Skin inspection: upper back and bilateral, lateral abdominal areas  Wounds on back and the bilateral abdominal areas are similar looking wounds.  Pt says the pemphigoid wounds tend to be tiny pin point, the wounds on the belly were due to stripping from removing EKG pads.  He did not really respond to cause of the back wounds.  Wounds are moist, red-yellow base, no to scant serous drainage.  Periwound tissue with red erythema.  He denies these wounds itching like the smaller pemphigoid   rash.  Shelby pain    04-22-19 Left abdomen                                 04-22-19 right abdomen       Wound Location:  Upper mid and right back  Date of last photo 4-17-19        Wound History: unclear etiology or duration; present on admission; pt reports hx bullous pemphigoid and use of triamcinolone cream  Measurements (length x width x depth, in cm):  3 areas, all around 1-1.5cm diameter, no depth  Wound Base: pink lightly scabbed tissue, dry  Palpation of the wound bed: normal   Periwound skin: intact, minimal erythema  Color: normal and consistent with surrounding tissue  Temperature: normal   Drainage: none  Description of drainage: none  Odor: none  Pain: minimal    Interventions  Current support surface: Standard  Atmos Air mattress  Current off-loading measures: pillows  Visual inspection of wound(s) completed  Wound Care: done per plan of care  Supplies: reviewed  Education provided: plan of care, wound progress and Infection  prevention   Discussed plan of care with Patient, Family and Nurse    Fabiano Angulo, RN

## 2019-04-22 NOTE — PLAN OF CARE
A&Ox4, forgetful at times. CMS intact. Bowel sounds audible, passing flatus, tolerating regular diet. VSS. Dressing CDI to abdomen & back. Up with SBA with gb/w. Denies chest pain or SOB. Plan discharge home with spouse.  Pt given discharge instructions. Questions answered. Discharge medications reviewed with patient. Follow up appointment scheduled, pt aware. Pt to [place] home with spouse and all personal belongings at time of discharge.

## 2019-04-23 ENCOUNTER — TELEPHONE (OUTPATIENT)
Dept: FAMILY MEDICINE | Facility: CLINIC | Age: 84
End: 2019-04-23

## 2019-04-23 LAB — COPATH REPORT: NORMAL

## 2019-04-23 NOTE — TELEPHONE ENCOUNTER
Reason for Call: Request for an order or referral:    Order or referral being requested: wound care    Date needed: at your convenience    Has the patient been seen by the PCP for this problem? YES    Additional comments call for wound care orders    Phone number Patient can be reached at:  Home number on file [phone] (home)    Best Time:      Can we leave a detailed message on this number?  YES    Call taken on 4/23/2019 at 3:21 PM by CHERISE ARROYO

## 2019-04-23 NOTE — PROGRESS NOTES
Late Entry  [facility]  Due to a high volume of referrals [facility] has requested this patient's home care episode be transferred to their deferral partner [facility], [facility]. [phone].  Care team and patient notified.

## 2019-04-23 NOTE — TELEPHONE ENCOUNTER
Ella with home care requesting wound care orders.     Wound care: 3 skin tears on RLQ, 3 on LLQ and 2 on back.   Instructions-  Clean wounds with saline   Pat dry with gauze  Apply medihoney to wound bed  Cover with [place] dressing  Change 3 x a week    Verbal order given for the instructions above.

## 2019-04-24 ENCOUNTER — TELEPHONE (OUTPATIENT)
Dept: CARDIOLOGY | Facility: CLINIC | Age: 84
End: 2019-04-24

## 2019-04-24 NOTE — TELEPHONE ENCOUNTER
Patient was evaluated by cardiology while inpatient for possible GIB and nosebleed in setting of OAC secondary to A. Fib. Determined that pt did not have GIB, but all related to nosebleeds. Pradaxa held, but pt at high risk of CVA secondary to high CHADs VASc score. Placed on Eliquis at time of discharge and pt will be evaluated for Watchman. Waiting for Mitralclip as well. Called patient to discuss any post hospital d/c questions he may have, review medication changes, and confirm f/u appts. Patient denied any questions regarding new medications or changes to PTA medications. Patient denied any SOB, chest pain, nosebleed, black or bloody stools or light headedness. RN confirmed with patient that he has an apt scheduled on 4/25/19 with TRENTON Mackenzie Tejada, with labs prior. Instructed to go to ED if he develops any uncontrolled nosebleed or bloody stools. Patient advised to call clinic with any cardiac related questions or concerns prior to this trenton't. Patient verbalized understanding and agreed with plan. Discharged to home with home care. LYUDMILA Veronica RN.

## 2019-04-25 ENCOUNTER — OFFICE VISIT (OUTPATIENT)
Dept: CARDIOLOGY | Facility: CLINIC | Age: 84
End: 2019-04-25
Payer: COMMERCIAL

## 2019-04-25 ENCOUNTER — CARE COORDINATION (OUTPATIENT)
Dept: CARDIOLOGY | Facility: CLINIC | Age: 84
End: 2019-04-25

## 2019-04-25 VITALS
HEIGHT: 74 IN | HEART RATE: 71 BPM | WEIGHT: 193.8 LBS | DIASTOLIC BLOOD PRESSURE: 70 MMHG | BODY MASS INDEX: 24.87 KG/M2 | SYSTOLIC BLOOD PRESSURE: 119 MMHG

## 2019-04-25 DIAGNOSIS — I50.21 ACUTE SYSTOLIC CHF (CONGESTIVE HEART FAILURE) (H): ICD-10-CM

## 2019-04-25 DIAGNOSIS — I48.19 PERSISTENT ATRIAL FIBRILLATION (H): ICD-10-CM

## 2019-04-25 DIAGNOSIS — I25.5 ISCHEMIC CARDIOMYOPATHY: ICD-10-CM

## 2019-04-25 DIAGNOSIS — D61.818 PANCYTOPENIA (H): ICD-10-CM

## 2019-04-25 DIAGNOSIS — D61.818 PANCYTOPENIA (H): Primary | ICD-10-CM

## 2019-04-25 DIAGNOSIS — I27.20 PULMONARY HYPERTENSION (H): ICD-10-CM

## 2019-04-25 DIAGNOSIS — I10 BENIGN ESSENTIAL HYPERTENSION: ICD-10-CM

## 2019-04-25 DIAGNOSIS — I73.9 PAD (PERIPHERAL ARTERY DISEASE) (H): ICD-10-CM

## 2019-04-25 LAB
ALT SERPL W P-5'-P-CCNC: <5 U/L (ref 5–30)
ANION GAP SERPL CALCULATED.3IONS-SCNC: 7.1 MMOL/L (ref 6–17)
BUN SERPL-MCNC: 20 MG/DL (ref 7–30)
CALCIUM SERPL-MCNC: 9.5 MG/DL (ref 8.5–10.5)
CHLORIDE SERPL-SCNC: 103 MMOL/L (ref 98–107)
CHOLEST SERPL-MCNC: 92 MG/DL
CO2 SERPL-SCNC: 29 MMOL/L (ref 23–29)
COPATH REPORT: NORMAL
CREAT SERPL-MCNC: 1.21 MG/DL (ref 0.7–1.3)
GFR SERPL CREATININE-BSD FRML MDRD: 57 ML/MIN/{1.73_M2}
GLUCOSE SERPL-MCNC: 110 MG/DL (ref 70–105)
HDLC SERPL-MCNC: 35 MG/DL
HGB BLD-MCNC: 9.9 G/DL (ref 13.3–17.7)
LDLC SERPL CALC-MCNC: 48 MG/DL
NONHDLC SERPL-MCNC: 57 MG/DL
POTASSIUM SERPL-SCNC: 4.1 MMOL/L (ref 3.5–5.1)
SODIUM SERPL-SCNC: 135 MMOL/L (ref 136–145)
TRIGL SERPL-MCNC: 45 MG/DL

## 2019-04-25 PROCEDURE — 84460 ALANINE AMINO (ALT) (SGPT): CPT | Performed by: INTERNAL MEDICINE

## 2019-04-25 PROCEDURE — 80048 BASIC METABOLIC PNL TOTAL CA: CPT | Performed by: INTERNAL MEDICINE

## 2019-04-25 PROCEDURE — 80061 LIPID PANEL: CPT | Performed by: INTERNAL MEDICINE

## 2019-04-25 PROCEDURE — 36415 COLL VENOUS BLD VENIPUNCTURE: CPT | Performed by: INTERNAL MEDICINE

## 2019-04-25 PROCEDURE — 99215 OFFICE O/P EST HI 40 MIN: CPT | Performed by: NURSE PRACTITIONER

## 2019-04-25 PROCEDURE — 85018 HEMOGLOBIN: CPT | Performed by: NURSE PRACTITIONER

## 2019-04-25 RX ORDER — BUMETANIDE 1 MG/1
1 TABLET ORAL EVERY OTHER DAY
Qty: 30 TABLET | Refills: 0 | COMMUNITY
Start: 2019-04-25 | End: 2019-05-03

## 2019-04-25 RX ORDER — BUMETANIDE 0.5 MG/1
0.5 TABLET ORAL EVERY OTHER DAY
COMMUNITY
Start: 2019-04-25 | End: 2019-05-03

## 2019-04-25 ASSESSMENT — MIFFLIN-ST. JEOR: SCORE: 1619.14
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
## 2019-04-25 NOTE — PROGRESS NOTES
HPI and Plan:   I had the pleasure of seeing Bairon Foster and his wife today in cardiology clinic follow up following his recent hospitalization. He is a pleasant 88 year old patient of Dr. Hope.    He has seen Dr. Carrizales in the past for his venous insufficiency (bilateral GSV ablations in August 2017) and Dr. Rico for his history of VT and chronic atrial fibrillation anticoagulated, he was switched during this recent hospitalization from Pradaxa to Eliquis 2.5 this is had epistaxis.  For detailed history please see my note from March 27, 2019.    Mr. Foster has a history of coronary artery disease, progressive worsening MR and TR. He had a CHELLE in March demonstrating moderate to severe to severe mitral regurgitation.  He was able to see Dr. Hope earlier this month and is scheduled May 20 for mitral clip procedure at the [facility].  He has had a recent heart failure exasperation hospitalization.  And more recently he had a questionable GI bleed which resulted in an admission but which was subsequently felt to be epitaxis and he was switched to low-dose Eliquis.  There is discussion about whether he be a good watchman candidate once his mitral valve is repaired.     He has a single lead pacemaker which is getting close to the end of battery life.  His last device check demonstrated an underlying rhythm of A. fib with rates of 40-50. He has had chronically low platelets, during his recent hospitalization he had a bone marrow biopsy and has a follow-up office visit with Dr. Chavez next week.  Dr. Chavez felt as long as his platelets stayed above 50 that he would be okay on Eliquis, that sometimes the parameters can be lowered depending on the clinical picture.    I saw Mr. Foster when I was in the hospital last week and I started him on Eliquis 2.5 mg twice daily.  He denies any recurrent epistaxis, hematuria or melena.  His weight has continued to go down on Bumex 1 mg daily.  His creatinine is starting to  creep up though his BUN has actually declined.  His hemoglobin is stable today at 9.9.  He has had a lot of appointments and is fatigued but is overall doing well and recovering at home.    Physical Exam  Please see Below     Assessment and Plan  1.  Severe MR.  He has plans to have a mitral clip on May 20.  He has had issues with heart failure for the last year with recent hospitalizations.  I talked to our nurse and Dr. Hope's clinic and will schedule a 6-minute walk next week as part of that work-up.  He had a right heart cath in April demonstrating mild elevated pulmonary hypertension, mildly elevated right side filling pressures.  I did decrease his diuretic today, currently he is taking Bumex 1 mg daily.  I continued him on 1 mg every other day and half a tablet every other day.  He will continue to weigh himself and if he has significant increase in weight then will go back to the daily 1 mg dosing.  2.  Permanent atrial fibrillation with a permanent pacemaker.  He is tolerating his Eliquis 2.5 mg twice daily, previously on Pradaxa he had several incidence of epistaxis.  His devices reaching the end of its battery life will need to be replaced, he has a device check scheduled in the next couple of weeks.  He may be a good candidate for watchman once his valve is fixed.  3.  Coronary artery disease with previous stenting.  He had a coronary angiogram and a right heart catheter in April admission and was found to have mild nonobstructive coronary disease.       Thank you for allowing me to care for Bairon Foster today.    WILLIS Bullard, CNP  Cardiology    Greater than half of this 45 minute appointment was spent in counseling and coordination of care.      Voice recognition software was used for this note, I have reviewed this note, but errors may have been missed.    No orders of the defined types were placed in this encounter.    Orders Placed This Encounter   Medications     bumetanide (BUMEX) 1  MG tablet     Sig: Take 1 tablet (1 mg) by mouth every other day     Dispense:  30 tablet     Refill:  0     bumetanide (BUMEX) 0.5 MG tablet     Sig: Take 1 tablet (0.5 mg) by mouth every other day     Medications Discontinued During This Encounter   Medication Reason     bumetanide (BUMEX) 1 MG tablet          CURRENT MEDICATIONS:  Current Outpatient Medications   Medication Sig Dispense Refill     acetaminophen (TYLENOL) 500 MG tablet Take 2 tablets (1,000 mg) by mouth every 6 hours as needed for mild pain 100 tablet 0     bumetanide (BUMEX) 0.5 MG tablet Take 1 tablet (0.5 mg) by mouth every other day       bumetanide (BUMEX) 1 MG tablet Take 1 tablet (1 mg) by mouth every other day 30 tablet 0     buPROPion (WELLBUTRIN XL) 150 MG 24 hr tablet Take 150 mg by mouth every morning       Carboxymethylcellulose Sod PF (CELLUVISC/REFRESH LIQUIGEL) 1 % ophthalmic solution Place 1 drop into both eyes 3 times daily as needed for dry eyes       carvedilol (COREG) 6.25 MG tablet TAKE 1 TABLET (6.25 MG) BY MOUTH 2 TIMES DAILY (WITH MEALS) 180 tablet 3     COMPRESSION STOCKINGS 1 each daily 2 each 0     diclofenac (VOLTAREN) 1 % topical gel Apply 2 g topically 4 times daily       finasteride (PROSCAR) 5 MG tablet TAKE 1 TABLET EVERY DAY 90 tablet 3     glucosamine-chondroitin 500-400 MG CAPS per capsule Take 2 capsules by mouth daily       ketotifen (ZADITOR) 0.025 % SOLN ophthalmic solution Place 1 drop into both eyes every 12 hours 1 Bottle      KLOR-CON 20 MEQ CR tablet TAKE 1 TABLET EVERY DAY 90 tablet 3     levothyroxine (SYNTHROID/LEVOTHROID) 100 MCG tablet TAKE 1 TABLET EVERY DAY 90 tablet 3     LORazepam (ATIVAN) 1 MG tablet Take 0.5-1 tablets (0.5-1 mg) by mouth every 8 hours as needed for anxiety 30 tablet 5     Multiple Vitamins-Minerals (CENTRUM SILVER) per tablet Take 1 tablet by mouth daily       omeprazole (PRILOSEC) 20 MG DR capsule TAKE 1 CAPSULE TWICE DAILY 180 capsule 3     ORDER FOR DME Auto-CPAP:Max 9 cm  H2OMin 9 cm H2O  Continuous Lifetime need and heated humidity.    1 Device 0     Polyethylene Glycol 3350 (MIRALAX PO) Take 1 packet by mouth At Bedtime        simvastatin (ZOCOR) 20 MG tablet Take 20 mg by mouth daily        sucralfate (CARAFATE) 1 GM tablet Take 1 tablet (1 g) by mouth 2 times daily 180 tablet 3     tamsulosin (FLOMAX) 0.4 MG capsule TAKE 1 CAPSULE EVERY DAY 90 capsule 3     triamcinolone (KENALOG) 0.025 % cream Apply topically 2 times daily as needed        apixaban ANTICOAGULANT (ELIQUIS) 2.5 MG tablet Take 1 tablet (2.5 mg) by mouth 2 times daily 180 tablet 3       ALLERGIES   No Known Allergies    PAST MEDICAL HISTORY:  Past Medical History:   Diagnosis Date     Acute, but ill-defined, cerebrovascular disease     NO RESIDUALS     Antiplatelet or antithrombotic long-term use      Arrhythmia     a fib     Blood in stool      Coronary atherosclerosis of unspecified type of vessel, native or graft     S/P PTCA, EF 50%     Esophageal reflux      Hydrocele, unspecified      Hypercholesterolemia      Hypersomnia with sleep apnea, unspecified      Inguinal hernia without mention of obstruction or gangrene, recurrent unilateral or unspecified      Ischemic cardiomyopathy      Major depression in complete remission (H)      Obese      Onychomycosis      JIM on CPAP      Other and unspecified hyperlipidemia      Other lymphedema     GROIN AND THIGHS     Other pancytopenia (H)      Other specified anemias      Overweight      Pacemaker      PAD (peripheral artery disease) (H)      Pancytopenia (H)      Persistent atrial fibrillation (H)     VVI PM for long pauses     Prostatic hypertrophy, benign      Pulmonary hypertension (H)      Scrotal varices      Thrombocytopenia, unspecified (H)      Umbilical hernia without obstruction and without gangrene      Unilateral or unspecified femoral hernia without mention of obstruction or gangrene, unilateral or unspecified      Unspecified hypothyroidism      Venous  stasis dermatitis of both lower extremities      Vitamin D deficiency        PAST SURGICAL HISTORY:  Past Surgical History:   Procedure Laterality Date     BONE MARROW BIOPSY, BONE SPECIMEN, NEEDLE/TROCAR N/A 4/19/2019    Procedure: BONE MARROW BIOPSY;  Surgeon: Yovani Cooley MD;  Location:  OR     CV HEART CATHETERIZATION WITH POSSIBLE INTERVENTION N/A 4/12/2019    Procedure: Heart Catheterization with possible Intervention;  Surgeon: Lev Beck MD;  Location:  HEART CARDIAC CATH LAB     CV RIGHT HEART CATH N/A 4/12/2019    Procedure: Right Heart Cath;  Surgeon: Lev Beck MD;  Location:  HEART CARDIAC CATH LAB     EYE SURGERY      CATARACT/BLEPHAROPLASTY     GENITOURINARY SURGERY      TUNA     HERNIA REPAIR      RIGHT INGUINAL     HERNIORRHAPHY INGUINAL  8/14/2012    Procedure: HERNIORRHAPHY INGUINAL;  right inguinal hernia repair;  Surgeon: Kareem Torrez MD;  Location:  [facility]     HERNIORRHAPHY UMBILICAL N/A 10/31/2017    Procedure: HERNIORRHAPHY UMBILICAL;  UMBILICAL HERNIA REPAIR WITH MESH;  Surgeon: Scar Harrington MD;  Location: [facility]     IMPLANT PACEMAKER  8/2009    single chamber     [place]      [facility] [place] SURGERY  2010    right TKR, left TKR     SURGICAL HISTORY OF -       Thumb surgery     SURGICAL HISTORY OF -   1990s    Left total knee replacement     SURGICAL HISTORY OF -   3/11    [facility] total knee replacement       FAMILY HISTORY:  Family History   Problem Relation Age of Onset     Cardiovascular Father      C.A.D. Father      Lung Cancer Sister      Unknown/Adopted Mother      Cancer - colorectal No family hx of        SOCIAL HISTORY:  Social History     Socioeconomic History     Marital status:      Spouse name: None     Number of children: None     Years of education: None     Highest education level: None   Occupational History     Occupation: Teacher, author, speaker     Employer: RETIRED   Social Needs     Financial resource  strain: None     Food insecurity:     Worry: None     Inability: None     Transportation needs:     Medical: None     Non-medical: None   Tobacco Use     Smoking status: Never Smoker     Smokeless tobacco: Never Used     Tobacco comment: per encounter 2/12/07   Substance and Sexual Activity     Alcohol use: Yes     Alcohol/week: 0.0 oz     Comment: 1/month     Drug use: No     Sexual activity: Never     Partners: Female   Lifestyle     Physical activity:     Days per week: None     Minutes per session: None     Stress: None   Relationships     Social connections:     Talks on phone: None     Gets together: None     Attends Shinto service: None     Active member of club or organization: None     Attends meetings of clubs or organizations: None     Relationship status: None     Intimate partner violence:     Fear of current or ex partner: None     Emotionally abused: None     Physically abused: None     Forced sexual activity: None   Other Topics Concern     Parent/sibling w/ CABG, MI or angioplasty before 65F 55M? No      Service Not Asked     Blood Transfusions Not Asked     Caffeine Concern No     Comment: 1-2 cups coffee a day     Occupational Exposure Not Asked     Hobby Hazards Not Asked     Sleep Concern No     Stress Concern Yes     Comment: due to wifes health condition     Weight Concern No     Special Diet No     Back Care Not Asked     Exercise Yes     Comment: states he uses his tredmill on and off     Bike Helmet Not Asked     Seat Belt Yes     Self-Exams Not Asked   Social History Narrative     None       Review of Systems:  Skin:  Positive for bruising dry skin    Eyes:  Positive for glasses cataract surgery on both eyes  ENT:  Negative      Respiratory:  Positive for sleep apnea;CPAP;dyspnea on exertion     Cardiovascular:    Positive for;edema;lightheadedness chest heaviness when walking, edema,   Gastroenterology: Negative      Genitourinary:  Negative      Musculoskeletal:  Positive for  "arthritis    Neurologic:  Positive for stroke;numbness or tingling of feet right foot  Psychiatric:  Positive for sleep disturbances;anxiety;excessive stress stress due to wifes health   Heme/Lymph/Imm:  Positive for easy bruising    Endocrine:  Positive for thyroid disorder      Physical Exam:  Vitals: /70   Pulse 71   Ht 1.88 m (6' 2.02\")   Wt 87.9 kg (193 lb 12.8 oz)   BMI 24.87 kg/m      Constitutional:  cooperative;in no acute distress frail      Skin:  warm and dry to the touch bruises present   fell twice recently, once on ice, once into a fire place which he can't really explain but resulted in some bruising.    Head:  normocephalic        Eyes:  pupils equal and round        Lymph:      ENT:  no pallor or cyanosis        Neck:    JVP elevated Large V wave consistent with tricuspid regurgitation    Respiratory:  clear to auscultation;normal symmetry    bilateral basilar crackles    Cardiac: regular rhythm       systolic murmur     Paced  pulses full and equal venous stasis changes bilaterally                                 Doppler triphasic R DP, Blunted biphasic L DP on portable to auscultation.    GI:  abdomen soft   benign    Extremities and Muscular Skeletal:  no deformities, clubbing, cyanosis, erythema observed stasis pigmentation bilateral LE edema;trace;1+ 1+ trace wearing compression stockings    Neurological:  affect appropriate        Psych:  Alert and Oriented x 3    Encounter Diagnosis   Name Primary?     Acute systolic CHF (congestive heart failure) (H)        Recent Lab Results:  LIPID RESULTS:  Lab Results   Component Value Date    CHOL 92 04/25/2019    HDL 35 (L) 04/25/2019    LDL 48 04/25/2019    TRIG 45 04/25/2019    CHOLHDLRATIO 2.1 06/15/2015       LIVER ENZYME RESULTS:  Lab Results   Component Value Date    AST 31 04/17/2019    ALT <5 (L) 04/25/2019       CBC RESULTS:  Lab Results   Component Value Date    WBC 3.0 (L) 04/19/2019    RBC 3.00 (L) 04/19/2019    HGB 9.9 (L) " 04/25/2019    HCT 29.7 (L) 04/19/2019    MCV 99 04/19/2019    MCH 33.0 04/19/2019    MCHC 33.3 04/19/2019    RDW 16.5 (H) 04/19/2019    PLT 83 (L) 04/19/2019       BMP RESULTS:  Lab Results   Component Value Date     (L) 04/25/2019    POTASSIUM 4.1 04/25/2019    CHLORIDE 103 04/25/2019    CO2 29 04/25/2019    ANIONGAP 7.1 04/25/2019     (H) 04/25/2019    BUN 20 04/25/2019    CR 1.21 04/25/2019    GFRESTIMATED 57 (L) 04/25/2019    GFRESTBLACK 68 04/25/2019    TEODORO 9.5 04/25/2019        A1C RESULTS:  No results found for: A1C    INR RESULTS:  Lab Results   Component Value Date    INR 1.79 (H) 11/20/2018    INR 1.48 (H) 03/21/2018           CC  No referring provider defined for this encounter.

## 2019-04-25 NOTE — PATIENT INSTRUCTIONS
Please decrease your diuretic. Currently you talk Bumex 1 mg daily. Lets decrease it to alternating doses.    So take 1 tablet one day, and 1/2 tablet the next day.

## 2019-04-29 ENCOUNTER — TELEPHONE (OUTPATIENT)
Dept: CARDIOLOGY | Facility: CLINIC | Age: 84
End: 2019-04-29

## 2019-04-29 NOTE — TELEPHONE ENCOUNTER
Spoke with patient. Patient states that he would like to talk to the device clinic about his upcoming device check. Patient transferred to device clinic RN phone line.

## 2019-04-29 NOTE — TELEPHONE ENCOUNTER
VM from patient, requesting a call back to discuss upcoming appointments. Attempted to contact patient to review further. Patient did not answer home phone number, unable to leave VM. Left VM on patient's mobile phone number for patient to call back.

## 2019-04-30 ENCOUNTER — ANCILLARY PROCEDURE (OUTPATIENT)
Dept: CARDIOLOGY | Facility: CLINIC | Age: 84
End: 2019-04-30
Attending: INTERNAL MEDICINE
Payer: COMMERCIAL

## 2019-04-30 ENCOUNTER — OFFICE VISIT (OUTPATIENT)
Dept: FAMILY MEDICINE | Facility: CLINIC | Age: 84
End: 2019-04-30
Payer: COMMERCIAL

## 2019-04-30 VITALS
WEIGHT: 199 LBS | DIASTOLIC BLOOD PRESSURE: 68 MMHG | BODY MASS INDEX: 25.54 KG/M2 | RESPIRATION RATE: 16 BRPM | HEART RATE: 60 BPM | SYSTOLIC BLOOD PRESSURE: 110 MMHG | OXYGEN SATURATION: 95 %

## 2019-04-30 DIAGNOSIS — I25.5 ISCHEMIC CARDIOMYOPATHY: Primary | ICD-10-CM

## 2019-04-30 DIAGNOSIS — Z95.0 CARDIAC PACEMAKER IN SITU: ICD-10-CM

## 2019-04-30 DIAGNOSIS — R04.0 EPISTAXIS: ICD-10-CM

## 2019-04-30 DIAGNOSIS — I25.10 CORONARY ARTERY DISEASE INVOLVING NATIVE CORONARY ARTERY OF NATIVE HEART WITHOUT ANGINA PECTORIS: ICD-10-CM

## 2019-04-30 DIAGNOSIS — D61.9 ANEMIA DUE TO BONE MARROW FAILURE, UNSPECIFIED BONE MARROW FAILURE TYPE (H): ICD-10-CM

## 2019-04-30 DIAGNOSIS — K92.2 GASTROINTESTINAL HEMORRHAGE, UNSPECIFIED GASTROINTESTINAL HEMORRHAGE TYPE: ICD-10-CM

## 2019-04-30 PROCEDURE — 99214 OFFICE O/P EST MOD 30 MIN: CPT | Performed by: FAMILY MEDICINE

## 2019-04-30 ASSESSMENT — PATIENT HEALTH QUESTIONNAIRE - PHQ9: SUM OF ALL RESPONSES TO PHQ QUESTIONS 1-9: 3

## 2019-04-30 NOTE — PATIENT INSTRUCTIONS
Patient has follow-up appointments with both cardiology and hematology/oncology.  I will see him back in 1 month.  No blood tests were done today as he is seeing 1 of his specialists this Thursday.  He is otherwise feeling well.

## 2019-04-30 NOTE — PROGRESS NOTES
SUBJECTIVE:   Bairon Foster is a 88 year old male who presents to clinic today for the following   health issues:          Hospital Follow-up Visit:    Hospital/Nursing Home/IP Rehab Facility: [facility]  Date of Admission: 4/16/2019  Date of Discharge: 4/22/2019  Reason(s) for Admission: Pancytopenia            Problems taking medications regularly:  None       Medication changes since discharge: None       Problems adhering to non-medication therapy:  None    Summary of hospitalization:  [facility] discharge summary reviewed  Diagnostic Tests/Treatments reviewed.  Follow up needed: none  Other Healthcare Providers Involved in Patient s Care:         Homecare and Hematology oncology and cardiology  Update since discharge: improved.     Post Discharge Medication Reconciliation: discharge medications reconciled, continue medications without change.  Plan of care communicated with patient     Coding guidelines for this visit:  Type of Medical   Decision Making Face-to-Face Visit       within 7 Days of discharge Face-to-Face Visit        within 14 days of discharge   Moderate Complexity [id number]   High Complexity [id number]                  Additional history: as documented    Reviewed  and updated as needed this visit by clinical staff  Tobacco  Allergies  Meds  Med Hx  Surg Hx  Fam Hx  Soc Hx        Reviewed and updated as needed this visit by Provider         Patient Active Problem List   Diagnosis     Atrial fibrillation (H)     Pulmonary hypertension (H)     CAD (coronary artery disease)     BPH (benign prostatic hyperplasia)     Ischemic cardiomyopathy     Anemia due to bone marrow failure, unspecified bone marrow failure type (H)     JIM (obstructive sleep apnea)- mild/moderate     Acute stress reaction     Health Care Home     Vitamin D deficiency     Advance Care Planning     Gastroesophageal reflux disease without esophagitis     Hydrocele, unspecified hydrocele type      Slow transit constipation     Hypercholesterolemia     Non morbid obesity due to excess calories w comorbid CAD< hi LDL      Venous stasis dermatitis of both lower extremities     Lymphedema of both lower extremities     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      Coronary artery disease due to lipid rich plaque     Acquired hypothyroidism     Onychomycosis     Overweight (BMI 25.0-29.9)     PAD (peripheral artery disease) (H)     Screening for prostate cancer     Blood in stool     Umbilical hernia without obstruction and without gangrene     Xerosis cutis     Status post coronary angiogram     AK (actinic keratosis)     Angioma of skin     Acute systolic CHF (congestive heart failure) (H)     CHF (congestive heart failure) (H)     GI bleed     Hematemesis     Epistaxis     Past Surgical History:   Procedure Laterality Date     BONE MARROW BIOPSY, BONE SPECIMEN, NEEDLE/TROCAR N/A 4/19/2019    Procedure: BONE MARROW BIOPSY;  Surgeon: Yovani Cooley MD;  Location:  OR     CV HEART CATHETERIZATION WITH POSSIBLE INTERVENTION N/A 4/12/2019    Procedure: Heart Catheterization with possible Intervention;  Surgeon: Lev Beck MD;  Location:  HEART CARDIAC CATH LAB     CV RIGHT HEART CATH N/A 4/12/2019    Procedure: Right Heart Cath;  Surgeon: Lev Beck MD;  Location:  HEART CARDIAC CATH LAB     EYE SURGERY      CATARACT/BLEPHAROPLASTY     GENITOURINARY SURGERY      TUNA     HERNIA REPAIR      RIGHT INGUINAL     HERNIORRHAPHY INGUINAL  8/14/2012    Procedure: HERNIORRHAPHY INGUINAL;  right inguinal hernia repair;  Surgeon: Kareem Torrez MD;  Location: [facility]     HERNIORRHAPHY UMBILICAL N/A 10/31/2017    Procedure: HERNIORRHAPHY UMBILICAL;  UMBILICAL HERNIA REPAIR WITH MESH;  Surgeon: Scar Harrington MD;  Location: [facility]     IMPLANT PACEMAKER  8/2009    single chamber     [place]      [facility] [place]OP[place]IC SURG[place]  2010    right TKR, left TKR     SURGICAL HISTORY  OF -       Thumb surgery     SURGICAL HISTORY OF -   1990s    Left total knee replacement     SURGICAL HISTORY OF -   3/11    [facility] total knee replacement       Social History     Tobacco Use     Smoking status: Never Smoker     Smokeless tobacco: Never Used     Tobacco comment: per encounter 2/12/07   Substance Use Topics     Alcohol use: Yes     Alcohol/week: 0.0 oz     Comment: 1/month     Family History   Problem Relation Age of Onset     Cardiovascular Father      C.A.D. Father      Lung Cancer Sister      Unknown/Adopted Mother      Cancer - colorectal No family hx of            ROS:  Constitutional, HEENT, cardiovascular, pulmonary, gi and gu systems are negative, except as otherwise noted.    OBJECTIVE:                                                    /68   Pulse 60   Resp 16   Wt 90.3 kg (199 lb)   SpO2 95%   BMI 25.54 kg/m    Body mass index is 25.54 kg/m .  GENERAL APPEARANCE: healthy, alert and no distress  RESP: lungs clear to auscultation - no rales, rhonchi or wheezes  CV: regular rates and rhythm, normal S1 S2, no S3 or S4 and no murmur, click or rub         ASSESSMENT/PLAN:                                                        ICD-10-CM    1. Ischemic cardiomyopathy I25.5 ASPIRIN NOT PRESCRIBED (INTENTIONAL)   2. Gastrointestinal hemorrhage, unspecified gastrointestinal hemorrhage type K92.2    3. Epistaxis R04.0    4. Anemia due to bone marrow failure, unspecified bone marrow failure type (H) D61.9    5. Coronary artery disease involving native coronary artery of native heart without angina pectoris I25.10        Patient Instructions   Patient has follow-up appointments with both cardiology and hematology/oncology.  I will see him back in 1 month.  No blood tests were done today as he is seeing 1 of his specialists this Thursday.  He is otherwise feeling well.      Mg Nelson MD  [facility]

## 2019-05-01 ENCOUNTER — TELEPHONE (OUTPATIENT)
Dept: FAMILY MEDICINE | Facility: CLINIC | Age: 84
End: 2019-05-01

## 2019-05-01 NOTE — TELEPHONE ENCOUNTER
Our goal is to have forms completed within 72 hours, however some forms may require a visit or additional information.    What clinic location was the form placed at [facility] or [place].?     Who is the form from?   Where did the form come from? Faxed to clinic   The form was placed in the inbox of Mg Nelson MD      Please fax to [phone]  Phone number: [phone]    Additional comments: home health care drug interaction     Call take on 5/1/2019 at 3:39 PM by Maya Monterroso

## 2019-05-02 ENCOUNTER — ONCOLOGY VISIT (OUTPATIENT)
Dept: ONCOLOGY | Facility: CLINIC | Age: 84
End: 2019-05-02
Attending: INTERNAL MEDICINE
Payer: COMMERCIAL

## 2019-05-02 ENCOUNTER — HOSPITAL ENCOUNTER (OUTPATIENT)
Dept: CARDIAC REHAB | Facility: CLINIC | Age: 84
End: 2019-05-02
Attending: INTERNAL MEDICINE
Payer: COMMERCIAL

## 2019-05-02 ENCOUNTER — TELEPHONE (OUTPATIENT)
Dept: CARDIOLOGY | Facility: CLINIC | Age: 84
End: 2019-05-02

## 2019-05-02 VITALS
HEIGHT: 74 IN | WEIGHT: 200.8 LBS | SYSTOLIC BLOOD PRESSURE: 124 MMHG | TEMPERATURE: 97.5 F | BODY MASS INDEX: 25.77 KG/M2 | DIASTOLIC BLOOD PRESSURE: 82 MMHG | HEART RATE: 60 BPM | OXYGEN SATURATION: 98 %

## 2019-05-02 DIAGNOSIS — D61.818 PANCYTOPENIA (H): Primary | ICD-10-CM

## 2019-05-02 DIAGNOSIS — I34.0 NON-RHEUMATIC MITRAL REGURGITATION: Primary | ICD-10-CM

## 2019-05-02 DIAGNOSIS — R09.89 ABNORMAL CHEST SOUNDS: ICD-10-CM

## 2019-05-02 PROCEDURE — G0463 HOSPITAL OUTPT CLINIC VISIT: HCPCS

## 2019-05-02 PROCEDURE — 99214 OFFICE O/P EST MOD 30 MIN: CPT | Performed by: INTERNAL MEDICINE

## 2019-05-02 PROCEDURE — 40000244 ZZH STATISTIC VISIT PULM REHAB: Performed by: CLINICAL EXERCISE PHYSIOLOGIST

## 2019-05-02 PROCEDURE — 94618 PULMONARY STRESS TESTING: CPT | Performed by: CLINICAL EXERCISE PHYSIOLOGIST

## 2019-05-02 ASSESSMENT — PAIN SCALES - GENERAL: PAINLEVEL: NO PAIN (0)

## 2019-05-02 ASSESSMENT — MIFFLIN-ST. JEOR: SCORE: 1650.82

## 2019-05-02 NOTE — PROGRESS NOTES
"Oncology Rooming Note    May 2, 2019 2:31 PM   Bairon Foster is a 88 year old male who presents for:    Chief Complaint   Patient presents with     Oncology Clinic Visit     Initial Vitals: /82 (BP Location: Left arm, Patient Position: Chair, Cuff Size: Adult Regular)   Pulse 60   Temp 97.5  F (36.4  C) (Oral)   Ht 1.88 m (6' 2.02\")   Wt 91.1 kg (200 lb 12.8 oz)   SpO2 98%   BMI 25.77 kg/m   Estimated body mass index is 25.77 kg/m  as calculated from the following:    Height as of this encounter: 1.88 m (6' 2.02\").    Weight as of this encounter: 91.1 kg (200 lb 12.8 oz). Body surface area is 2.18 meters squared.  No Pain (0) Comment: Data Unavailable   No LMP for male patient.  Allergies reviewed: No  Medications reviewed: Yes    Medications: Medication refills not needed today.  Pharmacy name entered into [facility]:    [facility] - [place], [place] - [address] [facility] - [address] [facility] - [place], [place] - [address] [facility] [address] - [place], [place] - [address]    Clinical concerns:  doctor was notified.      Paige Pinedo [facility]            "

## 2019-05-02 NOTE — Clinical Note
"    5/2/2019         RE: Bairon Foster  [address]  [facility] [address]        Dear Colleague,    Thank you for referring your patient, Bairon Foster, to the [facility]. Please see a copy of my visit note below.    Oncology Rooming Note    May 2, 2019 2:31 PM   Bairon Foster is a 88 year old male who presents for:    Chief Complaint   Patient presents with     Oncology Clinic Visit     Initial Vitals: /82 (BP Location: Left arm, Patient Position: Chair, Cuff Size: Adult Regular)   Pulse 60   Temp 97.5  F (36.4  C) (Oral)   Ht 1.88 m (6' 2.02\")   Wt 91.1 kg (200 lb 12.8 oz)   SpO2 98%   BMI 25.77 kg/m    Estimated body mass index is 25.77 kg/m  as calculated from the following:    Height as of this encounter: 1.88 m (6' 2.02\").    Weight as of this encounter: 91.1 kg (200 lb 12.8 oz). Body surface area is 2.18 meters squared.  No Pain (0) Comment: Data Unavailable   No LMP for male patient.  Allergies reviewed: No  Medications reviewed: Yes    Medications: Medication refills not needed today.  Pharmacy name entered into Psychiatric:    [facility] [place], [place] - [address] [facility] - [address] [facility] - [place], [place] - [address] [facility] - [place], [place] - [address]    Clinical concerns:  doctor was notified.      Paige Pinedo [facility]              Visit Date:   05/02/2019      SUBJECTIVE:  Mr. Foster is an 88-year-old gentleman who is here for followup on the result of the bone marrow biopsy.  The patient was recently seen in the hospital.  He was admitted because of nausea, vomiting, diarrhea and some blood-tinged emesis that was secondary to epistaxis and not to gastrointestinal bleed.  Labs in the hospital had revealed pancytopenia.  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  Multiple investigations were done.     1.  Multiple labs were done on " 04/18/2019.   -- WBC 3.3, hemoglobin 10.3 and platelets 83.  MCV of 99.   -- Retic of 1.4%.   -- SPEP was normal.   -- Vitamin B12 elevated.   -- Normal folic.   -- Normal ferritin and iron.  Low saturation of 14%.   -- Normal calcium and creatinine.   2.  Bone marrow biopsy on 04/19/2019 is normal.  It reveals normal cellular bone marrow with trilineage hematopoiesis.  There is no evidence of any dysplasia.  Flow revealed slightly increased *** population.  I spoke to the pathologist.  This is not of clinical significance.  Cytogenetics is normal.      Overall, he is feeling better.  His fatigue is less.  No chest pain.  No shortness of breath.  No nausea or vomiting.  Appetite good.  No bleeding.      PHYSICAL EXAMINATION:   GENERAL:  He is alert, oriented x3.   VITAL SIGNS:  Reviewed.  ECOG PS of 2.   The rest of the systems not examined.      LABS:  Reviewed.      ASSESSMENT:   1.  An 88-year-old gentleman with chronic pancytopenia.   2.  Multiple other medical problems including atrial fibrillation and cardiomyopathy.      PLAN:   1.  I had a long discussion with the patient.  Reviewed the result of the bone marrow.  He was happy to know that there is no evidence of any MDS or malignancy.      Discussed regarding pancytopenia.  This is chronic.  I am not sure why he is pancytopenic, but I told have told him so far all the investigations have been negative.   2.  The patient is doing well from pancytopenia.  He does not have any complications.  We will continue to monitor him.  I will see him back in 3 months' time with CBC.  If there is worsening of pancytopenia, we will consider a repeat bone marrow biopsy.  I advised him to see a physician sooner if he has worsening weakness, chest pain, shortness of breath, bleeding from any site or any other concerns.      Total face-to-face time spent 25 minutes, more than 50% of the time spent in counseling and coordination of care.         ANDRES KRISHNA MD              D: 2019   T: 2019   MT: MARTITA      Name:     HIRO IBARRA   MRN:      [id number]        Account:      [id number]   :      1931           Visit Date:   2019      Document: [id number]       Again, thank you for allowing me to participate in the care of your patient.        Sincerely,        Donald Chavez MD

## 2019-05-02 NOTE — PATIENT INSTRUCTIONS
Follow up in 3 months with CBC.  Scheduled/ Rupinder     AVS printed and given to patient. Rupinder

## 2019-05-02 NOTE — PROGRESS NOTES
Patient scheduled for mitraclip for Monday, 5/20/19.  He is scheduled for his pre op on 5/16.  Will also see Dr. Velasquez same day.

## 2019-05-02 NOTE — TELEPHONE ENCOUNTER
VON from patient's wife, stating that she has some questions for Anushka. Spoke with patient's wife. Patient's wife wanting to confirm the date and time of the patient's upcoming Mitral clip procedure. Reviewed with patient's wife that the procedure, per Anushka's note, is scheduled for May 20th. Patient's wife inquiring about information regarding the procedure and the plan. Reviewed with patient's wife that RN will get in touch with Dr. Hope's nurses regarding the procedure and the details. Spoke with Leyla Camp, who states that she will contact the patient's wife to review.

## 2019-05-03 ENCOUNTER — TELEPHONE (OUTPATIENT)
Dept: FAMILY MEDICINE | Facility: CLINIC | Age: 84
End: 2019-05-03

## 2019-05-03 ENCOUNTER — TELEPHONE (OUTPATIENT)
Dept: CARDIOLOGY | Facility: CLINIC | Age: 84
End: 2019-05-03

## 2019-05-03 DIAGNOSIS — I50.21 ACUTE SYSTOLIC CHF (CONGESTIVE HEART FAILURE) (H): ICD-10-CM

## 2019-05-03 RX ORDER — BUMETANIDE 1 MG/1
1 TABLET ORAL EVERY OTHER DAY
Qty: 45 TABLET | Refills: 3 | Status: ON HOLD | OUTPATIENT
Start: 2019-05-03 | End: 2019-05-21

## 2019-05-03 RX ORDER — BUMETANIDE 0.5 MG/1
0.5 TABLET ORAL EVERY OTHER DAY
Qty: 45 TABLET | Refills: 3 | Status: ON HOLD | OUTPATIENT
Start: 2019-05-03 | End: 2019-05-21

## 2019-05-03 NOTE — TELEPHONE ENCOUNTER
Our goal is to have forms completed within 72 hours, however some forms may require a visit or additional information.    What clinic location was the form placed at [facility] or [facility].?     Who is the form from?   Where did the form come from? Faxed to clinic   The form was placed in the inbox of Mg Nelson MD      Please fax to [phone]  Phone number: [phone]    Additional comments: home health care OT eval     Call take on 5/3/2019 at 11:35 AM by Maay Monterroso

## 2019-05-03 NOTE — PROGRESS NOTES
Discussed with the patient the plan for his mitraclip.. Plans for preop and surgeon visit 5/16 and mitraclip 5/20.  The patient agreeable to the plan.. No other tasks to be done at this time.. Amanda Sotomayor

## 2019-05-03 NOTE — TELEPHONE ENCOUNTER
Pt called wanting to discuss his weight. Pt stated that he has gained 4 lbs since seeing Anushka 4/25/19. RN tried to confirm that patient was taking Bumex 1 mg and 0.5 mg alternating days per Anushka's recommendation 4/25/19. Pt was unsure if he has that medication at home or if he has been taking it. Pt is going to check his medications and make sure he is taking the Bumex. Pt will call team 4 back if he needs a prescription refilled or if he continues to gain weight.     4/25/19 OV with [facility] NP   Assessment and Plan  1.  Severe MR.  He has plans to have a mitral clip on May 20.  He has had issues with heart failure for the last year with recent hospitalizations.  I talked to our nurse and Dr. Hope's clinic and will schedule a 6-minute walk next week as part of that work-up.  He had a right heart cath in April demonstrating mild elevated pulmonary hypertension, mildly elevated right side filling pressures.  I did decrease his diuretic today, currently he is taking Bumex 1 mg daily.  I continued him on 1 mg every other day and half a tablet every other day.  He will continue to weigh himself and if he has significant increase in weight then will go back to the daily 1 mg dosing.

## 2019-05-03 NOTE — TELEPHONE ENCOUNTER
Patient called back stating that he must have misplaced the Bumex and would like a new prescription sent to the pharmacy. New prescription for Bumex 1 mg and Bumex 0.5 mg to take alternating days sent to pharmacy. Pt will call with any weight concerns.

## 2019-05-03 NOTE — PROGRESS NOTES
Visit Date:   05/02/2019     HEMATOLOGY HISTORY:  Mr. Foster is a gentleman with chronic pancytopenia.   1. On 08/05/2009, WBC of 3.3, hemoglobin of 12.9 and platelets of 111.   2. On 04/16/2019:  -WBC of 2.2, hemoglobin of 9.7 and platelets of 67.  MCV normal.  Neutrophil of 75% and lymphocyte of 15%.   -Chemistry panel reveals mild elevated bilirubin of 1.7.  Normal AST, ALT and alkaline phosphatase.   3. On 04/18/2019.   -WBC 3.3, hemoglobin 10.3 and platelets 83.  MCV of 99. Retic of 1.4%.   -SPEP was normal.   -Vitamin B12 elevated.   -Normal folate.   -Normal ferritin and iron.  Low saturation of 14%.   4.  Bone marrow biopsy on 04/19/2019 is normal.  It reveals normal cellular bone marrow with trilineage hematopoiesis.  There is no evidence of any dysplasia.  Flow revealed slightly increased NK cell population.  I spoke to the pathologist.  This is not of clinical significance.  Cytogenetics is normal.     SUBJECTIVE:  Mr. Foster is an 88-year-old gentleman who is here for followup on the result of the bone marrow biopsy.  The patient was recently seen in the hospital.  He was admitted because of nausea, vomiting, diarrhea and some blood-tinged emesis that was secondary to epistaxis and not to gastrointestinal bleed.  Labs in the hospital had revealed pancytopenia.  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  Multiple investigations were done.     1.  Multiple labs were done on 04/18/2019.   -- WBC 3.3, hemoglobin 10.3 and platelets 83.  MCV of 99.   -- Retic of 1.4%.   -- SPEP was normal.   -- Vitamin B12 elevated.   -- Normal folic.   -- Normal ferritin and iron.  Low saturation of 14%.   -- Normal calcium and creatinine.   2.  Bone marrow biopsy on 04/19/2019 is normal.  It reveals normal cellular bone marrow with trilineage hematopoiesis.  There is no evidence of any dysplasia.  Flow revealed slightly increased NK cell population.  I spoke to the pathologist.  This is not of clinical significance.   Cytogenetics is normal.      Overall, he is feeling better.  His fatigue is less.  No chest pain.  No shortness of breath.  No nausea or vomiting.  Appetite good.  No bleeding.      PHYSICAL EXAMINATION:   GENERAL:  He is alert, oriented x3.   VITAL SIGNS:  Reviewed.  ECOG PS of 2.   The rest of the systems not examined.      LABS:  Reviewed.      ASSESSMENT:   1.  An 88-year-old gentleman with chronic pancytopenia.   2.  Multiple other medical problems including atrial fibrillation and cardiomyopathy.      PLAN:   1.  I had a long discussion with the patient.  Reviewed the result of the bone marrow.  He was happy to know that there is no evidence of any MDS or malignancy.      Discussed regarding pancytopenia.  This is chronic.  I am not sure why he is pancytopenic, but I told have told him so far all the investigations have been negative.     2.  The patient is doing well from pancytopenia.  He does not have any complications.  We will continue to monitor him.  I will see him back in 3 months' time with CBC.  If there is worsening of pancytopenia, we will consider a repeat bone marrow biopsy.  I advised him to see a physician sooner if he has worsening weakness, chest pain, shortness of breath, bleeding from any site or any other concerns.      Total face-to-face time spent 25 minutes, more than 50% of the time spent in counseling and coordination of care.         ANDRES KRISHNA MD             D: 2019   T: 2019   MT: MARTITA      Name:     HIRO IBARRA   MRN:      [id number]        Account:      [id number]   :      1931           Visit Date:   2019      Document: [id number]

## 2019-05-07 ENCOUNTER — MEDICAL CORRESPONDENCE (OUTPATIENT)
Dept: HEALTH INFORMATION MANAGEMENT | Facility: CLINIC | Age: 84
End: 2019-05-07

## 2019-05-07 ENCOUNTER — TELEPHONE (OUTPATIENT)
Dept: FAMILY MEDICINE | Facility: CLINIC | Age: 84
End: 2019-05-07

## 2019-05-07 ENCOUNTER — TELEPHONE (OUTPATIENT)
Dept: CARDIOLOGY | Facility: CLINIC | Age: 84
End: 2019-05-07

## 2019-05-07 NOTE — TELEPHONE ENCOUNTER
VM from patient requesting a call back. Spoke with patient. Patient states that he has specific questions regarding his Mitraclip procedure. Transferred patient to speak with Leyla Camp regarding his questions.

## 2019-05-07 NOTE — TELEPHONE ENCOUNTER
Our goal is to have forms completed within 72 hours, however some forms may require a visit or additional information.    What clinic location was the form placed at [facility] or [place].?     Who is the form from?   Where did the form come from? Faxed to clinic   The form was placed in the inbox of Mg Nelson MD      Please fax to [phone]  Phone number: [phone]    Additional comments:   Home hela care   HHA added at SOC  Call take on 5/7/2019 at 2:49 PM by Maya Monterroso

## 2019-05-08 ENCOUNTER — TELEPHONE (OUTPATIENT)
Dept: FAMILY MEDICINE | Facility: CLINIC | Age: 84
End: 2019-05-08

## 2019-05-08 NOTE — TELEPHONE ENCOUNTER
Our goal is to have forms completed within 72 hours, however some forms may require a visit or additional information.    What clinic location was the form placed at [facility] or [place].?     Who is the form from?   Where did the form come from? Faxed to clinic   The form was placed in the inbox of Mg Nelson MD      Please fax to [phone]  Phone number: [phone]    Additional comments: home health cARE DRUG INTERACTION     Call take on 5/8/2019 at 9:08 AM by Maya Monterroso

## 2019-05-09 ENCOUNTER — TELEPHONE (OUTPATIENT)
Dept: FAMILY MEDICINE | Facility: CLINIC | Age: 84
End: 2019-05-09

## 2019-05-09 NOTE — TELEPHONE ENCOUNTER
Our goal is to have forms completed within 72 hours, however some forms may require a visit or additional information.    What clinic location was the form placed at [facility] or [place].?     Who is the form from?   Where did the form come from? Faxed to clinic   The form was placed in the inbox of Mg Nelson MD      Please fax to [phone]  Phone number: [phone]    Additional comments: Home Health care SN MD summary    Call take on 5/9/2019 at 11:20 AM by Maya Monterroso

## 2019-05-11 ASSESSMENT — ENCOUNTER SYMPTOMS
POLYPHAGIA: 0
ALTERED TEMPERATURE REGULATION: 0
NIGHT SWEATS: 0
CHILLS: 1
WEIGHT GAIN: 0
INCREASED ENERGY: 1
FATIGUE: 1
HALLUCINATIONS: 0
POLYDIPSIA: 0
FEVER: 0
DECREASED APPETITE: 0
WEIGHT LOSS: 1

## 2019-05-13 ENCOUNTER — MEDICAL CORRESPONDENCE (OUTPATIENT)
Dept: HEALTH INFORMATION MANAGEMENT | Facility: CLINIC | Age: 84
End: 2019-05-13

## 2019-05-14 DIAGNOSIS — K21.9 ESOPHAGEAL REFLUX: ICD-10-CM

## 2019-05-14 NOTE — TELEPHONE ENCOUNTER
"Requested Prescriptions   Pending Prescriptions Disp Refills     sucralfate (CARAFATE) 1 GM tablet [Pharmacy Med Name: SUCRALFATE 1 GM Tablet]  Last Written Prescription Date:  4/18/2018  Last Fill Quantity: 180 tablet,  # refills: 3   Last office visit: 4/30/2019 with prescribing provider:  Leslie   Future Office Visit:   Next 5 appointments (look out 90 days)    May 29, 2019 10:00 AM CDT  [facility] [address] [place] [address]  [phone]   Aug 08, 2019  1:30 PM CDT  Return Visit with  LAB DRAW 1  [facility] ([facility]) [facility]  [address]  [facility] [address]  [phone]   Aug 08, 2019  2:40 PM CDT  Return Visit with Donald Chavez MD  [facility] ([facility]) [facility]  [address]  [facility] [address]  [phone]          180 tablet 3     Sig: TAKE 1 TABLET TWICE DAILY       Miscellaneous Gastrointestinal Agents Passed - 5/14/2019 12:09 PM        Passed - Recent (12 mo) or future (30 days) visit within the authorizing provider's specialty     Patient had office visit in the last 12 months or has a visit in the next 30 days with authorizing provider or within the authorizing provider's specialty.  See \"Patient Info\" tab in inbasket, or \"Choose Columns\" in Meds & Orders section of the refill encounter.              Passed - Medication is active on med list        Passed - Patient is 18 years of age or older           "

## 2019-05-15 ENCOUNTER — PRE VISIT (OUTPATIENT)
Facility: CLINIC | Age: 84
End: 2019-05-15

## 2019-05-15 NOTE — TELEPHONE ENCOUNTER
FUTURE VISIT INFORMATION      SURGERY INFORMATION:    Date: 19    Location:  Heart Cardiac cath lab    Surgeon:  Harshil Winter    Anesthesia Type:  General    RECORDS REQUESTED FROM:       Primary Care Provider: Mg Mullins    Pertinent Medical History: Pulmonary hypertension, Obstructive sleep apnea, epistaxis, CAD, Ischemic cardiomyopathy, PAD, Congestive heart failure    Most recent EKG+ Tracin19    Most recent ECHO: 19    Most recent Cardiac Stress Test: 3/16/17    Sleep Study: 3/12/12

## 2019-05-16 ENCOUNTER — OFFICE VISIT (OUTPATIENT)
Dept: SURGERY | Facility: CLINIC | Age: 84
End: 2019-05-16
Payer: COMMERCIAL

## 2019-05-16 ENCOUNTER — OFFICE VISIT (OUTPATIENT)
Dept: CARDIOLOGY | Facility: CLINIC | Age: 84
End: 2019-05-16
Attending: THORACIC SURGERY (CARDIOTHORACIC VASCULAR SURGERY)
Payer: COMMERCIAL

## 2019-05-16 ENCOUNTER — ANESTHESIA EVENT (OUTPATIENT)
Dept: CARDIOLOGY | Facility: CLINIC | Age: 84
DRG: 229 | End: 2019-05-16
Payer: COMMERCIAL

## 2019-05-16 VITALS
BODY MASS INDEX: 25.83 KG/M2 | RESPIRATION RATE: 16 BRPM | HEIGHT: 74 IN | HEART RATE: 61 BPM | WEIGHT: 201.3 LBS | OXYGEN SATURATION: 95 % | TEMPERATURE: 97.7 F | SYSTOLIC BLOOD PRESSURE: 122 MMHG | DIASTOLIC BLOOD PRESSURE: 77 MMHG

## 2019-05-16 VITALS
BODY MASS INDEX: 25.82 KG/M2 | SYSTOLIC BLOOD PRESSURE: 122 MMHG | DIASTOLIC BLOOD PRESSURE: 77 MMHG | HEIGHT: 74 IN | HEART RATE: 61 BPM | OXYGEN SATURATION: 95 % | WEIGHT: 201.2 LBS

## 2019-05-16 DIAGNOSIS — Z01.818 PRE-OPERATIVE GENERAL PHYSICAL EXAMINATION: Primary | ICD-10-CM

## 2019-05-16 DIAGNOSIS — I34.0 NON-RHEUMATIC MITRAL REGURGITATION: Primary | ICD-10-CM

## 2019-05-16 DIAGNOSIS — Z01.818 PRE-OPERATIVE GENERAL PHYSICAL EXAMINATION: ICD-10-CM

## 2019-05-16 DIAGNOSIS — Z01.818 PREOP EXAMINATION: Primary | ICD-10-CM

## 2019-05-16 LAB
ABO + RH BLD: NORMAL
ABO + RH BLD: NORMAL
BLD GP AB SCN SERPL QL: NORMAL
BLOOD BANK CMNT PATIENT-IMP: NORMAL
SPECIMEN EXP DATE BLD: NORMAL

## 2019-05-16 PROCEDURE — G0463 HOSPITAL OUTPT CLINIC VISIT: HCPCS | Mod: ZF

## 2019-05-16 RX ORDER — SODIUM CHLORIDE 9 MG/ML
INJECTION, SOLUTION INTRAVENOUS CONTINUOUS
Status: CANCELLED | OUTPATIENT
Start: 2019-05-16

## 2019-05-16 RX ORDER — LIDOCAINE 40 MG/G
CREAM TOPICAL
Status: CANCELLED | OUTPATIENT
Start: 2019-05-16

## 2019-05-16 RX ORDER — CEFAZOLIN SODIUM 2 G/100ML
2 INJECTION, SOLUTION INTRAVENOUS
Status: CANCELLED | OUTPATIENT
Start: 2019-05-16

## 2019-05-16 RX ORDER — SUCRALFATE 1 G/1
TABLET ORAL
Qty: 180 TABLET | Refills: 3 | Status: SHIPPED | OUTPATIENT
Start: 2019-05-16 | End: 2020-01-15

## 2019-05-16 RX ORDER — ASPIRIN 81 MG/1
81 TABLET ORAL DAILY
COMMUNITY
End: 2020-03-19

## 2019-05-16 RX ORDER — ASPIRIN 325 MG
325 TABLET ORAL ONCE
Status: CANCELLED | OUTPATIENT
Start: 2019-05-16 | End: 2019-05-16

## 2019-05-16 ASSESSMENT — PAIN SCALES - GENERAL: PAINLEVEL: NO PAIN (0)

## 2019-05-16 ASSESSMENT — MIFFLIN-ST. JEOR
SCORE: 1652.39
SCORE: 1652.84

## 2019-05-16 ASSESSMENT — NEW YORK HEART ASSOCIATION (NYHA) CLASSIFICATION: NYHA FUNCTIONAL CLASS: III

## 2019-05-16 ASSESSMENT — ENCOUNTER SYMPTOMS: DYSRHYTHMIAS: 1

## 2019-05-16 NOTE — PROGRESS NOTES
[facility] CARDIOTHORACIC SURGERY CONSULT  Patient Name: Bairon Foster  Medical Record Number: [id number]  YOB: 1931  Room Number: Room/bed info not found  Referring Physician: Dr. Winter    CC: Severe mitral insufficiency    History of Present Illness: Bairon Foster is a 88 year old male with history of coronary artery disease, progressive worsening MR and TR. He had a CHELLE in March demonstrating moderate to severe to severe mitral regurgitation.  He was able to see Dr. Hope earlier this month and is scheduled May 20 for mitral clip procedure at the [facility].  He has had a recent heart failure exasperation hospitalization.  And more recently he had a questionable GI bleed which resulted in an admission but which was subsequently felt to be epitaxis and he was switched to low-dose Eliquis.      He is seen in clinic today with his wife present.    Assessment and Plan:  Bairon Foster is a 88 year old male with severe mitral insufficiency, severe tricuspid insufficiency  1) Agree with mitral clip - poor candidate for surgery given age, frailty, comorbidities  2) Please call with questions or concerns.     Thank you for the opportunity to participate in the care of this patient.    Shemar Velasquez MD  Cardiothoracic Surgery  [phone]    Past Medical History:  Past Medical History:   Diagnosis Date     Acute, but ill-defined, cerebrovascular disease     NO RESIDUALS     Antiplatelet or antithrombotic long-term use      Arrhythmia     a fib     Blood in stool      Coronary atherosclerosis of unspecified type of vessel, native or graft     S/P PTCA, EF 50%     Esophageal reflux      Hydrocele, unspecified      Hypercholesterolemia      Hypersomnia with sleep apnea, unspecified      Inguinal hernia without mention of obstruction or gangrene, recurrent unilateral or unspecified      Ischemic cardiomyopathy      Major depression in complete remission (H)      Obese      Onychomycosis       JIM on CPAP      Other and unspecified hyperlipidemia      Other lymphedema     GROIN AND THIGHS     Other pancytopenia (H)      Other specified anemias      Overweight      Pacemaker      PAD (peripheral artery disease) (H)      Pancytopenia (H)      Persistent atrial fibrillation (H)     VVI PM for long pauses     Prostatic hypertrophy, benign      Pulmonary hypertension (H)      Scrotal varices      Thrombocytopenia, unspecified (H)      Umbilical hernia without obstruction and without gangrene      Unilateral or unspecified femoral hernia without mention of obstruction or gangrene, unilateral or unspecified      Unspecified hypothyroidism      Venous stasis dermatitis of both lower extremities      Vitamin D deficiency        Past Surgical History:  Past Surgical History:   Procedure Laterality Date     BONE MARROW BIOPSY, BONE SPECIMEN, NEEDLE/TROCAR N/A 4/19/2019    Procedure: BONE MARROW BIOPSY;  Surgeon: Yovani Cooley MD;  Location:  OR     CV HEART CATHETERIZATION WITH POSSIBLE INTERVENTION N/A 4/12/2019    Procedure: Heart Catheterization with possible Intervention;  Surgeon: Lev Beck MD;  Location:  HEART CARDIAC CATH LAB     CV RIGHT HEART CATH N/A 4/12/2019    Procedure: Right Heart Cath;  Surgeon: Lev Beck MD;  Location:  HEART CARDIAC CATH LAB     EYE SURGERY      CATARACT/BLEPHAROPLASTY     GENITOURINARY SURGERY      TUNA     HERNIA REPAIR      RIGHT INGUINAL     HERNIORRHAPHY INGUINAL  8/14/2012    Procedure: HERNIORRHAPHY INGUINAL;  right inguinal hernia repair;  Surgeon: Kareem Torrez MD;  Location:  [facility]     HERNIORRHAPHY UMBILICAL N/A 10/31/2017    Procedure: HERNIORRHAPHY UMBILICAL;  UMBILICAL HERNIA REPAIR WITH MESH;  Surgeon: Scar Harrington MD;  Location: [facility]     IMPLANT PACEMAKER  8/2009    single chamber     [place]      [facility] [place]THOPEDIC SURGERY  2010    right TKR, left TKR     SURGICAL HISTORY OF -       Thumb surgery      SURGICAL HISTORY OF -   1990s    Left total knee replacement     SURGICAL HISTORY OF -   3/11    [facility] total knee replacement        Family History:   Family History   Problem Relation Age of Onset     Cardiovascular Father      C.A.D. Father      Lung Cancer Sister      Unknown/Adopted Mother      Cancer - colorectal No family hx of        Social History:  Social History     Socioeconomic History     Marital status:      Spouse name: Not on file     Number of children: Not on file     Years of education: Not on file     Highest education level: Not on file   Occupational History     Occupation: Teacher, author, speaker     Employer: RETIRED   Social Needs     Financial resource strain: Not on file     Food insecurity:     Worry: Not on file     Inability: Not on file     Transportation needs:     Medical: Not on file     Non-medical: Not on file   Tobacco Use     Smoking status: Never Smoker     Smokeless tobacco: Never Used     Tobacco comment: per encounter 2/12/07   Substance and Sexual Activity     Alcohol use: Yes     Alcohol/week: 0.0 oz     Comment: 1/month     Drug use: No     Sexual activity: Never     Partners: Female   Lifestyle     Physical activity:     Days per week: Not on file     Minutes per session: Not on file     Stress: Not on file   Relationships     Social connections:     Talks on phone: Not on file     Gets together: Not on file     Attends Nondenominational service: Not on file     Active member of club or organization: Not on file     Attends meetings of clubs or organizations: Not on file     Relationship status: Not on file     Intimate partner violence:     Fear of current or ex partner: Not on file     Emotionally abused: Not on file     Physically abused: Not on file     Forced sexual activity: Not on file   Other Topics Concern     Parent/sibling w/ CABG, MI or angioplasty before 65F 55M? No      Service Not Asked     Blood Transfusions Not Asked     Caffeine Concern No      Comment: 1-2 cups coffee a day     Occupational Exposure Not Asked     Hobby Hazards Not Asked     Sleep Concern No     Stress Concern Yes     Comment: due to wifes health condition     Weight Concern No     Special Diet No     Back Care Not Asked     Exercise Yes     Comment: states he uses his tredmill on and off     Bike Helmet Not Asked     Seat Belt Yes     Self-Exams Not Asked   Social History Narrative     Not on file       Allergies:     No Known Allergies    Medications:  Current Outpatient Medications   Medication     acetaminophen (TYLENOL) 500 MG tablet     aspirin 81 MG EC tablet     bumetanide (BUMEX) 0.5 MG tablet     bumetanide (BUMEX) 1 MG tablet     buPROPion (WELLBUTRIN XL) 150 MG 24 hr tablet     Carboxymethylcellulose Sod PF (CELLUVISC/REFRESH LIQUIGEL) 1 % ophthalmic solution     carvedilol (COREG) 6.25 MG tablet     COMPRESSION STOCKINGS     diclofenac (VOLTAREN) 1 % topical gel     finasteride (PROSCAR) 5 MG tablet     glucosamine-chondroitin 500-400 MG CAPS per capsule     ketotifen (ZADITOR) 0.025 % SOLN ophthalmic solution     KLOR-CON 20 MEQ CR tablet     levothyroxine (SYNTHROID/LEVOTHROID) 100 MCG tablet     LORazepam (ATIVAN) 1 MG tablet     Multiple Vitamins-Minerals (CENTRUM SILVER) per tablet     omeprazole (PRILOSEC) 20 MG DR capsule     ORDER FOR DME     Polyethylene Glycol 3350 (MIRALAX PO)     simvastatin (ZOCOR) 20 MG tablet     sucralfate (CARAFATE) 1 GM tablet     tamsulosin (FLOMAX) 0.4 MG capsule     triamcinolone (KENALOG) 0.025 % cream     apixaban ANTICOAGULANT (ELIQUIS) 2.5 MG tablet     ASPIRIN NOT PRESCRIBED (INTENTIONAL)     No current facility-administered medications for this visit.        Review of Systems:   A 10 point ROS was performed and is negative other than HPI.    Physical Exam:  Pulse:  [61] 61  BP: (122)/(77) 122/77  SpO2:  [95 %] 95 %    Gen: NAD, resting comfortably in bed  Lungs: CTAB, non-labored breathing  CV: regular rhythm, normal rate  Abd: soft,  not tender, not distended  Ext: motor, sensation, pulses intact  Neuro: AOx3    Labs:  Lab Results   Component Value Date    WBC 3.0 04/19/2019     Lab Results   Component Value Date    RBC 3.00 04/19/2019     Lab Results   Component Value Date    HGB 9.9 04/25/2019     Lab Results   Component Value Date    HCT 29.7 04/19/2019     No components found for: MCT  Lab Results   Component Value Date    MCV 99 04/19/2019     Lab Results   Component Value Date    MCH 33.0 04/19/2019     Lab Results   Component Value Date    MCHC 33.3 04/19/2019     Lab Results   Component Value Date    RDW 16.5 04/19/2019     Lab Results   Component Value Date    PLT 83 04/19/2019     Last Comprehensive Metabolic Panel:  Sodium   Date Value Ref Range Status   04/25/2019 135 (L) 136 - 145 mmol/L Final     Potassium   Date Value Ref Range Status   04/25/2019 4.1 3.5 - 5.1 mmol/L Final     Chloride   Date Value Ref Range Status   04/25/2019 103 98 - 107 mmol/L Final     Carbon Dioxide   Date Value Ref Range Status   04/25/2019 29 23 - 29 mmol/L Final     Anion Gap   Date Value Ref Range Status   04/25/2019 7.1 6 - 17 mmol/L Final     Glucose   Date Value Ref Range Status   04/25/2019 110 (H) 70 - 105 mg/dL Final     Comment:     Fasting specimen     Urea Nitrogen   Date Value Ref Range Status   04/25/2019 20 7 - 30 mg/dL Final     Creatinine   Date Value Ref Range Status   04/25/2019 1.21 0.70 - 1.30 mg/dL Final     GFR Estimate   Date Value Ref Range Status   04/25/2019 57 (L) >60 mL/min/[1.73_m2] Final     Calcium   Date Value Ref Range Status   04/25/2019 9.5 8.5 - 10.5 mg/dL Final       Imaging:  Transesophageal echocardiogram (3/22/19):  Transesophageal Echocardiogram   Order: [id number]   Status:  Edited Result - FINAL   Visible to patient:  Yes (MyChart) Next appt:  Today at 02:30 PM in Surgery (CLAUDIA Patel) Dx:  Ischemic cardiomyopathy; Non-rheumati...   Details     Reading Physician Reading Date Result Priority    Demarco Call MD 3/22/2019       Narrative     [id number]  [id number]  [id number]  [id number]^KOREY^IZABEL^MILY SUZIE                                                                          Version 2        [facility]  Echocardiography Laboratory  [address] [place] [id number]        Name: HIRO IBARRA  MRN: [id number]  : 1931  Study Date: 2019 08:36 AM  Age: 88 yrs  Gender: Male  Patient Location: [facility]  Reason For Study: Ischemic cardiomyopathy, Non-rheumatic tricuspid valve  insuffici  History: PACEMAKER  Ordering Physician: IZABEL NAIR  Referring Physician: TORO STRAUSS  Performed By: Chay Khan RDCS     BSA: 2.2 m2  Height: 74 in  Weight: 210 lb  HR: 64  BP: 130/60 mmHg  _____________________________________________________________________________  __        Procedure  Complete CHELLE Adult. 3D image acquisition, reconstruction, and real-time  interpretation was performed. CHELLE Probe serial #[id number] was used during the  procedure.  _____________________________________________________________________________  __        Interpretation Summary     1. The left ventricle is mildly dilated. The visual ejection fraction is  estimated at 30%. There is moderate global hypokinesia of the left ventricle.  2. The right ventricle is moderately dilated. Moderately decreased right  ventricular systolic function  3. There is mod-severe to severe (3-4+) mitral regurgitation. Mitral valve is  moderately myxomatous, no prolapse or flail. MR jet is slighlty posterior  directed. Etiology likely functional MR from dilated LV and low EF, may have  some myxomatous component also. MVA 5.0cm2 by 3D alignment and measurement at  leaflet tips.  4. There is mod-severe to severe (3-4+) tricuspid regurgitation. TV is not  well visualized. TR jet is directed at septum, appears to come from central  coaptation point, where pacemaker lead crosses the valve.     TTE from  4/2018 showed EF 53% (although when reviewed appears lower), mild RV  dilation with normal function, 3+ MR, 1-2+ TR.  _____________________________________________________________________________  __        CHELLE  I determined this patient to be an appropriate candidate for the planned  sedation and procedure and have reassessed the patient immediately prior to  sedation and procedure. Total sedation time: 25 minutes of continuous bedside  1:1 monitoring. Versed (2mg) was given intravenously. Fentanyl (50mcg) was  given intravenously. Consent to the procedure was obtained prior to sedation.  The transesophageal probe was passed without difficulty. There were no  complications associated with this procedure.     Left Ventricle  The left ventricle is mildly dilated. The visual ejection fraction is  estimated at 30%. There is moderate global hypokinesia of the left ventricle.     Right Ventricle  The right ventricle is moderately dilated. Moderately decreased right  ventricular systolic function.     Atria  There is severe biatrial enlargement. There is no atrial shunt seen. A  contrast injection (Bubble Study) was performed that was negative for flow  across the interatrial septum. No thrombus is detected in the left atrial  appendage.        Mitral Valve  There is mod-severe to severe (3-4+) mitral regurgitation. Mitral valve is  moderately myxomatous, no prolapse or flail. MR jet is slighlty posterior  directed. Etiology likely functional MR from dilated LV and low EF, may have  some myxomatous component also.     Tricuspid Valve  There is mod-severe to severe (3-4+) tricuspid regurgitation. TV is not well  visualized. TR jet is directed at septum, appears to come from central  coaptation point, where pacemaker lead crosses the valve. The right  ventricular systolic pressure is approximated at 20.6 mmHg plus the right  atrial pressure.     Aortic Valve  Normal tricuspid aortic valve.     Pulmonic Valve  There is mild  (1+) pulmonic valvular regurgitation.     Vessels  The ascending aorta is Mildly dilated. Normal pulmonary vein velocity.        Pericardial/Pleural  There is no pericardial effusion.     Rhythm  The rhythm was atrial fibrillation with paced rhythm.  _____________________________________________________________________________  __     MMode/2D Measurements & Calculations  asc Aorta Diam: 3.8 cm        Doppler Measurements & Calculations  MR ERO: 0.05 cm2  MR volume: 8.0 ml  TR max jasiel: 227.1 cm/sec  TR max P.6 mmHg           _____________________________________________________________________________  __           Report approved by: Jesica Marte 2019 03:50 PM               Coronary angiogram (3/21/18):  Conclusion     Procedures:  Coronary Angiography  Left heart catheterization     PHYSICIANS:  Attending Physician: Dr. Lee  Interventional Cardiology Fellow: LONNY Jenkins     HPI/INDICATION:  The patient is an 87-year-old male with history of CAD, status post  PCI of an obtuse marginal branch in . He has had exertional chest  tightness along with shortness of breath an possibility of volume  overload. He has pedal edema that he is been following up in clinic  regularly. He also has chronic pancytopenia which I discussed with Dr. Nelson.  He is sent to the Cath Lab to evaluate his coronaries for  possible ischemic etiology.     DESCRIPTION:  1. Consent obtained with discussion of risks. All questions were  answered.  2. Sterile prep and procedure.  3. Location with Sheaths:   Left radial artery, 6 [place]ian  4. Access: Local anesthetic with lidocaine. A micropuncture 21 gauge  needle was used to establish vascular access using a modified  Seldinger technique.  5. Catheters:  JR4 and JL4  6. Estimated blood loss: < 25 ml     MEDICATIONS:  The procedure was performed under conscious sedation for 30?minutes  from 0855 to 0925.  The patient was assessed immediately before the first  sedation  medication was administered. Midazolam 2mg and Fentanyl 100mcg?were  administered.  Heart rate, BP, respiration, oxygen saturation and patient responses  were monitored throughout the procedure with the assistance of the RN  under my supervision.  IV Heparin was administered to achieve anticoagulation. IA verapamil  and nitroglycerin were given to prophylax against radial artery spasm.  Antiplatelet Therapy: ASA     Procedures:  Coronary Angiogram:   -Both coronary arteries arise from their respective cusps.  -Dominance: Right  -LM is without angiographic evidence of disease.   -LAD: Type 3 [LAD supplies the entire apex]. The LAD gives rise to  septal perforators, D1 and D2. There are luminal irregularities in the  mid LAD.  -LCX gives rise to 3 OM branches and then branching LPL. There is a  patent stent in the obtuse marginal branch otherwise there is minimal  CAD.   -RCA gives rise to PL branches and supplies PDA. The mid RCA has  moderate, 30-40% stenoses.     Left heart catheterization:  Left ventricular end-diastolic pressure 23 mmHg  LVEF 55-60% with no wall motion abnormalities  Trace MR  No gradient on pullback across aortic valve     Sheath Removal:  Left radial artery sheath removed, TR band in place     Contrast: 105ml      Fluoroscopy Time: 9.0min; 0.385Gy     COMPLICATIONS:  None     SUMMARY:   Single vessel CAD with patent stent from 2009 in the obtuse marginal.  On today's cath there are no flow-limiting lesions  Elevated left-sided filling pressures with left ventricular  end-diastolic pressure of 23 mmHg     PLAN:   Continue current medication regimen  Added spironolactone 25 mg daily  Continued medical management and lifestyle modification for  cardiovascular risk factor optimization.     The attending interventional cardiologist, Dr. Lee, was present  and supervised all critical aspects the procedure.     LONNY Jenkins MD, PhD  Interventional Cardiology Fellow     I was present  throughout the case and assisted as necessary.     I have personally reviewed the examination and initial interpretation  and I agree with the findings.     SANDI LEWIS MD       * Intraprocedure medication information is unavailable because the case start and end events have not been set *

## 2019-05-16 NOTE — NURSING NOTE
Chief Complaint   Patient presents with     New Patient     mitraclip   Vitals were taken and medications were reconciled.    Lexy Hollingsworth MA    1:06 PM

## 2019-05-16 NOTE — PROGRESS NOTES
Preoperative Assessment Center medication history for May 16, 2019 is complete.    See Epic admission navigator for prior to admission medications.   Operating room staff will still need to confirm medications and last dose information on day of surgery.     Medication history interview sources:  Patient Interview    Changes made to PTA medication list (reason)  Added: None    Deleted: None    Changed: None    Additional medication history information (including reliability of information, actions taken by pharmacist):    -- No recent (within 30 days) course of antibiotics  -- No recent (within 30 days) course of steroids  -- No recent (within 30 days) chronic daily medications stopped   -- Patient declines being on any other prescription or over-the-counter medications  -- patient is holding apixaban for his upcoming procedure    Prior to Admission medications    Medication Sig Last Dose Taking? Auth Provider   aspirin 81 MG EC tablet Take 81 mg by mouth daily  Taking Yes Reported, Patient   bumetanide (BUMEX) 0.5 MG tablet Take 1 tablet (0.5 mg) by mouth every other day Taking Yes Mackenzie Tejada APRN CNP   bumetanide (BUMEX) 1 MG tablet Take 1 tablet (1 mg) by mouth every other day Taking Yes Mackenzie Tejada APRN CNP   buPROPion (WELLBUTRIN XL) 150 MG 24 hr tablet Take 150 mg by mouth every morning Taking Yes Unknown, Entered By History   carvedilol (COREG) 6.25 MG tablet TAKE 1 TABLET (6.25 MG) BY MOUTH 2 TIMES DAILY (WITH MEALS) Taking Yes Mg Nelson MD   diclofenac (VOLTAREN) 1 % topical gel Apply 2 g topically 4 times daily Taking Yes Mg Nelson MD   finasteride (PROSCAR) 5 MG tablet TAKE 1 TABLET EVERY DAY Taking Yes Mg Nelson MD   glucosamine-chondroitin 500-400 MG CAPS per capsule Take 2 capsules by mouth daily Taking Yes Unknown, Entered By History   ketotifen (ZADITOR) 0.025 % SOLN ophthalmic solution Place 1 drop into both eyes every 12  hours Taking Yes Mg Nelosn MD   KLOR-CON 20 MEQ CR tablet TAKE 1 TABLET EVERY DAY Taking Yes Elvin Ross MD   levothyroxine (SYNTHROID/LEVOTHROID) 100 MCG tablet TAKE 1 TABLET EVERY DAY Taking Yes Mg Nelson MD   Multiple Vitamins-Minerals (CENTRUM SILVER) per tablet Take 1 tablet by mouth daily Taking Yes Reported, Patient   omeprazole (PRILOSEC) 20 MG DR capsule TAKE 1 CAPSULE TWICE DAILY Taking Yes Mg Nelson MD   Polyethylene Glycol 3350 (MIRALAX PO) Take 1 packet by mouth At Bedtime  Taking Yes Reported, Patient   simvastatin (ZOCOR) 20 MG tablet Take 20 mg by mouth daily  Taking Yes Reported, Patient   sucralfate (CARAFATE) 1 GM tablet TAKE 1 TABLET TWICE DAILY Taking Yes Mg Nelson MD   tamsulosin (FLOMAX) 0.4 MG capsule TAKE 1 CAPSULE EVERY DAY  Patient taking differently: TAKE 1 CAPSULE EVERY EVENING Taking Yes Mg Nelson MD   triamcinolone (KENALOG) 0.025 % cream Apply topically 2 times daily as needed  Taking Yes Reported, Patient   acetaminophen (TYLENOL) 500 MG tablet Take 2 tablets (1,000 mg) by mouth every 6 hours as needed for mild pain  Patient not taking: Reported on 5/16/2019 Not Taking  Mg Nelson MD   apixaban ANTICOAGULANT (ELIQUIS) 2.5 MG tablet Take 1 tablet (2.5 mg) by mouth 2 times daily  Patient not taking: Reported on 5/16/2019 Not Taking  Mackenzie Tejada APRN CNP   Carboxymethylcellulose Sod PF (CELLUVISC/REFRESH LIQUIGEL) 1 % ophthalmic solution Place 1 drop into both eyes 3 times daily as needed for dry eyes   Reported, Patient   COMPRESSION STOCKINGS 1 each daily   London Carrizales MD   LORazepam (ATIVAN) 1 MG tablet Take 0.5-1 tablets (0.5-1 mg) by mouth every 8 hours as needed for anxiety  Patient not taking: Reported on 5/16/2019 Not Taking  Aggie Akhtar DO   ORDER FOR DME Auto-CPAP:Max 9 cm H2OMin 9 cm H2O  Continuous Lifetime need and heated humidity.      Svee,  Alan PIERSON MD         Medication history completed by: Kwasi Fregoso [facility]

## 2019-05-16 NOTE — H&P
Pre-Operative H & P     CC:  Preoperative exam to assess for increased cardiopulmonary risk while undergoing surgery and anesthesia.    Date of Encounter: 5/16/2019  Primary Care Physician:  Mg Nelson  Bairon Foster is an 88 year old male who presents for pre-operative H & P in preparation for Mitral clip, with Dr. Harshil Das, on 5/20/19, for non -rheumatic severe and symptomatic mitral valve regurgitation, at [facility]. He has progressively worsening SOB for ~ 6 months. He has been hospitalized 2 x this year for CHF and his EF has declined from 53% to 30% thought to be due to worsening MV regurgitation.   He lives with his wife in a small house and is able to get around. He gets SOB with ADLs.   It has been a struggle with his leg edema and diuretic use. Some question of diuretics related to rashes. He is currently without CP, palpitations, or change in usual edema. He has gained 4-5 lbs since  last hospitalization 4/16-4/22/19 for epistaxis at which time he had a bone marrow biopsy for pancytopenia. He is taking his bumex and carvedilol.  No fevers, new SOB. He saw cardiology today and has been guided on need to hold eliquis and start ASA before procedure.  History is obtained from the patient.     Past Medical History  Past Medical History:   Diagnosis Date     Acute, but ill-defined, cerebrovascular disease 15 yrs ago?    NO RESIDUALS     Antiplatelet or antithrombotic long-term use      Coronary atherosclerosis of unspecified type of vessel, native or graft     S/P PTCA, EF 50%     Esophageal reflux      Hydrocele, unspecified      Hypercholesterolemia      Inguinal hernia without mention of obstruction or gangrene, recurrent unilateral or unspecified      Ischemic cardiomyopathy      JIM on CPAP      Other lymphedema     GROIN AND THIGHS     Pancytopenia      Anemia      Pacemaker      PAD (peripheral artery disease) (H)      Persistent  atrial fibrillation (H)     VVI PM for long pauses     Prostatic hypertrophy, benign      Pulmonary hypertension (H)      Scrotal varices      Thrombocytopenia, unspecified (H)      Umbilical hernia without obstruction and without gangrene      Unilateral or unspecified femoral hernia without mention of obstruction or gangrene, unilateral or unspecified      Hypothyroidism      Venous stasis dermatitis of both lower extremities      Vitamin D deficiency        Past Surgical History  Past Surgical History:   Procedure Laterality Date     BONE MARROW BIOPSY, BONE SPECIMEN, NEEDLE/TROCAR N/A 4/19/2019    Procedure: BONE MARROW BIOPSY;  Surgeon: Yovani Cooley MD;  Location:  OR     CV HEART CATHETERIZATION WITH POSSIBLE INTERVENTION N/A 4/12/2019    Procedure: Heart Catheterization with possible Intervention;  Surgeon: Lev Beck MD;  Location:  HEART CARDIAC CATH LAB     CV RIGHT HEART CATH N/A 4/12/2019    Procedure: Right Heart Cath;  Surgeon: Lev Beck MD;  Location:  HEART CARDIAC CATH LAB     EYE SURGERY      CATARACT/BLEPHAROPLASTY     GENITOURINARY SURGERY      TUNA     HERNIA REPAIR      RIGHT INGUINAL     HERNIORRHAPHY INGUINAL  8/14/2012    Procedure: HERNIORRHAPHY INGUINAL;  right inguinal hernia repair;  Surgeon: Kareem Torrez MD;  Location:  [facility]     HERNIORRHAPHY UMBILICAL N/A 10/31/2017    Procedure: HERNIORRHAPHY UMBILICAL;  UMBILICAL HERNIA REPAIR WITH MESH;  Surgeon: Scar Harrington MD;  Location: [facility]     IMPLANT PACEMAKER  8/2009    single chamber     [place]      [facility] [place]THOPEDIC SURGERY  2010    right TKR, left TKR     SURGICAL HISTORY OF -       Thumb surgery     SURGICAL HISTORY OF -   1990s    Left total knee replacement     SURGICAL HISTORY OF -   3/11    Rig total knee replacement       Hx of Blood transfusions/reactions: no     Hx of abnormal bleeding or anti-platelet use: On eliquis    Menstrual history: No LMP for male  patient.    Steroid use in the last year: no    Personal or FH with difficulty with Anesthesia:  no    Prior to Admission Medications  Current Outpatient Medications   Medication Sig Dispense Refill     acetaminophen (TYLENOL) 500 MG tablet Take 2 tablets (1,000 mg) by mouth every 6 hours as needed for mild pain 100 tablet 0     apixaban ANTICOAGULANT (ELIQUIS) 2.5 MG tablet Take 1 tablet (2.5 mg) by mouth 2 times daily (Patient not taking: Reported on 5/16/2019) 180 tablet 3     aspirin 81 MG EC tablet Take 81 mg by mouth       ASPIRIN NOT PRESCRIBED (INTENTIONAL) Please choose reason not prescribed, below       bumetanide (BUMEX) 0.5 MG tablet Take 1 tablet (0.5 mg) by mouth every other day 45 tablet 3     bumetanide (BUMEX) 1 MG tablet Take 1 tablet (1 mg) by mouth every other day 45 tablet 3     buPROPion (WELLBUTRIN XL) 150 MG 24 hr tablet Take 150 mg by mouth every morning       Carboxymethylcellulose Sod PF (CELLUVISC/REFRESH LIQUIGEL) 1 % ophthalmic solution Place 1 drop into both eyes 3 times daily as needed for dry eyes       carvedilol (COREG) 6.25 MG tablet TAKE 1 TABLET (6.25 MG) BY MOUTH 2 TIMES DAILY (WITH MEALS) 180 tablet 3     COMPRESSION STOCKINGS 1 each daily 2 each 0     diclofenac (VOLTAREN) 1 % topical gel Apply 2 g topically 4 times daily       finasteride (PROSCAR) 5 MG tablet TAKE 1 TABLET EVERY DAY 90 tablet 3     glucosamine-chondroitin 500-400 MG CAPS per capsule Take 2 capsules by mouth daily       ketotifen (ZADITOR) 0.025 % SOLN ophthalmic solution Place 1 drop into both eyes every 12 hours 1 Bottle      KLOR-CON 20 MEQ CR tablet TAKE 1 TABLET EVERY DAY 90 tablet 3     levothyroxine (SYNTHROID/LEVOTHROID) 100 MCG tablet TAKE 1 TABLET EVERY DAY 90 tablet 3     LORazepam (ATIVAN) 1 MG tablet Take 0.5-1 tablets (0.5-1 mg) by mouth every 8 hours as needed for anxiety 30 tablet 5     Multiple Vitamins-Minerals (CENTRUM SILVER) per tablet Take 1 tablet by mouth daily       omeprazole  (PRILOSEC) 20 MG DR capsule TAKE 1 CAPSULE TWICE DAILY 180 capsule 3     ORDER FOR DME Auto-CPAP:Max 9 cm H2OMin 9 cm H2O  Continuous Lifetime need and heated humidity.    1 Device 0     Polyethylene Glycol 3350 (MIRALAX PO) Take 1 packet by mouth At Bedtime        simvastatin (ZOCOR) 20 MG tablet Take 20 mg by mouth daily        sucralfate (CARAFATE) 1 GM tablet TAKE 1 TABLET TWICE DAILY 180 tablet 3     tamsulosin (FLOMAX) 0.4 MG capsule TAKE 1 CAPSULE EVERY DAY 90 capsule 3     triamcinolone (KENALOG) 0.025 % cream Apply topically 2 times daily as needed          Allergies  No Known Allergies    Social History  Social History     Socioeconomic History     Marital status:      Spouse name: Not on file     Number of children: 1   Occupational History     Occupation: Teacher, author, speaker     Employer: RETIRED   Tobacco Use     Smoking status: Never Smoker     Smokeless tobacco: Never Used   Substance and Sexual Activity     Alcohol use: Yes     Alcohol/week: 0.0 oz     Comment: 1/month     Drug use: No     Sexual activity: Never     Partners: Female       Family History  Family History   Problem Relation Age of Onset     Cardiovascular Father      C.A.D. Father      Lung Cancer Sister      Unknown/Adopted Mother      Cancer - colorectal No family hx of            Anesthesia Evaluation  Pt has had prior anesthetic. Type: General and MAC           ROS/MED HX    ENT/Pulmonary:     (+)sleep apnea, uses CPAP , . .    Neurologic:     (+)neuropathy - TOES, date: 15 YRS AGO without deficits    Cardiovascular:     (+) Dyslipidemia, --CAD, --stent,2009 1 . Taking blood thinners Pt has received instructions:   CHF etiology: MITRAL VALVE REGURGITATION 3-4+ Last EF: 30%  NYHA classification: III. LOYOLA, . pacemaker   Reason placed: ATRIAL FIBRILATION:. dysrhythmias a-fib, valvular problems/murmurs type: MR .   pulmonary hypertension,   Previous cardiac testing see below     METS/Exercise Tolerance:  1 - Eating,  "dressing   Hematologic:     (+) Anemia, Other Hematologic Disorder-THROMBOCYTOPENIA      Musculoskeletal:   (+)  other musculoskeletal- BILATERAL KNEE REPLACEMENTS      GI/Hepatic:     (+) GERD Asymptomatic on medication,       Renal/Genitourinary:     (+) BPH,       Endo:     (+) thyroid problem hypothyroidism, .      Psychiatric:     (+) psychiatric history depression      Infectious Disease:   (+) Other Infectious Disease BULBOUS PEMPHIGOID     (-) HIV (BULBOUS PEMPHIGOID)   Malignancy:      - no malignancy   Other:    (+) No chance of pregnancy no H/O Chronic Pain,no other significant disability        The complete review of systems is negative other than noted in the HPI or here.   Temp: 97.7  F (36.5  C) Temp src: Oral BP: 122/77 Pulse: 61   Resp: 16 SpO2: 95 %         201 lbs 4.8 oz  6' 2\"   Body mass index is 25.85 kg/m .       Physical Exam  Constitutional: Awake, alert, cooperative, no apparent distress, and frail appearing. He appears stated age.  Eyes: Pupils equal, round and microreactive to light, extra ocular muscles intact, sclera clear, conjunctiva normal.  HENT: Normocephalic, oral pharynx with moist mucus membranes, Partial upper denture. No goiter appreciated.   Respiratory: Clear to auscultation bilaterally, no crackles or wheezing.  Cardiovascular: Regular rate and rhythm, normal S1 and S2, and positive murmur best at apex.    -+  Carotids +2, no bruits. 2+ pitting edema BLE from below knee to ankle. Unable to palpate pedal pulses.  GI: Normal bowel sounds, soft, non-distended, non-tender, no masses palpated, no hepatosplenomegaly.    Lymph/Hematologic: No cervical lymphadenopathy and no supraclavicular lymphadenopathy.  Genitourinary:  Scrotal edema left > right  Skin: Warm and dry.  Multiple healing skin lesions some light and pink, some dark red, annular, no bullae seen  Musculoskeletal: Decreased extension ROM of neck. There is no redness, warmth, or swelling of the joints.    Neurologic: " Awake, alert, oriented to name, place and time. Cranial nerves II-XII are grossly intact.   Neuropsychiatric: Calm, cooperative. Normal affect.     Labs: (personally reviewed)  Lab Results   Component Value Date    WBC 3.0 04/19/2019     Lab Results   Component Value Date    RBC 3.00 04/19/2019     Lab Results   Component Value Date    HGB 9.9 04/25/2019     Lab Results   Component Value Date    HCT 29.7 04/19/2019     Lab Results   Component Value Date    MCV 99 04/19/2019     Lab Results   Component Value Date    MCH 33.0 04/19/2019     Lab Results   Component Value Date    MCHC 33.3 04/19/2019     Lab Results   Component Value Date    RDW 16.5 04/19/2019     Lab Results   Component Value Date    PLT 83 04/19/2019     Last Comprehensive Metabolic Panel:  Sodium   Date Value Ref Range Status   04/25/2019 135 (L) 136 - 145 mmol/L Final     Potassium   Date Value Ref Range Status   04/25/2019 4.1 3.5 - 5.1 mmol/L Final     Chloride   Date Value Ref Range Status   04/25/2019 103 98 - 107 mmol/L Final     Carbon Dioxide   Date Value Ref Range Status   04/25/2019 29 23 - 29 mmol/L Final     Anion Gap   Date Value Ref Range Status   04/25/2019 7.1 6 - 17 mmol/L Final     Glucose   Date Value Ref Range Status   04/25/2019 110 (H) 70 - 105 mg/dL Final     Comment:     Fasting specimen     Urea Nitrogen   Date Value Ref Range Status   04/25/2019 20 7 - 30 mg/dL Final     Creatinine   Date Value Ref Range Status   04/25/2019 1.21 0.70 - 1.30 mg/dL Final     GFR Estimate   Date Value Ref Range Status   04/25/2019 57 (L) >60 mL/min/[1.73_m2] Final     Calcium   Date Value Ref Range Status   04/25/2019 9.5 8.5 - 10.5 mg/dL Final       ECHO: 3/22/19: LVEF 30%, moderate global hypokinesia. RV moderately dilated, decreased RV function. Mod-severe (3-4+) MR, moderately myxomatous valve. Mod-severe TR  Coronary angiogram / right heart catheter April 2019 admission - mild nonobstructive CAD/ mild elevated pulmonary hypertension,  mildly elevated right side filling pressures.  Stress 3/2017: On stress images mild intensity distal inferolateral wall photopenic defect. On both stress and rest images - mild to moderate intensity basal inferior wall photopenic defect, which appears worse on rest images compared to stress images - likely attenuation artifact. No RWMA.  LVEF 47% with stress.  March 2019 Pacemaker interrogation: Underlying rhythm AF with rates 40-50. Battery life 1/2 year .       ASSESSMENT and PLAN  Bairon Foster is a 88 year old male scheduled to undergo  Mitral clip, with Dr. Harshil Das, on 5/20/19, for non -rheumatic severe and symptomatic mitral valve regurgitation.     Pre-operative considerations include:  1.) CV: CAD - OM stent 2009. Medical management with B-blocker - no daily ASA 2/2 thrombocytopenia.  Chronic CHF with decrease in EF from 53% to 30% this past year, likely due to valvar heart ds with worsening MR (3-4+) and TR (3+).   Acute on chronic CHF exacerbation 4/8/19 admitted for diuresis. Possible reactions (bullous pemphigus) to some diuretics...currently on bumex, carvedilol, no ACEI due to elevated Cr.  Permanent Atrial fib (AF) with single lead pacemaker anticoagulated with Eliquis, on B-blocker.   Possible CVA 15 yrs ago with left sided weakness-no residual and no subsequent event  Exam today: weight up 4 lbs, lungs clear, 2+ pitting edema BLE.  Procedure as planned.   Stop eliquis. Start 81 mg ASA today as per Leyla Camp.     2.) Heme : Admitted 4/16-4/22/19 for bleed (epistaxis), N/V -labs revealed pancytopenia (long standing /platelets as low as 44,000 in past) Work-up negative, BM biopsy normal.  4/18/19 wbc 3.3, HGB 10.3, platelets 88,000.  venous insufficiency (bilateral GSV ablations in August 2017).   - Type and screen today;  orders in place from cardiology for DOS for CBC, CMP rbcs.   -VTE risk is elevated due to age    3.) Pulmonary: JIM on CPAP. Scheduled for 6 min walk test  -Bring CPAP  mask to DOS    4.) Endo: Hypothyroid on synthroid which he will take DOS    5.) GI: GERD on prilosec which he will take DOS.     6.) Renal: BHAKTI 4/8/119 Cr 1.52 April admission. BPH. Day of discharge down to1.25.   BMP today.    Anesthesia: MAC 4/19/19 for bone marrow biopsy with no complications. Due to bullous pemphigus, pt requests care with skin / avoid use of tape: Suggest use of swaddling with IV.    Pt discussed with Dr. Gee. Pt also saw cardiology today - see note in EMR      - Anesthesia considerations:  Refer to PAC assessment in anesthesia records    Patient was discussed with Dr Gee.  Patient is optimized and is acceptable candidate for the proposed procedure.  No further diagnostic evaluation is needed.     CLAUDIA Patel  Preoperative Assessment Center  [facility]  [facility]  Phone: [phone]  Fax: [phone]

## 2019-05-16 NOTE — LETTER
5/16/2019      RE: Bairon Foster  [address]  [facility] [address]       Dear Colleague,    Thank you for the opportunity to participate in the care of your patient, Bairon Foster, at the [facility] HEART [facility] at [facility]. Please see a copy of my visit note below.    [facility] CARDIOTHORACIC SURGERY CONSULT  Patient Name: Bairon Foster  Medical Record Number: [id number]  YOB: 1931  Room Number: Room/bed info not found  Referring Physician: Dr. Winter    CC: Severe mitral insufficiency    History of Present Illness: Bairon Foster is a 88 year old male with history of coronary artery disease, progressive worsening MR and TR. He had a CHELLE in March demonstrating moderate to severe to severe mitral regurgitation.  He was able to see Dr. Hope earlier this month and is scheduled May 20 for mitral clip procedure at the [facility].  He has had a recent heart failure exasperation hospitalization.  And more recently he had a questionable GI bleed which resulted in an admission but which was subsequently felt to be epitaxis and he was switched to low-dose Eliquis.      He is seen in clinic today with his wife present.    Assessment and Plan:  Bairon Foster is a 88 year old male with severe mitral insufficiency, severe tricuspid insufficiency  1) Agree with mitral clip - poor candidate for surgery given age, frailty, comorbidities  2) Please call with questions or concerns.     Thank you for the opportunity to participate in the care of this patient.    Shemar Velasquez MD  Cardiothoracic Surgery  [phone]    Past Medical History:  Past Medical History:   Diagnosis Date     Acute, but ill-defined, cerebrovascular disease     NO RESIDUALS     Antiplatelet or antithrombotic long-term use      Arrhythmia     a fib     Blood in stool      Coronary atherosclerosis of unspecified type of vessel, native or graft     S/P PTCA, EF 50%     Esophageal  reflux      Hydrocele, unspecified      Hypercholesterolemia      Hypersomnia with sleep apnea, unspecified      Inguinal hernia without mention of obstruction or gangrene, recurrent unilateral or unspecified      Ischemic cardiomyopathy      Major depression in complete remission (H)      Obese      Onychomycosis      JIM on CPAP      Other and unspecified hyperlipidemia      Other lymphedema     GROIN AND THIGHS     Other pancytopenia (H)      Other specified anemias      Overweight      Pacemaker      PAD (peripheral artery disease) (H)      Pancytopenia (H)      Persistent atrial fibrillation (H)     VVI PM for long pauses     Prostatic hypertrophy, benign      Pulmonary hypertension (H)      Scrotal varices      Thrombocytopenia, unspecified (H)      Umbilical hernia without obstruction and without gangrene      Unilateral or unspecified femoral hernia without mention of obstruction or gangrene, unilateral or unspecified      Unspecified hypothyroidism      Venous stasis dermatitis of both lower extremities      Vitamin D deficiency        Past Surgical History:  Past Surgical History:   Procedure Laterality Date     BONE MARROW BIOPSY, BONE SPECIMEN, NEEDLE/TROCAR N/A 4/19/2019    Procedure: BONE MARROW BIOPSY;  Surgeon: Yovani Cooley MD;  Location:  OR     CV HEART CATHETERIZATION WITH POSSIBLE INTERVENTION N/A 4/12/2019    Procedure: Heart Catheterization with possible Intervention;  Surgeon: Lev Beck MD;  Location:  HEART CARDIAC CATH LAB     CV RIGHT HEART CATH N/A 4/12/2019    Procedure: Right Heart Cath;  Surgeon: Lev Beck MD;  Location:  HEART CARDIAC CATH LAB     EYE SURGERY      CATARACT/BLEPHAROPLASTY     GENITOURINARY SURGERY      TUNA     HERNIA REPAIR      RIGHT INGUINAL     HERNIORRHAPHY INGUINAL  8/14/2012    Procedure: HERNIORRHAPHY INGUINAL;  right inguinal hernia repair;  Surgeon: Kareem Torrez MD;  Location:  SD     HERNIORRHAPHY UMBILICAL N/A  10/31/2017    Procedure: HERNIORRHAPHY UMBILICAL;  UMBILICAL HERNIA REPAIR WITH MESH;  Surgeon: Scar Harrington MD;  Location:  SD     IMPLANT PACEMAKER  8/2009    single chamber     ORTHOPEDIC SURGERY      [facility] RIGHT     ORTHOPEDIC SURGERY  2010    right TKR, left TKR     SURGICAL HISTORY OF -       Thumb surgery     SURGICAL HISTORY OF -   1990s    Left total knee replacement     SURGICAL HISTORY OF -   3/11    Righ total knee replacement        Family History:   Family History   Problem Relation Age of Onset     Cardiovascular Father      C.A.D. Father      Lung Cancer Sister      Unknown/Adopted Mother      Cancer - colorectal No family hx of        Social History:  Social History     Socioeconomic History     Marital status:      Spouse name: Not on file     Number of children: Not on file     Years of education: Not on file     Highest education level: Not on file   Occupational History     Occupation: Teacher, author, speaker     Employer: RETIRED   Social Needs     Financial resource strain: Not on file     Food insecurity:     Worry: Not on file     Inability: Not on file     Transportation needs:     Medical: Not on file     Non-medical: Not on file   Tobacco Use     Smoking status: Never Smoker     Smokeless tobacco: Never Used     Tobacco comment: per encounter 2/12/07   Substance and Sexual Activity     Alcohol use: Yes     Alcohol/week: 0.0 oz     Comment: 1/month     Drug use: No     Sexual activity: Never     Partners: Female   Lifestyle     Physical activity:     Days per week: Not on file     Minutes per session: Not on file     Stress: Not on file   Relationships     Social connections:     Talks on phone: Not on file     Gets together: Not on file     Attends Muslim service: Not on file     Active member of club or organization: Not on file     Attends meetings of clubs or organizations: Not on file     Relationship status: Not on file     Intimate partner violence:     Fear of current  or ex partner: Not on file     Emotionally abused: Not on file     Physically abused: Not on file     Forced sexual activity: Not on file   Other Topics Concern     Parent/sibling w/ CABG, MI or angioplasty before 65F 55M? No      Service Not Asked     Blood Transfusions Not Asked     Caffeine Concern No     Comment: 1-2 cups coffee a day     Occupational Exposure Not Asked     Hobby Hazards Not Asked     Sleep Concern No     Stress Concern Yes     Comment: due to wifes health condition     Weight Concern No     Special Diet No     Back Care Not Asked     Exercise Yes     Comment: states he uses his tredmill on and off     Bike Helmet Not Asked     Seat Belt Yes     Self-Exams Not Asked   Social History Narrative     Not on file       Allergies:     No Known Allergies    Medications:  Current Outpatient Medications   Medication     acetaminophen (TYLENOL) 500 MG tablet     aspirin 81 MG EC tablet     bumetanide (BUMEX) 0.5 MG tablet     bumetanide (BUMEX) 1 MG tablet     buPROPion (WELLBUTRIN XL) 150 MG 24 hr tablet     Carboxymethylcellulose Sod PF (CELLUVISC/REFRESH LIQUIGEL) 1 % ophthalmic solution     carvedilol (COREG) 6.25 MG tablet     COMPRESSION STOCKINGS     diclofenac (VOLTAREN) 1 % topical gel     finasteride (PROSCAR) 5 MG tablet     glucosamine-chondroitin 500-400 MG CAPS per capsule     ketotifen (ZADITOR) 0.025 % SOLN ophthalmic solution     KLOR-CON 20 MEQ CR tablet     levothyroxine (SYNTHROID/LEVOTHROID) 100 MCG tablet     LORazepam (ATIVAN) 1 MG tablet     Multiple Vitamins-Minerals (CENTRUM SILVER) per tablet     omeprazole (PRILOSEC) 20 MG DR capsule     ORDER FOR DME     Polyethylene Glycol 3350 (MIRALAX PO)     simvastatin (ZOCOR) 20 MG tablet     sucralfate (CARAFATE) 1 GM tablet     tamsulosin (FLOMAX) 0.4 MG capsule     triamcinolone (KENALOG) 0.025 % cream     apixaban ANTICOAGULANT (ELIQUIS) 2.5 MG tablet     ASPIRIN NOT PRESCRIBED (INTENTIONAL)     No current  facility-administered medications for this visit.        Review of Systems:   A 10 point ROS was performed and is negative other than HPI.    Physical Exam:  Pulse:  [61] 61  BP: (122)/(77) 122/77  SpO2:  [95 %] 95 %    Gen: NAD, resting comfortably in bed  Lungs: CTAB, non-labored breathing  CV: regular rhythm, normal rate  Abd: soft, not tender, not distended  Ext: motor, sensation, pulses intact  Neuro: AOx3    Labs:  Lab Results   Component Value Date    WBC 3.0 04/19/2019     Lab Results   Component Value Date    RBC 3.00 04/19/2019     Lab Results   Component Value Date    HGB 9.9 04/25/2019     Lab Results   Component Value Date    HCT 29.7 04/19/2019     No components found for: MCT  Lab Results   Component Value Date    MCV 99 04/19/2019     Lab Results   Component Value Date    MCH 33.0 04/19/2019     Lab Results   Component Value Date    MCHC 33.3 04/19/2019     Lab Results   Component Value Date    RDW 16.5 04/19/2019     Lab Results   Component Value Date    PLT 83 04/19/2019     Last Comprehensive Metabolic Panel:  Sodium   Date Value Ref Range Status   04/25/2019 135 (L) 136 - 145 mmol/L Final     Potassium   Date Value Ref Range Status   04/25/2019 4.1 3.5 - 5.1 mmol/L Final     Chloride   Date Value Ref Range Status   04/25/2019 103 98 - 107 mmol/L Final     Carbon Dioxide   Date Value Ref Range Status   04/25/2019 29 23 - 29 mmol/L Final     Anion Gap   Date Value Ref Range Status   04/25/2019 7.1 6 - 17 mmol/L Final     Glucose   Date Value Ref Range Status   04/25/2019 110 (H) 70 - 105 mg/dL Final     Comment:     Fasting specimen     Urea Nitrogen   Date Value Ref Range Status   04/25/2019 20 7 - 30 mg/dL Final     Creatinine   Date Value Ref Range Status   04/25/2019 1.21 0.70 - 1.30 mg/dL Final     GFR Estimate   Date Value Ref Range Status   04/25/2019 57 (L) >60 mL/min/[1.73_m2] Final     Calcium   Date Value Ref Range Status   04/25/2019 9.5 8.5 - 10.5 mg/dL Final        Imaging:  Transesophageal echocardiogram (3/22/19):  Transesophageal Echocardiogram   Order: [id number]   Status:  Edited Result - FINAL   Visible to patient:  Yes (Inahart) Next appt:  Today at 02:30 PM in Surgery (CLAUDIA Patel) Dx:  Ischemic cardiomyopathy; Non-rheumati...   Details     Reading Physician Reading Date Result Priority   Demarco Call MD 3/22/2019       Narrative     [id number]  [facility]  [id number]  [id number]^KOREY^IZABEL^MILY LARSEN                                                                          Version 2        [facility]  Echocardiography Laboratory  [address] [place] [id number]        Name: HIRO IBARRA  MRN: [id number]  : 1931  Study Date: 2019 08:36 AM  Age: 88 yrs  Gender: Male  Patient Location: [facility]  Reason For Study: Ischemic cardiomyopathy, Non-rheumatic tricuspid valve  insuffici  History: PACEMAKER  Ordering Physician: IZABEL NAIR  Referring Physician: TORO STRAUSS  Performed By: Chay Khan RDCS     BSA: 2.2 m2  Height: 74 in  Weight: 210 lb  HR: 64  BP: 130/60 mmHg  _____________________________________________________________________________  __        Procedure  Complete CHELLE Adult. 3D image acquisition, reconstruction, and real-time  interpretation was performed. CHELLE Probe serial #[id number] was used during the  procedure.  _____________________________________________________________________________  __        Interpretation Summary     1. The left ventricle is mildly dilated. The visual ejection fraction is  estimated at 30%. There is moderate global hypokinesia of the left ventricle.  2. The right ventricle is moderately dilated. Moderately decreased right  ventricular systolic function  3. There is mod-severe to severe (3-4+) mitral regurgitation. Mitral valve is  moderately myxomatous, no prolapse or flail. MR jet is slighlty posterior  directed. Etiology likely functional MR  from dilated LV and low EF, may have  some myxomatous component also. MVA 5.0cm2 by 3D alignment and measurement at  leaflet tips.  4. There is mod-severe to severe (3-4+) tricuspid regurgitation. TV is not  well visualized. TR jet is directed at septum, appears to come from central  coaptation point, where pacemaker lead crosses the valve.     TTE from 4/2018 showed EF 53% (although when reviewed appears lower), mild RV  dilation with normal function, 3+ MR, 1-2+ TR.  _____________________________________________________________________________  __        CHELLE  I determined this patient to be an appropriate candidate for the planned  sedation and procedure and have reassessed the patient immediately prior to  sedation and procedure. Total sedation time: 25 minutes of continuous bedside  1:1 monitoring. Versed (2mg) was given intravenously. Fentanyl (50mcg) was  given intravenously. Consent to the procedure was obtained prior to sedation.  The transesophageal probe was passed without difficulty. There were no  complications associated with this procedure.     Left Ventricle  The left ventricle is mildly dilated. The visual ejection fraction is  estimated at 30%. There is moderate global hypokinesia of the left ventricle.     Right Ventricle  The right ventricle is moderately dilated. Moderately decreased right  ventricular systolic function.     Atria  There is severe biatrial enlargement. There is no atrial shunt seen. A  contrast injection (Bubble Study) was performed that was negative for flow  across the interatrial septum. No thrombus is detected in the left atrial  appendage.        Mitral Valve  There is mod-severe to severe (3-4+) mitral regurgitation. Mitral valve is  moderately myxomatous, no prolapse or flail. MR jet is slighlty posterior  directed. Etiology likely functional MR from dilated LV and low EF, may have  some myxomatous component also.     Tricuspid Valve  There is mod-severe to severe (3-4+)  tricuspid regurgitation. TV is not well  visualized. TR jet is directed at septum, appears to come from central  coaptation point, where pacemaker lead crosses the valve. The right  ventricular systolic pressure is approximated at 20.6 mmHg plus the right  atrial pressure.     Aortic Valve  Normal tricuspid aortic valve.     Pulmonic Valve  There is mild (1+) pulmonic valvular regurgitation.     Vessels  The ascending aorta is Mildly dilated. Normal pulmonary vein velocity.        Pericardial/Pleural  There is no pericardial effusion.     Rhythm  The rhythm was atrial fibrillation with paced rhythm.  _____________________________________________________________________________  __     MMode/2D Measurements & Calculations  asc Aorta Diam: 3.8 cm        Doppler Measurements & Calculations  MR ERO: 0.05 cm2  MR volume: 8.0 ml  TR max jasiel: 227.1 cm/sec  TR max P.6 mmHg           _____________________________________________________________________________  __           Report approved by: Jesica Marte 2019 03:50 PM               Coronary angiogram (3/21/18):  Conclusion     Procedures:  Coronary Angiography  Left heart catheterization     PHYSICIANS:  Attending Physician: Dr. Lee  Interventional Cardiology Fellow: LONNY Jenkins     HPI/INDICATION:  The patient is an 87-year-old male with history of CAD, status post  PCI of an obtuse marginal branch in . He has had exertional chest  tightness along with shortness of breath an possibility of volume  overload. He has pedal edema that he is been following up in clinic  regularly. He also has chronic pancytopenia which I discussed with Dr. Nelson.  He is sent to the Cath Lab to evaluate his coronaries for  possible ischemic etiology.     DESCRIPTION:  1. Consent obtained with discussion of risks. All questions were  answered.  2. Sterile prep and procedure.  3. Location with Sheaths:   Left radial artery, 6 Polish  4. Access: Local anesthetic  with lidocaine. A micropuncture 21 gauge  needle was used to establish vascular access using a modified  Seldinger technique.  5. Catheters:  JR4 and JL4  6. Estimated blood loss: < 25 ml     MEDICATIONS:  The procedure was performed under conscious sedation for 30?minutes  from 0855 to 0925.  The patient was assessed immediately before the first sedation  medication was administered. Midazolam 2mg and Fentanyl 100mcg?were  administered.  Heart rate, BP, respiration, oxygen saturation and patient responses  were monitored throughout the procedure with the assistance of the RN  under my supervision.  IV Heparin was administered to achieve anticoagulation. IA verapamil  and nitroglycerin were given to prophylax against radial artery spasm.  Antiplatelet Therapy: ASA     Procedures:  Coronary Angiogram:   -Both coronary arteries arise from their respective cusps.  -Dominance: Right  -LM is without angiographic evidence of disease.   -LAD: Type 3 [LAD supplies the entire apex]. The LAD gives rise to  septal perforators, D1 and D2. There are luminal irregularities in the  mid LAD.  -LCX gives rise to 3 OM branches and then branching LPL. There is a  patent stent in the obtuse marginal branch otherwise there is minimal  CAD.   -RCA gives rise to PL branches and supplies PDA. The mid RCA has  moderate, 30-40% stenoses.     Left heart catheterization:  Left ventricular end-diastolic pressure 23 mmHg  LVEF 55-60% with no wall motion abnormalities  Trace MR  No gradient on pullback across aortic valve     Sheath Removal:  Left radial artery sheath removed, TR band in place     Contrast: 105ml      Fluoroscopy Time: 9.0min; 0.385Gy     COMPLICATIONS:  None     SUMMARY:   Single vessel CAD with patent stent from 2009 in the obtuse marginal.  On today's cath there are no flow-limiting lesions  Elevated left-sided filling pressures with left ventricular  end-diastolic pressure of 23 mmHg     PLAN:   Continue current medication  regimen  Added spironolactone 25 mg daily  Continued medical management and lifestyle modification for  cardiovascular risk factor optimization.     The attending interventional cardiologist, Dr. Lewis, was present  and supervised all critical aspects the procedure.     LONNY Jenkins MD, PhD  Interventional Cardiology Fellow     I was present throughout the case and assisted as necessary.     I have personally reviewed the examination and initial interpretation  and I agree with the findings.     SANDI LEWIS MD       * Intraprocedure medication information is unavailable because the case start and end events have not been set *       Please do not hesitate to contact me if you have any questions/concerns.     Sincerely,     Shemar Velasquez MD

## 2019-05-16 NOTE — PATIENT INSTRUCTIONS
Preparing for Your Surgery      Name:  Bairon Foster   MRN:  [id number]   :  1931   Today's Date:  2019     Arriving for surgery:  Surgery date:  Monday May 20, 2019  Arrival time:  5:30 AM  Please come to:       [facility] [address]  [address] [place]  [address]    - ? parking is available in front of the hospital      -    Please proceed to [address] on the 3rd floor. [phone]?     - ?If you are in need of directions, wheelchair or escort please stop at the Information Desk in the lobby.  Inform the information person that you are here for surgery; a wheelchair and escort to [address] will be provided.?     What can I eat or drink?  -  You may have solid food or milk products until 8 hours prior to your surgery. 11:30 PM  -  You may have water, apple juice or 7up/Sprite until 2 hours prior to your surgery. 5:30 AM      -  Nothing after 5:30 AM    Which medicines can I take?  Take Aspirin per Cardiology instructions from [facility]  Hold NSAIDS for 10 days per Cardiology instructions from [facility]  -  Do NOT take these medications in the morning, the day of surgery:    -  Please take these medications the day of surgery:   Heart medications and other morning medications as needed               How do I prepare myself?  -  Take two showers: one the night before surgery; and one the morning of surgery.         Use Scrubcare or Hibiclens to wash from neck down.  You may use your own     shampoo and conditioner. No other hair products.   -  Do NOT use lotion, powder, deodorant, or antiperspirant the day of your surgery.  -  Do NOT wear a watch or jewelry.  - Do not bring your own medications to the hospital, except for inhalers and eye   drops.  -  Bring your ID and insurance card.    Questions or Concerns:  -If you have questions or concerns regarding the day of surgery, please call [phone].     -For questions after surgery please call your  surgeons office.           AFTER YOUR SURGERY  Breathing exercises   Breathing exercises help you recover faster. Take deep breaths and let the air out slowly. This will:     Help you wake up after surgery.    Help prevent complications like pneumonia.  Preventing complications will help you go home sooner.   Nausea and vomiting   You may feel sick to your stomach after surgery; if so, let your nurse know.    Pain control:  After surgery, you may have pain. Our goal is to help you manage your pain. Pain medicine will help you feel comfortable enough to do activities that will help you heal.  These activities may include breathing exercises, walking and physical therapy.   To help your health care team treat your pain we will ask: 1) If you have pain  2) where it is located 3) describe your pain in your words  Methods of pain control include medications given by mouth, vein or by nerve block for some surgeries.  Sequential Compression Device (SCD) or Pneumo Boots:  You may need to wear SCD S on your legs or feet. These are wraps connected to a machine that pumps in air and releases it. The repeated pumping helps prevent blood clots from forming.

## 2019-05-16 NOTE — ANESTHESIA PREPROCEDURE EVALUATION
Anesthesia Pre-Procedure Evaluation    Patient: Bairon Foster   MRN:     [id number] Gender:   male   Age:    88 year old :      1931        Preoperative Diagnosis: heart valve condition   Procedure(s):  CV MITRAL CLIP     Past Medical History:   Diagnosis Date     Acute, but ill-defined, cerebrovascular disease     NO RESIDUALS     Antiplatelet or antithrombotic long-term use      Arrhythmia     a fib     Blood in stool      Coronary atherosclerosis of unspecified type of vessel, native or graft     S/P PTCA, EF 50%     Esophageal reflux      Hydrocele, unspecified      Hypercholesterolemia      Hypersomnia with sleep apnea, unspecified      Inguinal hernia without mention of obstruction or gangrene, recurrent unilateral or unspecified      Ischemic cardiomyopathy      Major depression in complete remission (H)      Obese      Onychomycosis      JIM on CPAP      Other and unspecified hyperlipidemia      Other lymphedema     GROIN AND THIGHS     Other pancytopenia (H)      Other specified anemias      Overweight      Pacemaker      PAD (peripheral artery disease) (H)      Pancytopenia (H)      Persistent atrial fibrillation (H)     VVI PM for long pauses     Prostatic hypertrophy, benign      Pulmonary hypertension (H)      Scrotal varices      Thrombocytopenia, unspecified (H)      Umbilical hernia without obstruction and without gangrene      Unilateral or unspecified femoral hernia without mention of obstruction or gangrene, unilateral or unspecified      Unspecified hypothyroidism      Venous stasis dermatitis of both lower extremities      Vitamin D deficiency       Past Surgical History:   Procedure Laterality Date     BONE MARROW BIOPSY, BONE SPECIMEN, NEEDLE/TROCAR N/A 2019    Procedure: BONE MARROW BIOPSY;  Surgeon: Yovani Cooley MD;  Location: [facility] OR     CV HEART CATHETERIZATION WITH POSSIBLE INTERVENTION N/A 2019    Procedure: Heart Catheterization with possible Intervention;   Surgeon: Lev Beck MD;  Location:  HEART CARDIAC CATH LAB     CV RIGHT HEART CATH N/A 4/12/2019    Procedure: Right Heart Cath;  Surgeon: Lev Beck MD;  Location:  HEART CARDIAC CATH LAB     EYE SURGERY      CATARACT/BLEPHAROPLASTY     GENITOURINARY SURGERY      TUNA     HERNIA REPAIR      RIGHT INGUINAL     HERNIORRHAPHY INGUINAL  8/14/2012    Procedure: HERNIORRHAPHY INGUINAL;  right inguinal hernia repair;  Surgeon: Kareem Torrez MD;  Location:  [facility]     HERNIORRHAPHY UMBILICAL N/A 10/31/2017    Procedure: HERNIORRHAPHY UMBILICAL;  UMBILICAL HERNIA REPAIR WITH MESH;  Surgeon: Scar Harrington MD;  Location: [facility]     IMPLANT PACEMAKER  8/2009    single chamber     ORTHOPEDIC SURGERY      [facility] RIGHT     ORTHOPEDIC SURGERY  2010    right TKR, left TKR     SURGICAL HISTORY OF -       Thumb surgery     SURGICAL HISTORY OF -   1990s    Left total knee replacement     SURGICAL HISTORY OF -   3/11    [facility] total knee replacement          Anesthesia Evaluation     . Pt has had prior anesthetic. Type: General and MAC           ROS/MED HX    ENT/Pulmonary:     (+)sleep apnea, uses CPAP , . .    Neurologic:     (+)neuropathy - TOES, CVA date: 15 YRS AGO without deficits    Cardiovascular: Comment: ECHO: 3/22/19: LVEF 30%, moderate global hypokinesia. RV moderately dilated, decreased RV function. Mod-severe (3-4+) MR, moderately myxomatous valve. Mod-severe TR  Coronary angiogram / right heart catheter April 2019 admission - mild nonobstructive CAD/ mild elevated pulmonary hypertension, mildly elevated right side filling pressures.  Stress 3/2017: On stress images mild intensity distal inferolateral wall photopenic defect. On both stress and rest images - mild to moderate intensity basal inferior wall photopenic defect, which appears worse on rest images compared to stress images - likely attenuation artifact. No RWMA.  LVEF 47% with stress.  March 2019 Pacemaker interrogation: Underlying rhythm AF  with rates 40-50. Battery life 1/2 year .     (+) Dyslipidemia, --CAD, --stent,2009  1 . Taking blood thinners Pt has received instructions: . CHF etiology: MITRAL VALVE REGURGITATION 3-4+ Last EF: 30%  NYHA classification: III. LOYOLA, . pacemaker Reason placed: ATRIAL FIBRILATION:. dysrhythmias a-fib, valvular problems/murmurs type: MR . pulmonary hypertension, Previous cardiac testing Echodate:3/22/19results:1. The left ventricle is mildly dilated. The visual ejection fraction is  estimated at 30%. There is moderate global hypokinesia of the left ventricle.  2. The right ventricle is moderately dilated. Moderately decreased right  ventricular systolic function  3. There is mod-severe to severe (3-4+) mitral regurgitation. Mitral valve is  moderately myxomatous, no prolapse or flail. MR jet is slighlty posterior  directed. Etiology likely functional MR from dilated LV and low EF, may have  some myxomatous component also. MVA 5.0cm2 by 3D alignment and measurement at  leaflet tips.  4. There is mod-severe to severe (3-4+) tricuspid regurgitation. TV is not  well visualized. TR jet is directed at septum, appears to come from central  coaptation point, where pacemaker lead crosses the valve.Stress Testdate:2017 results:ECG reviewed date:3/21/18 results:Ventricular-paced rhythm 62 with occasional Premature ventricular complexes  When compared with ECG of 08-DEC-2009 10:00,  Electronic ventricular pacemaker has replaced Junctional rhythmCath date: 3/22/19 results:Single vessel CAD with patent stent from 2009 in the obtuse marginal.  On today's cath there are no flow-limiting lesions  Elevated left-sided filling pressures with left ventricular  end-diastolic pressure of 23 mmHg          METS/Exercise Tolerance:  1 - Eating, dressing   Hematologic:     (+) Anemia, Other Hematologic Disorder-THROMBOCYTOPENIA      Musculoskeletal:   (+)  other musculoskeletal- BILATERAL KNEE REPLACEMENTS      GI/Hepatic:     (+) GERD  Asymptomatic on medication,       Renal/Genitourinary:     (+) chronic renal disease (BHAKTI from diuresis 4/8/19-improving), type: ARF, BPH,       Endo:     (+) thyroid problem hypothyroidism, .      Psychiatric:     (+) psychiatric history depression      Infectious Disease:   (+) Other Infectious Disease BULBOUS PEMPHIGOID     (-) HIV (BULBOUS PEMPHIGOID)   Malignancy:      - no malignancy   Other:    (+) No chance of pregnancy no H/O Chronic Pain,no other significant disability                        PHYSICAL EXAM:   Mental Status/Neuro: A/A/O   Airway: Facies: Feasible  Mallampati: IV  Mouth/Opening: Full  TM distance: > 6 cm  Neck ROM: Limited   Respiratory: Auscultation: CTAB     Resp. Rate: Normal     Resp. Effort: Normal      CV: Rhythm: Regular  Heart: Murmur   Comments:      Dental:  Dentures: Partial; Upper  Dental Comments: Missing teeth.                Lab Results   Component Value Date    WBC 3.0 (L) 04/19/2019    HGB 9.9 (L) 04/25/2019    HCT 29.7 (L) 04/19/2019    PLT 83 (L) 04/19/2019    SED 11 09/23/2013     (L) 04/25/2019    POTASSIUM 4.1 04/25/2019    CHLORIDE 103 04/25/2019    CO2 29 04/25/2019    BUN 20 04/25/2019    CR 1.21 04/25/2019     (H) 04/25/2019    TEODORO 9.5 04/25/2019    PHOS 4.1 04/12/2019    MAG 2.1 04/12/2019    ALBUMIN 3.1 (L) 04/17/2019    PROTTOTAL 6.7 (L) 04/17/2019    ALT <5 (L) 04/25/2019    AST 31 04/17/2019    ALKPHOS 59 04/17/2019    BILITOTAL 1.7 (H) 04/17/2019    LIPASE 329 04/17/2019    PTT 34 03/21/2018    INR 1.79 (H) 11/20/2018    TSH 3.09 04/08/2019    T4 1.24 08/25/2016       Preop Vitals  BP Readings from Last 3 Encounters:   05/16/19 122/77   05/16/19 122/77   05/02/19 124/82    Pulse Readings from Last 3 Encounters:   05/16/19 61   05/16/19 61   05/02/19 60      Resp Readings from Last 3 Encounters:   05/16/19 16   04/30/19 16   04/22/19 16    SpO2 Readings from Last 3 Encounters:   05/16/19 95%   05/16/19 95%   05/02/19 98%      Temp Readings from Last  "1 Encounters:   05/16/19 97.7  F (36.5  C) (Oral)    Ht Readings from Last 1 Encounters:   05/16/19 1.88 m (6' 2\")      Wt Readings from Last 1 Encounters:   05/16/19 91.3 kg (201 lb 4.8 oz)    Estimated body mass index is 25.85 kg/m  as calculated from the following:    Height as of this encounter: 1.88 m (6' 2\").    Weight as of this encounter: 91.3 kg (201 lb 4.8 oz).     LDA:  Right Groin Interventional Procedure Access (Active)   Number of days: 34       Right Radial Interventional Procedure Access (Active)   Number of days: 34            Assessment:   ASA SCORE: 3       Documentation: H&P complete; Preop Testing complete; Consents complete   Proceeding: Proceed without further delay  Tobacco Use:  NO Active use of Tobacco/UNKNOWN Tobacco use status     Plan:   Anes. Type:  General        Drips/Meds-Preparation: Phenylephrine   Induction:  IV (Standard); Etomidate   Airway: Oral ETT   Access/Monitoring: PIV; 2nd PIV   Maintenance: Balanced   Emergence: Procedure Site   Logistics: Same Day Surgery     Postop Pain/Sedation Strategy:  Standard-Options: Opioids PRN     PONV Management:  Adult Risk Factors:, Non-Smoker, Postop Opioids  Prevention: Ondansetron                  PAC Discussion and Assessment    ASA Classification: 3  Case is suitable for:   Anesthetic techniques and relevant risks discussed: GA  Invasive monitoring and risk discussed: Yes  Types:   Possibility and Risk of blood transfusion discussed: Yes  NPO instructions given:   Additional anesthetic preparation and risks discussed:   Needs early admission to pre-op area:   Other:     PAC Resident/NP Anesthesia Assessment:  RW is an 88 year old male for Mitral clip, with Dr. Harshil Das, on 5/20/19, for non -rheumatic severe and symptomatic mitral valve regurgitation. PAC referral for risk assessment and optimization for anesthesia with comorbid conditions of HLD, CAD, coronary stent, Atrial fibrillation, CHFrEF, pacemaker, pulmonary " hypertenison, JIM, anemia, anticoagulation, hypothyroid and GERD  Pre-operative considerations include:  1.) CV: CAD - OM stent 2009. Medical management with B-blocker - no daily ASA 2/2 thrombocytopenia.  Chronic CHF with decrease in EF from 53% to 30% this past year, likely due to valvar heart ds with worsening MR (3-4+) and TR (3+).   Acute on chronic CHF exacerbation 4/8/19 admitted for diuresis. Possible reactions (bullous pemphigus) to some diuretics...currently on bumex, carvedilol, no ACEI due to elevated Cr.  Permanent Atrial fib (AF) with single lead pacemaker anticoagulated with Eliquis, on B-blocker.  Possible CVA 15 yrs ago with left sided weakness-no residual and no subsequent event  Exam today: weight up 4 lbs, lungs clear, 2+ pitting edema BLE.  See above cards hx for detailed testing.  Procedure as planned. Stop eliquis. Start 81 mg ASA today as per Leyla aCmp.   2.) Heme : Admitted 4/16-4/22/19 for bleed (epistaxis), N/V -labs revealed pancytopenia (long standing /platelets as low as 44,000 in past) Work-up negative, BM biopsy normal.  4/18/19 wbc 3.3, HGB 10.3, platelets 88,000.  venous insufficiency (bilateral GSV ablations in August 2017).   - Type and screen today;  orders in place from cardiology for DOS for CBC, CMP rbcs.   -VTE risk is elevated due to age  3.) Pulmonary: JIM on CPAP. Scheduled for 6 min walk test  -Bring CPAP mask to DOS  4.) Endo: Hypothyroid on synthroid which he will take DOS  5.) GI: GERD on prilosec which he will take DOS.   6.) Renal: BHAKTI 4/8/119 Cr 1.52 April admission. BPH. Day of discharge down to1.25.   BMP today.    Anesthesia: MAC 4/19/19 for bone marrow biopsy with no complications. Due to bullous pemphigus, pt requests care with skin / avoid use of tape: Suggest use of swaddling with IV.    Pt discussed with Dr. Gee. Pt also saw cardiology today - see note in EMR          Reviewed and Signed by PAC Mid-Level Provider/Resident  Mid-Level Provider/Resident:  Zaira Reynolds PA-C  Date: 5/16/19  Time: 3:31 PM    Attending Anesthesiologist Anesthesia Assessment:  88 year old for MitraClip in management of severe MR; also has severe TR. Known CAD with PTCA in past; recent decline in EF from 50% to 30%, likely due to decompensation from severe MR. Left heart cath 4/2019 showed normal coronary arteries; RV showed PAP 49/17, mean 30 with wedge of 15 mmHg. Stable PAD. Pancytopenia, platelet count 83,000.    Patient/case discussed with KI/resident; agree with above assessment. No need to see patient. Patient is appropriate for the planned procedure without further work-up or medical management.    Mago Gee MD         Anesthesiologist:   Date:   Time:   Pass/Fail:   Disposition:     PAC Pharmacist Assessment:        Pharmacist:   Date:   Time:        CLAUDIA Patel

## 2019-05-20 ENCOUNTER — HOSPITAL ENCOUNTER (INPATIENT)
Facility: CLINIC | Age: 84
LOS: 1 days | Discharge: HOME | DRG: 229 | End: 2019-05-21
Attending: INTERNAL MEDICINE | Admitting: INTERNAL MEDICINE
Payer: COMMERCIAL

## 2019-05-20 ENCOUNTER — HOSPITAL ENCOUNTER (OUTPATIENT)
Dept: CARDIOLOGY | Facility: CLINIC | Age: 84
Discharge: HOME | DRG: 229 | End: 2019-05-20
Attending: INTERNAL MEDICINE | Admitting: INTERNAL MEDICINE
Payer: COMMERCIAL

## 2019-05-20 ENCOUNTER — ANESTHESIA (OUTPATIENT)
Dept: CARDIOLOGY | Facility: CLINIC | Age: 84
DRG: 229 | End: 2019-05-20
Payer: COMMERCIAL

## 2019-05-20 ENCOUNTER — TELEPHONE (OUTPATIENT)
Dept: FAMILY MEDICINE | Facility: CLINIC | Age: 84
End: 2019-05-20

## 2019-05-20 DIAGNOSIS — I34.0 NON-RHEUMATIC MITRAL REGURGITATION: ICD-10-CM

## 2019-05-20 DIAGNOSIS — I50.21 ACUTE SYSTOLIC CHF (CONGESTIVE HEART FAILURE) (H): ICD-10-CM

## 2019-05-20 DIAGNOSIS — Z82.49 FH: MITRAL VALVE REPAIR: Primary | ICD-10-CM

## 2019-05-20 DIAGNOSIS — I27.20 PULMONARY HYPERTENSION (H): ICD-10-CM

## 2019-05-20 LAB
ABO + RH BLD: NORMAL
ABO + RH BLD: NORMAL
ALBUMIN SERPL-MCNC: 3 G/DL (ref 3.4–5)
ALBUMIN SERPL-MCNC: 3.8 G/DL (ref 3.4–5)
ALP SERPL-CCNC: 61 U/L (ref 40–150)
ALP SERPL-CCNC: 76 U/L (ref 40–150)
ALT SERPL W P-5'-P-CCNC: 17 U/L (ref 0–70)
ALT SERPL W P-5'-P-CCNC: 21 U/L (ref 0–70)
ANION GAP SERPL CALCULATED.3IONS-SCNC: 5 MMOL/L (ref 3–14)
ANION GAP SERPL CALCULATED.3IONS-SCNC: 6 MMOL/L (ref 3–14)
AST SERPL W P-5'-P-CCNC: 22 U/L (ref 0–45)
AST SERPL W P-5'-P-CCNC: 30 U/L (ref 0–45)
BASE EXCESS BLDA CALC-SCNC: 2.4 MMOL/L
BILIRUB SERPL-MCNC: 1.2 MG/DL (ref 0.2–1.3)
BILIRUB SERPL-MCNC: 1.4 MG/DL (ref 0.2–1.3)
BLD GP AB SCN SERPL QL: NORMAL
BLD PROD TYP BPU: NORMAL
BLOOD BANK CMNT PATIENT-IMP: NORMAL
BUN SERPL-MCNC: 22 MG/DL (ref 7–30)
BUN SERPL-MCNC: 25 MG/DL (ref 7–30)
CALCIUM SERPL-MCNC: 8.3 MG/DL (ref 8.5–10.1)
CALCIUM SERPL-MCNC: 9.6 MG/DL (ref 8.5–10.1)
CHLORIDE SERPL-SCNC: 106 MMOL/L (ref 94–109)
CHLORIDE SERPL-SCNC: 109 MMOL/L (ref 94–109)
CK SERPL-CCNC: 49 U/L (ref 30–300)
CO2 SERPL-SCNC: 28 MMOL/L (ref 20–32)
CO2 SERPL-SCNC: 29 MMOL/L (ref 20–32)
CREAT SERPL-MCNC: 1.09 MG/DL (ref 0.66–1.25)
CREAT SERPL-MCNC: 1.22 MG/DL (ref 0.66–1.25)
ERYTHROCYTE [DISTWIDTH] IN BLOOD BY AUTOMATED COUNT: 16.6 % (ref 10–15)
ERYTHROCYTE [DISTWIDTH] IN BLOOD BY AUTOMATED COUNT: 16.7 % (ref 10–15)
GFR SERPL CREATININE-BSD FRML MDRD: 53 ML/MIN/{1.73_M2}
GFR SERPL CREATININE-BSD FRML MDRD: 60 ML/MIN/{1.73_M2}
GLUCOSE BLDC GLUCOMTR-MCNC: 93 MG/DL (ref 70–99)
GLUCOSE SERPL-MCNC: 113 MG/DL (ref 70–99)
GLUCOSE SERPL-MCNC: 95 MG/DL (ref 70–99)
HCO3 BLD-SCNC: 26 MMOL/L (ref 21–28)
HCT VFR BLD AUTO: 27.8 % (ref 40–53)
HCT VFR BLD AUTO: 32.9 % (ref 40–53)
HGB BLD-MCNC: 10.2 G/DL (ref 13.3–17.7)
HGB BLD-MCNC: 8.7 G/DL (ref 13.3–17.7)
INR PPP: 1.44 (ref 0.86–1.14)
KCT BLD-ACNC: 123 SEC (ref 75–150)
KCT BLD-ACNC: 261 SEC (ref 75–150)
KCT BLD-ACNC: 274 SEC (ref 75–150)
MAGNESIUM SERPL-MCNC: 2.1 MG/DL (ref 1.6–2.3)
MCH RBC QN AUTO: 31.8 PG (ref 26.5–33)
MCH RBC QN AUTO: 31.9 PG (ref 26.5–33)
MCHC RBC AUTO-ENTMCNC: 31 G/DL (ref 31.5–36.5)
MCHC RBC AUTO-ENTMCNC: 31.3 G/DL (ref 31.5–36.5)
MCV RBC AUTO: 102 FL (ref 78–100)
MCV RBC AUTO: 103 FL (ref 78–100)
NUM BPU REQUESTED: 2
O2/TOTAL GAS SETTING VFR VENT: ABNORMAL %
OXYHGB MFR BLD: 97 % (ref 92–100)
PCO2 BLD: 36 MM HG (ref 35–45)
PH BLD: 7.47 PH (ref 7.35–7.45)
PHOSPHATE SERPL-MCNC: 2.7 MG/DL (ref 2.5–4.5)
PLATELET # BLD AUTO: 55 10E9/L (ref 150–450)
PLATELET # BLD AUTO: 71 10E9/L (ref 150–450)
PO2 BLD: 103 MM HG (ref 80–105)
POTASSIUM SERPL-SCNC: 3.6 MMOL/L (ref 3.4–5.3)
POTASSIUM SERPL-SCNC: 3.9 MMOL/L (ref 3.4–5.3)
PROT SERPL-MCNC: 6.5 G/DL (ref 6.8–8.8)
PROT SERPL-MCNC: 7.9 G/DL (ref 6.8–8.8)
RBC # BLD AUTO: 2.73 10E12/L (ref 4.4–5.9)
RBC # BLD AUTO: 3.21 10E12/L (ref 4.4–5.9)
SODIUM SERPL-SCNC: 141 MMOL/L (ref 133–144)
SODIUM SERPL-SCNC: 142 MMOL/L (ref 133–144)
SPECIMEN EXP DATE BLD: NORMAL
WBC # BLD AUTO: 2.8 10E9/L (ref 4–11)
WBC # BLD AUTO: 2.9 10E9/L (ref 4–11)

## 2019-05-20 PROCEDURE — 86901 BLOOD TYPING SEROLOGIC RH(D): CPT | Performed by: INTERNAL MEDICINE

## 2019-05-20 PROCEDURE — 33418 REPAIR TCAT MITRAL VALVE: CPT | Mod: Q0 | Performed by: INTERNAL MEDICINE

## 2019-05-20 PROCEDURE — 37000008 ZZH ANESTHESIA TECHNICAL FEE, 1ST 30 MIN: Performed by: INTERNAL MEDICINE

## 2019-05-20 PROCEDURE — 25000128 H RX IP 250 OP 636: Performed by: NURSE ANESTHETIST, CERTIFIED REGISTERED

## 2019-05-20 PROCEDURE — 25000565 ZZH ISOFLURANE, EA 15 MIN: Performed by: INTERNAL MEDICINE

## 2019-05-20 PROCEDURE — 27210794 ZZH OR GENERAL SUPPLY STERILE: Performed by: INTERNAL MEDICINE

## 2019-05-20 PROCEDURE — 82805 BLOOD GASES W/O2 SATURATION: CPT

## 2019-05-20 PROCEDURE — 83735 ASSAY OF MAGNESIUM: CPT

## 2019-05-20 PROCEDURE — 33419 REPAIR TCAT MITRAL VALVE: CPT | Mod: Q0 | Performed by: INTERNAL MEDICINE

## 2019-05-20 PROCEDURE — 40000065 ZZH STATISTIC EKG NON-CHARGEABLE

## 2019-05-20 PROCEDURE — 25000125 ZZHC RX 250: Performed by: NURSE ANESTHETIST, CERTIFIED REGISTERED

## 2019-05-20 PROCEDURE — 00000146 ZZHCL STATISTIC GLUCOSE BY METER IP

## 2019-05-20 PROCEDURE — B2151ZZ FLUOROSCOPY OF LEFT HEART USING LOW OSMOLAR CONTRAST: ICD-10-PCS | Performed by: INTERNAL MEDICINE

## 2019-05-20 PROCEDURE — 93355 ECHO TRANSESOPHAGEAL (TEE): CPT | Performed by: INTERNAL MEDICINE

## 2019-05-20 PROCEDURE — 21400000 ZZH R&B CCU UMMC

## 2019-05-20 PROCEDURE — 02UG3JZ SUPPLEMENT MITRAL VALVE WITH SYNTHETIC SUBSTITUTE, PERCUTANEOUS APPROACH: ICD-10-PCS | Performed by: INTERNAL MEDICINE

## 2019-05-20 PROCEDURE — 37000009 ZZH ANESTHESIA TECHNICAL FEE, EACH ADDTL 15 MIN: Performed by: INTERNAL MEDICINE

## 2019-05-20 PROCEDURE — 33419 REPAIR TCAT MITRAL VALVE: CPT | Performed by: INTERNAL MEDICINE

## 2019-05-20 PROCEDURE — C1894 INTRO/SHEATH, NON-LASER: HCPCS | Performed by: INTERNAL MEDICINE

## 2019-05-20 PROCEDURE — 99223 1ST HOSP IP/OBS HIGH 75: CPT | Performed by: NURSE PRACTITIONER

## 2019-05-20 PROCEDURE — 93010 ELECTROCARDIOGRAM REPORT: CPT | Performed by: INTERNAL MEDICINE

## 2019-05-20 PROCEDURE — 93355 ECHO TRANSESOPHAGEAL (TEE): CPT

## 2019-05-20 PROCEDURE — 25000128 H RX IP 250 OP 636: Performed by: INTERNAL MEDICINE

## 2019-05-20 PROCEDURE — 25800030 ZZH RX IP 258 OP 636: Performed by: NURSE ANESTHETIST, CERTIFIED REGISTERED

## 2019-05-20 PROCEDURE — 40000275 ZZH STATISTIC RCP TIME EA 10 MIN

## 2019-05-20 PROCEDURE — 85027 COMPLETE CBC AUTOMATED: CPT

## 2019-05-20 PROCEDURE — 86923 COMPATIBILITY TEST ELECTRIC: CPT | Performed by: INTERNAL MEDICINE

## 2019-05-20 PROCEDURE — 86900 BLOOD TYPING SEROLOGIC ABO: CPT | Performed by: INTERNAL MEDICINE

## 2019-05-20 PROCEDURE — 86850 RBC ANTIBODY SCREEN: CPT | Performed by: INTERNAL MEDICINE

## 2019-05-20 PROCEDURE — 80053 COMPREHEN METABOLIC PANEL: CPT | Performed by: INTERNAL MEDICINE

## 2019-05-20 PROCEDURE — 40000014 ZZH STATISTIC ARTERIAL MONITORING DAILY

## 2019-05-20 PROCEDURE — 36600 WITHDRAWAL OF ARTERIAL BLOOD: CPT

## 2019-05-20 PROCEDURE — C1769 GUIDE WIRE: HCPCS | Performed by: INTERNAL MEDICINE

## 2019-05-20 PROCEDURE — 25000132 ZZH RX MED GY IP 250 OP 250 PS 637: Performed by: INTERNAL MEDICINE

## 2019-05-20 PROCEDURE — 33418 REPAIR TCAT MITRAL VALVE: CPT | Performed by: INTERNAL MEDICINE

## 2019-05-20 PROCEDURE — 85027 COMPLETE CBC AUTOMATED: CPT | Performed by: INTERNAL MEDICINE

## 2019-05-20 PROCEDURE — 84100 ASSAY OF PHOSPHORUS: CPT

## 2019-05-20 PROCEDURE — 25000125 ZZHC RX 250: Performed by: INTERNAL MEDICINE

## 2019-05-20 PROCEDURE — 82550 ASSAY OF CK (CPK): CPT

## 2019-05-20 PROCEDURE — C1760 CLOSURE DEV, VASC: HCPCS | Performed by: INTERNAL MEDICINE

## 2019-05-20 PROCEDURE — 25000132 ZZH RX MED GY IP 250 OP 250 PS 637: Performed by: STUDENT IN AN ORGANIZED HEALTH CARE EDUCATION/TRAINING PROGRAM

## 2019-05-20 PROCEDURE — 85610 PROTHROMBIN TIME: CPT | Performed by: INTERNAL MEDICINE

## 2019-05-20 PROCEDURE — 25000132 ZZH RX MED GY IP 250 OP 250 PS 637: Performed by: NURSE PRACTITIONER

## 2019-05-20 PROCEDURE — 27810169 ZZH OR IMPLANT GENERAL: Performed by: INTERNAL MEDICINE

## 2019-05-20 PROCEDURE — 36415 COLL VENOUS BLD VENIPUNCTURE: CPT | Performed by: INTERNAL MEDICINE

## 2019-05-20 PROCEDURE — 85347 COAGULATION TIME ACTIVATED: CPT

## 2019-05-20 PROCEDURE — 80053 COMPREHEN METABOLIC PANEL: CPT

## 2019-05-20 DEVICE — DELIVERY SYSTEM MITRACLIP XTR CDS0601-XTR: Type: IMPLANTABLE DEVICE | Status: FUNCTIONAL

## 2019-05-20 RX ORDER — PROTAMINE SULFATE 10 MG/ML
INJECTION, SOLUTION INTRAVENOUS PRN
Status: DISCONTINUED | OUTPATIENT
Start: 2019-05-20 | End: 2019-05-20

## 2019-05-20 RX ORDER — TAMSULOSIN HYDROCHLORIDE 0.4 MG/1
0.4 CAPSULE ORAL DAILY
Status: DISCONTINUED | OUTPATIENT
Start: 2019-05-20 | End: 2019-05-21 | Stop reason: HOSPADM

## 2019-05-20 RX ORDER — LIDOCAINE HYDROCHLORIDE 20 MG/ML
INJECTION, SOLUTION INFILTRATION; PERINEURAL PRN
Status: DISCONTINUED | OUTPATIENT
Start: 2019-05-20 | End: 2019-05-20

## 2019-05-20 RX ORDER — ONDANSETRON 4 MG/1
4 TABLET, ORALLY DISINTEGRATING ORAL EVERY 30 MIN PRN
Status: DISCONTINUED | OUTPATIENT
Start: 2019-05-20 | End: 2019-05-20 | Stop reason: HOSPADM

## 2019-05-20 RX ORDER — ASPIRIN 81 MG/1
81 TABLET, CHEWABLE ORAL DAILY
Status: COMPLETED | OUTPATIENT
Start: 2019-05-20 | End: 2019-05-20

## 2019-05-20 RX ORDER — CARVEDILOL 6.25 MG/1
6.25 TABLET ORAL 2 TIMES DAILY WITH MEALS
Status: DISCONTINUED | OUTPATIENT
Start: 2019-05-20 | End: 2019-05-21 | Stop reason: HOSPADM

## 2019-05-20 RX ORDER — HYDROMORPHONE HYDROCHLORIDE 1 MG/ML
.3-.5 INJECTION, SOLUTION INTRAMUSCULAR; INTRAVENOUS; SUBCUTANEOUS EVERY 10 MIN PRN
Status: DISCONTINUED | OUTPATIENT
Start: 2019-05-20 | End: 2019-05-20 | Stop reason: HOSPADM

## 2019-05-20 RX ORDER — DIMENHYDRINATE 50 MG/ML
25 INJECTION, SOLUTION INTRAMUSCULAR; INTRAVENOUS
Status: DISCONTINUED | OUTPATIENT
Start: 2019-05-20 | End: 2019-05-20 | Stop reason: HOSPADM

## 2019-05-20 RX ORDER — ASPIRIN 81 MG/1
81 TABLET ORAL DAILY
Status: DISCONTINUED | OUTPATIENT
Start: 2019-05-21 | End: 2019-05-21 | Stop reason: HOSPADM

## 2019-05-20 RX ORDER — LIDOCAINE 40 MG/G
CREAM TOPICAL
Status: DISCONTINUED | OUTPATIENT
Start: 2019-05-20 | End: 2019-05-21 | Stop reason: HOSPADM

## 2019-05-20 RX ORDER — ASPIRIN 325 MG
325 TABLET ORAL ONCE
Status: DISCONTINUED | OUTPATIENT
Start: 2019-05-20 | End: 2019-05-20 | Stop reason: HOSPADM

## 2019-05-20 RX ORDER — FENTANYL CITRATE 50 UG/ML
25-50 INJECTION, SOLUTION INTRAMUSCULAR; INTRAVENOUS
Status: DISCONTINUED | OUTPATIENT
Start: 2019-05-20 | End: 2019-05-20 | Stop reason: HOSPADM

## 2019-05-20 RX ORDER — BUMETANIDE 0.5 MG/1
0.5 TABLET ORAL EVERY OTHER DAY
Status: DISCONTINUED | OUTPATIENT
Start: 2019-05-21 | End: 2019-05-21

## 2019-05-20 RX ORDER — METOPROLOL TARTRATE 1 MG/ML
1-2 INJECTION, SOLUTION INTRAVENOUS EVERY 5 MIN PRN
Status: DISCONTINUED | OUTPATIENT
Start: 2019-05-20 | End: 2019-05-20 | Stop reason: HOSPADM

## 2019-05-20 RX ORDER — FINASTERIDE 5 MG/1
5 TABLET, FILM COATED ORAL DAILY
Status: DISCONTINUED | OUTPATIENT
Start: 2019-05-20 | End: 2019-05-21 | Stop reason: HOSPADM

## 2019-05-20 RX ORDER — HYDRALAZINE HYDROCHLORIDE 20 MG/ML
10 INJECTION INTRAMUSCULAR; INTRAVENOUS
Status: DISCONTINUED | OUTPATIENT
Start: 2019-05-20 | End: 2019-05-21 | Stop reason: HOSPADM

## 2019-05-20 RX ORDER — ACETAMINOPHEN 500 MG
1000 TABLET ORAL EVERY 6 HOURS PRN
Status: DISCONTINUED | OUTPATIENT
Start: 2019-05-20 | End: 2019-05-21 | Stop reason: HOSPADM

## 2019-05-20 RX ORDER — FENTANYL CITRATE 50 UG/ML
INJECTION, SOLUTION INTRAMUSCULAR; INTRAVENOUS PRN
Status: DISCONTINUED | OUTPATIENT
Start: 2019-05-20 | End: 2019-05-20

## 2019-05-20 RX ORDER — NALOXONE HYDROCHLORIDE 0.4 MG/ML
.1-.4 INJECTION, SOLUTION INTRAMUSCULAR; INTRAVENOUS; SUBCUTANEOUS
Status: DISCONTINUED | OUTPATIENT
Start: 2019-05-20 | End: 2019-05-21 | Stop reason: HOSPADM

## 2019-05-20 RX ORDER — SODIUM CHLORIDE, SODIUM LACTATE, POTASSIUM CHLORIDE, CALCIUM CHLORIDE 600; 310; 30; 20 MG/100ML; MG/100ML; MG/100ML; MG/100ML
INJECTION, SOLUTION INTRAVENOUS CONTINUOUS
Status: DISCONTINUED | OUTPATIENT
Start: 2019-05-20 | End: 2019-05-20 | Stop reason: HOSPADM

## 2019-05-20 RX ORDER — ONDANSETRON 2 MG/ML
4 INJECTION INTRAMUSCULAR; INTRAVENOUS EVERY 30 MIN PRN
Status: DISCONTINUED | OUTPATIENT
Start: 2019-05-20 | End: 2019-05-20 | Stop reason: HOSPADM

## 2019-05-20 RX ORDER — PROPOFOL 10 MG/ML
INJECTION, EMULSION INTRAVENOUS PRN
Status: DISCONTINUED | OUTPATIENT
Start: 2019-05-20 | End: 2019-05-20

## 2019-05-20 RX ORDER — ASPIRIN 81 MG/1
81 TABLET ORAL DAILY
Status: DISCONTINUED | OUTPATIENT
Start: 2019-05-21 | End: 2019-05-21

## 2019-05-20 RX ORDER — LIDOCAINE 40 MG/G
CREAM TOPICAL
Status: DISCONTINUED | OUTPATIENT
Start: 2019-05-20 | End: 2019-05-20 | Stop reason: HOSPADM

## 2019-05-20 RX ORDER — NITROGLYCERIN 0.4 MG/1
0.4 TABLET SUBLINGUAL EVERY 5 MIN PRN
Status: DISCONTINUED | OUTPATIENT
Start: 2019-05-20 | End: 2019-05-21 | Stop reason: HOSPADM

## 2019-05-20 RX ORDER — LEVOTHYROXINE SODIUM 100 UG/1
100 TABLET ORAL
Status: DISCONTINUED | OUTPATIENT
Start: 2019-05-21 | End: 2019-05-21 | Stop reason: HOSPADM

## 2019-05-20 RX ORDER — SUCRALFATE 1 G/1
1 TABLET ORAL 2 TIMES DAILY
Status: DISCONTINUED | OUTPATIENT
Start: 2019-05-20 | End: 2019-05-21 | Stop reason: HOSPADM

## 2019-05-20 RX ORDER — CEFAZOLIN SODIUM 2 G/100ML
2 INJECTION, SOLUTION INTRAVENOUS
Status: COMPLETED | OUTPATIENT
Start: 2019-05-20 | End: 2019-05-20

## 2019-05-20 RX ORDER — SODIUM CHLORIDE, SODIUM LACTATE, POTASSIUM CHLORIDE, CALCIUM CHLORIDE 600; 310; 30; 20 MG/100ML; MG/100ML; MG/100ML; MG/100ML
INJECTION, SOLUTION INTRAVENOUS CONTINUOUS PRN
Status: DISCONTINUED | OUTPATIENT
Start: 2019-05-20 | End: 2019-05-20

## 2019-05-20 RX ORDER — NALOXONE HYDROCHLORIDE 0.4 MG/ML
.2-.4 INJECTION, SOLUTION INTRAMUSCULAR; INTRAVENOUS; SUBCUTANEOUS
Status: ACTIVE | OUTPATIENT
Start: 2019-05-20 | End: 2019-05-21

## 2019-05-20 RX ORDER — ADENOSINE 3 MG/ML
INJECTION, SOLUTION INTRAVENOUS PRN
Status: DISCONTINUED | OUTPATIENT
Start: 2019-05-20 | End: 2019-05-20

## 2019-05-20 RX ORDER — ATROPINE SULFATE 0.1 MG/ML
0.5 INJECTION INTRAVENOUS EVERY 5 MIN PRN
Status: ACTIVE | OUTPATIENT
Start: 2019-05-20 | End: 2019-05-21

## 2019-05-20 RX ORDER — CEFAZOLIN SODIUM 1 G/3ML
INJECTION, POWDER, FOR SOLUTION INTRAMUSCULAR; INTRAVENOUS PRN
Status: DISCONTINUED | OUTPATIENT
Start: 2019-05-20 | End: 2019-05-20

## 2019-05-20 RX ORDER — HYDRALAZINE HYDROCHLORIDE 20 MG/ML
2.5-5 INJECTION INTRAMUSCULAR; INTRAVENOUS EVERY 10 MIN PRN
Status: DISCONTINUED | OUTPATIENT
Start: 2019-05-20 | End: 2019-05-20 | Stop reason: HOSPADM

## 2019-05-20 RX ORDER — SIMVASTATIN 20 MG
20 TABLET ORAL DAILY
Status: DISCONTINUED | OUTPATIENT
Start: 2019-05-20 | End: 2019-05-21 | Stop reason: HOSPADM

## 2019-05-20 RX ORDER — ALBUTEROL SULFATE 0.83 MG/ML
2.5 SOLUTION RESPIRATORY (INHALATION) EVERY 4 HOURS PRN
Status: DISCONTINUED | OUTPATIENT
Start: 2019-05-20 | End: 2019-05-20 | Stop reason: HOSPADM

## 2019-05-20 RX ORDER — HEPARIN SODIUM 1000 [USP'U]/ML
INJECTION, SOLUTION INTRAVENOUS; SUBCUTANEOUS PRN
Status: DISCONTINUED | OUTPATIENT
Start: 2019-05-20 | End: 2019-05-20

## 2019-05-20 RX ORDER — FLUMAZENIL 0.1 MG/ML
0.2 INJECTION, SOLUTION INTRAVENOUS
Status: ACTIVE | OUTPATIENT
Start: 2019-05-20 | End: 2019-05-21

## 2019-05-20 RX ORDER — ONDANSETRON 2 MG/ML
INJECTION INTRAMUSCULAR; INTRAVENOUS PRN
Status: DISCONTINUED | OUTPATIENT
Start: 2019-05-20 | End: 2019-05-20

## 2019-05-20 RX ORDER — NOREPINEPHRINE BITARTRATE 0.06 MG/ML
.03-.4 INJECTION, SOLUTION INTRAVENOUS CONTINUOUS
Status: DISCONTINUED | OUTPATIENT
Start: 2019-05-20 | End: 2019-05-20 | Stop reason: HOSPADM

## 2019-05-20 RX ORDER — POTASSIUM CHLORIDE 750 MG/1
20 TABLET, EXTENDED RELEASE ORAL DAILY
Status: DISCONTINUED | OUTPATIENT
Start: 2019-05-20 | End: 2019-05-21 | Stop reason: HOSPADM

## 2019-05-20 RX ORDER — MEPERIDINE HYDROCHLORIDE 25 MG/ML
12.5 INJECTION INTRAMUSCULAR; INTRAVENOUS; SUBCUTANEOUS
Status: DISCONTINUED | OUTPATIENT
Start: 2019-05-20 | End: 2019-05-20 | Stop reason: HOSPADM

## 2019-05-20 RX ORDER — BUMETANIDE 1 MG/1
1 TABLET ORAL EVERY OTHER DAY
Status: DISCONTINUED | OUTPATIENT
Start: 2019-05-20 | End: 2019-05-21

## 2019-05-20 RX ORDER — BUPROPION HYDROCHLORIDE 150 MG/1
150 TABLET ORAL EVERY MORNING
Status: DISCONTINUED | OUTPATIENT
Start: 2019-05-21 | End: 2019-05-21 | Stop reason: HOSPADM

## 2019-05-20 RX ORDER — SODIUM CHLORIDE 9 MG/ML
INJECTION, SOLUTION INTRAVENOUS CONTINUOUS
Status: DISCONTINUED | OUTPATIENT
Start: 2019-05-20 | End: 2019-05-20 | Stop reason: HOSPADM

## 2019-05-20 RX ORDER — POLYETHYLENE GLYCOL 3350 17 G/17G
17 POWDER, FOR SOLUTION ORAL DAILY PRN
Status: DISCONTINUED | OUTPATIENT
Start: 2019-05-20 | End: 2019-05-21 | Stop reason: HOSPADM

## 2019-05-20 RX ORDER — NALOXONE HYDROCHLORIDE 0.4 MG/ML
.1-.4 INJECTION, SOLUTION INTRAMUSCULAR; INTRAVENOUS; SUBCUTANEOUS
Status: DISCONTINUED | OUTPATIENT
Start: 2019-05-20 | End: 2019-05-20 | Stop reason: HOSPADM

## 2019-05-20 RX ADMIN — SODIUM CHLORIDE, POTASSIUM CHLORIDE, SODIUM LACTATE AND CALCIUM CHLORIDE: 600; 310; 30; 20 INJECTION, SOLUTION INTRAVENOUS at 08:28

## 2019-05-20 RX ADMIN — HEPARIN SODIUM 2500 UNITS: 1000 INJECTION, SOLUTION INTRAVENOUS; SUBCUTANEOUS at 09:46

## 2019-05-20 RX ADMIN — SODIUM CHLORIDE, POTASSIUM CHLORIDE, SODIUM LACTATE AND CALCIUM CHLORIDE: 600; 310; 30; 20 INJECTION, SOLUTION INTRAVENOUS at 08:56

## 2019-05-20 RX ADMIN — NOREPINEPHRINE BITARTRATE 6.4 MCG: 1 INJECTION INTRAVENOUS at 08:53

## 2019-05-20 RX ADMIN — Medication 0.03 MCG/KG/MIN: at 09:38

## 2019-05-20 RX ADMIN — NOREPINEPHRINE BITARTRATE 6.4 MCG: 1 INJECTION INTRAVENOUS at 08:51

## 2019-05-20 RX ADMIN — NOREPINEPHRINE BITARTRATE 6.4 MCG: 1 INJECTION INTRAVENOUS at 09:22

## 2019-05-20 RX ADMIN — FENTANYL CITRATE 100 MCG: 50 INJECTION, SOLUTION INTRAMUSCULAR; INTRAVENOUS at 08:43

## 2019-05-20 RX ADMIN — PROTAMINE SULFATE 100 MG: 10 INJECTION, SOLUTION INTRAVENOUS at 11:02

## 2019-05-20 RX ADMIN — NOREPINEPHRINE BITARTRATE 6.4 MCG: 1 INJECTION INTRAVENOUS at 08:49

## 2019-05-20 RX ADMIN — HEPARIN SODIUM 2000 UNITS: 1000 INJECTION, SOLUTION INTRAVENOUS; SUBCUTANEOUS at 10:21

## 2019-05-20 RX ADMIN — CARVEDILOL 6.25 MG: 6.25 TABLET, FILM COATED ORAL at 18:28

## 2019-05-20 RX ADMIN — HEPARIN SODIUM 8000 UNITS: 1000 INJECTION, SOLUTION INTRAVENOUS; SUBCUTANEOUS at 09:31

## 2019-05-20 RX ADMIN — ONDANSETRON 4 MG: 2 INJECTION INTRAMUSCULAR; INTRAVENOUS at 08:47

## 2019-05-20 RX ADMIN — ACETAMINOPHEN 500 MG: 500 TABLET, FILM COATED ORAL at 18:23

## 2019-05-20 RX ADMIN — SIMVASTATIN 20 MG: 20 TABLET, FILM COATED ORAL at 21:40

## 2019-05-20 RX ADMIN — CEFAZOLIN SODIUM 2 G: 2 INJECTION, SOLUTION INTRAVENOUS at 08:57

## 2019-05-20 RX ADMIN — POTASSIUM CHLORIDE 20 MEQ: 750 TABLET, EXTENDED RELEASE ORAL at 18:27

## 2019-05-20 RX ADMIN — NOREPINEPHRINE BITARTRATE 6.4 MCG: 1 INJECTION INTRAVENOUS at 08:57

## 2019-05-20 RX ADMIN — NOREPINEPHRINE BITARTRATE 6.4 MCG: 1 INJECTION INTRAVENOUS at 09:41

## 2019-05-20 RX ADMIN — ROCURONIUM BROMIDE 50 MG: 10 INJECTION INTRAVENOUS at 08:45

## 2019-05-20 RX ADMIN — NOREPINEPHRINE BITARTRATE 6.4 MCG: 1 INJECTION INTRAVENOUS at 09:09

## 2019-05-20 RX ADMIN — NOREPINEPHRINE BITARTRATE 6.4 MCG: 1 INJECTION INTRAVENOUS at 09:55

## 2019-05-20 RX ADMIN — PROPOFOL 100 MG: 10 INJECTION, EMULSION INTRAVENOUS at 08:45

## 2019-05-20 RX ADMIN — ADENOSINE 12 MG: 3 INJECTION, SOLUTION INTRAVENOUS at 10:01

## 2019-05-20 RX ADMIN — ASPIRIN 81 MG 243 MG: 81 TABLET ORAL at 07:07

## 2019-05-20 RX ADMIN — NOREPINEPHRINE BITARTRATE 6.4 MCG: 1 INJECTION INTRAVENOUS at 09:35

## 2019-05-20 RX ADMIN — HEPARIN SODIUM 5000 UNITS: 1000 INJECTION, SOLUTION INTRAVENOUS; SUBCUTANEOUS at 09:25

## 2019-05-20 RX ADMIN — NOREPINEPHRINE BITARTRATE 6.4 MCG: 1 INJECTION INTRAVENOUS at 09:15

## 2019-05-20 RX ADMIN — TAMSULOSIN HYDROCHLORIDE 0.4 MG: 0.4 CAPSULE ORAL at 21:40

## 2019-05-20 RX ADMIN — NOREPINEPHRINE BITARTRATE 6.4 MCG: 1 INJECTION INTRAVENOUS at 09:38

## 2019-05-20 RX ADMIN — LIDOCAINE HYDROCHLORIDE 100 MG: 20 INJECTION, SOLUTION INFILTRATION; PERINEURAL at 08:43

## 2019-05-20 RX ADMIN — NOREPINEPHRINE BITARTRATE 6.4 MCG: 1 INJECTION INTRAVENOUS at 08:46

## 2019-05-20 RX ADMIN — CEFAZOLIN 1 G: 1 INJECTION, POWDER, FOR SOLUTION INTRAMUSCULAR; INTRAVENOUS at 11:13

## 2019-05-20 RX ADMIN — NOREPINEPHRINE BITARTRATE 6.4 MCG: 1 INJECTION INTRAVENOUS at 09:31

## 2019-05-20 RX ADMIN — SODIUM CHLORIDE, POTASSIUM CHLORIDE, SODIUM LACTATE AND CALCIUM CHLORIDE: 600; 310; 30; 20 INJECTION, SOLUTION INTRAVENOUS at 09:40

## 2019-05-20 RX ADMIN — SUCRALFATE 1 G: 1 TABLET ORAL at 21:40

## 2019-05-20 RX ADMIN — Medication 5 MG: at 22:42

## 2019-05-20 RX ADMIN — SUGAMMADEX 200 MG: 100 INJECTION, SOLUTION INTRAVENOUS at 11:29

## 2019-05-20 RX ADMIN — DEXMEDETOMIDINE HYDROCHLORIDE 0.2 MCG/KG/HR: 100 INJECTION, SOLUTION INTRAVENOUS at 10:27

## 2019-05-20 ASSESSMENT — ACTIVITIES OF DAILY LIVING (ADL)
ADLS_ACUITY_SCORE: 14
ADLS_ACUITY_SCORE: 25

## 2019-05-20 ASSESSMENT — MIFFLIN-ST. JEOR: SCORE: 1624.75

## 2019-05-20 NOTE — ANESTHESIA POSTPROCEDURE EVALUATION
Anesthesia POST Procedure Evaluation    Patient: Bairon Foster   MRN:     [id number] Gender:   male   Age:    88 year old :      1931        Preoperative Diagnosis: heart valve condition   Procedure(s):  CV MITRAL CLIP   Postop Comments: No value filed.       Anesthesia Type:  General  No value filed.    Reportable Event: NO     PAIN: Uncomplicated   Sign Out status: Comfortable, Well controlled pain     PONV: No PONV   Sign Out status:  No Nausea or Vomiting     Neuro/Psych: Uneventful perioperative course   Sign Out Status: Preoperative baseline; Age appropriate mentation     Airway/Resp.: Uneventful perioperative course   Sign Out Status: Non labored breathing, age appropriate RR; Resp. Status within EXPECTED Parameters     CV: Uneventful perioperative course   Sign Out status: Appropriate BP and perfusion indices; Appropriate HR/Rhythm     Disposition:   Sign Out in:  PACU  Recovery Course: Uneventful  Follow-Up: Not required           Last Anesthesia Record Vitals:  CRNA VITALS  2019 1112 - 2019 1212      2019             NIBP:  106/64    Ht Rate:  61    SpO2:  97 %    EKG:  V Paced          Last PACU Vitals:  Vitals Value Taken Time   /63 2019  2:20 PM   Temp 36.4  C (97.5  F) 2019 12:30 PM   Pulse 59 2019  2:20 PM   Resp 14 2019  1:45 PM   SpO2 98 % 2019  2:29 PM   Temp src     NIBP 106/64 2019 12:00 PM   Pulse     SpO2 97 % 2019 12:00 PM   Resp     Temp     Ht Rate 61 2019 12:00 PM   Temp 2     Vitals shown include unvalidated device data.      Electronically Signed By: Juancho Reilly MD, May 20, 2019, 2:30 PM

## 2019-05-20 NOTE — Clinical Note
Potential access sites were evaluated for patency using ultrasound.   The right femoral artery was selected. Access was obtained under with Sonosite and Fluoroscopic guidance using a standard 18 guage needle with direct visualization of needle entry.

## 2019-05-20 NOTE — H&P
[facility]  Bairon Foster MRN: [id number]  2/6/1931  Date of Admission:5/20/2019  Primary care provider: Mg Nelson         Chief Complaint:   S/p MitraClip x 2         History of Present Illness:   Bairon Foster is an 88 y.o.M with a PMHx of HFrEF (EF 30%), CAD (s/p PCI OM 2009), pAF (on Eliquis) s/p PPM, JIM (wears CPAP at night),  MDD, PAD, chronic lymphedema, chronic pancytopenia, GERD and non-rheumatic severe MR s/p MitraClip x 2.     Prior to procedure, patient with increasing SOB for the last 6 months.  Patient also hospitalized x 2 with heart failure exacerbations/volume overload/weight gain.  MitraClip procedure today with successful clip x 2, MG post op 5 mmHg.     At time of interview, patient denies chest pain, lightheadedness, dizziness, SOB. While in PACU, red urine noted in Amos, per MD, this was also present during procedure.            Review of Systems:    10 point review of systems negative except for stated above in HPI.          Past Medical History:   Medical History reviewed.   Past Medical History:   Diagnosis Date     Acute, but ill-defined, cerebrovascular disease     NO RESIDUALS     Antiplatelet or antithrombotic long-term use      Arrhythmia     a fib     Blood in stool      Coronary atherosclerosis of unspecified type of vessel, native or graft     S/P PTCA, EF 50%     Esophageal reflux      Hydrocele, unspecified      Hypercholesterolemia      Hypersomnia with sleep apnea, unspecified      Inguinal hernia without mention of obstruction or gangrene, recurrent unilateral or unspecified      Ischemic cardiomyopathy      Major depression in complete remission (H)      Obese      Onychomycosis      JIM on CPAP      Other and unspecified hyperlipidemia      Other lymphedema     GROIN AND THIGHS     Other pancytopenia (H)      Other specified anemias      Overweight      Pacemaker      PAD (peripheral artery disease) (H)       Pancytopenia (H)      Persistent atrial fibrillation (H)     VVI PM for long pauses     Prostatic hypertrophy, benign      Pulmonary hypertension (H)      Scrotal varices      Thrombocytopenia, unspecified (H)      Umbilical hernia without obstruction and without gangrene      Unilateral or unspecified femoral hernia without mention of obstruction or gangrene, unilateral or unspecified      Unspecified hypothyroidism      Venous stasis dermatitis of both lower extremities      Vitamin D deficiency              Past Surgical History:   Surgical History reviewed.   Past Surgical History:   Procedure Laterality Date     BONE MARROW BIOPSY, BONE SPECIMEN, NEEDLE/TROCAR N/A 4/19/2019    Procedure: BONE MARROW BIOPSY;  Surgeon: Yovani Cooley MD;  Location:  OR     CV HEART CATHETERIZATION WITH POSSIBLE INTERVENTION N/A 4/12/2019    Procedure: Heart Catheterization with possible Intervention;  Surgeon: Lev Beck MD;  Location:  HEART CARDIAC CATH LAB     CV RIGHT HEART CATH N/A 4/12/2019    Procedure: Right Heart Cath;  Surgeon: Lev Beck MD;  Location:  HEART CARDIAC CATH LAB     EYE SURGERY      CATARACT/BLEPHAROPLASTY     GENITOURINARY SURGERY      TUNA     HERNIA REPAIR      RIGHT INGUINAL     HERNIORRHAPHY INGUINAL  8/14/2012    Procedure: HERNIORRHAPHY INGUINAL;  right inguinal hernia repair;  Surgeon: Kareem Torrez MD;  Location:  [facility]     HERNIORRHAPHY UMBILICAL N/A 10/31/2017    Procedure: HERNIORRHAPHY UMBILICAL;  UMBILICAL HERNIA REPAIR WITH MESH;  Surgeon: Scar Harrington MD;  Location: [facility]     IMPLANT PACEMAKER  8/2009    single chamber     ORTHOPEDIC SURGERY      [facility] RIGHT     ORTHOPEDIC SURGERY  2010    right TKR, left TKR     SURGICAL HISTORY OF -       Thumb surgery     SURGICAL HISTORY OF -   1990s    Left total knee replacement     SURGICAL HISTORY OF -   3/11    [facility] total knee replacement             Social History:   Social History reviewed.  Social History  "    Tobacco Use     Smoking status: Never Smoker     Smokeless tobacco: Never Used   Substance Use Topics     Alcohol use: Yes     Alcohol/week: 0.0 oz     Comment: 1/month             Family History:   Family History reviewed.   Family History   Problem Relation Age of Onset     Cardiovascular Father      C.A.D. Father      Lung Cancer Sister      Unknown/Adopted Mother      Cancer - colorectal No family hx of              Allergies:   No Known Allergies          Medications:   Medications Reviewed.   Current Facility-Administered Medications   Medication     aspirin (ASA) tablet 325 mg     ceFAZolin (ANCEF) intermittent infusion 2 g in 100 mL dextrose PRE-MIX     EPINEPHrine (ADRENALIN) 5 mg in sodium chloride 0.9 % 250 mL infusion     HOLD: dabigatran (PRADAXA) 24 hours pre-procedure IF CrCl GREATER than or EQUAL to 50 mL/min     lidocaine (LMX4) cream     lidocaine 1 % 0.1-1 mL     norepinephrine (LEVOPHED) 16 mg in  mL infusion     Patient is NOT allergic to contrast dye OR was given pre-procedure oral steroids for treatment     sodium chloride (PF) 0.9% PF flush 3 mL     sodium chloride (PF) 0.9% PF flush 3 mL     sodium chloride 0.9% infusion             Physical Exam:   Vitals were reviewed.  Blood pressure (!) 123/92, pulse 60, temperature 97.8  F (36.6  C), temperature source Oral, resp. rate 18, height 1.88 m (6' 2\"), weight 88.5 kg (195 lb 1.7 oz), SpO2 98 %.    General: AAOx3, NAD  Skin: Not jaundiced, no rash, no ecchymoses; incision site L>R, mild oozing from left groin site, CDI, soft, non-tender, no signs of hematoma. No bruit.  HEENT: MMM, PERRLA, EOM intact  CV: RRR, normal S1S2, no murmur, clicks, rubs  Resp: Clear to auscultation bilaterally, no wheezes, rhonchi  Abd: Soft, non-tender, BS+, no masses appreciated  Extremities: warm and well perfused, palpable pulses, no edema  Neuro: No lateralizing symptoms or focal neurologic deficits        Labs:   Routine Labs:  Lab Results   Component " Value Date    TROPI 0.014 12/08/2009    TROPI 0.014 12/07/2009    TROPI 0.014 08/18/2009    TROPI 0.014 08/18/2009    TROPI <0.012 08/17/2009     CMP  Recent Labs   Lab 05/20/19  0628      POTASSIUM 3.9   CHLORIDE 106   CO2 29   ANIONGAP 6   GLC 95   BUN 25   CR 1.22   GFRESTIMATED 53*   GFRESTBLACK 61   TEODORO 9.6   PROTTOTAL 7.9   ALBUMIN 3.8   BILITOTAL 1.4*   ALKPHOS 76   AST 30   ALT 21     CBCNo lab results found in last 7 days.  INR  Recent Labs   Lab 05/20/19  0628   INR 1.44*           Diagnostics:    EKG 12Lead: Paced Rhythm HR 63    Echo 3/22/19:  Interpretation Summary     1. The left ventricle is mildly dilated. The visual ejection fraction is  estimated at 30%. There is moderate global hypokinesia of the left ventricle.  2. The right ventricle is moderately dilated. Moderately decreased right  ventricular systolic function  3. There is mod-severe to severe (3-4+) mitral regurgitation. Mitral valve is  moderately myxomatous, no prolapse or flail. MR jet is slighlty posterior  directed. Etiology likely functional MR from dilated LV and low EF, may have  some myxomatous component also. MVA 5.0cm2 by 3D alignment and measurement at  leaflet tips.  4. There is mod-severe to severe (3-4+) tricuspid regurgitation. TV is not  well visualized. TR jet is directed at septum, appears to come from central  coaptation point, where pacemaker lead crosses the valve.     TTE from 4/2018 showed EF 53% (although when reviewed appears lower), mild RV  dilation with normal function, 3+ MR, 1-2+ TR.    Transesophageal Echocardiogram 5/20/19:   Interpretation Summary     The mitral valve appeared mildly thickened at baseline with moderate to severe  mitral regurgitation. The mechanism appears to be anterior leaflet override  secondary to a dilated mitral annulus.  The patient underwent Lila-Clip placement x 2 (one at the A2P2 junction and  the next clip just medial to the first). Post procedure the clips appeared  well  seated with with moderate to severely posteriorly directed mitral  regurgitation.    Coronary Angiogram/Right heart Cath 4/12/19:  Pre-procedure Diagnosis     heart valve condition    Post-procedure Diagnosis     mild non obstructive CAD      Conclusion     Right sided filling pressures are mildly elevated.    Mild elevated Pulmonary Hypertension.    Left sided filling pressures are mildly elevated.    Normal cardiac output level.    Mild non obstructive CAD       Coronary Findings   Diagnostic   Dominance: Right   Left Main   The vessel is large.   Left Anterior Descending   The vessel is large.   Prox LAD lesion is 40% stenosed.   First Diagonal Branch   The vessel is large. The vessel exhibits minimal luminal irregularities.   Left Circumflex   The vessel is large.   First Obtuse Marginal Branch   The vessel is large. The vessel exhibits minimal luminal irregularities.   Second Obtuse Marginal Branch   The vessel is large. The vessel exhibits minimal luminal irregularities.   Third Obtuse Marginal Branch   The vessel is large. The vessel exhibits minimal luminal irregularities.   Right Coronary Artery   The vessel is large.   Prox RCA to Mid RCA lesion is 40% stenosed.   Inferior Septal   The vessel is moderate in size. The vessel exhibits minimal luminal irregularities.   Right Posterior Atrioventricular Branch   The vessel is small and is angiographically normal.   First Right Posterolateral   The vessel is small.   Second Right Posterolateral   The vessel is small.     Intervention   No interventions have been documented.     CV Right/Left Heart Cath   Right Heart Pressures   Phase: Baseline    RA  A-wave: 9  V-wave: 17  Mean: 11  HR: 80  RV  Systolic: 51   End Diastolic: 19   HR: 80    PA  Systolic: 49   Diasotlic: 17  Mean: 30  HR: 80    PCW  A-wave: 16   V-wave: 24  Mean: 15  HR: 80    Cardiac Output  CO Abel: 6.6  CI Abel: 3.0   CO TD: --  CI TD: --    Vitals  BP: 110 mmHg / 58 mmHg  SpO2: 96  %    Resistance Metric  SVR: --  PVR MEHTA: 2.2  Right sided filling pressures are mildly elevated.Left sided filling pressures are mildly elevated. Mild elevated Pulmonary Hypertension.Normal cardiac output level.       Assessment and Plan:     Bairon Foster is an 88 y.o.M with a PMHx of HFrEF (EF 30%), CAD (s/p PCI OM 2009), pAF (on Eliquis) s/p PPM, chronic pancytopenia, JIM (wears CPAP at night), depression, PAD, chronic lymphedema, CKD, GERD and non-rheumatic severe MR s/p MitraClip x 2.     #Severe mitral regurgitation, now s/p MitraClip X 2. Mean Gradient post op 5 mmHg. Sheath sites soft without hematoma. No arrhythmias. Neuro's intact    - Bedrest per protocol   - Neuro checks, per protocol  - Echocardiogram tomorrow  - Monitor groin sites  - EKG in morning   - Amos can be removed once patient is out of bed; continue to monitor urine output color and amount   - ASA and Eliquis for anti-platelet therapy  - Will resume ASA tonight, likely restart Eliquis tomorrow if no issues with bleeding overnight  - Cardiac rehab/PT/OT  - Diurese as needed/able  - Daily weights  - Strict intake/output  - Continuous telemetry monitoring      # CAD s/p PCI OM (2009)  # ICM (EF 30%)  Most recent echocardiogram with EF 30%.    - Daily weights as listed above  - Continue PTA Bumex, pt takes 0.5 mg every other day, 1 mg on alternate days; will give 1 mg today  - Continue PTA Coreg 6.25 mg BID  - Not currently on ACEi given history of CKD    # Paroxsymal atrial fibrillation s/p PPM  Single lead PPM, last interrogation around 4/19 with about 6 months battery life.  EKG currently paced with Underlying rhythm atrial fibrillation/occ PVC's. Per chart review, patient previously on Pradaxa, but switched to low dose Eliquis 2/2 Epistaxis.   - Resume PTA Eliquis 2.5 mg BID  - Continue PTA Coreg 6.25 mg BID  - Follow up with Primary Cardiology team/EP in regards to timing of battery change    Other Chronic Issues  # CKD III: Cr today 1.22  (baseline around 1.1-1.4): Continue to monitor, avoid nephrotoxins  # Pancytopenia: Chronic issue x 10 years, follows with heme/onc as OP, recent bone marrow biopsy with no evidence of MDS/malignancy; monitor with CBC daily (baseline WBC around 2-3, hgb 9-10, plt 60-70)  # JIM: CPAP at night  # MDD: continue PTA Wellbutrin  # BPH: Continue PTA Proscar and Flomax  # GERD: Continue PTA Prilosec  # PAD with lymphedema: continue use of compression stockings      FEN: NPO per protocol, ADAT to Cardiac diet  Prophylaxis:  DVT: SCD, ambulation PPI: Prilosec  Disposition: Home in 1-2 days with cardiac rehab pending stable post-op period  Code Status: FULL CODE      WILLIS Zhang, CNP  [facility] Interventional Cardiology Team  [phone]

## 2019-05-20 NOTE — Clinical Note
Potential access sites were evaluated for patency using ultrasound.   The right femoral vein was selected. Access was obtained under with Sonosite and Fluoroscopic guidance using a standard 18 guage needle with direct visualization of needle entry.

## 2019-05-20 NOTE — Clinical Note
Pt brought to room by CRNA, intubated, warren placed.  All monitoring and VS recording and medications per CRNA

## 2019-05-20 NOTE — ANESTHESIA CARE TRANSFER NOTE
Patient: Bairon Foster    Procedure(s):  CV MITRAL CLIP    Diagnosis: heart valve condition  Diagnosis Additional Information: No value filed.    Anesthesia Type:   No value filed.     Note:  Airway :Nasal Cannula  Patient transferred to:PACU  Comments: VSS on arrvival, report to RN.Handoff Report: Identifed the Patient, Identified the Reponsible Provider, Reviewed the pertinent medical history, Discussed the surgical course, Reviewed Intra-OP anesthesia mangement and issues during anesthesia, Set expectations for post-procedure period and Allowed opportunity for questions and acknowledgement of understanding      Vitals: (Last set prior to Anesthesia Care Transfer)    CRNA VITALS  5/20/2019 1112 - 5/20/2019 1150      5/20/2019             Resp Rate (set):  10                Electronically Signed By: WILLIS Harding CRNA  May 20, 2019  11:50 AM

## 2019-05-20 NOTE — TELEPHONE ENCOUNTER
Our goal is to have forms completed within 72 hours, however some forms may require a visit or additional information.    What clinic location was the form placed at [facility] or [place].?     Who is the form from?   Where did the form come from? Faxed to clinic   The form was placed in the inbox of Mg Nelson MD      Please fax to [phone]  Phone number: [phone]    Additional comments: Home health care DC summary    Call take on 5/20/2019 at 3:49 PM by Maya Monterroso

## 2019-05-20 NOTE — LETTER
Transition Communication Hand-off for Care Transitions to Next Level of Care Provider    Name: Bairon Foster  : 1931  MRN #: [id number]  Primary Care Provider: Mg Nelson     Primary Clinic: [address] [place] [address] [address] [facility] [address]     Reason for Hospitalization:  Non-rheumatic mitral regurgitation [I34.0]  Admit Date/Time: 2019  5:37 AM  Discharge Date: 19  Payor Source: Payor: [employer] / Plan: HU[employer] MEDICARE ADVANTAGE / Product Type: Medicare   Readmission Assessment Measure (LEIGH) Risk Score/category: elevated.   Reason for Communication Hand-off Referral: Multiple providers/specialties  Discharge Plan:  Discharge Needs Assessment:  Needs      Most Recent Value   Equipment Currently Used at Home  grab bar, tub/shower, grab bar, toilet, cane, straight   Home Care  [facility] [phone], Fax: [phone]      Follow-up specialty is recommended: Yes    Follow-up plan:    Future Appointments   Date Time Provider Department Center          2019  2:30 PM ARRIETA TECH1 SUUMPC [facility]   2019 10:00 AM Mg Nelson MD BXFP BLCX   2019  9:30 AM ARRIETA LAB SULAB [facility]   2019 10:30 AM Mackenzie Tejada APRN CNP SUUMHT [facility]   2019  1:30 PM SH LAB DRAW 1 [facility]   2019  2:40 PM Donald Chavez MD [facility]       Any outstanding tests or procedures:        Referrals     Future Labs/Procedures    CARDIAC REHAB REFERRAL     Process Instructions:    Advance to Wellness and Exercise for Life (WEL) Program or to another maintenance exercise program upon completion of phase 2 cardiac rehab.    Weight mgt program is only offered at [facility].    *This therapy referral will be filtered to a centralized scheduling office at [facility] and the patient will receive a call to schedule an appointment at a [place] location most convenient for them. *        Comments:    If you have not heard from the  scheduling office within 2 business days, please call [phone] for all locations, with the exception of [place], please call [phone] and [facility], please call [phone].    Please be aware that coverage of these services is subject to the terms and limitations of your health insurance plan.  Call member services at your health plan with any benefit or coverage questions.      Home care nursing referral     Comments:    [employer] [facility]   Phone: [phone]   Fax: [phone]    For RN alvina post hospitalization.   Assess: vital signs, respiratory and cardiac status, activity tolerance, hydration, nutritional status.   Med set up and management.  PT/OT eval and treat for deconditioning, strengthening, and endurance. Assist with transition to outpatient cardiac rehab when appropriate.     Your provider has ordered home care nursing services. If you have not been contacted within 2 days of your discharge please call the inpatient department phone number at [phone] .    Medication Therapy Management Referral     Comments:    MTM referral reason            Patient had a hospital or ED visit in last 6 months and has more than 10   PTA or Discharge medications    Patient has 5 PTA or Discharge Medications AND one of the following   diagnoses: DM,HF,COPD,AMI DX,PULM HTN       This service is designed to help you get the most from your medications.  A specially trained pharmacist will work closely with you and your doctors  to solve any problems related to your medications and to help you get the   best results from taking them.      The Medication Therapy Management staff will call you to schedule an appointment.            Key Recommendations:  Post hospitalization follow up.     GEORGES VAZQUEZ RN BSN  Care Coordinator Unit [address] [place] [address]  Phone: [phone]

## 2019-05-20 NOTE — OR NURSING
Blood tinged urine noted on arrival. Gregory GOODE notified. He stated that they noticed blood in urine during procedure and to continue to monitor.

## 2019-05-21 ENCOUNTER — APPOINTMENT (OUTPATIENT)
Dept: CARDIOLOGY | Facility: CLINIC | Age: 84
DRG: 229 | End: 2019-05-21
Payer: COMMERCIAL

## 2019-05-21 ENCOUNTER — APPOINTMENT (OUTPATIENT)
Dept: GENERAL RADIOLOGY | Facility: CLINIC | Age: 84
DRG: 229 | End: 2019-05-21
Payer: COMMERCIAL

## 2019-05-21 ENCOUNTER — MEDICAL CORRESPONDENCE (OUTPATIENT)
Dept: HEALTH INFORMATION MANAGEMENT | Facility: CLINIC | Age: 84
End: 2019-05-21

## 2019-05-21 ENCOUNTER — APPOINTMENT (OUTPATIENT)
Dept: OCCUPATIONAL THERAPY | Facility: CLINIC | Age: 84
DRG: 229 | End: 2019-05-21
Payer: COMMERCIAL

## 2019-05-21 ENCOUNTER — PATIENT OUTREACH (OUTPATIENT)
Dept: CARE COORDINATION | Facility: CLINIC | Age: 84
End: 2019-05-21

## 2019-05-21 VITALS
BODY MASS INDEX: 25.04 KG/M2 | DIASTOLIC BLOOD PRESSURE: 71 MMHG | HEIGHT: 74 IN | OXYGEN SATURATION: 96 % | SYSTOLIC BLOOD PRESSURE: 115 MMHG | TEMPERATURE: 98 F | HEART RATE: 63 BPM | WEIGHT: 195.1 LBS | RESPIRATION RATE: 16 BRPM

## 2019-05-21 DIAGNOSIS — Z98.890 S/P MITRAL VALVE REPAIR: Primary | ICD-10-CM

## 2019-05-21 DIAGNOSIS — D63.8 ANEMIA IN OTHER CHRONIC DISEASES CLASSIFIED ELSEWHERE: ICD-10-CM

## 2019-05-21 LAB
ANION GAP SERPL CALCULATED.3IONS-SCNC: 6 MMOL/L (ref 3–14)
BUN SERPL-MCNC: 25 MG/DL (ref 7–30)
CALCIUM SERPL-MCNC: 8.9 MG/DL (ref 8.5–10.1)
CHLORIDE SERPL-SCNC: 107 MMOL/L (ref 94–109)
CO2 SERPL-SCNC: 26 MMOL/L (ref 20–32)
CREAT SERPL-MCNC: 1.07 MG/DL (ref 0.66–1.25)
ERYTHROCYTE [DISTWIDTH] IN BLOOD BY AUTOMATED COUNT: 16.6 % (ref 10–15)
GFR SERPL CREATININE-BSD FRML MDRD: 62 ML/MIN/{1.73_M2}
GLUCOSE SERPL-MCNC: 146 MG/DL (ref 70–99)
HCT VFR BLD AUTO: 29.5 % (ref 40–53)
HGB BLD-MCNC: 9 G/DL (ref 13.3–17.7)
INTERPRETATION ECG - MUSE: NORMAL
MAGNESIUM SERPL-MCNC: 2.3 MG/DL (ref 1.6–2.3)
MCH RBC QN AUTO: 31.8 PG (ref 26.5–33)
MCHC RBC AUTO-ENTMCNC: 30.5 G/DL (ref 31.5–36.5)
MCV RBC AUTO: 104 FL (ref 78–100)
PHOSPHATE SERPL-MCNC: 2.8 MG/DL (ref 2.5–4.5)
PLATELET # BLD AUTO: 58 10E9/L (ref 150–450)
POTASSIUM SERPL-SCNC: 4.1 MMOL/L (ref 3.4–5.3)
RBC # BLD AUTO: 2.83 10E12/L (ref 4.4–5.9)
SODIUM SERPL-SCNC: 140 MMOL/L (ref 133–144)
WBC # BLD AUTO: 4.2 10E9/L (ref 4–11)

## 2019-05-21 PROCEDURE — 36415 COLL VENOUS BLD VENIPUNCTURE: CPT

## 2019-05-21 PROCEDURE — 25000132 ZZH RX MED GY IP 250 OP 250 PS 637

## 2019-05-21 PROCEDURE — 97110 THERAPEUTIC EXERCISES: CPT | Mod: GO

## 2019-05-21 PROCEDURE — 80048 BASIC METABOLIC PNL TOTAL CA: CPT

## 2019-05-21 PROCEDURE — 97535 SELF CARE MNGMENT TRAINING: CPT | Mod: GO

## 2019-05-21 PROCEDURE — 83735 ASSAY OF MAGNESIUM: CPT

## 2019-05-21 PROCEDURE — 71045 X-RAY EXAM CHEST 1 VIEW: CPT

## 2019-05-21 PROCEDURE — 93010 ELECTROCARDIOGRAM REPORT: CPT | Performed by: INTERNAL MEDICINE

## 2019-05-21 PROCEDURE — 84100 ASSAY OF PHOSPHORUS: CPT

## 2019-05-21 PROCEDURE — 99238 HOSP IP/OBS DSCHRG MGMT 30/<: CPT | Performed by: INTERNAL MEDICINE

## 2019-05-21 PROCEDURE — 25000132 ZZH RX MED GY IP 250 OP 250 PS 637: Performed by: NURSE PRACTITIONER

## 2019-05-21 PROCEDURE — 93306 TTE W/DOPPLER COMPLETE: CPT | Mod: 26 | Performed by: INTERNAL MEDICINE

## 2019-05-21 PROCEDURE — 85027 COMPLETE CBC AUTOMATED: CPT

## 2019-05-21 PROCEDURE — 93005 ELECTROCARDIOGRAM TRACING: CPT

## 2019-05-21 PROCEDURE — 97165 OT EVAL LOW COMPLEX 30 MIN: CPT | Mod: GO

## 2019-05-21 PROCEDURE — 25500064 ZZH RX 255 OP 636: Performed by: INTERNAL MEDICINE

## 2019-05-21 PROCEDURE — 40000264 ECHOCARDIOGRAM COMPLETE

## 2019-05-21 RX ORDER — BUMETANIDE 1 MG/1
1 TABLET ORAL DAILY
Qty: 45 TABLET | Refills: 0 | COMMUNITY
Start: 2019-05-21 | End: 2019-05-30

## 2019-05-21 RX ORDER — BUMETANIDE 1 MG/1
1 TABLET ORAL DAILY
Status: DISCONTINUED | OUTPATIENT
Start: 2019-05-22 | End: 2019-05-21 | Stop reason: HOSPADM

## 2019-05-21 RX ORDER — BUMETANIDE 0.5 MG/1
0.5 TABLET ORAL ONCE
Status: COMPLETED | OUTPATIENT
Start: 2019-05-21 | End: 2019-05-21

## 2019-05-21 RX ADMIN — BUMETANIDE 0.5 MG: 0.5 TABLET ORAL at 11:36

## 2019-05-21 RX ADMIN — SUCRALFATE 1 G: 1 TABLET ORAL at 07:49

## 2019-05-21 RX ADMIN — POTASSIUM CHLORIDE 20 MEQ: 750 TABLET, EXTENDED RELEASE ORAL at 07:48

## 2019-05-21 RX ADMIN — OMEPRAZOLE 20 MG: 20 CAPSULE, DELAYED RELEASE ORAL at 07:48

## 2019-05-21 RX ADMIN — BUPROPION HYDROCHLORIDE 150 MG: 150 TABLET, FILM COATED, EXTENDED RELEASE ORAL at 07:48

## 2019-05-21 RX ADMIN — FINASTERIDE 5 MG: 5 TABLET, FILM COATED ORAL at 07:48

## 2019-05-21 RX ADMIN — CARVEDILOL 6.25 MG: 6.25 TABLET, FILM COATED ORAL at 07:49

## 2019-05-21 RX ADMIN — HUMAN ALBUMIN MICROSPHERES AND PERFLUTREN 4 ML: 10; .22 INJECTION, SOLUTION INTRAVENOUS at 09:15

## 2019-05-21 RX ADMIN — BUMETANIDE 0.5 MG: 0.5 TABLET ORAL at 07:48

## 2019-05-21 RX ADMIN — LEVOTHYROXINE SODIUM 100 MCG: 100 TABLET ORAL at 07:48

## 2019-05-21 RX ADMIN — ASPIRIN 81 MG: 81 TABLET, COATED ORAL at 07:48

## 2019-05-21 ASSESSMENT — ACTIVITIES OF DAILY LIVING (ADL)
ADLS_ACUITY_SCORE: 13

## 2019-05-21 ASSESSMENT — MIFFLIN-ST. JEOR: SCORE: 1624.72

## 2019-05-21 NOTE — DISCHARGE SUMMARY
I have seen and examined the patient with the [facility] team. I agree with the assessment and plan of the discharge summary note above.I have reviewed pertinent labs. More than 30 minutes were spent organizing the patient's discharge.    Philip Santiago MD  Interventional Cardiology  Pager: [phone]    [address] [address]  p: [phone]    Discharge Summary: Cardiology Service    Bairon Foster MRN# [id number]   YOB: 1931 Age: 88 year old       Admission Date: 5/20/2019  Discharge Date: 05/21/19      Discharge Diagnoses:  1. Severe MR s/p MitraClip  2. Severe TR  3. Mild-Mod MS  4. Chronic mixed systolic, diastolic heart failure  5. CAD s/p PCI (OM1 2009)  6. Paroxsymal atrial fibrillation (Eliquis) s/p PPM  7. JIM  8. MDD  9. Chronic pancytopenia  10. GERD    Pertinent Procedures:  1. MitraClip    Consults:  PT/OT  SW/Care Coordinator  Dietician    Imaging with results:  Echocardiogram 5/21/19 (POD 1):  Interpretation Summary  Global hypokinesis with mildly (EF 40-45%) reduced left ventricular function.  Flattened septum is consistent with right ventricular volume overload.  The right ventricle is moderately to severely dilated and RV function is at  least mildly reduced.  s/p Lila-Clip placement x 2. There is least moderate posteriorly directed  mitral regurgitation. There is mild to moderate mitral stenosis with a mean  gradient of 5 mmHg at a HR of 50 bpm.  Severe tricuspid insufficiency is present with hepatic vein systolic flow  reversal.  The pulmonary artery systolic pressure cannot be assessed. There is moderate  pulmonic insufficiency.  The IVC is dilated to 4.4 cm with abnormal respiratory variation. No  pericardial effusion is present.     Compared to prior 4/4/2018, the TR appears worse and the MR appears improved.  MS is new. There are now lila-clips present. LV function is slightly worse.    Echocardiogram 5/20/19 (POD  0):  Interpretation Summary     The mitral valve appeared mildly thickened at baseline with moderate to severe  mitral regurgitation. The mechanism appears to be anterior leaflet override  secondary to a dilated mitral annulus.  The patient underwent Lila-Clip placement x 2 (one at the A2P2 junction and  the next clip just medial to the first). Post procedure the clips appeared  well seated with with moderate to severely posteriorly directed mitral  Regurgitation.    CHELLE 3/22/19 (pre-op):  Interpretation Summary     1. The left ventricle is mildly dilated. The visual ejection fraction is  estimated at 30%. There is moderate global hypokinesia of the left ventricle.  2. The right ventricle is moderately dilated. Moderately decreased right  ventricular systolic function  3. There is mod-severe to severe (3-4+) mitral regurgitation. Mitral valve is  moderately myxomatous, no prolapse or flail. MR jet is slighlty posterior  directed. Etiology likely functional MR from dilated LV and low EF, may have  some myxomatous component also. MVA 5.0cm2 by 3D alignment and measurement at  leaflet tips.  4. There is mod-severe to severe (3-4+) tricuspid regurgitation. TV is not  well visualized. TR jet is directed at septum, appears to come from central  coaptation point, where pacemaker lead crosses the valve.     TTE from 4/2018 showed EF 53% (although when reviewed appears lower), mild RV  dilation with normal function, 3+ MR, 1-2+ TR.    Other imaging studies:  EKG 12Lead: Vpaced, underlying afib LBBB (seen previously)      Brief HPI:  Bairon Foster is an 88 y.o.M with a PMHx of HFrEF (EF 30%), CAD (s/p PCI OM 2009), pAF (on Eliquis) s/p PPM, chronic pancytopenia, JIM (wears CPAP at night), depression, PAD, chronic lymphedema, CKD, GERD and non-rheumatic severe MR s/p MitraClip x 2.     Hospital Course by Diagnosis:  #Severe mitral regurgitation, now s/p MitraClip x 2.   ## Severe TR  ## Mild-moderate MS  Post op MG 5 mmHg.  Sheath sites soft without hematoma. No arrhythmias. Neuros intact. EKG Vpaced rhythm. Red urine in Amos noted during case and continued overnight, now urine bridget in color. Patient also with some oozing from RFV incision site overnight, now CDI. Repeat echocardiogram this am with worsened TR (severe, previously noted to be mod-severe), worsened MS (mild-mod), with improved MR.     - Continue PTA ASA 81 mg daily  - Resume PTA Eliquis 2.5 mg BID  - Patient to discharge home with home care  - Start 1 mg Bumex daily (rather than 0.5/1mg every other day)  - Daily weights  - Continue to monitor urine color; patient informed to call if urine becomes darker/more red color noted  - Lifelong antibiotic prophylaxis for dental procedures  - Follow up with valve Clinic in 2 weeks and 1 month with repeat Echocardiogram prior to one month appointment     # CAD s/p PCI OM1 (2009)  # ICM (EF 30%)  Repeat echocardiogram today with EF 40-45%, dilated IVC.  Patient lung sounds clear, trace edema BLE.   - Daily weights as listed above  - Start Bumex 1 mg daily (patient previously taking 0.5 mg/1 mg alternating every other day--may be titrated as OP, until follow up appointment will have patient take 1 mg daily)  - Continue PTA Coreg 6.25 mg BID  - Not currently on ACEi given history of CKD    # Paroxsymal atrial fibrillation s/p PPM  Single lead PPM, last interrogation around 4/19 with about 6 months battery life.  EKG currently paced with Underlying rhythm atrial fibrillation/occ PVC's. Per chart review, patient previously on Pradaxa, but switched to low dose Eliquis 2/2 Epistaxis.   - Resume PTA Eliquis 2.5 mg BID   - Continue PTA Coreg 6.25 mg BID  - Follow up with Primary Cardiology team/EP in regards to timing of battery change     Other Chronic Issues  #CKD III: Cr today 1.07 (baseline around 1.1-1.4)  #Pancytopenia: Chronic issue x 10 years, follows with heme/onc as OP, recent bone marrow biopsy with no evidence of  MDS/malignancy; baseline WBC around 2-3, hgb 9-10, plt 60-70  # JIM: CPAP at night  # MDD: continue PTA Wellbutrin  # BPH: Continue PTA Proscar and Flomax  # GERD: Continue PTA Prilosec  # PAD with lymphedema: continue use of compression stockings    Condition on discharge  Temp:  [96.6  F (35.9  C)-98.4  F (36.9  C)] 98.1  F (36.7  C)  Pulse:  [58-90] 63  Heart Rate:  [59-77] 60  Resp:  [10-16] 16  BP: ()/(54-84) 118/65  SpO2:  [92 %-100 %] 98 %  General: Alert, interactive, NAD  Eyes: sclera anicteric, EOMI  Neck: JVP flat, carotid 2+ bilaterally  Cardiovascular: regular rate and rhythm, normal S1 and S2, no murmurs, gallops, or rubs  Resp: clear to auscultation bilaterally, no rales, wheezes, or rhonchi  GI: Soft, nontender, nondistended. +BS.  No HSM or masses, no rebound or guarding.  Extremities: trace BLE edema, no cyanosis or clubbing, dorsalis pedis and posterior tibialis pulses 2+ bilaterally  Skin: Warm and dry, no jaundice or rash; groin incision site, L>R, CDI, dressing intact, mild bruising around RFV site, soft, non-tender, no sign hematoma, scattered skin tears (pt reports healing blisters) on abdomen and back  Neuro: CN 2-12 intact, moves all extremities equally  Psych: Alert & oriented x 3      Medication Changes:  - MODIFY Bumex 1 mg daily    Discharge medications:   Current Discharge Medication List      CONTINUE these medications which have CHANGED    Details   bumetanide (BUMEX) 1 MG tablet Take 1 tablet (1 mg) by mouth daily  Qty: 45 tablet, Refills: 0    Associated Diagnoses: Acute systolic CHF (congestive heart failure) (H)         CONTINUE these medications which have NOT CHANGED    Details   acetaminophen (TYLENOL) 500 MG tablet Take 2 tablets (1,000 mg) by mouth every 6 hours as needed for mild pain  Qty: 100 tablet, Refills: 0    Associated Diagnoses: Arthritis      aspirin 81 MG EC tablet Take 81 mg by mouth daily       buPROPion (WELLBUTRIN XL) 150 MG 24 hr tablet Take 150 mg by  mouth every morning      Carboxymethylcellulose Sod PF (CELLUVISC/REFRESH LIQUIGEL) 1 % ophthalmic solution Place 1 drop into both eyes 3 times daily as needed for dry eyes      carvedilol (COREG) 6.25 MG tablet TAKE 1 TABLET (6.25 MG) BY MOUTH 2 TIMES DAILY (WITH MEALS)  Qty: 180 tablet, Refills: 3    Associated Diagnoses: Ischemic cardiomyopathy      COMPRESSION STOCKINGS 1 each daily  Qty: 2 each, Refills: 0    Associated Diagnoses: Varicose 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      diclofenac (VOLTAREN) 1 % topical gel Apply 2 g topically 4 times daily    Associated Diagnoses: Arthritis      finasteride (PROSCAR) 5 MG tablet TAKE 1 TABLET EVERY DAY  Qty: 90 tablet, Refills: 3    Associated Diagnoses: Benign prostatic hyperplasia, unspecified whether lower urinary tract symptoms present      glucosamine-chondroitin 500-400 MG CAPS per capsule Take 2 capsules by mouth daily      KLOR-CON 20 MEQ CR tablet TAKE 1 TABLET EVERY DAY  Qty: 90 tablet, Refills: 3    Associated Diagnoses: Lymphedema of both lower extremities      levothyroxine (SYNTHROID/LEVOTHROID) 100 MCG tablet TAKE 1 TABLET EVERY DAY  Qty: 90 tablet, Refills: 3    Associated Diagnoses: Acquired hypothyroidism      LORazepam (ATIVAN) 1 MG tablet Take 0.5-1 tablets (0.5-1 mg) by mouth every 8 hours as needed for anxiety  Qty: 30 tablet, Refills: 5    Associated Diagnoses: Acute stress reaction      Multiple Vitamins-Minerals (CENTRUM SILVER) per tablet Take 1 tablet by mouth daily      omeprazole (PRILOSEC) 20 MG DR capsule TAKE 1 CAPSULE TWICE DAILY  Qty: 180 capsule, Refills: 3    Associated Diagnoses: Gastroesophageal reflux disease without esophagitis      Polyethylene Glycol 3350 (MIRALAX PO) Take 1 packet by mouth At Bedtime       simvastatin (ZOCOR) 20 MG tablet Take 20 mg by mouth daily       sucralfate (CARAFATE) 1 GM tablet TAKE 1 TABLET TWICE DAILY  Qty: 180 tablet, Refills: 3    Associated Diagnoses: Esophageal reflux       tamsulosin (FLOMAX) 0.4 MG capsule TAKE 1 CAPSULE EVERY DAY  Qty: 90 capsule, Refills: 3    Associated Diagnoses: Benign prostatic hyperplasia with urinary hesitancy      apixaban ANTICOAGULANT (ELIQUIS) 2.5 MG tablet Take 1 tablet (2.5 mg) by mouth 2 times daily  Qty: 180 tablet, Refills: 3    Associated Diagnoses: Persistent atrial fibrillation (H)      ketotifen (ZADITOR) 0.025 % SOLN ophthalmic solution Place 1 drop into both eyes every 12 hours  Qty: 1 Bottle      ORDER FOR DME Auto-CPAP:Max 9 cm H2OMin 9 cm H2O  Continuous Lifetime need and heated humidity.     Qty: 1 Device, Refills: 0    Associated Diagnoses: Obstructive sleep apnea (adult) (pediatric); Other dyspnea and respiratory abnormality      triamcinolone (KENALOG) 0.025 % cream Apply topically 2 times daily as needed              Labs or imaging requiring follow-up after discharge:  Repeat Echocardiogram prior to one month follow up appointment    Follow-up:  - Follow up with Valve Clinic in 2 weeks and one month for post MitraClip follow up, as previously arranged at [facility]  - Follow up with PCP in 7-10 days for post hospitalization visit    Code status:  Full    Patient Care Team:  Mg Nelson MD as PCP - General (Family Practice)  Mg Nelson MD as PCP (Family Practice)  Sean Carrera MD as MD (Cardiology)  Mg Nelson MD as Assigned PCP  Care, [facility] ([facility]    WILLIS Zhang, CNP  [facility] Cardiology  [phone]

## 2019-05-21 NOTE — PLAN OF CARE
D:Patient is here in the unit s/p mitraclip.  C/B R-groin leaking blood and manual pressure and applied and bedrest implement.  Patient was on bedrest until 4am.    I/A: Up with SBA at the edge of bed at 4am,  Both R and L groin site is unchanged and neuros intact.  Patient is able to void using bedside urinal and voided 150cc of bridget/red urine.    P: Will continue to cares and notify [facility] with any status changes.

## 2019-05-21 NOTE — PROGRESS NOTES
CLINICAL NUTRITION SERVICES    Reason for Assessment:  Low-sodium (2 g/day) nutrition education, received low sodium nutrition education consult    Diet History:  Pt reports no history of receiving low-sodium nutrition education in the past. Pt does enjoy some high sodium foods, such as soup, pizza, and various condiments. Has questions about lower sodium foods to choose and high sodium foods to limit/avoid.     Nutrition Diagnosis:  Food- and nutrition-related knowledge deficit r/t no previous knowledge of low-sodium diet AEB pt report of no previous formal low-sodium nutrition education.    Nutrition Prescription/Recs:  Low-sodium diet restriction. Fluid restriction as needed, per team.       Interventions:  Nutrition Education (pt and wife, Kristi)  1. Provided verbal instruction on a heart-healthy diet with emphasis on low-sodium meal planning. Reviewed high sodium foods/beverages to limit and low-sodium foods/beverages to choose. Rec limit sodium to 2 g/day (as per team rec). Showed label reading. Explained fluid restriction (2 L/day rec by team). Answered questions. Provided RD contact information.   2. Provided the following handouts: How to Read Nutrition Labels, Low-Sodium Foods and Drinks, Tips for a Low-Sodium Diet, Seasoning Your Foods Without Adding Salt, and Managing Fluid Restriction. Provided Nutrition Care Manual handout on Sodium Amounts of foods/beverages.     Goals:    Pt will verbalize at least five high sodium foods and the importance of avoiding added salt to foods for cooking or seasoning foods.     Follow-up:   Patient to ask any further nutrition-related questions before discharge. In addition, pt may request outpatient RD appointment.     Vicki Brennan, MS, RD, LD, [facility]   6C Pgr: [phone]

## 2019-05-21 NOTE — PROGRESS NOTES
Care Coordinator - Discharge Planning    Admission Date/Time:  5/20/2019  Attending MD:  Harshil Winter MD   Data  Chart reviewed, discussed with interdisciplinary team.   Patient was admitted for:   1. FH: mitral valve repair    2. Non-rheumatic mitral regurgitation    3. Non-rheumatic mitral regurgitation    4. Acute systolic CHF (congestive heart failure) (H)    Assessment   Concerns with insurance coverage for discharge needs: None.  Current Living Situation: Patient lives with spouse.  Support System: Supportive and Involved wife.   Services Involved: [facility]   Transportation at Discharge: Family or friend will provide  Transportation to Medical Appointments:    - Name of caregiver: wife.     Coordination of Care and Referrals: Provided patient/family with options for resumption of home care.   Pt and pt's wife want to resume home care with [facility], [facility].   Intervention:   Arrangements made with [facility], [facility] (Ph: [phone] Fax: [phone]) for RN eval post hospitalization, assess vital signs, respiratory and cardiac status, activity tolerance, hydration, nutritional status, med set up and management. PT/OT eval and treat for deconditioning, strengthening, and endurance. Assist with transition to OPCR when appropriate.     Plan  Anticipated Discharge Date:  5/21/19  Anticipated Discharge Plan:  Discharge to home with home care.     GEORGES VAZQUEZ RN BSN  Care Coordinator Unit   [phone]

## 2019-05-21 NOTE — PLAN OF CARE
OT/CR 6C  Discharge Planner OT   Patient plan for discharge: Home  Current status: Eval complete, treatment indicated. SBA sit<>stand. SBA for a toilet transfer and g/h while standing. SBA for UB/LB dressing. Pt ambulated ~200ft x2 with no AD and SBA. Pt ambulated up and down 3 stairs x3 reps with bilateral hand railing and SBA  Barriers to return to prior living situation: fatigue, weakness, acute medical needs  Recommendations for discharge: Home OP CR  Rationale for recommendations: Pt is primarily independent with ADL completion, however would benefit from continued skilled therapy to increase activity tolerance       Entered by: Becky Aguirre 05/21/2019 9:55 AM     Occupational Therapy Discharge Summary    Reason for therapy discharge:    Discharged to home with outpatient therapy.    Progress towards therapy goal(s). See goals on Care Plan in [facility] electronic health record for goal details.  Goals partially met.  Barriers to achieving goals:   discharge from facility.    Therapy recommendation(s):    Continued therapy is recommended.  Rationale/Recommendations:  Recommend OP CR Phase II to increase exercise endurance.

## 2019-05-21 NOTE — DISCHARGE INSTRUCTIONS
Going home after MitraClip Procedure  Femoral Access    You have small procedure sites at both groins, the one on the left is slightly bigger. You may have small amounts of bruising or discomfort, this is expected. If you develop worse swelling, redness and warmth that does not go away, fever and chills, Increasing numbness in your legs, worsening pain at the site then you need to contact your nurse coordinator.    You may shower, but do not soak in a bath tub or pool or apply lotions, ointments, powder, etc for 3-5 days or until sites look healed.     Activity: No lifting, pushing, pulling more than 10 pounds (examples to avoid: groceries, vacuuming, gardening, golfing): For one week with a procedure through the groin.    After the initial healing process of the access site, we recommend cardiac rehabilitation for all MitraClip patients. Cardiac rehabilitation will help you:  - Rebuild stamina, strength and balance.  - Learn how to participate in activities safely, as well as help you regain confidence to do so.  - Return to activities of daily living and leisure.  Please contact their central scheduling to arrange at [phone]    We prefer you to follow up with your primary care provider within 2 weeks. You will be arranged to see the Valve (Cardiology) clinic in two weeks and month. Before your one month appointment, you will have a repeat ultrasound of the heart to look at the valve.     Should you have any questions or concerns please contact your assigned Cardiology nurse coordinator:  Kelly [phone] (at the first prompt enter #1, second prompt enter #3, then ask the triage nurse if you can speak with Kelly).         Treatment Plan  Plan of care for wounds on upper back and lateral abdominal areas:  Every other day, after showerin. Clean in shower with soap and water, using a fresh, clean washcloth.  Rinse and dry  2. Cover with Mepilex Border dressings if wounds are draining-  Want to encourage  drying out so leave open to the air when able  - follow any plans for skin care as laid out by [facility] MD's.  Continue to follow up with your regular [facility] MDs, etc upon discharge

## 2019-05-21 NOTE — PROGRESS NOTES
05/21/19 0900   Quick Adds   Type of Visit Initial Occupational Therapy Evaluation   Living Environment   Lives With spouse   Living Arrangements house   Home Accessibility stairs within home   Number of Stairs, Within Home, Primary 10   Transportation Anticipated family or friend will provide   Living Environment Comment Pt lives in a house with his wife. He has a flight of stairs to get upstairs to his bedroom. He has a walk in shower with bars   Self-Care   Usual Activity Tolerance good   Current Activity Tolerance good   Regular Exercise No   Equipment Currently Used at Home grab bar, tub/shower;grab bar, toilet;cane, straight   Activity/Exercise/Self-Care Comment Pt was previously independent with ADL completion   Functional Level   Ambulation 0-->independent   Transferring 0-->independent   Toileting 0-->independent   Bathing 0-->independent   Dressing 0-->independent   Eating 0-->independent   Communication 0-->understands/communicates without difficulty   Cognition 0 - no cognition issues reported   Fall history within last six months yes   Number of times patient has fallen within last six months 1  (on the ice)   Which of the above functional risks had a recent onset or change? none       Present no   Language english   General Information   Onset of Illness/Injury or Date of Surgery - Date 05/20/19   Referring Physician Cooper Jenkins MD   Patient/Family Goals Statement To return home   Additional Occupational Profile Info/Pertinent History of Current Problem Bairon Foster is an 88 y.o.M with a PMHx of HFrEF (EF 30%), CAD (s/p PCI OM 2009), pAF (on Eliquis) s/p PPM, JIM (wears CPAP at night),  MDD, PAD, chronic lymphedema, chronic pancytopenia, GERD and non-rheumatic severe MR s/p MitraClip x 2.    Precautions/Limitations fall precautions   Weight-Bearing Status - LUE full weight-bearing   Weight-Bearing Status - RUE full weight-bearing   Weight-Bearing Status - LLE full  weight-bearing   Weight-Bearing Status - RLE full weight-bearing   Heart Disease Risk Factors Medical history;Age   General Observations Pt is pleasant and agreeable to therapy   General Info Comments Activity: ambulate every shift   Cognitive Status Examination   Orientation orientation to person, place and time   Level of Consciousness alert   Follows Commands (Cognition) WNL   Memory intact   Attention No deficits were identified   Organization/Problem Solving No deficits were identified   Executive Function No deficits were identified   Cognitive Comment Pt is alert, oriented and generally appropriate in conversation   Visual Perception   Visual Perception Wears glasses;No deficits were identified   Sensory Examination   Sensory Comments Pt has baseline bilateral feet numbness and tingling   Integumentary/Edema   Integumentary/Edema Comments Pt has BLE edema, wear compression stockings daily   Range of Motion (ROM)   ROM Comment BUE ROM WFL   Strength   Strength Comments BUE grossly 5/5, however generalized weakness   Hand Strength   Hand Strength Comments Bilateral  strength WNL   Transfer Skill: Sit to Stand   Level of [place]: Sit/Stand stand-by assist   Physical Assist/Nonphysical Assist: Sit/Stand supervision   Transfer Skill: Sit to Stand full weight-bearing   Transfer Skill: Toilet Transfer   Level of [place]: Toilet stand-by assist   Physical Assist/Nonphysical Assist: Toilet supervision   Weight-Bearing Restrictions: Toilet full weight-bearing   Lower Body Dressing   Level of [place]: Dress Lower Body stand-by assist   Physical Assist/Nonphysical Assist: Dress Lower Body supervision   Toileting   Level of [place]: Toilet stand-by assist   Physical Assist/Nonphysical Assist: Toilet supervision   Grooming   Level of [place]: Grooming stand-by assist   Physical Assist/Nonphysical Assist: Grooming supervision   Instrumental Activities of Daily Living (IADL)   IADL Comments Pt  "and spouse previously received assistance for IADL completion   Activities of Daily Living Analysis   Impairments Contributing to Impaired Activities of Daily Living strength decreased   General Therapy Interventions   Planned Therapy Interventions ADL retraining;IADL retraining;home program guidelines;progressive activity/exercise;strengthening   Clinical Impression   Criteria for Skilled Therapeutic Interventions Met yes, treatment indicated   OT Diagnosis decreased activity tolerance   Influenced by the following impairments weakness, fatigue   Assessment of Occupational Performance 5 or more Performance Deficits   Identified Performance Deficits g/h, toileting, bathing, dressing, functoinal mobility   Clinical Decision Making (Complexity) Low complexity   Therapy Frequency other (see comments)  (6x per week)   Predicted Duration of Therapy Intervention (days/wks) 5/30/19   Anticipated Equipment Needs at Discharge other (see comments)  (TBD)   Anticipated Discharge Disposition Home with Outpatient Therapy   Risks and Benefits of Treatment have been explained. Yes   Patient, Family & other staff in agreement with plan of care Yes   [facility] TM \"6 Clicks\"   2016, Trustees of [facility], under license to [employer].  All rights reserved.   6 Clicks Short Forms Daily Activity Inpatient Short Form   [facility]  \"6 Clicks\" Daily Activity Inpatient Short Form   1. Putting on and taking off regular lower body clothing? 4 - None   2. Bathing (including washing, rinsing, drying)? 3 - A Little   3. Toileting, which includes using toilet, bedpan or urinal? 4 - None   4. Putting on and taking off regular upper body clothing? 4 - None   5. Taking care of personal grooming such as brushing teeth? 4 - None   6. Eating meals? 4 - None   Daily Activity Raw Score (Score out of 24.Lower scores equate to lower levels of function) 23   Total Evaluation Time   Total Evaluation Time (Minutes) 5 "

## 2019-05-21 NOTE — PROGRESS NOTES
Pt cleared for discharge to home. R & L groin sites unchanged. Denies pain. Ambulating independently in room & in halls with therapy.   Only medication change is for pt to take bumex 1mg po daily. No other new rx needed.  Plan for pt to f/u with valve clinic in 2 weeks and primary MD as scheduled in a week. Questions answered and paperwork reviewed. Pt's friend to provide transport home for pt & wife.

## 2019-05-21 NOTE — PLAN OF CARE
D: Patient s/p mitraclip arrived on 6C about 1515. AxOx4. Neuros intact this shift. LFA 4fr covered with tegaderm. RFV 24Fr oozing in PACU and extra pressure applied. Both groin sites c/d/I at start of shift. Patient up to chair after bedrest. Hourly groin check at 2100 showed both groins c/d/I. At about 2130 when I was about to bladder scan, right groin gauze bright red under tegaderm.  I: Assisted patient back to bed. Called cross cover and held pressure for 15 minutes. Recovered with gauze and tegaderm. VSS throughout. Some more slow oozing noted and reapplied pressure for 15 minutes and placed dstat dry with tegaderm. VSS stable. Pedal pulse baseline.   A: Rhythm has been afib/paced. BPs stable, room air sats. Tylenol given for some lower back pain at end of bed rest has since resolved. Warren removed at end of bedrest. (Note that warren had dark red output that was known prior to arrival on 6C. Some blood oozed from meatus after removal.)No void yet. Bladder scan showed no urine. Cross cover notified about UO.  P: Continue monitoring right groin site. Patient instructed on additional bedrest until 0400. Instructed to use call light for any needs.

## 2019-05-21 NOTE — PROGRESS NOTES
I have seen and examined the patient with the CSI team. I agree with the assessment and plan of the note above.I have reviewed pertinent labs.     Philip Santiago MD  Interventional Cardiology  Pager: [phone]  [facility]      CSI Progress Note  Bairon Foster MRN: [id number]  2/6/1931  Date of Admission:5/20/2019  Primary care provider: Mg Nelson         Interval History:   - No acute events overnight  - s/p MitraClip x 2  - Patient with improvement in urine color overnight, continued to have mild oozing from RFV, pressure held overnight, redressed  - Groin site this am CDI, soft, non-tender, no evidence of hematoma  - No arrhythmias overnight         Review of Systems:    10 point review of systems negative except for stated above in HPI.          Past Medical History:   Medical History reviewed.   Past Medical History:   Diagnosis Date     Acute, but ill-defined, cerebrovascular disease     NO RESIDUALS     Antiplatelet or antithrombotic long-term use      Arrhythmia     a fib     Blood in stool      Coronary atherosclerosis of unspecified type of vessel, native or graft     S/P PTCA, EF 50%     Esophageal reflux      Hydrocele, unspecified      Hypercholesterolemia      Hypersomnia with sleep apnea, unspecified      Inguinal hernia without mention of obstruction or gangrene, recurrent unilateral or unspecified      Ischemic cardiomyopathy      Major depression in complete remission (H)      Obese      Onychomycosis      JIM on CPAP      Other and unspecified hyperlipidemia      Other lymphedema     GROIN AND THIGHS     Other pancytopenia (H)      Other specified anemias      Overweight      Pacemaker      PAD (peripheral artery disease) (H)      Pancytopenia (H)      Persistent atrial fibrillation (H)     VVI PM for long pauses     Prostatic hypertrophy, benign      Pulmonary hypertension (H)      Scrotal varices      Thrombocytopenia, unspecified (H)      Umbilical hernia without  obstruction and without gangrene      Unilateral or unspecified femoral hernia without mention of obstruction or gangrene, unilateral or unspecified      Unspecified hypothyroidism      Venous stasis dermatitis of both lower extremities      Vitamin D deficiency              Past Surgical History:   Surgical History reviewed.   Past Surgical History:   Procedure Laterality Date     BONE MARROW BIOPSY, BONE SPECIMEN, NEEDLE/TROCAR N/A 4/19/2019    Procedure: BONE MARROW BIOPSY;  Surgeon: Yovani Cooley MD;  Location:  OR     CV HEART CATHETERIZATION WITH POSSIBLE INTERVENTION N/A 4/12/2019    Procedure: Heart Catheterization with possible Intervention;  Surgeon: Lev Beck MD;  Location:  HEART CARDIAC CATH LAB     CV RIGHT HEART CATH N/A 4/12/2019    Procedure: Right Heart Cath;  Surgeon: Lev Beck MD;  Location:  HEART CARDIAC CATH LAB     EYE SURGERY      CATARACT/BLEPHAROPLASTY     GENITOURINARY SURGERY      TUNA     HERNIA REPAIR      RIGHT INGUINAL     HERNIORRHAPHY INGUINAL  8/14/2012    Procedure: HERNIORRHAPHY INGUINAL;  right inguinal hernia repair;  Surgeon: Kareem Torrez MD;  Location:  [facility]     HERNIORRHAPHY UMBILICAL N/A 10/31/2017    Procedure: HERNIORRHAPHY UMBILICAL;  UMBILICAL HERNIA REPAIR WITH MESH;  Surgeon: Scar Harrington MD;  Location: [facility]     IMPLANT PACEMAKER  8/2009    single chamber     ORTHOPEDIC SURGERY      [facility] RIGHT     ORTHOPEDIC SURGERY  2010    right TKR, left TKR     SURGICAL HISTORY OF -       Thumb surgery     SURGICAL HISTORY OF -   1990s    Left total knee replacement     SURGICAL HISTORY OF -   3/11    [facility] total knee replacement             Social History:   Social History reviewed.  Social History     Tobacco Use     Smoking status: Never Smoker     Smokeless tobacco: Never Used   Substance Use Topics     Alcohol use: Yes     Alcohol/week: 0.0 oz     Comment: 1/month             Family History:   Family History reviewed.   Family  "History   Problem Relation Age of Onset     Cardiovascular Father      PB. Father      Lung Cancer Sister      Unknown/Adopted Mother      Cancer - colorectal No family hx of              Allergies:   No Known Allergies          Medications:   Medications Reviewed.   Current Facility-Administered Medications   Medication     acetaminophen (TYLENOL) tablet 1,000 mg     apixaban ANTICOAGULANT (ELIQUIS) tablet 2.5 mg     aspirin EC tablet 81 mg     aspirin EC tablet 81 mg     bumetanide (BUMEX) tablet 0.5 mg     bumetanide (BUMEX) tablet 1 mg     buPROPion (WELLBUTRIN XL) 24 hr tablet 150 mg     carvedilol (COREG) tablet 6.25 mg     finasteride (PROSCAR) tablet 5 mg     hydrALAZINE (APRESOLINE) injection 10 mg     levothyroxine (SYNTHROID/LEVOTHROID) tablet 100 mcg     lidocaine (LMX4) cream     lidocaine 1 % 0.1-1 mL     melatonin tablet 5 mg     naloxone (NARCAN) injection 0.1-0.4 mg     nitroGLYcerin (NITROSTAT) sublingual tablet 0.4 mg     omeprazole (priLOSEC) CR capsule 20 mg     polyethylene glycol (MIRALAX/GLYCOLAX) Packet 17 g     potassium chloride ER (K-DUR/KLOR-CON M) CR tablet 20 mEq     simvastatin (ZOCOR) tablet 20 mg     sodium chloride (PF) 0.9% PF flush 3 mL     sodium chloride (PF) 0.9% PF flush 3 mL     sucralfate (CARAFATE) tablet 1 g     tamsulosin (FLOMAX) capsule 0.4 mg             Physical Exam:   Vitals were reviewed.  Blood pressure 118/65, pulse 63, temperature 98.1  F (36.7  C), temperature source Oral, resp. rate 16, height 1.88 m (6' 2\"), weight 88.5 kg (195 lb 1.6 oz), SpO2 98 %.    General: AAOx3, NAD  Skin: Not jaundiced, no rash, no ecchymoses. Groin site CDI, soft, non-tender, no signs of hematoma. No bruits.   HEENT: MMM, PERRLA, EOM intact  CV: RRR, normal S1S2, no murmur, clicks, rubs  Resp: Clear to auscultation bilaterally, no wheezes, rhonchi  Abd: Soft, non-tender, BS+, no masses appreciated  Extremities: warm and well perfused, palpable pulses, 1+ edema BLE  Neuro: No " lateralizing symptoms or focal neurologic deficits        Labs:   Routine Labs:  Lab Results   Component Value Date    TROPI 0.014 12/08/2009    TROPI 0.014 12/07/2009    TROPI 0.014 08/18/2009    TROPI 0.014 08/18/2009    TROPI <0.012 08/17/2009     CMP  Recent Labs   Lab 05/21/19  0528 05/20/19  1300 05/20/19  0628    142 141   POTASSIUM 4.1 3.6 3.9   CHLORIDE 107 109 106   CO2 26 28 29   ANIONGAP 6 5 6   * 113* 95   BUN 25 22 25   CR 1.07 1.09 1.22   GFRESTIMATED 62 60* 53*   GFRESTBLACK 71 70 61   TEODORO 8.9 8.3* 9.6   MAG 2.3 2.1  --    PHOS 2.8 2.7  --    PROTTOTAL  --  6.5* 7.9   ALBUMIN  --  3.0* 3.8   BILITOTAL  --  1.2 1.4*   ALKPHOS  --  61 76   AST  --  22 30   ALT  --  17 21     CBC  Recent Labs   Lab 05/21/19 0528 05/20/19  1300 05/20/19  0628   WBC 4.2 2.9* 2.8*   RBC 2.83* 2.73* 3.21*   HGB 9.0* 8.7* 10.2*   HCT 29.5* 27.8* 32.9*   * 102* 103*   MCH 31.8 31.9 31.8   MCHC 30.5* 31.3* 31.0*   RDW 16.6* 16.7* 16.6*   PLT 58* 55* 71*     INR  Recent Labs   Lab 05/20/19  0628   INR 1.44*           Diagnostics:    EKG 12Lead 5/21/19: Vpaced HR 60    CHELLE (immediate post op) 5/20/19:  Interpretation Summary     The mitral valve appeared mildly thickened at baseline with moderate to severe  mitral regurgitation. The mechanism appears to be anterior leaflet override  secondary to a dilated mitral annulus.  The patient underwent Lila-Clip placement x 2 (one at the A2P2 junction and  the next clip just medial to the first). Post procedure the clips appeared  well seated with with moderate to severely posteriorly directed mitral  regurgitation.    Assessment and Plan:     Bairon Foster is an 88 y.o.M with a PMHx of HFrEF (EF 30%), CAD (s/p PCI OM 2009), pAF (on Eliquis) s/p PPM, chronic pancytopenia, JIM (wears CPAP at night), depression, PAD, chronic lymphedema, CKD, GERD and non-rheumatic severe MR s/p MitraClip x 2.     #Severe mitral regurgitation, now s/p MitraClip x 2. Post op MG 5 mmHg.  Sheath sites soft without hematoma. No arrhythmias. Neuros intact. EKG Vpaced rhythm. Red urine in Amos noted during case and continued overnight, now urine bridget in color. Patient also with some oozing from RFV incision site overnight.     - Ambulate with assist as tolerated  - Neuro checks, per protocol  - Echocardiogram today  - Chest xray this am   - Monitor groin sites  - Continue PTA ASA 81 mg daily  - Restart Eliquis this evening   - Cardiac rehab/PT/OT  - Diurese as needed/able; continue 1 mg Bumex daily  - Daily weights  - Strict intake/output  - Continue to monitor urine color  - Continuous telemetry monitoring  - Lifelong antibiotic prophylaxis for dental procedures     # CAD s/p PCI OM (2009)  # ICM (EF 30%)  - Daily weights as listed above  - Continue Bumex 1 mg daily (PTA pt was taking 0.5 mg every other day, 1 mg on alternate days)  - Continue PTA Coreg 6.25 mg BID  - Not currently on ACEi given history of CKD  - Repeat echocardiogram and cxr pending this am     # Paroxsymal atrial fibrillation s/p PPM  Single lead PPM, last interrogation around 4/19 with about 6 months battery life.  EKG currently paced with Underlying rhythm atrial fibrillation/occ PVC's. Per chart review, patient previously on Pradaxa, but switched to low dose Eliquis 2/2 Epistaxis.   - Resume PTA Eliquis 2.5 mg BID this evening  - Continue PTA Coreg 6.25 mg BID  - Follow up with Primary Cardiology team/EP in regards to timing of battery change     Other Chronic Issues  # CKD III: Cr today 1.07 (baseline around 1.1-1.4): Continue to monitor, avoid nephrotoxins  # Pancytopenia: Chronic issue x 10 years, follows with heme/onc as OP, recent bone marrow biopsy with no evidence of MDS/malignancy; monitor with CBC daily (baseline WBC around 2-3, hgb 9-10, plt 60-70)  # JIM: CPAP at night  # MDD: continue PTA Wellbutrin  # BPH: Continue PTA Proscar and Flomax  # GERD: Continue PTA Prilosec  # PAD with lymphedema: continue use of  compression stockings       FEN: Cardiac diet  Prophylaxis:  DVT: ASA/Eliquis PPI: Prilosec  Disposition: Possible discharge home this evening vs tomorrow pending stable post-op period  Code Status: FULL CODE      Patient seen and discussed with Dr. Santiago, who agrees with plan as outlined above.     Norma Lopez, APRN, CNP  [facility] Interventional Cardiology   [phone]

## 2019-05-21 NOTE — CONSULTS
Social Work Services Consult Note:     SW consulted for mitraclip follow up/discharge planning.  Will follow by chart review for needs.  Please re-consult if other needs arise.    [facility]  6[address] Unit   Phone: [phone]  Pager: [phone]  Unit: [phone]

## 2019-05-22 ENCOUNTER — PATIENT OUTREACH (OUTPATIENT)
Dept: CARE COORDINATION | Facility: CLINIC | Age: 84
End: 2019-05-22

## 2019-05-22 NOTE — PROGRESS NOTES
[facility]: Post-Discharge Note  SITUATION                                                      Admission:    Admission Date: 05/20/19   Reason for Admission: Severe MR s/p MitraClip  Discharge:   Discharge Date: 05/21/19  Discharge Diagnosis: Severe MR s/p MitraClip  Discharge Service: Cardiology     BACKGROUND                                                      Bairon Foster is an 88 y.o.M with a PMHx of HFrEF (EF 30%), CAD (s/p PCI OM 2009), pAF (on Eliquis) s/p PPM, JIM (wears CPAP at night),  MDD, PAD, chronic lymphedema, chronic pancytopenia, GERD and non-rheumatic severe MR s/p MitraClip x 2.      Prior to procedure, patient with increasing SOB for the last 6 months.  Patient also hospitalized x 2 with heart failure exacerbations/volume overload/weight gain.  MitraClip procedure today with successful clip x 2, MG post op 5 mmHg.      At time of interview, patient denies chest pain, lightheadedness, dizziness, SOB. While in PACU, red urine noted in Amos, per MD, this was also present during procedure.     ASSESSMENT      Discharge Assessment  Patient reports symptoms are: Improved  Does the patient have all of their medications?: Yes  Does patient know what their new medications are for?: Yes  Does patient have a follow-up appointment scheduled?: Yes  Does patient have any other questions or concerns?: No    Post-op  Did the patient have surgery or a procedure: Yes  Fever: No  Chills: No  Eating & Drinking: eating and drinking without complaints/concerns  PO Intake: regular diet  Bowel Function: normal  Urinary Status: voiding without complaint/concerns    PLAN                                                      Outpatient Plan:      - Follow up with Valve Clinic in 2 weeks and one month for post MitraClip follow up, as previously arranged at [facility]  - Follow up with PCP in 7-10 days for post hospitalization visit    Future Appointments   Date Time Provider Department  [place]   5/28/2019  2:30 PM ARRIETA TECH1 SUUMPC UMP PSA CLIN   5/29/2019 10:00 AM Mg Nelson MD BXFP BLCX   5/30/2019  9:30 AM ARRIETA LAB SULAB UMP PSA CLIN   5/30/2019 10:30 [facility]   6/20/2019 11:00 AM [id number] SHCVCV CVIMG   6/20/2019 12:00 PM ARRIETA LAB SULAB P PSA CLIN   6/20/2019  2:30 PM [facility] Genao [facility]   8/8/2019  1:30 PM  [facility]   8/8/2019  2:40 PM Donald Chavez MD [facility]           Pebbles Tubbs, CMA

## 2019-05-22 NOTE — LETTER
[place] CARE COORDINATION  [facility]  [address], [place] [address]    May 22, 2019    Bairon HIMANSHU Foster  [address]  [facility] [id number]      Dear Bairon,    I am a clinic care coordinator who works with Mg Nelson MD at [facility]. I wanted to thank you for spending the time to talk with me and provide you with my contact information so that you can call me with questions or concerns about your health care. Below is a description of clinic care coordination and how I can further assist you.     The clinic care coordinator is a registered nurse and/or  who understand the health care system. The goal of clinic care coordination is to help you manage your health and improve access to the [facility] system in the most efficient manner. The registered nurse can assist you in meeting your health care goals by providing education, coordinating services, and strengthening the communication among your providers. The  can assist you with financial, behavioral, psychosocial, chemical dependency, counseling, and/or psychiatric resources.    Please feel free to contact me at [phone] with any questions or concerns. We at [facility] are focused on providing you with the highest-quality healthcare experience possible and that all starts with you.     Sincerely,     Jax Mendoza RN  Clinic Care Coordinator  [facility]  Ph: [phone]    Enclosed: I have enclosed a copy of a 24 Hour Access Plan. This has helpful phone numbers for you to call when needed. Please keep this in an easy to access place to use as needed.

## 2019-05-22 NOTE — PROGRESS NOTES
Clinic Care Coordination Contact  Care Coordination Communication    Referral Source: IP Handoff    Clinical Data: Patient was hospitalized at [facility] from 5/20/19 to 5/21/19 with diagnosis of mitral valve repair with Lila-Clip.    Home Care Contact:              Home Care Agency: [facility] [facility], [place].              Contact name () and phone number: [phone]              Care Coordination contacted home care: Yes              Anticipated start of care date: resumption of care    Patient Contact:               Introduced self and role of care coordination.               Discharge instructions were reviewed with patient/caregiver.               Do you have any questions about your medications? No              Follow up appointment is scheduled for 5/29/19.              Home care has contacted patient: Yes              Patient questions/concerns: No.  The patient updated that he was able to sleep in a little this morning and is feeling very well.  He denied any issues with his right groin site.  His wife is home and available to help him as needed.  He confirmed he will continue with home care services and denied concerns.    Plan: At this time, the patient states he is doing well and denies need for additional assistance from Care Coordination, but agreed to contact CCC RN with any questions or concerns.  CCC RN will make no further scheduled patient outreaches at this time but will remain available should any patient needs arise.    Jax Mendoza, RN  Clinic Care Coordinator  [facility]  Ph: [phone]

## 2019-05-22 NOTE — LETTER
Health Care Home - Access Care Plan    About Me:    Patient Name:  Bairon Ibarra    YOB: 1931  Age:                      88 year old   Susanna MRN:     [id number] Telephone Information:   Home Phone [phone]   Mobile [phone]       Address:  [address]  [facility] [address] Email address:  [email or web address]      Emergency Contact(s)   Name Relationship Lgl Grd Work Phone Home Phone Mobile Phone   1. KIRT IBARRA Spouse   [phone]   2. MARIA DOLORES IBARRA Son    [phone]             Health Maintenance: Routine Health maintenance Reviewed: Due/Overdue   Health Maintenance Due   Topic Date Due     LIPID  02/06/1931     CBC  02/06/1931     ZOSTER IMMUNIZATION (2 of 3) 06/26/2012     My Access Plan  Medical Emergency 914   Questions or concerns during clinic hours Primary Clinic Line, I will call the clinic directly: [facility] [phone]   24 Hour Appointment Line [phone] or  [phone] [place] [phone]) (toll free)   24 Hour Nurse Line [phone] (toll free)   Questions or concerns outside clinic hours 24 Hour Appointment Line, I will call the after-hours on-call line:   [facility] [phone] or [phone]) (toll-free)   Preferred Urgent Care [facility], [phone]   Preferred Hospital [facility]  [phone]   Preferred Pharmacy [facility] [address]     Behavioral Health Crisis Line The National Suicide Prevention Lifeline at 1-888.659.1199 or 913       My Care Team Members  Patient Care Team       Relationship Specialty Notifications Start End    Mg Nelson MD PCP - General Family Practice  4/30/12     Phone: [phone] Fax: [phone]         [address] [facility] [address]    Mg Nelson MD PCP Family Practice  2/8/12     Phone:  [phone] Fax: [phone]         [address] [facility] [address]    Sean Carrera MD MD Cardiology  2/8/12     Phone: [phone] Fax: [phone]         [address] [facility] [address]    Mg Nelson MD Assigned PCP   12/31/17     Phone: [phone] Fax: [phone]         [address] [facility] [address]    [facility] ([facility] ([place])  4/22/19     Phone: [phone]                My Medical and Care Information  Problem List   Patient Active Problem List   Diagnosis     Atrial fibrillation (H)     Pulmonary hypertension (H)     CAD (coronary artery disease)     BPH (benign prostatic hyperplasia)     Ischemic cardiomyopathy     Anemia due to bone marrow failure, unspecified bone marrow failure type (H)     JIM (obstructive sleep apnea)- mild/moderate     Acute stress reaction     Health Care Home     Vitamin D deficiency     Advance Care Planning     Gastroesophageal reflux disease without esophagitis     Hydrocele, unspecified hydrocele type     Slow transit constipation     Hypercholesterolemia     Non morbid obesity due to excess calories w comorbid CAD< hi LDL      Venous stasis dermatitis of both lower extremities     Lymphedema of both lower extremities     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      Coronary artery disease due to lipid rich plaque     Acquired hypothyroidism     Onychomycosis     Overweight (BMI 25.0-29.9)     PAD (peripheral artery disease) (H)     Screening for prostate cancer     Blood in stool     Umbilical hernia without obstruction and without gangrene     Xerosis cutis     Status post coronary angiogram     AK (actinic keratosis)     Angioma of skin     Acute systolic CHF (congestive heart failure) (H)     CHF (congestive heart failure) (H)     GI bleed     Hematemesis     Epistaxis     Non-rheumatic mitral regurgitation     Mitral regurgitation      Current Medications:  Current Outpatient  Medications   Medication     acetaminophen (TYLENOL) 500 MG tablet     apixaban ANTICOAGULANT (ELIQUIS) 2.5 MG tablet     aspirin 81 MG EC tablet     bumetanide (BUMEX) 1 MG tablet     buPROPion (WELLBUTRIN XL) 150 MG 24 hr tablet     Carboxymethylcellulose Sod PF (CELLUVISC/REFRESH LIQUIGEL) 1 % ophthalmic solution     carvedilol (COREG) 6.25 MG tablet     COMPRESSION STOCKINGS     diclofenac (VOLTAREN) 1 % topical gel     finasteride (PROSCAR) 5 MG tablet     glucosamine-chondroitin 500-400 MG CAPS per capsule     ketotifen (ZADITOR) 0.025 % SOLN ophthalmic solution     KLOR-CON 20 MEQ CR tablet     levothyroxine (SYNTHROID/LEVOTHROID) 100 MCG tablet     LORazepam (ATIVAN) 1 MG tablet     Multiple Vitamins-Minerals (CENTRUM SILVER) per tablet     omeprazole (PRILOSEC) 20 MG DR capsule     ORDER FOR DME     Polyethylene Glycol 3350 (MIRALAX PO)     simvastatin (ZOCOR) 20 MG tablet     sucralfate (CARAFATE) 1 GM tablet     tamsulosin (FLOMAX) 0.4 MG capsule     triamcinolone (KENALOG) 0.025 % cream     No current facility-administered medications for this visit.

## 2019-05-23 ENCOUNTER — ALLIED HEALTH/NURSE VISIT (OUTPATIENT)
Dept: PHARMACY | Facility: CLINIC | Age: 84
End: 2019-05-23
Payer: COMMERCIAL

## 2019-05-23 DIAGNOSIS — G47.33 OSA (OBSTRUCTIVE SLEEP APNEA): ICD-10-CM

## 2019-05-23 DIAGNOSIS — I50.21 ACUTE SYSTOLIC CHF (CONGESTIVE HEART FAILURE) (H): Primary | ICD-10-CM

## 2019-05-23 DIAGNOSIS — M19.90 ARTHRITIS: ICD-10-CM

## 2019-05-23 DIAGNOSIS — K21.9 GASTROESOPHAGEAL REFLUX DISEASE WITHOUT ESOPHAGITIS: ICD-10-CM

## 2019-05-23 DIAGNOSIS — N40.1 BENIGN PROSTATIC HYPERPLASIA WITH URINARY HESITANCY: ICD-10-CM

## 2019-05-23 DIAGNOSIS — R39.11 BENIGN PROSTATIC HYPERPLASIA WITH URINARY HESITANCY: ICD-10-CM

## 2019-05-23 DIAGNOSIS — F43.0 ACUTE STRESS REACTION: ICD-10-CM

## 2019-05-23 DIAGNOSIS — R21 RASH: ICD-10-CM

## 2019-05-23 DIAGNOSIS — E03.9 HYPOTHYROIDISM, UNSPECIFIED TYPE: ICD-10-CM

## 2019-05-23 DIAGNOSIS — I48.19 PERSISTENT ATRIAL FIBRILLATION (H): ICD-10-CM

## 2019-05-23 PROCEDURE — 99607 MTMS BY PHARM ADDL 15 MIN: CPT | Performed by: PHARMACIST

## 2019-05-23 PROCEDURE — 99605 MTMS BY PHARM NP 15 MIN: CPT | Performed by: PHARMACIST

## 2019-05-23 NOTE — PATIENT INSTRUCTIONS
Recommendations from today's MTM visit:                                                    MTM (medication therapy management) is a service provided by a clinical pharmacist designed to help you get the most of out of your medicines.   Today we reviewed what your medicines are for, how to know if they are working, that your medicines are safe and how to make your medicine regimen as easy as possible.     1. Talk with Dr. Nelson about increasing bumetanide if you are still having swelling in your ankles and torso. Remember, if you gain more than 2 pounds in a day or 5 pounds in a week you should be calling your clinic (either Dr. Nelson or the heart clinic).     2. Talk with cardiology about stopping aspirin since you have had trouble with bleeding. Eliquis may be enough for you at this point.     3. Talk with Dr. Nelson about if you still need to take sucralfate (generic for Carafate). Medications like levothyroxine should be  from sucralfate by at least 2 hours and sucralfate should be taken on an empty stomach.     4. A list of places you can take your unwanted medications is included.     Next MTM visit: Guera will call you again in a month, but if you need anything, please call her at [phone]    To schedule another MTM appointment, please call the clinic directly or you may call the MTM scheduling line at [phone] or toll-free at [phone].     My Clinical Pharmacist's contact information:                                                      It was a pleasure talking with you today!  Please feel free to contact me with any questions or concerns you have.      Guera Esparza, Pharm.D., M.B.A., BCACP  MTM Pharmacist, [facility]  Pager: [phone]  Email: [email or web address]     You may receive a survey about the MTM services you received by email and/or US Mail.  I would appreciate your feedback to help me serve you better in the future. Your comments will be  anonymous.

## 2019-05-23 NOTE — LETTER
May 23, 2019      Bairon Foster  [address]  [facility] [address]        Dear Alfa,       It was so nice to speak with you on May 23rd.  I hope I was able to give you some useful information during our Medication Therapy Management (MTM) visit. The purpose of this visit with a clinical pharmacist was to review the medicines you received when discharged from the hospital. We want to make sure that you know which medicines to take and what they are for. We also want to make sure all your medicines are working, safe, and as easy to take as possible.    Enclosed is a summary of the suggestions we talked about and any other thoughts I had. There is also a list of your medicines included. This information has also been shared with your primary care provider.    Feel free to call if you have any questions or concerns. By working together with you and your doctor, I hope to help you feel confident managing your medicines and improving your quality of life.       Best wishes,         Guera Esparza, Pharm.D., M.B.A., BCACP  MTM Pharmacist, [facility]  Pager: [phone]  Email: [email or web address]

## 2019-05-23 NOTE — PROGRESS NOTES
"SUBJECTIVE/OBJECTIVE:                Bairon Foster is a 88 year old male called for a transitions of care visit.  He was discharged from [facility] on 5/21 for   1. Severe MR s/p MitraClip  2. Severe TR  3. Mild-Mod MS  4. Chronic mixed systolic, diastolic heart failure  5. CAD s/p PCI (OM1 2009)  6. Paroxsymal atrial fibrillation (Eliquis) s/p PPM  7. JIM  8. MDD  9. Chronic pancytopenia  10. GERD    Chief Complaint: Feeling good since getting home from the hospital. Reports that friends and family are telling him he has more \"vitality\". He is wondering of where to dispose of his unwanted/unneeded medications.     Allergies/ADRs: Reviewed in Epic  Tobacco: No tobacco use  Alcohol: not currently using  Caffeine: 1.5 cups/day of coffee  Activity: back to normal physical activity since getting home. He's trying to get more muscle because he's lost a lot of weight recently.   PMH: Reviewed in Epic    Medication Adherence/Access: [facility]. Doesn't have any concerns about remembering to take his meds and states he rarely misses any doses.     HFrEF: Current medications include carvedilol 6.25mg twice daily and bumetanide 1mg once daily (was increased from alternate daily dosing of 0.5mg/1mg). Additionally he is taking potassium chloride 20mEq every day. Patient reports no current medication side effects. Chart states not on an ACE due to CKD     ECHO: EF 40-45% per discharge summary.   Pt is complaining of sx of HFof: weight gain.  Pt is measuring daily weightsand reports his weight has gone up since he got home and reports weight today as 200 pounds, wants to be around 190 pounds. He reports this is likely due to fluid and not diet. He feels his legs and torso are still swollen/fluid filled. He is using compression stockings.   Pt is following a low sodium diet - of note he is using a Potassium chloride salt substitute, but reports doing this for the last 3-4 months, " is avoiding EtOH.    Afib: Patient is currently taking Eliquis 2.5mg BID for anticoagulation and carvedilol as noted above.  Patient reports no current concerns of bruising or bleeding, however has had a lot of nose bleeding in the past and a questionable GI bleed - but he thinks that it may have been from swallowing blood and reports Dr. Chavez agreed with him on that. Due to this, his Eliquis dose was reduced from 5mg BID to 2.5mg BID. He also continues ASA for secondary prevention (PCI in 2009).     JIM: Using CPAP every night. No issues with this. Sleeps well.     Anxiety: Taking bupropion [id number] once daily in the morning and lorazepam 1mg once daily in the morning, sometimes a half-tab at night as well. His wife has some medical issues which make him anxious and this helps calm him down.     GERD: Current medications include: Prilosec (omeprazole) 20mg BID and sucralfate 1 gram twice daily. He denies sx of GERD and states he's been on this combination for a very long time.      BPH: finasteride 5mg daily and flomax 0.4mg once daily. Reports that he is urinating OK.     Rash: Has a rash that has some itching. He is using triamcinolone twice daily with good efficacy.     Pain: Taking OTC APAP as needed - taking very rarely. Does not have any pain from recent procedure. He also uses diclofenac topically for arthritis pain and glucosamine and finds this helpful.     Hypothyroidism: Patient is taking levothyroxine 100 mcg daily. Patient is having the following symptoms: none.     Other meds reviewed for the purposes of med rec but were not assessed today due to time.     ASSESSMENT:                 Current medications were reviewed today.      Medication Adherence: no issues identified    HFrEF: Potassium WNL at discharge, should monitor carefully since he's using a potassium containing salt substitute, although I think use is rare. His weight is up and he is complaining of swelling. May need increase in bumetanide  dose. Pt is otherwise asymptomatic.     Afib: Although he is using ASA for secondary prevention in the setting of his age and recent issues with epistaxis, could consider stopping ASA (especially since he is on a DOAC).     JIM: Stable    Anxiety: Stable - would like to see him get off lorazepam if possible. He is working with providers on this.     GERD: Unclear of indication for high-dose PPI and sucralfate. Sounds as if both of these were started prior to recent concern over GI bleed. Unclear if he actually had a GI bleed - hospital notes from 4/18/19 appear to indicate he may not have. He may be able to discontinue sucralfate and decrease PPI. If sucralfate stopped, TSH may need rechecked since he has been taking it with the levothyroxine.      BPH: Stable    Rash: Stable    Pain: Stable    Hypothyroidism: Stable    PLAN:              Post Discharge Medication Reconciliation Status: discharge medications reconciled, continue medications without change.    1. Note sent to cardiology and Dr. Nelson to see if bumetanide should be increased (being cautious of renal function).     2. Encouraged patient to discuss stopping aspirin with cardiology. Given his age and risk of bleeding, continuing this plus a DOAC may not be needed.     3. Encouraged pt to talk with DR. Nelson about stopping sucralfate. If stopped, would recommend TSH recheck in 4-6 weeks.     4. Provided pt information on medication disposal.     I spent 60 minutes with this patient today. A copy of the visit note was provided to the patient's primary care and cardiology providers.    Will follow up in 1 month, sooner if needed.    The patient was mailed a summary of these recommendations as an after visit summary.    Guera Esparza, YayoD, AJAY, BCACP  MTM Pharmacist, [facility]

## 2019-05-24 LAB
BLD PROD TYP BPU: NORMAL
BLD PROD TYP BPU: NORMAL
BLD UNIT ID BPU: 0
BLD UNIT ID BPU: 0
BLOOD PRODUCT CODE: NORMAL
BLOOD PRODUCT CODE: NORMAL
BPU ID: NORMAL
BPU ID: NORMAL
TRANSFUSION STATUS PATIENT QL: NORMAL

## 2019-05-24 NOTE — PROGRESS NOTES
Thank you - I called him today and his weight was down to 197 (down 3 pounds in 24/hrs). So I instructed him if weight goes up by 2 pounds in 24 hrs or 5 pounds in a week to take an extra 1/2 tablet. I'll call him on Tuesday to check in.     Guera Esparza PharmD, AJAY, BCACP  [facility] Pharmacist, [facility]   ===View-only below this line===    ----- Message -----  From: Mackenzie Tejada APRN CNP  Sent: 5/24/2019   8:37 AM  To: Mg Nelson MD, *  Subject: RE: Increase bumetanide?                         Thank you,  Yes, please increaes bumex to 1 mg and 1.5 mg on alternanting days. I see him 5/30 and will f/u then.     Thanks, Anushka  ----- Message -----  From: Guera Esparza [facility]  Sent: 5/23/2019   6:25 PM  To: Mg Nelson MD, *  Subject: Increase bumetanide?                             Hello - I spoke to Alfa today for a post-discharge MTM visit. He states his abdomen and legs are still quite swollen and that his weight is up to 200 pounds (depending on scales, this is up from his last visit on 5/21 when he weighed 195). He denies other symptoms of fluid overload. I wanted to check and see if you would like to increase his bumetanide from 1mg to alternate dosing of 1mg/1.5mg every other day (PTA he was taking alternate dosing of 0.5/1mg every other day).     Thanks!  Guera Esparza PharmD, AJAY, BCACP  [facility] Pharmacist, [facility]

## 2019-05-26 ENCOUNTER — HOSPITAL ENCOUNTER (EMERGENCY)
Facility: CLINIC | Age: 84
Discharge: HOME | End: 2019-05-26
Attending: EMERGENCY MEDICINE | Admitting: EMERGENCY MEDICINE
Payer: COMMERCIAL

## 2019-05-26 VITALS
SYSTOLIC BLOOD PRESSURE: 120 MMHG | RESPIRATION RATE: 16 BRPM | OXYGEN SATURATION: 98 % | TEMPERATURE: 97.7 F | DIASTOLIC BLOOD PRESSURE: 58 MMHG | HEART RATE: 60 BPM

## 2019-05-26 DIAGNOSIS — T14.8XXA MULTIPLE WOUNDS OF SKIN: ICD-10-CM

## 2019-05-26 PROCEDURE — 99283 EMERGENCY DEPT VISIT LOW MDM: CPT

## 2019-05-26 ASSESSMENT — ENCOUNTER SYMPTOMS
FEVER: 0
WOUND: 1
DIZZINESS: 0

## 2019-05-26 NOTE — ED TRIAGE NOTES
Heart valve surgery Monday, one of the sites is bleeding, takes eloquis , changing dressing daily

## 2019-05-26 NOTE — ED PROVIDER NOTES
History     Chief Complaint:  Wound Check     HPI   Bairon Foster is a 88 year old male who presents with concerns about multiple skin tears.  He was recently hospitalized for procedure related to his heart.  During that time he had multiple patches and EKG patches on his body.  He says they were frequently ripped off quickly and since then he has had problems with sores and bleeding.  He is on blood thinner.  He has been trying to cover them with what of her bandages he has had at home but they continued to ooze and bleed at times.  He denies any other concerns.  He says his surgical sites have been normal.    Allergies:  No known drug allergies     Medications:    Tylenol  Eliquis  Aspirin 81 mg   Bumex  Wellbutrin XL  Celluvisc/refresh liquigel  Coreg  Compression Stockings  Voltaren  Proscar  Glucosamine-chondroitin  zaditor  Levothyroxine  Ativan  Centrum silver  prilosec  Miralax PO  Zocor  Carafate  Flomax  Kenalog    Past Medical History:    Cerebrovascular disease  Antiplatelet long-term use  Arrhythmia  Coronary atherosclerosis  Esophageal reflux  Hydrocele  Hypercholesterolemia  Hypersomnia  Inguinal hernia  Ischemic cardiomyopathy   Major depression  Mitral regurgitation  Obese  Onychomycosis  JIM on CPAP  Hyperlipidemia  Other lymphedema  Other pancytopenia  Other specific anemias  Overweight  Pacemaker  PAD  Pancytopenia  Persistent atrial fibrillation  Prostatic hypertrophy   Pulmonary hypertension  Scrotal varices  Thrombocytopenia  Umbilical hernia   Femoral hernia  Hypothyroidism  Venous stasis dermatitis  Vitamin D deficiency      Past Surgical History:    Bone marrow biopsy  Cv heart catheterization  CV right heart cath  Eye surgery  Genitourinary surgery  Hernia repair  Herniorrhaphy inguinal  Herniorrhaphy umbilical  Implant pacemaker  Orthopedic surgery  Thumb surgery  Left total knee replacement  Right total knee replacement      Family History:    Cardiovascular: Father  C.A.D.:  Father  Lung Cancer: Sister    Social History:  Smoking status: Never smoker  Alcohol use: Yes    Marital Status:   [2]       Review of Systems   Constitutional: Negative for fever.   Skin: Positive for wound.   Neurological: Negative for dizziness.   All other systems reviewed and are negative.      Physical Exam   First Vitals:  BP: 120/58  Pulse: 60  Temp: 97.7  F (36.5  C)  Resp: 16  SpO2: 98 %      Physical Exam  Eyes:  The pupils are equal and round    Conjunctivae and sclerae are normal  ENT:    The nose is normal    Pinnae are normal  Resp:  Lungs are clear    Non-labored    No rales    No wheezing   MS:  Normal muscular tone    No asymmetric leg swelling  Skin:  Multiple superficial skin wounds mostly lower abdomen, one on mid-upper back    When uncovered some bleeding restarts  Neuro:   Awake, alert.      Speech is normal and fluent.    Face is symmetric.     Moves all extremities      Emergency Department Course     Emergency Department Course:  1230: I performed my exam of the patient as noted above.     Findings and plan explained to the Patient. Patient discharged home with instructions regarding supportive care, medications, and reasons to return. The importance of close follow-up was reviewed.      Impression & Plan      Medical Decision Making:  Bairon Foster is a 88 year old male who presents the emergency department with multiple wounds on his skin.  He was recently hospitalized and developed wounds where his stickers and monitoring pads were located.  His dressings were taken off and new dressings were applied.  We did use hemostatic bandages and coverings.  He felt that these covered his wounds well.    He was given some supplies for home as well.  His lesions did not appear infected at this time.    We will have him avoid getting these areas wet for the next 24 to 48 hours.  He will have to be gentle and removing his current bandages.    Diagnosis:    ICD-10-CM    1. Multiple wounds of  skin R23.8        Disposition:  discharged to home    Discharge Medications:     Medication List      There are no discharge medications for this visit.           Jacek Fields  5/26/2019    EMERGENCY DEPARTMENT       Mendez Hernandez MD  05/26/19 [id number]

## 2019-05-28 ENCOUNTER — ANCILLARY PROCEDURE (OUTPATIENT)
Dept: CARDIOLOGY | Facility: CLINIC | Age: 84
End: 2019-05-28
Attending: INTERNAL MEDICINE
Payer: COMMERCIAL

## 2019-05-28 ENCOUNTER — TELEPHONE (OUTPATIENT)
Dept: CARDIOLOGY | Facility: CLINIC | Age: 84
End: 2019-05-28

## 2019-05-28 DIAGNOSIS — Z95.0 CARDIAC PACEMAKER IN SITU: ICD-10-CM

## 2019-05-29 ENCOUNTER — OFFICE VISIT (OUTPATIENT)
Dept: FAMILY MEDICINE | Facility: CLINIC | Age: 84
End: 2019-05-29
Payer: COMMERCIAL

## 2019-05-29 VITALS
DIASTOLIC BLOOD PRESSURE: 60 MMHG | HEIGHT: 74 IN | RESPIRATION RATE: 12 BRPM | HEART RATE: 60 BPM | OXYGEN SATURATION: 96 % | WEIGHT: 197 LBS | TEMPERATURE: 96.7 F | SYSTOLIC BLOOD PRESSURE: 100 MMHG | BODY MASS INDEX: 25.28 KG/M2

## 2019-05-29 DIAGNOSIS — T14.8XXA MULTIPLE WOUNDS OF SKIN: Primary | ICD-10-CM

## 2019-05-29 DIAGNOSIS — D61.9 ANEMIA DUE TO BONE MARROW FAILURE, UNSPECIFIED BONE MARROW FAILURE TYPE (H): ICD-10-CM

## 2019-05-29 DIAGNOSIS — R73.9 HYPERGLYCEMIA: ICD-10-CM

## 2019-05-29 DIAGNOSIS — I25.10 CORONARY ARTERY DISEASE INVOLVING NATIVE CORONARY ARTERY OF NATIVE HEART WITHOUT ANGINA PECTORIS: ICD-10-CM

## 2019-05-29 LAB
BASOPHILS # BLD AUTO: 0 10E9/L (ref 0–0.2)
BASOPHILS NFR BLD AUTO: 0.7 %
DIFFERENTIAL METHOD BLD: ABNORMAL
EOSINOPHIL # BLD AUTO: 0.1 10E9/L (ref 0–0.7)
EOSINOPHIL NFR BLD AUTO: 5.1 %
ERYTHROCYTE [DISTWIDTH] IN BLOOD BY AUTOMATED COUNT: 17 % (ref 10–15)
HBA1C MFR BLD: 5.5 % (ref 0–5.6)
HCT VFR BLD AUTO: 27.4 % (ref 40–53)
HGB BLD-MCNC: 9 G/DL (ref 13.3–17.7)
LYMPHOCYTES # BLD AUTO: 0.7 10E9/L (ref 0.8–5.3)
LYMPHOCYTES NFR BLD AUTO: 24.7 %
MCH RBC QN AUTO: 33.7 PG (ref 26.5–33)
MCHC RBC AUTO-ENTMCNC: 32.8 G/DL (ref 31.5–36.5)
MCV RBC AUTO: 103 FL (ref 78–100)
MONOCYTES # BLD AUTO: 0.3 10E9/L (ref 0–1.3)
MONOCYTES NFR BLD AUTO: 9.8 %
NEUTROPHILS # BLD AUTO: 1.6 10E9/L (ref 1.6–8.3)
NEUTROPHILS NFR BLD AUTO: 59.7 %
PLATELET # BLD AUTO: 59 10E9/L (ref 150–450)
RBC # BLD AUTO: 2.67 10E12/L (ref 4.4–5.9)
WBC # BLD AUTO: 2.8 10E9/L (ref 4–11)

## 2019-05-29 PROCEDURE — 83036 HEMOGLOBIN GLYCOSYLATED A1C: CPT | Performed by: FAMILY MEDICINE

## 2019-05-29 PROCEDURE — 99214 OFFICE O/P EST MOD 30 MIN: CPT | Performed by: FAMILY MEDICINE

## 2019-05-29 PROCEDURE — 82043 UR ALBUMIN QUANTITATIVE: CPT | Performed by: FAMILY MEDICINE

## 2019-05-29 PROCEDURE — 36415 COLL VENOUS BLD VENIPUNCTURE: CPT | Performed by: FAMILY MEDICINE

## 2019-05-29 PROCEDURE — 85025 COMPLETE CBC W/AUTO DIFF WBC: CPT | Performed by: FAMILY MEDICINE

## 2019-05-29 ASSESSMENT — MIFFLIN-ST. JEOR: SCORE: 1633.34

## 2019-05-29 NOTE — PROGRESS NOTES
Subjective     Bairon Foster is a 88 year old male who presents to clinic today for the following health issues:    HPI     ED/UC Follow-up    Facility:  [facility]  Date of visit: 05/26/2019  Reason for visit: Wound Check  Current Status: Wounds are still bleeding and Anemia is a concern.    Hypertension Follow-up      Outpatient blood pressures are not being checked.    Low Salt Diet: low salt      Amount of exercise or physical activity: 2-3 days/week for an average of 15-30 minutes    Problems taking medications regularly: No    Medication side effects: none    Diet: regular (no restrictions)    Edema      Duration: Many months    Description (location/character/radiation): Mitral valve issues leading to the dependent edema in his legs.    Intensity:  moderate    Accompanying signs and symptoms: Swelling    History (similar episodes/previous evaluation): Yes, echocardiogram    Precipitating or alleviating factors: Recent mitral valve clip and patient is slowly starting to lose weight    Therapies tried and outcome: See above       Patient Active Problem List   Diagnosis     Atrial fibrillation (H)     Pulmonary hypertension (H)     CAD (coronary artery disease)     BPH (benign prostatic hyperplasia)     Ischemic cardiomyopathy     Anemia due to bone marrow failure, unspecified bone marrow failure type (H)     JIM (obstructive sleep apnea)- mild/moderate     Acute stress reaction     Health Care Home     Vitamin D deficiency     Advance Care Planning     Gastroesophageal reflux disease without esophagitis     Hydrocele, unspecified hydrocele type     Slow transit constipation     Hypercholesterolemia     Non morbid obesity due to excess calories w comorbid CAD< hi LDL      Venous stasis dermatitis of both lower extremities     Lymphedema of both lower extremities     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      Coronary artery disease due to lipid rich plaque     Acquired hypothyroidism     Onychomycosis      Overweight (BMI 25.0-29.9)     PAD (peripheral artery disease) (H)     Screening for prostate cancer     Blood in stool     Umbilical hernia without obstruction and without gangrene     Xerosis cutis     Status post coronary angiogram     AK (actinic keratosis)     Angioma of skin     Acute systolic CHF (congestive heart failure) (H)     CHF (congestive heart failure) (H)     GI bleed     Hematemesis     Epistaxis     Non-rheumatic mitral regurgitation     Mitral regurgitation     Past Surgical History:   Procedure Laterality Date     BONE MARROW BIOPSY, BONE SPECIMEN, NEEDLE/TROCAR N/A 4/19/2019    Procedure: BONE MARROW BIOPSY;  Surgeon: Yovani Cooley MD;  Location:  OR     CV HEART CATHETERIZATION WITH POSSIBLE INTERVENTION N/A 4/12/2019    Procedure: Heart Catheterization with possible Intervention;  Surgeon: Lev Beck MD;  Location:  HEART CARDIAC CATH LAB     CV RIGHT HEART CATH N/A 4/12/2019    Procedure: Right Heart Cath;  Surgeon: Lev Beck MD;  Location:  HEART CARDIAC CATH LAB     EYE SURGERY      CATARACT/BLEPHAROPLASTY     GENITOURINARY SURGERY      TUNA     HERNIA REPAIR      RIGHT INGUINAL     HERNIORRHAPHY INGUINAL  8/14/2012    Procedure: HERNIORRHAPHY INGUINAL;  right inguinal hernia repair;  Surgeon: Kareem Torrez MD;  Location:  [facility]     HERNIORRHAPHY UMBILICAL N/A 10/31/2017    Procedure: HERNIORRHAPHY UMBILICAL;  UMBILICAL HERNIA REPAIR WITH MESH;  Surgeon: Scar Harrington MD;  Location: [facility]     IMPLANT PACEMAKER  8/2009    single chamber     [place]THOPEDIC [place]      [facility] [place]     ORTHOPEDIC SURGERY  2010    right TKR, left TKR     SURGICAL HISTORY OF -       Thumb surgery     SURGICAL HISTORY OF -   1990s    Left total knee replacement     SURGICAL HISTORY OF -   3/11    [place] total knee replacement       Social History     Tobacco Use     Smoking status: Never Smoker     Smokeless tobacco: Never Used   Substance Use Topics     Alcohol use: Yes      "Alcohol/week: 0.0 oz     Comment: 1/month     Family History   Problem Relation Age of Onset     Cardiovascular Father      C.A.D. Father      Lung Cancer Sister      Unknown/Adopted Mother      Cancer - colorectal No family hx of              Reviewed and updated as needed this visit by Provider         Review of Systems   ROS COMP: Constitutional, HEENT, cardiovascular, pulmonary, gi and gu systems are negative, except as otherwise noted.      Objective    /60   Pulse 60   Temp 96.7  F (35.9  C) (Tympanic)   Resp 12   Ht 1.88 m (6' 2\")   Wt 89.4 kg (197 lb)   SpO2 96%   BMI 25.29 kg/m    Body mass index is 25.29 kg/m .  Physical Exam   GENERAL APPEARANCE: healthy, alert and no distress  CV: Trace edema in both lower extremities  SKIN: Numerous wounds on the skin in various degrees of healing.  Some are scabbed over some are still open summer still oozing a little bit.            Assessment & Plan       ICD-10-CM    1. Multiple wounds of skin R23.8    2. Anemia due to bone marrow failure, unspecified bone marrow failure type (H) D61.9 CBC with platelets differential   3. Coronary artery disease involving native coronary artery of native heart without angina pectoris I25.10 ACE/ARB/ARNI NOT PRESCRIBED (INTENTIONAL)   4. Hyperglycemia R73.9 Albumin Random Urine Quantitative with Creat Ratio     Hemoglobin A1c        BMI:   Estimated body mass index is 25.29 kg/m  as calculated from the following:    Height as of this encounter: 1.88 m (6' 2\").    Weight as of this encounter: 89.4 kg (197 lb).           MEDICATIONS:  Continue current medications without change  FURTHER TESTING:       -We will check CBC today.  Patient Instructions   We will check CBC today.  He has an appointment tomorrow with cardiology.  Tentatively we will follow-up on his anemia in 1 month.  Follow-up will be sooner if his test results today dictate that.  With respect to home care versus cardiac home care he will discuss that " tomorrow with cardiology.  We also did his hemoglobin A1c albumin today as his recent blood sugar was 146.  Patient is not checking blood sugars.  He is feeling better since having his mitral clip done.  His weight is down 2 pounds from 1 month ago.  I gave him a supply of bandages to place over the wounds that are still losing some.  If they continue to improve we do not need to see him back for those.      Return in about 1 month (around 6/26/2019) for Hemoglobin check, edema.    Mg Nelson MD  [facility]

## 2019-05-29 NOTE — NURSING NOTE
"Chief Complaint   Patient presents with     RECHECK     /60   Pulse 60   Temp 96.7  F (35.9  C) (Tympanic)   Resp 12   Ht 1.88 m (6' 2\")   Wt 89.4 kg (197 lb)   SpO2 96%   BMI 25.29 kg/m   Estimated body mass index is 25.29 kg/m  as calculated from the following:    Height as of this encounter: 1.88 m (6' 2\").    Weight as of this encounter: 89.4 kg (197 lb).  BP completed using cuff size: [place] Allen CMA    Health Maintenance Due   Topic Date Due     ZOSTER IMMUNIZATION (2 of 3) 06/26/2012     Health Maintenance reviewed at today's visit patient asked to schedule/complete:   Immunizations:  Patient agrees to schedule    "

## 2019-05-29 NOTE — PATIENT INSTRUCTIONS
We will check CBC today.  He has an appointment tomorrow with cardiology.  Tentatively we will follow-up on his anemia in 1 month.  Follow-up will be sooner if his test results today dictate that.  With respect to home care versus cardiac home care he will discuss that tomorrow with cardiology.  We also did his hemoglobin A1c albumin today as his recent blood sugar was 146.  Patient is not checking blood sugars.  He is feeling better since having his mitral clip done.  His weight is down 2 pounds from 1 month ago.  I gave him a supply of bandages to place over the wounds that are still losing some.  If they continue to improve we do not need to see him back for those.

## 2019-05-30 ENCOUNTER — OFFICE VISIT (OUTPATIENT)
Dept: CARDIOLOGY | Facility: CLINIC | Age: 84
End: 2019-05-30
Attending: INTERNAL MEDICINE
Payer: COMMERCIAL

## 2019-05-30 ENCOUNTER — TELEPHONE (OUTPATIENT)
Dept: FAMILY MEDICINE | Facility: CLINIC | Age: 84
End: 2019-05-30

## 2019-05-30 VITALS
DIASTOLIC BLOOD PRESSURE: 66 MMHG | OXYGEN SATURATION: 97 % | BODY MASS INDEX: 25.85 KG/M2 | HEIGHT: 74 IN | WEIGHT: 201.4 LBS | HEART RATE: 60 BPM | SYSTOLIC BLOOD PRESSURE: 114 MMHG

## 2019-05-30 DIAGNOSIS — D61.818 PANCYTOPENIA (H): ICD-10-CM

## 2019-05-30 DIAGNOSIS — D63.8 ANEMIA IN OTHER CHRONIC DISEASES CLASSIFIED ELSEWHERE: Primary | ICD-10-CM

## 2019-05-30 DIAGNOSIS — Z98.890 S/P MITRAL VALVE REPAIR: ICD-10-CM

## 2019-05-30 DIAGNOSIS — I50.21 ACUTE SYSTOLIC CHF (CONGESTIVE HEART FAILURE) (H): ICD-10-CM

## 2019-05-30 DIAGNOSIS — I48.19 PERSISTENT ATRIAL FIBRILLATION (H): ICD-10-CM

## 2019-05-30 DIAGNOSIS — R60.0 LOWER EXTREMITY EDEMA: ICD-10-CM

## 2019-05-30 LAB
CREAT UR-MCNC: 193 MG/DL
MICROALBUMIN UR-MCNC: 101 MG/L
MICROALBUMIN/CREAT UR: 52.33 MG/G CR (ref 0–17)

## 2019-05-30 PROCEDURE — 99215 OFFICE O/P EST HI 40 MIN: CPT | Mod: 24 | Performed by: NURSE PRACTITIONER

## 2019-05-30 RX ORDER — BUMETANIDE 0.5 MG/1
0.5 TABLET ORAL DAILY
Qty: 90 TABLET | Refills: 3 | Status: SHIPPED | OUTPATIENT
Start: 2019-05-30 | End: 2019-06-25

## 2019-05-30 RX ORDER — BUMETANIDE 1 MG/1
1 TABLET ORAL DAILY
Qty: 90 TABLET | Refills: 3 | Status: SHIPPED | OUTPATIENT
Start: 2019-05-30 | End: 2019-06-20 | Stop reason: ALTCHOICE

## 2019-05-30 ASSESSMENT — MIFFLIN-ST. JEOR: SCORE: 1653.29

## 2019-05-30 NOTE — LETTER
5/30/2019    Mg Nelson MD  [address]  [facility] [address]    RE: Bairon DOWNS Cristian       Dear Colleague,    I had the pleasure of seeing Bairon Foster in the [facility].    HPI and Plan:   I had the pleasure of seeing Bairon Foster and his wife today in cardiology clinic follow up. He is a pleasant 88 year old patient of Dr. Hope.  I am seeing him post mitral clip procedure, he has an appointment with Sofie our MitraClip K[facility] in a couple weeks.    Mr. Foster is well-known to me.  He has a history of coronary disease, severe MR status post mitral clip on May 20, 2019, severe TR and mild to moderate MS.  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  His last intervention was done to his OM in 2009.  He has paroxysmal atrial fibrillation and is on 2-1/2 mg of Eliquis, he has a permanent pacemaker which is nearing end of battery life.  He has treated obstructive sleep apnea and chronic pancytopenia with low platelets.  He is struggled with heart failure symptoms now for over a year.  Because of his venous insufficiency it was initially thought that some of his edema might be due to that.  However  echocardiography showed progression of his valvular disease and a decrease in his EF.    Prior to MitraClip his coronary angiogram showed mild nonobstructive coronary disease, normal cardiac output level, mildly elevated elevated right and left-sided filling pressures and mildly elevated PH.  His echo showed a drop in EF from a year prior when it was 53%, down to 30% with moderate global hypokinesis of the LV, m moderate to severe MR and moderate to severe TR.    His mitral clip procedure was done on May 20, 2019.  Access was obtained using the right  common femoral vein, 2 clips were used for the procedure and his regurgitation is reportedly reduced to moderate.  He continues to have severe tricuspid insufficiency with hepatic vein systolic flow  reversal and moderate pulmonic insufficiency.  The IVC is dilated with abnormal respiration variation.  He was not put on Plavix following the procedure because of recent issues with bleeding and low platelets.  He is on the lowest dose of Eliquis for this reason and he was placed on 81 mg aspirin for the clips.    Today Alfa is doing okay.  He continues to have bilateral 1-2+ lower extremity edema and I am not convinced he feels too much better than he did prior to the procedure.  His weights at home are going down.  When I saw him a month ago in our clinic he was 193, today in our clinic he is 201.  He has had chronic blistering of the skin for the last couple of years and [facility]  Is particularly bad today.  Previously they recommended he stop torsemide and hydrochlorothiazide to see if this would help, does not really seem to have made a difference.  He saw them to get day and they gave him some topical ointments.    Labs Reviewed: Hemoglobin 9 platelets 59, creatinine 1.07    Physical Exam  Please see Below     Assessment and Plan  1.  Systolic and diastolic heart failure   Secondary to valvular heart disease.  He continues to be in heart failure despite the MitraClip procedure, he continues to have severe TR.  His weight is up 9 pounds since I saw him last.  I will increase his  Bumex to 1.5 mg daily.  We will check a BMP in follow-up.  He continues on beta-blockade with his carvedilol to 6.25 twice daily, previously ARB and ACE inhibitor therapy has been discontinued for renal insufficiency.  His renal function is better today, but before starting to ACE or are bad like to see how he responds to the diuretic.  Also, as I will discuss below will be doing a CT and I do want to protect his kidneys from contrast.  2.  paroxysmal atrial fibrillation.  He has a permanent pacemaker which is nearing end of life.  He is on Eliquis 2.5 mg twice daily previously he had problems  with bleeding on the 5 mg twice daily dose.  There  has been discussion about the watchman procedure for him, if he can be successfully treated with a watchman he could discontinue Eliquis and this may help with his bleeding issues.  He is rate controlled with beta-blockade.  I discussed with Dr. Hope and will do a CT watchman to see if he is a candidate, he will follow-up with Dr. Hope for results.  3. Severe MR.  Status post mitral clip, he needed to clips to reduce his MR to moderate.  He now has severe TR.  He was not placed on Plavix because of his bleeding concerns, and instead is on a low-dose aspirin.  His groin site appears to be healing but there is a firm hematoma there about the size of an acorn.    Thank you for allowing me to care for Bairon Foster today.  He will have close follow-up with Sofie and will need to see Dr. Hope after that to discuss the watchman.  I am not sure what options are available for his severe TR.    Greater than half of this 45 minute appointment was spent in counseling and coordination of care.      WILLIS Bullard, ИВАН  Cardiology    Voice recognition software was used for this note, I have reviewed this note, but errors may have been missed.    Orders Placed This Encounter   Procedures     CT Angiogram heart     Basic metabolic panel     CBC with platelets     Orders Placed This Encounter   Medications     bumetanide (BUMEX) 1 MG tablet     Sig: Take 1 tablet (1 mg) by mouth daily     Dispense:  90 tablet     Refill:  3     bumetanide (BUMEX) 0.5 MG tablet     Sig: Take 1 tablet (0.5 mg) by mouth daily     Dispense:  90 tablet     Refill:  3     Medications Discontinued During This Encounter   Medication Reason     bumetanide (BUMEX) 1 MG tablet Reorder         CURRENT MEDICATIONS:  Current Outpatient Medications   Medication Sig Dispense Refill     acetaminophen (TYLENOL) 500 MG tablet Take 2 tablets (1,000 mg) by mouth every 6 hours as needed for mild pain 100  tablet 0     apixaban ANTICOAGULANT (ELIQUIS) 2.5 MG tablet Take 1 tablet (2.5 mg) by mouth 2 times daily 180 tablet 3     aspirin 81 MG EC tablet Take 81 mg by mouth daily        bumetanide (BUMEX) 0.5 MG tablet Take 1 tablet (0.5 mg) by mouth daily 90 tablet 3     bumetanide (BUMEX) 1 MG tablet Take 1 tablet (1 mg) by mouth daily 90 tablet 3     buPROPion (WELLBUTRIN XL) 150 MG 24 hr tablet Take 150 mg by mouth every morning       Carboxymethylcellulose Sod PF (CELLUVISC/REFRESH LIQUIGEL) 1 % ophthalmic solution Place 1 drop into both eyes 3 times daily as needed for dry eyes       carvedilol (COREG) 6.25 MG tablet TAKE 1 TABLET (6.25 MG) BY MOUTH 2 TIMES DAILY (WITH MEALS) 180 tablet 3     COMPRESSION STOCKINGS 1 each daily 2 each 0     diclofenac (VOLTAREN) 1 % topical gel Apply 2 g topically 4 times daily       finasteride (PROSCAR) 5 MG tablet TAKE 1 TABLET EVERY DAY 90 tablet 3     glucosamine-chondroitin 500-400 MG CAPS per capsule Take 2 capsules by mouth daily       ketotifen (ZADITOR) 0.025 % SOLN ophthalmic solution Place 1 drop into both eyes every 12 hours 1 Bottle      KLOR-CON 20 MEQ CR tablet TAKE 1 TABLET EVERY DAY 90 tablet 3     levothyroxine (SYNTHROID/LEVOTHROID) 100 MCG tablet TAKE 1 TABLET EVERY DAY 90 tablet 3     LORazepam (ATIVAN) 1 MG tablet Take 0.5-1 tablets (0.5-1 mg) by mouth every 8 hours as needed for anxiety 30 tablet 5     Multiple Vitamins-Minerals (CENTRUM SILVER) per tablet Take 1 tablet by mouth daily       omeprazole (PRILOSEC) 20 MG DR capsule TAKE 1 CAPSULE TWICE DAILY 180 capsule 3     ORDER FOR DME Auto-CPAP:Max 9 cm H2OMin 9 cm H2O  Continuous Lifetime need and heated humidity.    1 Device 0     Polyethylene Glycol 3350 (MIRALAX PO) Take 1 packet by mouth At Bedtime        simvastatin (ZOCOR) 20 MG tablet Take 20 mg by mouth daily        sucralfate (CARAFATE) 1 GM tablet TAKE 1 TABLET TWICE DAILY (Patient taking differently: TAKE 1 TABLET once daily) 180 tablet 3      tamsulosin (FLOMAX) 0.4 MG capsule TAKE 1 CAPSULE EVERY DAY (Patient taking differently: TAKE 1 CAPSULE EVERY EVENING) 90 capsule 3     triamcinolone (KENALOG) 0.025 % cream Apply topically 2 times daily as needed        ACE/ARB/ARNI NOT PRESCRIBED (INTENTIONAL) Please choose reason not prescribed, below (Patient not taking: Reported on 5/30/2019)         ALLERGIES   No Known Allergies    PAST MEDICAL HISTORY:  Past Medical History:   Diagnosis Date     Acute, but ill-defined, cerebrovascular disease     NO RESIDUALS     Antiplatelet or antithrombotic long-term use      Arrhythmia     a fib     Blood in stool      Coronary atherosclerosis of unspecified type of vessel, native or graft     S/P PTCA, EF 50%     Esophageal reflux      Hydrocele, unspecified      Hypercholesterolemia      Hypersomnia with sleep apnea, unspecified      Inguinal hernia without mention of obstruction or gangrene, recurrent unilateral or unspecified      Ischemic cardiomyopathy      Major depression in complete remission (H)      Mitral regurgitation     s/p MitraClip 5/20/19     Obese      Onychomycosis      JIM on CPAP      Other and unspecified hyperlipidemia      Other lymphedema     GROIN AND THIGHS     Other pancytopenia (H)      Other specified anemias      Overweight      Pacemaker      PAD (peripheral artery disease) (H)      Pancytopenia (H)      Persistent atrial fibrillation (H)     VVI PM for long pauses     Prostatic hypertrophy, benign      Pulmonary hypertension (H)      Scrotal varices      Thrombocytopenia, unspecified (H)      Umbilical hernia without obstruction and without gangrene      Unilateral or unspecified femoral hernia without mention of obstruction or gangrene, unilateral or unspecified      Unspecified hypothyroidism      Venous stasis dermatitis of both lower extremities      Vitamin D deficiency        PAST SURGICAL HISTORY:  Past Surgical History:   Procedure Laterality Date     BONE MARROW BIOPSY, BONE  SPECIMEN, NEEDLE/TROCAR N/A 4/19/2019    Procedure: BONE MARROW BIOPSY;  Surgeon: Yovani Cooley MD;  Location:  OR     CV HEART CATHETERIZATION WITH POSSIBLE INTERVENTION N/A 4/12/2019    Procedure: Heart Catheterization with possible Intervention;  Surgeon: Lev Beck MD;  Location:  HEART CARDIAC CATH LAB     CV RIGHT HEART CATH N/A 4/12/2019    Procedure: Right Heart Cath;  Surgeon: Lev Beck MD;  Location:  HEART CARDIAC CATH LAB     EYE SURGERY      CATARACT/BLEPHAROPLASTY     GENITOURINARY SURGERY      TUNA     HERNIA REPAIR      RIGHT INGUINAL     HERNIORRHAPHY INGUINAL  8/14/2012    Procedure: HERNIORRHAPHY INGUINAL;  right inguinal hernia repair;  Surgeon: Kareem Torrez MD;  Location:  [facility]     HERNIORRHAPHY UMBILICAL N/A 10/31/2017    Procedure: HERNIORRHAPHY UMBILICAL;  UMBILICAL HERNIA REPAIR WITH MESH;  Surgeon: Scar Harrington MD;  Location: [facility]     IMPLANT PACEMAKER  8/2009    single chamber     [place]THOPEDIC [place]      [facility] [place] [place]     ORTHOPEDIC SURGERY  2010    right TKR, left TKR     SURGICAL HISTORY OF -       Thumb surgery     SURGICAL HISTORY OF -   1990s    Left total knee replacement     SURGICAL HISTORY OF -   3/11    [facility] total knee replacement       FAMILY HISTORY:  Family History   Problem Relation Age of Onset     Cardiovascular Father      C.A.D. Father      Lung Cancer Sister      Unknown/Adopted Mother      Cancer - colorectal No family hx of        SOCIAL HISTORY:  Social History     Socioeconomic History     Marital status:      Spouse name: None     Number of children: None     Years of education: None     Highest education level: None   Occupational History     Occupation: Teacher, author, speaker     Employer: RETIRED   Social Needs     Financial resource strain: None     Food insecurity:     Worry: None     Inability: None     Transportation needs:     Medical: None     Non-medical: None   Tobacco Use     Smoking status: Never Smoker      Smokeless tobacco: Never Used   Substance and Sexual Activity     Alcohol use: Not Currently     Alcohol/week: 0.0 oz     Comment: 1/month     Drug use: No     Sexual activity: Never     Partners: Female   Lifestyle     Physical activity:     Days per week: None     Minutes per session: None     Stress: None   Relationships     Social connections:     Talks on phone: None     Gets together: None     Attends Tenriism service: None     Active member of club or organization: None     Attends meetings of clubs or organizations: None     Relationship status: None     Intimate partner violence:     Fear of current or ex partner: None     Emotionally abused: None     Physically abused: None     Forced sexual activity: None   Other Topics Concern     Parent/sibling w/ CABG, MI or angioplasty before 65F 55M? No      Service Not Asked     Blood Transfusions Not Asked     Caffeine Concern No     Comment: 1-2 cups coffee a day     Occupational Exposure Not Asked     Hobby Hazards Not Asked     Sleep Concern No     Stress Concern Yes     Comment: due to wifes health condition     Weight Concern No     Special Diet No     Back Care Not Asked     Exercise Yes     Comment: states he uses his tredmill on and off     Bike Helmet Not Asked     Seat Belt Yes     Self-Exams Not Asked   Social History Narrative     None       Review of Systems:  Skin:  Positive for bruising     Eyes:  Positive for glasses cataract surgery on both eyes  ENT:  Negative      Respiratory:  Positive for sleep apnea;CPAP SOB has subsided   Cardiovascular:  Negative Positive for;edema    Gastroenterology: Negative poor appetite lossing weight.  Genitourinary:  Negative urinary frequency    Musculoskeletal:  Positive for arthritis    Neurologic:  Positive for stroke;numbness or tingling of feet right foot  Psychiatric:  Positive for sleep disturbances;anxiety;excessive stress stress due to wifes health   Heme/Lymph/Imm:  Positive for easy bruising  "nose bleeds   Endocrine:  Positive for thyroid disorder      Physical Exam:  Vitals: /66   Pulse 60   Ht 1.88 m (6' 2\")   Wt 91.4 kg (201 lb 6.4 oz)   SpO2 97%   BMI 25.86 kg/m       Constitutional:  cooperative;in no acute distress frail      Skin:  warm and dry to the touch bruises present   some blistering on back and healed blisters on chest, saw dermatology today    Head:  normocephalic        Eyes:  pupils equal and round        Lymph:      ENT:  no pallor or cyanosis        Neck:    JVP elevated      Respiratory:  clear to auscultation;normal symmetry    bilateral basilar crackles    Cardiac: regular rhythm       systolic murmur     Paced  pulses full and equal venous stasis changes bilaterally                                 right femoral access site has acorn size firm nontentender hematoma without bruit or bleeding.     GI:  abdomen soft   benign    Extremities and Muscular Skeletal:  no deformities, clubbing, cyanosis, erythema observed stasis pigmentation bilateral LE edema;trace;1+;2+     wearing compression stockings    Neurological:  affect appropriate        Psych:  Alert and Oriented x 3    Encounter Diagnoses   Name Primary?     S/P mitral valve repair      Anemia in other chronic diseases classified elsewhere  Yes     Pancytopenia (H)      Lower extremity edema      Persistent atrial fibrillation (H)      Acute systolic CHF (congestive heart failure) (H)        Recent Lab Results:  LIPID RESULTS:  Lab Results   Component Value Date    CHOL 92 04/25/2019    HDL 35 (L) 04/25/2019    LDL 48 04/25/2019    TRIG 45 04/25/2019    CHOLHDLRATIO 2.1 06/15/2015       LIVER ENZYME RESULTS:  Lab Results   Component Value Date    AST 22 05/20/2019    ALT 17 05/20/2019       CBC RESULTS:  Lab Results   Component Value Date    WBC 2.8 (L) 05/29/2019    RBC 2.67 (L) 05/29/2019    HGB 9.0 (L) 05/29/2019    HCT 27.4 (L) 05/29/2019     (H) 05/29/2019    MCH 33.7 (H) 05/29/2019    MCHC 32.8 " 05/29/2019    RDW 17.0 (H) 05/29/2019    PLT 59 (L) 05/29/2019       BMP RESULTS:  Lab Results   Component Value Date     05/21/2019    POTASSIUM 4.1 05/21/2019    CHLORIDE 107 05/21/2019    CO2 26 05/21/2019    ANIONGAP 6 05/21/2019     (H) 05/21/2019    BUN 25 05/21/2019    CR 1.07 05/21/2019    GFRESTIMATED 62 05/21/2019    GFRESTBLACK 71 05/21/2019    TEODORO 8.9 05/21/2019        A1C RESULTS:  Lab Results   Component Value Date    A1C 5.5 05/29/2019       INR RESULTS:  Lab Results   Component Value Date    INR 1.44 (H) 05/20/2019    INR 1.79 (H) 11/20/2018           CC  Segundo Hope MD  [address]  [place] [phone]                  Thank you for allowing me to participate in the care of your patient.    Sincerely,     WILLIS [facility]

## 2019-05-30 NOTE — PATIENT INSTRUCTIONS
Continue aspirin for now.  /  Continue eliquis 2.5 twice a day.    CT to workup the watchman    -Increase bumex to 1.5 mg (1 and 1/2 tablets) every day.    Keep follow up with Sofie.    If you have more bleeding from your groin site, call me, we may have to stop aspirin.    [place] [phone]

## 2019-05-30 NOTE — TELEPHONE ENCOUNTER
Our goal is to have forms completed within 72 hours, however some forms may require a visit or additional information.    What clinic location was the form placed at [facility] or [facility]     Who is the form from?   Where did the form come from? Faxed to clinic   The form was placed in the inbox of Mg Nelson MD      Please fax to [phone]  Phone number: [phone]     home health care   hold on care in [facility]  Additional comments:     Call take on 5/30/2019 at 1:08 PM by Maya Monterroso

## 2019-05-31 NOTE — PROGRESS NOTES
HPI and Plan:   I had the pleasure of seeing Bairon Foster and his wife today in cardiology clinic follow up. He is a pleasant 88 year old patient of Dr. Hope.  I am seeing him post mitral clip procedure, he has an appointment with Sofie our Mitra[facility] in a couple weeks.    Mr. Foster is well-known to me.  He has a history of coronary disease, severe MR status post mitral clip on May 20, 2019, severe TR and mild to moderate MS.  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  His last intervention was done to his OM in 2009.  He has paroxysmal atrial fibrillation and is on 2-1/2 mg of Eliquis, he has a permanent pacemaker which is nearing end of battery life.  He has treated obstructive sleep apnea and chronic pancytopenia with low platelets.  He is struggled with heart failure symptoms now for over a year.  Because of his venous insufficiency it was initially thought that some of his edema might be due to that.  However  echocardiography showed progression of his valvular disease and a decrease in his EF.    Prior to MitraClip his coronary angiogram showed mild nonobstructive coronary disease, normal cardiac output level, mildly elevated elevated right and left-sided filling pressures and mildly elevated PH.  His echo showed a drop in EF from a year prior when it was 53%, down to 30% with moderate global hypokinesis of the LV, m moderate to severe MR and moderate to severe TR.    His mitral clip procedure was done on May 20, 2019.  Access was obtained using the right  common femoral vein, 2 clips were used for the procedure and his regurgitation is reportedly reduced to moderate.  He continues to have severe tricuspid insufficiency with hepatic vein systolic flow reversal and moderate pulmonic insufficiency.  The IVC is dilated with abnormal respiration variation.  He was not put on Plavix following the procedure because of recent issues with bleeding and low platelets.  He is on the  lowest dose of Eliquis for this reason and he was placed on 81 mg aspirin for the clips.    Today Alfa is doing okay.  He continues to have bilateral 1-2+ lower extremity edema and I am not convinced he feels too much better than he did prior to the procedure.  His weights at home are going down.  When I saw him a month ago in our clinic he was 193, today in our clinic he is 201.  He has had chronic blistering of the skin for the last couple of years and [facility]  Is particularly bad today.  Previously they recommended he stop torsemide and hydrochlorothiazide to see if this would help, does not really seem to have made a difference.  He saw them to get day and they gave him some topical ointments.    Labs Reviewed: Hemoglobin 9 platelets 59, creatinine 1.07    Physical Exam  Please see Below     Assessment and Plan  1.  Systolic and diastolic heart failure   Secondary to valvular heart disease.  He continues to be in heart failure despite the MitraClip procedure, he continues to have severe TR.  His weight is up 9 pounds since I saw him last.  I will increase his  Bumex to 1.5 mg daily.  We will check a BMP in follow-up.  He continues on beta-blockade with his carvedilol to 6.25 twice daily, previously ARB and ACE inhibitor therapy has been discontinued for renal insufficiency.  His renal function is better today, but before starting to ACE or are bad like to see how he responds to the diuretic.  Also, as I will discuss below will be doing a CT and I do want to protect his kidneys from contrast.  2.  paroxysmal atrial fibrillation.  He has a permanent pacemaker which is nearing end of life.  He is on Eliquis 2.5 mg twice daily previously he had problems with bleeding on the 5 mg twice daily dose.  There  has been discussion about the watchman procedure for him, if he can be successfully treated with a watchman he could discontinue Eliquis and this may help with his bleeding  issues.  He is rate controlled with beta-blockade.  I discussed with Dr. Hope and will do a CT watchman to see if he is a candidate, he will follow-up with Dr. Hope for results.  3. Severe MR.  Status post mitral clip, he needed to clips to reduce his MR to moderate.  He now has severe TR.  He was not placed on Plavix because of his bleeding concerns, and instead is on a low-dose aspirin.  His groin site appears to be healing but there is a firm hematoma there about the size of an acorn.    Thank you for allowing me to care for Bairon Foster today.  He will have close follow-up with Sofie and will need to see Dr. Hope after that to discuss the watchman.  I am not sure what options are available for his severe TR.    Greater than half of this 45 minute appointment was spent in counseling and coordination of care.      WILLIS Bullard, ИВАН  Cardiology    Voice recognition software was used for this note, I have reviewed this note, but errors may have been missed.    Orders Placed This Encounter   Procedures     CT Angiogram heart     Basic metabolic panel     CBC with platelets     Orders Placed This Encounter   Medications     bumetanide (BUMEX) 1 MG tablet     Sig: Take 1 tablet (1 mg) by mouth daily     Dispense:  90 tablet     Refill:  3     bumetanide (BUMEX) 0.5 MG tablet     Sig: Take 1 tablet (0.5 mg) by mouth daily     Dispense:  90 tablet     Refill:  3     Medications Discontinued During This Encounter   Medication Reason     bumetanide (BUMEX) 1 MG tablet Reorder         CURRENT MEDICATIONS:  Current Outpatient Medications   Medication Sig Dispense Refill     acetaminophen (TYLENOL) 500 MG tablet Take 2 tablets (1,000 mg) by mouth every 6 hours as needed for mild pain 100 tablet 0     apixaban ANTICOAGULANT (ELIQUIS) 2.5 MG tablet Take 1 tablet (2.5 mg) by mouth 2 times daily 180 tablet 3     aspirin 81 MG EC tablet Take 81 mg by mouth daily        bumetanide (BUMEX) 0.5 MG tablet Take 1 tablet  (0.5 mg) by mouth daily 90 tablet 3     bumetanide (BUMEX) 1 MG tablet Take 1 tablet (1 mg) by mouth daily 90 tablet 3     buPROPion (WELLBUTRIN XL) 150 MG 24 hr tablet Take 150 mg by mouth every morning       Carboxymethylcellulose Sod PF (CELLUVISC/REFRESH LIQUIGEL) 1 % ophthalmic solution Place 1 drop into both eyes 3 times daily as needed for dry eyes       carvedilol (COREG) 6.25 MG tablet TAKE 1 TABLET (6.25 MG) BY MOUTH 2 TIMES DAILY (WITH MEALS) 180 tablet 3     COMPRESSION STOCKINGS 1 each daily 2 each 0     diclofenac (VOLTAREN) 1 % topical gel Apply 2 g topically 4 times daily       finasteride (PROSCAR) 5 MG tablet TAKE 1 TABLET EVERY DAY 90 tablet 3     glucosamine-chondroitin 500-400 MG CAPS per capsule Take 2 capsules by mouth daily       ketotifen (ZADITOR) 0.025 % SOLN ophthalmic solution Place 1 drop into both eyes every 12 hours 1 Bottle      KLOR-CON 20 MEQ CR tablet TAKE 1 TABLET EVERY DAY 90 tablet 3     levothyroxine (SYNTHROID/LEVOTHROID) 100 MCG tablet TAKE 1 TABLET EVERY DAY 90 tablet 3     LORazepam (ATIVAN) 1 MG tablet Take 0.5-1 tablets (0.5-1 mg) by mouth every 8 hours as needed for anxiety 30 tablet 5     Multiple Vitamins-Minerals (CENTRUM SILVER) per tablet Take 1 tablet by mouth daily       omeprazole (PRILOSEC) 20 MG DR capsule TAKE 1 CAPSULE TWICE DAILY 180 capsule 3     ORDER FOR DME Auto-CPAP:Max 9 cm H2OMin 9 cm H2O  Continuous Lifetime need and heated humidity.    1 Device 0     Polyethylene Glycol 3350 (MIRALAX PO) Take 1 packet by mouth At Bedtime        simvastatin (ZOCOR) 20 MG tablet Take 20 mg by mouth daily        sucralfate (CARAFATE) 1 GM tablet TAKE 1 TABLET TWICE DAILY (Patient taking differently: TAKE 1 TABLET once daily) 180 tablet 3     tamsulosin (FLOMAX) 0.4 MG capsule TAKE 1 CAPSULE EVERY DAY (Patient taking differently: TAKE 1 CAPSULE EVERY EVENING) 90 capsule 3     triamcinolone (KENALOG) 0.025 % cream Apply topically 2 times daily as needed         ACE/ARB/ARNI NOT PRESCRIBED (INTENTIONAL) Please choose reason not prescribed, below (Patient not taking: Reported on 5/30/2019)         ALLERGIES   No Known Allergies    PAST MEDICAL HISTORY:  Past Medical History:   Diagnosis Date     Acute, but ill-defined, cerebrovascular disease     NO RESIDUALS     Antiplatelet or antithrombotic long-term use      Arrhythmia     a fib     Blood in stool      Coronary atherosclerosis of unspecified type of vessel, native or graft     S/P PTCA, EF 50%     Esophageal reflux      Hydrocele, unspecified      Hypercholesterolemia      Hypersomnia with sleep apnea, unspecified      Inguinal hernia without mention of obstruction or gangrene, recurrent unilateral or unspecified      Ischemic cardiomyopathy      Major depression in complete remission (H)      Mitral regurgitation     s/p MitraClip 5/20/19     Obese      Onychomycosis      JIM on CPAP      Other and unspecified hyperlipidemia      Other lymphedema     GROIN AND THIGHS     Other pancytopenia (H)      Other specified anemias      Overweight      Pacemaker      PAD (peripheral artery disease) (H)      Pancytopenia (H)      Persistent atrial fibrillation (H)     VVI PM for long pauses     Prostatic hypertrophy, benign      Pulmonary hypertension (H)      Scrotal varices      Thrombocytopenia, unspecified (H)      Umbilical hernia without obstruction and without gangrene      Unilateral or unspecified femoral hernia without mention of obstruction or gangrene, unilateral or unspecified      Unspecified hypothyroidism      Venous stasis dermatitis of both lower extremities      Vitamin D deficiency        PAST SURGICAL HISTORY:  Past Surgical History:   Procedure Laterality Date     BONE MARROW BIOPSY, BONE SPECIMEN, NEEDLE/TROCAR N/A 4/19/2019    Procedure: BONE MARROW BIOPSY;  Surgeon: Yovani Cooley MD;  Location: [facility] OR     CV HEART CATHETERIZATION WITH POSSIBLE INTERVENTION N/A 4/12/2019    Procedure: Heart  Catheterization with possible Intervention;  Surgeon: Lev Beck MD;  Location:  HEART CARDIAC CATH LAB     CV RIGHT HEART CATH N/A 4/12/2019    Procedure: Right Heart Cath;  Surgeon: Lev Beck MD;  Location:  HEART CARDIAC CATH LAB     EYE SURGERY      CATARACT/BLEPHAROPLASTY     GENITOURINARY SURGERY      TUNA     HERNIA REPAIR      RIGHT INGUINAL     HERNIORRHAPHY INGUINAL  8/14/2012    Procedure: HERNIORRHAPHY INGUINAL;  right inguinal hernia repair;  Surgeon: Kareem Torrez MD;  Location:  [facility]     HERNIORRHAPHY UMBILICAL N/A 10/31/2017    Procedure: HERNIORRHAPHY UMBILICAL;  UMBILICAL HERNIA REPAIR WITH MESH;  Surgeon: Scar Harrington MD;  Location: [facility]     IMPLANT PACEMAKER  8/2009    single chamber     ORTHOPEDIC SURGERY      [facility] RIGHT     ORTHOPEDIC SURGERY  2010    right TKR, left TKR     SURGICAL HISTORY OF -       Thumb surgery     SURGICAL HISTORY OF -   1990s    Left total knee replacement     SURGICAL HISTORY OF -   3/11    Rig total knee replacement       FAMILY HISTORY:  Family History   Problem Relation Age of Onset     Cardiovascular Father      C.A.D. Father      Lung Cancer Sister      Unknown/Adopted Mother      Cancer - colorectal No family hx of        SOCIAL HISTORY:  Social History     Socioeconomic History     Marital status:      Spouse name: None     Number of children: None     Years of education: None     Highest education level: None   Occupational History     Occupation: Teacher, author, speaker     Employer: RETIRED   Social Needs     Financial resource strain: None     Food insecurity:     Worry: None     Inability: None     Transportation needs:     Medical: None     Non-medical: None   Tobacco Use     Smoking status: Never Smoker     Smokeless tobacco: Never Used   Substance and Sexual Activity     Alcohol use: Not Currently     Alcohol/week: 0.0 oz     Comment: 1/month     Drug use: No     Sexual activity: Never     Partners: Female   Lifestyle  "    Physical activity:     Days per week: None     Minutes per session: None     Stress: None   Relationships     Social connections:     Talks on phone: None     Gets together: None     Attends Yazidi service: None     Active member of club or organization: None     Attends meetings of clubs or organizations: None     Relationship status: None     Intimate partner violence:     Fear of current or ex partner: None     Emotionally abused: None     Physically abused: None     Forced sexual activity: None   Other Topics Concern     Parent/sibling w/ CABG, MI or angioplasty before 65F 55M? No      Service Not Asked     Blood Transfusions Not Asked     Caffeine Concern No     Comment: 1-2 cups coffee a day     Occupational Exposure Not Asked     Hobby Hazards Not Asked     Sleep Concern No     Stress Concern Yes     Comment: due to wifes health condition     Weight Concern No     Special Diet No     Back Care Not Asked     Exercise Yes     Comment: states he uses his tredmill on and off     Bike Helmet Not Asked     Seat Belt Yes     Self-Exams Not Asked   Social History Narrative     None       Review of Systems:  Skin:  Positive for bruising     Eyes:  Positive for glasses cataract surgery on both eyes  ENT:  Negative      Respiratory:  Positive for sleep apnea;CPAP SOB has subsided   Cardiovascular:  Negative Positive for;edema    Gastroenterology: Negative poor appetite lossing weight.  Genitourinary:  Negative urinary frequency    Musculoskeletal:  Positive for arthritis    Neurologic:  Positive for stroke;numbness or tingling of feet right foot  Psychiatric:  Positive for sleep disturbances;anxiety;excessive stress stress due to wifes health   Heme/Lymph/Imm:  Positive for easy bruising nose bleeds   Endocrine:  Positive for thyroid disorder      Physical Exam:  Vitals: /66   Pulse 60   Ht 1.88 m (6' 2\")   Wt 91.4 kg (201 lb 6.4 oz)   SpO2 97%   BMI 25.86 kg/m      Constitutional:  " cooperative;in no acute distress frail      Skin:  warm and dry to the touch bruises present   some blistering on back and healed blisters on chest, saw dermatology today    Head:  normocephalic        Eyes:  pupils equal and round        Lymph:      ENT:  no pallor or cyanosis        Neck:    JVP elevated      Respiratory:  clear to auscultation;normal symmetry    bilateral basilar crackles    Cardiac: regular rhythm       systolic murmur     Paced  pulses full and equal venous stasis changes bilaterally                                 right femoral access site has acorn size firm nontentender hematoma without bruit or bleeding.     GI:  abdomen soft   benign    Extremities and Muscular Skeletal:  no deformities, clubbing, cyanosis, erythema observed stasis pigmentation bilateral LE edema;trace;1+;2+     wearing compression stockings    Neurological:  affect appropriate        Psych:  Alert and Oriented x 3    Encounter Diagnoses   Name Primary?     S/P mitral valve repair      Anemia in other chronic diseases classified elsewhere  Yes     Pancytopenia (H)      Lower extremity edema      Persistent atrial fibrillation (H)      Acute systolic CHF (congestive heart failure) (H)        Recent Lab Results:  LIPID RESULTS:  Lab Results   Component Value Date    CHOL 92 04/25/2019    HDL 35 (L) 04/25/2019    LDL 48 04/25/2019    TRIG 45 04/25/2019    CHOLHDLRATIO 2.1 06/15/2015       LIVER ENZYME RESULTS:  Lab Results   Component Value Date    AST 22 05/20/2019    ALT 17 05/20/2019       CBC RESULTS:  Lab Results   Component Value Date    WBC 2.8 (L) 05/29/2019    RBC 2.67 (L) 05/29/2019    HGB 9.0 (L) 05/29/2019    HCT 27.4 (L) 05/29/2019     (H) 05/29/2019    MCH 33.7 (H) 05/29/2019    MCHC 32.8 05/29/2019    RDW 17.0 (H) 05/29/2019    PLT 59 (L) 05/29/2019       BMP RESULTS:  Lab Results   Component Value Date     05/21/2019    POTASSIUM 4.1 05/21/2019    CHLORIDE 107 05/21/2019    CO2 26 05/21/2019     ANIONGAP 6 05/21/2019     (H) 05/21/2019    BUN 25 05/21/2019    CR 1.07 05/21/2019    GFRESTIMATED 62 05/21/2019    GFRESTBLACK 71 05/21/2019    TEODORO 8.9 05/21/2019        A1C RESULTS:  Lab Results   Component Value Date    A1C 5.5 05/29/2019       INR RESULTS:  Lab Results   Component Value Date    INR 1.44 (H) 05/20/2019    INR 1.79 (H) 11/20/2018           CC  Segundo Hope MD  [id number]  [place] [id number]

## 2019-06-06 ENCOUNTER — TELEPHONE (OUTPATIENT)
Dept: CARDIOLOGY | Facility: CLINIC | Age: 84
End: 2019-06-06

## 2019-06-06 ENCOUNTER — TELEPHONE (OUTPATIENT)
Dept: FAMILY MEDICINE | Facility: CLINIC | Age: 84
End: 2019-06-06

## 2019-06-06 ENCOUNTER — HOSPITAL ENCOUNTER (OUTPATIENT)
Dept: CARDIAC REHAB | Facility: CLINIC | Age: 84
End: 2019-06-06
Attending: NURSE PRACTITIONER
Payer: COMMERCIAL

## 2019-06-06 DIAGNOSIS — R26.81 UNSTEADY GAIT: Primary | ICD-10-CM

## 2019-06-06 PROCEDURE — 40000116 ZZH STATISTIC OP CR VISIT: Performed by: OCCUPATIONAL THERAPIST

## 2019-06-06 PROCEDURE — 93797 PHYS/QHP OP CAR RHAB WO ECG: CPT | Mod: XU | Performed by: OCCUPATIONAL THERAPIST

## 2019-06-06 PROCEDURE — 40000575 ZZH STATISTIC OP CARDIAC VISIT #2: Performed by: OCCUPATIONAL THERAPIST

## 2019-06-06 PROCEDURE — 93798 PHYS/QHP OP CAR RHAB W/ECG: CPT | Performed by: OCCUPATIONAL THERAPIST

## 2019-06-06 NOTE — TELEPHONE ENCOUNTER
Rosmery with outpatient cardiac rehab returning call to clinic. Approval given for the requested home physical therapy eval order due to unsteady gait. Order placed. No further action needed at this time.

## 2019-06-06 NOTE — ADDENDUM NOTE
Addendum  created 06/06/19 1506 by Juancho Reilly MD    Attestation recorded in Intraprocedure, Intraprocedure Attestations filed

## 2019-06-06 NOTE — TELEPHONE ENCOUNTER
Reason for Call:  Other    New order for PT home care evaluation    Detailed comments:   Rosmery from Cardiac Rehab at [facility] called requesting new orders for Home care PT evaluation  The patient is not safe due to his gait  He is being discharged    Phone Number Patient can be reached at: Other phone number:      Cardiac rehab at [facility]  Correct phone number deleted     Best Time: anytime    Can we leave a detailed message on this number? Not Applicable    Call taken on 6/6/2019 at 12:10 PM by ADA DELAROSA

## 2019-06-06 NOTE — TELEPHONE ENCOUNTER
Left voice message asking Rosmery at  rehab to call triage back. Triage, please relay providers approval of PT orders. Do they need orders placed on epic?

## 2019-06-06 NOTE — TELEPHONE ENCOUNTER
Patient called to report that he forgot about his CTA angiogram heart apt that was scheduled on 6/5/19. Patient requested to be transferred to scheduling to reschedule apt. RN transferred patient to scheduling to reschedule CTA.

## 2019-06-07 ENCOUNTER — HOSPITAL ENCOUNTER (OUTPATIENT)
Dept: CARDIOLOGY | Facility: CLINIC | Age: 84
Discharge: HOME | End: 2019-06-07
Attending: FAMILY MEDICINE | Admitting: FAMILY MEDICINE
Payer: COMMERCIAL

## 2019-06-07 DIAGNOSIS — D63.8 ANEMIA IN OTHER CHRONIC DISEASES CLASSIFIED ELSEWHERE: ICD-10-CM

## 2019-06-07 DIAGNOSIS — I48.19 PERSISTENT ATRIAL FIBRILLATION (H): ICD-10-CM

## 2019-06-07 PROCEDURE — 75572 CT HRT W/3D IMAGE: CPT | Mod: 26 | Performed by: INTERNAL MEDICINE

## 2019-06-07 PROCEDURE — 25000128 H RX IP 250 OP 636: Performed by: FAMILY MEDICINE

## 2019-06-07 PROCEDURE — 75572 CT HRT W/3D IMAGE: CPT

## 2019-06-07 RX ORDER — IOPAMIDOL 755 MG/ML
200 INJECTION, SOLUTION INTRAVASCULAR ONCE
Status: COMPLETED | OUTPATIENT
Start: 2019-06-07 | End: 2019-06-07

## 2019-06-07 RX ADMIN — SODIUM CHLORIDE 100 ML: 9 INJECTION, SOLUTION INTRAVENOUS at 10:53

## 2019-06-07 RX ADMIN — IOPAMIDOL 100 ML: 755 INJECTION, SOLUTION INTRAVENOUS at 10:53

## 2019-06-10 ENCOUNTER — TELEPHONE (OUTPATIENT)
Dept: CARDIOLOGY | Facility: CLINIC | Age: 84
End: 2019-06-10

## 2019-06-10 NOTE — TELEPHONE ENCOUNTER
Patient calling for CTA results (completed 6/7/19). RN advised patient that the results are still pending, but RN would continue to monitor and will call patient with results.

## 2019-06-11 ENCOUNTER — TELEPHONE (OUTPATIENT)
Dept: FAMILY MEDICINE | Facility: CLINIC | Age: 84
End: 2019-06-11

## 2019-06-11 NOTE — TELEPHONE ENCOUNTER
Our goal is to have forms completed within 72 hours, however some forms may require a visit or additional information.    What clinic location was the form placed at [facility] or [facility].?     Who is the form from?   Where did the form come from? Faxed to clinic   The form was placed in the inbox of Mg Nelson MD      Please fax to [phone]  Phone number: [phone]    Additional comments: home health care DC / order summary    Call take on 6/11/2019 at 10:14 AM by Maya Monterroso

## 2019-06-12 ENCOUNTER — MEDICAL CORRESPONDENCE (OUTPATIENT)
Dept: HEALTH INFORMATION MANAGEMENT | Facility: CLINIC | Age: 84
End: 2019-06-12

## 2019-06-13 ENCOUNTER — HOSPITAL ENCOUNTER (OUTPATIENT)
Dept: CARDIAC REHAB | Facility: CLINIC | Age: 84
End: 2019-06-13
Attending: INTERNAL MEDICINE
Payer: COMMERCIAL

## 2019-06-13 PROCEDURE — 93798 PHYS/QHP OP CAR RHAB W/ECG: CPT | Performed by: REHABILITATION PRACTITIONER

## 2019-06-13 PROCEDURE — 40000116 ZZH STATISTIC OP CR VISIT: Performed by: REHABILITATION PRACTITIONER

## 2019-06-19 ENCOUNTER — TELEPHONE (OUTPATIENT)
Dept: CARDIOLOGY | Facility: CLINIC | Age: 84
End: 2019-06-19

## 2019-06-19 NOTE — TELEPHONE ENCOUNTER
Patient called requesting a copy of a list future apts be mailed to him. RN mailed list of apts and confirmed patient's ECHO, lab and KI apts for tomorrow 6/20/19. Patient had no further questions at this time.

## 2019-06-20 ENCOUNTER — OFFICE VISIT (OUTPATIENT)
Dept: CARDIOLOGY | Facility: CLINIC | Age: 84
End: 2019-06-20
Attending: INTERNAL MEDICINE
Payer: COMMERCIAL

## 2019-06-20 ENCOUNTER — HOSPITAL ENCOUNTER (OUTPATIENT)
Dept: CARDIOLOGY | Facility: CLINIC | Age: 84
Discharge: HOME | End: 2019-06-20
Attending: INTERNAL MEDICINE | Admitting: INTERNAL MEDICINE
Payer: COMMERCIAL

## 2019-06-20 VITALS
SYSTOLIC BLOOD PRESSURE: 116 MMHG | BODY MASS INDEX: 25.03 KG/M2 | WEIGHT: 195 LBS | DIASTOLIC BLOOD PRESSURE: 68 MMHG | HEART RATE: 60 BPM | HEIGHT: 74 IN

## 2019-06-20 DIAGNOSIS — I34.0 NON-RHEUMATIC MITRAL REGURGITATION: Primary | ICD-10-CM

## 2019-06-20 DIAGNOSIS — I25.5 ISCHEMIC CARDIOMYOPATHY: ICD-10-CM

## 2019-06-20 DIAGNOSIS — Z98.890 S/P MITRAL VALVE REPAIR: ICD-10-CM

## 2019-06-20 DIAGNOSIS — D63.8 ANEMIA IN OTHER CHRONIC DISEASES CLASSIFIED ELSEWHERE: ICD-10-CM

## 2019-06-20 DIAGNOSIS — I48.19 PERSISTENT ATRIAL FIBRILLATION (H): ICD-10-CM

## 2019-06-20 DIAGNOSIS — I25.10 CORONARY ARTERY DISEASE INVOLVING NATIVE CORONARY ARTERY OF NATIVE HEART WITHOUT ANGINA PECTORIS: ICD-10-CM

## 2019-06-20 DIAGNOSIS — I50.22 CHRONIC SYSTOLIC CONGESTIVE HEART FAILURE (H): ICD-10-CM

## 2019-06-20 LAB
ANION GAP SERPL CALCULATED.3IONS-SCNC: 8.8 MMOL/L (ref 6–17)
BUN SERPL-MCNC: 23 MG/DL (ref 7–30)
CALCIUM SERPL-MCNC: 9.5 MG/DL (ref 8.5–10.5)
CHLORIDE SERPL-SCNC: 104 MMOL/L (ref 98–107)
CO2 SERPL-SCNC: 31 MMOL/L (ref 23–29)
CREAT SERPL-MCNC: 1.63 MG/DL (ref 0.7–1.3)
ERYTHROCYTE [DISTWIDTH] IN BLOOD BY AUTOMATED COUNT: 17.7 % (ref 10–15)
GFR SERPL CREATININE-BSD FRML MDRD: 40 ML/MIN/{1.73_M2}
GLUCOSE SERPL-MCNC: 123 MG/DL (ref 70–105)
HCT VFR BLD AUTO: 26.3 % (ref 40–53)
HGB BLD-MCNC: 8.6 G/DL (ref 13.3–17.7)
MCH RBC QN AUTO: 33.3 PG (ref 26.5–33)
MCHC RBC AUTO-ENTMCNC: 32.7 G/DL (ref 31.5–36.5)
MCV RBC AUTO: 102 FL (ref 78–100)
PLATELET # BLD AUTO: 69 10E9/L (ref 150–450)
POTASSIUM SERPL-SCNC: 3.8 MMOL/L (ref 3.5–5.1)
RBC # BLD AUTO: 2.58 10E12/L (ref 4.4–5.9)
SODIUM SERPL-SCNC: 140 MMOL/L (ref 136–145)
WBC # BLD AUTO: 2.8 10E9/L (ref 4–11)

## 2019-06-20 PROCEDURE — 93306 TTE W/DOPPLER COMPLETE: CPT

## 2019-06-20 PROCEDURE — 80048 BASIC METABOLIC PNL TOTAL CA: CPT | Performed by: INTERNAL MEDICINE

## 2019-06-20 PROCEDURE — 85027 COMPLETE CBC AUTOMATED: CPT | Performed by: INTERNAL MEDICINE

## 2019-06-20 PROCEDURE — 36415 COLL VENOUS BLD VENIPUNCTURE: CPT | Performed by: INTERNAL MEDICINE

## 2019-06-20 PROCEDURE — 93306 TTE W/DOPPLER COMPLETE: CPT | Mod: 26 | Performed by: INTERNAL MEDICINE

## 2019-06-20 PROCEDURE — 99214 OFFICE O/P EST MOD 30 MIN: CPT | Mod: 25 | Performed by: NURSE PRACTITIONER

## 2019-06-20 RX ORDER — TORSEMIDE 20 MG/1
20 TABLET ORAL DAILY
Qty: 30 TABLET | Refills: 0 | Status: SHIPPED | OUTPATIENT
Start: 2019-06-20 | End: 2019-07-31

## 2019-06-20 RX ORDER — DOXYCYCLINE HYCLATE 100 MG
TABLET ORAL
Status: ON HOLD | COMMUNITY
End: 2019-12-03

## 2019-06-20 ASSESSMENT — MIFFLIN-ST. JEOR: SCORE: 1624.26

## 2019-06-20 NOTE — LETTER
6/20/2019    Mg Nelson MD  [address]  [facility] [address]    RE: Bairon Foster       Dear Colleague,    I had the pleasure of seeing Bairon Foster in the [facility].    Cardiology Clinic Progress Note  Bairon Foster MRN# [id number]   YOB: 1931 Age: 88 year old     Reason For Visit:  1 month post MitraClip follow-up   Primary Cardiologist:   Dr. 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    On May 20, 2019, he underwent MitraClip procedure using 2 clips.  Today, he underwent a follow-up echocardiogram that demonstrated an ejection fraction of 40%, mildly dilated left ventricle, mild global hypokinesia of the left ventricle, flattened septum consistent with RV pressure/volume overload.  2 mitral clips are noted to be attached to the mitral leaflets, mild to moderate mitral regurgitation, moderate to severely dilated right ventricle, and mildly decreased right ventricular systolic function.     He was last seen on 5/30/2019 for a 10-day follow-up post MitraClip.  He was noted to have 1-2+ bilateral lower extremity edema and weight was up approximately 9 pounds. Bumex was increased to 1.5 mg daily without much improvement in his swelling, although weight is down 6lbs.    He continues to report exertional shortness of breath, noted to have 2+ pitting lower extremity edema, denies chest pain,  PND, orthopnea, presyncope, syncope, heart racing, or palpitations.  Upon exam, he is noted to have bilateral crackles.  He has  chronic atrial fibrillation with rates controlled on carvedilol, anticoagulated with low dose Eliquis.  His wife states he is very unsteady on his feet and has had multiple falls over the last few weeks.  He admits to daily episodes of epistaxis as well as bleeding from the gums.  CBC today showed a white count of 2.8, RBC 2.58, hemoglobin 8.6, hematocrit 26.3 and platelet count 69.  He recently underwent a cardiac CTA  in anticipation for watchman procedure.  Follow-up BMP showed a sodium of 140, potassium 3.8, BUN 23, creatinine 1.63 and GFR 40.    Blood pressure is well controlled at 116/68  BMI 25.04  Device interrogation on 3/27/2019 identified battery status of less than 6 months.  He has continued to have monthly device interrogations that demonstrate normal device functioning.      He is primarily sedentary  Follows a heart healthy diet  Remains compliant with all medications.                   Assessment and Plan:     Mr. 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  His 1 month post MitraClip echocardiogram demonstrated an ejection fraction of 40%, mildly dilated left ventricle, mild global hypokinesia of the left ventricle, flattened septum consistent with RV pressure/volume overload.  2 mitral clips are noted to be attached to the mitral leaflets, mild to moderate mitral regurgitation, moderate to severely dilated right ventricle, and mildly decreased right ventricular systolic function.  He continues with 2+ bilateral pitting edema, despite increased dose of Bumex.  I will switch Bumex to torsemide 20 mg daily for better diuresis.   His creatinine is noted to be significantly elevated at 1.63 status post CT and increased Bumex.  I reviewed with Dr. Hope patient's risk of being on anticoagulation in the setting of recent falls, worsening pancytopenia, and daily episodes of bleeding.  Recommended he stop Eliquis for now, and undergo watchman procedure which he will then have to restart low-dose Eliquis x45 days post procedure.  Benefit versus risk of stopping patient anticoagulation was reviewed with patient and his wife who are agreeable to plan.  He continues with monthly device monitoring due to estimated battery life of less than 6 months recorded in March 2019.  Continue all other medications.  Continue with compression stockings.  Follow-up BMP and KI visit in 2 weeks.  Call office with any worsening symptoms.                  Thank you for allowing me to participate in this delightful patient's care. He will follow up in 2 weeks with labs prior.        WILLIS Soni, CNP          Review of Systems:   Review of Systems:  Skin:  Positive for bruising   Eyes:  Positive for glasses  ENT:  Negative    Respiratory:  Positive for sleep apnea;CPAP  Cardiovascular:    Positive for;edema;lightheadedness;dizziness  Gastroenterology: Negative poor appetite  Genitourinary:  Negative urinary frequency  Musculoskeletal:  Positive for arthritis  Neurologic:  Positive for stroke;numbness or tingling of feet;incoordination  Psychiatric:  Positive for sleep disturbances;anxiety;excessive stress  Heme/Lymph/Imm:  Positive for easy bruising  Endocrine:  Positive for thyroid disorder              Physical Exam:     GEN:  In general, this is a well nourished male in no acute distress.  HEENT:  Pupils equal, round. Sclerae nonicteric. Clear oropharynx. Mucous membranes moist.  NECK: Supple, no masses appreciated. Trachea midline. Hard to assess JVD   C/V:  Regular rate and irregular rhythm, systolic murmur, no rub or gallop. No S3 or RV heave.   RESP:  Respirations are unlabored. No use of accessory muscles. Bilateral crackles  GI: Abdomen soft, nontender, nondistended. No HSM appreciated.   EXTREM: 2+ pitting LE edema. No cyanosis or clubbing.  NEURO: Alert and oriented, cooperative. Ambulates with cane.   PSYCH: Normal affect.  SKIN: Warm and dry. No rashes or petechiae appreciated.          Past Medical History:     Past Medical History:   Diagnosis Date     Acute, but ill-defined, cerebrovascular disease     NO RESIDUALS     Antiplatelet or antithrombotic long-term use      Arrhythmia     a fib     Blood in stool      Coronary atherosclerosis of unspecified type of vessel, native or graft     S/P PTCA, EF 50%     Esophageal reflux      Hydrocele, unspecified      Hypercholesterolemia      Hypersomnia with sleep apnea, unspecified      Inguinal hernia without mention of obstruction or gangrene, recurrent unilateral or unspecified      Ischemic cardiomyopathy      Major depression in complete remission (H)      Mitral regurgitation     s/p MitraClip 5/20/19     Obese      Onychomycosis      JIM on CPAP      Other and unspecified hyperlipidemia      Other lymphedema     GROIN AND THIGHS     Other pancytopenia (H)      Other specified anemias      Overweight      Pacemaker      PAD (peripheral artery disease) (H)      Pancytopenia (H)      Persistent atrial fibrillation (H)     VVI PM for long pauses     Prostatic hypertrophy, benign      Pulmonary hypertension (H)      Scrotal varices      Thrombocytopenia, unspecified (H)      Umbilical hernia without obstruction and without gangrene      Unilateral or unspecified femoral hernia without mention of obstruction or gangrene, unilateral or unspecified      Unspecified hypothyroidism      Venous stasis dermatitis of both lower extremities      Vitamin D deficiency               Past Surgical History:     Past Surgical History:   Procedure Laterality Date     BONE MARROW BIOPSY, BONE SPECIMEN, NEEDLE/TROCAR N/A  4/19/2019    Procedure: BONE MARROW BIOPSY;  Surgeon: Yovani Cooley MD;  Location:  OR     CV HEART CATHETERIZATION WITH POSSIBLE INTERVENTION N/A 4/12/2019    Procedure: Heart Catheterization with possible Intervention;  Surgeon: Lev Beck MD;  Location:  HEART CARDIAC CATH LAB     CV MITRACLIP N/A 5/20/2019    Procedure: CV MITRAL CLIP;  Surgeon: Harshil Winter MD;  Location: [facility]     CV RIGHT HEART CATH N/A 4/12/2019    Procedure: Right Heart Cath;  Surgeon: Lev Beck MD;  Location:  HEART CARDIAC CATH LAB     EYE SURGERY      CATARACT/BLEPHAROPLASTY     GENITOURINARY SURGERY      TUNA     HERNIA REPAIR      RIGHT INGUINAL     HERNIORRHAPHY INGUINAL  8/14/2012    Procedure: HERNIORRHAPHY INGUINAL;  right inguinal hernia repair;  Surgeon: Kareem Torrez MD;  Location: [facility]     HERNIORRHAPHY UMBILICAL N/A 10/31/2017    Procedure: HERNIORRHAPHY UMBILICAL;  UMBILICAL HERNIA REPAIR WITH MESH;  Surgeon: Scar Harrington MD;  Location: [facility]     IMPLANT PACEMAKER  8/2009    single chamber     [place]      [facility] [place]     ORTHOPEDIC SURGERY  2010    right TKR, left TKR     SURGICAL HISTORY OF -       Thumb surgery     SURGICAL HISTORY OF -   1990s    Left total knee replacement     SURGICAL HISTORY OF -   3/11    [facility] total knee replacement              Allergies:   Patient has no known allergies.       Data:   All laboratory data reviewed:    LAST CHOLESTEROL:  Lab Results   Component Value Date    CHOL 92 04/25/2019     Lab Results   Component Value Date    HDL 35 04/25/2019     Lab Results   Component Value Date    LDL 48 04/25/2019     Lab Results   Component Value Date    TRIG 45 04/25/2019     Lab Results   Component Value Date    CHOLHDLRATIO 2.1 06/15/2015       LAST BMP:  Lab Results   Component Value Date     06/20/2019      Lab Results   Component Value Date    POTASSIUM 3.8 06/20/2019     Lab Results   Component Value Date     CHLORIDE 104 06/20/2019     Lab Results   Component Value Date    TEODORO 9.5 06/20/2019     Lab Results   Component Value Date    CO2 31 06/20/2019     Lab Results   Component Value Date    BUN 23 06/20/2019     Lab Results   Component Value Date    CR 1.63 06/20/2019     Lab Results   Component Value Date     06/20/2019       LAST CBC:  Lab Results   Component Value Date    WBC 2.8 06/20/2019     Lab Results   Component Value Date    RBC 2.58 06/20/2019     Lab Results   Component Value Date    HGB 8.6 06/20/2019     Lab Results   Component Value Date    HCT 26.3 06/20/2019     Lab Results   Component Value Date     06/20/2019     Lab Results   Component Value Date    MCH 33.3 06/20/2019     Lab Results   Component Value Date    MCHC 32.7 06/20/2019     Lab Results   Component Value Date    RDW 17.7 06/20/2019     Lab Results   Component Value Date    PLT 69 06/20/2019         Thank you for allowing me to participate in the care of your patient.    Sincerely,     WILLIS Soni [facility]

## 2019-06-20 NOTE — PATIENT INSTRUCTIONS
Thanks for coming into [facility] today.    1 month post mitraclip  Reviewed results of echocardiogram, improvement in the mitral regurgitation  Worsening leg swelling and persistent exertional shortness of breath  Will switch Bumex to Torsemide 20mg daily  Due to persistent bleeding and unsteady gait, stop low dose Eliquis.  Reviewed there is still risk of stroke, verbally understood.   Schedule watchman procedure. Valve coordinator to notify patient.   Follow up in 2 weeks for clinical evaluation and follow up BMP     Please call my nurse at [phone] with any questions or concerns.    Scheduling phone number: [phone]  Reminder: Please bring in all current medications, over the counter supplements and vitamin bottles to your next appointment.

## 2019-06-21 NOTE — PROGRESS NOTES
Cardiology Clinic Progress Note  Bairon Foster MRN# [id number]   YOB: 1931 Age: 88 year old     Reason For Visit:  1 month post MitraClip follow-up   Primary Cardiologist:   Dr. 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    On May 20, 2019, he underwent MitraClip procedure using 2 clips.  Today, he underwent a follow-up echocardiogram that demonstrated an ejection fraction of 40%, mildly dilated left ventricle, mild global hypokinesia of the left ventricle, flattened septum consistent with RV pressure/volume overload.  2 mitral clips are noted to be attached to the mitral leaflets, mild to moderate mitral regurgitation, moderate to severely dilated right ventricle, and mildly decreased right ventricular systolic function.     He was last seen on 5/30/2019 for a 10-day follow-up post MitraClip.  He was noted to have 1-2+ bilateral lower extremity edema and weight was up approximately 9 pounds. Bumex was increased to 1.5 mg daily without much improvement in his swelling, although weight is down 6lbs.    He continues to report exertional shortness of breath, noted to have 2+ pitting lower extremity edema, denies chest pain,  PND, orthopnea, presyncope, syncope, heart racing, or palpitations.  Upon exam, he is noted to have bilateral crackles.  He has chronic atrial fibrillation with rates controlled on carvedilol, anticoagulated with low dose Eliquis.  His wife states he is very unsteady on his feet and has had multiple falls over the last few weeks.  He admits to daily  episodes of epistaxis as well as bleeding from the gums.  CBC today showed a white count of 2.8, RBC 2.58, hemoglobin 8.6, hematocrit 26.3 and platelet count 69.  He recently underwent a cardiac CTA  in anticipation for watchman procedure.  Follow-up BMP showed a sodium of 140, potassium 3.8, BUN 23, creatinine 1.63 and GFR 40.    Blood pressure is well controlled at 116/68  BMI 25.04  Device interrogation on 3/27/2019 identified battery status of less than 6 months.  He has continued to have monthly device interrogations that demonstrate normal device functioning.      He is primarily sedentary  Follows a heart healthy diet  Remains compliant with all medications.                   Assessment and Plan:     Mr. 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  His 1 month post MitraClip echocardiogram demonstrated an ejection fraction of 40%, mildly dilated left ventricle, mild global hypokinesia of the left ventricle, flattened septum consistent with RV pressure/volume overload.  2 mitral clips are noted to be attached to the mitral leaflets, mild to moderate mitral regurgitation, moderate to severely dilated right ventricle, and mildly decreased right ventricular systolic function.  He continues with 2+ bilateral pitting edema, despite increased dose of Bumex.  I will switch Bumex to torsemide 20 mg daily for better diuresis.  His creatinine is noted to be significantly elevated at 1.63 status post CT and increased Bumex.  I reviewed with Dr. Hope patient's risk of being on anticoagulation in the setting of recent falls, worsening  pancytopenia, and daily episodes of bleeding.  Recommended he stop Eliquis for now, and undergo watchman procedure which he will then have to restart low-dose Eliquis x45 days post procedure.  Benefit versus risk of stopping patient anticoagulation was reviewed with patient and his wife who are agreeable to plan.  He continues with monthly device monitoring due to estimated battery life of less than 6 months recorded in March 2019.  Continue all other medications.  Continue with compression stockings.  Follow-up BMP and KI visit in 2 weeks.  Call office with any worsening symptoms.                  Thank you for allowing me to participate in this delightful patient's care. He will follow up in 2 weeks with labs prior.        Sofie Genao, APRN, CNP          Review of Systems:   Review of Systems:  Skin:  Positive for bruising   Eyes:  Positive for glasses  ENT:  Negative    Respiratory:  Positive for sleep apnea;CPAP  Cardiovascular:    Positive for;edema;lightheadedness;dizziness  Gastroenterology: Negative poor appetite  Genitourinary:  Negative urinary frequency  Musculoskeletal:  Positive for arthritis  Neurologic:  Positive for stroke;numbness or tingling of feet;incoordination  Psychiatric:  Positive for sleep disturbances;anxiety;excessive stress  Heme/Lymph/Imm:  Positive for easy bruising  Endocrine:  Positive for thyroid disorder              Physical Exam:     GEN:  In general, this is a well nourished male in no acute distress.  HEENT:  Pupils equal, round. Sclerae nonicteric. Clear oropharynx. Mucous membranes moist.  NECK: Supple, no masses appreciated. Trachea midline. Hard to assess JVD   C/V:  Regular rate and irregular rhythm, systolic murmur, no rub or gallop. No S3 or RV heave.   RESP: Respirations are unlabored. No use of accessory muscles. Bilateral crackles  GI: Abdomen soft, nontender, nondistended. No HSM appreciated.   EXTREM: 2+ pitting LE edema. No cyanosis or clubbing.  NEURO: Alert  and oriented, cooperative. Ambulates with cane.   PSYCH: Normal affect.  SKIN: Warm and dry. No rashes or petechiae appreciated.          Past Medical History:     Past Medical History:   Diagnosis Date     Acute, but ill-defined, cerebrovascular disease     NO RESIDUALS     Antiplatelet or antithrombotic long-term use      Arrhythmia     a fib     Blood in stool      Coronary atherosclerosis of unspecified type of vessel, native or graft     S/P PTCA, EF 50%     Esophageal reflux      Hydrocele, unspecified      Hypercholesterolemia      Hypersomnia with sleep apnea, unspecified      Inguinal hernia without mention of obstruction or gangrene, recurrent unilateral or unspecified      Ischemic cardiomyopathy      Major depression in complete remission (H)      Mitral regurgitation     s/p MitraClip 5/20/19     Obese      Onychomycosis      JIM on CPAP      Other and unspecified hyperlipidemia      Other lymphedema     GROIN AND THIGHS     Other pancytopenia (H)      Other specified anemias      Overweight      Pacemaker      PAD (peripheral artery disease) (H)      Pancytopenia (H)      Persistent atrial fibrillation (H)     VVI PM for long pauses     Prostatic hypertrophy, benign      Pulmonary hypertension (H)      Scrotal varices      Thrombocytopenia, unspecified (H)      Umbilical hernia without obstruction and without gangrene      Unilateral or unspecified femoral hernia without mention of obstruction or gangrene, unilateral or unspecified      Unspecified hypothyroidism      Venous stasis dermatitis of both lower extremities      Vitamin D deficiency               Past Surgical History:     Past Surgical History:   Procedure Laterality Date     BONE MARROW BIOPSY, BONE SPECIMEN, NEEDLE/TROCAR N/A 4/19/2019    Procedure: BONE MARROW BIOPSY;  Surgeon: Yovani Cooley MD;  Location: [facility] OR     CV HEART CATHETERIZATION WITH POSSIBLE INTERVENTION N/A 4/12/2019    Procedure: Heart Catheterization with  possible Intervention;  Surgeon: Lev Beck MD;  Location:  HEART CARDIAC CATH LAB     CV MITRACLIP N/A 5/20/2019    Procedure: CV MITRAL CLIP;  Surgeon: Harshil Winter MD;  [facility]     CV RIGHT HEART CATH N/A 4/12/2019    Procedure: Right Heart Cath;  Surgeon: Lev Beck MD;  Location:  HEART CARDIAC CATH LAB     EYE SURGERY      CATARACT/BLEPHAROPLASTY     GENITOURINARY SURGERY      TUNA     HERNIA REPAIR      RIGHT INGUINAL     HERNIORRHAPHY INGUINAL  8/14/2012    Procedure: HERNIORRHAPHY INGUINAL;  right inguinal hernia repair;  Surgeon: Kareem Torrez MD;  Location:  [facility]     HERNIORRHAPHY UMBILICAL N/A 10/31/2017    Procedure: HERNIORRHAPHY UMBILICAL;  UMBILICAL HERNIA REPAIR WITH MESH;  Surgeon: Scar Harrington MD;  Location: [facility]     IMPLANT PACEMAKER  8/2009    single chamber     ORTHOPEDIC SURGERY      [facility] [place]     ORTHOPEDIC SURGERY  2010    right TKR, left TKR     SURGICAL HISTORY OF -       Thumb surgery     SURGICAL HISTORY OF -   1990s    Left total knee replacement     SURGICAL HISTORY OF -   3/11    [facility] total knee replacement              Allergies:   Patient has no known allergies.       Data:   All laboratory data reviewed:    LAST CHOLESTEROL:  Lab Results   Component Value Date    CHOL 92 04/25/2019     Lab Results   Component Value Date    HDL 35 04/25/2019     Lab Results   Component Value Date    LDL 48 04/25/2019     Lab Results   Component Value Date    TRIG 45 04/25/2019     Lab Results   Component Value Date    CHOLHDLRATIO 2.1 06/15/2015       LAST BMP:  Lab Results   Component Value Date     06/20/2019      Lab Results   Component Value Date    POTASSIUM 3.8 06/20/2019     Lab Results   Component Value Date    CHLORIDE 104 06/20/2019     Lab Results   Component Value Date    TEODORO 9.5 06/20/2019     Lab Results   Component Value Date    CO2 31 06/20/2019     Lab Results   Component Value Date    BUN 23 06/20/2019     Lab  Results   Component Value Date    CR 1.63 06/20/2019     Lab Results   Component Value Date     06/20/2019       LAST CBC:  Lab Results   Component Value Date    WBC 2.8 06/20/2019     Lab Results   Component Value Date    RBC 2.58 06/20/2019     Lab Results   Component Value Date    HGB 8.6 06/20/2019     Lab Results   Component Value Date    HCT 26.3 06/20/2019     Lab Results   Component Value Date     06/20/2019     Lab Results   Component Value Date    MCH 33.3 06/20/2019     Lab Results   Component Value Date    MCHC 32.7 06/20/2019     Lab Results   Component Value Date    RDW 17.7 06/20/2019     Lab Results   Component Value Date    PLT 69 06/20/2019

## 2019-06-24 ENCOUNTER — NURSE TRIAGE (OUTPATIENT)
Dept: FAMILY MEDICINE | Facility: CLINIC | Age: 84
End: 2019-06-24

## 2019-06-24 NOTE — TELEPHONE ENCOUNTER
"Fall x 2 on 6/21/2019. Bruising and swelling on left leg from knee to ankle. States pain is 7/10 on pain scale. No dizziness. Did not hit head.     Huddle with provider, okay for appointment tomorrow 6/25 for PCP    Reason for Disposition    Taking Coumadin (warfarin) or other strong blood thinner, or known bleeding disorder (e.g., thrombocytopenia)    Additional Information    Negative: Shock suspected (e.g., cold/pale/clammy skin, too weak to stand, low BP, rapid pulse)    Negative: Sounds like a life-threatening emergency to the triager    Negative: Bruises with fever    Negative: Tiny bruises (spots or dots) of unknown cause    Negative: Bruise(s) of forehead or head    Negative: Bruise(s) of face or jaw    Negative: Followed an injury, and triager doesn't know which injury guideline to use first    Negative: Post-operative bruising    Negative: Dizziness or lightheadedness    Negative: [1] Bruise on head/face, chest, or abdomen AND [2]  taking Coumadin (warfarin) or other strong blood thinner, or known bleeding disorder (e.g., thrombocytopenia)    Negative: Unexplained bleeding from another site (e.g., gums, nose, urine) as well    Negative: Patient sounds very sick or weak to the triager    Negative: [1] Not caused by an injury AND [2] 5 or more bruises now    Negative: [1] Raised bruise AND [2] size > 2 inches (5 cm) AND [3] expanding    Negative: [1] SEVERE pain AND [2] not improved 2 hours after pain medicine/ice packs    Negative: Suspicious history for the injury    Answer Assessment - Initial Assessment Questions  1. APPEARANCE of BRUISE: \"Describe the bruise.\"       Left leg, knee down to ankle, black/blue,   2. SIZE: \"How large is the bruise?\"       large  3. NUMBER: \"How many bruises are there?\"       1 large one  4. LOCATION: \"Where is the bruise located?\"       Lower leg  5. ONSET: \"How long ago did the bruise occur?\"       6. CAUSE: \"Tell me how it happened.\"      6/21/2019, 2 falls that happened, " "  7. MEDICAL HISTORY: \"Do you have any medical problems that can cause easy bruising or bleeding?\" (e.g., leukemia, liver disease, recent chemotherapy)      Cardiac work done recently   8. MEDICATIONS : \"Do you take any medications which thin the blood such as: aspirin, heparin, ibuprofen (NSAIDS), Plavix, or Coumadin?\"      Eliquis: last week was taken off this medication  9. OTHER SYMPTOMS: \"Do you have any other symptoms?\"  (e.g., weakness, dizziness, pain, fever, nosebleed, blood in urine/stool)      Swelling, left leg    Protocols used: BRUISES-A-AH      "

## 2019-06-25 ENCOUNTER — OFFICE VISIT (OUTPATIENT)
Dept: FAMILY MEDICINE | Facility: CLINIC | Age: 84
End: 2019-06-25
Payer: COMMERCIAL

## 2019-06-25 ENCOUNTER — CARE COORDINATION (OUTPATIENT)
Dept: CARDIOLOGY | Facility: CLINIC | Age: 84
End: 2019-06-25

## 2019-06-25 VITALS
SYSTOLIC BLOOD PRESSURE: 96 MMHG | RESPIRATION RATE: 16 BRPM | BODY MASS INDEX: 23.5 KG/M2 | DIASTOLIC BLOOD PRESSURE: 50 MMHG | OXYGEN SATURATION: 96 % | HEART RATE: 63 BPM | WEIGHT: 183 LBS

## 2019-06-25 DIAGNOSIS — I89.0 LYMPHEDEMA OF BOTH LOWER EXTREMITIES: ICD-10-CM

## 2019-06-25 DIAGNOSIS — I50.42 CHRONIC COMBINED SYSTOLIC AND DIASTOLIC CONGESTIVE HEART FAILURE (H): ICD-10-CM

## 2019-06-25 DIAGNOSIS — R29.6 RECURRENT FALLS: Primary | ICD-10-CM

## 2019-06-25 PROCEDURE — 99214 OFFICE O/P EST MOD 30 MIN: CPT | Performed by: FAMILY MEDICINE

## 2019-06-25 NOTE — PROGRESS NOTES
Called patient's wife to discuss Watchman procedure.  According to Josh Wasserman, the FRANKLYN is like a large chicken-wing anatomy , measures at top end of allowable ostial widths (30.4 mm is 8% compression on a 33 mm device).  They feel it is worth trying.  Dr. Hope reviewed and is willing to try Watchman.  I spoke with patient's wife and she states he is not doing well.  Is having frequent falls.  Was in to see PCP.   Will wait on Watchman at this time.  Will schedule patient to see Anushka later this week.

## 2019-06-25 NOTE — PATIENT INSTRUCTIONS
I will have the patient stop his Bumex.  He will continue with torsemide and carvedilol.  He will follow-up with me in 2 weeks.

## 2019-06-25 NOTE — PROGRESS NOTES
Subjective     Bairon Foster is a 88 year old male who presents to clinic today for the following health issues:    HPI   Falls      Duration: Friday    Description (location/character/radiation): pt fell on Friday 6/21/2019, pt hurt left leg.  Pt wife states that pt has been falling a lot, 3 times yesterday. Pt also fell a couple times before Friday    Intensity:  moderate    Accompanying signs and symptoms: none    History (similar episodes/previous evaluation): None    Precipitating or alleviating factors: All of his falls have been when he is been getting up from either sitting or lying down.  He does get lightheaded at that point.  Yesterday when he fell backwards he could not get up off the floor.  His wife called 911 and the police came and helped him up and he is been fine since.    Therapies tried and outcome: Patient is currently on Demadex and torsemide.  Also he is on carvedilol for beta blockade.         Patient Active Problem List   Diagnosis     Atrial fibrillation (H)     Pulmonary hypertension (H)     CAD (coronary artery disease)     BPH (benign prostatic hyperplasia)     Ischemic cardiomyopathy     Anemia due to bone marrow failure, unspecified bone marrow failure type (H)     JIM (obstructive sleep apnea)- mild/moderate     Acute stress reaction     Health Care Home     Vitamin D deficiency     Advance Care Planning     Gastroesophageal reflux disease without esophagitis     Hydrocele, unspecified hydrocele type     Slow transit constipation     Hypercholesterolemia     Non morbid obesity due to excess calories w comorbid CAD< hi LDL      Venous stasis dermatitis of both lower extremities     Lymphedema of both lower extremities     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      Coronary artery disease due to lipid rich plaque     Acquired hypothyroidism     Onychomycosis     Overweight (BMI 25.0-29.9)     PAD (peripheral artery disease) (H)     Screening for prostate cancer     Blood  in stool     Umbilical hernia without obstruction and without gangrene     Xerosis cutis     Status post coronary angiogram     AK (actinic keratosis)     Angioma of skin     Acute systolic CHF (congestive heart failure) (H)     CHF (congestive heart failure) (H)     GI bleed     Hematemesis     Epistaxis     Non-rheumatic mitral regurgitation     Mitral regurgitation     Past Surgical History:   Procedure Laterality Date     BONE MARROW BIOPSY, BONE SPECIMEN, NEEDLE/TROCAR N/A 4/19/2019    Procedure: BONE MARROW BIOPSY;  Surgeon: Yovani Cooley MD;  Location:  OR     CV HEART CATHETERIZATION WITH POSSIBLE INTERVENTION N/A 4/12/2019    Procedure: Heart Catheterization with possible Intervention;  Surgeon: Lev Beck MD;  Location:  HEART CARDIAC CATH LAB     CV MITRACLIP N/A 5/20/2019    Procedure: CV MITRAL CLIP;  Surgeon: Harshil Winter MD;  Location:  HEART CARDIAC CATH LAB     CV RIGHT HEART CATH N/A 4/12/2019    Procedure: Right Heart Cath;  Surgeon: Lev Beck MD;  Location:  HEART CARDIAC CATH LAB     EYE SURGERY      CATARACT/BLEPHAROPLASTY     GENITOURINARY SURGERY      TUNA     HERNIA REPAIR      RIGHT INGUINAL     HERNIORRHAPHY INGUINAL  8/14/2012    Procedure: HERNIORRHAPHY INGUINAL;  right inguinal hernia repair;  Surgeon: Kareem Torrez MD;  Location:  [facility]     HERNIORRHAPHY UMBILICAL N/A 10/31/2017    Procedure: HERNIORRHAPHY UMBILICAL;  UMBILICAL HERNIA REPAIR WITH MESH;  Surgeon: Scar Harrington MD;  Location: [facility]     IMPLANT PACEMAKER  8/2009    single chamber     ORTHOPEDIC SURGERY      [facility] RIGHT     ORTHOPEDIC SURGERY  2010    right TKR, left TKR     SURGICAL HISTORY OF -       Thumb surgery     SURGICAL HISTORY OF -   1990s    Left total knee replacement     SURGICAL HISTORY OF -   3/11    [facility] total knee replacement       Social History     Tobacco Use     Smoking status: Never Smoker     Smokeless tobacco: Never Used   Substance Use Topics      "Alcohol use: Not Currently     Alcohol/week: 0.0 oz     Comment: 1/month     Family History   Problem Relation Age of Onset     Cardiovascular Father      C.A.D. Father      Lung Cancer Sister      Unknown/Adopted Mother      Cancer - colorectal No family hx of              Reviewed and updated as needed this visit by Provider         Review of Systems   ROS COMP: Constitutional, HEENT, cardiovascular, pulmonary, gi and gu systems are negative, except as otherwise noted.      Objective    BP 96/50   Pulse 63   Resp 16   Wt 83 kg (183 lb)   SpO2 96%   BMI 23.50 kg/m    Body mass index is 23.5 kg/m .  Physical Exam   GENERAL APPEARANCE: healthy, alert and no distress  RESP: lungs clear to auscultation - no rales, rhonchi or wheezes  CV: regular rates and rhythm, normal S1 S2, no S3 or S4, no murmur, click or rub and there is edema present in both lower extremities left greater than right.            Assessment & Plan       ICD-10-CM    1. Recurrent falls R29.6     I am favoring due to orthostasis   2. Chronic combined systolic and diastolic congestive heart failure (H) I50.42    3. Lymphedema of both lower extremities I89.0         BMI:   Estimated body mass index is 23.5 kg/m  as calculated from the following:    Height as of 6/20/19: 1.88 m (6' 2\").    Weight as of this encounter: 83 kg (183 lb).           Patient Instructions   I will have the patient stop his Bumex.  He will continue with torsemide and carvedilol.  He will follow-up with me in 2 weeks.      Return in about 2 weeks (around 7/9/2019) for hypertension, heart failure.    Mg Nelson MD  [facility]      "

## 2019-06-27 ENCOUNTER — OFFICE VISIT (OUTPATIENT)
Dept: CARDIOLOGY | Facility: CLINIC | Age: 84
End: 2019-06-27
Payer: COMMERCIAL

## 2019-06-27 ENCOUNTER — HOSPITAL ENCOUNTER (OUTPATIENT)
Dept: GENERAL RADIOLOGY | Facility: CLINIC | Age: 84
Discharge: HOME | End: 2019-06-27
Attending: NURSE PRACTITIONER | Admitting: NURSE PRACTITIONER
Payer: COMMERCIAL

## 2019-06-27 VITALS
HEIGHT: 74 IN | SYSTOLIC BLOOD PRESSURE: 118 MMHG | BODY MASS INDEX: 24.19 KG/M2 | HEART RATE: 65 BPM | DIASTOLIC BLOOD PRESSURE: 74 MMHG | WEIGHT: 188.5 LBS

## 2019-06-27 DIAGNOSIS — R29.6 FALLS FREQUENTLY: ICD-10-CM

## 2019-06-27 DIAGNOSIS — S89.92XA KNEE INJURY, LEFT, INITIAL ENCOUNTER: ICD-10-CM

## 2019-06-27 DIAGNOSIS — I50.22 CHRONIC SYSTOLIC CONGESTIVE HEART FAILURE (H): ICD-10-CM

## 2019-06-27 DIAGNOSIS — I25.10 CORONARY ARTERY DISEASE INVOLVING NATIVE CORONARY ARTERY OF NATIVE HEART WITHOUT ANGINA PECTORIS: Primary | ICD-10-CM

## 2019-06-27 DIAGNOSIS — Z98.890 S/P MITRAL VALVE REPAIR: ICD-10-CM

## 2019-06-27 DIAGNOSIS — R06.02 SHORTNESS OF BREATH: ICD-10-CM

## 2019-06-27 DIAGNOSIS — T14.8XXA OPEN WOUND: ICD-10-CM

## 2019-06-27 DIAGNOSIS — T14.8XXA HEMATOMA OF SKIN: ICD-10-CM

## 2019-06-27 PROCEDURE — 99215 OFFICE O/P EST HI 40 MIN: CPT | Performed by: NURSE PRACTITIONER

## 2019-06-27 PROCEDURE — 73562 X-RAY EXAM OF KNEE 3: CPT | Mod: 50

## 2019-06-27 ASSESSMENT — MIFFLIN-ST. JEOR: SCORE: 1594.78

## 2019-06-27 NOTE — LETTER
6/27/2019    Mg Nelson MD  [address]  [facility] [address]    RE: Bairon DOWNS Cristian       Dear Colleague,    I had the pleasure of seeing Bairon Foster in the [facility].    HPI and Plan:   I had the pleasure of seeing Bairon Foster today in cardiology clinic follow up. He is a pleasant 88 year old patient of Dr. Hope.  He was put on my schedule today to discuss frequent falls and lower extremity edema.  He saw my colleague him last week and MitraClip clinic, she stopped his Eliquis for falls and epistaxis and switched his Bumex to torsemide 20 mg daily.  He saw Dr. Nelson 2 days ago for falls, at that time the patient was apparently taking both Bumex and torsemide and so he was instructed to discontinue the Bumex with the thought that he was falling due to orthostasis.    Mr. Foster is well-known to me.  He has a history of coronary disease, severe MR status post mitral clip on May 20, 2019, severe TR and mild to moderate MS.  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  His last intervention was done to his OM in 2009.  He has paroxysmal atrial fibrillation, his Eliquis was recently discontinued because of his frequent falls.  He has a permanent pacemaker which is nearing end of battery life.  He has treated obstructive sleep apnea and chronic pancytopenia with low platelets.      Prior to MitraClip his coronary angiogram showed mild nonobstructive coronary disease, normal cardiac output level, mildly elevated elevated right and left-sided filling pressures and mildly elevated PH.  His echo showed a drop in EF from a year prior when it was 53%, down to 30% with moderate global hypokinesis of the LV, m moderate to severe MR and moderate to severe TR.     His mitral clip with two clips was done on May 20, 2019.   He was not put on Plavix following the procedure because of recent issues with bleeding and low platelets but was kept  on Eliquis 2.5 BID and 81 mg aspirin.  He had a echo June 20 which described the EF is 40% slightly increased LV end-diastolic pressure of regurgitant mitral jet eccentrically directed with 1-2+ MR and a severely dilated RV.  He had moderately severe 3+ tricuspid regurgitation and RVSP was 30 mmHg.    He is in the process of being worked up  a watchman device and our nurse called him and learned of his falls and they asked to see me today.  He tells me that a couple of weeks ago he had 2 significant falls off of his chair in the kitchen, he was on Eliquis 2.5 twice daily at that time and he hurt his knee pretty bad.  He had significant bruising along his leg from just above the knee and behind him down.  He and his wife can agree on whether or not this is improved or not.  About that time he saw Sofie and she stopped his Eliquis.  He went on to have several more falls and these were attributed to orthostatic hypotension.  He had a lot of bilateral lower extremity edema when he saw Sofie, today his right lower extremity looks normal to me, I cannot appreciate any edema.  However his left lower extremity is profoundly edematous and quite tender below the knee.  The knee itself is swollen with a resolving bruise and he clearly has a hematoma.  Above the knee and behind the thigh he has a lot of bruising as well.  He took another fall earlier this week and was on the floor for 2 hours before his wife finally called the police because he was not able to get himself off the floor and there is no way she would be able to help him accomplish this.  In the process he gave himself quite a good abrasion on his spine.  His dermatologoic skin condition is worsening with more blisters erupting.  [facility] if this was an allergy and recommended Bumex over torsemide.  Clinically he looks frail but he has clear lung sounds and denies dyspnea.  He is using a walker for the first time ever since have known  him.  He clearly has difficulty walking due to a knee injury.    Physical Exam  Please see Below     Assessment and Plan  1.  Left lower extremity edema and hematoma secondary to significant falls while on Eliquis and questionable damage to the knee which was previously replaced.  This is typically out of my scope of practice, I will go ahead and get an knee x-ray with 3 views to see if his replacement is still intact and if there is any obvious breaks.  If there are any abnormalities he will need to follow-up with Dr. Nelson or perhaps [facility] who replace his knees years ago.   2.  Frailty.  He is extremely frail and this is probably due to his heart failure and also due to his recent leg injury.  His wife is not capable of providing cares for him.  He has a granddaughter who helps him out for a day and a half a week.  He has a significant abrasion on his back from this last fall.  I have placed an order for wound care clinic to look at his back.  I also think the need home health care, PT and OT.  I placed an order for this but this make it kicked back to the primary to  determine.  3.  Atrial fibrillation.  He has been worked up for watchman, will continue to see if he is appropriate candidate for this.  He clearly cannot be on anticoagulation anymore.  In the meantime his pacemaker is near end of battery life and they are closely following it in device clinic.  4.  Severe mitral regurgitation status post mitral clip procedure  with 2 clips.  He had indeterminate success with this procedure.  Today he appears pretty euvolemic except for that left leg.  He still has significant TR.  He is not on Plavix or Eliquis, just a low-dose aspirin because of the anemia and bleeding.  He is going to keep follow-up with Sofie next week with a BMP prior.  I suspect we should switch him to Bumex given the recurrent blisters and the fact that they appear to be worsening.    Thank you for allowing me to care for Bairon DOWNS  Cristian today.    WILLIS Bullard, CNP  Cardiology  Greater than half of this 45 minute appointment was spent in counseling and coordination of care.    Voice recognition software was used for this note, I have reviewed this note, but errors may have been missed.    Orders Placed This Encounter   Procedures     XR Knee Bilateral 3 Views     HOME CARE NURSING REFERRAL     WOUND CARE REFERRAL     Palliative Care (referral)     No orders of the defined types were placed in this encounter.    There are no discontinued medications.      CURRENT MEDICATIONS:  Current Outpatient Medications   Medication Sig Dispense Refill     acetaminophen (TYLENOL) 500 MG tablet Take 2 tablets (1,000 mg) by mouth every 6 hours as needed for mild pain 100 tablet 0     aspirin 81 MG EC tablet Take 81 mg by mouth daily        buPROPion (WELLBUTRIN XL) 150 MG 24 hr tablet Take 150 mg by mouth every morning       Carboxymethylcellulose Sod PF (CELLUVISC/REFRESH LIQUIGEL) 1 % ophthalmic solution Place 1 drop into both eyes 3 times daily as needed for dry eyes       carvedilol (COREG) 6.25 MG tablet TAKE 1 TABLET (6.25 MG) BY MOUTH 2 TIMES DAILY (WITH MEALS) 180 tablet 3     COMPRESSION STOCKINGS 1 each daily 2 each 0     diclofenac (VOLTAREN) 1 % topical gel Apply 2 g topically 4 times daily       doxycycline hyclate (VIBRA-TABS) 100 MG tablet TAKE ONE CAPSULE/TABLET BY MOUTH TWICE A DAY       finasteride (PROSCAR) 5 MG tablet TAKE 1 TABLET EVERY DAY 90 tablet 3     glucosamine-chondroitin 500-400 MG CAPS per capsule Take 2 capsules by mouth daily       ketotifen (ZADITOR) 0.025 % SOLN ophthalmic solution Place 1 drop into both eyes every 12 hours 1 Bottle      KLOR-CON 20 MEQ CR tablet TAKE 1 TABLET EVERY DAY 90 tablet 3     levothyroxine (SYNTHROID/LEVOTHROID) 100 MCG tablet TAKE 1 TABLET EVERY DAY 90 tablet 3     LORazepam (ATIVAN) 1 MG tablet Take 0.5-1 tablets (0.5-1 mg) by mouth every 8 hours as needed for anxiety 30 tablet 5      Multiple Vitamins-Minerals (CENTRUM SILVER) per tablet Take 1 tablet by mouth daily       omeprazole (PRILOSEC) 20 MG DR capsule TAKE 1 CAPSULE TWICE DAILY 180 capsule 3     ORDER FOR DME Auto-CPAP:Max 9 cm H2OMin 9 cm H2O  Continuous Lifetime need and heated humidity.    1 Device 0     Polyethylene Glycol 3350 (MIRALAX PO) Take 1 packet by mouth At Bedtime        simvastatin (ZOCOR) 20 MG tablet Take 20 mg by mouth daily        sucralfate (CARAFATE) 1 GM tablet TAKE 1 TABLET TWICE DAILY 180 tablet 3     tamsulosin (FLOMAX) 0.4 MG capsule TAKE 1 CAPSULE EVERY DAY (Patient taking differently: TAKE 1 CAPSULE EVERY EVENING) 90 capsule 3     torsemide (DEMADEX) 20 MG tablet Take 1 tablet (20 mg) by mouth daily 30 tablet 0     triamcinolone (KENALOG) 0.025 % cream Apply topically 2 times daily as needed        ACE/ARB/ARNI NOT PRESCRIBED (INTENTIONAL) Please choose reason not prescribed, below (Patient not taking: Reported on 5/30/2019)         ALLERGIES   No Known Allergies    PAST MEDICAL HISTORY:  Past Medical History:   Diagnosis Date     Acute, but ill-defined, cerebrovascular disease     NO RESIDUALS     Antiplatelet or antithrombotic long-term use      Arrhythmia     a fib     Blood in stool      Coronary atherosclerosis of unspecified type of vessel, native or graft     S/P PTCA, EF 50%     Esophageal reflux      Hydrocele, unspecified      Hypercholesterolemia      Hypersomnia with sleep apnea, unspecified      Inguinal hernia without mention of obstruction or gangrene, recurrent unilateral or unspecified      Ischemic cardiomyopathy      Major depression in complete remission (H)      Mitral regurgitation     s/p MitraClip 5/20/19     Obese      Onychomycosis      JIM on CPAP      Other and unspecified hyperlipidemia      Other lymphedema     GROIN AND THIGHS     Other pancytopenia (H)      Other specified anemias      Overweight      Pacemaker      PAD (peripheral artery disease) (H)      Pancytopenia (H)       Persistent atrial fibrillation (H)     VVI PM for long pauses     Prostatic hypertrophy, benign      Pulmonary hypertension (H)      Scrotal varices      Thrombocytopenia, unspecified (H)      Umbilical hernia without obstruction and without gangrene      Unilateral or unspecified femoral hernia without mention of obstruction or gangrene, unilateral or unspecified      Unspecified hypothyroidism      Venous stasis dermatitis of both lower extremities      Vitamin D deficiency        PAST SURGICAL HISTORY:  Past Surgical History:   Procedure Laterality Date     BONE MARROW BIOPSY, BONE SPECIMEN, NEEDLE/TROCAR N/A 4/19/2019    Procedure: BONE MARROW BIOPSY;  Surgeon: Yovani Cooley MD;  Location:  OR     CV HEART CATHETERIZATION WITH POSSIBLE INTERVENTION N/A 4/12/2019    Procedure: Heart Catheterization with possible Intervention;  Surgeon: Lev Beck MD;  Location:  HEART CARDIAC CATH LAB     CV MITRACLIP N/A 5/20/2019    Procedure: CV MITRAL CLIP;  Surgeon: Harshil Winter MD;  Location:  HEART CARDIAC CATH LAB     CV RIGHT HEART CATH N/A 4/12/2019    Procedure: Right Heart Cath;  Surgeon: Lev Beck MD;  Location:  HEART CARDIAC CATH LAB     EYE SURGERY      CATARACT/BLEPHAROPLASTY     GENITOURINARY SURGERY      TUNA     HERNIA REPAIR      RIGHT INGUINAL     HERNIORRHAPHY INGUINAL  8/14/2012    Procedure: HERNIORRHAPHY INGUINAL;  right inguinal hernia repair;  Surgeon: Kareem Torrez MD;  Location:  [facility]     HERNIORRHAPHY UMBILICAL N/A 10/31/2017    Procedure: HERNIORRHAPHY UMBILICAL;  UMBILICAL HERNIA REPAIR WITH MESH;  Surgeon: Scar Harrington MD;  [facility]: [facility]     IMPLANT PACEMAKER  8/2009    single chamber     ORTHOPEDIC SURGERY      [facility]     ORTHOPEDIC SURGERY  2010    right TKR, left TKR     SURGICAL HISTORY OF -       Thumb surgery     SURGICAL HISTORY OF -   1990s    Left total knee replacement     SURGICAL HISTORY OF -   3/11    [facility] total knee replacement        FAMILY HISTORY:  Family History   Problem Relation Age of Onset     Cardiovascular Father      C.A.D. Father      Lung Cancer Sister      Unknown/Adopted Mother      Cancer - colorectal No family hx of        SOCIAL HISTORY:  Social History     Socioeconomic History     Marital status:      Spouse name: None     Number of children: None     Years of education: None     Highest education level: None   Occupational History     Occupation: Teacher, author, speaker     Employer: RETIRED   Social Needs     Financial resource strain: None     Food insecurity:     Worry: None     Inability: None     Transportation needs:     Medical: None     Non-medical: None   Tobacco Use     Smoking status: Never Smoker     Smokeless tobacco: Never Used   Substance and Sexual Activity     Alcohol use: Not Currently     Alcohol/week: 0.0 oz     Comment: 1/month     Drug use: No     Sexual activity: Never     Partners: Female   Lifestyle     Physical activity:     Days per week: None     Minutes per session: None     Stress: None   Relationships     Social connections:     Talks on phone: None     Gets together: None     Attends Congregational service: None     Active member of club or organization: None     Attends meetings of clubs or organizations: None     Relationship status: None     Intimate partner violence:     Fear of current or ex partner: None     Emotionally abused: None     Physically abused: None     Forced sexual activity: None   Other Topics Concern     Parent/sibling w/ CABG, MI or angioplasty before 65F 55M? No      Service Not Asked     Blood Transfusions Not Asked     Caffeine Concern No     Comment: 1-2 cups coffee a day     Occupational Exposure Not Asked     Hobby Hazards Not Asked     Sleep Concern No     Stress Concern Yes     Comment: due to wifes health condition     Weight Concern No     Special Diet No     Back Care Not Asked     Exercise Yes     Comment: states he uses his tredmill on and off  "    Bike Helmet Not Asked     Seat Belt Yes     Self-Exams Not Asked   Social History Narrative     None       Review of Systems:  Skin:  Positive for bruising states has open wounds   Eyes:  Positive for glasses    ENT:  Positive for epistaxis    Respiratory:  Positive for dyspnea on exertion;sleep apnea;CPAP     Cardiovascular:  palpitations;chest pain;Negative;dizziness;lightheadedness;syncope or near-syncope;cyanosis Positive for;edema;fatigue    Gastroenterology: Negative      Genitourinary:  Positive for prostate problem    Musculoskeletal:  Positive for muscular weakness balance off, recent falls  Neurologic:  Positive for stroke;numbness or tingling of feet    Psychiatric:  Positive for anxiety;excessive stress    Heme/Lymph/Imm:  Positive for easy bruising    Endocrine:  Positive for thyroid disorder      Physical Exam:  Vitals: /74 (BP Location: Right arm, Cuff Size: Adult Regular)   Pulse 65   Ht 1.88 m (6' 2\")   Wt 85.5 kg (188 lb 8 oz)   BMI 24.20 kg/m       Constitutional:  cooperative;in no acute distress frail      Skin:  warm and dry to the touch bruises present pacemaker incision in the left infraclavicular area was well-healed some blistering on back and healed blisters on chest, back abrasion, multiple blisters.    Head:  normocephalic        Eyes:  pupils equal and round        Lymph:      ENT:  no pallor or cyanosis        Neck:  JVP normal        Respiratory:  clear to auscultation;normal symmetry    bilateral basilar crackles    Cardiac: regular rhythm       systolic murmur     Paced  pulses full and equal                                   left lower extremity below knee appears to have a hematoma, warm, tender, buldge on outer aspect just below knee    GI:  abdomen soft   benign    Extremities and Muscular Skeletal:  no deformities, clubbing, cyanosis, erythema observed stasis pigmentation     LLE edema;2+;3+      Neurological:  affect appropriate        Psych:  Alert and Oriented " x 3 Depression  Encounter Diagnoses   Name Primary?     Coronary artery disease involving native coronary artery of native heart without angina pectoris Yes     Shortness of breath      Knee injury, left, initial encounter      Chronic systolic congestive heart failure (H)      S/P mitral valve repair      Falls frequently      Hematoma of skin      Open wound        Recent Lab Results:  LIPID RESULTS:  Lab Results   Component Value Date    CHOL 92 04/25/2019    HDL 35 (L) 04/25/2019    LDL 48 04/25/2019    TRIG 45 04/25/2019    CHOLHDLRATIO 2.1 06/15/2015       LIVER ENZYME RESULTS:  Lab Results   Component Value Date    AST 22 05/20/2019    ALT 17 05/20/2019       CBC RESULTS:  Lab Results   Component Value Date    WBC 2.8 (L) 06/20/2019    RBC 2.58 (L) 06/20/2019    HGB 8.6 (L) 06/20/2019    HCT 26.3 (L) 06/20/2019     (H) 06/20/2019    MCH 33.3 (H) 06/20/2019    MCHC 32.7 06/20/2019    RDW 17.7 (H) 06/20/2019    PLT 69 (L) 06/20/2019       BMP RESULTS:  Lab Results   Component Value Date     06/20/2019    POTASSIUM 3.8 06/20/2019    CHLORIDE 104 06/20/2019    CO2 31 (H) 06/20/2019    ANIONGAP 8.8 06/20/2019     (H) 06/20/2019    BUN 23 06/20/2019    CR 1.63 (H) 06/20/2019    GFRESTIMATED 40 (L) 06/20/2019    GFRESTBLACK 49 (L) 06/20/2019    TEODORO 9.5 06/20/2019        A1C RESULTS:  Lab Results   Component Value Date    A1C 5.5 05/29/2019       INR RESULTS:  Lab Results   Component Value Date    INR 1.44 (H) 05/20/2019    INR 1.79 (H) 11/20/2018           CC  No referring provider defined for this encounter.                Thank you for allowing me to participate in the care of your patient.    Sincerely,     WILLIS Smith [facility]

## 2019-06-27 NOTE — PROGRESS NOTES
HPI and Plan:   I had the pleasure of seeing Bairon Foster today in cardiology clinic follow up. He is a pleasant 88 year old patient of Dr. Hope.  He was put on my schedule today to discuss frequent falls and lower extremity edema.  He saw my colleague him last week and Mitra[facility] clinic, she stopped his Eliquis for falls and epistaxis and switched his Bumex to torsemide 20 mg daily.  He saw Dr. Nelson 2 days ago for falls, at that time the patient was apparently taking both Bumex and torsemide and so he was instructed to discontinue the Bumex with the thought that he was falling due to orthostasis.    Mr. Foster is well-known to me.  He has a history of coronary disease, severe MR status post mitral clip on May 20, 2019, severe TR and mild to moderate MS.  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  His last intervention was done to his OM in 2009.  He has paroxysmal atrial fibrillation, his Eliquis was recently discontinued because of his frequent falls.  He has a permanent pacemaker which is nearing end of battery life.  He has treated obstructive sleep apnea and chronic pancytopenia with low platelets.      Prior to MitraClip his coronary angiogram showed mild nonobstructive coronary disease, normal cardiac output level, mildly elevated elevated right and left-sided filling pressures and mildly elevated PH.  His echo showed a drop in EF from a year prior when it was 53%, down to 30% with moderate global hypokinesis of the LV, m moderate to severe MR and moderate to severe TR.     His mitral clip with two clips was done on May 20, 2019.   He was not put on Plavix following the procedure because of recent issues with bleeding and low platelets but was kept on Eliquis 2.5 BID and 81 mg aspirin.  He had a echo June 20 which described the EF is 40% slightly increased LV end-diastolic pressure of regurgitant mitral jet eccentrically directed with 1-2+ MR and a severely dilated RV.   He had moderately severe 3+ tricuspid regurgitation and RVSP was 30 mmHg.    He is in the process of being worked up  a watchman device and our nurse called him and learned of his falls and they asked to see me today.  He tells me that a couple of weeks ago he had 2 significant falls off of his chair in the kitchen, he was on Eliquis 2.5 twice daily at that time and he hurt his knee pretty bad.  He had significant bruising along his leg from just above the knee and behind him down.  He and his wife can agree on whether or not this is improved or not.  About that time he saw Sofie and she stopped his Eliquis.  He went on to have several more falls and these were attributed to orthostatic hypotension.  He had a lot of bilateral lower extremity edema when he saw Sofie, today his right lower extremity looks normal to me, I cannot appreciate any edema.  However his left lower extremity is profoundly edematous and quite tender below the knee.  The knee itself is swollen with a resolving bruise and he clearly has a hematoma.  Above the knee and behind the thigh he has a lot of bruising as well.  He took another fall earlier this week and was on the floor for 2 hours before his wife finally called the police because he was not able to get himself off the floor and there is no way she would be able to help him accomplish this.  In the process he gave himself quite a good abrasion on his spine.  His dermatologoic skin condition is worsening with more blisters erupting.  [facility] if this was an allergy and recommended Bumex over torsemide.  Clinically he looks frail but he has clear lung sounds and denies dyspnea.  He is using a walker for the first time ever since have known him.  He clearly has difficulty walking due to a knee injury.    Physical Exam  Please see Below     Assessment and Plan  1.  Left lower extremity edema and hematoma secondary to significant falls while on Eliquis and  questionable damage to the knee which was previously replaced.  This is typically out of my scope of practice, I will go ahead and get an knee x-ray with 3 views to see if his replacement is still intact and if there is any obvious breaks.  If there are any abnormalities he will need to follow-up with Dr. Nelson or perhaps [facility] who replace his knees years ago.   2.  Frailty.  He is extremely frail and this is probably due to his heart failure and also due to his recent leg injury.  His wife is not capable of providing cares for him.  He has a granddaughter who helps him out for a day and a half a week.  He has a significant abrasion on his back from this last fall.  I have placed an order for wound care clinic to look at his back.  I also think the need home health care, PT and OT.  I placed an order for this but this make it kicked back to the primary to  determine.  3.  Atrial fibrillation.  He has been worked up for watchman, will continue to see if he is appropriate candidate for this.  He clearly cannot be on anticoagulation anymore.  In the meantime his pacemaker is near end of battery life and they are closely following it in device clinic.  4.  Severe mitral regurgitation status post mitral clip procedure  with 2 clips.  He had indeterminate success with this procedure.  Today he appears pretty euvolemic except for that left leg.  He still has significant TR.  He is not on Plavix or Eliquis, just a low-dose aspirin because of the anemia and bleeding.  He is going to keep follow-up with Sofie next week with a BMP prior.  I suspect we should switch him to Bumex given the recurrent blisters and the fact that they appear to be worsening.    Thank you for allowing me to care for Bairon Foster today.    Anushka Tejada, WILLIS, CNP  Cardiology  Greater than half of this 45 minute appointment was spent in counseling and coordination of care.    Voice recognition software was used for this note, I have  reviewed this note, but errors may have been missed.    Orders Placed This Encounter   Procedures     XR Knee Bilateral 3 Views     HOME CARE NURSING REFERRAL     WOUND CARE REFERRAL     Palliative Care (referral)     No orders of the defined types were placed in this encounter.    There are no discontinued medications.      CURRENT MEDICATIONS:  Current Outpatient Medications   Medication Sig Dispense Refill     acetaminophen (TYLENOL) 500 MG tablet Take 2 tablets (1,000 mg) by mouth every 6 hours as needed for mild pain 100 tablet 0     aspirin 81 MG EC tablet Take 81 mg by mouth daily        buPROPion (WELLBUTRIN XL) 150 MG 24 hr tablet Take 150 mg by mouth every morning       Carboxymethylcellulose Sod PF (CELLUVISC/REFRESH LIQUIGEL) 1 % ophthalmic solution Place 1 drop into both eyes 3 times daily as needed for dry eyes       carvedilol (COREG) 6.25 MG tablet TAKE 1 TABLET (6.25 MG) BY MOUTH 2 TIMES DAILY (WITH MEALS) 180 tablet 3     COMPRESSION STOCKINGS 1 each daily 2 each 0     diclofenac (VOLTAREN) 1 % topical gel Apply 2 g topically 4 times daily       doxycycline hyclate (VIBRA-TABS) 100 MG tablet TAKE ONE CAPSULE/TABLET BY MOUTH TWICE A DAY       finasteride (PROSCAR) 5 MG tablet TAKE 1 TABLET EVERY DAY 90 tablet 3     glucosamine-chondroitin 500-400 MG CAPS per capsule Take 2 capsules by mouth daily       ketotifen (ZADITOR) 0.025 % SOLN ophthalmic solution Place 1 drop into both eyes every 12 hours 1 Bottle      KLOR-CON 20 MEQ CR tablet TAKE 1 TABLET EVERY DAY 90 tablet 3     levothyroxine (SYNTHROID/LEVOTHROID) 100 MCG tablet TAKE 1 TABLET EVERY DAY 90 tablet 3     LORazepam (ATIVAN) 1 MG tablet Take 0.5-1 tablets (0.5-1 mg) by mouth every 8 hours as needed for anxiety 30 tablet 5     Multiple Vitamins-Minerals (CENTRUM SILVER) per tablet Take 1 tablet by mouth daily       omeprazole (PRILOSEC) 20 MG DR capsule TAKE 1 CAPSULE TWICE DAILY 180 capsule 3     ORDER FOR DME Auto-CPAP:Max 9 cm H2OMin 9 cm  H2O  Continuous Lifetime need and heated humidity.    1 Device 0     Polyethylene Glycol 3350 (MIRALAX PO) Take 1 packet by mouth At Bedtime        simvastatin (ZOCOR) 20 MG tablet Take 20 mg by mouth daily        sucralfate (CARAFATE) 1 GM tablet TAKE 1 TABLET TWICE DAILY 180 tablet 3     tamsulosin (FLOMAX) 0.4 MG capsule TAKE 1 CAPSULE EVERY DAY (Patient taking differently: TAKE 1 CAPSULE EVERY EVENING) 90 capsule 3     torsemide (DEMADEX) 20 MG tablet Take 1 tablet (20 mg) by mouth daily 30 tablet 0     triamcinolone (KENALOG) 0.025 % cream Apply topically 2 times daily as needed        ACE/ARB/ARNI NOT PRESCRIBED (INTENTIONAL) Please choose reason not prescribed, below (Patient not taking: Reported on 5/30/2019)         ALLERGIES   No Known Allergies    PAST MEDICAL HISTORY:  Past Medical History:   Diagnosis Date     Acute, but ill-defined, cerebrovascular disease     NO RESIDUALS     Antiplatelet or antithrombotic long-term use      Arrhythmia     a fib     Blood in stool      Coronary atherosclerosis of unspecified type of vessel, native or graft     S/P PTCA, EF 50%     Esophageal reflux      Hydrocele, unspecified      Hypercholesterolemia      Hypersomnia with sleep apnea, unspecified      Inguinal hernia without mention of obstruction or gangrene, recurrent unilateral or unspecified      Ischemic cardiomyopathy      Major depression in complete remission (H)      Mitral regurgitation     s/p MitraClip 5/20/19     Obese      Onychomycosis      JIM on CPAP      Other and unspecified hyperlipidemia      Other lymphedema     GROIN AND THIGHS     Other pancytopenia (H)      Other specified anemias      Overweight      Pacemaker      PAD (peripheral artery disease) (H)      Pancytopenia (H)      Persistent atrial fibrillation (H)     VVI PM for long pauses     Prostatic hypertrophy, benign      Pulmonary hypertension (H)      Scrotal varices      Thrombocytopenia, unspecified (H)      Umbilical hernia without  obstruction and without gangrene      Unilateral or unspecified femoral hernia without mention of obstruction or gangrene, unilateral or unspecified      Unspecified hypothyroidism      Venous stasis dermatitis of both lower extremities      Vitamin D deficiency        PAST SURGICAL HISTORY:  Past Surgical History:   Procedure Laterality Date     BONE MARROW BIOPSY, BONE SPECIMEN, NEEDLE/TROCAR N/A 4/19/2019    Procedure: BONE MARROW BIOPSY;  Surgeon: Yovani Cooley MD;  Location:  OR     CV HEART CATHETERIZATION WITH POSSIBLE INTERVENTION N/A 4/12/2019    Procedure: Heart Catheterization with possible Intervention;  Surgeon: Lev Beck MD;  Location:  HEART CARDIAC CATH LAB     CV MITRACLIP N/A 5/20/2019    Procedure: CV MITRAL CLIP;  Surgeon: Harshil Winter MD;  Location:  HEART CARDIAC CATH LAB     CV RIGHT HEART CATH N/A 4/12/2019    Procedure: Right Heart Cath;  Surgeon: Lev Beck MD;  Location:  HEART CARDIAC CATH LAB     EYE SURGERY      CATARACT/BLEPHAROPLASTY     GENITOURINARY SURGERY      TUNA     HERNIA REPAIR      RIGHT INGUINAL     HERNIORRHAPHY INGUINAL  8/14/2012    Procedure: HERNIORRHAPHY INGUINAL;  right inguinal hernia repair;  Surgeon: Kareem Torrez MD;  Location:  [facility]     HERNIORRHAPHY UMBILICAL N/A 10/31/2017    Procedure: HERNIORRHAPHY UMBILICAL;  UMBILICAL HERNIA REPAIR WITH MESH;  Surgeon: Scar Harrington MD;  Location: [facility]     IMPLANT PACEMAKER  8/2009    single chamber     [place]      [facility] [place]THOPEDIC SURGERY  2010    right TKR, left TKR     SURGICAL HISTORY OF -       Thumb surgery     SURGICAL HISTORY OF -   1990s    Left total knee replacement     SURGICAL HISTORY OF -   3/11    [facility] total knee replacement       FAMILY HISTORY:  Family History   Problem Relation Age of Onset     Cardiovascular Father      C.A.D. Father      Lung Cancer Sister      Unknown/Adopted Mother      Cancer - colorectal No family hx of         SOCIAL HISTORY:  Social History     Socioeconomic History     Marital status:      Spouse name: None     Number of children: None     Years of education: None     Highest education level: None   Occupational History     Occupation: Teacher, author, speaker     Employer: RETIRED   Social Needs     Financial resource strain: None     Food insecurity:     Worry: None     Inability: None     Transportation needs:     Medical: None     Non-medical: None   Tobacco Use     Smoking status: Never Smoker     Smokeless tobacco: Never Used   Substance and Sexual Activity     Alcohol use: Not Currently     Alcohol/week: 0.0 oz     Comment: 1/month     Drug use: No     Sexual activity: Never     Partners: Female   Lifestyle     Physical activity:     Days per week: None     Minutes per session: None     Stress: None   Relationships     Social connections:     Talks on phone: None     Gets together: None     Attends Spiritism service: None     Active member of club or organization: None     Attends meetings of clubs or organizations: None     Relationship status: None     Intimate partner violence:     Fear of current or ex partner: None     Emotionally abused: None     Physically abused: None     Forced sexual activity: None   Other Topics Concern     Parent/sibling w/ CABG, MI or angioplasty before 65F 55M? No      Service Not Asked     Blood Transfusions Not Asked     Caffeine Concern No     Comment: 1-2 cups coffee a day     Occupational Exposure Not Asked     Hobby Hazards Not Asked     Sleep Concern No     Stress Concern Yes     Comment: due to wifes health condition     Weight Concern No     Special Diet No     Back Care Not Asked     Exercise Yes     Comment: states he uses his tredmill on and off     Bike Helmet Not Asked     Seat Belt Yes     Self-Exams Not Asked   Social History Narrative     None       Review of Systems:  Skin:  Positive for bruising states has open wounds   Eyes:  Positive for  "glasses    ENT:  Positive for epistaxis    Respiratory:  Positive for dyspnea on exertion;sleep apnea;CPAP     Cardiovascular:  palpitations;chest pain;Negative;dizziness;lightheadedness;syncope or near-syncope;cyanosis Positive for;edema;fatigue    Gastroenterology: Negative      Genitourinary:  Positive for prostate problem    Musculoskeletal:  Positive for muscular weakness balance off, recent falls  Neurologic:  Positive for stroke;numbness or tingling of feet    Psychiatric:  Positive for anxiety;excessive stress    Heme/Lymph/Imm:  Positive for easy bruising    Endocrine:  Positive for thyroid disorder      Physical Exam:  Vitals: /74 (BP Location: Right arm, Cuff Size: Adult Regular)   Pulse 65   Ht 1.88 m (6' 2\")   Wt 85.5 kg (188 lb 8 oz)   BMI 24.20 kg/m      Constitutional:  cooperative;in no acute distress frail      Skin:  warm and dry to the touch bruises present pacemaker incision in the left infraclavicular area was well-healed some blistering on back and healed blisters on chest, back abrasion, multiple blisters.    Head:  normocephalic        Eyes:  pupils equal and round        Lymph:      ENT:  no pallor or cyanosis        Neck:  JVP normal        Respiratory:  clear to auscultation;normal symmetry    bilateral basilar crackles    Cardiac: regular rhythm       systolic murmur     Paced  pulses full and equal                                   left lower extremity below knee appears to have a hematoma, warm, tender, buldge on outer aspect just below knee    GI:  abdomen soft   benign    Extremities and Muscular Skeletal:  no deformities, clubbing, cyanosis, erythema observed stasis pigmentation     LLE edema;2+;3+      Neurological:  affect appropriate        Psych:  Alert and Oriented x 3 Depression  Encounter Diagnoses   Name Primary?     Coronary artery disease involving native coronary artery of native heart without angina pectoris Yes     Shortness of breath      Knee injury, left, " initial encounter      Chronic systolic congestive heart failure (H)      S/P mitral valve repair      Falls frequently      Hematoma of skin      Open wound        Recent Lab Results:  LIPID RESULTS:  Lab Results   Component Value Date    CHOL 92 04/25/2019    HDL 35 (L) 04/25/2019    LDL 48 04/25/2019    TRIG 45 04/25/2019    CHOLHDLRATIO 2.1 06/15/2015       LIVER ENZYME RESULTS:  Lab Results   Component Value Date    AST 22 05/20/2019    ALT 17 05/20/2019       CBC RESULTS:  Lab Results   Component Value Date    WBC 2.8 (L) 06/20/2019    RBC 2.58 (L) 06/20/2019    HGB 8.6 (L) 06/20/2019    HCT 26.3 (L) 06/20/2019     (H) 06/20/2019    MCH 33.3 (H) 06/20/2019    MCHC 32.7 06/20/2019    RDW 17.7 (H) 06/20/2019    PLT 69 (L) 06/20/2019       BMP RESULTS:  Lab Results   Component Value Date     06/20/2019    POTASSIUM 3.8 06/20/2019    CHLORIDE 104 06/20/2019    CO2 31 (H) 06/20/2019    ANIONGAP 8.8 06/20/2019     (H) 06/20/2019    BUN 23 06/20/2019    CR 1.63 (H) 06/20/2019    GFRESTIMATED 40 (L) 06/20/2019    GFRESTBLACK 49 (L) 06/20/2019    TEODORO 9.5 06/20/2019        A1C RESULTS:  Lab Results   Component Value Date    A1C 5.5 05/29/2019       INR RESULTS:  Lab Results   Component Value Date    INR 1.44 (H) 05/20/2019    INR 1.79 (H) 11/20/2018           CC  No referring provider defined for this encounter.

## 2019-06-28 ENCOUNTER — TELEPHONE (OUTPATIENT)
Dept: FAMILY MEDICINE | Facility: CLINIC | Age: 84
End: 2019-06-28

## 2019-06-28 ENCOUNTER — TELEPHONE (OUTPATIENT)
Dept: CARDIOLOGY | Facility: CLINIC | Age: 84
End: 2019-06-28

## 2019-06-28 NOTE — TELEPHONE ENCOUNTER
Reason for Call:  Home Health Care    Vick with [facility] called regarding (reason for call): orders    Orders are needed for this patient. Skilled nursing    PT: na    OT: na    Skilled Nursing: will Dr Nelson follow pt via phone and fax?     Pt Provider: Dr Nelson    Phone Number Homecare Nurse can be reached at: [phone]    Can we leave a detailed message on this number? YES    Phone number patient can be reached at: Home number on file [phone] (home)    Best Time: any    Call taken on 6/28/2019 at 10:41 AM by DARYN FRANK

## 2019-06-28 NOTE — TELEPHONE ENCOUNTER
RN reviewed with KI Reveles patient's knee x-ray (neg for fx). RN called patient's wife and provided them with the x-ray results. RN also advised patient's wife that we recommend patient be reevaluated by PMD to further assess knee and back to determine if further workup needed. RN also stressed the importance to patient's wife to bring patient in to ED for evaluation if he sustains another fall or symptoms worsen in his back or knee. Patient's wife verbalized understanding and is in agreement with plan. Patient's wife will call PMD's office and orthopedic office today to schedule apts.

## 2019-07-01 NOTE — TELEPHONE ENCOUNTER
Patient Contact    Attempt # 1    Was call answered?  No.  Left message on voicemail with provider answer to home care question. No further follow up needed. Home Care was told to call back if there are any further questions/concerns.

## 2019-07-02 ENCOUNTER — ANCILLARY PROCEDURE (OUTPATIENT)
Dept: CARDIOLOGY | Facility: CLINIC | Age: 84
End: 2019-07-02
Attending: INTERNAL MEDICINE
Payer: COMMERCIAL

## 2019-07-02 DIAGNOSIS — Z95.0 CARDIAC PACEMAKER IN SITU: ICD-10-CM

## 2019-07-02 PROCEDURE — 93293 PM PHONE R-STRIP DEVICE EVAL: CPT | Performed by: INTERNAL MEDICINE

## 2019-07-05 ENCOUNTER — TELEPHONE (OUTPATIENT)
Dept: FAMILY MEDICINE | Facility: CLINIC | Age: 84
End: 2019-07-05

## 2019-07-05 ENCOUNTER — TELEPHONE (OUTPATIENT)
Dept: CARDIOLOGY | Facility: CLINIC | Age: 84
End: 2019-07-05

## 2019-07-05 NOTE — TELEPHONE ENCOUNTER
Pt called requesting a refill of his Levothyroxine. Informed patient that Dr. Nelson manages his thyroid and he will need to call his office for a refill. Pt verbalized understanding.

## 2019-07-08 ENCOUNTER — TELEPHONE (OUTPATIENT)
Dept: CARDIOLOGY | Facility: CLINIC | Age: 84
End: 2019-07-08

## 2019-07-08 NOTE — TELEPHONE ENCOUNTER
VM from patient, requesting a call back regarding his upcoming appointments this week. Spoke with patient and confirmed his lab appointment and OV with Sofie PERRY on 7/9/19. Patient had no further questions.

## 2019-07-09 NOTE — PROGRESS NOTES
Angiogram instructions were called to patient and his wife.  Date: 3/21/2018 at 8:30, arrive at [facility] 6:30.  NPO after midnight except for meds.  Will hold Pradaxa x 3 days.  Continue ASA 81 mg including day of procedure.  Will need .  All questions answered.  
No

## 2019-07-10 ENCOUNTER — TELEPHONE (OUTPATIENT)
Dept: CARDIOLOGY | Facility: CLINIC | Age: 84
End: 2019-07-10

## 2019-07-10 ENCOUNTER — OFFICE VISIT (OUTPATIENT)
Dept: FAMILY MEDICINE | Facility: CLINIC | Age: 84
End: 2019-07-10
Payer: COMMERCIAL

## 2019-07-10 VITALS
WEIGHT: 185 LBS | RESPIRATION RATE: 12 BRPM | BODY MASS INDEX: 23.74 KG/M2 | DIASTOLIC BLOOD PRESSURE: 54 MMHG | HEIGHT: 74 IN | TEMPERATURE: 97.4 F | SYSTOLIC BLOOD PRESSURE: 98 MMHG | HEART RATE: 59 BPM | OXYGEN SATURATION: 98 %

## 2019-07-10 DIAGNOSIS — N18.30 CKD (CHRONIC KIDNEY DISEASE) STAGE 3, GFR 30-59 ML/MIN (H): ICD-10-CM

## 2019-07-10 DIAGNOSIS — I50.42 CHRONIC COMBINED SYSTOLIC AND DIASTOLIC CONGESTIVE HEART FAILURE (H): Primary | ICD-10-CM

## 2019-07-10 DIAGNOSIS — L30.9 DERMATITIS: ICD-10-CM

## 2019-07-10 DIAGNOSIS — I89.0 LYMPHEDEMA OF BOTH LOWER EXTREMITIES: ICD-10-CM

## 2019-07-10 DIAGNOSIS — I87.2 VENOUS STASIS DERMATITIS OF BOTH LOWER EXTREMITIES: ICD-10-CM

## 2019-07-10 DIAGNOSIS — T14.8XXA HEMATOMA OF SKIN: ICD-10-CM

## 2019-07-10 DIAGNOSIS — I25.10 CORONARY ARTERY DISEASE INVOLVING NATIVE CORONARY ARTERY OF NATIVE HEART WITHOUT ANGINA PECTORIS: Primary | ICD-10-CM

## 2019-07-10 PROCEDURE — 80051 ELECTROLYTE PANEL: CPT | Performed by: FAMILY MEDICINE

## 2019-07-10 PROCEDURE — 82565 ASSAY OF CREATININE: CPT | Performed by: FAMILY MEDICINE

## 2019-07-10 PROCEDURE — 84520 ASSAY OF UREA NITROGEN: CPT | Performed by: FAMILY MEDICINE

## 2019-07-10 PROCEDURE — 99214 OFFICE O/P EST MOD 30 MIN: CPT | Performed by: FAMILY MEDICINE

## 2019-07-10 PROCEDURE — 36415 COLL VENOUS BLD VENIPUNCTURE: CPT | Performed by: FAMILY MEDICINE

## 2019-07-10 RX ORDER — SPIRONOLACTONE 25 MG/1
TABLET ORAL
COMMUNITY
End: 2019-07-31

## 2019-07-10 RX ORDER — BUMETANIDE 0.5 MG/1
TABLET ORAL
COMMUNITY
Start: 2019-06-03 | End: 2019-07-31

## 2019-07-10 RX ORDER — POTASSIUM CHLORIDE 20MEQ/15ML
LIQUID (ML) ORAL
COMMUNITY
End: 2019-07-16

## 2019-07-10 RX ORDER — NIACINAMIDE 500 MG
TABLET ORAL
COMMUNITY
End: 2020-02-18

## 2019-07-10 ASSESSMENT — MIFFLIN-ST. JEOR: SCORE: 1578.9

## 2019-07-10 NOTE — TELEPHONE ENCOUNTER
Pt called stating that he accidentally missed his lab and OV with Sofie Genao yesterday and would like to rescheduled. Pt rescheduled for 19. New BMP placed as order .

## 2019-07-10 NOTE — NURSING NOTE
"Chief Complaint   Patient presents with     RECHECK     BP 98/54   Pulse 59   Temp 97.4  F (36.3  C) (Tympanic)   Resp 12   Ht 1.88 m (6' 2\")   Wt 83.9 kg (185 lb)   SpO2 98%   BMI 23.75 kg/m   Estimated body mass index is 23.75 kg/m  as calculated from the following:    Height as of this encounter: 1.88 m (6' 2\").    Weight as of this encounter: 83.9 kg (185 lb).  BP completed using cuff size: [place] Allen CMA    Health Maintenance Due   Topic Date Due     ZOSTER IMMUNIZATION (2 of 3) 06/26/2012     MAGGY ASSESSMENT  06/15/2016     Health Maintenance reviewed at today's visit patient asked to schedule/complete:   Depression:  Completed at today's visit.  Immunizations:  Patient agrees to schedule    "

## 2019-07-10 NOTE — PATIENT INSTRUCTIONS
The patient has a wound care nurse coming out to see him today.  I therefore left all the dry lesions uncovered.  I did put a dressing with a Vaseline gauze over the one that was bleeding on the left flank.  We will check electrolyte panel and his BUN and creatinine today.  He will follow-up with me in 1 month.  He has appointments already set up for his pacemaker check and will call and make an appointment with cardiology as he has not done that yet.

## 2019-07-10 NOTE — TELEPHONE ENCOUNTER
Received VM from patient to call back regarding his medications. Tried to call patient back. Spoke to patients wife and she stated patient was currently unavailable and she will have patient call back when available. Team 4 direct number given.

## 2019-07-10 NOTE — LETTER
July 12, 2019      Bairon Foster  [address] [address]        Dear ,    We are writing to inform you of your test results.    This is all pretty stable.  I will see you back in August.    Resulted Orders   Electrolyte panel (Na, K, Cl, CO2, Anion gap)   Result Value Ref Range    Sodium 139 133 - 144 mmol/L    Potassium 3.8 3.4 - 5.3 mmol/L    Chloride 105 94 - 109 mmol/L    Carbon Dioxide 28 20 - 32 mmol/L    Anion Gap 6 3 - 14 mmol/L   Creatinine   Result Value Ref Range    Creatinine 1.27 (H) 0.66 - 1.25 mg/dL    GFR Estimate 50 (L) >60 mL/min/[1.73_m2]      Comment:      Non  GFR Calc  Starting 12/18/2018, serum creatinine based estimated GFR (eGFR) will be   calculated using the Chronic Kidney Disease Epidemiology Collaboration   (CKD-EPI) equation.      GFR Estimate If Black 58 (L) >60 mL/min/[1.73_m2]      Comment:       GFR Calc  Starting 12/18/2018, serum creatinine based estimated GFR (eGFR) will be   calculated using the Chronic Kidney Disease Epidemiology Collaboration   (CKD-EPI) equation.     Urea nitrogen   Result Value Ref Range    Urea Nitrogen 23 7 - 30 mg/dL       If you have any questions or concerns, please call the clinic at the number listed above.       Sincerely,        Mg Nelson MD

## 2019-07-10 NOTE — PROGRESS NOTES
Subjective     Bairon Foster is a 88 year old male who presents to clinic today for the following health issues:    HPI     Falls      Duration: Ongoing    Description (location/character/radiation): After having Pace Maker installed, started to Fall more frequently    Intensity:  mild    Accompanying signs and symptoms: Wounds that are slow to heal    History (similar episodes/previous evaluation): None    Edema      Duration: Ongoing    Description (location/character/radiation): Mitral valve issues leading to the dependent edema in his legs.    Intensity:  moderate    Accompanying signs and symptoms: Swelling    History (similar episodes/previous evaluation): Yes, echocardiogram    Precipitating or alleviating factors: Recent mitral valve clip and patient is slowly starting to lose weight    Therapies tried and outcome: See above    Hypertension Follow-up      Outpatient blood pressures are not being checked.    Low Salt Diet: low salt      Amount of exercise or physical activity: 2-3 days/week for an average of 15-30 minutes    Problems taking medications regularly: No    Medication side effects: none    Diet: regular (no restrictions)    Rash      Duration: a few weeks    Description  Location: back  Itching: moderate    Intensity:  moderate    Accompanying signs and symptoms: None    History (similar episodes/previous evaluation): There is a questionable history of reaction to torsemide with his skin rash.  He is currently being followed by wound care nurse.    Precipitating or alleviating factors:  New exposures:  None  Recent travel: no      Therapies tried and outcome: none      Patient Active Problem List   Diagnosis     Atrial fibrillation (H)     Pulmonary hypertension (H)     CAD (coronary artery disease)     BPH (benign prostatic hyperplasia)     Ischemic cardiomyopathy     Anemia due to bone marrow failure, unspecified bone marrow failure type (H)     JIM (obstructive sleep apnea)- mild/moderate      Acute stress reaction     Health Care Home     Vitamin D deficiency     Advance Care Planning     Gastroesophageal reflux disease without esophagitis     Hydrocele, unspecified hydrocele type     Slow transit constipation     Hypercholesterolemia     Non morbid obesity due to excess calories w comorbid CAD< hi LDL      Venous stasis dermatitis of both lower extremities     Lymphedema of both lower extremities     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      Coronary artery disease due to lipid rich plaque     Acquired hypothyroidism     Onychomycosis     Overweight (BMI 25.0-29.9)     PAD (peripheral artery disease) (H)     Screening for prostate cancer     Blood in stool     Umbilical hernia without obstruction and without gangrene     Xerosis cutis     Status post coronary angiogram     AK (actinic keratosis)     Angioma of skin     Acute systolic CHF (congestive heart failure) (H)     CHF (congestive heart failure) (H)     GI bleed     Hematemesis     Epistaxis     Non-rheumatic mitral regurgitation     Mitral regurgitation     CKD (chronic kidney disease) stage 3, GFR 30-59 ml/min (H)     Past Surgical History:   Procedure Laterality Date     BONE MARROW BIOPSY, BONE SPECIMEN, NEEDLE/TROCAR N/A 4/19/2019    Procedure: BONE MARROW BIOPSY;  Surgeon: Yovani Cooley MD;  Location:  OR     CV HEART CATHETERIZATION WITH POSSIBLE INTERVENTION N/A 4/12/2019    Procedure: Heart Catheterization with possible Intervention;  Surgeon: Lev Beck MD;  Location:  HEART CARDIAC CATH LAB     CV MITRACLIP N/A 5/20/2019    Procedure: CV MITRAL CLIP;  Surgeon: Harshil Winter MD;  Location:  HEART CARDIAC CATH LAB     CV RIGHT HEART CATH N/A 4/12/2019    Procedure: Right Heart Cath;  Surgeon: Lev Beck MD;  Location:  HEART CARDIAC CATH LAB     EYE SURGERY      CATARACT/BLEPHAROPLASTY     GENITOURINARY SURGERY      TUNA     HERNIA REPAIR      RIGHT INGUINAL     HERNIORRHAPHY  "INGUINAL  8/14/2012    Procedure: HERNIORRHAPHY INGUINAL;  right inguinal hernia repair;  Surgeon: Kareem Torrez MD;  Location: [facility]     HERNIORRHAPHY UMBILICAL N/A 10/31/2017    Procedure: HERNIORRHAPHY UMBILICAL;  UMBILICAL HERNIA REPAIR WITH MESH;  Surgeon: Scar Harrington MD;  Location: [facility]     IMPLANT PACEMAKER  8/2009    single chamber     ORTHOPEDIC SURGERY      [facility] RIGHT     ORTHOPEDIC SURGERY  2010    right TKR, left TKR     SURGICAL HISTORY OF -       Thumb surgery     SURGICAL HISTORY OF -   1990s    Left total knee replacement     SURGICAL HISTORY OF -   3/11    Rig total knee replacement       Social History     Tobacco Use     Smoking status: Never Smoker     Smokeless tobacco: Never Used   Substance Use Topics     Alcohol use: Not Currently     Alcohol/week: 0.0 oz     Comment: 1/month     Family History   Problem Relation Age of Onset     Cardiovascular Father      C.A.D. Father      Lung Cancer Sister      Unknown/Adopted Mother      Cancer - colorectal No family hx of              Reviewed and updated as needed this visit by Provider         Review of Systems   ROS COMP: Constitutional, HEENT, cardiovascular, pulmonary, gi and gu systems are negative, except as otherwise noted.      Objective    BP 98/54   Pulse 59   Temp 97.4  F (36.3  C) (Tympanic)   Resp 12   Ht 1.88 m (6' 2\")   Wt 83.9 kg (185 lb)   SpO2 98%   BMI 23.75 kg/m    Body mass index is 23.75 kg/m .  Physical Exam   GENERAL APPEARANCE: healthy, alert and no distress  EYES: Eyes grossly normal to inspection, PERRL and conjunctivae and sclerae normal  NECK: no adenopathy and no asymmetry, masses, or scars  RESP: lungs clear to auscultation - no rales, rhonchi or wheezes  CV: regular rates and rhythm, normal S1 S2, no S3 or S4 and murmur unchanged from previous.  MS: There is a resolving hematoma just distal to the knee joint on the left side laterally from 1 of his previous falls.  According to the patient it has markedly " improved.  There is just trace edema in the left lower extremity.  Right lower extremity is normal.  SKIN: There are numerous lesions across the back and left side of the trunk one on the left side of the trunk which is open and when I remove the dressing started to bleed a little bit.  The remainder are all dry.  There are some that are fluid-filled 1 with blood on the left lower part of the back.            Assessment & Plan       ICD-10-CM    1. Chronic combined systolic and diastolic congestive heart failure (H) I50.42 Electrolyte panel (Na, K, Cl, CO2, Anion gap)     Creatinine     Urea nitrogen   2. CKD (chronic kidney disease) stage 3, GFR 30-59 ml/min (H) N18.3    3. Hematoma of skin T14.8XXA    4. Venous stasis dermatitis of both lower extremities I87.2    5. Lymphedema of both lower extremities I89.0    6. Dermatitis L30.9           Patient Instructions   The patient has a wound care nurse coming out to see him today.  I therefore left all the dry lesions uncovered.  I did put a dressing with a Vaseline gauze over the one that was bleeding on the left flank.  We will check electrolyte panel and his BUN and creatinine today.  He will follow-up with me in 1 month.  He has appointments already set up for his pacemaker check and will call and make an appointment with cardiology as he has not done that yet.      No follow-ups on file.    Mg Nelson MD  [facility]

## 2019-07-11 LAB
ANION GAP SERPL CALCULATED.3IONS-SCNC: 6 MMOL/L (ref 3–14)
BUN SERPL-MCNC: 23 MG/DL (ref 7–30)
CHLORIDE SERPL-SCNC: 105 MMOL/L (ref 94–109)
CO2 SERPL-SCNC: 28 MMOL/L (ref 20–32)
CREAT SERPL-MCNC: 1.27 MG/DL (ref 0.66–1.25)
GFR SERPL CREATININE-BSD FRML MDRD: 50 ML/MIN/{1.73_M2}
POTASSIUM SERPL-SCNC: 3.8 MMOL/L (ref 3.4–5.3)
SODIUM SERPL-SCNC: 139 MMOL/L (ref 133–144)

## 2019-07-12 ENCOUNTER — TELEPHONE (OUTPATIENT)
Dept: FAMILY MEDICINE | Facility: CLINIC | Age: 84
End: 2019-07-12

## 2019-07-12 NOTE — TELEPHONE ENCOUNTER
Our goal is to have forms completed within 72 hours, however some forms may require a visit or additional information.    What clinic location was the form placed at [facility] or [place].?     Who is the form from?   Where did the form come from? Faxed to clinic   The form was placed in the inbox of Mg Nelson MD      Please fax to [phone]  Phone number: [phone]    Additional comments: home health care PT eval?    Call take on 7/12/2019 at 4:41 PM by Maya Monterroso

## 2019-07-12 NOTE — TELEPHONE ENCOUNTER
Reason for Call:  Home Health Care    Ella with Home Health Care  Homecare called regarding verbal home care orders     Orders are needed for this patient.    PT: n/a    OT: n/a    Skilled Nursing: To use adaptic dressing to wound, to cover with a [place].     Pt Provider: Dr. Nelson     Phone Number Homecare Nurse can be reached at:   Ella: [phone]    Can we leave a detailed message on this number? Yes     Phone number patient can be reached at: n/a    Best Time: any    Call taken on 7/12/2019 at 2:16 PM by Priya Willis

## 2019-07-16 ENCOUNTER — HOSPITAL ENCOUNTER (OUTPATIENT)
Dept: WOUND CARE | Facility: CLINIC | Age: 84
Discharge: HOME | End: 2019-07-16
Attending: PHYSICIAN ASSISTANT | Admitting: PHYSICIAN ASSISTANT
Payer: COMMERCIAL

## 2019-07-16 VITALS
TEMPERATURE: 97.6 F | DIASTOLIC BLOOD PRESSURE: 74 MMHG | SYSTOLIC BLOOD PRESSURE: 109 MMHG | HEART RATE: 63 BPM | RESPIRATION RATE: 16 BRPM

## 2019-07-16 DIAGNOSIS — T14.8XXA MULTIPLE SKIN TEARS: ICD-10-CM

## 2019-07-16 DIAGNOSIS — S20.429A: ICD-10-CM

## 2019-07-16 DIAGNOSIS — R29.6 FALLS FREQUENTLY: ICD-10-CM

## 2019-07-16 DIAGNOSIS — T14.8XXA OPEN WOUND: ICD-10-CM

## 2019-07-16 PROCEDURE — 99203 OFFICE O/P NEW LOW 30 MIN: CPT | Performed by: PHYSICIAN ASSISTANT

## 2019-07-16 PROCEDURE — G0463 HOSPITAL OUTPT CLINIC VISIT: HCPCS

## 2019-07-16 NOTE — DISCHARGE INSTRUCTIONS
[facility]  [address] [facility] [address], [place] [address]  Appointment Phone [phone] Nurse Advisors [phone]    Bairon Foster      2/6/1931    [facility] Phone: [phone] Fax: [phone]  [facility] Phone [phone]    Wound Dressing Change to left lateral thigh, left lateral chest and back blisters  Cleanse wounds and surrounding skin with: wound cleanser  Cover wounds with Tegagerm films  Change dressing leave in place until follow up next week in our clinic. Home care can replace films as needed if they fall off prior to follow up with our clinic.     Mercy Fisher PA-C. July 16, 2019    Call us at [phone] if you have any questions about your wounds, have redness or swelling around your wound, have a fever of 101 or greater or if you have any other problems or concerns. We answer the phone Monday through Friday 8 am to 4 pm, please leave a message as we check the voicemail frequently throughout the day.     Follow up with the nursing staff weekly until healed

## 2019-07-16 NOTE — PROGRESS NOTES
Patient arrived for wound care visit. Certified Wound Care Nurse time spent evaluating patient record, completed a full evaluation and documented wound(s) & marry-wound skin; provided recommendation based on treatment plan. Applied dressing, reviewed discharge instructions, patient education, and discussed plan of care with appropriate medical team staff members and patient and/or family members.

## 2019-07-16 NOTE — PROGRESS NOTES
[facility]    HISTORY OF PRESENT ILLNESS:   Bairon Foster is a 88 year old male who presents today for skin tears on his trunk. He sustained several skin tears after tele-monitoring in May. Home health has tried various salves and cover dressing without much success. Has very thin skin and prone to skin tears. Also notes large hematoma on left leg he sustained traumatically. Is now no longer on blood thinners. No open sores or signs of necrosis.     REVIEW OF SYSTEMS:  CONSTITUTIONAL: Denies fevers or acute illness    PAST MEDICAL HISTORY:  has a past medical history of Acute, but ill-defined, cerebrovascular disease, Antiplatelet or antithrombotic long-term use, Arrhythmia, Blood in stool, Coronary atherosclerosis of unspecified type of vessel, native or graft, Esophageal reflux, Hydrocele, unspecified, Hypercholesterolemia, Hypersomnia with sleep apnea, unspecified, Inguinal hernia without mention of obstruction or gangrene, recurrent unilateral or unspecified, Ischemic cardiomyopathy, Major depression in complete remission (H), Mitral regurgitation, Obese, Onychomycosis, JIM on CPAP, Other and unspecified hyperlipidemia, Other lymphedema, Other pancytopenia (H), Other specified anemias, Overweight, Pacemaker, PAD (peripheral artery disease) (H), Pancytopenia (H), Persistent atrial fibrillation (H), Prostatic hypertrophy, benign, Pulmonary hypertension (H), Scrotal varices, Thrombocytopenia, unspecified (H), Umbilical hernia without obstruction and without gangrene, Unilateral or unspecified femoral hernia without mention of obstruction or gangrene, unilateral or unspecified, Unspecified hypothyroidism, Venous stasis dermatitis of both lower extremities, and Vitamin D deficiency.    SOCIAL HISTORY: , we see his wife Ursula in our clinic  TOBACCO STATUS:  reports that he has never smoked. He has never used smokeless tobacco.    MEDICATIONS:   Current Outpatient Medications   Medication      ACE/ARB/ARNI NOT PRESCRIBED (INTENTIONAL)     acetaminophen (TYLENOL) 500 MG tablet     aspirin 81 MG EC tablet     bumetanide (BUMEX) 0.5 MG tablet     buPROPion (WELLBUTRIN XL) 150 MG 24 hr tablet     Carboxymethylcellulose Sod PF (CELLUVISC/REFRESH LIQUIGEL) 1 % ophthalmic solution     carvedilol (COREG) 6.25 MG tablet     COMPRESSION STOCKINGS     diclofenac (VOLTAREN) 1 % topical gel     doxycycline hyclate (VIBRA-TABS) 100 MG tablet     finasteride (PROSCAR) 5 MG tablet     glucosamine-chondroitin 500-400 MG CAPS per capsule     ketotifen (ZADITOR) 0.025 % SOLN ophthalmic solution     KLOR-CON 20 MEQ CR tablet     levothyroxine (SYNTHROID/LEVOTHROID) 100 MCG tablet     LORazepam (ATIVAN) 1 MG tablet     Multiple Vitamins-Minerals (CENTRUM SILVER) per tablet     niacinamide (NIACIN) 500 MG tablet     omeprazole (PRILOSEC) 20 MG DR capsule     ORDER FOR DME     Polyethylene Glycol 3350 (MIRALAX PO)     simvastatin (ZOCOR) 20 MG tablet     spironolactone (ALDACTONE) 25 MG tablet     sucralfate (CARAFATE) 1 GM tablet     tamsulosin (FLOMAX) 0.4 MG capsule     torsemide (DEMADEX) 20 MG tablet     triamcinolone (KENALOG) 0.025 % cream     No current facility-administered medications for this encounter.        VITALS: /74 (BP Location: Left arm)   Pulse 63   Temp 97.6  F (36.4  C) (Temporal)   Resp 16      PERTINENT LABS:    Recent Labs   Lab Test 06/20/19  1209 05/29/19  1023  05/20/19  1300 05/20/19  0628   ALBUMIN  --   --   --  3.0* 3.8   HGB 8.6* 9.0*   < > 8.7* 10.2*   INR  --   --   --   --  1.44*   WBC 2.8* 2.8*   < > 2.9* 2.8*   A1C  --  5.5  --   --   --     < > = values in this interval not displayed.     PHYSICAL EXAM:  GENERAL: Patient is alert and oriented and in no acute distress  INTEGUMENTARY: scattered partial-thickness skin tears on trunk        ASSESSMENT:   1. Skin tears on trunk  2. Adherence to plan of care - unknown as this is initial assessment    PLAN/DISCUSSION:   1. Wound care  plan: protect with Tegaderm film, leave on until next visit in one week, if falls off before then can reapply film    FOLLOW-UP: 1 week    CARMINE RODRIGUEZ) YANI DUVALL

## 2019-07-17 DIAGNOSIS — N40.0 BENIGN PROSTATIC HYPERPLASIA, UNSPECIFIED WHETHER LOWER URINARY TRACT SYMPTOMS PRESENT: ICD-10-CM

## 2019-07-18 RX ORDER — FINASTERIDE 5 MG/1
TABLET, FILM COATED ORAL
Qty: 90 TABLET | Refills: 3 | Status: SHIPPED | OUTPATIENT
Start: 2019-07-18 | End: 2020-01-15

## 2019-07-18 NOTE — TELEPHONE ENCOUNTER
Requested Prescriptions   Pending Prescriptions Disp Refills     finasteride (PROSCAR) 5 MG tablet [Pharmacy Med Name: FINASTERIDE 5 MG Tablet]  Last Written Prescription Date:  5/22/2018  Last Fill Quantity: 90 tablet,  # refills: 3   Last office visit: 7/10/2019 with prescribing provider:  Leslie   Future Office Visit:   Next 5 appointments (look out 90 days)    Jul 23, 2019  2:00 PM CDT  Return Visit with  WOUND HEALING NURSE  [facility] [place] ([facility]) [address]  [address]  [facility] [address]  [phone]   Jul 26, 2019 10:10 AM CDT  Return Visit with WILLIS Soni CNP  [facility] ([facility]) [address] [facility] [address]  [facility] [address]  [phone] OPT 2   Jul 30, 2019  2:00 PM CDT  Return Visit with  WOUND HEALING NURSE  [facility] [place] ([facility]) [address]  [address]  [facility] [address]  [phone]   Aug 08, 2019  1:30 PM CDT  Return Visit with  LAB DRAW 1  [facility] ([facility]) [facility]  [address]  [facility] [address]  [phone]   Aug 08, 2019  2:40 PM CDT  Return Visit with Donald Chavez MD  [facility] ([facility]) [facility]  [address]  [facility] [address]  [phone]          90 tablet 3     Sig: TAKE 1 TABLET EVERY DAY       Alpha Blockers Passed - 7/17/2019  7:13 PM        Passed - Blood pressure under 140/90 in past 12 months     BP Readings from Last 3 Encounters:   07/16/19 109/74   07/10/19 98/54   06/27/19 118/74                 Passed - Recent (12 mo) or future (30 days) visit within the authorizing provider's specialty     Patient had office visit in the last 12 months or has a visit in the next 30 days with authorizing provider or within the  "authorizing provider's specialty.  See \"Patient Info\" tab in inbasket, or \"Choose Columns\" in Meds & Orders section of the refill encounter.              Passed - Patient does not have Tadalafil, Vardenafil, or Sildenafil on their medication list        Passed - Medication is active on med list        Passed - Patient is 18 years of age or older           "

## 2019-07-19 ENCOUNTER — TELEPHONE (OUTPATIENT)
Dept: WOUND CARE | Facility: CLINIC | Age: 84
End: 2019-07-19

## 2019-07-19 NOTE — TELEPHONE ENCOUNTER
Reason for Call:  Bairon S Wilson   called regarding burning under tegaderm dressings that were placed at dermatology office yesterday     Pt Provider: Mercy Fisher PA-C     Phone Number can be reached at: [phone]    Reviewed Patients chart. [facility]. They applied Clobetasol cream under the tegaderm, he wants to come to our office to get the dressings removed. Advised that we do not have a provider in the office to manage this and differed [facility] back to [facility].   Pl[facility]. Guy at [facility] to notify of his problems and intolerance of clobetasol. Requested she follow up with Patient regarding different treatment.   verbalized understanding and agrees with the plan.    Angie Cruz RN on 7/19/2019 at 9:59 AM

## 2019-07-23 ENCOUNTER — TELEPHONE (OUTPATIENT)
Dept: FAMILY MEDICINE | Facility: CLINIC | Age: 84
End: 2019-07-23

## 2019-07-23 NOTE — TELEPHONE ENCOUNTER
Our goal is to have forms completed within 72 hours, however some forms may require a visit or additional information.    What clinic location was the form placed at [facility] or [place].?     Who is the form from?   Where did the form come from? Faxed to clinic   The form was placed in the inbox of Mg Nelson MD      Please fax to [phone]  Phone number: [phone]    Additional comments: home health care RN /LPN orders     Call take on 7/23/2019 at 10:48 AM by Maya Monterroso

## 2019-07-23 NOTE — TELEPHONE ENCOUNTER
Our goal is to have forms completed within 72 hours, however some forms may require a visit or additional information.    What clinic location was the form placed at [facility] or [place].?     Who is the form from?   Where did the form come from? Faxed to clinic   The form was placed in the inbox of Mg Nelson MD      Please fax to [phone]  Phone number: [phone]    Additional comments: home health care OT eval completed , unable to schedule w/in 48 hours     Call take on 7/23/2019 at 10:56 AM by Maya Monterroso

## 2019-07-24 ENCOUNTER — TELEPHONE (OUTPATIENT)
Dept: FAMILY MEDICINE | Facility: CLINIC | Age: 84
End: 2019-07-24

## 2019-07-24 NOTE — TELEPHONE ENCOUNTER
Our goal is to have forms completed within 72 hours, however some forms may require a visit or additional information.    What clinic location was the form placed at [facility] or [place].?     Who is the form from?   Where did the form come from? Faxed to clinic   The form was placed in the inbox of Mg Nelson MD      Please fax to [phone]  Phone number: [phone]    Additional comments: home health wound care //  RN, PT, &OT ORDERS     Call take on 7/24/2019 at 11:28 AM by Maya Monterroso

## 2019-07-29 ENCOUNTER — TELEPHONE (OUTPATIENT)
Dept: FAMILY MEDICINE | Facility: CLINIC | Age: 84
End: 2019-07-29

## 2019-07-29 NOTE — TELEPHONE ENCOUNTER
Our goal is to have forms completed within 72 hours, however some forms may require a visit or additional information.    What clinic location was the form placed at [facility] or [place].?     Who is the form from?   Where did the form come from? Faxed to clinic   The form was placed in the inbox of Mg Nelson MD      Please fax to [phone]  Phone number: [phone]    Additional comments: home health OT eval     Call take on 7/29/2019 at 10:47 AM by Maya Monterroso

## 2019-07-30 DIAGNOSIS — D61.9 ANEMIA DUE TO BONE MARROW FAILURE, UNSPECIFIED BONE MARROW FAILURE TYPE (H): Primary | ICD-10-CM

## 2019-07-31 ENCOUNTER — OFFICE VISIT (OUTPATIENT)
Dept: CARDIOLOGY | Facility: CLINIC | Age: 84
End: 2019-07-31
Payer: COMMERCIAL

## 2019-07-31 VITALS
HEIGHT: 74 IN | OXYGEN SATURATION: 98 % | SYSTOLIC BLOOD PRESSURE: 102 MMHG | BODY MASS INDEX: 24.36 KG/M2 | DIASTOLIC BLOOD PRESSURE: 66 MMHG | HEART RATE: 64 BPM | WEIGHT: 189.8 LBS

## 2019-07-31 DIAGNOSIS — I89.0 LYMPHEDEMA OF BOTH LOWER EXTREMITIES: ICD-10-CM

## 2019-07-31 DIAGNOSIS — I25.10 CORONARY ARTERY DISEASE INVOLVING NATIVE CORONARY ARTERY OF NATIVE HEART WITHOUT ANGINA PECTORIS: ICD-10-CM

## 2019-07-31 DIAGNOSIS — I50.22 CHRONIC SYSTOLIC CONGESTIVE HEART FAILURE (H): ICD-10-CM

## 2019-07-31 DIAGNOSIS — I34.0 NON-RHEUMATIC MITRAL REGURGITATION: ICD-10-CM

## 2019-07-31 DIAGNOSIS — D61.9 ANEMIA DUE TO BONE MARROW FAILURE, UNSPECIFIED BONE MARROW FAILURE TYPE (H): ICD-10-CM

## 2019-07-31 DIAGNOSIS — I87.2 VENOUS STASIS DERMATITIS OF BOTH LOWER EXTREMITIES: ICD-10-CM

## 2019-07-31 DIAGNOSIS — I48.20 CHRONIC ATRIAL FIBRILLATION (H): Primary | ICD-10-CM

## 2019-07-31 LAB
ANION GAP SERPL CALCULATED.3IONS-SCNC: 12.6 MMOL/L (ref 6–17)
BASOPHILS # BLD AUTO: 0.1 10E9/L (ref 0–0.2)
BASOPHILS NFR BLD AUTO: 1.9 %
BUN SERPL-MCNC: 21 MG/DL (ref 7–30)
CALCIUM SERPL-MCNC: 9.6 MG/DL (ref 8.5–10.5)
CHLORIDE SERPL-SCNC: 104 MMOL/L (ref 98–107)
CO2 SERPL-SCNC: 27 MMOL/L (ref 23–29)
CREAT SERPL-MCNC: 1.33 MG/DL (ref 0.7–1.3)
DIFFERENTIAL METHOD BLD: ABNORMAL
EOSINOPHIL # BLD AUTO: 0.3 10E9/L (ref 0–0.7)
EOSINOPHIL NFR BLD AUTO: 9.9 %
ERYTHROCYTE [DISTWIDTH] IN BLOOD BY AUTOMATED COUNT: 16.3 % (ref 10–15)
GFR SERPL CREATININE-BSD FRML MDRD: 51 ML/MIN/{1.73_M2}
GLUCOSE SERPL-MCNC: 156 MG/DL (ref 70–105)
HCT VFR BLD AUTO: 27.7 % (ref 40–53)
HGB BLD-MCNC: 8.8 G/DL (ref 13.3–17.7)
IMM GRANULOCYTES # BLD: 0 10E9/L (ref 0–0.4)
IMM GRANULOCYTES NFR BLD: 0 %
LYMPHOCYTES # BLD AUTO: 0.8 10E9/L (ref 0.8–5.3)
LYMPHOCYTES NFR BLD AUTO: 25.6 %
MCH RBC QN AUTO: 32.1 PG (ref 26.5–33)
MCHC RBC AUTO-ENTMCNC: 31.8 G/DL (ref 31.5–36.5)
MCV RBC AUTO: 101 FL (ref 78–100)
MONOCYTES # BLD AUTO: 0.2 10E9/L (ref 0–1.3)
MONOCYTES NFR BLD AUTO: 6.8 %
NEUTROPHILS # BLD AUTO: 1.8 10E9/L (ref 1.6–8.3)
NEUTROPHILS NFR BLD AUTO: 55.8 %
NRBC # BLD AUTO: 0 10*3/UL
NRBC BLD AUTO-RTO: 0 /100
PLATELET # BLD AUTO: 83 10E9/L (ref 150–450)
POTASSIUM SERPL-SCNC: 3.6 MMOL/L (ref 3.5–5.1)
RBC # BLD AUTO: 2.74 10E12/L (ref 4.4–5.9)
SODIUM SERPL-SCNC: 140 MMOL/L (ref 136–145)
WBC # BLD AUTO: 3.2 10E9/L (ref 4–11)

## 2019-07-31 PROCEDURE — 80048 BASIC METABOLIC PNL TOTAL CA: CPT | Performed by: INTERNAL MEDICINE

## 2019-07-31 PROCEDURE — 36415 COLL VENOUS BLD VENIPUNCTURE: CPT | Performed by: INTERNAL MEDICINE

## 2019-07-31 PROCEDURE — 85025 COMPLETE CBC W/AUTO DIFF WBC: CPT | Performed by: NURSE PRACTITIONER

## 2019-07-31 PROCEDURE — 99214 OFFICE O/P EST MOD 30 MIN: CPT | Performed by: NURSE PRACTITIONER

## 2019-07-31 RX ORDER — TORSEMIDE 20 MG/1
20 TABLET ORAL DAILY
Qty: 90 TABLET | Refills: 3 | Status: SHIPPED | OUTPATIENT
Start: 2019-07-31 | End: 2019-08-21

## 2019-07-31 ASSESSMENT — MIFFLIN-ST. JEOR: SCORE: 1600.68

## 2019-07-31 NOTE — PROGRESS NOTES
Cardiology Clinic Progress Note  Bairon Foster MRN# [id number]   YOB: 1931 Age: 88 year old     Reason For Visit:  Follow up   Primary Cardiologist:   Dr. 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    On May 20, 2019, he underwent MitraClip procedure using 2 clips.  Follow-up echocardiogram demonstrated an ejection fraction of 40%, mildly dilated left ventricle, mild global hypokinesia of the left ventricle, flattened septum consistent with RV pressure/volume overload.  2 mitral clips are noted to be attached to the mitral leaflets, mild to moderate mitral regurgitation, moderate to severely dilated right ventricle, and mildly decreased right ventricular systolic function.     He followed up in clinic on 2 separate occasions in June with complaints of worsening shortness of breath, bilateral 2+ pitting LE edema w/ blisters, and frequent falling. It was recommended his anticoagulation be discontinued due to high risk of falls and frequent episodes of epistaxis.  He was initially considered for possible Watchman. Unfortunately, he is at risk for taking  anticoagulation for the required 45 days following the implant. He was switched from Bumex to torsemide for better diuresis which has shown signifiicant improvement in his breathing. He was noted to have significant left knee swelling and underwent a Xray that showed no evidence of fracture or loosening. [facility]     Today, he presents to the office today for routine follow up. He is  feeling well on a cardiac standpoint denies chest pain, shortness of breath, PND, orthopnea, presyncope, syncope, heart racing, or palpitations.  He continues with LE edema, managed with Torsemide and compression stockings. He recently started on Clobetasol for the management of his chronic  abdominal and LE leg lesions possibly related to Torsemide. Continues with daily dressing changes.     Blood pressure is well controlled at 102-66  BMP today showed a sodium of 140, potassium 3.6, BUN 21, Creatinine 1.33 and GFR 51  BMI 24.37  He is following closely with the Device clinic as his pacemaker is nearing MADHAV.     He is currently receiving in home PT/OT. Balance is improving with the assistance of a cane.   Follows a heart healthy diet  Remains compliant with all medications.                   Assessment and Plan:     Mr. 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  His 1 month post MitraClip echocardiogram demonstrated an ejection fraction of 40%, mildly dilated left ventricle, mild global hypokinesia of the left ventricle, flattened septum consistent with RV pressure/volume overload.  2 mitral clips are noted to be attached to the mitral leaflets, mild to moderate mitral regurgitation, moderate to severely dilated right ventricle, and mildly decreased right ventricular systolic function.  He reports a significant improvement in his shortness of breath on Torsemide. Creatinine level has improved, 1.33 today. I will continue with current dose of Torsemide. He continues with 1+ bilateral pitting edema, managed with diuretics  and compression stockings. He continues to show overall improvement in his symptoms and balance. Continue with PT/OT for continued strength training. She he continue to improve,  I would recommend he be reevaluated by Dr. Hope for consideration of possible Watchman. Follow up with device clinic regarding generator change out once MADHAV is reached. Continue on current medical therapy.                   Thank you for allowing me to participate in this delightful patient's care. Recommend follow up with Dr. Hope in 3 months       WILLIS Soni, CNP          Review of Systems:   Review of Systems:  Skin:  Positive for bruising   Eyes:  Positive for glasses  ENT:  Positive for epistaxis  Respiratory:  Positive for dyspnea on exertion;sleep apnea;CPAP  Cardiovascular:  palpitations;chest pain;Negative;dizziness;lightheadedness;syncope or near-syncope;cyanosis Positive for;edema;fatigue  Gastroenterology: Negative poor appetite  Genitourinary:  Positive for prostate problem  Musculoskeletal:  Positive for muscular weakness  Neurologic:  Positive for stroke;numbness or tingling of feet  Psychiatric:  Positive for anxiety;excessive stress  Heme/Lymph/Imm:  Positive for easy bruising  Endocrine:  Positive for thyroid disorder              Physical Exam:     GEN:  In general, this is a well nourished male in no acute distress.  HEENT:  Pupils equal, round. Sclerae nonicteric. Clear oropharynx. Mucous membranes moist.  NECK: Supple, no masses appreciated. Trachea midline. Hard to assess JVD   C/V:  Regular rate and irregular rhythm, systolic murmur, no rub or gallop. No S3 or RV heave.   RESP: Respirations are unlabored. No use of accessory muscles. Bilateral crackles  GI: Abdomen soft, nontender, nondistended. No HSM appreciated.   EXTREM: 1+ pitting LE edema. No cyanosis or clubbing.  NEURO: Alert and oriented, cooperative. Ambulates with cane.   PSYCH: Normal affect.  SKIN: Warm and dry. No rashes or petechiae  appreciated. Dressings to abdomen and left LE. C/ D/I          Past Medical History:     Past Medical History:   Diagnosis Date     Acute, but ill-defined, cerebrovascular disease     NO RESIDUALS     Antiplatelet or antithrombotic long-term use      Arrhythmia     a fib     Blood in stool      Coronary atherosclerosis of unspecified type of vessel, native or graft     S/P PTCA, EF 50%     Esophageal reflux      Hydrocele, unspecified      Hypercholesterolemia      Hypersomnia with sleep apnea, unspecified      Inguinal hernia without mention of obstruction or gangrene, recurrent unilateral or unspecified      Ischemic cardiomyopathy      Major depression in complete remission (H)      Mitral regurgitation     s/p MitraClip 5/20/19     Obese      Onychomycosis      JIM on CPAP      Other and unspecified hyperlipidemia      Other lymphedema     GROIN AND THIGHS     Other pancytopenia (H)      Other specified anemias      Overweight      Pacemaker      PAD (peripheral artery disease) (H)      Pancytopenia (H)      Persistent atrial fibrillation (H)     VVI PM for long pauses     Prostatic hypertrophy, benign      Pulmonary hypertension (H)      Scrotal varices      Thrombocytopenia, unspecified (H)      Umbilical hernia without obstruction and without gangrene      Unilateral or unspecified femoral hernia without mention of obstruction or gangrene, unilateral or unspecified      Unspecified hypothyroidism      Venous stasis dermatitis of both lower extremities      Vitamin D deficiency               Past Surgical History:     Past Surgical History:   Procedure Laterality Date     BONE MARROW BIOPSY, BONE SPECIMEN, NEEDLE/TROCAR N/A 4/19/2019    Procedure: BONE MARROW BIOPSY;  Surgeon: Yovani Cooley MD;  Location:  OR     CV HEART CATHETERIZATION WITH POSSIBLE INTERVENTION N/A 4/12/2019    Procedure: Heart Catheterization with possible Intervention;  Surgeon: Lev Beck MD;  Location:  HEART  CARDIAC CATH LAB     CV MITRACLIP N/A 5/20/2019    Procedure: CV MITRAL CLIP;  Surgeon: Harshil Winter MD;  Location:  HEART CARDIAC CATH LAB     CV RIGHT HEART CATH N/A 4/12/2019    Procedure: Right Heart Cath;  Surgeon: Lev Beck MD;  Location:  HEART CARDIAC CATH LAB     EYE SURGERY      CATARACT/BLEPHAROPLASTY     GENITOURINARY SURGERY      TUNA     HERNIA REPAIR      RIGHT INGUINAL     HERNIORRHAPHY INGUINAL  8/14/2012    Procedure: HERNIORRHAPHY INGUINAL;  right inguinal hernia repair;  Surgeon: Kareem Torrez MD;  Location: [facility]     HERNIORRHAPHY UMBILICAL N/A 10/31/2017    Procedure: HERNIORRHAPHY UMBILICAL;  UMBILICAL HERNIA REPAIR WITH MESH;  Surgeon: Scar Harrington MD;  Location: [facility]     IMPLANT PACEMAKER  8/2009    single chamber     ORTHOPEDIC SURGERY      [facility] RIGHT     ORTHOPEDIC SURGERY  2010    right TKR, left TKR     SURGICAL HISTORY OF -       Thumb surgery     SURGICAL HISTORY OF -   1990s    Left total knee replacement     SURGICAL HISTORY OF -   3/11    [facility] total knee replacement              Allergies:   Patient has no known allergies.       Data:   All laboratory data reviewed:    LAST CHOLESTEROL:  Lab Results   Component Value Date    CHOL 92 04/25/2019     Lab Results   Component Value Date    HDL 35 04/25/2019     Lab Results   Component Value Date    LDL 48 04/25/2019     Lab Results   Component Value Date    TRIG 45 04/25/2019     Lab Results   Component Value Date    CHOLHDLRATIO 2.1 06/15/2015       LAST BMP:  Lab Results   Component Value Date     06/20/2019      Lab Results   Component Value Date    POTASSIUM 3.8 06/20/2019     Lab Results   Component Value Date    CHLORIDE 104 06/20/2019     Lab Results   Component Value Date    TEODORO 9.5 06/20/2019     Lab Results   Component Value Date    CO2 31 06/20/2019     Lab Results   Component Value Date    BUN 23 06/20/2019     Lab Results   Component Value Date    CR 1.63 06/20/2019     Lab Results   Component  Value Date     06/20/2019       LAST CBC:  Lab Results   Component Value Date    WBC 2.8 06/20/2019     Lab Results   Component Value Date    RBC 2.58 06/20/2019     Lab Results   Component Value Date    HGB 8.6 06/20/2019     Lab Results   Component Value Date    HCT 26.3 06/20/2019     Lab Results   Component Value Date     06/20/2019     Lab Results   Component Value Date    MCH 33.3 06/20/2019     Lab Results   Component Value Date    MCHC 32.7 06/20/2019     Lab Results   Component Value Date    RDW 17.7 06/20/2019     Lab Results   Component Value Date    PLT 69 06/20/2019

## 2019-07-31 NOTE — LETTER
7/31/2019    Mg Nelson MD  [address]  [facility] [address]    RE: Bairon Foster       Dear Colleague,    I had the pleasure of seeing Bairon Foster in the [facility].    Cardiology Clinic Progress Note  Bairon Foster MRN# [id number]   YOB: 1931 Age: 88 year old     Reason For Visit:  Follow up   Primary Cardiologist:   Dr. 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    On May 20, 2019, he underwent MitraClip procedure using 2 clips.  Follow-up echocardiogram demonstrated an ejection fraction of 40%, mildly dilated left ventricle, mild global hypokinesia of the left ventricle, flattened septum consistent with RV pressure/volume overload.  2 mitral clips are noted to be attached to the mitral leaflets, mild to moderate mitral regurgitation, moderate to severely dilated right ventricle, and mildly decreased right ventricular systolic function.     He followed up in clinic on 2 separate occasions in June with complaints of worsening shortness of breath, bilateral 2+ pitting LE edema w/ blisters, and frequent falling. It was recommended his anticoagulation be discontinued due to high risk of falls and frequent episodes of epistaxis.  He was initially considered for possible Watchman. Unfortunately, he is at risk for taking  anticoagulation for the required 45 days following the implant. He was switched from Bumex to torsemide for better diuresis which has shown signifiicant improvement in his breathing. He was noted  to have significant left knee swelling and underwent a Xray that showed no evidence of fracture or loosening. [facility]     Today, he presents to the office today for routine follow up. He is feeling well on a cardiac standpoint denies chest pain, shortness of breath, PND, orthopnea, presyncope, syncope, heart racing, or palpitations.  He continues with LE edema, managed with Torsemide and compression stockings. He recently started on Clobetasol for the management of his chronic  abdominal and LE leg lesions possibly related to Torsemide. Continues with daily dressing changes.     Blood pressure is well controlled at 102-66  BMP today showed a sodium of 140, potassium 3.6, BUN 21, Creatinine 1.33 and GFR 51  BMI 24.37  He is following closely with the Device clinic as his pacemaker is nearing MADHAV.     He is currently receiving in home PT/OT. Balance is improving with the assistance of a cane.   Follows a heart healthy diet  Remains compliant with all medications.                   Assessment and Plan:     Mr. 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  His 1 month post MitraClip echocardiogram demonstrated an ejection fraction of 40%, mildly dilated left ventricle, mild global hypokinesia of the left ventricle, flattened septum consistent with RV pressure/volume overload.  2 mitral clips are noted to be attached to the mitral leaflets, mild to moderate mitral regurgitation, moderate to severely dilated right ventricle, and mildly decreased right ventricular systolic function.  He reports a  significant improvement in his shortness of breath on Torsemide. Creatinine level has improved, 1.33 today. I will continue with current dose of Torsemide. He continues with 1+ bilateral pitting edema, managed with diuretics and compression stockings. He continues to show overall improvement in his symptoms and balance. Continue with PT/OT for continued strength training. She he continue to improve,  I would recommend he be reevaluated by Dr. Hope for consideration of possible Watchman. Follow up with device clinic regarding generator change out once MADHAV is reached. Continue on current medical therapy.                   Thank you for allowing me to participate in this delightful patient's care. Recommend follow up with Dr. Hope in 3 months       WILLIS Soni, CNP          Review of Systems:   Review of Systems:  Skin:  Positive for bruising   Eyes:  Positive for glasses  ENT:  Positive for epistaxis  Respiratory:  Positive for dyspnea on exertion;sleep apnea;CPAP  Cardiovascular:  palpitations;chest pain;Negative;dizziness;lightheadedness;syncope or near-syncope;cyanosis Positive for;edema;fatigue  Gastroenterology: Negative poor appetite  Genitourinary:  Positive for prostate problem  Musculoskeletal:  Positive for muscular weakness  Neurologic:  Positive for stroke;numbness or tingling of feet  Psychiatric:  Positive for anxiety;excessive stress  Heme/Lymph/Imm:  Positive for easy bruising  Endocrine:  Positive for thyroid disorder              Physical Exam:     GEN:  In general, this is a well nourished male in no acute distress.  HEENT:  Pupils equal, round. Sclerae nonicteric. Clear oropharynx. Mucous membranes moist.  NECK: Supple, no masses appreciated. Trachea midline. Hard to assess JVD   C/V:  Regular rate and irregular rhythm, systolic murmur, no rub or gallop. No S3 or RV heave.   RESP: Respirations are unlabored. No use of accessory muscles. Bilateral crackles  GI: Abdomen soft, nontender,  nondistended. No HSM appreciated.   EXTREM: 1+ pitting LE edema. No cyanosis or clubbing.  NEURO: Alert and oriented, cooperative. Ambulates with cane.   PSYCH: Normal affect.  SKIN: Warm and dry. No rashes or petechiae appreciated. Dressings to abdomen and left LE. C/ D/I          Past Medical History:     Past Medical History:   Diagnosis Date     Acute, but ill-defined, cerebrovascular disease     NO RESIDUALS     Antiplatelet or antithrombotic long-term use      Arrhythmia     a fib     Blood in stool      Coronary atherosclerosis of unspecified type of vessel, native or graft     S/P PTCA, EF 50%     Esophageal reflux      Hydrocele, unspecified      Hypercholesterolemia      Hypersomnia with sleep apnea, unspecified      Inguinal hernia without mention of obstruction or gangrene, recurrent unilateral or unspecified      Ischemic cardiomyopathy      Major depression in complete remission (H)      Mitral regurgitation     s/p MitraClip 5/20/19     Obese      Onychomycosis      JIM on CPAP      Other and unspecified hyperlipidemia      Other lymphedema     GROIN AND THIGHS     Other pancytopenia (H)      Other specified anemias      Overweight      Pacemaker      PAD (peripheral artery disease) (H)      Pancytopenia (H)      Persistent atrial fibrillation (H)     VVI PM for long pauses     Prostatic hypertrophy, benign      Pulmonary hypertension (H)      Scrotal varices      Thrombocytopenia, unspecified (H)      Umbilical hernia without obstruction and without gangrene      Unilateral or unspecified femoral hernia without mention of obstruction or gangrene, unilateral or unspecified      Unspecified hypothyroidism      Venous stasis dermatitis of both lower extremities      Vitamin D deficiency               Past Surgical History:     Past Surgical History:   Procedure Laterality Date     BONE MARROW BIOPSY, BONE SPECIMEN, NEEDLE/TROCAR N/A 4/19/2019    Procedure: BONE MARROW BIOPSY;  Surgeon: Yovani Cooley  MD Gregory;  Location:  OR     CV HEART CATHETERIZATION WITH POSSIBLE INTERVENTION N/A 4/12/2019    Procedure: Heart Catheterization with possible Intervention;  Surgeon: Lev Beck MD;  Location:  HEART CARDIAC CATH LAB     CV MITRACLIP N/A 5/20/2019    Procedure: CV MITRAL CLIP;  Surgeon: Harshil Winter MD;  [facility]     CV RIGHT HEART CATH N/A 4/12/2019    Procedure: Right Heart Cath;  Surgeon: Lev Beck MD;  Location:  HEART CARDIAC CATH LAB     EYE SURGERY      CATARACT/BLEPHAROPLASTY     GENITOURINARY SURGERY      TUNA     HERNIA REPAIR      RIGHT INGUINAL     HERNIORRHAPHY INGUINAL  8/14/2012    Procedure: HERNIORRHAPHY INGUINAL;  right inguinal hernia repair;  Surgeon: Kareem Torrez MD;  [facility]     HERNIORRHAPHY UMBILICAL N/A 10/31/2017    Procedure: HERNIORRHAPHY UMBILICAL;  UMBILICAL HERNIA REPAIR WITH MESH;  Surgeon: Scar Harrington MD;  Location: [facility]     IMPLANT PACEMAKER  8/2009    single chamber     ORTHOPEDIC SURGERY      [facility] RIGHT     ORTHOPEDIC SURGERY  2010    right TKR, left TKR     SURGICAL HISTORY OF -       Thumb surgery     SURGICAL HISTORY OF -   1990s    Left total knee replacement     SURGICAL HISTORY OF -   3/11    [facility] total knee replacement              Allergies:   Patient has no known allergies.       Data:   All laboratory data reviewed:    LAST CHOLESTEROL:  Lab Results   Component Value Date    CHOL 92 04/25/2019     Lab Results   Component Value Date    HDL 35 04/25/2019     Lab Results   Component Value Date    LDL 48 04/25/2019     Lab Results   Component Value Date    TRIG 45 04/25/2019     Lab Results   Component Value Date    CHOLHDLRATIO 2.1 06/15/2015       LAST BMP:  Lab Results   Component Value Date     06/20/2019      Lab Results   Component Value Date    POTASSIUM 3.8 06/20/2019     Lab Results   Component Value Date    CHLORIDE 104 06/20/2019     Lab Results   Component Value Date    TEODORO 9.5  06/20/2019     Lab Results   Component Value Date    CO2 31 06/20/2019     Lab Results   Component Value Date    BUN 23 06/20/2019     Lab Results   Component Value Date    CR 1.63 06/20/2019     Lab Results   Component Value Date     06/20/2019       LAST CBC:  Lab Results   Component Value Date    WBC 2.8 06/20/2019     Lab Results   Component Value Date    RBC 2.58 06/20/2019     Lab Results   Component Value Date    HGB 8.6 06/20/2019     Lab Results   Component Value Date    HCT 26.3 06/20/2019     Lab Results   Component Value Date     06/20/2019     Lab Results   Component Value Date    MCH 33.3 06/20/2019     Lab Results   Component Value Date    MCHC 32.7 06/20/2019     Lab Results   Component Value Date    RDW 17.7 06/20/2019     Lab Results   Component Value Date    PLT 69 06/20/2019         Thank you for allowing me to participate in the care of your patient.    Sincerely,     WILLIS Soni CNP     [facility]

## 2019-08-02 ENCOUNTER — TELEPHONE (OUTPATIENT)
Dept: FAMILY MEDICINE | Facility: CLINIC | Age: 84
End: 2019-08-02

## 2019-08-02 NOTE — TELEPHONE ENCOUNTER
Our goal is to have forms completed within 72 hours, however some forms may require a visit or additional information.    What clinic location was the form placed at [facility] or [place].?     Who is the form from?   Where did the form come from? Faxed to clinic   The form was placed in the inbox of Mg Nelson MD      Please fax to [phone]  Phone number:      Additional comments: home health care PT eval 7/2/19    Call take on 8/2/2019 at 1:33 PM by Maya Monterroso

## 2019-08-05 ENCOUNTER — MEDICAL CORRESPONDENCE (OUTPATIENT)
Dept: HEALTH INFORMATION MANAGEMENT | Facility: CLINIC | Age: 84
End: 2019-08-05

## 2019-08-05 ENCOUNTER — TELEPHONE (OUTPATIENT)
Dept: FAMILY MEDICINE | Facility: CLINIC | Age: 84
End: 2019-08-05

## 2019-08-05 NOTE — TELEPHONE ENCOUNTER
Our goal is to have forms completed within 72 hours, however some forms may require a visit or additional information.    What clinic location was the form placed at [facility] or [place].?     Who is the form from?   Where did the form come from? Faxed to clinic   The form was placed in the inbox of Mg Nelson MD      Please fax to [phone]  Phone number: [phone]    Additional comments: home health PT DC summary    Call take on 8/5/2019 at 9:46 AM by Maya Monterroso

## 2019-08-06 ENCOUNTER — ANCILLARY PROCEDURE (OUTPATIENT)
Dept: CARDIOLOGY | Facility: CLINIC | Age: 84
End: 2019-08-06
Attending: INTERNAL MEDICINE
Payer: COMMERCIAL

## 2019-08-06 ENCOUNTER — TELEPHONE (OUTPATIENT)
Dept: FAMILY MEDICINE | Facility: CLINIC | Age: 84
End: 2019-08-06

## 2019-08-06 DIAGNOSIS — Z95.0 CARDIAC PACEMAKER IN SITU: ICD-10-CM

## 2019-08-06 NOTE — TELEPHONE ENCOUNTER
Our goal is to have forms completed within 72 hours, however some forms may require a visit or additional information.    What clinic location was the form placed at [facility] or [place].?     Who is the form from?   Where did the form come from? Faxed to clinic   The form was placed in the inbox of Mg Nelson MD      Please fax to [phone]  Phone number: [phone]    Additional comments: home health care [facility] 6/29/19 to 8/27/19    Call take on 8/6/2019 at 4:38 PM by Maya Monterroso

## 2019-08-07 ENCOUNTER — MEDICAL CORRESPONDENCE (OUTPATIENT)
Dept: HEALTH INFORMATION MANAGEMENT | Facility: CLINIC | Age: 84
End: 2019-08-07

## 2019-08-08 ENCOUNTER — TELEPHONE (OUTPATIENT)
Dept: FAMILY MEDICINE | Facility: CLINIC | Age: 84
End: 2019-08-08

## 2019-08-08 NOTE — TELEPHONE ENCOUNTER
Our goal is to have forms completed within 72 hours, however some forms may require a visit or additional information.    What clinic location was the form placed at [facility] or [place].?     Who is the form from?   Where did the form come from? Faxed to clinic   The form was placed in the inbox of Mg Nelson MD      Please fax to [phone]  Phone number: [phone]    Additional comments: home health care new medication     Call take on 8/8/2019 at 1:04 PM by Maya Monterroso

## 2019-08-12 ENCOUNTER — TELEPHONE (OUTPATIENT)
Dept: FAMILY MEDICINE | Facility: CLINIC | Age: 84
End: 2019-08-12

## 2019-08-12 NOTE — TELEPHONE ENCOUNTER
Wound care nurse calling with update. States he has new wounds on legs - similar to blisters on abdomen that keep opening - and she is looking for new orders for wound care for legs.    Requesting order say:   Remove dressing. Cleanse with wound cleanser or normal saline. Pat dry with guaze. Apply new dressing (tegaderm) and secure.     Routing to provider to advise.

## 2019-08-13 NOTE — TELEPHONE ENCOUNTER
Home Care ([facility]) Contact    Attempt # 1    Was call answered?  No.  Left message on voicemail with information to call triage back.    Upon callback, provide verbal orders for wound care. Ask for wound care nurse phone number to relay orders to her as well.

## 2019-08-14 ENCOUNTER — TELEPHONE (OUTPATIENT)
Dept: FAMILY MEDICINE | Facility: CLINIC | Age: 84
End: 2019-08-14

## 2019-08-14 NOTE — TELEPHONE ENCOUNTER
Our goal is to have forms completed within 72 hours, however some forms may require a visit or additional information.    What clinic location was the form placed at [facility] or [place].?     Who is the form from?   Where did the form come from? Faxed to clinic   The form was placed in the inbox of Mg Nelson MD      Please fax to [phone]  Phone number: [phone]    Additional comments: home health care OT DC summary    Call take on 8/14/2019 at 11:40 AM by Maya Monterroso

## 2019-08-14 NOTE — TELEPHONE ENCOUNTER
Our goal is to have forms completed within 72 hours, however some forms may require a visit or additional information.    What clinic location was the form placed at [facility] or [place].?     Who is the form from?   Where did the form come from? Faxed to clinic   The form was placed in the inbox of Mg Nelson MD      Please fax to [phone]  Phone number: [phone]    Additional comments: home health care SN, PT, & OT orders  //   OT orders effective 8/5/19    Call take on 8/14/2019 at 4:18 PM by Maya Monterroso

## 2019-08-15 ENCOUNTER — ONCOLOGY VISIT (OUTPATIENT)
Dept: ONCOLOGY | Facility: CLINIC | Age: 84
End: 2019-08-15
Attending: INTERNAL MEDICINE
Payer: COMMERCIAL

## 2019-08-15 ENCOUNTER — MEDICAL CORRESPONDENCE (OUTPATIENT)
Dept: HEALTH INFORMATION MANAGEMENT | Facility: CLINIC | Age: 84
End: 2019-08-15

## 2019-08-15 VITALS
RESPIRATION RATE: 16 BRPM | DIASTOLIC BLOOD PRESSURE: 67 MMHG | TEMPERATURE: 97.5 F | BODY MASS INDEX: 24.9 KG/M2 | OXYGEN SATURATION: 97 % | HEIGHT: 74 IN | HEART RATE: 67 BPM | WEIGHT: 194 LBS | SYSTOLIC BLOOD PRESSURE: 108 MMHG

## 2019-08-15 DIAGNOSIS — D61.818 PANCYTOPENIA (H): Primary | ICD-10-CM

## 2019-08-15 PROCEDURE — G0463 HOSPITAL OUTPT CLINIC VISIT: HCPCS

## 2019-08-15 PROCEDURE — 99213 OFFICE O/P EST LOW 20 MIN: CPT | Performed by: INTERNAL MEDICINE

## 2019-08-15 RX ORDER — CLOBETASOL PROPIONATE 0.5 MG/G
CREAM TOPICAL
COMMUNITY
End: 2020-02-18

## 2019-08-15 ASSESSMENT — MIFFLIN-ST. JEOR: SCORE: 1619.73

## 2019-08-15 ASSESSMENT — PAIN SCALES - GENERAL: PAINLEVEL: NO PAIN (0)

## 2019-08-15 NOTE — PATIENT INSTRUCTIONS
1. CBC every 3 months.  2. Patient plans to follow up at [facility] and have CBC checked there.    Patient will call if appt is needed.

## 2019-08-15 NOTE — Clinical Note
"    8/15/2019         RE: Bairon Foster  [address]  [place] [facility] [place] [facility] [place] [address]        Dear Colleague,    Thank you for referring your patient, Bairon Foster, to the [facility]. Please see a copy of my visit note below.    Oncology Rooming Note    August 15, 2019 3:18 PM   Bairon Foster is a 88 year old male who presents for:    Chief Complaint   Patient presents with     Oncology Clinic Visit     Initial Vitals: /67   Pulse 67   Temp 97.5  F (36.4  C) (Oral)   Resp 16   Ht 1.88 m (6' 2\")   Wt 88 kg (194 lb)   SpO2 97%   BMI 24.91 kg/m    Estimated body mass index is 24.91 kg/m  as calculated from the following:    Height as of this encounter: 1.88 m (6' 2\").    Weight as of this encounter: 88 kg (194 lb). Body surface area is 2.14 meters squared.  No Pain (0) Comment: Data Unavailable   No LMP for male patient.  Allergies reviewed: Yes  Medications reviewed: Yes    Medications: Medication refills not needed today.  Pharmacy name entered into [employer]:    [facility] - [place], [place] - [address] [facility]ARMACY MAIL DELIVERY - [address] [facility] - [place], [place] - [address] [facility] [address] - [place], [place] - [address]  [place] [facility]    Clinical concerns: no      Aury Rodrigues [facility]              Visit Date:   08/15/2019      SUBJECTIVE:  Mr. Foster is an 88-year-old gentleman with multiple medical problems including bullous pemphigoid.  The patient follows up in Hematology/Oncology Clinic for pancytopenia.  He has had multiple workups done including bone marrow biopsy.  Cause of pancytopenia is not clear.      His pancytopenia has been stable.  It has not been getting worse.  He has not been getting complications from it.  No recurrent infection.  No bleeding complication.  He does have fatigue, which would mainly go along with his age.      Overall, his condition has been " stable.  Main problem has been the skin lesion from pemphigus.  He has itching.      No headache.  No dizziness.  No chest pain.  No shortness of breath.  No nausea or vomiting.  Appetite fairly good.  No bleeding.      PHYSICAL EXAMINATION:   GENERAL:  He is alert, oriented x 3.   VITAL SIGNS:  Reviewed.  ECOG PS of 2.     SKIN:  Multiple lesions of the pemphigus.   The rest of the systems not examined.      DIAGNOSTIC DATA:  Labs reviewed.  These were done on 2019.      ASSESSMENT:   1.  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  Pancytopenia is stable.   2.  Other medical problems including atrial fibrillation and cardiomyopathy.   3.  Bullous pemphigus.      PLAN:   1.  CBC was reviewed with him.  I explained to him that he is pancytopenic.  Pancytopenia is stable.  It has not gotten worse.      We discussed regarding pancytopenia.  I told him I do not know what is causing it.  He has had multiple investigations done.  There is no need to repeat any other investigation at this time.  We will simply monitor it.      2.  Discussed regarding monitoring.  The patient mentioned that he frequently goes to [facility].  He would like to have it monitored there and come and see me if there is any worsening.  That is very reasonable.  I told the patient to have CBC checked every 3 months.  There is worsening of pancytopenia, he will come and see me.  I advised him to see a physician sooner if he has fever, chills, infection, bleeding or any other concerns.         ANDRES KRISHNA MD             D: 08/15/2019   T: 08/15/2019   MT: MARGARITA      Name:     HIRO IBARRA   MRN:      [id number]        Account:      [id number]   :      1931           Visit Date:   08/15/2019      Document: [id number]       Again, thank you for allowing me to participate in the care of your patient.        Sincerely,        Andres Krishna MD

## 2019-08-15 NOTE — PROGRESS NOTES
"Oncology Rooming Note    August 15, 2019 3:18 PM   Bairon Foster is a 88 year old male who presents for:    Chief Complaint   Patient presents with     Oncology Clinic Visit     Initial Vitals: /67   Pulse 67   Temp 97.5  F (36.4  C) (Oral)   Resp 16   Ht 1.88 m (6' 2\")   Wt 88 kg (194 lb)   SpO2 97%   BMI 24.91 kg/m   Estimated body mass index is 24.91 kg/m  as calculated from the following:    Height as of this encounter: 1.88 m (6' 2\").    Weight as of this encounter: 88 kg (194 lb). Body surface area is 2.14 meters squared.  No Pain (0) Comment: Data Unavailable   No LMP for male patient.  Allergies reviewed: Yes  Medications reviewed: Yes    Medications: Medication refills not needed today.  Pharmacy name entered into [employer]:    [facility] [facility] - [place], [place] - [address] PHARMACY MAIL DELIVERY - [place] [address] [facility]  [facility] - [place], [place] - [address] [facility] [address] - [place], [place] - [address]  [place] [facility]    Clinical concerns: no      Aury Rodrigues CMA            "

## 2019-08-16 ENCOUNTER — TELEPHONE (OUTPATIENT)
Dept: CARDIOLOGY | Facility: CLINIC | Age: 84
End: 2019-08-16

## 2019-08-16 NOTE — TELEPHONE ENCOUNTER
----- Message from Tanya Lopez sent at 8/16/2019 12:58 PM CDT -----  Please call his wife to discuss device check in Sept. She wants to know if he needs new device implanted. Cindy    Pt has phone check scheduled 9/10/2019, he is nearing MADHAV so we are doing monthly phone checks. Called wife, left message on both cell and home phones that he does not need a battery change yet but we will keep checking once a month until it's time. Left Device Clinic RN number for pt/wife to call back if they have more questions.

## 2019-08-16 NOTE — PROGRESS NOTES
Visit Date:   08/15/2019     HEMATOLOGY HISTORY:  Mr. Foster is a gentleman with chronic pancytopenia.   1. On 08/05/2009, WBC of 3.3, hemoglobin of 12.9 and platelets of 111.   2. On 04/16/2019:  -WBC of 2.2, hemoglobin of 9.7 and platelets of 67.  MCV normal.  Neutrophil of 75% and lymphocyte of 15%.   -Chemistry panel reveals mild elevated bilirubin of 1.7.  Normal AST, ALT and alkaline phosphatase.   3. On 04/18/2019.   -WBC 3.3, hemoglobin 10.3 and platelets 83.  MCV of 99. Retic of 1.4%.   -SPEP was normal.   -Vitamin B12 elevated.   -Normal folate.   -Normal ferritin and iron.  Low saturation of 14%.   4.  Bone marrow biopsy on 04/19/2019 is normal.  It reveals normal cellular bone marrow with trilineage hematopoiesis.  There is no evidence of any dysplasia.  Flow revealed slightly increased NK cell population.  I spoke to the pathologist.  This is not of clinical significance.  Cytogenetics is normal.      SUBJECTIVE:  Mr. Foster is an 88-year-old gentleman with multiple medical problems including bullous pemphigoid.  The patient follows up in Hematology/Oncology Clinic for pancytopenia.  He has had multiple workups done including bone marrow biopsy.  Cause of pancytopenia is not clear.      His pancytopenia has been stable.  It has not been getting worse.  He has not been getting complications from it.  No recurrent infection.  No bleeding complication.  He does have fatigue, which will go along with his age.      Overall, his condition has been stable.  Main problem has been the skin lesion from pemphigus.  He has itching.      No headache.  No dizziness.  No chest pain.  No shortness of breath.  No nausea or vomiting.  Appetite is fairly good.  No bleeding.      PHYSICAL EXAMINATION:   GENERAL:  He is alert, oriented x 3.   VITAL SIGNS:  Reviewed.  ECOG PS of 2.     SKIN:  Multiple lesions of the pemphigus.   The rest of the systems not examined.      DIAGNOSTIC DATA:  Labs reviewed.  These were done on  2019.      ASSESSMENT:   1.  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  Pancytopenia is stable.   2.  Other medical problems including atrial fibrillation and cardiomyopathy.   3.  Bullous pemphigus.      PLAN:   1.  CBC was reviewed with him.  I explained to him that he is pancytopenic.  Pancytopenia is stable.  It has not gotten worse.      We discussed regarding pancytopenia.  I told him I do not know what is causing it.  He has had multiple investigations done.  There is no need to repeat any other investigation at this time.  We will simply monitor it.      2.  Discussed regarding monitoring.  The patient mentioned that he frequently goes to [facility].  He would like to have it monitored there and come and see me if there is any worsening.  That is very reasonable.  I told the patient to have CBC checked every 3 months. If there is worsening of pancytopenia, he will come and see me.  I advised him to see a physician sooner if he has fever, chills, infection, bleeding or any other concerns.         ANDRES KRISHNA MD             D: 08/15/2019   T: 08/15/2019   MT: MARGAIRTA      Name:     HIRO IBARRA   MRN:      [id number]        Account:      [id number]   :      1931           Visit Date:   08/15/2019      Document: [id number]

## 2019-08-19 ENCOUNTER — TELEPHONE (OUTPATIENT)
Dept: FAMILY MEDICINE | Facility: CLINIC | Age: 84
End: 2019-08-19

## 2019-08-19 NOTE — TELEPHONE ENCOUNTER
Our goal is to have forms completed within 72 hours, however some forms may require a visit or additional information.    What clinic location was the form placed at [facility] or [place].?     Who is the form from?   Where did the form come from? Faxed to clinic   The form was placed in the inbox of Mg Nelson MD      Please fax to [phone]  Phone number: [phone]    Additional comments: [facility]  wound care orders     Call take on 8/19/2019 at 9:21 AM by Maya Monterroso

## 2019-08-19 NOTE — ADDENDUM NOTE
Encounter addended by: Alfonzo Guy EP on: 8/19/2019 8:16 AM   Actions taken: Sign clinical note, Episode resolved Airway patent, nasal mucosa clear, mouth with normal mucosa. Throat has no vesicles, no oropharyngeal exudates and uvula is midline.  Large tonsils w/o exudates.   Clear tympanic membranes bilaterally.

## 2019-08-19 NOTE — PROGRESS NOTES
Cardiac Rehab Discharge Summary  Bairon Foster  88 year old  Mitral Clip    Reason for discharge:    Patient/family request discontinuation of services.  Therapist recommended patient discontinue with cardiac rehab and complete home PT as patient scored as a high falls risk. He may be appropriate for cardiac rehab if he is able to improve balance.    Progress towards goals:  Goals not met.  Barriers to achieving goals:   discharge from facility.    Recommendation(s):    Continue home exercise program.       Physician cosignature/electronic signature indicates agreements with the ITP document and approval of discharge.

## 2019-08-21 ENCOUNTER — TELEPHONE (OUTPATIENT)
Dept: FAMILY MEDICINE | Facility: CLINIC | Age: 84
End: 2019-08-21

## 2019-08-21 ENCOUNTER — OFFICE VISIT (OUTPATIENT)
Dept: FAMILY MEDICINE | Facility: CLINIC | Age: 84
End: 2019-08-21
Payer: COMMERCIAL

## 2019-08-21 VITALS
OXYGEN SATURATION: 99 % | WEIGHT: 193 LBS | RESPIRATION RATE: 14 BRPM | BODY MASS INDEX: 24.78 KG/M2 | DIASTOLIC BLOOD PRESSURE: 64 MMHG | SYSTOLIC BLOOD PRESSURE: 122 MMHG | HEART RATE: 60 BPM | TEMPERATURE: 98.5 F

## 2019-08-21 DIAGNOSIS — E78.00 HYPERCHOLESTEROLEMIA: Primary | ICD-10-CM

## 2019-08-21 DIAGNOSIS — L12.0 BULLOUS PEMPHIGOID (H): ICD-10-CM

## 2019-08-21 DIAGNOSIS — I89.0 LYMPHEDEMA OF BOTH LOWER EXTREMITIES: ICD-10-CM

## 2019-08-21 DIAGNOSIS — I25.10 CORONARY ARTERY DISEASE INVOLVING NATIVE CORONARY ARTERY OF NATIVE HEART WITHOUT ANGINA PECTORIS: ICD-10-CM

## 2019-08-21 PROCEDURE — 80061 LIPID PANEL: CPT | Performed by: FAMILY MEDICINE

## 2019-08-21 PROCEDURE — 36415 COLL VENOUS BLD VENIPUNCTURE: CPT | Performed by: FAMILY MEDICINE

## 2019-08-21 PROCEDURE — 99214 OFFICE O/P EST MOD 30 MIN: CPT | Performed by: FAMILY MEDICINE

## 2019-08-21 NOTE — PROGRESS NOTES
Subjective     Bairon Foster is a 88 year old male who presents to clinic today for the following health issues:    HPI   Hyperlipidemia Follow-Up      Are you having any of the following symptoms? (Select all that apply)  No complaints of shortness of breath, chest pain or pressure.  No increased sweating or nausea with activity.  No left-sided neck or arm pain.  No complaints of pain in calves when walking 1-2 blocks.    Are you regularly taking any medication or supplement to lower your cholesterol?   Yes- simvastatin    Are you having muscle aches or other side effects that you think could be caused by your cholesterol lowering medication?  No      Hypothyroidism Follow-up      Since last visit, patient describes the following symptoms: dry skin        How many servings of fruits and vegetables do you eat daily?  0-1    On average, how many sweetened beverages do you drink each day (soda, juice, sweet tea, etc)?   0    How many days per week do you miss taking your medication? 0        Vascular Disease Follow-up      Are you having any of the following symptoms? (Select all that apply) No complaints of shortness of breath, chest pain or pressure.  No increased sweating or nausea with activity.  No left-sided neck or arm pain.  No complaints of pain in calves when walking 1-2 blocks.    How often do you take nitroglycerin? Never    Do you take an aspirin every day? Yes      Patient Active Problem List   Diagnosis     Atrial fibrillation (H)     Pulmonary hypertension (H)     CAD (coronary artery disease)     BPH (benign prostatic hyperplasia)     Ischemic cardiomyopathy     Anemia due to bone marrow failure, unspecified bone marrow failure type (H)     JIM (obstructive sleep apnea)- mild/moderate     Acute stress reaction     Health Care Home     Vitamin D deficiency     Advance Care Planning     Gastroesophageal reflux disease without esophagitis     Hydrocele, unspecified hydrocele type     Slow transit  constipation     Hypercholesterolemia     Venous stasis dermatitis of both lower extremities     Lymphedema of both lower extremities     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      Coronary artery disease due to lipid rich plaque     Acquired hypothyroidism     Onychomycosis     PAD (peripheral artery disease) (H)     Blood in stool     Umbilical hernia without obstruction and without gangrene     Xerosis cutis     Status post coronary angiogram     AK (actinic keratosis)     Angioma of skin     Acute systolic CHF (congestive heart failure) (H)     CHF (congestive heart failure) (H)     GI bleed     Hematemesis     Epistaxis     Non-rheumatic mitral regurgitation     Mitral regurgitation     CKD (chronic kidney disease) stage 3, GFR 30-59 ml/min (H)     Blister of back     Bullous pemphigoid     Past Surgical History:   Procedure Laterality Date     BONE MARROW BIOPSY, BONE SPECIMEN, NEEDLE/TROCAR N/A 4/19/2019    Procedure: BONE MARROW BIOPSY;  Surgeon: Yovani Cooley MD;  Location:  OR     CV HEART CATHETERIZATION WITH POSSIBLE INTERVENTION N/A 4/12/2019    Procedure: Heart Catheterization with possible Intervention;  Surgeon: Lev Beck MD;  Location:  HEART CARDIAC CATH LAB     CV MITRACLIP N/A 5/20/2019    Procedure: CV MITRAL CLIP;  Surgeon: Harshil Winter MD;  Location:  HEART CARDIAC CATH LAB     CV RIGHT HEART CATH N/A 4/12/2019    Procedure: Right Heart Cath;  Surgeon: Lev Beck MD;  Location:  HEART CARDIAC CATH LAB     EYE SURGERY      CATARACT/BLEPHAROPLASTY     GENITOURINARY SURGERY      TUNA     HERNIA REPAIR      RIGHT INGUINAL     HERNIORRHAPHY INGUINAL  8/14/2012    Procedure: HERNIORRHAPHY INGUINAL;  right inguinal hernia repair;  Surgeon: Kareem Torrez MD;  Location: [facility]     HERNIORRHAPHY UMBILICAL N/A 10/31/2017    Procedure: HERNIORRHAPHY UMBILICAL;  UMBILICAL HERNIA REPAIR WITH MESH;  Surgeon: Scar Harrington MD;  Location: [facility]      "IMPLANT PACEMAKER  8/2009    single chamber     ORTHOPEDIC SURGERY      [facility] RIGHT     ORTHOPEDIC SURGERY  2010    right TKR, left TKR     SURGICAL HISTORY OF -       Thumb surgery     SURGICAL HISTORY OF -   1990s    Left total knee replacement     SURGICAL HISTORY OF -   3/11    [facility] total knee replacement       Social History     Tobacco Use     Smoking status: Never Smoker     Smokeless tobacco: Never Used   Substance Use Topics     Alcohol use: Not Currently     Alcohol/week: 0.0 oz     Comment: 1/month     Family History   Problem Relation Age of Onset     Cardiovascular Father      C.A.D. Father      Lung Cancer Sister      Unknown/Adopted Mother      Cancer - colorectal No family hx of              Reviewed and updated as needed this visit by Provider         Review of Systems   ROS COMP: Constitutional, HEENT, cardiovascular, pulmonary, gi and gu systems are negative, except as otherwise noted.      Objective    /64   Pulse 60   Temp 98.5  F (36.9  C) (Tympanic)   Resp 14   Wt 87.5 kg (193 lb)   SpO2 99%   BMI 24.78 kg/m    Body mass index is 24.78 kg/m .  Physical Exam   GENERAL APPEARANCE: healthy, alert and no distress  SKIN: Numerous hemorrhagic bullae are present on the arms and trunk.  I did not examine his legs as he changes the dressings coming tonight and they were fully dressed today.            Assessment & Plan       ICD-10-CM    1. Hypercholesterolemia E78.00 Lipid Profile   2. Lymphedema of both lower extremities I89.0    3. Coronary artery disease involving native coronary artery of native heart without angina pectoris I25.10    4. Bullous pemphigoid L12.0         BMI:   Estimated body mass index is 24.78 kg/m  as calculated from the following:    Height as of 8/15/19: 1.88 m (6' 2\").    Weight as of this encounter: 87.5 kg (193 lb).           Patient Instructions   The patient has an appointment tomorrow with [facility] with the dermatology department.  This is to follow-up on his " bullous pemphigoid.  He recently saw cardiology and is feeling well from a cardiology standpoint.  He is not having any chest pains.  His breathing is okay.  He continues to get swelling in his lower extremities.  His biggest issue is the skin rash that itches and the bullae do not heal quickly.  He has a couple different creams that he applies topically to those.  I will plan on having the patient back in 2 months for his wellness exam.  He will need labs at that time.      Return in about 2 months (around 10/21/2019) for wellness exam.    Mg Nelson MD  [facility]

## 2019-08-21 NOTE — TELEPHONE ENCOUNTER
Our goal is to have forms completed within 72 hours, however some forms may require a visit or additional information.    What clinic location was the form placed at [facility] or [place].?     Who is the form from?   Where did the form come from? Faxed to clinic   The form was placed in the inbox of Mg Nelson MD      Please fax to [phone]  Phone number: [phone]    Additional comments: home health care wound care left leg     Call take on 8/21/2019 at 4:20 PM by Maya Monterroso

## 2019-08-21 NOTE — PATIENT INSTRUCTIONS
The patient has an appointment tomorrow with [facility] with the dermatology department.  This is to follow-up on his bullous pemphigoid.  He recently saw cardiology and is feeling well from a cardiology standpoint.  He is not having any chest pains.  His breathing is okay.  He continues to get swelling in his lower extremities.  His biggest issue is the skin rash that itches and the bullae do not heal quickly.  He has a couple different creams that he applies topically to those.  I will plan on having the patient back in 2 months for his wellness exam.  He will need labs at that time.

## 2019-08-22 LAB
CHOLEST SERPL-MCNC: 97 MG/DL
HDLC SERPL-MCNC: 41 MG/DL
LDLC SERPL CALC-MCNC: 48 MG/DL
NONHDLC SERPL-MCNC: 56 MG/DL
TRIGL SERPL-MCNC: 41 MG/DL

## 2019-08-27 ENCOUNTER — TELEPHONE (OUTPATIENT)
Dept: FAMILY MEDICINE | Facility: CLINIC | Age: 84
End: 2019-08-27

## 2019-08-27 NOTE — TELEPHONE ENCOUNTER
Our goal is to have forms completed within 72 hours, however some forms may require a visit or additional information.    What clinic location was the form placed at [facility] or [place].?     Who is the form from?   Where did the form come from? Faxed to clinic   The form was placed in the inbox of Mg Nelson MD      Please fax to [phone]  Phone number: [phone]    Additional comments: [facility] care inc wound cleanse    Call take on 8/27/2019 at 1:50 PM by Maya Monterroso

## 2019-08-28 ENCOUNTER — TELEPHONE (OUTPATIENT)
Dept: FAMILY MEDICINE | Facility: CLINIC | Age: 84
End: 2019-08-28

## 2019-08-28 ENCOUNTER — TRANSFERRED RECORDS (OUTPATIENT)
Dept: HEALTH INFORMATION MANAGEMENT | Facility: CLINIC | Age: 84
End: 2019-08-28

## 2019-08-28 NOTE — TELEPHONE ENCOUNTER
"Patient Contact    Attempt # 1    Was call answered?  No.  Left message from provider: \"I think it is \"regular\".  However, if they want definitive conclusion about that they should check with [facility] [place].\"      "

## 2019-08-28 NOTE — TELEPHONE ENCOUNTER
Reason for Call:  Other call back    Detailed comments: Kristi and Bairon called saying that Bairon in the [facility]  [facility] to [facility] is 'regular' or does it have a defibrillator?     They asked for a return call.    Phone Number Patient can be reached at: Home number on file [phone] (home)    Best Time: any    Can we leave a detailed message on this number? YES    Call taken on 8/28/2019 at 11:24 AM by Priya Willis

## 2019-08-28 NOTE — TELEPHONE ENCOUNTER
Please see message below. Per chart review, it looks like patient has pacemaker and not ICD. Routing to provider to review

## 2019-08-28 NOTE — TELEPHONE ENCOUNTER
"I think it is \"regular\".  However, if they want definitive conclusion about that they should check with [facility] [place].  "

## 2019-08-29 NOTE — TELEPHONE ENCOUNTER
Closing encounter.  Detailed message was left by staff.    RADHA FierroN, RN  [employer] [facility]

## 2019-08-30 ENCOUNTER — TELEPHONE (OUTPATIENT)
Dept: FAMILY MEDICINE | Facility: CLINIC | Age: 84
End: 2019-08-30

## 2019-08-30 NOTE — TELEPHONE ENCOUNTER
Our goal is to have forms completed within 72 hours, however some forms may require a visit or additional information.    What clinic location was the form placed at [facility] or [place].?     Who is the form from?   Where did the form come from? Faxed to clinic   The form was placed in the inbox of Mg Nelson MD      Please fax to [phone]  Phone number: [phone]    Additional comments: home health care SN wound cleansing , med changes     Call take on 8/30/2019 at 3:00 PM by Maya Monterroso

## 2019-09-03 ENCOUNTER — TELEPHONE (OUTPATIENT)
Dept: FAMILY MEDICINE | Facility: CLINIC | Age: 84
End: 2019-09-03

## 2019-09-03 NOTE — TELEPHONE ENCOUNTER
Our goal is to have forms completed within 72 hours, however some forms may require a visit or additional information.    What clinic location was the form placed at [facility] or [place].?     Who is the form from?   Where did the form come from? Faxed to clinic   The form was placed in the inbox of Mg Nelson MD      Please fax to [phone]  Phone number: [phone]    Additional comments: Critical access hospital care [facility] 8/28/19 to 10/26/19    Call take on 9/3/2019 at 4:26 PM by Maya Monterroso

## 2019-09-03 NOTE — TELEPHONE ENCOUNTER
Our goal is to have forms completed within 72 hours, however some forms may require a visit or additional information.    What clinic location was the form placed at [facility] or [place].?     Who is the form from?   Where did the form come from? Faxed to clinic   The form was placed in the inbox of Mg Nelson MD      Please fax to [phone]  Phone number: [phone]    Additional comments: home health care inc  ok to hold all services until discharge home    Call take on 9/3/2019 at 4:28 PM by Maya Monterroso

## 2019-09-04 ENCOUNTER — MEDICAL CORRESPONDENCE (OUTPATIENT)
Dept: HEALTH INFORMATION MANAGEMENT | Facility: CLINIC | Age: 84
End: 2019-09-04

## 2019-09-04 ENCOUNTER — TELEPHONE (OUTPATIENT)
Dept: FAMILY MEDICINE | Facility: CLINIC | Age: 84
End: 2019-09-04

## 2019-09-04 NOTE — TELEPHONE ENCOUNTER
Reason for Call: Request for an order or referral:    Order or referral being requested:   Wound care  Lower leg     Date needed: as soon as possible    Has the patient been seen by the PCP for this problem? unknown    Additional comments:    Cleaning with wound cleanser  Pat dry with gauze  Apply adaptic to wound bed   Cover with ABD pad  Wrap with Kerlix    Changed daily until healed     Phone number Patient can be reached at:  Other phone number:  [phone]    Best Time:  anytime    Can we leave a detailed message on this number?  YES    Call taken on 9/4/2019 at 10:35 AM by ADA DELAROSA

## 2019-09-05 ENCOUNTER — TRANSFERRED RECORDS (OUTPATIENT)
Dept: HEALTH INFORMATION MANAGEMENT | Facility: CLINIC | Age: 84
End: 2019-09-05

## 2019-09-06 ENCOUNTER — TELEPHONE (OUTPATIENT)
Dept: CARDIOLOGY | Facility: CLINIC | Age: 84
End: 2019-09-06

## 2019-09-06 DIAGNOSIS — I50.22 CHRONIC SYSTOLIC CONGESTIVE HEART FAILURE (H): Primary | ICD-10-CM

## 2019-09-06 NOTE — TELEPHONE ENCOUNTER
Kristi called in regards to , Bairon. [facility]U [facility] Dr. Hope. I informed her I received communication from his physician regarding this and we could schedule him next week. Appointment date and times relayed to Kristi. Zelda Hilliard RN

## 2019-09-10 ENCOUNTER — ANCILLARY PROCEDURE (OUTPATIENT)
Dept: CARDIOLOGY | Facility: CLINIC | Age: 84
End: 2019-09-10
Attending: INTERNAL MEDICINE
Payer: COMMERCIAL

## 2019-09-10 DIAGNOSIS — Z95.0 CARDIAC PACEMAKER IN SITU: ICD-10-CM

## 2019-09-13 ENCOUNTER — TELEPHONE (OUTPATIENT)
Dept: FAMILY MEDICINE | Facility: CLINIC | Age: 84
End: 2019-09-13

## 2019-09-13 NOTE — TELEPHONE ENCOUNTER
Our goal is to have forms completed within 72 hours, however some forms may require a visit or additional information.    What clinic location was the form placed at [facility] or [place].?     Who is the form from? Home Care  Where did the form come from? Faxed to clinic   The form was placed in the inbox of Mg Nelson MD      Please fax to [phone]    Additional comments:     Call take on 9/13/2019 at 11:59 AM by Haley Lynn

## 2019-09-13 NOTE — TELEPHONE ENCOUNTER
Our goal is to have forms completed within 72 hours, however some forms may require a visit or additional information.    What clinic location was the form placed at [facility] or [place].?     Who is the form from? Home Care  Where did the form come from? Faxed to clinic   The form was placed in the inbox of Mg Nelson MD      Please fax to [phone]    Additional comments:     Call take on 9/13/2019 at 11:49 AM by Haley Lynn

## 2019-09-16 ENCOUNTER — TELEPHONE (OUTPATIENT)
Dept: FAMILY MEDICINE | Facility: CLINIC | Age: 84
End: 2019-09-16

## 2019-09-16 ENCOUNTER — TRANSFERRED RECORDS (OUTPATIENT)
Dept: HEALTH INFORMATION MANAGEMENT | Facility: CLINIC | Age: 84
End: 2019-09-16

## 2019-09-16 ENCOUNTER — MEDICAL CORRESPONDENCE (OUTPATIENT)
Dept: HEALTH INFORMATION MANAGEMENT | Facility: CLINIC | Age: 84
End: 2019-09-16

## 2019-09-16 NOTE — TELEPHONE ENCOUNTER
Our goal is to have forms completed within 72 hours, however some forms may require a visit or additional information.    What clinic location was the form placed at [facility] or [place].?     Who is the form from?   Where did the form come from? Faxed to clinic   The form was placed in the inbox of Mg Nelson MD      Please fax to [phone]  Phone number: [phone]    Additional comments: [facility] POC    Call take on 9/16/2019 at 1:08 PM by Naomi Pearl

## 2019-09-17 ENCOUNTER — TELEPHONE (OUTPATIENT)
Dept: FAMILY MEDICINE | Facility: CLINIC | Age: 84
End: 2019-09-17

## 2019-09-17 NOTE — TELEPHONE ENCOUNTER
Our goal is to have forms completed within 72 hours, however some forms may require a visit or additional information.    What clinic location was the form placed at [facility] or [place].?     Who is the form from?   Where did the form come from? Faxed to clinic   The form was placed in the inbox of Mg Nelson MD      Please fax to [phone]  Phone number: [phone]  Additional comments: home health care DC summary     Call take on 9/17/2019 at 5:20 PM by Maya Monterroso

## 2019-09-17 NOTE — TELEPHONE ENCOUNTER
Our goal is to have forms completed within 72 hours, however some forms may require a visit or additional information.    What clinic location was the form placed at [facility] or [place].?     Who is the form from?   Where did the form come from? Faxed to clinic   The form was placed in the inbox of Mg Nelson MD      Please fax to [phone]  Phone number: [phone]    Additional comments: home health care [facility] 9/4/19 to 11/2/19    Call take on 9/17/2019 at 10:37 AM by Maya Monterroso

## 2019-09-18 ENCOUNTER — MEDICAL CORRESPONDENCE (OUTPATIENT)
Dept: HEALTH INFORMATION MANAGEMENT | Facility: CLINIC | Age: 84
End: 2019-09-18

## 2019-09-19 ENCOUNTER — TRANSFERRED RECORDS (OUTPATIENT)
Dept: HEALTH INFORMATION MANAGEMENT | Facility: CLINIC | Age: 84
End: 2019-09-19

## 2019-09-25 ENCOUNTER — TELEPHONE (OUTPATIENT)
Dept: FAMILY MEDICINE | Facility: CLINIC | Age: 84
End: 2019-09-25

## 2019-09-25 ENCOUNTER — MEDICAL CORRESPONDENCE (OUTPATIENT)
Dept: HEALTH INFORMATION MANAGEMENT | Facility: CLINIC | Age: 84
End: 2019-09-25

## 2019-09-25 NOTE — TELEPHONE ENCOUNTER
Our goal is to have forms completed within 72 hours, however some forms may require a visit or additional information.    What clinic location was the form placed at [facility] or [place].?     Who is the form from?   Where did the form come from? Faxed to clinic   The form was placed in the inbox of Mg Nelson MD      Please fax to [phone]  Phone number: [phone]    Additional comments: [facility] Physician orders ([facility])    Call take on 9/25/2019 at 8:52 AM by Naomi Pearl

## 2019-09-26 ENCOUNTER — OFFICE VISIT (OUTPATIENT)
Dept: CARDIOLOGY | Facility: CLINIC | Age: 84
End: 2019-09-26
Payer: COMMERCIAL

## 2019-09-26 VITALS
DIASTOLIC BLOOD PRESSURE: 60 MMHG | WEIGHT: 182 LBS | HEIGHT: 74 IN | HEART RATE: 64 BPM | SYSTOLIC BLOOD PRESSURE: 110 MMHG | BODY MASS INDEX: 23.36 KG/M2

## 2019-09-26 DIAGNOSIS — I50.21 ACUTE SYSTOLIC CHF (CONGESTIVE HEART FAILURE) (H): ICD-10-CM

## 2019-09-26 DIAGNOSIS — I25.5 ISCHEMIC CARDIOMYOPATHY: Primary | ICD-10-CM

## 2019-09-26 DIAGNOSIS — I50.22 CHRONIC SYSTOLIC CONGESTIVE HEART FAILURE (H): ICD-10-CM

## 2019-09-26 LAB
ANION GAP SERPL CALCULATED.3IONS-SCNC: 9.6 MMOL/L (ref 6–17)
BUN SERPL-MCNC: 20 MG/DL (ref 7–30)
CALCIUM SERPL-MCNC: 9.6 MG/DL (ref 8.5–10.5)
CHLORIDE SERPL-SCNC: 99 MMOL/L (ref 98–107)
CO2 SERPL-SCNC: 32 MMOL/L (ref 23–29)
CREAT SERPL-MCNC: 1.37 MG/DL (ref 0.7–1.3)
GFR SERPL CREATININE-BSD FRML MDRD: 49 ML/MIN/{1.73_M2}
GLUCOSE SERPL-MCNC: 122 MG/DL (ref 70–105)
POTASSIUM SERPL-SCNC: 3.6 MMOL/L (ref 3.5–5.1)
SODIUM SERPL-SCNC: 137 MMOL/L (ref 136–145)

## 2019-09-26 PROCEDURE — 80048 BASIC METABOLIC PNL TOTAL CA: CPT | Performed by: NURSE PRACTITIONER

## 2019-09-26 PROCEDURE — 36415 COLL VENOUS BLD VENIPUNCTURE: CPT | Performed by: NURSE PRACTITIONER

## 2019-09-26 PROCEDURE — 99215 OFFICE O/P EST HI 40 MIN: CPT | Performed by: NURSE PRACTITIONER

## 2019-09-26 RX ORDER — DEXAMETHASONE 6 MG/1
6 TABLET ORAL DAILY
Status: ON HOLD | COMMUNITY
End: 2019-12-03

## 2019-09-26 RX ORDER — BUMETANIDE 1 MG/1
1 TABLET ORAL 2 TIMES DAILY
COMMUNITY
Start: 2019-09-26 | End: 2020-01-15

## 2019-09-26 ASSESSMENT — MIFFLIN-ST. JEOR: SCORE: 1565.3

## 2019-09-26 NOTE — LETTER
9/26/2019    Mg Nelson MD  [address]  [facility] [address]    RE: Bairon DOWNS Cristian       Dear Colleague,    I had the pleasure of seeing Bairon Foster in the [facility].    HPI and Plan:   I had the pleasure of seeing Bairon Foster and his son today in cardiology clinic follow up. He is a pleasant 88 year old patient of Dr. Hope, he is well known to me but has been recently treated at the [facility] where he gets the majority of his care.    Alfa has a history of coronary disease, severe MR status post mitral clip (with two clips) on May 20, 2019, severe TR and mild to moderate mitral stenosis.  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  His last intervention was done to his OM in 2009.  He has paroxysmal atrial fibrillation, his Eliquis was recently discontinued because of his frequent falls, he has also struggled with difficult to manage epitaxis and there has been discussion about Watchman.  He has a permanent pacemaker which is nearing end of battery life, the [facility] recently adjusted the programming to prolong the battery life, thy decreased the lower rate to 50 bpm to avoid RV pacing. He is high risk for developing infection from ID standpoing if battery replaced too soon after sepsis.  He has treated obstructive sleep apnea and chronic pancytopenia with low platelets.      Prior to MitraClip in May, his coronary angiogram showed mild nonobstructive coronary disease, normal cardiac output level, mildly elevated elevated right and left-sided filling pressures and mildly elevated PH.  His EF dropped this past spring to about 30%.     His mitral clip  (with two clips) was done on May 20, 2019.   He was not put on Plavix following the procedure because of recent issues with bleeding and low platelets but was kept on Eliquis 2.5 BID and 81 mg aspirin.  H is June echo described the EF is 40% slightly increased LV end-diastolic  pressure of regurgitant mitral jet eccentrically directed with 1-2+ MR and a severely dilated RV.  He had moderately severe 3+ tricuspid regurgitation and RVSP was 30 mmHg.     He was doing poorly when I saw him in June, frequent falls and edema vs anasarca. 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 Since seen last in July in our group, he has had some setbacks, thought today he looks remarkably well. He was hospitalized at [facility] in September for Corynebacterium sepsis, I have incomplete records from this hospitalization, but there is a [facility] 8[facility]28/19 reports no obvious vegetations, mod MV regurgitation with two clips, EF 40-45% (report scanned into epic).     Today he looks little thinner and frailer on the top half of his body.  He is definitely had improvement in his lower extremity edema and anasarca since I saw him in June.  He has his legs wrapped up to his knees and has weeping wounds that are being treated by wound clinic.  But his thighs are no longer full of water.  He tells me he  is currently only on a low-dose aspirin, no longer on Plavix or Eliquis.  This is because of falls and bleeds.  He wonders if Dr. Hope would still consider a watchman in him or not.  He has a history of remote CVA, the details of which are unknown to me.  He continues to live with his wife next door to his son.  He has home nursing and PT coming out to his house now [facility] of the [facility].  He is interested in palliative care now, previously I had set up for him but he did not make that appointment.    Physical Exam  Please see Below     Assessment and Plan  1. Systolic and Diastolic heart failure secondary to valvular heat disease. S/p MV clip and mod-severe TR. His weight is down from 188 (when I saw him in June) to 182 and his edema has receded from his thighs. Good renal function. Can really only tolerate bumex, currently 1 mg BID. I'll  keep him on this dose and see him in November with BMP prior to reassess. He will continue with home weight and notify us of significant changes. Cont Coreg 6.25 BID.  2. Chronic Afib with tachy-june s/p [facility] of [facility] to [facility]. AC discharge for bleeding risk. On low dose ASA. MADHAV <0.25 years, concern for infection with recent hospitalization for sepsis if gen change done in near future.  3. Bullous phemphiogoid- avoid loop diuretics. Bumex and ethacrynic acid okay per dermatology at [facility]  4. CAD No ischemic symtpoms, on good medical mgmt.   5.  Recent hospitalization for sepsis  6. Weeping legs. I've never seen his legs weep before. His LE edema is much improved from June. He is getting wound care and they are wrapped, he had a photo taken earlier this am and I wonder if his bullous Phemphiogoid blisters are worse on his LE then prevoiusly and if these are contributing. It is probably also due to some CHF.    Thank you for allowing me to care for Bairon Foster today. He should keep follow up wiht Dr. Hope November 15. He wants to see what Dr. Hope thinks about Watchman.     Regarding Palliative care, I have previously arranged for that in our clinic, but he did not go. He seems much more open to it now. We discussed doing it at  versus the [facility], and for his ease and for their resources, he will do this through the [facility].     WILLIS Bullard, CNP  Cardiology    Voice recognition software was used for this note, I have reviewed this note, but errors may have been missed.    No orders of the defined types were placed in this encounter.    Orders Placed This Encounter   Medications     dexamethasone (DECADRON) 6 MG tablet     Sig: Take 6 mg by mouth daily X 7 days. Started 9/26/19     bumetanide (BUMEX) 1 MG tablet     Sig: Take 1 tablet (1 mg) by mouth 2 times daily     There are no discontinued medications.      CURRENT MEDICATIONS:  Current Outpatient Medications   Medication Sig Dispense Refill      acetaminophen (TYLENOL) 500 MG tablet Take 2 tablets (1,000 mg) by mouth every 6 hours as needed for mild pain 100 tablet 0     aspirin 81 MG EC tablet Take 81 mg by mouth daily        bumetanide (BUMEX) 1 MG tablet Take 1 tablet (1 mg) by mouth 2 times daily       buPROPion (WELLBUTRIN XL) 150 MG 24 hr tablet Take 150 mg by mouth every morning       Carboxymethylcellulose Sod PF (CELLUVISC/REFRESH LIQUIGEL) 1 % ophthalmic solution Place 1 drop into both eyes 3 times daily as needed for dry eyes       carvedilol (COREG) 6.25 MG tablet TAKE 1 TABLET (6.25 MG) BY MOUTH 2 TIMES DAILY (WITH MEALS) 180 tablet 3     clobetasol (TEMOVATE) 0.05 % external cream Apply topically 2 times daily       COMPRESSION STOCKINGS 1 each daily 2 each 0     dexamethasone (DECADRON) 6 MG tablet Take 6 mg by mouth daily X 7 days. Started 9/26/19       doxycycline hyclate (VIBRA-TABS) 100 MG tablet TAKE ONE CAPSULE/TABLET BY MOUTH TWICE A DAY       finasteride (PROSCAR) 5 MG tablet TAKE 1 TABLET EVERY DAY 90 tablet 3     glucosamine-chondroitin 500-400 MG CAPS per capsule Take 2 capsules by mouth daily       ketotifen (ZADITOR) 0.025 % SOLN ophthalmic solution Place 1 drop into both eyes every 12 hours 1 Bottle      KLOR-CON 20 MEQ CR tablet TAKE 1 TABLET EVERY DAY 90 tablet 3     levothyroxine (SYNTHROID/LEVOTHROID) 100 MCG tablet TAKE 1 TABLET EVERY DAY 90 tablet 3     LORazepam (ATIVAN) 1 MG tablet Take 0.5-1 tablets (0.5-1 mg) by mouth every 8 hours as needed for anxiety 30 tablet 5     Multiple Vitamins-Minerals (CENTRUM SILVER) per tablet Take 1 tablet by mouth daily       niacinamide (NIACIN) 500 MG tablet TAKE ONE TABLET BY MOUTH TWICE A DAY       omeprazole (PRILOSEC) 20 MG DR capsule TAKE 1 CAPSULE TWICE DAILY 180 capsule 3     Polyethylene Glycol 3350 (MIRALAX PO) Take 1 packet by mouth At Bedtime        simvastatin (ZOCOR) 20 MG tablet Take 20 mg by mouth daily        sucralfate (CARAFATE) 1 GM tablet TAKE 1 TABLET TWICE DAILY  180 tablet 3     tamsulosin (FLOMAX) 0.4 MG capsule TAKE 1 CAPSULE EVERY DAY (Patient taking differently: TAKE 1 CAPSULE EVERY EVENING) 90 capsule 3     triamcinolone (KENALOG) 0.025 % cream Apply topically 2 times daily as needed        ACE/ARB/ARNI NOT PRESCRIBED (INTENTIONAL) Please choose reason not prescribed, below       diclofenac (VOLTAREN) 1 % topical gel Apply 2 g topically 4 times daily       ETHACRYNIC ACID PO        ORDER FOR DME Auto-CPAP:Max 9 cm H2OMin 9 cm H2O  Continuous Lifetime need and heated humidity.    1 Device 0       ALLERGIES     Allergies   Allergen Reactions     Furosemide Blisters     Bullous pemphigoid. Bumex and ethacrynic acid okay.     Torsemide Blisters     Bullous pemphigoid.  Bumex and ethacrynic acid okay.       PAST MEDICAL HISTORY:  Past Medical History:   Diagnosis Date     Acute, but ill-defined, cerebrovascular disease     NO RESIDUALS     Antiplatelet or antithrombotic long-term use      Arrhythmia     a fib     Blood in stool      Coronary atherosclerosis of unspecified type of vessel, native or graft     S/P PTCA, EF 50%     Esophageal reflux      Hydrocele, unspecified      Hypercholesterolemia      Hypersomnia with sleep apnea, unspecified      Inguinal hernia without mention of obstruction or gangrene, recurrent unilateral or unspecified      Ischemic cardiomyopathy      Major depression in complete remission (H)      Mitral regurgitation     s/p MitraClip 5/20/19     Obese      Onychomycosis      JIM on CPAP      Other and unspecified hyperlipidemia      Other lymphedema     GROIN AND THIGHS     Other pancytopenia (H)      Other specified anemias      Overweight      Pacemaker      PAD (peripheral artery disease) (H)      Pancytopenia (H)      Persistent atrial fibrillation (H)     VVI PM for long pauses     Prostatic hypertrophy, benign      Pulmonary hypertension (H)      Scrotal varices      Thrombocytopenia, unspecified (H)      Umbilical hernia without  obstruction and without gangrene      Unilateral or unspecified femoral hernia without mention of obstruction or gangrene, unilateral or unspecified      Unspecified hypothyroidism      Venous stasis dermatitis of both lower extremities      Vitamin D deficiency        PAST SURGICAL HISTORY:  Past Surgical History:   Procedure Laterality Date     BONE MARROW BIOPSY, BONE SPECIMEN, NEEDLE/TROCAR N/A 4/19/2019    Procedure: BONE MARROW BIOPSY;  Surgeon: Yovani Cooley MD;  Location:  OR     CV HEART CATHETERIZATION WITH POSSIBLE INTERVENTION N/A 4/12/2019    Procedure: Heart Catheterization with possible Intervention;  Surgeon: Lev Beck MD;  Location:  HEART CARDIAC CATH LAB     CV MITRACLIP N/A 5/20/2019    Procedure: CV MITRAL CLIP;  Surgeon: Harshil Winter MD;  Location:  HEART CARDIAC CATH LAB     CV RIGHT HEART CATH N/A 4/12/2019    Procedure: Right Heart Cath;  Surgeon: Lev Beck MD;  Location:  HEART CARDIAC CATH LAB     EYE SURGERY      CATARACT/BLEPHAROPLASTY     GENITOURINARY SURGERY      TUNA     HERNIA REPAIR      RIGHT INGUINAL     HERNIORRHAPHY INGUINAL  8/14/2012    Procedure: HERNIORRHAPHY INGUINAL;  right inguinal hernia repair;  Surgeon: Kareem Torrez MD;  Location:  [facility]     HERNIORRHAPHY UMBILICAL N/A 10/31/2017    Procedure: HERNIORRHAPHY UMBILICAL;  UMBILICAL HERNIA REPAIR WITH MESH;  Surgeon: Scar Harrington MD;  Location: [facility]     IMPLANT PACEMAKER  8/2009    single chamber     [place]      [facility] [place]THOPEDIC SURGERY  2010    right TKR, left TKR     SURGICAL HISTORY OF -       Thumb surgery     SURGICAL HISTORY OF -   1990s    Left total knee replacement     SURGICAL HISTORY OF -   3/11    [facility] total knee replacement       FAMILY HISTORY:  Family History   Problem Relation Age of Onset     Cardiovascular Father      C.A.D. Father      Lung Cancer Sister      Unknown/Adopted Mother      Cancer - colorectal No family hx of         SOCIAL HISTORY:  Social History     Socioeconomic History     Marital status:      Spouse name: None     Number of children: None     Years of education: None     Highest education level: None   Occupational History     Occupation: Teacher, author, speaker     Employer: RETIRED   Social Needs     Financial resource strain: None     Food insecurity:     Worry: None     Inability: None     Transportation needs:     Medical: None     Non-medical: None   Tobacco Use     Smoking status: Never Smoker     Smokeless tobacco: Never Used   Substance and Sexual Activity     Alcohol use: Not Currently     Alcohol/week: 0.0 standard drinks     Comment: 1/month     Drug use: No     Sexual activity: Not Currently     Partners: Female   Lifestyle     Physical activity:     Days per week: None     Minutes per session: None     Stress: None   Relationships     Social connections:     Talks on phone: None     Gets together: None     Attends Zoroastrian service: None     Active member of club or organization: None     Attends meetings of clubs or organizations: None     Relationship status: None     Intimate partner violence:     Fear of current or ex partner: None     Emotionally abused: None     Physically abused: None     Forced sexual activity: None   Other Topics Concern     Parent/sibling w/ CABG, MI or angioplasty before 65F 55M? No      Service Not Asked     Blood Transfusions Not Asked     Caffeine Concern No     Comment: 1-2 cups coffee a day     Occupational Exposure Not Asked     Hobby Hazards Not Asked     Sleep Concern No     Stress Concern Yes     Comment: due to wifes health condition     Weight Concern No     Special Diet No     Back Care Not Asked     Exercise Yes     Comment: states he uses his tredmill on and off     Bike Helmet Not Asked     Seat Belt Yes     Self-Exams Not Asked   Social History Narrative     None       Review of Systems:  Skin:  Positive for bruising states has open wounds,  "blisters   Eyes:  Positive for glasses cataract surgery on both eyes  ENT:  Positive for   nose bleeds  Respiratory:  Positive for dyspnea on exertion;sleep apnea;CPAP     Cardiovascular:    Positive for;edema;lower extremity symptoms    Gastroenterology: Negative      Genitourinary:  not assessed      Musculoskeletal:  Positive for muscular weakness;arthritis balance off, recent falls  Neurologic:  Positive for stroke;numbness or tingling of feet    Psychiatric:  Positive for anxiety;excessive stress    Heme/Lymph/Imm:  Positive for easy bruising    Endocrine:  Positive for thyroid disorder      Physical Exam:  Vitals: /60   Pulse 64   Ht 1.88 m (6' 2\")   Wt 82.6 kg (182 lb)   BMI 23.37 kg/m       Constitutional:  cooperative;in no acute distress frail      Skin:  warm and dry to the touch   pacemaker incision in the left infraclavicular area was well-healed some blistering on back and healed blisters on chest, back abrasion, multiple blisters.    Head:  normocephalic        Eyes:  pupils equal and round        Lymph:      ENT:  no pallor or cyanosis        Neck:    JVP elevated      Respiratory:  clear to auscultation;normal symmetry    bilateral basilar crackles    Cardiac: regular rhythm       systolic murmur     Paced  pulses full and equal                                   feet and legs wrapped, thighs not edematous    GI:  abdomen soft   benign    Extremities and Muscular Skeletal:  no deformities, clubbing, cyanosis, erythema observed stasis pigmentation     LLE edema;2+;1+ feet and legs wrapped    Neurological:  affect appropriate        Psych:  Alert and Oriented x 3    Encounter Diagnoses   Name Primary?     Ischemic cardiomyopathy Yes     Acute systolic CHF (congestive heart failure) (H)        Recent Lab Results:  LIPID RESULTS:  Lab Results   Component Value Date    CHOL 97 08/21/2019    HDL 41 08/21/2019    LDL 48 08/21/2019    TRIG 41 08/21/2019    CHOLHDLRATIO 2.1 06/15/2015       LIVER " ENZYME RESULTS:  Lab Results   Component Value Date    AST 22 05/20/2019    ALT 17 05/20/2019       CBC RESULTS:  Lab Results   Component Value Date    WBC 3.2 (L) 07/31/2019    RBC 2.74 (L) 07/31/2019    HGB 8.8 (L) 07/31/2019    HCT 27.7 (L) 07/31/2019     (H) 07/31/2019    MCH 32.1 07/31/2019    MCHC 31.8 07/31/2019    RDW 16.3 (H) 07/31/2019    PLT 83 (L) 07/31/2019       BMP RESULTS:  Lab Results   Component Value Date     09/26/2019    POTASSIUM 3.6 09/26/2019    CHLORIDE 99 09/26/2019    CO2 32 (H) 09/26/2019    ANIONGAP 9.6 09/26/2019     (H) 09/26/2019    BUN 20 09/26/2019    CR 1.37 (H) 09/26/2019    GFRESTIMATED 49 (L) 09/26/2019    GFRESTBLACK 59 (L) 09/26/2019    TEODORO 9.6 09/26/2019        A1C RESULTS:  Lab Results   Component Value Date    A1C 5.5 05/29/2019       INR RESULTS:  Lab Results   Component Value Date    INR 1.44 (H) 05/20/2019    INR 1.79 (H) 11/20/2018           CC  Mg Nelson MD  [address] [place] [address]                Thank you for allowing me to participate in the care of your patient.    Sincerely,     WILLIS Smith [facility]

## 2019-09-27 NOTE — PROGRESS NOTES
HPI and Plan:   I had the pleasure of seeing Bairon Foster and his son today in cardiology clinic follow up. He is a pleasant 88 year old patient of Dr. Hope, he is well known to me but has been recently treated at the [facility] where he gets the majority of his care.    Alfa has a history of coronary disease, severe MR status post mitral clip (with two clips) on May 20, 2019, severe TR and mild to moderate mitral stenosis.  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  His last intervention was done to his OM in 2009.  He has paroxysmal atrial fibrillation, his Eliquis was recently discontinued because of his frequent falls, he has also struggled with difficult to manage epitaxis and there has been discussion about Watchman.  He has a permanent pacemaker which is nearing end of battery life, [facility] to [facility] the battery life, thy decreased the lower rate to 50 bpm to avoid RV pacing. He is high risk for developing infection from ID standpoing if battery replaced too soon after sepsis.  He has treated obstructive sleep apnea and chronic pancytopenia with low platelets.      Prior to MitraClip in May, his coronary angiogram showed mild nonobstructive coronary disease, normal cardiac output level, mildly elevated elevated right and left-sided filling pressures and mildly elevated PH.  His EF dropped this past spring to about 30%.     His mitral clip (with two clips) was done on May 20, 2019.   He was not put on Plavix following the procedure because of recent issues with bleeding and low platelets but was kept on Eliquis 2.5 BID and 81 mg aspirin.  His June echo described the EF is 40% slightly increased LV end-diastolic pressure of regurgitant mitral jet eccentrically directed with 1-2+ MR and a severely dilated RV.  He had moderately severe 3+ tricuspid regurgitation and RVSP was 30 mmHg.     He was doing poorly when I saw him in June, frequent falls  and edema vs anasarca. 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 Since seen last in July in our group, he has had some setbacks, thought today he looks remarkably well. He was hospitalized at [facility] in September for Corynebacterium sepsis, I have incomplete records from this hospitalization, but there is a [facility] 8/28/19 reports no obvious vegetations, mod MV regurgitation with two clips, EF 40-45% (report scanned into epic).     Today he looks little thinner and frailer on the top half of his body.  He is definitely had improvement in his lower extremity edema and anasarca since I saw him in June.  He has his legs wrapped up to his knees and has weeping wounds that are being treated by wound clinic.  But his thighs are no longer full of water.  He tells me he  is currently only on a low-dose aspirin, no longer on Plavix or Eliquis.  This is because of falls and bleeds.  He wonders if Dr. Hope would still consider a watchman in him or not.  He has a history of remote CVA, the details of which are unknown to me.  He continues to live with his wife next door to his son.  He has home nursing and PT coming out to his house now [facility] of the [facility].  He is interested in palliative care now, previously I had set up for him but he did not make that appointment.    Physical Exam  Please see Below     Assessment and Plan  1. Systolic and Diastolic heart failure secondary to valvular heat disease. S/p MV clip and mod-severe TR. His weight is down from 188 (when I saw him in June) to 182 and his edema has receded from his thighs. Good renal function. Can really only tolerate bumex, currently 1 mg BID. I'll keep him on this dose and see him in November with BMP prior to reassess. He will continue with home weight and notify us of significant changes. Cont Coreg 6.25 BID.  2. Chronic Afib with tachy-june s/p [facility] to [facility]. AC  discharge for bleeding risk. On low dose ASA. MADHAV <0.25 years, concern for infection with recent hospitalization for sepsis if gen change done in near future.  3. Bullous phemphiogoid- avoid loop diuretics. Bumex and ethacrynic acid okay per dermatology at [facility]  4. CAD No ischemic symtpoms, on good medical mgmt.   5.  Recent hospitalization for sepsis  6. Weeping legs. I've never seen his legs weep before. His LE edema is much improved from June. He is getting wound care and they are wrapped, he had a photo taken earlier this am and I wonder if his bullous Phemphiogoid blisters are worse on his LE then prevoiusly and if these are contributing. It is probably also due to some CHF.    Thank you for allowing me to care for Bairon Foster today. He should keep follow up wiht Dr. Hope November 15. He wants to see what Dr. Hope thinks about Watchman.     Regarding Palliative care, I have previously arranged for that in our clinic, but he did not go. He seems much more open to it now. We discussed doing it at  versus the [facility], and for his ease and for their resources, he will do this through the [facility].     WILLIS Bullard, CNP  Cardiology    Voice recognition software was used for this note, I have reviewed this note, but errors may have been missed.    No orders of the defined types were placed in this encounter.    Orders Placed This Encounter   Medications     dexamethasone (DECADRON) 6 MG tablet     Sig: Take 6 mg by mouth daily X 7 days. Started 9/26/19     bumetanide (BUMEX) 1 MG tablet     Sig: Take 1 tablet (1 mg) by mouth 2 times daily     There are no discontinued medications.      CURRENT MEDICATIONS:  Current Outpatient Medications   Medication Sig Dispense Refill     acetaminophen (TYLENOL) 500 MG tablet Take 2 tablets (1,000 mg) by mouth every 6 hours as needed for mild pain 100 tablet 0     aspirin 81 MG EC tablet Take 81 mg by mouth daily        bumetanide (BUMEX) 1 MG tablet Take 1 tablet (1 mg) by  mouth 2 times daily       buPROPion (WELLBUTRIN XL) 150 MG 24 hr tablet Take 150 mg by mouth every morning       Carboxymethylcellulose Sod PF (CELLUVISC/REFRESH LIQUIGEL) 1 % ophthalmic solution Place 1 drop into both eyes 3 times daily as needed for dry eyes       carvedilol (COREG) 6.25 MG tablet TAKE 1 TABLET (6.25 MG) BY MOUTH 2 TIMES DAILY (WITH MEALS) 180 tablet 3     clobetasol (TEMOVATE) 0.05 % external cream Apply topically 2 times daily       COMPRESSION STOCKINGS 1 each daily 2 each 0     dexamethasone (DECADRON) 6 MG tablet Take 6 mg by mouth daily X 7 days. Started 9/26/19       doxycycline hyclate (VIBRA-TABS) 100 MG tablet TAKE ONE CAPSULE/TABLET BY MOUTH TWICE A DAY       finasteride (PROSCAR) 5 MG tablet TAKE 1 TABLET EVERY DAY 90 tablet 3     glucosamine-chondroitin 500-400 MG CAPS per capsule Take 2 capsules by mouth daily       ketotifen (ZADITOR) 0.025 % SOLN ophthalmic solution Place 1 drop into both eyes every 12 hours 1 Bottle      KLOR-CON 20 MEQ CR tablet TAKE 1 TABLET EVERY DAY 90 tablet 3     levothyroxine (SYNTHROID/LEVOTHROID) 100 MCG tablet TAKE 1 TABLET EVERY DAY 90 tablet 3     LORazepam (ATIVAN) 1 MG tablet Take 0.5-1 tablets (0.5-1 mg) by mouth every 8 hours as needed for anxiety 30 tablet 5     Multiple Vitamins-Minerals (CENTRUM SILVER) per tablet Take 1 tablet by mouth daily       niacinamide (NIACIN) 500 MG tablet TAKE ONE TABLET BY MOUTH TWICE A DAY       omeprazole (PRILOSEC) 20 MG DR capsule TAKE 1 CAPSULE TWICE DAILY 180 capsule 3     Polyethylene Glycol 3350 (MIRALAX PO) Take 1 packet by mouth At Bedtime        simvastatin (ZOCOR) 20 MG tablet Take 20 mg by mouth daily        sucralfate (CARAFATE) 1 GM tablet TAKE 1 TABLET TWICE DAILY 180 tablet 3     tamsulosin (FLOMAX) 0.4 MG capsule TAKE 1 CAPSULE EVERY DAY (Patient taking differently: TAKE 1 CAPSULE EVERY EVENING) 90 capsule 3     triamcinolone (KENALOG) 0.025 % cream Apply topically 2 times daily as needed         ACE/ARB/ARNI NOT PRESCRIBED (INTENTIONAL) Please choose reason not prescribed, below       diclofenac (VOLTAREN) 1 % topical gel Apply 2 g topically 4 times daily       ETHACRYNIC ACID PO        ORDER FOR DME Auto-CPAP:Max 9 cm H2OMin 9 cm H2O  Continuous Lifetime need and heated humidity.    1 Device 0       ALLERGIES     Allergies   Allergen Reactions     Furosemide Blisters     Bullous pemphigoid. Bumex and ethacrynic acid okay.     Torsemide Blisters     Bullous pemphigoid.  Bumex and ethacrynic acid okay.       PAST MEDICAL HISTORY:  Past Medical History:   Diagnosis Date     Acute, but ill-defined, cerebrovascular disease     NO RESIDUALS     Antiplatelet or antithrombotic long-term use      Arrhythmia     a fib     Blood in stool      Coronary atherosclerosis of unspecified type of vessel, native or graft     S/P PTCA, EF 50%     Esophageal reflux      Hydrocele, unspecified      Hypercholesterolemia      Hypersomnia with sleep apnea, unspecified      Inguinal hernia without mention of obstruction or gangrene, recurrent unilateral or unspecified      Ischemic cardiomyopathy      Major depression in complete remission (H)      Mitral regurgitation     s/p MitraClip 5/20/19     Obese      Onychomycosis      JIM on CPAP      Other and unspecified hyperlipidemia      Other lymphedema     GROIN AND THIGHS     Other pancytopenia (H)      Other specified anemias      Overweight      Pacemaker      PAD (peripheral artery disease) (H)      Pancytopenia (H)      Persistent atrial fibrillation (H)     VVI PM for long pauses     Prostatic hypertrophy, benign      Pulmonary hypertension (H)      Scrotal varices      Thrombocytopenia, unspecified (H)      Umbilical hernia without obstruction and without gangrene      Unilateral or unspecified femoral hernia without mention of obstruction or gangrene, unilateral or unspecified      Unspecified hypothyroidism      Venous stasis dermatitis of both lower extremities       Vitamin D deficiency        PAST SURGICAL HISTORY:  Past Surgical History:   Procedure Laterality Date     BONE MARROW BIOPSY, BONE SPECIMEN, NEEDLE/TROCAR N/A 4/19/2019    Procedure: BONE MARROW BIOPSY;  Surgeon: Yovani Cooley MD;  Location:  OR     CV HEART CATHETERIZATION WITH POSSIBLE INTERVENTION N/A 4/12/2019    Procedure: Heart Catheterization with possible Intervention;  Surgeon: Lev Beck MD;  Location:  HEART CARDIAC CATH LAB     CV MITRACLIP N/A 5/20/2019    Procedure: CV MITRAL CLIP;  Surgeon: Harshil Winter MD;  Location:  HEART CARDIAC CATH LAB     CV RIGHT HEART CATH N/A 4/12/2019    Procedure: Right Heart Cath;  Surgeon: Lev Beck MD;  Location:  HEART CARDIAC CATH LAB     EYE SURGERY      CATARACT/BLEPHAROPLASTY     GENITOURINARY SURGERY      TUNA     HERNIA REPAIR      RIGHT INGUINAL     HERNIORRHAPHY INGUINAL  8/14/2012    Procedure: HERNIORRHAPHY INGUINAL;  right inguinal hernia repair;  Surgeon: Kareem Torrez MD;  Location:  [facility]     HERNIORRHAPHY UMBILICAL N/A 10/31/2017    Procedure: HERNIORRHAPHY UMBILICAL;  UMBILICAL HERNIA REPAIR WITH MESH;  Surgeon: Scar Harrington MD;  Location: [facility]     IMPLANT PACEMAKER  8/2009    single chamber     ORTHOPEDIC SURGERY      [facility] [place]     ORTHOPEDIC SURGERY  2010    right TKR, left TKR     SURGICAL HISTORY OF -       Thumb surgery     SURGICAL HISTORY OF -   1990s    Left total knee replacement     SURGICAL HISTORY OF -   3/11    Righ total knee replacement       FAMILY HISTORY:  Family History   Problem Relation Age of Onset     Cardiovascular Father      C.A.D. Father      Lung Cancer Sister      Unknown/Adopted Mother      Cancer - colorectal No family hx of        SOCIAL HISTORY:  Social History     Socioeconomic History     Marital status:      Spouse name: None     Number of children: None     Years of education: None     Highest education level: None   Occupational History     Occupation:  Teacher, author, speaker     Employer: RETIRED   Social Needs     Financial resource strain: None     Food insecurity:     Worry: None     Inability: None     Transportation needs:     Medical: None     Non-medical: None   Tobacco Use     Smoking status: Never Smoker     Smokeless tobacco: Never Used   Substance and Sexual Activity     Alcohol use: Not Currently     Alcohol/week: 0.0 standard drinks     Comment: 1/month     Drug use: No     Sexual activity: Not Currently     Partners: Female   Lifestyle     Physical activity:     Days per week: None     Minutes per session: None     Stress: None   Relationships     Social connections:     Talks on phone: None     Gets together: None     Attends Tenriism service: None     Active member of club or organization: None     Attends meetings of clubs or organizations: None     Relationship status: None     Intimate partner violence:     Fear of current or ex partner: None     Emotionally abused: None     Physically abused: None     Forced sexual activity: None   Other Topics Concern     Parent/sibling w/ CABG, MI or angioplasty before 65F 55M? No      Service Not Asked     Blood Transfusions Not Asked     Caffeine Concern No     Comment: 1-2 cups coffee a day     Occupational Exposure Not Asked     Hobby Hazards Not Asked     Sleep Concern No     Stress Concern Yes     Comment: due to wifes health condition     Weight Concern No     Special Diet No     Back Care Not Asked     Exercise Yes     Comment: states he uses his tredmill on and off     Bike Helmet Not Asked     Seat Belt Yes     Self-Exams Not Asked   Social History Narrative     None       Review of Systems:  Skin:  Positive for bruising states has open wounds, blisters   Eyes:  Positive for glasses cataract surgery on both eyes  ENT:  Positive for   nose bleeds  Respiratory:  Positive for dyspnea on exertion;sleep apnea;CPAP     Cardiovascular:    Positive for;edema;lower extremity symptoms   "  Gastroenterology: Negative      Genitourinary:  not assessed      Musculoskeletal:  Positive for muscular weakness;arthritis balance off, recent falls  Neurologic:  Positive for stroke;numbness or tingling of feet    Psychiatric:  Positive for anxiety;excessive stress    Heme/Lymph/Imm:  Positive for easy bruising    Endocrine:  Positive for thyroid disorder      Physical Exam:  Vitals: /60   Pulse 64   Ht 1.88 m (6' 2\")   Wt 82.6 kg (182 lb)   BMI 23.37 kg/m      Constitutional:  cooperative;in no acute distress frail      Skin:  warm and dry to the touch   pacemaker incision in the left infraclavicular area was well-healed some blistering on back and healed blisters on chest, back abrasion, multiple blisters.    Head:  normocephalic        Eyes:  pupils equal and round        Lymph:      ENT:  no pallor or cyanosis        Neck:    JVP elevated      Respiratory:  clear to auscultation;normal symmetry    bilateral basilar crackles    Cardiac: regular rhythm       systolic murmur     Paced  pulses full and equal                                   feet and legs wrapped, thighs not edematous    GI:  abdomen soft   benign    Extremities and Muscular Skeletal:  no deformities, clubbing, cyanosis, erythema observed stasis pigmentation     LLE edema;2+;1+ feet and legs wrapped    Neurological:  affect appropriate        Psych:  Alert and Oriented x 3    Encounter Diagnoses   Name Primary?     Ischemic cardiomyopathy Yes     Acute systolic CHF (congestive heart failure) (H)        Recent Lab Results:  LIPID RESULTS:  Lab Results   Component Value Date    CHOL 97 08/21/2019    HDL 41 08/21/2019    LDL 48 08/21/2019    TRIG 41 08/21/2019    CHOLHDLRATIO 2.1 06/15/2015       LIVER ENZYME RESULTS:  Lab Results   Component Value Date    AST 22 05/20/2019    ALT 17 05/20/2019       CBC RESULTS:  Lab Results   Component Value Date    WBC 3.2 (L) 07/31/2019    RBC 2.74 (L) 07/31/2019    HGB 8.8 (L) 07/31/2019    HCT 27.7 " (L) 07/31/2019     (H) 07/31/2019    MCH 32.1 07/31/2019    MCHC 31.8 07/31/2019    RDW 16.3 (H) 07/31/2019    PLT 83 (L) 07/31/2019       BMP RESULTS:  Lab Results   Component Value Date     09/26/2019    POTASSIUM 3.6 09/26/2019    CHLORIDE 99 09/26/2019    CO2 32 (H) 09/26/2019    ANIONGAP 9.6 09/26/2019     (H) 09/26/2019    BUN 20 09/26/2019    CR 1.37 (H) 09/26/2019    GFRESTIMATED 49 (L) 09/26/2019    GFRESTBLACK 59 (L) 09/26/2019    TEODORO 9.6 09/26/2019        A1C RESULTS:  Lab Results   Component Value Date    A1C 5.5 05/29/2019       INR RESULTS:  Lab Results   Component Value Date    INR 1.44 (H) 05/20/2019    INR 1.79 (H) 11/20/2018           CC  Mg Nelson MD  [id number]  [place] [address]

## 2019-10-03 ENCOUNTER — HEALTH MAINTENANCE LETTER (OUTPATIENT)
Age: 84
End: 2019-10-03

## 2019-10-04 ENCOUNTER — TELEPHONE (OUTPATIENT)
Dept: FAMILY MEDICINE | Facility: CLINIC | Age: 84
End: 2019-10-04

## 2019-10-04 NOTE — TELEPHONE ENCOUNTER
Our goal is to have forms completed within 72 hours, however some forms may require a visit or additional information.    What clinic location was the form placed at [facility] or [place].?     Who is the form from?   Where did the form come from? Faxed to clinic   The form was placed in the inbox of Mg Nelson MD      Please fax to [phone]  Phone number: [phone]    Additional comments: home health wound care orders     Call take on 10/4/2019 at 11:35 AM by Maya Monterroso

## 2019-10-07 ENCOUNTER — MEDICAL CORRESPONDENCE (OUTPATIENT)
Dept: HEALTH INFORMATION MANAGEMENT | Facility: CLINIC | Age: 84
End: 2019-10-07

## 2019-10-07 ENCOUNTER — TELEPHONE (OUTPATIENT)
Dept: FAMILY MEDICINE | Facility: CLINIC | Age: 84
End: 2019-10-07

## 2019-10-07 NOTE — TELEPHONE ENCOUNTER
Our goal is to have forms completed within 72 hours, however some forms may require a visit or additional information.    What clinic location was the form placed at [facility] or [place].?     Who is the form from?   Where did the form come from? Faxed to clinic   The form was placed in the inbox of Mg Nelson MD      Please fax to [phone]  Phone number: [phone]    Additional comments: Home health PT DC summary     Call take on 10/7/2019 at 1:14 PM by Maya Monterroso

## 2019-10-14 DIAGNOSIS — I25.5 ISCHEMIC CARDIOMYOPATHY: ICD-10-CM

## 2019-10-14 DIAGNOSIS — E03.9 ACQUIRED HYPOTHYROIDISM: ICD-10-CM

## 2019-10-15 ENCOUNTER — TELEPHONE (OUTPATIENT)
Dept: FAMILY MEDICINE | Facility: CLINIC | Age: 84
End: 2019-10-15

## 2019-10-15 ENCOUNTER — ANCILLARY PROCEDURE (OUTPATIENT)
Dept: CARDIOLOGY | Facility: CLINIC | Age: 84
End: 2019-10-15
Attending: INTERNAL MEDICINE
Payer: COMMERCIAL

## 2019-10-15 DIAGNOSIS — Z95.0 CARDIAC PACEMAKER IN SITU: ICD-10-CM

## 2019-10-15 NOTE — TELEPHONE ENCOUNTER
Our goal is to have forms completed within 72 hours, however some forms may require a visit or additional information.    What clinic location was the form placed at [facility] or [place].?     Who is the form from?   Where did the form come from? Faxed to clinic   The form was placed in the inbox of Mg Nelson MD      Please fax to [phone]  Phone number: [phone]    Additional comments: home health care [facility] 6/29/19 to 8/27/19    Call take on 10/15/2019 at 10:36 AM by Maya Monterroso

## 2019-10-15 NOTE — TELEPHONE ENCOUNTER
Our goal is to have forms completed within 72 hours, however some forms may require a visit or additional information.    What clinic location was the form placed at [facility] or [place].?     Who is the form from?   Where did the form come from? Faxed to clinic   The form was placed in the inbox of Mg Nelson MD      Please fax to [phone]  Phone number: [phone]    Additional comments: Home health Care wound care orders     Call take on 10/15/2019 at 10:38 AM by Maya Monterroso

## 2019-10-15 NOTE — TELEPHONE ENCOUNTER
Our goal is to have forms completed within 72 hours, however some forms may require a visit or additional information.    What clinic location was the form placed at [facility] or [place].?     Who is the form from?   Where did the form come from? Faxed to clinic   The form was placed in the inbox of Mg Nelson MD      Please fax to [phone]  Phone number: [phone]    Additional comments: home health care hold all services pt in hosp    Call take on 10/15/2019 at 10:59 AM by Maya Monterroso

## 2019-10-15 NOTE — TELEPHONE ENCOUNTER
Our goal is to have forms completed within 72 hours, however some forms may require a visit or additional information.    What clinic location was the form placed at [facility] or [place].?     Who is the form from?   Where did the form come from? Faxed to clinic   The form was placed in the inbox of Mg Nelson MD      Please fax to [phone]  Phone number: [phone]    Additional comments: home health care PT & OT added to SOC    Call take on 10/15/2019 at 5:02 PM by Maya Monterroso

## 2019-10-16 ENCOUNTER — TELEPHONE (OUTPATIENT)
Dept: FAMILY MEDICINE | Facility: CLINIC | Age: 84
End: 2019-10-16

## 2019-10-16 ENCOUNTER — MEDICAL CORRESPONDENCE (OUTPATIENT)
Dept: HEALTH INFORMATION MANAGEMENT | Facility: CLINIC | Age: 84
End: 2019-10-16

## 2019-10-16 RX ORDER — LEVOTHYROXINE SODIUM 100 UG/1
TABLET ORAL
Qty: 90 TABLET | Refills: 2 | Status: SHIPPED | OUTPATIENT
Start: 2019-10-16 | End: 2020-01-15

## 2019-10-16 RX ORDER — CARVEDILOL 6.25 MG/1
TABLET ORAL
Qty: 180 TABLET | Refills: 2 | Status: SHIPPED | OUTPATIENT
Start: 2019-10-16 | End: 2020-01-15

## 2019-10-16 NOTE — TELEPHONE ENCOUNTER
Prescription approved per [facility]ill Protocol for 12 months of refills since last appointment, which was 8/21/2019.

## 2019-10-16 NOTE — TELEPHONE ENCOUNTER
Our goal is to have forms completed within 72 hours, however some forms may require a visit or additional information.    What clinic location was the form placed at [facility] or [place].?     Who is the form from?   Where did the form come from? Faxed to clinic   The form was placed in the inbox of Mg Nelson MD      Please fax to [phone]  Phone number: [phone]    Additional comments: [facility] Physical therapy discharge summary    Call take on 10/16/2019 at 12:00 PM by Naomi Pearl

## 2019-10-17 NOTE — TELEPHONE ENCOUNTER
Our goal is to have forms completed within 72 hours, however some forms may require a visit or additional information.    What clinic location was the form placed at [facility] or [place].?     Who is the form from?   Where did the form come from? Faxed to clinic   The form was placed in the inbox of Mg Nelson MD      Please fax to [phone]  Phone number: [phone]    Additional comments: [facility] health care [facility]     Call take on 10/17/2019 at 8:09 AM by Maya Monterroso

## 2019-10-24 ENCOUNTER — TELEPHONE (OUTPATIENT)
Dept: FAMILY MEDICINE | Facility: CLINIC | Age: 84
End: 2019-10-24

## 2019-10-24 NOTE — TELEPHONE ENCOUNTER
Our goal is to have forms completed within 72 hours, however some forms may require a visit or additional information.    What clinic location was the form placed at [facility] or [place].?     Who is the form from?   Where did the form come from? Faxed to clinic   The form was placed in the inbox of Mg Nelson MD      Please fax to [phone]  Phone number: [phone]    Additional comments: home health care interventions     Call take on 10/24/2019 at 11:30 AM by Maya Monterroso

## 2019-11-06 ENCOUNTER — TELEPHONE (OUTPATIENT)
Dept: FAMILY MEDICINE | Facility: CLINIC | Age: 84
End: 2019-11-06

## 2019-11-06 NOTE — TELEPHONE ENCOUNTER
Our goal is to have forms completed within 72 hours, however some forms may require a visit or additional information.    What clinic location was the form placed at [facility] or [place].?     Who is the form from?   Where did the form come from? Faxed to clinic   The form was placed in the inbox of Mg Nelson MD      Please fax to [phone]  Phone number: [phone]    Additional comments: [facility] Physician orders (Pt frequency decreased to weekly for medication management and set up. Medication dose changes noted)    Call take on 11/6/2019 at 11:18 AM by Naomi Pearl

## 2019-11-07 ENCOUNTER — TELEPHONE (OUTPATIENT)
Dept: FAMILY MEDICINE | Facility: CLINIC | Age: 84
End: 2019-11-07

## 2019-11-07 NOTE — TELEPHONE ENCOUNTER
Our goal is to have forms completed within 72 hours, however some forms may require a visit or additional information.    What clinic location was the form placed at [facility] or [place].?     Who is the form from?   Where did the form come from? Faxed to clinic   The form was placed in the inbox of Mg Nelson MD      Please fax to [phone]  Phone number: [phone]    Additional comments: home health care [facility] 11/3/19 TO 1/1/2020    Call take on 11/7/2019 at 3:22 PM by Maya Monterroso

## 2019-11-11 ENCOUNTER — MEDICAL CORRESPONDENCE (OUTPATIENT)
Dept: HEALTH INFORMATION MANAGEMENT | Facility: CLINIC | Age: 84
End: 2019-11-11

## 2019-11-15 ENCOUNTER — OFFICE VISIT (OUTPATIENT)
Dept: CARDIOLOGY | Facility: CLINIC | Age: 84
End: 2019-11-15
Attending: NURSE PRACTITIONER
Payer: COMMERCIAL

## 2019-11-15 VITALS
SYSTOLIC BLOOD PRESSURE: 127 MMHG | WEIGHT: 199 LBS | BODY MASS INDEX: 25.54 KG/M2 | DIASTOLIC BLOOD PRESSURE: 71 MMHG | HEART RATE: 94 BPM | HEIGHT: 74 IN

## 2019-11-15 DIAGNOSIS — I50.21 ACUTE SYSTOLIC CHF (CONGESTIVE HEART FAILURE) (H): Primary | ICD-10-CM

## 2019-11-15 DIAGNOSIS — I25.5 ISCHEMIC CARDIOMYOPATHY: ICD-10-CM

## 2019-11-15 DIAGNOSIS — Z95.0 CARDIAC PACEMAKER IN SITU: Primary | ICD-10-CM

## 2019-11-15 DIAGNOSIS — I48.20 CHRONIC ATRIAL FIBRILLATION (H): ICD-10-CM

## 2019-11-15 DIAGNOSIS — I34.0 NON-RHEUMATIC MITRAL REGURGITATION: ICD-10-CM

## 2019-11-15 DIAGNOSIS — I50.22 CHRONIC SYSTOLIC CONGESTIVE HEART FAILURE (H): ICD-10-CM

## 2019-11-15 PROCEDURE — 99215 OFFICE O/P EST HI 40 MIN: CPT | Performed by: INTERNAL MEDICINE

## 2019-11-15 ASSESSMENT — MIFFLIN-ST. JEOR: SCORE: 1642.41

## 2019-11-15 NOTE — PROGRESS NOTES
Courtesy check.   Patient was in clinic today to see Dr. Hope who requested a check of his battery. His battery currently says <0.5 years left. He stated that [facility] He wants to transfer is care to us until after his watchman is done by [facility]. Scheduled another phone check in 1 month. Patient aware.

## 2019-11-15 NOTE — LETTER
11/15/2019    Mg Nelson MD  [address]  [facility] [address]    RE: Bairon DOWNS Cristian       Dear Colleague,    I had the pleasure of seeing Bairon Foster in the [facility].    HPI and Plan:     Mr. Foster is a very pleasant 88-year-old gentleman who has multiple medical issues including bullous pemphigoid with lower extremity edema and weeping serosanguineous fluid.  Has history of congestive heart failure chronic atrial fibrillation status post permanent pacemaker insertion [facility].    Mr. Foster has had problems with bleeding and epistaxis having him come off of oral anticoagulation.  Unfortunately given his age history of heart failure history of previous TIA he has a chads vas score of 5 putting him at over 7% risk for CVA.  Because of this I do think he is a good candidate for the watchman device.  I have explained the risks and benefits of this procedure to him and he would like to proceed.  In addition we did a device check on him which shows him to be at greater than 6 months for end-of-life and he would like to have his device checked with us until the watchman has been completed and then will turn his cardiovascular care over to the [facility].    Orders Placed This Encounter   Procedures     CT Angiogram congenital heart disease     No orders of the defined types were placed in this encounter.    There are no discontinued medications.      Encounter Diagnoses   Name Primary?     Chronic systolic congestive heart failure (H)      Chronic atrial fibrillation      Acute systolic CHF (congestive heart failure) (H) Yes     Ischemic cardiomyopathy      Non-rheumatic mitral regurgitation        CURRENT MEDICATIONS:  Current Outpatient Medications   Medication Sig Dispense Refill     bumetanide (BUMEX) 1 MG tablet Take 1 tablet (1 mg) by mouth 2 times daily       ACE/ARB/ARNI NOT PRESCRIBED (INTENTIONAL)  Please choose reason not prescribed, below       acetaminophen (TYLENOL) 500 MG tablet Take 2 tablets (1,000 mg) by mouth every 6 hours as needed for mild pain 100 tablet 0     aspirin 81 MG EC tablet Take 81 mg by mouth daily        buPROPion (WELLBUTRIN XL) 150 MG 24 hr tablet Take 150 mg by mouth every morning       Carboxymethylcellulose Sod PF (CELLUVISC/REFRESH LIQUIGEL) 1 % ophthalmic solution Place 1 drop into both eyes 3 times daily as needed for dry eyes       carvedilol (COREG) 6.25 MG tablet TAKE 1 TABLET TWICE DAILY WITH MEALS 180 tablet 2     clobetasol (TEMOVATE) 0.05 % external cream Apply topically 2 times daily       COMPRESSION STOCKINGS 1 each daily 2 each 0     dexamethasone (DECADRON) 6 MG tablet Take 6 mg by mouth daily X 7 days. Started 9/26/19       diclofenac (VOLTAREN) 1 % topical gel Apply 2 g topically 4 times daily       doxycycline hyclate (VIBRA-TABS) 100 MG tablet TAKE ONE CAPSULE/TABLET BY MOUTH TWICE A DAY       ETHACRYNIC ACID PO        finasteride (PROSCAR) 5 MG tablet TAKE 1 TABLET EVERY DAY 90 tablet 3     glucosamine-chondroitin 500-400 MG CAPS per capsule Take 2 capsules by mouth daily       ketotifen (ZADITOR) 0.025 % SOLN ophthalmic solution Place 1 drop into both eyes every 12 hours 1 Bottle      KLOR-CON 20 MEQ CR tablet TAKE 1 TABLET EVERY DAY 90 tablet 3     levothyroxine (SYNTHROID/LEVOTHROID) 100 MCG tablet TAKE 1 TABLET EVERY DAY 90 tablet 2     LORazepam (ATIVAN) 1 MG tablet Take 0.5-1 tablets (0.5-1 mg) by mouth every 8 hours as needed for anxiety 30 tablet 5     Multiple Vitamins-Minerals (CENTRUM SILVER) per tablet Take 1 tablet by mouth daily       niacinamide (NIACIN) 500 MG tablet TAKE ONE TABLET BY MOUTH TWICE A DAY       omeprazole (PRILOSEC) 20 MG DR capsule TAKE 1 CAPSULE TWICE DAILY 180 capsule 3     ORDER FOR DME Auto-CPAP:Max 9 cm H2OMin 9 cm H2O  Continuous Lifetime need and heated humidity.    1 Device 0     Polyethylene Glycol 3350 (MIRALAX PO) Take  1 packet by mouth At Bedtime        simvastatin (ZOCOR) 20 MG tablet Take 20 mg by mouth daily        sucralfate (CARAFATE) 1 GM tablet TAKE 1 TABLET TWICE DAILY 180 tablet 3     tamsulosin (FLOMAX) 0.4 MG capsule TAKE 1 CAPSULE EVERY DAY (Patient taking differently: TAKE 1 CAPSULE EVERY EVENING) 90 capsule 3     triamcinolone (KENALOG) 0.025 % cream Apply topically 2 times daily as needed          ALLERGIES     Allergies   Allergen Reactions     Furosemide Blisters     Bullous pemphigoid. Bumex and ethacrynic acid okay.     Torsemide Blisters     Bullous pemphigoid.  Bumex and ethacrynic acid okay.       PAST MEDICAL HISTORY:  Past Medical History:   Diagnosis Date     Acute, but ill-defined, cerebrovascular disease     NO RESIDUALS     Antiplatelet or antithrombotic long-term use      Arrhythmia     a fib     Coronary atherosclerosis of unspecified type of vessel, native or graft     S/P PTCA, EF 50%     Esophageal reflux      Hypercholesterolemia      Ischemic cardiomyopathy      Mitral regurgitation     s/p MitraClip 5/20/19     Obese      JIM on CPAP      Other and unspecified hyperlipidemia      Other lymphedema     GROIN AND THIGHS     Other pancytopenia (H)      Other specified anemias      Overweight      Pacemaker      PAD (peripheral artery disease) (H)      Pancytopenia (H)      Persistent atrial fibrillation     VVI PM for long pauses     Pulmonary hypertension (H)      Unspecified hypothyroidism      Venous stasis dermatitis of both lower extremities      Vitamin D deficiency        PAST SURGICAL HISTORY:  Past Surgical History:   Procedure Laterality Date     BONE MARROW BIOPSY, BONE SPECIMEN, NEEDLE/TROCAR N/A 4/19/2019    Procedure: BONE MARROW BIOPSY;  Surgeon: Yovani Cooley MD;  Location:  OR     CV HEART CATHETERIZATION WITH POSSIBLE INTERVENTION N/A 4/12/2019    Procedure: Heart Catheterization with possible Intervention;  Surgeon: Lev Beck MD;  Location:  HEART  CARDIAC CATH LAB     CV MITRACLIP N/A 5/20/2019    Procedure: CV MITRAL CLIP;  Surgeon: Harshil Winter MD;  Location:  HEART CARDIAC CATH LAB     CV RIGHT HEART CATH N/A 4/12/2019    Procedure: Right Heart Cath;  Surgeon: Lev Beck MD;  Location:  HEART CARDIAC CATH LAB     EYE SURGERY      CATARACT/BLEPHAROPLASTY     GENITOURINARY SURGERY      TUNA     HERNIA REPAIR      RIGHT INGUINAL     HERNIORRHAPHY INGUINAL  8/14/2012    Procedure: HERNIORRHAPHY INGUINAL;  right inguinal hernia repair;  Surgeon: Kareem Torrez MD;  Location: [facility]     HERNIORRHAPHY UMBILICAL N/A 10/31/2017    Procedure: HERNIORRHAPHY UMBILICAL;  UMBILICAL HERNIA REPAIR WITH MESH;  Surgeon: Scar Harrington MD;  Location: [facility]     IMPLANT PACEMAKER  8/2009    single chamber     ORTHOPEDIC SURGERY      [facility] RIGHT     ORTHOPEDIC SURGERY  2010    right TKR, left TKR     SURGICAL HISTORY OF -       Thumb surgery     SURGICAL HISTORY OF -   1990s    Left total knee replacement     SURGICAL HISTORY OF -   3/11    [facility] total knee replacement       FAMILY HISTORY:  Family History   Problem Relation Age of Onset     Cardiovascular Father      C.A.D. Father      Lung Cancer Sister      Unknown/Adopted Mother      Cancer - colorectal No family hx of        SOCIAL HISTORY:  Social History     Socioeconomic History     Marital status:      Spouse name: None     Number of children: None     Years of education: None     Highest education level: None   Occupational History     Occupation: Teacher, author, speaker     Employer: RETIRED   Social Needs     Financial resource strain: None     Food insecurity:     Worry: None     Inability: None     Transportation needs:     Medical: None     Non-medical: None   Tobacco Use     Smoking status: Never Smoker     Smokeless tobacco: Never Used   Substance and Sexual Activity     Alcohol use: Not Currently     Alcohol/week: 0.0 standard drinks     Comment: 1/month     Drug use: No     Sexual  "activity: Not Currently     Partners: Female   Lifestyle     Physical activity:     Days per week: None     Minutes per session: None     Stress: None   Relationships     Social connections:     Talks on phone: None     Gets together: None     Attends Jainism service: None     Active member of club or organization: None     Attends meetings of clubs or organizations: None     Relationship status: None     Intimate partner violence:     Fear of current or ex partner: None     Emotionally abused: None     Physically abused: None     Forced sexual activity: None   Other Topics Concern     Parent/sibling w/ CABG, MI or angioplasty before 65F 55M? No      Service Not Asked     Blood Transfusions Not Asked     Caffeine Concern No     Comment: 1-2 cups coffee a day     Occupational Exposure Not Asked     Hobby Hazards Not Asked     Sleep Concern No     Stress Concern Yes     Comment: due to wifes health condition     Weight Concern No     Special Diet No     Back Care Not Asked     Exercise Yes     Comment: states he uses his tredmill on and off     Bike Helmet Not Asked     Seat Belt Yes     Self-Exams Not Asked   Social History Narrative     None       Review of Systems:  Skin:  Positive for bruising   Eyes:  Positive for glasses  ENT:  Positive for    Respiratory:  Positive for sleep apnea;CPAP  Cardiovascular:    Positive for;edema;lower extremity symptoms  Gastroenterology: Negative    Genitourinary:  not assessed    Musculoskeletal:  Positive for muscular weakness;arthritis  Neurologic:  Positive for stroke;numbness or tingling of feet  Psychiatric:  Positive for anxiety;excessive stress  Heme/Lymph/Imm:  Positive for easy bruising  Endocrine:  Positive for thyroid disorder    Physical Exam:  Vitals: /71   Pulse 94   Ht 1.88 m (6' 2\")   Wt 90.3 kg (199 lb)   BMI 25.55 kg/m       Constitutional:  cooperative;in no acute distress frail      Skin:  warm and dry to the touch bruises present pacemaker " incision in the left infraclavicular area was well-healed some blistering on back and healed blisters on chest, back abrasion, multiple blisters.    Head:  normocephalic        Eyes:  pupils equal and round        Lymph:      ENT:  no pallor or cyanosis        Neck:  JVP normal JVP elevated Large V wave consistent with tricuspid regurgitation    Respiratory:  clear to auscultation;normal symmetry    bilateral basilar crackles    Cardiac: regular rhythm       systolic murmur     Paced  pulses full and equal venous stasis changes bilaterally                                 feet and legs wrapped, thighs not edematous    GI:  abdomen soft   benign    Extremities and Muscular Skeletal:  no deformities, clubbing, cyanosis, erythema observed stasis pigmentation bilateral LE edema;trace;1+;2+ 1+ LLE edema;2+;1+ feet and legs wrapped    Neurological:  affect appropriate        Psych:  Alert and Oriented x 3 Depression    Recent Lab Results:  LIPID RESULTS:  Lab Results   Component Value Date    CHOL 97 08/21/2019    HDL 41 08/21/2019    LDL 48 08/21/2019    TRIG 41 08/21/2019    CHOLHDLRATIO 2.1 06/15/2015       LIVER ENZYME RESULTS:  Lab Results   Component Value Date    AST 22 05/20/2019    ALT 17 05/20/2019       CBC RESULTS:  Lab Results   Component Value Date    WBC 3.2 (L) 07/31/2019    RBC 2.74 (L) 07/31/2019    HGB 8.8 (L) 07/31/2019    HCT 27.7 (L) 07/31/2019     (H) 07/31/2019    MCH 32.1 07/31/2019    MCHC 31.8 07/31/2019    RDW 16.3 (H) 07/31/2019    PLT 83 (L) 07/31/2019       BMP RESULTS:  Lab Results   Component Value Date     09/26/2019    POTASSIUM 3.6 09/26/2019    CHLORIDE 99 09/26/2019    CO2 32 (H) 09/26/2019    ANIONGAP 9.6 09/26/2019     (H) 09/26/2019    BUN 20 09/26/2019    CR 1.37 (H) 09/26/2019    GFRESTIMATED 49 (L) 09/26/2019    GFRESTBLACK 59 (L) 09/26/2019    TEODORO 9.6 09/26/2019        A1C RESULTS:  Lab Results   Component Value Date    A1C 5.5 05/29/2019       INR  RESULTS:  Lab Results   Component Value Date    INR 1.44 (H) 05/20/2019    INR 1.79 (H) 11/20/2018                   Thank you for allowing me to participate in the care of your patient.    Sincerely,     SUE BOONE MD     [facility]

## 2019-11-15 NOTE — PROGRESS NOTES
HPI and Plan:     Mr. Foster is a very pleasant 88-year-old gentleman who has multiple medical issues including bullous pemphigoid with lower extremity edema and weeping serosanguineous fluid.  Has history of congestive heart failure chronic atrial fibrillation status post permanent pacemaker insertion [facility]    Mr. Foster has had problems with bleeding and epistaxis having him come off of oral anticoagulation.  Unfortunately given his age history of heart failure history of previous TIA he has a chads vas score of 5 putting him at over 7% risk for CVA.  Because of this I do think he is a good candidate for the watchman device.  I have explained the risks and benefits of this procedure to him and he would like to proceed.  In addition we did a device check on him which shows him to be at greater than 6 months for end-of-life and he would like to have his device checked with us until the watchman has been completed and then will turn his cardiovascular care over to the [facility].    Orders Placed This Encounter   Procedures     CT Angiogram congenital heart disease     No orders of the defined types were placed in this encounter.    There are no discontinued medications.      Encounter Diagnoses   Name Primary?     Chronic systolic congestive heart failure (H)      Chronic atrial fibrillation      Acute systolic CHF (congestive heart failure) (H) Yes     Ischemic cardiomyopathy      Non-rheumatic mitral regurgitation        CURRENT MEDICATIONS:  Current Outpatient Medications   Medication Sig Dispense Refill     bumetanide (BUMEX) 1 MG tablet Take 1 tablet (1 mg) by mouth 2 times daily       ACE/ARB/ARNI NOT PRESCRIBED (INTENTIONAL) Please choose reason not prescribed, below       acetaminophen (TYLENOL) 500 MG tablet Take 2 tablets (1,000 mg) by mouth every 6 hours as needed for mild pain 100 tablet 0     aspirin 81 MG EC tablet Take 81 mg by mouth daily         buPROPion (WELLBUTRIN XL) 150 MG 24 hr tablet Take 150 mg by mouth every morning       Carboxymethylcellulose Sod PF (CELLUVISC/REFRESH LIQUIGEL) 1 % ophthalmic solution Place 1 drop into both eyes 3 times daily as needed for dry eyes       carvedilol (COREG) 6.25 MG tablet TAKE 1 TABLET TWICE DAILY WITH MEALS 180 tablet 2     clobetasol (TEMOVATE) 0.05 % external cream Apply topically 2 times daily       COMPRESSION STOCKINGS 1 each daily 2 each 0     dexamethasone (DECADRON) 6 MG tablet Take 6 mg by mouth daily X 7 days. Started 9/26/19       diclofenac (VOLTAREN) 1 % topical gel Apply 2 g topically 4 times daily       doxycycline hyclate (VIBRA-TABS) 100 MG tablet TAKE ONE CAPSULE/TABLET BY MOUTH TWICE A DAY       ETHACRYNIC ACID PO        finasteride (PROSCAR) 5 MG tablet TAKE 1 TABLET EVERY DAY 90 tablet 3     glucosamine-chondroitin 500-400 MG CAPS per capsule Take 2 capsules by mouth daily       ketotifen (ZADITOR) 0.025 % SOLN ophthalmic solution Place 1 drop into both eyes every 12 hours 1 Bottle      KLOR-CON 20 MEQ CR tablet TAKE 1 TABLET EVERY DAY 90 tablet 3     levothyroxine (SYNTHROID/LEVOTHROID) 100 MCG tablet TAKE 1 TABLET EVERY DAY 90 tablet 2     LORazepam (ATIVAN) 1 MG tablet Take 0.5-1 tablets (0.5-1 mg) by mouth every 8 hours as needed for anxiety 30 tablet 5     Multiple Vitamins-Minerals (CENTRUM SILVER) per tablet Take 1 tablet by mouth daily       niacinamide (NIACIN) 500 MG tablet TAKE ONE TABLET BY MOUTH TWICE A DAY       omeprazole (PRILOSEC) 20 MG DR capsule TAKE 1 CAPSULE TWICE DAILY 180 capsule 3     ORDER FOR DME Auto-CPAP:Max 9 cm H2OMin 9 cm H2O  Continuous Lifetime need and heated humidity.    1 Device 0     Polyethylene Glycol 3350 (MIRALAX PO) Take 1 packet by mouth At Bedtime        simvastatin (ZOCOR) 20 MG tablet Take 20 mg by mouth daily        sucralfate (CARAFATE) 1 GM tablet TAKE 1 TABLET TWICE DAILY 180 tablet 3     tamsulosin (FLOMAX) 0.4 MG capsule TAKE 1 CAPSULE  EVERY DAY (Patient taking differently: TAKE 1 CAPSULE EVERY EVENING) 90 capsule 3     triamcinolone (KENALOG) 0.025 % cream Apply topically 2 times daily as needed          ALLERGIES     Allergies   Allergen Reactions     Furosemide Blisters     Bullous pemphigoid. Bumex and ethacrynic acid okay.     Torsemide Blisters     Bullous pemphigoid.  Bumex and ethacrynic acid okay.       PAST MEDICAL HISTORY:  Past Medical History:   Diagnosis Date     Acute, but ill-defined, cerebrovascular disease     NO RESIDUALS     Antiplatelet or antithrombotic long-term use      Arrhythmia     a fib     Coronary atherosclerosis of unspecified type of vessel, native or graft     S/P PTCA, EF 50%     Esophageal reflux      Hypercholesterolemia      Ischemic cardiomyopathy      Mitral regurgitation     s/p MitraClip 5/20/19     Obese      JIM on CPAP      Other and unspecified hyperlipidemia      Other lymphedema     GROIN AND THIGHS     Other pancytopenia (H)      Other specified anemias      Overweight      Pacemaker      PAD (peripheral artery disease) (H)      Pancytopenia (H)      Persistent atrial fibrillation     VVI PM for long pauses     Pulmonary hypertension (H)      Unspecified hypothyroidism      Venous stasis dermatitis of both lower extremities      Vitamin D deficiency        PAST SURGICAL HISTORY:  Past Surgical History:   Procedure Laterality Date     BONE MARROW BIOPSY, BONE SPECIMEN, NEEDLE/TROCAR N/A 4/19/2019    Procedure: BONE MARROW BIOPSY;  Surgeon: Yovani Cooley MD;  Location:  OR     CV HEART CATHETERIZATION WITH POSSIBLE INTERVENTION N/A 4/12/2019    Procedure: Heart Catheterization with possible Intervention;  Surgeon: Lev Beck MD;  Location:  HEART CARDIAC CATH LAB     CV MITRACLIP N/A 5/20/2019    Procedure: CV MITRAL CLIP;  Surgeon: Harshil Winter MD;  Location:  HEART CARDIAC CATH LAB     CV RIGHT HEART CATH N/A 4/12/2019    Procedure: Right Heart Cath;  Surgeon:  Lev Beck MD;  Location:  HEART CARDIAC CATH LAB     EYE SURGERY      CATARACT/BLEPHAROPLASTY     GENITOURINARY SURGERY      TUNA     HERNIA REPAIR      RIGHT INGUINAL     HERNIORRHAPHY INGUINAL  8/14/2012    Procedure: HERNIORRHAPHY INGUINAL;  right inguinal hernia repair;  Surgeon: Kareem Torrez MD;  Location: [facility]     HERNIORRHAPHY UMBILICAL N/A 10/31/2017    Procedure: HERNIORRHAPHY UMBILICAL;  UMBILICAL HERNIA REPAIR WITH MESH;  Surgeon: Scar Harrington MD;  Location: [facility]     IMPLANT PACEMAKER  8/2009    single chamber     ORTHOPEDIC SURGERY      [facility] RIGHT     ORTHOPEDIC SURGERY  2010    right TKR, left TKR     SURGICAL HISTORY OF -       Thumb surgery     SURGICAL HISTORY OF -   1990s    Left total knee replacement     SURGICAL HISTORY OF -   3/11    [facility] total knee replacement       FAMILY HISTORY:  Family History   Problem Relation Age of Onset     Cardiovascular Father      C.A.D. Father      Lung Cancer Sister      Unknown/Adopted Mother      Cancer - colorectal No family hx of        SOCIAL HISTORY:  Social History     Socioeconomic History     Marital status:      Spouse name: None     Number of children: None     Years of education: None     Highest education level: None   Occupational History     Occupation: Teacher, author, speaker     Employer: RETIRED   Social Needs     Financial resource strain: None     Food insecurity:     Worry: None     Inability: None     Transportation needs:     Medical: None     Non-medical: None   Tobacco Use     Smoking status: Never Smoker     Smokeless tobacco: Never Used   Substance and Sexual Activity     Alcohol use: Not Currently     Alcohol/week: 0.0 standard drinks     Comment: 1/month     Drug use: No     Sexual activity: Not Currently     Partners: Female   Lifestyle     Physical activity:     Days per week: None     Minutes per session: None     Stress: None   Relationships     Social connections:     Talks on phone: None     Gets  "together: None     Attends Scientology service: None     Active member of club or organization: None     Attends meetings of clubs or organizations: None     Relationship status: None     Intimate partner violence:     Fear of current or ex partner: None     Emotionally abused: None     Physically abused: None     Forced sexual activity: None   Other Topics Concern     Parent/sibling w/ CABG, MI or angioplasty before 65F 55M? No      Service Not Asked     Blood Transfusions Not Asked     Caffeine Concern No     Comment: 1-2 cups coffee a day     Occupational Exposure Not Asked     Hobby Hazards Not Asked     Sleep Concern No     Stress Concern Yes     Comment: due to wifes health condition     Weight Concern No     Special Diet No     Back Care Not Asked     Exercise Yes     Comment: states he uses his tredmill on and off     Bike Helmet Not Asked     Seat Belt Yes     Self-Exams Not Asked   Social History Narrative     None       Review of Systems:  Skin:  Positive for bruising   Eyes:  Positive for glasses  ENT:  Positive for    Respiratory:  Positive for sleep apnea;CPAP  Cardiovascular:    Positive for;edema;lower extremity symptoms  Gastroenterology: Negative    Genitourinary:  not assessed    Musculoskeletal:  Positive for muscular weakness;arthritis  Neurologic:  Positive for stroke;numbness or tingling of feet  Psychiatric:  Positive for anxiety;excessive stress  Heme/Lymph/Imm:  Positive for easy bruising  Endocrine:  Positive for thyroid disorder    Physical Exam:  Vitals: /71   Pulse 94   Ht 1.88 m (6' 2\")   Wt 90.3 kg (199 lb)   BMI 25.55 kg/m      Constitutional:  cooperative;in no acute distress frail      Skin:  warm and dry to the touch bruises present pacemaker incision in the left infraclavicular area was well-healed some blistering on back and healed blisters on chest, back abrasion, multiple blisters.    Head:  normocephalic        Eyes:  pupils equal and round        Lymph:  "     ENT:  no pallor or cyanosis        Neck:  JVP normal JVP elevated Large V wave consistent with tricuspid regurgitation    Respiratory:  clear to auscultation;normal symmetry    bilateral basilar crackles    Cardiac: regular rhythm       systolic murmur     Paced  pulses full and equal venous stasis changes bilaterally                                 feet and legs wrapped, thighs not edematous    GI:  abdomen soft   benign    Extremities and Muscular Skeletal:  no deformities, clubbing, cyanosis, erythema observed stasis pigmentation bilateral LE edema;trace;1+;2+ 1+ LLE edema;2+;1+ feet and legs wrapped    Neurological:  affect appropriate        Psych:  Alert and Oriented x 3 Depression    Recent Lab Results:  LIPID RESULTS:  Lab Results   Component Value Date    CHOL 97 08/21/2019    HDL 41 08/21/2019    LDL 48 08/21/2019    TRIG 41 08/21/2019    CHOLHDLRATIO 2.1 06/15/2015       LIVER ENZYME RESULTS:  Lab Results   Component Value Date    AST 22 05/20/2019    ALT 17 05/20/2019       CBC RESULTS:  Lab Results   Component Value Date    WBC 3.2 (L) 07/31/2019    RBC 2.74 (L) 07/31/2019    HGB 8.8 (L) 07/31/2019    HCT 27.7 (L) 07/31/2019     (H) 07/31/2019    MCH 32.1 07/31/2019    MCHC 31.8 07/31/2019    RDW 16.3 (H) 07/31/2019    PLT 83 (L) 07/31/2019       BMP RESULTS:  Lab Results   Component Value Date     09/26/2019    POTASSIUM 3.6 09/26/2019    CHLORIDE 99 09/26/2019    CO2 32 (H) 09/26/2019    ANIONGAP 9.6 09/26/2019     (H) 09/26/2019    BUN 20 09/26/2019    CR 1.37 (H) 09/26/2019    GFRESTIMATED 49 (L) 09/26/2019    GFRESTBLACK 59 (L) 09/26/2019    TEODORO 9.6 09/26/2019        A1C RESULTS:  Lab Results   Component Value Date    A1C 5.5 05/29/2019       INR RESULTS:  Lab Results   Component Value Date    INR 1.44 (H) 05/20/2019    INR 1.79 (H) 11/20/2018           CC  Sofie Genao, APRN CNP  [id number] [place] [address]

## 2019-11-19 ENCOUNTER — TELEPHONE (OUTPATIENT)
Dept: FAMILY MEDICINE | Facility: CLINIC | Age: 84
End: 2019-11-19

## 2019-11-19 ENCOUNTER — MEDICAL CORRESPONDENCE (OUTPATIENT)
Dept: HEALTH INFORMATION MANAGEMENT | Facility: CLINIC | Age: 84
End: 2019-11-19

## 2019-11-19 NOTE — TELEPHONE ENCOUNTER
Our goal is to have forms completed within 72 hours, however some forms may require a visit or additional information.    What clinic location was the form placed at [facility] or [place].?     Who is the form from?   Where did the form come from? Faxed to clinic   The form was placed in the inbox of Mg Nelson MD      Please fax to [phone]  Phone number: [phone]    Additional comments: [facility] left atrial appendage closure w/ non-valvular atrial fibrilation     Call take on 11/19/2019 at 3:17 PM by Maya Monterroso

## 2019-11-19 NOTE — TELEPHONE ENCOUNTER
Our goal is to have forms completed within 72 hours, however some forms may require a visit or additional information.    What clinic location was the form placed at [facility] or [place].?     Who is the form from?   Where did the form come from? Faxed to clinic   The form was placed in the inbox of Mg Nelson MD      Please fax to [phone]  Phone number: [phone]    Additional comments: home health care SN rn/lpn 1 w day every 2 days for 7 weeks     Call take on 11/19/2019 at 8:21 AM by Maya Monterroso

## 2019-11-20 DIAGNOSIS — I48.20 CHRONIC ATRIAL FIBRILLATION (H): Primary | ICD-10-CM

## 2019-11-20 NOTE — PROGRESS NOTES
Called and spoke with Bairon in regards to setting up his Watchman procedure. He is go with going ahead with his procedure on the 3rd of Dec with Dr Hope. Case request placed and we will move to getting this set up.Sarah Anderson RN

## 2019-11-22 ENCOUNTER — CARE COORDINATION (OUTPATIENT)
Dept: CARDIOLOGY | Facility: CLINIC | Age: 84
End: 2019-11-22

## 2019-11-22 ENCOUNTER — OFFICE VISIT (OUTPATIENT)
Dept: CARDIOLOGY | Facility: CLINIC | Age: 84
End: 2019-11-22
Payer: COMMERCIAL

## 2019-11-22 VITALS
DIASTOLIC BLOOD PRESSURE: 68 MMHG | SYSTOLIC BLOOD PRESSURE: 126 MMHG | HEART RATE: 80 BPM | HEIGHT: 74 IN | WEIGHT: 200.84 LBS | BODY MASS INDEX: 25.78 KG/M2

## 2019-11-22 DIAGNOSIS — I48.0 PAF (PAROXYSMAL ATRIAL FIBRILLATION) (H): Primary | ICD-10-CM

## 2019-11-22 DIAGNOSIS — I50.22 CHRONIC SYSTOLIC CONGESTIVE HEART FAILURE (H): Primary | ICD-10-CM

## 2019-11-22 PROCEDURE — 99214 OFFICE O/P EST MOD 30 MIN: CPT | Performed by: NURSE PRACTITIONER

## 2019-11-22 ASSESSMENT — MIFFLIN-ST. JEOR: SCORE: 1650.75

## 2019-11-22 NOTE — LETTER
11/22/2019    Mg Nelson MD  [address]  [facility] [address]    RE: Bairon DOWNS Cristian       Dear Colleague,    I had the pleasure of seeing Bairon Foster in the [facility].        STRUCTURAL HEART CARE   pre-WATCHMAN H&P     Primary Cardiologist: Dr. Hope    HPI   Mr. Foster presents for pre-operative H&P accompanied by his wife and granddaughter in preparation for elective Watchman on  12/03/2019 at [facility].     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     He has a last standing history of paroxysmal atrial fibrillation for which he was anticoagulated with Eliquis. Given his frequent fall risk and epistaxis, the Eliquis was discontinued.  Unfortunately, given his history of heart failure and previous CVA, he has a CHADSVASC of 5 putting him at a 7% or higher risk for recurrent CVA. His HAS-BLED score is 5 putting him at high risk of major bleeding. He was referred to Dr. Hope in the structural heart clinic for further evaluation of a potential watchman device.  It was felt he is a good candidate for the Watchman device, given the above factors.     He underwent a CT coronary angiogram to assess the size of his left atrial appendage which measures 30.5 mm x 28.5 mm.  The left atrial appendage depth is 38.2 mm.  Dr. Hope reviewed the CTA and deemed this patient a good candidate for the watchman device.    Today presents to the cardiology clinic for a history and physical prior to his watchman device.  He appears to be doing well from cardiac standpoint.  He denies shortness of breath and chest pain. He completes his ADL's without exertional dyspnea.  He tells me he  adheres to a 2 g sodium diet.  He is currently scheduled to undergo a watchman procedure on December 3, 2019.  He denies palpitations, PND, orthopnea, presyncope, syncope, or LE edema.     Blood pressure today is 126/68.    Assessment and Plan     1.  Paroxysmal atrial fibrillation.  Patient has long-standing history of paroxysmal atrial fibrillation.  He has a chads vas score of 5 and has bled score of 5.  He was previously anticoagulated with Eliquis.  Eliquis was discontinued given frequent falls and an episode of severe epistaxis.  Given the above, the patient was recommended to undergo a watchman device.  CTA was completed.  Watchman device size will be chosen prior to the procedure on December 3, 2019.  I have reviewed the risk and benefits of this procedure with the patient including the risk of heart attack, death, stroke.  Additional risks of the procedure include vascular injury and bleeding.  The patient has excepted these risks and is willing to move forward with the watchman procedure.    History of blood transfusions/reactions: No  History of abnormal bleeding/anti-platelet use: No  Steroid use in the last year: No  Personal or family history with difficulty with anesthesia: No    Medication Recommendations:  Diuretic : Hold Bumex the morning of the procedure    2. 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  He is status post mitral clip in May 2019.  Most recent echocardiogram showed mild to moderate mitral regurgitation as well as moderate to severe tricuspid regurgitation.  His weight today is around 200 pounds.  This is up from when he was last seen by Анна in September 2019.  At that time, he weighed 182 pounds.  He was on 199 pounds at his recent visit with Dr. Hope. He has bilateral lymphedema and his legs are in bilateral ACE wraps.  Given his significant weight gain, I have asked him to increase his Bumex to 2 mg in the a.m. and 1 mg in the p.m. for 3 days  then resume normal dosage of 1 mg BID.  Follow-up with a basic metabolic panel on 11/25/19.      3. Coronary artery disease. He denies symptoms concerning for ischemia. Continue good medical management.     4. Lymphedema. Bilateral ACE wraps on lower extremities.       WILLIS Rodriguez, CNP  11/22/2019    Current Medications:  Current Outpatient Medications   Medication Sig Dispense Refill     ACE/ARB/ARNI NOT PRESCRIBED (INTENTIONAL) Please choose reason not prescribed, below       acetaminophen (TYLENOL) 500 MG tablet Take 2 tablets (1,000 mg) by mouth every 6 hours as needed for mild pain 100 tablet 0     aspirin 81 MG EC tablet Take 81 mg by mouth daily        bumetanide (BUMEX) 1 MG tablet Take 1 tablet (1 mg) by mouth 2 times daily       buPROPion (WELLBUTRIN XL) 150 MG 24 hr tablet Take 150 mg by mouth every morning       Carboxymethylcellulose Sod PF (CELLUVISC/REFRESH LIQUIGEL) 1 % ophthalmic solution Place 1 drop into both eyes 3 times daily as needed for dry eyes       carvedilol (COREG) 6.25 MG tablet TAKE 1 TABLET TWICE DAILY WITH MEALS 180 tablet 2     clobetasol (TEMOVATE) 0.05 % external cream Apply topically 2 times daily       COMPRESSION STOCKINGS 1 each daily 2 each 0     dexamethasone (DECADRON) 6 MG tablet Take 6 mg by mouth daily X 7 days. Started 9/26/19       diclofenac (VOLTAREN) 1 % topical gel Apply 2 g topically 4 times daily       doxycycline hyclate (VIBRA-TABS) 100 MG tablet TAKE ONE CAPSULE/TABLET BY MOUTH TWICE A DAY       ETHACRYNIC ACID PO        finasteride (PROSCAR) 5 MG tablet TAKE 1 TABLET EVERY DAY 90 tablet 3     glucosamine-chondroitin 500-400 MG CAPS per capsule Take 2 capsules by mouth daily       ketotifen (ZADITOR) 0.025 % SOLN ophthalmic solution Place 1 drop into both eyes every 12 hours 1 Bottle      KLOR-CON 20 MEQ CR tablet TAKE 1 TABLET EVERY DAY 90 tablet 3     levothyroxine (SYNTHROID/LEVOTHROID) 100 MCG tablet TAKE 1 TABLET EVERY DAY 90 tablet 2     LORazepam  (ATIVAN) 1 MG tablet Take 0.5-1 tablets (0.5-1 mg) by mouth every 8 hours as needed for anxiety 30 tablet 5     Multiple Vitamins-Minerals (CENTRUM SILVER) per tablet Take 1 tablet by mouth daily       niacinamide (NIACIN) 500 MG tablet TAKE ONE TABLET BY MOUTH TWICE A DAY       omeprazole (PRILOSEC) 20 MG DR capsule TAKE 1 CAPSULE TWICE DAILY 180 capsule 3     ORDER FOR DME Auto-CPAP:Max 9 cm H2OMin 9 cm H2O  Continuous Lifetime need and heated humidity.    1 Device 0     Polyethylene Glycol 3350 (MIRALAX PO) Take 1 packet by mouth At Bedtime        simvastatin (ZOCOR) 20 MG tablet Take 20 mg by mouth daily        sucralfate (CARAFATE) 1 GM tablet TAKE 1 TABLET TWICE DAILY 180 tablet 3     tamsulosin (FLOMAX) 0.4 MG capsule TAKE 1 CAPSULE EVERY DAY (Patient taking differently: TAKE 1 CAPSULE EVERY EVENING) 90 capsule 3     triamcinolone (KENALOG) 0.025 % cream Apply topically 2 times daily as needed          Allergies:     Allergies   Allergen Reactions     Furosemide Blisters     Bullous pemphigoid. Bumex and ethacrynic acid okay.     Torsemide Blisters     Bullous pemphigoid.  Bumex and ethacrynic acid okay.       Past Medical History:  Past Medical History:   Diagnosis Date     Acute, but ill-defined, cerebrovascular disease     NO RESIDUALS     Antiplatelet or antithrombotic long-term use      Arrhythmia     a fib     Bullous pemphigoid 8/21/2019     Coronary atherosclerosis of unspecified type of vessel, native or graft     S/P PTCA, EF 50%     Epistaxis      Esophageal reflux      Hypercholesterolemia      Ischemic cardiomyopathy      Lower extremity edema      Mitral regurgitation     s/p MitraClip 5/20/19     Obese      JIM on CPAP      Other and unspecified hyperlipidemia      Other lymphedema     GROIN AND THIGHS     Other pancytopenia (H)      Other specified anemias      Overweight      Pacemaker     PACEMAKER-BS Implanted 8/5/2009      PAD (peripheral artery disease) (H)      Pancytopenia (H)       Persistent atrial fibrillation     VVI PM for long pauses     Pulmonary hypertension (H)      Systolic CHF (H)      TIA (transient ischemic attack)      Unspecified hypothyroidism      Venous stasis dermatitis of both lower extremities      Vitamin D deficiency        Past Surgical History:  Past Surgical History:   Procedure Laterality Date     BONE MARROW BIOPSY, BONE SPECIMEN, NEEDLE/TROCAR N/A 4/19/2019    Procedure: BONE MARROW BIOPSY;  Surgeon: Yovani Cooley MD;  Location:  OR     CV HEART CATHETERIZATION WITH POSSIBLE INTERVENTION N/A 4/12/2019    Procedure: Heart Catheterization with possible Intervention;  Surgeon: Lev Beck MD;  Location:  HEART CARDIAC CATH LAB     CV MITRACLIP N/A 5/20/2019    Procedure: CV MITRAL CLIP;  Surgeon: Harshil Winter MD;  Location:  HEART CARDIAC CATH LAB     CV RIGHT HEART CATH N/A 4/12/2019    Procedure: Right Heart Cath;  Surgeon: Lev Beck MD;  Location:  HEART CARDIAC CATH LAB     EYE SURGERY      CATARACT/BLEPHAROPLASTY     GENITOURINARY SURGERY      TUNA     HERNIA REPAIR      RIGHT INGUINAL     HERNIORRHAPHY INGUINAL  8/14/2012    Procedure: HERNIORRHAPHY INGUINAL;  right inguinal hernia repair;  Surgeon: Kareem Torrez MD;  Location:  [facility]     HERNIORRHAPHY UMBILICAL N/A 10/31/2017    Procedure: HERNIORRHAPHY UMBILICAL;  UMBILICAL HERNIA REPAIR WITH MESH;  Surgeon: Scar Harrington MD;  Location:  [facility]     IMPLANT PACEMAKER  8/2009    single chamber     ORTHOPEDIC SURGERY      [facility] RIGHT     ORTHOPEDIC SURGERY  2010    right TKR, left TKR     SURGICAL HISTORY OF -       Thumb surgery     SURGICAL HISTORY OF -   1990s    Left total knee replacement     SURGICAL HISTORY OF -   3/11    [facility] total knee replacement       Family History:  Family History   Problem Relation Age of Onset     Cardiovascular Father      C.A.D. Father      Lung Cancer Sister      Unknown/Adopted Mother      Cancer - colorectal No family hx of         Social History:  Social History     Socioeconomic History     Marital status:      Spouse name: None     Number of children: None     Years of education: None     Highest education level: None   Occupational History     Occupation: Teacher, author, speaker     Employer: RETIRED   Social Needs     Financial resource strain: None     Food insecurity:     Worry: None     Inability: None     Transportation needs:     Medical: None     Non-medical: None   Tobacco Use     Smoking status: Never Smoker     Smokeless tobacco: Never Used   Substance and Sexual Activity     Alcohol use: Not Currently     Alcohol/week: 0.0 standard drinks     Comment: 1/month     Drug use: No     Sexual activity: Not Currently     Partners: Female   Lifestyle     Physical activity:     Days per week: None     Minutes per session: None     Stress: None   Relationships     Social connections:     Talks on phone: None     Gets together: None     Attends Temple service: None     Active member of club or organization: None     Attends meetings of clubs or organizations: None     Relationship status: None     Intimate partner violence:     Fear of current or ex partner: None     Emotionally abused: None     Physically abused: None     Forced sexual activity: None   Other Topics Concern     Parent/sibling w/ CABG, MI or angioplasty before 65F 55M? No      Service Not Asked     Blood Transfusions Not Asked     Caffeine Concern No     Comment: 1-2 cups coffee a day     Occupational Exposure Not Asked     Hobby Hazards Not Asked     Sleep Concern No     Stress Concern Yes     Comment: due to wifes health condition     Weight Concern No     Special Diet No     Back Care Not Asked     Exercise Yes     Comment: states he uses his tredmill on and off     Bike Helmet Not Asked     Seat Belt Yes     Self-Exams Not Asked   Social History Narrative     None       Review of Systems:  Skin:  Positive for bruising states has open wounds,  "blisters   Eyes:  Positive for glasses cataract surgery on both eyes  ENT:  Positive for epistaxis    Respiratory:  Positive for sleep apnea;CPAP SOB has subsided   Cardiovascular:    Positive for;edema;lower extremity symptoms    Gastroenterology: Negative      Genitourinary:  Negative      Musculoskeletal:  Positive for muscular weakness;arthritis balance off, recent falls  Neurologic:  Positive for stroke;numbness or tingling of feet right foot; has had a couple of falls lately per wife  Psychiatric:  Positive for anxiety;excessive stress    Heme/Lymph/Imm:  Negative      Endocrine:  Positive for thyroid disorder        Physical Exam:  Vitals: /68   Pulse 80   Ht 1.88 m (6' 2\")   Wt 91.1 kg (200 lb 13.4 oz)   BMI 25.79 kg/m       Constitutional:  cooperative;in no acute distress frail      Skin:  warm and dry to the touch bruises present pacemaker incision in the left infraclavicular area was well-healed some blistering on back and healed blisters on chest, back abrasion, multiple blisters.    Head:  normocephalic        Eyes:  pupils equal and round;sclera white        Lymph:      ENT:  no pallor or cyanosis        Neck:  JVP normal JVP elevated Large V wave consistent with tricuspid regurgitation    Respiratory:  normal symmetry;no tenderness to palpation;normal respiratory excursion;no intercostal retraction;no use of accessory muscles    bilateral basilar crackles    Cardiac: regular rhythm;normal S1 and S2       systolic murmur;LLSB;grade 2     Paced  pulses full and equal venous stasis changes bilaterally   right radial artery;2+             left radial artery;2+               feet and legs wrapped, thighs not edematous    GI:  abdomen soft;non-tender   benign    Extremities and Muscular Skeletal:  no deformities, clubbing, cyanosis, erythema observed stasis pigmentation bilateral LE edema;trace;1+;2+ 1+ LLE edema;2+;1+ feet and legs wrapped    Neurological:  affect appropriate;no gross motor " deficits        Psych:  Alert and Oriented x 3    No diagnosis found.      Laboratory Studies:  LIPID RESULTS:  Lab Results   Component Value Date    CHOL 97 08/21/2019    HDL 41 08/21/2019    LDL 48 08/21/2019    TRIG 41 08/21/2019    CHOLHDLRATIO 2.1 06/15/2015       LIVER ENZYME RESULTS:  Lab Results   Component Value Date    AST 22 05/20/2019    ALT 17 05/20/2019       CBC RESULTS:  Lab Results   Component Value Date    WBC 3.2 (L) 07/31/2019    RBC 2.74 (L) 07/31/2019    HGB 8.8 (L) 07/31/2019    HCT 27.7 (L) 07/31/2019     (H) 07/31/2019    MCH 32.1 07/31/2019    MCHC 31.8 07/31/2019    RDW 16.3 (H) 07/31/2019    PLT 83 (L) 07/31/2019       BMP RESULTS:  Lab Results   Component Value Date     09/26/2019    POTASSIUM 3.6 09/26/2019    CHLORIDE 99 09/26/2019    CO2 32 (H) 09/26/2019    ANIONGAP 9.6 09/26/2019     (H) 09/26/2019    BUN 20 09/26/2019    CR 1.37 (H) 09/26/2019    GFRESTIMATED 49 (L) 09/26/2019    GFRESTBLACK 59 (L) 09/26/2019    TEODORO 9.6 09/26/2019        A1C RESULTS:  Lab Results   Component Value Date    A1C 5.5 05/29/2019       INR RESULTS:  Lab Results   Component Value Date    INR 1.44 (H) 05/20/2019    INR 1.79 (H) 11/20/2018       Thank you for allowing me to participate in the care of your patient.    Sincerely,     WILLIS Rodriguez [facility]

## 2019-11-22 NOTE — PROGRESS NOTES
STRUCTURAL HEART CARE   pre-TAVR H&P     Primary Cardiologist: ***    HPI   *** who presents for pre-operative H&P accompanied by *** in preparation for elective TAVR on *** at [facility].     *** carries a past medical history notable for ***.     *** was seen in structural heart clinic on *** for symptoms of ***  *** underwent TAVR workup consisting of     ECG: ***    Imaging Studies:    Coronary Angiogram- ***    Echocardiogram- ***    TAVR CT - ***    *** was evaluated in TAVR conference and felt to be a good candidate for TAVR via ***, using a ***.     *** present today feeling ***. Denies chest pain, shortness of breath, palpitations, PND, orthopnea, presyncope, syncope, or LE edema.     Blood pressure today is ***  Labs showed a ***            Assessment and Plan     1.  Severe Aortic Stenosis -   - Echo        History of blood transfusions/reactions: ***  History of abnormal bleeding/anti-platelet use: ***  Steroid use in the last year: ***  Personal or family history with difficulty with anesthesia: ***         Type and screen orders completed. Supplies for scrubbing provided. Patient is cleared by their dentist.     Patient has no fever, chills, or urinary symptoms. Patient *** have contrast dye allergy.     Patient is optimized and is acceptable candidate for the proposed procedure.  No further diagnostic evaluation is needed. Pre-TAVR instructions provided in written format.     Medication Recommendations:  Acei/ARB ***  Diuretic *** (EF< 40%?)  Antiplatelet *** (transapical?)   Coumadin/NOAC ***   BB ***    2. ***  3. ***  4. ***      Sofiya Burton, APRN, CNP  11/22/2019    Current Medications:  Current Outpatient Medications   Medication Sig Dispense Refill     ACE/ARB/ARNI NOT PRESCRIBED (INTENTIONAL) Please choose reason not prescribed, below       acetaminophen (TYLENOL) 500 MG tablet Take 2 tablets (1,000 mg) by mouth every 6 hours as needed for mild pain 100 tablet 0     aspirin  81 MG EC tablet Take 81 mg by mouth daily        bumetanide (BUMEX) 1 MG tablet Take 1 tablet (1 mg) by mouth 2 times daily       buPROPion (WELLBUTRIN XL) 150 MG 24 hr tablet Take 150 mg by mouth every morning       Carboxymethylcellulose Sod PF (CELLUVISC/REFRESH LIQUIGEL) 1 % ophthalmic solution Place 1 drop into both eyes 3 times daily as needed for dry eyes       carvedilol (COREG) 6.25 MG tablet TAKE 1 TABLET TWICE DAILY WITH MEALS 180 tablet 2     clobetasol (TEMOVATE) 0.05 % external cream Apply topically 2 times daily       COMPRESSION STOCKINGS 1 each daily 2 each 0     dexamethasone (DECADRON) 6 MG tablet Take 6 mg by mouth daily X 7 days. Started 9/26/19       diclofenac (VOLTAREN) 1 % topical gel Apply 2 g topically 4 times daily       doxycycline hyclate (VIBRA-TABS) 100 MG tablet TAKE ONE CAPSULE/TABLET BY MOUTH TWICE A DAY       ETHACRYNIC ACID PO        finasteride (PROSCAR) 5 MG tablet TAKE 1 TABLET EVERY DAY 90 tablet 3     glucosamine-chondroitin 500-400 MG CAPS per capsule Take 2 capsules by mouth daily       ketotifen (ZADITOR) 0.025 % SOLN ophthalmic solution Place 1 drop into both eyes every 12 hours 1 Bottle      KLOR-CON 20 MEQ CR tablet TAKE 1 TABLET EVERY DAY 90 tablet 3     levothyroxine (SYNTHROID/LEVOTHROID) 100 MCG tablet TAKE 1 TABLET EVERY DAY 90 tablet 2     LORazepam (ATIVAN) 1 MG tablet Take 0.5-1 tablets (0.5-1 mg) by mouth every 8 hours as needed for anxiety 30 tablet 5     Multiple Vitamins-Minerals (CENTRUM SILVER) per tablet Take 1 tablet by mouth daily       niacinamide (NIACIN) 500 MG tablet TAKE ONE TABLET BY MOUTH TWICE A DAY       omeprazole (PRILOSEC) 20 MG DR capsule TAKE 1 CAPSULE TWICE DAILY 180 capsule 3     ORDER FOR DME Auto-CPAP:Max 9 cm H2OMin 9 cm H2O  Continuous Lifetime need and heated humidity.    1 Device 0     Polyethylene Glycol 3350 (MIRALAX PO) Take 1 packet by mouth At Bedtime        simvastatin (ZOCOR) 20 MG tablet Take 20 mg by mouth daily         sucralfate (CARAFATE) 1 GM tablet TAKE 1 TABLET TWICE DAILY 180 tablet 3     tamsulosin (FLOMAX) 0.4 MG capsule TAKE 1 CAPSULE EVERY DAY (Patient taking differently: TAKE 1 CAPSULE EVERY EVENING) 90 capsule 3     triamcinolone (KENALOG) 0.025 % cream Apply topically 2 times daily as needed          Allergies:     Allergies   Allergen Reactions     Furosemide Blisters     Bullous pemphigoid. Bumex and ethacrynic acid okay.     Torsemide Blisters     Bullous pemphigoid.  Bumex and ethacrynic acid okay.       Past Medical History:  Past Medical History:   Diagnosis Date     Acute, but ill-defined, cerebrovascular disease     NO RESIDUALS     Antiplatelet or antithrombotic long-term use      Arrhythmia     a fib     Bullous pemphigoid 8/21/2019     Coronary atherosclerosis of unspecified type of vessel, native or graft     S/P PTCA, EF 50%     Epistaxis      Esophageal reflux      Hypercholesterolemia      Ischemic cardiomyopathy      Lower extremity edema      Mitral regurgitation     s/p MitraClip 5/20/19     Obese      JIM on CPAP      Other and unspecified hyperlipidemia      Other lymphedema     GROIN AND THIGHS     Other pancytopenia (H)      Other specified anemias      Overweight      Pacemaker     PACEMAKER-BS Implanted 8/5/2009      PAD (peripheral artery disease) (H)      Pancytopenia (H)      Persistent atrial fibrillation     VVI PM for long pauses     Pulmonary hypertension (H)      Systolic CHF (H)      TIA (transient ischemic attack)      Unspecified hypothyroidism      Venous stasis dermatitis of both lower extremities      Vitamin D deficiency        Past Surgical History:  Past Surgical History:   Procedure Laterality Date     BONE MARROW BIOPSY, BONE SPECIMEN, NEEDLE/TROCAR N/A 4/19/2019    Procedure: BONE MARROW BIOPSY;  Surgeon: Yovani Cooley MD;  Location: [facility] OR     CV HEART CATHETERIZATION WITH POSSIBLE INTERVENTION N/A 4/12/2019    Procedure: Heart Catheterization with possible  Intervention;  Surgeon: Lev Beck MD;  Location:  HEART CARDIAC CATH LAB     CV MITRACLIP N/A 5/20/2019    Procedure: CV MITRAL CLIP;  Surgeon: Harshil Winter MD;  [facility]     CV RIGHT HEART CATH N/A 4/12/2019    Procedure: Right Heart Cath;  Surgeon: Lev Beck MD;  Location:  HEART CARDIAC CATH LAB     EYE SURGERY      CATARACT/BLEPHAROPLASTY     GENITOURINARY SURGERY      TUNA     HERNIA REPAIR      RIGHT INGUINAL     HERNIORRHAPHY INGUINAL  8/14/2012    Procedure: HERNIORRHAPHY INGUINAL;  right inguinal hernia repair;  Surgeon: Kareem Torrez MD;  Location: [facility]     HERNIORRHAPHY UMBILICAL N/A 10/31/2017    Procedure: HERNIORRHAPHY UMBILICAL;  UMBILICAL HERNIA REPAIR WITH MESH;  Surgeon: Scar Harrington MD;  Location: [facility]     IMPLANT PACEMAKER  8/2009    single chamber     [place]      [facility] [place]OPEDIC SURGERY  2010    right TKR, left TKR     SURGICAL HISTORY OF -       Thumb surgery     SURGICAL HISTORY OF -   1990s    Left total knee replacement     SURGICAL HISTORY OF -   3/11    [facility] total knee replacement       Family History:  Family History   Problem Relation Age of Onset     Cardiovascular Father      C.A.D. Father      Lung Cancer Sister      Unknown/Adopted Mother      Cancer - colorectal No family hx of        Social History:  Social History     Socioeconomic History     Marital status:      Spouse name: None     Number of children: None     Years of education: None     Highest education level: None   Occupational History     Occupation: Teacher, author, speaker     Employer: RETIRED   Social Needs     Financial resource strain: None     Food insecurity:     Worry: None     Inability: None     Transportation needs:     Medical: None     Non-medical: None   Tobacco Use     Smoking status: Never Smoker     Smokeless tobacco: Never Used   Substance and Sexual Activity     Alcohol use: Not Currently     Alcohol/week: 0.0  standard drinks     Comment: 1/month     Drug use: No     Sexual activity: Not Currently     Partners: Female   Lifestyle     Physical activity:     Days per week: None     Minutes per session: None     Stress: None   Relationships     Social connections:     Talks on phone: None     Gets together: None     Attends Sikh service: None     Active member of club or organization: None     Attends meetings of clubs or organizations: None     Relationship status: None     Intimate partner violence:     Fear of current or ex partner: None     Emotionally abused: None     Physically abused: None     Forced sexual activity: None   Other Topics Concern     Parent/sibling w/ CABG, MI or angioplasty before 65F 55M? No      Service Not Asked     Blood Transfusions Not Asked     Caffeine Concern No     Comment: 1-2 cups coffee a day     Occupational Exposure Not Asked     Hobby Hazards Not Asked     Sleep Concern No     Stress Concern Yes     Comment: due to wifes health condition     Weight Concern No     Special Diet No     Back Care Not Asked     Exercise Yes     Comment: states he uses his tredmill on and off     Bike Helmet Not Asked     Seat Belt Yes     Self-Exams Not Asked   Social History Narrative     None       Review of Systems:  Skin:  Positive for bruising states has open wounds, blisters   Eyes:  Positive for glasses cataract surgery on both eyes  ENT:  Positive for epistaxis    Respiratory:  Positive for sleep apnea;CPAP SOB has subsided   Cardiovascular:    Positive for;edema;lower extremity symptoms    Gastroenterology: Negative      Genitourinary:  Negative      Musculoskeletal:  Positive for muscular weakness;arthritis balance off, recent falls  Neurologic:  Positive for stroke;numbness or tingling of feet right foot; has had a couple of falls lately per wife  Psychiatric:  Positive for anxiety;excessive stress    Heme/Lymph/Imm:  Negative      Endocrine:  Positive for thyroid disorder   "      Physical Exam:  Vitals: /68   Pulse 80   Ht 1.88 m (6' 2\")   Wt 92.7 kg (204 lb 6.4 oz)   BMI 26.24 kg/m      Constitutional:           Skin:             Head:           Eyes:           Lymph:      ENT:           Neck:           Respiratory:            Cardiac:                                                           GI:           Extremities and Muscular Skeletal:                 Neurological:           Psych:       No diagnosis found.      Laboratory Studies:  LIPID RESULTS:  Lab Results   Component Value Date    CHOL 97 08/21/2019    HDL 41 08/21/2019    LDL 48 08/21/2019    TRIG 41 08/21/2019    CHOLHDLRATIO 2.1 06/15/2015       LIVER ENZYME RESULTS:  Lab Results   Component Value Date    AST 22 05/20/2019    ALT 17 05/20/2019       CBC RESULTS:  Lab Results   Component Value Date    WBC 3.2 (L) 07/31/2019    RBC 2.74 (L) 07/31/2019    HGB 8.8 (L) 07/31/2019    HCT 27.7 (L) 07/31/2019     (H) 07/31/2019    MCH 32.1 07/31/2019    MCHC 31.8 07/31/2019    RDW 16.3 (H) 07/31/2019    PLT 83 (L) 07/31/2019       BMP RESULTS:  Lab Results   Component Value Date     09/26/2019    POTASSIUM 3.6 09/26/2019    CHLORIDE 99 09/26/2019    CO2 32 (H) 09/26/2019    ANIONGAP 9.6 09/26/2019     (H) 09/26/2019    BUN 20 09/26/2019    CR 1.37 (H) 09/26/2019    GFRESTIMATED 49 (L) 09/26/2019    GFRESTBLACK 59 (L) 09/26/2019    TEODORO 9.6 09/26/2019        A1C RESULTS:  Lab Results   Component Value Date    A1C 5.5 05/29/2019       INR RESULTS:  Lab Results   Component Value Date    INR 1.44 (H) 05/20/2019    INR 1.79 (H) 11/20/2018           "

## 2019-11-22 NOTE — PROGRESS NOTES
STRUCTURAL HEART CARE   pre-WATCHMAN H&P     Primary Cardiologist: Dr. Hope    HPI   Mr. Foster presents for pre-operative H&P accompanied by his wife and granddaughter in preparation for elective Watchman on 12/03/2019 at [facility].     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     He has a last standing history of paroxysmal atrial fibrillation for which he was anticoagulated with Eliquis. Given his frequent fall risk and epistaxis, the Eliquis was discontinued.  Unfortunately, given his history of heart failure and previous CVA, he has a CHADSVASC of 5 putting him at a 7% or higher risk for recurrent CVA. His HAS-BLED score is 5 putting him at high risk of major bleeding. He was referred to Dr. Hope in the structural heart clinic for further evaluation of a potential watchman device.  It was felt he is a good candidate for the Watchman device, given the above factors.     He underwent a CT coronary angiogram to assess the size of his left atrial appendage which measures 30.5 mm x 28.5 mm.  The left atrial appendage depth is 38.2 mm.  Dr. Hope reviewed the CTA and deemed this patient a good candidate for the watchman device.    Today presents to the cardiology clinic for a history and physical prior to his watchman device.  He appears to be doing well from cardiac standpoint.  He denies shortness of breath and chest pain. He completes his ADL's without exertional dyspnea.  He tells me he adheres to a 2 g sodium diet.  He is currently scheduled to undergo a watchman procedure on December 3, 2019.  He denies palpitations, PND, orthopnea, presyncope, syncope, or LE edema.     Blood pressure today is  126/68.    Assessment and Plan     1.  Paroxysmal atrial fibrillation.  Patient has long-standing history of paroxysmal atrial fibrillation.  He has a chads vas score of 5 and has bled score of 5.  He was previously anticoagulated with Eliquis.  Eliquis was discontinued given frequent falls and an episode of severe epistaxis.  Given the above, the patient was recommended to undergo a watchman device.  CTA was completed.  Watchman device size will be chosen prior to the procedure on December 3, 2019.  I have reviewed the risk and benefits of this procedure with the patient including the risk of heart attack, death, stroke.  Additional risks of the procedure include vascular injury and bleeding.  The patient has excepted these risks and is willing to move forward with the watchman procedure.    History of blood transfusions/reactions: No  History of abnormal bleeding/anti-platelet use: No  Steroid use in the last year: No  Personal or family history with difficulty with anesthesia: No    Medication Recommendations:  Diuretic : Hold Bumex the morning of the procedure    2. 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  He is status post mitral clip in May 2019.  Most recent echocardiogram showed mild to moderate mitral regurgitation as well as moderate to severe tricuspid regurgitation.  His weight today is around 200 pounds.  This is up from when he was last seen by Анна in September 2019.  At that time, he weighed 182 pounds.  He was on 199 pounds at his recent visit with Dr. Hope. He has bilateral lymphedema and his legs are in bilateral ACE wraps.  Given his significant weight gain, I have asked him to increase his Bumex to 2 mg in the a.m. and 1 mg in the p.m. for 3 days then resume normal dosage of 1 mg BID.  Follow-up with a basic metabolic panel on 11/25/19.      3. Coronary artery disease. He denies symptoms concerning for ischemia. Continue good medical management.     4.  Lymphedema. Bilateral ACE wraps on lower extremities.       Sofiya Burton, APRN, CNP  11/22/2019    Current Medications:  Current Outpatient Medications   Medication Sig Dispense Refill     ACE/ARB/ARNI NOT PRESCRIBED (INTENTIONAL) Please choose reason not prescribed, below       acetaminophen (TYLENOL) 500 MG tablet Take 2 tablets (1,000 mg) by mouth every 6 hours as needed for mild pain 100 tablet 0     aspirin 81 MG EC tablet Take 81 mg by mouth daily        bumetanide (BUMEX) 1 MG tablet Take 1 tablet (1 mg) by mouth 2 times daily       buPROPion (WELLBUTRIN XL) 150 MG 24 hr tablet Take 150 mg by mouth every morning       Carboxymethylcellulose Sod PF (CELLUVISC/REFRESH LIQUIGEL) 1 % ophthalmic solution Place 1 drop into both eyes 3 times daily as needed for dry eyes       carvedilol (COREG) 6.25 MG tablet TAKE 1 TABLET TWICE DAILY WITH MEALS 180 tablet 2     clobetasol (TEMOVATE) 0.05 % external cream Apply topically 2 times daily       COMPRESSION STOCKINGS 1 each daily 2 each 0     dexamethasone (DECADRON) 6 MG tablet Take 6 mg by mouth daily X 7 days. Started 9/26/19       diclofenac (VOLTAREN) 1 % topical gel Apply 2 g topically 4 times daily       doxycycline hyclate (VIBRA-TABS) 100 MG tablet TAKE ONE CAPSULE/TABLET BY MOUTH TWICE A DAY       ETHACRYNIC ACID PO        finasteride (PROSCAR) 5 MG tablet TAKE 1 TABLET EVERY DAY 90 tablet 3     glucosamine-chondroitin 500-400 MG CAPS per capsule Take 2 capsules by mouth daily       ketotifen (ZADITOR) 0.025 % SOLN ophthalmic solution Place 1 drop into both eyes every 12 hours 1 Bottle      KLOR-CON 20 MEQ CR tablet TAKE 1 TABLET EVERY DAY 90 tablet 3     levothyroxine (SYNTHROID/LEVOTHROID) 100 MCG tablet TAKE 1 TABLET EVERY DAY 90 tablet 2     LORazepam (ATIVAN) 1 MG tablet Take 0.5-1 tablets (0.5-1 mg) by mouth every 8 hours as needed for anxiety 30 tablet 5     Multiple Vitamins-Minerals (CENTRUM SILVER) per tablet Take 1 tablet by mouth daily        niacinamide (NIACIN) 500 MG tablet TAKE ONE TABLET BY MOUTH TWICE A DAY       omeprazole (PRILOSEC) 20 MG DR capsule TAKE 1 CAPSULE TWICE DAILY 180 capsule 3     ORDER FOR DME Auto-CPAP:Max 9 cm H2OMin 9 cm H2O  Continuous Lifetime need and heated humidity.    1 Device 0     Polyethylene Glycol 3350 (MIRALAX PO) Take 1 packet by mouth At Bedtime        simvastatin (ZOCOR) 20 MG tablet Take 20 mg by mouth daily        sucralfate (CARAFATE) 1 GM tablet TAKE 1 TABLET TWICE DAILY 180 tablet 3     tamsulosin (FLOMAX) 0.4 MG capsule TAKE 1 CAPSULE EVERY DAY (Patient taking differently: TAKE 1 CAPSULE EVERY EVENING) 90 capsule 3     triamcinolone (KENALOG) 0.025 % cream Apply topically 2 times daily as needed          Allergies:     Allergies   Allergen Reactions     Furosemide Blisters     Bullous pemphigoid. Bumex and ethacrynic acid okay.     Torsemide Blisters     Bullous pemphigoid.  Bumex and ethacrynic acid okay.       Past Medical History:  Past Medical History:   Diagnosis Date     Acute, but ill-defined, cerebrovascular disease     NO RESIDUALS     Antiplatelet or antithrombotic long-term use      Arrhythmia     a fib     Bullous pemphigoid 8/21/2019     Coronary atherosclerosis of unspecified type of vessel, native or graft     S/P PTCA, EF 50%     Epistaxis      Esophageal reflux      Hypercholesterolemia      Ischemic cardiomyopathy      Lower extremity edema      Mitral regurgitation     s/p MitraClip 5/20/19     Obese      JIM on CPAP      Other and unspecified hyperlipidemia      Other lymphedema     GROIN AND THIGHS     Other pancytopenia (H)      Other specified anemias      Overweight      Pacemaker     PACEMAKER-BS Implanted 8/5/2009      PAD (peripheral artery disease) (H)      Pancytopenia (H)      Persistent atrial fibrillation     VVI PM for long pauses     Pulmonary hypertension (H)      Systolic CHF (H)      TIA (transient ischemic attack)      Unspecified hypothyroidism      Venous stasis  dermatitis of both lower extremities      Vitamin D deficiency        Past Surgical History:  Past Surgical History:   Procedure Laterality Date     BONE MARROW BIOPSY, BONE SPECIMEN, NEEDLE/TROCAR N/A 4/19/2019    Procedure: BONE MARROW BIOPSY;  Surgeon: Yovani Cooley MD;  Location:  OR     CV HEART CATHETERIZATION WITH POSSIBLE INTERVENTION N/A 4/12/2019    Procedure: Heart Catheterization with possible Intervention;  Surgeon: Lev Beck MD;  Location:  HEART CARDIAC CATH LAB     CV MITRACLIP N/A 5/20/2019    Procedure: CV MITRAL CLIP;  Surgeon: Harshil Winter MD;  Location:  HEART CARDIAC CATH LAB     CV RIGHT HEART CATH N/A 4/12/2019    Procedure: Right Heart Cath;  Surgeon: Lev Beck MD;  Location:  HEART CARDIAC CATH LAB     EYE SURGERY      CATARACT/BLEPHAROPLASTY     GENITOURINARY SURGERY      TUNA     HERNIA REPAIR      RIGHT INGUINAL     HERNIORRHAPHY INGUINAL  8/14/2012    Procedure: HERNIORRHAPHY INGUINAL;  right inguinal hernia repair;  Surgeon: Kareem Torrez MD;  Location:  [facility]     HERNIORRHAPHY UMBILICAL N/A 10/31/2017    Procedure: HERNIORRHAPHY UMBILICAL;  UMBILICAL HERNIA REPAIR WITH MESH;  Surgeon: Scar Harrington MD;  Location: [facility]     IMPLANT PACEMAKER  8/2009    single chamber     [place]      [facility] [place]THOPEDIC SURGERY  2010    right TKR, left TKR     SURGICAL HISTORY OF -       Thumb surgery     SURGICAL HISTORY OF -   1990s    Left total knee replacement     SURGICAL HISTORY OF -   3/11    Rig total knee replacement       Family History:  Family History   Problem Relation Age of Onset     Cardiovascular Father      C.A.D. Father      Lung Cancer Sister      Unknown/Adopted Mother      Cancer - colorectal No family hx of        Social History:  Social History     Socioeconomic History     Marital status:      Spouse name: None     Number of children: None     Years of education: None     Highest education level: None    Occupational History     Occupation: Teacher, author, speaker     Employer: RETIRED   Social Needs     Financial resource strain: None     Food insecurity:     Worry: None     Inability: None     Transportation needs:     Medical: None     Non-medical: None   Tobacco Use     Smoking status: Never Smoker     Smokeless tobacco: Never Used   Substance and Sexual Activity     Alcohol use: Not Currently     Alcohol/week: 0.0 standard drinks     Comment: 1/month     Drug use: No     Sexual activity: Not Currently     Partners: Female   Lifestyle     Physical activity:     Days per week: None     Minutes per session: None     Stress: None   Relationships     Social connections:     Talks on phone: None     Gets together: None     Attends Rastafari service: None     Active member of club or organization: None     Attends meetings of clubs or organizations: None     Relationship status: None     Intimate partner violence:     Fear of current or ex partner: None     Emotionally abused: None     Physically abused: None     Forced sexual activity: None   Other Topics Concern     Parent/sibling w/ CABG, MI or angioplasty before 65F 55M? No      Service Not Asked     Blood Transfusions Not Asked     Caffeine Concern No     Comment: 1-2 cups coffee a day     Occupational Exposure Not Asked     Hobby Hazards Not Asked     Sleep Concern No     Stress Concern Yes     Comment: due to wifes health condition     Weight Concern No     Special Diet No     Back Care Not Asked     Exercise Yes     Comment: states he uses his tredmill on and off     Bike Helmet Not Asked     Seat Belt Yes     Self-Exams Not Asked   Social History Narrative     None       Review of Systems:  Skin:  Positive for bruising states has open wounds, blisters   Eyes:  Positive for glasses cataract surgery on both eyes  ENT:  Positive for epistaxis    Respiratory:  Positive for sleep apnea;CPAP SOB has subsided   Cardiovascular:    Positive for;edema;lower  "extremity symptoms    Gastroenterology: Negative      Genitourinary:  Negative      Musculoskeletal:  Positive for muscular weakness;arthritis balance off, recent falls  Neurologic:  Positive for stroke;numbness or tingling of feet right foot; has had a couple of falls lately per wife  Psychiatric:  Positive for anxiety;excessive stress    Heme/Lymph/Imm:  Negative      Endocrine:  Positive for thyroid disorder        Physical Exam:  Vitals: /68   Pulse 80   Ht 1.88 m (6' 2\")   Wt 91.1 kg (200 lb 13.4 oz)   BMI 25.79 kg/m      Constitutional:  cooperative;in no acute distress frail      Skin:  warm and dry to the touch bruises present pacemaker incision in the left infraclavicular area was well-healed some blistering on back and healed blisters on chest, back abrasion, multiple blisters.    Head:  normocephalic        Eyes:  pupils equal and round;sclera white        Lymph:      ENT:  no pallor or cyanosis        Neck:  JVP normal JVP elevated Large V wave consistent with tricuspid regurgitation    Respiratory:  normal symmetry;no tenderness to palpation;normal respiratory excursion;no intercostal retraction;no use of accessory muscles    bilateral basilar crackles    Cardiac: regular rhythm;normal S1 and S2       systolic murmur;LLSB;grade 2     Paced  pulses full and equal venous stasis changes bilaterally   right radial artery;2+             left radial artery;2+               feet and legs wrapped, thighs not edematous    GI:  abdomen soft;non-tender   benign    Extremities and Muscular Skeletal:  no deformities, clubbing, cyanosis, erythema observed stasis pigmentation bilateral LE edema;trace;1+;2+ 1+ LLE edema;2+;1+ feet and legs wrapped    Neurological:  affect appropriate;no gross motor deficits        Psych:  Alert and Oriented x 3    No diagnosis found.      Laboratory Studies:  LIPID RESULTS:  Lab Results   Component Value Date    CHOL 97 08/21/2019    HDL 41 08/21/2019    LDL 48 08/21/2019    " TRIG 41 08/21/2019    CHOLHDLRATIO 2.1 06/15/2015       LIVER ENZYME RESULTS:  Lab Results   Component Value Date    AST 22 05/20/2019    ALT 17 05/20/2019       CBC RESULTS:  Lab Results   Component Value Date    WBC 3.2 (L) 07/31/2019    RBC 2.74 (L) 07/31/2019    HGB 8.8 (L) 07/31/2019    HCT 27.7 (L) 07/31/2019     (H) 07/31/2019    MCH 32.1 07/31/2019    MCHC 31.8 07/31/2019    RDW 16.3 (H) 07/31/2019    PLT 83 (L) 07/31/2019       BMP RESULTS:  Lab Results   Component Value Date     09/26/2019    POTASSIUM 3.6 09/26/2019    CHLORIDE 99 09/26/2019    CO2 32 (H) 09/26/2019    ANIONGAP 9.6 09/26/2019     (H) 09/26/2019    BUN 20 09/26/2019    CR 1.37 (H) 09/26/2019    GFRESTIMATED 49 (L) 09/26/2019    GFRESTBLACK 59 (L) 09/26/2019    TEODORO 9.6 09/26/2019        A1C RESULTS:  Lab Results   Component Value Date    A1C 5.5 05/29/2019       INR RESULTS:  Lab Results   Component Value Date    INR 1.44 (H) 05/20/2019    INR 1.79 (H) 11/20/2018

## 2019-11-22 NOTE — PROGRESS NOTES
Met with Bairon and his wife as part of his pre op visit for his upcoming Watchman on the 3rd of Dec. He is aware of his lab apt on the 2nd at 9am and knows where to check in at. He knows to be NPO after midnight the night before his procedure and to hold his diuretic the morning of. They have no further questions at this time.Sarah Anderson RN

## 2019-11-22 NOTE — PROGRESS NOTES
Per Sofiya OLGUIN I called the patient and will have him take an extra dose of Bumex in the mornings on sat, sun, and monday and he will have a BMP drawn here on Monday and return to his normal dose of Bumex. He weight was noted to be up about 15lbs from his normal weight. He states he feels great and is able to sleep flat. Sarah Anderson RN

## 2019-11-25 ENCOUNTER — TELEPHONE (OUTPATIENT)
Dept: CARDIOLOGY | Facility: CLINIC | Age: 84
End: 2019-11-25

## 2019-11-25 DIAGNOSIS — E87.6 HYPOKALEMIA: Primary | ICD-10-CM

## 2019-11-25 DIAGNOSIS — I50.22 CHRONIC SYSTOLIC CONGESTIVE HEART FAILURE (H): ICD-10-CM

## 2019-11-25 LAB
ANION GAP SERPL CALCULATED.3IONS-SCNC: ABNORMAL MMOL/L (ref 6–17)
BUN SERPL-MCNC: 16 MG/DL (ref 7–30)
CALCIUM SERPL-MCNC: 9.5 MG/DL (ref 8.5–10.5)
CHLORIDE SERPL-SCNC: 100 MMOL/L (ref 98–107)
CO2 SERPL-SCNC: 34 MMOL/L (ref 23–29)
CREAT SERPL-MCNC: 1.22 MG/DL (ref 0.7–1.3)
GFR SERPL CREATININE-BSD FRML MDRD: 56 ML/MIN/{1.73_M2}
GLUCOSE SERPL-MCNC: 141 MG/DL (ref 70–105)
POTASSIUM SERPL-SCNC: 2.8 MMOL/L (ref 3.5–5.1)
SODIUM SERPL-SCNC: 142 MMOL/L (ref 136–145)

## 2019-11-25 PROCEDURE — 36415 COLL VENOUS BLD VENIPUNCTURE: CPT | Performed by: NURSE PRACTITIONER

## 2019-11-25 PROCEDURE — 80048 BASIC METABOLIC PNL TOTAL CA: CPT | Performed by: NURSE PRACTITIONER

## 2019-11-25 RX ORDER — POTASSIUM CHLORIDE 1500 MG/1
40 TABLET, EXTENDED RELEASE ORAL 2 TIMES DAILY
Qty: 90 TABLET | Refills: 3 | Status: SHIPPED | OUTPATIENT
Start: 2019-11-25 | End: 2019-11-26

## 2019-11-25 NOTE — TELEPHONE ENCOUNTER
11/25/19 Pt called asking about diuretic dose on day of upcoming Watchman. Pt given # to Dr Araceli BLOUNT ( [phone]) . Pt has no further questions at this time. Krista 1250 pm

## 2019-11-25 NOTE — TELEPHONE ENCOUNTER
Received a call from Sofiya OLGUIN in regards to Alfa's potassium of 2.8 drawn today and she has started him on potassium 40 meq everyday until he comes in on Monday for his pre op for his watchman. I called the patient and told him of the med changes and he knows to resume his old dose of bumex 1mg twice a day and to take 40 meq of the potassium daily.Sarah Anderson RN

## 2019-11-26 DIAGNOSIS — E87.6 HYPOKALEMIA: ICD-10-CM

## 2019-11-26 RX ORDER — POTASSIUM CHLORIDE 1500 MG/1
40 TABLET, EXTENDED RELEASE ORAL 2 TIMES DAILY
Qty: 90 TABLET | Refills: 3 | Status: SHIPPED | OUTPATIENT
Start: 2019-11-26 | End: 2020-01-10

## 2019-11-27 RX ORDER — LIDOCAINE 40 MG/G
CREAM TOPICAL
Status: CANCELLED | OUTPATIENT
Start: 2019-11-27

## 2019-11-27 RX ORDER — DIPHENHYDRAMINE HYDROCHLORIDE 50 MG/ML
50 INJECTION INTRAMUSCULAR; INTRAVENOUS
Status: CANCELLED | OUTPATIENT
Start: 2019-11-27

## 2019-11-27 RX ORDER — SODIUM CHLORIDE 9 MG/ML
1000 INJECTION, SOLUTION INTRAVENOUS CONTINUOUS
Status: CANCELLED | OUTPATIENT
Start: 2019-11-27

## 2019-11-27 RX ORDER — METHYLPREDNISOLONE SODIUM SUCCINATE 125 MG/2ML
125 INJECTION, POWDER, LYOPHILIZED, FOR SOLUTION INTRAMUSCULAR; INTRAVENOUS
Status: CANCELLED | OUTPATIENT
Start: 2019-11-27

## 2019-11-27 RX ORDER — CEFAZOLIN SODIUM 2 G/100ML
2 INJECTION, SOLUTION INTRAVENOUS
Status: CANCELLED | OUTPATIENT
Start: 2019-11-27

## 2019-11-27 RX ORDER — POTASSIUM CHLORIDE 1500 MG/1
20 TABLET, EXTENDED RELEASE ORAL
Status: CANCELLED | OUTPATIENT
Start: 2019-11-27

## 2019-11-29 ENCOUNTER — TELEPHONE (OUTPATIENT)
Dept: FAMILY MEDICINE | Facility: CLINIC | Age: 84
End: 2019-11-29

## 2019-11-29 NOTE — TELEPHONE ENCOUNTER
Our goal is to have forms completed within 72 hours, however some forms may require a visit or additional information.    What clinic location was the form placed at [facility] or [place]     Who is the form from?   Where did the form come from? Faxed to clinic   The form was placed in the inbox of Mg Nelson MD      Please fax to [phone]  Phone number: [phone]    Additional comments: home health care  [facility] 11/3/19 to 1/1/20    Call take on 11/29/2019 at 11:09 AM by Maya Monterroso

## 2019-11-29 NOTE — TELEPHONE ENCOUNTER
Our goal is to have forms completed within 72 hours, however some forms may require a visit or additional information.    What clinic location was the form placed at [facility] or [facility]     Who is the form from?   Where did the form come from? Faxed to clinic   The form was placed in the inbox of Mg Nelson MD      Please fax to [phone]  Phone number: [phone]    Additional comments: home health care new wound care orders [facility]     Call take on 11/29/2019 at 9:41 AM by Maya Monterroso

## 2019-12-02 ENCOUNTER — MEDICAL CORRESPONDENCE (OUTPATIENT)
Dept: HEALTH INFORMATION MANAGEMENT | Facility: CLINIC | Age: 84
End: 2019-12-02

## 2019-12-02 ENCOUNTER — HOSPITAL ENCOUNTER (OUTPATIENT)
Dept: LAB | Facility: CLINIC | Age: 84
Discharge: HOME | DRG: 274 | End: 2019-12-02
Attending: INTERNAL MEDICINE | Admitting: INTERNAL MEDICINE
Payer: COMMERCIAL

## 2019-12-02 DIAGNOSIS — I48.20 CHRONIC ATRIAL FIBRILLATION (H): ICD-10-CM

## 2019-12-02 LAB
ABO + RH BLD: NORMAL
ABO + RH BLD: NORMAL
ALBUMIN SERPL-MCNC: 3.2 G/DL (ref 3.4–5)
ALP SERPL-CCNC: 100 U/L (ref 40–150)
ALT SERPL W P-5'-P-CCNC: 24 U/L (ref 0–70)
ANION GAP SERPL CALCULATED.3IONS-SCNC: 3 MMOL/L (ref 3–14)
AST SERPL W P-5'-P-CCNC: 30 U/L (ref 0–45)
BASOPHILS # BLD AUTO: 0 10E9/L (ref 0–0.2)
BASOPHILS NFR BLD AUTO: 0.5 %
BILIRUB SERPL-MCNC: 0.9 MG/DL (ref 0.2–1.3)
BLD GP AB SCN SERPL QL: NORMAL
BLOOD BANK CMNT PATIENT-IMP: NORMAL
BUN SERPL-MCNC: 17 MG/DL (ref 7–30)
CALCIUM SERPL-MCNC: 8.7 MG/DL (ref 8.5–10.1)
CHLORIDE SERPL-SCNC: 106 MMOL/L (ref 94–109)
CO2 SERPL-SCNC: 33 MMOL/L (ref 20–32)
CREAT SERPL-MCNC: 1.2 MG/DL (ref 0.66–1.25)
DIFFERENTIAL METHOD BLD: ABNORMAL
EOSINOPHIL # BLD AUTO: 0.1 10E9/L (ref 0–0.7)
EOSINOPHIL NFR BLD AUTO: 1.4 %
ERYTHROCYTE [DISTWIDTH] IN BLOOD BY AUTOMATED COUNT: 19 % (ref 10–15)
GFR SERPL CREATININE-BSD FRML MDRD: 53 ML/MIN/{1.73_M2}
GLUCOSE SERPL-MCNC: 149 MG/DL (ref 70–99)
HCT VFR BLD AUTO: 29.8 % (ref 40–53)
HGB BLD-MCNC: 9.2 G/DL (ref 13.3–17.7)
IMM GRANULOCYTES # BLD: 0 10E9/L (ref 0–0.4)
IMM GRANULOCYTES NFR BLD: 0.2 %
LYMPHOCYTES # BLD AUTO: 0.7 10E9/L (ref 0.8–5.3)
LYMPHOCYTES NFR BLD AUTO: 16.7 %
MCH RBC QN AUTO: 31.9 PG (ref 26.5–33)
MCHC RBC AUTO-ENTMCNC: 30.9 G/DL (ref 31.5–36.5)
MCV RBC AUTO: 104 FL (ref 78–100)
MONOCYTES # BLD AUTO: 0.4 10E9/L (ref 0–1.3)
MONOCYTES NFR BLD AUTO: 9.4 %
NEUTROPHILS # BLD AUTO: 3.1 10E9/L (ref 1.6–8.3)
NEUTROPHILS NFR BLD AUTO: 71.8 %
PLATELET # BLD AUTO: 91 10E9/L (ref 150–450)
POTASSIUM SERPL-SCNC: 3.7 MMOL/L (ref 3.4–5.3)
PROT SERPL-MCNC: 7.1 G/DL (ref 6.8–8.8)
RBC # BLD AUTO: 2.88 10E12/L (ref 4.4–5.9)
SODIUM SERPL-SCNC: 142 MMOL/L (ref 133–144)
SPECIMEN EXP DATE BLD: NORMAL
WBC # BLD AUTO: 4.3 10E9/L (ref 4–11)

## 2019-12-02 PROCEDURE — 85025 COMPLETE CBC W/AUTO DIFF WBC: CPT | Performed by: INTERNAL MEDICINE

## 2019-12-02 PROCEDURE — 86850 RBC ANTIBODY SCREEN: CPT | Performed by: INTERNAL MEDICINE

## 2019-12-02 PROCEDURE — 36415 COLL VENOUS BLD VENIPUNCTURE: CPT | Performed by: INTERNAL MEDICINE

## 2019-12-02 PROCEDURE — 86901 BLOOD TYPING SEROLOGIC RH(D): CPT | Performed by: INTERNAL MEDICINE

## 2019-12-02 PROCEDURE — 86900 BLOOD TYPING SEROLOGIC ABO: CPT | Performed by: INTERNAL MEDICINE

## 2019-12-02 PROCEDURE — 80053 COMPREHEN METABOLIC PANEL: CPT | Performed by: INTERNAL MEDICINE

## 2019-12-02 NOTE — OR NURSING
No answer, I left a message to follow directions from the cardiac group re: medications, NPO status.  I gave instructions to arrive at [facility] at 0515 and call us at [phone] with questions.

## 2019-12-03 ENCOUNTER — ANESTHESIA EVENT (OUTPATIENT)
Dept: CARDIOLOGY | Facility: CLINIC | Age: 84
DRG: 274 | End: 2019-12-03
Payer: COMMERCIAL

## 2019-12-03 ENCOUNTER — ANESTHESIA (OUTPATIENT)
Dept: CARDIOLOGY | Facility: CLINIC | Age: 84
DRG: 274 | End: 2019-12-03
Payer: COMMERCIAL

## 2019-12-03 ENCOUNTER — HOSPITAL ENCOUNTER (INPATIENT)
Facility: CLINIC | Age: 84
LOS: 1 days | Discharge: HOME | DRG: 274 | End: 2019-12-04
Admitting: INTERNAL MEDICINE
Payer: COMMERCIAL

## 2019-12-03 ENCOUNTER — HOSPITAL ENCOUNTER (OUTPATIENT)
Dept: CARDIOLOGY | Facility: CLINIC | Age: 84
DRG: 274 | End: 2019-12-03
Attending: INTERNAL MEDICINE
Payer: COMMERCIAL

## 2019-12-03 DIAGNOSIS — I48.20 CHRONIC ATRIAL FIBRILLATION (H): ICD-10-CM

## 2019-12-03 DIAGNOSIS — Z95.818 PRESENCE OF WATCHMAN LEFT ATRIAL APPENDAGE CLOSURE DEVICE: Primary | ICD-10-CM

## 2019-12-03 DIAGNOSIS — I48.0 PAF (PAROXYSMAL ATRIAL FIBRILLATION) (H): Primary | ICD-10-CM

## 2019-12-03 DIAGNOSIS — I25.5 ISCHEMIC CARDIOMYOPATHY: ICD-10-CM

## 2019-12-03 PROCEDURE — 93355 ECHO TRANSESOPHAGEAL (TEE): CPT

## 2019-12-03 PROCEDURE — 25000125 ZZHC RX 250: Performed by: INTERNAL MEDICINE

## 2019-12-03 PROCEDURE — 25000132 ZZH RX MED GY IP 250 OP 250 PS 637: Performed by: STUDENT IN AN ORGANIZED HEALTH CARE EDUCATION/TRAINING PROGRAM

## 2019-12-03 PROCEDURE — 25800030 ZZH RX IP 258 OP 636: Performed by: NURSE ANESTHETIST, CERTIFIED REGISTERED

## 2019-12-03 PROCEDURE — 33340 PERQ CLSR TCAT L ATR APNDGE: CPT | Performed by: INTERNAL MEDICINE

## 2019-12-03 PROCEDURE — 21000001 ZZH R&B HEART CARE

## 2019-12-03 PROCEDURE — 02L73DK OCCLUSION OF LEFT ATRIAL APPENDAGE WITH INTRALUMINAL DEVICE, PERCUTANEOUS APPROACH: ICD-10-PCS | Performed by: INTERNAL MEDICINE

## 2019-12-03 PROCEDURE — 25000128 H RX IP 250 OP 636: Performed by: INTERNAL MEDICINE

## 2019-12-03 PROCEDURE — 85347 COAGULATION TIME ACTIVATED: CPT

## 2019-12-03 PROCEDURE — 37000009 ZZH ANESTHESIA TECHNICAL FEE, EACH ADDTL 15 MIN: Performed by: INTERNAL MEDICINE

## 2019-12-03 PROCEDURE — 93010 ELECTROCARDIOGRAM REPORT: CPT | Performed by: INTERNAL MEDICINE

## 2019-12-03 PROCEDURE — 25000125 ZZHC RX 250: Performed by: NURSE ANESTHETIST, CERTIFIED REGISTERED

## 2019-12-03 PROCEDURE — 25000128 H RX IP 250 OP 636: Performed by: NURSE ANESTHETIST, CERTIFIED REGISTERED

## 2019-12-03 PROCEDURE — 93005 ELECTROCARDIOGRAM TRACING: CPT

## 2019-12-03 PROCEDURE — C1760 CLOSURE DEV, VASC: HCPCS | Performed by: INTERNAL MEDICINE

## 2019-12-03 PROCEDURE — 27210794 ZZH OR GENERAL SUPPLY STERILE: Performed by: INTERNAL MEDICINE

## 2019-12-03 PROCEDURE — 37000008 ZZH ANESTHESIA TECHNICAL FEE, 1ST 30 MIN: Performed by: INTERNAL MEDICINE

## 2019-12-03 PROCEDURE — 27211313 ZZ H ACCESS HEART CATH CR21: Performed by: INTERNAL MEDICINE

## 2019-12-03 PROCEDURE — 93355 ECHO TRANSESOPHAGEAL (TEE): CPT | Performed by: INTERNAL MEDICINE

## 2019-12-03 PROCEDURE — C1894 INTRO/SHEATH, NON-LASER: HCPCS | Performed by: INTERNAL MEDICINE

## 2019-12-03 PROCEDURE — C1769 GUIDE WIRE: HCPCS | Performed by: INTERNAL MEDICINE

## 2019-12-03 PROCEDURE — 25000566 ZZH SEVOFLURANE, EA 15 MIN: Performed by: INTERNAL MEDICINE

## 2019-12-03 RX ORDER — SIMVASTATIN 20 MG
20 TABLET ORAL DAILY
Status: DISCONTINUED | OUTPATIENT
Start: 2019-12-04 | End: 2019-12-04 | Stop reason: HOSPADM

## 2019-12-03 RX ORDER — PROPOFOL 10 MG/ML
INJECTION, EMULSION INTRAVENOUS PRN
Status: DISCONTINUED | OUTPATIENT
Start: 2019-12-03 | End: 2019-12-03

## 2019-12-03 RX ORDER — SODIUM CHLORIDE, SODIUM LACTATE, POTASSIUM CHLORIDE, CALCIUM CHLORIDE 600; 310; 30; 20 MG/100ML; MG/100ML; MG/100ML; MG/100ML
INJECTION, SOLUTION INTRAVENOUS CONTINUOUS
Status: DISCONTINUED | OUTPATIENT
Start: 2019-12-03 | End: 2019-12-04

## 2019-12-03 RX ORDER — SODIUM CHLORIDE 9 MG/ML
1000 INJECTION, SOLUTION INTRAVENOUS CONTINUOUS
Status: ACTIVE | OUTPATIENT
Start: 2019-12-03 | End: 2019-12-03

## 2019-12-03 RX ORDER — TRIAMCINOLONE ACETONIDE 0.25 MG/G
CREAM TOPICAL 2 TIMES DAILY PRN
Status: DISCONTINUED | OUTPATIENT
Start: 2019-12-03 | End: 2019-12-04 | Stop reason: HOSPADM

## 2019-12-03 RX ORDER — ONDANSETRON 2 MG/ML
4 INJECTION INTRAMUSCULAR; INTRAVENOUS EVERY 30 MIN PRN
Status: DISCONTINUED | OUTPATIENT
Start: 2019-12-03 | End: 2019-12-03

## 2019-12-03 RX ORDER — SODIUM CHLORIDE 9 MG/ML
1000 INJECTION, SOLUTION INTRAVENOUS CONTINUOUS
Status: DISCONTINUED | OUTPATIENT
Start: 2019-12-03 | End: 2019-12-04

## 2019-12-03 RX ORDER — METOPROLOL TARTRATE 1 MG/ML
1-2 INJECTION, SOLUTION INTRAVENOUS EVERY 5 MIN PRN
Status: DISCONTINUED | OUTPATIENT
Start: 2019-12-03 | End: 2019-12-04 | Stop reason: HOSPADM

## 2019-12-03 RX ORDER — TAMSULOSIN HYDROCHLORIDE 0.4 MG/1
0.4 CAPSULE ORAL DAILY
Status: DISCONTINUED | OUTPATIENT
Start: 2019-12-04 | End: 2019-12-04 | Stop reason: HOSPADM

## 2019-12-03 RX ORDER — IOPAMIDOL 755 MG/ML
INJECTION, SOLUTION INTRAVASCULAR
Status: DISCONTINUED | OUTPATIENT
Start: 2019-12-03 | End: 2019-12-03 | Stop reason: HOSPADM

## 2019-12-03 RX ORDER — NEOSTIGMINE METHYLSULFATE 1 MG/ML
VIAL (ML) INJECTION PRN
Status: DISCONTINUED | OUTPATIENT
Start: 2019-12-03 | End: 2019-12-03

## 2019-12-03 RX ORDER — ONDANSETRON 4 MG/1
4 TABLET, ORALLY DISINTEGRATING ORAL EVERY 30 MIN PRN
Status: DISCONTINUED | OUTPATIENT
Start: 2019-12-03 | End: 2019-12-03

## 2019-12-03 RX ORDER — LEVOTHYROXINE SODIUM 100 UG/1
100 TABLET ORAL
Status: DISCONTINUED | OUTPATIENT
Start: 2019-12-04 | End: 2019-12-04 | Stop reason: HOSPADM

## 2019-12-03 RX ORDER — ONDANSETRON 2 MG/ML
4 INJECTION INTRAMUSCULAR; INTRAVENOUS EVERY 6 HOURS PRN
Status: DISCONTINUED | OUTPATIENT
Start: 2019-12-03 | End: 2019-12-04 | Stop reason: HOSPADM

## 2019-12-03 RX ORDER — ACETAMINOPHEN 325 MG/1
650 TABLET ORAL EVERY 4 HOURS PRN
Status: DISCONTINUED | OUTPATIENT
Start: 2019-12-03 | End: 2019-12-04 | Stop reason: HOSPADM

## 2019-12-03 RX ORDER — DIPHENHYDRAMINE HYDROCHLORIDE 50 MG/ML
50 INJECTION INTRAMUSCULAR; INTRAVENOUS
Status: DISCONTINUED | OUTPATIENT
Start: 2019-12-03 | End: 2019-12-04 | Stop reason: HOSPADM

## 2019-12-03 RX ORDER — METHYLPREDNISOLONE SODIUM SUCCINATE 125 MG/2ML
125 INJECTION, POWDER, LYOPHILIZED, FOR SOLUTION INTRAMUSCULAR; INTRAVENOUS
Status: DISCONTINUED | OUTPATIENT
Start: 2019-12-03 | End: 2019-12-04 | Stop reason: HOSPADM

## 2019-12-03 RX ORDER — HEPARIN SODIUM 1000 [USP'U]/ML
INJECTION, SOLUTION INTRAVENOUS; SUBCUTANEOUS
Status: DISCONTINUED | OUTPATIENT
Start: 2019-12-03 | End: 2019-12-03 | Stop reason: HOSPADM

## 2019-12-03 RX ORDER — SUCRALFATE 1 G/1
1 TABLET ORAL 2 TIMES DAILY
Status: DISCONTINUED | OUTPATIENT
Start: 2019-12-03 | End: 2019-12-04 | Stop reason: HOSPADM

## 2019-12-03 RX ORDER — ASPIRIN 81 MG/1
81 TABLET ORAL DAILY
Status: DISCONTINUED | OUTPATIENT
Start: 2019-12-04 | End: 2019-12-04 | Stop reason: HOSPADM

## 2019-12-03 RX ORDER — ONDANSETRON 4 MG/1
4 TABLET, ORALLY DISINTEGRATING ORAL EVERY 6 HOURS PRN
Status: DISCONTINUED | OUTPATIENT
Start: 2019-12-03 | End: 2019-12-04 | Stop reason: HOSPADM

## 2019-12-03 RX ORDER — FENTANYL CITRATE 50 UG/ML
INJECTION, SOLUTION INTRAMUSCULAR; INTRAVENOUS PRN
Status: DISCONTINUED | OUTPATIENT
Start: 2019-12-03 | End: 2019-12-03

## 2019-12-03 RX ORDER — NALOXONE HYDROCHLORIDE 0.4 MG/ML
.1-.4 INJECTION, SOLUTION INTRAMUSCULAR; INTRAVENOUS; SUBCUTANEOUS
Status: CANCELLED | OUTPATIENT
Start: 2019-12-03 | End: 2019-12-04

## 2019-12-03 RX ORDER — FENTANYL CITRATE 50 UG/ML
25-50 INJECTION, SOLUTION INTRAMUSCULAR; INTRAVENOUS
Status: DISCONTINUED | OUTPATIENT
Start: 2019-12-03 | End: 2019-12-04 | Stop reason: HOSPADM

## 2019-12-03 RX ORDER — CEFAZOLIN SODIUM 2 G/100ML
2 INJECTION, SOLUTION INTRAVENOUS
Status: COMPLETED | OUTPATIENT
Start: 2019-12-03 | End: 2019-12-03

## 2019-12-03 RX ORDER — FINASTERIDE 5 MG/1
5 TABLET, FILM COATED ORAL DAILY
Status: DISCONTINUED | OUTPATIENT
Start: 2019-12-04 | End: 2019-12-04 | Stop reason: HOSPADM

## 2019-12-03 RX ORDER — SODIUM CHLORIDE 9 MG/ML
INJECTION, SOLUTION INTRAVENOUS CONTINUOUS PRN
Status: DISCONTINUED | OUTPATIENT
Start: 2019-12-03 | End: 2019-12-03

## 2019-12-03 RX ORDER — NALOXONE HYDROCHLORIDE 0.4 MG/ML
.1-.4 INJECTION, SOLUTION INTRAMUSCULAR; INTRAVENOUS; SUBCUTANEOUS
Status: DISCONTINUED | OUTPATIENT
Start: 2019-12-03 | End: 2019-12-04 | Stop reason: HOSPADM

## 2019-12-03 RX ORDER — CARVEDILOL 6.25 MG/1
6.25 TABLET ORAL 2 TIMES DAILY WITH MEALS
Status: DISCONTINUED | OUTPATIENT
Start: 2019-12-03 | End: 2019-12-04 | Stop reason: HOSPADM

## 2019-12-03 RX ORDER — BUMETANIDE 1 MG/1
1 TABLET ORAL 2 TIMES DAILY
Status: DISCONTINUED | OUTPATIENT
Start: 2019-12-03 | End: 2019-12-04 | Stop reason: HOSPADM

## 2019-12-03 RX ORDER — LIDOCAINE HYDROCHLORIDE 20 MG/ML
INJECTION, SOLUTION INFILTRATION; PERINEURAL PRN
Status: DISCONTINUED | OUTPATIENT
Start: 2019-12-03 | End: 2019-12-03

## 2019-12-03 RX ORDER — NIACIN 500 MG/1
500 TABLET, EXTENDED RELEASE ORAL AT BEDTIME
Status: DISCONTINUED | OUTPATIENT
Start: 2019-12-03 | End: 2019-12-04 | Stop reason: HOSPADM

## 2019-12-03 RX ORDER — GLYCOPYRROLATE 0.2 MG/ML
INJECTION, SOLUTION INTRAMUSCULAR; INTRAVENOUS PRN
Status: DISCONTINUED | OUTPATIENT
Start: 2019-12-03 | End: 2019-12-03

## 2019-12-03 RX ORDER — POLYETHYLENE GLYCOL 3350 17 G/17G
17 POWDER, FOR SOLUTION ORAL
Status: DISCONTINUED | OUTPATIENT
Start: 2019-12-03 | End: 2019-12-04 | Stop reason: HOSPADM

## 2019-12-03 RX ORDER — ACETAMINOPHEN 500 MG
1000 TABLET ORAL EVERY 6 HOURS PRN
Status: DISCONTINUED | OUTPATIENT
Start: 2019-12-03 | End: 2019-12-03

## 2019-12-03 RX ORDER — POTASSIUM CHLORIDE 1500 MG/1
20 TABLET, EXTENDED RELEASE ORAL
Status: DISCONTINUED | OUTPATIENT
Start: 2019-12-03 | End: 2019-12-04 | Stop reason: HOSPADM

## 2019-12-03 RX ORDER — BUPROPION HYDROCHLORIDE 150 MG/1
150 TABLET ORAL EVERY MORNING
Status: DISCONTINUED | OUTPATIENT
Start: 2019-12-04 | End: 2019-12-04 | Stop reason: HOSPADM

## 2019-12-03 RX ORDER — DEXAMETHASONE SODIUM PHOSPHATE 4 MG/ML
INJECTION, SOLUTION INTRA-ARTICULAR; INTRALESIONAL; INTRAMUSCULAR; INTRAVENOUS; SOFT TISSUE PRN
Status: DISCONTINUED | OUTPATIENT
Start: 2019-12-03 | End: 2019-12-03

## 2019-12-03 RX ORDER — NIACINAMIDE 500 MG
500 TABLET ORAL 2 TIMES DAILY
Status: DISCONTINUED | OUTPATIENT
Start: 2019-12-03 | End: 2019-12-03 | Stop reason: CLARIF

## 2019-12-03 RX ORDER — LIDOCAINE 40 MG/G
CREAM TOPICAL
Status: DISCONTINUED | OUTPATIENT
Start: 2019-12-03 | End: 2019-12-04 | Stop reason: HOSPADM

## 2019-12-03 RX ORDER — PROTAMINE SULFATE 10 MG/ML
INJECTION, SOLUTION INTRAVENOUS
Status: DISCONTINUED | OUTPATIENT
Start: 2019-12-03 | End: 2019-12-03 | Stop reason: HOSPADM

## 2019-12-03 RX ORDER — LORAZEPAM 0.5 MG/1
0.5-1 TABLET ORAL EVERY 8 HOURS PRN
Status: DISCONTINUED | OUTPATIENT
Start: 2019-12-03 | End: 2019-12-04 | Stop reason: HOSPADM

## 2019-12-03 RX ADMIN — PHENYLEPHRINE HYDROCHLORIDE 0.25 MCG/KG/MIN: 10 INJECTION INTRAVENOUS at 07:35

## 2019-12-03 RX ADMIN — DEXMEDETOMIDINE HYDROCHLORIDE 8 MCG: 100 INJECTION, SOLUTION INTRAVENOUS at 07:41

## 2019-12-03 RX ADMIN — CARVEDILOL 6.25 MG: 6.25 TABLET, FILM COATED ORAL at 18:34

## 2019-12-03 RX ADMIN — SUCRALFATE 1 G: 1 TABLET ORAL at 20:18

## 2019-12-03 RX ADMIN — Medication 5 MG: at 08:54

## 2019-12-03 RX ADMIN — OMEPRAZOLE 20 MG: 20 CAPSULE, DELAYED RELEASE ORAL at 18:33

## 2019-12-03 RX ADMIN — GLYCOPYRROLATE 0.8 MG: 0.2 INJECTION, SOLUTION INTRAMUSCULAR; INTRAVENOUS at 08:54

## 2019-12-03 RX ADMIN — PROPOFOL 100 MG: 10 INJECTION, EMULSION INTRAVENOUS at 07:41

## 2019-12-03 RX ADMIN — FENTANYL CITRATE 100 MCG: 50 INJECTION, SOLUTION INTRAMUSCULAR; INTRAVENOUS at 07:41

## 2019-12-03 RX ADMIN — SODIUM CHLORIDE: 9 INJECTION, SOLUTION INTRAVENOUS at 07:28

## 2019-12-03 RX ADMIN — CEFAZOLIN SODIUM 2 G: 2 INJECTION, SOLUTION INTRAVENOUS at 07:56

## 2019-12-03 RX ADMIN — LIDOCAINE HYDROCHLORIDE 100 MG: 20 INJECTION, SOLUTION INFILTRATION; PERINEURAL at 07:41

## 2019-12-03 RX ADMIN — DEXMEDETOMIDINE HYDROCHLORIDE 0.5 MCG/KG/HR: 100 INJECTION, SOLUTION INTRAVENOUS at 07:46

## 2019-12-03 RX ADMIN — DEXMEDETOMIDINE HYDROCHLORIDE 8 MCG: 100 INJECTION, SOLUTION INTRAVENOUS at 07:33

## 2019-12-03 RX ADMIN — BUMETANIDE 1 MG: 1 TABLET ORAL at 18:32

## 2019-12-03 ASSESSMENT — ACTIVITIES OF DAILY LIVING (ADL)
ADLS_ACUITY_SCORE: 19
ADLS_ACUITY_SCORE: 20

## 2019-12-03 ASSESSMENT — NEW YORK HEART ASSOCIATION (NYHA) CLASSIFICATION: NYHA FUNCTIONAL CLASS: III

## 2019-12-03 ASSESSMENT — ENCOUNTER SYMPTOMS: DYSRHYTHMIAS: 1

## 2019-12-03 ASSESSMENT — MIFFLIN-ST. JEOR: SCORE: 1683.23

## 2019-12-03 NOTE — ANESTHESIA PREPROCEDURE EVALUATION
Anesthesia Pre-Procedure Evaluation    Patient: Bairon Foster   MRN: [id number] : 1931          Preoperative Diagnosis: unable to take anitcoagulation long term    Procedure(s):  EP Left Atrial Appendage Closure    Past Medical History:   Diagnosis Date     Acute, but ill-defined, cerebrovascular disease     NO RESIDUALS     Antiplatelet or antithrombotic long-term use      Arrhythmia     a fib     Bullous pemphigoid 2019     Coronary atherosclerosis of unspecified type of vessel, native or graft     S/P PTCA, EF 50%     Epistaxis      Esophageal reflux      Hypercholesterolemia      Ischemic cardiomyopathy      Lower extremity edema      Mitral regurgitation     s/p MitraClip 19     Obese      JIM on CPAP      Other and unspecified hyperlipidemia      Other lymphedema     GROIN AND THIGHS     Other pancytopenia (H)      Other specified anemias      Overweight      Pacemaker     PACEMAKER-BS Implanted 2009      PAD (peripheral artery disease) (H)      Pancytopenia (H)      Persistent atrial fibrillation     VVI PM for long pauses     Pulmonary hypertension (H)      Systolic CHF (H)      TIA (transient ischemic attack)      Unspecified hypothyroidism      Venous stasis dermatitis of both lower extremities      Vitamin D deficiency      Past Surgical History:   Procedure Laterality Date     BONE MARROW BIOPSY, BONE SPECIMEN, NEEDLE/TROCAR N/A 2019    Procedure: BONE MARROW BIOPSY;  Surgeon: Yovani Cooley MD;  Location:  OR     CV HEART CATHETERIZATION WITH POSSIBLE INTERVENTION N/A 2019    Procedure: Heart Catheterization with possible Intervention;  Surgeon: Lev Beck MD;  Location:  HEART CARDIAC CATH LAB     CV MITRACLIP N/A 2019    Procedure: CV MITRAL CLIP;  Surgeon: Harshil Winter MD;  Location:  HEART CARDIAC CATH LAB     CV RIGHT HEART CATH N/A 2019    Procedure: Right Heart Cath;  Surgeon: Lev Beck MD;  Location:   HEART CARDIAC CATH LAB     EYE SURGERY      CATARACT/BLEPHAROPLASTY     GENITOURINARY SURGERY      TUNA     HERNIA REPAIR      RIGHT INGUINAL     HERNIORRHAPHY INGUINAL  8/14/2012    Procedure: HERNIORRHAPHY INGUINAL;  right inguinal hernia repair;  Surgeon: Kareem Torrez MD;  Location: [facility]     HERNIORRHAPHY UMBILICAL N/A 10/31/2017    Procedure: HERNIORRHAPHY UMBILICAL;  UMBILICAL HERNIA REPAIR WITH MESH;  Surgeon: Scar Harrington MD;  Location: [facility]     IMPLANT PACEMAKER  8/2009    single chamber     ORTHOPEDIC SURGERY      [facility] RIGHT     ORTHOPEDIC SURGERY  2010    right TKR, left TKR     SURGICAL HISTORY OF -       Thumb surgery     SURGICAL HISTORY OF -   1990s    Left total knee replacement     SURGICAL HISTORY OF -   3/11    Righ total knee replacement     Allergies   Allergen Reactions     Furosemide Blisters     Bullous pemphigoid. Bumex and ethacrynic acid okay.     Torsemide Blisters     Bullous pemphigoid.  Bumex and ethacrynic acid okay.     Social History     Tobacco Use     Smoking status: Never Smoker     Smokeless tobacco: Never Used   Substance Use Topics     Alcohol use: Not Currently     Alcohol/week: 0.0 standard drinks     Comment: 1/month     Prior to Admission medications    Medication Sig Start Date End Date Taking? Authorizing Provider   ACE/ARB/ARNI NOT PRESCRIBED (INTENTIONAL) Please choose reason not prescribed, below 5/29/19   Mg Nelson MD   acetaminophen (TYLENOL) 500 MG tablet Take 2 tablets (1,000 mg) by mouth every 6 hours as needed for mild pain 11/21/18   Mg Nelson MD   aspirin 81 MG EC tablet Take 81 mg by mouth daily     Reported, Patient   bumetanide (BUMEX) 1 MG tablet Take 1 tablet (1 mg) by mouth 2 times daily 9/26/19   Mackenzie Tejada APRN CNP   buPROPion (WELLBUTRIN XL) 150 MG 24 hr tablet Take 150 mg by mouth every morning    Unknown, Entered By History   Carboxymethylcellulose Sod PF (CELLUVISC/REFRESH LIQUIGEL) 1 % ophthalmic  solution Place 1 drop into both eyes 3 times daily as needed for dry eyes    Reported, Patient   carvedilol (COREG) 6.25 MG tablet TAKE 1 TABLET TWICE DAILY WITH MEALS 10/16/19   Mg Nelson MD   clobetasol (TEMOVATE) 0.05 % external cream Apply topically 2 times daily    Reported, Patient   COMPRESSION STOCKINGS 1 each daily 3/20/17   London Carrizales MD   dexamethasone (DECADRON) 6 MG tablet Take 6 mg by mouth daily X 7 days. Started 9/26/19    Reported, Patient   diclofenac (VOLTAREN) 1 % topical gel Apply 2 g topically 4 times daily 1/22/19 1/22/20  Mg Nelson MD   doxycycline hyclate (VIBRA-TABS) 100 MG tablet TAKE ONE CAPSULE/TABLET BY MOUTH TWICE A DAY    Reported, Patient   ETHACRYNIC ACID PO     Reported, Patient   finasteride (PROSCAR) 5 MG tablet TAKE 1 TABLET EVERY DAY 7/18/19   Mg Nelson MD   glucosamine-chondroitin 500-400 MG CAPS per capsule Take 2 capsules by mouth daily    Unknown, Entered By History   ketotifen (ZADITOR) 0.025 % SOLN ophthalmic solution Place 1 drop into both eyes every 12 hours 7/24/18   Mg Nelson MD   KLOR-CON 20 MEQ CR tablet TAKE 1 TABLET EVERY DAY 3/30/19   Elvin Ross MD   levothyroxine (SYNTHROID/LEVOTHROID) 100 MCG tablet TAKE 1 TABLET EVERY DAY 10/16/19   Mg Nelsno MD   LORazepam (ATIVAN) 1 MG tablet Take 0.5-1 tablets (0.5-1 mg) by mouth every 8 hours as needed for anxiety 4/13/19   Aggie Akhtar,    Multiple Vitamins-Minerals (CENTRUM SILVER) per tablet Take 1 tablet by mouth daily    Reported, Patient   niacinamide (NIACIN) 500 MG tablet TAKE ONE TABLET BY MOUTH TWICE A DAY    Reported, Patient   omeprazole (PRILOSEC) 20 MG DR capsule TAKE 1 CAPSULE TWICE DAILY 1/23/19   Mg Nelson MD   ORDER FOR DME Auto-CPAP:Max 9 cm H2OMin 9 cm H2O  Continuous Lifetime need and heated humidity.    3/13/12   Alan Estrada MD   Polyethylene Glycol 3350 (MIRALAX PO) Take 1 packet by  mouth At Bedtime     Reported, Patient   potassium chloride ER (K-DUR/KLOR-CON M) 20 MEQ CR tablet Take 2 tablets (40 mEq) by mouth 2 times daily 19   Sofiya Burton APRN CNP   simvastatin (ZOCOR) 20 MG tablet Take 20 mg by mouth daily     Reported, Patient   sucralfate (CARAFATE) 1 GM tablet TAKE 1 TABLET TWICE DAILY 19   Mg Nelson MD   tamsulosin (FLOMAX) 0.4 MG capsule TAKE 1 CAPSULE EVERY DAY  Patient taking differently: TAKE 1 CAPSULE EVERY EVENING 19   Mg Nelson MD   triamcinolone (KENALOG) 0.025 % cream Apply topically 2 times daily as needed     Reported, Patient     No current Epic-ordered facility-administered medications on file.      No current [facility]-ordered outpatient medications on file.       Recent Labs   Lab Test 19  0949 19  1318    142   POTASSIUM 3.7 2.8*   CHLORIDE 106 100   CO2 33* 34*   ANIONGAP 3 Not Calculated   * 141*   BUN 17 16   CR 1.20 1.22   TEODORO 8.7 9.5     Recent Labs   Lab Test 19  0949 19  0819   WBC 4.3 3.2*   HGB 9.2* 8.8*   PLT 91* 83*     Recent Labs   Lab Test 19  0949   ABO A   RH Neg     No results for input(s): TROPI in the last 18821 hours.  Recent Labs   Lab Test 19  1300 19  1830   PH 7.47* 7.47*   PCO2 36 40   PO2 103 72*   HCO3 26 29*     No results for input(s): HCG in the last 13715 hours.  No results found for this or any previous visit (from the past 744 hour(s)).  Recent Results (from the past 4320 hour(s))   Echocardiogram Complete    Narrative    [id number]  [id number]  [id number]  [id number]^MELY^SUE^BESSY        [facility]  [facility]  Echocardiography Laboratory  [address]  [address]  [place] [address]  Phone [phone]  Fax [phone]        Name: HIRO IBARRA  MRN: [id number]  : 1931  Study Date: 2019 11:02 AM  Age: 88 yrs  Gender: Male  Patient Location: [facility]  Reason For Study: S/P  mitral valve repair  Ordering Physician: SUE BOONE  Referring Physician: Mg Nelson  Performed By: Yoselin Castañeda RDCS     BSA: 2.2 m2  Height: 74 in  Weight: 201 lb  HR: 60  BP: 101/59 mmHg  _____________________________________________________________________________  __     Procedure  Complete Echo Adult.     _____________________________________________________________________________  __        Interpretation Summary     The left ventricle is mildly dilated.  Left ventricular systolic function is mild to moderately reduced.  The visual ejection fraction is estimated at 40%.  There is mild global hypokinesia of the left ventricle.  Flattened septum is consistent with RV pressure/volume overload.  2 mitral clips noted attached to mitral leaflets.  The mitral regurgitant jet is eccentrically directed.  There is mild to moderate (1-2+) mitral regurgitation.  The right ventricle is moderate to severely dilated.  Mildly decreased right ventricular systolic function  In comparison to previous study, 5/2019, LVEDD is slightly increased, LVEF may  be marginally lower.  _____________________________________________________________________________  __        Left Ventricle  The left ventricle is mildly dilated. There is mild concentric left  ventricular hypertrophy. Left ventricular systolic function is mild to  moderately reduced. The visual ejection fraction is estimated at 40%.  Diastolic Doppler findings (E/E' ratio and/or other parameters) suggest left  ventricular filling pressures are increased. Flattened septum is consistent  with RV pressure/volume overload. There is mild global hypokinesia of the left  ventricle.     Right Ventricle  The right ventricle is moderate to severely dilated. Mildly decreased right  ventricular systolic function. There is a pacemaker lead in the right  ventricle.     Atria  The left atrium is severely dilated. The right atrium is severely dilated.  Left to right atrial shunt,  small.     Mitral Valve  2 mitral clips noted attached to mitral leaflets. There is mild to moderate  (1-2+) mitral regurgitation. The mitral regurgitant jet is eccentrically  directed. The mean mitral valve gradient is 3.2 mmHg. The peak mitral valve  gradient is 11.5 mmHg.     Tricuspid Valve  The tricuspid valve is not well visualized, but is grossly normal. There is  moderately severe (3+) tricuspid regurgitation. The right ventricular systolic  pressure is elevated at 30.3 mmHg.        Aortic Valve  The aortic valve is trileaflet. No aortic regurgitation is present. No aortic  stenosis is present.     Pulmonic Valve  The pulmonic valve is not well seen, but is grossly normal. There is mild (1+)  pulmonic valvular regurgitation. Normal pulmonic valve velocity.     Vessels  Normal size aorta. Dilation of the inferior vena cava is present with abnormal  respiratory variation in diameter.     Pericardium  There is no pericardial effusion.     _____________________________________________________________________________  __  MMode/2D Measurements & Calculations  IVSd: 1.3 cm  LVIDd: 5.9 cm  LVIDs: 4.3 cm  LVPWd: 1.3 cm  FS: 27.3 %  LV mass(C)d: 345.4 grams  LV mass(C)dI: 158.6 grams/m2  Ao root diam: 3.5 cm  LA dimension: 5.5 cm  asc Aorta Diam: 3.7 cm  LA/Ao: 1.6     LA Volume Index (BP): 108.2 ml/m2  RWT: 0.44        Doppler Measurements & Calculations  MV E max fabricio: 157.7 cm/sec  MV max P.5 mmHg  MV mean PG: 3.2 mmHg  MV V2 VTI: 41.8 cm  MV dec time: 0.24 sec  MR ERO: 0.37 cm2  MR volume: 61.4 ml  TV max P.3 mmHg  TR max fabricio: 275.0 cm/sec  TR max P.3 mmHg  E/E': 20.8     Peak E' Fabricio: 7.6 cm/sec           _____________________________________________________________________________  __           Report approved by: Jesica Wheat 2019 01:20 PM            RECENT LABS:       Anesthesia Evaluation     . Pt has had prior anesthetic. Type: General and MAC           ROS/MED  HX    ENT/Pulmonary:     (+)sleep apnea, uses CPAP , . .    Neurologic:     (+)neuropathy - TOES, CVA date: 15 YRS AGO without deficits    Cardiovascular: Comment: ECHO: 3/22/19: LVEF 30%, moderate global hypokinesia. RV moderately dilated, decreased RV function. Mod-severe (3-4+) MR, moderately myxomatous valve. Mod-severe TR  Coronary angiogram / right heart catheter April 2019 admission - mild nonobstructive CAD/ mild elevated pulmonary hypertension, mildly elevated right side filling pressures.  Stress 3/2017: On stress images mild intensity distal inferolateral wall photopenic defect. On both stress and rest images - mild to moderate intensity basal inferior wall photopenic defect, which appears worse on rest images compared to stress images - likely attenuation artifact. No RWMA.  LVEF 47% with stress.  March 2019 Pacemaker interrogation: Underlying rhythm AF with rates 40-50. Battery life 1/2 year .     (+) Dyslipidemia, --CAD, --stent,2009  1 . Taking blood thinners Pt has received instructions: . CHF etiology: s/p Mitral clips x2 Last EF: 40-45% NYHA classification: III. [place], . pacemaker Reason placed: ATRIAL FIBRILATION:. dysrhythmias a-fib, valvular problems/murmurs type: MR s/p mitral clip:. pulmonary hypertension, Previous cardiac testing Echodate:3/22/19results:1. The left ventricle is mildly dilated. The visual ejection fraction is  estimated at 30%. There is moderate global hypokinesia of the left ventricle.  2. The right ventricle is moderately dilated. Moderately decreased right  ventricular systolic function  3. There is mod-severe to severe (3-4+) mitral regurgitation. Mitral valve is  moderately myxomatous, no prolapse or flail. MR jet is slighlty posterior  directed. Etiology likely functional MR from dilated LV and low EF, may have  some myxomatous component also. MVA 5.0cm2 by 3D alignment and measurement at  leaflet tips.  4. There is mod-severe to severe (3-4+) tricuspid regurgitation. TV  is not  well visualized. TR jet is directed at septum, appears to come from central  coaptation point, where pacemaker lead crosses the valve.    Repeat post mitral clip CHELLE EF 40-45%Stress Testdate:2017 results:ECG reviewed date:3/21/18 results:Ventricular-paced rhythm 62 with occasional Premature ventricular complexes  When compared with ECG of 08-DEC-2009 10:00,  Electronic ventricular pacemaker has replaced Junctional rhythmCath date: 3/22/19 results:Single vessel CAD with patent stent from 2009 in the obtuse marginal.  On today's cath there are no flow-limiting lesions  Elevated left-sided filling pressures with left ventricular  end-diastolic pressure of 23 mmHg          METS/Exercise Tolerance:  3 - Able to walk 1-2 blocks without stopping   Hematologic:     (+) Anemia, Other Hematologic Disorder-THROMBOCYTOPENIA      Musculoskeletal:   (+)  other musculoskeletal- BILATERAL KNEE REPLACEMENTS      GI/Hepatic:     (+) GERD Asymptomatic on medication,       Renal/Genitourinary:     (+) chronic renal disease (BHAKTI from diuresis 4/8/19-improving), type: ARF, BPH,       Endo:     (+) thyroid problem hypothyroidism, .      Psychiatric:     (+) psychiatric history depression      Infectious Disease:   (+) Other Infectious Disease BULBOUS PEMPHIGOID     (-) HIV (BULBOUS PEMPHIGOID)   Malignancy:      - no malignancy   Other:    (+) No chance of pregnancy no H/O Chronic Pain,no other significant disability                         Physical Exam  Normal systems: cardiovascular and pulmonary    Airway   Mallampati: III  TM distance: >3 FB  Neck ROM: full    Dental   (+) missing and partials    Cardiovascular       Pulmonary             Lab Results   Component Value Date    WBC 4.3 12/02/2019    HGB 9.2 (L) 12/02/2019    HCT 29.8 (L) 12/02/2019    PLT 91 (L) 12/02/2019    SED 11 09/23/2013     12/02/2019    POTASSIUM 3.7 12/02/2019    CHLORIDE 106 12/02/2019    CO2 33 (H) 12/02/2019    BUN 17 12/02/2019    CR 1.20  "12/02/2019     (H) 12/02/2019    TEODORO 8.7 12/02/2019    PHOS 2.8 05/21/2019    MAG 2.3 05/21/2019    ALBUMIN 3.2 (L) 12/02/2019    PROTTOTAL 7.1 12/02/2019    ALT 24 12/02/2019    AST 30 12/02/2019    ALKPHOS 100 12/02/2019    BILITOTAL 0.9 12/02/2019    LIPASE 329 04/17/2019    PTT 34 03/21/2018    INR 1.44 (H) 05/20/2019    TSH 3.09 04/08/2019    T4 1.24 08/25/2016       Preop Vitals  BP Readings from Last 3 Encounters:   11/22/19 126/68   11/15/19 127/71   09/26/19 110/60    Pulse Readings from Last 3 Encounters:   11/22/19 80   11/15/19 94   09/26/19 64      Resp Readings from Last 3 Encounters:   08/21/19 14   08/15/19 16   07/16/19 16    SpO2 Readings from Last 3 Encounters:   08/21/19 99%   08/15/19 97%   07/31/19 98%      Temp Readings from Last 1 Encounters:   08/21/19 36.9  C (98.5  F) (Tympanic)    Ht Readings from Last 1 Encounters:   11/22/19 1.88 m (6' 2\")      Wt Readings from Last 1 Encounters:   11/22/19 91.1 kg (200 lb 13.4 oz)    Estimated body mass index is 25.79 kg/m  as calculated from the following:    Height as of 11/22/19: 1.88 m (6' 2\").    Weight as of 11/22/19: 91.1 kg (200 lb 13.4 oz).       Anesthesia Plan      History & Physical Review  History and physical reviewed and following examination; no interval change.    ASA Status:  4 .    NPO Status:  > 8 hours    Plan for General and ETT with Intravenous and Propofol induction. Maintenance will be Balanced.    PONV prophylaxis:  Ondansetron (or other 5HT-3)  Phenylephrine gtt available  precedex gtt, less than 0.5mac inhalational       Postoperative Care  Postoperative pain management:  Oral pain medications.      Consents  Anesthetic plan, risks, benefits and alternatives discussed with:  Patient..                 Sachin Almanza MD  "

## 2019-12-03 NOTE — DISCHARGE INSTRUCTIONS
"Left Atrial Appendage Closure Discharge Instructions    After you go home:  \" Have an adult stay with you until tomorrow.  \" You may eat your normal diet, unless your doctor tells you otherwise.  \" Increase fluid intake for two days.  \" Continue to use Incentive Spirometer 4-5 times every two hours while awake for 2 days, then throw away.       For 24-48 hours (due to the sedation you received):   \" Do NOT make any important or legal decisions.  \" Do NOT drive or operate machines at home or at work.  \" Do NOT drink alcohol.    Care of Puncture Site:  \" Check the puncture site every 1-2 hours while awake.  \" For 2-3 days, when you cough, sneeze, laugh or move your bowels, hold your hand over the puncture site and press firmly.  \" Remove the band aid after 24 hours. If there is minor oozing, apply another band aid and remove it after 12 hours.  \" It is normal to have a small bruise or pea size lump at the site.  \" Ok to use ice packs at groin sites 20 minutes at a time for groin discomfort  \" You may shower. Do NOT take a bath, or use a hot tub or pool until groin site heals, which may take up to a week.  Do NOT scrub the site. Do not use lotion or powder near the puncture site.    Activity:  \" Do NOT lift, push or pull more than 10 pounds for 7 days.  \" NO repetitive motions such as loading  or vacuuming.   \" Relax and take it easy for 5 days.   \" DO NOT DRIVE FOR 5 DAYS!    Bleeding:  \" If you start bleeding from the groin site, lie down flat and press firmly on the site for 10 minutes or until bleeding stops.   \" Once bleeding stops lay flat for 2 hours.        Go to ER or Call 911 right away if you have heavy bleeding or with bleeding that does not stop.    Medications:  \" Take your medications, including blood thinners, unless your doctor tells you not to.  \" If you have stopped any other medicines, check with your nurse or provider about when to restart them.  \" If you have pain, you may take Advil " "(ibuprofen) or Tylenol (acetaminophen).    Call the A Fib RN if:  \" You have increased pain or a large or growing hard lump around the site.  \" The site is red, swollen, hot or tender.  \" Blood or fluid is draining from the site.  \" You have chills or a fever greater than 101 F (38 C).  \" Your leg feels numb, cool or changes color.  \" If chest or groin pain is not relieved by Advil or Tylenol.  \" Any questions or concerns.    Call 911 for signs/symptoms of stroke:  \" Visual disturbance  \" Problems with talking  \" Smile only occurs on half your face  \" Numbness on one side  \" Sudden headache  \" Confusion  \" Problems with walking or balance.       Follow Up Appointments:  \" 12/19/19 at 2:30pm with Sofiya   \" ***     [facility]: [facility] RN's Sarah Ndiaye    [phone](Mon-Fri, 8:00-5:00)    after clinic hours for on-call Cardiologist [phone] (7 days a week) .          "

## 2019-12-03 NOTE — ANESTHESIA POSTPROCEDURE EVALUATION
Patient: Bairon Foster    Procedure(s):  Left Atrial Appendage Closure    Diagnosis:unable to take anitcoagulation long term  Diagnosis Additional Information: No value filed.    Anesthesia Type:  GETA    Note:  Anesthesia Post Evaluation    Patient location during evaluation: PACU  Patient participation: Able to fully participate in evaluation  Level of consciousness: sleepy but conscious and responsive to verbal stimuli  Pain management: adequate  Airway patency: patent  Cardiovascular status: acceptable and hemodynamically stable  Respiratory status: acceptable and unassisted  Hydration status: acceptable  PONV: none     Anesthetic complications: None          Last vitals:  Vitals:    12/03/19 0940 12/03/19 0950 12/03/19 1000   BP: 94/58 94/55 93/55   Pulse: 57 50 51   Resp: 13 11 12   Temp: 36.2  C (97.2  F)     SpO2: 98% 94% 96%         Electronically Signed By: Sachin Almanza MD  December 3, 2019  10:09 AM

## 2019-12-03 NOTE — ANESTHESIA CARE TRANSFER NOTE
Patient: Bairon Foster    Procedure(s):  Left Atrial Appendage Closure    Diagnosis: unable to take anitcoagulation long term  Diagnosis Additional Information: No value filed.    Anesthesia Type:   No value filed.     Note:  Airway :Face Mask  Patient transferred to:PACU  Handoff Report: Identifed the Patient, Identified the Reponsible Provider, Reviewed the pertinent medical history, Discussed the surgical course, Reviewed Intra-OP anesthesia mangement and issues during anesthesia, Set expectations for post-procedure period and Allowed opportunity for questions and acknowledgement of understanding      Vitals: (Last set prior to Anesthesia Care Transfer)    CRNA VITALS  12/3/2019 0837 - 12/3/2019 0916      12/3/2019             Resp Rate (set):  10                Electronically Signed By: WILLIS Londono CRNA  December 3, 2019  9:16 AM

## 2019-12-04 ENCOUNTER — APPOINTMENT (OUTPATIENT)
Dept: CARDIOLOGY | Facility: CLINIC | Age: 84
DRG: 274 | End: 2019-12-04
Attending: STUDENT IN AN ORGANIZED HEALTH CARE EDUCATION/TRAINING PROGRAM
Payer: COMMERCIAL

## 2019-12-04 ENCOUNTER — TELEPHONE (OUTPATIENT)
Dept: FAMILY MEDICINE | Facility: CLINIC | Age: 84
End: 2019-12-04

## 2019-12-04 VITALS
BODY MASS INDEX: 26.76 KG/M2 | DIASTOLIC BLOOD PRESSURE: 51 MMHG | RESPIRATION RATE: 18 BRPM | WEIGHT: 208.5 LBS | OXYGEN SATURATION: 92 % | SYSTOLIC BLOOD PRESSURE: 107 MMHG | TEMPERATURE: 98.5 F | HEIGHT: 74 IN | HEART RATE: 65 BPM

## 2019-12-04 LAB
ANION GAP SERPL CALCULATED.3IONS-SCNC: 5 MMOL/L (ref 3–14)
APTT PPP: 31 SEC (ref 22–37)
BUN SERPL-MCNC: 22 MG/DL (ref 7–30)
CALCIUM SERPL-MCNC: 8.3 MG/DL (ref 8.5–10.1)
CHLORIDE SERPL-SCNC: 109 MMOL/L (ref 94–109)
CO2 SERPL-SCNC: 29 MMOL/L (ref 20–32)
CREAT SERPL-MCNC: 1.24 MG/DL (ref 0.66–1.25)
ERYTHROCYTE [DISTWIDTH] IN BLOOD BY AUTOMATED COUNT: 19.3 % (ref 10–15)
GFR SERPL CREATININE-BSD FRML MDRD: 51 ML/MIN/{1.73_M2}
GLUCOSE SERPL-MCNC: 90 MG/DL (ref 70–99)
HCT VFR BLD AUTO: 26.8 % (ref 40–53)
HGB BLD-MCNC: 8.4 G/DL (ref 13.3–17.7)
INR PPP: 1.28 (ref 0.86–1.14)
INTERPRETATION ECG - MUSE: NORMAL
KCT BLD-ACNC: 227 SEC (ref 75–150)
KCT BLD-ACNC: 243 SEC (ref 75–150)
MCH RBC QN AUTO: 31.7 PG (ref 26.5–33)
MCHC RBC AUTO-ENTMCNC: 31.3 G/DL (ref 31.5–36.5)
MCV RBC AUTO: 101 FL (ref 78–100)
PLATELET # BLD AUTO: 69 10E9/L (ref 150–450)
POTASSIUM SERPL-SCNC: 3.7 MMOL/L (ref 3.4–5.3)
RBC # BLD AUTO: 2.65 10E12/L (ref 4.4–5.9)
SODIUM SERPL-SCNC: 143 MMOL/L (ref 133–144)
WBC # BLD AUTO: 4.3 10E9/L (ref 4–11)

## 2019-12-04 PROCEDURE — 36415 COLL VENOUS BLD VENIPUNCTURE: CPT | Performed by: STUDENT IN AN ORGANIZED HEALTH CARE EDUCATION/TRAINING PROGRAM

## 2019-12-04 PROCEDURE — 85730 THROMBOPLASTIN TIME PARTIAL: CPT | Performed by: STUDENT IN AN ORGANIZED HEALTH CARE EDUCATION/TRAINING PROGRAM

## 2019-12-04 PROCEDURE — 93321 DOPPLER ECHO F-UP/LMTD STD: CPT | Mod: 26 | Performed by: INTERNAL MEDICINE

## 2019-12-04 PROCEDURE — 93308 TTE F-UP OR LMTD: CPT

## 2019-12-04 PROCEDURE — 85027 COMPLETE CBC AUTOMATED: CPT | Performed by: STUDENT IN AN ORGANIZED HEALTH CARE EDUCATION/TRAINING PROGRAM

## 2019-12-04 PROCEDURE — 93325 DOPPLER ECHO COLOR FLOW MAPG: CPT | Mod: 26 | Performed by: INTERNAL MEDICINE

## 2019-12-04 PROCEDURE — 85610 PROTHROMBIN TIME: CPT | Performed by: STUDENT IN AN ORGANIZED HEALTH CARE EDUCATION/TRAINING PROGRAM

## 2019-12-04 PROCEDURE — 93308 TTE F-UP OR LMTD: CPT | Mod: 26 | Performed by: INTERNAL MEDICINE

## 2019-12-04 PROCEDURE — 25000132 ZZH RX MED GY IP 250 OP 250 PS 637: Performed by: STUDENT IN AN ORGANIZED HEALTH CARE EDUCATION/TRAINING PROGRAM

## 2019-12-04 PROCEDURE — 80048 BASIC METABOLIC PNL TOTAL CA: CPT | Performed by: STUDENT IN AN ORGANIZED HEALTH CARE EDUCATION/TRAINING PROGRAM

## 2019-12-04 RX ORDER — POTASSIUM CHLORIDE 1500 MG/1
20 TABLET, EXTENDED RELEASE ORAL DAILY
Status: DISCONTINUED | OUTPATIENT
Start: 2019-12-04 | End: 2019-12-04 | Stop reason: HOSPADM

## 2019-12-04 RX ADMIN — OMEPRAZOLE 20 MG: 20 CAPSULE, DELAYED RELEASE ORAL at 08:30

## 2019-12-04 RX ADMIN — TAMSULOSIN HYDROCHLORIDE 0.4 MG: 0.4 CAPSULE ORAL at 08:29

## 2019-12-04 RX ADMIN — CARVEDILOL 6.25 MG: 6.25 TABLET, FILM COATED ORAL at 08:30

## 2019-12-04 RX ADMIN — SIMVASTATIN 20 MG: 20 TABLET, FILM COATED ORAL at 08:29

## 2019-12-04 RX ADMIN — POTASSIUM CHLORIDE 20 MEQ: 1500 TABLET, EXTENDED RELEASE ORAL at 08:29

## 2019-12-04 RX ADMIN — BUPROPION HYDROCHLORIDE 150 MG: 150 TABLET, FILM COATED, EXTENDED RELEASE ORAL at 08:29

## 2019-12-04 RX ADMIN — FINASTERIDE 5 MG: 5 TABLET, FILM COATED ORAL at 08:29

## 2019-12-04 RX ADMIN — BUMETANIDE 1 MG: 1 TABLET ORAL at 08:30

## 2019-12-04 RX ADMIN — LEVOTHYROXINE SODIUM 100 MCG: 100 TABLET ORAL at 08:29

## 2019-12-04 RX ADMIN — SUCRALFATE 1 G: 1 TABLET ORAL at 08:29

## 2019-12-04 RX ADMIN — ASPIRIN 81 MG: 81 TABLET, DELAYED RELEASE ORAL at 08:29

## 2019-12-04 RX ADMIN — APIXABAN 5 MG: 5 TABLET, FILM COATED ORAL at 08:30

## 2019-12-04 ASSESSMENT — ACTIVITIES OF DAILY LIVING (ADL)
ADLS_ACUITY_SCORE: 20

## 2019-12-04 ASSESSMENT — MIFFLIN-ST. JEOR: SCORE: 1685.5

## 2019-12-04 NOTE — PLAN OF CARE
VSS. Tele AFib controlled. R groin site CDI, CMS intact. Pt has bilateral LE wraps that were placed by his [facility] providers. He states that he does not want them taken off. Bilateral pedal pulses weak. Neuro intact. Pt denies CP, SOB.

## 2019-12-04 NOTE — PLAN OF CARE
Neuro- A&OX4, forgetful @ times  Most Recent Vitals- Temp: 98.1  F (36.7  C) Temp src: Oral BP: 101/55 Pulse: 65 Heart Rate: 92 Resp: 18 SpO2: 92 % O2 Device: None (Room air) Oxygen Delivery: 2 LPM  Tele/Cardiac- A fib CVR, occasional pacing, denies CP  Resp- stable on RA  Activity- SBA with cane  Pain- denies  Drips- none, SL  Drains/Tubes- PIV  Skin- right groin CDI and CMS intact, BLE edema-legs wrapped, skin tear/scab on right bicep   GI/- WDL  Aggression Color- Green  Plan- Possible discharge today, continue to monitor  Misc- NA    Courtney Tobin RN

## 2019-12-04 NOTE — TELEPHONE ENCOUNTER
Yudy from [employer] called requesting verbal approval to resume SN home care services after hospitalization 1 x a week for 9 weeks. Please advice on approval. Ok to leave a detailed voice message with providers responce.

## 2019-12-04 NOTE — DISCHARGE SUMMARY
[facility]  Discharge Summary  Cardiology    Date of Admission:  12/3/2019  Date of Discharge:  12/4/2019  Discharging Provider: Rocky Platt  Date of Service (when I saw the patient): 12/04/19    Discharge Diagnoses   Status post watchman placement    History of Present Illness   Bairon Foster is an 88 year old male who had elective watchman on 12/3/2019.  He has history of nonvalvular atrial fibrillation. Given high fall risk and frequent epistaxis, Eliquis was discontinued but it was felt that he needed stroke prevention due to his significantly high CHADS VASC score of 5. His HASBLED score is 5. He was seen in structural heart clinic and was felt to be a good candidate for left atrial appendage closure.    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     Hospital Course   Bairon Foster was admitted on 12/3/2019.  The following problems were addressed during his hospitalization:    He underwent successful watchman placement with no immediate complications. He received a 33 mm Watchman device with 15-17% compression of the device. There was no leak identified. Venous access site was closed with closure device. POD #1 echocardiogram shows no pericardial effusion. Watchman device could not be visualized, but patient will be scheduled for CHELLE for evaluation of this. CBC shows significant anemia but this is at his baseline.  BMP shows normal electrolytes and stable renal function.    Plan:  - Continue with Bumex 1 mg twice daily  - Aspirin lifelong  - Eliquis for 45 days  - 10-day follow-up with Sofiya Burton arranged  - Discharge to home  today    Code Status   Full Code    Primary Care Physician   Mg Nelson    Physical Exam   Temp: 98.1  F (36.7  C) Temp src: Oral BP: 101/55 Pulse: 65 Heart Rate: 92 Resp: 18 SpO2: 92 % O2 Device: None (Room air) Oxygen Delivery: 2 LPM  Vitals:    12/03/19 0642 12/04/19 0500   Weight: 94.3 kg (208 lb) 94.6 kg (208 lb 8 oz)     Constitutional: NAD.  Oxygen on room air.  Respiratory: Clear to auscultation bilaterally  Cardiovascular: Irregularly irregular rhythm but normal rate with 2 out of 6 holosystolic murmur along the left sternal border.  GI: Normal bowel sounds.  Skin: No ecchymosis or hematoma at the femoral site.    Neurologic: Alert and oriented  Neuropsychiatric: Normal affect    Time Spent on this Encounter   Rocky WALLACE PA-C, personally saw the patient today and spent less than or equal to 30 minutes discharging this patient.    Discharge Disposition   Discharged to home  Condition at discharge: Stable    Consultations This Hospital Stay   None    Discharge Orders      Discharge Instructions - IF on Metformin (Glucophage or Glucovance)    IF on metformin (GLUCOPHAGE, GLUMETZA, FORTAMET, RIOMET) and metformin containing medications: (KAZANO, METAGLIP,  GLUCOVANCE,  AVANDAMET, XIGDUO XR, JANUMET, JANUMET XR, JENTADUETO, PRANDIMET, KOMBIGLYZE XR, INVOKAMET, ACTOPLUS MET, ACTOPLUS MET XR), schedule a Basic Metabolic Panel at [facility] or Primary Clinic in 48 - 72 hours post procedure and PRIOR TO resuming the Metformin and metformin containing medications.  Hold Metformin and metformin containing medications until after the Basic Metabolic Panel on the 2nd or 3rd day following the procedure.  May resume after blood draw is complete.     Discharge Instructions - Follow up with [facility] Cardiologist    Follow-up with the [facility] with the procedural Provider in 2-4 weeks.     Discharge Medications   Current Discharge Medication List      CONTINUE these medications which have NOT  CHANGED    Details   acetaminophen (TYLENOL) 500 MG tablet Take 2 tablets (1,000 mg) by mouth every 6 hours as needed for mild pain  Qty: 100 tablet, Refills: 0    Associated Diagnoses: Arthritis      aspirin 81 MG EC tablet Take 81 mg by mouth daily       buPROPion (WELLBUTRIN XL) 150 MG 24 hr tablet Take 150 mg by mouth every morning      Carboxymethylcellulose Sod PF (CELLUVISC/REFRESH LIQUIGEL) 1 % ophthalmic solution Place 2 drops into both eyes daily as needed for dry eyes       carvedilol (COREG) 6.25 MG tablet TAKE 1 TABLET TWICE DAILY WITH MEALS  Qty: 180 tablet, Refills: 2    Associated Diagnoses: Ischemic cardiomyopathy      clobetasol (TEMOVATE) 0.05 % external cream Apply topically twice a week AS NEEDED      diclofenac (VOLTAREN) 1 % topical gel Apply 2 g topically 4 times daily    Associated Diagnoses: Arthritis      finasteride (PROSCAR) 5 MG tablet TAKE 1 TABLET EVERY DAY  Qty: 90 tablet, Refills: 3    Associated Diagnoses: Benign prostatic hyperplasia, unspecified whether lower urinary tract symptoms present      glucosamine-chondroitin 500-400 MG CAPS per capsule Take 2 capsules by mouth daily AS NEEDED      ketotifen (ZADITOR) 0.025 % SOLN ophthalmic solution Place 1 drop into both eyes every 12 hours  Qty: 1 Bottle      levothyroxine (SYNTHROID/LEVOTHROID) 100 MCG tablet TAKE 1 TABLET EVERY DAY  Qty: 90 tablet, Refills: 2    Associated Diagnoses: Acquired hypothyroidism      Multiple Vitamins-Minerals (CENTRUM SILVER) per tablet Take 1 tablet by mouth daily      niacinamide (NIACIN) 500 MG tablet TAKE ONE TABLET BY MOUTH TWICE A DAY      omeprazole (PRILOSEC) 20 MG DR capsule TAKE 1 CAPSULE TWICE DAILY  Qty: 180 capsule, Refills: 3    Associated Diagnoses: Gastroesophageal reflux disease without esophagitis      Polyethylene Glycol 3350 (MIRALAX PO) Take 1 packet by mouth At Bedtime       potassium chloride ER (K-DUR/KLOR-CON M) 20 MEQ CR tablet Take 2 tablets (40 mEq) by mouth 2 times  daily  Qty: 90 tablet, Refills: 3    Associated Diagnoses: Hypokalemia      simvastatin (ZOCOR) 20 MG tablet Take 20 mg by mouth daily       sucralfate (CARAFATE) 1 GM tablet TAKE 1 TABLET TWICE DAILY  Qty: 180 tablet, Refills: 3    Associated Diagnoses: Esophageal reflux      tamsulosin (FLOMAX) 0.4 MG capsule TAKE 1 CAPSULE EVERY DAY  Qty: 90 capsule, Refills: 3    Associated Diagnoses: Benign prostatic hyperplasia with urinary hesitancy      triamcinolone (KENALOG) 0.025 % cream Apply topically 2 times daily as needed       ACE/ARB/ARNI NOT PRESCRIBED (INTENTIONAL) Please choose reason not prescribed, below    Associated Diagnoses: Coronary artery disease involving native coronary artery of native heart without angina pectoris      bumetanide (BUMEX) 1 MG tablet Take 1 tablet (1 mg) by mouth 2 times daily      COMPRESSION STOCKINGS 1 each daily  Qty: 2 each, Refills: 0    Associated Diagnoses: Varicose 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      ORDER FOR DME Auto-CPAP:Max 9 cm H2OMin 9 cm H2O  Continuous Lifetime need and heated humidity.     Qty: 1 Device, Refills: 0    Associated Diagnoses: Obstructive sleep apnea (adult) (pediatric); Other dyspnea and respiratory abnormality         STOP taking these medications       LORazepam (ATIVAN) 1 MG tablet Comments:   Reason for Stopping:             Allergies   Allergies   Allergen Reactions     Furosemide Blisters     Bullous pemphigoid. Bumex and ethacrynic acid okay.     Torsemide Blisters     Bullous pemphigoid.  Bumex and ethacrynic acid okay.     Data   Reviewed.      Rocky Platt PA-C 12/4/2019

## 2019-12-04 NOTE — PLAN OF CARE
Vital signs stable. Medications discussed, Information reviewed, Questions answered, and concerns addressed. Follow-up Instructions reviewed. Pt belongings accounted for and sent home with them. Pt left via wheelchair and driven by son  .

## 2019-12-16 ENCOUNTER — TELEPHONE (OUTPATIENT)
Dept: FAMILY MEDICINE | Facility: CLINIC | Age: 84
End: 2019-12-16

## 2019-12-16 ENCOUNTER — HEALTH MAINTENANCE LETTER (OUTPATIENT)
Age: 84
End: 2019-12-16

## 2019-12-16 NOTE — TELEPHONE ENCOUNTER
Our goal is to have forms completed within 72 hours, however some forms may require a visit or additional information.    What clinic location was the form placed at [facility] or [place]     Who is the form from?   Where did the form come from? Faxed to clinic   The form was placed in the inbox of Mg Nelson MD      Please fax to [phone]  Phone number: [phone]    Additional comments: home health care VO for RN to eval and treat    Call take on 12/16/2019 at 11:49 AM by Maya Monterroso

## 2019-12-17 ENCOUNTER — ANCILLARY PROCEDURE (OUTPATIENT)
Dept: CARDIOLOGY | Facility: CLINIC | Age: 84
End: 2019-12-17
Attending: INTERNAL MEDICINE
Payer: COMMERCIAL

## 2019-12-17 DIAGNOSIS — Z95.0 CARDIAC PACEMAKER IN SITU: ICD-10-CM

## 2019-12-19 ENCOUNTER — OFFICE VISIT (OUTPATIENT)
Dept: CARDIOLOGY | Facility: CLINIC | Age: 84
End: 2019-12-19
Attending: INTERNAL MEDICINE
Payer: COMMERCIAL

## 2019-12-19 VITALS
BODY MASS INDEX: 27.18 KG/M2 | DIASTOLIC BLOOD PRESSURE: 70 MMHG | SYSTOLIC BLOOD PRESSURE: 124 MMHG | HEIGHT: 74 IN | HEART RATE: 52 BPM | WEIGHT: 211.8 LBS

## 2019-12-19 DIAGNOSIS — Z98.890 S/P LEFT ATRIAL APPENDAGE LIGATION: Primary | ICD-10-CM

## 2019-12-19 DIAGNOSIS — I48.0 PAF (PAROXYSMAL ATRIAL FIBRILLATION) (H): ICD-10-CM

## 2019-12-19 DIAGNOSIS — I25.5 ISCHEMIC CARDIOMYOPATHY: ICD-10-CM

## 2019-12-19 LAB
ANION GAP SERPL CALCULATED.3IONS-SCNC: 2 MMOL/L (ref 3–14)
BUN SERPL-MCNC: 22 MG/DL (ref 7–30)
CALCIUM SERPL-MCNC: 9.3 MG/DL (ref 8.5–10.1)
CHLORIDE SERPL-SCNC: 107 MMOL/L (ref 94–109)
CO2 SERPL-SCNC: 33 MMOL/L (ref 20–32)
CREAT SERPL-MCNC: 1.03 MG/DL (ref 0.66–1.25)
GFR SERPL CREATININE-BSD FRML MDRD: 64 ML/MIN/{1.73_M2}
GLUCOSE SERPL-MCNC: 107 MG/DL (ref 70–99)
POTASSIUM SERPL-SCNC: 3.4 MMOL/L (ref 3.4–5.3)
SODIUM SERPL-SCNC: 142 MMOL/L (ref 133–144)

## 2019-12-19 PROCEDURE — 80048 BASIC METABOLIC PNL TOTAL CA: CPT | Performed by: PHYSICIAN ASSISTANT

## 2019-12-19 PROCEDURE — 99214 OFFICE O/P EST MOD 30 MIN: CPT | Performed by: NURSE PRACTITIONER

## 2019-12-19 PROCEDURE — 36415 COLL VENOUS BLD VENIPUNCTURE: CPT | Performed by: PHYSICIAN ASSISTANT

## 2019-12-19 ASSESSMENT — MIFFLIN-ST. JEOR: SCORE: 1700.47

## 2019-12-19 NOTE — LETTER
12/19/2019    Mg Nelson MD  [address]  [facility] [address]    RE: Bairon DOWNS Cristian       Dear Colleague,    I had the pleasure of seeing Bairon Foster in the [facility].        STRUCTURAL HEART CARE   Post WATCHMAN     Primary Cardiologist: Dr. Hope    HPI   Mr. Foster presents for pre-operative H&P accompanied by his wife and granddaughter in preparation for elective Watchman on 12/03/2019 at [facility].     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     He has a last standing history of paroxysmal atrial fibrillation for which he was anticoagulated with Eliquis. Given his frequent fall risk and epistaxis, the Eliquis was discontinued.  Unfortunately, given his history of heart failure and previous CVA, he has a CHADSVASC of 5 putting him at a 7% or higher risk for recurrent CVA. His HAS-BLED score is 5 putting him at high risk of major bleeding. He was referred to Dr. Hope in the structural heart clinic for further evaluation of a potential watchman device.  It was felt he is a good candidate for the Watchman device, given the above factors.     He underwent a CT coronary angiogram to assess the size of his left atrial appendage which measures 30.5 mm x 28.5 mm.  The left atrial appendage depth is 38.2 mm.  Dr. Hope reviewed the CTA and deemed this patient a good candidate for the watchman device.      Alfa ultimately underwent a watchman procedure on December 3, 2019 utilizing a 33 mm watchman device with 15 to 17% compression of the device.  There was no leak identified.  Postoperative day 1 echocardiogram showed no pericardial effusion.  There were no complications  and the patient was discharged on Eliquis.    Today presents the cardiology clinic following his watchman device.  He is doing well without symptoms of shortness of breath or chest discomfort.  His weight has increased 11 pounds since her last visit.  He tells me he has scrotal edema and lower extremity edema.  He tells me he reduced his Bumex to 1 mg in the morning and 0.5 mg in the afternoon.  Since the change in the dosage, he has noticed the increase in his weight.  He denies orthopnea and PND.  His blood pressure today is well controlled at 124/27.    Assessment and Plan     1.  Paroxysmal atrial fibrillation.  Patient has long-standing history of paroxysmal atrial fibrillation.  He has a chads vas score of 5 and has bled score of 5. He is now status post a 33 mm watchman device implantation.  His postoperative echocardiogram looked good without evidence of pericardial effusion.  He continues on aspirin and Eliquis.  45 days after the watchman, the patient will undergo a CHELLE to evaluate if the device is well-seated.  I have discussed the risks and benefits of the CHELLE with the patient.     Patient has no history of esophageal stricture, odynphagia, dysphagia, airway problems, or medication allergies. All risks and benefits for transesophageal echocardiogram have been explained to the patient.  This includes but is not limited to damage to the oral cavity, esophageal perforation, GI bleeding, pharyngeal hematoma, transient bronchospasm, transient hypoxia, arrhthymias (NSVT, transient atrial fibrillation), vomiting, hemoptysis, and complications from anesthesia. Patient understands and wishes to proceed with it. A formal consent form will be signed by the procedural physician.    I will have the CHELLE reviewed by Dr. Hope.  If the device is well-seated, we will discontinue Eliquis and initiate Plavix for approximately 4-1/2 month or up to 6 months postoperatively.    2. Systolic and diastolic heart failure  secondary to valvular heart disease.  He is status post mitral clip in May 2019.  Most recent echocardiogram showed mild mitral regurgitation as well as moderate to severe tricuspid regurgitation.  Unfortunately, his weight is up 11 pounds since I last saw him in clinic.  He has significant lower extremity edema in addition to scrotal edema.  He self decreased his Bumex dose from 1 mg  and half a milligram in the afternoon.  I have asked him to resume his previous dose of Bumex at 1 mg in the morning and 1 mg I have asked him to weigh himself daily and keep a daily log.  I would like him to call with his daily weights in 1 to 2 weeks.  Should he gain 2 pounds in a day or 5 pounds in a week I would like him to call the team for nurses.  I have asked him to follow-up with Chel in 2 weeks with a basic metabolic panel.  I have also reinforced a low-sodium diet.    3. Coronary artery disease. He denies symptoms concerning for ischemia. Continue good medical management.     4. Lymphedema. Bilateral ACE wraps on lower extremities.       Sofiya Burton, WILLIS, CNP  11/22/2019    Current Medications:  Current Outpatient Medications   Medication Sig Dispense Refill     acetaminophen (TYLENOL) 500 MG tablet Take 2 tablets (1,000 mg) by mouth every 6 hours as needed for mild pain 100 tablet 0     apixaban ANTICOAGULANT (ELIQUIS) 5 MG tablet Take 1 tablet (5 mg) by mouth 2 times daily 30 tablet 1     aspirin 81 MG EC tablet Take 81 mg by mouth daily        bumetanide (BUMEX) 1 MG tablet Take 1 tablet (1 mg) by mouth 2 times daily       buPROPion (WELLBUTRIN XL) 150 MG 24 hr tablet Take 150 mg by mouth every morning       Carboxymethylcellulose Sod PF (CELLUVISC/REFRESH LIQUIGEL) 1 % ophthalmic solution Place 2 drops into both eyes daily as needed for dry eyes        carvedilol (COREG) 6.25 MG tablet TAKE 1 TABLET TWICE DAILY WITH MEALS 180 tablet 2     clobetasol (TEMOVATE) 0.05 % external cream Apply topically twice a week AS  NEEDED       diclofenac (VOLTAREN) 1 % topical gel Apply 2 g topically 4 times daily (Patient taking differently: Apply 2 g topically twice a week AS NEEDED)       finasteride (PROSCAR) 5 MG tablet TAKE 1 TABLET EVERY DAY 90 tablet 3     glucosamine-chondroitin 500-400 MG CAPS per capsule Take 2 capsules by mouth daily AS NEEDED       levothyroxine (SYNTHROID/LEVOTHROID) 100 MCG tablet TAKE 1 TABLET EVERY DAY 90 tablet 2     Multiple Vitamins-Minerals (CENTRUM SILVER) per tablet Take 1 tablet by mouth daily       niacinamide (NIACIN) 500 MG tablet TAKE ONE TABLET BY MOUTH TWICE A DAY       omeprazole (PRILOSEC) 20 MG DR capsule TAKE 1 CAPSULE TWICE DAILY 180 capsule 3     ORDER FOR DME Auto-CPAP:Max 9 cm H2OMin 9 cm H2O  Continuous Lifetime need and heated humidity.    1 Device 0     Polyethylene Glycol 3350 (MIRALAX PO) Take 1 packet by mouth At Bedtime        potassium chloride ER (K-DUR/KLOR-CON M) 20 MEQ CR tablet Take 2 tablets (40 mEq) by mouth 2 times daily 90 tablet 3     simvastatin (ZOCOR) 20 MG tablet Take 20 mg by mouth daily        sucralfate (CARAFATE) 1 GM tablet TAKE 1 TABLET TWICE DAILY 180 tablet 3     tamsulosin (FLOMAX) 0.4 MG capsule TAKE 1 CAPSULE EVERY DAY (Patient taking differently: TAKE 1 CAPSULE EVERY EVENING) 90 capsule 3     triamcinolone (KENALOG) 0.025 % cream Apply topically 2 times daily as needed        ACE/ARB/ARNI NOT PRESCRIBED (INTENTIONAL) Please choose reason not prescribed, below (Patient not taking: Reported on 12/19/2019)       COMPRESSION STOCKINGS 1 each daily 2 each 0     ketotifen (ZADITOR) 0.025 % SOLN ophthalmic solution Place 1 drop into both eyes every 12 hours (Patient not taking: Reported on 12/19/2019) 1 Bottle        Allergies:     Allergies   Allergen Reactions     Furosemide Blisters     Bullous pemphigoid. Bumex and ethacrynic acid okay.     Torsemide Blisters     Bullous pemphigoid.  Bumex and ethacrynic acid okay.       Past Medical History:  Past Medical  History:   Diagnosis Date     Acute, but ill-defined, cerebrovascular disease     NO RESIDUALS     Antiplatelet or antithrombotic long-term use     Hisotry of falls and severe epistaxis     Arrhythmia     a fib     Bullous pemphigoid 8/21/2019     CKD (chronic kidney disease) stage 3, GFR 30-59 ml/min (H)      Coronary atherosclerosis of unspecified type of vessel, native or graft     S/P PTCA, EF 50%     Epistaxis      Esophageal reflux      GI bleed 4/17/2019     Hypercholesterolemia      Ischemic cardiomyopathy      Lymphedema of both lower extremities     Wears ACE wraps     Mitral regurgitation     s/p MitraClip 5/20/19     Obese      JIM on CPAP      Other lymphedema     GROIN AND THIGHS     Other pancytopenia (H)      Other specified anemias      Overweight      Pacemaker     PACEMAKER-BS Implanted 8/5/2009      PAD (peripheral artery disease) (H)      Pancytopenia (H)      Persistent atrial fibrillation     VVI PM for long pauses     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    Closure with a 33 mm Watchman device      Pulmonary hypertension (H)      Status post coronary angiogram 3/21/2018     Systolic CHF (H)      TIA (transient ischemic attack)      Unspecified hypothyroidism      Venous stasis dermatitis of both lower extremities      Vitamin D deficiency        Past Surgical History:  Past Surgical History:   Procedure Laterality Date     BONE MARROW BIOPSY, BONE SPECIMEN, NEEDLE/TROCAR N/A 4/19/2019    Procedure: BONE MARROW BIOPSY;  Surgeon: Yovani Cooley MD;  Location:  OR     CV HEART CATHETERIZATION WITH POSSIBLE INTERVENTION N/A 4/12/2019    Procedure: Heart Catheterization with possible Intervention;  Surgeon: Lev Beck MD;  Location:  HEART CARDIAC CATH LAB     CV LEFT ATRIAL APPENDAGE CLOSURE N/A 12/3/2019    Procedure: Left Atrial Appendage Closure;  Surgeon: Segundo Hope MD;  Location:  HEART CARDIAC CATH LAB     CV MITRACLIP  N/A 5/20/2019    Procedure: CV MITRAL CLIP;  Surgeon: Harshil Winter MD;  Location:  HEART CARDIAC CATH LAB     CV RIGHT HEART CATH N/A 4/12/2019    Procedure: Right Heart Cath;  Surgeon: Lev Beck MD;  Location:  HEART CARDIAC CATH LAB     EYE SURGERY      CATARACT/BLEPHAROPLASTY     GENITOURINARY SURGERY      TUNA     HERNIA REPAIR      RIGHT INGUINAL     HERNIORRHAPHY INGUINAL  8/14/2012    Procedure: HERNIORRHAPHY INGUINAL;  right inguinal hernia repair;  Surgeon: Kareem Torrez MD;  Location: [facility]     HERNIORRHAPHY UMBILICAL N/A 10/31/2017    Procedure: HERNIORRHAPHY UMBILICAL;  UMBILICAL HERNIA REPAIR WITH MESH;  Surgeon: Scar Harrington MD;  Location: [facility]     IMPLANT PACEMAKER  8/2009    single chamber     ORTHOPEDIC SURGERY      [facility] RIGHT     ORTHOPEDIC SURGERY  2010    right TKR, left TKR     SURGICAL HISTORY OF -       Thumb surgery     SURGICAL HISTORY OF -   1990s    Left total knee replacement     SURGICAL HISTORY OF -   3/11    Rig total knee replacement       Family History:  Family History   Problem Relation Age of Onset     Cardiovascular Father      C.A.D. Father      Lung Cancer Sister      Unknown/Adopted Mother      Cancer - colorectal No family hx of        Social History:  Social History     Socioeconomic History     Marital status:      Spouse name: None     Number of children: None     Years of education: None     Highest education level: None   Occupational History     Occupation: Teacher, author, speaker     Employer: RETIRED   Social Needs     Financial resource strain: None     Food insecurity:     Worry: None     Inability: None     Transportation needs:     Medical: None     Non-medical: None   Tobacco Use     Smoking status: Never Smoker     Smokeless tobacco: Never Used   Substance and Sexual Activity     Alcohol use: Not Currently     Alcohol/week: 0.0 standard drinks     Comment: 1/month     Drug use: No     Sexual activity: Not Currently     Partners:  "Female   Lifestyle     Physical activity:     Days per week: None     Minutes per session: None     Stress: None   Relationships     Social connections:     Talks on phone: None     Gets together: None     Attends Jain service: None     Active member of club or organization: None     Attends meetings of clubs or organizations: None     Relationship status: None     Intimate partner violence:     Fear of current or ex partner: None     Emotionally abused: None     Physically abused: None     Forced sexual activity: None   Other Topics Concern     Parent/sibling w/ CABG, MI or angioplasty before 65F 55M? No      Service Not Asked     Blood Transfusions Not Asked     Caffeine Concern No     Comment: 1-2 cups coffee a day     Occupational Exposure Not Asked     Hobby Hazards Not Asked     Sleep Concern No     Stress Concern Yes     Comment: due to wifes health condition     Weight Concern No     Special Diet No     Back Care Not Asked     Exercise Yes     Comment: states he uses his tredmill on and off     Bike Helmet Not Asked     Seat Belt Yes     Self-Exams Not Asked   Social History Narrative     None       Review of Systems:  Skin:  Positive for bruising     Eyes:  Positive for glasses cataract surgery on both eyes  ENT:  Positive for epistaxis have decreased  Respiratory:  Positive for sleep apnea;CPAP;dyspnea on exertion     Cardiovascular:    Positive for;edema    Gastroenterology: Negative      Genitourinary:  Positive for   testicular edema  Musculoskeletal:  Positive for muscular weakness;arthritis    Neurologic:  Positive for stroke    Psychiatric:  Positive for anxiety;excessive stress    Heme/Lymph/Imm:  Negative      Endocrine:  Positive for thyroid disorder        Physical Exam:  Vitals: /70   Pulse 52   Ht 1.88 m (6' 2\")   Wt 96.1 kg (211 lb 12.8 oz)   BMI 27.19 kg/m       Constitutional:  cooperative;in no acute distress frail      Skin:  warm and dry to the touch bruises present " pacemaker incision in the left infraclavicular area was well-healed      Head:  normocephalic        Eyes:  pupils equal and round;sclera white        Lymph:      ENT:  no pallor or cyanosis        Neck:  JVP normal JVP elevated Large V wave consistent with tricuspid regurgitation    Respiratory:  normal symmetry;no tenderness to palpation;normal respiratory excursion;no intercostal retraction;no use of accessory muscles;clear to auscultation         Cardiac: regular rhythm;normal S1 and S2       systolic murmur;LLSB;grade 2     Paced  pulses full and equal venous stasis changes bilaterally   right radial artery;2+             left radial artery;2+                    GI:  abdomen soft;non-tender   benign    Extremities and Muscular Skeletal:  no deformities, clubbing, cyanosis, erythema observed stasis pigmentation bilateral LE edema;1+;2+     feet and legs wrapped, scrotal edema    Neurological:  affect appropriate;no gross motor deficits        Psych:  Alert and Oriented x 3    Encounter Diagnoses   Name Primary?     PAF (paroxysmal atrial fibrillation) (H)      S/P left atrial appendage ligation Yes         Laboratory Studies:  LIPID RESULTS:  Lab Results   Component Value Date    CHOL 97 08/21/2019    HDL 41 08/21/2019    LDL 48 08/21/2019    TRIG 41 08/21/2019    CHOLHDLRATIO 2.1 06/15/2015       LIVER ENZYME RESULTS:  Lab Results   Component Value Date    AST 30 12/02/2019    ALT 24 12/02/2019       CBC RESULTS:  Lab Results   Component Value Date    WBC 4.3 12/04/2019    RBC 2.65 (L) 12/04/2019    HGB 8.4 (L) 12/04/2019    HCT 26.8 (L) 12/04/2019     (H) 12/04/2019    MCH 31.7 12/04/2019    MCHC 31.3 (L) 12/04/2019    RDW 19.3 (H) 12/04/2019    PLT 69 (L) 12/04/2019       BMP RESULTS:  Lab Results   Component Value Date     12/19/2019    POTASSIUM 3.4 12/19/2019    CHLORIDE 107 12/19/2019    CO2 33 (H) 12/19/2019    ANIONGAP 2 (L) 12/19/2019     (H) 12/19/2019    BUN 22 12/19/2019    CR  1.03 12/19/2019    GFRESTIMATED 64 12/19/2019    GFRESTBLACK 74 12/19/2019    TEODORO 9.3 12/19/2019        A1C RESULTS:  Lab Results   Component Value Date    A1C 5.5 05/29/2019       INR RESULTS:  Lab Results   Component Value Date    INR 1.28 (H) 12/04/2019    INR 1.44 (H) 05/20/2019             Thank you for allowing me to participate in the care of your patient.      Sincerely,     [facility]    cc:   Segundo Hope MD  [address]  [place] [phone]

## 2019-12-19 NOTE — PROGRESS NOTES
STRUCTURAL HEART CARE   Post WATCHMAN     Primary Cardiologist: Dr. Hope    HPI   Mr. Foster presents for pre-operative H&P accompanied by his wife and granddaughter in preparation for elective Watchman on 12/03/2019 at [facility].     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     He has a last standing history of paroxysmal atrial fibrillation for which he was anticoagulated with Eliquis. Given his frequent fall risk and epistaxis, the Eliquis was discontinued.  Unfortunately, given his history of heart failure and previous CVA, he has a CHADSVASC of 5 putting him at a 7% or higher risk for recurrent CVA. His HAS-BLED score is 5 putting him at high risk of major bleeding. He was referred to Dr. Hope in the structural heart clinic for further evaluation of a potential watchman device.  It was felt he is a good candidate for the Watchman device, given the above factors.     He underwent a CT coronary angiogram to assess the size of his left atrial appendage which measures 30.5 mm x 28.5 mm.  The left atrial appendage depth is 38.2 mm.  Dr. Hope reviewed the CTA and deemed this patient a good candidate for the watchman device.      Alfa ultimately underwent a watchman procedure on December 3, 2019 utilizing a 33 mm watchman device with 15 to 17% compression of the device.  There was no leak identified.  Postoperative day 1 echocardiogram showed no pericardial effusion.  There were no complications and the patient was discharged on Eliquis.    Today presents the cardiology clinic following his watchman device.  He is doing well without symptoms of shortness of breath or chest discomfort.  His weight has increased 11 pounds  since her last visit.  He tells me he has scrotal edema and lower extremity edema.  He tells me he reduced his Bumex to 1 mg in the morning and 0.5 mg in the afternoon.  Since the change in the dosage, he has noticed the increase in his weight.  He denies orthopnea and PND.  His blood pressure today is well controlled at 124/27.    Assessment and Plan     1.  Paroxysmal atrial fibrillation.  Patient has long-standing history of paroxysmal atrial fibrillation.  He has a chads vas score of 5 and has bled score of 5. He is now status post a 33 mm watchman device implantation.  His postoperative echocardiogram looked good without evidence of pericardial effusion.  He continues on aspirin and Eliquis.  45 days after the watchman, the patient will undergo a CHELLE to evaluate if the device is well-seated.  I have discussed the risks and benefits of the CHELLE with the patient.     Patient has no history of esophageal stricture, odynphagia, dysphagia, airway problems, or medication allergies. All risks and benefits for transesophageal echocardiogram have been explained to the patient.  This includes but is not limited to damage to the oral cavity, esophageal perforation, GI bleeding, pharyngeal hematoma, transient bronchospasm, transient hypoxia, arrhthymias (NSVT, transient atrial fibrillation), vomiting, hemoptysis, and complications from anesthesia. Patient understands and wishes to proceed with it. A formal consent form will be signed by the procedural physician.    I will have the CHELLE reviewed by Dr. Hope.  If the device is well-seated, we will discontinue Eliquis and initiate Plavix for approximately 4-1/2 month or up to 6 months postoperatively.    2. 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  He is status post mitral clip in May 2019.  Most recent echocardiogram showed mild mitral regurgitation as well as moderate to severe tricuspid regurgitation.  Unfortunately, his weight is up 11  pounds since I last saw him in clinic.  He has significant lower extremity edema in addition to scrotal edema.  He self decreased his Bumex dose from 1 mg  and half a milligram in the afternoon.  I have asked him to resume his previous dose of Bumex at 1 mg in the morning and 1 mg I have asked him to weigh himself daily and keep a daily log.  I would like him to call with his daily weights in 1 to 2 weeks.  Should he gain 2 pounds in a day or 5 pounds in a week I would like him to call the team for nurses.  I have asked him to follow-up with Chel in 2 weeks with a basic metabolic panel.  I have also reinforced a low-sodium diet.    3. Coronary artery disease. He denies symptoms concerning for ischemia. Continue good medical management.     4. Lymphedema. Bilateral ACE wraps on lower extremities.       WILLIS Rodriguez, CNP  11/22/2019    Current Medications:  Current Outpatient Medications   Medication Sig Dispense Refill     acetaminophen (TYLENOL) 500 MG tablet Take 2 tablets (1,000 mg) by mouth every 6 hours as needed for mild pain 100 tablet 0     apixaban ANTICOAGULANT (ELIQUIS) 5 MG tablet Take 1 tablet (5 mg) by mouth 2 times daily 30 tablet 1     aspirin 81 MG EC tablet Take 81 mg by mouth daily        bumetanide (BUMEX) 1 MG tablet Take 1 tablet (1 mg) by mouth 2 times daily       buPROPion (WELLBUTRIN XL) 150 MG 24 hr tablet Take 150 mg by mouth every morning       Carboxymethylcellulose Sod PF (CELLUVISC/REFRESH LIQUIGEL) 1 % ophthalmic solution Place 2 drops into both eyes daily as needed for dry eyes        carvedilol (COREG) 6.25 MG tablet TAKE 1 TABLET TWICE DAILY WITH MEALS 180 tablet 2     clobetasol (TEMOVATE) 0.05 % external cream Apply topically twice a week AS NEEDED       diclofenac (VOLTAREN) 1 % topical gel Apply 2 g topically 4 times daily (Patient taking differently: Apply 2 g topically twice a week AS NEEDED)       finasteride (PROSCAR) 5 MG tablet TAKE 1 TABLET EVERY DAY 90 tablet 3      glucosamine-chondroitin 500-400 MG CAPS per capsule Take 2 capsules by mouth daily AS NEEDED       levothyroxine (SYNTHROID/LEVOTHROID) 100 MCG tablet TAKE 1 TABLET EVERY DAY 90 tablet 2     Multiple Vitamins-Minerals (CENTRUM SILVER) per tablet Take 1 tablet by mouth daily       niacinamide (NIACIN) 500 MG tablet TAKE ONE TABLET BY MOUTH TWICE A DAY       omeprazole (PRILOSEC) 20 MG DR capsule TAKE 1 CAPSULE TWICE DAILY 180 capsule 3     ORDER FOR DME Auto-CPAP:Max 9 cm H2OMin 9 cm H2O  Continuous Lifetime need and heated humidity.    1 Device 0     Polyethylene Glycol 3350 (MIRALAX PO) Take 1 packet by mouth At Bedtime        potassium chloride ER (K-DUR/KLOR-CON M) 20 MEQ CR tablet Take 2 tablets (40 mEq) by mouth 2 times daily 90 tablet 3     simvastatin (ZOCOR) 20 MG tablet Take 20 mg by mouth daily        sucralfate (CARAFATE) 1 GM tablet TAKE 1 TABLET TWICE DAILY 180 tablet 3     tamsulosin (FLOMAX) 0.4 MG capsule TAKE 1 CAPSULE EVERY DAY (Patient taking differently: TAKE 1 CAPSULE EVERY EVENING) 90 capsule 3     triamcinolone (KENALOG) 0.025 % cream Apply topically 2 times daily as needed        ACE/ARB/ARNI NOT PRESCRIBED (INTENTIONAL) Please choose reason not prescribed, below (Patient not taking: Reported on 12/19/2019)       COMPRESSION STOCKINGS 1 each daily 2 each 0     ketotifen (ZADITOR) 0.025 % SOLN ophthalmic solution Place 1 drop into both eyes every 12 hours (Patient not taking: Reported on 12/19/2019) 1 Bottle        Allergies:     Allergies   Allergen Reactions     Furosemide Blisters     Bullous pemphigoid. Bumex and ethacrynic acid okay.     Torsemide Blisters     Bullous pemphigoid.  Bumex and ethacrynic acid okay.       Past Medical History:  Past Medical History:   Diagnosis Date     Acute, but ill-defined, cerebrovascular disease     NO RESIDUALS     Antiplatelet or antithrombotic long-term use     Hisotry of falls and severe epistaxis     Arrhythmia     a fib     Bullous pemphigoid  8/21/2019     CKD (chronic kidney disease) stage 3, GFR 30-59 ml/min (H)      Coronary atherosclerosis of unspecified type of vessel, native or graft     S/P PTCA, EF 50%     Epistaxis      Esophageal reflux      GI bleed 4/17/2019     Hypercholesterolemia      Ischemic cardiomyopathy      Lymphedema of both lower extremities     Wears ACE wraps     Mitral regurgitation     s/p MitraClip 5/20/19     Obese      JIM on CPAP      Other lymphedema     GROIN AND THIGHS     Other pancytopenia (H)      Other specified anemias      Overweight      Pacemaker     PACEMAKER-BS Implanted 8/5/2009      PAD (peripheral artery disease) (H)      Pancytopenia (H)      Persistent atrial fibrillation     VVI PM for long pauses     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    Closure with a 33 mm Watchman device      Pulmonary hypertension (H)      Status post coronary angiogram 3/21/2018     Systolic CHF (H)      TIA (transient ischemic attack)      Unspecified hypothyroidism      Venous stasis dermatitis of both lower extremities      Vitamin D deficiency        Past Surgical History:  Past Surgical History:   Procedure Laterality Date     BONE MARROW BIOPSY, BONE SPECIMEN, NEEDLE/TROCAR N/A 4/19/2019    Procedure: BONE MARROW BIOPSY;  Surgeon: Yovani Cooley MD;  Location:  OR     CV HEART CATHETERIZATION WITH POSSIBLE INTERVENTION N/A 4/12/2019    Procedure: Heart Catheterization with possible Intervention;  Surgeon: Lev Beck MD;  Location:  HEART CARDIAC CATH LAB     CV LEFT ATRIAL APPENDAGE CLOSURE N/A 12/3/2019    Procedure: Left Atrial Appendage Closure;  Surgeon: Segundo Hope MD;  Location:  HEART CARDIAC CATH LAB     CV MITRACLIP N/A 5/20/2019    Procedure: CV MITRAL CLIP;  Surgeon: Harshil Winter MD;  Location:  HEART CARDIAC CATH LAB     CV RIGHT HEART CATH N/A 4/12/2019    Procedure: Right Heart Cath;  Surgeon: Lev Beck MD;  Location:   HEART CARDIAC CATH LAB     EYE SURGERY      CATARACT/BLEPHAROPLASTY     GENITOURINARY SURGERY      TUNA     HERNIA REPAIR      RIGHT INGUINAL     HERNIORRHAPHY INGUINAL  8/14/2012    Procedure: HERNIORRHAPHY INGUINAL;  right inguinal hernia repair;  Surgeon: Kareem Torrez MD;  Location: [facility]     HERNIORRHAPHY UMBILICAL N/A 10/31/2017    Procedure: HERNIORRHAPHY UMBILICAL;  UMBILICAL HERNIA REPAIR WITH MESH;  Surgeon: Scar Harrington MD;  Location: [facility]     IMPLANT PACEMAKER  8/2009    single chamber     ORTHOPEDIC SURGERY      [facility] RIGHT     ORTHOPEDIC SURGERY  2010    right TKR, left TKR     SURGICAL HISTORY OF -       Thumb surgery     SURGICAL HISTORY OF -   1990s    Left total knee replacement     SURGICAL HISTORY OF -   3/11    [facility] [place] total knee replacement       Family History:  Family History   Problem Relation Age of Onset     Cardiovascular Father      C.A.D. Father      Lung Cancer Sister      Unknown/Adopted Mother      Cancer - colorectal No family hx of        Social History:  Social History     Socioeconomic History     Marital status:      Spouse name: None     Number of children: None     Years of education: None     Highest education level: None   Occupational History     Occupation: Teacher, author, speaker     Employer: RETIRED   Social Needs     Financial resource strain: None     Food insecurity:     Worry: None     Inability: None     Transportation needs:     Medical: None     Non-medical: None   Tobacco Use     Smoking status: Never Smoker     Smokeless tobacco: Never Used   Substance and Sexual Activity     Alcohol use: Not Currently     Alcohol/week: 0.0 standard drinks     Comment: 1/month     Drug use: No     Sexual activity: Not Currently     Partners: Female   Lifestyle     Physical activity:     Days per week: None     Minutes per session: None     Stress: None   Relationships     Social connections:     Talks on phone: None     Gets together: None     Attends Judaism service:  "None     Active member of club or organization: None     Attends meetings of clubs or organizations: None     Relationship status: None     Intimate partner violence:     Fear of current or ex partner: None     Emotionally abused: None     Physically abused: None     Forced sexual activity: None   Other Topics Concern     Parent/sibling w/ CABG, MI or angioplasty before 65F 55M? No      Service Not Asked     Blood Transfusions Not Asked     Caffeine Concern No     Comment: 1-2 cups coffee a day     Occupational Exposure Not Asked     Hobby Hazards Not Asked     Sleep Concern No     Stress Concern Yes     Comment: due to wifes health condition     Weight Concern No     Special Diet No     Back Care Not Asked     Exercise Yes     Comment: states he uses his tredmill on and off     Bike Helmet Not Asked     Seat Belt Yes     Self-Exams Not Asked   Social History Narrative     None       Review of Systems:  Skin:  Positive for bruising     Eyes:  Positive for glasses cataract surgery on both eyes  ENT:  Positive for epistaxis have decreased  Respiratory:  Positive for sleep apnea;CPAP;dyspnea on exertion     Cardiovascular:    Positive for;edema    Gastroenterology: Negative      Genitourinary:  Positive for   testicular edema  Musculoskeletal:  Positive for muscular weakness;arthritis    Neurologic:  Positive for stroke    Psychiatric:  Positive for anxiety;excessive stress    Heme/Lymph/Imm:  Negative      Endocrine:  Positive for thyroid disorder        Physical Exam:  Vitals: /70   Pulse 52   Ht 1.88 m (6' 2\")   Wt 96.1 kg (211 lb 12.8 oz)   BMI 27.19 kg/m      Constitutional:  cooperative;in no acute distress frail      Skin:  warm and dry to the touch bruises present pacemaker incision in the left infraclavicular area was well-healed      Head:  normocephalic        Eyes:  pupils equal and round;sclera white        Lymph:      ENT:  no pallor or cyanosis        Neck:  JVP normal JVP elevated " Large V wave consistent with tricuspid regurgitation    Respiratory:  normal symmetry;no tenderness to palpation;normal respiratory excursion;no intercostal retraction;no use of accessory muscles;clear to auscultation         Cardiac: regular rhythm;normal S1 and S2       systolic murmur;LLSB;grade 2     Paced  pulses full and equal venous stasis changes bilaterally   right radial artery;2+             left radial artery;2+                    GI:  abdomen soft;non-tender   benign    Extremities and Muscular Skeletal:  no deformities, clubbing, cyanosis, erythema observed stasis pigmentation bilateral LE edema;1+;2+     feet and legs wrapped, scrotal edema    Neurological:  affect appropriate;no gross motor deficits        Psych:  Alert and Oriented x 3    Encounter Diagnoses   Name Primary?     PAF (paroxysmal atrial fibrillation) (H)      S/P left atrial appendage ligation Yes         Laboratory Studies:  LIPID RESULTS:  Lab Results   Component Value Date    CHOL 97 08/21/2019    HDL 41 08/21/2019    LDL 48 08/21/2019    TRIG 41 08/21/2019    CHOLHDLRATIO 2.1 06/15/2015       LIVER ENZYME RESULTS:  Lab Results   Component Value Date    AST 30 12/02/2019    ALT 24 12/02/2019       CBC RESULTS:  Lab Results   Component Value Date    WBC 4.3 12/04/2019    RBC 2.65 (L) 12/04/2019    HGB 8.4 (L) 12/04/2019    HCT 26.8 (L) 12/04/2019     (H) 12/04/2019    MCH 31.7 12/04/2019    MCHC 31.3 (L) 12/04/2019    RDW 19.3 (H) 12/04/2019    PLT 69 (L) 12/04/2019       BMP RESULTS:  Lab Results   Component Value Date     12/19/2019    POTASSIUM 3.4 12/19/2019    CHLORIDE 107 12/19/2019    CO2 33 (H) 12/19/2019    ANIONGAP 2 (L) 12/19/2019     (H) 12/19/2019    BUN 22 12/19/2019    CR 1.03 12/19/2019    GFRESTIMATED 64 12/19/2019    GFRESTBLACK 74 12/19/2019    TEODORO 9.3 12/19/2019        A1C RESULTS:  Lab Results   Component Value Date    A1C 5.5 05/29/2019       INR RESULTS:  Lab Results   Component Value Date     INR 1.28 (H) 12/04/2019    INR 1.44 (H) 05/20/2019

## 2019-12-19 NOTE — LETTER
12/19/2019    Mg Nelson MD  [address]  [facility] [address]    RE: Bairon DOWNS Cristian       Dear Colleague,    I had the pleasure of seeing Bairon Foster in the [facility].        STRUCTURAL HEART CARE   Post WATCHMAN     Primary Cardiologist: Dr. Hope    HPI   Mr. Foster presents for pre-operative H&P accompanied by his wife and granddaughter in preparation for elective Watchman on 12/03/2019 at [facility].     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     He has a last standing history of paroxysmal atrial fibrillation for which he was anticoagulated with Eliquis. Given his frequent fall risk and epistaxis, the Eliquis was discontinued.  Unfortunately, given his history of heart failure and previous CVA, he has a CHADSVASC of 5 putting him at a 7% or higher risk for recurrent CVA. His HAS-BLED score is 5 putting him at high risk of major bleeding. He was referred to Dr. Hope in the structural heart clinic for further evaluation of a potential watchman device.  It was felt he is a good candidate for the Watchman device, given the above factors.     He underwent a CT coronary angiogram to assess the size of his left atrial appendage which measures 30.5 mm x 28.5 mm.  The left atrial appendage depth is 38.2 mm.  Dr. Hope reviewed the CTA and deemed this patient a good candidate for the watchman device.      Alfa ultimately underwent a watchman procedure on December 3, 2019 utilizing a 33 mm watchman device with 15 to 17% compression of the device.  There was no leak identified.  Postoperative day 1 echocardiogram showed no pericardial effusion.  There were no complications  and the patient was discharged on Eliquis.    Today presents the cardiology clinic following his watchman device.  He is doing well without symptoms of shortness of breath or chest discomfort.  His weight has increased 11 pounds since her last visit.  He tells me he has scrotal edema and lower extremity edema.  He tells me he reduced his Bumex to 1 mg in the morning and 0.5 mg in the afternoon.  Since the change in the dosage, he has noticed the increase in his weight.  He denies orthopnea and PND.  His blood pressure today is well controlled at 124/27.    Assessment and Plan     1.  Paroxysmal atrial fibrillation.  Patient has long-standing history of paroxysmal atrial fibrillation.  He has a chads vas score of 5 and has bled score of 5. He is now status post a 33 mm watchman device implantation.  His postoperative echocardiogram looked good without evidence of pericardial effusion.  He continues on aspirin and Eliquis.  45 days after the watchman, the patient will undergo a CHELLE to evaluate if the device is well-seated.  I have discussed the risks and benefits of the CHELLE with the patient.     Patient has no history of esophageal stricture, odynphagia, dysphagia, airway problems, or medication allergies. All risks and benefits for transesophageal echocardiogram have been explained to the patient.  This includes but is not limited to damage to the oral cavity, esophageal perforation, GI bleeding, pharyngeal hematoma, transient bronchospasm, transient hypoxia, arrhthymias (NSVT, transient atrial fibrillation), vomiting, hemoptysis, and complications from anesthesia. Patient understands and wishes to proceed with it. A formal consent form will be signed by the procedural physician.    I will have the CHELLE reviewed by Dr. Hope.  If the device is well-seated, we will discontinue Eliquis and initiate Plavix for approximately 4-1/2 month or up to 6 months postoperatively.    2. Systolic and diastolic heart failure  secondary to valvular heart disease.  He is status post mitral clip in May 2019.  Most recent echocardiogram showed mild mitral regurgitation as well as moderate to severe tricuspid regurgitation.  Unfortunately, his weight is up 11 pounds since I last saw him in clinic.  He has significant lower extremity edema in addition to scrotal edema.  He self decreased his Bumex dose from 1 mg  and half a milligram in the afternoon.  I have asked him to resume his previous dose of Bumex at 1 mg in the morning and 1 mg I have asked him to weigh himself daily and keep a daily log.  I would like him to call with his daily weights in 1 to 2 weeks.  Should he gain 2 pounds in a day or 5 pounds in a week I would like him to call the team for nurses.  I have asked him to follow-up with Chel in 2 weeks with a basic metabolic panel.  I have also reinforced a low-sodium diet.    3. Coronary artery disease. He denies symptoms concerning for ischemia. Continue good medical management.     4. Lymphedema. Bilateral ACE wraps on lower extremities.       Sofiya Burton, WILLIS, CNP  11/22/2019    Current Medications:  Current Outpatient Medications   Medication Sig Dispense Refill     acetaminophen (TYLENOL) 500 MG tablet Take 2 tablets (1,000 mg) by mouth every 6 hours as needed for mild pain 100 tablet 0     apixaban ANTICOAGULANT (ELIQUIS) 5 MG tablet Take 1 tablet (5 mg) by mouth 2 times daily 30 tablet 1     aspirin 81 MG EC tablet Take 81 mg by mouth daily        bumetanide (BUMEX) 1 MG tablet Take 1 tablet (1 mg) by mouth 2 times daily       buPROPion (WELLBUTRIN XL) 150 MG 24 hr tablet Take 150 mg by mouth every morning       Carboxymethylcellulose Sod PF (CELLUVISC/REFRESH LIQUIGEL) 1 % ophthalmic solution Place 2 drops into both eyes daily as needed for dry eyes        carvedilol (COREG) 6.25 MG tablet TAKE 1 TABLET TWICE DAILY WITH MEALS 180 tablet 2     clobetasol (TEMOVATE) 0.05 % external cream Apply topically twice a week AS  NEEDED       diclofenac (VOLTAREN) 1 % topical gel Apply 2 g topically 4 times daily (Patient taking differently: Apply 2 g topically twice a week AS NEEDED)       finasteride (PROSCAR) 5 MG tablet TAKE 1 TABLET EVERY DAY 90 tablet 3     glucosamine-chondroitin 500-400 MG CAPS per capsule Take 2 capsules by mouth daily AS NEEDED       levothyroxine (SYNTHROID/LEVOTHROID) 100 MCG tablet TAKE 1 TABLET EVERY DAY 90 tablet 2     Multiple Vitamins-Minerals (CENTRUM SILVER) per tablet Take 1 tablet by mouth daily       niacinamide (NIACIN) 500 MG tablet TAKE ONE TABLET BY MOUTH TWICE A DAY       omeprazole (PRILOSEC) 20 MG DR capsule TAKE 1 CAPSULE TWICE DAILY 180 capsule 3     ORDER FOR DME Auto-CPAP:Max 9 cm H2OMin 9 cm H2O  Continuous Lifetime need and heated humidity.    1 Device 0     Polyethylene Glycol 3350 (MIRALAX PO) Take 1 packet by mouth At Bedtime        potassium chloride ER (K-DUR/KLOR-CON M) 20 MEQ CR tablet Take 2 tablets (40 mEq) by mouth 2 times daily 90 tablet 3     simvastatin (ZOCOR) 20 MG tablet Take 20 mg by mouth daily        sucralfate (CARAFATE) 1 GM tablet TAKE 1 TABLET TWICE DAILY 180 tablet 3     tamsulosin (FLOMAX) 0.4 MG capsule TAKE 1 CAPSULE EVERY DAY (Patient taking differently: TAKE 1 CAPSULE EVERY EVENING) 90 capsule 3     triamcinolone (KENALOG) 0.025 % cream Apply topically 2 times daily as needed        ACE/ARB/ARNI NOT PRESCRIBED (INTENTIONAL) Please choose reason not prescribed, below (Patient not taking: Reported on 12/19/2019)       COMPRESSION STOCKINGS 1 each daily 2 each 0     ketotifen (ZADITOR) 0.025 % SOLN ophthalmic solution Place 1 drop into both eyes every 12 hours (Patient not taking: Reported on 12/19/2019) 1 Bottle        Allergies:     Allergies   Allergen Reactions     Furosemide Blisters     Bullous pemphigoid. Bumex and ethacrynic acid okay.     Torsemide Blisters     Bullous pemphigoid.  Bumex and ethacrynic acid okay.       Past Medical History:  Past Medical  History:   Diagnosis Date     Acute, but ill-defined, cerebrovascular disease     NO RESIDUALS     Antiplatelet or antithrombotic long-term use     Hisotry of falls and severe epistaxis     Arrhythmia     a fib     Bullous pemphigoid 8/21/2019     CKD (chronic kidney disease) stage 3, GFR 30-59 ml/min (H)      Coronary atherosclerosis of unspecified type of vessel, native or graft     S/P PTCA, EF 50%     Epistaxis      Esophageal reflux      GI bleed 4/17/2019     Hypercholesterolemia      Ischemic cardiomyopathy      Lymphedema of both lower extremities     Wears ACE wraps     Mitral regurgitation     s/p MitraClip 5/20/19     Obese      JIM on CPAP      Other lymphedema     GROIN AND THIGHS     Other pancytopenia (H)      Other specified anemias      Overweight      Pacemaker     PACEMAKER-BS Implanted 8/5/2009      PAD (peripheral artery disease) (H)      Pancytopenia (H)      Persistent atrial fibrillation     VVI PM for long pauses     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    Closure with a 33 mm Watchman device      Pulmonary hypertension (H)      Status post coronary angiogram 3/21/2018     Systolic CHF (H)      TIA (transient ischemic attack)      Unspecified hypothyroidism      Venous stasis dermatitis of both lower extremities      Vitamin D deficiency        Past Surgical History:  Past Surgical History:   Procedure Laterality Date     BONE MARROW BIOPSY, BONE SPECIMEN, NEEDLE/TROCAR N/A 4/19/2019    Procedure: BONE MARROW BIOPSY;  Surgeon: Yovani Cooley MD;  Location:  OR     CV HEART CATHETERIZATION WITH POSSIBLE INTERVENTION N/A 4/12/2019    Procedure: Heart Catheterization with possible Intervention;  Surgeon: Lev Beck MD;  Location:  HEART CARDIAC CATH LAB     CV LEFT ATRIAL APPENDAGE CLOSURE N/A 12/3/2019    Procedure: Left Atrial Appendage Closure;  Surgeon: Segundo Hope MD;  Location:  HEART CARDIAC CATH LAB     CV MITRACLIP  N/A 5/20/2019    Procedure: CV MITRAL CLIP;  Surgeon: Harshil Winter MD;  Location:  HEART CARDIAC CATH LAB     CV RIGHT HEART CATH N/A 4/12/2019    Procedure: Right Heart Cath;  Surgeon: Lev Beck MD;  Location:  HEART CARDIAC CATH LAB     EYE SURGERY      CATARACT/BLEPHAROPLASTY     GENITOURINARY SURGERY      TUNA     HERNIA REPAIR      RIGHT INGUINAL     HERNIORRHAPHY INGUINAL  8/14/2012    Procedure: HERNIORRHAPHY INGUINAL;  right inguinal hernia repair;  Surgeon: Kareem Torrez MD;  Location: [facility]     HERNIORRHAPHY UMBILICAL N/A 10/31/2017    Procedure: HERNIORRHAPHY UMBILICAL;  UMBILICAL HERNIA REPAIR WITH MESH;  Surgeon: Scar Harrington MD;  Location: [facility]     IMPLANT PACEMAKER  8/2009    single chamber     ORTHOPEDIC SURGERY      [facility] RIGHT     ORTHOPEDIC SURGERY  2010    right TKR, left TKR     SURGICAL HISTORY OF -       Thumb surgery     SURGICAL HISTORY OF -   1990s    Left total knee replacement     SURGICAL HISTORY OF -   3/11    Rig total knee replacement       Family History:  Family History   Problem Relation Age of Onset     Cardiovascular Father      C.A.D. Father      Lung Cancer Sister      Unknown/Adopted Mother      Cancer - colorectal No family hx of        Social History:  Social History     Socioeconomic History     Marital status:      Spouse name: None     Number of children: None     Years of education: None     Highest education level: None   Occupational History     Occupation: Teacher, author, speaker     Employer: RETIRED   Social Needs     Financial resource strain: None     Food insecurity:     Worry: None     Inability: None     Transportation needs:     Medical: None     Non-medical: None   Tobacco Use     Smoking status: Never Smoker     Smokeless tobacco: Never Used   Substance and Sexual Activity     Alcohol use: Not Currently     Alcohol/week: 0.0 standard drinks     Comment: 1/month     Drug use: No     Sexual activity: Not Currently     Partners:  "Female   Lifestyle     Physical activity:     Days per week: None     Minutes per session: None     Stress: None   Relationships     Social connections:     Talks on phone: None     Gets together: None     Attends Anabaptism service: None     Active member of club or organization: None     Attends meetings of clubs or organizations: None     Relationship status: None     Intimate partner violence:     Fear of current or ex partner: None     Emotionally abused: None     Physically abused: None     Forced sexual activity: None   Other Topics Concern     Parent/sibling w/ CABG, MI or angioplasty before 65F 55M? No      Service Not Asked     Blood Transfusions Not Asked     Caffeine Concern No     Comment: 1-2 cups coffee a day     Occupational Exposure Not Asked     Hobby Hazards Not Asked     Sleep Concern No     Stress Concern Yes     Comment: due to wifes health condition     Weight Concern No     Special Diet No     Back Care Not Asked     Exercise Yes     Comment: states he uses his tredmill on and off     Bike Helmet Not Asked     Seat Belt Yes     Self-Exams Not Asked   Social History Narrative     None       Review of Systems:  Skin:  Positive for bruising     Eyes:  Positive for glasses cataract surgery on both eyes  ENT:  Positive for epistaxis have decreased  Respiratory:  Positive for sleep apnea;CPAP;dyspnea on exertion     Cardiovascular:    Positive for;edema    Gastroenterology: Negative      Genitourinary:  Positive for   testicular edema  Musculoskeletal:  Positive for muscular weakness;arthritis    Neurologic:  Positive for stroke    Psychiatric:  Positive for anxiety;excessive stress    Heme/Lymph/Imm:  Negative      Endocrine:  Positive for thyroid disorder        Physical Exam:  Vitals: /70   Pulse 52   Ht 1.88 m (6' 2\")   Wt 96.1 kg (211 lb 12.8 oz)   BMI 27.19 kg/m       Constitutional:  cooperative;in no acute distress frail      Skin:  warm and dry to the touch bruises present " pacemaker incision in the left infraclavicular area was well-healed      Head:  normocephalic        Eyes:  pupils equal and round;sclera white        Lymph:      ENT:  no pallor or cyanosis        Neck:  JVP normal JVP elevated Large V wave consistent with tricuspid regurgitation    Respiratory:  normal symmetry;no tenderness to palpation;normal respiratory excursion;no intercostal retraction;no use of accessory muscles;clear to auscultation         Cardiac: regular rhythm;normal S1 and S2       systolic murmur;LLSB;grade 2     Paced  pulses full and equal venous stasis changes bilaterally   right radial artery;2+             left radial artery;2+                    GI:  abdomen soft;non-tender   benign    Extremities and Muscular Skeletal:  no deformities, clubbing, cyanosis, erythema observed stasis pigmentation bilateral LE edema;1+;2+     feet and legs wrapped, scrotal edema    Neurological:  affect appropriate;no gross motor deficits        Psych:  Alert and Oriented x 3    Encounter Diagnoses   Name Primary?     PAF (paroxysmal atrial fibrillation) (H)      S/P left atrial appendage ligation Yes         Laboratory Studies:  LIPID RESULTS:  Lab Results   Component Value Date    CHOL 97 08/21/2019    HDL 41 08/21/2019    LDL 48 08/21/2019    TRIG 41 08/21/2019    CHOLHDLRATIO 2.1 06/15/2015       LIVER ENZYME RESULTS:  Lab Results   Component Value Date    AST 30 12/02/2019    ALT 24 12/02/2019       CBC RESULTS:  Lab Results   Component Value Date    WBC 4.3 12/04/2019    RBC 2.65 (L) 12/04/2019    HGB 8.4 (L) 12/04/2019    HCT 26.8 (L) 12/04/2019     (H) 12/04/2019    MCH 31.7 12/04/2019    MCHC 31.3 (L) 12/04/2019    RDW 19.3 (H) 12/04/2019    PLT 69 (L) 12/04/2019       BMP RESULTS:  Lab Results   Component Value Date     12/19/2019    POTASSIUM 3.4 12/19/2019    CHLORIDE 107 12/19/2019    CO2 33 (H) 12/19/2019    ANIONGAP 2 (L) 12/19/2019     (H) 12/19/2019    BUN 22 12/19/2019    CR  1.03 12/19/2019    GFRESTIMATED 64 12/19/2019    GFRESTBLACK 74 12/19/2019    TEODORO 9.3 12/19/2019        A1C RESULTS:  Lab Results   Component Value Date    A1C 5.5 05/29/2019       INR RESULTS:  Lab Results   Component Value Date    INR 1.28 (H) 12/04/2019    INR 1.44 (H) 05/20/2019       Thank you for allowing me to participate in the care of your patient.    Sincerely,     WILLIS Rodriguez [facility]

## 2019-12-19 NOTE — PATIENT INSTRUCTIONS
1. Increase Bumex to 1 mg in the morning and 1 mg in the afternoon    2. Keep a daily log of your weights. If you gain 2 pounds in a daily or 5 pounds in a week, call the team 4.     3. Continue low sodium diet     4. Call Team 4 nurses next week with your weights    5. Follow-up with ИВАН Reveles 1/10/20 at 10:00 am with labs before    6. CHELLE the week of January 17, 2020.

## 2019-12-23 ENCOUNTER — TELEPHONE (OUTPATIENT)
Dept: CARDIOLOGY | Facility: CLINIC | Age: 84
End: 2019-12-23

## 2019-12-23 NOTE — TELEPHONE ENCOUNTER
Patient called RN per KI Arriaza's recommendations and provided updates on his weights since resuming Bumex 1mg in the morning and 1mg in the evening (see weights below). Patient will continue to take bumex as rx and will call our office with  Wt. Gain >2lbs overnight. Patient scheduled for F/U with KI Anushka on 1/10/19 (along with BMP).       Weights:    12/21 211lb  12/22 208 lb  12/23 206lb                12/19/19 OV KI Sofiya   1.  Paroxysmal atrial fibrillation.  Patient has long-standing history of paroxysmal atrial fibrillation.  He has a chads vas score of 5 and has bled score of 5. He is now status post a 33 mm watchman device implantation.  His postoperative echocardiogram looked good without evidence of pericardial effusion.  He continues on aspirin and Eliquis.  45 days after the watchman, the patient will undergo a CHELLE to evaluate if the device is well-seated.  I have discussed the risks and benefits of the CHELLE with the patient.      Patient has no history of esophageal stricture, odynphagia, dysphagia, airway problems, or medication allergies. All risks and benefits for transesophageal echocardiogram have been explained to the patient.  This includes but is not limited to damage to the oral cavity, esophageal perforation, GI bleeding, pharyngeal hematoma, transient bronchospasm, transient hypoxia, arrhthymias (NSVT, transient atrial fibrillation), vomiting, hemoptysis, and complications from anesthesia. Patient understands and wishes to proceed with it. A formal consent form will be signed by the procedural physician.     I will have the CHELLE reviewed by Dr. Hope.  If the device is well-seated, we will discontinue Eliquis and initiate Plavix for approximately 4-1/2 month or up to 6 months postoperatively.     2. 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  He is status post mitral clip in May 2019.  Most recent echocardiogram showed mild mitral regurgitation as well as  moderate to severe tricuspid regurgitation.  Unfortunately, his weight is up 11 pounds since I last saw him in clinic.  He has significant lower extremity edema in addition to scrotal edema.  He self decreased his Bumex dose from 1 mg  and half a milligram in the afternoon.  I have asked him to resume his previous dose of Bumex at 1 mg in the morning and 1 mg I have asked him to weigh himself daily and keep a daily log.  I would like him to call with his daily weights in 1 to 2 weeks.  Should he gain 2 pounds in a day or 5 pounds in a week I would like him to call the team for nurses.  I have asked him to follow-up with Chel in 2 weeks with a basic metabolic panel.  I have also reinforced a low-sodium diet.     3. Coronary artery disease. He denies symptoms concerning for ischemia. Continue good medical management.      4. Lymphedema. Bilateral ACE wraps on lower extremities.         Sofiya Burton, WILLIS, CNP  11/22/2019

## 2019-12-30 NOTE — TELEPHONE ENCOUNTER
VON from patient, stating that he has been having trouble sleeping since increasing back to 1 mg BID of bumex. Patient states that he is not sleeping at night because he is up urinating so much. Patient wants to decrease his dose so that he can sleep at night. Contacted patient to review further. Patient states that his weight has been doing well, and he does not have any symptoms to report, but he has been having issues sleeping because he is up going to the bathroom all night. Patient is still taking Bumex 1 mg BID. He is taking 1 pill at 8AM (right when he gets up) and the other pill around 2PM. Patient has the following weights to report:    12/24 - 205 12/26 - 208 12/27 - 207 12/28 - 203 12/30 - 201     Patient would really like to be able to sleep better at night and is wondering if Sofiya has any further recommendations regarding his bumex dosing. Will route to Sofiya for review.

## 2019-12-31 NOTE — TELEPHONE ENCOUNTER
Spoke with patient about Sofiya's recommendations. Patient verbalized understanding and agreed with plan of care. No further questions.

## 2019-12-31 NOTE — TELEPHONE ENCOUNTER
He is still not to his dry weight. Please have him continue current dosage but move the PM dose up to 1230-1. If this does not help, maybe Anushka can discuss Raffaeleura with him at his visit next week.

## 2020-01-03 ENCOUNTER — TELEPHONE (OUTPATIENT)
Dept: FAMILY MEDICINE | Facility: CLINIC | Age: 85
End: 2020-01-03

## 2020-01-03 NOTE — TELEPHONE ENCOUNTER
Our goal is to have forms completed within 72 hours, however some forms may require a visit or additional information.    What clinic location was the form placed at [facility] or [place].?     Who is the form from?   Where did the form come from? Faxed to clinic   The form was placed in the inbox of Mg Nelson MD      Please fax to [phone]  Phone number: [phone]    Additional comments: home health wound care 2 times a week     Call take on 1/3/2020 at 11:58 AM by Maya Monterroso

## 2020-01-05 NOTE — ANESTHESIA POSTPROCEDURE EVALUATION
Patient: Bairon Foster    Procedure(s):  Left Atrial Appendage Closure    Diagnosis:unable to take anitcoagulation long term  Diagnosis Additional Information: No value filed.    Anesthesia Type:  GETA, general    Note:  Anesthesia Post Evaluation    Patient location during evaluation: PACU  Patient participation: Able to fully participate in evaluation  Level of consciousness: sleepy but conscious and responsive to verbal stimuli  Pain management: adequate  Airway patency: patent  Cardiovascular status: acceptable and hemodynamically stable  Respiratory status: acceptable and unassisted  Hydration status: acceptable  PONV: none     Anesthetic complications: None          Last vitals:  Vitals:    12/03/19 2006 12/04/19 0118 12/04/19 0847   BP: 113/68 101/55 107/51   Pulse:      Resp: 16 18 18   Temp: 36.7  C (98.1  F)  36.9  C (98.5  F)   SpO2: 98% 92% 92%         Electronically Signed By: Sachin Almanza MD  January 5, 2020  4:13 PM

## 2020-01-07 ENCOUNTER — TELEPHONE (OUTPATIENT)
Dept: CARDIOLOGY | Facility: CLINIC | Age: 85
End: 2020-01-07

## 2020-01-07 NOTE — TELEPHONE ENCOUNTER
Patient called to report his wt today (see below). Patient reports with the change in timing of taking his bumex he is sleeping much better. Patient had no further questions at this time and will f/u with KI Anushka this Friday 1/10/20 with labs prior. s  1/7/19 192lbs lbs      12/24 - 205 12/26 - 208 12/27 - 207 12/28 - 203 12/30 - 201 12/19/19 OV KI Sofiya  Assessment and Plan      1.  Paroxysmal atrial fibrillation.  Patient has long-standing history of paroxysmal atrial fibrillation.  He has a chads vas score of 5 and has bled score of 5. He is now status post a 33 mm watchman device implantation.  His postoperative echocardiogram looked good without evidence of pericardial effusion.  He continues on aspirin and Eliquis.  45 days after the watchman, the patient will undergo a CHELLE to evaluate if the device is well-seated.  I have discussed the risks and benefits of the CHELLE with the patient.      Patient has no history of esophageal stricture, odynphagia, dysphagia, airway problems, or medication allergies. All risks and benefits for transesophageal echocardiogram have been explained to the patient.  This includes but is not limited to damage to the oral cavity, esophageal perforation, GI bleeding, pharyngeal hematoma, transient bronchospasm, transient hypoxia, arrhthymias (NSVT, transient atrial fibrillation), vomiting, hemoptysis, and complications from anesthesia. Patient understands and wishes to proceed with it. A formal consent form will be signed by the procedural physician.     I will have the CHELLE reviewed by Dr. Hope.  If the device is well-seated, we will discontinue Eliquis and initiate Plavix for approximately 4-1/2 month or up to 6 months postoperatively.     2. 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  He is status post mitral clip in May 2019.  Most recent echocardiogram showed mild mitral regurgitation as well as moderate to severe tricuspid regurgitation.   Unfortunately, his weight is up 11 pounds since I last saw him in clinic.  He has significant lower extremity edema in addition to scrotal edema.  He self decreased his Bumex dose from 1 mg  and half a milligram in the afternoon.  I have asked him to resume his previous dose of Bumex at 1 mg in the morning and 1 mg I have asked him to weigh himself daily and keep a daily log.  I would like him to call with his daily weights in 1 to 2 weeks.  Should he gain 2 pounds in a day or 5 pounds in a week I would like him to call the team for nurses.  I have asked him to follow-up with Chel in 2 weeks with a basic metabolic panel.  I have also reinforced a low-sodium diet.     3. Coronary artery disease. He denies symptoms concerning for ischemia. Continue good medical management.      4. Lymphedema. Bilateral ACE wraps on lower extremities.         Sofiya Burton, APRN, CNP

## 2020-01-08 ENCOUNTER — TELEPHONE (OUTPATIENT)
Dept: FAMILY MEDICINE | Facility: CLINIC | Age: 85
End: 2020-01-08

## 2020-01-08 NOTE — TELEPHONE ENCOUNTER
Our goal is to have forms completed within 72 hours, however some forms may require a visit or additional information.    What clinic location was the form placed at [facility] or [place].?     Who is the form from?   Where did the form come from? Faxed to clinic   The form was placed in the inbox of Mg Nelson MD and Elvin Ross MD      Please fax to [phone]  Phone number: [phone]    Additional comments: [facility] Physician orders (Pt hospitalized, ok to hold services)    Call take on 1/8/2020 at 8:29 AM by Naomi Pearl

## 2020-01-10 ENCOUNTER — OFFICE VISIT (OUTPATIENT)
Dept: CARDIOLOGY | Facility: CLINIC | Age: 85
End: 2020-01-10
Payer: COMMERCIAL

## 2020-01-10 VITALS
WEIGHT: 195.8 LBS | HEIGHT: 74 IN | SYSTOLIC BLOOD PRESSURE: 118 MMHG | HEART RATE: 62 BPM | DIASTOLIC BLOOD PRESSURE: 62 MMHG | BODY MASS INDEX: 25.13 KG/M2

## 2020-01-10 DIAGNOSIS — Z95.818 PRESENCE OF WATCHMAN LEFT ATRIAL APPENDAGE CLOSURE DEVICE: ICD-10-CM

## 2020-01-10 DIAGNOSIS — Z95.0 CARDIAC PACEMAKER IN SITU: Primary | ICD-10-CM

## 2020-01-10 DIAGNOSIS — E87.6 HYPOKALEMIA: ICD-10-CM

## 2020-01-10 DIAGNOSIS — Z98.890 S/P MITRAL VALVE REPAIR: ICD-10-CM

## 2020-01-10 DIAGNOSIS — Z98.890 S/P LEFT ATRIAL APPENDAGE LIGATION: ICD-10-CM

## 2020-01-10 LAB
ANION GAP SERPL CALCULATED.3IONS-SCNC: 8.2 MMOL/L (ref 6–17)
BUN SERPL-MCNC: 24 MG/DL (ref 7–30)
CALCIUM SERPL-MCNC: 9.5 MG/DL (ref 8.5–10.5)
CHLORIDE SERPL-SCNC: 100 MMOL/L (ref 98–107)
CO2 SERPL-SCNC: 36 MMOL/L (ref 23–29)
CREAT SERPL-MCNC: 1.42 MG/DL (ref 0.7–1.3)
GFR SERPL CREATININE-BSD FRML MDRD: 47 ML/MIN/{1.73_M2}
GLUCOSE SERPL-MCNC: 133 MG/DL (ref 70–105)
POTASSIUM SERPL-SCNC: 3.2 MMOL/L (ref 3.5–5.1)
SODIUM SERPL-SCNC: 141 MMOL/L (ref 136–145)

## 2020-01-10 PROCEDURE — 99215 OFFICE O/P EST HI 40 MIN: CPT | Performed by: NURSE PRACTITIONER

## 2020-01-10 PROCEDURE — 80048 BASIC METABOLIC PNL TOTAL CA: CPT | Performed by: INTERNAL MEDICINE

## 2020-01-10 PROCEDURE — 36415 COLL VENOUS BLD VENIPUNCTURE: CPT | Performed by: INTERNAL MEDICINE

## 2020-01-10 RX ORDER — POTASSIUM CHLORIDE 1500 MG/1
20 TABLET, EXTENDED RELEASE ORAL 2 TIMES DAILY
Qty: 180 TABLET | Refills: 3 | Status: SHIPPED | OUTPATIENT
Start: 2020-01-10 | End: 2020-05-28

## 2020-01-10 ASSESSMENT — MIFFLIN-ST. JEOR: SCORE: 1627.89

## 2020-01-10 NOTE — PROGRESS NOTES
HPI and Plan:   I had the pleasure of seeing Bairon Foster, his wife and granddaughter today in cardiology clinic follow after his recent Watchman procedure and as a H&P prior to his CHELLE next week to assess the success of the Watchman. He is a pleasant 88 year old patient of Dr. Hope who I have also seen for several years now.    Mr. 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 He has had a terrible time tolerating it anticoagulation, he has had epitaxis, probable GI bleeds and his bullous pemphigoid which affects his skin significantly causes bleeding when the blisters burst.  For this reason he underwent a watchman, with the hope that he could be discontinued from his anticoagulation.  His chads vas score is 5 giving him a risk of recurrent CVA of 7 %/year.    On December 3, 2019 he underwent successful left atrial appendage closure with a 33 mm watchman device, there were no leaks identified.  He was placed on apixaban 5 mg twice daily for at least 45 days.  Looking back at Russell's note, if the CHELLE showed successful closure after 45 days then he would be switched to Plavix for up to 6 months.  His  echocardiogram post op was technically difficult and the watchman device was not well visualized and could not be commented on.  He had mild eccentric mitral regurgitation posteriorly directed and a mean diastolic valve gradient of 2.4 mmHg.  His EF was estimated at 40 to 45% and he had a moderately dilated RV with mild to moderately decreased systolic function.    Today Alfa is doing remarkably well.  He has continued to diurese, and is getting close to his dry weight.  Today he  was 195 pounds, this is down from 211 pounds December 19.  He currently takes Bumex 1 mg twice daily.  He is not taking potassium supplements, his potassium was a little low today at 3.2.  His creatinine was 1.42.  He and his family think he is overall doing well.  He is able to walk around with a cane.  He is weak, he needs to use 2 hands to get out of the chair.  We talked about practicing sit stands at home now that he has less edema and fewer blisters in his feet.    Physical Exam  Please see Below     Assessment and Plan  1.  Atrial fibrillation.  He has a permanent pacemaker, this is getting close to end-of-life, he is scheduled for a device check in 2 weeks.  I have asked scheduling to set this up for the day of his CHELLE.  We will need to put as much space between the device replacement and his watchman procedure as we can.  He has a CHELLE scheduled next week to assess the watchman placement and function.  For now he continues on Eliquis 5 mg twice daily, the appropriate dose for his weight and renal function.  He will likely need to be transition to Plavix after Dr. Hope reviews a CHELLE.  We did discuss the risks and benefits of the procedure.  He is a little concerned about his sensitive skin.  I told him that while I could not guarantee that he would not have problems with the patches used for the procedure, we can request that they be as gentle as possible and removing them.  If there are any patches that are better for sensitive skin, I would request that they use those.  He will need to be n.p.o. that day.  He will need to have a , the family is aware of this.  2.  Systolic and diastolic heart failure.  Status post MitraClip in May 2019.  He does have significant valvular heart disease, he continues to have tricuspid regurgitation.  He is doing well with Bumex 1 mg twice daily, his weight continues to come down and he seems to be tolerating it based on his kidney function.  He is a little hypokalemic  today and I have asked him to restart his potassium supplements.  I will see him back in a couple of weeks, will check a BMP at that time.  He continues on good medical management with carvedilol and diuretics.  We could consider adding isosorbide if he continues to do well at her next visit.  He is intentionally not on a ACE or ARB because of his renal dysfunction.  3.  Coronary artery disease.  He is not having any ischemic symptoms.  He continues on aspirin, statin and beta-blockade.  4.  Depression.  He is getting a little more angry than he has been in the past, his wife complains about this quite a lot and he does not deny it.  He is currently on Wellbutrin  mg daily.  I have asked him to talk to his primary to see if we can get this dose increased.  He is willing to do so.    Thank you for allowing me to care for Bairon Foster today.    WILLIS Bullard, CNP  Cardiology  Greater than half of this 45 minute appointment was spent in counseling and coordination of care.    Voice recognition software was used for this note, I have reviewed this note, but errors may have been missed.    Orders Placed This Encounter   Procedures     Basic metabolic panel     Follow-Up with Cardiac Advanced Practice Provider     Orders Placed This Encounter   Medications     potassium chloride ER (K-DUR/KLOR-CON M) 20 MEQ CR tablet     Sig: Take 1 tablet (20 mEq) by mouth 2 times daily     Dispense:  180 tablet     Refill:  3     Medications Discontinued During This Encounter   Medication Reason     potassium chloride ER (K-DUR/KLOR-CON M) 20 MEQ CR tablet Reorder         CURRENT MEDICATIONS:  Current Outpatient Medications   Medication Sig Dispense Refill     ACE/ARB/ARNI NOT PRESCRIBED (INTENTIONAL) Please choose reason not prescribed, below       acetaminophen (TYLENOL) 500 MG tablet Take 2 tablets (1,000 mg) by mouth every 6 hours as needed for mild pain 100 tablet 0     apixaban ANTICOAGULANT (ELIQUIS) 5 MG tablet  Take 1 tablet (5 mg) by mouth 2 times daily 30 tablet 1     aspirin 81 MG EC tablet Take 81 mg by mouth daily        bumetanide (BUMEX) 1 MG tablet Take 1 tablet (1 mg) by mouth 2 times daily       buPROPion (WELLBUTRIN XL) 150 MG 24 hr tablet Take 150 mg by mouth every morning       Carboxymethylcellulose Sod PF (CELLUVISC/REFRESH LIQUIGEL) 1 % ophthalmic solution Place 2 drops into both eyes daily as needed for dry eyes        carvedilol (COREG) 6.25 MG tablet TAKE 1 TABLET TWICE DAILY WITH MEALS 180 tablet 2     clobetasol (TEMOVATE) 0.05 % external cream Apply topically twice a week AS NEEDED       COMPRESSION STOCKINGS 1 each daily 2 each 0     diclofenac (VOLTAREN) 1 % topical gel Apply 2 g topically 4 times daily (Patient taking differently: Apply 2 g topically twice a week AS NEEDED)       finasteride (PROSCAR) 5 MG tablet TAKE 1 TABLET EVERY DAY 90 tablet 3     glucosamine-chondroitin 500-400 MG CAPS per capsule Take 2 capsules by mouth daily AS NEEDED       levothyroxine (SYNTHROID/LEVOTHROID) 100 MCG tablet TAKE 1 TABLET EVERY DAY 90 tablet 2     Multiple Vitamins-Minerals (CENTRUM SILVER) per tablet Take 1 tablet by mouth daily       omeprazole (PRILOSEC) 20 MG DR capsule TAKE 1 CAPSULE TWICE DAILY 180 capsule 3     ORDER FOR DME Auto-CPAP:Max 9 cm H2OMin 9 cm H2O  Continuous Lifetime need and heated humidity.    1 Device 0     Polyethylene Glycol 3350 (MIRALAX PO) Take 1 packet by mouth At Bedtime        potassium chloride ER (K-DUR/KLOR-CON M) 20 MEQ CR tablet Take 1 tablet (20 mEq) by mouth 2 times daily 180 tablet 3     simvastatin (ZOCOR) 20 MG tablet Take 20 mg by mouth daily        sucralfate (CARAFATE) 1 GM tablet TAKE 1 TABLET TWICE DAILY 180 tablet 3     tamsulosin (FLOMAX) 0.4 MG capsule TAKE 1 CAPSULE EVERY DAY (Patient taking differently: TAKE 1 CAPSULE EVERY EVENING) 90 capsule 3     triamcinolone (KENALOG) 0.025 % cream Apply topically 2 times daily as needed        ketotifen (ZADITOR)  0.025 % SOLN ophthalmic solution Place 1 drop into both eyes every 12 hours (Patient not taking: Reported on 12/19/2019) 1 Bottle      niacinamide (NIACIN) 500 MG tablet TAKE ONE TABLET BY MOUTH TWICE A DAY         ALLERGIES     Allergies   Allergen Reactions     Furosemide Blisters     Bullous pemphigoid. Bumex and ethacrynic acid okay.     Torsemide Blisters     Bullous pemphigoid.  Bumex and ethacrynic acid okay.       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     CKD (chronic kidney disease) stage 3, GFR 30-59 ml/min (H)      Coronary atherosclerosis of unspecified type of vessel, native or graft     S/P PTCA, EF 50%     Epistaxis      Esophageal reflux      GI bleed 4/17/2019     Hypercholesterolemia      Ischemic cardiomyopathy      Lymphedema of both lower extremities     Wears ACE wraps     Mitral regurgitation     s/p MitraClip 5/20/19     Obese      JIM on CPAP      Other lymphedema     GROIN AND THIGHS     Other pancytopenia (H)      Other specified anemias      Overweight      Pacemaker     PACEMAKER-BS Implanted 8/5/2009      PAD (peripheral artery disease) (H)      Pancytopenia (H)      Persistent atrial fibrillation     VVI PM for long pauses     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    Closure with a 33 mm Watchman device      Pulmonary hypertension (H)      Status post coronary angiogram 3/21/2018     Systolic CHF (H)      TIA (transient ischemic attack)      Unspecified hypothyroidism      Venous stasis dermatitis of both lower extremities      Vitamin D deficiency        PAST SURGICAL HISTORY:  Past Surgical History:   Procedure Laterality Date     BONE MARROW BIOPSY, BONE SPECIMEN, NEEDLE/TROCAR N/A 4/19/2019    Procedure: BONE  MARROW BIOPSY;  Surgeon: Yovani Cooley MD;  Location:  OR     CV HEART CATHETERIZATION WITH POSSIBLE INTERVENTION N/A 4/12/2019    Procedure: Heart Catheterization with possible Intervention;  Surgeon: Lev Beck MD;  Location:  HEART CARDIAC CATH LAB     CV LEFT ATRIAL APPENDAGE CLOSURE N/A 12/3/2019    Procedure: Left Atrial Appendage Closure;  Surgeon: Segundo Hope MD;  Location:  HEART CARDIAC CATH LAB     CV MITRACLIP N/A 5/20/2019    Procedure: CV MITRAL CLIP;  Surgeon: Harshil Winter MD;  Location:  HEART CARDIAC CATH LAB     CV RIGHT HEART CATH N/A 4/12/2019    Procedure: Right Heart Cath;  Surgeon: Lev Beck MD;  Location:  HEART CARDIAC CATH LAB     EYE SURGERY      CATARACT/BLEPHAROPLASTY     GENITOURINARY SURGERY      TUNA     HERNIA REPAIR      RIGHT INGUINAL     HERNIORRHAPHY INGUINAL  8/14/2012    Procedure: HERNIORRHAPHY INGUINAL;  right inguinal hernia repair;  Surgeon: Kareem Torrez MD;  Location:  [facility]     HERNIORRHAPHY UMBILICAL N/A 10/31/2017    Procedure: HERNIORRHAPHY UMBILICAL;  UMBILICAL HERNIA REPAIR WITH MESH;  Surgeon: Scar Harrington MD;  Location: [facility]     IMPLANT PACEMAKER  8/2009    single chamber     ORTHOPEDIC SURGERY      [facility] RIGHT     ORTHOPEDIC SURGERY  2010    right TKR, left TKR     SURGICAL HISTORY OF -       Thumb surgery     SURGICAL HISTORY OF -   1990s    Left total knee replacement     SURGICAL HISTORY OF -   3/11    Rig total knee replacement       FAMILY HISTORY:  Family History   Problem Relation Age of Onset     Cardiovascular Father      C.A.D. Father      Lung Cancer Sister      Unknown/Adopted Mother      Cancer - colorectal No family hx of        SOCIAL HISTORY:  Social History     Socioeconomic History     Marital status:      Spouse name: None     Number of children: None     Years of education: None     Highest education level: None   Occupational History     Occupation: Teacher, author, speaker      Employer: RETIRED   Social Needs     Financial resource strain: None     Food insecurity:     Worry: None     Inability: None     Transportation needs:     Medical: None     Non-medical: None   Tobacco Use     Smoking status: Never Smoker     Smokeless tobacco: Never Used   Substance and Sexual Activity     Alcohol use: Not Currently     Alcohol/week: 0.0 standard drinks     Comment: 1/month     Drug use: No     Sexual activity: Not Currently     Partners: Female   Lifestyle     Physical activity:     Days per week: None     Minutes per session: None     Stress: None   Relationships     Social connections:     Talks on phone: None     Gets together: None     Attends [employer]ism service: None     Active member of club or organization: None     Attends meetings of clubs or organizations: None     Relationship status: None     Intimate partner violence:     Fear of current or ex partner: None     Emotionally abused: None     Physically abused: None     Forced sexual activity: None   Other Topics Concern     Parent/sibling w/ CABG, MI or angioplasty before 65F 55M? No      Service Not Asked     Blood Transfusions Not Asked     Caffeine Concern No     Comment: 1-2 cups coffee a day     Occupational Exposure Not Asked     Hobby Hazards Not Asked     Sleep Concern No     Stress Concern Yes     Comment: due to wifes health condition     Weight Concern No     Special Diet No     Back Care Not Asked     Exercise Yes     Comment: states he uses his tredmill on and off     Bike Helmet Not Asked     Seat Belt Yes     Self-Exams Not Asked   Social History Narrative     None       Review of Systems:  Skin:  Positive for bruising states has open wounds, blisters   Eyes:  Positive for glasses    ENT:  Negative      Respiratory:  Positive for sleep apnea;CPAP;dyspnea on exertion     Cardiovascular:    Positive for;edema    Gastroenterology:        Genitourinary:  Positive for prostate problem testicular  "edema  Musculoskeletal:  Positive for muscular weakness;arthritis balance off, recent falls  Neurologic:  Positive for stroke    Psychiatric:  Negative      Heme/Lymph/Imm:  Positive for easy bruising nose bleeds   Endocrine:  Positive for thyroid disorder      Physical Exam:  Vitals: /62   Pulse 62   Ht 1.88 m (6' 2\")   Wt 88.8 kg (195 lb 12.8 oz)   BMI 25.14 kg/m      Constitutional:  cooperative;in no acute distress frail      Skin:  warm and dry to the touch bruises present pacemaker incision in the left infraclavicular area was well-healed some blistering on back and healed blisters on chest, back abrasion, multiple blisters.    Head:  normocephalic        Eyes:  pupils equal and round;sclera white        Lymph:      ENT:  no pallor or cyanosis        Neck:    JVP elevated Large V wave consistent with tricuspid regurgitation    Respiratory:  normal symmetry;no tenderness to palpation;normal respiratory excursion;no intercostal retraction;no use of accessory muscles;clear to auscultation    bilateral basilar crackles    Cardiac: regular rhythm;normal S1 and S2       systolic murmur     Paced                                      feet and legs wrapped, thighs not edematous    GI:  abdomen soft;non-tender   benign    Extremities and Muscular Skeletal:      bilateral LE edema;1+;2+     feet and legs wrapped, scrotal edema    Neurological:  affect appropriate   walking with cane    Psych:  Alert and Oriented x 3 Depression  Encounter Diagnoses   Name Primary?     Hypokalemia      Cardiac pacemaker in situ Yes     S/P mitral valve repair      Presence of Watchman left atrial appendage closure device        Recent Lab Results:  LIPID RESULTS:  Lab Results   Component Value Date    CHOL 97 08/21/2019    HDL 41 08/21/2019    LDL 48 08/21/2019    TRIG 41 08/21/2019    CHOLHDLRATIO 2.1 06/15/2015       LIVER ENZYME RESULTS:  Lab Results   Component Value Date    AST 30 12/02/2019    ALT 24 12/02/2019       CBC " RESULTS:  Lab Results   Component Value Date    WBC 4.3 12/04/2019    RBC 2.65 (L) 12/04/2019    HGB 8.4 (L) 12/04/2019    HCT 26.8 (L) 12/04/2019     (H) 12/04/2019    MCH 31.7 12/04/2019    MCHC 31.3 (L) 12/04/2019    RDW 19.3 (H) 12/04/2019    PLT 69 (L) 12/04/2019       BMP RESULTS:  Lab Results   Component Value Date     01/10/2020    POTASSIUM 3.2 (L) 01/10/2020    CHLORIDE 100 01/10/2020    CO2 36 (H) 01/10/2020    ANIONGAP 8.2 01/10/2020     (H) 01/10/2020    BUN 24 01/10/2020    CR 1.42 (H) 01/10/2020    GFRESTIMATED 47 (L) 01/10/2020    GFRESTBLACK 57 (L) 01/10/2020    TEODORO 9.5 01/10/2020        A1C RESULTS:  Lab Results   Component Value Date    A1C 5.5 05/29/2019       INR RESULTS:  Lab Results   Component Value Date    INR 1.28 (H) 12/04/2019    INR 1.44 (H) 05/20/2019           CC  No referring provider defined for this encounter.

## 2020-01-10 NOTE — PATIENT INSTRUCTIONS
Start taking potassium supplements, 20 meq twice a day. Just take it with bumex.    Talk to Dr. Nelson about increasing your Welbutrin.    Anushka  [phone]

## 2020-01-10 NOTE — LETTER
1/10/2020    Mg Nelson MD  [address]  [facility] [address]    RE: Bairon DOWNS Cristian       Dear Colleague,    I had the pleasure of seeing Bairon Foster in the [facility].    HPI and Plan:   I had the pleasure of seeing Bairon Foster, his wife and granddaughter today in cardiology clinic follow after his recent Watchman procedure and as a H&P prior to his CHELLE next week to assess the success of the Watchman. He is a pleasant 88 year old patient of Dr. Hope who I have also seen for several years now.    Mr. 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 He has had a terrible time tolerating it anticoagulation, he has had epitaxis, probable GI bleeds and his bullous pemphigoid which affects his skin significantly causes bleeding when the blisters burst.  For this reason he underwent a watchman, with the hope that he could be discontinued from his anticoagulation.  His chads vas score is 5 giving him a risk of recurrent CVA of 7 %/year.    On December 3, 2019 he underwent successful left atrial appendage closure with a 33 mm watchman device, there were no leaks identified.  He was placed on apixaban 5 mg twice daily for at least 45 days.  Looking back at Russell's note, if the CHELLE showed successful closure after 45 days then he would be switched to Plavix for up to 6 months.  His  echocardiogram post op was technically difficult and the watchman device was not well visualized and could not be commented on.  He had mild eccentric mitral regurgitation posteriorly directed and a mean diastolic valve gradient of 2.4 mmHg.  His EF was  estimated at 40 to 45% and he had a moderately dilated RV with mild to moderately decreased systolic function.    Today Alfa is doing remarkably well.  He has continued to diurese, and is getting close to his dry weight.  Today he was 195 pounds, this is down from 211 pounds December 19.  He currently takes Bumex 1 mg twice daily.  He is not taking potassium supplements, his potassium was a little low today at 3.2.  His creatinine was 1.42.  He and his family think he is overall doing well.  He is able to walk around with a cane.  He is weak, he needs to use 2 hands to get out of the chair.  We talked about practicing sit stands at home now that he has less edema and fewer blisters in his feet.    Physical Exam  Please see Below     Assessment and Plan  1.  Atrial fibrillation.  He has a permanent pacemaker, this is getting close to end-of-life, he is scheduled for a device check in 2 weeks.  I have asked scheduling to set this up for the day of his CHELLE.  We will need to put as much space between the device replacement and his watchman procedure as we can.  He has a CHELLE scheduled next week to assess the watchman placement and function.  For now he continues on Eliquis 5 mg twice daily, the appropriate dose for his weight and renal function.  He will likely need to be transition to Plavix after Dr. Hope reviews a CHELLE.  We did discuss the risks and benefits of the procedure.  He is a little concerned about his sensitive skin.  I told him that while I could not guarantee that he would not have problems with the patches used for the procedure, we can request that they be as gentle as possible and removing them.  If there are any patches that are better for sensitive skin, I would request that they use those.  He will need to be n.p.o. that day.  He will need to have a , the family is aware of this.  2.  Systolic and diastolic heart failure.  Status post MitraClip in May 2019.  He does have significant valvular  heart disease, he continues to have tricuspid regurgitation.  He is doing well with Bumex 1 mg twice daily, his weight continues to come down and he seems to be tolerating it based on his kidney function.  He is a little hypokalemic today and I have asked him to restart his potassium supplements.  I will see him back in a couple of weeks, will check a BMP at that time.  He continues on good medical management with carvedilol and diuretics.  We could consider adding isosorbide if he continues to do well at her next visit.  He is intentionally not on a ACE or ARB because of his renal dysfunction.  3.  Coronary artery disease.  He is not having any ischemic symptoms.  He continues on aspirin, statin and beta-blockade.  4.  Depression.  He is getting a little more angry than he has been in the past, his wife complains about this quite a lot and he does not deny it.  He is currently on Wellbutrin  mg daily.  I have asked him to talk to his primary to see if we can get this dose increased.  He is willing to do so.    Thank you for allowing me to care for Bairon Foster today.    WILLIS Bullard, CNP  Cardiology  Greater than half of this 45 minute appointment was spent in counseling and coordination of care.    Voice recognition software was used for this note, I have reviewed this note, but errors may have been missed.    Orders Placed This Encounter   Procedures     Basic metabolic panel     Follow-Up with Cardiac Advanced Practice Provider     Orders Placed This Encounter   Medications     potassium chloride ER (K-DUR/KLOR-CON M) 20 MEQ CR tablet     Sig: Take 1 tablet (20 mEq) by mouth 2 times daily     Dispense:  180 tablet     Refill:  3     Medications Discontinued During This Encounter   Medication Reason     potassium chloride ER (K-DUR/KLOR-CON M) 20 MEQ CR tablet Reorder         CURRENT MEDICATIONS:  Current Outpatient Medications   Medication Sig Dispense Refill     ACE/ARB/ARNI NOT PRESCRIBED  (INTENTIONAL) Please choose reason not prescribed, below       acetaminophen (TYLENOL) 500 MG tablet Take 2 tablets (1,000 mg) by mouth every 6 hours as needed for mild pain 100 tablet 0     apixaban ANTICOAGULANT (ELIQUIS) 5 MG tablet Take 1 tablet (5 mg) by mouth 2 times daily 30 tablet 1     aspirin 81 MG EC tablet Take 81 mg by mouth daily        bumetanide (BUMEX) 1 MG tablet Take 1 tablet (1 mg) by mouth 2 times daily       buPROPion (WELLBUTRIN XL) 150 MG 24 hr tablet Take 150 mg by mouth every morning       Carboxymethylcellulose Sod PF (CELLUVISC/REFRESH LIQUIGEL) 1 % ophthalmic solution Place 2 drops into both eyes daily as needed for dry eyes        carvedilol (COREG) 6.25 MG tablet TAKE 1 TABLET TWICE DAILY WITH MEALS 180 tablet 2     clobetasol (TEMOVATE) 0.05 % external cream Apply topically twice a week AS NEEDED       COMPRESSION STOCKINGS 1 each daily 2 each 0     diclofenac (VOLTAREN) 1 % topical gel Apply 2 g topically 4 times daily (Patient taking differently: Apply 2 g topically twice a week AS NEEDED)       finasteride (PROSCAR) 5 MG tablet TAKE 1 TABLET EVERY DAY 90 tablet 3     glucosamine-chondroitin 500-400 MG CAPS per capsule Take 2 capsules by mouth daily AS NEEDED       levothyroxine (SYNTHROID/LEVOTHROID) 100 MCG tablet TAKE 1 TABLET EVERY DAY 90 tablet 2     Multiple Vitamins-Minerals (CENTRUM SILVER) per tablet Take 1 tablet by mouth daily       omeprazole (PRILOSEC) 20 MG DR capsule TAKE 1 CAPSULE TWICE DAILY 180 capsule 3     ORDER FOR DME Auto-CPAP:Max 9 cm H2OMin 9 cm H2O  Continuous Lifetime need and heated humidity.    1 Device 0     Polyethylene Glycol 3350 (MIRALAX PO) Take 1 packet by mouth At Bedtime        potassium chloride ER (K-DUR/KLOR-CON M) 20 MEQ CR tablet Take 1 tablet (20 mEq) by mouth 2 times daily 180 tablet 3     simvastatin (ZOCOR) 20 MG tablet Take 20 mg by mouth daily        sucralfate (CARAFATE) 1 GM tablet TAKE 1 TABLET TWICE DAILY 180 tablet 3      tamsulosin (FLOMAX) 0.4 MG capsule TAKE 1 CAPSULE EVERY DAY (Patient taking differently: TAKE 1 CAPSULE EVERY EVENING) 90 capsule 3     triamcinolone (KENALOG) 0.025 % cream Apply topically 2 times daily as needed        ketotifen (ZADITOR) 0.025 % SOLN ophthalmic solution Place 1 drop into both eyes every 12 hours (Patient not taking: Reported on 12/19/2019) 1 Bottle      niacinamide (NIACIN) 500 MG tablet TAKE ONE TABLET BY MOUTH TWICE A DAY         ALLERGIES     Allergies   Allergen Reactions     Furosemide Blisters     Bullous pemphigoid. Bumex and ethacrynic acid okay.     Torsemide Blisters     Bullous pemphigoid.  Bumex and ethacrynic acid okay.       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     CKD (chronic kidney disease) stage 3, GFR 30-59 ml/min (H)      Coronary atherosclerosis of unspecified type of vessel, native or graft     S/P PTCA, EF 50%     Epistaxis      Esophageal reflux      GI bleed 4/17/2019     Hypercholesterolemia      Ischemic cardiomyopathy      Lymphedema of both lower extremities     Wears ACE wraps     Mitral regurgitation     s/p MitraClip 5/20/19     Obese      JIM on CPAP      Other lymphedema     GROIN AND THIGHS     Other pancytopenia (H)      Other specified anemias      Overweight      Pacemaker     PACEMAKER-BS Implanted 8/5/2009      PAD (peripheral artery disease) (H)      Pancytopenia (H)      Persistent atrial fibrillation     VVI PM for long pauses     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    Closure with a 33 mm Watchman device      Pulmonary hypertension (H)      Status post coronary angiogram 3/21/2018     Systolic CHF (H)      TIA (transient ischemic attack)      Unspecified hypothyroidism      Venous  stasis dermatitis of both lower extremities      Vitamin D deficiency        PAST SURGICAL HISTORY:  Past Surgical History:   Procedure Laterality Date     BONE MARROW BIOPSY, BONE SPECIMEN, NEEDLE/TROCAR N/A 4/19/2019    Procedure: BONE MARROW BIOPSY;  Surgeon: Yovani Cooley MD;  Location:  OR     CV HEART CATHETERIZATION WITH POSSIBLE INTERVENTION N/A 4/12/2019    Procedure: Heart Catheterization with possible Intervention;  Surgeon: Lev Beck MD;  Location:  HEART CARDIAC CATH LAB     CV LEFT ATRIAL APPENDAGE CLOSURE N/A 12/3/2019    Procedure: Left Atrial Appendage Closure;  Surgeon: Segundo Hope MD;  Location:  HEART CARDIAC CATH LAB     CV MITRACLIP N/A 5/20/2019    Procedure: CV MITRAL CLIP;  Surgeon: Harshil Winter MD;  Location:  HEART CARDIAC CATH LAB     CV RIGHT HEART CATH N/A 4/12/2019    Procedure: Right Heart Cath;  Surgeon: Lev Beck MD;  Location:  HEART CARDIAC CATH LAB     EYE SURGERY      CATARACT/BLEPHAROPLASTY     GENITOURINARY SURGERY      TUNA     HERNIA REPAIR      RIGHT INGUINAL     HERNIORRHAPHY INGUINAL  8/14/2012    Procedure: HERNIORRHAPHY INGUINAL;  right inguinal hernia repair;  Surgeon: Kareem Torrez MD;  Location:  [facility]     HERNIORRHAPHY UMBILICAL N/A 10/31/2017    Procedure: HERNIORRHAPHY UMBILICAL;  UMBILICAL HERNIA REPAIR WITH MESH;  Surgeon: Scar Harrington MD;  Location: [facility]     IMPLANT PACEMAKER  8/2009    single chamber     [place]OPED[place]      [facility] [place]     ORTHOPEDIC SURGERY  2010    right TKR, left TKR     SURGICAL HISTORY OF -       Thumb surgery     SURGICAL HISTORY OF -   1990s    Left total knee replacement     SURGICAL HISTORY OF -   3/11    [facility] total knee replacement       FAMILY HISTORY:  Family History   Problem Relation Age of Onset     Cardiovascular Father      C.A.D. Father      Lung Cancer Sister      Unknown/Adopted Mother      Cancer - colorectal No family hx of        SOCIAL HISTORY:  Social  History     Socioeconomic History     Marital status:      Spouse name: None     Number of children: None     Years of education: None     Highest education level: None   Occupational History     Occupation: Teacher, author, speaker     Employer: RETIRED   Social Needs     Financial resource strain: None     Food insecurity:     Worry: None     Inability: None     Transportation needs:     Medical: None     Non-medical: None   Tobacco Use     Smoking status: Never Smoker     Smokeless tobacco: Never Used   Substance and Sexual Activity     Alcohol use: Not Currently     Alcohol/week: 0.0 standard drinks     Comment: 1/month     Drug use: No     Sexual activity: Not Currently     Partners: Female   Lifestyle     Physical activity:     Days per week: None     Minutes per session: None     Stress: None   Relationships     Social connections:     Talks on phone: None     Gets together: None     Attends Jainism service: None     Active member of club or organization: None     Attends meetings of clubs or organizations: None     Relationship status: None     Intimate partner violence:     Fear of current or ex partner: None     Emotionally abused: None     Physically abused: None     Forced sexual activity: None   Other Topics Concern     Parent/sibling w/ CABG, MI or angioplasty before 65F 55M? No      Service Not Asked     Blood Transfusions Not Asked     Caffeine Concern No     Comment: 1-2 cups coffee a day     Occupational Exposure Not Asked     Hobby Hazards Not Asked     Sleep Concern No     Stress Concern Yes     Comment: due to wifes health condition     Weight Concern No     Special Diet No     Back Care Not Asked     Exercise Yes     Comment: states he uses his tredmill on and off     Bike Helmet Not Asked     Seat Belt Yes     Self-Exams Not Asked   Social History Narrative     None       Review of Systems:  Skin:  Positive for bruising states has open wounds, blisters   Eyes:  Positive for  "glasses    ENT:  Negative      Respiratory:  Positive for sleep apnea;CPAP;dyspnea on exertion     Cardiovascular:    Positive for;edema    Gastroenterology:        Genitourinary:  Positive for prostate problem testicular edema  Musculoskeletal:  Positive for muscular weakness;arthritis balance off, recent falls  Neurologic:  Positive for stroke    Psychiatric:  Negative      Heme/Lymph/Imm:  Positive for easy bruising nose bleeds   Endocrine:  Positive for thyroid disorder      Physical Exam:  Vitals: /62   Pulse 62   Ht 1.88 m (6' 2\")   Wt 88.8 kg (195 lb 12.8 oz)   BMI 25.14 kg/m       Constitutional:  cooperative;in no acute distress frail      Skin:  warm and dry to the touch bruises present pacemaker incision in the left infraclavicular area was well-healed some blistering on back and healed blisters on chest, back abrasion, multiple blisters.    Head:  normocephalic        Eyes:  pupils equal and round;sclera white        Lymph:      ENT:  no pallor or cyanosis        Neck:    JVP elevated Large V wave consistent with tricuspid regurgitation    Respiratory:  normal symmetry;no tenderness to palpation;normal respiratory excursion;no intercostal retraction;no use of accessory muscles;clear to auscultation    bilateral basilar crackles    Cardiac: regular rhythm;normal S1 and S2       systolic murmur     Paced                                      feet and legs wrapped, thighs not edematous    GI:  abdomen soft;non-tender   benign    Extremities and Muscular Skeletal:      bilateral LE edema;1+;2+     feet and legs wrapped, scrotal edema    Neurological:  affect appropriate   walking with cane    Psych:  Alert and Oriented x 3 Depression  Encounter Diagnoses   Name Primary?     Hypokalemia      Cardiac pacemaker in situ Yes     S/P mitral valve repair      Presence of Watchman left atrial appendage closure device        Recent Lab Results:  LIPID RESULTS:  Lab Results   Component Value Date    CHOL 97 " 08/21/2019    HDL 41 08/21/2019    LDL 48 08/21/2019    TRIG 41 08/21/2019    CHOLHDLRATIO 2.1 06/15/2015       LIVER ENZYME RESULTS:  Lab Results   Component Value Date    AST 30 12/02/2019    ALT 24 12/02/2019       CBC RESULTS:  Lab Results   Component Value Date    WBC 4.3 12/04/2019    RBC 2.65 (L) 12/04/2019    HGB 8.4 (L) 12/04/2019    HCT 26.8 (L) 12/04/2019     (H) 12/04/2019    MCH 31.7 12/04/2019    MCHC 31.3 (L) 12/04/2019    RDW 19.3 (H) 12/04/2019    PLT 69 (L) 12/04/2019       BMP RESULTS:  Lab Results   Component Value Date     01/10/2020    POTASSIUM 3.2 (L) 01/10/2020    CHLORIDE 100 01/10/2020    CO2 36 (H) 01/10/2020    ANIONGAP 8.2 01/10/2020     (H) 01/10/2020    BUN 24 01/10/2020    CR 1.42 (H) 01/10/2020    GFRESTIMATED 47 (L) 01/10/2020    GFRESTBLACK 57 (L) 01/10/2020    TEODORO 9.5 01/10/2020        A1C RESULTS:  Lab Results   Component Value Date    A1C 5.5 05/29/2019       INR RESULTS:  Lab Results   Component Value Date    INR 1.28 (H) 12/04/2019    INR 1.44 (H) 05/20/2019           CC  No referring provider defined for this encounter.                Thank you for allowing me to participate in the care of your patient.    Sincerely,     WILLIS Smith [facility]

## 2020-01-13 ENCOUNTER — CARE COORDINATION (OUTPATIENT)
Dept: CARDIOLOGY | Facility: CLINIC | Age: 85
End: 2020-01-13

## 2020-01-13 NOTE — PROGRESS NOTES
Called Bairon and went over prep for CHELLE. He knows to be NPO after midnight the night before and he will take his am pills with a sip of water, he will hold his am bumex. He will report to the welcome desk at 11:30 for a CHELLE tme of 13:30. He will have his (wife) present as he will be receiving conscious sedation. He has no further questions at this time.Sarah Anderson RN

## 2020-01-15 ENCOUNTER — MEDICAL CORRESPONDENCE (OUTPATIENT)
Dept: HEALTH INFORMATION MANAGEMENT | Facility: CLINIC | Age: 85
End: 2020-01-15

## 2020-01-15 ENCOUNTER — OFFICE VISIT (OUTPATIENT)
Dept: FAMILY MEDICINE | Facility: CLINIC | Age: 85
End: 2020-01-15
Payer: COMMERCIAL

## 2020-01-15 ENCOUNTER — TELEPHONE (OUTPATIENT)
Dept: FAMILY MEDICINE | Facility: CLINIC | Age: 85
End: 2020-01-15

## 2020-01-15 VITALS
SYSTOLIC BLOOD PRESSURE: 128 MMHG | HEIGHT: 74 IN | OXYGEN SATURATION: 99 % | DIASTOLIC BLOOD PRESSURE: 78 MMHG | TEMPERATURE: 97.4 F | HEART RATE: 56 BPM | BODY MASS INDEX: 25.6 KG/M2 | RESPIRATION RATE: 14 BRPM | WEIGHT: 199.5 LBS

## 2020-01-15 DIAGNOSIS — N40.1 BENIGN PROSTATIC HYPERPLASIA WITH URINARY HESITANCY: ICD-10-CM

## 2020-01-15 DIAGNOSIS — F32.5 MAJOR DEPRESSION IN COMPLETE REMISSION (H): Primary | ICD-10-CM

## 2020-01-15 DIAGNOSIS — E03.9 ACQUIRED HYPOTHYROIDISM: ICD-10-CM

## 2020-01-15 DIAGNOSIS — I25.5 ISCHEMIC CARDIOMYOPATHY: ICD-10-CM

## 2020-01-15 DIAGNOSIS — Z95.818 PRESENCE OF WATCHMAN LEFT ATRIAL APPENDAGE CLOSURE DEVICE: ICD-10-CM

## 2020-01-15 DIAGNOSIS — K21.9 GASTROESOPHAGEAL REFLUX DISEASE WITHOUT ESOPHAGITIS: Chronic | ICD-10-CM

## 2020-01-15 DIAGNOSIS — N40.0 BENIGN PROSTATIC HYPERPLASIA, UNSPECIFIED WHETHER LOWER URINARY TRACT SYMPTOMS PRESENT: ICD-10-CM

## 2020-01-15 DIAGNOSIS — K21.9 GASTROESOPHAGEAL REFLUX DISEASE, ESOPHAGITIS PRESENCE NOT SPECIFIED: ICD-10-CM

## 2020-01-15 DIAGNOSIS — R39.11 BENIGN PROSTATIC HYPERPLASIA WITH URINARY HESITANCY: ICD-10-CM

## 2020-01-15 PROCEDURE — 99214 OFFICE O/P EST MOD 30 MIN: CPT | Performed by: FAMILY MEDICINE

## 2020-01-15 RX ORDER — LEVOTHYROXINE SODIUM 100 UG/1
TABLET ORAL
Qty: 90 TABLET | Refills: 3 | Status: SHIPPED | OUTPATIENT
Start: 2020-01-15 | End: 2020-03-20

## 2020-01-15 RX ORDER — TAMSULOSIN HYDROCHLORIDE 0.4 MG/1
CAPSULE ORAL
Qty: 90 CAPSULE | Refills: 3 | Status: SHIPPED | OUTPATIENT
Start: 2020-01-15 | End: 2020-03-20

## 2020-01-15 RX ORDER — BUMETANIDE 1 MG/1
1 TABLET ORAL 2 TIMES DAILY
Qty: 180 TABLET | Refills: 3 | Status: SHIPPED | OUTPATIENT
Start: 2020-01-15 | End: 2020-02-18

## 2020-01-15 RX ORDER — FINASTERIDE 5 MG/1
TABLET, FILM COATED ORAL
Qty: 90 TABLET | Refills: 3 | Status: SHIPPED | OUTPATIENT
Start: 2020-01-15 | End: 2020-03-20

## 2020-01-15 RX ORDER — BUPROPION HYDROCHLORIDE 300 MG/1
300 TABLET ORAL EVERY MORNING
Qty: 90 TABLET | Refills: 1 | Status: SHIPPED | OUTPATIENT
Start: 2020-01-15 | End: 2020-03-20

## 2020-01-15 RX ORDER — SUCRALFATE 1 G/1
1 TABLET ORAL 2 TIMES DAILY
Qty: 180 TABLET | Refills: 3 | Status: SHIPPED | OUTPATIENT
Start: 2020-01-15 | End: 2020-03-20

## 2020-01-15 RX ORDER — CARVEDILOL 6.25 MG/1
TABLET ORAL
Qty: 180 TABLET | Refills: 3 | Status: SHIPPED | OUTPATIENT
Start: 2020-01-15 | End: 2020-06-02

## 2020-01-15 ASSESSMENT — MIFFLIN-ST. JEOR: SCORE: 1644.68

## 2020-01-15 NOTE — PROGRESS NOTES
Subjective     Bairon Foster is a 88 year old male who presents to clinic today for the following health issues:    HPI   Review lab results      Duration: Done 01/10/2020    Description (location/character/radiation): See lab results    Intensity:  NA    Accompanying signs and symptoms: None    History (similar episodes/previous evaluation): NA    Precipitating or alleviating factors: None    Therapies tried and outcome: NA     Hypertension Follow-up      Do you check your blood pressure regularly outside of the clinic? No     Are you following a low salt diet? Yes    Are your blood pressures ever more than 140 on the top number (systolic) OR more   than 90 on the bottom number (diastolic), for example 140/90?  n/a    Depression Followup    How are you doing with your depression since your last visit? Worsened and would like to try an increased dose of Wellbutrin    Are you having other symptoms that might be associated with depression? No    Have you had a significant life event?  No     Are you feeling anxious or having panic attacks?   No    Do you have any concerns with your use of alcohol or other drugs? No    Social History     Tobacco Use     Smoking status: Never Smoker     Smokeless tobacco: Never Used   Substance Use Topics     Alcohol use: Not Currently     Alcohol/week: 0.0 standard drinks     Comment: 1/month     Drug use: No     PHQ 9/17/2018 10/10/2018 4/30/2019   PHQ-9 Total Score 1 4 3   Q9: Thoughts of better off dead/self-harm past 2 weeks Not at all Not at all Not at all     No flowsheet data found.  Last PHQ-9 4/30/2019   1.  Little interest or pleasure in doing things 1   2.  Feeling down, depressed, or hopeless 0   3.  Trouble falling or staying asleep, or sleeping too much 1   4.  Feeling tired or having little energy 1   5.  Poor appetite or overeating 0   6.  Feeling bad about yourself 0   7.  Trouble concentrating 0   8.  Moving slowly or restless 0   Q9: Thoughts of better off  dead/self-harm past 2 weeks 0   PHQ-9 Total Score 3   Difficulty at work, home, or with people Not difficult at all         Suicide Assessment Five-step Evaluation and Treatment (SAFE-T)    Chronic Kidney Disease Follow-up      Do you take any over the counter pain medicine?: No      Patient Active Problem List   Diagnosis     Atrial fibrillation (H)     Pulmonary hypertension (H)     CAD (coronary artery disease)     BPH (benign prostatic hyperplasia)     Ischemic cardiomyopathy     Anemia due to bone marrow failure, unspecified bone marrow failure type (H)     JIM (obstructive sleep apnea)- mild/moderate     Acute stress reaction     Health Care Home     Vitamin D deficiency     Advance Care Planning     Gastroesophageal reflux disease without esophagitis     Hydrocele, unspecified hydrocele type     Slow transit constipation     Hypercholesterolemia     Venous stasis dermatitis of both lower extremities     Lymphedema of both lower extremities     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      Coronary artery disease due to lipid rich plaque     Acquired hypothyroidism     Onychomycosis     PAD (peripheral artery disease) (H)     Blood in stool     Umbilical hernia without obstruction and without gangrene     Xerosis cutis     Status post coronary angiogram     AK (actinic keratosis)     Angioma of skin     Acute systolic CHF (congestive heart failure) (H)     CHF (congestive heart failure) (H)     GI bleed     Hematemesis     Epistaxis     Non-rheumatic mitral regurgitation     Mitral regurgitation     CKD (chronic kidney disease) stage 3, GFR 30-59 ml/min (H)     Blister of back     Bullous pemphigoid     Chronic atrial fibrillation     Presence of Watchman left atrial appendage closure device     Past Surgical History:   Procedure Laterality Date     BONE MARROW BIOPSY, BONE SPECIMEN, NEEDLE/TROCAR N/A 4/19/2019    Procedure: BONE MARROW BIOPSY;  Surgeon: Yovani Cooley MD;  Location:  OR      CV HEART CATHETERIZATION WITH POSSIBLE INTERVENTION N/A 4/12/2019    Procedure: Heart Catheterization with possible Intervention;  Surgeon: Lev Beck MD;  Location:  HEART CARDIAC CATH LAB     CV LEFT ATRIAL APPENDAGE CLOSURE N/A 12/3/2019    Procedure: Left Atrial Appendage Closure;  Surgeon: Segundo Hope MD;  Location:  HEART CARDIAC CATH LAB     CV MITRACLIP N/A 5/20/2019    Procedure: CV MITRAL CLIP;  Surgeon: Harshil Winter MD;  Location:  HEART CARDIAC CATH LAB     CV RIGHT HEART CATH N/A 4/12/2019    Procedure: Right Heart Cath;  Surgeon: Lev Beck MD;  Location:  HEART CARDIAC CATH LAB     EYE SURGERY      CATARACT/BLEPHAROPLASTY     GENITOURINARY SURGERY      TUNA     HERNIA REPAIR      RIGHT INGUINAL     HERNIORRHAPHY INGUINAL  8/14/2012    Procedure: HERNIORRHAPHY INGUINAL;  right inguinal hernia repair;  Surgeon: Kareem Torrez MD;  Location: [facility]     HERNIORRHAPHY UMBILICAL N/A 10/31/2017    Procedure: HERNIORRHAPHY UMBILICAL;  UMBILICAL HERNIA REPAIR WITH MESH;  Surgeon: Scar Harrington MD;  Location: [facility]     IMPLANT PACEMAKER  8/2009    single chamber     [place]      [facility] [place]THOPEDIC SURGERY  2010    right TKR, left TKR     SURGICAL HISTORY OF -       Thumb surgery     SURGICAL HISTORY OF -   1990s    Left total knee replacement     SURGICAL HISTORY OF -   3/11    [facility] total knee replacement       Social History     Tobacco Use     Smoking status: Never Smoker     Smokeless tobacco: Never Used   Substance Use Topics     Alcohol use: Not Currently     Alcohol/week: 0.0 standard drinks     Comment: 1/month     Family History   Problem Relation Age of Onset     Cardiovascular Father      C.A.D. Father      Lung Cancer Sister      Unknown/Adopted Mother      Cancer - colorectal No family hx of              Reviewed and updated as needed this visit by Provider         Review of Systems   ROS COMP: Constitutional, HEENT, cardiovascular,  "pulmonary, gi and gu systems are negative, except as otherwise noted.      Objective    /78 (Patient Position: Sitting, Cuff Size: Adult Regular)   Pulse 56   Temp 97.4  F (36.3  C) (Tympanic)   Resp 14   Ht 1.88 m (6' 2\")   Wt 90.5 kg (199 lb 8 oz)   SpO2 99%   BMI 25.61 kg/m    Body mass index is 25.61 kg/m .  Physical Exam   GENERAL APPEARANCE: healthy, alert and no distress            Assessment & Plan       ICD-10-CM    1. 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 Presence of Watchman left atrial appendage closure device Z95.818    3. Ischemic cardiomyopathy I25.5 bumetanide (BUMEX) 1 MG tablet     carvedilol (COREG) 6.25 MG tablet   4. Benign prostatic hyperplasia, unspecified whether lower urinary tract symptoms present N40.0 finasteride (PROSCAR) 5 MG tablet   5. Acquired hypothyroidism E03.9 levothyroxine (SYNTHROID/LEVOTHROID) 100 MCG tablet   6. Esophageal reflux K21.9    7. Benign prostatic hyperplasia with urinary hesitancy N40.1 tamsulosin (FLOMAX) 0.4 MG capsule    R39.11    8. Gastroesophageal reflux disease, esophagitis presence not specified K21.9 sucralfate (CARAFATE) 1 GM tablet        BMI:   Estimated body mass index is 25.61 kg/m  as calculated from the following:    Height as of this encounter: 1.88 m (6' 2\").    Weight as of this encounter: 90.5 kg (199 lb 8 oz).           Patient Instructions   The patient was largely here today to get refills for his medications.  He recently changed from [employer] insurance to Medicare.  He also gets some of his medications from [facility].  He is not exactly clear as to which medicines he gets where and which ones he needs refilled.  At the end of the discussion we [facility] and [facility] does his Eliquis.  We did refill the remainder of his medications and sent them to [facility].  I did increase his Wellbutrin to 300 mg daily.  He " will follow-up in 1 month on that medication.      Return in about 4 weeks (around 2/12/2020) for depression, heart failure, labs.    Mg Nelson MD  [facility]

## 2020-01-15 NOTE — TELEPHONE ENCOUNTER
Reason for Call:  Form, our goal is to have forms completed with 72 hours, however, some forms may require a visit or additional information.    Type of letter, form or note:  medical    Who is the form from?: Home care    Where did the form come from: form was faxed in    What clinic location was the form placed at?: [facility]    Where the form was placed: Given to physician    What number is listed as a contact on the form?: Fax: [phone]  PHone: [phone]       Additional comments: [employer]: POC 11/03/19-1/1/2020    Call taken on 1/15/2020 at 10:45 AM by Rose Sinha

## 2020-01-16 NOTE — PATIENT INSTRUCTIONS
The patient was largely here today to get refills for his medications.  He recently changed from [employer] insurance to Medicare.  He also gets some of his medications from [facility].  He is not exactly clear as to which medicines he gets where and which ones he needs refilled.  At the end of the discussion we decided that [facility] and that cardiology does his Eliquis.  We did refill the remainder of his medications and sent them to [facility].  I did increase his Wellbutrin to 300 mg daily.  He will follow-up in 1 month on that medication.

## 2020-01-17 ENCOUNTER — HOSPITAL ENCOUNTER (OUTPATIENT)
Facility: CLINIC | Age: 85
Discharge: HOME | End: 2020-01-17
Attending: INTERNAL MEDICINE | Admitting: INTERNAL MEDICINE
Payer: COMMERCIAL

## 2020-01-17 ENCOUNTER — HOSPITAL ENCOUNTER (OUTPATIENT)
Dept: CARDIOLOGY | Facility: CLINIC | Age: 85
End: 2020-01-17
Attending: NURSE PRACTITIONER | Admitting: INTERNAL MEDICINE
Payer: COMMERCIAL

## 2020-01-17 VITALS
WEIGHT: 201.7 LBS | TEMPERATURE: 98.1 F | RESPIRATION RATE: 15 BRPM | DIASTOLIC BLOOD PRESSURE: 68 MMHG | SYSTOLIC BLOOD PRESSURE: 119 MMHG | HEIGHT: 74 IN | BODY MASS INDEX: 25.89 KG/M2 | OXYGEN SATURATION: 96 % | HEART RATE: 67 BPM

## 2020-01-17 DIAGNOSIS — Z98.890 S/P LEFT ATRIAL APPENDAGE LIGATION: ICD-10-CM

## 2020-01-17 DIAGNOSIS — I48.0 PAF (PAROXYSMAL ATRIAL FIBRILLATION) (H): ICD-10-CM

## 2020-01-17 PROCEDURE — 40000235 ZZH STATISTIC TELEMETRY

## 2020-01-17 PROCEDURE — 93325 DOPPLER ECHO COLOR FLOW MAPG: CPT | Mod: 26 | Performed by: INTERNAL MEDICINE

## 2020-01-17 PROCEDURE — 99152 MOD SED SAME PHYS/QHP 5/>YRS: CPT | Performed by: INTERNAL MEDICINE

## 2020-01-17 PROCEDURE — 25000128 H RX IP 250 OP 636: Performed by: INTERNAL MEDICINE

## 2020-01-17 PROCEDURE — 25000125 ZZHC RX 250: Performed by: INTERNAL MEDICINE

## 2020-01-17 PROCEDURE — 40000857 ZZH STATISTIC TEE INCLUDES SEDATION

## 2020-01-17 PROCEDURE — 93312 ECHO TRANSESOPHAGEAL: CPT | Mod: 26 | Performed by: INTERNAL MEDICINE

## 2020-01-17 PROCEDURE — 93320 DOPPLER ECHO COMPLETE: CPT | Mod: 26 | Performed by: INTERNAL MEDICINE

## 2020-01-17 PROCEDURE — 93320 DOPPLER ECHO COMPLETE: CPT

## 2020-01-17 RX ORDER — GLYCOPYRROLATE 0.2 MG/ML
0.1 INJECTION, SOLUTION INTRAMUSCULAR; INTRAVENOUS ONCE
Status: COMPLETED | OUTPATIENT
Start: 2020-01-17 | End: 2020-01-17

## 2020-01-17 RX ORDER — LIDOCAINE 50 MG/G
OINTMENT TOPICAL ONCE
Status: COMPLETED | OUTPATIENT
Start: 2020-01-17 | End: 2020-01-17

## 2020-01-17 RX ORDER — NALOXONE HYDROCHLORIDE 0.4 MG/ML
.1-.4 INJECTION, SOLUTION INTRAMUSCULAR; INTRAVENOUS; SUBCUTANEOUS
Status: DISCONTINUED | OUTPATIENT
Start: 2020-01-17 | End: 2020-01-17 | Stop reason: HOSPADM

## 2020-01-17 RX ORDER — SODIUM CHLORIDE 9 MG/ML
INJECTION, SOLUTION INTRAVENOUS CONTINUOUS PRN
Status: DISCONTINUED | OUTPATIENT
Start: 2020-01-17 | End: 2020-01-17 | Stop reason: HOSPADM

## 2020-01-17 RX ORDER — FENTANYL CITRATE 50 UG/ML
25 INJECTION, SOLUTION INTRAMUSCULAR; INTRAVENOUS
Status: DISCONTINUED | OUTPATIENT
Start: 2020-01-17 | End: 2020-01-17 | Stop reason: HOSPADM

## 2020-01-17 RX ORDER — FLUMAZENIL 0.1 MG/ML
0.2 INJECTION, SOLUTION INTRAVENOUS
Status: DISCONTINUED | OUTPATIENT
Start: 2020-01-17 | End: 2020-01-17 | Stop reason: HOSPADM

## 2020-01-17 RX ORDER — LIDOCAINE 40 MG/G
CREAM TOPICAL
Status: DISCONTINUED | OUTPATIENT
Start: 2020-01-17 | End: 2020-01-17 | Stop reason: HOSPADM

## 2020-01-17 RX ORDER — LIDOCAINE HYDROCHLORIDE 40 MG/ML
1.5 SOLUTION TOPICAL ONCE
Status: COMPLETED | OUTPATIENT
Start: 2020-01-17 | End: 2020-01-17

## 2020-01-17 RX ORDER — DEXTROSE MONOHYDRATE 25 G/50ML
9.5 INJECTION, SOLUTION INTRAVENOUS
Status: DISCONTINUED | OUTPATIENT
Start: 2020-01-17 | End: 2020-01-17 | Stop reason: HOSPADM

## 2020-01-17 RX ADMIN — FENTANYL CITRATE 25 MCG: 50 INJECTION, SOLUTION INTRAMUSCULAR; INTRAVENOUS at 11:17

## 2020-01-17 RX ADMIN — LIDOCAINE HYDROCHLORIDE 1.5 ML: 40 SOLUTION TOPICAL at 11:01

## 2020-01-17 RX ADMIN — GLYCOPYRROLATE 0.1 MG: 0.2 INJECTION, SOLUTION INTRAMUSCULAR; INTRAVENOUS at 11:03

## 2020-01-17 RX ADMIN — TOPICAL ANESTHETIC 0.5 ML: 200 SPRAY DENTAL; PERIODONTAL at 11:05

## 2020-01-17 RX ADMIN — MIDAZOLAM 1 MG: 1 INJECTION INTRAMUSCULAR; INTRAVENOUS at 11:17

## 2020-01-17 ASSESSMENT — MIFFLIN-ST. JEOR: SCORE: 1654.66

## 2020-01-17 NOTE — PROGRESS NOTES
Care Suites Procedure Nursing Note    Procedure: CHELLE  Procedure started time: Sedation started at 1117  Procedure completed time: probe removed at 1149  Concerns/abnormal assessment: none, pt awake, denies any sore throat or pain.  Will cont to monitor.  VSS  Plan: monitor.  Will call son for .     Sedation for procedure: Versed 1 mg and Fentanyl 25 mcg.

## 2020-01-17 NOTE — PROGRESS NOTES
Care Suites Admission Nursing Note    Reason for admission: CHELLE  CS arrival time: 1000  Accompanied by: Son Josh hilario   Name/phone of DC : Josh [phone]  Medications held: Bumex-   Consent signed: will sign with MD  Abnormal assessment/labs: n/a  If abnormal, provider notified: n/a  Education/questions answered: yes, AVS reviewed with pt.  States understanding.  Plan: CHELLE    Denies any diff swallowing or esophageal disorder.  Denies any false or removal teeth.  Pt sleeps with CPAP nightly.

## 2020-01-17 NOTE — DISCHARGE INSTRUCTIONS
CHELLE  (Transesophageal Echocardiogram)  Discharge Instructions    After you go home:      Have an adult stay with you for 6 hours.       For 24 hours - due to the sedation you received:    Relax and take it easy.    Do NOT make any important or legal decisions.    Do NOT drive or operate machines at home or at work.    Do NOT drink alcohol.    Diet:    You may resume your normal diet, but no scratchy foods for two days.    If your throat is sore, eat cold, bland or soft foods.    You may have heartburn if the tube used in the exam entered your stomach.  If so:   - Do not eat acidic and spicy foods.   - Do not eat three hours before bedtime. Clear liquids are okay.   - When lying down, use two pillows to raise your head.    Medicines:      Take your medications, including blood thinners, unless your provider tells you not to.    If you have stopped any medicines, check with your provider about when to restart them.    You may take Tylenol (Acetaminophen) if your throat is sore.    You may take antacids if you have heartburn.      Follow Up Appointments:      Follow up with your cardiologist at [facility] of patient preference as instructed.    Follow up with your primary care provider as needed.    Call the clinic if:      You have heartburn that is severe or lasts more than 72 hours.    You have a sore throat that feels worse after 72 hours.    You have shortness of breath, neck pain, chest pain, fever, chills, coughing up blood, or other unusual signs.    Questions or concerns      [facility] [place]:    [phone] [facility] (7 days a week)

## 2020-01-17 NOTE — PRE-PROCEDURE
GENERAL PRE-PROCEDURE:   Procedure:  Transesophageal echocardiogram  Date/Time:  1/17/2020 11:08 AM    Verbal consent obtained?: Yes    Written consent obtained?: Yes    Risks and benefits: Risks, benefits and alternatives were discussed    Consent given by:  Patient  Patient states understanding of procedure being performed: Yes    Patient's understanding of procedure matches consent: Yes    Procedure consent matches procedure scheduled: Yes    Appropriately NPO:  Yes  ASA Class:  Class 2- mild systemic disease, no acute problems, no functional limitations  Mallampati  :  Grade 2- soft palate, base of uvula, tonsillar pillars, and portion of posterior pharyngeal wall visible  Lungs:  Lungs clear with good breath sounds bilaterally  Heart:  Normal heart sounds and rate  History & Physical reviewed:  History and physical reviewed and no updates needed  Statement of review:  I have reviewed the lab findings, diagnostic data, medications, and the plan for sedation

## 2020-01-17 NOTE — PROCEDURES
[facility]    Procedure: *Transesophageal Echocardiogram  Date/Time: 1/17/2020 11:52 AM  Performed by: Yadira Méndez MD  Authorized by: Yadira Méndez MD     UNIVERSAL PROTOCOL   Site Marked: Yes  Prior Images Obtained and Reviewed:  Yes  Required items: Required blood products, implants, devices and special equipment available    Patient identity confirmed:  Verbally with patient  Patient was reevaluated immediately before administering moderate or deep sedation or anesthesia  Confirmation Checklist:  Patient's identity using two indicators  Time out: Immediately prior to the procedure a time out was called    Universal Protocol: the Joint Commission Universal Protocol was followed          SEDATION    Patient Sedated: Yes    Sedation:  Fentanyl and midazolam  Vital signs: Vital signs monitored during sedation    PROCEDURE   Patient Tolerance:  Patient tolerated the procedure well with no immediate complications  Describe Procedure: CHELLE probe passed without difficulty. Versed 1 mg and Fentanyl 25 mcg was used for sedation. Patient tolerated procedure well. Preliminary findings of mitral clip x 2 with severe MR, severe TR, bidirectional interatrial shunt s/p transeptal puncture, and Watchman device well seated with adequate FRANKLYN compression, trace leak.   Length of time physician/provider present for 1:1 monitoring during sedation: 30

## 2020-01-17 NOTE — PROGRESS NOTES
Care Suites Discharge Nursing Note    Education/questions answered: yes  Patient DC location: home  Accompanied by: Son  CS discharge time: 1230    Able to ambulate from bed to chair without diff.  Cont to deny pain or sore throat.  AVS in hand on discharge.

## 2020-01-20 ENCOUNTER — TELEPHONE (OUTPATIENT)
Dept: CARDIOLOGY | Facility: CLINIC | Age: 85
End: 2020-01-20

## 2020-01-20 NOTE — TELEPHONE ENCOUNTER
Patient called today to report his wt was 196lbs. Patient reports he is walking, lifting weights and denies any SOB. Patient denies any increase in swelling. Patient also wondering about CHELLE results. RN reviewed results with patient at high level and informed patient that KI Anushka would go over them with him in more detail at upcoming OV on 1/24/20 patient verbalized understanding and has no further questions at this time.         Interpretation Summary     CHELLE for evaluation of Watchman device.     LV normal in size with mildly reduced function.  LVEF 45-50%.  RV normal in size with mildly reduced function.  Severe biatrial enlargement. Bidirectional interatrial shunt is present s/p  transeptal puncture.  33 mm Watchman device visualized in FRANKLYN and is well seated with trivial leak.  Adequate FRANKLYN compression.  Two mitraclips are visualized with double orifice mitral valve. One clip  attached at A2P2. Second clip detached from anterior leaflet.  Severe, posteriorly directed MR. Multiple jets present limit quantification  method by PISA. Mean diastolic gradient is 3 mmHg at a HR of 60 bpm.  Device leads are present in the RV and RA. There is severe TR.  There is no pericardial effusion.     Compared to prior study 12/3/2019, Watchman placement appears stable. Degree  of MR and TR have worsened. Otherwise findings similar.              1/10/20 OV KI Anushka  Assessment and Plan  1.  Atrial fibrillation.  He has a permanent pacemaker, this is getting close to end-of-life, he is scheduled for a device check in 2 weeks.  I have asked scheduling to set this up for the day of his CHELLE.  We will need to put as much space between the device replacement and his watchman procedure as we can.  He has a CHELLE scheduled next week to assess the watchman placement and function.  For now he continues on Eliquis 5 mg twice daily, the appropriate dose for his weight and renal function.  He will likely need to be transition to Plavix after  Dr. Hope reviews a CHELLE.  We did discuss the risks and benefits of the procedure.  He is a little concerned about his sensitive skin.  I told him that while I could not guarantee that he would not have problems with the patches used for the procedure, we can request that they be as gentle as possible and removing them.  If there are any patches that are better for sensitive skin, I would request that they use those.  He will need to be n.p.o. that day.  He will need to have a , the family is aware of this.  2.  Systolic and diastolic heart failure.  Status post MitraClip in May 2019.  He does have significant valvular heart disease, he continues to have tricuspid regurgitation.  He is doing well with Bumex 1 mg twice daily, his weight continues to come down and he seems to be tolerating it based on his kidney function.  He is a little hypokalemic today and I have asked him to restart his potassium supplements.  I will see him back in a couple of weeks, will check a BMP at that time.  He continues on good medical management with carvedilol and diuretics.  We could consider adding isosorbide if he continues to do well at her next visit.  He is intentionally not on a ACE or ARB because of his renal dysfunction.  3.  Coronary artery disease.  He is not having any ischemic symptoms.  He continues on aspirin, statin and beta-blockade.  4.  Depression.  He is getting a little more angry than he has been in the past, his wife complains about this quite a lot and he does not deny it.  He is currently on Wellbutrin  mg daily.  I have asked him to talk to his primary to see if we can get this dose increased.  He is willing to do so.     Thank you for allowing me to care for Bairon Foster today.     WILLIS Bullard, CNP  Cardiology  Greater than half of this 45 minute appointment was spent in counseling and coordination of care.

## 2020-01-21 ENCOUNTER — ANCILLARY PROCEDURE (OUTPATIENT)
Dept: CARDIOLOGY | Facility: CLINIC | Age: 85
End: 2020-01-21
Attending: INTERNAL MEDICINE
Payer: COMMERCIAL

## 2020-01-21 DIAGNOSIS — Z95.0 CARDIAC PACEMAKER IN SITU: ICD-10-CM

## 2020-01-24 ENCOUNTER — OFFICE VISIT (OUTPATIENT)
Dept: CARDIOLOGY | Facility: CLINIC | Age: 85
End: 2020-01-24
Attending: NURSE PRACTITIONER
Payer: COMMERCIAL

## 2020-01-24 VITALS
WEIGHT: 203 LBS | DIASTOLIC BLOOD PRESSURE: 66 MMHG | SYSTOLIC BLOOD PRESSURE: 130 MMHG | BODY MASS INDEX: 26.05 KG/M2 | HEART RATE: 61 BPM | HEIGHT: 74 IN

## 2020-01-24 DIAGNOSIS — I25.5 ISCHEMIC CARDIOMYOPATHY: Primary | ICD-10-CM

## 2020-01-24 DIAGNOSIS — Z95.818 PRESENCE OF WATCHMAN LEFT ATRIAL APPENDAGE CLOSURE DEVICE: ICD-10-CM

## 2020-01-24 DIAGNOSIS — E87.6 HYPOKALEMIA: ICD-10-CM

## 2020-01-24 DIAGNOSIS — Z98.890 S/P MITRAL VALVE REPAIR: ICD-10-CM

## 2020-01-24 DIAGNOSIS — Z95.0 CARDIAC PACEMAKER IN SITU: ICD-10-CM

## 2020-01-24 LAB
ANION GAP SERPL CALCULATED.3IONS-SCNC: 7.8 MMOL/L (ref 6–17)
BUN SERPL-MCNC: 24 MG/DL (ref 7–30)
CALCIUM SERPL-MCNC: 9.2 MG/DL (ref 8.5–10.5)
CHLORIDE SERPL-SCNC: 103 MMOL/L (ref 98–107)
CO2 SERPL-SCNC: 32 MMOL/L (ref 23–29)
CREAT SERPL-MCNC: 1.19 MG/DL (ref 0.7–1.3)
GFR SERPL CREATININE-BSD FRML MDRD: 58 ML/MIN/{1.73_M2}
GLUCOSE SERPL-MCNC: 122 MG/DL (ref 70–105)
POTASSIUM SERPL-SCNC: 3.8 MMOL/L (ref 3.5–5.1)
SODIUM SERPL-SCNC: 139 MMOL/L (ref 136–145)

## 2020-01-24 PROCEDURE — 36415 COLL VENOUS BLD VENIPUNCTURE: CPT | Performed by: NURSE PRACTITIONER

## 2020-01-24 PROCEDURE — 99214 OFFICE O/P EST MOD 30 MIN: CPT | Performed by: NURSE PRACTITIONER

## 2020-01-24 PROCEDURE — 80048 BASIC METABOLIC PNL TOTAL CA: CPT | Performed by: NURSE PRACTITIONER

## 2020-01-24 ASSESSMENT — MIFFLIN-ST. JEOR: SCORE: 1660.55

## 2020-01-24 NOTE — LETTER
1/24/2020    Mg Nelson MD  [address]  [facility] [address]    RE: Bairon Foster       Dear Colleague,    I had the pleasure of seeing Bairon Foster in the [facility].    HPI and Plan:   I had the pleasure of seeing Bairon Foster, his wife and granddaughter today in cardiology clinic follow up after his CHELLE last week demonstrated successful closure with a watchman device that he had placed December 3, 2019. He is a pleasant 88 year old patient of Dr. Hope.    Mr. 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 He has had a terrible time tolerating it anticoagulation, he has had epitaxis, probable GI bleeds and his bullous pemphigoid which affects his skin significantly causes bleeding when the blisters burst.      On December 3, 2019 he underwent successful left atrial appendage closure with a 33 mm watchman device, there were no leaks identified.  He was placed on apixaban 5 mg twice daily for at least 45 days.  On January 17 he underwent  transesophageal echocardiogram to assess the watchman.  The echo reports that the devices well-seated with a trivial leak.  There is adequate left atrial appendage compression.  It is noted that there is a bidirectional intra-atrial shunt status post transseptal puncture, Dr. Hope feels this will close on its own.  There is some worsening MR ant TR.    Today Alfa is doing well.  He continues to have epistaxis almost daily on the Eliquis and baby aspirin.  He continues on Bumex 1 mg twice a day, his renal function is excellent.  His weight is starting to creep up, both at home and on our  scale here.  He has been doing exercises on his chair, squats stands and feels like he is getting strength back in his legs.  He ambulates with a cane and wonders if there are any exercises he can do to improve his ability to do stairs at home.  On exam he does have significantly elevated JVP and bilateral lower extremity edema.  His device is being checked monthly now as it is getting end-of-life.    Physical Exam  Please see Below     Assessment and Plan  1.  Atrial fibrillation.  He has a permanent pacemaker which we are checking monthly as it is close to end-of-life.  He had watchman placement in early December 2019 and his recent transesophageal echo shows the device to be well-seated.  I did discuss this with Dr. Hope, we will go ahead and discontinue his Eliquis and continue him on aspirin only.  Given the fact that he has daily nosebleeds  and the fact that he has had difficulty tolerating dual antiplatelet therapy in the past, we will forego Plavix for now.    2.  Systolic and diastolic heart failure.  Status post MitraClip in May 2019.  He does have significant valvular heart disease, he continues to have tricuspid regurgitation.  He is doing well with Bumex 1 mg twice daily  He is intentionally not on a ACE or ARB because of his renal dysfunction.  His weight is up a little bit, I think it fluctuates because of his valvular heart disease.  We reviewed the importance of a low-sodium diet.  He is also consuming more calories as he is having ice cream on a daily and sometimes several times a day basis.  He continues to take the Bumex as I mentioned, I told him he could take an extra half tablet up to 3 times a week if his weight  has gone up more than 3 or 4 pounds in a day or 5 pounds in a week.  If he needs to use more Bumex than that, he should give me a call and I will see him sooner than the 2-month follow-up we have arranged.  3.  Coronary artery disease.  He is not having any ischemic symptoms.  He  continues on aspirin, statin and beta-blockade.  4.  Depression.      He saw his primary and increased his Wellbutrin, hopefully this will help.  Thank you for allowing me to care for Bairon Foster today.  I will see him back in 2 months with a BMP prior.    WILLIS Bullard, CNP  Cardiology  Greater than half of this 45 minute appointment was spent in counseling and coordination of care.    Voice recognition software was used for this note, I have reviewed this note, but errors may have been missed.    Orders Placed This Encounter   Procedures     Basic metabolic panel     Follow-Up with Cardiac Advanced Practice Provider     No orders of the defined types were placed in this encounter.    Medications Discontinued During This Encounter   Medication Reason     apixaban ANTICOAGULANT (ELIQUIS) 5 MG tablet          CURRENT MEDICATIONS:  Current Outpatient Medications   Medication Sig Dispense Refill     acetaminophen (TYLENOL) 500 MG tablet Take 2 tablets (1,000 mg) by mouth every 6 hours as needed for mild pain 100 tablet 0     aspirin 81 MG EC tablet Take 81 mg by mouth daily        bumetanide (BUMEX) 1 MG tablet Take 1 tablet (1 mg) by mouth 2 times daily 180 tablet 3     buPROPion (WELLBUTRIN XL) 300 MG 24 hr tablet Take 1 tablet (300 mg) by mouth every morning 90 tablet 1     Carboxymethylcellulose Sod PF (CELLUVISC/REFRESH LIQUIGEL) 1 % ophthalmic solution Place 2 drops into both eyes daily as needed for dry eyes        carvedilol (COREG) 6.25 MG tablet TAKE 1 TABLET TWICE DAILY WITH MEALS 180 tablet 3     clobetasol (TEMOVATE) 0.05 % external cream Apply topically twice a week AS NEEDED       finasteride (PROSCAR) 5 MG tablet TAKE 1 TABLET EVERY DAY 90 tablet 3     glucosamine-chondroitin 500-400 MG CAPS per capsule Take 2 capsules by mouth daily AS NEEDED       levothyroxine (SYNTHROID/LEVOTHROID) 100 MCG tablet TAKE 1 TABLET EVERY DAY 90 tablet 3     Multiple Vitamins-Minerals (CENTRUM SILVER) per  tablet Take 1 tablet by mouth daily       niacinamide (NIACIN) 500 MG tablet TAKE ONE TABLET BY MOUTH TWICE A DAY       omeprazole (PRILOSEC) 20 MG DR capsule TAKE 1 CAPSULE TWICE DAILY 180 capsule 3     Polyethylene Glycol 3350 (MIRALAX PO) Take 1 packet by mouth At Bedtime        potassium chloride ER (K-DUR/KLOR-CON M) 20 MEQ CR tablet Take 1 tablet (20 mEq) by mouth 2 times daily 180 tablet 3     simvastatin (ZOCOR) 20 MG tablet Take 20 mg by mouth daily        sucralfate (CARAFATE) 1 GM tablet Take 1 tablet (1 g) by mouth 2 times daily 180 tablet 3     tamsulosin (FLOMAX) 0.4 MG capsule TAKE 1 CAPSULE EVERY DAY 90 capsule 3     triamcinolone (KENALOG) 0.025 % cream Apply topically 2 times daily as needed        ACE/ARB/ARNI NOT PRESCRIBED (INTENTIONAL) Please choose reason not prescribed, below       COMPRESSION STOCKINGS 1 each daily 2 each 0     ketotifen (ZADITOR) 0.025 % SOLN ophthalmic solution Place 1 drop into both eyes every 12 hours (Patient not taking: Reported on 12/19/2019) 1 Bottle      ORDER FOR DME Auto-CPAP:Max 9 cm H2OMin 9 cm H2O  Continuous Lifetime need and heated humidity.    1 Device 0       ALLERGIES     Allergies   Allergen Reactions     Furosemide Blisters     Bullous pemphigoid. Bumex and ethacrynic acid okay.     Torsemide Blisters     Bullous pemphigoid.  Bumex and ethacrynic acid okay.       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     CKD (chronic kidney disease) stage 3, GFR 30-59 ml/min (H)      Coronary atherosclerosis of unspecified type of vessel, native or graft     S/P PTCA, EF 50%     Epistaxis      Esophageal reflux      GI bleed 4/17/2019     Hypercholesterolemia      Ischemic cardiomyopathy      Lymphedema of both lower extremities      Wears ACE wraps     Mitral regurgitation     s/p MitraClip 5/20/19     Obese      JIM on CPAP      Other lymphedema     GROIN AND THIGHS     Other pancytopenia (H)      Other specified anemias      Overweight      Pacemaker     PACEMAKER-BS Implanted 8/5/2009      PAD (peripheral artery disease) (H)      Pancytopenia (H)      Persistent atrial fibrillation     VVI PM for long pauses     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    Closure with a 33 mm Watchman device      Pulmonary hypertension (H)      Status post coronary angiogram 3/21/2018     Systolic CHF (H)      TIA (transient ischemic attack)      Unspecified hypothyroidism      Venous stasis dermatitis of both lower extremities      Vitamin D deficiency        PAST SURGICAL HISTORY:  Past Surgical History:   Procedure Laterality Date     BONE MARROW BIOPSY, BONE SPECIMEN, NEEDLE/TROCAR N/A 4/19/2019    Procedure: BONE MARROW BIOPSY;  Surgeon: Yovani Cooley MD;  Location:  OR     CV HEART CATHETERIZATION WITH POSSIBLE INTERVENTION N/A 4/12/2019    Procedure: Heart Catheterization with possible Intervention;  Surgeon: Lev Beck MD;  Location:  HEART CARDIAC CATH LAB     CV LEFT ATRIAL APPENDAGE CLOSURE N/A 12/3/2019    Procedure: Left Atrial Appendage Closure;  Surgeon: Segundo Hope MD;  Location:  HEART CARDIAC CATH LAB     CV MITRACLIP N/A 5/20/2019    Procedure: CV MITRAL CLIP;  Surgeon: Harshil Winter MD;  Location:  HEART CARDIAC CATH LAB     CV RIGHT HEART CATH N/A 4/12/2019    Procedure: Right Heart Cath;  Surgeon: eLv Beck MD;  Location:  HEART CARDIAC CATH LAB     EYE SURGERY      CATARACT/BLEPHAROPLASTY     GENITOURINARY SURGERY      TUNA     HERNIA REPAIR      RIGHT INGUINAL     HERNIORRHAPHY INGUINAL  8/14/2012    Procedure: HERNIORRHAPHY INGUINAL;  right inguinal hernia repair;  Surgeon: Kareem Torrez MD;  Location:  [facility]     HERNIORRHAPHY UMBILICAL N/A  10/31/2017    Procedure: HERNIORRHAPHY UMBILICAL;  UMBILICAL HERNIA REPAIR WITH MESH;  Surgeon: Scar Harrington MD;  Location:  [facility]     IMPLANT PACEMAKER  8/2009    single chamber     ORTHOPEDIC SURGERY      [facility] RIGHT     ORTHOPEDIC SURGERY  2010    right TKR, left TKR     SURGICAL HISTORY OF -       Thumb surgery     SURGICAL HISTORY OF -   1990s    Left total knee replacement     SURGICAL HISTORY OF -   3/11    Righ total knee replacement       FAMILY HISTORY:  Family History   Problem Relation Age of Onset     Cardiovascular Father      C.A.D. Father      Lung Cancer Sister      Unknown/Adopted Mother      Cancer - colorectal No family hx of        SOCIAL HISTORY:  Social History     Socioeconomic History     Marital status:      Spouse name: None     Number of children: None     Years of education: None     Highest education level: None   Occupational History     Occupation: Teacher, author, speaker     Employer: RETIRED   Social Needs     Financial resource strain: None     Food insecurity:     Worry: None     Inability: None     Transportation needs:     Medical: None     Non-medical: None   Tobacco Use     Smoking status: Never Smoker     Smokeless tobacco: Never Used   Substance and Sexual Activity     Alcohol use: Not Currently     Alcohol/week: 0.0 standard drinks     Comment: 1/month     Drug use: No     Sexual activity: Not Currently     Partners: Female   Lifestyle     Physical activity:     Days per week: None     Minutes per session: None     Stress: None   Relationships     Social connections:     Talks on phone: None     Gets together: None     Attends Muslim service: None     Active member of club or organization: None     Attends meetings of clubs or organizations: None     Relationship status: None     Intimate partner violence:     Fear of current or ex partner: None     Emotionally abused: None     Physically abused: None     Forced sexual activity: None   Other Topics Concern      "Parent/sibling w/ CABG, MI or angioplasty before 65F 55M? No      Service Not Asked     Blood Transfusions Not Asked     Caffeine Concern No     Comment: 1-2 cups coffee a day     Occupational Exposure Not Asked     Hobby Hazards Not Asked     Sleep Concern No     Stress Concern Yes     Comment: due to wifes health condition     Weight Concern No     Special Diet No     Back Care Not Asked     Exercise Yes     Comment: states he uses his tredmill on and off     Bike Helmet Not Asked     Seat Belt Yes     Self-Exams Not Asked   Social History Narrative     None       Review of Systems:  Skin:  Positive for bruising states has open wounds, blisters   Eyes:  Positive for glasses cataract surgery on both eyes  ENT:  Negative      Respiratory:  Positive for sleep apnea;CPAP     Cardiovascular:    Positive for;edema    Gastroenterology: Negative      Genitourinary:  not assessed      Musculoskeletal:  Positive for muscular weakness;arthritis    Neurologic:  Positive for stroke    Psychiatric:  Negative      Heme/Lymph/Imm:  Positive for easy bruising    Endocrine:  Positive for thyroid disorder      Physical Exam:  Vitals: /66   Pulse 61   Ht 1.88 m (6' 2\")   Wt 92.1 kg (203 lb)   BMI 26.06 kg/m       Constitutional:  cooperative        Skin:  warm and dry to the touch   pacemaker incision in the left infraclavicular area was well-healed      Head:           Eyes:  pupils equal and round        Lymph:      ENT:  no pallor or cyanosis        Neck:    JVP elevated;JVP 10-12 Large V wave consistent with tricuspid regurgitation    Respiratory:  normal symmetry;no tenderness to palpation;normal respiratory excursion;no intercostal retraction;no use of accessory muscles;clear to auscultation         Cardiac: regular rhythm;normal S1 and S2                                                    feet and legs wrapped, thighs not edematous    GI:  abdomen soft        Extremities and Muscular Skeletal:      bilateral " LE edema;1+;2+     feet and legs wrapped    Neurological:  affect appropriate   walking with cane    Psych:  Alert and Oriented x 3    Encounter Diagnoses   Name Primary?     Hypokalemia      Cardiac pacemaker in situ      S/P mitral valve repair      Presence of Watchman left atrial appendage closure device      Ischemic cardiomyopathy Yes       Recent Lab Results:  LIPID RESULTS:  Lab Results   Component Value Date    CHOL 97 08/21/2019    HDL 41 08/21/2019    LDL 48 08/21/2019    TRIG 41 08/21/2019    CHOLHDLRATIO 2.1 06/15/2015       LIVER ENZYME RESULTS:  Lab Results   Component Value Date    AST 30 12/02/2019    ALT 24 12/02/2019       CBC RESULTS:  Lab Results   Component Value Date    WBC 4.3 12/04/2019    RBC 2.65 (L) 12/04/2019    HGB 8.4 (L) 12/04/2019    HCT 26.8 (L) 12/04/2019     (H) 12/04/2019    MCH 31.7 12/04/2019    MCHC 31.3 (L) 12/04/2019    RDW 19.3 (H) 12/04/2019    PLT 69 (L) 12/04/2019       BMP RESULTS:  Lab Results   Component Value Date     01/24/2020    POTASSIUM 3.8 01/24/2020    CHLORIDE 103 01/24/2020    CO2 32 (H) 01/24/2020    ANIONGAP 7.8 01/24/2020     (H) 01/24/2020    BUN 24 01/24/2020    CR 1.19 01/24/2020    GFRESTIMATED 58 (L) 01/24/2020    GFRESTBLACK 70 01/24/2020    TEODORO 9.2 01/24/2020        A1C RESULTS:  Lab Results   Component Value Date    A1C 5.5 05/29/2019       INR RESULTS:  Lab Results   Component Value Date    INR 1.28 (H) 12/04/2019    INR 1.44 (H) 05/20/2019           CC  WILLIS Spence CNP  [address]  [place] [id number]                Thank you for allowing me to participate in the care of your patient.    Sincerely,     WILLIS Smith CNP     [facility]

## 2020-01-24 NOTE — PROGRESS NOTES
HPI and Plan:   I had the pleasure of seeing Bairon Foster, his wife and granddaughter today in cardiology clinic follow up after his CHELLE last week demonstrated successful closure with a watchman device that he had placed December 3, 2019. He is a pleasant 88 year old patient of Dr. Hope.    Mr. 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 He has had a terrible time tolerating it anticoagulation, he has had epitaxis, probable GI bleeds and his bullous pemphigoid which affects his skin significantly causes bleeding when the blisters burst.      On December 3, 2019 he underwent successful left atrial appendage closure with a 33 mm watchman device, there were no leaks identified.  He was placed on apixaban 5 mg twice daily for at least 45 days.  On January 17 he underwent  transesophageal echocardiogram to assess the watchman.  The echo reports that the devices well-seated with a trivial leak.  There is adequate left atrial appendage compression.  It is noted that there is a bidirectional intra-atrial shunt status post transseptal puncture, Dr. Hope feels this will close on its own.  There is some worsening MR ant TR.    Today Alfa is doing well.  He continues to have epistaxis almost daily on the Eliquis and baby aspirin.  He continues on Bumex 1 mg twice a day, his renal function is excellent.  His weight is starting to creep up, both at home and on our scale here.  He has been doing exercises on his chair, squats stands and feels like he is getting strength back in his legs.  He ambulates with a cane and wonders if there are any exercises he can do to improve his ability to  do stairs at home.  On exam he does have significantly elevated JVP and bilateral lower extremity edema.  His device is being checked monthly now as it is getting end-of-life.    Physical Exam  Please see Below     Assessment and Plan  1.  Atrial fibrillation.  He has a permanent pacemaker which we are checking monthly as it is close to end-of-life.  He had watchman placement in early December 2019 and his recent transesophageal echo shows the device to be well-seated.  I did discuss this with Dr. Hope, we will go ahead and discontinue his Eliquis and continue him on aspirin only.  Given the fact that he has daily nosebleeds  and the fact that he has had difficulty tolerating dual antiplatelet therapy in the past, we will forego Plavix for now.    2.  Systolic and diastolic heart failure.  Status post MitraClip in May 2019.  He does have significant valvular heart disease, he continues to have tricuspid regurgitation.  He is doing well with Bumex 1 mg twice daily  He is intentionally not on a ACE or ARB because of his renal dysfunction.  His weight is up a little bit, I think it fluctuates because of his valvular heart disease.  We reviewed the importance of a low-sodium diet.  He is also consuming more calories as he is having ice cream on a daily and sometimes several times a day basis.  He continues to take the Bumex as I mentioned, I told him he could take an extra half tablet up to 3 times a week if his weight  has gone up more than 3 or 4 pounds in a day or 5 pounds in a week.  If he needs to use more Bumex than that, he should give me a call and I will see him sooner than the 2-month follow-up we have arranged.  3.  Coronary artery disease.  He is not having any ischemic symptoms.  He continues on aspirin, statin and beta-blockade.  4.  Depression.    He saw his primary and increased his Wellbutrin, hopefully this will help.  Thank you for allowing me to care for Bairon Foster today.  I will see him back  in 2 months with a BMP prior.    WILLIS Bullard, ИВАН  Cardiology  Greater than half of this 45 minute appointment was spent in counseling and coordination of care.    Voice recognition software was used for this note, I have reviewed this note, but errors may have been missed.    Orders Placed This Encounter   Procedures     Basic metabolic panel     Follow-Up with Cardiac Advanced Practice Provider     No orders of the defined types were placed in this encounter.    Medications Discontinued During This Encounter   Medication Reason     apixaban ANTICOAGULANT (ELIQUIS) 5 MG tablet          CURRENT MEDICATIONS:  Current Outpatient Medications   Medication Sig Dispense Refill     acetaminophen (TYLENOL) 500 MG tablet Take 2 tablets (1,000 mg) by mouth every 6 hours as needed for mild pain 100 tablet 0     aspirin 81 MG EC tablet Take 81 mg by mouth daily        bumetanide (BUMEX) 1 MG tablet Take 1 tablet (1 mg) by mouth 2 times daily 180 tablet 3     buPROPion (WELLBUTRIN XL) 300 MG 24 hr tablet Take 1 tablet (300 mg) by mouth every morning 90 tablet 1     Carboxymethylcellulose Sod PF (CELLUVISC/REFRESH LIQUIGEL) 1 % ophthalmic solution Place 2 drops into both eyes daily as needed for dry eyes        carvedilol (COREG) 6.25 MG tablet TAKE 1 TABLET TWICE DAILY WITH MEALS 180 tablet 3     clobetasol (TEMOVATE) 0.05 % external cream Apply topically twice a week AS NEEDED       finasteride (PROSCAR) 5 MG tablet TAKE 1 TABLET EVERY DAY 90 tablet 3     glucosamine-chondroitin 500-400 MG CAPS per capsule Take 2 capsules by mouth daily AS NEEDED       levothyroxine (SYNTHROID/LEVOTHROID) 100 MCG tablet TAKE 1 TABLET EVERY DAY 90 tablet 3     Multiple Vitamins-Minerals (CENTRUM SILVER) per tablet Take 1 tablet by mouth daily       niacinamide (NIACIN) 500 MG tablet TAKE ONE TABLET BY MOUTH TWICE A DAY       omeprazole (PRILOSEC) 20 MG DR capsule TAKE 1 CAPSULE TWICE DAILY 180 capsule 3     Polyethylene Glycol 3350  (MIRALAX PO) Take 1 packet by mouth At Bedtime        potassium chloride ER (K-DUR/KLOR-CON M) 20 MEQ CR tablet Take 1 tablet (20 mEq) by mouth 2 times daily 180 tablet 3     simvastatin (ZOCOR) 20 MG tablet Take 20 mg by mouth daily        sucralfate (CARAFATE) 1 GM tablet Take 1 tablet (1 g) by mouth 2 times daily 180 tablet 3     tamsulosin (FLOMAX) 0.4 MG capsule TAKE 1 CAPSULE EVERY DAY 90 capsule 3     triamcinolone (KENALOG) 0.025 % cream Apply topically 2 times daily as needed        ACE/ARB/ARNI NOT PRESCRIBED (INTENTIONAL) Please choose reason not prescribed, below       COMPRESSION STOCKINGS 1 each daily 2 each 0     ketotifen (ZADITOR) 0.025 % SOLN ophthalmic solution Place 1 drop into both eyes every 12 hours (Patient not taking: Reported on 12/19/2019) 1 Bottle      ORDER FOR DME Auto-CPAP:Max 9 cm H2OMin 9 cm H2O  Continuous Lifetime need and heated humidity.    1 Device 0       ALLERGIES     Allergies   Allergen Reactions     Furosemide Blisters     Bullous pemphigoid. Bumex and ethacrynic acid okay.     Torsemide Blisters     Bullous pemphigoid.  Bumex and ethacrynic acid okay.       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     CKD (chronic kidney disease) stage 3, GFR 30-59 ml/min (H)      Coronary atherosclerosis of unspecified type of vessel, native or graft     S/P PTCA, EF 50%     Epistaxis      Esophageal reflux      GI bleed 4/17/2019     Hypercholesterolemia      Ischemic cardiomyopathy      Lymphedema of both lower extremities     Wears ACE wraps     Mitral regurgitation     s/p MitraClip 5/20/19     Obese      JIM on CPAP      Other lymphedema     GROIN AND THIGHS     Other pancytopenia (H)      Other specified anemias      Overweight      Pacemaker      PACEMAKER-BS Implanted 8/5/2009      PAD (peripheral artery disease) (H)      Pancytopenia (H)      Persistent atrial fibrillation     VVI PM for long pauses     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    Closure with a 33 mm Watchman device      Pulmonary hypertension (H)      Status post coronary angiogram 3/21/2018     Systolic CHF (H)      TIA (transient ischemic attack)      Unspecified hypothyroidism      Venous stasis dermatitis of both lower extremities      Vitamin D deficiency        PAST SURGICAL HISTORY:  Past Surgical History:   Procedure Laterality Date     BONE MARROW BIOPSY, BONE SPECIMEN, NEEDLE/TROCAR N/A 4/19/2019    Procedure: BONE MARROW BIOPSY;  Surgeon: Yovani Cooley MD;  Location:  OR     CV HEART CATHETERIZATION WITH POSSIBLE INTERVENTION N/A 4/12/2019    Procedure: Heart Catheterization with possible Intervention;  Surgeon: Lev Beck MD;  Location:  HEART CARDIAC CATH LAB     CV LEFT ATRIAL APPENDAGE CLOSURE N/A 12/3/2019    Procedure: Left Atrial Appendage Closure;  Surgeon: Segundo Hope MD;  Location:  HEART CARDIAC CATH LAB     CV MITRACLIP N/A 5/20/2019    Procedure: CV MITRAL CLIP;  Surgeon: Harshil Winter MD;  Location:  HEART CARDIAC CATH LAB     CV RIGHT HEART CATH N/A 4/12/2019    Procedure: Right Heart Cath;  Surgeon: Lev Beck MD;  Location:  HEART CARDIAC CATH LAB     EYE SURGERY      CATARACT/BLEPHAROPLASTY     GENITOURINARY SURGERY      TUNA     HERNIA REPAIR      RIGHT INGUINAL     HERNIORRHAPHY INGUINAL  8/14/2012    Procedure: HERNIORRHAPHY INGUINAL;  right inguinal hernia repair;  Surgeon: Kareem Torrez MD;  Location: [facility]     HERNIORRHAPHY UMBILICAL N/A 10/31/2017    Procedure: HERNIORRHAPHY UMBILICAL;  UMBILICAL HERNIA REPAIR WITH MESH;  Surgeon: Scar Harrington MD;  Location: [facility]     IMPLANT PACEMAKER  8/2009    single chamber     ORTHOPEDIC SURGERY      [facility] [place] [place] RIGHT     ORTHOPEDIC  SURGERY  2010    right TKR, left TKR     SURGICAL HISTORY OF -       Thumb surgery     SURGICAL HISTORY OF -   1990s    Left total knee replacement     SURGICAL HISTORY OF -   3/11    [facility] total knee replacement       FAMILY HISTORY:  Family History   Problem Relation Age of Onset     Cardiovascular Father      C.A.D. Father      Lung Cancer Sister      Unknown/Adopted Mother      Cancer - colorectal No family hx of        SOCIAL HISTORY:  Social History     Socioeconomic History     Marital status:      Spouse name: None     Number of children: None     Years of education: None     Highest education level: None   Occupational History     Occupation: Teacher, author, speaker     Employer: RETIRED   Social Needs     Financial resource strain: None     Food insecurity:     Worry: None     Inability: None     Transportation needs:     Medical: None     Non-medical: None   Tobacco Use     Smoking status: Never Smoker     Smokeless tobacco: Never Used   Substance and Sexual Activity     Alcohol use: Not Currently     Alcohol/week: 0.0 standard drinks     Comment: 1/month     Drug use: No     Sexual activity: Not Currently     Partners: Female   Lifestyle     Physical activity:     Days per week: None     Minutes per session: None     Stress: None   Relationships     Social connections:     Talks on phone: None     Gets together: None     Attends Congregational service: None     Active member of club or organization: None     Attends meetings of clubs or organizations: None     Relationship status: None     Intimate partner violence:     Fear of current or ex partner: None     Emotionally abused: None     Physically abused: None     Forced sexual activity: None   Other Topics Concern     Parent/sibling w/ CABG, MI or angioplasty before 65F 55M? No      Service Not Asked     Blood Transfusions Not Asked     Caffeine Concern No     Comment: 1-2 cups coffee a day     Occupational Exposure Not Asked     Hobby  "Hazards Not Asked     Sleep Concern No     Stress Concern Yes     Comment: due to wifes health condition     Weight Concern No     Special Diet No     Back Care Not Asked     Exercise Yes     Comment: states he uses his tredmill on and off     Bike Helmet Not Asked     Seat Belt Yes     Self-Exams Not Asked   Social History Narrative     None       Review of Systems:  Skin:  Positive for bruising states has open wounds, blisters   Eyes:  Positive for glasses cataract surgery on both eyes  ENT:  Negative      Respiratory:  Positive for sleep apnea;CPAP     Cardiovascular:    Positive for;edema    Gastroenterology: Negative      Genitourinary:  not assessed      Musculoskeletal:  Positive for muscular weakness;arthritis    Neurologic:  Positive for stroke    Psychiatric:  Negative      Heme/Lymph/Imm:  Positive for easy bruising    Endocrine:  Positive for thyroid disorder      Physical Exam:  Vitals: /66   Pulse 61   Ht 1.88 m (6' 2\")   Wt 92.1 kg (203 lb)   BMI 26.06 kg/m      Constitutional:  cooperative        Skin:  warm and dry to the touch   pacemaker incision in the left infraclavicular area was well-healed      Head:           Eyes:  pupils equal and round        Lymph:      ENT:  no pallor or cyanosis        Neck:    JVP elevated;JVP 10-12 Large V wave consistent with tricuspid regurgitation    Respiratory:  normal symmetry;no tenderness to palpation;normal respiratory excursion;no intercostal retraction;no use of accessory muscles;clear to auscultation         Cardiac: regular rhythm;normal S1 and S2                                                    feet and legs wrapped, thighs not edematous    GI:  abdomen soft        Extremities and Muscular Skeletal:      bilateral LE edema;1+;2+     feet and legs wrapped    Neurological:  affect appropriate   walking with cane    Psych:  Alert and Oriented x 3    Encounter Diagnoses   Name Primary?     Hypokalemia      Cardiac pacemaker in situ      S/P " mitral valve repair      Presence of Watchman left atrial appendage closure device      Ischemic cardiomyopathy Yes       Recent Lab Results:  LIPID RESULTS:  Lab Results   Component Value Date    CHOL 97 08/21/2019    HDL 41 08/21/2019    LDL 48 08/21/2019    TRIG 41 08/21/2019    CHOLHDLRATIO 2.1 06/15/2015       LIVER ENZYME RESULTS:  Lab Results   Component Value Date    AST 30 12/02/2019    ALT 24 12/02/2019       CBC RESULTS:  Lab Results   Component Value Date    WBC 4.3 12/04/2019    RBC 2.65 (L) 12/04/2019    HGB 8.4 (L) 12/04/2019    HCT 26.8 (L) 12/04/2019     (H) 12/04/2019    MCH 31.7 12/04/2019    MCHC 31.3 (L) 12/04/2019    RDW 19.3 (H) 12/04/2019    PLT 69 (L) 12/04/2019       BMP RESULTS:  Lab Results   Component Value Date     01/24/2020    POTASSIUM 3.8 01/24/2020    CHLORIDE 103 01/24/2020    CO2 32 (H) 01/24/2020    ANIONGAP 7.8 01/24/2020     (H) 01/24/2020    BUN 24 01/24/2020    CR 1.19 01/24/2020    GFRESTIMATED 58 (L) 01/24/2020    GFRESTBLACK 70 01/24/2020    TEODORO 9.2 01/24/2020        A1C RESULTS:  Lab Results   Component Value Date    A1C 5.5 05/29/2019       INR RESULTS:  Lab Results   Component Value Date    INR 1.28 (H) 12/04/2019    INR 1.44 (H) 05/20/2019           CC  Mackenzie Tejada, APRN CNP  [address]  [place] [id number]

## 2020-01-24 NOTE — PATIENT INSTRUCTIONS
Stop eliquis.    Continue aspirin, 81 mg daily.    Continue bumex 1 mg twice a day. But when your weight is going up more than 2-3 pounds in a day or 5 pounds in a week, take an extra 1/2 tablet up to three times a week to bring the weight back down into the 190s.    Continue with device checks.    Anushka

## 2020-01-31 ENCOUNTER — TELEPHONE (OUTPATIENT)
Dept: FAMILY MEDICINE | Facility: CLINIC | Age: 85
End: 2020-01-31

## 2020-01-31 NOTE — TELEPHONE ENCOUNTER
Reason for Call:  Form, our goal is to have forms completed with 72 hours, however, some forms may require a visit or additional information.    Type of letter, form or note:  medical    Who is the form from?: Home care    Where did the form come from: form was faxed in    [facility]    Where the form was placed: Given to physician    What number is listed as a contact on the form?: Fax: [phone] Phone: [phone]       Additional comments: [facility] Medication List    Call taken on 1/31/2020 at 2:42 PM by Rose Sinha

## 2020-02-03 ENCOUNTER — MEDICAL CORRESPONDENCE (OUTPATIENT)
Dept: HEALTH INFORMATION MANAGEMENT | Facility: CLINIC | Age: 85
End: 2020-02-03

## 2020-02-11 ENCOUNTER — NURSING HOME VISIT (OUTPATIENT)
Dept: GERIATRICS | Facility: CLINIC | Age: 85
End: 2020-02-11
Payer: COMMERCIAL

## 2020-02-11 VITALS
HEIGHT: 68 IN | OXYGEN SATURATION: 93 % | WEIGHT: 194 LBS | DIASTOLIC BLOOD PRESSURE: 64 MMHG | SYSTOLIC BLOOD PRESSURE: 106 MMHG | BODY MASS INDEX: 29.4 KG/M2 | RESPIRATION RATE: 16 BRPM | TEMPERATURE: 97.5 F | HEART RATE: 62 BPM

## 2020-02-11 DIAGNOSIS — R29.6 FALLS FREQUENTLY: Primary | ICD-10-CM

## 2020-02-11 DIAGNOSIS — R53.81 PHYSICAL DECONDITIONING: ICD-10-CM

## 2020-02-11 DIAGNOSIS — N18.30 CKD (CHRONIC KIDNEY DISEASE) STAGE 3, GFR 30-59 ML/MIN (H): ICD-10-CM

## 2020-02-11 DIAGNOSIS — L12.0 BULLOUS PEMPHIGOID (H): ICD-10-CM

## 2020-02-11 DIAGNOSIS — I50.32 CHRONIC HEART FAILURE WITH PRESERVED EJECTION FRACTION (H): ICD-10-CM

## 2020-02-11 DIAGNOSIS — I25.10 CORONARY ARTERY DISEASE INVOLVING NATIVE CORONARY ARTERY OF NATIVE HEART WITHOUT ANGINA PECTORIS: ICD-10-CM

## 2020-02-11 DIAGNOSIS — I48.20 CHRONIC ATRIAL FIBRILLATION (H): ICD-10-CM

## 2020-02-11 PROBLEM — Z53.9 ERRONEOUS ENCOUNTER--DISREGARD: Status: ACTIVE | Noted: 2020-02-11

## 2020-02-11 PROCEDURE — 99310 SBSQ NF CARE HIGH MDM 45: CPT | Performed by: NURSE PRACTITIONER

## 2020-02-11 ASSESSMENT — MIFFLIN-ST. JEOR: SCORE: 1519.48

## 2020-02-11 NOTE — LETTER
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  Admitted to this facility for  rehab, medical management and nursing care.    HPI:    HPI information obtained from: facility chart records, facility staff and patient report.   Brief Summary of Hospital Course:   Falls/weakness; workup negative for acute findings  HFpEF: appears euvolemic, did recieve IV bumex in ED then Resume PTA 2mg BID  Pancytopenia: extensive workup at [facility], most recent bone marrow biopsy spring 2019, no evidence of MDS, counts at  Baseline  Bullous pemphigoid: drug induced loop diuretics (lasix or torsemide) currently tolerating bumex.   Updates on Status Since Skilled nursing Admission: on exam today patient is alert, sitting up in WC, denies pain or discomfort, he does have LE swelling 1+ he has wraps on legs bilaterally, patient states wraps are new, denies fever, chills, cough, congestion, CP, palpitations, SOB, patient states he does have leg weakness, states left leg is more weak than his right, otherwise no other c/o, states he slept well last night and appetite is good    CODE STATUS/ADVANCE DIRECTIVES DISCUSSION:   CPR/Full code   Patient's living condition: lives with spouse  ALLERGIES: Furosemide and  Torsemide  PAST MEDICAL HISTORY:  has a past medical history of Acute, but ill-defined, cerebrovascular disease, Antiplatelet or antithrombotic long-term use, Arrhythmia, Bullous pemphigoid (8/21/2019), CKD (chronic kidney disease) stage 3, GFR 30-59 ml/min (H), Coronary atherosclerosis of unspecified type of vessel, native or graft, Epistaxis, Esophageal reflux, GI bleed (4/17/2019), Hypercholesterolemia, Ischemic cardiomyopathy, Lymphedema of both lower extremities, Mitral regurgitation, Obese, JIM on CPAP, Other lymphedema, Other pancytopenia (H), Other specified anemias, Overweight, Pacemaker, PAD (peripheral artery disease) (H), Pancytopenia (H), Persistent atrial fibrillation, Presence of Watchman left atrial appendage closure device (12/03/2019), Pulmonary hypertension (H), Status post coronary angiogram (3/21/2018), Systolic CHF (H), TIA (transient ischemic attack), Unspecified hypothyroidism, Venous stasis dermatitis of both lower extremities, and Vitamin D deficiency. He also has no past medical history of Hypertension.  PAST SURGICAL HISTORY:   has a past surgical history that includes surgical history of - ; surgical history of -  (1990s); surgical history of -  (3/11); Eye surgery; Abdomen surgery; Implant pacemaker (8/2009); hernia repair; Herniorrhaphy inguinal (8/14/2012); orthopedic surgery; orthopedic surgery (2010); Herniorrhaphy umbilical (N/A, 10/31/2017); Heart Catheterization with Possible Intervention (N/A, 4/12/2019); Right Heart Cath (N/A, 4/12/2019); Bone marrow biopsy, bone specimen, needle/trocar (N/A, 4/19/2019); MitraClip (N/A, 5/20/2019); and Left Atrial Appendage Closure (N/A, 12/3/2019).  FAMILY HISTORY: family history includes C.A.D. in his father; Cardiovascular in his father; Lung Cancer in his sister; Unknown/Adopted in his mother.  SOCIAL HISTORY:   reports that he has never smoked. He has never used smokeless tobacco. He reports previous alcohol use. He reports that he does  not use drugs.    Post Discharge Medication Reconciliation Status: discharge medications reconciled, continue medications without change    Current Outpatient Medications   Medication Sig Dispense Refill     ACE/ARB/ARNI NOT PRESCRIBED (INTENTIONAL) Please choose reason not prescribed, below       acetaminophen (TYLENOL) 500 MG tablet Take 2 tablets (1,000 mg) by mouth every 6 hours as needed for mild pain 100 tablet 0     aspirin 81 MG EC tablet Take 81 mg by mouth daily        bumetanide (BUMEX) 1 MG tablet Take 1 tablet (1 mg) by mouth 2 times daily 180 tablet 3     buPROPion (WELLBUTRIN XL) 300 MG 24 hr tablet Take 1 tablet (300 mg) by mouth every morning 90 tablet 1     Carboxymethylcellulose Sod PF (CELLUVISC/REFRESH LIQUIGEL) 1 % ophthalmic solution Place 2 drops into both eyes daily as needed for dry eyes        carvedilol (COREG) 6.25 MG tablet TAKE 1 TABLET TWICE DAILY WITH MEALS 180 tablet 3     cholecalciferol (VITAMIN D3) 5000 units (125 mcg) capsule Take 5,000 Units by mouth daily       clobetasol (TEMOVATE) 0.05 % external cream Apply topically twice a week AS NEEDED       COMPRESSION STOCKINGS 1 each daily 2 each 0     emollient (VANICREAM) external cream Apply topically 2 times daily       EPINEPHrine (EPIPEN/ADRENACLICK/OR ANY BX GENERIC EQUIV) 0.3 MG/0.3ML injection 2-pack Inject 0.3 mg into the muscle as needed for anaphylaxis       finasteride (PROSCAR) 5 MG tablet TAKE 1 TABLET EVERY DAY 90 tablet 3     glucosamine-chondroitin 500-400 MG CAPS per capsule Take 2 capsules by mouth daily AS NEEDED       ketotifen (ZADITOR) 0.025 % SOLN ophthalmic solution Place 1 drop into both eyes every 12 hours 1 Bottle      levothyroxine (SYNTHROID/LEVOTHROID) 100 MCG tablet TAKE 1 TABLET EVERY DAY 90 tablet 3     Multiple Vitamins-Minerals (CENTRUM SILVER) per tablet Take 1 tablet by mouth daily       mupirocin (BACTROBAN) 2 % external ointment Apply topically daily       niacinamide (NIACIN) 500 MG tablet  "TAKE ONE TABLET BY MOUTH TWICE A DAY       omeprazole (PRILOSEC) 20 MG DR capsule TAKE 1 CAPSULE TWICE DAILY 180 capsule 3     ORDER FOR DME Auto-CPAP:Max 9 cm H2OMin 9 cm H2O  Continuous Lifetime need and heated humidity.    1 Device 0     Polyethylene Glycol 3350 (MIRALAX PO) Take 1 packet by mouth At Bedtime        potassium chloride ER (K-DUR/KLOR-CON M) 20 MEQ CR tablet Take 1 tablet (20 mEq) by mouth 2 times daily 180 tablet 3     simvastatin (ZOCOR) 20 MG tablet Take 20 mg by mouth daily        sucralfate (CARAFATE) 1 GM tablet Take 1 tablet (1 g) by mouth 2 times daily 180 tablet 3     tamsulosin (FLOMAX) 0.4 MG capsule TAKE 1 CAPSULE EVERY DAY 90 capsule 3     triamcinolone (KENALOG) 0.025 % cream Apply topically 2 times daily as needed          ROS:  10 point ROS of systems including Constitutional, Eyes, Respiratory, Cardiovascular, Gastroenterology, Genitourinary, Integumentary, Musculoskeletal, Psychiatric were all negative except for pertinent positives noted in my HPI.    Vitals:  /64   Pulse 62   Temp 97.5  F (36.4  C)   Resp 16   Ht 1.727 m (5' 8\")   Wt 88 kg (194 lb)   SpO2 93%   BMI 29.50 kg/m     Exam:  GENERAL APPEARANCE:  Alert, in no distress  ENT:  Mouth and posterior oropharynx normal, moist mucous membranes, [place]  EYES:  EOM, conjunctivae, lids, pupils and irises normal, PERRL  RESP:  respiratory effort and palpation of chest normal, lungs clear to auscultation , no respiratory distress  CV:  Palpation and auscultation of heart done , peripheral edema 1+ in LE bilaterally, irregular rhythm noted, rate controlled  ABDOMEN:  normal bowel sounds, soft, nontender, no hepatosplenomegaly or other masses  M/S:   Examination of:   right upper extremity, left upper extremity, right lower extremity and left lower extremity  Inspection, ROM, stability and muscle strength normal and slight decrease in strength Left LE compared to right  SKIN:  Inspection of skin and subcutaneous tissue " baseline, did not visualize Legs, they were wrapped at time of exam  NEURO:   Cranial nerves 2-12 are normal tested and grossly at patient's baseline, speech WNL  PSYCH:  affect and mood normal    Lab/Diagnostic data:  Recent labs in [facility] reviewed by me today.     ASSESSMENT/PLAN:  Falls frequently  Physical deconditioning  Acute/ongoing: PT and OT for strengthening    Chronic heart failure with preserved ejection fraction (H)  Coronary artery disease involving native coronary artery of native heart without angina pectoris  Chronic atrial fibrillation  CKD (chronic kidney disease) stage 3, GFR 30-59 ml/min (H)  Ongoing/acute: daily weights, vitals daily and prn, BMP follow twice weekly, continue coreg 6.25mg BID, Bumex 1mg BID, zocor 20mg qhs, KCL 20meq BID, ASA 81mg QD    Bullous pemphigoid  Ongoing:  patient uses vanicream BID to legs and clobetasol twice weekly prn for legs         Orders written by provider at facility  Daily weights  BMP twice weekly  CBC weekly on Monday    Total time spent with patient visit at the skilled nursing facility was 45 min including patient visit. Greater than 50% of total time spent with counseling and coordinating care due to discussed current weakness, discussed when weakness seemed to start, patient feels weakness started when he was hospitalized over [facility] last year, will continue with therapy for strengthening, patient lives at home with his wife who has trouble hearing and with vision. Patient states he and his wife help each other out. Will monitor and assess for proper discharge disposition.     Electronically signed by:  Tonya Lynn Haase, APRN CNP                       Sincerely,        Tonya Lynn Haase, APRN CNP

## 2020-02-11 NOTE — PROGRESS NOTES
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..  Admitted to this facility for  rehab, medical management and nursing care.    HPI:    HPI information obtained from: facility chart records, facility staff and patient report.   Brief Summary of Hospital Course:   Falls/weakness; workup negative for acute findings  HFpEF: appears euvolemic, did recieve IV bumex in ED then Resume PTA 2mg BID  Pancytopenia: extensive workup at [facility], most recent bone marrow biopsy spring 2019, no evidence of MDS, counts at  Baseline  Bullous pemphigoid: drug induced loop diuretics (lasix or torsemide) currently tolerating bumex.   Updates on Status Since Skilled nursing Admission: on exam today patient is alert, sitting up in WC, denies pain or discomfort, he does have LE swelling 1+ he has wraps on legs bilaterally, patient states wraps are new, denies fever, chills, cough, congestion, CP, palpitations, SOB, patient states he does have leg weakness, states left leg is more weak than his right, otherwise no other c/o, states he slept well last night and appetite is good    CODE STATUS/ADVANCE DIRECTIVES DISCUSSION:   CPR/Full code   Patient's living condition: lives with spouse  ALLERGIES: Furosemide and Torsemide  PAST MEDICAL HISTORY:  has a past medical history of Acute, but ill-defined, cerebrovascular  disease, Antiplatelet or antithrombotic long-term use, Arrhythmia, Bullous pemphigoid (8/21/2019), CKD (chronic kidney disease) stage 3, GFR 30-59 ml/min (H), Coronary atherosclerosis of unspecified type of vessel, native or graft, Epistaxis, Esophageal reflux, GI bleed (4/17/2019), Hypercholesterolemia, Ischemic cardiomyopathy, Lymphedema of both lower extremities, Mitral regurgitation, Obese, JIM on CPAP, Other lymphedema, Other pancytopenia (H), Other specified anemias, Overweight, Pacemaker, PAD (peripheral artery disease) (H), Pancytopenia (H), Persistent atrial fibrillation, Presence of Watchman left atrial appendage closure device (12/03/2019), Pulmonary hypertension (H), Status post coronary angiogram (3/21/2018), Systolic CHF (H), TIA (transient ischemic attack), Unspecified hypothyroidism, Venous stasis dermatitis of both lower extremities, and Vitamin D deficiency. He also has no past medical history of Hypertension.  PAST SURGICAL HISTORY:   has a past surgical history that includes surgical history of - ; surgical history of -  (1990s); surgical history of -  (3/11); Eye surgery; Abdomen surgery; Implant pacemaker (8/2009); hernia repair; Herniorrhaphy inguinal (8/14/2012); orthopedic surgery; orthopedic surgery (2010); Herniorrhaphy umbilical (N/A, 10/31/2017); Heart Catheterization with Possible Intervention (N/A, 4/12/2019); Right Heart Cath (N/A, 4/12/2019); Bone marrow biopsy, bone specimen, needle/trocar (N/A, 4/19/2019); MitraClip (N/A, 5/20/2019); and Left Atrial Appendage Closure (N/A, 12/3/2019).  FAMILY HISTORY: family history includes C.A.D. in his father; Cardiovascular in his father; Lung Cancer in his sister; Unknown/Adopted in his mother.  SOCIAL HISTORY:   reports that he has never smoked. He has never used smokeless tobacco. He reports previous alcohol use. He reports that he does not use drugs.    Post Discharge Medication Reconciliation Status: discharge medications reconciled,  continue medications without change    Current Outpatient Medications   Medication Sig Dispense Refill     ACE/ARB/ARNI NOT PRESCRIBED (INTENTIONAL) Please choose reason not prescribed, below       acetaminophen (TYLENOL) 500 MG tablet Take 2 tablets (1,000 mg) by mouth every 6 hours as needed for mild pain 100 tablet 0     aspirin 81 MG EC tablet Take 81 mg by mouth daily        bumetanide (BUMEX) 1 MG tablet Take 1 tablet (1 mg) by mouth 2 times daily 180 tablet 3     buPROPion (WELLBUTRIN XL) 300 MG 24 hr tablet Take 1 tablet (300 mg) by mouth every morning 90 tablet 1     Carboxymethylcellulose Sod PF (CELLUVISC/REFRESH LIQUIGEL) 1 % ophthalmic solution Place 2 drops into both eyes daily as needed for dry eyes        carvedilol (COREG) 6.25 MG tablet TAKE 1 TABLET TWICE DAILY WITH MEALS 180 tablet 3     cholecalciferol (VITAMIN D3) 5000 units (125 mcg) capsule Take 5,000 Units by mouth daily       clobetasol (TEMOVATE) 0.05 % external cream Apply topically twice a week AS NEEDED       COMPRESSION STOCKINGS 1 each daily 2 each 0     emollient (VANICREAM) external cream Apply topically 2 times daily       EPINEPHrine (EPIPEN/ADRENACLICK/OR ANY BX GENERIC EQUIV) 0.3 MG/0.3ML injection 2-pack Inject 0.3 mg into the muscle as needed for anaphylaxis       finasteride (PROSCAR) 5 MG tablet TAKE 1 TABLET EVERY DAY 90 tablet 3     glucosamine-chondroitin 500-400 MG CAPS per capsule Take 2 capsules by mouth daily AS NEEDED       ketotifen (ZADITOR) 0.025 % SOLN ophthalmic solution Place 1 drop into both eyes every 12 hours 1 Bottle      levothyroxine (SYNTHROID/LEVOTHROID) 100 MCG tablet TAKE 1 TABLET EVERY DAY 90 tablet 3     Multiple Vitamins-Minerals (CENTRUM SILVER) per tablet Take 1 tablet by mouth daily       mupirocin (BACTROBAN) 2 % external ointment Apply topically daily       niacinamide (NIACIN) 500 MG tablet TAKE ONE TABLET BY MOUTH TWICE A DAY       omeprazole (PRILOSEC) 20 MG DR capsule TAKE 1 CAPSULE TWICE  "DAILY 180 capsule 3     ORDER FOR DME Auto-CPAP:Max 9 cm H2OMin 9 cm H2O  Continuous Lifetime need and heated humidity.    1 Device 0     Polyethylene Glycol 3350 (MIRALAX PO) Take 1 packet by mouth At Bedtime        potassium chloride ER (K-DUR/KLOR-CON M) 20 MEQ CR tablet Take 1 tablet (20 mEq) by mouth 2 times daily 180 tablet 3     simvastatin (ZOCOR) 20 MG tablet Take 20 mg by mouth daily        sucralfate (CARAFATE) 1 GM tablet Take 1 tablet (1 g) by mouth 2 times daily 180 tablet 3     tamsulosin (FLOMAX) 0.4 MG capsule TAKE 1 CAPSULE EVERY DAY 90 capsule 3     triamcinolone (KENALOG) 0.025 % cream Apply topically 2 times daily as needed          ROS:  10 point ROS of systems including Constitutional, Eyes, Respiratory, Cardiovascular, Gastroenterology, Genitourinary, Integumentary, Musculoskeletal, Psychiatric were all negative except for pertinent positives noted in my HPI.    Vitals:  /64   Pulse 62   Temp 97.5  F (36.4  C)   Resp 16   Ht 1.727 m (5' 8\")   Wt 88 kg (194 lb)   SpO2 93%   BMI 29.50 kg/m    Exam:  GENERAL APPEARANCE:  Alert, in no distress  ENT:  Mouth and posterior oropharynx normal, moist mucous membranes, [place]  EYES:  EOM, conjunctivae, lids, pupils and irises normal, PERRL  RESP:  respiratory effort and palpation of chest normal, lungs clear to auscultation , no respiratory distress  CV:  Palpation and auscultation of heart done , peripheral edema 1+ in LE bilaterally, irregular rhythm noted, rate controlled  ABDOMEN:  normal bowel sounds, soft, nontender, no hepatosplenomegaly or other masses  M/S:   Examination of:   right upper extremity, left upper extremity, right lower extremity and left lower extremity  Inspection, ROM, stability and muscle strength normal and slight decrease in strength Left LE compared to right  SKIN:  Inspection of skin and subcutaneous tissue baseline, did not visualize Legs, they were wrapped at time of exam  NEURO:   Cranial nerves 2-12 are normal " tested and grossly at patient's baseline, speech WNL  PSYCH:  affect and mood normal    Lab/Diagnostic data:  Recent labs in [facility] reviewed by me today.     ASSESSMENT/PLAN:  Falls frequently  Physical deconditioning  Acute/ongoing: PT and OT for strengthening    Chronic heart failure with preserved ejection fraction (H)  Coronary artery disease involving native coronary artery of native heart without angina pectoris  Chronic atrial fibrillation  CKD (chronic kidney disease) stage 3, GFR 30-59 ml/min (H)  Ongoing/acute: daily weights, vitals daily and prn, BMP follow twice weekly, continue coreg 6.25mg BID, Bumex 1mg BID, zocor 20mg qhs, KCL 20meq BID, ASA 81mg QD    Bullous pemphigoid  Ongoing:  patient uses vanicream BID to legs and clobetasol twice weekly prn for legs         Orders written by provider at facility  Daily weights  BMP twice weekly  CBC weekly on Monday    Total time spent with patient visit at the [facility] nursing facility was 45 min including patient visit. Greater than 50% of total time spent with counseling and coordinating care due to discussed current weakness, discussed when weakness seemed to start, patient feels weakness started when he was hospitalized over [facility] last year, will continue with therapy for strengthening, patient lives at home with his wife who has trouble hearing and with vision. Patient states he and his wife help each other out. Will monitor and assess for proper discharge disposition.     Electronically signed by:  Tonya Lynn Haase, APRN CNP

## 2020-02-13 LAB
ANION GAP SERPL CALCULATED.3IONS-SCNC: 8 MMOL/L (ref 7–16)
BUN SERPL-MCNC: 30 MG/DL (ref 7–26)
CALCIUM SERPL-MCNC: 9.3 MG/DL (ref 8.4–10.4)
CHLORIDE SERPLBLD-SCNC: 104 MMOL/L (ref 98–109)
CO2 SERPL-SCNC: 31 MMOL/L (ref 20–29)
CREAT SERPL-MCNC: 1.27 MG/DL (ref 0.73–1.18)
GFR SERPL CREATININE-BSD FRML MDRD: 50 ML/MIN/1.73M2
GLUCOSE SERPL-MCNC: 96 MG/DL (ref 70–100)
POTASSIUM SERPL-SCNC: 3.5 MMOL/L (ref 3.5–5.1)
SODIUM SERPL-SCNC: 143 MMOL/L (ref 136–145)

## 2020-02-15 ENCOUNTER — NURSING HOME VISIT (OUTPATIENT)
Dept: GERIATRICS | Facility: CLINIC | Age: 85
End: 2020-02-15
Payer: COMMERCIAL

## 2020-02-15 ENCOUNTER — TELEPHONE (OUTPATIENT)
Dept: GERIATRICS | Facility: CLINIC | Age: 85
End: 2020-02-15

## 2020-02-15 DIAGNOSIS — M79.652 PAIN OF LEFT THIGH: ICD-10-CM

## 2020-02-15 DIAGNOSIS — I48.20 CHRONIC ATRIAL FIBRILLATION (H): ICD-10-CM

## 2020-02-15 DIAGNOSIS — I50.32 CHRONIC HEART FAILURE WITH PRESERVED EJECTION FRACTION (H): ICD-10-CM

## 2020-02-15 DIAGNOSIS — R29.6 FALLS FREQUENTLY: Primary | ICD-10-CM

## 2020-02-15 PROCEDURE — 99305 1ST NF CARE MODERATE MDM 35: CPT | Performed by: INTERNAL MEDICINE

## 2020-02-15 NOTE — TELEPHONE ENCOUNTER
Nursing called as house MD was visiting resident this AM and resident concerned for pain in left thigh.  Staff did not see an Xray order that the resident mentioned.    Plan:  Xray of femur - left side    Electronically signed by Yari Mayfield RN, CNP

## 2020-02-17 VITALS
HEIGHT: 68 IN | OXYGEN SATURATION: 98 % | HEART RATE: 82 BPM | RESPIRATION RATE: 20 BRPM | BODY MASS INDEX: 29.4 KG/M2 | DIASTOLIC BLOOD PRESSURE: 63 MMHG | WEIGHT: 194 LBS | SYSTOLIC BLOOD PRESSURE: 110 MMHG | TEMPERATURE: 98.9 F

## 2020-02-17 LAB
ANION GAP SERPL CALCULATED.3IONS-SCNC: 12 MMOL/L (ref 7–16)
BUN SERPL-MCNC: 31 MG/DL (ref 7–26)
CALCIUM SERPL-MCNC: 9.4 MG/DL (ref 8.4–10.4)
CHLORIDE SERPLBLD-SCNC: 103 MMOL/L (ref 98–109)
CO2 SERPL-SCNC: 23 MMOL/L (ref 20–29)
CREAT SERPL-MCNC: 1.27 MG/DL (ref 0.73–1.18)
DIFFERENTIAL: ABNORMAL
ERYTHROCYTE [DISTWIDTH] IN BLOOD BY AUTOMATED COUNT: 16.9 % (ref 11.9–15.5)
GFR SERPL CREATININE-BSD FRML MDRD: 50 ML/MIN/1.73M2
GLUCOSE SERPL-MCNC: 161 MG/DL (ref 70–100)
HCT VFR BLD AUTO: 25 % (ref 38.8–50)
HEMOGLOBIN: 7.6 G/DL (ref 13.5–17.5)
MCH RBC QN AUTO: 31.5 PG (ref 27.6–33.3)
MCHC RBC AUTO-ENTMCNC: 30.4 G/DL (ref 31.5–35.2)
MCV RBC AUTO: 103.7 FL (ref 80–100)
PLATELET # BLD AUTO: 101 10^9/L (ref 150–450)
POTASSIUM SERPL-SCNC: 3.6 MMOL/L (ref 3.5–5.1)
RBC # BLD AUTO: 2.41 10^12/L (ref 4.32–5.72)
SODIUM SERPL-SCNC: 138 MMOL/L (ref 136–145)
WBC # BLD AUTO: 3.8 10^9/L (ref 3.5–10.5)

## 2020-02-17 RX ORDER — BUMETANIDE 1 MG/1
1 TABLET ORAL EVERY EVENING
COMMUNITY
End: 2020-06-02

## 2020-02-17 RX ORDER — BUMETANIDE 2 MG/1
2 TABLET ORAL 2 TIMES DAILY
COMMUNITY
End: 2020-05-14

## 2020-02-17 ASSESSMENT — MIFFLIN-ST. JEOR: SCORE: 1519.48

## 2020-02-17 NOTE — PROGRESS NOTES
[facility]  PRIMARY CARE PROVIDER AND CLINIC:  Mg Nelson MD, [address] [facility] / [facility] [address]    Patient was seen by Dr. Badillo at the [facility] on February 15, 2020, for initial TCU visit.    Patient is a 89 year old  (2/6/1931), PMH of HFpEF (EF 40-45%) Atrial fib (watchman device 1/20), history of pacemaker placement, history of severe MR, s/p clip, history CVA, chronic kidney disease, who was hospitalized at the [facility] for the evaluation of multiple falls.    No neurologic or medical cause for falls identified.  (Im not able to determine what if any imaging Pt had as part of evaluation of falls)      CV status felt to be stable during hospitalization.    Patient states he had been doing well up until this past evening when he awoke with left hip and thigh pain.  He does not recall any specific injury recently.  He wonders if he did something to his hip in therapy.  He denies back pain.  Pain is constant, is located in his lateral hip and thigh area.  He has not attempted to get out of bed yet this morning.  He denies chronic lower extremity pain but he states his left leg is usually a little weaker than the right.    He denies fevers, chills, dysuria, abdominal pain, constipation, nausea, vomiting.      CODE STATUS/ADVANCE DIRECTIVES DISCUSSION:   CPR/Full code   Patient's living condition: lives with spouse  ALLERGIES: Furosemide and Torsemide  PAST MEDICAL HISTORY:  has a past medical history of Acute, but ill-defined, cerebrovascular disease, Antiplatelet or antithrombotic long-term use, Arrhythmia, Bullous pemphigoid (8/21/2019), CKD (chronic kidney disease) stage 3, GFR 30-59 ml/min (H), Coronary atherosclerosis of unspecified type of vessel, native or graft, Epistaxis, Esophageal reflux, GI bleed (4/17/2019), Hypercholesterolemia, Ischemic cardiomyopathy, Lymphedema of both lower extremities, Mitral regurgitation, Obese, JIM on CPAP, Other lymphedema,  Other pancytopenia (H), Other specified anemias, Overweight, Pacemaker, PAD (peripheral artery disease) (H), Pancytopenia (H), Persistent atrial fibrillation, Presence of Watchman left atrial appendage closure device (12/03/2019), Pulmonary hypertension (H), Status post coronary angiogram (3/21/2018), Systolic CHF (H), TIA (transient ischemic attack), Unspecified hypothyroidism, Venous stasis dermatitis of both lower extremities, and Vitamin D deficiency. He also has no past medical history of Hypertension.  PAST SURGICAL HISTORY:   has a past surgical history that includes surgical history of - ; surgical history of -  (1990s); surgical history of -  (3/11); Eye surgery; Abdomen surgery; Implant pacemaker (8/2009); hernia repair; Herniorrhaphy inguinal (8/14/2012); orthopedic surgery; orthopedic surgery (2010); Herniorrhaphy umbilical (N/A, 10/31/2017); Heart Catheterization with Possible Intervention (N/A, 4/12/2019); Right Heart Cath (N/A, 4/12/2019); Bone marrow biopsy, bone specimen, needle/trocar (N/A, 4/19/2019); MitraClip (N/A, 5/20/2019); and Left Atrial Appendage Closure (N/A, 12/3/2019).  FAMILY HISTORY: family history includes C.A.D. in his father; Cardiovascular in his father; Lung Cancer in his sister; Unknown/Adopted in his mother.  SOCIAL HISTORY:   reports that he has never smoked. He has never used smokeless tobacco. He reports previous alcohol use. He reports that he does not use drugs.        Current Outpatient Medications   Medication Sig Dispense Refill     ACE/ARB/ARNI NOT PRESCRIBED (INTENTIONAL) Please choose reason not prescribed, below       acetaminophen (TYLENOL) 500 MG tablet Take 2 tablets (1,000 mg) by mouth every 6 hours as needed for mild pain 100 tablet 0     aspirin 81 MG EC tablet Take 81 mg by mouth daily        bumetanide (BUMEX) 1 MG tablet Take 1 tablet (1 mg) by mouth 2 times daily 180 tablet 3     buPROPion (WELLBUTRIN XL) 300 MG 24 hr tablet Take 1 tablet (300 mg) by mouth  every morning 90 tablet 1     Carboxymethylcellulose Sod PF (CELLUVISC/REFRESH LIQUIGEL) 1 % ophthalmic solution Place 2 drops into both eyes daily as needed for dry eyes        carvedilol (COREG) 6.25 MG tablet TAKE 1 TABLET TWICE DAILY WITH MEALS 180 tablet 3     cholecalciferol (VITAMIN D3) 5000 units (125 mcg) capsule Take 5,000 Units by mouth daily       clobetasol (TEMOVATE) 0.05 % external cream Apply topically twice a week AS NEEDED       COMPRESSION STOCKINGS 1 each daily 2 each 0     emollient (VANICREAM) external cream Apply topically 2 times daily       EPINEPHrine (EPIPEN/ADRENACLICK/OR ANY BX GENERIC EQUIV) 0.3 MG/0.3ML injection 2-pack Inject 0.3 mg into the muscle as needed for anaphylaxis       finasteride (PROSCAR) 5 MG tablet TAKE 1 TABLET EVERY DAY 90 tablet 3     glucosamine-chondroitin 500-400 MG CAPS per capsule Take 2 capsules by mouth daily AS NEEDED       ketotifen (ZADITOR) 0.025 % SOLN ophthalmic solution Place 1 drop into both eyes every 12 hours 1 Bottle      levothyroxine (SYNTHROID/LEVOTHROID) 100 MCG tablet TAKE 1 TABLET EVERY DAY 90 tablet 3     Multiple Vitamins-Minerals (CENTRUM SILVER) per tablet Take 1 tablet by mouth daily       mupirocin (BACTROBAN) 2 % external ointment Apply topically daily       niacinamide (NIACIN) 500 MG tablet TAKE ONE TABLET BY MOUTH TWICE A DAY       omeprazole (PRILOSEC) 20 MG DR capsule TAKE 1 CAPSULE TWICE DAILY 180 capsule 3     ORDER FOR DME Auto-CPAP:Max 9 cm H2OMin 9 cm H2O  Continuous Lifetime need and heated humidity.    1 Device 0     Polyethylene Glycol 3350 (MIRALAX PO) Take 1 packet by mouth At Bedtime        potassium chloride ER (K-DUR/KLOR-CON M) 20 MEQ CR tablet Take 1 tablet (20 mEq) by mouth 2 times daily 180 tablet 3     simvastatin (ZOCOR) 20 MG tablet Take 20 mg by mouth daily        sucralfate (CARAFATE) 1 GM tablet Take 1 tablet (1 g) by mouth 2 times daily 180 tablet 3     tamsulosin (FLOMAX) 0.4 MG capsule TAKE 1 CAPSULE EVERY  DAY 90 capsule 3     triamcinolone (KENALOG) 0.025 % cream Apply topically 2 times daily as needed          ROS:  10 point ROS negative except as noted above.      Exam:  GENERAL APPEARANCE:  Alert, well-appearing male, lying in bed, appears moderately uncomfortable complaining of left thigh discomfort  ENT: Oral mucosa moist  EYES: No eye redness or drainage  RESP: Lungs clear  CV: Irregular, heart rate 70s  ABDOMEN: Soft, nontender, nondistended  M/S: 1+ lower extremity edema bilaterally.  Legs are wrapped  Patient with marked pain left hip and thigh area with range of motion of left leg.  Strength difficult to assess secondary to thigh discomfort.    SKIN: No rash noted in visible skin.    NEURO: Alert, fully oriented.  Face symmetric.   PSYCH:  affect and mood normal.  Patient is perplexed regarding the onset of left leg pain overnight.        ASSESSMENT/PLAN:    Falls frequently  Physical deconditioning  Left hip and thigh pain, per patient, new onset overnight.  No history of recent trauma to left leg.  No back pain.  Etiology of apparent acute pain not clear.  Differential includes muscle strain, radiculopathy, occult hip injury.   Plan: Monitor hip symptoms this morning, continue physical therapy.  If continued hip discomfort will need a hip x-ray to rule out acute injury.  Further radiologic evaluation to be predicated on clinical course.      Chronic heart failure with preserved ejection fraction (H)  Coronary artery disease involving native coronary artery of native heart without angina pectoris  Chronic atrial fibrillation  CKD (chronic kidney disease) stage 3, GFR 30-59 ml/min (H)  CV status stable.  Patient status post watchman device placement  Plan: Continue current medications, monitor weight, exam, basic metabolic panel.    Chronic lower extremity edema  Likely secondary to venous insufficiency  Plan: Continue wraps, continue diuretic therapy..      Yovani Badillo MD

## 2020-02-17 NOTE — PROGRESS NOTES
[facility]  [place] Medical Record Number:  [id number]  Place of Service where encounter took place:  [facility] ([facility]) [[id number]]  Chief Complaint   Patient presents with     RECHECK       HPI:    Bairon Foster  is a 89 year old (2/6/1931), who is being seen today for an episodic care visit.  HPI information obtained from: facility chart records and facility staff. Today's concern is:  Falls/weakness: patient has had falls here at [facility] TCU, no injury, in therapy he is walking up to 150 feet using a RW with SBA, denies pain or discomfort at time of exam.   CHF/atrial fib/CKD: BP as follows 115/63, 102/56, 98/54 with HR in 70's, weight is stable at 194.7lbs denies SOB cough, congestion, CP, palpitations, has LE edema ongoing, wears compression garments     Past Medical and Surgical History reviewed in Epic today.    MEDICATIONS:  Current Outpatient Medications   Medication Sig Dispense Refill     ACE/ARB/ARNI NOT PRESCRIBED (INTENTIONAL) Please choose reason not prescribed, below       acetaminophen (TYLENOL) 500 MG tablet Take 2 tablets (1,000 mg) by mouth every 6 hours as needed for mild pain 100 tablet 0     aspirin 81 MG EC tablet Take 81 mg by mouth daily        bumetanide (BUMEX) 1 MG tablet Take 1 mg by mouth every evening       bumetanide (BUMEX) 2 MG tablet Take 2 mg by mouth daily before breakfast       buPROPion (WELLBUTRIN XL) 300 MG 24 hr tablet Take 1 tablet (300 mg) by mouth every morning 90 tablet 1     carvedilol (COREG) 6.25 MG tablet TAKE 1 TABLET TWICE DAILY WITH MEALS 180 tablet 3     cholecalciferol (VITAMIN D3) 5000 units (125 mcg) capsule Take 5,000 Units by mouth daily       COMPRESSION STOCKINGS 1 each daily 2 each 0     emollient (VANICREAM) external cream Apply topically 2 times daily       EPINEPHrine (EPIPEN/ADRENACLICK/OR ANY BX GENERIC EQUIV) 0.3 MG/0.3ML injection 2-pack Inject 0.3 mg into the muscle as needed for anaphylaxis       finasteride (PROSCAR) 5  MG tablet TAKE 1 TABLET EVERY DAY 90 tablet 3     ketotifen (ZADITOR) 0.025 % SOLN ophthalmic solution Place 1 drop into both eyes every 12 hours 1 Bottle      levothyroxine (SYNTHROID/LEVOTHROID) 100 MCG tablet TAKE 1 TABLET EVERY DAY 90 tablet 3     Multiple Vitamins-Minerals (CENTRUM SILVER) per tablet Take 1 tablet by mouth daily       mupirocin (BACTROBAN) 2 % external ointment Apply topically daily       omeprazole (PRILOSEC) 20 MG DR capsule TAKE 1 CAPSULE TWICE DAILY 180 capsule 3     potassium chloride ER (K-DUR/KLOR-CON M) 20 MEQ CR tablet Take 1 tablet (20 mEq) by mouth 2 times daily 180 tablet 3     simvastatin (ZOCOR) 20 MG tablet Take 20 mg by mouth daily        sucralfate (CARAFATE) 1 GM tablet Take 1 tablet (1 g) by mouth 2 times daily 180 tablet 3     tamsulosin (FLOMAX) 0.4 MG capsule TAKE 1 CAPSULE EVERY DAY 90 capsule 3     triamcinolone (KENALOG) 0.025 % cream Apply topically 2 times daily as needed        bumetanide (BUMEX) 1 MG tablet Take 1 tablet (1 mg) by mouth 2 times daily 180 tablet 3     Carboxymethylcellulose Sod PF (CELLUVISC/REFRESH LIQUIGEL) 1 % ophthalmic solution Place 2 drops into both eyes daily as needed for dry eyes        clobetasol (TEMOVATE) 0.05 % external cream Apply topically twice a week AS NEEDED       glucosamine-chondroitin 500-400 MG CAPS per capsule Take 2 capsules by mouth daily AS NEEDED       niacinamide (NIACIN) 500 MG tablet TAKE ONE TABLET BY MOUTH TWICE A DAY       ORDER FOR DME Auto-CPAP:Max 9 cm H2OMin 9 cm H2O  Continuous Lifetime need and heated humidity.    1 Device 0     Polyethylene Glycol 3350 (MIRALAX PO) Take 1 packet by mouth At Bedtime            REVIEW OF SYSTEMS:  10 point ROS of systems including Constitutional, Eyes, Respiratory, Cardiovascular, Gastroenterology, Genitourinary, Integumentary, Musculoskeletal, Psychiatric were all negative except for pertinent positives noted in my HPI.    Objective:  /63   Pulse 82   Temp 98.9  F  "(37.2  C)   Resp 20   Ht 1.727 m (5' 8\")   Wt 88 kg (194 lb)   SpO2 98%   BMI 29.50 kg/m    Exam:  GENERAL APPEARANCE:  Alert, in no distress  ENT:  Mouth and posterior oropharynx normal, moist mucous membranes, [place]  EYES:  EOM, conjunctivae, lids, pupils and irises normal, PERRL  RESP:  respiratory effort and palpation of chest normal, lungs clear to auscultation , no respiratory distress  CV:  Palpation and auscultation of heart done , peripheral edema 1+ in LE bilaterally, irregular rhythm noted, rate controlled  ABDOMEN:  normal bowel sounds, soft, nontender, no hepatosplenomegaly or other masses  M/S:   Examination of:   right upper extremity, left upper extremity, right lower extremity and left lower extremity  Inspection, ROM, stability and muscle strength normal and slight decrease in strength Left LE compared to right  SKIN:  Inspection of skin and subcutaneous tissue baseline, did not visualize Legs, they were wrapped at time of exam  NEURO:   Cranial nerves 2-12 are normal tested and grossly at patient's baseline, speech WNL  PSYCH:  affect and mood normal    Labs:   Recent labs in [facility] reviewed by me today.     ASSESSMENT/PLAN:  Falls frequently  Physical deconditioning  Acute/ongoing: PT and OT for strengthening     Chronic heart failure with preserved ejection fraction (H)  Coronary artery disease involving native coronary artery of native heart without angina pectoris  Chronic atrial fibrillation  CKD (chronic kidney disease) stage 3, GFR 30-59 ml/min (H)  Ongoing/acute: daily weights, vitals daily and prn, BMP follow twice weekly, continue coreg 6.25mg BID, Bumex 2mg q am and 1mg pm , zocor 20mg qhs, KCL 20meq BID, ASA 81mg QD     Bullous pemphigoid  Ongoing:  patient uses vanicream BID to legs and clobetasol twice weekly prn for legs        Orders written by provider at facility  No new orders today    Total time with a new patient visit in the assisted livin minutes including discussions " about the POC and care coordination with the patient. Greater than 50% of total time spent with counseling and coordinating care due to discussed current medications, lab monitoring, POC and therapy. Will continue to work with therapy on strength and balance, patient lives at home with wife plans to DC back home.  Electronically signed by:  Tonya Lynn Haase, APRN CNP

## 2020-02-18 ENCOUNTER — NURSING HOME VISIT (OUTPATIENT)
Dept: GERIATRICS | Facility: CLINIC | Age: 85
End: 2020-02-18
Payer: COMMERCIAL

## 2020-02-18 DIAGNOSIS — I25.10 CORONARY ARTERY DISEASE INVOLVING NATIVE CORONARY ARTERY OF NATIVE HEART WITHOUT ANGINA PECTORIS: ICD-10-CM

## 2020-02-18 DIAGNOSIS — R29.6 FALLS FREQUENTLY: Primary | ICD-10-CM

## 2020-02-18 DIAGNOSIS — I48.20 CHRONIC ATRIAL FIBRILLATION (H): ICD-10-CM

## 2020-02-18 DIAGNOSIS — I50.32 CHRONIC HEART FAILURE WITH PRESERVED EJECTION FRACTION (H): ICD-10-CM

## 2020-02-18 DIAGNOSIS — N18.30 CKD (CHRONIC KIDNEY DISEASE) STAGE 3, GFR 30-59 ML/MIN (H): ICD-10-CM

## 2020-02-18 DIAGNOSIS — R53.81 PHYSICAL DECONDITIONING: ICD-10-CM

## 2020-02-18 DIAGNOSIS — L12.0 BULLOUS PEMPHIGOID (H): ICD-10-CM

## 2020-02-18 DIAGNOSIS — M79.652 PAIN OF LEFT THIGH: ICD-10-CM

## 2020-02-18 PROCEDURE — 99310 SBSQ NF CARE HIGH MDM 45: CPT | Performed by: NURSE PRACTITIONER

## 2020-02-18 NOTE — LETTER
2/18/2020        RE: Bairon Foster  [address]  [facility] [address]        [facility]  [place] Medical Record Number:  [id number]  Place of Service where encounter took place:  [facility] ([facility]) [[id number]]  Chief Complaint   Patient presents with     RECHECK       HPI:    Bairon Foster  is a 89 year old (2/6/1931), who is being seen today for an episodic care visit.  HPI information obtained from: facility chart records and facility staff. Today's concern is:  Falls/weakness: patient has had falls here at [facility] TCU, no injury, in therapy he is walking up to 150 feet using a RW with SBA, denies pain or discomfort at time of exam.   CHF/atrial fib/CKD: BP as follows 115/63, 102/56, 98/54 with HR in 70's, weight is stable at 194.7lbs denies SOB cough, congestion, CP, palpitations, has LE edema ongoing, wears compression garments     Past Medical and Surgical History reviewed in Epic today.    MEDICATIONS:  Current Outpatient Medications   Medication Sig Dispense Refill     ACE/ARB/ARNI NOT PRESCRIBED (INTENTIONAL) Please choose reason not prescribed, below       acetaminophen (TYLENOL) 500 MG tablet Take 2 tablets (1,000 mg) by mouth every 6 hours as needed for mild pain 100 tablet 0     aspirin 81 MG EC tablet Take 81 mg by mouth daily        bumetanide (BUMEX) 1 MG tablet Take 1 mg by mouth every evening       bumetanide (BUMEX) 2 MG tablet Take 2 mg by mouth daily before breakfast       buPROPion (WELLBUTRIN XL) 300 MG 24 hr tablet Take 1 tablet (300 mg) by mouth every morning 90 tablet 1     carvedilol (COREG) 6.25 MG tablet TAKE 1 TABLET TWICE DAILY WITH MEALS 180 tablet 3     cholecalciferol (VITAMIN D3) 5000 units (125 mcg) capsule Take 5,000 Units by mouth daily       COMPRESSION STOCKINGS 1 each daily 2 each 0     emollient (VANICREAM) external cream Apply topically 2 times daily       EPINEPHrine (EPIPEN/ADRENACLICK/OR ANY BX GENERIC EQUIV) 0.3 MG/0.3ML  injection 2-pack Inject 0.3 mg into the muscle as needed for anaphylaxis       finasteride (PROSCAR) 5 MG tablet TAKE 1 TABLET EVERY DAY 90 tablet 3     ketotifen (ZADITOR) 0.025 % SOLN ophthalmic solution Place 1 drop into both eyes every 12 hours 1 Bottle      levothyroxine (SYNTHROID/LEVOTHROID) 100 MCG tablet TAKE 1 TABLET EVERY DAY 90 tablet 3     Multiple Vitamins-Minerals (CENTRUM SILVER) per tablet Take 1 tablet by mouth daily       mupirocin (BACTROBAN) 2 % external ointment Apply topically daily       omeprazole (PRILOSEC) 20 MG DR capsule TAKE 1 CAPSULE TWICE DAILY 180 capsule 3     potassium chloride ER (K-DUR/KLOR-CON M) 20 MEQ CR tablet Take 1 tablet (20 mEq) by mouth 2 times daily 180 tablet 3     simvastatin (ZOCOR) 20 MG tablet Take 20 mg by mouth daily        sucralfate (CARAFATE) 1 GM tablet Take 1 tablet (1 g) by mouth 2 times daily 180 tablet 3     tamsulosin (FLOMAX) 0.4 MG capsule TAKE 1 CAPSULE EVERY DAY 90 capsule 3     triamcinolone (KENALOG) 0.025 % cream Apply topically 2 times daily as needed        bumetanide (BUMEX) 1 MG tablet Take 1 tablet (1 mg) by mouth 2 times daily 180 tablet 3     Carboxymethylcellulose Sod PF (CELLUVISC/REFRESH LIQUIGEL) 1 % ophthalmic solution Place 2 drops into both eyes daily as needed for dry eyes        clobetasol (TEMOVATE) 0.05 % external cream Apply topically twice a week AS NEEDED       glucosamine-chondroitin 500-400 MG CAPS per capsule Take 2 capsules by mouth daily AS NEEDED       niacinamide (NIACIN) 500 MG tablet TAKE ONE TABLET BY MOUTH TWICE A DAY       ORDER FOR DME Auto-CPAP:Max 9 cm H2OMin 9 cm H2O  Continuous Lifetime need and heated humidity.    1 Device 0     Polyethylene Glycol 3350 (MIRALAX PO) Take 1 packet by mouth At Bedtime            REVIEW OF SYSTEMS:  10 point ROS of systems including Constitutional, Eyes, Respiratory, Cardiovascular, Gastroenterology, Genitourinary, Integumentary, Musculoskeletal, Psychiatric were all negative  "except for pertinent positives noted in my HPI.    Objective:  /63   Pulse 82   Temp 98.9  F (37.2  C)   Resp 20   Ht 1.727 m (5' 8\")   Wt 88 kg (194 lb)   SpO2 98%   BMI 29.50 kg/m     Exam:  GENERAL APPEARANCE:  Alert, in no distress  ENT:  Mouth and posterior oropharynx normal, moist mucous membranes, [place]  EYES:  EOM, conjunctivae, lids, pupils and irises normal, PERRL  RESP:  respiratory effort and palpation of chest normal, lungs clear to auscultation , no respiratory distress  CV:  Palpation and auscultation of heart done , peripheral edema 1+ in LE bilaterally, irregular rhythm noted, rate controlled  ABDOMEN:  normal bowel sounds, soft, nontender, no hepatosplenomegaly or other masses  M/S:   Examination of:   right upper extremity, left upper extremity, right lower extremity and left lower extremity  Inspection, ROM, stability and muscle strength normal and slight decrease in strength Left LE compared to right  SKIN:  Inspection of skin and subcutaneous tissue baseline, did not visualize Legs, they were wrapped at time of exam  NEURO:   Cranial nerves 2-12 are normal tested and grossly at patient's baseline, speech WNL  PSYCH:  affect and mood normal    Labs:   Recent labs in [facility] reviewed by me today.     ASSESSMENT/PLAN:  Falls frequently  Physical deconditioning  Acute/ongoing: PT and OT for strengthening     Chronic heart failure with preserved ejection fraction (H)  Coronary artery disease involving native coronary artery of native heart without angina pectoris  Chronic atrial fibrillation  CKD (chronic kidney disease) stage 3, GFR 30-59 ml/min (H)  Ongoing/acute: daily weights, vitals daily and prn, BMP follow twice weekly, continue coreg 6.25mg BID, Bumex 2mg q am and 1mg pm , zocor 20mg qhs, KCL 20meq BID, ASA 81mg QD     Bullous pemphigoid  Ongoing:  patient uses vanicream BID to legs and clobetasol twice weekly prn for legs        Orders written by provider at facility  No new orders " today    Total time with a new patient visit in the assisted livin minutes including discussions about the POC and care coordination with the patient. Greater than 50% of total time spent with counseling and coordinating care due to discussed current medications, lab monitoring, POC and therapy. Will continue to work with therapy on strength and balance, patient lives at home with wife plans to DC back home.  Electronically signed by:  Tonya Lynn Haase, APRN CNP             Sincerely,        Tonya Lynn Haase, APRN CNP

## 2020-02-20 ENCOUNTER — TRANSFERRED RECORDS (OUTPATIENT)
Dept: HEALTH INFORMATION MANAGEMENT | Facility: CLINIC | Age: 85
End: 2020-02-20

## 2020-02-20 LAB
ANION GAP SERPL CALCULATED.3IONS-SCNC: 9 MMOL/L (ref 7–16)
BUN SERPL-MCNC: 26 MG/DL (ref 7–26)
CALCIUM SERPL-MCNC: 8.9 MG/DL (ref 8.4–10.4)
CHLORIDE SERPLBLD-SCNC: 105 MMOL/L (ref 98–109)
CO2 SERPL-SCNC: 30 MMOL/L (ref 20–29)
CREAT SERPL-MCNC: 1.17 MG/DL (ref 0.73–1.18)
GFR SERPL CREATININE-BSD FRML MDRD: 55 ML/MIN/1.73M2
GLUCOSE SERPL-MCNC: 88 MG/DL (ref 70–100)
POTASSIUM SERPL-SCNC: 3.6 MMOL/L (ref 3.5–5.1)
SODIUM SERPL-SCNC: 144 MMOL/L (ref 136–145)

## 2020-02-24 ENCOUNTER — TELEPHONE (OUTPATIENT)
Dept: FAMILY MEDICINE | Facility: CLINIC | Age: 85
End: 2020-02-24

## 2020-02-24 ENCOUNTER — TRANSFERRED RECORDS (OUTPATIENT)
Dept: MULTI SPECIALTY CLINIC | Facility: CLINIC | Age: 85
End: 2020-02-24

## 2020-02-24 LAB
ANION GAP SERPL CALCULATED.3IONS-SCNC: 7 MMOL/L (ref 7–16)
BUN SERPL-MCNC: 26 MG/DL (ref 7–26)
CALCIUM SERPL-MCNC: 8.8 MG/DL (ref 8.4–10.4)
CHLORIDE SERPLBLD-SCNC: 107 MMOL/L (ref 98–109)
CO2 SERPL-SCNC: 28 MMOL/L (ref 20–29)
CREAT SERPL-MCNC: 1.15 MG/DL (ref 0.73–1.18)
DIFFERENTIAL: ABNORMAL
ERYTHROCYTE [DISTWIDTH] IN BLOOD BY AUTOMATED COUNT: 17.6 % (ref 11.9–15.5)
GFR SERPL CREATININE-BSD FRML MDRD: 56 ML/MIN/1.73M2
GLUCOSE SERPL-MCNC: 88 MG/DL (ref 70–100)
HCT VFR BLD AUTO: 24.2 % (ref 38.8–50)
HEMOGLOBIN: 7.3 G/DL (ref 13.5–17.5)
MCH RBC QN AUTO: 31.9 PG (ref 27.6–33.3)
MCHC RBC AUTO-ENTMCNC: 30.2 G/DL (ref 31.5–35.2)
MCV RBC AUTO: 105.7 FL (ref 80–100)
PLATELET # BLD AUTO: 105 X10^9/L (ref 150–450)
POTASSIUM SERPL-SCNC: 3.7 MMOL/L (ref 3.5–5.1)
RBC # BLD AUTO: 2.29 X10^12/L (ref 4.32–5.72)
SODIUM SERPL-SCNC: 142 MMOL/L (ref 136–145)
WBC # BLD AUTO: 4.2 X10^9/L (ref 3.5–10.5)

## 2020-02-24 ASSESSMENT — MIFFLIN-ST. JEOR: SCORE: 1528.55

## 2020-02-25 ENCOUNTER — NURSING HOME VISIT (OUTPATIENT)
Dept: GERIATRICS | Facility: CLINIC | Age: 85
End: 2020-02-25
Payer: COMMERCIAL

## 2020-02-25 VITALS
RESPIRATION RATE: 16 BRPM | BODY MASS INDEX: 29.7 KG/M2 | OXYGEN SATURATION: 95 % | DIASTOLIC BLOOD PRESSURE: 59 MMHG | HEART RATE: 66 BPM | SYSTOLIC BLOOD PRESSURE: 108 MMHG | WEIGHT: 196 LBS | HEIGHT: 68 IN | TEMPERATURE: 97.5 F

## 2020-02-25 DIAGNOSIS — R29.6 FALLS FREQUENTLY: ICD-10-CM

## 2020-02-25 DIAGNOSIS — R53.81 PHYSICAL DECONDITIONING: ICD-10-CM

## 2020-02-25 DIAGNOSIS — I25.10 CORONARY ARTERY DISEASE INVOLVING NATIVE CORONARY ARTERY OF NATIVE HEART WITHOUT ANGINA PECTORIS: ICD-10-CM

## 2020-02-25 DIAGNOSIS — I48.20 CHRONIC ATRIAL FIBRILLATION (H): ICD-10-CM

## 2020-02-25 DIAGNOSIS — D61.9 ANEMIA DUE TO BONE MARROW FAILURE, UNSPECIFIED BONE MARROW FAILURE TYPE (H): Primary | ICD-10-CM

## 2020-02-25 DIAGNOSIS — I50.32 CHRONIC HEART FAILURE WITH PRESERVED EJECTION FRACTION (H): ICD-10-CM

## 2020-02-25 DIAGNOSIS — N18.30 CKD (CHRONIC KIDNEY DISEASE) STAGE 3, GFR 30-59 ML/MIN (H): ICD-10-CM

## 2020-02-25 DIAGNOSIS — L12.0 BULLOUS PEMPHIGOID (H): ICD-10-CM

## 2020-02-25 PROCEDURE — 99310 SBSQ NF CARE HIGH MDM 45: CPT | Performed by: NURSE PRACTITIONER

## 2020-02-25 NOTE — LETTER
2/25/2020        RE: Bairon Foster  [address]  [facility] [address]        [place] [facility]  [place] Medical Record Number:  [id number]  Place of Service where encounter took place:  [facility] ([facility]) [[id number]]  Chief Complaint   Patient presents with     RECHECK       HPI:    Bairon Foster  is a 89 year old (2/6/1931), who is being seen today for an episodic care visit.  HPI information obtained from: facility chart records and facility staff. Today's concern is:  Falls/weakness: patient has had falls here at [facility], no injury, in therapy he is walking up to 200 feet using a RW with SBA, denies pain or discomfort at time of exam.   CHF/atrial fib/CKD: BP as follows 108/59, 104/61, 109/61  with HR in 70's, weight is stable at 196.8lbs denies SOB cough, congestion, CP, palpitations, has LE edema ongoing, wears compression garments   Anemia: Hgb 7.3, Plt 105  Past Medical and Surgical History reviewed in Epic today.    MEDICATIONS:  Current Outpatient Medications   Medication Sig Dispense Refill     ACE/ARB/ARNI NOT PRESCRIBED (INTENTIONAL) Please choose reason not prescribed, below       acetaminophen (TYLENOL) 500 MG tablet Take 2 tablets (1,000 mg) by mouth every 6 hours as needed for mild pain 100 tablet 0     aspirin 81 MG EC tablet Take 81 mg by mouth daily        bumetanide (BUMEX) 1 MG tablet Take 1 mg by mouth every evening       bumetanide (BUMEX) 2 MG tablet Take 2 mg by mouth daily before breakfast       buPROPion (WELLBUTRIN XL) 300 MG 24 hr tablet Take 1 tablet (300 mg) by mouth every morning 90 tablet 1     carvedilol (COREG) 6.25 MG tablet TAKE 1 TABLET TWICE DAILY WITH MEALS 180 tablet 3     cholecalciferol (VITAMIN D3) 5000 units (125 mcg) capsule Take 5,000 Units by mouth daily       COMPRESSION STOCKINGS 1 each daily 2 each 0     emollient (VANICREAM) external cream Apply topically 2 times daily       EPINEPHrine (EPIPEN/ADRENACLICK/OR ANY BX GENERIC  "EQUIV) 0.3 MG/0.3ML injection 2-pack Inject 0.3 mg into the muscle as needed for anaphylaxis       finasteride (PROSCAR) 5 MG tablet TAKE 1 TABLET EVERY DAY 90 tablet 3     levothyroxine (SYNTHROID/LEVOTHROID) 100 MCG tablet TAKE 1 TABLET EVERY DAY 90 tablet 3     Multiple Vitamins-Minerals (CENTRUM SILVER) per tablet Take 1 tablet by mouth daily       omeprazole (PRILOSEC) 20 MG DR capsule TAKE 1 CAPSULE TWICE DAILY 180 capsule 3     potassium chloride ER (K-DUR/KLOR-CON M) 20 MEQ CR tablet Take 1 tablet (20 mEq) by mouth 2 times daily 180 tablet 3     simvastatin (ZOCOR) 20 MG tablet Take 20 mg by mouth daily        sucralfate (CARAFATE) 1 GM tablet Take 1 tablet (1 g) by mouth 2 times daily 180 tablet 3     tamsulosin (FLOMAX) 0.4 MG capsule TAKE 1 CAPSULE EVERY DAY 90 capsule 3     triamcinolone (KENALOG) 0.025 % cream Apply topically 2 times daily as needed        ketotifen (ZADITOR) 0.025 % SOLN ophthalmic solution Place 1 drop into both eyes every 12 hours 1 Bottle      mupirocin (BACTROBAN) 2 % external ointment Apply topically daily           REVIEW OF SYSTEMS:  10 point ROS of systems including Constitutional, Eyes, Respiratory, Cardiovascular, Gastroenterology, Genitourinary, Integumentary, Musculoskeletal, Psychiatric were all negative except for pertinent positives noted in my HPI.    Objective:  /59   Pulse 66   Temp 97.5  F (36.4  C)   Resp 16   Ht 1.727 m (5' 8\")   Wt 88.9 kg (196 lb)   SpO2 95%   BMI 29.80 kg/m     Exam:  GENERAL APPEARANCE:  Alert, in no distress  ENT:  Mouth and posterior oropharynx normal, moist mucous membranes, [place]  EYES:  EOM, conjunctivae, lids, pupils and irises normal, PERRL  RESP:  respiratory effort and palpation of chest normal, lungs clear to auscultation , no respiratory distress  CV:  Palpation and auscultation of heart done , peripheral edema 1+ in LE bilaterally, irregular rhythm noted, rate controlled  ABDOMEN:  normal bowel sounds, soft, nontender, no " hepatosplenomegaly or other masses  M/S:   Examination of:   right upper extremity, left upper extremity, right lower extremity and left lower extremity  Inspection, ROM, stability and muscle strength normal and slight decrease in strength Left LE compared to right  SKIN:  Inspection of skin and subcutaneous tissue baseline, did not visualize Legs, they were wrapped at time of exam  NEURO:   Cranial nerves 2-12 are normal tested and grossly at patient's baseline, speech WNL  PSYCH:  affect and mood normal    Labs:   Recent labs in Psychiatric reviewed by me today.     ASSESSMENT/PLAN:  Falls frequently  Physical deconditioning  Acute/ongoing: PT and OT for strengthening     Chronic heart failure with preserved ejection fraction (H)  Coronary artery disease involving native coronary artery of native heart without angina pectoris  Chronic atrial fibrillation  CKD (chronic kidney disease) stage 3, GFR 30-59 ml/min (H)  Ongoing/acute: daily weights, vitals daily and prn, BMP follow twice weekly, continue coreg 6.25mg BID, Bumex 2mg q am and 1mg pm , zocor 20mg qhs, KCL 20meq BID, ASA 81mg QD     Bullous pemphigoid  Ongoing:  patient uses vanicream BID to legs and clobetasol twice weekly prn for legs     Anemia due to bone marrow failure: unspecified  Acute/ongoing: set up an appt with hematology      Orders written by provider at facility  No new orders today    Total time with a new patient visit in the assisted livin minutes including discussions about the POC and care coordination with the patient. Greater than 50% of total time spent with counseling and coordinating care due to discussed low Hgb, called wife Kristi to discuss current Hgb of 7.3 and getting a hematologist on board to figure out a plan moving forward. Discussed discharge, patient is doing well and will likely be going home soon. .  Electronically signed by:  Tonya Lynn Haase, APRN CNP             Sincerely,        Tonya Lynn Haase, APRN CNP

## 2020-02-25 NOTE — PROGRESS NOTES
[place] [facility]  [place] Medical Record Number:  [id number]  Place of Service where encounter took place:  [facility] ([facility]) [[id number]]  Chief Complaint   Patient presents with     RECHECK       HPI:    Bairon Foster  is a 89 year old (2/6/1931), who is being seen today for an episodic care visit.  HPI information obtained from: facility chart records and facility staff. Today's concern is:  Falls/weakness: patient has had falls here at [facility], no injury, in therapy he is walking up to 200 feet using a RW with SBA, denies pain or discomfort at time of exam.   CHF/atrial fib/CKD: BP as follows 108/59, 104/61, 109/61  with HR in 70's, weight is stable at 196.8lbs denies SOB cough, congestion, CP, palpitations, has LE edema ongoing, wears compression garments   Anemia: Hgb 7.3, Plt 105  Past Medical and Surgical History reviewed in Epic today.    MEDICATIONS:  Current Outpatient Medications   Medication Sig Dispense Refill     ACE/ARB/ARNI NOT PRESCRIBED (INTENTIONAL) Please choose reason not prescribed, below       acetaminophen (TYLENOL) 500 MG tablet Take 2 tablets (1,000 mg) by mouth every 6 hours as needed for mild pain 100 tablet 0     aspirin 81 MG EC tablet Take 81 mg by mouth daily        bumetanide (BUMEX) 1 MG tablet Take 1 mg by mouth every evening       bumetanide (BUMEX) 2 MG tablet Take 2 mg by mouth daily before breakfast       buPROPion (WELLBUTRIN XL) 300 MG 24 hr tablet Take 1 tablet (300 mg) by mouth every morning 90 tablet 1     carvedilol (COREG) 6.25 MG tablet TAKE 1 TABLET TWICE DAILY WITH MEALS 180 tablet 3     cholecalciferol (VITAMIN D3) 5000 units (125 mcg) capsule Take 5,000 Units by mouth daily       COMPRESSION STOCKINGS 1 each daily 2 each 0     emollient (VANICREAM) external cream Apply topically 2 times daily       EPINEPHrine (EPIPEN/ADRENACLICK/OR ANY BX GENERIC EQUIV) 0.3 MG/0.3ML injection 2-pack Inject 0.3 mg into the muscle as needed for anaphylaxis        "finasteride (PROSCAR) 5 MG tablet TAKE 1 TABLET EVERY DAY 90 tablet 3     levothyroxine (SYNTHROID/LEVOTHROID) 100 MCG tablet TAKE 1 TABLET EVERY DAY 90 tablet 3     Multiple Vitamins-Minerals (CENTRUM SILVER) per tablet Take 1 tablet by mouth daily       omeprazole (PRILOSEC) 20 MG DR capsule TAKE 1 CAPSULE TWICE DAILY 180 capsule 3     potassium chloride ER (K-DUR/KLOR-CON M) 20 MEQ CR tablet Take 1 tablet (20 mEq) by mouth 2 times daily 180 tablet 3     simvastatin (ZOCOR) 20 MG tablet Take 20 mg by mouth daily        sucralfate (CARAFATE) 1 GM tablet Take 1 tablet (1 g) by mouth 2 times daily 180 tablet 3     tamsulosin (FLOMAX) 0.4 MG capsule TAKE 1 CAPSULE EVERY DAY 90 capsule 3     triamcinolone (KENALOG) 0.025 % cream Apply topically 2 times daily as needed        ketotifen (ZADITOR) 0.025 % SOLN ophthalmic solution Place 1 drop into both eyes every 12 hours 1 Bottle      mupirocin (BACTROBAN) 2 % external ointment Apply topically daily           REVIEW OF SYSTEMS:  10 point ROS of systems including Constitutional, Eyes, Respiratory, Cardiovascular, Gastroenterology, Genitourinary, Integumentary, Musculoskeletal, Psychiatric were all negative except for pertinent positives noted in my HPI.    Objective:  /59   Pulse 66   Temp 97.5  F (36.4  C)   Resp 16   Ht 1.727 m (5' 8\")   Wt 88.9 kg (196 lb)   SpO2 95%   BMI 29.80 kg/m    Exam:  GENERAL APPEARANCE:  Alert, in no distress  ENT:  Mouth and posterior oropharynx normal, moist mucous membranes, [place]  EYES:  EOM, conjunctivae, lids, pupils and irises normal, PERRL  RESP:  respiratory effort and palpation of chest normal, lungs clear to auscultation , no respiratory distress  CV:  Palpation and auscultation of heart done , peripheral edema 1+ in LE bilaterally, irregular rhythm noted, rate controlled  ABDOMEN:  normal bowel sounds, soft, nontender, no hepatosplenomegaly or other masses  M/S:   Examination of:   right upper extremity, left upper " extremity, right lower extremity and left lower extremity  Inspection, ROM, stability and muscle strength normal and slight decrease in strength Left LE compared to right  SKIN:  Inspection of skin and subcutaneous tissue baseline, did not visualize Legs, they were wrapped at time of exam  NEURO:   Cranial nerves 2-12 are normal tested and grossly at patient's baseline, speech WNL  PSYCH:  affect and mood normal    Labs:   Recent labs in [facility] reviewed by me today.     ASSESSMENT/PLAN:  Falls frequently  Physical deconditioning  Acute/ongoing: PT and OT for strengthening     Chronic heart failure with preserved ejection fraction (H)  Coronary artery disease involving native coronary artery of native heart without angina pectoris  Chronic atrial fibrillation  CKD (chronic kidney disease) stage 3, GFR 30-59 ml/min (H)  Ongoing/acute: daily weights, vitals daily and prn, BMP follow twice weekly, continue coreg 6.25mg BID, Bumex 2mg q am and 1mg pm , zocor 20mg qhs, KCL 20meq BID, ASA 81mg QD     Bullous pemphigoid  Ongoing:  patient uses vanicream BID to legs and clobetasol twice weekly prn for legs     Anemia due to bone marrow failure: unspecified  Acute/ongoing: set up an appt with hematology      Orders written by provider at facility  No new orders today    Total time with a new patient visit in the assisted livin minutes including discussions about the POC and care coordination with the patient. Greater than 50% of total time spent with counseling and coordinating care due to discussed low Hgb, called wife Kristi to discuss current Hgb of 7.3 and getting a hematologist on board to figure out a plan moving forward. Discussed discharge, patient is doing well and will likely be going home soon. .  Electronically signed by:  Tonya Lynn Haase, APRN CNP

## 2020-02-26 VITALS
WEIGHT: 198 LBS | TEMPERATURE: 98.2 F | SYSTOLIC BLOOD PRESSURE: 117 MMHG | OXYGEN SATURATION: 91 % | BODY MASS INDEX: 30.01 KG/M2 | DIASTOLIC BLOOD PRESSURE: 60 MMHG | HEART RATE: 68 BPM | HEIGHT: 68 IN | RESPIRATION RATE: 16 BRPM

## 2020-02-26 RX ORDER — POLYETHYLENE GLYCOL 3350 17 G/17G
1 POWDER, FOR SOLUTION ORAL DAILY
COMMUNITY

## 2020-02-26 ASSESSMENT — MIFFLIN-ST. JEOR: SCORE: 1537.62

## 2020-02-27 ENCOUNTER — TRANSFERRED RECORDS (OUTPATIENT)
Dept: HEALTH INFORMATION MANAGEMENT | Facility: CLINIC | Age: 85
End: 2020-02-27

## 2020-02-27 ENCOUNTER — DISCHARGE SUMMARY NURSING HOME (OUTPATIENT)
Dept: GERIATRICS | Facility: CLINIC | Age: 85
End: 2020-02-27
Payer: COMMERCIAL

## 2020-02-27 DIAGNOSIS — R29.6 FALLS FREQUENTLY: ICD-10-CM

## 2020-02-27 DIAGNOSIS — D61.9 ANEMIA DUE TO BONE MARROW FAILURE, UNSPECIFIED BONE MARROW FAILURE TYPE (H): Primary | ICD-10-CM

## 2020-02-27 DIAGNOSIS — I48.20 CHRONIC ATRIAL FIBRILLATION (H): ICD-10-CM

## 2020-02-27 DIAGNOSIS — L12.0 BULLOUS PEMPHIGOID (H): ICD-10-CM

## 2020-02-27 DIAGNOSIS — I25.10 CORONARY ARTERY DISEASE INVOLVING NATIVE CORONARY ARTERY OF NATIVE HEART WITHOUT ANGINA PECTORIS: ICD-10-CM

## 2020-02-27 DIAGNOSIS — I50.32 CHRONIC HEART FAILURE WITH PRESERVED EJECTION FRACTION (H): ICD-10-CM

## 2020-02-27 DIAGNOSIS — M79.652 PAIN OF LEFT THIGH: ICD-10-CM

## 2020-02-27 DIAGNOSIS — R53.81 PHYSICAL DECONDITIONING: ICD-10-CM

## 2020-02-27 DIAGNOSIS — N18.30 CKD (CHRONIC KIDNEY DISEASE) STAGE 3, GFR 30-59 ML/MIN (H): ICD-10-CM

## 2020-02-27 LAB
ANION GAP SERPL CALCULATED.3IONS-SCNC: 8 MMOL/L (ref 7–16)
BUN SERPL-MCNC: 25 MG/DL (ref 7–26)
CALCIUM SERPL-MCNC: 8.8 MG/DL (ref 8.4–10.4)
CHLORIDE SERPLBLD-SCNC: 105 MMOL/L (ref 98–109)
CO2 SERPL-SCNC: 29 MMOL/L (ref 20–29)
CREAT SERPL-MCNC: 1.17 MG/DL (ref 0.73–1.18)
GFR SERPL CREATININE-BSD FRML MDRD: 55 ML/MIN/1.73M2
GLUCOSE SERPL-MCNC: 91 MG/DL (ref 70–100)
POTASSIUM SERPL-SCNC: 3.6 MMOL/L (ref 3.5–5.1)
SODIUM SERPL-SCNC: 142 MMOL/L (ref 136–145)

## 2020-02-27 PROCEDURE — 99316 NF DSCHRG MGMT 30 MIN+: CPT | Performed by: NURSE PRACTITIONER

## 2020-02-27 NOTE — PROGRESS NOTES
[place] [facility] DISCHARGE SUMMARY  PATIENT'S NAME: Bairon Foster  YOB: 1931  MEDICAL RECORD NUMBER:  [id number]  Place of Service where encounter took place:  [facility] (S) [[id number]]    PRIMARY CARE PROVIDER AND CLINIC RESPONSIBLE AFTER TRANSFER:   Mg Nelson MD, [address] / [facility] [address]    [facility] Provider     Transferring providers: Tonya Lynn Haase, APRN CNP, Yovani Badillo MD  Recent Hospitalization/ED:  [facility] stay 2/5/2020 to 2/10/2020.  Date of SNF Admission: February / 10 / 2020  Date of SNF (anticipated) Discharge: February / 29 / 2020  Discharged to: previous independent home  Cognitive Scores: BIMS: 13/15 SBT: 6/28, CPT: 4.4/5.6 suggesting that an AL w  Physical Function: Ambulating 200 ft with RW SBA  DME: [facility]    CODE STATUS/ADVANCE DIRECTIVES DISCUSSION:  Full Code   ALLERGIES: Furosemide and Torsemide    DISCHARGE DIAGNOSIS/NURSING FACILITY COURSE:   Patient progressed in therapy to walking up to 200 feet using a RW with SBA, Assist with ADL's with Cognitive impairement noted, CPT of 4.4/5.6 patient will DC to home to live with wife with home PT and RN.     Past Medical History:  has a past medical history of Acute, but ill-defined, cerebrovascular disease, Antiplatelet or antithrombotic long-term use, Arrhythmia, Bullous pemphigoid (8/21/2019), CKD (chronic kidney disease) stage 3, GFR 30-59 ml/min (H), Coronary atherosclerosis of unspecified type of vessel, native or graft, Epistaxis, Esophageal reflux, GI bleed (4/17/2019), Hypercholesterolemia, Ischemic cardiomyopathy, Lymphedema of both lower extremities, Mitral regurgitation, Obese, JIM on CPAP, Other lymphedema, Other pancytopenia (H), Other specified anemias, Overweight, Pacemaker, PAD (peripheral artery disease) (H), Pancytopenia (H), Persistent atrial fibrillation, 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 He also has no past medical history of Hypertension.    Discharge Medications:    Current Outpatient Medications   Medication Sig Dispense Refill     ACE/ARB/ARNI NOT PRESCRIBED (INTENTIONAL) Please choose reason not prescribed, below       acetaminophen (TYLENOL) 500 MG tablet Take 2 tablets (1,000 mg) by mouth every 6 hours as needed for mild pain 100 tablet 0     aspirin 81 MG EC tablet Take 81 mg by mouth daily        bumetanide (BUMEX) 1 MG tablet Take 1 mg by mouth every evening       bumetanide (BUMEX) 2 MG tablet Take 2 mg by mouth daily before breakfast       buPROPion (WELLBUTRIN XL) 300 MG 24 hr tablet Take 1 tablet (300 mg) by mouth every morning 90 tablet 1     carvedilol (COREG) 6.25 MG tablet TAKE 1 TABLET TWICE DAILY WITH MEALS 180 tablet 3     cholecalciferol (VITAMIN D3) 5000 units (125 mcg) capsule Take 5,000 Units by mouth daily       COMPRESSION STOCKINGS 1 each daily 2 each 0     emollient (VANICREAM) external cream Apply topically 2 times daily       EPINEPHrine (EPIPEN/ADRENACLICK/OR ANY BX GENERIC EQUIV) 0.3 MG/0.3ML injection 2-pack Inject 0.3 mg into the muscle as needed for anaphylaxis       finasteride (PROSCAR) 5 MG tablet TAKE 1 TABLET EVERY DAY 90 tablet 3     levothyroxine (SYNTHROID/LEVOTHROID) 100 MCG tablet TAKE 1 TABLET EVERY DAY 90 tablet 3     Multiple Vitamins-Minerals (CENTRUM SILVER) per tablet Take 1 tablet by mouth daily       omeprazole (PRILOSEC) 20 MG DR capsule TAKE 1 CAPSULE TWICE DAILY 180 capsule 3     polyethylene glycol (MIRALAX) packet Take 1 packet by mouth daily       potassium chloride ER (K-DUR/KLOR-CON M) 20 MEQ CR tablet Take 1 tablet (20 mEq) by mouth 2 times daily 180 tablet 3     simvastatin (ZOCOR) 20 MG tablet Take 20 mg by mouth daily    "     sucralfate (CARAFATE) 1 GM tablet Take 1 tablet (1 g) by mouth 2 times daily 180 tablet 3     tamsulosin (FLOMAX) 0.4 MG capsule TAKE 1 CAPSULE EVERY DAY 90 capsule 3     triamcinolone (KENALOG) 0.025 % cream Apply topically 2 times daily as needed        ketotifen (ZADITOR) 0.025 % SOLN ophthalmic solution Place 1 drop into both eyes every 12 hours 1 Bottle      mupirocin (BACTROBAN) 2 % external ointment Apply topically daily         Medication Changes/Rationale:     There were no medication changes made    Controlled medications sent with patient:   not applicable/none     ROS:   10 point ROS of systems including Constitutional, Eyes, Respiratory, Cardiovascular, Gastroenterology, Genitourinary, Integumentary, Musculoskeletal, Psychiatric were all negative except for pertinent positives noted in my HPI.    Physical Exam:   Vitals: /60   Pulse 68   Temp 98.2  F (36.8  C)   Resp 16   Ht 1.727 m (5' 8\")   Wt 89.8 kg (198 lb)   SpO2 91%   BMI 30.11 kg/m    BMI= Body mass index is 30.11 kg/m .  GENERAL APPEARANCE:  Alert, in no distress  ENT:  Mouth and posterior oropharynx normal, moist mucous membranes, [place]  EYES:  EOM, conjunctivae, lids, pupils and irises normal, PERRL  RESP:  respiratory effort and palpation of chest normal, lungs clear to auscultation , no respiratory distress  CV:  Palpation and auscultation of heart done , peripheral edema 1+ in LE bilaterally, irregular rhythm noted, rate controlled  ABDOMEN:  normal bowel sounds, soft, nontender, no hepatosplenomegaly or other masses  M/S:   Examination of:   right upper extremity, left upper extremity, right lower extremity and left lower extremity  Inspection, ROM, stability and muscle strength normal and slight decrease in strength Left LE compared to right  SKIN:  Inspection of skin and subcutaneous tissue baseline, did not visualize Legs, they were wrapped at time of exam  NEURO:   Cranial nerves 2-12 are normal tested and grossly at " patient's baseline, speech WNL  PSYCH:  affect and mood normal    SNF labs:   Most Recent 3 CBC's:  Recent Labs   Lab Test 02/24/20 02/17/20 12/04/19  0544   WBC 4.2 3.8 4.3   HGB 7.3* 7.6* 8.4*   .7* 103.7* 101*   * 101* 69*     Most Recent 3 BMP's:  Recent Labs   Lab Test 02/24/20 02/20/20 02/17/20    144 138   POTASSIUM 3.7 3.6 3.6   CHLORIDE 107 105 103   CO2 28 30* 23   BUN 26 26 31*   CR 1.15 1.17 1.27*   ANIONGAP 7 9 12   TEODORO 8.8 8.9 9.4   GLC 88 88 161*     ASSESSMENT/PLAN:  Falls frequently  Physical deconditioning  Acute/ongoing: DC home with home PT and RN and HHA through [facility].      Chronic heart failure with preserved ejection fraction (H)  Coronary artery disease involving native coronary artery of native heart without angina pectoris  Chronic atrial fibrillation  CKD (chronic kidney disease) stage 3, GFR 30-59 ml/min (H)  Ongoing/acute: continue daily weights, continue coreg 6.25mg BID, Bumex 2mg q am and 1mg pm , zocor 20mg qhs, KCL 20meq BID, ASA 81mg QD     Bullous pemphigoid  Ongoing:  patient uses vanicream BID to legs and clobetasol twice weekly prn for legs     Anemia due to bone marrow failure: unspecified  Acute/ongoing: appt with Dr. Chavez for further investigation low Hgb    DISCHARGE PLAN:    Follow up labs: No labs orders/due    Medical Follow Up:      Follow up with primary care provider in 1-2 weeks    MTM referral needed and placed by this provider: No    Current [place] scheduled appointments:  Next 5 appointments (look out 90 days)    Mar 10, 2020 11:30 AM CDT  [facility] [address] [place] [address]  [phone]   Mar 11, 2020  8:40 AM CDT  Return Visit with Donald Chavez MD  [facility] ([facility]) [address], [address]  [facility]  [address]  [phone]   Mar 27, 2020 11:00 AM CDT  Return Visit with WILLIS Spence CNP  [facility] ([facility]) [address] [place], [place] [id number]  [facility] [address]  [phone] OPT 2           Discharge Services: Home Care:  Physical Therapy and Registered Nurse    Discharge Instructions Verbalized to Patient at Discharge:     None      TOTAL DISCHARGE TIME:   Greater than 30 minutes  Electronically signed by:  Tonya Lynn Haase, APRN CNP     Documentation of Face to Face and Certification for Home Health Services    I certify that patient: Bairon Foster is under my care and that I, or a nurse practitioner or physician's assistant working with me, had a face-to-face encounter that meets the physician face-to-face encounter requirements with this patient on: 2/27/2020.    This encounter with the patient was in whole, or in part, for the following medical condition, which is the primary reason for home health care: Falls, right arm pain, CHF.    I certify that, based on my findings, the following services are medically necessary home health services: Nursing and Physical Therapy.    My clinical findings support the need for the above services because: Nurse is needed: To assess vitals, pain, daily weights, medication management after changes in medications or other medical regimen.. and Physical Therapy Services are needed to assess and treat the following functional impairments: strengthening, endurance, home safety.    Further, I certify that my clinical findings support that this patient is homebound (i.e. absences from home require considerable and taxing effort and are for medical reasons or [facility] services or infrequently or of short duration when for other reasons) because: Requires assistance of another person or specialized equipment to access medical services because patient: Requires supervision of another for safe  transfer...    Based on the above findings. I certify that this patient is confined to the home and needs intermittent skilled nursing care, physical therapy and/or speech therapy.  The patient is under my care, and I have initiated the establishment of the plan of care.  This patient will be followed by a physician who will periodically review the plan of care.  Physician/Provider to provide follow up care: Mg Nelson    Attending [facility] physician (the Medicare certified [facility] provider): Tonya Lynn Haase, APRN CNP, Dr.Dan Justino MD, signing F2F and only signing for initial order. Please send all follow up questions and concerns or needed follow up signatures to the PCP, who [facility] has on file as:  Mg Nelson.    Physician Signature: See electronic signature associated with these discharge orders.  Date: 2/27/2020

## 2020-02-27 NOTE — LETTER
2/27/2020        RE: Bairon Foster  [address]  [facility] [address]        [facility] DISCHARGE SUMMARY  PATIENT'S NAME: Bairon Foster  YOB: 1931  MEDICAL RECORD NUMBER:  [id number]  Place of Service where encounter took place:  [facility] [[id number]]    PRIMARY CARE PROVIDER AND CLINIC RESPONSIBLE AFTER TRANSFER:   Mg Nelson MD, [address] [facility] [address] / [facility] [address]    [facility] Provider     Transferring providers: Tonya Lynn Haase, APRN CNP, Yovani Badillo MD  Recent Hospitalization/ED:  [facility] stay 2/5/2020 to 2/10/2020.  Date of SNF Admission: February / 10 / 2020  Date of SNF (anticipated) Discharge: February / 29 / 2020  Discharged to: previous independent home  Cognitive Scores: BIMS: 13/15 SBT: 6/28, CPT: 4.4/5.6 suggesting that an AL w  Physical Function: Ambulating 200 ft with RW SBA  DME: [facility]    CODE STATUS/ADVANCE DIRECTIVES DISCUSSION:  Full Code   ALLERGIES: Furosemide and Torsemide    DISCHARGE DIAGNOSIS/NURSING FACILITY COURSE:   Patient progressed in therapy to walking up to 200 feet using a RW with SBA, Assist with ADL's with Cognitive impairement noted, CPT of 4.4/5.6 patient will DC to home to live with wife with home PT and RN.     Past Medical History:  has a past medical history of Acute, but ill-defined, cerebrovascular disease, Antiplatelet or antithrombotic long-term use, Arrhythmia, Bullous pemphigoid (8/21/2019), CKD (chronic kidney disease) stage 3, GFR 30-59 ml/min (H), Coronary atherosclerosis of unspecified type of vessel, native or graft, Epistaxis, Esophageal reflux, GI bleed (4/17/2019), Hypercholesterolemia, Ischemic cardiomyopathy, Lymphedema of both lower extremities, Mitral regurgitation, Obese, JIM on CPAP, Other lymphedema, Other pancytopenia (H), Other specified anemias, Overweight, Pacemaker, PAD (peripheral artery disease) (H), Pancytopenia (H), Persistent atrial  fibrillation, Presence of Watchman left atrial appendage closure device (12/03/2019), Pulmonary hypertension (H), Status post coronary angiogram (3/21/2018), Systolic CHF (H), TIA (transient ischemic attack), Unspecified hypothyroidism, Venous stasis dermatitis of both lower extremities, and Vitamin D deficiency. He also has no past medical history of Hypertension.    Discharge Medications:    Current Outpatient Medications   Medication Sig Dispense Refill     ACE/ARB/ARNI NOT PRESCRIBED (INTENTIONAL) Please choose reason not prescribed, below       acetaminophen (TYLENOL) 500 MG tablet Take 2 tablets (1,000 mg) by mouth every 6 hours as needed for mild pain 100 tablet 0     aspirin 81 MG EC tablet Take 81 mg by mouth daily        bumetanide (BUMEX) 1 MG tablet Take 1 mg by mouth every evening       bumetanide (BUMEX) 2 MG tablet Take 2 mg by mouth daily before breakfast       buPROPion (WELLBUTRIN XL) 300 MG 24 hr tablet Take 1 tablet (300 mg) by mouth every morning 90 tablet 1     carvedilol (COREG) 6.25 MG tablet TAKE 1 TABLET TWICE DAILY WITH MEALS 180 tablet 3     cholecalciferol (VITAMIN D3) 5000 units (125 mcg) capsule Take 5,000 Units by mouth daily       COMPRESSION STOCKINGS 1 each daily 2 each 0     emollient (VANICREAM) external cream Apply topically 2 times daily       EPINEPHrine (EPIPEN/ADRENACLICK/OR ANY BX GENERIC EQUIV) 0.3 MG/0.3ML injection 2-pack Inject 0.3 mg into the muscle as needed for anaphylaxis       finasteride (PROSCAR) 5 MG tablet TAKE 1 TABLET EVERY DAY 90 tablet 3     levothyroxine (SYNTHROID/LEVOTHROID) 100 MCG tablet TAKE 1 TABLET EVERY DAY 90 tablet 3     Multiple Vitamins-Minerals (CENTRUM SILVER) per tablet Take 1 tablet by mouth daily       omeprazole (PRILOSEC) 20 MG DR capsule TAKE 1 CAPSULE TWICE DAILY 180 capsule 3     polyethylene glycol (MIRALAX) packet Take 1 packet by mouth daily       potassium chloride ER (K-DUR/KLOR-CON M) 20 MEQ CR tablet Take 1 tablet (20 mEq) by  "mouth 2 times daily 180 tablet 3     simvastatin (ZOCOR) 20 MG tablet Take 20 mg by mouth daily        sucralfate (CARAFATE) 1 GM tablet Take 1 tablet (1 g) by mouth 2 times daily 180 tablet 3     tamsulosin (FLOMAX) 0.4 MG capsule TAKE 1 CAPSULE EVERY DAY 90 capsule 3     triamcinolone (KENALOG) 0.025 % cream Apply topically 2 times daily as needed        ketotifen (ZADITOR) 0.025 % SOLN ophthalmic solution Place 1 drop into both eyes every 12 hours 1 Bottle      mupirocin (BACTROBAN) 2 % external ointment Apply topically daily         Medication Changes/Rationale:     There were no medication changes made    Controlled medications sent with patient:   not applicable/none     ROS:   10 point ROS of systems including Constitutional, Eyes, Respiratory, Cardiovascular, Gastroenterology, Genitourinary, Integumentary, Musculoskeletal, Psychiatric were all negative except for pertinent positives noted in my HPI.    Physical Exam:   Vitals: /60   Pulse 68   Temp 98.2  F (36.8  C)   Resp 16   Ht 1.727 m (5' 8\")   Wt 89.8 kg (198 lb)   SpO2 91%   BMI 30.11 kg/m     BMI= Body mass index is 30.11 kg/m .  GENERAL APPEARANCE:  Alert, in no distress  ENT:  Mouth and posterior oropharynx normal, moist mucous membranes, Knik  EYES:  EOM, conjunctivae, lids, pupils and irises normal, PERRL  RESP:  respiratory effort and palpation of chest normal, lungs clear to auscultation , no respiratory distress  CV:  Palpation and auscultation of heart done , peripheral edema 1+ in LE bilaterally, irregular rhythm noted, rate controlled  ABDOMEN:  normal bowel sounds, soft, nontender, no hepatosplenomegaly or other masses  M/S:   Examination of:   right upper extremity, left upper extremity, right lower extremity and left lower extremity  Inspection, ROM, stability and muscle strength normal and slight decrease in strength Left LE compared to right  SKIN:  Inspection of skin and subcutaneous tissue baseline, did not visualize Legs, " they were wrapped at time of exam  NEURO:   Cranial nerves 2-12 are normal tested and grossly at patient's baseline, speech WNL  PSYCH:  affect and mood normal    SNF labs:   Most Recent 3 CBC's:  Recent Labs   Lab Test 02/24/20 02/17/20 12/04/19  0544   WBC 4.2 3.8 4.3   HGB 7.3* 7.6* 8.4*   .7* 103.7* 101*   * 101* 69*     Most Recent 3 BMP's:  Recent Labs   Lab Test 02/24/20 02/20/20 02/17/20    144 138   POTASSIUM 3.7 3.6 3.6   CHLORIDE 107 105 103   CO2 28 30* 23   BUN 26 26 31*   CR 1.15 1.17 1.27*   ANIONGAP 7 9 12   TEODORO 8.8 8.9 9.4   GLC 88 88 161*     ASSESSMENT/PLAN:  Falls frequently  Physical deconditioning  Acute/ongoing: DC home with home PT and RN and HHA through [facility].      Chronic heart failure with preserved ejection fraction (H)  Coronary artery disease involving native coronary artery of native heart without angina pectoris  Chronic atrial fibrillation  CKD (chronic kidney disease) stage 3, GFR 30-59 ml/min (H)  Ongoing/acute: continue daily weights, continue coreg 6.25mg BID, Bumex 2mg q am and 1mg pm , zocor 20mg qhs, KCL 20meq BID, ASA 81mg QD     Bullous pemphigoid  Ongoing:  patient uses vanicream BID to legs and clobetasol twice weekly prn for legs     Anemia due to bone marrow failure: unspecified  Acute/ongoing: appt with Dr. Chavez for further investigation low Hgb    DISCHARGE PLAN:    Follow up labs: No labs orders/due    Medical Follow Up:      Follow up with primary care provider in 1-2 weeks    [facility] referral needed and placed by this provider: No    Current [facility] scheduled appointments:  Next 5 appointments (look out 90 days)    Mar 10, 2020 11:30 AM CDT  Office Visit with Mg Nelson MD  [facility] ([facility]) [address] [place] [id number]  [phone]   Mar 11, 2020  8:40 AM CDT  Return Visit with Donald Chavez MD  [facility]  ([facility]) [address]  [facility] [address]  [place] [address]  [phone]   Mar 27, 2020 11:00 AM CDT  Return Visit with WILLIS Spence CNP  [facility] ([facility]) [address] [facility] [address]  [place] [address]  [phone] OPT 2           Discharge Services: Home Care:  Physical Therapy and Registered Nurse    Discharge Instructions Verbalized to Patient at Discharge:     None      TOTAL DISCHARGE TIME:   Greater than 30 minutes  Electronically signed by:  Tonya Lynn Haase, APRN CNP     Documentation of Face to Face and Certification for Home Health Services    I certify that patient: Bairon Foster is under my care and that I, or a nurse practitioner or physician's assistant working with me, had a face-to-face encounter that meets the physician face-to-face encounter requirements with this patient on: 2/27/2020.    This encounter with the patient was in whole, or in part, for the following medical condition, which is the primary reason for home health care: Falls, right arm pain, CHF.    I certify that, based on my findings, the following services are medically necessary home health services: Nursing and Physical Therapy.    My clinical findings support the need for the above services because: Nurse is needed: To assess vitals, pain, daily weights, medication management after changes in medications or other medical regimen.. and Physical Therapy Services are needed to assess and treat the following functional impairments: strengthening, endurance, home safety.    Further, I certify that my clinical findings support that this patient is homebound (i.e. absences from home require considerable and taxing effort and are for medical reasons or [employer] services or infrequently or of short duration when for other reasons) because: Requires assistance of another person or specialized equipment to  access medical services because patient: Requires supervision of another for safe transfer...    Based on the above findings. I certify that this patient is confined to the home and needs intermittent skilled nursing care, physical therapy and/or speech therapy.  The patient is under my care, and I have initiated the establishment of the plan of care.  This patient will be followed by a physician who will periodically review the plan of care.  Physician/Provider to provide follow up care: Mg Nelson    Attending [facility] physician (the Medicare certified PECOS provider): Tonya Lynn Haase, APRN CNP, Dr.Dan Justino MD, signing F2F and only signing for initial order. Please send all follow up questions and concerns or needed follow up signatures to the PCP, who [facility] has on file as:  Mg Nelson.    Physician Signature: See electronic signature associated with these discharge orders.  Date: 2/27/2020                  Sincerely,        Tonya Lynn Haase, APRN CNP

## 2020-03-02 ENCOUNTER — TELEPHONE (OUTPATIENT)
Dept: FAMILY MEDICINE | Facility: CLINIC | Age: 85
End: 2020-03-02

## 2020-03-02 ENCOUNTER — PATIENT OUTREACH (OUTPATIENT)
Dept: CARE COORDINATION | Facility: CLINIC | Age: 85
End: 2020-03-02

## 2020-03-02 DIAGNOSIS — Z71.89 COUNSELING AND COORDINATION OF CARE: Primary | ICD-10-CM

## 2020-03-02 NOTE — TELEPHONE ENCOUNTER
Routing to provider- Please see prior message from pt.    Do you feel comfortable following this patient in home care?

## 2020-03-02 NOTE — TELEPHONE ENCOUNTER
Reason for Call:  Other call back  Detailed comments: please call Roxanna from [facility] want to know if Dr Nelson will follow patient for home care  Phone Number Patient can be reached at: Other phone number:  [phone]  Best Time: any  Can we leave a detailed message on this number? YES  Call taken on 3/2/2020 at 9:15 AM by JENIFER THOMPSON

## 2020-03-02 NOTE — TELEPHONE ENCOUNTER
Care coordination will follow up.    Patient called stating that since 12/5 he has been having trouble seeing out of his left eye.  He said he can barely see at all until he refocuses on an object.  He called [facility] and was told he needs to see an ophthalmologist.  Patient does not have a PCP to make a referral.  Patient aware this would need to be discussed with Dr. Manning with her input.  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

## 2020-03-02 NOTE — LETTER
[facility] CARE COORDINATION        March 2, 2020        Bairon Foster  [address] [facility] [address]        Dear Bairon,    I am a clinic care coordinator who works with Mg Nelson MD at [facility]. I wanted to thank you for spending the time to talk with me.  Below is a description of clinic care coordination and how I can further assist you.      The clinic care coordinator team is made up of a registered nurse,  and community health worker who understand the health care system. The goal of clinic care coordination is to help you manage your health and improve access to the health care system in the most efficient manner. The team can assist you in meeting your health care goals by providing education, coordinating services, strengthening the communication among your providers  and supporting you with any resource needs.    Please feel free to contact me, at [phone] with any questions or concerns. We are focused on providing you with the highest-quality healthcare experience possible and that all starts with you.     Sincerely,     MARIE Price                                                                     Clinic Care Coordination                                          [facility]: [facility]       Phone: [phone]

## 2020-03-02 NOTE — PROGRESS NOTES
The clinic Community Health Worker spoke with the patient today at the request of the PCP to discuss possible Clinic Care Coordination enrollment.  The service was described to the patient and immediate needs were discussed.  The patient declined enrollment at this time.  The PCP is encouraged to refer in the future if the patient's needs change.  CHW mailed out information on the Clinic Care Coordination program.

## 2020-03-03 ENCOUNTER — TELEPHONE (OUTPATIENT)
Dept: FAMILY MEDICINE | Facility: CLINIC | Age: 85
End: 2020-03-03

## 2020-03-03 ENCOUNTER — MEDICAL CORRESPONDENCE (OUTPATIENT)
Dept: HEALTH INFORMATION MANAGEMENT | Facility: CLINIC | Age: 85
End: 2020-03-03

## 2020-03-03 NOTE — TELEPHONE ENCOUNTER
Reason for Call:  Form, our goal is to have forms completed with 72 hours, however, some forms may require a visit or additional information.    Type of letter, form or note:  medical    Who is the form from?: Home care    Where did the form come from: form was faxed in    What clinic location was the form placed at?: [facility]    Where the form was placed: Given to physician    What number is listed as a contact on the form?: [phone] (P) [phone]       Additional comments: [facility]: Orders- Drug Interaction; Home Care Discharge    Call taken on 3/3/2020 at 9:08 AM by Rose Sinha

## 2020-03-03 NOTE — TELEPHONE ENCOUNTER
Reason for Call:  Form, our goal is to have forms completed with 72 hours, however, some forms may require a visit or additional information.    Type of letter, form or note:  medical    Who is the form from?: Home care    Where did the form come from: form was faxed in    What clinic location was the form placed at?: [facility]    Where the form was placed: Given to physician    What number is listed as a contact on the form?: [phone] (P) [phone]       Additional comments: [facility]: Face to Face Encounter form and notes    Call taken on 3/3/2020 at 9:20 AM by Rose Sinha

## 2020-03-03 NOTE — TELEPHONE ENCOUNTER
Reason for Call:  Form, our goal is to have forms completed with 72 hours, however, some forms may require a visit or additional information.    Type of letter, form or note:  Home Health Certification    Who is the form from?: Home care    Where did the form come from: form was faxed in    What clinic location was the form placed at?: [facility]    Where the form was placed: Given to physician    What number is listed as a contact on the form?: [phone] (P) [phone]       Additional comments: [facility]: [facility] POC 1/2/20-3/1/20    Call taken on 3/3/2020 at 11:38 AM by Rose Sinha

## 2020-03-06 ENCOUNTER — CARE COORDINATION (OUTPATIENT)
Dept: CARDIOLOGY | Facility: CLINIC | Age: 85
End: 2020-03-06

## 2020-03-06 NOTE — PROGRESS NOTES
Call from [facility] inquiring about Bumex dose. Left voice mail for call back.       MINE Merlos March 6, 2020 4:12 PM

## 2020-03-09 ENCOUNTER — TELEPHONE (OUTPATIENT)
Dept: FAMILY MEDICINE | Facility: CLINIC | Age: 85
End: 2020-03-09

## 2020-03-09 NOTE — TELEPHONE ENCOUNTER
Reason for Call:  Form, our goal is to have forms completed with 72 hours, however, some forms may require a visit or additional information.    Type of letter, form or note:  medical    Who is the form from?: Home care    Where did the form come from: form was faxed in    What clinic location was the form placed at?: [facility]    Where the form was placed: Given to physician    What number is listed as a contact on the form?: [phone] (P) [phone]       Additional comments: [employer]: PT Eval     Call taken on 3/9/2020 at 4:11 PM by Rose Sinha

## 2020-03-10 NOTE — PROGRESS NOTES
VM received from patient's HCRN, [facility], calling inquiring about what dose of bumex the patient should be on. Reviewed Anushka's last note from 1/24/20. Patient was on bumex 1 mg BID at that visit, but was changed to 2 mg in the AM and 1 mg in the PM with recent nursing home stay. Contacted [facility] to review this. Reviewed current Bumex dose with [facility]. No further questions.

## 2020-03-11 ENCOUNTER — HOSPITAL ENCOUNTER (OUTPATIENT)
Facility: CLINIC | Age: 85
Setting detail: SPECIMEN
Discharge: HOME | End: 2020-03-11
Attending: INTERNAL MEDICINE | Admitting: INTERNAL MEDICINE
Payer: COMMERCIAL

## 2020-03-11 ENCOUNTER — ONCOLOGY VISIT (OUTPATIENT)
Dept: ONCOLOGY | Facility: CLINIC | Age: 85
End: 2020-03-11
Attending: INTERNAL MEDICINE
Payer: COMMERCIAL

## 2020-03-11 ENCOUNTER — ANCILLARY PROCEDURE (OUTPATIENT)
Dept: CARDIOLOGY | Facility: CLINIC | Age: 85
End: 2020-03-11
Attending: INTERNAL MEDICINE
Payer: COMMERCIAL

## 2020-03-11 ENCOUNTER — MEDICAL CORRESPONDENCE (OUTPATIENT)
Dept: HEALTH INFORMATION MANAGEMENT | Facility: CLINIC | Age: 85
End: 2020-03-11

## 2020-03-11 VITALS
HEART RATE: 76 BPM | SYSTOLIC BLOOD PRESSURE: 119 MMHG | DIASTOLIC BLOOD PRESSURE: 73 MMHG | BODY MASS INDEX: 25.69 KG/M2 | WEIGHT: 200.2 LBS | OXYGEN SATURATION: 97 % | RESPIRATION RATE: 16 BRPM | TEMPERATURE: 98.2 F | HEIGHT: 74 IN

## 2020-03-11 DIAGNOSIS — Z95.0 CARDIAC PACEMAKER IN SITU: ICD-10-CM

## 2020-03-11 DIAGNOSIS — Z95.0 CARDIAC PACEMAKER IN SITU: Primary | ICD-10-CM

## 2020-03-11 DIAGNOSIS — D61.818 PANCYTOPENIA (H): Primary | ICD-10-CM

## 2020-03-11 PROBLEM — K92.2 GI BLEED: Status: RESOLVED | Noted: 2019-04-17 | Resolved: 2020-03-11

## 2020-03-11 PROBLEM — K42.9 UMBILICAL HERNIA WITHOUT OBSTRUCTION AND WITHOUT GANGRENE: Status: RESOLVED | Noted: 2017-09-09 | Resolved: 2020-03-11

## 2020-03-11 PROBLEM — K92.0 HEMATEMESIS: Status: RESOLVED | Noted: 2019-04-18 | Resolved: 2020-03-11

## 2020-03-11 PROBLEM — Z53.9 ERRONEOUS ENCOUNTER--DISREGARD: Status: RESOLVED | Noted: 2020-02-11 | Resolved: 2020-03-11

## 2020-03-11 PROBLEM — S20.429A: Status: RESOLVED | Noted: 2019-07-16 | Resolved: 2020-03-11

## 2020-03-11 PROBLEM — R04.0 EPISTAXIS: Status: RESOLVED | Noted: 2019-04-30 | Resolved: 2020-03-11

## 2020-03-11 PROBLEM — Z98.890 STATUS POST CORONARY ANGIOGRAM: Status: RESOLVED | Noted: 2018-03-21 | Resolved: 2020-03-11

## 2020-03-11 PROBLEM — K92.1 BLOOD IN STOOL: Status: RESOLVED | Noted: 2017-09-08 | Resolved: 2020-03-11

## 2020-03-11 LAB
BASOPHILS # BLD AUTO: 0.1 10E9/L (ref 0–0.2)
BASOPHILS NFR BLD AUTO: 1.5 %
DIFFERENTIAL METHOD BLD: ABNORMAL
EOSINOPHIL # BLD AUTO: 0.7 10E9/L (ref 0–0.7)
EOSINOPHIL NFR BLD AUTO: 20.9 %
ERYTHROCYTE [DISTWIDTH] IN BLOOD BY AUTOMATED COUNT: 17.2 % (ref 10–15)
HCT VFR BLD AUTO: 27.1 % (ref 40–53)
HGB BLD-MCNC: 8.4 G/DL (ref 13.3–17.7)
IMM GRANULOCYTES # BLD: 0 10E9/L (ref 0–0.4)
IMM GRANULOCYTES NFR BLD: 0.6 %
LYMPHOCYTES # BLD AUTO: 0.7 10E9/L (ref 0.8–5.3)
LYMPHOCYTES NFR BLD AUTO: 22.4 %
MCH RBC QN AUTO: 31.5 PG (ref 26.5–33)
MCHC RBC AUTO-ENTMCNC: 31 G/DL (ref 31.5–36.5)
MCV RBC AUTO: 102 FL (ref 78–100)
MONOCYTES # BLD AUTO: 0.3 10E9/L (ref 0–1.3)
MONOCYTES NFR BLD AUTO: 9.5 %
NEUTROPHILS # BLD AUTO: 1.5 10E9/L (ref 1.6–8.3)
NEUTROPHILS NFR BLD AUTO: 45.1 %
NRBC # BLD AUTO: 0 10*3/UL
NRBC BLD AUTO-RTO: 0 /100
PLATELET # BLD AUTO: 84 10E9/L (ref 150–450)
RBC # BLD AUTO: 2.67 10E12/L (ref 4.4–5.9)
WBC # BLD AUTO: 3.3 10E9/L (ref 4–11)

## 2020-03-11 PROCEDURE — G0463 HOSPITAL OUTPT CLINIC VISIT: HCPCS

## 2020-03-11 PROCEDURE — 99214 OFFICE O/P EST MOD 30 MIN: CPT | Performed by: INTERNAL MEDICINE

## 2020-03-11 PROCEDURE — 93293 PM PHONE R-STRIP DEVICE EVAL: CPT | Performed by: INTERNAL MEDICINE

## 2020-03-11 PROCEDURE — 85025 COMPLETE CBC W/AUTO DIFF WBC: CPT | Performed by: INTERNAL MEDICINE

## 2020-03-11 ASSESSMENT — PAIN SCALES - GENERAL: PAINLEVEL: NO PAIN (0)

## 2020-03-11 ASSESSMENT — MIFFLIN-ST. JEOR: SCORE: 1642.85

## 2020-03-11 NOTE — PROGRESS NOTES
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   1. On 08/05/2009, WBC of 3.3, hemoglobin of 12.9 and platelets of 111.   2. On 04/16/2019:  -WBC of 2.2, hemoglobin of 9.7 and platelets of 67.  MCV normal.  Neutrophil of 75% and lymphocyte of 15%.   -Chemistry panel reveals mild elevated bilirubin of 1.7.  Normal AST, ALT and alkaline phosphatase.   3. On 04/18/2019.   -WBC 3.3, hemoglobin 10.3 and platelets 83.  MCV of 99. Retic of 1.4%.   -SPEP was normal.   -Vitamin B12 elevated.   -Normal folate.   -Normal ferritin and iron.  Low saturation of 14%.   4.  Bone marrow biopsy on 04/19/2019 is normal. Bone marrow cellularity of 10%-20%.   It reveals normal trilineage hematopoiesis.  There is no evidence of any dysplasia.  Flow revealed slightly increased NK cell population.  I spoke to the pathologist.  This is not of clinical significance.  Cytogenetics is normal.      SUBJECTIVE:  Mr. Foster is an 89-year-old gentleman with chronic pancytopenia.  He has had multiple investigations done.  His pancytopenia is likely from hypocellular marrow.  Bone marrow biopsy was done in 04/2019.  It had revealed trilineage hematopoiesis.  A bone marrow cellularity was 10%-20%.      The patient has multiple other medical problems including hypothyroidism, cardiomyopathy and pemphigus.  He also has anemia of chronic disease.      Overall, he is doing well.  He has some fatigue, but is still active and able to do his things.      REVIEW OF SYSTEMS:  No headache.  No dizziness.  No chest pain.  No shortness of breath.  No abdominal pain, nausea or vomiting.  No urinary or bowel complaints.  No bleeding.  No fever, chills or night sweats.      PHYSICAL EXAMINATION:   GENERAL:  Alert and oriented x 3.   VITAL SIGNS:  Reviewed.  ECOG PS of 1.     EYES:  No icterus.   THROAT:  No ulcer. No thrush.   NECK:  Supple. No lymphadenopathy. No thyromegaly.   AXILLAE:  No lymphadenopathy.    LUNGS:  Good air entry bilaterally.  No crackles or wheezing.   HEART:  Regular.  No murmur.   GI: Abdomen is soft.  Nontender. No mass.   EXTREMITIES:  Mild pedal edema.  No calf swelling or tenderness.   SKIN:  No rash.      LABORATORY DATA:  Reviewed.      ASSESSMENT:   1.  An 89-year-old gentleman with chronic pancytopenia.  Pancytopenia is secondary to hypocellular marrow.  Anemia is also due to anemia of chronic disease.   2.  Multiple other medical problems including cardiomyopathy, hypothyroidism and pemphigus.      PLAN:   1. The patient has pancytopenia.  His pancytopenia is overall stable.  CBC was done today.  No worsening of is pancytopenia.  He is not neutropenic.      Complications of pancytopenia discussed.  He is not having any complication.  In fact, he is doing well for his age.      At this time, we will monitor his CBC every 2-3 months.  If his pancytopenia gets worse, we are going to do mainly supportive treatment with transfusion and growth factor support.      I advised the patient to have CBC checked every 2-3 months with his primary care physician or at the [facility].  I will see him in 6 months.  Advised him to see a physician sooner if he has fever, chills, infection, bleeding, worsening weakness, worsening fatigue, shortness of breath or any other concerns.     2.  He and his wife had a few questions, which were all answered.         ANDRES KRISHNA MD             D: 2020   T: 2020   MT: YANIRA      Name:     HIRO IBARRA   MRN:      [id number]        Account:      [id number]   :      [id number]           Visit Date:   2020      Document: [id number]

## 2020-03-11 NOTE — PROGRESS NOTES
Medical Assistant Note:  Bairon Foster presents today for BLOOD DRAW.    Patient seen by provider today: Yes: TOMI.   present during visit today: Not Applicable.    Concerns: No Concerns.    Procedure:  Lab draw site: rac, Needle type: bf, Gauge: 23.    Post Assessment:  Labs drawn without difficulty: Yes.    Discharge Plan:  Departure Mode: Ambulatory.    Face to Face Time: 5 min  .    Paige Pinedo, CMA

## 2020-03-11 NOTE — LETTER
3/11/2020         RE: Bairon Foster  [address]  [place] [address]        Dear Colleague,    Thank you for referring your patient, Bairon Foster, to the [facility]. Please see a copy of my visit note below.    Medical Assistant Note:  Bairon Foster presents today for BLOOD DRAW.    Patient seen by provider today: Yes: TOMI.   present during visit today: Not Applicable.    Concerns: No Concerns.    Procedure:  Lab draw site: rac, Needle type: bf, Gauge: 23.    Post Assessment:  Labs drawn without difficulty: Yes.    Discharge Plan:  Departure Mode: Ambulatory.    Face to Face Time: 5 min  .    Paige Pinedo, [facility]            Visit Date:   03/11/2020      SUBJECTIVE:  Mr. Foster is an 89-year-old gentleman with chronic pancytopenia.  He has had multiple investigations done.  His pancytopenia is likely from hypocellular marrow.  Bone marrow biopsy was done in 04/2019.  It had revealed trilineage hematopoiesis.  A bone marrow cellularity was 10%-20%.      The patient has multiple other medical problems including hypothyroidism, cardiomyopathy and pemphigus.  He also has anemia of chronic disease.      Overall, he is doing well.  He has some fatigue, but is still active and able to do his things.      REVIEW OF SYSTEMS:  No headache.  No dizziness.  No chest pain.  No shortness of breath.  No abdominal pain, nausea or vomiting.  No urinary or bowel complaints.  No bleeding.  No fever, chills or night sweats.      PHYSICAL EXAMINATION:  Normal.   VITAL SIGNS:  ECOG PS of 1.   EXTREMITIES:  Mild bilateral edema.      LABORATORY DATA:  Reviewed.      ASSESSMENT:   1.  An 89-year-old gentleman with chronic pancytopenia.  Pancytopenia is secondary to hypocellular marrow.  Anemia is also due to anemia of chronic disease.   2.  Multiple other medical problems including cardiomyopathy, hypothyroidism and pemphigus.      PLAN:   The patient has pancytopenia.  His pancytopenia  is overall stable.  CBC was done today.  No worsening of is pancytopenia.  He is not neutropenic.      Complications of pancytopenia discussed.  He is not having any complication.  In fact, he is doing well for his age.      At this time, we will monitor his CBC every 2-3 months.  If his pancytopenia gets worse, we are going to do mainly supportive treatment with transfusion and growth factor support.      I advised the patient to have CBC checked every 2-3 months with his primary care physician or at the [facility].  I will see him in 6 months.  Advised him to see a physician sooner if he has fever, chills, infection, bleeding, worsening weakness, worsening fatigue, shortness of breath or any other concerns.   2.  He and his wife had a few questions, which were all answered.         ANDRES KRISHNA MD             D: 2020   T: 2020   MT: YANIRA      Name:     HIRO IBARRA   MRN:      [id number]        Account:      [id number]   :      1931           Visit Date:   2020      Document: [id number]       Again, thank you for allowing me to participate in the care of your patient.        Sincerely,        Andres Krishna MD

## 2020-03-12 ENCOUNTER — TELEPHONE (OUTPATIENT)
Dept: FAMILY MEDICINE | Facility: CLINIC | Age: 85
End: 2020-03-12

## 2020-03-12 NOTE — RESULT ENCOUNTER NOTE
Dear  Cristian,    CBC overall stable. WBC low at 3.3. Hemoglobin low at 8.4. Platelet low at 84. Please, continue to have CBC checked with your primary doctor.    Please, call me with any questions.    Donald Chavez MD

## 2020-03-12 NOTE — TELEPHONE ENCOUNTER
Reason for Call:  Form, our goal is to have forms completed with 72 hours, however, some forms may require a visit or additional information.    Type of letter, form or note:  medical    Who is the form from?: Home care    Where did the form come from: form was mailed in    What clinic location was the form placed at?: [facility]    Where the form was placed: Given to physician    What number is listed as a contact on the form?: [phone] (P) [phone]       Additional comments: [employer]: OT eval ; Medication update    Call taken on 3/12/2020 at 11:20 AM by Rose Sinha

## 2020-03-19 ENCOUNTER — TELEPHONE (OUTPATIENT)
Dept: FAMILY MEDICINE | Facility: CLINIC | Age: 85
End: 2020-03-19

## 2020-03-19 DIAGNOSIS — I25.10 CORONARY ARTERY DISEASE INVOLVING NATIVE CORONARY ARTERY OF NATIVE HEART WITHOUT ANGINA PECTORIS: ICD-10-CM

## 2020-03-19 DIAGNOSIS — R39.11 BENIGN PROSTATIC HYPERPLASIA WITH URINARY HESITANCY: ICD-10-CM

## 2020-03-19 DIAGNOSIS — E87.6 HYPOKALEMIA: ICD-10-CM

## 2020-03-19 DIAGNOSIS — N40.0 BENIGN PROSTATIC HYPERPLASIA, UNSPECIFIED WHETHER LOWER URINARY TRACT SYMPTOMS PRESENT: ICD-10-CM

## 2020-03-19 DIAGNOSIS — K21.9 GASTROESOPHAGEAL REFLUX DISEASE WITHOUT ESOPHAGITIS: Chronic | ICD-10-CM

## 2020-03-19 DIAGNOSIS — I25.5 ISCHEMIC CARDIOMYOPATHY: Primary | ICD-10-CM

## 2020-03-19 DIAGNOSIS — N40.1 BENIGN PROSTATIC HYPERPLASIA WITH URINARY HESITANCY: ICD-10-CM

## 2020-03-19 DIAGNOSIS — F32.5 MAJOR DEPRESSION IN COMPLETE REMISSION (H): ICD-10-CM

## 2020-03-19 DIAGNOSIS — K21.9 GASTROESOPHAGEAL REFLUX DISEASE, ESOPHAGITIS PRESENCE NOT SPECIFIED: ICD-10-CM

## 2020-03-19 DIAGNOSIS — E03.9 ACQUIRED HYPOTHYROIDISM: ICD-10-CM

## 2020-03-19 RX ORDER — POTASSIUM CHLORIDE 1500 MG/1
20 TABLET, EXTENDED RELEASE ORAL 2 TIMES DAILY
Qty: 180 TABLET | Refills: 3 | Status: CANCELLED | OUTPATIENT
Start: 2020-03-19

## 2020-03-19 NOTE — TELEPHONE ENCOUNTER
"Requested Prescriptions   Pending Prescriptions Disp Refills     aspirin 81 MG EC tablet       Sig: Take 1 tablet (81 mg) by mouth daily     Last Written Prescription Date:    Last Fill Quantity: ,  # refills:    Last office visit: 1/15/2020 with prescribing provider:     Future Office Visit:   Next 5 appointments (look out 90 days)    Mar 27, 2020 11:00 AM CDT  Return Visit with WILLIS Spence CNP  [facility] ([facility]) [address] [place] [id number] [phone] OPT 2               Analgesics (Non-Narcotic Tylenol and ASA Only) Passed - 3/19/2020 11:59 AM        Passed - Recent (12 mo) or future (30 days) visit within the authorizing provider's specialty     Patient has had an office visit with the authorizing provider or a provider within the authorizing providers department within the previous 12 mos or has a future within next 30 days. See \"Patient Info\" tab in inbasket, or \"Choose Columns\" in Meds & Orders section of the refill encounter.              Passed - Patient is age 20 years or older     If ASA is flagged for ages under 20 years old. Forward to provider for confirmation Ryes Syndrome is not a concern.              Passed - Medication is active on med list           buPROPion (WELLBUTRIN XL) 300 MG 24 hr tablet 90 tablet 1     Sig: Take 1 tablet (300 mg) by mouth every morning     Last Written Prescription Date:  1/15/2020  Last Fill Quantity: 90,  # refills: 1   Last office visit: 1/15/2020 with prescribing provider:  Leslie   Future Office Visit:   Next 5 appointments (look out 90 days)    Mar 27, 2020 11:00 AM CDT  Return Visit with WILLIS Spence CNP  [facility] ([facility]) [address]  [facility] [id number] [phone] OPT 2               SSRIs Protocol Failed - 3/19/2020 11:59 AM        Failed - PHQ-9 score less than 5 " "in past 6 months     Please review last PHQ-9 score.           Passed - Medication is Bupropion     If the medication is Bupropion (Wellbutrin), and the patient is taking for smoking cessation; OK to refill.          Passed - Medication is active on med list        Passed - Patient is age 18 or older        Passed - Recent (6 mo) or future (30 days) visit within the authorizing provider's specialty     Patient had office visit in the last 6 months or has a visit in the next 30 days with authorizing provider or within the authorizing provider's specialty.  See \"Patient Info\" tab in inbasket, or \"Choose Columns\" in Meds & Orders section of the refill encounter.               cholecalciferol (VITAMIN D3) 5000 units (125 mcg) capsule       Sig: Take 1 capsule (5,000 Units) by mouth daily     Last Written Prescription Date:    Last Fill Quantity: ,  # refills:    Last office visit: 1/15/2020 with prescribing provider:     Future Office Visit:   Next 5 appointments (look out 90 days)    Mar 27, 2020 11:00 AM CDT  Return Visit with WILLIS Spence [facility] ([facility]) [address] [place] [phone] OPT 2               Vitamin Supplements (Adult) Protocol Passed - 3/19/2020 11:59 AM        Passed - High dose Vitamin D not ordered        Passed - Recent (12 mo) or future (30 days) visit within the authorizing provider's specialty     Patient has had an office visit with the authorizing provider or a provider within the authorizing providers department within the previous 12 mos or has a future within next 30 days. See \"Patient Info\" tab in inbasket, or \"Choose Columns\" in Meds & Orders section of the refill encounter.              Passed - Medication is active on med list           finasteride (PROSCAR) 5 MG tablet 90 tablet 3     Sig: TAKE 1 TABLET EVERY DAY     Last Written Prescription Date:  1/5/2020  Last Fill " "Quantity: 90,  # refills: 3   Last office visit: 1/15/2020 with prescribing provider:  Leslie   Future Office Visit:   Next 5 appointments (look out 90 days)    Mar 27, 2020 11:00 AM CDT  Return Visit with WILLIS Spence CNP  [facility] ([facility]) [address] [place] [id number] [phone] OPT 2               BPH Agents Passed - 3/19/2020 11:59 AM        Passed - Recent (12 mo) or future (30 days) visit within the authorizing provider's department     Patient has had an office visit with the authorizing provider or a provider within the authorizing providers department within the previous 12 mos or has a future within next 30 days. See \"Patient Info\" tab in inbasket, or \"Choose Columns\" in Meds & Orders section of the refill encounter.              Passed - Medication is active on med list        Passed - Patient is 18 years of age or older           levothyroxine (SYNTHROID/LEVOTHROID) 100 MCG tablet 90 tablet 3     Sig: TAKE 1 TABLET EVERY DAY     Last Written Prescription Date:  1/15/2020  Last Fill Quantity: 90,  # refills: 3   Last office visit: 1/15/2020 with prescribing provider:  Leslie   Future Office Visit:   Next 5 appointments (look out 90 days)    Mar 27, 2020 11:00 AM CDT  Return Visit with WILLIS Spence CNP  [facility] ([facility]) [address] [place] [id number] [phone] OPT 2               Thyroid Protocol Passed - 3/19/2020 11:59 AM        Passed - Patient is 12 years or older        Passed - Recent (12 mo) or future (30 days) visit within the authorizing provider's specialty     Patient has had an office visit with the authorizing provider or a provider within the authorizing providers department within the previous 12 mos or has a future within next 30 days. See \"Patient Info\" tab in inbasket, or \"Choose " "Columns\" in Meds & Orders section of the refill encounter.              Passed - Medication is active on med list        Passed - Normal TSH on file in past 12 months     Recent Labs   Lab Test 04/08/19  1218   TSH 3.09                 omeprazole (PRILOSEC) 20 MG DR capsule 180 capsule 3     Last Written Prescription Date:  1/23/2019  Last Fill Quantity: 180,  # refills: 3   Last office visit: 1/15/2020 with prescribing provider:  Leslie   Future Office Visit:   Next 5 appointments (look out 90 days)    Mar 27, 2020 11:00 AM CDT  Return Visit with WILLIS Spence CNP  [facility] ([facility]) [address] [place], [place] [id number]  [facility] [id number] [phone] OPT 2               PPI Protocol Passed - 3/19/2020 11:59 AM        Passed - Not on Clopidogrel (unless Pantoprazole ordered)        Passed - No diagnosis of osteoporosis on record        Passed - Recent (12 mo) or future (30 days) visit within the authorizing provider's specialty     Patient has had an office visit with the authorizing provider or a provider within the authorizing providers department within the previous 12 mos or has a future within next 30 days. See \"Patient Info\" tab in inbasket, or \"Choose Columns\" in Meds & Orders section of the refill encounter.              Passed - Medication is active on med list        Passed - Patient is age 18 or older           potassium chloride ER (KLOR-CON M) 20 MEQ CR tablet 180 tablet 3     Sig: Take 1 tablet (20 mEq) by mouth 2 times daily     Last Written Prescription Date:  1/10/2020  Last Fill Quantity: 180,  # refills: 3   Last office visit: 1/15/2020 with prescribing provider:  Leslie   Future Office Visit:   Next 5 appointments (look out 90 days)    Mar 27, 2020 11:00 AM CDT  Return Visit with WILLIS Spence CNP  [facility] ([facility]) [address] [place] [address] " [address]  [place] [address] [phone] OPT 2               Potassium Supplements Protocol Passed - 3/19/2020 11:59 AM        Passed - Recent (12 mo) or future (30 days) visit within the authorizing provider's department     Patient has had an office visit with the authorizing provider or a provider within the authorizing providers department within the previous 12 mos or has a future within next 30 days. See \"Patient Info\" tab in inbasket, or \"Choose Columns\" in Meds & Orders section of the refill encounter.              Passed - Medication is active on med list        Passed - Patient is age 18 or older        Passed - Normal serum potassium in past 12 months     Recent Labs   Lab Test 02/27/20   POTASSIUM 3.6                       sucralfate (CARAFATE) 1 GM tablet 180 tablet 3     Sig: Take 1 tablet (1 g) by mouth 2 times daily     Last Written Prescription Date:  1/15/2020  Last Fill Quantity: 180,  # refills: 3   Last office visit: 1/15/2020 with prescribing provider:  Leslie   Future Office Visit:   Next 5 appointments (look out 90 days)    Mar 27, 2020 11:00 AM CDT  Return Visit with WILLIS Spence CNP  [facility] ([facility]) [address] [place] [address] [address]  [place] [address] [phone] OPT 2               Miscellaneous Gastrointestinal Agents Passed - 3/19/2020 11:59 AM        Passed - Recent (12 mo) or future (30 days) visit within the authorizing provider's specialty     Patient has had an office visit with the authorizing provider or a provider within the authorizing providers department within the previous 12 mos or has a future within next 30 days. See \"Patient Info\" tab in inbasket, or \"Choose Columns\" in Meds & Orders section of the refill encounter.              Passed - Medication is active on med list        Passed - Patient is 18 years of age or older           tamsulosin (FLOMAX) 0.4 MG capsule 90 capsule 3     Sig: TAKE 1 " "CAPSULE EVERY DAY     Last Written Prescription Date:  1/15/2020  Last Fill Quantity: 90,  # refills: 3   Last office visit: 1/15/2020 with prescribing provider:  Leslie   Future Office Visit:   Next 5 appointments (look out 90 days)    Mar 27, 2020 11:00 AM CDT  Return Visit with WILLIS Spence CNP  [facility] ([facility]) [address] [place] [phone] OPT 2               Alpha Blockers Passed - 3/19/2020 11:59 AM        Passed - Blood pressure under 140/90 in past 12 months     BP Readings from Last 3 Encounters:   03/11/20 119/73   02/26/20 117/60   02/24/20 108/59                 Passed - Recent (12 mo) or future (30 days) visit within the authorizing provider's specialty     Patient has had an office visit with the authorizing provider or a provider within the authorizing providers department within the previous 12 mos or has a future within next 30 days. See \"Patient Info\" tab in inbasket, or \"Choose Columns\" in Meds & Orders section of the refill encounter.              Passed - Patient does not have Tadalafil, Vardenafil, or Sildenafil on their medication list        Passed - Medication is active on med list        Passed - Patient is 18 years of age or older             "

## 2020-03-19 NOTE — TELEPHONE ENCOUNTER
Reason for Call:  Medication or medication refill:    Do you use a [facility]?  Name of the pharmacy and phone number for the current request:     [facility]      Name of the medication requested: All Medications    Other request: The patient's Home care nurse Mariluz called and stated that the patient needs all of his medication refills to be sent to a new pharmacy.  The patient stated that they are no longer using [facility] and want all of their medications sent to the [facility] in [place] on [facility].     Can we leave a detailed message on this number? YES    Phone number patient can be reached at: Other phone number:  [phone]    Best Time: Any    Call taken on 3/19/2020 at 11:45 AM by Rose Sinha

## 2020-03-19 NOTE — TELEPHONE ENCOUNTER
Reason for Call:  Form, our goal is to have forms completed with 72 hours, however, some forms may require a visit or additional information.    Type of letter, form or note:  Home Health Certification    Who is the form from?: Home care    Where did the form come from: form was faxed in    What clinic location was the form placed at?: [facility]    Where the form was placed: Given to physician    What number is listed as a contact on the form?: [phone] (P) [phone]       Additional comments: [employer]: [facility] POC 3/1/20-4/29/20    Call taken on 3/19/2020 at 2:51 PM by Rose Sinha

## 2020-03-20 ENCOUNTER — NURSE TRIAGE (OUTPATIENT)
Dept: FAMILY MEDICINE | Facility: CLINIC | Age: 85
End: 2020-03-20

## 2020-03-20 ENCOUNTER — OFFICE VISIT (OUTPATIENT)
Dept: FAMILY MEDICINE | Facility: CLINIC | Age: 85
End: 2020-03-20
Payer: COMMERCIAL

## 2020-03-20 VITALS
DIASTOLIC BLOOD PRESSURE: 78 MMHG | BODY MASS INDEX: 25.68 KG/M2 | HEART RATE: 64 BPM | WEIGHT: 200 LBS | OXYGEN SATURATION: 98 % | TEMPERATURE: 97 F | SYSTOLIC BLOOD PRESSURE: 128 MMHG | RESPIRATION RATE: 16 BRPM

## 2020-03-20 DIAGNOSIS — I89.0 LYMPHEDEMA OF BOTH LOWER EXTREMITIES: Primary | ICD-10-CM

## 2020-03-20 DIAGNOSIS — I87.2 VENOUS STASIS DERMATITIS OF BOTH LOWER EXTREMITIES: ICD-10-CM

## 2020-03-20 DIAGNOSIS — L12.0 BULLOUS PEMPHIGOID (H): ICD-10-CM

## 2020-03-20 DIAGNOSIS — I50.42 CHRONIC COMBINED SYSTOLIC AND DIASTOLIC CONGESTIVE HEART FAILURE (H): ICD-10-CM

## 2020-03-20 PROCEDURE — 99214 OFFICE O/P EST MOD 30 MIN: CPT | Performed by: FAMILY MEDICINE

## 2020-03-20 RX ORDER — LEVOTHYROXINE SODIUM 100 UG/1
TABLET ORAL
Qty: 90 TABLET | Refills: 1 | Status: SHIPPED | OUTPATIENT
Start: 2020-03-20 | End: 2020-06-02

## 2020-03-20 RX ORDER — BUMETANIDE 2 MG/1
2 TABLET ORAL DAILY
Qty: 180 TABLET | Refills: 1 | Status: SHIPPED | OUTPATIENT
Start: 2020-03-20 | End: 2020-05-14

## 2020-03-20 RX ORDER — ASPIRIN 81 MG/1
81 TABLET ORAL DAILY
COMMUNITY
Start: 2020-03-20 | End: 2020-09-28

## 2020-03-20 RX ORDER — FINASTERIDE 5 MG/1
TABLET, FILM COATED ORAL
Qty: 90 TABLET | Refills: 1 | Status: SHIPPED | OUTPATIENT
Start: 2020-03-20 | End: 2020-06-02

## 2020-03-20 RX ORDER — TAMSULOSIN HYDROCHLORIDE 0.4 MG/1
CAPSULE ORAL
Qty: 90 CAPSULE | Refills: 1 | Status: SHIPPED | OUTPATIENT
Start: 2020-03-20 | End: 2020-06-02

## 2020-03-20 RX ORDER — SUCRALFATE 1 G/1
1 TABLET ORAL 2 TIMES DAILY
Qty: 180 TABLET | Refills: 1 | Status: SHIPPED | OUTPATIENT
Start: 2020-03-20 | End: 2020-06-02

## 2020-03-20 RX ORDER — BUPROPION HYDROCHLORIDE 300 MG/1
300 TABLET ORAL EVERY MORNING
Qty: 90 TABLET | Refills: 1 | Status: SHIPPED | OUTPATIENT
Start: 2020-03-20 | End: 2020-06-02

## 2020-03-20 NOTE — TELEPHONE ENCOUNTER
FYI-  Patient called reporting bilateral leg edema for 3 weeks and worsening. [facility] he contact PCP re: leg swelling. Pt denies chest pain, fever or cough. Admits SOB with activity. He has been taking Bumex 2 mg in the morning and 1 mg in at noon. Advised pt be seen today.  Office visit was scheduled.No further cation needed unless different directives.     Additional Information    Negative: Sounds like a life-threatening emergency to the triager    Negative: Chest pain    Negative: Small area of swelling and followed an insect bite to the area    Negative: Followed a knee injury    Negative: Ankle or foot injury    Negative: Pregnant with leg swelling or edema    Negative: Difficulty breathing at rest    Negative: Entire foot is cool or blue in comparison to other side    Negative: SEVERE swelling (e.g., swelling extends above knee, entire leg is swollen, weeping fluid)    Negative: Thigh or calf pain and only 1 side and present > 1 hour    Negative: Thigh, calf, or ankle swelling in only one leg    Negative: Thigh, calf, or ankle swelling in both legs, but one side is definitely more swollen    Negative: Cast on leg or ankle and has increasing pain    Negative: Can't walk or can barely stand (new onset)    Negative: Fever and red area (or area very tender to touch)    Negative: Patient sounds very sick or weak to the triager    Protocols used: LEG SWELLING AND EDEMA-A-[place]

## 2020-03-20 NOTE — TELEPHONE ENCOUNTER
Aspirin 81 MG EC Tablet   Routing refill request to provider for review/approval because:  Medication is reported/historical    buPROPion (WELLBUTRIN XL) 300 MG 24 hr tablet  PHQ 9/17/2018 10/10/2018 4/30/2019   PHQ-9 Total Score 1 4 3   Q9: Thoughts of better off dead/self-harm past 2 weeks Not at all Not at all Not at all     Routing refill request to provider for review/approval because:  Needs updated PHQ-9    cholecalciferol (VITAMIN D3) 5000 units  Routing refill request to provider for review/approval because:  Medication is reported/historical    finasteride (PROSCAR) 5 MG tablet  Requested from new pharmacy.     levothyroxine (SYNTHROID/LEVOTHROID) 100 MCG tablet  Prescription approved per [facility] Refill Protocol.    omeprazole (PRILOSEC) 20 MG DR capsule  Prescription approved per [facility] Refill Protocol.    potassium chloride ER (KLOR-CON M) 20 MEQ CR tablet  Called pharmacy, they have on file. Rx denied.     sucralfate (CARAFATE) 1 GM tablet  Prescription approved per [facility] Refill Protocol.    tamsulosin (FLOMAX) 0.4 MG capsule  Prescription approved per [facility] Refill Protocol.

## 2020-03-20 NOTE — PROGRESS NOTES
Subjective     Bairon Foster is a 89 year old male who presents to clinic today for the following health issues:    HPI   Edema      Duration: on and off for 6 months    Description (location/character/radiation): both legs    Intensity:  moderate    Accompanying signs and symptoms: swelling, warmth    History (similar episodes/previous evaluation): CHF, also has a rash on lower legs    Precipitating or alleviating factors: None    Therapies tried and outcome: support wraps, diuretic   Pt has been taking Bumex 2 mg in AM and 1 mg at noon (has been taking 1 mg pills each time    He has Hx of bullous pemphigoid on both legs, is worried that increased swelling will cause more blistering to show up        Heart Failure Follow-up  Are you experiencing any shortness of breath? Yes, with activity only   How would you describe your shortness of breath?  Same as usual        Are you experiencing any swelling in your legs or feet?  Worse than usual    Are you using more pillows than usual? No    Do you cough at night?  No    Do you check your weight daily?  Yes    Have you had a weight change recently?  Weight increase    Are you having any of the following side effects from your medications? (Select all that apply)  The patient does not report symptoms of dizziness, fatigue, cough, swelling, or slow heart beat.    Since your last visit, how many times have you gone to the cardiologist, urgent care, emergency room, or hospital because of your heart failure?   None      BP Readings from Last 3 Encounters:   03/20/20 128/78   03/11/20 119/73   02/26/20 117/60    Wt Readings from Last 3 Encounters:   03/20/20 90.7 kg (200 lb)   03/11/20 90.8 kg (200 lb 3.2 oz)   02/26/20 89.8 kg (198 lb)                    Reviewed and updated as needed this visit by Provider         Review of Systems   ROS COMP: CONSTITUTIONAL: NEGATIVE for fever, chills, change in weight  INTEGUMENTARY/SKIN: POSITIVE for rash lower legs bilateral  ENT/MOUTH:  "NEGATIVE for ear, mouth and throat problems  RESP: NEGATIVE for significant cough or SOB  CV: POSITIVE for lower extremity edema and NEGATIVE for chest pain/chest pressure      Objective    /78 (Cuff Size: Adult Regular)   Pulse 64   Temp 97  F (36.1  C) (Tympanic)   Resp 16   Wt 90.7 kg (200 lb)   SpO2 98%   BMI 25.68 kg/m    Body mass index is 25.68 kg/m .  Physical Exam   GENERAL: healthy, alert and no distress  NECK: no adenopathy, no asymmetry, masses, or scars and thyroid normal to palpation  RESP: lungs clear to auscultation - no rales, rhonchi or wheezes  CV: regular rates and rhythm, normal S1 S2, no S3 or S4, no murmur, click or rub, peripheral pulses strong and 3+ bilateral lower extremity pitting edema to knees    ABDOMEN: soft, nontender, no hepatosplenomegaly, no masses and bowel sounds normal  MS: extremities normal- no gross deformities noted    Diagnostic Test Results:  Labs reviewed in Epic  none         Assessment & Plan     Bairon was seen today for edema.    Diagnoses and all orders for this visit:    Lymphedema of both lower extremities  -     bumetanide (BUMEX) 2 MG tablet; Take 1 tablet (2 mg) by mouth daily    Venous stasis dermatitis of both lower extremities    Bullous pemphigoid    Chronic combined systolic and diastolic congestive heart failure (H)  -     bumetanide (BUMEX) 2 MG tablet; Take 1 tablet (2 mg) by mouth daily         BMI:   Estimated body mass index is 25.68 kg/m  as calculated from the following:    Height as of 3/11/20: 1.88 m (6' 2\").    Weight as of this encounter: 90.7 kg (200 lb).           FUTURE APPOINTMENTS:       - Follow-up visit in 2 weeks if not improving  Pt already has appt with cardiology for next week, he will keep that appt  Patient Instructions   Increase Bumex to take 2 mg twice daily       Return in about 2 weeks (around 4/3/2020) for dependent edema.    Elvin Ross MD  [facility]    "

## 2020-03-22 NOTE — PROGRESS NOTES
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   1. On 08/05/2009, WBC of 3.3, hemoglobin of 12.9 and platelets of 111.   2. On 04/16/2019:  -WBC of 2.2, hemoglobin of 9.7 and platelets of 67.  MCV normal.  Neutrophil of 75% and lymphocyte of 15%.   -Chemistry panel reveals mild elevated bilirubin of 1.7.  Normal AST, ALT and alkaline phosphatase.   3. On 04/18/2019.   -WBC 3.3, hemoglobin 10.3 and platelets 83.  MCV of 99. Retic of 1.4%.   -SPEP was normal.   -Vitamin B12 elevated.   -Normal folate.   -Normal ferritin and iron.  Low saturation of 14%.   4.  Bone marrow biopsy on 04/19/2019 is normal. Bone marrow cellularity of 10%-20%.   It reveals normal trilineage hematopoiesis.  There is no evidence of any dysplasia.  Flow revealed slightly increased NK cell population.  I spoke to the pathologist.  This is not of clinical significance.  Cytogenetics is normal.      SUBJECTIVE:  Mr. Foster is an 89-year-old gentleman with chronic pancytopenia.  He has had multiple investigations done.  His pancytopenia is likely from hypocellular marrow.  Bone marrow biopsy was done in 04/2019.  It had revealed trilineage hematopoiesis.  A bone marrow cellularity was 10%-20%.      The patient has multiple other medical problems including hypothyroidism, cardiomyopathy and pemphigus.  He also has anemia of chronic disease.      Overall, he is doing well.  He has some fatigue, but is still active and able to do his things.      REVIEW OF SYSTEMS:  No headache.  No dizziness.  No chest pain.  No shortness of breath.  No abdominal pain, nausea or vomiting.  No urinary or bowel complaints.  No bleeding.  No fever, chills or night sweats.      PHYSICAL EXAMINATION:   GENERAL:  Alert and oriented x 3.   VITAL SIGNS:  Reviewed.  ECOG PS of 1.     EYES:  No icterus.   THROAT:  No ulcer. No thrush.   NECK:  Supple. No lymphadenopathy. No thyromegaly.   AXILLAE:  No lymphadenopathy.    LUNGS:  Good air entry bilaterally.  No crackles or wheezing.   HEART:  Regular.  No murmur.   GI: Abdomen is soft.  Nontender. No mass.   EXTREMITIES:  Mild pedal edema.  No calf swelling or tenderness.   SKIN:  No rash.      LABORATORY DATA:  Reviewed.      ASSESSMENT:   1.  An 89-year-old gentleman with chronic pancytopenia.  Pancytopenia is secondary to hypocellular marrow.  Anemia is also due to anemia of chronic disease.   2.  Multiple other medical problems including cardiomyopathy, hypothyroidism and pemphigus.      PLAN:   1. The patient has pancytopenia.  His pancytopenia is overall stable.  CBC was done today.  No worsening of is pancytopenia.  He is not neutropenic.      Complications of pancytopenia discussed.  He is not having any complication.  In fact, he is doing well for his age.      At this time, we will monitor his CBC every 2-3 months.  If his pancytopenia gets worse, we are going to do mainly supportive treatment with transfusion and growth factor support.      I advised the patient to have CBC checked every 2-3 months with his primary care physician or at the [facility].  I will see him in 6 months.  Advised him to see a physician sooner if he has fever, chills, infection, bleeding, worsening weakness, worsening fatigue, shortness of breath or any other concerns.     2.  He and his wife had a few questions, which were all answered.         ANDRES KRISHNA MD             D: 2020   T: 2020   MT: YANIRA      Name:     HIRO IBARRA   MRN:      [id number]        Account:      [id number]   :      1931           Visit Date:   2020      Document: [id number]

## 2020-03-23 ENCOUNTER — MEDICAL CORRESPONDENCE (OUTPATIENT)
Dept: HEALTH INFORMATION MANAGEMENT | Facility: CLINIC | Age: 85
End: 2020-03-23

## 2020-03-24 ENCOUNTER — TELEPHONE (OUTPATIENT)
Dept: FAMILY MEDICINE | Facility: CLINIC | Age: 85
End: 2020-03-24

## 2020-03-24 ENCOUNTER — MEDICAL CORRESPONDENCE (OUTPATIENT)
Dept: HEALTH INFORMATION MANAGEMENT | Facility: CLINIC | Age: 85
End: 2020-03-24

## 2020-03-24 NOTE — TELEPHONE ENCOUNTER
Reason for Call:  Form, our goal is to have forms completed with 72 hours, however, some forms may require a visit or additional information.    Type of letter, form or note:  medical    Who is the form from?: Home care    Where did the form come from: form was faxed in    What clinic location was the form placed at?: [facility]    Where the form was placed: Given to physician    What number is listed as a contact on the form?: [phone]       Additional comments: [employer]:  OT 1xweek per patient request    Call taken on 3/24/2020 at 4:25 PM by Rose Sinha

## 2020-03-26 ENCOUNTER — TELEPHONE (OUTPATIENT)
Dept: FAMILY MEDICINE | Facility: CLINIC | Age: 85
End: 2020-03-26

## 2020-03-26 NOTE — TELEPHONE ENCOUNTER
Reason for Call:  Form, our goal is to have forms completed with 72 hours, however, some forms may require a visit or additional information.    Type of letter, form or note:  .    Who is the form from?:     Where did the form come from: form was faxed in    What clinic location was the form placed at?: [facility]  Providers name Mg Nelson MD  Where the form was placed: provider Box/Folder    What number is listed as a contact on the form?: [phone]  Phone Number [phone]       Additional comments: [facility]  admit HHI for RN, PT, OT, HHA     Call taken on 3/26/2020 at 10:28 AM by Maya Monterroso

## 2020-03-27 ENCOUNTER — VIRTUAL VISIT (OUTPATIENT)
Dept: CARDIOLOGY | Facility: CLINIC | Age: 85
End: 2020-03-27
Attending: NURSE PRACTITIONER
Payer: COMMERCIAL

## 2020-03-27 VITALS — WEIGHT: 198 LBS | HEIGHT: 74 IN | BODY MASS INDEX: 25.41 KG/M2

## 2020-03-27 DIAGNOSIS — I50.22 CHRONIC SYSTOLIC CONGESTIVE HEART FAILURE (H): Primary | ICD-10-CM

## 2020-03-27 DIAGNOSIS — Z98.890 S/P MITRAL VALVE REPAIR: ICD-10-CM

## 2020-03-27 DIAGNOSIS — Z95.818 PRESENCE OF WATCHMAN LEFT ATRIAL APPENDAGE CLOSURE DEVICE: ICD-10-CM

## 2020-03-27 DIAGNOSIS — I48.0 PAF (PAROXYSMAL ATRIAL FIBRILLATION) (H): ICD-10-CM

## 2020-03-27 PROCEDURE — 99213 OFFICE O/P EST LOW 20 MIN: CPT | Mod: TEL | Performed by: NURSE PRACTITIONER

## 2020-03-27 ASSESSMENT — MIFFLIN-ST. JEOR: SCORE: 1632.87

## 2020-03-27 NOTE — PROGRESS NOTES
"Bairon Foster is a 89 year old male who is being evaluated via a billable telephone visit.      The patient has been notified of following:     \"This telephone visit will be conducted via a call between you and your physician/provider. We have found that certain health care needs can be provided without the need for a physical exam.  This service lets us provide the care you need with a short phone conversation.  If a prescription is necessary we can send it directly to your pharmacy.  If lab work is needed we can place an order for that and you can then stop by our lab to have the test done at a later time.    If during the course of the call the physician/provider feels a telephone visit is not appropriate, you will not be charged for this service.\"     Physician has received verbal consent for a Telephone Visit from the patient? Yes    Bairon Foster complains of    Chief Complaint   Patient presents with     Cardiomyopathy       I have reviewed and updated the patient's Past Medical History, Social History, Family History and Medication List.    ALLERGIES  Furosemide and Torsemide    Additional provider notes:   HPI  I had the pleasure of speaking to the Bairon Foster over the phone today.  This was supposed to be a face-to-face visit but it was flipped to a telephone call given COVID 19.    Mr. 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 He has had a terrible time tolerating it anticoagulation, he has had epitaxis, probable GI bleeds and his bullous pemphigoid which affects his skin significantly causes bleeding when the blisters burst. On December 3, 2019 he underwent " successful left atrial appendage closure with a 33 mm watchman device, there were no leaks identified.  In January transesophageal echocardiogram to assess the watchman reported that the device was well-seated with a trivial leak.  There is adequate left atrial appendage compression. EF 40-45%.     Alfa had Apetex is daily on Eliquis and baby aspirin.  He has since discontinued Eliquis.  He is on a daily 81 mg aspirin and has Apetex is now only about once every 10 days or so, when he does have it it can last about 10 minutes.  He has had a fall due to imbalance since her last visit and he spent 3 weeks at [facility].  During this time he gained 8 pounds.  He was discharged and is now back living at home.  His sons live next door and bring he and his wife food.  He is trying to limit exposure to his granddaughter who previously did a lot of cares for him but recently came back from [place] and is in self quarantine in a different location.  Alfa saw his primary last week and his Bumex was increased from 3 mg daily to 4 mg daily, he currently takes 2 mg in the morning, 1 in the afternoon and 1 in the evening.  He is lost 2 pounds on his home scale since he increased the dose.  He says what what helps most is compression therapy.  He has a home health nurse that sets up his medications once a week and checks his vital signs.  His last blood pressure check that he recalls was 119/73 and his weight had come down from 200 298 pounds.    Assessment/Plan:  1. S/P mitral valve repair with mitraclip  With mild MR on last echo    2. Presence of Watchman left atrial appendage closure device  Well seated and closed on CHELLE  Okay to be off AC      3. Chronic systolic congestive heart failure (H)  Chronic LE edema, seen recently at PMD, bumex increased, legs wrapped. Difficult to assess this over phone. Wt down. Not complaining of SOB.    4. PAF (paroxysmal atrial fibrillation) (H)  Off ac d/t Watchman  Rate controlled    Phone call  duration: 14 minutes    WILLIS Smith CNP

## 2020-03-30 ENCOUNTER — TELEPHONE (OUTPATIENT)
Dept: FAMILY MEDICINE | Facility: CLINIC | Age: 85
End: 2020-03-30

## 2020-03-30 ENCOUNTER — MEDICAL CORRESPONDENCE (OUTPATIENT)
Dept: HEALTH INFORMATION MANAGEMENT | Facility: CLINIC | Age: 85
End: 2020-03-30

## 2020-03-30 NOTE — TELEPHONE ENCOUNTER
Reason for Call:  Form, our goal is to have forms completed with 72 hours, however, some forms may require a visit or additional information.    Type of letter, form or note:  medical    Who is the form from?: Home care    Where did the form come from: form was faxed in    What clinic location was the form placed at?: [facility]    Where the form was placed: Given to physician    What number is listed as a contact on the form?: [phone] (P) [phone]       Additional comments: [employer]: POC 6/29/19-8/27/19    Call taken on 3/30/2020 at 3:55 PM by Rose Sinha

## 2020-03-31 ENCOUNTER — MEDICAL CORRESPONDENCE (OUTPATIENT)
Dept: HEALTH INFORMATION MANAGEMENT | Facility: CLINIC | Age: 85
End: 2020-03-31

## 2020-03-31 ENCOUNTER — TELEPHONE (OUTPATIENT)
Dept: FAMILY MEDICINE | Facility: CLINIC | Age: 85
End: 2020-03-31

## 2020-03-31 NOTE — TELEPHONE ENCOUNTER
Reason for Call:  Form, our goal is to have forms completed with 72 hours, however, some forms may require a visit or additional information.    Type of letter, form or note:  medical    Who is the form from?: Home care    Where did the form come from: form was faxed in    What clinic location was the form placed at?: [facility]    Where the form was placed: Given to physician    What number is listed as a contact on the form?: [phone] (P) [phone]       Additional comments: [employer]: Physician Orders, update to medication profile    Call taken on 3/31/2020 at 10:12 AM by Rose Sinha

## 2020-04-01 ENCOUNTER — MEDICAL CORRESPONDENCE (OUTPATIENT)
Dept: HEALTH INFORMATION MANAGEMENT | Facility: CLINIC | Age: 85
End: 2020-04-01

## 2020-04-01 ENCOUNTER — TELEPHONE (OUTPATIENT)
Dept: FAMILY MEDICINE | Facility: CLINIC | Age: 85
End: 2020-04-01

## 2020-04-01 NOTE — TELEPHONE ENCOUNTER
Reason for Call:  Form, our goal is to have forms completed with 72 hours, however, some forms may require a visit or additional information.    Type of letter, form or note:  medical    Who is the form from?: Home care    Where did the form come from: form was faxed in    What clinic location was the form placed at?: [facility]    Where the form was placed: Given to physician    What number is listed as a contact on the form?:        Additional comments: [employer]: Please hold services    Call taken on 4/1/2020 at 2:21 PM by Rose Sinha

## 2020-04-01 NOTE — TELEPHONE ENCOUNTER
Reason for Call:  Form, our goal is to have forms completed with 72 hours, however, some forms may require a visit or additional information.    Type of letter, form or note:  medical    Who is the form from?: Home care    Where did the form come from: form was faxed in    What clinic location was the form placed at?: [facility]    Where the form was placed: Given to physician    What number is listed as a contact on the form?: [phone] (P) [phone]       Additional comments: [email or web address]: OK to admit HHI for RN, PT, OT, HHA    Call taken on 4/1/2020 at 1:30 PM by Rose Sinha

## 2020-04-14 ENCOUNTER — TELEPHONE (OUTPATIENT)
Dept: FAMILY MEDICINE | Facility: CLINIC | Age: 85
End: 2020-04-14

## 2020-04-14 DIAGNOSIS — K21.9 GASTROESOPHAGEAL REFLUX DISEASE WITHOUT ESOPHAGITIS: Primary | ICD-10-CM

## 2020-04-14 NOTE — TELEPHONE ENCOUNTER
omeprazole (PRILOSEC) 20 MG DR capsule   Insurance will not pay for BID dosing, they will cover once a day dosing, or will likely need to send a 40 mg dose .  Thanks

## 2020-04-15 RX ORDER — OMEPRAZOLE 40 MG/1
40 CAPSULE, DELAYED RELEASE ORAL DAILY
Qty: 30 CAPSULE | Refills: 5 | Status: SHIPPED | OUTPATIENT
Start: 2020-04-15 | End: 2020-06-02

## 2020-04-20 ENCOUNTER — MEDICAL CORRESPONDENCE (OUTPATIENT)
Dept: HEALTH INFORMATION MANAGEMENT | Facility: CLINIC | Age: 85
End: 2020-04-20

## 2020-04-20 ENCOUNTER — TELEPHONE (OUTPATIENT)
Dept: FAMILY MEDICINE | Facility: CLINIC | Age: 85
End: 2020-04-20

## 2020-04-20 NOTE — TELEPHONE ENCOUNTER
Reason for Call:  Form, our goal is to have forms completed with 72 hours, however, some forms may require a visit or additional information.    Type of letter, form or note:  medical    Who is the form from?: Home care    Where did the form come from: form was faxed in    What clinic location was the form placed at?: [place], [place]  Providers name Mg Nelson MD  Where the form was placed: [facility]    What number is listed as a contact on the form?: [phone]  Phone Number        Additional comments: home Health care DC OT summary    Call taken on 4/20/2020 at 11:23 AM by Maya Monterroso

## 2020-04-22 ENCOUNTER — MEDICAL CORRESPONDENCE (OUTPATIENT)
Dept: HEALTH INFORMATION MANAGEMENT | Facility: CLINIC | Age: 85
End: 2020-04-22

## 2020-04-22 ENCOUNTER — TELEPHONE (OUTPATIENT)
Dept: FAMILY MEDICINE | Facility: CLINIC | Age: 85
End: 2020-04-22

## 2020-04-22 NOTE — TELEPHONE ENCOUNTER
Reason for Call:  Form, our goal is to have forms completed with 72 hours, however, some forms may require a visit or additional information.    Type of letter, form or note:  medical    Who is the form from?: Home care    Where did the form come from: form was faxed in    What clinic location was the form placed at?: [place], [place]  Providers name Mg Nelson MD  Where the form was placed: O    What number is listed as a contact on the form?: [phone]  Phone Number [phone]       Additional comments: [facility]  OT DC summary    Call taken on 4/22/2020 at 9:57 AM by Maya Monterroso

## 2020-04-24 DIAGNOSIS — Z95.818 PRESENCE OF WATCHMAN LEFT ATRIAL APPENDAGE CLOSURE DEVICE: Primary | ICD-10-CM

## 2020-04-27 ENCOUNTER — MEDICAL CORRESPONDENCE (OUTPATIENT)
Dept: HEALTH INFORMATION MANAGEMENT | Facility: CLINIC | Age: 85
End: 2020-04-27

## 2020-04-27 ENCOUNTER — TELEPHONE (OUTPATIENT)
Dept: FAMILY MEDICINE | Facility: CLINIC | Age: 85
End: 2020-04-27

## 2020-04-27 NOTE — TELEPHONE ENCOUNTER
Reason for Call:  Form, our goal is to have forms completed with 72 hours, however, some forms may require a visit or additional information.    Type of letter, form or note:  medical    Who is the form from?: Home care    Where did the form come from: form was faxed in    What clinic location was the form placed at?: [facility]    Where the form was placed: Given to physician    What number is listed as a contact on the form?: [phone] (P) [phone]       Additional comments: [employer]: Physical Therapy D/C summary    Call taken on 4/27/2020 at 9:58 AM by Rose Sinha

## 2020-04-28 ENCOUNTER — TELEPHONE (OUTPATIENT)
Dept: CARDIOLOGY | Facility: CLINIC | Age: 85
End: 2020-04-28

## 2020-04-28 ENCOUNTER — ANCILLARY PROCEDURE (OUTPATIENT)
Dept: CARDIOLOGY | Facility: CLINIC | Age: 85
End: 2020-04-28
Attending: INTERNAL MEDICINE
Payer: COMMERCIAL

## 2020-04-28 DIAGNOSIS — I49.5 SICK SINUS SYNDROME (H): Primary | ICD-10-CM

## 2020-04-28 DIAGNOSIS — Z95.0 CARDIAC PACEMAKER IN SITU: ICD-10-CM

## 2020-04-28 NOTE — TELEPHONE ENCOUNTER
Patient had a teletrace today showing that his device has triggered MADHAV. He will need an H&P and generator change done by 6/11/20. This can be done with any EP at our clinic as he has not seen any of them and follows with Dr. Hope.    I spoke with the patient and reviewed this plan. He states that one of his sons would be able to drop him off and drive him home from the procedure. Papers given to Zelda, asked that she contact the patient to schedule.

## 2020-05-01 ENCOUNTER — TRANSFERRED RECORDS (OUTPATIENT)
Dept: HEALTH INFORMATION MANAGEMENT | Facility: CLINIC | Age: 85
End: 2020-05-01

## 2020-05-01 ENCOUNTER — HOSPITAL ENCOUNTER (OUTPATIENT)
Facility: CLINIC | Age: 85
End: 2020-05-01
Payer: COMMERCIAL

## 2020-05-01 DIAGNOSIS — I49.5 SICK SINUS SYNDROME (H): ICD-10-CM

## 2020-05-01 DIAGNOSIS — Z95.0 CARDIAC PACEMAKER IN SITU: ICD-10-CM

## 2020-05-05 ENCOUNTER — MEDICAL CORRESPONDENCE (OUTPATIENT)
Dept: HEALTH INFORMATION MANAGEMENT | Facility: CLINIC | Age: 85
End: 2020-05-05

## 2020-05-05 ENCOUNTER — TELEPHONE (OUTPATIENT)
Dept: FAMILY MEDICINE | Facility: CLINIC | Age: 85
End: 2020-05-05

## 2020-05-05 NOTE — TELEPHONE ENCOUNTER
Reason for Call:  Form, our goal is to have forms completed with 72 hours, however, some forms may require a visit or additional information.    Type of letter, form or note:  medical    Who is the form from?: Home care    Where did the form come from: form was faxed in    What clinic location was the form placed at?: [place], [place]  Providers name Mg Nelson MD  Where the form was placed: [facility]    What number is listed as a contact on the form?: [phone]  Phone Number [phone]       Additional comments: [facility]  payor change to [facility] for RN    Call taken on 5/5/2020 at 11:07 AM by Maya Monterroso

## 2020-05-06 ENCOUNTER — TELEPHONE (OUTPATIENT)
Dept: FAMILY MEDICINE | Facility: CLINIC | Age: 85
End: 2020-05-06

## 2020-05-06 ENCOUNTER — MEDICAL CORRESPONDENCE (OUTPATIENT)
Dept: HEALTH INFORMATION MANAGEMENT | Facility: CLINIC | Age: 85
End: 2020-05-06

## 2020-05-06 NOTE — TELEPHONE ENCOUNTER
Reason for Call:  Form, our goal is to have forms completed with 72 hours, however, some forms may require a visit or additional information.    Type of letter, form or note:  Home Health Certification    Who is the form from?: Home care    Where did the form come from: form was faxed in    What clinic location was the form placed at?: [place], [place]  Providers name Mg Nelson MD  Where the form was placed: [facility]    What number is listed as a contact on the form?: [phone]  Phone Number [phone]       Additional comments: home health care [facility] 4/30/20 to 6/28/20    Call taken on 5/6/2020 at 11:56 AM by Maya Monterroso

## 2020-05-07 ENCOUNTER — VIRTUAL VISIT (OUTPATIENT)
Dept: CARDIOLOGY | Facility: CLINIC | Age: 85
End: 2020-05-07
Payer: COMMERCIAL

## 2020-05-07 ENCOUNTER — TELEPHONE (OUTPATIENT)
Dept: FAMILY MEDICINE | Facility: CLINIC | Age: 85
End: 2020-05-07

## 2020-05-07 ENCOUNTER — HOSPITAL ENCOUNTER (OUTPATIENT)
Facility: CLINIC | Age: 85
End: 2020-05-07
Payer: COMMERCIAL

## 2020-05-07 DIAGNOSIS — Z95.0 CARDIAC PACEMAKER IN SITU: ICD-10-CM

## 2020-05-07 DIAGNOSIS — I49.5 SICK SINUS SYNDROME (H): Primary | ICD-10-CM

## 2020-05-07 PROCEDURE — 99213 OFFICE O/P EST LOW 20 MIN: CPT | Mod: 95 | Performed by: INTERNAL MEDICINE

## 2020-05-07 NOTE — LETTER
5/7/2020      RE: Bairon Foster  [address]  [facility] [address]       Dear Colleague,    Thank you for the opportunity to participate in the care of your patient, Bairon Foster, at the [facility]. Please see a copy of my visit note below.    REASON FOR VISIT: Electrophysiology evaluation.      HISTORY OF PRESENT ILLNESS: 89-year-old gentleman with a history of hypertension, hyperlipidemia, previous TIA, obstructive sleep apnea, previous GI bleed, coronary artery disease, severe mitral regurgitation (percutaneous mitral clip done 05/20/2019), and  permanent atrial fibrillation (watchman procedure 12/3/2019) as well as pacemaker implantation for significant bradycardia (8/5/2009), was recently found to have generator battery MADHAV.    At the moment, he is doing well.  He has no complaints during this visit.  He denies any symptoms of chest pain, shortness of breath, lightheadedness, near-syncope or syncope.    Device was interrogated 04/28/2020.  Lead parameters are stable and he was found to have battery at MADHAV.    Previous studies:  -CHELLE (01/17/2020): Mild LV dysfunction.  EF estimated 45-50%.  There was severe biatrial enlargement with bidirectional interatrial shunt present.  Watchman device was well seated in the FRANKLYN with trivial leak and adequate compression. Two mitraclips are visualized with double orifice mitral valve. There was severe TR.  There is no pericardial effusion.      ASSESSMENT AND PLAN:   1.    Device care.  Battery reached MADHAV.  I recommend pacemaker battery change.  I explained the procedure in details.  He understand there is a 1 to 2% risk associated procedure and the risk infection may be higher.  He agrees with plan will like to move forward.  We will make arrangements to have it done.  2.  Permanent atrial fibrillation.  Continue therapy with Coreg.  3.  Embolic prevention.  Chads vas  score of 6.  He is status post watchman procedure.  Continue aspirin.     Please do not hesitate to contact me if you have any questions/concerns.     Sincerely,     Shubham Curiel MD

## 2020-05-07 NOTE — TELEPHONE ENCOUNTER
Reason for Call:  Form, our goal is to have forms completed with 72 hours, however, some forms may require a visit or additional information.    Type of letter, form or note:  medical    Who is the form from?: [facility] (if other please explain)    Where did the form come from: form was faxed in    What clinic location was the form placed at?: [place], [place]  Providers name Mg Nelson MD  Where the form was placed: Given to physician    What number is listed as a contact on the form?: [phone]  Phone Number [phone]       Additional comments: [facility] - ICD 10 diagnosis verification form    Call taken on 5/7/2020 at 2:42 PM by Becky Dasilva

## 2020-05-07 NOTE — PROGRESS NOTES
Electrophysiology/ Phone Call Clinic Note         H&P and Plan:     Patient opted to conduct today's visit via telephone due to ongoing issues related to ongoing COVID-19 virus pandemic and to minimize social interaction (based on CDC guidelines).      Visit details:  Patient has given verbal consent for this visit.    Interview was done talking to patient over the phone.  Visit start time: 1:04 PM  Visit stop time: 1:17 PM  Provider location: Office.  Patient location: Home.     REASON FOR VISIT: Electrophysiology evaluation.      HISTORY OF PRESENT ILLNESS: 89-year-old gentleman with a history of hypertension, hyperlipidemia, previous TIA, obstructive sleep apnea, previous GI bleed, coronary artery disease, severe mitral regurgitation (percutaneous mitral clip done 05/20/2019), and  permanent atrial fibrillation (watchman procedure 12/3/2019) as well as pacemaker implantation for significant bradycardia (8/5/2009), was recently found to have generator battery MADHAV.    At the moment, he is doing well.  He has no complaints during this visit.  He denies any symptoms of chest pain, shortness of breath, lightheadedness, near-syncope or syncope.    Device was interrogated 04/28/2020.  Lead parameters are stable and he was found to have battery at MADHAV.    Previous studies:  -CHELLE (01/17/2020): Mild LV dysfunction.  EF estimated 45-50%.  There was severe biatrial enlargement with bidirectional interatrial shunt present.  Watchman device was well seated in the [place] with trivial leak and adequate compression. Two mitraclips are visualized with double orifice mitral valve. There was severe TR.  There is no pericardial effusion.      ASSESSMENT AND PLAN:   1.    Device care.  Battery reached MADHAV.  I recommend pacemaker battery change.  I explained the procedure in details.  He understand there is a 1 to 2% risk associated procedure and the risk infection may be higher.  He agrees with plan will like to move forward.  We will  make arrangements to have it done.  2.  Permanent atrial fibrillation.  Continue therapy with Coreg.  3.  Embolic prevention.  Chads vas score of 6.  He is status post watchman procedure.  Continue aspirin.      Shubham Curiel MD    Physical Exam:  Vitals: There were no vitals taken for this visit.    Physical exam not performed as this was a phone call visit.      CURRENT MEDICATIONS:  Current Outpatient Medications   Medication Sig Dispense Refill     acetaminophen (TYLENOL) 500 MG tablet Take 2 tablets (1,000 mg) by mouth every 6 hours as needed for mild pain 100 tablet 0     aspirin 81 MG EC tablet Take 1 tablet (81 mg) by mouth daily       bumetanide (BUMEX) 1 MG tablet Take 2 mg by mouth every evening        bumetanide (BUMEX) 2 MG tablet Take 1 tablet (2 mg) by mouth daily (Patient taking differently: Take 2 mg by mouth daily noon) 180 tablet 1     bumetanide (BUMEX) 2 MG tablet Take 2 mg by mouth daily before breakfast       buPROPion (WELLBUTRIN XL) 300 MG 24 hr tablet Take 1 tablet (300 mg) by mouth every morning 90 tablet 1     carvedilol (COREG) 6.25 MG tablet TAKE 1 TABLET TWICE DAILY WITH MEALS 180 tablet 3     cholecalciferol (VITAMIN D3) 5000 units (125 mcg) capsule Take 1 capsule (5,000 Units) by mouth daily       COMPRESSION STOCKINGS 1 each daily 2 each 0     emollient (VANICREAM) external cream Apply topically 2 times daily       EPINEPHrine (EPIPEN/ADRENACLICK/OR ANY BX GENERIC EQUIV) 0.3 MG/0.3ML injection 2-pack Inject 0.3 mg into the muscle as needed for anaphylaxis       finasteride (PROSCAR) 5 MG tablet TAKE 1 TABLET EVERY DAY 90 tablet 1     levothyroxine (SYNTHROID/LEVOTHROID) 100 MCG tablet TAKE 1 TABLET EVERY DAY 90 tablet 1     Multiple Vitamins-Minerals (CENTRUM SILVER) per tablet Take 1 tablet by mouth daily       mupirocin (BACTROBAN) 2 % external ointment Apply topically daily       omeprazole (PRILOSEC) 40 MG DR capsule Take 1 capsule (40 mg) by mouth daily 30 capsule 5      polyethylene glycol (MIRALAX) packet Take 1 packet by mouth daily       potassium chloride ER (K-DUR/KLOR-CON M) 20 MEQ CR tablet Take 1 tablet (20 mEq) by mouth 2 times daily 180 tablet 3     simvastatin (ZOCOR) 20 MG tablet Take 20 mg by mouth daily        sucralfate (CARAFATE) 1 GM tablet Take 1 tablet (1 g) by mouth 2 times daily 180 tablet 1     tamsulosin (FLOMAX) 0.4 MG capsule TAKE 1 CAPSULE EVERY DAY 90 capsule 1     triamcinolone (KENALOG) 0.025 % cream Apply topically 2 times daily as needed        ACE/ARB/ARNI NOT PRESCRIBED (INTENTIONAL) Please choose reason not prescribed, below         ALLERGIES     Allergies   Allergen Reactions     Furosemide Blisters     Bullous pemphigoid. Bumex and ethacrynic acid okay.     Torsemide Blisters     Bullous pemphigoid.  Bumex and ethacrynic acid okay.       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     CKD (chronic kidney disease) stage 3, GFR 30-59 ml/min (H)      Coronary atherosclerosis of unspecified type of vessel, native or graft     S/P PTCA, EF 50%     Epistaxis      Esophageal reflux      GI bleed 4/17/2019     Hypercholesterolemia      Ischemic cardiomyopathy      Lymphedema of both lower extremities     Wears ACE wraps     Mitral regurgitation     s/p MitraClip 5/20/19     Obese      JIM on CPAP      Other lymphedema     GROIN AND THIGHS     Other pancytopenia (H)      Other specified anemias      Overweight      Pacemaker     PACEMAKER-BS Implanted 8/5/2009      PAD (peripheral artery disease) (H)      Pancytopenia (H)      Persistent atrial fibrillation     VVI PM for long pauses     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    Closure with a 33 mm Watchman device      Pulmonary hypertension  (H)      Status post coronary angiogram 3/21/2018     Systolic CHF (H)      TIA (transient ischemic attack)      Unspecified hypothyroidism      Venous stasis dermatitis of both lower extremities      Vitamin D deficiency        PAST SURGICAL HISTORY:  Past Surgical History:   Procedure Laterality Date     BONE MARROW BIOPSY, BONE SPECIMEN, NEEDLE/TROCAR N/A 4/19/2019    Procedure: BONE MARROW BIOPSY;  Surgeon: Yovani Cooley MD;  Location:  OR     CV HEART CATHETERIZATION WITH POSSIBLE INTERVENTION N/A 4/12/2019    Procedure: Heart Catheterization with possible Intervention;  Surgeon: Lev Beck MD;  Location:  HEART CARDIAC CATH LAB     CV LEFT ATRIAL APPENDAGE CLOSURE N/A 12/3/2019    Procedure: Left Atrial Appendage Closure;  Surgeon: Segundo Hope MD;  Location:  HEART CARDIAC CATH LAB     CV MITRACLIP N/A 5/20/2019    Procedure: CV MITRAL CLIP;  Surgeon: Harshil Winter MD;  Location:  HEART CARDIAC CATH LAB     CV RIGHT HEART CATH N/A 4/12/2019    Procedure: Right Heart Cath;  Surgeon: Lev Beck MD;  Location:  HEART CARDIAC CATH LAB     EYE SURGERY      CATARACT/BLEPHAROPLASTY     GENITOURINARY SURGERY      TUNA     HERNIA REPAIR      RIGHT INGUINAL     HERNIORRHAPHY INGUINAL  8/14/2012    Procedure: HERNIORRHAPHY INGUINAL;  right inguinal hernia repair;  Surgeon: Kareem Torrez MD;  Location: [facility]     HERNIORRHAPHY UMBILICAL N/A 10/31/2017    Procedure: HERNIORRHAPHY UMBILICAL;  UMBILICAL HERNIA REPAIR WITH MESH;  Surgeon: Scar Harrington MD;  Location: [facility]     IMPLANT PACEMAKER  8/2009    single chamber     ORTHOPEDIC SURGERY      [facility] RIGHT     ORTHOPEDIC SURGERY  2010    right TKR, left TKR     SURGICAL HISTORY OF -       Thumb surgery     SURGICAL HISTORY OF -   1990s    Left total knee replacement     SURGICAL HISTORY OF -   3/11    [facility] total knee replacement       FAMILY HISTORY:  Family History   Problem Relation Age of Onset     Cardiovascular  Father      C.A.D. Father      Lung Cancer Sister      Unknown/Adopted Mother      Cancer - colorectal No family hx of        SOCIAL HISTORY:  Social History     Socioeconomic History     Marital status:      Spouse name: Not on file     Number of children: Not on file     Years of education: Not on file     Highest education level: Not on file   Occupational History     Occupation: Teacher, author, speaker     Employer: RETIRED   Social Needs     Financial resource strain: Not on file     Food insecurity     Worry: Not on file     Inability: Not on file     Transportation needs     Medical: Not on file     Non-medical: Not on file   Tobacco Use     Smoking status: Never Smoker     Smokeless tobacco: Never Used   Substance and Sexual Activity     Alcohol use: Not Currently     Alcohol/week: 0.0 standard drinks     Comment: 1/month     Drug use: No     Sexual activity: Not Currently     Partners: Female   Lifestyle     Physical activity     Days per week: Not on file     Minutes per session: Not on file     Stress: Not on file   Relationships     Social connections     Talks on phone: Not on file     Gets together: Not on file     Attends Denominational service: Not on file     Active member of club or organization: Not on file     Attends meetings of clubs or organizations: Not on file     Relationship status: Not on file     Intimate partner violence     Fear of current or ex partner: Not on file     Emotionally abused: Not on file     Physically abused: Not on file     Forced sexual activity: Not on file   Other Topics Concern     Parent/sibling w/ CABG, MI or angioplasty before 65F 55M? No      Service Not Asked     Blood Transfusions Not Asked     Caffeine Concern No     Comment: 1-2 cups coffee a day     Occupational Exposure Not Asked     Hobby Hazards Not Asked     Sleep Concern No     Stress Concern Yes     Comment: due to wifes health condition     Weight Concern No     Special Diet No     Back  Care Not Asked     Exercise Yes     Comment: states he uses his tredmill on and off     Bike Helmet Not Asked     Seat Belt Yes     Self-Exams Not Asked   Social History Narrative     Not on file       Review of Systems:  Skin:        Eyes:       ENT:       Respiratory:       Cardiovascular:       Gastroenterology:      Genitourinary:       Musculoskeletal:       Neurologic:       Psychiatric:       Heme/Lymph/Imm:       Endocrine:           Recent Lab Results:  LIPID RESULTS:  Lab Results   Component Value Date    CHOL 97 08/21/2019    HDL 41 08/21/2019    LDL 48 08/21/2019    TRIG 41 08/21/2019    CHOLHDLRATIO 2.1 06/15/2015       LIVER ENZYME RESULTS:  Lab Results   Component Value Date    AST 30 12/02/2019    ALT 24 12/02/2019       CBC RESULTS:  Lab Results   Component Value Date    WBC 3.3 (L) 03/11/2020    RBC 2.67 (L) 03/11/2020    HGB 8.4 (L) 03/11/2020    HCT 27.1 (L) 03/11/2020     (H) 03/11/2020    MCH 31.5 03/11/2020    MCHC 31.0 (L) 03/11/2020    RDW 17.2 (H) 03/11/2020    PLT 84 (L) 03/11/2020       BMP RESULTS:  Lab Results   Component Value Date     02/27/2020    POTASSIUM 3.6 02/27/2020    CHLORIDE 105 02/27/2020    CO2 29 02/27/2020    ANIONGAP 8 02/27/2020    GLC 91 02/27/2020    BUN 25 02/27/2020    CR 1.17 02/27/2020    GFRESTIMATED 55 (A) 02/27/2020    GFRESTBLACK >60 02/27/2020    TEODORO 8.8 02/27/2020        A1C RESULTS:  Lab Results   Component Value Date    A1C 5.5 05/29/2019       INR RESULTS:  Lab Results   Component Value Date    INR 1.28 (H) 12/04/2019    INR 1.44 (H) 05/20/2019         ECHOCARDIOGRAM  No results found for this or any previous visit (from the past 8760 hour(s)).      No orders of the defined types were placed in this encounter.    No orders of the defined types were placed in this encounter.    There are no discontinued medications.      Encounter Diagnoses   Name Primary?     Sick sinus syndrome (H) Yes     Cardiac pacemaker in situ          CC  No referring  "provider defined for this encounter.                  Bairon Foster is a 89 year old male who is being evaluated via a billable telephone visit.      The patient has been notified of following:     \"This telephone visit will be conducted via a call between you and your physician/provider. We have found that certain health care needs can be provided without the need for a physical exam.  This service lets us provide the care you need with a short phone conversation.  If a prescription is necessary we can send it directly to your pharmacy.  If lab work is needed we can place an order for that and you can then stop by our lab to have the test done at a later time.    Telephone visits are billed at different rates depending on your insurance coverage. During this emergency period, for some insurers they may be billed the same as an in-person visit.  Please reach out to your insurance provider with any questions.    If during the course of the call the physician/provider feels a telephone visit is not appropriate, you will not be charged for this service.\"    BP: 110/61  HR: 71  Height: 6-2\"  Weight: 200lb  Review Of Systems  Skin: NEGATIVE  Eyes:Ears/Nose/Throat: nosebleed X1 week  Respiratory: sleep apnea, wears CPAP   Cardiovascular: edema, in LE, states bumex not working, swelling increased to groin area  Gastrointestinal: NEGATIVE  Genitourinary:prostate problem   Musculoskeletal: NEGATIVE  Neurologic: NEGATIVE  Psychiatric: NEGATIVE  Hematologic/Lymphatic/Immunologic: bruising  Endocrine: thyroid disease    Gaby Harmon LPN    Patient has given verbal consent for Telephone visit?  Yes     What phone number would you like to be contacted at? [phone]    How would you like to obtain your AVS? Srinivas    Phone call duration:  minutes            "

## 2020-05-08 ENCOUNTER — PATIENT OUTREACH (OUTPATIENT)
Dept: CARE COORDINATION | Facility: CLINIC | Age: 85
End: 2020-05-08

## 2020-05-08 DIAGNOSIS — Z71.89 COUNSELING AND COORDINATION OF CARE: Primary | ICD-10-CM

## 2020-05-11 NOTE — PROGRESS NOTES
Clinic Care Coordination Contact  Community Health Worker Initial Outreach    CHW Initial Information Gathering:  Referral Source: PCP  Preferred Hospital: [facility]  [phone]  No PCP office visit in Past Year: No    Patient accepts CC: Yes     Phone Assessment is scheduled with BETO [place] on 5/15/20 at 2:00pm.    MARIE Price  Clinic Care Coordination  [facility], [facility], and [facility]  Phone: [phone]

## 2020-05-12 ENCOUNTER — NURSE TRIAGE (OUTPATIENT)
Dept: FAMILY MEDICINE | Facility: CLINIC | Age: 85
End: 2020-05-12

## 2020-05-12 ENCOUNTER — TELEPHONE (OUTPATIENT)
Dept: FAMILY MEDICINE | Facility: CLINIC | Age: 85
End: 2020-05-12

## 2020-05-12 DIAGNOSIS — Z11.59 ENCOUNTER FOR SCREENING FOR OTHER VIRAL DISEASES: Primary | ICD-10-CM

## 2020-05-12 NOTE — TELEPHONE ENCOUNTER
"Nurse Triage SBAR    Situation    : Patient wondering why Bumex is not working for BLE edema ?    Background    : hx of bilateral Edema and CHF    Assessment   : now has edema traveling from feet, to the legs, and groins and now in testicle that is causing discomfort. Patient stated he has increased his dose on Bumex: 2 tables in AM, 1 tablet in afternoon, 2 tablets in PM. patient reports no SOB at rest will notice SOB with stair use- \"but that is not new for me\". No cough. No fever. No redness in the legs. Patient denies weeping in BLE. He currently has both legs wrapped.    (See information below for more triage details.)    Recommendation   : Routing to provider for recommendation on Bumetanide (Bumex) Medication dose change? Versus in clinic evaluation? .    Protocol Recommended Disposition: Paged/Called Provider        Additional Information    Negative: Chest pain    Negative: Small area of swelling and followed an insect bite to the area    Negative: Followed a knee injury    Negative: Ankle or foot injury    Negative: Pregnant with leg swelling or edema    Negative: Difficulty breathing at rest    Negative: Entire foot is cool or blue in comparison to other side    MODERATE swelling of both ankles (e.g., swelling extends up to the knees) AND new onset or worsening    Negative: Swelling of face, arm or hands (Exception: slight puffiness of fingers during hot weather)    Negative: Pregnant > 20 weeks and sudden weight gain (i.e., > 2 lbs, 1 kg in one week)    Negative: SEVERE swelling (e.g., swelling extends above knee, entire leg is swollen, weeping fluid)    Negative: Thigh or calf pain and only 1 side and present > 1 hour    Negative: Thigh, calf, or ankle swelling in only one leg    Negative: Thigh, calf, or ankle swelling in both legs, but one side is definitely more swollen    Negative: Cast on leg or ankle and has increasing pain    Negative: Can't walk or can barely stand (new onset)    Negative: Fever " "and red area (or area very tender to touch)    Negative: Patient sounds very sick or weak to the triager    Answer Assessment - Initial Assessment Questions  1. ONSET: \"When did the swelling start?\" (e.g., minutes, hours, days)      Leg swelling started to extened to groin- about the end of April  2. LOCATION: \"What part of the leg is swollen?\"  \"Are both legs swollen or just one leg?\"      Both legs  3. SEVERITY: \"How bad is the swelling?\" (e.g., localized; mild, moderate, severe)   - Localized - small area of swelling localized to one leg   - MILD pedal edema - swelling limited to foot and ankle, pitting edema < 1/4 inch (6 mm) deep, rest and elevation eliminate most or all swelling   - MODERATE edema - swelling of lower leg to knee, pitting edema > 1/4 inch (6 mm) deep, rest and elevation only partially reduce swelling   - SEVERE edema - swelling extends above knee, facial or hand swelling present       Moderate-Severe (edema extending into the groin and testes) Not in hands or face  4. REDNESS: \"Does the swelling look red or infected?\"      no  5. PAIN: \"Is the swelling painful to touch?\" If so, ask: \"How painful is it?\"   (Scale 1-10; mild, moderate or severe)      Pain in testicle  6. FEVER: \"Do you have a fever?\" If so, ask: \"What is it, how was it measured, and when did it start?\"       No  7. CAUSE: \"What do you think is causing the leg swelling?\"      Bumex dose  8. MEDICAL HISTORY: \"Do you have a history of heart failure, kidney disease, liver failure, or cancer?\"      Heart Disease  9. RECURRENT SYMPTOM: \"Have you had leg swelling before?\" If so, ask: \"When was the last time?\" \"What happened that time?\"      Around March, currently taking Bumex  10. OTHER SYMPTOMS: \"Do you have any other symptoms?\" (e.g., chest pain, difficulty breathing)        Weight today was 192 pounds (does not check daily). No current weeping.  11. PREGNANCY: \"Is there any chance you are pregnant?\" \"When was your last menstrual " "period?\"        n/a    Protocols used: LEG SWELLING AND EDEMA-A-OH      "

## 2020-05-12 NOTE — TELEPHONE ENCOUNTER
Nasra with [employer] [facility] called and stated that she met with the patient earlier today and noticed that the patient has a significant increase of swelling in his feet, legs, and groin.  The patient wraps his legs but Nasra believes that the patient would benefit from wrapping his feet as well.  She also noticed that the patient has a sore on the top of his right foot that he has been dressing with extra wound supplies that he had at home.  She wanted Dr. Nelson to be aware of these changes and to let them know if they should be changing or adding orders for the patient.

## 2020-05-13 ENCOUNTER — MEDICAL CORRESPONDENCE (OUTPATIENT)
Dept: HEALTH INFORMATION MANAGEMENT | Facility: CLINIC | Age: 85
End: 2020-05-13

## 2020-05-13 ENCOUNTER — TELEPHONE (OUTPATIENT)
Dept: FAMILY MEDICINE | Facility: CLINIC | Age: 85
End: 2020-05-13

## 2020-05-13 DIAGNOSIS — Z95.818 PRESENCE OF WATCHMAN LEFT ATRIAL APPENDAGE CLOSURE DEVICE: Primary | ICD-10-CM

## 2020-05-13 DIAGNOSIS — Z95.0 CARDIAC PACEMAKER IN SITU: ICD-10-CM

## 2020-05-13 DIAGNOSIS — I49.5 SICK SINUS SYNDROME (H): ICD-10-CM

## 2020-05-13 NOTE — TELEPHONE ENCOUNTER
Spoke with patients wife, Kristi.     Provider message was shared.     States they do have a virtual visit with Cardiology tomorrow and will discuss these symptoms and medication question with them at that time.

## 2020-05-13 NOTE — TELEPHONE ENCOUNTER
Reason for Call:  Form, our goal is to have forms completed with 72 hours, however, some forms may require a visit or additional information.    Type of letter, form or note:  Home Health Certification    Who is the form from?: Home care    Where did the form come from: form was faxed in    What clinic location was the form placed at?: [facility]    Where the form was placed: Given to physician    What number is listed as a contact on the form?: [phone](P) [phone]       Additional comments: [employer]: [facility]or change to [facility]; [facility] POC 4/30/20-6/28/20    Call taken on 5/13/2020 at 10:14 AM by Rose Sinha

## 2020-05-14 ENCOUNTER — HOSPITAL ENCOUNTER (INPATIENT)
Facility: CLINIC | Age: 85
LOS: 8 days | Discharge: NURSING, REHAB OR LONG-TERM CARE | DRG: 292 | End: 2020-05-22
Attending: EMERGENCY MEDICINE | Admitting: HOSPITALIST
Payer: COMMERCIAL

## 2020-05-14 ENCOUNTER — VIRTUAL VISIT (OUTPATIENT)
Dept: CARDIOLOGY | Facility: CLINIC | Age: 85
End: 2020-05-14
Payer: COMMERCIAL

## 2020-05-14 ENCOUNTER — TELEPHONE (OUTPATIENT)
Dept: FAMILY MEDICINE | Facility: CLINIC | Age: 85
End: 2020-05-14

## 2020-05-14 ENCOUNTER — APPOINTMENT (OUTPATIENT)
Dept: GENERAL RADIOLOGY | Facility: CLINIC | Age: 85
DRG: 292 | End: 2020-05-14
Attending: EMERGENCY MEDICINE
Payer: COMMERCIAL

## 2020-05-14 VITALS
WEIGHT: 200 LBS | SYSTOLIC BLOOD PRESSURE: 105 MMHG | DIASTOLIC BLOOD PRESSURE: 61 MMHG | HEART RATE: 63 BPM | BODY MASS INDEX: 25.68 KG/M2

## 2020-05-14 DIAGNOSIS — R60.0 LOWER EXTREMITY EDEMA: ICD-10-CM

## 2020-05-14 DIAGNOSIS — I87.2 VENOUS STASIS DERMATITIS OF BOTH LOWER EXTREMITIES: ICD-10-CM

## 2020-05-14 DIAGNOSIS — I48.20 CHRONIC ATRIAL FIBRILLATION (H): ICD-10-CM

## 2020-05-14 DIAGNOSIS — I50.23 ACUTE ON CHRONIC SYSTOLIC HEART FAILURE (H): Primary | ICD-10-CM

## 2020-05-14 DIAGNOSIS — Z95.0 CARDIAC PACEMAKER IN SITU: ICD-10-CM

## 2020-05-14 DIAGNOSIS — I50.41 ACUTE COMBINED SYSTOLIC AND DIASTOLIC CONGESTIVE HEART FAILURE (H): Primary | ICD-10-CM

## 2020-05-14 DIAGNOSIS — I25.10 CORONARY ARTERY DISEASE, ANGINA PRESENCE UNSPECIFIED, UNSPECIFIED VESSEL OR LESION TYPE, UNSPECIFIED WHETHER NATIVE OR TRANSPLANTED HEART: ICD-10-CM

## 2020-05-14 DIAGNOSIS — N50.89 SCROTAL EDEMA: ICD-10-CM

## 2020-05-14 DIAGNOSIS — I25.5 ISCHEMIC CARDIOMYOPATHY: ICD-10-CM

## 2020-05-14 DIAGNOSIS — I48.91 ATRIAL FIBRILLATION, UNSPECIFIED TYPE (H): ICD-10-CM

## 2020-05-14 DIAGNOSIS — Z95.818 PRESENCE OF WATCHMAN LEFT ATRIAL APPENDAGE CLOSURE DEVICE: ICD-10-CM

## 2020-05-14 DIAGNOSIS — G47.33 OSA (OBSTRUCTIVE SLEEP APNEA): ICD-10-CM

## 2020-05-14 LAB
ALBUMIN SERPL-MCNC: 3.1 G/DL (ref 3.4–5)
ALP SERPL-CCNC: 85 U/L (ref 40–150)
ALT SERPL W P-5'-P-CCNC: 13 U/L (ref 0–70)
ANION GAP SERPL CALCULATED.3IONS-SCNC: 5 MMOL/L (ref 3–14)
AST SERPL W P-5'-P-CCNC: 29 U/L (ref 0–45)
BASOPHILS # BLD AUTO: 0.1 10E9/L (ref 0–0.2)
BASOPHILS NFR BLD AUTO: 2.3 %
BILIRUB SERPL-MCNC: 1.2 MG/DL (ref 0.2–1.3)
BUN SERPL-MCNC: 18 MG/DL (ref 7–30)
CALCIUM SERPL-MCNC: 9.1 MG/DL (ref 8.5–10.1)
CHLORIDE SERPL-SCNC: 106 MMOL/L (ref 94–109)
CO2 SERPL-SCNC: 30 MMOL/L (ref 20–32)
CREAT SERPL-MCNC: 1.25 MG/DL (ref 0.66–1.25)
DIFFERENTIAL METHOD BLD: ABNORMAL
EOSINOPHIL # BLD AUTO: 0.6 10E9/L (ref 0–0.7)
EOSINOPHIL NFR BLD AUTO: 17 %
ERYTHROCYTE [DISTWIDTH] IN BLOOD BY AUTOMATED COUNT: 17.3 % (ref 10–15)
GFR SERPL CREATININE-BSD FRML MDRD: 51 ML/MIN/{1.73_M2}
GLUCOSE SERPL-MCNC: 96 MG/DL (ref 70–99)
HCT VFR BLD AUTO: 27.8 % (ref 40–53)
HGB BLD-MCNC: 8.7 G/DL (ref 13.3–17.7)
IMM GRANULOCYTES # BLD: 0 10E9/L (ref 0–0.4)
IMM GRANULOCYTES NFR BLD: 0 %
INTERPRETATION ECG - MUSE: NORMAL
LYMPHOCYTES # BLD AUTO: 0.6 10E9/L (ref 0.8–5.3)
LYMPHOCYTES NFR BLD AUTO: 16.2 %
MCH RBC QN AUTO: 30.5 PG (ref 26.5–33)
MCHC RBC AUTO-ENTMCNC: 31.3 G/DL (ref 31.5–36.5)
MCV RBC AUTO: 98 FL (ref 78–100)
MONOCYTES # BLD AUTO: 0.5 10E9/L (ref 0–1.3)
MONOCYTES NFR BLD AUTO: 13.4 %
NEUTROPHILS # BLD AUTO: 1.8 10E9/L (ref 1.6–8.3)
NEUTROPHILS NFR BLD AUTO: 51.1 %
NRBC # BLD AUTO: 0 10*3/UL
NRBC BLD AUTO-RTO: 0 /100
NT-PROBNP SERPL-MCNC: 718 PG/ML (ref 0–1800)
PLATELET # BLD AUTO: 93 10E9/L (ref 150–450)
POTASSIUM SERPL-SCNC: 3.7 MMOL/L (ref 3.4–5.3)
PROT SERPL-MCNC: 6.6 G/DL (ref 6.8–8.8)
RBC # BLD AUTO: 2.85 10E12/L (ref 4.4–5.9)
SODIUM SERPL-SCNC: 141 MMOL/L (ref 133–144)
WBC # BLD AUTO: 3.5 10E9/L (ref 4–11)

## 2020-05-14 PROCEDURE — 99214 OFFICE O/P EST MOD 30 MIN: CPT | Mod: 95 | Performed by: NURSE PRACTITIONER

## 2020-05-14 PROCEDURE — 25000132 ZZH RX MED GY IP 250 OP 250 PS 637: Performed by: HOSPITALIST

## 2020-05-14 PROCEDURE — 83880 ASSAY OF NATRIURETIC PEPTIDE: CPT | Performed by: EMERGENCY MEDICINE

## 2020-05-14 PROCEDURE — 80053 COMPREHEN METABOLIC PANEL: CPT | Performed by: EMERGENCY MEDICINE

## 2020-05-14 PROCEDURE — 99285 EMERGENCY DEPT VISIT HI MDM: CPT | Mod: 25

## 2020-05-14 PROCEDURE — 93005 ELECTROCARDIOGRAM TRACING: CPT

## 2020-05-14 PROCEDURE — 85025 COMPLETE CBC W/AUTO DIFF WBC: CPT | Performed by: EMERGENCY MEDICINE

## 2020-05-14 PROCEDURE — 96374 THER/PROPH/DIAG INJ IV PUSH: CPT

## 2020-05-14 PROCEDURE — 71046 X-RAY EXAM CHEST 2 VIEWS: CPT

## 2020-05-14 PROCEDURE — 25000128 H RX IP 250 OP 636: Performed by: EMERGENCY MEDICINE

## 2020-05-14 PROCEDURE — 99223 1ST HOSP IP/OBS HIGH 75: CPT | Mod: AI | Performed by: HOSPITALIST

## 2020-05-14 PROCEDURE — 21000001 ZZH R&B HEART CARE

## 2020-05-14 RX ORDER — ONDANSETRON 4 MG/1
4 TABLET, ORALLY DISINTEGRATING ORAL EVERY 6 HOURS PRN
Status: DISCONTINUED | OUTPATIENT
Start: 2020-05-14 | End: 2020-05-22 | Stop reason: HOSPADM

## 2020-05-14 RX ORDER — BUMETANIDE 1 MG/1
2 TABLET ORAL EVERY MORNING
COMMUNITY
End: 2020-06-02

## 2020-05-14 RX ORDER — ONDANSETRON 2 MG/ML
4 INJECTION INTRAMUSCULAR; INTRAVENOUS EVERY 6 HOURS PRN
Status: DISCONTINUED | OUTPATIENT
Start: 2020-05-14 | End: 2020-05-22 | Stop reason: HOSPADM

## 2020-05-14 RX ORDER — ACETAMINOPHEN 500 MG
1000 TABLET ORAL EVERY 6 HOURS PRN
Status: DISCONTINUED | OUTPATIENT
Start: 2020-05-14 | End: 2020-05-22 | Stop reason: HOSPADM

## 2020-05-14 RX ORDER — NALOXONE HYDROCHLORIDE 0.4 MG/ML
.1-.4 INJECTION, SOLUTION INTRAMUSCULAR; INTRAVENOUS; SUBCUTANEOUS
Status: DISCONTINUED | OUTPATIENT
Start: 2020-05-14 | End: 2020-05-22 | Stop reason: HOSPADM

## 2020-05-14 RX ORDER — LEVOTHYROXINE SODIUM 100 UG/1
100 TABLET ORAL
Status: DISCONTINUED | OUTPATIENT
Start: 2020-05-15 | End: 2020-05-22 | Stop reason: HOSPADM

## 2020-05-14 RX ORDER — POTASSIUM CHLORIDE 1500 MG/1
20-40 TABLET, EXTENDED RELEASE ORAL
Status: DISCONTINUED | OUTPATIENT
Start: 2020-05-14 | End: 2020-05-22 | Stop reason: HOSPADM

## 2020-05-14 RX ORDER — SUCRALFATE 1 G/1
1 TABLET ORAL 2 TIMES DAILY
Status: DISCONTINUED | OUTPATIENT
Start: 2020-05-15 | End: 2020-05-22 | Stop reason: HOSPADM

## 2020-05-14 RX ORDER — POTASSIUM CHLORIDE 1500 MG/1
20 TABLET, EXTENDED RELEASE ORAL 2 TIMES DAILY
Status: DISCONTINUED | OUTPATIENT
Start: 2020-05-14 | End: 2020-05-19

## 2020-05-14 RX ORDER — FUROSEMIDE 10 MG/ML
40 INJECTION INTRAMUSCULAR; INTRAVENOUS EVERY 12 HOURS
Status: CANCELLED | OUTPATIENT
Start: 2020-05-14

## 2020-05-14 RX ORDER — TAMSULOSIN HYDROCHLORIDE 0.4 MG/1
0.4 CAPSULE ORAL DAILY
Status: DISCONTINUED | OUTPATIENT
Start: 2020-05-15 | End: 2020-05-22 | Stop reason: HOSPADM

## 2020-05-14 RX ORDER — EMOLLIENT BASE
CREAM (GRAM) TOPICAL 2 TIMES DAILY
Status: DISCONTINUED | OUTPATIENT
Start: 2020-05-15 | End: 2020-05-22 | Stop reason: HOSPADM

## 2020-05-14 RX ORDER — ASPIRIN 81 MG/1
81 TABLET ORAL DAILY
Status: DISCONTINUED | OUTPATIENT
Start: 2020-05-15 | End: 2020-05-22 | Stop reason: HOSPADM

## 2020-05-14 RX ORDER — POTASSIUM CL/LIDO/0.9 % NACL 10MEQ/0.1L
10 INTRAVENOUS SOLUTION, PIGGYBACK (ML) INTRAVENOUS
Status: DISCONTINUED | OUTPATIENT
Start: 2020-05-14 | End: 2020-05-22 | Stop reason: HOSPADM

## 2020-05-14 RX ORDER — BUMETANIDE 1 MG/1
1 TABLET ORAL
COMMUNITY
End: 2020-06-02

## 2020-05-14 RX ORDER — POTASSIUM CHLORIDE 1.5 G/1.58G
20-40 POWDER, FOR SOLUTION ORAL
Status: DISCONTINUED | OUTPATIENT
Start: 2020-05-14 | End: 2020-05-22 | Stop reason: HOSPADM

## 2020-05-14 RX ORDER — PROCHLORPERAZINE MALEATE 5 MG
5 TABLET ORAL EVERY 6 HOURS PRN
Status: DISCONTINUED | OUTPATIENT
Start: 2020-05-14 | End: 2020-05-22 | Stop reason: HOSPADM

## 2020-05-14 RX ORDER — FINASTERIDE 5 MG/1
5 TABLET, FILM COATED ORAL DAILY
Status: DISCONTINUED | OUTPATIENT
Start: 2020-05-15 | End: 2020-05-22 | Stop reason: HOSPADM

## 2020-05-14 RX ORDER — BUMETANIDE 0.25 MG/ML
2 INJECTION INTRAMUSCULAR; INTRAVENOUS EVERY 12 HOURS
Status: DISCONTINUED | OUTPATIENT
Start: 2020-05-15 | End: 2020-05-15

## 2020-05-14 RX ORDER — POTASSIUM CHLORIDE 7.45 MG/ML
10 INJECTION INTRAVENOUS
Status: DISCONTINUED | OUTPATIENT
Start: 2020-05-14 | End: 2020-05-22 | Stop reason: HOSPADM

## 2020-05-14 RX ORDER — CARVEDILOL 6.25 MG/1
6.25 TABLET ORAL 2 TIMES DAILY WITH MEALS
Status: DISCONTINUED | OUTPATIENT
Start: 2020-05-15 | End: 2020-05-22 | Stop reason: HOSPADM

## 2020-05-14 RX ORDER — LIDOCAINE 40 MG/G
CREAM TOPICAL
Status: DISCONTINUED | OUTPATIENT
Start: 2020-05-14 | End: 2020-05-22 | Stop reason: HOSPADM

## 2020-05-14 RX ORDER — BUPROPION HYDROCHLORIDE 150 MG/1
300 TABLET ORAL EVERY MORNING
Status: DISCONTINUED | OUTPATIENT
Start: 2020-05-15 | End: 2020-05-22 | Stop reason: HOSPADM

## 2020-05-14 RX ORDER — POTASSIUM CHLORIDE 29.8 MG/ML
20 INJECTION INTRAVENOUS
Status: DISCONTINUED | OUTPATIENT
Start: 2020-05-14 | End: 2020-05-22 | Stop reason: HOSPADM

## 2020-05-14 RX ORDER — SIMVASTATIN 20 MG
20 TABLET ORAL DAILY
Status: DISCONTINUED | OUTPATIENT
Start: 2020-05-15 | End: 2020-05-22 | Stop reason: HOSPADM

## 2020-05-14 RX ORDER — PROCHLORPERAZINE 25 MG
12.5 SUPPOSITORY, RECTAL RECTAL EVERY 12 HOURS PRN
Status: DISCONTINUED | OUTPATIENT
Start: 2020-05-14 | End: 2020-05-22 | Stop reason: HOSPADM

## 2020-05-14 RX ORDER — POLYETHYLENE GLYCOL 3350 17 G/17G
17 POWDER, FOR SOLUTION ORAL DAILY
Status: DISCONTINUED | OUTPATIENT
Start: 2020-05-15 | End: 2020-05-22 | Stop reason: HOSPADM

## 2020-05-14 RX ORDER — BUMETANIDE 0.25 MG/ML
2 INJECTION INTRAMUSCULAR; INTRAVENOUS ONCE
Status: COMPLETED | OUTPATIENT
Start: 2020-05-14 | End: 2020-05-14

## 2020-05-14 RX ADMIN — POTASSIUM CHLORIDE 20 MEQ: 1500 TABLET, EXTENDED RELEASE ORAL at 22:52

## 2020-05-14 RX ADMIN — BUMETANIDE 2 MG: 0.25 INJECTION INTRAMUSCULAR; INTRAVENOUS at 21:50

## 2020-05-14 ASSESSMENT — ENCOUNTER SYMPTOMS
ABDOMINAL PAIN: 0
SHORTNESS OF BREATH: 0
UNEXPECTED WEIGHT CHANGE: 1
FEVER: 0
DIFFICULTY URINATING: 0

## 2020-05-14 ASSESSMENT — MIFFLIN-ST. JEOR
SCORE: 1709.08
SCORE: 1641.94

## 2020-05-14 NOTE — LETTER
5/14/2020      RE: Bairon Foster  [address]  [facility] [id number]       Dear Colleague,    Thank you for the opportunity to participate in the care of your patient, Bairon Foster, at the [facility] at [facility]. Please see a copy of my visit note below.    I had the pleasure of speaking with Bairon Foster and his wife today in cardiology clinic follow up for recent re-accumulation of his lower extremity edema. He is a pleasant 89 year old patient of Dr. Hope and Dr. Curiel.    Mr. 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 He had a terrible time tolerating it anticoagulation, with frequent epitaxis, probable GI bleeds.  On December 3, 2019 he underwent successful left atrial appendage closure with a 33 mm watchman device, there were no leaks identified. In January transesophageal echocardiogram to assess the watchman reported that the device was well-seated with a trivial leak.  There is adequate left atrial appendage compression. EF 40-45% and anticoagulation was discontinued.    Lasix and torsemide significantly worsen his bullous pemphigoid which affects his skin significantly causes bleeding when the blisters burst, bumex has worked well for him and is [facility].    I spoke to Bairon and his wife today.  He has had a 8 to 10 pound weight gain recently on Bumex, he is currently taking 4 mg total a day.  [facility] wrap [facility] once or twice a week.  They tell me that the bottom of his feet are so bad that he cannot walk now and that the edema has gotten into his scrotum and he is also having difficulty ambulating for that reason.  His last labs were done in February, he has had good renal function.    Physical Exam  Deferred- phone    Assessment and Plan  1. Chronic systolic and diastolic congestive heart failure (H)  Acute on chronic LE edema despite being on Bumex 4 mg total daily.  This is difficult to assess over the phone.  His weight is up 8 to 10 pounds and he is having difficulty ambulating because of the blisters that have returned on his feet and the scrotal edema that he is developed.  Knowing him well, this is not something that I am going to be able to treat with oral diuretics.  I cannot get a lab today, probably not even tomorrow, my last BMP was in [facility].  He really would benefit from IV diuresis, but I cannot get him into infusion clinic until later next week.  My recommendation is that he go in to the hospital via the ER.  I would recommend that he be put on a Bumex drip, and depending on his electrolytes consider metolazone.  He has essentially an allergy to Lasix, torsemide and spironolactone which worsen hisbullous pemphigoid   switch also worsens when he puts on water weight.   2. Permanent afib on coreg. S/P Watchman, off ac. Continue aspirin.  3. S/P mitral valve repair with mitraclip  With mild MR on last echo  4. Device care. Recent visit with Dr. Curiel,  battery at end of life, gen change scheduled Jumana 3. PPM for significant bradycardia.    Thank you for allowing me to care for Bairon Foster today.    WILLIS Bullard, CNP  Cardiology    Voice recognition software was used for this note, I have reviewed this note, but errors may have been missed.    No orders of the defined types were placed in this encounter.    No orders of the defined types were placed in this  encounter.    There are no discontinued medications.      CURRENT MEDICATIONS:  Current Outpatient Medications   Medication Sig Dispense Refill     acetaminophen (TYLENOL) 500 MG tablet Take 2 tablets (1,000 mg) by mouth every 6 hours as needed for mild pain 100 tablet 0     aspirin 81 MG EC tablet Take 1 tablet (81 mg) by mouth daily       bumetanide (BUMEX) 2 MG tablet Take 2 mg by mouth 2 times daily        buPROPion (WELLBUTRIN XL) 300 MG 24 hr tablet Take 1 tablet (300 mg) by mouth every morning 90 tablet 1     carvedilol (COREG) 6.25 MG tablet TAKE 1 TABLET TWICE DAILY WITH MEALS 180 tablet 3     cholecalciferol (VITAMIN D3) 5000 units (125 mcg) capsule Take 1 capsule (5,000 Units) by mouth daily       COMPRESSION STOCKINGS 1 each daily 2 each 0     emollient (VANICREAM) external cream Apply topically 2 times daily       EPINEPHrine (EPIPEN/ADRENACLICK/OR ANY BX GENERIC EQUIV) 0.3 MG/0.3ML injection 2-pack Inject 0.3 mg into the muscle as needed for anaphylaxis       finasteride (PROSCAR) 5 MG tablet TAKE 1 TABLET EVERY DAY 90 tablet 1     levothyroxine (SYNTHROID/LEVOTHROID) 100 MCG tablet TAKE 1 TABLET EVERY DAY 90 tablet 1     Multiple Vitamins-Minerals (CENTRUM SILVER) per tablet Take 1 tablet by mouth daily       mupirocin (BACTROBAN) 2 % external ointment Apply topically daily       omeprazole (PRILOSEC) 40 MG DR capsule Take 1 capsule (40 mg) by mouth daily 30 capsule 5     polyethylene glycol (MIRALAX) packet Take 1 packet by mouth daily       potassium chloride ER (K-DUR/KLOR-CON M) 20 MEQ CR tablet Take 1 tablet (20 mEq) by mouth 2 times daily 180 tablet 3     simvastatin (ZOCOR) 20 MG tablet Take 20 mg by mouth daily        sucralfate (CARAFATE) 1 GM tablet Take 1 tablet (1 g) by mouth 2 times daily 180 tablet 1     tamsulosin (FLOMAX) 0.4 MG capsule TAKE 1 CAPSULE EVERY DAY 90 capsule 1     triamcinolone (KENALOG) 0.025 % cream Apply topically 2 times daily as needed        ACE/ARB/ARNI NOT  PRESCRIBED (INTENTIONAL) Please choose reason not prescribed, below (Patient not taking: Reported on 5/14/2020)       bumetanide (BUMEX) 1 MG tablet Take 2 mg by mouth every evening        bumetanide (BUMEX) 2 MG tablet Take 1 tablet (2 mg) by mouth daily (Patient not taking: Reported on 5/14/2020) 180 tablet 1       ALLERGIES     Allergies   Allergen Reactions     Furosemide Blisters     Bullous pemphigoid. Bumex and ethacrynic acid okay.     Torsemide Blisters     Bullous pemphigoid.  Bumex and ethacrynic acid okay.       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     CKD (chronic kidney disease) stage 3, GFR 30-59 ml/min (H)      Coronary atherosclerosis of unspecified type of vessel, native or graft     S/P PTCA, EF 50%     Epistaxis      Esophageal reflux      GI bleed 4/17/2019     Hypercholesterolemia      Ischemic cardiomyopathy      Lymphedema of both lower extremities     Wears ACE wraps     Mitral regurgitation     s/p MitraClip 5/20/19     Obese      JIM on CPAP      Other lymphedema     GROIN AND THIGHS     Other pancytopenia (H)      Other specified anemias      Overweight      Pacemaker     PACEMAKER-BS Implanted 8/5/2009      PAD (peripheral artery disease) (H)      Pancytopenia (H)      Persistent atrial fibrillation     VVI PM for long pauses     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    Closure with a 33 mm Watchman device      Pulmonary hypertension (H)      Status post coronary angiogram 3/21/2018     Systolic CHF (H)      TIA (transient ischemic attack)      Unspecified hypothyroidism      Venous stasis dermatitis of both lower extremities      Vitamin D deficiency        PAST SURGICAL HISTORY:  Past Surgical History:   Procedure  Laterality Date     BONE MARROW BIOPSY, BONE SPECIMEN, NEEDLE/TROCAR N/A 4/19/2019    Procedure: BONE MARROW BIOPSY;  Surgeon: Yovani Cooley MD;  Location:  OR     CV HEART CATHETERIZATION WITH POSSIBLE INTERVENTION N/A 4/12/2019    Procedure: Heart Catheterization with possible Intervention;  Surgeon: Lev Beck MD;  Location:  HEART CARDIAC CATH LAB     CV LEFT ATRIAL APPENDAGE CLOSURE N/A 12/3/2019    Procedure: Left Atrial Appendage Closure;  Surgeon: Segundo Hope MD;  Location:  HEART CARDIAC CATH LAB     CV MITRACLIP N/A 5/20/2019    Procedure: CV MITRAL CLIP;  Surgeon: Harshil Winter MD;  Location:  HEART CARDIAC CATH LAB     CV RIGHT HEART CATH N/A 4/12/2019    Procedure: Right Heart Cath;  Surgeon: Lev Beck MD;  Location:  HEART CARDIAC CATH LAB     EYE SURGERY      CATARACT/BLEPHAROPLASTY     GENITOURINARY SURGERY      TUNA     HERNIA REPAIR      RIGHT INGUINAL     HERNIORRHAPHY INGUINAL  8/14/2012    Procedure: HERNIORRHAPHY INGUINAL;  right inguinal hernia repair;  Surgeon: Kareem Torerz MD;  Location:  [facility]     HERNIORRHAPHY UMBILICAL N/A 10/31/2017    Procedure: HERNIORRHAPHY UMBILICAL;  UMBILICAL HERNIA REPAIR WITH MESH;  Surgeon: Scar Harrington MD;  Location: [facility]     IMPLANT PACEMAKER  8/2009    single chamber     ORTHOPEDIC SURGERY      [facility] RIGHT     ORTHOPEDIC SURGERY  2010    right TKR, left TKR     SURGICAL HISTORY OF -       Thumb surgery     SURGICAL HISTORY OF -   1990s    Left total knee replacement     SURGICAL HISTORY OF -   3/11    [facility] total knee replacement       FAMILY HISTORY:  Family History   Problem Relation Age of Onset     Cardiovascular Father      C.A.D. Father      Lung Cancer Sister      Unknown/Adopted Mother      Cancer - colorectal No family hx of        SOCIAL HISTORY:  Social History     Socioeconomic History     Marital status:      Spouse name: None     Number of children: None     Years of education:  None     Highest education level: None   Occupational History     Occupation: Teacher, author, speaker     Employer: RETIRED   Social Needs     Financial resource strain: None     Food insecurity     Worry: None     Inability: None     Transportation needs     Medical: None     Non-medical: None   Tobacco Use     Smoking status: Never Smoker     Smokeless tobacco: Never Used   Substance and Sexual Activity     Alcohol use: Not Currently     Alcohol/week: 0.0 standard drinks     Comment: 1/month     Drug use: No     Sexual activity: Not Currently     Partners: Female   Lifestyle     Physical activity     Days per week: None     Minutes per session: None     Stress: None   Relationships     Social connections     Talks on phone: None     Gets together: None     Attends Mu-ism service: None     Active member of club or organization: None     Attends meetings of clubs or organizations: None     Relationship status: None     Intimate partner violence     Fear of current or ex partner: None     Emotionally abused: None     Physically abused: None     Forced sexual activity: None   Other Topics Concern     Parent/sibling w/ CABG, MI or angioplasty before 65F 55M? No      Service Not Asked     Blood Transfusions Not Asked     Caffeine Concern No     Comment: 1-2 cups coffee a day     Occupational Exposure Not Asked     Hobby Hazards Not Asked     Sleep Concern No     Stress Concern Yes     Comment: due to wifes health condition     Weight Concern No     Special Diet No     Back Care Not Asked     Exercise Yes     Comment: states he uses his tredmill on and off     Bike Helmet Not Asked     Seat Belt Yes     Self-Exams Not Asked   Social History Narrative     None         Recent Lab Results:  LIPID RESULTS:  Lab Results   Component Value Date    CHOL 97 08/21/2019    HDL 41 08/21/2019    LDL 48 08/21/2019    TRIG 41 08/21/2019    CHOLHDLRATIO 2.1 06/15/2015       LIVER ENZYME RESULTS:  Lab Results   Component  Value Date    AST 30 12/02/2019    ALT 24 12/02/2019       CBC RESULTS:  Lab Results   Component Value Date    WBC 3.3 (L) 03/11/2020    RBC 2.67 (L) 03/11/2020    HGB 8.4 (L) 03/11/2020    HCT 27.1 (L) 03/11/2020     (H) 03/11/2020    MCH 31.5 03/11/2020    MCHC 31.0 (L) 03/11/2020    RDW 17.2 (H) 03/11/2020    PLT 84 (L) 03/11/2020       BMP RESULTS:  Lab Results   Component Value Date     02/27/2020    POTASSIUM 3.6 02/27/2020    CHLORIDE 105 02/27/2020    CO2 29 02/27/2020    ANIONGAP 8 02/27/2020    GLC 91 02/27/2020    BUN 25 02/27/2020    CR 1.17 02/27/2020    GFRESTIMATED 55 (A) 02/27/2020    GFRESTBLACK >60 02/27/2020    TEODORO 8.8 02/27/2020        A1C RESULTS:  Lab Results   Component Value Date    A1C 5.5 05/29/2019       INR RESULTS:  Lab Results   Component Value Date    INR 1.28 (H) 12/04/2019    INR 1.44 (H) 05/20/2019       Please do not hesitate to contact me if you have any questions/concerns.     Sincerely,     WILLIS Smith CNP

## 2020-05-14 NOTE — PROGRESS NOTES
"Bairon Foster is a 89 year old male who is being evaluated via a billable telephone visit.      The patient has been notified of following:     \"This telephone visit will be conducted via a call between you and your physician/provider. We have found that certain health care needs can be provided without the need for a physical exam.  This service lets us provide the care you need with a short phone conversation.  If a prescription is necessary we can send it directly to your pharmacy.  If lab work is needed we can place an order for that and you can then stop by our lab to have the test done at a later time.    Telephone visits are billed at different rates depending on your insurance coverage. During this emergency period, for some insurers they may be billed the same as an in-person visit.  Please reach out to your insurance provider with any questions.    If during the course of the call the physician/provider feels a telephone visit is not appropriate, you will not be charged for this service.\"    Patient has given verbal consent for Telephone visit?  Yes    What phone number would you like to be contacted at? [phone]    How would you like to obtain your AVS? MyChart     Review Of Systems  Skin: NEGATIVE  Eyes:Ears/Nose/Throat: NEGATIVE  Respiratory: NEGATIVE  Cardiovascular:Edema  Gastrointestinal: NEGATIVE  Genitourinary:NEGATIVE   Musculoskeletal: NEGATIVE  Neurologic: NEGATIVE  Psychiatric: NEGATIVE  Hematologic/Lymphatic/Immunologic: NEGATIVE  Endocrine:  NEGATIVE  Vital signs reported by patient  BP.105/61  P. 63  Wt.200lb  Patient need]s clarification on Bumex dose currently taking 2 mg bid  Bhavani Otto Lpn    HPI and Plan:   I had the pleasure of speaking with Bairon Foster and his wife today in cardiology clinic follow up for recent re-accumulation of his lower extremity edema. He is a pleasant 89 year old patient of Dr. Hope and Dr. Curiel.    Mr. 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 He had a terrible time tolerating it anticoagulation, with frequent epitaxis, probable GI bleeds.  On December 3, 2019 he underwent successful left atrial appendage closure with a 33 mm watchman device, there were no leaks identified. In January transesophageal echocardiogram to assess the watchman reported that the device was well-seated with a trivial leak.  There is adequate left atrial appendage compression. EF 40-45% and anticoagulation was discontinued.    Lasix and torsemide significantly worsen his bullous pemphigoid which affects his skin significantly causes bleeding when the blisters burst, bumex has worked well for him and [facility]    I spoke to Bairon and his wife today.  He has had a 8 to 10 pound weight gain recently on Bumex, he is currently taking 4 mg total a day.  [facility] wrap [facility] once or twice a week.  They tell me that the bottom of his feet are so bad that he cannot walk now and that the edema has gotten into his scrotum and he is also having difficulty ambulating for that reason.  His last labs were done in February, he has had good renal function.    Physical Exam  Deferred- phone    Assessment and Plan  1. Chronic systolic and diastolic congestive heart failure (H)  Acute on chronic LE edema despite being on Bumex 4 mg total daily.  This is difficult to assess over the phone.  His weight is up 8 to 10 pounds and he is having difficulty ambulating because of the blisters  that have returned on his feet and the scrotal edema that he is developed.  Knowing him well, this is not something that I am going to be able to treat with oral diuretics.  I cannot get a lab today, probably not even tomorrow, my last BMP was in [facility].  He really would benefit from IV diuresis, but I cannot get him into infusion clinic until later next week.  My recommendation is that he go in to the hospital via the ER.  I would recommend that he be put on a Bumex drip, and depending on his electrolytes consider metolazone.  He has essentially an allergy to Lasix, torsemide and spironolactone which worsen hisbullous pemphigoid   switch also worsens when he puts on water weight.   2. Permanent afib on coreg. S/P Watchman, off ac. Continue aspirin.  3. S/P mitral valve repair with mitraclip  With mild MR on last echo  4. Device care. Recent visit with Dr. Curiel,  battery at end of life, gen change scheduled Jumana 3. PPM for significant bradycardia.    Thank you for allowing me to care for Bairon Foster today.    WILLIS Bullard, CNP  Cardiology    Voice recognition software was used for this note, I have reviewed this note, but errors may have been missed.    No orders of the defined types were placed in this encounter.    No orders of the defined types were placed in this encounter.    There are no discontinued medications.      CURRENT MEDICATIONS:  Current Outpatient Medications   Medication Sig Dispense Refill     acetaminophen (TYLENOL) 500 MG tablet Take 2 tablets (1,000 mg) by mouth every 6 hours as needed for mild pain 100 tablet 0     aspirin 81 MG EC tablet Take 1 tablet (81 mg) by mouth daily       bumetanide (BUMEX) 2 MG tablet Take 2 mg by mouth 2 times daily        buPROPion (WELLBUTRIN XL) 300 MG 24 hr tablet Take 1 tablet (300 mg) by mouth every morning 90 tablet 1     carvedilol (COREG) 6.25 MG tablet TAKE 1 TABLET TWICE DAILY WITH MEALS 180 tablet 3     cholecalciferol (VITAMIN D3) 5000  units (125 mcg) capsule Take 1 capsule (5,000 Units) by mouth daily       COMPRESSION STOCKINGS 1 each daily 2 each 0     emollient (VANICREAM) external cream Apply topically 2 times daily       EPINEPHrine (EPIPEN/ADRENACLICK/OR ANY BX GENERIC EQUIV) 0.3 MG/0.3ML injection 2-pack Inject 0.3 mg into the muscle as needed for anaphylaxis       finasteride (PROSCAR) 5 MG tablet TAKE 1 TABLET EVERY DAY 90 tablet 1     levothyroxine (SYNTHROID/LEVOTHROID) 100 MCG tablet TAKE 1 TABLET EVERY DAY 90 tablet 1     Multiple Vitamins-Minerals (CENTRUM SILVER) per tablet Take 1 tablet by mouth daily       mupirocin (BACTROBAN) 2 % external ointment Apply topically daily       omeprazole (PRILOSEC) 40 MG DR capsule Take 1 capsule (40 mg) by mouth daily 30 capsule 5     polyethylene glycol (MIRALAX) packet Take 1 packet by mouth daily       potassium chloride ER (K-DUR/KLOR-CON M) 20 MEQ CR tablet Take 1 tablet (20 mEq) by mouth 2 times daily 180 tablet 3     simvastatin (ZOCOR) 20 MG tablet Take 20 mg by mouth daily        sucralfate (CARAFATE) 1 GM tablet Take 1 tablet (1 g) by mouth 2 times daily 180 tablet 1     tamsulosin (FLOMAX) 0.4 MG capsule TAKE 1 CAPSULE EVERY DAY 90 capsule 1     triamcinolone (KENALOG) 0.025 % cream Apply topically 2 times daily as needed        ACE/ARB/ARNI NOT PRESCRIBED (INTENTIONAL) Please choose reason not prescribed, below (Patient not taking: Reported on 5/14/2020)       bumetanide (BUMEX) 1 MG tablet Take 2 mg by mouth every evening        bumetanide (BUMEX) 2 MG tablet Take 1 tablet (2 mg) by mouth daily (Patient not taking: Reported on 5/14/2020) 180 tablet 1       ALLERGIES     Allergies   Allergen Reactions     Furosemide Blisters     Bullous pemphigoid. Bumex and ethacrynic acid okay.     Torsemide Blisters     Bullous pemphigoid.  Bumex and ethacrynic acid okay.       PAST MEDICAL HISTORY:  Past Medical History:   Diagnosis Date     Acute, but ill-defined, cerebrovascular disease     NO  RESIDUALS     Antiplatelet or antithrombotic long-term use     Hisotry of falls and severe epistaxis     Arrhythmia     a fib     Bullous pemphigoid 8/21/2019     CKD (chronic kidney disease) stage 3, GFR 30-59 ml/min (H)      Coronary atherosclerosis of unspecified type of vessel, native or graft     S/P PTCA, EF 50%     Epistaxis      Esophageal reflux      GI bleed 4/17/2019     Hypercholesterolemia      Ischemic cardiomyopathy      Lymphedema of both lower extremities     Wears ACE wraps     Mitral regurgitation     s/p MitraClip 5/20/19     Obese      JIM on CPAP      Other lymphedema     GROIN AND THIGHS     Other pancytopenia (H)      Other specified anemias      Overweight      Pacemaker     PACEMAKER-BS Implanted 8/5/2009      PAD (peripheral artery disease) (H)      Pancytopenia (H)      Persistent atrial fibrillation     VVI PM for long pauses     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    Closure with a 33 mm Watchman device      Pulmonary hypertension (H)      Status post coronary angiogram 3/21/2018     Systolic CHF (H)      TIA (transient ischemic attack)      Unspecified hypothyroidism      Venous stasis dermatitis of both lower extremities      Vitamin D deficiency        PAST SURGICAL HISTORY:  Past Surgical History:   Procedure Laterality Date     BONE MARROW BIOPSY, BONE SPECIMEN, NEEDLE/TROCAR N/A 4/19/2019    Procedure: BONE MARROW BIOPSY;  Surgeon: Yovani Cooley MD;  Location:  OR      HEART CATHETERIZATION WITH POSSIBLE INTERVENTION N/A 4/12/2019    Procedure: Heart Catheterization with possible Intervention;  Surgeon: Lev Beck MD;  Location:  HEART CARDIAC CATH LAB     CV LEFT ATRIAL APPENDAGE CLOSURE N/A 12/3/2019    Procedure: Left Atrial Appendage Closure;  Surgeon: Segundo Hope MD;  Location:  HEART CARDIAC CATH LAB     CV MITRACLIP N/A 5/20/2019    Procedure: CV MITRAL CLIP;  Surgeon: Harshil Winter MD;   Location:  HEART CARDIAC CATH LAB     CV RIGHT HEART CATH N/A 4/12/2019    Procedure: Right Heart Cath;  Surgeon: Lev Beck MD;  Location:  HEART CARDIAC CATH LAB     EYE SURGERY      CATARACT/BLEPHAROPLASTY     GENITOURINARY SURGERY      TUNA     HERNIA REPAIR      RIGHT INGUINAL     HERNIORRHAPHY INGUINAL  8/14/2012    Procedure: HERNIORRHAPHY INGUINAL;  right inguinal hernia repair;  Surgeon: Kareem Torrez MD;  Location:  [facility]     HERNIORRHAPHY UMBILICAL N/A 10/31/2017    Procedure: HERNIORRHAPHY UMBILICAL;  UMBILICAL HERNIA REPAIR WITH MESH;  Surgeon: Scar Harrington MD;  Location: [facility]     IMPLANT PACEMAKER  8/2009    single chamber     ORTHOPEDIC SURGERY      [facility] RIGHT     ORTHOPEDIC SURGERY  2010    right TKR, left TKR     SURGICAL HISTORY OF -       Thumb surgery     SURGICAL HISTORY OF -   1990s    Left total knee replacement     SURGICAL HISTORY OF -   3/11    Righ total knee replacement       FAMILY HISTORY:  Family History   Problem Relation Age of Onset     Cardiovascular Father      C.A.D. Father      Lung Cancer Sister      Unknown/Adopted Mother      Cancer - colorectal No family hx of        SOCIAL HISTORY:  Social History     Socioeconomic History     Marital status:      Spouse name: None     Number of children: None     Years of education: None     Highest education level: None   Occupational History     Occupation: Teacher, author, speaker     Employer: RETIRED   Social Needs     Financial resource strain: None     Food insecurity     Worry: None     Inability: None     Transportation needs     Medical: None     Non-medical: None   Tobacco Use     Smoking status: Never Smoker     Smokeless tobacco: Never Used   Substance and Sexual Activity     Alcohol use: Not Currently     Alcohol/week: 0.0 standard drinks     Comment: 1/month     Drug use: No     Sexual activity: Not Currently     Partners: Female   Lifestyle     Physical activity     Days per week: None     Minutes per  session: None     Stress: None   Relationships     Social connections     Talks on phone: None     Gets together: None     Attends Gnosticist service: None     Active member of club or organization: None     Attends meetings of clubs or organizations: None     Relationship status: None     Intimate partner violence     Fear of current or ex partner: None     Emotionally abused: None     Physically abused: None     Forced sexual activity: None   Other Topics Concern     Parent/sibling w/ CABG, MI or angioplasty before 65F 55M? No      Service Not Asked     Blood Transfusions Not Asked     Caffeine Concern No     Comment: 1-2 cups coffee a day     Occupational Exposure Not Asked     Hobby Hazards Not Asked     Sleep Concern No     Stress Concern Yes     Comment: due to wifes health condition     Weight Concern No     Special Diet No     Back Care Not Asked     Exercise Yes     Comment: states he uses his tredmill on and off     Bike Helmet Not Asked     Seat Belt Yes     Self-Exams Not Asked   Social History Narrative     None         Recent Lab Results:  LIPID RESULTS:  Lab Results   Component Value Date    CHOL 97 08/21/2019    HDL 41 08/21/2019    LDL 48 08/21/2019    TRIG 41 08/21/2019    CHOLHDLRATIO 2.1 06/15/2015       LIVER ENZYME RESULTS:  Lab Results   Component Value Date    AST 30 12/02/2019    ALT 24 12/02/2019       CBC RESULTS:  Lab Results   Component Value Date    WBC 3.3 (L) 03/11/2020    RBC 2.67 (L) 03/11/2020    HGB 8.4 (L) 03/11/2020    HCT 27.1 (L) 03/11/2020     (H) 03/11/2020    MCH 31.5 03/11/2020    MCHC 31.0 (L) 03/11/2020    RDW 17.2 (H) 03/11/2020    PLT 84 (L) 03/11/2020       BMP RESULTS:  Lab Results   Component Value Date     02/27/2020    POTASSIUM 3.6 02/27/2020    CHLORIDE 105 02/27/2020    CO2 29 02/27/2020    ANIONGAP 8 02/27/2020    GLC 91 02/27/2020    BUN 25 02/27/2020    CR 1.17 02/27/2020    GFRESTIMATED 55 (A) 02/27/2020    GFRESTBLACK >60 02/27/2020     TEODORO 8.8 02/27/2020        A1C RESULTS:  Lab Results   Component Value Date    A1C 5.5 05/29/2019       INR RESULTS:  Lab Results   Component Value Date    INR 1.28 (H) 12/04/2019    INR 1.44 (H) 05/20/2019           CC  No referring provider defined for this encounter.      Phone call duration: 30 minutes    WILLIS Smith CNP

## 2020-05-14 NOTE — ED TRIAGE NOTES
Pt having increasing edema to lower extremities over past 10 days.  PCP told pt to come in for diuresis.  Pt has scheduled cardiac surgery coming up.

## 2020-05-14 NOTE — ED PROVIDER NOTES
History     Chief Complaint:    Leg Swelling    The history is provided by the patient.      Bairon Foster is a 89 year old male with a history of sick sinus syndrome, coronary artery disease, atrial fibrillation, mitral regurgitation, systolic CHF, and TIA on Bumex with a pacemaker in situ who presents with bilateral lower extremity swelling. The patient states that his entire legs up to his groin have been swollen for the past 10 days. The patient also endorses slight weight increase. He denies shortness of breath with or without exertion, difficulty walking, difficulty urinating, chest pain, abdominal pain, or fevers. He lives at home with his wife.. The patient states that he had a Watchman procedure 4-5 months ago. Of note: the patient notes a recent dosage change 4-5 weeks ago of his Bumex from 2 mg to 4 mg.  Patient had a telemedicine conference with cardiology today and they recommended that he present to the ER for further evaluation as he will need IV diuretics.    Allergies:  Furosemide  Torsemide     Medications:    Ace, intentional, not prescribed  Aspirin 81 mg  Bumex  Wellbutrin XL  Coreg  Vitamin D3  Vanicream  Epipen  Proscar  Synthroid  Bactroban  Omeprazole  Miralax  K-dur  Zocor  Carafate  Flomax  Kenalog     Past Medical History:    Sick sinus syndrome  BPH (benign prostatic hyperplasia)  Coronary artery disease  AK (actinic keratosis)  Acute, but ill-defined, cerebrovascular disease  Arrhythmia  CKD, stage 3  Bullous pemphigoid  Coronary atherosclerosis  Epistaxis  GERD  Xerosis cutis  GI bleed  Depression  Hydrocele  Onychomycosis  Angioma of skin  Hypercholesterolemia  Ischemic cardiomyopathy  Lymphedema of both lower extremities  Mitral regurgitation  Obesity  Obstructive sleep apnea on CPAP  Lymphedema  Pancytopenia  Anemia  Overweight  Peripheral artery disease  Atrial fibrillation  Presence of Watchman left atrial appendage closure device  Pulmonary hypertension  Systolic  "CHF  TIA  Hypothyroidism  Venous stasis dermatitis of both lower extremities  Vitamin D deficiency    Past Surgical History:    Bone marrow biopsy, bone specimen, needle/trocar  CV heart catheterization with possible intervention  CV left atrial appendage closure  CV mitraclip  CV right heart cath  Cataract extraction/blepharoplasty  Genitourinary surgery, TUNA  Right inguinal hernia repair  Umbilical hernia repair  Implant pacemaker, single chamber  Orthopedic surgery, [facility] right  TKA, bilateral  Thumb surgery    Family History:    Lung cancer - sister  Coronary artery disease - father    Social History:  Negative for tobacco use.  Negative for alcohol use - not currently, none for 1 month.  Negative for drug use.  Marital Status:   [2]     Review of Systems   Constitutional: Positive for unexpected weight change. Negative for fever.   Respiratory: Negative for shortness of breath.    Cardiovascular: Positive for leg swelling. Negative for chest pain.   Gastrointestinal: Negative for abdominal pain.   Genitourinary: Negative for difficulty urinating.   Musculoskeletal: Negative for gait problem.   All other systems reviewed and are negative.      Physical Exam     Patient Vitals for the past 24 hrs:   BP Temp Temp src Pulse Heart Rate Resp SpO2 Height Weight   05/14/20 2020 121/76 -- -- -- -- -- 95 % -- --   05/14/20 1936 116/74 -- -- 72 -- 18 96 % -- --   05/14/20 1745 111/60 97.5  F (36.4  C) Temporal 66 66 16 96 % 1.88 m (6' 2\") 90.7 kg (200 lb)     Physical Exam  General: Alert and cooperative with exam. Patient in mild distress. Normal mentation.  Head:  Scalp is NC/AT  Eyes:  No scleral icterus, PERRL  ENT:  The external nose and ears are normal.  Neck:  Normal range of motion without rigidity.  CV:  Regular rate and rhythm    Pacemaker in place.  Resp:  Breath sounds are clear bilaterally    Non-labored, no retractions or accessory muscle use  GI:  Abdomen is soft, no distension, no tenderness. No " peritoneal signs  MS:  2+ pitting edema extending to his waist with significant scrotal edema. Compression wraps in place.  Skin:  Warm and dry, No rash or lesions noted.  Neuro: Oriented x 3. No gross motor deficits.    Emergency Department Course     ECG (19:30:16):  Rate 72 bpm. ND interval *. QRS duration 114. QT/QTc 424/464. P-R-T axes * 0 39. Atrial fibrillation. Low voltage QRS. Incomplete right bundle branch block. Abnormal ECG. No significant change compared to EKG dated 12/03/2019. Interpreted at 1940 by Gregory Rivera DO.    Imaging:  Radiology findings were communicated with the patient who voiced understanding of the findings.    XR  Chest, PA & LAT:   No significant interval change. Single-lead cardiac device left chest wall, with lead tip projected over the RV. Cardiac enlargement. Pulmonary vascularity is within normal limits. Hazy opacity at the left lung base could be related to atelectasis or pneumonia. No pleural effusions are identified, as per radiology.     Laboratory:  Laboratory findings were communicated with the patient who voiced understanding of the findings.    CBC: WBC 3.5 L, HGB 9.7, PLT 93 L  CMP: GFR estimate 51 L, Albumin 3.1 L, Protein total 6.6 L o/w WNL (Creatinine 1.125)  BNP: 718    Emergency Department Course:  Past medical records, nursing notes, and vitals reviewed.    1916: I performed an exam of the patient as documented above.     EKG obtained in the ED, see results above.     IV was inserted and blood was drawn for laboratory testing, results above.    The patient was sent for a chest x-ray while in the emergency department, results above.     2048: I rechecked the patient and discussed the results of his workup thus far.     I personally reviewed the laboratory, EKG, and imaging results with the Patient and answered all related questions prior to admission.     Findings and plan explained to the Patient who consents to admission.     2057: Discussed the  patient with Dr. Stuart, who will admit the patient to a cardiac telemetry [facility] bed for further monitoring, evaluation, and treatment.     Impression & Plan     Medical Decision Making:  Patient is a 89-year-old male who presents with a several week history of progressively worsening lower extremity/scrotal edema; evaluated via telemedicine by cardiology today with recommendation for ER evaluation and IV diuresis.  Patient's medical history and records were reviewed.  On evaluation patient is hemodynamically stable and chest x-ray shows no evidence of infection or pulmonary edema; mild atelectasis noted.  Patient has no symptoms consistent with COVID-19.  Labs without significant findings and BNP is not significantly elevated.  EKG demonstrates known atrial fibrillation.  Patient appears to be failing treatment with oral diuretics and he will be admitted to the hospitalist service for IV diuresis as well as further evaluation and care.  He remained stable throughout ED course.    Diagnosis:    ICD-10-CM   1. Lower extremity edema  R60.0   2. Scrotal edema  N50.89     Disposition:  Admitted to [facility].    Scribe Disclosure:  Lesley WALLACE, am serving as a scribe at 6:40 PM on 5/14/2020 to document services personally performed by Gregory Rivera DO based on my observations and the provider's statements to me.     Lesley Rogers  5/14/2020    EMERGENCY DEPARTMENT       Gregory Rivera DO  05/15/20 0109

## 2020-05-14 NOTE — TELEPHONE ENCOUNTER
Talia from [email or web address] [email or web address] [facility] called needing orders for patient please call talia regarding this phone number is   [phone]

## 2020-05-15 ENCOUNTER — APPOINTMENT (OUTPATIENT)
Dept: PHYSICAL THERAPY | Facility: CLINIC | Age: 85
DRG: 292 | End: 2020-05-15
Attending: HOSPITALIST
Payer: COMMERCIAL

## 2020-05-15 ENCOUNTER — APPOINTMENT (OUTPATIENT)
Dept: OCCUPATIONAL THERAPY | Facility: CLINIC | Age: 85
DRG: 292 | End: 2020-05-15
Attending: HOSPITALIST
Payer: COMMERCIAL

## 2020-05-15 ENCOUNTER — APPOINTMENT (OUTPATIENT)
Dept: CARDIOLOGY | Facility: CLINIC | Age: 85
DRG: 292 | End: 2020-05-15
Attending: HOSPITALIST
Payer: COMMERCIAL

## 2020-05-15 ENCOUNTER — DOCUMENTATION ONLY (OUTPATIENT)
Dept: OTHER | Facility: CLINIC | Age: 85
End: 2020-05-15

## 2020-05-15 LAB
ALBUMIN SERPL-MCNC: 2.9 G/DL (ref 3.4–5)
ANION GAP SERPL CALCULATED.3IONS-SCNC: 5 MMOL/L (ref 3–14)
ANION GAP SERPL CALCULATED.3IONS-SCNC: 5 MMOL/L (ref 3–14)
BUN SERPL-MCNC: 19 MG/DL (ref 7–30)
BUN SERPL-MCNC: 20 MG/DL (ref 7–30)
CALCIUM SERPL-MCNC: 8.9 MG/DL (ref 8.5–10.1)
CALCIUM SERPL-MCNC: 9 MG/DL (ref 8.5–10.1)
CHLORIDE SERPL-SCNC: 103 MMOL/L (ref 94–109)
CHLORIDE SERPL-SCNC: 104 MMOL/L (ref 94–109)
CO2 SERPL-SCNC: 30 MMOL/L (ref 20–32)
CO2 SERPL-SCNC: 31 MMOL/L (ref 20–32)
CREAT SERPL-MCNC: 1.22 MG/DL (ref 0.66–1.25)
CREAT SERPL-MCNC: 1.25 MG/DL (ref 0.66–1.25)
ERYTHROCYTE [DISTWIDTH] IN BLOOD BY AUTOMATED COUNT: 17.6 % (ref 10–15)
GFR SERPL CREATININE-BSD FRML MDRD: 51 ML/MIN/{1.73_M2}
GFR SERPL CREATININE-BSD FRML MDRD: 52 ML/MIN/{1.73_M2}
GLUCOSE SERPL-MCNC: 105 MG/DL (ref 70–99)
GLUCOSE SERPL-MCNC: 118 MG/DL (ref 70–99)
HCT VFR BLD AUTO: 26.6 % (ref 40–53)
HGB BLD-MCNC: 8.3 G/DL (ref 13.3–17.7)
MAGNESIUM SERPL-MCNC: 2.3 MG/DL (ref 1.6–2.3)
MCH RBC QN AUTO: 30.5 PG (ref 26.5–33)
MCHC RBC AUTO-ENTMCNC: 31.2 G/DL (ref 31.5–36.5)
MCV RBC AUTO: 98 FL (ref 78–100)
PHOSPHATE SERPL-MCNC: 2.6 MG/DL (ref 2.5–4.5)
PLATELET # BLD AUTO: 95 10E9/L (ref 150–450)
POTASSIUM SERPL-SCNC: 3.5 MMOL/L (ref 3.4–5.3)
POTASSIUM SERPL-SCNC: 3.6 MMOL/L (ref 3.4–5.3)
RBC # BLD AUTO: 2.72 10E12/L (ref 4.4–5.9)
SODIUM SERPL-SCNC: 139 MMOL/L (ref 133–144)
SODIUM SERPL-SCNC: 139 MMOL/L (ref 133–144)
WBC # BLD AUTO: 3.1 10E9/L (ref 4–11)

## 2020-05-15 PROCEDURE — 21000001 ZZH R&B HEART CARE

## 2020-05-15 PROCEDURE — 97116 GAIT TRAINING THERAPY: CPT | Mod: GP

## 2020-05-15 PROCEDURE — G0463 HOSPITAL OUTPT CLINIC VISIT: HCPCS

## 2020-05-15 PROCEDURE — 80048 BASIC METABOLIC PNL TOTAL CA: CPT | Performed by: HOSPITALIST

## 2020-05-15 PROCEDURE — 25000132 ZZH RX MED GY IP 250 OP 250 PS 637: Performed by: NURSE PRACTITIONER

## 2020-05-15 PROCEDURE — 99222 1ST HOSP IP/OBS MODERATE 55: CPT | Performed by: INTERNAL MEDICINE

## 2020-05-15 PROCEDURE — 93306 TTE W/DOPPLER COMPLETE: CPT | Mod: 26 | Performed by: INTERNAL MEDICINE

## 2020-05-15 PROCEDURE — 83735 ASSAY OF MAGNESIUM: CPT | Performed by: HOSPITALIST

## 2020-05-15 PROCEDURE — 25000132 ZZH RX MED GY IP 250 OP 250 PS 637: Performed by: HOSPITALIST

## 2020-05-15 PROCEDURE — 85027 COMPLETE CBC AUTOMATED: CPT | Performed by: HOSPITALIST

## 2020-05-15 PROCEDURE — 80069 RENAL FUNCTION PANEL: CPT | Performed by: HOSPITALIST

## 2020-05-15 PROCEDURE — 97165 OT EVAL LOW COMPLEX 30 MIN: CPT | Mod: GO

## 2020-05-15 PROCEDURE — 97161 PT EVAL LOW COMPLEX 20 MIN: CPT | Mod: GP

## 2020-05-15 PROCEDURE — 97530 THERAPEUTIC ACTIVITIES: CPT | Mod: GO

## 2020-05-15 PROCEDURE — 93306 TTE W/DOPPLER COMPLETE: CPT

## 2020-05-15 PROCEDURE — 99232 SBSQ HOSP IP/OBS MODERATE 35: CPT | Performed by: HOSPITALIST

## 2020-05-15 PROCEDURE — 25000125 ZZHC RX 250: Performed by: HOSPITALIST

## 2020-05-15 PROCEDURE — 36415 COLL VENOUS BLD VENIPUNCTURE: CPT | Performed by: HOSPITALIST

## 2020-05-15 PROCEDURE — 97535 SELF CARE MNGMENT TRAINING: CPT | Mod: GO

## 2020-05-15 PROCEDURE — 97530 THERAPEUTIC ACTIVITIES: CPT | Mod: GP

## 2020-05-15 RX ORDER — CHLORTHALIDONE 25 MG/1
25 TABLET ORAL DAILY
Status: DISCONTINUED | OUTPATIENT
Start: 2020-05-15 | End: 2020-05-22 | Stop reason: HOSPADM

## 2020-05-15 RX ADMIN — FINASTERIDE 5 MG: 5 TABLET, FILM COATED ORAL at 08:30

## 2020-05-15 RX ADMIN — SUCRALFATE 1 G: 1 TABLET ORAL at 08:30

## 2020-05-15 RX ADMIN — POTASSIUM CHLORIDE 20 MEQ: 1500 TABLET, EXTENDED RELEASE ORAL at 08:30

## 2020-05-15 RX ADMIN — ACETAMINOPHEN 1000 MG: 500 TABLET, FILM COATED ORAL at 11:51

## 2020-05-15 RX ADMIN — SUCRALFATE 1 G: 1 TABLET ORAL at 22:57

## 2020-05-15 RX ADMIN — SIMVASTATIN 20 MG: 20 TABLET, FILM COATED ORAL at 08:30

## 2020-05-15 RX ADMIN — CARVEDILOL 6.25 MG: 6.25 TABLET, FILM COATED ORAL at 08:30

## 2020-05-15 RX ADMIN — Medication: at 11:51

## 2020-05-15 RX ADMIN — TAMSULOSIN HYDROCHLORIDE 0.4 MG: 0.4 CAPSULE ORAL at 09:00

## 2020-05-15 RX ADMIN — CARVEDILOL 6.25 MG: 6.25 TABLET, FILM COATED ORAL at 17:52

## 2020-05-15 RX ADMIN — ASPIRIN 81 MG: 81 TABLET, DELAYED RELEASE ORAL at 08:30

## 2020-05-15 RX ADMIN — BUPROPION HYDROCHLORIDE 300 MG: 150 TABLET, FILM COATED, EXTENDED RELEASE ORAL at 08:30

## 2020-05-15 RX ADMIN — OMEPRAZOLE 40 MG: 20 CAPSULE, DELAYED RELEASE ORAL at 08:30

## 2020-05-15 RX ADMIN — POLYETHYLENE GLYCOL 3350 17 G: 17 POWDER, FOR SOLUTION ORAL at 08:30

## 2020-05-15 RX ADMIN — BUMETANIDE 0.5 MG/HR: 0.25 INJECTION INTRAMUSCULAR; INTRAVENOUS at 09:54

## 2020-05-15 RX ADMIN — CHLORTHALIDONE 25 MG: 25 TABLET ORAL at 12:45

## 2020-05-15 RX ADMIN — POTASSIUM CHLORIDE 20 MEQ: 1500 TABLET, EXTENDED RELEASE ORAL at 22:57

## 2020-05-15 RX ADMIN — LEVOTHYROXINE SODIUM 100 MCG: 100 TABLET ORAL at 06:28

## 2020-05-15 ASSESSMENT — ACTIVITIES OF DAILY LIVING (ADL)
ADLS_ACUITY_SCORE: 19
ADLS_ACUITY_SCORE: 18
ADLS_ACUITY_SCORE: 19

## 2020-05-15 ASSESSMENT — MIFFLIN-ST. JEOR: SCORE: 1709.98

## 2020-05-15 NOTE — PROGRESS NOTES
05/15/20 1003   Quick Adds   Type of Visit Initial Occupational Therapy Evaluation   Living Environment   Lives With child(ang), adult;spouse   Living Arrangements house   Home Accessibility no concerns   Transportation Anticipated family or friend will provide   Living Environment Comment Pt has flat entry, has gabriele railings to the basement but doesn't need to go down there. Pt has a grab bar in the walk-in shower, also has a shower chair.    Self-Care   Regular Exercise Yes   Equipment Currently Used at Home walker, rolling  (2WW)   Activity/Exercise/Self-Care Comment pt states he exercises 2-3x/week, uses weights   Functional Level   Ambulation 1-->assistive equipment   Transferring 1-->assistive equipment   Toileting 1-->assistive equipment   Bathing 1-->assistive equipment   Dressing 0-->independent   Eating 0-->independent   Communication 0-->understands/communicates without difficulty   Swallowing 0-->swallows foods/liquids without difficulty   Cognition 0 - no cognition issues reported   Fall history within last six months yes   Number of times patient has fallen within last six months 3   Which of the above functional risks had a recent onset or change? bathing;dressing;ambulation;transferring;toileting   Prior Functional Level Comment pt uses a 2WW to get around. Pt is ind w/ all ADL's except bathing, which his son supervises. Pt's wife does most of the cooking, also does laundy. Pt's has home RN for med mgmt and lymph wraps. Pt's family assists for shopping   General Information   Onset of Illness/Injury or Date of Surgery - Date 05/14/20   Referring Physician Nikolay Stuart MD    Patient/Family Goals Statement return home   Additional Occupational Profile Info/Pertinent History of Current Problem Pt admitted w/ acute on chronic CHF exacerbation   Precautions/Limitations fall precautions   Cognitive Status Examination   Orientation orientation to person, place and time   Level of Consciousness  alert   Follows Commands (Cognition) WNL   Memory intact   Visual Perception   Visual Perception Wears glasses   Sensory Examination   Sensory Comments denies numbness/tingling   Integumentary/Edema   Integumentary/Edema Comments pt has significant scrotal edema, baseline BLE edema (pt has BLE wraps here from home)   Mobility   Bed Mobility Comments SBA   Transfer Skill: Sit to Stand   Level of Garden: Sit/Stand contact guard   Balance   Balance Comments needs walker for mobility, has had 3 falls in the past 6 months   Bathing   Level of Garden minimum assist (75% patients effort)   Upper Body Dressing   Level of Garden: Dress Upper Body independent   Lower Body Dressing   Level of Garden: Dress Lower Body minimum assist (75% patients effort)   Toileting   Level of Garden: Toilet stand-by assist   Grooming   Level of Garden: Grooming stand-by assist   Eating/Self Feeding   Level of Garden: Eating independent   Activities of Daily Living Analysis   Impairments Contributing to Impaired Activities of Daily Living strength decreased;flexibility decreased  (dec functional act tolerance)   General Therapy Interventions   Planned Therapy Interventions ADL retraining;home program guidelines;progressive activity/exercise;risk factor education   Clinical Impression   Criteria for Skilled Therapeutic Interventions Met yes, treatment indicated   OT Diagnosis decreased ind/ease w/ ADL's and mobility   Influenced by the following impairments decreased functional act tolerance, difficulty reaching to BLE's, dec overall strength   Assessment of Occupational Performance 1-3 Performance Deficits   Identified Performance Deficits decreased ind/ease w/ toileting, LB dressing, bathing, functional mobility   Clinical Decision Making (Complexity) Low complexity   Therapy Frequency Daily   Predicted Duration of Therapy Intervention (days/wks) 5 days   Anticipated Discharge Disposition Home with  "Assist   Risks and Benefits of Treatment have been explained. Yes   Patient, Family & other staff in agreement with plan of care Yes   [facility] TM \"6 Clicks\"   2016, Trustees of [facility], under license to [employer].  All rights reserved.   6 Clicks Short Forms Daily Activity Inpatient Short Form   [facility] AM-PAC  \"6 Clicks\" Daily Activity Inpatient Short Form   1. Putting on and taking off regular lower body clothing? 3 - A Little   2. Bathing (including washing, rinsing, drying)? 3 - A Little   3. Toileting, which includes using toilet, bedpan or urinal? 3 - A Little   4. Putting on and taking off regular upper body clothing? 4 - None   5. Taking care of personal grooming such as brushing teeth? 3 - A Little   6. Eating meals? 4 - None   Daily Activity Raw Score (Score out of 24.Lower scores equate to lower levels of function) 20   Total Evaluation Time   Total Evaluation Time (Minutes) 8     "

## 2020-05-15 NOTE — PLAN OF CARE
[id number]: A&Ox4. VSS on RA. Denies CP or shortness of breath. LS clear. BLE +4 edema. Scrotum +4 edema. Compression wraps removed overnight per pt request. Quarter sized opened blister to top of R foot. Intact jelly bean sized blood blister to 4th R toe. Tele afib CVR BBB with freq PVCs. Up with 1 assist.

## 2020-05-15 NOTE — ED NOTES
[facility]  ED Nurse Handoff Report    ED Chief complaint: Leg Swelling      ED Diagnosis:   Final diagnoses:   Lower extremity edema   Scrotal edema       Code Status: admitting to confirm, multiple on file, mostly recently full    Allergies:   Allergies   Allergen Reactions     Furosemide Blisters     Bullous pemphigoid. Bumex and ethacrynic acid okay.     Torsemide Blisters     Bullous pemphigoid.  Bumex and ethacrynic acid okay.       Patient Story: Increasing BLE edema for past 10 days. PCP has been trying to manage but edema has continued to get worse. Recommended admit as failing outpatient management.  Focused Assessment:  BLE edema up into scrotum. Denies CP, SOB, cough. PTA wraps to BLEs.  Labs Ordered and Resulted from Time of ED Arrival Up to the Time of Departure from the ED   CBC WITH PLATELETS DIFFERENTIAL - Abnormal; Notable for the following components:       Result Value    WBC 3.5 (*)     RBC Count 2.85 (*)     Hemoglobin 8.7 (*)     Hematocrit 27.8 (*)     MCHC 31.3 (*)     RDW 17.3 (*)     Platelet Count 93 (*)     Absolute Lymphocytes 0.6 (*)     All other components within normal limits   COMPREHENSIVE METABOLIC PANEL - Abnormal; Notable for the following components:    GFR Estimate 51 (*)     GFR Estimate If Black 59 (*)     Albumin 3.1 (*)     Protein Total 6.6 (*)     All other components within normal limits   NT PROBNP INPATIENT     Chest XR,  PA & LAT   Preliminary Result   IMPRESSION: No significant interval change. Single-lead cardiac device   left chest wall, with lead tip projected over the RV. Cardiac   enlargement. Pulmonary vascularity is within normal limits. Hazy   opacity at the left lung base could be related to atelectasis or   pneumonia. No pleural effusions are identified.         Treatments and/or interventions provided: bumex 2mg IV  Patient's response to treatments and/or interventions: resting on cot, VSS    To be done/followed up on inpatient unit:   "per hospitalist    Does this patient have any cognitive concerns?: n/a    Activity level - Baseline/Home:  Cane and Walker  Activity Level - Current:   Cane and Walker    Patient's Preferred language: English   Needed?: No    Isolation: None  Infection: Not Applicable  Bariatric?: No    Vital Signs:   Vitals:    05/14/20 1745 05/14/20 1936 05/14/20 2020   BP: 111/60 116/74 121/76   Pulse: 66 72    Resp: 16 18    Temp: 97.5  F (36.4  C)     TempSrc: Temporal     SpO2: 96% 96% 95%   Weight: 90.7 kg (200 lb)     Height: 1.88 m (6' 2\")         Cardiac Rhythm:     Was the PSS-3 completed:   Yes  What interventions are required if any?               Family Comments: none present; wife notified via telephone  OBS brochure/video discussed/provided to patient/family: N/A  For the majority of the shift this patient's behavior was Green.   Behavioral interventions performed were care and rounding.    ED NURSE PHONE NUMBER: *[phone]         "

## 2020-05-15 NOTE — PROGRESS NOTES
05/15/20 1513   Quick Adds   Type of Visit Initial PT Evaluation   Living Environment   Lives With child(ang), adult;spouse   Living Arrangements house   Home Accessibility no concerns   Transportation Anticipated family or friend will provide   Living Environment Comment Lives in multi-level home but has first floor set up with no stairs to enter.   Self-Care   Usual Activity Tolerance good   Current Activity Tolerance moderate   Regular Exercise Yes   Activity/Exercise Type strength training   Exercise Amount/Frequency 3-5 times/wk   Equipment Currently Used at Home walker, rolling   Activity/Exercise/Self-Care Comment Pt states he exercises using weights as often as he can, walks around his home, does not do any formal aerobic exercises.   Functional Level Prior   Ambulation 1-->assistive equipment   Transferring 1-->assistive equipment   Fall history within last six months yes   Number of times patient has fallen within last six months 3   Prior Functional Level Comment Pt uses FWW at home, has assist with bathing, home RN for meds and lymph wraps.   General Information   Onset of Illness/Injury or Date of Surgery - Date 05/14/20   Referring Physician Nikolay Stuart MD    Patient/Family Goals Statement To get better   Pertinent History of Current Problem (include personal factors and/or comorbidities that impact the POC) Pt is 89 year old male adm on 5/14/2020 after telehealth visit suggested pt go to ED for further management. Pt adm for IV diuresis. Pt with significant cardiac history and had a Watchman's procedure 4-5months ago.   Precautions/Limitations fall precautions   Cognitive Status Examination   Orientation orientation to person, place and time   Level of Consciousness alert   Cognitive Comment Pt pleasant and cooperative throughout.   Pain Assessment   Patient Currently in Pain No   Integumentary/Edema   Integumentary/Edema Comments Pt has home lymph wraps in room, LEs assessed but  "awaiting wound care consult prior to initiating lymphedema wrapping in hospital.   Range of Motion (ROM)   ROM Comment B LE ROM WFL   Strength   Strength Comments B LE strength WFL, no focal deficits noted   Bed Mobility   Bed Mobility Comments SBA   Transfer Skills   Transfer Comments SBA   Gait   Gait Comments SBA with FWW   Balance   Balance Comments No LOB with functional tasks   General Therapy Interventions   Planned Therapy Interventions manual therapy;balance training;strengthening;gait training   Clinical Impression   Criteria for Skilled Therapeutic Intervention yes, treatment indicated   PT Diagnosis Impaired gait   Influenced by the following impairments LE edema, general deconditioning, decreased activity tolerance   Functional limitations due to impairments Decreased ability to participate in daily tasks   Clinical Presentation Stable/Uncomplicated   Clinical Presentation Rationale Current presentation, PMH   Clinical Decision Making (Complexity) Low complexity   Therapy Frequency Other (see comments)  (assess for lymphedema needs after WOC consult completed)   Predicted Duration of Therapy Intervention (days/wks) 4 days   Anticipated Discharge Disposition Home with Assist   Risk & Benefits of therapy have been explained Yes   Patient, Family & other staff in agreement with plan of care Yes   Clinical Impression Comments Pt appears to be near baseline level of function, mobility needs will be met during hospital stay by OT/CR. Will further assess lymphedema needs once WOC consult completed.   [facility] [employer] TM \"6 Clicks\"   2016, Trustees of [facility], under license to [employer].  All rights reserved.   6 Clicks Short Forms Basic Mobility Inpatient Short Form   [facility] [employer]  \"6 Clicks\" V.2 Basic Mobility Inpatient Short Form   1. Turning from your back to your side while in a flat bed without using bedrails? 4 - None   2. Moving from lying on your back to sitting on the " side of a flat bed without using bedrails? 4 - None   3. Moving to and from a bed to a chair (including a wheelchair)? 4 - None   4. Standing up from a chair using your arms (e.g., wheelchair, or bedside chair)? 4 - None   5. To walk in hospital room? 3 - A Little   6. Climbing 3-5 steps with a railing? 3 - A Little   Basic Mobility Raw Score (Score out of 24.Lower scores equate to lower levels of function) 22   Total Evaluation Time   Total Evaluation Time (Minutes) 10

## 2020-05-15 NOTE — CONSULTS
[facility]    Cardiology Consultation     Date of Admission:  5/14/2020    Assessment & Plan   Bairon Foster is a 89 year old male who was admitted on 5/14/2020. I was asked to see the patient for CHF exacerbation.    1. Acute on chronic systolic and diastolic congestive heart failure exacerbation    CHELLE 01/2020 LVEF 45-50%, Severe MR and TR with mild MS    CXR no evidence of edema    NTproBNP 718    I/O -448     pounds, dry weight 198 pounds    IV bumex 0.5 mg/hr, stable renal function    Carvedilol 6.25 mg BID (PTA Bumex 4 mg daily)     2. Severe MR status post clip x2 and Severe TR    CHELLE 01/2020 shows severe 4+ MR despite MitraClip x2, severe posteriorly directed MR.  Mild MS, mean gradient 2.9 mmHg.    CHELLE 01/2020 shows Severe 4+ TR with RVSP 32.7 mmHg + RAP, mild PH (35-45 mmHg), RV function underestimated due to TR velocity    Diuresing with IV Bumex      3. Permanent atrial fibrillation with history of symptomatic bradycardia status post PPM    Rate controlled with Carvedilol, Watchman device     Battery end of like, gen change scheduled for Jumana 3     Follows Dr. Curiel outpatient    4. Coronary artery disease    Mild non obstructive CAD    Aspirin and Simvastatin 20 mg daily    5. Lymphedema     Bilateral lower extremity wraps     6. CKD stage 3    Creatinine 1.25, GFR 51, BUN 18    Creatinine baseline 1.1-1.2      History CVA    Status post Watchman 2019    7. Pancytopenia, secondary to hypocellular marrow    WBC 3.5, Hgb 8.7, Plt 93 (near baseline)    Follows Dr. Chavez as outpatient    Plan:  1. Add chlorthalidone 25 mg daily. Will up titrate as able   2. Continue IV bumex gtt  3. Echocardiogram today  4. Continue lymphedema wraps  5. 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    The patient was seen for a Virtual Cardiology Visit 05/14/2020 by ИВАН Reveles. During the visit, he noted worsening fluid retention and shortness of breath. There was concern for acute on chronic systolic and diastolic congestive heart failure.  He was felt that he is failing outpatient management and needed to come into the hospital for diuresis with IV diuretics.    On arrival to the ED, Labs were fairly unremarkable, creatinine was at baseline, he has chronic pancytopenia which also appears to be at baseline. BNP was within the normal range at 718.  Chest x-ray showed possible atelectasis at the left lung base but otherwise no acute cardiopulmonary disease.  EKG did not show any acute ischemic changes.  He was given a dose of IV Bumex and transitioned to Bumex gtt. He was admitted for further management of IV diuresis and CHF exacerbation.    Past Medical History   I have reviewed this patient's medical history and updated it with pertinent information if needed.   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     CKD (chronic kidney disease) stage 3, GFR 30-59 ml/min (H)      Coronary atherosclerosis of unspecified type of vessel, native or graft     S/P  PTCA, EF 50%     Epistaxis      Esophageal reflux      GI bleed 4/17/2019     Hypercholesterolemia      Ischemic cardiomyopathy      Lymphedema of both lower extremities     Wears ACE wraps     Mitral regurgitation     s/p MitraClip 5/20/19     Obese      JIM on CPAP      Other lymphedema     GROIN AND THIGHS     Other pancytopenia (H)      Other specified anemias      Overweight      Pacemaker     PACEMAKER-BS Implanted 8/5/2009      PAD (peripheral artery disease) (H)      Pancytopenia (H)      Persistent atrial fibrillation     VVI PM for long pauses     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    Closure with a 33 mm Watchman device      Pulmonary hypertension (H)      Status post coronary angiogram 3/21/2018     Systolic CHF (H)      TIA (transient ischemic attack)      Unspecified hypothyroidism      Venous stasis dermatitis of both lower extremities      Vitamin D deficiency        Past Surgical History   I have reviewed this patient's surgical history and updated it with pertinent information if needed.  Past Surgical History:   Procedure Laterality Date     BONE MARROW BIOPSY, BONE SPECIMEN, NEEDLE/TROCAR N/A 4/19/2019    Procedure: BONE MARROW BIOPSY;  Surgeon: Yovani Cooley MD;  Location:  OR     CV HEART CATHETERIZATION WITH POSSIBLE INTERVENTION N/A 4/12/2019    Procedure: Heart Catheterization with possible Intervention;  Surgeon: Lev Beck MD;  Location:  HEART CARDIAC CATH LAB     CV LEFT ATRIAL APPENDAGE CLOSURE N/A 12/3/2019    Procedure: Left Atrial Appendage Closure;  Surgeon: Segundo Hope MD;  Location:  HEART CARDIAC CATH LAB     CV MITRACLIP N/A 5/20/2019    Procedure: CV MITRAL CLIP;  Surgeon: Harshil Winter MD;  Location:  HEART CARDIAC CATH LAB     CV RIGHT HEART CATH N/A 4/12/2019    Procedure: Right Heart Cath;  Surgeon: Lev Beck MD;  Location:  HEART CARDIAC CATH LAB     EYE SURGERY       CATARACT/BLEPHAROPLASTY     GENITOURINARY SURGERY      TUNA     HERNIA REPAIR      RIGHT INGUINAL     HERNIORRHAPHY INGUINAL  2012    Procedure: HERNIORRHAPHY INGUINAL;  right inguinal hernia repair;  Surgeon: Kareem Torrez MD;  Location: [facility]     HERNIORRHAPHY UMBILICAL N/A 10/31/2017    Procedure: HERNIORRHAPHY UMBILICAL;  UMBILICAL HERNIA REPAIR WITH MESH;  Surgeon: Scar Harrington MD;  Location: [facility]     IMPLANT PACEMAKER  2009    single chamber     ORTHOPEDIC SURGERY      [facility] RIGHT     ORTHOPEDIC SURGERY      right TKR, left TKR     SURGICAL HISTORY OF -       Thumb surgery     SURGICAL HISTORY OF -       Left total knee replacement     SURGICAL HISTORY OF -   3/11    [facility] total knee replacement       Prior to Admission Medications   Prior to Admission Medications   Prescriptions Last Dose Informant Patient Reported? Taking?   ACE/ARB/ARNI NOT PRESCRIBED (INTENTIONAL)  Self No Yes   Sig: Please choose reason not prescribed, below   COMPRESSION STOCKINGS  Self Yes No   Si each daily   EPINEPHrine (EPIPEN/ADRENACLICK/OR ANY BX GENERIC EQUIV) 0.3 MG/0.3ML injection 2-pack More than a month at Unknown time Self Yes No   Sig: Inject 0.3 mg into the muscle as needed for anaphylaxis   Multiple Vitamins-Minerals (CENTRUM SILVER) per tablet 2020 at Unknown time Self Yes Yes   Sig: Take 1 tablet by mouth daily   acetaminophen (TYLENOL) 500 MG tablet 2020 at Unknown time Self No Yes   Sig: Take 2 tablets (1,000 mg) by mouth every 6 hours as needed for mild pain   aspirin 81 MG EC tablet 2020 at Unknown time Self No Yes   Sig: Take 1 tablet (81 mg) by mouth daily   buPROPion (WELLBUTRIN XL) 300 MG 24 hr tablet 2020 at Unknown time Self No Yes   Sig: Take 1 tablet (300 mg) by mouth every morning   bumetanide (BUMEX) 1 MG tablet 2020 at 2000 Self Yes Yes   Sig: Take 1 mg by mouth every evening    bumetanide (BUMEX) 1 MG tablet 2020 at AM Self Yes Yes   Sig: Take 2 mg by mouth  every morning   bumetanide (BUMEX) 1 MG tablet 5/14/2020 at Noon Self Yes Yes   Sig: Take 1 mg by mouth daily (with lunch) At Noon   carvedilol (COREG) 6.25 MG tablet 5/14/2020 at AM Self No Yes   Sig: TAKE 1 TABLET TWICE DAILY WITH MEALS   cholecalciferol (VITAMIN D3) 5000 units (125 mcg) capsule More than a month at Unknown time Self No No   Sig: Take 1 capsule (5,000 Units) by mouth daily   finasteride (PROSCAR) 5 MG tablet 5/14/2020 at Unknown time Self No Yes   Sig: TAKE 1 TABLET EVERY DAY   levothyroxine (SYNTHROID/LEVOTHROID) 100 MCG tablet 5/14/2020 at Unknown time Self No Yes   Sig: TAKE 1 TABLET EVERY DAY   omeprazole (PRILOSEC) 40 MG DR capsule 5/14/2020 at Unknown time Self No Yes   Sig: Take 1 capsule (40 mg) by mouth daily   polyethylene glycol (MIRALAX) packet 5/13/2020 at Unknown time Self Yes Yes   Sig: Take 1 packet by mouth daily   potassium chloride ER (K-DUR/KLOR-CON M) 20 MEQ CR tablet 5/14/2020 at x 2 doses Self No Yes   Sig: Take 1 tablet (20 mEq) by mouth 2 times daily   simvastatin (ZOCOR) 20 MG tablet 5/14/2020 at Unknown time Self Yes Yes   Sig: Take 20 mg by mouth daily    sucralfate (CARAFATE) 1 GM tablet 5/14/2020 at x 2 doses Self No Yes   Sig: Take 1 tablet (1 g) by mouth 2 times daily   tamsulosin (FLOMAX) 0.4 MG capsule 5/14/2020 at Unknown time Self No Yes   Sig: TAKE 1 CAPSULE EVERY DAY   triamcinolone (KENALOG) 0.025 % cream Past Month at Unknown time Self Yes Yes   Sig: Apply topically 2 times daily as needed       Facility-Administered Medications: None     Allergies   Allergies   Allergen Reactions     Furosemide Blisters     Bullous pemphigoid. Bumex and ethacrynic acid okay.     Torsemide Blisters     Bullous pemphigoid.  Bumex and ethacrynic acid okay.       Social History   I have reviewed this patient's social history and updated it with pertinent information if needed. Bairon DOWNS Cristian  reports that he has never smoked. He has never used smokeless tobacco. He reports  previous alcohol use. He reports that he does not use drugs.    Family History   I have reviewed this patient's family history and updated it with pertinent information if needed.   Family History   Problem Relation Age of Onset     Cardiovascular Father      C.A.D. Father      Lung Cancer Sister      Unknown/Adopted Mother      Cancer - colorectal No family hx of        Review of Systems   The 10 point Review of Systems is negative other than noted in the HPI or here.     Physical Exam   Temp: 98.2  F (36.8  C) Temp src: Oral BP: 107/62 Pulse: 71 Heart Rate: 74 Resp: 18 SpO2: 93 % O2 Device: None (Room air)    Vital Signs with Ranges  Temp:  [97.5  F (36.4  C)-98.3  F (36.8  C)] 98.2  F (36.8  C)  Pulse:  [63-80] 71  Heart Rate:  [66-74] 74  Resp:  [16-18] 18  BP: ()/(59-76) 107/62  SpO2:  [92 %-96 %] 93 %  215 lbs 0 oz      Constitutional:   NAD   Skin:   Warm and dry   Head:   Nontraumatic   Neck:   supple, symmetrical, trachea midline   Lungs:   symmetric, clear to auscultation   Cardiovascular:   regularly irregular rhythm, normal S1 and S2 and systolic murmur grade 3/6   Abdomen:   Benign   Extremities and Back:   Edema 3+   Neurological:   Grossly nonfocal       Data   Most Recent 3 CBC's:  Recent Labs   Lab Test 05/14/20 1848 03/11/20  0913 02/24/20   WBC 3.5* 3.3* 4.2   HGB 8.7* 8.4* 7.3*   MCV 98 102* 105.7*   PLT 93* 84* 105*     Most Recent 3 BMP's:  Recent Labs   Lab Test 05/14/20 1848 02/27/20 02/24/20    142 142   POTASSIUM 3.7 3.6 3.7   CHLORIDE 106 105 107   CO2 30 29 28   BUN 18 25 26   CR 1.25 1.17 1.15   ANIONGAP 5 8 7   TEODORO 9.1 8.8 8.8   GLC 96 91 88     Most Recent 3 BNP's:  Recent Labs   Lab Test 05/14/20 1848 04/08/19  1218 04/12/18  0919 01/08/18  1545   NTBNPI 718 578  --   --    NTBNP  --   --  357 356

## 2020-05-15 NOTE — PROGRESS NOTES
RECEIVING UNIT ED HANDOFF REVIEW    ED Nurse Handoff Report was reviewed by: Toña Mcdonald RN on May 14, 2020 at 9:25 PM

## 2020-05-15 NOTE — PROGRESS NOTES
[facility]  WO Nurse Inpatient Wound Assessment     Reason for consultation: Evaluate and treat  Rt dorsal foot and Rt 2nd toe    Assessment  Rt dorsal foot: Superficial rupt bulla secondary to edema in foot. No local s/s infection/  Rt 4nd toe knuckle: soft fluid filled blood blister. Pt states area from stubbing his toe. No local s/s infection.   No other open areas on BLE.  Leg with jordyn appearance. 3+ edema in RLE and foot > LLE and foot. Pt biggest complaint is scrotal edema. Pt wears Circaid wraps every day during the day except @ night. Uses Vaseline on his legs daily to keep his BLE skin soft and supple.     Treatment Plan  Wound care to BLE:  Daily  1. Clean legs with soap and water. Pat dry  2. Moisturize with Sween-24  3. Re-apply Circaid wraps in the am and remove before bed. Pt has his own wraps @ bedside  4  Rt dorsal foot and Rt 4th toe wound: change dressing on odd days and prn      -Clean gently with MicroKlenz      - Apply Mepilex border to dorsal foot. Discontinue when healed      - cover 4th toe blood blister wit 1/2 polymen  5. Heel suspension @ all times in bed  6. Mobilize when medically indicated      Orders  In epic  Recommended provider order: Pt biggest concern is his scrotal edema  WO Nurse follow-up plan: weekly and prn  Nursing to notify the Provider(s) and re-consult the [facility] Nurse if wound(s) deteriorates or new skin concern.    Patient History  According to provider note(s):      Objective Data    Active Diet Order:  Orders Placed This Encounter      2 Gram Sodium Diet No Caffeine for 24 hours (once tests completed, may have caffeine)    Output:   I/O last 3 completed shifts:  In: 453 [P.O.:450; I.V.:3]  Out: 875 [Urine:875]    Risk Assessment:   Sensory Perception: 4-->no impairment  Moisture: 4-->rarely moist  Activity: 3-->walks occasionally  Mobility: 3-->slightly limited  Nutrition: 3-->adequate  Friction and Shear: 3-->no apparent problem  Dave  Score: 20                          Labs:   Recent Labs   Lab 05/15/20  1200   ALBUMIN 2.9*   HGB 8.3*   WBC 3.1*       Physical Exam  Skin inspection:  BLE    Wound Location:  Rt dorsal foot and Rt 4th toe  Date of last photo:   5-15-20        Wound History:  ? Bullous pimphigoid  Measurements (length x width x depth, in cm):    -Rt dorsal foot:  1.5cm x 1.0cm x superficial with pink base   -Rt 4th toe knuckle: 1.0cm x 1.0cm x dark blood blister  Tunneling: N/A  Undermining: N/A  Palpation of the wound bed: normal   Periwound skin: intact  Color: jordyn but all BLE skin is intact   Temperature: normal   Drainage:scant sero-sang from blister  Odor: none  Pain: Denies    Interventions  Current support surface:  Current off-loading measures: pillows  Visual inspection of wound(s) completed  Wound Care: competed per POC  Supplies: In pt room. Pt has his own Circaid wraps @ bedside  Education provided: discussed PIP measures  Discussed plan of care with Patient    Fernanda Mary RN, CWOCN

## 2020-05-15 NOTE — PROGRESS NOTES
[facility]    Hospitalist Progress Note      Assessment & Plan   Bairon Foster is a 89 year old male who was admitted on 5/14/2020 with worsening lower extremity edema. After evaluation in the ER, there was concern for acute on chronic systolic and diastolic congestive heart failure. He was given IV bumex and was admitted to the hospitalist service for further evaluation and management.    Acute on chronic systolic and diastolic congestive heart failure  Severe MR s/p mitraclip  Severe TR  Dry weight is 198, on admit was 215. PTA bumex 4mg daily, coreg 6.25mg BID. Noted worsening edema, but not yet short of breath. Received IV bumex in the ER. He does not tolerate furosemide, torsemide, or spironolactone as they exacerbate his bullous pemphigoid. Echo 5/15 shows Ef 46%, massive biatrial enlargement. mitraclip and pacemaker leads visualized and dilated IVC.  - Start bumex gtt, 0.5mg/hr  - Monitor intake & output and daily weights.  - Cardiology consulted, plan to continue PTA coreg and bumex gtt for now  - Chlorthalidone 25mg daily added  - Consult lymphedema wrap  - CORE consult  - Repeat BMP 5/15 evening and again in AM--replete potassium as needed     Permanent atrial fibrillation  Did not tolerate anticoagulation due to frequent epistaxis and high fall risk. S/p Watchman device on 12/3/19. Permanent pacemaker in place. Due for generator change in June 2020.  - Continue PTA aspirin.  - Continue PTA carvedilol.     Coronary artery disease  - Continue PTA aspirin, carvedilol, and simvastatin.     Pancytopenia  - Secondary to hypocellular marrow.  - Counts are at about baseline (hgb 8.7, WBC 3.5, plt 93).  - Followed by Dr. Chavez as an outpatient.     Chronic kidney disease, stage 3  - Baseline creatinine is about 1.2.  - Creatinine 1.22 5/15 after bumex gtt initiated  - Recheck labs in AM and again evening of 5/15     Hypothyroidism  - Continue PTA levothyroxine.     BPH  - Continue PTA  finasteride and tamsulosin.     GERD  - Continue PTA omeprazole and sucralfate.     Bullous pemphigoid  - Continue Vanicream to bilateral lower extremities twice daily.  - Consult wound care nurse.  - [facility].     History of CVA  - Continue PTA aspirin and simvastatin.     Lymphedema  - Continue lower extremity wraps.  - Lymphedemia consult added    DVT Prophylaxis: Pneumatic Compression Devices  Code Status: Full Code  Expected discharge: 2 - 3 days, recommended to likely prior living arrangement pending transition to PO diuretics    Aggie Akhtar, DO  Text Page (7am - 6pm)    Interval History   Patient seen and examined. He feels his scrotal edema has improved since admission overnight. No chest pain, shortness of breath, abdominal pain, fevers, chills.     -Data reviewed today: I reviewed all new labs and imaging results over the last 24 hours. I personally reviewed no new imaging or EKGs    Physical Exam   Temp: 98.2  F (36.8  C) Temp src: Oral BP: 97/61 Pulse: 68 Heart Rate: 74 Resp: 16 SpO2: 93 % O2 Device: None (Room air)    Vitals:    05/14/20 1745 05/14/20 2217 05/15/20 0635   Weight: 90.7 kg (200 lb) 97.4 kg (214 lb 12.8 oz) 97.5 kg (215 lb)     Vital Signs with Ranges  Temp:  [97.5  F (36.4  C)-98.3  F (36.8  C)] 98.2  F (36.8  C)  Pulse:  [63-80] 68  Heart Rate:  [66-74] 74  Resp:  [16-18] 16  BP: ()/(59-76) 97/61  SpO2:  [92 %-96 %] 93 %  I/O last 3 completed shifts:  In: 53 [P.O.:50; I.V.:3]  Out: 575 [Urine:575]    Constitutional: Awake, alert, cooperative, no apparent distress  Respiratory: Clear to auscultation bilaterally, no crackles or wheezing  Cardiovascular: irregularly irregular, normal S1 and S2, and + systolic murmur  GI: Normal bowel sounds, soft, non-distended, non-tender  Skin/Integumen: No rashes, no cyanosis, +3 edema up to waist, including scrotum  Other:     Medications     bumetanide 0.5 mg/hr (05/15/20 0937)     -  MEDICATION INSTRUCTIONS -       ACE/ARB/ARNI NOT PRESCRIBED         aspirin  81 mg Oral Daily     buPROPion  300 mg Oral QAM     carvedilol  6.25 mg Oral BID w/meals     chlorthalidone  25 mg Oral Daily     emollient   Topical BID     finasteride  5 mg Oral Daily     levothyroxine  100 mcg Oral QAM AC     omeprazole  40 mg Oral Daily     polyethylene glycol  17 g Oral Daily     potassium chloride ER  20 mEq Oral BID     simvastatin  20 mg Oral Daily     sodium chloride (PF)  3 mL Intracatheter Q8H     sucralfate  1 g Oral BID     tamsulosin  0.4 mg Oral Daily       Data   Recent Labs   Lab 05/15/20  1200 20  1848   WBC 3.1* 3.5*   HGB 8.3* 8.7*   MCV 98 98   PLT 95* 93*    141   POTASSIUM 3.6 3.7   CHLORIDE 104 106   CO2 30 30   BUN 19 18   CR 1.22 1.25   ANIONGAP 5 5   TEODORO 8.9 9.1   * 96   ALBUMIN 2.9* 3.1*   PROTTOTAL  --  6.6*   BILITOTAL  --  1.2   ALKPHOS  --  85   ALT  --  13   AST  --  29       Recent Results (from the past 24 hour(s))   Chest XR,  PA & LAT    Narrative    CHEST TWO VIEWS  2020 7:50 PM     HISTORY: Leg swelling.    COMPARISON: 2019.      Impression    IMPRESSION: No significant interval change. Single-lead cardiac device  left chest wall, with lead tip projected over the RV. Cardiac  enlargement. Pulmonary vascularity is within normal limits. Hazy  opacity at the left lung base could be related to atelectasis or  pneumonia. No pleural effusions are identified.     RAZA MAGANA MD   Echocardiogram Complete    Narrative    [id number]  [id number]  [id number]  [id number]^KELBY^AMANDA^LON           [facility]  Echocardiography Laboratory  [address] [place] [id number]        Name: HIRO IBARRA  MRN: [id number]  : 1931  Study Date: 05/15/2020 09:37 AM  Age: 89 yrs  Gender: Male  Patient Location: [facility]  Reason For Study: CHF  Ordering Physician: AMANDA LAMA  Referring Physician: TORO STRAUSS  Performed By: Sparkle Bloom      BSA: 2.2 m2  Height: 74 in  Weight: 200 lb  HR: 86  BP: 107/62 mmHg  _____________________________________________________________________________  __        Procedure  Complete Portable Echo Adult.  _____________________________________________________________________________  __        Interpretation Summary     1. Status post transcatheter mitral valve repair using the MitraClip device x  2 on 5/20/2019.  2. Stable mitral clip position, residual mild mitral regurgitation (eccentric,  posteriorly directed). Mean diastolic gradient 4 mmHg consistent with mild  mitral stenosis (heart rate 76 bpm).  3. Normal left ventricular size with mildly decreased systolic function and  global hypokinesis. Biplane LVEF 46%.  4. Normal right ventricular systolic function.  5. Massive bi-atrial enlargement.  6. Pacemaker lead visualized in the right ventricle. Moderate tricuspid valve  regurgitation. The right ventricular systolic pressure is approximated at 27.0  mmHg plus the right atrial pressure.  7. 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     8. Status post left atrial appendage closure with a 33 mm Watchman device.  Device not well visualized.     _____________________________________________________________________________  __        Left Ventricle  The left ventricle is normal in size. There is mild concentric left  ventricular hypertrophy. Mildly decreased left ventricular systolic function.  LVEF 46% based on biplane 2D tracing. Diastolic function not assessed due to  presence of prosthetic mitral valve. There is mild global hypokinesia of the  left ventricle. Septal wall motion abnormality may reflect pacemaker  activation.     Right Ventricle  The right ventricle is grossly normal size. The right ventricular systolic  function is normal. There is a pacemaker lead in the right ventricle.     Atria  The left atrium is severely dilated. The right atrium is severely dilated.  There is  no color Doppler evidence of an atrial shunt. Status post left atrial  appendage closure with a 33 mm Watchman device on 12/3/2019. The left atrial  appendage is not well visualized.     Mitral Valve  There is mild (1+) mitral regurgitation. Mitral valve prosthesis. Stable  mitral clip position, residual mild mitral regurgitation (eccentric,  posteriorly directed). Mean diastolic gradient 4 mmHg consistent with mild  mitral stenosis (heart rate 76 bpm).        Tricuspid Valve  Normal tricuspid valve. There is moderate (2+) tricuspid regurgitation. The  right ventricular systolic pressure is approximated at 27.0 mmHg plus the  right atrial pressure.     Aortic Valve  The aortic valve is trileaflet with aortic valve sclerosis. There is trace  aortic regurgitation. No hemodynamically significant valvular aortic stenosis.     Pulmonic Valve  The pulmonic valve is not well seen, but is grossly normal. There is mild to  moderate (1-2+) pulmonic valvular regurgitation.     Vessels  The aortic root is normal size. The ascending aorta is Borderline dilated. The  inferior vena cava was dilated at 4.3 cm without respiratory variability,  consistent with increased right atrial pressure.     Pericardium  There is no pericardial effusion.        Rhythm  Sinus rhythm was noted.  _____________________________________________________________________________  __  MMode/2D Measurements & Calculations  IVSd: 1.2 cm     LVIDd: 5.8 cm  LVIDs: 3.7 cm  LVPWd: 1.1 cm  FS: 36.8 %  LV mass(C)d: 281.5 grams  LV mass(C)dI: 129.5 grams/m2  Ao root diam: 3.7 cm  LA dimension: 6.2 cm  asc Aorta Diam: 3.8 cm  LA/Ao: 1.7  LVOT diam: 2.4 cm  LVOT area: 4.6 cm2  LA Volume (BP): 214.0 ml  LA Volume Index (BP): 98.6 ml/m2  RWT: 0.37           Doppler Measurements & Calculations  MV E max jasiel: 161.7 cm/sec  MV max P.4 mmHg  MV mean P.1 mmHg  MV V2 VTI: 39.1 cm  MV P1/2t max jasiel: 193.7 cm/sec  MV P1/2t: 63.1 msec  MVA(P1/2t): 3.5 cm2  MV dec  slope: 898.9 cm/sec2  MV dec time: 0.18 sec  PA V2 max: 117.3 cm/sec  PA max P.5 mmHg  PA acc time: 0.11 sec  TR max jasiel: 258.7 cm/sec  TR max P.0 mmHg  E/E' av.6  Lateral E/e': 18.4  Medial E/e': 26.9           _____________________________________________________________________________  __           Report approved by: Dr Cierra Fay 05/15/2020 01:02 PM

## 2020-05-15 NOTE — CONSULTS
"Standard CHF consult received for 2gm Na diet education  Spoke with pt via phone this morning (dept policy during pandemic)  Pt tells me that he has been following a low Na diet at home  Wife does the cooking   Wife and grd-dtr grocery shop  Pt does not use a salt shaker  Eats mainly fresh foods  Breakfast - oatmeal, strawberries, English muffin  Lunch - salad or leftovers  Dinner - \"my son lives with us and he is a  - cooks dinner often\"  States that his family is aware that he needs to follow a low Na diet and they are supportive of this  Pt declined any diet review/handouts - \"my wife has a bookcase full of stuff\"  Encouraged pt to let RN know if he has questions or would like some written info  "

## 2020-05-15 NOTE — PLAN OF CARE
Discharge Planner PT   Patient plan for discharge: Home  Current status: Pt is 89 year old male adm on 5/14/2020 for further management of heart failure and LE swelling. At baseline, pt lives with family, has first floor set up, no stairs to enter home, ambulates with FWW. PT orders received, evaluation completed and treatment initiated. Pt is able to complete all mobility necessary for discharge to home at SBA to mod I level with FWW, able to ambulate household distances without difficulty. Will plan to follow up to complete formal lymphedema assessment after WOC consult as RN requesting to wait to apply lymphedema wraps  until after wound care assesses pt's LE wounds.  Barriers to return to prior living situation: None from a mobility standpoint  Recommendations for discharge: Home with family to assist with bathing and IADLs as they were doing prior to admission.  Rationale for recommendations: Pt appears to be near baseline level of mobility. Mobility needs will be met by OT/CR during hospital stay, will plan to follow up with pt about lymphedema assessment once WOC consult completed.       Entered by: Madisyn Vásquez 05/15/2020 3:57 PM

## 2020-05-15 NOTE — H&P
[facility]    History and Physical  Hospitalist       Date of Admission:  5/14/2020    Assessment & Plan   Bairon Foster is a 89 year old male with a history of systolic and diastolic congestive heart failure, severe MR, severe TR, permanent atrial fibrillation, coronary artery disease, pancytopenia, chronic kidney disease stage 3, hypothyroidism, BPH, GERD, bullous pemphigoid, CVA, and lymphedema who presented to the ER with worsening lower extremity edema. After evaluation in the ER, there was concern for acute on chronic systolic and diastolic congestive heart failure. He was given IV bumex and is being admitted to the hospitalist service for further evaluation and management.    Acute on chronic systolic and diastolic congestive heart failure  Severe MR s/p mitraclip  Severe TR  - Received IV bumex in the ER. Will continue IV bumex 2 mg twice daily for now.  - Monitor intake & output and daily weights.  - If he does not respond well to intermittent IV bumex, consider continuous bumex infusion.  - He does not tolerate furosemide, torsemide, or spironolactone as they exacerbate his bullous pemphigoid.  - TTE.  - Consult cardiology regarding further management, appreciate their assistance.    Permanent atrial fibrillation  - Did not tolerate anticoagulation due to frequent epistaxis and high fall risk.  - S/p Watchman device on 12/3/19.  - Continue PTA aspirin.  - Continue PTA carvedilol.  - Permanent pacemaker in place. Due for generator change in June 2020.    Coronary artery disease  - Continue PTA aspirin, carvedilol, and simvastatin.    Pancytopenia  - Secondary to hypocellular marrow.  - Counts are at about baseline (hgb 8.7, WBC 3.5, plt 93).  - Followed by Dr. Chavez as an outpatient.    Chronic kidney disease, stage 3  - Baseline creatinine is about 1.2.  - Creatinine 1.25 this morning, monitor as he is diuresed.  - Recheck labs in AM.    Hypothyroidism  - Continue PTA  levothyroxine.    BPH  - Continue PTA finasteride and tamsulosin.    GERD  - Continue PTA omeprazole and sucralfate.    Bullous pemphigoid  - Continue Vanicream to bilateral lower extremities twice daily.  - Consult wound care nurse.  - [facility].    History of CVA  - Continue PTA aspirin and simvastatin.    Lymphedema  - Continue lower extremity wraps.    DVT Prophylaxis: Pneumatic Compression Devices  Code Status: Full Code  Expected discharge: 3 - 4 days pending improvement in heart failure, cardiology evaluation, therapy evaluations    Nikolay Stuart MD    Primary Care Physician   Mg Nelson    Chief Complaint   Swelling in his lower extremities    History is obtained from the patient    History of Present Illness   Bairon Foster is a 89 year old male with a history of systolic and diastolic congestive heart failure, severe MR, severe TR, permanent atrial fibrillation, coronary artery disease, pancytopenia, chronic kidney disease stage 3, hypothyroidism, BPH, GERD, bullous pemphigoid, CVA, and lymphedema who presented to the ER with worsening lower extremity edema.  Over the last couple weeks, he is noticed worsening swelling in his lower extremities and up into his groin.  He feels like his weight is up about 8 pounds or so over the course the last few weeks.  He denies any specific chest pain or shortness of breath.  No orthopnea or PND.  He has increased his Bumex without any improvement.  He is developing blisters on the bottom of his feet and is having difficulty ambulating.  He also has significant scrotal edema and has difficulty urinating.  He had a virtual visit with the cardiology clinic today.  There was concern for acute on chronic systolic and diastolic congestive heart failure.  He was felt that he is failing outpatient management and needed to come into the hospital for diuresis with IV diuretics.    In the ER, he was evaluated by  Dr. Gregory Rivera.  Labs were fairly unremarkable, creatinine was at baseline, he has chronic pancytopenia which also appears to be at baseline.  BNP was within the normal range at 718.  Chest x-ray showed possible atelectasis at the left lung base but otherwise no acute cardiopulmonary disease.  EKG did not show any acute ischemic changes.  He was given a dose of IV Bumex in the ER and the hospitalist service was contacted to admit him for further evaluation and management.    He denies any recent dietary indiscretions.  His Bumex dose has been increased recently and he has not missed any doses.  He has been taking his other medications as prescribed as well.  He denies any recent fevers, chills, or sweats.  No cough, sore throat, runny nose, or earaches.  No abdominal pain, nausea, diarrhea, or urinary symptoms.    Past Medical History    I have reviewed this patient's medical history and updated it with pertinent information if needed.   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     CKD (chronic kidney disease) stage 3, GFR 30-59 ml/min (H)      Coronary atherosclerosis of unspecified type of vessel, native or graft     S/P PTCA, EF 50%     Epistaxis      Esophageal reflux      GI bleed 4/17/2019     Hypercholesterolemia      Ischemic cardiomyopathy      Lymphedema of both lower extremities     Wears ACE wraps     Mitral regurgitation     s/p MitraClip 5/20/19     Obese      JIM on CPAP      Other lymphedema     GROIN AND THIGHS     Other pancytopenia (H)      Other specified anemias      Overweight      Pacemaker     PACEMAKER-BS Implanted 8/5/2009      PAD (peripheral artery disease) (H)      Pancytopenia (H)      Persistent atrial fibrillation     VVI PM for long pauses     Presence of  Watchman left atrial appendage closure device 12/03/2019    Closure with a 33 mm Watchman device      Pulmonary hypertension (H)      Status post coronary angiogram 3/21/2018     Systolic CHF (H)      TIA (transient ischemic attack)      Unspecified hypothyroidism      Venous stasis dermatitis of both lower extremities      Vitamin D deficiency      Past Surgical History   I have reviewed this patient's surgical history and updated it with pertinent information if needed.  Past Surgical History:   Procedure Laterality Date     BONE MARROW BIOPSY, BONE SPECIMEN, NEEDLE/TROCAR N/A 4/19/2019    Procedure: BONE MARROW BIOPSY;  Surgeon: Yovani Cooley MD;  Location:  OR     CV HEART CATHETERIZATION WITH POSSIBLE INTERVENTION N/A 4/12/2019    Procedure: Heart Catheterization with possible Intervention;  Surgeon: Lev Beck MD;  Location:  HEART CARDIAC CATH LAB     CV LEFT ATRIAL APPENDAGE CLOSURE N/A 12/3/2019    Procedure: Left Atrial Appendage Closure;  Surgeon: Segundo Hope MD;  Location:  HEART CARDIAC CATH LAB     CV MITRACLIP N/A 5/20/2019    Procedure: CV MITRAL CLIP;  Surgeon: Harshil Winter MD;  Location:  HEART CARDIAC CATH LAB     CV RIGHT HEART CATH N/A 4/12/2019    Procedure: Right Heart Cath;  Surgeon: Lev Beck MD;  Location:  HEART CARDIAC CATH LAB     EYE SURGERY      CATARACT/BLEPHAROPLASTY     GENITOURINARY SURGERY      TUNA     HERNIA REPAIR      RIGHT INGUINAL     HERNIORRHAPHY INGUINAL  8/14/2012    Procedure: HERNIORRHAPHY INGUINAL;  right inguinal hernia repair;  Surgeon: Kareem Torrez MD;  Location: [facility]     HERNIORRHAPHY UMBILICAL N/A 10/31/2017    Procedure: HERNIORRHAPHY UMBILICAL;  UMBILICAL HERNIA REPAIR WITH MESH;  Surgeon: Scar Harrington MD;  Location: [facility]     IMPLANT PACEMAKER  8/2009    single chamber     ORTHOPEDIC SURGERY      [facility] RIGHT     ORTHOPEDIC SURGERY  2010    right TKR, left TKR     SURGICAL HISTORY OF -       Thumb  surgery     SURGICAL HISTORY OF -       Left total knee replacement     SURGICAL HISTORY OF -   3/11    [facility] total knee replacement     Prior to Admission Medications   Prior to Admission Medications   Prescriptions Last Dose Informant Patient Reported? Taking?   ACE/ARB/ARNI NOT PRESCRIBED (INTENTIONAL)  Self No Yes   Sig: Please choose reason not prescribed, below   COMPRESSION STOCKINGS  Self Yes No   Si each daily   EPINEPHrine (EPIPEN/ADRENACLICK/OR ANY BX GENERIC EQUIV) 0.3 MG/0.3ML injection 2-pack More than a month at Unknown time Self Yes No   Sig: Inject 0.3 mg into the muscle as needed for anaphylaxis   Multiple Vitamins-Minerals (CENTRUM SILVER) per tablet 2020 at Unknown time Self Yes Yes   Sig: Take 1 tablet by mouth daily   acetaminophen (TYLENOL) 500 MG tablet 2020 at Unknown time Self No Yes   Sig: Take 2 tablets (1,000 mg) by mouth every 6 hours as needed for mild pain   aspirin 81 MG EC tablet 2020 at Unknown time Self No Yes   Sig: Take 1 tablet (81 mg) by mouth daily   buPROPion (WELLBUTRIN XL) 300 MG 24 hr tablet 2020 at Unknown time Self No Yes   Sig: Take 1 tablet (300 mg) by mouth every morning   bumetanide (BUMEX) 1 MG tablet 2020 at 2000 Self Yes Yes   Sig: Take 1 mg by mouth every evening    bumetanide (BUMEX) 1 MG tablet 2020 at AM Self Yes Yes   Sig: Take 2 mg by mouth every morning   bumetanide (BUMEX) 1 MG tablet 2020 at Noon Self Yes Yes   Sig: Take 1 mg by mouth daily (with lunch) At Noon   carvedilol (COREG) 6.25 MG tablet 2020 at AM Self No Yes   Sig: TAKE 1 TABLET TWICE DAILY WITH MEALS   cholecalciferol (VITAMIN D3) 5000 units (125 mcg) capsule More than a month at Unknown time Self No No   Sig: Take 1 capsule (5,000 Units) by mouth daily   finasteride (PROSCAR) 5 MG tablet 2020 at Unknown time Self No Yes   Sig: TAKE 1 TABLET EVERY DAY   levothyroxine (SYNTHROID/LEVOTHROID) 100 MCG tablet 2020 at Unknown time Self No  Yes   Sig: TAKE 1 TABLET EVERY DAY   omeprazole (PRILOSEC) 40 MG DR capsule 5/14/2020 at Unknown time Self No Yes   Sig: Take 1 capsule (40 mg) by mouth daily   polyethylene glycol (MIRALAX) packet 5/13/2020 at Unknown time Self Yes Yes   Sig: Take 1 packet by mouth daily   potassium chloride ER (K-DUR/KLOR-CON M) 20 MEQ CR tablet 5/14/2020 at x 2 doses Self No Yes   Sig: Take 1 tablet (20 mEq) by mouth 2 times daily   simvastatin (ZOCOR) 20 MG tablet 5/14/2020 at Unknown time Self Yes Yes   Sig: Take 20 mg by mouth daily    sucralfate (CARAFATE) 1 GM tablet 5/14/2020 at x 2 doses Self No Yes   Sig: Take 1 tablet (1 g) by mouth 2 times daily   tamsulosin (FLOMAX) 0.4 MG capsule 5/14/2020 at Unknown time Self No Yes   Sig: TAKE 1 CAPSULE EVERY DAY   triamcinolone (KENALOG) 0.025 % cream Past Month at Unknown time Self Yes Yes   Sig: Apply topically 2 times daily as needed       Facility-Administered Medications: None     Allergies   Allergies   Allergen Reactions     Furosemide Blisters     Bullous pemphigoid. Bumex and ethacrynic acid okay.     Torsemide Blisters     Bullous pemphigoid.  Bumex and ethacrynic acid okay.     Social History   I have reviewed this patient's social history and updated it with pertinent information if needed. Bairon Foster  reports that he has never smoked. He has never used smokeless tobacco. He reports previous alcohol use. He reports that he does not use drugs.    Family History   I have reviewed this patient's family history and updated it with pertinent information if needed.   Family History   Problem Relation Age of Onset     Cardiovascular Father      C.A.D. Father      Lung Cancer Sister      Unknown/Adopted Mother      Cancer - colorectal No family hx of      Review of Systems   The 10 point Review of Systems is negative other than noted in the HPI or here.    Physical Exam   Temp: 97.5  F (36.4  C) Temp src: Temporal BP: 117/68 Pulse: 79 Heart Rate: 66 Resp: 18 SpO2: 94 % O2  Device: None (Room air)    Vital Signs with Ranges  Temp:  [97.5  F (36.4  C)] 97.5  F (36.4  C)  Pulse:  [63-79] 79  Heart Rate:  [66] 66  Resp:  [16-18] 18  BP: (105-121)/(60-76) 117/68  SpO2:  [92 %-96 %] 94 %  200 lbs 0 oz    Constitutional: awake, alert, cooperative, no apparent distress  Eyes: pupils equal, round and reactive to light, conjunctiva normal  ENT: oral pharynx with moist mucous membranes  Respiratory: clear to auscultation bilaterally, no crackles or wheezing  Cardiovascular: regular rate, irregular rhythm, normal S1 and S2, systolic murmur noted  GI: No scars, normal bowel sounds, soft, non-distended, non-tender, no masses palpated, no hepatosplenomegally  Skin: warm, dry  Musculoskeletal: no lower extremity pitting edema present  Neurologic: awake, alert, oriented to name, place and time, forgetful. Motor is 5 out of 5 bilaterally. Sensory is intact.    Data   Data reviewed today:  I personally reviewed the EKG tracing showing atrial fibrillation and the chest x-ray image(s) showing suspected atelectasis at the left base.    Recent Labs   Lab 05/14/20  1848   WBC 3.5*   HGB 8.7*   MCV 98   PLT 93*      POTASSIUM 3.7   CHLORIDE 106   CO2 30   BUN 18   CR 1.25   ANIONGAP 5   TEODORO 9.1   GLC 96   ALBUMIN 3.1*   PROTTOTAL 6.6*   BILITOTAL 1.2   ALKPHOS 85   ALT 13   AST 29     Recent Results (from the past 24 hour(s))   Chest XR,  PA & LAT    Narrative    CHEST TWO VIEWS  5/14/2020 7:50 PM     HISTORY: Leg swelling.    COMPARISON: 5/21/2019.      Impression    IMPRESSION: No significant interval change. Single-lead cardiac device  left chest wall, with lead tip projected over the RV. Cardiac  enlargement. Pulmonary vascularity is within normal limits. Hazy  opacity at the left lung base could be related to atelectasis or  pneumonia. No pleural effusions are identified.     RAZA MAGANA MD

## 2020-05-15 NOTE — PLAN OF CARE
Discharge Planner OT   Patient plan for discharge: home w/ wife and son/lulú in law  Current status: OT eval/tx initiated. At baseline, pt is mod I w/ mobility and all ADL's using 2WW except bathing, which his son supervises for safety. Pt has A from family and  RN for all IADL's.     Pt alert and oriented, cognition appears WFL for age.   Currently, pt SBA for bed mobility, SBA-CGA for sit>stand tx, and SBA for functional mobility w/in room and hallway using 2WW (x ~100 ft), no LOB and min VC's for posture and walker mgmt. Pt spv-SBA for standing toileting task. Did need min A to don socks and to don undergarments over feet w/ SBA for clothing mgmt in stance, no LOB throughout. VSS on RA.  Barriers to return to prior living situation: none anticipated   Recommendations for discharge: home w/ A from family w/ LB dressing prn, continued A for bathing and IADL's  Rationale for recommendations: Pt appears to have a very supportive family, pt states he always has someone home with him and states his family can assist whenever needed. Anticipate pt will safely return home when medically able to discharge.        Entered by: Ayah Whitehead 05/15/2020 12:18 PM

## 2020-05-15 NOTE — PHARMACY-ADMISSION MEDICATION HISTORY
Pharmacy Medication History  Admission medication history interview status for the 5/14/2020  admission is complete. See EPIC admission navigator for prior to admission medications     Medication history sources: Patient  Medication history source reliability: Good  Adherence assessment: Good    Additional medication history information:   Bumex dose is 2 mg in AM, 1 mg at Noon and 1 mg in evening.     Medication reconciliation completed by provider prior to medication history? No    Time spent in this activity: 25 minutes      Prior to Admission medications    Medication Sig Last Dose Taking? Auth Provider   ACE/ARB/ARNI NOT PRESCRIBED (INTENTIONAL) Please choose reason not prescribed, below  Yes Mg Nelson MD   acetaminophen (TYLENOL) 500 MG tablet Take 2 tablets (1,000 mg) by mouth every 6 hours as needed for mild pain 5/14/2020 at Unknown time Yes Mg Nelson MD   aspirin 81 MG EC tablet Take 1 tablet (81 mg) by mouth daily 5/14/2020 at Unknown time Yes Mg Nelson MD   bumetanide (BUMEX) 1 MG tablet Take 2 mg by mouth every morning 5/14/2020 at AM Yes Unknown, Entered By History   bumetanide (BUMEX) 1 MG tablet Take 1 mg by mouth daily (with lunch) At Noon 5/14/2020 at Noon Yes Unknown, Entered By History   bumetanide (BUMEX) 1 MG tablet Take 1 mg by mouth every evening  5/13/2020 at 2000 Yes Reported, Patient   buPROPion (WELLBUTRIN XL) 300 MG 24 hr tablet Take 1 tablet (300 mg) by mouth every morning 5/14/2020 at Unknown time Yes Mg Nelson MD   carvedilol (COREG) 6.25 MG tablet TAKE 1 TABLET TWICE DAILY WITH MEALS 5/14/2020 at AM Yes Mg Nelson MD   finasteride (PROSCAR) 5 MG tablet TAKE 1 TABLET EVERY DAY 5/14/2020 at Unknown time Yes Mg Nelson MD   levothyroxine (SYNTHROID/LEVOTHROID) 100 MCG tablet TAKE 1 TABLET EVERY DAY 5/14/2020 at Unknown time Yes Mg Nelson MD   Multiple Vitamins-Minerals (CENTRUM SILVER) per  tablet Take 1 tablet by mouth daily 5/14/2020 at Unknown time Yes Reported, Patient   omeprazole (PRILOSEC) 40 MG DR capsule Take 1 capsule (40 mg) by mouth daily 5/14/2020 at Unknown time Yes Mg Nelson MD   polyethylene glycol (MIRALAX) packet Take 1 packet by mouth daily 5/13/2020 at Unknown time Yes Reported, Patient   potassium chloride ER (K-DUR/KLOR-CON M) 20 MEQ CR tablet Take 1 tablet (20 mEq) by mouth 2 times daily 5/14/2020 at x 2 doses Yes Mackenzie Tejada APRN CNP   simvastatin (ZOCOR) 20 MG tablet Take 20 mg by mouth daily  5/14/2020 at Unknown time Yes Reported, Patient   sucralfate (CARAFATE) 1 GM tablet Take 1 tablet (1 g) by mouth 2 times daily 5/14/2020 at x 2 doses Yes Mg Nelson MD   tamsulosin (FLOMAX) 0.4 MG capsule TAKE 1 CAPSULE EVERY DAY 5/14/2020 at Unknown time Yes Mg Nelson MD   triamcinolone (KENALOG) 0.025 % cream Apply topically 2 times daily as needed  Past Month at Unknown time Yes Reported, Patient   cholecalciferol (VITAMIN D3) 5000 units (125 mcg) capsule Take 1 capsule (5,000 Units) by mouth daily More than a month at Unknown time  Mg Nelson MD   COMPRESSION STOCKINGS 1 each daily   London Carrizales MD   EPINEPHrine (EPIPEN/ADRENACLICK/OR ANY BX GENERIC EQUIV) 0.3 MG/0.3ML injection 2-pack Inject 0.3 mg into the muscle as needed for anaphylaxis More than a month at Unknown time  Reported, Patient       Nayeli Parra, YayoD, BCPS

## 2020-05-15 NOTE — CONSULTS
Care Transition Initial Assessment -      Met with: Patient    Active Problems:    Acute combined systolic and diastolic congestive heart failure (H)       DATA  Lives With: spouse   Living Arrangements: house  Quality of Family Relationships: involved, supportive  Description of Support System: Supportive, Involved  Who is your support system?: Wife, Children  Support Assessment: Adequate family and caregiver support.   Identified issues/concerns regarding health management:    Quality of Family Relationships: involved, supportive  Transportation Anticipated: family or friend will provide    Per social work consult for discharge planning.  Patient was admitted on 5-14-20 with CHF exacerbation.  The tentative date of discharge is yet to be determined.  Reviewed chart.  Patient lives with his wife and children in a house  Once inside patient can remain on the main floor.  Patient uses a two wheeled walker at baseline.  Patient has grab bars and a shower chair in the bathroom.  Patient exercises 2-3 times a week.  Patient is independent with all ADL's with the exception of bathing.  Patient does the cooking and laundry.  Patient is open to homecare services for nursing.  Reviewed the therapy discharge recommendations of returning home upon discharge with a resumption of homecare on discharge and patient is in agreement.  Will follow up with patient the closer we get to discharge to discuss discharge planning further.    ASSESSMENT  Cognitive Status:  awake and alert  Concerns to be addressed: discharge planning, homecare on discharge.     PLAN  Financial costs for the patient includes N/A.  Patient given options and choices for discharge N/A.  Patient/family is agreeable to the plan?  Yes  Transportation/person available to transport on day of discharge  is TBD and have they been notified/set up TBD  Patient Goals and Preferences: return home with a resumption of homecare on discharge.  Patient anticipates  discharging to:  Home.    Will continue to follow and assist with a safe discharge plan.      LOVELY Maher, [facility]  Lead   [phone]  [facility]

## 2020-05-16 ENCOUNTER — APPOINTMENT (OUTPATIENT)
Dept: OCCUPATIONAL THERAPY | Facility: CLINIC | Age: 85
DRG: 292 | End: 2020-05-16
Payer: COMMERCIAL

## 2020-05-16 LAB
ANION GAP SERPL CALCULATED.3IONS-SCNC: 7 MMOL/L (ref 3–14)
BUN SERPL-MCNC: 20 MG/DL (ref 7–30)
CALCIUM SERPL-MCNC: 8.8 MG/DL (ref 8.5–10.1)
CHLORIDE SERPL-SCNC: 104 MMOL/L (ref 94–109)
CO2 SERPL-SCNC: 30 MMOL/L (ref 20–32)
CREAT SERPL-MCNC: 1.29 MG/DL (ref 0.66–1.25)
ERYTHROCYTE [DISTWIDTH] IN BLOOD BY AUTOMATED COUNT: 17.4 % (ref 10–15)
GFR SERPL CREATININE-BSD FRML MDRD: 49 ML/MIN/{1.73_M2}
GLUCOSE SERPL-MCNC: 93 MG/DL (ref 70–99)
HCT VFR BLD AUTO: 25.3 % (ref 40–53)
HGB BLD-MCNC: 7.9 G/DL (ref 13.3–17.7)
MAGNESIUM SERPL-MCNC: 2 MG/DL (ref 1.6–2.3)
MCH RBC QN AUTO: 30.3 PG (ref 26.5–33)
MCHC RBC AUTO-ENTMCNC: 31.2 G/DL (ref 31.5–36.5)
MCV RBC AUTO: 97 FL (ref 78–100)
PLATELET # BLD AUTO: 88 10E9/L (ref 150–450)
POTASSIUM SERPL-SCNC: 3.1 MMOL/L (ref 3.4–5.3)
POTASSIUM SERPL-SCNC: 3.2 MMOL/L (ref 3.4–5.3)
POTASSIUM SERPL-SCNC: 3.3 MMOL/L (ref 3.4–5.3)
RBC # BLD AUTO: 2.61 10E12/L (ref 4.4–5.9)
SODIUM SERPL-SCNC: 141 MMOL/L (ref 133–144)
WBC # BLD AUTO: 2.9 10E9/L (ref 4–11)

## 2020-05-16 PROCEDURE — 40000893 ZZH STATISTIC PT IP EVAL DEFER

## 2020-05-16 PROCEDURE — 99232 SBSQ HOSP IP/OBS MODERATE 35: CPT | Performed by: HOSPITALIST

## 2020-05-16 PROCEDURE — 97110 THERAPEUTIC EXERCISES: CPT | Mod: GO

## 2020-05-16 PROCEDURE — 83735 ASSAY OF MAGNESIUM: CPT | Performed by: HOSPITALIST

## 2020-05-16 PROCEDURE — 21000001 ZZH R&B HEART CARE

## 2020-05-16 PROCEDURE — 80048 BASIC METABOLIC PNL TOTAL CA: CPT | Performed by: HOSPITALIST

## 2020-05-16 PROCEDURE — 25000132 ZZH RX MED GY IP 250 OP 250 PS 637: Performed by: HOSPITALIST

## 2020-05-16 PROCEDURE — 97535 SELF CARE MNGMENT TRAINING: CPT | Mod: GO

## 2020-05-16 PROCEDURE — 25000125 ZZHC RX 250: Performed by: HOSPITALIST

## 2020-05-16 PROCEDURE — 85027 COMPLETE CBC AUTOMATED: CPT | Performed by: HOSPITALIST

## 2020-05-16 PROCEDURE — 25000132 ZZH RX MED GY IP 250 OP 250 PS 637: Performed by: NURSE PRACTITIONER

## 2020-05-16 PROCEDURE — 99232 SBSQ HOSP IP/OBS MODERATE 35: CPT | Performed by: INTERNAL MEDICINE

## 2020-05-16 PROCEDURE — 36415 COLL VENOUS BLD VENIPUNCTURE: CPT | Performed by: HOSPITALIST

## 2020-05-16 PROCEDURE — 84132 ASSAY OF SERUM POTASSIUM: CPT | Performed by: HOSPITALIST

## 2020-05-16 RX ADMIN — FINASTERIDE 5 MG: 5 TABLET, FILM COATED ORAL at 09:04

## 2020-05-16 RX ADMIN — CARVEDILOL 6.25 MG: 6.25 TABLET, FILM COATED ORAL at 17:53

## 2020-05-16 RX ADMIN — POTASSIUM CHLORIDE 40 MEQ: 1500 TABLET, EXTENDED RELEASE ORAL at 14:53

## 2020-05-16 RX ADMIN — BUPROPION HYDROCHLORIDE 300 MG: 150 TABLET, FILM COATED, EXTENDED RELEASE ORAL at 09:04

## 2020-05-16 RX ADMIN — POTASSIUM CHLORIDE 20 MEQ: 1500 TABLET, EXTENDED RELEASE ORAL at 20:39

## 2020-05-16 RX ADMIN — TAMSULOSIN HYDROCHLORIDE 0.4 MG: 0.4 CAPSULE ORAL at 09:04

## 2020-05-16 RX ADMIN — LEVOTHYROXINE SODIUM 100 MCG: 100 TABLET ORAL at 06:43

## 2020-05-16 RX ADMIN — BUMETANIDE 0.5 MG/HR: 0.25 INJECTION INTRAMUSCULAR; INTRAVENOUS at 06:22

## 2020-05-16 RX ADMIN — POTASSIUM CHLORIDE 20 MEQ: 1500 TABLET, EXTENDED RELEASE ORAL at 09:04

## 2020-05-16 RX ADMIN — POTASSIUM CHLORIDE 40 MEQ: 1500 TABLET, EXTENDED RELEASE ORAL at 22:33

## 2020-05-16 RX ADMIN — POTASSIUM CHLORIDE 40 MEQ: 1500 TABLET, EXTENDED RELEASE ORAL at 06:43

## 2020-05-16 RX ADMIN — POTASSIUM CHLORIDE 20 MEQ: 1500 TABLET, EXTENDED RELEASE ORAL at 17:04

## 2020-05-16 RX ADMIN — ASPIRIN 81 MG: 81 TABLET, DELAYED RELEASE ORAL at 09:04

## 2020-05-16 RX ADMIN — CARVEDILOL 6.25 MG: 6.25 TABLET, FILM COATED ORAL at 09:04

## 2020-05-16 RX ADMIN — SUCRALFATE 1 G: 1 TABLET ORAL at 09:04

## 2020-05-16 RX ADMIN — BUMETANIDE 0.5 MG/HR: 0.25 INJECTION INTRAMUSCULAR; INTRAVENOUS at 22:33

## 2020-05-16 RX ADMIN — OMEPRAZOLE 40 MG: 20 CAPSULE, DELAYED RELEASE ORAL at 09:04

## 2020-05-16 RX ADMIN — CHLORTHALIDONE 25 MG: 25 TABLET ORAL at 09:04

## 2020-05-16 RX ADMIN — SUCRALFATE 1 G: 1 TABLET ORAL at 20:39

## 2020-05-16 RX ADMIN — SIMVASTATIN 20 MG: 20 TABLET, FILM COATED ORAL at 09:04

## 2020-05-16 ASSESSMENT — ACTIVITIES OF DAILY LIVING (ADL)
ADLS_ACUITY_SCORE: 18

## 2020-05-16 ASSESSMENT — MIFFLIN-ST. JEOR: SCORE: 1687.3

## 2020-05-16 NOTE — PROGRESS NOTES
[facility]    Hospitalist Progress Note      Assessment & Plan   Bairon Foster is a 89 year old male who was admitted on 5/14/2020 with worsening lower extremity edema. After evaluation in the ER, there was concern for acute on chronic systolic and diastolic congestive heart failure. He was given IV bumex and was admitted to the hospitalist service for further evaluation and management.    Acute on chronic systolic and diastolic congestive heart failure  Severe MR s/p mitraclip  Severe TR  Dry weight is 198, on admit was 215. PTA bumex 4mg daily, coreg 6.25mg BID. Noted worsening edema, but not yet short of breath. Received IV bumex in the ER. He does not tolerate furosemide, torsemide, or spironolactone as they exacerbate his bullous pemphigoid. Echo 5/15 shows EF 46%, massive biatrial enlargement. mitraclip and pacemaker leads visualized and dilated IVC.  - Bumex gtt, 0.5mg/hr--further adjustment to diuretics per cardiology  - Monitor intake & output and daily weights (weight down to 210 5/16)  - Cardiology consulted  - Continue PTA coreg  - Chlorthalidone 25mg daily added  - Consult lymphedema wrap  - CORE consult    Hypokalemia  Secondary to diuresis  - Replete per protocol    Permanent atrial fibrillation  Did not tolerate anticoagulation due to frequent epistaxis and high fall risk. S/p Watchman device on 12/3/19. Permanent pacemaker in place. Due for generator change in June 2020.  - Continue PTA aspirin.  - Continue PTA carvedilol.     Coronary artery disease  - Continue PTA aspirin, carvedilol, and simvastatin.     Pancytopenia  - Secondary to hypocellular marrow.  - Counts are at about baseline (hgb 8.7, WBC 3.5, plt 93).  - Followed by Dr. Chavez as an outpatient.     Chronic kidney disease, stage 3  - Baseline creatinine is about 1.2.  - Creatinine 1.22 5/15 after bumex gtt initiated  - Recheck labs in AM and again evening of 5/15     Hypothyroidism  - Continue PTA  levothyroxine.     BPH  - Continue PTA finasteride and tamsulosin.     GERD  - Continue PTA omeprazole and sucralfate.     Bullous pemphigoid  - Continue Vanicream to bilateral lower extremities twice daily.  - Consult wound care nurse.  - [facility].     History of CVA  - Continue PTA aspirin and simvastatin.     Lymphedema  - Continue lower extremity wraps.  - Lymphedemia consult added    DVT Prophylaxis: Pneumatic Compression Devices  Code Status: Full Code  Expected discharge: 2 - 3 days, recommended to prior living arrangement pending transition to PO diuretics    Aggie Akhtar, DO  Text Page (7am - 6pm)    Interval History   Patient seen and examined. He feels his scrotal edema has improved still, feels like his legs are still swollen--he was hoping for faster improvement in his swelling. No chest pain, shortness of breath, abdominal pain, fevers, chills.     -Data reviewed today: I reviewed all new labs and imaging results over the last 24 hours. I personally reviewed no new imaging or EKGs    Physical Exam   Temp: 97.7  F (36.5  C) Temp src: Oral BP: 106/61 Pulse: 72 Heart Rate: 64 Resp: 16 SpO2: 92 % O2 Device: None (Room air)    Vitals:    05/14/20 2217 05/15/20 0635 05/16/20 0159   Weight: 97.4 kg (214 lb 12.8 oz) 97.5 kg (215 lb) 95.3 kg (210 lb)     Vital Signs with Ranges  Temp:  [97.7  F (36.5  C)-98.2  F (36.8  C)] 97.7  F (36.5  C)  Pulse:  [72-73] 72  Heart Rate:  [63-69] 64  Resp:  [16] 16  BP: ()/(48-62) 106/61  SpO2:  [92 %-97 %] 92 %  I/O last 3 completed shifts:  In: 1436 [P.O.:1420; I.V.:16]  Out: 2425 [Urine:2425]    Constitutional: Awake, alert, cooperative, no apparent distress  Respiratory: Clear to auscultation bilaterally, no crackles or wheezing  Cardiovascular: irregularly irregular, normal S1 and S2, and + systolic murmur  GI: Normal bowel sounds, soft, non-distended, non-tender  Skin/Integumen: No rashes, no cyanosis, +3 edema  up to waist, mildly improved from 5/15  Other:     Medications     bumetanide 0.5 mg/hr (05/16/20 0902)     - MEDICATION INSTRUCTIONS -       ACE/ARB/ARNI NOT PRESCRIBED         aspirin  81 mg Oral Daily     buPROPion  300 mg Oral QAM     carvedilol  6.25 mg Oral BID w/meals     chlorthalidone  25 mg Oral Daily     emollient   Topical BID     finasteride  5 mg Oral Daily     levothyroxine  100 mcg Oral QAM AC     omeprazole  40 mg Oral Daily     polyethylene glycol  17 g Oral Daily     potassium chloride ER  20 mEq Oral BID     simvastatin  20 mg Oral Daily     sodium chloride (PF)  3 mL Intracatheter Q8H     sucralfate  1 g Oral BID     tamsulosin  0.4 mg Oral Daily       Data   Recent Labs   Lab 05/16/20  1301 05/16/20  0551 05/15/20  2022 05/15/20  1200 05/14/20  1848   WBC  --  2.9*  --  3.1* 3.5*   HGB  --  7.9*  --  8.3* 8.7*   MCV  --  97  --  98 98   PLT  --  88*  --  95* 93*   NA  --  141 139 139 141   POTASSIUM 3.2* 3.1* 3.5 3.6 3.7   CHLORIDE  --  104 103 104 106   CO2  --  30 31 30 30   BUN  --  20 20 19 18   CR  --  1.29* 1.25 1.22 1.25   ANIONGAP  --  7 5 5 5   TEODORO  --  8.8 9.0 8.9 9.1   GLC  --  93 118* 105* 96   ALBUMIN  --   --   --  2.9* 3.1*   PROTTOTAL  --   --   --   --  6.6*   BILITOTAL  --   --   --   --  1.2   ALKPHOS  --   --   --   --  85   ALT  --   --   --   --  13   AST  --   --   --   --  29       No results found for this or any previous visit (from the past 24 hour(s)).

## 2020-05-16 NOTE — PROGRESS NOTES
Heart Failure Care Pathway  GOALS TO BE MET BEFORE DISCHARGE:    1. Decrease congestion and/or edema with diuretic therapy to achieve near      optimal volume status.            Dyspnea improved:  Yes            Edema improved:     No        Net I/O and Weights since admission:          04/16 2300 - 05/16 2259  In: 1889 [P.O.:1870; I.V.:19]  Out: 3300 [Urine:3300]  Net: -1411            Vitals:    05/14/20 1745 05/14/20 2217 05/15/20 0635 05/16/20 0159   Weight: 90.7 kg (200 lb) 97.4 kg (214 lb 12.8 oz) 97.5 kg (215 lb) 95.3 kg (210 lb)       2.  O2 sats > 92% on RA or at prior home O2 therapy level.          Current oxygenation status:       SpO2: 92 %         O2 Device: None (Room air),            Able to wean O2 this shift to keep sats > 92%:  Yes, Room air       Does patient use Home O2? No    3.  Tolerates ambulation and mobility near baseline: No, dyspneic with activity.        How many times did the patient ambulate with nursing staff this shift? 3    VSS. Monitor shows Atrial fib with CVR. Pt. Denies pain. Bumex drip at 0.5 mg/hr.   Continue to monitor.    Please review the Heart Failure Care Pathway for additional HF goal outcomes.    Lou Reddy RN RN  5/16/2020

## 2020-05-16 NOTE — PROGRESS NOTES
[facility]    Cardiology Progress Note     Assessment & Plan        Bairon Foster is a 89 year old male who was admitted on 5/14/2020 with HFpEF      1. Acute on chronic systolic and diastolic congestive heart failure exacerbation    CHELLE 01/2020 LVEF 45-50%, Severe MR and TR with mild MS    CXR no evidence of edema    NTproBNP 718    I/O 1.3 negative since admission      pounds, dry weight 198 pounds    IV bumex 0.5 mg/hr, stable renal function    Carvedilol 6.25 mg BID (PTA Bumex 4 mg daily) and daily thiazide     CORE consult      2. Severe MR status post clip x2 and Severe TR    CHELLE 01/2020 shows severe 4+ MR despite MitraClip x2, severe posteriorly directed MR.  Mild MS, mean gradient 2.9 mmHg.    CHELLE 01/2020 shows Severe 4+ TR with RVSP 32.7 mmHg + RAP, mild PH (35-45 mmHg), RV function underestimated due to TR velocity    Diuresing with IV Bumex      Could consider additional afterload reducer if needed for BP and assistance in symptomatic severe MR      3. Permanent atrial fibrillation with history of symptomatic bradycardia status post PPM    Rate controlled with Carvedilol, Watchman device     Battery end of life, gen change scheduled for Jumana 3     Follows Dr. Curiel outpatient     4. Coronary artery disease    Mild non obstructive CAD    Aspirin and Simvastatin 20 mg daily       History CVA    Status post Watchman 2019     7. Pancytopenia, secondary to hypocellular marrow    WBC 3.5, Hgb 8.7, Plt 93 (near baseline)    Follows Dr. Chavez as outpatient    Will continue to follow.     Disposition Plan   Expected discharge in 2-3 days     Entered: Yadira Méndez 05/16/2020, 11:25 AM       Yadira Méndez MD  Text Page  (Monday - Friday, 8 am- 5 pm)    Interval History   No acute events overnight. Patient feels he is improving his breathing. Still with LE edema.     Physical Exam   Temp: 97.7  F (36.5  C) Temp src: Oral BP: 106/61 Pulse: 72 Heart Rate: 64 Resp: 16 SpO2: 92 % O2  Device: None (Room air)    Vitals:    05/14/20 2217 05/15/20 0635 05/16/20 0159   Weight: 97.4 kg (214 lb 12.8 oz) 97.5 kg (215 lb) 95.3 kg (210 lb)     Vital Signs with Ranges  Temp:  [97.7  F (36.5  C)-98.2  F (36.8  C)] 97.7  F (36.5  C)  Pulse:  [65-73] 72  Heart Rate:  [63-69] 64  Resp:  [16] 16  BP: ()/(48-62) 106/61  SpO2:  [92 %-97 %] 92 %  I/O last 3 completed shifts:  In: 1100 [P.O.:1100]  Out: 1975 [Urine:1975]  Patient Active Problem List   Diagnosis     Atrial fibrillation (H)     Pulmonary hypertension (H)     CAD (coronary artery disease)     BPH (benign prostatic hyperplasia)     Ischemic cardiomyopathy     JIM (obstructive sleep apnea)- mild/moderate     Vitamin D deficiency     Gastroesophageal reflux disease without esophagitis     Hydrocele, unspecified hydrocele type     Slow transit constipation     Hypercholesterolemia     Venous stasis dermatitis of both lower extremities     Lymphedema of both lower extremities     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      Coronary artery disease due to lipid rich plaque     Acquired hypothyroidism     Onychomycosis     PAD (peripheral artery disease) (H)     Xerosis cutis     AK (actinic keratosis)     Angioma of skin     Acute systolic CHF (congestive heart failure) (H)     CHF (congestive heart failure) (H)     Non-rheumatic mitral regurgitation     Mitral regurgitation     CKD (chronic kidney disease) stage 3, GFR 30-59 ml/min (H)     Bullous pemphigoid     Chronic atrial fibrillation     Presence of Watchman left atrial appendage closure device     Sick sinus syndrome (H)     Cardiac pacemaker in situ     Acute combined systolic and diastolic congestive heart failure (H)       Constitutional: Awake, alert, cooperative, no apparent distress  Respiratory: Crackles at bases  Cardiovascular: RR, holosystolic murmur at apex, nl s1,s2  GI: Normal bowel sounds, soft, non-distended, non-tender  Skin/Integumen: No rashes, no cyanosis, no  edema  Other:      Medications     bumetanide 0.5 mg/hr (05/16/20 0902)     - MEDICATION INSTRUCTIONS -       ACE/ARB/ARNI NOT PRESCRIBED         aspirin  81 mg Oral Daily     buPROPion  300 mg Oral QAM     carvedilol  6.25 mg Oral BID w/meals     chlorthalidone  25 mg Oral Daily     emollient   Topical BID     finasteride  5 mg Oral Daily     levothyroxine  100 mcg Oral QAM AC     omeprazole  40 mg Oral Daily     polyethylene glycol  17 g Oral Daily     potassium chloride ER  20 mEq Oral BID     simvastatin  20 mg Oral Daily     sodium chloride (PF)  3 mL Intracatheter Q8H     sucralfate  1 g Oral BID     tamsulosin  0.4 mg Oral Daily       Data   Results for orders placed or performed during the hospital encounter of 05/14/20 (from the past 24 hour(s))   Basic metabolic panel   Result Value Ref Range    Sodium 139 133 - 144 mmol/L    Potassium 3.5 3.4 - 5.3 mmol/L    Chloride 103 94 - 109 mmol/L    Carbon Dioxide 31 20 - 32 mmol/L    Anion Gap 5 3 - 14 mmol/L    Glucose 118 (H) 70 - 99 mg/dL    Urea Nitrogen 20 7 - 30 mg/dL    Creatinine 1.25 0.66 - 1.25 mg/dL    GFR Estimate 51 (L) >60 mL/min/[1.73_m2]    GFR Estimate If Black 59 (L) >60 mL/min/[1.73_m2]    Calcium 9.0 8.5 - 10.1 mg/dL   Basic metabolic panel   Result Value Ref Range    Sodium 141 133 - 144 mmol/L    Potassium 3.1 (L) 3.4 - 5.3 mmol/L    Chloride 104 94 - 109 mmol/L    Carbon Dioxide 30 20 - 32 mmol/L    Anion Gap 7 3 - 14 mmol/L    Glucose 93 70 - 99 mg/dL    Urea Nitrogen 20 7 - 30 mg/dL    Creatinine 1.29 (H) 0.66 - 1.25 mg/dL    GFR Estimate 49 (L) >60 mL/min/[1.73_m2]    GFR Estimate If Black 56 (L) >60 mL/min/[1.73_m2]    Calcium 8.8 8.5 - 10.1 mg/dL   Magnesium   Result Value Ref Range    Magnesium 2.0 1.6 - 2.3 mg/dL   CBC with platelets   Result Value Ref Range    WBC 2.9 (L) 4.0 - 11.0 10e9/L    RBC Count 2.61 (L) 4.4 - 5.9 10e12/L    Hemoglobin 7.9 (L) 13.3 - 17.7 g/dL    Hematocrit 25.3 (L) 40.0 - 53.0 %    MCV 97 78 - 100 fl    MCH  30.3 26.5 - 33.0 pg    MCHC 31.2 (L) 31.5 - 36.5 g/dL    RDW 17.4 (H) 10.0 - 15.0 %    Platelet Count 88 (L) 150 - 450 10e9/L   Potassium   Result Value Ref Range    Potassium 3.2 (L) 3.4 - 5.3 mmol/L

## 2020-05-16 NOTE — PLAN OF CARE
Discharge Planner OT   Patient plan for discharge: home w/ wife and son/lulú in law  Current status: Mod I for bed mob and transfers today. SBA for mobility using 2WW, had 1 mild LOB when turning into restroom but was able to recover w/o A. SBA for donning briefs, toileting, and g/h at sink. Ambulated x 4.5 min, BP hypotensive but stable. Rated fatigue as 8/10 but did not appear that fatigued  Barriers to return to prior living situation: none anticipated  Recommendations for discharge: home w/ A from family w/ LB dressing prn, continued A for bathing and IADL's  Rationale for recommendations: Pt appears to have a very supportive family, pt states he always has someone home with him and states his family can assist whenever needed. Anticipate pt will safely return home when medically able to discharge.        Entered by: Ayah Whitehead 05/16/2020 12:54 PM

## 2020-05-16 NOTE — PLAN OF CARE
Heart Failure Care Pathway  GOALS TO BE MET BEFORE DISCHARGE:    1. Decrease congestion and/or edema with diuretic therapy to achieve near      optimal volume status.            Dyspnea improved:  Yes            Edema improved:     No, please explain: +3/+4 generalized edema         Net I/O and Weights since admission:          04/16 0700 - 05/16 0659  In: 1153 [P.O.:1150; I.V.:3]  Out: 2550 [Urine:2550]  Net: -1397            Vitals:    05/14/20 1745 05/14/20 2217 05/15/20 0635 05/16/20 0159   Weight: 90.7 kg (200 lb) 97.4 kg (214 lb 12.8 oz) 97.5 kg (215 lb) 95.3 kg (210 lb)       2.  O2 sats > 92% on RA or at prior home O2 therapy level.          Current oxygenation status:       SpO2: 97 %         O2 Device: None (Room air),            Able to wean O2 this shift to keep sats > 92%:  NA       Does patient use Home O2? No    3.  Tolerates ambulation and mobility near baseline: Yes        How many times did the patient ambulate with nursing staff this shift? 1    Please review the Heart Failure Care Pathway for additional HF goal outcomes.    Madisyn Oswald RN RN  5/16/2020

## 2020-05-16 NOTE — PLAN OF CARE
Discharge Planner PT   Patient plan for discharge: Home  Current status: Lymphedema orders received, pt assessed. At this time, pt has home LE wraps present in room, unable to don at this time as compression socks still wet. Pt also with scrotal edema, scrotal compression from TG soft brought to room but pt declining this intervention at this time due to increased Bumex and need for increased urination. TG soft left in room for use when ambulating if pt agreeable. Recommend pt continue to use home wraps applied by nursing as appropriate. Pt is being followed by OT/CR for mobility, will therefore complete lymphedema and PT orders at this time.  Barriers to return to prior living situation: none from a mobility standpoint  Recommendations for discharge: Home with family and home services as prior to admission.  Rationale for recommendations: Pt is able to complete mobility necessary for discharge to home, has home LE lymphedema wraps which can be applied by nursing. Pt declining intervention for scrotal edema at this time. No further PT intervention at this time.       Entered by: Madisyn Vásquez 05/16/2020 2:10 PM      Physical Therapy Discharge Summary    Reason for therapy discharge:    All goals and outcomes met, no further needs identified.    Progress towards therapy goal(s). See goals on Care Plan in Epic electronic health record for goal details.  Goals met    Therapy recommendation(s):    No further PT indicated at this time as pt has own LE lymphedema wraps and is being followed by OT/CR for mobility during hospital stay.

## 2020-05-17 ENCOUNTER — APPOINTMENT (OUTPATIENT)
Dept: OCCUPATIONAL THERAPY | Facility: CLINIC | Age: 85
DRG: 292 | End: 2020-05-17
Payer: COMMERCIAL

## 2020-05-17 LAB
ANION GAP SERPL CALCULATED.3IONS-SCNC: 6 MMOL/L (ref 3–14)
ANION GAP SERPL CALCULATED.3IONS-SCNC: 8 MMOL/L (ref 3–14)
BUN SERPL-MCNC: 20 MG/DL (ref 7–30)
BUN SERPL-MCNC: 24 MG/DL (ref 7–30)
CALCIUM SERPL-MCNC: 9.1 MG/DL (ref 8.5–10.1)
CALCIUM SERPL-MCNC: 9.1 MG/DL (ref 8.5–10.1)
CHLORIDE SERPL-SCNC: 102 MMOL/L (ref 94–109)
CHLORIDE SERPL-SCNC: 104 MMOL/L (ref 94–109)
CO2 SERPL-SCNC: 30 MMOL/L (ref 20–32)
CO2 SERPL-SCNC: 31 MMOL/L (ref 20–32)
CREAT SERPL-MCNC: 1.27 MG/DL (ref 0.66–1.25)
CREAT SERPL-MCNC: 1.38 MG/DL (ref 0.66–1.25)
ERYTHROCYTE [DISTWIDTH] IN BLOOD BY AUTOMATED COUNT: 17.3 % (ref 10–15)
GFR SERPL CREATININE-BSD FRML MDRD: 45 ML/MIN/{1.73_M2}
GFR SERPL CREATININE-BSD FRML MDRD: 50 ML/MIN/{1.73_M2}
GLUCOSE SERPL-MCNC: 129 MG/DL (ref 70–99)
GLUCOSE SERPL-MCNC: 96 MG/DL (ref 70–99)
HCT VFR BLD AUTO: 25.3 % (ref 40–53)
HGB BLD-MCNC: 8 G/DL (ref 13.3–17.7)
MAGNESIUM SERPL-MCNC: 2.1 MG/DL (ref 1.6–2.3)
MCH RBC QN AUTO: 30.7 PG (ref 26.5–33)
MCHC RBC AUTO-ENTMCNC: 31.6 G/DL (ref 31.5–36.5)
MCV RBC AUTO: 97 FL (ref 78–100)
PHOSPHATE SERPL-MCNC: 3.1 MG/DL (ref 2.5–4.5)
PLATELET # BLD AUTO: 101 10E9/L (ref 150–450)
POTASSIUM SERPL-SCNC: 3.4 MMOL/L (ref 3.4–5.3)
POTASSIUM SERPL-SCNC: 3.9 MMOL/L (ref 3.4–5.3)
RBC # BLD AUTO: 2.61 10E12/L (ref 4.4–5.9)
SODIUM SERPL-SCNC: 140 MMOL/L (ref 133–144)
SODIUM SERPL-SCNC: 141 MMOL/L (ref 133–144)
WBC # BLD AUTO: 3.2 10E9/L (ref 4–11)

## 2020-05-17 PROCEDURE — 36415 COLL VENOUS BLD VENIPUNCTURE: CPT | Performed by: HOSPITALIST

## 2020-05-17 PROCEDURE — 25000132 ZZH RX MED GY IP 250 OP 250 PS 637: Performed by: NURSE PRACTITIONER

## 2020-05-17 PROCEDURE — 84132 ASSAY OF SERUM POTASSIUM: CPT | Performed by: HOSPITALIST

## 2020-05-17 PROCEDURE — 99232 SBSQ HOSP IP/OBS MODERATE 35: CPT | Performed by: INTERNAL MEDICINE

## 2020-05-17 PROCEDURE — 83735 ASSAY OF MAGNESIUM: CPT | Performed by: HOSPITALIST

## 2020-05-17 PROCEDURE — 99232 SBSQ HOSP IP/OBS MODERATE 35: CPT | Performed by: HOSPITALIST

## 2020-05-17 PROCEDURE — 25000125 ZZHC RX 250: Performed by: HOSPITALIST

## 2020-05-17 PROCEDURE — 84100 ASSAY OF PHOSPHORUS: CPT | Performed by: HOSPITALIST

## 2020-05-17 PROCEDURE — 25000132 ZZH RX MED GY IP 250 OP 250 PS 637: Performed by: HOSPITALIST

## 2020-05-17 PROCEDURE — 21000001 ZZH R&B HEART CARE

## 2020-05-17 PROCEDURE — 85027 COMPLETE CBC AUTOMATED: CPT | Performed by: HOSPITALIST

## 2020-05-17 PROCEDURE — 80048 BASIC METABOLIC PNL TOTAL CA: CPT | Performed by: HOSPITALIST

## 2020-05-17 PROCEDURE — 97535 SELF CARE MNGMENT TRAINING: CPT | Mod: GO | Performed by: OCCUPATIONAL THERAPIST

## 2020-05-17 RX ORDER — ISOSORBIDE MONONITRATE 30 MG/1
30 TABLET, EXTENDED RELEASE ORAL DAILY
Status: DISCONTINUED | OUTPATIENT
Start: 2020-05-17 | End: 2020-05-18

## 2020-05-17 RX ADMIN — ISOSORBIDE MONONITRATE 30 MG: 30 TABLET, EXTENDED RELEASE ORAL at 12:05

## 2020-05-17 RX ADMIN — CARVEDILOL 6.25 MG: 6.25 TABLET, FILM COATED ORAL at 08:18

## 2020-05-17 RX ADMIN — POTASSIUM CHLORIDE 20 MEQ: 1500 TABLET, EXTENDED RELEASE ORAL at 20:42

## 2020-05-17 RX ADMIN — CHLORTHALIDONE 25 MG: 25 TABLET ORAL at 08:17

## 2020-05-17 RX ADMIN — BUMETANIDE 0.75 MG/HR: 0.25 INJECTION INTRAMUSCULAR; INTRAVENOUS at 20:31

## 2020-05-17 RX ADMIN — POTASSIUM CHLORIDE 20 MEQ: 1500 TABLET, EXTENDED RELEASE ORAL at 08:17

## 2020-05-17 RX ADMIN — SUCRALFATE 1 G: 1 TABLET ORAL at 08:17

## 2020-05-17 RX ADMIN — OMEPRAZOLE 40 MG: 20 CAPSULE, DELAYED RELEASE ORAL at 08:18

## 2020-05-17 RX ADMIN — SUCRALFATE 1 G: 1 TABLET ORAL at 20:40

## 2020-05-17 RX ADMIN — BUPROPION HYDROCHLORIDE 300 MG: 150 TABLET, FILM COATED, EXTENDED RELEASE ORAL at 08:18

## 2020-05-17 RX ADMIN — ASPIRIN 81 MG: 81 TABLET, DELAYED RELEASE ORAL at 08:17

## 2020-05-17 RX ADMIN — SIMVASTATIN 20 MG: 20 TABLET, FILM COATED ORAL at 08:18

## 2020-05-17 RX ADMIN — LEVOTHYROXINE SODIUM 100 MCG: 100 TABLET ORAL at 08:17

## 2020-05-17 RX ADMIN — Medication: at 08:21

## 2020-05-17 RX ADMIN — POTASSIUM CHLORIDE 20 MEQ: 1500 TABLET, EXTENDED RELEASE ORAL at 01:27

## 2020-05-17 RX ADMIN — FINASTERIDE 5 MG: 5 TABLET, FILM COATED ORAL at 08:17

## 2020-05-17 RX ADMIN — TAMSULOSIN HYDROCHLORIDE 0.4 MG: 0.4 CAPSULE ORAL at 08:18

## 2020-05-17 ASSESSMENT — ACTIVITIES OF DAILY LIVING (ADL)
ADLS_ACUITY_SCORE: 19
ADLS_ACUITY_SCORE: 19
ADLS_ACUITY_SCORE: 18

## 2020-05-17 ASSESSMENT — MIFFLIN-ST. JEOR: SCORE: 1685.04

## 2020-05-17 NOTE — PROGRESS NOTES
Left message with [facility] To identify if patient is open to HHC services thru them and what type of services he receives.  Will await call back.    3:20 PM  Rec'd call back that patient is open to skilled nursing thru [facility]    Alivia Muir RN  Care Coordinator  [facility]  [phone]

## 2020-05-17 NOTE — PROGRESS NOTES
[facility]    Cardiology Progress Note     Assessment & Plan     Bairon Foster is a 89 year old male who was admitted on 5/14/2020 with HFpEF      1. Acute on chronic systolic and diastolic congestive heart failure exacerbation    CHELLE 01/2020 LVEF 45-50%, Severe MR and TR with mild MS    CXR no evidence of edema    NTproBNP 718    I/O 2.4 negative since admission      pounds, dry weight 198 pounds    Increase IV bumex from 0.5 mg/hr to 1mg/hr, stable renal function. Started chlorthalidone 25 mg daily 5/15.    Carvedilol 6.25 mg BID (PTA Bumex 4 mg daily) and daily thiazide, imdur for afterload reduction.     CORE consult      2. Severe MR status post clip x2 and Severe TR    CHELLE 01/2020 shows severe 4+ MR despite MitraClip x2, severe posteriorly directed MR.  Mild MS, mean gradient 2.9 mmHg.    CHELLE 01/2020 shows Severe 4+ TR with RVSP 32.7 mmHg + RAP, mild PH (35-45 mmHg), RV function underestimated due to TR velocity    Diuresing with IV Bumex      Could consider additional afterload reducer if needed for BP and assistance in symptomatic severe MR - start imdur 30 mg daily today if BP tolerates      3. Permanent atrial fibrillation with history of symptomatic bradycardia status post PPM    Rate controlled with Carvedilol, Watchman device     Battery end of life, gen change scheduled for Jumana 3     Follows Dr. Curiel outpatient     4. Coronary artery disease    Mild non obstructive CAD    Aspirin and Simvastatin 20 mg daily       History CVA    Status post Watchman 2019     7. Pancytopenia, secondary to hypocellular marrow    WBC 3.5, Hgb 8.7, Plt 93 (near baseline)    Follows Dr. Chavez as outpatient     Will continue to follow.       Yadira Méndez MD  Text Page  (Monday - Friday, 8 am- 5 pm)    Interval History   Minimal UOP overnight, Cr stable. No acute events. Still with LE edema. Minimal SOB.    Physical Exam   Temp: 98.7  F (37.1  C) Temp src: Oral BP: 94/53 Pulse: 62 Heart Rate: 66  Resp: 16 SpO2: 94 % O2 Device: None (Room air)    Vitals:    05/15/20 0635 05/16/20 0159 05/17/20 0400   Weight: 97.5 kg (215 lb) 95.3 kg (210 lb) 95 kg (209 lb 8 oz)     Vital Signs with Ranges  Temp:  [97.7  F (36.5  C)-98.7  F (37.1  C)] 98.7  F (37.1  C)  Pulse:  [62] 62  Heart Rate:  [66-89] 66  Resp:  [16-18] 16  BP: ()/(50-65) 94/53  SpO2:  [94 %-95 %] 94 %  I/O last 3 completed shifts:  In: 976 [P.O.:960; I.V.:16]  Out: 1700 [Urine:1700]  Patient Active Problem List   Diagnosis     Atrial fibrillation (H)     Pulmonary hypertension (H)     CAD (coronary artery disease)     BPH (benign prostatic hyperplasia)     Ischemic cardiomyopathy     JIM (obstructive sleep apnea)- mild/moderate     Vitamin D deficiency     Gastroesophageal reflux disease without esophagitis     Hydrocele, unspecified hydrocele type     Slow transit constipation     Hypercholesterolemia     Venous stasis dermatitis of both lower extremities     Lymphedema of both lower extremities     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      Coronary artery disease due to lipid rich plaque     Acquired hypothyroidism     Onychomycosis     PAD (peripheral artery disease) (H)     Xerosis cutis     AK (actinic keratosis)     Angioma of skin     Acute systolic CHF (congestive heart failure) (H)     CHF (congestive heart failure) (H)     Non-rheumatic mitral regurgitation     Mitral regurgitation     CKD (chronic kidney disease) stage 3, GFR 30-59 ml/min (H)     Bullous pemphigoid     Chronic atrial fibrillation     Presence of Watchman left atrial appendage closure device     Sick sinus syndrome (H)     Cardiac pacemaker in situ     Acute combined systolic and diastolic congestive heart failure (H)       Constitutional: Awake, alert, cooperative, no apparent distress  Respiratory: Crackles at bases  Cardiovascular: Regular rate and rhythm, normal S1 and S2, and no murmur noted  GI: Normal bowel sounds, soft, non-distended,  non-tender  Skin/Integumen: No rashes, no cyanosis, 1+ edema  Other:      Medications     bumetanide 0.5 mg/hr (05/17/20 0762)     - MEDICATION INSTRUCTIONS -       ACE/ARB/ARNI NOT PRESCRIBED         aspirin  81 mg Oral Daily     buPROPion  300 mg Oral QAM     carvedilol  6.25 mg Oral BID w/meals     chlorthalidone  25 mg Oral Daily     emollient   Topical BID     finasteride  5 mg Oral Daily     isosorbide mononitrate  30 mg Oral Daily     levothyroxine  100 mcg Oral QAM AC     omeprazole  40 mg Oral Daily     polyethylene glycol  17 g Oral Daily     potassium chloride ER  20 mEq Oral BID     simvastatin  20 mg Oral Daily     sodium chloride (PF)  3 mL Intracatheter Q8H     sucralfate  1 g Oral BID     tamsulosin  0.4 mg Oral Daily       Data   Results for orders placed or performed during the hospital encounter of 05/14/20 (from the past 24 hour(s))   Potassium   Result Value Ref Range    Potassium 3.2 (L) 3.4 - 5.3 mmol/L   Potassium   Result Value Ref Range    Potassium 3.3 (L) 3.4 - 5.3 mmol/L   Basic metabolic panel   Result Value Ref Range    Sodium 141 133 - 144 mmol/L    Potassium 3.9 3.4 - 5.3 mmol/L    Chloride 104 94 - 109 mmol/L    Carbon Dioxide 31 20 - 32 mmol/L    Anion Gap 6 3 - 14 mmol/L    Glucose 96 70 - 99 mg/dL    Urea Nitrogen 20 7 - 30 mg/dL    Creatinine 1.27 (H) 0.66 - 1.25 mg/dL    GFR Estimate 50 (L) >60 mL/min/[1.73_m2]    GFR Estimate If Black 58 (L) >60 mL/min/[1.73_m2]    Calcium 9.1 8.5 - 10.1 mg/dL   CBC with platelets   Result Value Ref Range    WBC 3.2 (L) 4.0 - 11.0 10e9/L    RBC Count 2.61 (L) 4.4 - 5.9 10e12/L    Hemoglobin 8.0 (L) 13.3 - 17.7 g/dL    Hematocrit 25.3 (L) 40.0 - 53.0 %    MCV 97 78 - 100 fl    MCH 30.7 26.5 - 33.0 pg    MCHC 31.6 31.5 - 36.5 g/dL    RDW 17.3 (H) 10.0 - 15.0 %    Platelet Count 101 (L) 150 - 450 10e9/L   Magnesium   Result Value Ref Range    Magnesium 2.1 1.6 - 2.3 mg/dL   Phosphorus   Result Value Ref Range    Phosphorus 3.1 2.5 - 4.5 mg/dL

## 2020-05-17 NOTE — PLAN OF CARE
A&Ox4. VSS on RA. Denies pain. Tele afib CVR BBB. Up with A1, walker and GB. Using urinal in bathroom. Only put out 650 ml urine this shift on Bumex gtt. +4 to +3 edema to BLE and scrotum. Standing weight obtained this AM. Continue to diurese.     Heart Failure Care Pathway  GOALS TO BE MET BEFORE DISCHARGE:    1. Decrease congestion and/or edema with diuretic therapy to achieve near      optimal volume status.            Dyspnea improved:  Yes            Edema improved:     No, please explain: BLE and scrotal edema        Net I/O and Weights since admission:          04/17 0700 - 05/17 0659  In: 2129 [P.O.:2110; I.V.:19]  Out: 3950 [Urine:3950]  Net: -1821            Vitals:    05/14/20 1745 05/14/20 2217 05/15/20 0635 05/16/20 0159   Weight: 90.7 kg (200 lb) 97.4 kg (214 lb 12.8 oz) 97.5 kg (215 lb) 95.3 kg (210 lb)    05/17/20 0400   Weight: 95 kg (209 lb 8 oz)       2.  O2 sats > 92% on RA or at prior home O2 therapy level.          Current oxygenation status:       SpO2: 95 %         O2 Device: None (Room air),            Able to wean O2 this shift to keep sats > 92%:  Yes       Does patient use Home O2? No    3.  Tolerates ambulation and mobility near baseline: No, please explain: gen weakness        How many times did the patient ambulate with nursing staff this shift? 1    Please review the Heart Failure Care Pathway for additional HF goal outcomes.    RAKESH SNYDER, RN RN  5/17/2020

## 2020-05-17 NOTE — PLAN OF CARE
Heart Failure Care Pathway  GOALS TO BE MET BEFORE DISCHARGE:    1. Decrease congestion and/or edema with diuretic therapy to achieve near      optimal volume status.            Dyspnea improved:  Yes            Edema improved:     No, please explain: +4 scrotal and +3 legs        Net I/O and Weights since admission:          04/16 2300 - 05/16 2259  In: 1889 [P.O.:1870; I.V.:19]  Out: 3650 [Urine:3650]  Net: -1761            Vitals:    05/14/20 1745 05/14/20 2217 05/15/20 0635 05/16/20 0159   Weight: 90.7 kg (200 lb) 97.4 kg (214 lb 12.8 oz) 97.5 kg (215 lb) 95.3 kg (210 lb)       2.  O2 sats > 92% on RA or at prior home O2 therapy level.          Current oxygenation status:       SpO2: 94 %         O2 Device: None (Room air),            Able to wean O2 this shift to keep sats > 92%:  N/A       Does patient use Home O2? No    3.  Tolerates ambulation and mobility near baseline: Yes        How many times did the patient ambulate with nursing staff this shift? 3 in room    Please review the Heart Failure Care Pathway for additional HF goal outcomes.    Juliet Silverman RN RN  5/16/2020     Pt is A&O x4, VSS and on tele A-Fib CVR with occasional V paced, on RA and LS clear, Up with 1 and RW and BG, Potassium replaced and K+ is 3.3, 40 MEQ of Potassium chloride given per protocol. Bumex 0.5 mg/hr gtt, plan to continue to diurese.

## 2020-05-17 NOTE — PROGRESS NOTES
[facility]    Hospitalist Progress Note      Assessment & Plan   Bairon Foster is a 89 year old male who was admitted on 5/14/2020 with worsening lower extremity edema. After evaluation in the ER, there was concern for acute on chronic systolic and diastolic congestive heart failure. He was given IV bumex and was admitted to the hospitalist service for further evaluation and management.    Acute on chronic systolic and diastolic congestive heart failure  Severe MR s/p mitraclip  Severe TR  Dry weight is 198, on admit was 215. PTA bumex 4mg daily, coreg 6.25mg BID. Noted worsening edema, but not yet short of breath. Received IV bumex in the ER. He does not tolerate furosemide, torsemide, or spironolactone as they exacerbate his bullous pemphigoid. Echo 5/15 shows EF 46%, massive biatrial enlargement. mitraclip and pacemaker leads visualized and dilated IVC.  - Bumex gtt, 0.5mg/hr with poor diuresis on 5/16--increasing gtt to 0.75mg/hr--if BP does not tolerate, will decrease to 0.5mg/hr again.  - Imdur 30mg added for afterload reduction to hopefully aide in diuresis  - Monitor intake & output and daily weights (weight down to 209 5/17)  - Cardiology consulted  - Continue PTA coreg   - Chlorthalidone 25mg daily (new)  - Consult lymphedema wrap  - CORE consult    Hypokalemia  Secondary to diuresis  - Replete per protocol    Permanent atrial fibrillation  Did not tolerate anticoagulation due to frequent epistaxis and high fall risk. S/p Watchman device on 12/3/19. Permanent pacemaker in place. Due for generator change in June 2020.  - Continue PTA aspirin.  - Continue PTA carvedilol.     Coronary artery disease  - Continue PTA aspirin, carvedilol, and simvastatin.     Pancytopenia  - Secondary to hypocellular marrow.  - Counts are at about baseline on admit (hgb 8.7, WBC 3.5, plt 93).  - Followed by Dr. Chavez as an outpatient.     Chronic kidney disease, stage 3  - Baseline creatinine is about 1.2.  -  Creatinine 1.27 5/17  - Recheck labs in AM     Hypothyroidism  - Continue PTA levothyroxine.     BPH  - Continue PTA finasteride and tamsulosin.     GERD  - Continue PTA omeprazole and sucralfate.     Bullous pemphigoid  - Continue Vanicream to bilateral lower extremities twice daily.  - Consult wound care nurse.  - [facility].     History of CVA  - Continue PTA aspirin and simvastatin.     Lymphedema  - Continue lower extremity wraps.  - Lymphedemia consult added    DVT Prophylaxis: Pneumatic Compression Devices  Code Status: Full Code  Expected discharge: 2 - 3 days, recommended to prior living arrangement pending transition to PO diuretics    Aggie Akhtar, DO  Text Page (7am - 6pm)    Interval History   Patient seen and examined. Feels his legs are still swollen and that he is not urinating enough. Scrotal edema is overall improved. No chest pain, shortness of breath, nausea, vomiting, fevers, chills.    -Data reviewed today: I reviewed all new labs and imaging results over the last 24 hours. I personally reviewed no new imaging or EKGs    Physical Exam   Temp: 98.7  F (37.1  C) Temp src: Oral BP: 93/68 Pulse: 62 Heart Rate: 61 Resp: 16 SpO2: 94 % O2 Device: None (Room air)    Vitals:    05/15/20 0635 05/16/20 0159 05/17/20 0400   Weight: 97.5 kg (215 lb) 95.3 kg (210 lb) 95 kg (209 lb 8 oz)     Vital Signs with Ranges  Temp:  [97.7  F (36.5  C)-98.7  F (37.1  C)] 98.7  F (37.1  C)  Pulse:  [62] 62  Heart Rate:  [61-89] 61  Resp:  [16-18] 16  BP: ()/(50-68) 93/68  SpO2:  [94 %-95 %] 94 %  I/O last 3 completed shifts:  In: 360 [P.O.:360]  Out: 1800 [Urine:1800]    Constitutional: Awake, alert, cooperative, no apparent distress  Respiratory: Clear to auscultation bilaterally, no crackles or wheezing  Cardiovascular: irregularly irregular, normal S1 and S2, and + systolic murmur  GI: Normal bowel sounds, soft, non-distended, non-tender  Skin/Integumen: No  rashes, no cyanosis, +3 edema up to waist, stable from 5/16  Other:     Medications     bumetanide 0.75 mg/hr (05/17/20 1511)     - MEDICATION INSTRUCTIONS -       ACE/ARB/ARNI NOT PRESCRIBED         aspirin  81 mg Oral Daily     buPROPion  300 mg Oral QAM     carvedilol  6.25 mg Oral BID w/meals     chlorthalidone  25 mg Oral Daily     emollient   Topical BID     finasteride  5 mg Oral Daily     isosorbide mononitrate  30 mg Oral Daily     levothyroxine  100 mcg Oral QAM AC     omeprazole  40 mg Oral Daily     polyethylene glycol  17 g Oral Daily     potassium chloride ER  20 mEq Oral BID     simvastatin  20 mg Oral Daily     sodium chloride (PF)  3 mL Intracatheter Q8H     sucralfate  1 g Oral BID     tamsulosin  0.4 mg Oral Daily       Data   Recent Labs   Lab 05/17/20  0557 05/16/20 2031 05/16/20  1301 05/16/20  0551 05/15/20  2022 05/15/20  1200 05/14/20  1848   WBC 3.2*  --   --  2.9*  --  3.1* 3.5*   HGB 8.0*  --   --  7.9*  --  8.3* 8.7*   MCV 97  --   --  97  --  98 98   *  --   --  88*  --  95* 93*     --   --  141 139 139 141   POTASSIUM 3.9 3.3* 3.2* 3.1* 3.5 3.6 3.7   CHLORIDE 104  --   --  104 103 104 106   CO2 31  --   --  30 31 30 30   BUN 20  --   --  20 20 19 18   CR 1.27*  --   --  1.29* 1.25 1.22 1.25   ANIONGAP 6  --   --  7 5 5 5   TEODORO 9.1  --   --  8.8 9.0 8.9 9.1   GLC 96  --   --  93 118* 105* 96   ALBUMIN  --   --   --   --   --  2.9* 3.1*   PROTTOTAL  --   --   --   --   --   --  6.6*   BILITOTAL  --   --   --   --   --   --  1.2   ALKPHOS  --   --   --   --   --   --  85   ALT  --   --   --   --   --   --  13   AST  --   --   --   --   --   --  29       No results found for this or any previous visit (from the past 24 hour(s)).

## 2020-05-17 NOTE — PLAN OF CARE
Discharge Planner OT   Patient plan for discharge: home with family A  Current status: pt is very pleasant, participates well. Ambulated 150' with FWW and 1 standing rest break. LE fatigued after activity. Bed mobility with min A for LE positioning.   Barriers to return to prior living situation: none anticipated  Recommendations for discharge: home with family assist as needed for LE dressing, IADL's  Rationale for recommendations: pt has very supportive family available to assist       Entered by: Molly Vivar 05/17/2020 12:38 PM

## 2020-05-17 NOTE — PLAN OF CARE
Heart Failure Care Pathway  GOALS TO BE MET BEFORE DISCHARGE:    1. Decrease congestion and/or edema with diuretic therapy to achieve near      optimal volume status.            Dyspnea improved:  Yes            Edema improved:     Yes        Net I/O and Weights since admission:          04/17 1500 - 05/17 1459  In: 2249 [P.O.:2230; I.V.:19]  Out: 4250 [Urine:4250]  Net: -2001            Vitals:    05/14/20 1745 05/14/20 2217 05/15/20 0635 05/16/20 0159   Weight: 90.7 kg (200 lb) 97.4 kg (214 lb 12.8 oz) 97.5 kg (215 lb) 95.3 kg (210 lb)    05/17/20 0400   Weight: 95 kg (209 lb 8 oz)       2.  O2 sats > 92% on RA or at prior home O2 therapy level.          Current oxygenation status:       SpO2: 94 %         O2 Device: None (Room air),            Able to wean O2 this shift to keep sats > 92%:  Yes       Does patient use Home O2? No    3.  Tolerates ambulation and mobility near baseline: Yes        How many times did the patient ambulate with nursing staff this shift? 4    VSS, BP somewhat soft. Bumex gtt infusing. Urine output ok, not great. Added imdur today. Wt remains above baseline. Breathing easily. Home lymph wraps on. Up with SBA, GB/walker or cane.     Please review the Heart Failure Care Pathway for additional HF goal outcomes.    Nikkie Tucker RN RN  5/17/2020

## 2020-05-18 ENCOUNTER — APPOINTMENT (OUTPATIENT)
Dept: OCCUPATIONAL THERAPY | Facility: CLINIC | Age: 85
DRG: 292 | End: 2020-05-18
Payer: COMMERCIAL

## 2020-05-18 ENCOUNTER — PATIENT OUTREACH (OUTPATIENT)
Dept: CARE COORDINATION | Facility: CLINIC | Age: 85
End: 2020-05-18

## 2020-05-18 LAB
ANION GAP SERPL CALCULATED.3IONS-SCNC: 6 MMOL/L (ref 3–14)
BUN SERPL-MCNC: 24 MG/DL (ref 7–30)
CALCIUM SERPL-MCNC: 8.9 MG/DL (ref 8.5–10.1)
CHLORIDE SERPL-SCNC: 104 MMOL/L (ref 94–109)
CO2 SERPL-SCNC: 32 MMOL/L (ref 20–32)
CREAT SERPL-MCNC: 1.41 MG/DL (ref 0.66–1.25)
GFR SERPL CREATININE-BSD FRML MDRD: 44 ML/MIN/{1.73_M2}
GLUCOSE SERPL-MCNC: 93 MG/DL (ref 70–99)
POTASSIUM SERPL-SCNC: 3.2 MMOL/L (ref 3.4–5.3)
POTASSIUM SERPL-SCNC: 3.4 MMOL/L (ref 3.4–5.3)
SODIUM SERPL-SCNC: 142 MMOL/L (ref 133–144)

## 2020-05-18 PROCEDURE — 25000132 ZZH RX MED GY IP 250 OP 250 PS 637: Performed by: HOSPITALIST

## 2020-05-18 PROCEDURE — 36415 COLL VENOUS BLD VENIPUNCTURE: CPT | Performed by: HOSPITALIST

## 2020-05-18 PROCEDURE — 25000132 ZZH RX MED GY IP 250 OP 250 PS 637: Performed by: NURSE PRACTITIONER

## 2020-05-18 PROCEDURE — 80048 BASIC METABOLIC PNL TOTAL CA: CPT | Performed by: HOSPITALIST

## 2020-05-18 PROCEDURE — 97530 THERAPEUTIC ACTIVITIES: CPT | Mod: GO

## 2020-05-18 PROCEDURE — 99232 SBSQ HOSP IP/OBS MODERATE 35: CPT | Performed by: HOSPITALIST

## 2020-05-18 PROCEDURE — 25000125 ZZHC RX 250: Performed by: HOSPITALIST

## 2020-05-18 PROCEDURE — 21000001 ZZH R&B HEART CARE

## 2020-05-18 PROCEDURE — 84132 ASSAY OF SERUM POTASSIUM: CPT | Performed by: HOSPITALIST

## 2020-05-18 PROCEDURE — 97535 SELF CARE MNGMENT TRAINING: CPT | Mod: GO

## 2020-05-18 PROCEDURE — 99232 SBSQ HOSP IP/OBS MODERATE 35: CPT | Performed by: INTERNAL MEDICINE

## 2020-05-18 RX ADMIN — CARVEDILOL 6.25 MG: 6.25 TABLET, FILM COATED ORAL at 08:01

## 2020-05-18 RX ADMIN — POTASSIUM CHLORIDE 20 MEQ: 1500 TABLET, EXTENDED RELEASE ORAL at 09:18

## 2020-05-18 RX ADMIN — LEVOTHYROXINE SODIUM 100 MCG: 100 TABLET ORAL at 06:11

## 2020-05-18 RX ADMIN — ASPIRIN 81 MG: 81 TABLET, DELAYED RELEASE ORAL at 08:02

## 2020-05-18 RX ADMIN — SUCRALFATE 1 G: 1 TABLET ORAL at 20:45

## 2020-05-18 RX ADMIN — OMEPRAZOLE 40 MG: 20 CAPSULE, DELAYED RELEASE ORAL at 08:01

## 2020-05-18 RX ADMIN — FINASTERIDE 5 MG: 5 TABLET, FILM COATED ORAL at 08:01

## 2020-05-18 RX ADMIN — Medication: at 08:09

## 2020-05-18 RX ADMIN — CARVEDILOL 6.25 MG: 6.25 TABLET, FILM COATED ORAL at 17:53

## 2020-05-18 RX ADMIN — SUCRALFATE 1 G: 1 TABLET ORAL at 08:01

## 2020-05-18 RX ADMIN — POTASSIUM CHLORIDE 20 MEQ: 1500 TABLET, EXTENDED RELEASE ORAL at 08:01

## 2020-05-18 RX ADMIN — BUPROPION HYDROCHLORIDE 300 MG: 150 TABLET, FILM COATED, EXTENDED RELEASE ORAL at 08:01

## 2020-05-18 RX ADMIN — POTASSIUM CHLORIDE 40 MEQ: 1.5 POWDER, FOR SOLUTION ORAL at 06:08

## 2020-05-18 RX ADMIN — SIMVASTATIN 20 MG: 20 TABLET, FILM COATED ORAL at 08:02

## 2020-05-18 RX ADMIN — TAMSULOSIN HYDROCHLORIDE 0.4 MG: 0.4 CAPSULE ORAL at 08:01

## 2020-05-18 RX ADMIN — CHLORTHALIDONE 25 MG: 25 TABLET ORAL at 08:01

## 2020-05-18 RX ADMIN — ISOSORBIDE MONONITRATE 30 MG: 30 TABLET, EXTENDED RELEASE ORAL at 08:01

## 2020-05-18 RX ADMIN — BUMETANIDE 0.75 MG/HR: 0.25 INJECTION INTRAMUSCULAR; INTRAVENOUS at 14:41

## 2020-05-18 RX ADMIN — Medication: at 20:45

## 2020-05-18 RX ADMIN — POTASSIUM CHLORIDE 20 MEQ: 1500 TABLET, EXTENDED RELEASE ORAL at 20:45

## 2020-05-18 ASSESSMENT — ACTIVITIES OF DAILY LIVING (ADL)
ADLS_ACUITY_SCORE: 19
ADLS_ACUITY_SCORE: 18
ADLS_ACUITY_SCORE: 17

## 2020-05-18 ASSESSMENT — MIFFLIN-ST. JEOR: SCORE: 1681.41

## 2020-05-18 NOTE — PROGRESS NOTES
Clinic Care Coordination Contact  [facility]       Clinical Data: Care Coordinator Outreach  Outreach attempted - per chart review, patient is currently admitted at [facility].     Plan: Care Coordinator will watch the chart for discharge to do an outreach.    MARIE Price  Clinic Care Coordination  [facility]  Phone: [phone]

## 2020-05-18 NOTE — PROGRESS NOTES
"SPIRITUAL HEALTH SERVICES  SPIRITUAL ASSESSMENT Progress Note  [facility]     REFERRAL SOURCE: Length of Stay     Reviewed documentation. Reflective conversation shared with Alfa which integrated elements of illness and family narratives. While he does not identify as \"spiritual\" he appreciated having a visit and is open to further conversation during his hospitalization.  Spoke about his concern for the state of the world and lack of leadership. 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     PLAN: I will continue to follow while he remains in the hospital and as requests may arise.    Maile Dumont  Associate    Pager  [phone]  Phone [phone]    "

## 2020-05-18 NOTE — PLAN OF CARE
A&Ox4, forgetful. Denies pain/discomfort. Tele-Afib CVR. Potassium replaced per protocol for level of 3.2 with Potassium recheck of 3.4 @ 1139 today.   Up with assist x1 using walker and GB. Bumex gtt infusing @ 3 ml/hr.    BLE-edematous and elevated on 3 pillows.  Dressings to Right foot-CDI.  Pt got up to recliner for lunch.  Tolerating 2 Gm Na: No caffeine diet.

## 2020-05-18 NOTE — PLAN OF CARE
Discharge Planner OT   Patient plan for discharge: home with family A  Current status: Pt ambulated x 200 ft with SBA and FWW. Good tolerance. VSS throughout session. Pt ambulated to bathroom with SBA-CGA and FWW. Pt stood at sink x 7 minutes for g/h tasks and for toileting tasks with emphasis on improving ax toleracnce. Min cues needed for sequencing. Dec memory noted however pt reports family at home to A as needed.   Barriers to return to prior living situation: none anticipated  Recommendations for discharge: home with family assist as needed for LE dressing, IADL's  Rationale for recommendations: pt has very supportive family available to assist as needed.        Entered by: Yulia Michael 05/18/2020 10:00 AM

## 2020-05-18 NOTE — PLAN OF CARE
2807-1140:  Pt a/ox4, forgetful. Soft BPs, evening Coreg held. Denies pain. Tele Afib CVR with occasional PVC. K+ this morning was 3.2, supplement given per protocol. Up with assist x1 using walker + gb. Bumex gtt currently infusing.

## 2020-05-18 NOTE — PROGRESS NOTES
[facility]    Hospitalist Progress Note      Assessment & Plan   Bairon Foster is a 89 year old male who was admitted on 5/14/2020 with worsening lower extremity edema. After evaluation in the ER, there was concern for acute on chronic systolic and diastolic congestive heart failure. He was given IV bumex and was admitted to the hospitalist service for further evaluation and management.    Acute on chronic systolic and diastolic congestive heart failure  Severe MR s/p mitraclip  Severe TR  Dry weight is 198, on admit was 215. PTA bumex 4mg daily, coreg 6.25mg BID. Noted worsening edema, but not yet short of breath. Received IV bumex in the ER. He does not tolerate furosemide, torsemide, or spironolactone as they exacerbate his bullous pemphigoid. Echo 5/15 shows EF 46%, massive biatrial enlargement. mitraclip and pacemaker leads visualized and dilated IVC.  - Bumex gtt, 0.5mg/hr with poor diuresis on 5/16--increased gtt to 0.75mg/hr  On 5/17--then up to 1mg/hr on 5/18  - Imdur 30mg added 5/17, discontinued on 5/18  - Monitor intake & output and daily weights (weight down to 208 5/16)  - Cardiology consulted  - Continue PTA coreg   - Chlorthalidone 25mg daily started 5/15  - Consult lymphedema wrap  - CORE consult    Hypokalemia  Secondary to diuresis. On scheduled 20meq KCl BID  - Replete per protocol    Permanent atrial fibrillation  Did not tolerate anticoagulation due to frequent epistaxis and high fall risk. S/p Watchman device on 12/3/19. Permanent pacemaker in place. Due for generator change in June 2020.  - Continue PTA aspirin.  - Continue PTA carvedilol with hold parameters     Coronary artery disease  - Continue PTA aspirin, carvedilol, and simvastatin.     Pancytopenia  - Secondary to hypocellular marrow.  - Counts are at about baseline on admit (hgb 8.7, WBC 3.5, plt 93).  - Followed by Dr. Chavez as an outpatient.     Chronic kidney disease, stage 3  - Baseline creatinine is about  1.2.  - Slight increase in Cr with diuresis, but only up to 1.41 on 5/18  - Recheck labs in AM     Hypothyroidism  - Continue PTA levothyroxine.     BPH  - Continue PTA finasteride and tamsulosin.     GERD  - Continue PTA omeprazole and sucralfate.     Bullous pemphigoid  - Continue Vanicream to bilateral lower extremities twice daily.  - Consult wound care nurse.  - [facility].     History of CVA  - Continue PTA aspirin and simvastatin.     Lymphedema  - Continue lower extremity wraps.  - Lymphedemia consult added    DVT Prophylaxis: Pneumatic Compression Devices  Code Status: Full Code  Expected discharge: 2 - 3 days, recommended to prior living arrangement pending transition to PO diuretics    Aggie Akhtar, DO  Text Page (7am - 6pm)    Interval History   Patient seen and examined. Legs are still swollen, but he feels they are mildly improved (particularly in the morning). No chest pain, shortness of breath, nausea, vomiting. Able to move from chair to bed with walker without issue.  Attempted call to patient's wife Kristi to provide update, no answer x2.    -Data reviewed today: I reviewed all new labs and imaging results over the last 24 hours. I personally reviewed no new imaging or EKGs    Physical Exam   Temp: 97.8  F (36.6  C) Temp src: Oral BP: 105/47 Pulse: 76 Heart Rate: 70 Resp: 16 SpO2: 90 % O2 Device: None (Room air)    Vitals:    05/16/20 0159 05/17/20 0400 05/18/20 0340   Weight: 95.3 kg (210 lb) 95 kg (209 lb 8 oz) 94.7 kg (208 lb 11.2 oz)     Vital Signs with Ranges  Temp:  [97.7  F (36.5  C)-98.4  F (36.9  C)] 97.8  F (36.6  C)  Pulse:  [65-76] 76  Heart Rate:  [61-71] 70  Resp:  [15-16] 16  BP: ()/(47-68) 105/47  SpO2:  [90 %-93 %] 90 %  I/O last 3 completed shifts:  In: 570 [P.O.:570]  Out: 1750 [Urine:1750]    Constitutional: Awake, alert, cooperative, no apparent distress  Respiratory: Clear to auscultation bilaterally, no crackles or  wheezing  Cardiovascular: irregularly irregular, normal S1 and S2, and + systolic murmur  GI: Normal bowel sounds, soft, non-distended, non-tender  Skin/Integumen: No rashes, no cyanosis, +3 edema up to waist, stable from 5/17  Other:     Medications     bumetanide 0.75 mg/hr (05/18/20 0800)     - MEDICATION INSTRUCTIONS -       ACE/ARB/ARNI NOT PRESCRIBED         aspirin  81 mg Oral Daily     buPROPion  300 mg Oral QAM     carvedilol  6.25 mg Oral BID w/meals     chlorthalidone  25 mg Oral Daily     emollient   Topical BID     finasteride  5 mg Oral Daily     isosorbide mononitrate  30 mg Oral Daily     levothyroxine  100 mcg Oral QAM AC     omeprazole  40 mg Oral Daily     polyethylene glycol  17 g Oral Daily     potassium chloride ER  20 mEq Oral BID     simvastatin  20 mg Oral Daily     sodium chloride (PF)  3 mL Intracatheter Q8H     sucralfate  1 g Oral BID     tamsulosin  0.4 mg Oral Daily       Data   Recent Labs   Lab 05/18/20  0538 05/17/20  1952 05/17/20  0557  05/16/20  0551  05/15/20  1200 05/14/20  1848   WBC  --   --  3.2*  --  2.9*  --  3.1* 3.5*   HGB  --   --  8.0*  --  7.9*  --  8.3* 8.7*   MCV  --   --  97  --  97  --  98 98   PLT  --   --  101*  --  88*  --  95* 93*    140 141  --  141   < > 139 141   POTASSIUM 3.2* 3.4 3.9   < > 3.1*   < > 3.6 3.7   CHLORIDE 104 102 104  --  104   < > 104 106   CO2 32 30 31  --  30   < > 30 30   BUN 24 24 20  --  20   < > 19 18   CR 1.41* 1.38* 1.27*  --  1.29*   < > 1.22 1.25   ANIONGAP 6 8 6  --  7   < > 5 5   TEODORO 8.9 9.1 9.1  --  8.8   < > 8.9 9.1   GLC 93 129* 96  --  93   < > 105* 96   ALBUMIN  --   --   --   --   --   --  2.9* 3.1*   PROTTOTAL  --   --   --   --   --   --   --  6.6*   BILITOTAL  --   --   --   --   --   --   --  1.2   ALKPHOS  --   --   --   --   --   --   --  85   ALT  --   --   --   --   --   --   --  13   AST  --   --   --   --   --   --   --  29    < > = values in this interval not displayed.       No results found for this or  any previous visit (from the past 24 hour(s)).

## 2020-05-18 NOTE — PROGRESS NOTES
[facility]    Cardiology Progress Note    Primary Cardiologist: Dr. Hope and Mackenzie Tejada, NP; Dr. Curiel ()    Date of Admission: 05/14/2020  Service date: 05/18/2020    Summary:  Mr. 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     Assessment & Plan   1. Acute on chronic systolic and diastolic congestive heart failure  - PTA on bumex 2 mg in the AM, 1 mg at lunch, and 1 mg in the evening as well as carvedilol 6.25 mg BID.   - CHELLE 01/2020 with LVEF 45-50%, Severe MR and TR with mild MS.  - CXR with no evidence of edema.  - NTproBNP of 718 on admission.  - Diuresing with IV bumex at 0.75 mg/hr  - Started on chlorthalidone 25 mg daily and imdur 30 mg daily.  - Net 3.3 L output since admission.   - Admit wt 214 lbs, down to 208 lbs today. Suspected dry wt of around 198-200 lbs.  2. Severe MR status post mitral clip x2 and Severe TR  - CHELLE 01/2020 shows severe 4+ MR despite MitraClip x2, severe posteriorly directed MR.  Mild MS, mean gradient 2.9 mmHg.  - CHELLE 01/2020 shows Severe 4+ TR with RVSP 32.7 mmHg + RAP, mild PH (35-45 mmHg), RV function underestimated due to TR velocity  - Diuresing with IV Bumex as above.  - Started on imdur as above for afterload reduction and assistance in symptomatic severe MR.  3. Permanent atrial fibrillation with history of symptomatic bradycardia status post PPM  - Rate controlled with Carvedilol, Watchman device.  - Battery near end of life, generator change scheduled for Jumana 3.   -  Follows Dr. Curiel as an outpatient.  4. Coronary artery disease  - Mild, non-obstructive CAD on coronary angiogram in 04/2019.  - Continue PTA aspirin 81 mg daily and simvastatin 20 mg daily  5. History of CVA  - Status post Watchman device implant in 12/2019.  6. Pancytopenia, secondary to hypocellular marrow  - WBC 3.5, Hgb 8.7, Plt 93 (near baseline).  - Follows Dr. Chavez as an outpatient.   7. Chronic Kidney Disease   - Baseline creatinine of around 1.2.  - Creatinine trending up slightly to 1.4 today.     Plan:   1. Continue diuresis with IV bumex 0.75 mg/hr. Likely needs several more days of IV diuresis yet. Could consider increasing rate to 1 mg/hr.   2. Continue with daily weights, strict I&Os, low sodium diet, and close monitoring or renal function and electrolytes.  3. We discussed the [facility] and he was open to establishing care with [facility] [place] for close outpatient follow up in 1 week post discharge with a repeat BMP and NT pro BNP beforehand.    Disposition Plan   Expected discharge in 2-3 days to TBD pending further diuresis.     Entered: Venkatesh Cuellar 05/18/2020, 9:58 AM     Interval History   Patient is resting comfortably. He denies shortness of breath or chest pain. He continues to have significant lower extremity edema from his ankles to his thighs and scrotum, which he notes is maybe 15% improved since his admission.    Telemetry:   A.Fib with CVR     Thank you for the opportunity to participate in this pleasant patient's care.     Vick Cuellra, APRN, CNP   Text Page  (8am - 5pm, M-F)    Patient Active Problem List   Diagnosis     Atrial fibrillation (H)     Pulmonary hypertension (H)     CAD (coronary artery disease)     BPH (benign prostatic hyperplasia)     Ischemic cardiomyopathy     JIM (obstructive sleep apnea)- mild/moderate     Vitamin D deficiency     Gastroesophageal reflux disease without esophagitis     Hydrocele, unspecified hydrocele type     Slow transit constipation      Hypercholesterolemia     Venous stasis dermatitis of both lower extremities     Lymphedema of both lower extremities     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      Coronary artery disease due to lipid rich plaque     Acquired hypothyroidism     Onychomycosis     PAD (peripheral artery disease) (H)     Xerosis cutis     AK (actinic keratosis)     Angioma of skin     Acute systolic CHF (congestive heart failure) (H)     CHF (congestive heart failure) (H)     Non-rheumatic mitral regurgitation     Mitral regurgitation     CKD (chronic kidney disease) stage 3, GFR 30-59 ml/min (H)     Bullous pemphigoid     Chronic atrial fibrillation     Presence of Watchman left atrial appendage closure device     Sick sinus syndrome (H)     Cardiac pacemaker in situ     Acute combined systolic and diastolic congestive heart failure (H)     Physical Exam   Temp: 97.8  F (36.6  C) Temp src: Oral BP: 105/47 Pulse: 76 Heart Rate: 70 Resp: 16 SpO2: 90 % O2 Device: None (Room air)    Vitals:    05/16/20 0159 05/17/20 0400 05/18/20 0340   Weight: 95.3 kg (210 lb) 95 kg (209 lb 8 oz) 94.7 kg (208 lb 11.2 oz)     Vital Signs with Ranges  Temp:  [97.7  F (36.5  C)-98.4  F (36.9  C)] 97.8  F (36.6  C)  Pulse:  [65-76] 76  Heart Rate:  [61-71] 70  Resp:  [15-16] 16  BP: ()/(47-68) 105/47  SpO2:  [90 %-93 %] 90 %  I/O last 3 completed shifts:  In: 570 [P.O.:570]  Out: 1750 [Urine:1750]    Constitutional: Appears younger than his stated age, well nourished, and in no acute distress.  Eyes: Pupils equal, round. Sclerae anicteric.   HEENT: Normocephalic, atraumatic.    Neck: Supple. JVP elevated to the level of the jaw.  Respiratory: Breathing non-labored. Lungs clear to auscultation bilaterally. No crackles, wheezes, rhonchi, or rales.  Cardiovascular: Irregularly irregular rhythm, controlled rate. No murmur, rub, or gallop.  GI: Soft, non-distended, non-tender.  Skin: Warm, dry.   Musculoskeletal/Extremities: Moves all  extremities well and symmetrically. 2+ weeping edema from the ankles to thighs bilaterally.  Neurologic: No gross focal deficits. Alert, awake, and oriented to person, place and time.  Psychiatric: Affect appropriate. Mentation normal.    Medications     bumetanide 0.75 mg/hr (05/18/20 0800)     - MEDICATION INSTRUCTIONS -       ACE/ARB/ARNI NOT PRESCRIBED         aspirin  81 mg Oral Daily     buPROPion  300 mg Oral QAM     carvedilol  6.25 mg Oral BID w/meals     chlorthalidone  25 mg Oral Daily     emollient   Topical BID     finasteride  5 mg Oral Daily     isosorbide mononitrate  30 mg Oral Daily     levothyroxine  100 mcg Oral QAM AC     omeprazole  40 mg Oral Daily     polyethylene glycol  17 g Oral Daily     potassium chloride ER  20 mEq Oral BID     simvastatin  20 mg Oral Daily     sodium chloride (PF)  3 mL Intracatheter Q8H     sucralfate  1 g Oral BID     tamsulosin  0.4 mg Oral Daily     Data   Results for orders placed or performed during the hospital encounter of 05/14/20 (from the past 24 hour(s))   Basic metabolic panel   Result Value Ref Range    Sodium 140 133 - 144 mmol/L    Potassium 3.4 3.4 - 5.3 mmol/L    Chloride 102 94 - 109 mmol/L    Carbon Dioxide 30 20 - 32 mmol/L    Anion Gap 8 3 - 14 mmol/L    Glucose 129 (H) 70 - 99 mg/dL    Urea Nitrogen 24 7 - 30 mg/dL    Creatinine 1.38 (H) 0.66 - 1.25 mg/dL    GFR Estimate 45 (L) >60 mL/min/[1.73_m2]    GFR Estimate If Black 52 (L) >60 mL/min/[1.73_m2]    Calcium 9.1 8.5 - 10.1 mg/dL   Basic metabolic panel   Result Value Ref Range    Sodium 142 133 - 144 mmol/L    Potassium 3.2 (L) 3.4 - 5.3 mmol/L    Chloride 104 94 - 109 mmol/L    Carbon Dioxide 32 20 - 32 mmol/L    Anion Gap 6 3 - 14 mmol/L    Glucose 93 70 - 99 mg/dL    Urea Nitrogen 24 7 - 30 mg/dL    Creatinine 1.41 (H) 0.66 - 1.25 mg/dL    GFR Estimate 44 (L) >60 mL/min/[1.73_m2]    GFR Estimate If Black 51 (L) >60 mL/min/[1.73_m2]    Calcium 8.9 8.5 - 10.1 mg/dL     This note was completed  in part using Dragon voice recognition software. Although reviewed after completion, some word and grammatical errors may occur.

## 2020-05-19 ENCOUNTER — APPOINTMENT (OUTPATIENT)
Dept: OCCUPATIONAL THERAPY | Facility: CLINIC | Age: 85
DRG: 292 | End: 2020-05-19
Payer: COMMERCIAL

## 2020-05-19 ENCOUNTER — MEDICAL CORRESPONDENCE (OUTPATIENT)
Dept: HEALTH INFORMATION MANAGEMENT | Facility: CLINIC | Age: 85
End: 2020-05-19

## 2020-05-19 ENCOUNTER — TELEPHONE (OUTPATIENT)
Dept: FAMILY MEDICINE | Facility: CLINIC | Age: 85
End: 2020-05-19

## 2020-05-19 LAB
ANION GAP SERPL CALCULATED.3IONS-SCNC: 6 MMOL/L (ref 3–14)
BUN SERPL-MCNC: 30 MG/DL (ref 7–30)
CALCIUM SERPL-MCNC: 9.1 MG/DL (ref 8.5–10.1)
CHLORIDE SERPL-SCNC: 100 MMOL/L (ref 94–109)
CO2 SERPL-SCNC: 32 MMOL/L (ref 20–32)
CREAT SERPL-MCNC: 1.4 MG/DL (ref 0.66–1.25)
ERYTHROCYTE [DISTWIDTH] IN BLOOD BY AUTOMATED COUNT: 17.6 % (ref 10–15)
GFR SERPL CREATININE-BSD FRML MDRD: 44 ML/MIN/{1.73_M2}
GLUCOSE SERPL-MCNC: 90 MG/DL (ref 70–99)
HCT VFR BLD AUTO: 24.8 % (ref 40–53)
HGB BLD-MCNC: 7.8 G/DL (ref 13.3–17.7)
MAGNESIUM SERPL-MCNC: 2.2 MG/DL (ref 1.6–2.3)
MCH RBC QN AUTO: 30.4 PG (ref 26.5–33)
MCHC RBC AUTO-ENTMCNC: 31.5 G/DL (ref 31.5–36.5)
MCV RBC AUTO: 97 FL (ref 78–100)
PHOSPHATE SERPL-MCNC: 3.7 MG/DL (ref 2.5–4.5)
PLATELET # BLD AUTO: 98 10E9/L (ref 150–450)
POTASSIUM SERPL-SCNC: 2.7 MMOL/L (ref 3.4–5.3)
POTASSIUM SERPL-SCNC: 2.9 MMOL/L (ref 3.4–5.3)
POTASSIUM SERPL-SCNC: 3.5 MMOL/L (ref 3.4–5.3)
RBC # BLD AUTO: 2.57 10E12/L (ref 4.4–5.9)
SODIUM SERPL-SCNC: 138 MMOL/L (ref 133–144)
WBC # BLD AUTO: 3.4 10E9/L (ref 4–11)

## 2020-05-19 PROCEDURE — 84100 ASSAY OF PHOSPHORUS: CPT | Performed by: HOSPITALIST

## 2020-05-19 PROCEDURE — 99232 SBSQ HOSP IP/OBS MODERATE 35: CPT | Performed by: INTERNAL MEDICINE

## 2020-05-19 PROCEDURE — 84132 ASSAY OF SERUM POTASSIUM: CPT | Performed by: PATHOLOGY

## 2020-05-19 PROCEDURE — 85027 COMPLETE CBC AUTOMATED: CPT | Performed by: HOSPITALIST

## 2020-05-19 PROCEDURE — 97530 THERAPEUTIC ACTIVITIES: CPT | Mod: GO

## 2020-05-19 PROCEDURE — 36415 COLL VENOUS BLD VENIPUNCTURE: CPT | Performed by: PATHOLOGY

## 2020-05-19 PROCEDURE — 25000125 ZZHC RX 250: Performed by: INTERNAL MEDICINE

## 2020-05-19 PROCEDURE — 25000132 ZZH RX MED GY IP 250 OP 250 PS 637: Performed by: INTERNAL MEDICINE

## 2020-05-19 PROCEDURE — 84132 ASSAY OF SERUM POTASSIUM: CPT | Performed by: INTERNAL MEDICINE

## 2020-05-19 PROCEDURE — 83735 ASSAY OF MAGNESIUM: CPT | Performed by: HOSPITALIST

## 2020-05-19 PROCEDURE — 36415 COLL VENOUS BLD VENIPUNCTURE: CPT | Performed by: INTERNAL MEDICINE

## 2020-05-19 PROCEDURE — 21000001 ZZH R&B HEART CARE

## 2020-05-19 PROCEDURE — 80048 BASIC METABOLIC PNL TOTAL CA: CPT | Performed by: HOSPITALIST

## 2020-05-19 PROCEDURE — 84132 ASSAY OF SERUM POTASSIUM: CPT | Performed by: HOSPITALIST

## 2020-05-19 PROCEDURE — 25000132 ZZH RX MED GY IP 250 OP 250 PS 637: Performed by: HOSPITALIST

## 2020-05-19 PROCEDURE — 36415 COLL VENOUS BLD VENIPUNCTURE: CPT | Performed by: HOSPITALIST

## 2020-05-19 PROCEDURE — 25000132 ZZH RX MED GY IP 250 OP 250 PS 637: Performed by: NURSE PRACTITIONER

## 2020-05-19 RX ORDER — POTASSIUM CHLORIDE 1500 MG/1
20 TABLET, EXTENDED RELEASE ORAL 3 TIMES DAILY
Status: DISCONTINUED | OUTPATIENT
Start: 2020-05-19 | End: 2020-05-22 | Stop reason: HOSPADM

## 2020-05-19 RX ORDER — SPIRONOLACTONE 25 MG
12.5 TABLET ORAL DAILY
Status: DISCONTINUED | OUTPATIENT
Start: 2020-05-19 | End: 2020-05-19

## 2020-05-19 RX ORDER — SPIRONOLACTONE 25 MG/1
25 TABLET ORAL DAILY
Status: DISCONTINUED | OUTPATIENT
Start: 2020-05-19 | End: 2020-05-22 | Stop reason: HOSPADM

## 2020-05-19 RX ADMIN — OMEPRAZOLE 40 MG: 20 CAPSULE, DELAYED RELEASE ORAL at 10:28

## 2020-05-19 RX ADMIN — CARVEDILOL 6.25 MG: 6.25 TABLET, FILM COATED ORAL at 10:26

## 2020-05-19 RX ADMIN — CARVEDILOL 6.25 MG: 6.25 TABLET, FILM COATED ORAL at 18:52

## 2020-05-19 RX ADMIN — POTASSIUM CHLORIDE 40 MEQ: 1500 TABLET, EXTENDED RELEASE ORAL at 10:27

## 2020-05-19 RX ADMIN — BUPROPION HYDROCHLORIDE 300 MG: 150 TABLET, FILM COATED, EXTENDED RELEASE ORAL at 10:25

## 2020-05-19 RX ADMIN — SIMVASTATIN 20 MG: 20 TABLET, FILM COATED ORAL at 10:28

## 2020-05-19 RX ADMIN — SUCRALFATE 1 G: 1 TABLET ORAL at 10:25

## 2020-05-19 RX ADMIN — LEVOTHYROXINE SODIUM 100 MCG: 100 TABLET ORAL at 10:25

## 2020-05-19 RX ADMIN — BUMETANIDE 1 MG/HR: 0.25 INJECTION INTRAMUSCULAR; INTRAVENOUS at 01:35

## 2020-05-19 RX ADMIN — POTASSIUM CHLORIDE 40 MEQ: 1500 TABLET, EXTENDED RELEASE ORAL at 07:54

## 2020-05-19 RX ADMIN — ASPIRIN 81 MG: 81 TABLET, DELAYED RELEASE ORAL at 10:25

## 2020-05-19 RX ADMIN — SPIRONOLACTONE 25 MG: 25 TABLET ORAL at 18:52

## 2020-05-19 RX ADMIN — CHLORTHALIDONE 25 MG: 25 TABLET ORAL at 10:25

## 2020-05-19 RX ADMIN — POTASSIUM CHLORIDE 40 MEQ: 1500 TABLET, EXTENDED RELEASE ORAL at 15:30

## 2020-05-19 RX ADMIN — POTASSIUM CHLORIDE 20 MEQ: 1500 TABLET, EXTENDED RELEASE ORAL at 21:36

## 2020-05-19 RX ADMIN — FINASTERIDE 5 MG: 5 TABLET, FILM COATED ORAL at 10:27

## 2020-05-19 RX ADMIN — Medication: at 10:35

## 2020-05-19 RX ADMIN — TAMSULOSIN HYDROCHLORIDE 0.4 MG: 0.4 CAPSULE ORAL at 10:28

## 2020-05-19 RX ADMIN — BUMETANIDE 1 MG/HR: 0.25 INJECTION INTRAMUSCULAR; INTRAVENOUS at 15:29

## 2020-05-19 RX ADMIN — SUCRALFATE 1 G: 1 TABLET ORAL at 21:01

## 2020-05-19 RX ADMIN — POTASSIUM CHLORIDE 40 MEQ: 1500 TABLET, EXTENDED RELEASE ORAL at 18:52

## 2020-05-19 ASSESSMENT — ACTIVITIES OF DAILY LIVING (ADL)
ADLS_ACUITY_SCORE: 18
ADLS_ACUITY_SCORE: 17

## 2020-05-19 ASSESSMENT — MIFFLIN-ST. JEOR: SCORE: 1676.42

## 2020-05-19 NOTE — PLAN OF CARE
Discharge Planner OT   Patient plan for discharge: home with family A    Current status:  SBA for supine > sit. /62.  Pt ambulated x 300 ft with SBA- CGA and FWW, slow but steady. Pt completed 15 stairs with CGA and use of B railing. One rest break needed. Educated on home safety and having someone nearby for stair navigation. BP at end of session 118/69.     Barriers to return to prior living situation: none anticipated    Recommendations for discharge: home with family assist as needed for LE dressing, IADL's, CGA for stair navigation.     Rationale for recommendations: pt has very supportive family available to assist as needed. Recommend use of walker and CGA for stair navigation.        Entered by: Yulia Michael 05/19/2020 11:04 AM

## 2020-05-19 NOTE — PROGRESS NOTES
[facility]    Cardiology Progress Note    Primary Cardiologist: Dr. Hope and Mackenzie Tejada, NP; Dr. Curiel ()    Date of Admission: 05/14/2020  Service date: 05/19/2020    Summary:  Mr. 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     Assessment & Plan   1. Acute on chronic systolic and diastolic congestive heart failure  - PTA on bumex 2 mg in the AM, 1 mg at lunch, and 1 mg in the evening as well as carvedilol 6.25 mg BID.   - Echo yesterday with LVEF 45-50%, mild MR and moderate TR.  - CXR with no evidence of edema.  - NTproBNP of 718 on admission.  - Diuresing with IV bumex at 1 mg/hr, increased from 0.75 mg/hr yesterday.  - Net 4.1 L output since admission.   - Admit wt 214 lbs, down to 207 lbs today. Suspected dry wt of around 198-200 lbs.  2. Mitral regurgitation status post mitral clip x2 and moderate TR  - Echocardiogram yesterday showing Mitral regurgitation looking much better, estimated as mild, rather than severe by CHELLE in January 2020.  - Tricuspid regurgitation also looks much better, estimated as moderate, rather than severe by CHELLE in January 2020.  - Diuresing with IV Bumex as above.  3. Permanent atrial fibrillation with history of symptomatic bradycardia status post PPM  - Rate controlled with carvedilol. Watchman device in place for stroke prophylaxis.  - Battery near end of life; generator change scheduled for Jumana 3.   - Follows Dr. Curiel as an outpatient.  4. Coronary artery disease  - Mild,  non-obstructive CAD on coronary angiogram in 04/2019.  - Continue PTA aspirin 81 mg daily and simvastatin 20 mg daily  5. History of CVA  - Status post Watchman device implant in 12/2019.  6. Pancytopenia, secondary to hypocellular marrow  - WBC 3.5, Hgb 8.7, Plt 93 (near baseline).  - Follows Dr. Chavez as an outpatient.   7. Chronic Kidney Disease   - Baseline creatinine of around 1.2.  - Creatinine stable around 1.3-1.4 this admission.      Plan:   1. Continue diuresis with IV bumex 1 mg/hr. Likely needs several more days of IV diuresis yet.   2. Continue with daily weights, strict I&Os, low sodium diet, and close monitoring or renal function and electrolytes.  3. Will arrange for close outpatient follow up in 1 week post discharge with a repeat BMP beforehand and for him to later establish with the [facility].    Disposition Plan   Expected discharge in 2-3 days to TBD pending further diuresis.     Entered: Venkatesh Cuellar 05/19/2020, 8:03 AM     Interval History   Patient is resting comfortably. He denies shortness of breath or chest pain. He continues to have significant lower extremity edema from his ankles to his thighs and scrotum, which continues to improve.     Telemetry:   A.Fib with CVR     Thank you for the opportunity to participate in this pleasant patient's care.     Vick Cuellar, WILLIS, CNP   Text Page  (8am - 5pm, M-F)    Patient Active Problem List   Diagnosis     Atrial fibrillation (H)     Pulmonary hypertension (H)     CAD (coronary artery disease)     BPH (benign prostatic hyperplasia)     Ischemic cardiomyopathy     JIM (obstructive sleep apnea)- mild/moderate     Vitamin D deficiency     Gastroesophageal reflux disease without esophagitis     Hydrocele, unspecified hydrocele type     Slow transit constipation     Hypercholesterolemia     Venous stasis dermatitis of both lower extremities     Lymphedema of both lower extremities     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       Coronary artery disease due to lipid rich plaque     Acquired hypothyroidism     Onychomycosis     PAD (peripheral artery disease) (H)     Xerosis cutis     AK (actinic keratosis)     Angioma of skin     Acute systolic CHF (congestive heart failure) (H)     CHF (congestive heart failure) (H)     Non-rheumatic mitral regurgitation     Mitral regurgitation     CKD (chronic kidney disease) stage 3, GFR 30-59 ml/min (H)     Bullous pemphigoid     Chronic atrial fibrillation     Presence of Watchman left atrial appendage closure device     Sick sinus syndrome (H)     Cardiac pacemaker in situ     Acute combined systolic and diastolic congestive heart failure (H)     Physical Exam   Temp: 98  F (36.7  C) Temp src: Oral BP: 107/64 Pulse: 65 Heart Rate: 67 Resp: 18 SpO2: 92 % O2 Device: None (Room air)    Vitals:    05/17/20 0400 05/18/20 0340 05/19/20 0500   Weight: 95 kg (209 lb 8 oz) 94.7 kg (208 lb 11.2 oz) 94.2 kg (207 lb 9.6 oz)     Vital Signs with Ranges  Temp:  [98  F (36.7  C)-98.2  F (36.8  C)] 98  F (36.7  C)  Pulse:  [65] 65  Heart Rate:  [67] 67  Resp:  [16-18] 18  BP: (104-107)/(60-65) 107/64  SpO2:  [91 %-92 %] 92 %  I/O last 3 completed shifts:  In: 1112 [P.O.:1080; I.V.:32]  Out: 1975 [Urine:1975]    Constitutional: Appears younger than his stated age, well nourished, and in no acute distress.  Eyes: Pupils equal, round. Sclerae anicteric.   HEENT: Normocephalic, atraumatic.    Respiratory: Breathing non-labored. Lungs clear to auscultation bilaterally. No crackles, wheezes, rhonchi, or rales.  Cardiovascular: Irregularly irregular rhythm, controlled rate. No murmur, rub, or gallop.  GI: Soft, non-distended, non-tender.  Skin: Warm, dry. Significant scrotal edema.  Musculoskeletal/Extremities: Moves all extremities well and symmetrically. 2+ edema from the ankles to thighs bilaterally.  Neurologic: No gross focal deficits. Alert, awake, and oriented to person, place and time.  Psychiatric: Affect  appropriate. Mentation normal.    Medications     bumetanide 1 mg/hr (05/19/20 0135)     - MEDICATION INSTRUCTIONS -       ACE/ARB/ARNI NOT PRESCRIBED         aspirin  81 mg Oral Daily     buPROPion  300 mg Oral QAM     carvedilol  6.25 mg Oral BID w/meals     chlorthalidone  25 mg Oral Daily     emollient   Topical BID     finasteride  5 mg Oral Daily     levothyroxine  100 mcg Oral QAM AC     omeprazole  40 mg Oral Daily     polyethylene glycol  17 g Oral Daily     potassium chloride ER  20 mEq Oral BID     simvastatin  20 mg Oral Daily     sodium chloride (PF)  3 mL Intracatheter Q8H     sucralfate  1 g Oral BID     tamsulosin  0.4 mg Oral Daily     Data   Results for orders placed or performed during the hospital encounter of 05/14/20 (from the past 24 hour(s))   Potassium   Result Value Ref Range    Potassium 3.4 3.4 - 5.3 mmol/L   Basic metabolic panel   Result Value Ref Range    Sodium 138 133 - 144 mmol/L    Potassium 2.7 (L) 3.4 - 5.3 mmol/L    Chloride 100 94 - 109 mmol/L    Carbon Dioxide 32 20 - 32 mmol/L    Anion Gap 6 3 - 14 mmol/L    Glucose 90 70 - 99 mg/dL    Urea Nitrogen 30 7 - 30 mg/dL    Creatinine 1.40 (H) 0.66 - 1.25 mg/dL    GFR Estimate 44 (L) >60 mL/min/[1.73_m2]    GFR Estimate If Black 51 (L) >60 mL/min/[1.73_m2]    Calcium 9.1 8.5 - 10.1 mg/dL   CBC with platelets   Result Value Ref Range    WBC 3.4 (L) 4.0 - 11.0 10e9/L    RBC Count 2.57 (L) 4.4 - 5.9 10e12/L    Hemoglobin 7.8 (L) 13.3 - 17.7 g/dL    Hematocrit 24.8 (L) 40.0 - 53.0 %    MCV 97 78 - 100 fl    MCH 30.4 26.5 - 33.0 pg    MCHC 31.5 31.5 - 36.5 g/dL    RDW 17.6 (H) 10.0 - 15.0 %    Platelet Count 98 (L) 150 - 450 10e9/L   Magnesium   Result Value Ref Range    Magnesium 2.2 1.6 - 2.3 mg/dL   Phosphorus   Result Value Ref Range    Phosphorus 3.7 2.5 - 4.5 mg/dL     This note was completed in part using Dragon voice recognition software. Although reviewed after completion, some word and grammatical errors may occur.

## 2020-05-19 NOTE — PLAN OF CARE
VSS. Sating well on RA. Pt up w/ Ax1 and walker. HR Afib CVR. No complaints of pain. Pt w/ severe scrotal edema, +3 edema to BLE.

## 2020-05-19 NOTE — PROGRESS NOTES
[facility]  Hospitalist Progress Note  Kareem Murillo MD  05/19/2020    Assessment & Plan   Bairon Foster is a 89 year old male who was admitted on 5/14/2020 with worsening lower extremity edema. After evaluation in the ER, there was concern for acute on chronic systolic and diastolic congestive heart failure. He was given IV bumex and was admitted to the hospitalist service for further evaluation and management.     Acute on chronic systolic and diastolic congestive heart failure  Severe MR s/p mitraclip  Severe TR  Dry weight is 198, on admit was 215. PTA bumex 4mg daily, coreg 6.25mg BID. Noted worsening edema, but not yet short of breath. Received IV bumex in the ER. He does not tolerate furosemide, torsemide, or spironolactone as they exacerbate his bullous pemphigoid. Echo 5/15 shows EF 46%, massive biatrial enlargement. mitraclip and pacemaker leads visualized and dilated IVC.  - Bumex gtt, 0.5mg/hr with poor diuresis on 5/16--increased gtt to 0.75mg/hr  On 5/17--then up to 1mg/hr on 5/18  - Imdur 30mg added 5/17, discontinued on 5/18  - Monitor intake & output and daily weights (weight down to 208 5/16)  - Cardiology consulted  - Continue PTA coreg   - Chlorthalidone 25mg daily started 5/15  - Consult lymphedema wrap  - [facility] consult     Hypokalemia  Secondary to diuresis. On scheduled 20meq KCl BID  - Replete per protocol     Permanent atrial fibrillation  Did not tolerate anticoagulation due to frequent epistaxis and high fall risk. S/p Watchman device on 12/3/19. Permanent pacemaker in place. Due for generator change in June 2020.  - Continue PTA aspirin.  - Continue PTA carvedilol with hold parameters     Coronary artery disease  - Continue PTA aspirin, carvedilol, and simvastatin.     Pancytopenia  - Secondary to hypocellular marrow.  - Counts are at about baseline on admit (hgb 8.7, WBC 3.5, plt 93).  - Followed by Dr. Chavez as an outpatient.     Chronic kidney disease, stage 3  -  "Baseline creatinine is about 1.2.  - Slight increase in Cr with diuresis, but only up to 1.41 on 5/18  - Recheck labs in AM     Hypothyroidism  - Continue PTA levothyroxine.     BPH  - Continue PTA finasteride and tamsulosin.     GERD  - Continue PTA omeprazole and sucralfate.     Bullous pemphigoid  - Continue Vanicream to bilateral lower extremities twice daily.  - Consult wound care nurse.  - [facility].     History of CVA  - Continue PTA aspirin and simvastatin.     Lymphedema  - Continue lower extremity wraps.  - Lymphedemia consult added     DVT Prophylaxis: Pneumatic Compression Devices  Code Status: Full Code  Expected discharge: 2 - 3 days, recommended to prior living arrangement pending transition to PO diuretics       Interval History   -- chart reviewed  -- noted ongoing diuresis with increased bumer dosing  -- noted K worse, net 800 ml diuresis yesterday.    -Data reviewed today: I reviewed all new labs and imaging over the last 24 hours. I personally reviewed no images or EKG's today.    Physical Exam   Heart Rate: 67, Blood pressure 107/64, pulse 65, temperature (P) 97.3  F (36.3  C), temperature source (P) Oral, resp. rate (P) 18, height 1.88 m (6' 2\"), weight 94.2 kg (207 lb 9.6 oz), SpO2 92 %.  Vitals:    05/17/20 0400 05/18/20 0340 05/19/20 0500   Weight: 95 kg (209 lb 8 oz) 94.7 kg (208 lb 11.2 oz) 94.2 kg (207 lb 9.6 oz)     Vital Signs with Ranges  Temp:  [97.3  F (36.3  C)-98.2  F (36.8  C)] (P) 97.3  F (36.3  C)  Pulse:  [65] 65  Heart Rate:  [67] 67  Resp:  [16-18] (P) 18  BP: (104-107)/(60-65) 107/64  SpO2:  [91 %-92 %] 92 %  I/O's Last 24 hours  I/O last 3 completed shifts:  In: 1112 [P.O.:1080; I.V.:32]  Out: 1975 [Urine:1975]    Constitutional: Awake, alert, cooperative, no apparent distress  Respiratory: Clear to auscultation bilaterally, no crackles or wheezing  Cardiovascular: Regular rate and rhythm, normal S1 and S2, and + murmur " noted  GI: Normal bowel sounds, soft, non-distended, non-tender  Skin/Integumen: No rashes, no cyanosis, +++ edema  Other:      Medications   All medications were reviewed.    bumetanide 1 mg/hr (05/19/20 0135)     - MEDICATION INSTRUCTIONS -       ACE/ARB/ARNI NOT PRESCRIBED         aspirin  81 mg Oral Daily     buPROPion  300 mg Oral QAM     carvedilol  6.25 mg Oral BID w/meals     chlorthalidone  25 mg Oral Daily     emollient   Topical BID     finasteride  5 mg Oral Daily     levothyroxine  100 mcg Oral QAM AC     omeprazole  40 mg Oral Daily     polyethylene glycol  17 g Oral Daily     potassium chloride ER  20 mEq Oral BID     simvastatin  20 mg Oral Daily     sodium chloride (PF)  3 mL Intracatheter Q8H     sucralfate  1 g Oral BID     tamsulosin  0.4 mg Oral Daily        Data   Recent Labs   Lab 05/19/20  0626 05/18/20  1139 05/18/20  0538 05/17/20  1952 05/17/20  0557  05/16/20  0551  05/15/20  1200 05/14/20  1848   WBC 3.4*  --   --   --  3.2*  --  2.9*  --  3.1* 3.5*   HGB 7.8*  --   --   --  8.0*  --  7.9*  --  8.3* 8.7*   MCV 97  --   --   --  97  --  97  --  98 98   PLT 98*  --   --   --  101*  --  88*  --  95* 93*     --  142 140 141  --  141   < > 139 141   POTASSIUM 2.7* 3.4 3.2* 3.4 3.9   < > 3.1*   < > 3.6 3.7   CHLORIDE 100  --  104 102 104  --  104   < > 104 106   CO2 32  --  32 30 31  --  30   < > 30 30   BUN 30  --  24 24 20  --  20   < > 19 18   CR 1.40*  --  1.41* 1.38* 1.27*  --  1.29*   < > 1.22 1.25   ANIONGAP 6  --  6 8 6  --  7   < > 5 5   TEODORO 9.1  --  8.9 9.1 9.1  --  8.8   < > 8.9 9.1   GLC 90  --  93 129* 96  --  93   < > 105* 96   ALBUMIN  --   --   --   --   --   --   --   --  2.9* 3.1*   PROTTOTAL  --   --   --   --   --   --   --   --   --  6.6*   BILITOTAL  --   --   --   --   --   --   --   --   --  1.2   ALKPHOS  --   --   --   --   --   --   --   --   --  85   ALT  --   --   --   --   --   --   --   --   --  13   AST  --   --   --   --   --   --   --   --   --  29    <  > = values in this interval not displayed.       No results found for this or any previous visit (from the past 24 hour(s)).    Kareem Murillo MD  Text Page  (7am to 6pm)

## 2020-05-19 NOTE — TELEPHONE ENCOUNTER
Reason for Call:  Form, our goal is to have forms completed with 72 hours, however, some forms may require a visit or additional information.    Type of letter, form or note:  medical    Who is the form from?: Home care    Where did the form come from: form was faxed in    What clinic location was the form placed at?: [facility]    Where the form was placed: Given to physician    What number is listed as a contact on the form?: [phone] (P) [phone]       Additional comments: [employer]: Wound on right foot    Call taken on 5/19/2020 at 11:52 AM by Rose Sinha

## 2020-05-19 NOTE — PLAN OF CARE
Heart Failure Care Pathway  GOALS TO BE MET BEFORE DISCHARGE:    1. Decrease congestion and/or edema with diuretic therapy to achieve near      optimal volume status.            Dyspnea improved:  Yes            Edema improved:     Yes        Net I/O and Weights since admission:          04/19 0700 - 05/19 0659  In: 3811 [P.O.:3760; I.V.:51]  Out: 7575 [Urine:7575]  Net: -3764            Vitals:    05/14/20 1745 05/14/20 2217 05/15/20 0635 05/16/20 0159   Weight: 90.7 kg (200 lb) 97.4 kg (214 lb 12.8 oz) 97.5 kg (215 lb) 95.3 kg (210 lb)    05/17/20 0400 05/18/20 0340   Weight: 95 kg (209 lb 8 oz) 94.7 kg (208 lb 11.2 oz)       2.  O2 sats > 92% on RA or at prior home O2 therapy level.          Current oxygenation status:       SpO2: 91 %         O2 Device: None (Room air),            Able to wean O2 this shift to keep sats > 92%:  Yes       Does patient use Home O2? No    3.  Tolerates ambulation and mobility near baseline: Yes        How many times did the patient ambulate with nursing staff this shift? 4    Please review the Heart Failure Care Pathway for additional HF goal outcomes.    Sandra Henry RN RN  5/18/2020   Neuro:intact, forgetful  CV/Rhythm:a fib controlled  Resp/02:ra  GI/Diet:BM  :voiding  Skin/Incisions/Sites: jordyn BLE, WOC R foot and digit  Pulses/CMS:itact  Edema:BLE and scrotum  Activity/Falls Risk:gait belt,walker, x 1 assist  Lines/Drains/IVs:piv  Labs/BGM:K 3.4  Test/Procedures:bumex gtt  VS/Pain:stable, denies pain  DC Plan:2-3 more days  Other:increased bumex tonight

## 2020-05-19 NOTE — PLAN OF CARE
Heart Failure Care Pathway  GOALS TO BE MET BEFORE DISCHARGE:    1. Decrease congestion and/or edema with diuretic therapy to achieve near      optimal volume status.            Dyspnea improved:  Yes            Edema improved:     Yes        Net I/O and Weights since admission:          04/19 1500 - 05/19 1459  In: 4051 [P.O.:4000; I.V.:51]  Out: 8825 [Urine:8825]  Net: -4774            Vitals:    05/14/20 1745 05/14/20 2217 05/15/20 0635 05/16/20 0159   Weight: 90.7 kg (200 lb) 97.4 kg (214 lb 12.8 oz) 97.5 kg (215 lb) 95.3 kg (210 lb)    05/17/20 0400 05/18/20 0340 05/19/20 0500   Weight: 95 kg (209 lb 8 oz) 94.7 kg (208 lb 11.2 oz) 94.2 kg (207 lb 9.6 oz)       2.  O2 sats > 92% on RA or at prior home O2 therapy level.          Current oxygenation status:       SpO2: 92 %         O2 Device: None (Room air),            Able to wean O2 this shift to keep sats > 92%:  Yes       Does patient use Home O2? No    3.  Tolerates ambulation and mobility near baseline: yes.  please explain:         How many times did the patient ambulate with nursing staff this shift? 2    Please review the Heart Failure Care Pathway for additional HF goal outcomes.    Ne Campbell RN RN  5/19/2020       Pt here with chf. A&O, forgetful.   VSS. Tele afib cvr.   Cardiac diet,  Up with sba and walker. Denies pain. Pt scoring green on the Aggression Stop Light Tool. Plan:  continue bumex gtt.

## 2020-05-20 ENCOUNTER — APPOINTMENT (OUTPATIENT)
Dept: OCCUPATIONAL THERAPY | Facility: CLINIC | Age: 85
DRG: 292 | End: 2020-05-20
Payer: COMMERCIAL

## 2020-05-20 LAB
ABO + RH BLD: NORMAL
ABO + RH BLD: NORMAL
ANION GAP SERPL CALCULATED.3IONS-SCNC: 5 MMOL/L (ref 3–14)
BLD GP AB SCN SERPL QL: NORMAL
BLD PROD TYP BPU: NORMAL
BLD PROD TYP BPU: NORMAL
BLD UNIT ID BPU: 0
BLOOD BANK CMNT PATIENT-IMP: NORMAL
BLOOD PRODUCT CODE: NORMAL
BPU ID: NORMAL
BUN SERPL-MCNC: 34 MG/DL (ref 7–30)
CALCIUM SERPL-MCNC: 9.2 MG/DL (ref 8.5–10.1)
CHLORIDE SERPL-SCNC: 100 MMOL/L (ref 94–109)
CO2 SERPL-SCNC: 33 MMOL/L (ref 20–32)
CREAT SERPL-MCNC: 1.42 MG/DL (ref 0.66–1.25)
ERYTHROCYTE [DISTWIDTH] IN BLOOD BY AUTOMATED COUNT: 17.2 % (ref 10–15)
GFR SERPL CREATININE-BSD FRML MDRD: 43 ML/MIN/{1.73_M2}
GLUCOSE SERPL-MCNC: 81 MG/DL (ref 70–99)
HCT VFR BLD AUTO: 24.3 % (ref 40–53)
HGB BLD-MCNC: 7.7 G/DL (ref 13.3–17.7)
MCH RBC QN AUTO: 30.3 PG (ref 26.5–33)
MCHC RBC AUTO-ENTMCNC: 31.7 G/DL (ref 31.5–36.5)
MCV RBC AUTO: 96 FL (ref 78–100)
NUM BPU REQUESTED: 1
PLATELET # BLD AUTO: 93 10E9/L (ref 150–450)
POTASSIUM SERPL-SCNC: 3.3 MMOL/L (ref 3.4–5.3)
POTASSIUM SERPL-SCNC: 3.5 MMOL/L (ref 3.4–5.3)
RBC # BLD AUTO: 2.54 10E12/L (ref 4.4–5.9)
SODIUM SERPL-SCNC: 138 MMOL/L (ref 133–144)
SPECIMEN EXP DATE BLD: NORMAL
TRANSFUSION STATUS PATIENT QL: NORMAL
TRANSFUSION STATUS PATIENT QL: NORMAL
WBC # BLD AUTO: 3.5 10E9/L (ref 4–11)

## 2020-05-20 PROCEDURE — 97530 THERAPEUTIC ACTIVITIES: CPT | Mod: GO | Performed by: OCCUPATIONAL THERAPIST

## 2020-05-20 PROCEDURE — 36415 COLL VENOUS BLD VENIPUNCTURE: CPT | Performed by: INTERNAL MEDICINE

## 2020-05-20 PROCEDURE — 97110 THERAPEUTIC EXERCISES: CPT | Mod: GO | Performed by: OCCUPATIONAL THERAPIST

## 2020-05-20 PROCEDURE — 25000128 H RX IP 250 OP 636: Performed by: INTERNAL MEDICINE

## 2020-05-20 PROCEDURE — 86901 BLOOD TYPING SEROLOGIC RH(D): CPT | Performed by: INTERNAL MEDICINE

## 2020-05-20 PROCEDURE — 25000125 ZZHC RX 250: Performed by: INTERNAL MEDICINE

## 2020-05-20 PROCEDURE — 21000001 ZZH R&B HEART CARE

## 2020-05-20 PROCEDURE — 80048 BASIC METABOLIC PNL TOTAL CA: CPT | Performed by: INTERNAL MEDICINE

## 2020-05-20 PROCEDURE — 99232 SBSQ HOSP IP/OBS MODERATE 35: CPT | Performed by: INTERNAL MEDICINE

## 2020-05-20 PROCEDURE — 25000132 ZZH RX MED GY IP 250 OP 250 PS 637: Performed by: HOSPITALIST

## 2020-05-20 PROCEDURE — 85027 COMPLETE CBC AUTOMATED: CPT | Performed by: INTERNAL MEDICINE

## 2020-05-20 PROCEDURE — 25000132 ZZH RX MED GY IP 250 OP 250 PS 637: Performed by: INTERNAL MEDICINE

## 2020-05-20 PROCEDURE — 86850 RBC ANTIBODY SCREEN: CPT | Performed by: INTERNAL MEDICINE

## 2020-05-20 PROCEDURE — 97535 SELF CARE MNGMENT TRAINING: CPT | Mod: GO | Performed by: OCCUPATIONAL THERAPIST

## 2020-05-20 PROCEDURE — 86900 BLOOD TYPING SEROLOGIC ABO: CPT | Performed by: INTERNAL MEDICINE

## 2020-05-20 PROCEDURE — 86923 COMPATIBILITY TEST ELECTRIC: CPT | Performed by: INTERNAL MEDICINE

## 2020-05-20 PROCEDURE — P9016 RBC LEUKOCYTES REDUCED: HCPCS | Performed by: INTERNAL MEDICINE

## 2020-05-20 PROCEDURE — 84132 ASSAY OF SERUM POTASSIUM: CPT | Performed by: INTERNAL MEDICINE

## 2020-05-20 PROCEDURE — 40000141 ZZH STATISTIC PERIPHERAL IV START W/O US GUIDANCE

## 2020-05-20 PROCEDURE — 99233 SBSQ HOSP IP/OBS HIGH 50: CPT | Performed by: INTERNAL MEDICINE

## 2020-05-20 PROCEDURE — 25000132 ZZH RX MED GY IP 250 OP 250 PS 637: Performed by: NURSE PRACTITIONER

## 2020-05-20 RX ORDER — HYDROCODONE BITARTRATE AND ACETAMINOPHEN 5; 325 MG/1; MG/1
1-2 TABLET ORAL EVERY 6 HOURS PRN
Status: DISCONTINUED | OUTPATIENT
Start: 2020-05-20 | End: 2020-05-22 | Stop reason: HOSPADM

## 2020-05-20 RX ORDER — BUMETANIDE 0.25 MG/ML
1 INJECTION INTRAMUSCULAR; INTRAVENOUS ONCE
Status: COMPLETED | OUTPATIENT
Start: 2020-05-20 | End: 2020-05-20

## 2020-05-20 RX ORDER — CARBOXYMETHYLCELLULOSE SODIUM 5 MG/ML
2 SOLUTION/ DROPS OPHTHALMIC
Status: DISCONTINUED | OUTPATIENT
Start: 2020-05-20 | End: 2020-05-22 | Stop reason: HOSPADM

## 2020-05-20 RX ADMIN — OMEPRAZOLE 40 MG: 20 CAPSULE, DELAYED RELEASE ORAL at 08:28

## 2020-05-20 RX ADMIN — TAMSULOSIN HYDROCHLORIDE 0.4 MG: 0.4 CAPSULE ORAL at 08:28

## 2020-05-20 RX ADMIN — SUCRALFATE 1 G: 1 TABLET ORAL at 20:23

## 2020-05-20 RX ADMIN — POTASSIUM CHLORIDE 20 MEQ: 1500 TABLET, EXTENDED RELEASE ORAL at 18:58

## 2020-05-20 RX ADMIN — BUPROPION HYDROCHLORIDE 300 MG: 150 TABLET, FILM COATED, EXTENDED RELEASE ORAL at 08:27

## 2020-05-20 RX ADMIN — CHLORTHALIDONE 25 MG: 25 TABLET ORAL at 08:28

## 2020-05-20 RX ADMIN — BUMETANIDE 1 MG/HR: 0.25 INJECTION INTRAMUSCULAR; INTRAVENOUS at 19:41

## 2020-05-20 RX ADMIN — POTASSIUM CHLORIDE 40 MEQ: 1500 TABLET, EXTENDED RELEASE ORAL at 08:38

## 2020-05-20 RX ADMIN — LEVOTHYROXINE SODIUM 100 MCG: 100 TABLET ORAL at 06:42

## 2020-05-20 RX ADMIN — ASPIRIN 81 MG: 81 TABLET, DELAYED RELEASE ORAL at 08:28

## 2020-05-20 RX ADMIN — BUMETANIDE 1 MG/HR: 0.25 INJECTION INTRAMUSCULAR; INTRAVENOUS at 05:32

## 2020-05-20 RX ADMIN — POTASSIUM CHLORIDE 20 MEQ: 1500 TABLET, EXTENDED RELEASE ORAL at 08:38

## 2020-05-20 RX ADMIN — POTASSIUM CHLORIDE 20 MEQ: 1500 TABLET, EXTENDED RELEASE ORAL at 20:23

## 2020-05-20 RX ADMIN — SUCRALFATE 1 G: 1 TABLET ORAL at 08:27

## 2020-05-20 RX ADMIN — BUMETANIDE 1 MG: 0.25 INJECTION INTRAMUSCULAR; INTRAVENOUS at 17:47

## 2020-05-20 RX ADMIN — SPIRONOLACTONE 25 MG: 25 TABLET ORAL at 08:27

## 2020-05-20 RX ADMIN — SIMVASTATIN 20 MG: 20 TABLET, FILM COATED ORAL at 08:27

## 2020-05-20 RX ADMIN — POTASSIUM CHLORIDE 20 MEQ: 1500 TABLET, EXTENDED RELEASE ORAL at 11:50

## 2020-05-20 RX ADMIN — FINASTERIDE 5 MG: 5 TABLET, FILM COATED ORAL at 08:27

## 2020-05-20 RX ADMIN — Medication: at 09:51

## 2020-05-20 ASSESSMENT — MIFFLIN-ST. JEOR: SCORE: 1664.62

## 2020-05-20 ASSESSMENT — ACTIVITIES OF DAILY LIVING (ADL)
ADLS_ACUITY_SCORE: 18

## 2020-05-20 NOTE — PROGRESS NOTES
[facility]  Hospitalist Progress Note  Kareem Murillo MD  05/20/2020    Assessment & Plan   Bairon Foster is a 89 year old male who was admitted on 5/14/2020 with worsening lower extremity edema. After evaluation in the ER, there was concern for acute on chronic systolic and diastolic congestive heart failure. He was given IV bumex and was admitted to the hospitalist service for further evaluation and management.     Acute on chronic systolic and diastolic congestive heart failure  Severe MR s/p mitraclip  Severe TR  Dry weight is 198, on admit was 215. PTA bumex 4mg daily, coreg 6.25mg BID. Noted worsening edema, but not yet short of breath. Received IV bumex in the ER. He does not tolerate furosemide, torsemide, or spironolactone as they exacerbate his bullous pemphigoid. Echo 5/15 shows EF 46%, massive biatrial enlargement. mitraclip and pacemaker leads visualized and dilated IVC.  - Bumex gtt, 0.5mg/hr with poor diuresis on 5/16--increased gtt to 0.75mg/hr  On 5/17--then up to 1mg/hr on 5/18 with -2L past 24 hrs  - Imdur 30mg added 5/17, discontinued on 5/18  - Monitor intake & output and daily weights (weight down to 208 5/16)  - Cardiology consulted  - Continue PTA coreg   - Chlorthalidone 25mg daily started 5/15  - Consult lymphedema wrap     Hypokalemia  Secondary to diuresis. On scheduled 20meq KCl BID  - Replete per protocol     Permanent atrial fibrillation  Did not tolerate anticoagulation due to frequent epistaxis and high fall risk. S/p Watchman device on 12/3/19. Permanent pacemaker in place. Due for generator change in June 2020.  - Continue PTA aspirin.  - Continue PTA carvedilol with hold parameters     Coronary artery disease  - Continue PTA aspirin, carvedilol, and simvastatin.     Pancytopenia  - Secondary to hypocellular marrow.  - Counts are at about baseline on admit (hgb 8.7, WBC 3.5, plt 93).  - Followed by Dr. Chavez as an outpatient.  - getting 1 unit of pRBC per  "cardiology     Chronic kidney disease, stage 3  - Baseline creatinine is about 1.2.  - Slight increase in Cr with diuresis, but only up to 1.41 on 5/18  - creatinine remains stable despite aggressive diuresis     Hypothyroidism  - Continue PTA levothyroxine.     BPH  - Continue PTA finasteride and tamsulosin.     GERD  - Continue PTA omeprazole and sucralfate.     Bullous pemphigoid  - Continue Vanicream to bilateral lower extremities twice daily.  - Consult wound care nurse.  - [facility].     History of CVA  - Continue PTA aspirin and simvastatin.     Lymphedema  - Continue lower extremity wraps.  - Lymphedemia consult added     DVT Prophylaxis: Pneumatic Compression Devices  Code Status: Full Code  Expected discharge: on 5/22 anticipate home per therapies       Interval History   -- discussed with RN  -- noted plans for blood transfusion  -- discussed with cardiology    -Data reviewed today: I reviewed all new labs and imaging over the last 24 hours. I personally reviewed no images or EKG's today.    Physical Exam   Heart Rate: 67, Blood pressure 126/70, pulse 65, temperature 97.5  F (36.4  C), temperature source Axillary, resp. rate 18, height 1.88 m (6' 2\"), weight 93 kg (205 lb), SpO2 93 %.  Vitals:    05/18/20 0340 05/19/20 0500 05/20/20 0410   Weight: 94.7 kg (208 lb 11.2 oz) 94.2 kg (207 lb 9.6 oz) 93 kg (205 lb)     Vital Signs with Ranges  Temp:  [97.2  F (36.2  C)-98.1  F (36.7  C)] 97.5  F (36.4  C)  Heart Rate:  [61-68] 67  Resp:  [18] 18  BP: ()/(55-70) 126/70  SpO2:  [90 %-96 %] 93 %  I/O's Last 24 hours  I/O last 3 completed shifts:  In: 840 [P.O.:840]  Out: 2725 [Urine:2725]    Constitutional: Awake, alert, cooperative, no apparent distress  Respiratory: Clear to auscultation bilaterally, no crackles or wheezing  Cardiovascular: Regular rate and rhythm, normal S1 and S2, and + murmur noted  GI: Normal bowel sounds, soft, non-distended, " non-tender  Skin/Integumen: No rashes, no cyanosis, +++ edema  Other:      Medications   All medications were reviewed.    bumetanide 1 mg/hr (05/20/20 0726)     - MEDICATION INSTRUCTIONS -       ACE/ARB/ARNI NOT PRESCRIBED         aspirin  81 mg Oral Daily     bumetanide  1 mg Intravenous Once     buPROPion  300 mg Oral QAM     carvedilol  6.25 mg Oral BID w/meals     chlorthalidone  25 mg Oral Daily     emollient   Topical BID     finasteride  5 mg Oral Daily     levothyroxine  100 mcg Oral QAM AC     omeprazole  40 mg Oral Daily     polyethylene glycol  17 g Oral Daily     potassium chloride ER  20 mEq Oral TID     simvastatin  20 mg Oral Daily     sodium chloride (PF)  3 mL Intracatheter Q8H     spironolactone  25 mg Oral Daily     sucralfate  1 g Oral BID     tamsulosin  0.4 mg Oral Daily        Data   Recent Labs   Lab 05/20/20  0534 05/19/20  2128 05/19/20  1409 05/19/20  0626  05/18/20  0538  05/17/20  0557  05/15/20  1200 05/14/20  1848   WBC 3.5*  --   --  3.4*  --   --   --  3.2*   < > 3.1* 3.5*   HGB 7.7*  --   --  7.8*  --   --   --  8.0*   < > 8.3* 8.7*   MCV 96  --   --  97  --   --   --  97   < > 98 98   PLT 93*  --   --  98*  --   --   --  101*   < > 95* 93*     --   --  138  --  142   < > 141   < > 139 141   POTASSIUM 3.3* 3.5 2.9* 2.7*   < > 3.2*   < > 3.9   < > 3.6 3.7   CHLORIDE 100  --   --  100  --  104   < > 104   < > 104 106   CO2 33*  --   --  32  --  32   < > 31   < > 30 30   BUN 34*  --   --  30  --  24   < > 20   < > 19 18   CR 1.42*  --   --  1.40*  --  1.41*   < > 1.27*   < > 1.22 1.25   ANIONGAP 5  --   --  6  --  6   < > 6   < > 5 5   TEODORO 9.2  --   --  9.1  --  8.9   < > 9.1   < > 8.9 9.1   GLC 81  --   --  90  --  93   < > 96   < > 105* 96   ALBUMIN  --   --   --   --   --   --   --   --   --  2.9* 3.1*   PROTTOTAL  --   --   --   --   --   --   --   --   --   --  6.6*   BILITOTAL  --   --   --   --   --   --   --   --   --   --  1.2   ALKPHOS  --   --   --   --   --   --   --    --   --   --  85   ALT  --   --   --   --   --   --   --   --   --   --  13   AST  --   --   --   --   --   --   --   --   --   --  29    < > = values in this interval not displayed.       No results found for this or any previous visit (from the past 24 hour(s)).    Kareem Murillo MD  Text Page  (7am to 6pm)

## 2020-05-20 NOTE — PROGRESS NOTES
[facility]    Cardiology Progress Note    Primary Cardiologist: Dr. Hope and Mackenzie Tejada, NP; Dr. Curiel ()    Date of Admission: 05/14/2020  Service date: 05/20/2020    Summary:  Mr. 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     Assessment & Plan   1. Acute on chronic systolic and diastolic congestive heart failure  - PTA on bumex 2 mg in the AM, 1 mg at lunch, and 1 mg in the evening as well as carvedilol 6.25 mg BID.   - Echo yesterday with LVEF 45-50%, mild MR and moderate TR.  - CXR with no evidence of edema.  - NTproBNP of 718 on admission.  - Diuresing well with spironolactone 25 mg daily, chlorthalidone 25 mg daily, and IV bumex at 1 mg/hr with -2 L output yesterday, net -6.1 L output since admission.   - Admit wt 214 lbs, down to 205 lbs today. Suspected dry wt of around 198-200 lbs.  2. Mitral regurgitation status post mitral clip x2 and moderate TR  - Echocardiogram 5/18/20 showing mitral regurgitation looking much better, estimated as mild, rather than severe by CHELLE in January 2020.  - Tricuspid regurgitation also looks much better, estimated as moderate, rather than severe by CHELLE in January 2020.  - Diuresing with IV Bumex as above.  3. Permanent atrial fibrillation with history of symptomatic bradycardia status post PPM  - Rate controlled with carvedilol. Watchman device in place for stroke prophylaxis.  - Battery near end of life; generator change scheduled for Jumana 3.   - Follows Dr. Curiel as an  outpatient.  4. Coronary artery disease  - Mild, non-obstructive CAD on coronary angiogram in 04/2019.  - Continue PTA aspirin 81 mg daily and simvastatin 20 mg daily  5. History of CVA  - Status post Watchman device implant in 12/2019.  6. Pancytopenia, secondary to hypocellular marrow  - WBC 3.5, Hgb 8.7, Plt 93 (near baseline).  - Follows Dr. Chavez as an outpatient.   7. Chronic Kidney Disease   - Baseline creatinine of around 1.2.  - Creatinine remaining stable around 1.3-1.4 this admission.   8. Chronic venous insufficiency   - Continue with leg wraps and elevation.      Plan:   1. Continue diuresis with IV bumex 1 mg/hr, spironolactone 25 mg daily, and chlorthalidone 25 mg daily.    2. Continue with daily weights, strict I&Os, low sodium diet, and close monitoring or renal function and electrolytes.  3. Likely needs one more day of IV diuresis. Expect will be ready for discharge Friday.   4. Will arrange for close outpatient follow up in 1 week post discharge with a repeat BMP beforehand and for him to later establish with the [facility].    Disposition Plan   Expected discharge in 1-2 days to TBD pending further diuresis.     Entered: Venkatesh Cuellar 05/20/2020, 9:32 AM     Interval History   Patient is resting comfortably. He denies shortness of breath or chest pain. He continues to have significant lower extremity edema from his ankles to his thighs and scrotum, which continues to improve.     Telemetry:   A.Fib with CVR     Thank you for the opportunity to participate in this pleasant patient's care.     Vick Cuellar, APRN, CNP   Text Page  (8am - 5pm, M-F)    Patient Active Problem List   Diagnosis     Atrial fibrillation (H)     Pulmonary hypertension (H)     CAD (coronary artery disease)     BPH (benign prostatic hyperplasia)     Ischemic cardiomyopathy     JIM (obstructive sleep apnea)- mild/moderate     Vitamin D deficiency     Gastroesophageal reflux disease without esophagitis     Hydrocele,  unspecified hydrocele type     Slow transit constipation     Hypercholesterolemia     Venous stasis dermatitis of both lower extremities     Lymphedema of both lower extremities     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      Coronary artery disease due to lipid rich plaque     Acquired hypothyroidism     Onychomycosis     PAD (peripheral artery disease) (H)     Xerosis cutis     AK (actinic keratosis)     Angioma of skin     Acute systolic CHF (congestive heart failure) (H)     CHF (congestive heart failure) (H)     Non-rheumatic mitral regurgitation     Mitral regurgitation     CKD (chronic kidney disease) stage 3, GFR 30-59 ml/min (H)     Bullous pemphigoid     Chronic atrial fibrillation     Presence of Watchman left atrial appendage closure device     Sick sinus syndrome (H)     Cardiac pacemaker in situ     Acute combined systolic and diastolic congestive heart failure (H)     Physical Exam   Temp: 97.5  F (36.4  C) Temp src: Axillary BP: 100/60   Heart Rate: 67 Resp: 18 SpO2: 90 % O2 Device: None (Room air)    Vitals:    05/18/20 0340 05/19/20 0500 05/20/20 0410   Weight: 94.7 kg (208 lb 11.2 oz) 94.2 kg (207 lb 9.6 oz) 93 kg (205 lb)     Vital Signs with Ranges  Temp:  [97.2  F (36.2  C)-98.1  F (36.7  C)] 97.5  F (36.4  C)  Heart Rate:  [61-68] 67  Resp:  [18] 18  BP: ()/(55-68) 100/60  SpO2:  [90 %-96 %] 90 %  I/O last 3 completed shifts:  In: 600 [P.O.:600]  Out: 2600 [Urine:2600]    Constitutional: Appears younger than his stated age, well nourished, and in no acute distress.  Eyes: Pupils equal, round. Sclerae anicteric.   HEENT: Normocephalic, atraumatic.    Respiratory: Breathing non-labored. Lungs clear to auscultation bilaterally. No crackles, wheezes, rhonchi, or rales.  Cardiovascular: Irregularly irregular rhythm, controlled rate. No murmur, rub, or gallop.  GI: Soft, non-distended, non-tender.  Skin: Warm, dry. Significant scrotal edema. Venous stasis changes in the LE  bilaterally.   Musculoskeletal/Extremities: Moves all extremities well and symmetrically. 2+ edema from the ankles to thighs bilaterally.  Neurologic: No gross focal deficits. Alert, awake, and oriented to person, place and time.  Psychiatric: Affect appropriate. Mentation normal.    Medications     bumetanide 1 mg/hr (05/20/20 0726)     - MEDICATION INSTRUCTIONS -       ACE/ARB/ARNI NOT PRESCRIBED         aspirin  81 mg Oral Daily     buPROPion  300 mg Oral QAM     carvedilol  6.25 mg Oral BID w/meals     chlorthalidone  25 mg Oral Daily     emollient   Topical BID     finasteride  5 mg Oral Daily     levothyroxine  100 mcg Oral QAM AC     omeprazole  40 mg Oral Daily     polyethylene glycol  17 g Oral Daily     potassium chloride ER  20 mEq Oral TID     simvastatin  20 mg Oral Daily     sodium chloride (PF)  3 mL Intracatheter Q8H     spironolactone  25 mg Oral Daily     sucralfate  1 g Oral BID     tamsulosin  0.4 mg Oral Daily     Data   Results for orders placed or performed during the hospital encounter of 05/14/20 (from the past 24 hour(s))   Potassium   Result Value Ref Range    Potassium 2.9 (L) 3.4 - 5.3 mmol/L   Potassium   Result Value Ref Range    Potassium 3.5 3.4 - 5.3 mmol/L   Basic metabolic panel   Result Value Ref Range    Sodium 138 133 - 144 mmol/L    Potassium 3.3 (L) 3.4 - 5.3 mmol/L    Chloride 100 94 - 109 mmol/L    Carbon Dioxide 33 (H) 20 - 32 mmol/L    Anion Gap 5 3 - 14 mmol/L    Glucose 81 70 - 99 mg/dL    Urea Nitrogen 34 (H) 7 - 30 mg/dL    Creatinine 1.42 (H) 0.66 - 1.25 mg/dL    GFR Estimate 43 (L) >60 mL/min/[1.73_m2]    GFR Estimate If Black 50 (L) >60 mL/min/[1.73_m2]    Calcium 9.2 8.5 - 10.1 mg/dL   CBC with platelets   Result Value Ref Range    WBC 3.5 (L) 4.0 - 11.0 10e9/L    RBC Count 2.54 (L) 4.4 - 5.9 10e12/L    Hemoglobin 7.7 (L) 13.3 - 17.7 g/dL    Hematocrit 24.3 (L) 40.0 - 53.0 %    MCV 96 78 - 100 fl    MCH 30.3 26.5 - 33.0 pg    MCHC 31.7 31.5 - 36.5 g/dL    RDW 17.2  (H) 10.0 - 15.0 %    Platelet Count 93 (L) 150 - 450 10e9/L     This note was completed in part using Dragon voice recognition software. Although reviewed after completion, some word and grammatical errors may occur.

## 2020-05-20 NOTE — PLAN OF CARE
Heart Failure Care Pathway  GOALS TO BE MET BEFORE DISCHARGE:    1. Decrease congestion and/or edema with diuretic therapy to achieve near      optimal volume status.            Dyspnea improved:  Yes            Edema improved:  Ace wraps off for HS, ankle and foot +1, knee and leg +3, scrotal +4        Net I/O and Weights since admission:          04/19 2300 - 05/19 2259  In: 4051 [P.O.:4000; I.V.:51]  Out: 9875 [Urine:9875]  Net: -5824            Vitals:    05/14/20 1745 05/14/20 2217 05/15/20 0635 05/16/20 0159   Weight: 90.7 kg (200 lb) 97.4 kg (214 lb 12.8 oz) 97.5 kg (215 lb) 95.3 kg (210 lb)    05/17/20 0400 05/18/20 0340 05/19/20 0500   Weight: 95 kg (209 lb 8 oz) 94.7 kg (208 lb 11.2 oz) 94.2 kg (207 lb 9.6 oz)       2.  O2 sats > 92% on RA or at prior home O2 therapy level.          Current oxygenation status:       SpO2: 96 %         O2 Device: None (Room air),            Able to wean O2 this shift to keep sats > 92%:  N/A       Does patient use Home O2? No    3.  Tolerates ambulation and mobility near baseline: ambulated in room x4        How many times did the patient ambulate with nursing staff this shift? 4 in room    Please review the Heart Failure Care Pathway for additional HF goal outcomes.    Juliet Silverman RN RN  5/19/2020     Pt is A&O, forgetful, denies CP, on RA and LS diminished, on tele A-FIb CVR with PVCs and occasional V Paced, Potassium replaced and one time dose of Aldactone 25 mg given - K+ 3.5, ambulated in room to the bathroom x4. Plan to continue to diurese with Bumex 1mg/hr.

## 2020-05-20 NOTE — PLAN OF CARE
Heart Failure Care Pathway  GOALS TO BE MET BEFORE DISCHARGE:    1. Decrease congestion and/or edema with diuretic therapy to achieve near      optimal volume status.            Dyspnea improved:  Yes            Edema improved:     Yes, bilateral LE 2+, scrotal 4+        Net I/O and Weights since admission:          04/20 0700 - 05/20 0659  In: 4411 [P.O.:4360; I.V.:51]  Out: 58547 [Urine:67272]  Net: -6014            Vitals:    05/14/20 1745 05/14/20 2217 05/15/20 0635 05/16/20 0159   Weight: 90.7 kg (200 lb) 97.4 kg (214 lb 12.8 oz) 97.5 kg (215 lb) 95.3 kg (210 lb)    05/17/20 0400 05/18/20 0340 05/19/20 0500 05/20/20 0410   Weight: 95 kg (209 lb 8 oz) 94.7 kg (208 lb 11.2 oz) 94.2 kg (207 lb 9.6 oz) 93 kg (205 lb)       2.  O2 sats > 92% on RA or at prior home O2 therapy level.          Current oxygenation status:       SpO2: 94 %         O2 Device: None (Room air),            Able to wean O2 this shift to keep sats > 92%:  NA, on RA       Does patient use Home O2? NO    3.  Tolerates ambulation and mobility near baseline: Yes        How many times did the patient ambulate with nursing staff this shift? {NUMBERS      bathroom x3    Please review the Heart Failure Care Pathway for additional HF goal outcomes.    Chelo Levin RN RN  5/20/2020     VSS. Tele afib CVR. A&O x4. Denies pain. Voiding per urinal. Ambulated with assist of 1, gait belt and walker to bathroom. Diuresing with bumex drip.

## 2020-05-20 NOTE — PLAN OF CARE
Discharge Planner OT   Patient plan for discharge: home  Current status: Supine to sit with SBA with increased time, sit <> stand with SBA/CGA and ambulated to CR satellite and back approx 120 ft x2 with FWW and CGA for safety. Pt completed 5 stairs and when turning around had a LOB needing min/mod A to help pt to sit on chair. Pt denies any dizziness or SOB, etc. Vitals stable. Pt reports due to this therapist waking him up at start of session. Pt educated in AE for LE dress requiring mod cues for tech and SBA./min A. Pt inconsistent in level of A he requires with LE Dress. Pt needing to use toilet and inconsistent regarding what he was going to do initially reporting he was going to sit and then stand and then use urinal standing and then took urinal away too soon and soiled his clothing, pt needed to change gown, pad, socks, etc with MOD A. Completed toilet transfer with MIN A.  Barriers to return to prior living situation: impaired balance, safety, appears to have possible STM impairments, activity tolerance decreased.   Recommendations for discharge: TCU  Rationale for recommendations: pt requires increased A with ADL's and functional mobility and Pt will require daily therapy to increase ADL and functional mobility independence and safety to PLOF. At this time pt declining TCU if home will require 24 hr S and A with stairs, shower transfer, dressing and all IADL's including med mgmt, etc and home PT and OT.       Entered by: Sherrie Oliva 05/20/2020 1:10 PM

## 2020-05-20 NOTE — PLAN OF CARE
VSS on RA.  Tele: Afib CVR with BBB. Denies pain shortness of breath.  Up with 1 and walker.  Ambulated in halls x2.  Bumex gtt in place.  1 unit of blood infusing.  Discharge in 1-2 days.  Call light within reach.  Will continue to monitor.

## 2020-05-21 ENCOUNTER — APPOINTMENT (OUTPATIENT)
Dept: OCCUPATIONAL THERAPY | Facility: CLINIC | Age: 85
DRG: 292 | End: 2020-05-21
Payer: COMMERCIAL

## 2020-05-21 LAB
ANION GAP SERPL CALCULATED.3IONS-SCNC: 9 MMOL/L (ref 3–14)
BASOPHILS # BLD AUTO: 0.1 10E9/L (ref 0–0.2)
BASOPHILS NFR BLD AUTO: 1.5 %
BUN SERPL-MCNC: 35 MG/DL (ref 7–30)
CALCIUM SERPL-MCNC: 9.4 MG/DL (ref 8.5–10.1)
CHLORIDE SERPL-SCNC: 99 MMOL/L (ref 94–109)
CO2 SERPL-SCNC: 32 MMOL/L (ref 20–32)
CREAT SERPL-MCNC: 1.61 MG/DL (ref 0.66–1.25)
DIFFERENTIAL METHOD BLD: ABNORMAL
EOSINOPHIL # BLD AUTO: 0.5 10E9/L (ref 0–0.7)
EOSINOPHIL NFR BLD AUTO: 13 %
ERYTHROCYTE [DISTWIDTH] IN BLOOD BY AUTOMATED COUNT: 17.2 % (ref 10–15)
GFR SERPL CREATININE-BSD FRML MDRD: 37 ML/MIN/{1.73_M2}
GLUCOSE SERPL-MCNC: 88 MG/DL (ref 70–99)
HCT VFR BLD AUTO: 29.1 % (ref 40–53)
HGB BLD-MCNC: 9.2 G/DL (ref 13.3–17.7)
IMM GRANULOCYTES # BLD: 0 10E9/L (ref 0–0.4)
IMM GRANULOCYTES NFR BLD: 0.3 %
LYMPHOCYTES # BLD AUTO: 0.8 10E9/L (ref 0.8–5.3)
LYMPHOCYTES NFR BLD AUTO: 19.5 %
MCH RBC QN AUTO: 30.1 PG (ref 26.5–33)
MCHC RBC AUTO-ENTMCNC: 31.6 G/DL (ref 31.5–36.5)
MCV RBC AUTO: 95 FL (ref 78–100)
MONOCYTES # BLD AUTO: 0.4 10E9/L (ref 0–1.3)
MONOCYTES NFR BLD AUTO: 9 %
NEUTROPHILS # BLD AUTO: 2.3 10E9/L (ref 1.6–8.3)
NEUTROPHILS NFR BLD AUTO: 56.7 %
NRBC # BLD AUTO: 0 10*3/UL
NRBC BLD AUTO-RTO: 0 /100
PLATELET # BLD AUTO: 112 10E9/L (ref 150–450)
POTASSIUM SERPL-SCNC: 3.4 MMOL/L (ref 3.4–5.3)
RBC # BLD AUTO: 3.06 10E12/L (ref 4.4–5.9)
SODIUM SERPL-SCNC: 140 MMOL/L (ref 133–144)
WBC # BLD AUTO: 4 10E9/L (ref 4–11)

## 2020-05-21 PROCEDURE — 25000132 ZZH RX MED GY IP 250 OP 250 PS 637: Performed by: NURSE PRACTITIONER

## 2020-05-21 PROCEDURE — 25000132 ZZH RX MED GY IP 250 OP 250 PS 637: Performed by: INTERNAL MEDICINE

## 2020-05-21 PROCEDURE — 36415 COLL VENOUS BLD VENIPUNCTURE: CPT | Performed by: INTERNAL MEDICINE

## 2020-05-21 PROCEDURE — 80048 BASIC METABOLIC PNL TOTAL CA: CPT | Performed by: INTERNAL MEDICINE

## 2020-05-21 PROCEDURE — 85025 COMPLETE CBC W/AUTO DIFF WBC: CPT | Performed by: INTERNAL MEDICINE

## 2020-05-21 PROCEDURE — 99232 SBSQ HOSP IP/OBS MODERATE 35: CPT | Performed by: INTERNAL MEDICINE

## 2020-05-21 PROCEDURE — 25000132 ZZH RX MED GY IP 250 OP 250 PS 637: Performed by: HOSPITALIST

## 2020-05-21 PROCEDURE — 21000001 ZZH R&B HEART CARE

## 2020-05-21 PROCEDURE — 97530 THERAPEUTIC ACTIVITIES: CPT | Mod: GO

## 2020-05-21 PROCEDURE — 97535 SELF CARE MNGMENT TRAINING: CPT | Mod: GO

## 2020-05-21 RX ORDER — MULTIPLE VITAMINS W/ MINERALS TAB 9MG-400MCG
1 TAB ORAL DAILY
Status: DISCONTINUED | OUTPATIENT
Start: 2020-05-21 | End: 2020-05-22 | Stop reason: HOSPADM

## 2020-05-21 RX ORDER — BUMETANIDE 1 MG/1
1 TABLET ORAL EVERY EVENING
Status: DISCONTINUED | OUTPATIENT
Start: 2020-05-21 | End: 2020-05-22 | Stop reason: HOSPADM

## 2020-05-21 RX ORDER — BUMETANIDE 1 MG/1
2 TABLET ORAL EVERY MORNING
Status: DISCONTINUED | OUTPATIENT
Start: 2020-05-21 | End: 2020-05-22 | Stop reason: HOSPADM

## 2020-05-21 RX ORDER — BUMETANIDE 1 MG/1
1 TABLET ORAL
Status: DISCONTINUED | OUTPATIENT
Start: 2020-05-21 | End: 2020-05-22 | Stop reason: HOSPADM

## 2020-05-21 RX ADMIN — BUMETANIDE 1 MG: 1 TABLET ORAL at 20:29

## 2020-05-21 RX ADMIN — SPIRONOLACTONE 25 MG: 25 TABLET ORAL at 08:45

## 2020-05-21 RX ADMIN — CARVEDILOL 6.25 MG: 6.25 TABLET, FILM COATED ORAL at 09:32

## 2020-05-21 RX ADMIN — ASPIRIN 81 MG: 81 TABLET, DELAYED RELEASE ORAL at 08:43

## 2020-05-21 RX ADMIN — MULTIPLE VITAMINS W/ MINERALS TAB 1 TABLET: TAB at 13:21

## 2020-05-21 RX ADMIN — BUMETANIDE 2 MG: 1 TABLET ORAL at 09:32

## 2020-05-21 RX ADMIN — Medication 5000 UNITS: at 15:53

## 2020-05-21 RX ADMIN — BUPROPION HYDROCHLORIDE 300 MG: 150 TABLET, FILM COATED, EXTENDED RELEASE ORAL at 08:43

## 2020-05-21 RX ADMIN — CARBOXYMETHYLCELLULOSE SODIUM 2 DROP: 5 SOLUTION/ DROPS OPHTHALMIC at 07:03

## 2020-05-21 RX ADMIN — SUCRALFATE 1 G: 1 TABLET ORAL at 08:43

## 2020-05-21 RX ADMIN — CARVEDILOL 6.25 MG: 6.25 TABLET, FILM COATED ORAL at 18:07

## 2020-05-21 RX ADMIN — OMEPRAZOLE 40 MG: 20 CAPSULE, DELAYED RELEASE ORAL at 08:43

## 2020-05-21 RX ADMIN — Medication: at 20:42

## 2020-05-21 RX ADMIN — POTASSIUM CHLORIDE 20 MEQ: 1500 TABLET, EXTENDED RELEASE ORAL at 08:43

## 2020-05-21 RX ADMIN — Medication: at 08:50

## 2020-05-21 RX ADMIN — CHLORTHALIDONE 25 MG: 25 TABLET ORAL at 08:53

## 2020-05-21 RX ADMIN — SUCRALFATE 1 G: 1 TABLET ORAL at 20:29

## 2020-05-21 RX ADMIN — POTASSIUM CHLORIDE 20 MEQ: 1500 TABLET, EXTENDED RELEASE ORAL at 20:29

## 2020-05-21 RX ADMIN — POTASSIUM CHLORIDE 20 MEQ: 1500 TABLET, EXTENDED RELEASE ORAL at 15:53

## 2020-05-21 RX ADMIN — TAMSULOSIN HYDROCHLORIDE 0.4 MG: 0.4 CAPSULE ORAL at 08:45

## 2020-05-21 RX ADMIN — CARBOXYMETHYLCELLULOSE SODIUM 2 DROP: 5 SOLUTION/ DROPS OPHTHALMIC at 16:12

## 2020-05-21 RX ADMIN — FINASTERIDE 5 MG: 5 TABLET, FILM COATED ORAL at 08:43

## 2020-05-21 RX ADMIN — LEVOTHYROXINE SODIUM 100 MCG: 100 TABLET ORAL at 07:04

## 2020-05-21 RX ADMIN — SIMVASTATIN 20 MG: 20 TABLET, FILM COATED ORAL at 08:43

## 2020-05-21 ASSESSMENT — ACTIVITIES OF DAILY LIVING (ADL)
ADLS_ACUITY_SCORE: 18
ADLS_ACUITY_SCORE: 19

## 2020-05-21 ASSESSMENT — MIFFLIN-ST. JEOR: SCORE: 1647.39

## 2020-05-21 NOTE — CONSULTS
[facility]    Epic review for [facility] consult. Patient admitted on 5/14 after virtual visit w/cardiology KI d/t weight gain, LE swelling to groin despite bumex 4mg bid. He has been diuresed w/IV bumex + chlorthalidone. Estimated dry weight 198 - 200#. He is followed by Dr Curiel and Mackenzie Calvin. Will message scheduling to arrange hospital f/u and [facility] enrollment.   Future Appointments   Date Time Provider Department Center   5/21/2020  4:00 PM Yulia Michael OT [facility]   6/15/2020 11:30 AM BX LAB [facility]   9/15/2020  1:30 PM SH LAB DRAW 1 [facility]   9/15/2020  2:40 PM Donald Chavez MD [facility]       Please call with questions.         Zamzam Garcia RN, BSN  [facility] RN Care Coordinator  [facility]  [phone]  [facility]: Cardiomyopathy, Optimization, Rehabilitation, Education

## 2020-05-21 NOTE — PLAN OF CARE
Discharge Planner OT   Patient plan for discharge: home    Current status:  Pt with flat affect and low energy this date. Pt scored 13/30 on SLUMS indicating dementia level cognition. Pt had difficulty with all aspects of assessment including STM recall, attention, language, sequencing and problem solving.  SBA for supine > sit. Pt required CGA for sit < > stand with FWW. CGA- Min A for ambulation x 200 ft with 2 minor LOB and needing physical A to correct. Pt at high risk for falling this date. Decreased balance noted from previous session. BP 98/56 post ambulation.     Barriers to return to prior living situation: impaired balance, safety, appears to have possible STM impairments, activity tolerance decreased, high fall risk    Recommendations for discharge: TCU    Rationale for recommendations: Pt would benefit from continued skilled OT services to improve independence and safety with ADL's and functional transfers as pt is not at baseline.  At this time pt declining TCU, if home will require 24 hr supervision and A with stairs, shower transfer, dressing and all IADL's including med mgmt, etc and home PT and OT. Recommend pt not return to driving until he can participate in OP OT driving eval.        Entered by: Yulia Michael 05/21/2020 2:44 PM

## 2020-05-21 NOTE — PROVIDER NOTIFICATION
MD Notification    Notified Person: MD    Notified Person Name: Dr. Zavala     Notification Date/Time: 2033 5/20    Notification Interaction: [employer] web    Purpose of Notification: PATIENT R.W room 260-2. would like something for dry, itchy eyes. nothing available in his PRN meds.      Orders Received:orders for hydrocodone given    Comments:

## 2020-05-21 NOTE — PROGRESS NOTES
[facility]  Hospitalist Progress Note  Kareem Murillo MD  05/21/2020    Assessment & Plan   Bairon Foster is a 89 year old male who was admitted on 5/14/2020 with worsening lower extremity edema. After evaluation in the ER, there was concern for acute on chronic systolic and diastolic congestive heart failure. He was given IV bumex and was admitted to the hospitalist service for further evaluation and management.     Acute on chronic systolic and diastolic congestive heart failure  Severe MR s/p mitraclip  Severe TR  Dry weight is 198, on admit was 215. PTA bumex 4mg daily, coreg 6.25mg BID. Noted worsening edema, but not yet short of breath. Received IV bumex in the ER. He does not tolerate furosemide, torsemide, or spironolactone as they exacerbate his bullous pemphigoid. Echo 5/15 shows EF 46%, massive biatrial enlargement. mitraclip and pacemaker leads visualized and dilated IVC.  - Bumex gtt, 0.5mg/hr with poor diuresis on 5/16--increased gtt to 0.75mg/hr  On 5/17--then up to 1mg/hr on 5/18, changed to PO bumex today.  - Imdur 30mg added 5/17, discontinued on 5/18  - Monitor intake & output and daily weights (weight down to 201) - 10 L  - Cardiology consulted  - Continue PTA coreg, spironolactone  - Chlorthalidone 25mg daily started 5/15  - Consult lymphedema wrap     Hypokalemia  Secondary to diuresis. On scheduled 20meq KCl BID  - Replete per protocol     Permanent atrial fibrillation  Did not tolerate anticoagulation due to frequent epistaxis and high fall risk. S/p Watchman device on 12/3/19. Permanent pacemaker in place. Due for generator change in June 2020.  - Continue PTA aspirin.  - Continue PTA carvedilol with hold parameters     Coronary artery disease  - Continue PTA aspirin, carvedilol, and simvastatin.     Pancytopenia  - Secondary to hypocellular marrow.  - Counts are at about baseline on admit (hgb 8.7, WBC 3.5, plt 93).  - Followed by Dr. Chavez as an outpatient.  -  "received 1 unit of pRBC per cardiology  - hgb 7.7 > 9.2     Chronic kidney disease, stage 3  - Baseline creatinine is about 1.2.  - Slight increase in Cr with diuresis, but only up to 1.41 > 1.6 but stable hco3    - creatinine up from 1.4 to 1.6 with aggressive diuresis     Hypothyroidism  - Continue PTA levothyroxine.     BPH  - Continue PTA finasteride and tamsulosin.     GERD  - Continue PTA omeprazole and sucralfate.     Bullous pemphigoid  - Continue Vanicream to bilateral lower extremities twice daily.  - Consult wound care nurse.  - [facility].     History of CVA  - Continue PTA aspirin and simvastatin.     Lymphedema  - Continue lower extremity wraps.  - Lymphedemia consult added     DVT Prophylaxis: Pneumatic Compression Devices  Code Status: Full Code    Expected discharge: on 5/22 anticipate home per therapies       Interval History   -- decreased LE edema  -- feeling stronger, improved sob    -Data reviewed today: I reviewed all new labs and imaging over the last 24 hours. I personally reviewed no images or EKG's today.    Physical Exam   Heart Rate: 64, Blood pressure 117/65, pulse 65, temperature 98  F (36.7  C), temperature source Oral, resp. rate 16, height 1.88 m (6' 2\"), weight 91.3 kg (201 lb 3.2 oz), SpO2 92 %.  Vitals:    05/19/20 0500 05/20/20 0410 05/21/20 0021   Weight: 94.2 kg (207 lb 9.6 oz) 93 kg (205 lb) 91.3 kg (201 lb 3.2 oz)     Vital Signs with Ranges  Temp:  [97.5  F (36.4  C)-98  F (36.7  C)] 98  F (36.7  C)  Heart Rate:  [64-72] 64  Resp:  [16-18] 16  BP: ()/(49-78) 117/65  SpO2:  [92 %-96 %] 92 %  I/O's Last 24 hours  I/O last 3 completed shifts:  In: 1020 [P.O.:720]  Out: 4875 [Urine:4875]    Constitutional: Awake, alert, cooperative, no apparent distress  Respiratory: Clear to auscultation bilaterally, no crackles or wheezing  Cardiovascular: Regular rate and rhythm, normal S1 and S2, and + murmur noted  GI: Normal bowel " sounds, soft, non-distended, non-tender  Skin/Integumen: No rashes, no cyanosis, + edema  Other:      Medications   All medications were reviewed.    - MEDICATION INSTRUCTIONS -       ACE/ARB/ARNI NOT PRESCRIBED         aspirin  81 mg Oral Daily     bumetanide  1 mg Oral Daily with lunch     bumetanide  1 mg Oral QPM     bumetanide  2 mg Oral QAM     buPROPion  300 mg Oral QAM     carvedilol  6.25 mg Oral BID w/meals     chlorthalidone  25 mg Oral Daily     cholecalciferol  5,000 Units Oral Daily     emollient   Topical BID     finasteride  5 mg Oral Daily     levothyroxine  100 mcg Oral QAM AC     multivitamin  1 tablet Oral Daily     omeprazole  40 mg Oral Daily     polyethylene glycol  17 g Oral Daily     potassium chloride ER  20 mEq Oral TID     simvastatin  20 mg Oral Daily     sodium chloride (PF)  3 mL Intracatheter Q8H     spironolactone  25 mg Oral Daily     sucralfate  1 g Oral BID     tamsulosin  0.4 mg Oral Daily        Data   Recent Labs   Lab 05/21/20  0607 05/20/20  1741 05/20/20  0534  05/19/20  0626  05/15/20  1200 05/14/20  1848   WBC 4.0  --  3.5*  --  3.4*   < > 3.1* 3.5*   HGB 9.2*  --  7.7*  --  7.8*   < > 8.3* 8.7*   MCV 95  --  96  --  97   < > 98 98   *  --  93*  --  98*   < > 95* 93*     --  138  --  138   < > 139 141   POTASSIUM 3.4 3.5 3.3*   < > 2.7*   < > 3.6 3.7   CHLORIDE 99  --  100  --  100   < > 104 106   CO2 32  --  33*  --  32   < > 30 30   BUN 35*  --  34*  --  30   < > 19 18   CR 1.61*  --  1.42*  --  1.40*   < > 1.22 1.25   ANIONGAP 9  --  5  --  6   < > 5 5   TEODORO 9.4  --  9.2  --  9.1   < > 8.9 9.1   GLC 88  --  81  --  90   < > 105* 96   ALBUMIN  --   --   --   --   --   --  2.9* 3.1*   PROTTOTAL  --   --   --   --   --   --   --  6.6*   BILITOTAL  --   --   --   --   --   --   --  1.2   ALKPHOS  --   --   --   --   --   --   --  85   ALT  --   --   --   --   --   --   --  13   AST  --   --   --   --   --   --   --  29    < > = values in this interval not  displayed.       No results found for this or any previous visit (from the past 24 hour(s)).    Kareem Murillo MD  Text Page  (7am to 6pm)

## 2020-05-21 NOTE — PROGRESS NOTES
[facility]    Cardiology Progress Note    Primary Cardiologist: Dr. Hope and Mackenzie Tejada, NP; Dr. Curiel ()    Date of Admission: 05/14/2020  Service date: 05/20/2020    Summary:  Mr. 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     Assessment & Plan   1. Acute on chronic systolic and diastolic congestive heart failure  - PTA on bumex 2 mg in the AM, 1 mg at lunch, and 1 mg in the evening as well as carvedilol 6.25 mg BID.   - Echo 5/15/20 with LVEF 45-50%, mild MR and moderate TR.  - CXR with no evidence of edema.  - NTproBNP of 718 on admission.  - Diuresing well with spironolactone 25 mg daily, chlorthalidone 25 mg daily, and IV bumex at 1 mg/hr with -2 L output yesterday, net -8.5 L output since admission.   - Admit wt 214 lbs, down to 201 lbs today. Nearing suspected dry wt of around 198-200 lbs.  2. Mitral regurgitation status post mitral clip x2 and moderate TR  - Echocardiogram 5/18/20 showing mitral regurgitation looking much better, estimated as mild, rather than severe by CHELLE in January 2020.  - Tricuspid regurgitation also looks much better, estimated as moderate, rather than severe by CHELLE in January 2020.  - Diuresing with IV Bumex as above.  3. Permanent atrial fibrillation with history of symptomatic bradycardia status post PPM  - Rate controlled with carvedilol. Watchman device in place for stroke prophylaxis.  - Battery near end of life; generator change scheduled for Jumana 3.   - Follows Dr. Curiel as  an outpatient.  4. Coronary artery disease  - Mild, non-obstructive CAD on coronary angiogram in 04/2019.  - Continue PTA aspirin 81 mg daily and simvastatin 20 mg daily  5. History of CVA  - Status post Watchman device implant in 12/2019.  6. Pancytopenia, secondary to hypocellular marrow  - WBC 3.5, Hgb 7.7, Plt 93 yesterday with hemoglobin dropping slightly from baseline.  - Received 1 unit of PRBCs yesterday evening with improvement in Hgb to 9.2 today.  - Follows Dr. Chavez as an outpatient.   7. Chronic Kidney Disease   - Baseline creatinine of around 1.2.  - Creatinine bumped up a bit today to 1.61 from around 1.3-1.4 most of this admission providing further evidence that he has likely been adequately diuresed to near his dry weight.   8. Chronic venous insufficiency   - Continue with leg wraps and elevation.      Plan:   1. Will transition to PO bumex at his PTA dosing of 2 mg PO in the morning, 1 mg PO at lunch, and 1 mg PO in the evening with spironolactone 25 mg PO daily, and chlorthalidone 25 mg PO daily.    2. Continue with daily weights, strict I&Os, low sodium diet, and close monitoring or renal function and electrolytes.  3. Likely discharge this afternoon or tomorrow.   4. Will arrange for close outpatient follow up in 1 week post discharge with a repeat BMP beforehand and for him to later establish with the [facility].    Disposition Plan   Expected discharge in 0-1 days pending stability of electrolytes and renal function with transition to PO diuretics.     Entered: Venkatesh Polo 05/21/2020, 9:32 AM     Interval History   Patient is resting comfortably. He received a unit of PRBCs yesterday evening with improvement in his hemoglobin to 9.2 today. He denies shortness of breath or chest pain. He feels his lower extremity edema and scrotal edema is significantly improved.    Telemetry:   [email or web address].[email or web address] with CVR     Thank you for the opportunity to participate in this pleasant patient's care.     Vick  WILLIS Cuellar, CNP   Text Page  (8am - 5pm, M-F)    Patient Active Problem List   Diagnosis     Atrial fibrillation (H)     Pulmonary hypertension (H)     CAD (coronary artery disease)     BPH (benign prostatic hyperplasia)     Ischemic cardiomyopathy     JIM (obstructive sleep apnea)- mild/moderate     Vitamin D deficiency     Gastroesophageal reflux disease without esophagitis     Hydrocele, unspecified hydrocele type     Slow transit constipation     Hypercholesterolemia     Venous stasis dermatitis of both lower extremities     Lymphedema of both lower extremities     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      Coronary artery disease due to lipid rich plaque     Acquired hypothyroidism     Onychomycosis     PAD (peripheral artery disease) (H)     Xerosis cutis     AK (actinic keratosis)     Angioma of skin     Acute systolic CHF (congestive heart failure) (H)     CHF (congestive heart failure) (H)     Non-rheumatic mitral regurgitation     Mitral regurgitation     CKD (chronic kidney disease) stage 3, GFR 30-59 ml/min (H)     Bullous pemphigoid     Chronic atrial fibrillation     Presence of Watchman left atrial appendage closure device     Sick sinus syndrome (H)     Cardiac pacemaker in situ     Acute combined systolic and diastolic congestive heart failure (H)     Physical Exam   Temp: 98  F (36.7  C) Temp src: Oral BP: 117/65   Heart Rate: 64 Resp: 16 SpO2: 92 % O2 Device: None (Room air)    Vitals:    05/19/20 0500 05/20/20 0410 05/21/20 0021   Weight: 94.2 kg (207 lb 9.6 oz) 93 kg (205 lb) 91.3 kg (201 lb 3.2 oz)     Vital Signs with Ranges  Temp:  [97.5  F (36.4  C)-98  F (36.7  C)] 98  F (36.7  C)  Heart Rate:  [62-72] 64  Resp:  [16-18] 16  BP: ()/(49-78) 117/65  SpO2:  [92 %-96 %] 92 %  I/O last 3 completed shifts:  In: 1020 [P.O.:720]  Out: 4875 [Urine:4875]    Constitutional: Appears younger than his stated age, well nourished, and in no acute distress.  Eyes: Pupils equal, round.  Sclerae anicteric.   HEENT: Normocephalic, atraumatic.    Respiratory: Breathing non-labored. Lungs clear to auscultation bilaterally. No crackles, wheezes, rhonchi, or rales.  Cardiovascular: Irregularly irregular rhythm, controlled rate. No murmur, rub, or gallop.  GI: Soft, non-distended, non-tender.  Skin: Warm, dry. Venous stasis changes in the LE bilaterally.   Musculoskeletal/Extremities: Moves all extremities well and symmetrically. 1+ edema in the ankles bilaterally.  Neurologic: No gross focal deficits. Alert, awake, and oriented to person, place and time.  Psychiatric: Affect appropriate. Mentation normal.    Medications     - MEDICATION INSTRUCTIONS -       ACE/ARB/ARNI NOT PRESCRIBED         aspirin  81 mg Oral Daily     bumetanide  1 mg Oral Daily with lunch     bumetanide  1 mg Oral QPM     bumetanide  2 mg Oral QAM     buPROPion  300 mg Oral QAM     carvedilol  6.25 mg Oral BID w/meals     chlorthalidone  25 mg Oral Daily     emollient   Topical BID     finasteride  5 mg Oral Daily     levothyroxine  100 mcg Oral QAM AC     omeprazole  40 mg Oral Daily     polyethylene glycol  17 g Oral Daily     potassium chloride ER  20 mEq Oral TID     simvastatin  20 mg Oral Daily     sodium chloride (PF)  3 mL Intracatheter Q8H     spironolactone  25 mg Oral Daily     sucralfate  1 g Oral BID     tamsulosin  0.4 mg Oral Daily     Data   Results for orders placed or performed during the hospital encounter of 05/14/20 (from the past 24 hour(s))   ABO/Rh type and screen   Result Value Ref Range    Units Ordered 1     ABO A     RH(D) Neg     Antibody Screen Neg     Test Valid Only At [facility]        Specimen Expires 05/23/2020     Crossmatch Red Blood Cells    Blood component   Result Value Ref Range    Unit Number [id number]     Blood Component Type Red Blood Cells Leukocyte Reduced     Division Number 00     Status of Unit Released to care unit     Blood Product Code [id number]     Unit Status  ISS    Potassium   Result Value Ref Range    Potassium 3.5 3.4 - 5.3 mmol/L   CBC with platelets differential   Result Value Ref Range    WBC 4.0 4.0 - 11.0 10e9/L    RBC Count 3.06 (L) 4.4 - 5.9 10e12/L    Hemoglobin 9.2 (L) 13.3 - 17.7 g/dL    Hematocrit 29.1 (L) 40.0 - 53.0 %    MCV 95 78 - 100 fl    MCH 30.1 26.5 - 33.0 pg    MCHC 31.6 31.5 - 36.5 g/dL    RDW 17.2 (H) 10.0 - 15.0 %    Platelet Count 112 (L) 150 - 450 10e9/L    Diff Method Automated Method     % Neutrophils 56.7 %    % Lymphocytes 19.5 %    % Monocytes 9.0 %    % Eosinophils 13.0 %    % Basophils 1.5 %    % Immature Granulocytes 0.3 %    Nucleated RBCs 0 0 /100    Absolute Neutrophil 2.3 1.6 - 8.3 10e9/L    Absolute Lymphocytes 0.8 0.8 - 5.3 10e9/L    Absolute Monocytes 0.4 0.0 - 1.3 10e9/L    Absolute Eosinophils 0.5 0.0 - 0.7 10e9/L    Absolute Basophils 0.1 0.0 - 0.2 10e9/L    Abs Immature Granulocytes 0.0 0 - 0.4 10e9/L    Absolute Nucleated RBC 0.0    Basic metabolic panel   Result Value Ref Range    Sodium 140 133 - 144 mmol/L    Potassium 3.4 3.4 - 5.3 mmol/L    Chloride 99 94 - 109 mmol/L    Carbon Dioxide 32 20 - 32 mmol/L    Anion Gap 9 3 - 14 mmol/L    Glucose 88 70 - 99 mg/dL    Urea Nitrogen 35 (H) 7 - 30 mg/dL    Creatinine 1.61 (H) 0.66 - 1.25 mg/dL    GFR Estimate 37 (L) >60 mL/min/[1.73_m2]    GFR Estimate If Black 43 (L) >60 mL/min/[1.73_m2]    Calcium 9.4 8.5 - 10.1 mg/dL     This note was completed in part using Dragon voice recognition software. Although reviewed after completion, some word and grammatical errors may occur.

## 2020-05-21 NOTE — PLAN OF CARE
"Assumed cares [id number]. A&Ox4, forgetful. Transitioned to oral bumex today with DAMI. Wound cares complete to BLE & compression wraps on. Denies chest pain, SOB, or numbness/tingling. OT recommending 24 hour care - plan for discharge to [facility] tomorrow per SW. BP soft this afternoon, lunchtime dose of bumex held per Dr. Carrera. Continue to monitor.    /63 (BP Location: Right arm)   Pulse 59   Temp 97.4  F (36.3  C) (Oral)   Resp 16   Ht 1.88 m (6' 2\")   Wt 91.3 kg (201 lb 3.2 oz)   SpO2 94%   BMI 25.83 kg/m      Alayna Berry RN on 5/21/2020 at 6:09 PM    "

## 2020-05-21 NOTE — PROGRESS NOTES
SW:  D:  Call placed to update patient's wife as to the change in the discharge recommendation from home to TCU.  Patient's wife is in agreement and she is asking for a referral to be sent to [facility].  Referral sent, via discharge on the double, to check bed availability.  Received a call back from [facility].  They have a bed available for tomorrow.  Call placed to update patient's wife.  P:  Will continue to follow.      LOVELY Maher, [facility]  Lead   [phone]  [facility]

## 2020-05-21 NOTE — PROGRESS NOTES
"BRIEF NUTRITION ASSESSMENT      REASON FOR ASSESSMENT:  Bairon Foster is a 89 year old male seen by Registered Dietitian for [facility]    NUTRITION HISTORY:  Pt states he \"watches\" his salt intake, normally has a good appetite.    CURRENT DIET AND INTAKE:  Diet:  2 gm Na  Pt says he has a fair appetite,\" they're giving me too much food\".             ANTHROPOMETRICS:  Height: 6' 2\"  Weight:  201 lbs 3.2 oz  Body mass index is 25.83 kg/m .   Weight Status: Overweight BMI 25-29.9  IBW:  86.4 kg  Weight History:   Wt Readings from Last 10 Encounters:   05/21/20 91.3 kg (201 lb 3.2 oz)   05/14/20 90.7 kg (200 lb)   03/27/20 89.8 kg (198 lb)   03/20/20 90.7 kg (200 lb)   03/11/20 90.8 kg (200 lb 3.2 oz)   02/26/20 89.8 kg (198 lb)   02/24/20 88.9 kg (196 lb)   02/17/20 88 kg (194 lb)   02/11/20 88 kg (194 lb)   02/11/20 88 kg (194 lb)         LABS:  Labs noted    MALNUTRITION:  Unable to do Nutrition Focused Physical Assessment (NFPA) due to department guidelines for COVID-19.    NUTRITION INTERVENTION:  Nutrition Diagnosis:  No nutrition diagnosis at this time.    Implementation:  Nutrition Education:  Mailed information to pt's home address on 2 gm diet.    FOLLOW UP/MONITORING:   Will re-evaluate in 7 - 10 days, or sooner, if re-consulted.            "

## 2020-05-21 NOTE — PLAN OF CARE
Heart Failure Care Pathway  GOALS TO BE MET BEFORE DISCHARGE:    1. Decrease congestion and/or edema with diuretic therapy to achieve near      optimal volume status.            Dyspnea improved:  Yes            Edema improved:     Yes        Net I/O and Weights since admission:          04/21 0700 - 05/21 0659  In: 5431 [P.O.:5080; I.V.:51]  Out: 88194 [Urine:67704]  Net: -9469            Vitals:    05/14/20 1745 05/14/20 2217 05/15/20 0635 05/16/20 0159   Weight: 90.7 kg (200 lb) 97.4 kg (214 lb 12.8 oz) 97.5 kg (215 lb) 95.3 kg (210 lb)    05/17/20 0400 05/18/20 0340 05/19/20 0500 05/20/20 0410   Weight: 95 kg (209 lb 8 oz) 94.7 kg (208 lb 11.2 oz) 94.2 kg (207 lb 9.6 oz) 93 kg (205 lb)    05/21/20 0021   Weight: 91.3 kg (201 lb 3.2 oz)       2.  O2 sats > 92% on RA or at prior home O2 therapy level.          Current oxygenation status:       SpO2: 95 %         O2 Device: None (Room air),            Able to wean O2 this shift to keep sats > 92%:  Yes       Does patient use Home O2? No    3.  Tolerates ambulation and mobility near baseline: Yes        How many times did the patient ambulate with nursing staff this shift? 5    Please review the Heart Failure Care Pathway for additional HF goal outcomes.    KATE PEREZ RN        Neuro- A/Ox4, can be forgetful at times   Most Recent Vitals- Temp: 98  F (36.7  C) Temp src: Oral BP: 123/78   Heart Rate: 70 Resp: 18 SpO2: 95 % O2 Device: None (Room air)    Tele/Cardiac- A-Fib CVR   Resp- Room air   Activity- 1 Assist, GB and walker   Pain- denies pain, SOB or LOYOLA   Drips- Bumex infusing @ 1mg/hr  Skin- Blanchable redness, jordyn BLE, Wound to anterior R. Foot, covered w/ foam meplix. Bandage to 4th R. Toe. Redness and severe swelling to scrotum, bruise to the coccyx.   GI/- Voiding in BR or at bedside via urinal adequately    Aggression Color- Green  Plan- Plan to continue to diurese, weight management w/ diuresing, and possible discharge Friday.   Misc- Edema to  BLE improving.    KATE PEREZ, RN

## 2020-05-22 ENCOUNTER — APPOINTMENT (OUTPATIENT)
Dept: OCCUPATIONAL THERAPY | Facility: CLINIC | Age: 85
DRG: 292 | End: 2020-05-22
Payer: COMMERCIAL

## 2020-05-22 VITALS
WEIGHT: 201.2 LBS | OXYGEN SATURATION: 92 % | DIASTOLIC BLOOD PRESSURE: 51 MMHG | TEMPERATURE: 98.2 F | RESPIRATION RATE: 16 BRPM | BODY MASS INDEX: 25.82 KG/M2 | HEART RATE: 66 BPM | SYSTOLIC BLOOD PRESSURE: 90 MMHG | HEIGHT: 74 IN

## 2020-05-22 LAB
ANION GAP SERPL CALCULATED.3IONS-SCNC: 5 MMOL/L (ref 3–14)
BUN SERPL-MCNC: 36 MG/DL (ref 7–30)
CALCIUM SERPL-MCNC: 9.3 MG/DL (ref 8.5–10.1)
CHLORIDE SERPL-SCNC: 100 MMOL/L (ref 94–109)
CO2 SERPL-SCNC: 35 MMOL/L (ref 20–32)
CREAT SERPL-MCNC: 1.59 MG/DL (ref 0.66–1.25)
ERYTHROCYTE [DISTWIDTH] IN BLOOD BY AUTOMATED COUNT: 17 % (ref 10–15)
GFR SERPL CREATININE-BSD FRML MDRD: 38 ML/MIN/{1.73_M2}
GLUCOSE SERPL-MCNC: 85 MG/DL (ref 70–99)
HCT VFR BLD AUTO: 26.8 % (ref 40–53)
HGB BLD-MCNC: 8.5 G/DL (ref 13.3–17.7)
MCH RBC QN AUTO: 30.1 PG (ref 26.5–33)
MCHC RBC AUTO-ENTMCNC: 31.7 G/DL (ref 31.5–36.5)
MCV RBC AUTO: 95 FL (ref 78–100)
PLATELET # BLD AUTO: 95 10E9/L (ref 150–450)
POTASSIUM SERPL-SCNC: 3.4 MMOL/L (ref 3.4–5.3)
RBC # BLD AUTO: 2.82 10E12/L (ref 4.4–5.9)
SODIUM SERPL-SCNC: 140 MMOL/L (ref 133–144)
WBC # BLD AUTO: 3.4 10E9/L (ref 4–11)

## 2020-05-22 PROCEDURE — 25000132 ZZH RX MED GY IP 250 OP 250 PS 637: Performed by: INTERNAL MEDICINE

## 2020-05-22 PROCEDURE — 97535 SELF CARE MNGMENT TRAINING: CPT | Mod: GO

## 2020-05-22 PROCEDURE — 25000132 ZZH RX MED GY IP 250 OP 250 PS 637: Performed by: HOSPITALIST

## 2020-05-22 PROCEDURE — 99232 SBSQ HOSP IP/OBS MODERATE 35: CPT | Performed by: NURSE PRACTITIONER

## 2020-05-22 PROCEDURE — 25000132 ZZH RX MED GY IP 250 OP 250 PS 637: Performed by: NURSE PRACTITIONER

## 2020-05-22 PROCEDURE — 36415 COLL VENOUS BLD VENIPUNCTURE: CPT | Performed by: INTERNAL MEDICINE

## 2020-05-22 PROCEDURE — 85027 COMPLETE CBC AUTOMATED: CPT | Performed by: INTERNAL MEDICINE

## 2020-05-22 PROCEDURE — 80048 BASIC METABOLIC PNL TOTAL CA: CPT | Performed by: INTERNAL MEDICINE

## 2020-05-22 PROCEDURE — 99239 HOSP IP/OBS DSCHRG MGMT >30: CPT | Performed by: INTERNAL MEDICINE

## 2020-05-22 PROCEDURE — 97110 THERAPEUTIC EXERCISES: CPT | Mod: GO

## 2020-05-22 RX ORDER — CHLORTHALIDONE 25 MG/1
25 TABLET ORAL DAILY
DISCHARGE
Start: 2020-05-23 | End: 2020-05-28

## 2020-05-22 RX ORDER — SPIRONOLACTONE 25 MG/1
25 TABLET ORAL DAILY
DISCHARGE
Start: 2020-05-23 | End: 2020-06-02

## 2020-05-22 RX ADMIN — LEVOTHYROXINE SODIUM 100 MCG: 100 TABLET ORAL at 08:47

## 2020-05-22 RX ADMIN — SPIRONOLACTONE 25 MG: 25 TABLET ORAL at 08:47

## 2020-05-22 RX ADMIN — MULTIPLE VITAMINS W/ MINERALS TAB 1 TABLET: TAB at 08:47

## 2020-05-22 RX ADMIN — TAMSULOSIN HYDROCHLORIDE 0.4 MG: 0.4 CAPSULE ORAL at 08:47

## 2020-05-22 RX ADMIN — OMEPRAZOLE 40 MG: 20 CAPSULE, DELAYED RELEASE ORAL at 08:48

## 2020-05-22 RX ADMIN — BUMETANIDE 2 MG: 1 TABLET ORAL at 08:48

## 2020-05-22 RX ADMIN — CARVEDILOL 6.25 MG: 6.25 TABLET, FILM COATED ORAL at 08:47

## 2020-05-22 RX ADMIN — ASPIRIN 81 MG: 81 TABLET, DELAYED RELEASE ORAL at 08:47

## 2020-05-22 RX ADMIN — POTASSIUM CHLORIDE 20 MEQ: 1500 TABLET, EXTENDED RELEASE ORAL at 08:47

## 2020-05-22 RX ADMIN — Medication 5000 UNITS: at 08:47

## 2020-05-22 RX ADMIN — BUMETANIDE 1 MG: 1 TABLET ORAL at 12:09

## 2020-05-22 RX ADMIN — FINASTERIDE 5 MG: 5 TABLET, FILM COATED ORAL at 08:47

## 2020-05-22 RX ADMIN — SIMVASTATIN 20 MG: 20 TABLET, FILM COATED ORAL at 08:47

## 2020-05-22 RX ADMIN — SUCRALFATE 1 G: 1 TABLET ORAL at 08:47

## 2020-05-22 RX ADMIN — BUPROPION HYDROCHLORIDE 300 MG: 150 TABLET, FILM COATED, EXTENDED RELEASE ORAL at 08:48

## 2020-05-22 RX ADMIN — CHLORTHALIDONE 25 MG: 25 TABLET ORAL at 08:47

## 2020-05-22 ASSESSMENT — ACTIVITIES OF DAILY LIVING (ADL)
ADLS_ACUITY_SCORE: 19

## 2020-05-22 NOTE — PROGRESS NOTES
Clinic Care Coordination Contact     Clinical Data: Care Coordinator Outreach  Outreach attempted - per chart review, patient is currently admitted at [facility].     Plan: Care Coordinator will watch the chart for discharge to do an outreach.     MARIE Price  Clinic Care Coordination  [facility] [place]  Phone: [phone]

## 2020-05-22 NOTE — PROGRESS NOTES
SW:  D:  Received discharge orders for patient.  Bed available at [facility] for today.  Patient will transport, via the skyway, around 14:00 today.  Patient informed of the plan and in agreement to the plan.  Call placed to update patient's wife Kristi and she is also in agreement to the plan.  Call placed to update [facility] and faxed the orders and the PAS.      PAS-RR    D: Per DHS regulation, SW completed and submitted PAS-RR to [place] [employer] Direct Connect via the Senior LinkAge Line.  PAS-RR confirmation # is : [id number].    I: SW spoke with patient's wife and they are aware a PAS-RR has been submitted.  SW reviewed with patient's wife that they may be contacted for a follow up appointment within 10 days of hospital discharge if their SNF stay is < 30 days.  Contact information for [facility] Line was also provided.    A: Patient's wife verbalized understanding.    P: Further questions may be directed to [facility] LinkAge Line at #[phone], option #4 for PAS-RR staff.      LOVELY Maher, [facility]  Lead   [phone]  [facility]

## 2020-05-22 NOTE — DISCHARGE SUMMARY
[facility]  Discharge Summary        Bairon Foster MRN# [id number]   YOB: 1931 Age: 89 year old     Date of Admission:  5/14/2020  Date of Discharge:  5/22/2020  Admitting Physician:  Nikolay Stuart MD  Discharge Physician: Kareem Murillo MD  Discharging Service: Hospitalist     Primary Provider:  Mg Nelson  Primary Care Physician Phone Number: [phone]         Discharge Diagnoses/Problem Oriented Hospital Course (Providers):    Bairon Foster was admitted on 5/14/2020 by Nikolay Stuart MD and I would refer you to their history and physical.  The following problems were addressed during his hospitalization:    Bairon Foster is a 89 year old male who was admitted on 5/14/2020 with worsening lower extremity edema. After evaluation in the ER, there was concern for acute on chronic systolic and diastolic congestive heart failure. He was given IV bumex and was admitted to the hospitalist service for further evaluation and management.     Acute on chronic systolic and diastolic congestive heart failure  Severe MR s/p mitraclip  Severe TR  Dry weight is 198, on admit was 215. PTA bumex 4mg daily, coreg 6.25mg BID. Noted worsening edema, but not yet short of breath. Received IV bumex in the ER. He does not tolerate furosemide, torsemide, or spironolactone as they exacerbate his bullous pemphigoid. Echo 5/15 shows EF 46%, massive biatrial enlargement. mitraclip and pacemaker leads visualized and dilated IVC.  - Bumex gtt, 0.5mg/hr with poor diuresis on 5/16--increased gtt to 0.75mg/hr  On 5/17--then up to 1mg/hr on 5/18, changed to PO bumex his usual home doses day before discharge.  - Imdur 30mg added 5/17, discontinued on 5/18  - Monitor intake & output and daily weights (weight down to 201) - 11L and weight down to 201 lbs, goal weight 198-200  - Cardiology followed as inpatient  - Continue PTA coreg, spironolactone and chlorthalidone added  -  monitor lytes for a week to ensure stability  - Consult lymphedema wrap     Hypokalemia  Secondary to diuresis. On scheduled 20meq KCl BID  - Repleted per protocol     Permanent atrial fibrillation  Did not tolerate anticoagulation due to frequent epistaxis and high fall risk. S/p Watchman device on 12/3/19. Permanent pacemaker in place. Due for generator change in June 2020.  - Continue PTA aspirin.  - Continue PTA carvedilol       Coronary artery disease  - Continue PTA aspirin, carvedilol, and simvastatin.     Pancytopenia  - Secondary to hypocellular marrow.  - Counts are at about baseline on admit (hgb 8.7, WBC 3.5, plt 93).  - Followed by Dr. Chavez as an outpatient.  - received 1 unit of pRBC per cardiology  - hgb 7.7 > 9.2 > 8.5     Chronic kidney disease, stage 3  - Baseline creatinine is about 1.2.  - Slight increase in Cr with diuresis, but only up to 1.41 > 1.6 but stable HCO3    - creatinine stabilized between 1.4 to 1.6 with aggressive diuresis and likely his current baseline.     Hypothyroidism  - Continue PTA levothyroxine.     BPH  - Continue PTA finasteride and tamsulosin.     GERD  - Continue PTA omeprazole and sucralfate.     Bullous pemphigoid  - Continue Vanicream to bilateral lower extremities twice daily.  - Consult wound care nurse.  - [facility].     History of CVA  - Continue PTA aspirin and simvastatin.     Lymphedema  - Continue lower extremity wraps.  - Lymphedemia consult added     DVT Prophylaxis: Pneumatic Compression Devices  Code Status: Full Code     Expected discharge: on 5/22 anticipate home per therapies            Code Status:      Full Code         Important Results:      NAD  HEENT NCTA  PULM CTA  CV RRR  GI SOFT +BS  MS + EDEMA  NEURO NON FOCAL  DERM WARM AND DRY         Pending Results:        Unresulted Labs Ordered in the Past 30 Days of this Admission     No orders found from 4/14/2020 to 5/15/2020.                Discharge Instructions and Follow-Up:      Follow-up Appointments     Follow Up and recommended labs and tests      Follow up with [facility] as directed    Repeat BMP every 3 days x 3 to ensure electrolyte stability with addition   of hygroton and spironolactone    Follow up with PCP after discharge from TCU                  Discharge Disposition:      Discharged to rehabilitation facility         Discharge Medications:        Current Discharge Medication List      START taking these medications    Details   chlorthalidone (HYGROTON) 25 MG tablet Take 1 tablet (25 mg) by mouth daily  Qty:      Associated Diagnoses: Acute combined systolic and diastolic congestive heart failure (H)      spironolactone (ALDACTONE) 25 MG tablet Take 1 tablet (25 mg) by mouth daily  Qty:      Associated Diagnoses: Acute combined systolic and diastolic congestive heart failure (H)         CONTINUE these medications which have NOT CHANGED    Details   ACE/ARB/ARNI NOT PRESCRIBED (INTENTIONAL) Please choose reason not prescribed, below    Associated Diagnoses: Coronary artery disease involving native coronary artery of native heart without angina pectoris      acetaminophen (TYLENOL) 500 MG tablet Take 2 tablets (1,000 mg) by mouth every 6 hours as needed for mild pain  Qty: 100 tablet, Refills: 0    Associated Diagnoses: Arthritis      aspirin 81 MG EC tablet Take 1 tablet (81 mg) by mouth daily    Associated Diagnoses: Coronary artery disease involving native coronary artery of native heart without angina pectoris      !! bumetanide (BUMEX) 1 MG tablet Take 2 mg by mouth every morning      !! bumetanide (BUMEX) 1 MG tablet Take 1 mg by mouth daily (with lunch) At Noon      !! bumetanide (BUMEX) 1 MG tablet Take 1 mg by mouth every evening       buPROPion (WELLBUTRIN XL) 300 MG 24 hr tablet Take 1 tablet (300 mg) by mouth every morning  Qty: 90 tablet, Refills: 1    Associated Diagnoses: Major depression in complete remission (H)       carvedilol (COREG) 6.25 MG tablet TAKE 1 TABLET TWICE DAILY WITH MEALS  Qty: 180 tablet, Refills: 3    Associated Diagnoses: Ischemic cardiomyopathy      finasteride (PROSCAR) 5 MG tablet TAKE 1 TABLET EVERY DAY  Qty: 90 tablet, Refills: 1    Associated Diagnoses: Benign prostatic hyperplasia, unspecified whether lower urinary tract symptoms present      levothyroxine (SYNTHROID/LEVOTHROID) 100 MCG tablet TAKE 1 TABLET EVERY DAY  Qty: 90 tablet, Refills: 1    Associated Diagnoses: Acquired hypothyroidism      Multiple Vitamins-Minerals (CENTRUM SILVER) per tablet Take 1 tablet by mouth daily      omeprazole (PRILOSEC) 40 MG DR capsule Take 1 capsule (40 mg) by mouth daily  Qty: 30 capsule, Refills: 5    Associated Diagnoses: Gastroesophageal reflux disease without esophagitis      polyethylene glycol (MIRALAX) packet Take 1 packet by mouth daily      potassium chloride ER (K-DUR/KLOR-CON M) 20 MEQ CR tablet Take 1 tablet (20 mEq) by mouth 2 times daily  Qty: 180 tablet, Refills: 3    Associated Diagnoses: Hypokalemia      simvastatin (ZOCOR) 20 MG tablet Take 20 mg by mouth daily       sucralfate (CARAFATE) 1 GM tablet Take 1 tablet (1 g) by mouth 2 times daily  Qty: 180 tablet, Refills: 1    Associated Diagnoses: Gastroesophageal reflux disease, esophagitis presence not specified      tamsulosin (FLOMAX) 0.4 MG capsule TAKE 1 CAPSULE EVERY DAY  Qty: 90 capsule, Refills: 1    Associated Diagnoses: Benign prostatic hyperplasia with urinary hesitancy      triamcinolone (KENALOG) 0.025 % cream Apply topically 2 times daily as needed       cholecalciferol (VITAMIN D3) 5000 units (125 mcg) capsule Take 1 capsule (5,000 Units) by mouth daily    Associated Diagnoses: Coronary artery disease involving native coronary artery of native heart without angina pectoris      COMPRESSION STOCKINGS 1 each daily  Qty: 2 each, Refills: 0    Associated Diagnoses: Varicose 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       EPINEPHrine (EPIPEN/ADRENACLICK/OR ANY BX GENERIC EQUIV) 0.3 MG/0.3ML injection 2-pack Inject 0.3 mg into the muscle as needed for anaphylaxis       !! - Potential duplicate medications found. Please discuss with provider.               Allergies:         Allergies   Allergen Reactions     Furosemide Blisters     Bullous pemphigoid. Bumex and ethacrynic acid okay.     Torsemide Blisters     Bullous pemphigoid.  Bumex and ethacrynic acid okay.            Consultations This Hospital Stay:      Consultation during this admission received from cardiology          Discharge Orders for Skilled Facility (from Discharge Orders):        After Care Instructions     Activity - Up with nursing assistance      Advance Diet as Tolerated      Follow this diet upon discharge: Orders Placed This Encounter      Room Service      2 Gram Sodium Diet No Caffeine for 24 hours (once tests completed, may have caffeine)         Daily weights      Call Provider for weight gain of more than 2 pounds per day or 5 pounds per week.         Fall precautions      General info for SNF      Length of Stay Estimate: Short Term Care: Estimated # of Days <30  Condition at Discharge: Improving  Level of care:skilled   Rehabilitation Potential: Good  Admission H&P remains valid and up-to-date: Yes  Recent Chemotherapy: N/A  Use Nursing Home Standing Orders: N/A         Intake and output      Every shift         Mantoux instructions      Give two-step Mantoux (PPD) Per Facility Policy Yes             Future tests that were ordered for you     Basic metabolic panel      Basic metabolic panel      N terminal pro BNP outpatient      TSH with free T4 reflex      Last Lab Result: TSH (mU/L)       Date                     Value                 04/08/2019               3.09             ----------                  Rehab orders for Skilled Facility (from Discharge Orders):      Referrals     Future Labs/Procedures    Follow-Up with Cardiac Advanced Practice  Provider     Discharge Order: F/U with Cardiac  KI     Follow-Up with [facility]     Occupational Therapy Adult Consult     Comments:    Evaluate and treat as clinically indicated.    Reason:  chf    Physical Therapy Adult Consult     Comments:    Evaluate and treat as clinically indicated.    Reason: chf             Discharge Time:       Greater than 30 minutes.        Image Results From This Hospital Stay (For Non-EPIC Providers):        Results for orders placed or performed during the hospital encounter of 20   Chest XR,  PA & LAT    Narrative    CHEST TWO VIEWS  2020 7:50 PM     HISTORY: Leg swelling.    COMPARISON: 2019.      Impression    IMPRESSION: No significant interval change. Single-lead cardiac device  left chest wall, with lead tip projected over the RV. Cardiac  enlargement. Pulmonary vascularity is within normal limits. Hazy  opacity at the left lung base could be related to atelectasis or  pneumonia. No pleural effusions are identified.     RAZA MAGANA MD   Echocardiogram Complete    Narrative    [id number]  [id number]  [id number]  [id number]^KELBY^AMANDA^LON           [facility]  Echocardiography Laboratory  [address] [place] [address]        Name: HIRO IBARRA  MRN: [id number]  : 1931  Study Date: 05/15/2020 09:37 AM  Age: 89 yrs  Gender: Male  Patient Location: [facility]  Reason For Study: CHF  Ordering Physician: AMANDA LAMA  Referring Physician: TORO STRAUSS  Performed By: Sparkle Bloom     BSA: 2.2 m2  Height: 74 in  Weight: 200 lb  HR: 86  BP: 107/62 mmHg  _____________________________________________________________________________  __        Procedure  Complete Portable Echo Adult.  _____________________________________________________________________________  __        Interpretation Summary     1. Status post transcatheter mitral valve repair using the MitraClip device x  2 on 2019.  2. Stable mitral clip position,  residual mild mitral regurgitation (eccentric,  posteriorly directed). Mean diastolic gradient 4 mmHg consistent with mild  mitral stenosis (heart rate 76 bpm).  3. Normal left ventricular size with mildly decreased systolic function and  global hypokinesis. Biplane LVEF 46%.  4. Normal right ventricular systolic function.  5. Massive bi-atrial enlargement.  6. Pacemaker lead visualized in the right ventricle. Moderate tricuspid valve  regurgitation. The right ventricular systolic pressure is approximated at 27.0  mmHg plus the right atrial pressure.  7. 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     8. Status post left atrial appendage closure with a 33 mm Watchman device.  Device not well visualized.     _____________________________________________________________________________  __        Left Ventricle  The left ventricle is normal in size. There is mild concentric left  ventricular hypertrophy. Mildly decreased left ventricular systolic function.  LVEF 46% based on biplane 2D tracing. Diastolic function not assessed due to  presence of prosthetic mitral valve. There is mild global hypokinesia of the  left ventricle. Septal wall motion abnormality may reflect pacemaker  activation.     Right Ventricle  The right ventricle is grossly normal size. The right ventricular systolic  function is normal. There is a pacemaker lead in the right ventricle.     Atria  The left atrium is severely dilated. The right atrium is severely dilated.  There is no color Doppler evidence of an atrial shunt. Status post left atrial  appendage closure with a 33 mm Watchman device on 12/3/2019. The left atrial  appendage is not well visualized.     Mitral Valve  There is mild (1+) mitral regurgitation. Mitral valve prosthesis. Stable  mitral clip position, residual mild mitral regurgitation (eccentric,  posteriorly directed). Mean diastolic gradient 4 mmHg consistent with mild  mitral stenosis (heart  rate 76 bpm).        Tricuspid Valve  Normal tricuspid valve. There is moderate (2+) tricuspid regurgitation. The  right ventricular systolic pressure is approximated at 27.0 mmHg plus the  right atrial pressure.     Aortic Valve  The aortic valve is trileaflet with aortic valve sclerosis. There is trace  aortic regurgitation. No hemodynamically significant valvular aortic stenosis.     Pulmonic Valve  The pulmonic valve is not well seen, but is grossly normal. There is mild to  moderate (1-2+) pulmonic valvular regurgitation.     Vessels  The aortic root is normal size. The ascending aorta is Borderline dilated. The  inferior vena cava was dilated at 4.3 cm without respiratory variability,  consistent with increased right atrial pressure.     Pericardium  There is no pericardial effusion.        Rhythm  Sinus rhythm was noted.  _____________________________________________________________________________  __  MMode/2D Measurements & Calculations  IVSd: 1.2 cm     LVIDd: 5.8 cm  LVIDs: 3.7 cm  LVPWd: 1.1 cm  FS: 36.8 %  LV mass(C)d: 281.5 grams  LV mass(C)dI: 129.5 grams/m2  Ao root diam: 3.7 cm  LA dimension: 6.2 cm  asc Aorta Diam: 3.8 cm  LA/Ao: 1.7  LVOT diam: 2.4 cm  LVOT area: 4.6 cm2  LA Volume (BP): 214.0 ml  LA Volume Index (BP): 98.6 ml/m2  RWT: 0.37           Doppler Measurements & Calculations  MV E max jasiel: 161.7 cm/sec  MV max P.4 mmHg  MV mean P.1 mmHg  MV V2 VTI: 39.1 cm  MV P1/2t max jasiel: 193.7 cm/sec  MV P1/2t: 63.1 msec  MVA(P1/2t): 3.5 cm2  MV dec slope: 898.9 cm/sec2  MV dec time: 0.18 sec  PA V2 max: 117.3 cm/sec  PA max P.5 mmHg  PA acc time: 0.11 sec  TR max jasiel: 258.7 cm/sec  TR max P.0 mmHg  E/E' av.6  Lateral E/e': 18.4  Medial E/e': 26.9           _____________________________________________________________________________  __           Report approved by: Dr Cierra Fay 05/15/2020 01:02 PM              Most Recent Lab Results In EPIC (For Non-EPIC Providers):     Most Recent 3 CBC's:  Recent Labs   Lab Test 05/22/20  0607 05/21/20  0607 05/20/20  0534   WBC 3.4* 4.0 3.5*   HGB 8.5* 9.2* 7.7*   MCV 95 95 96   PLT 95* 112* 93*      Most Recent 3 BMP's:  Recent Labs   Lab Test 05/22/20  0607 05/21/20  0607 05/20/20  1741 05/20/20  0534    140  --  138   POTASSIUM 3.4 3.4 3.5 3.3*   CHLORIDE 100 99  --  100   CO2 35* 32  --  33*   BUN 36* 35*  --  34*   CR 1.59* 1.61*  --  1.42*   ANIONGAP 5 9  --  5   TEODORO 9.3 9.4  --  9.2   GLC 85 88  --  81     Most Recent 3 Troponin's:No lab results found.    Invalid input(s): TROP, TROPONINIES  Most Recent 3 INR's:  Recent Labs   Lab Test 12/04/19  0544 05/20/19  0628 11/20/18  1339   INR 1.28* 1.44* 1.79*     Most Recent 2 LFT's:  Recent Labs   Lab Test 05/14/20  1848 12/02/19  0949   AST 29 30   ALT 13 24   ALKPHOS 85 100   BILITOTAL 1.2 0.9     Most Recent Cholesterol Panel:  Recent Labs   Lab Test 08/21/19  1449   CHOL 97   LDL 48   HDL 41   TRIG 41     Most Recent 6 Bacteria Isolates From Any Culture (See EPIC Reports for Culture Details):No lab results found.  Most Recent TSH, T4 and HgbA1c:  Recent Labs   Lab Test 05/29/19  1023 04/08/19  1218  08/25/16  0931   TSH  --  3.09   < > 1.20   T4  --   --   --  1.24   A1C 5.5  --   --   --     < > = values in this interval not displayed.

## 2020-05-22 NOTE — PLAN OF CARE
Discharge Planner OT   Patient plan for discharge: TCU    Current status: Pt very pleasant and cooperative. Engaged in seated BLE exercises. Sit to stand with CGA for balance safety. Stood at sink for 20 minutes to complete grooming tasks. Ambulated 70 ft with cane with Leticia for balance safety; pt unsteady. Pt educated regarding safety benefits of using walker rather than cane. Left with chair alarm on.    Barriers to return to prior living situation: impaired balance, safety, STM impairments (13/30 on SLUMS on 5/21/2020), activity tolerance decreased, high fall risk     Recommendations for discharge: TCU     Rationale for recommendations: Pt would benefit from continued skilled OT services to improve independence and safety with ADL's and functional transfers as pt is not at baseline.  If home will require 24 hr supervision and A with stairs, shower transfer, dressing and all IADL's including med mgmt, etc and home PT and OT. Recommend pt not return to driving until he can participate in OP OT driving eval     Entered by: Jojo Barreto 05/22/2020 9:09 AM       ADDENDUM 5/22/2020 at 3:00pm    Occupational Therapy Discharge Summary    Reason for therapy discharge:    Discharged to transitional care facility.    Progress towards therapy goal(s). See goals on Care Plan in [facility] electronic health record for goal details.  Goals partially met.  Barriers to achieving goals:   discharge from facility.    Therapy recommendation(s):    Continued therapy is recommended.  Rationale/Recommendations:  Pt below baseline.

## 2020-05-22 NOTE — PROGRESS NOTES
[facility]    Cardiology Progress Note    Primary Cardiologist: Dr. Hope and Mackenzie Tejada, NP; Dr. Curiel ()    Date of Admission: 05/14/2020  Service date: 05/20/2020    Summary:  Mr. 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     Assessment & Plan   1. Acute on chronic systolic and diastolic congestive heart failure  - PTA on bumex 2 mg in the AM, 1 mg at lunch, and 1 mg in the evening as well as carvedilol 6.25 mg BID.   - Echo 5/15/20 with LVEF 45-50%, mild MR and moderate TR.  - CXR with no evidence of edema.  - NTproBNP of 718 on admission.  - Diuresed well with spironolactone 25 mg daily, chlorthalidone 25 mg daily, and IV bumex with net -11.4 L output since admission.   - Admit wt 214 lbs, down to 201 lbs. Nearing targeted dry wt of around 198-200 lbs.  2. Mitral regurgitation status post mitral clip x2 and moderate TR  - Echocardiogram 5/18/20 showing mitral regurgitation looking much better, estimated as mild, rather than severe by CHELLE in January 2020.  - Tricuspid regurgitation also looks much better, estimated as moderate, rather than severe by CHELLE in January 2020.  3. Permanent atrial fibrillation with history of symptomatic bradycardia status post PPM  - Rate controlled with carvedilol. Watchman device in place for stroke prophylaxis.  - Battery near end of life; generator change scheduled for Jumana 3.   - Follows Dr. Curiel as an outpatient.  4. Coronary artery disease  - Mild, non-obstructive CAD on  "coronary angiogram in 04/2019.  - Continue PTA aspirin 81 mg daily and simvastatin 20 mg daily  5. History of CVA  - Status post Watchman device implant in 12/2019.  6. Pancytopenia, secondary to hypocellular marrow  - WBC 3.5, Hgb 7.7, Plt 93 yesterday with hemoglobin dropping slightly from baseline.  - Received 1 unit of PRBCs the evening of 5/20/20 with improvement in Hgb to 9.2 yesterday.  - Follows Dr. Chavez as an outpatient.   7. Chronic Kidney Disease   - Baseline creatinine of around 1.2.  - Creatinine bumped up to 1.61 yesterday and is improved slightly today at 1.59.  8. Chronic venous insufficiency   - Continue with leg wraps and elevation.      Plan:   1. Continue PO bumex at his PTA dosing of 2 mg PO in the morning, 1 mg PO at lunch, and 1 mg PO in the evening in addition to spironolactone 25 mg PO daily, and chlorthalidone 25 mg PO daily with potassium 20 mEq TID.    2. Continue with daily weights and low sodium diet.  3. Okay from a cardiac standpoint for discharge today.  4. I have messaged the scheduling department to arrange close outpatient follow up in 1 week post discharge with a repeat BMP and NT pro BNP beforehand and for him establish with [facility].    Disposition Plan   Expected discharge today per pt plan to discharge to [facility].     Entered: Venkatesh Cuellar 05/22/2020, 9:39 AM     Interval History   Patient is resting comfortably. He reports feeling a bit \"melancholy\" regarding his situation being in the hospital for several days and the fact that he will be discharging to [facility]. He feels his lower extremity edema and scrotal edema are significantly improved. He denies shortness of breath, chest pain, or dizziness.     Telemetry:   A.Fib with CVR     Thank you for the opportunity to participate in this pleasant patient's care.     Vick Cuellar, APRN, CNP   Text Page  (8am - 5pm, M-F)    Patient Active Problem List   Diagnosis     Atrial fibrillation (H)     Pulmonary " hypertension (H)     CAD (coronary artery disease)     BPH (benign prostatic hyperplasia)     Ischemic cardiomyopathy     JIM (obstructive sleep apnea)- mild/moderate     Vitamin D deficiency     Gastroesophageal reflux disease without esophagitis     Hydrocele, unspecified hydrocele type     Slow transit constipation     Hypercholesterolemia     Venous stasis dermatitis of both lower extremities     Lymphedema of both lower extremities     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      Coronary artery disease due to lipid rich plaque     Acquired hypothyroidism     Onychomycosis     PAD (peripheral artery disease) (H)     Xerosis cutis     AK (actinic keratosis)     Angioma of skin     Acute systolic CHF (congestive heart failure) (H)     CHF (congestive heart failure) (H)     Non-rheumatic mitral regurgitation     Mitral regurgitation     CKD (chronic kidney disease) stage 3, GFR 30-59 ml/min (H)     Bullous pemphigoid     Chronic atrial fibrillation     Presence of Watchman left atrial appendage closure device     Sick sinus syndrome (H)     Cardiac pacemaker in situ     Acute combined systolic and diastolic congestive heart failure (H)     Physical Exam   Temp: 98.2  F (36.8  C) Temp src: Oral BP: 90/51 Pulse: 66 Heart Rate: 62 Resp: 16 SpO2: 92 % O2 Device: None (Room air)    Vitals:    05/19/20 0500 05/20/20 0410 05/21/20 0021   Weight: 94.2 kg (207 lb 9.6 oz) 93 kg (205 lb) 91.3 kg (201 lb 3.2 oz)     Vital Signs with Ranges  Temp:  [97.4  F (36.3  C)-98.5  F (36.9  C)] 98.2  F (36.8  C)  Pulse:  [58-67] 66  Heart Rate:  [59-63] 62  Resp:  [16] 16  BP: ()/(49-71) 90/51  SpO2:  [90 %-95 %] 92 %  I/O last 3 completed shifts:  In: 880 [P.O.:880]  Out: 2375 [Urine:2375]    Constitutional: Appears younger than his stated age, well nourished, and in no acute distress.  Eyes: Pupils equal, round. Sclerae anicteric.   HEENT: Normocephalic, atraumatic.    Respiratory: Breathing non-labored. Lungs clear  to auscultation bilaterally. No crackles, wheezes, rhonchi, or rales.  Cardiovascular: Irregularly irregular rhythm, controlled rate. No murmur, rub, or gallop.  GI: Soft, non-distended, non-tender.  Skin: Warm, dry. Venous stasis changes in the LE bilaterally.   Musculoskeletal/Extremities: Moves all extremities well and symmetrically. 1+ edema in the ankles bilaterally.  Neurologic: No gross focal deficits. Alert, awake, and oriented to person, place and time.  Psychiatric: Affect appropriate. Mentation normal.    Medications     - MEDICATION INSTRUCTIONS -       ACE/ARB/ARNI NOT PRESCRIBED         aspirin  81 mg Oral Daily     bumetanide  1 mg Oral Daily with lunch     bumetanide  1 mg Oral QPM     bumetanide  2 mg Oral QAM     buPROPion  300 mg Oral QAM     carvedilol  6.25 mg Oral BID w/meals     chlorthalidone  25 mg Oral Daily     cholecalciferol  5,000 Units Oral Daily     emollient   Topical BID     finasteride  5 mg Oral Daily     levothyroxine  100 mcg Oral QAM AC     multivitamin w/minerals  1 tablet Oral Daily     omeprazole  40 mg Oral Daily     polyethylene glycol  17 g Oral Daily     potassium chloride ER  20 mEq Oral TID     simvastatin  20 mg Oral Daily     sodium chloride (PF)  3 mL Intracatheter Q8H     spironolactone  25 mg Oral Daily     sucralfate  1 g Oral BID     tamsulosin  0.4 mg Oral Daily     Data   Results for orders placed or performed during the hospital encounter of 05/14/20 (from the past 24 hour(s))   Basic metabolic panel   Result Value Ref Range    Sodium 140 133 - 144 mmol/L    Potassium 3.4 3.4 - 5.3 mmol/L    Chloride 100 94 - 109 mmol/L    Carbon Dioxide 35 (H) 20 - 32 mmol/L    Anion Gap 5 3 - 14 mmol/L    Glucose 85 70 - 99 mg/dL    Urea Nitrogen 36 (H) 7 - 30 mg/dL    Creatinine 1.59 (H) 0.66 - 1.25 mg/dL    GFR Estimate 38 (L) >60 mL/min/[1.73_m2]    GFR Estimate If Black 44 (L) >60 mL/min/[1.73_m2]    Calcium 9.3 8.5 - 10.1 mg/dL   CBC with platelets   Result Value Ref  Range    WBC 3.4 (L) 4.0 - 11.0 10e9/L    RBC Count 2.82 (L) 4.4 - 5.9 10e12/L    Hemoglobin 8.5 (L) 13.3 - 17.7 g/dL    Hematocrit 26.8 (L) 40.0 - 53.0 %    MCV 95 78 - 100 fl    MCH 30.1 26.5 - 33.0 pg    MCHC 31.7 31.5 - 36.5 g/dL    RDW 17.0 (H) 10.0 - 15.0 %    Platelet Count 95 (L) 150 - 450 10e9/L     This note was completed in part using Dragon voice recognition software. Although reviewed after completion, some word and grammatical errors may occur.

## 2020-05-22 NOTE — PROGRESS NOTES
Care from [id number]. Patient oriented but forgetful. Urinating in urinal. Up with one assist and walker. Denies pain. Discharge planned for today to [facility].

## 2020-05-22 NOTE — PLAN OF CARE
VSS. Patient slept well overnight. Denies pain. Plan for discharge to TCU today.     Heart Failure Care Pathway  GOALS TO BE MET BEFORE DISCHARGE:    1. Decrease congestion and/or edema with diuretic therapy to achieve near      optimal volume status.            Dyspnea improved:  Yes            Edema improved:     Yes        Net I/O and Weights since admission:          04/22 0700 - 05/22 0659  In: 6311 [P.O.:5960; I.V.:51]  Out: 30753 [Urine:02232]  Net: -75334            Vitals:    05/14/20 1745 05/14/20 2217 05/15/20 0635 05/16/20 0159   Weight: 90.7 kg (200 lb) 97.4 kg (214 lb 12.8 oz) 97.5 kg (215 lb) 95.3 kg (210 lb)    05/17/20 0400 05/18/20 0340 05/19/20 0500 05/20/20 0410   Weight: 95 kg (209 lb 8 oz) 94.7 kg (208 lb 11.2 oz) 94.2 kg (207 lb 9.6 oz) 93 kg (205 lb)    05/21/20 0021   Weight: 91.3 kg (201 lb 3.2 oz)       2.  O2 sats > 92% on RA or at prior home O2 therapy level.          Current oxygenation status:       SpO2: 90 %         O2 Device: None (Room air),            Able to wean O2 this shift to keep sats > 92%:  Yes       Does patient use Home O2? No    3.  Tolerates ambulation and mobility near baseline: Yes        How many times did the patient ambulate with nursing staff this shift? 1    Please review the Heart Failure Care Pathway for additional HF goal outcomes.    Bhavani Brasher RN RN  5/22/2020

## 2020-05-26 ENCOUNTER — NURSING HOME VISIT (OUTPATIENT)
Dept: GERIATRICS | Facility: CLINIC | Age: 85
End: 2020-05-26
Payer: COMMERCIAL

## 2020-05-26 ENCOUNTER — PATIENT OUTREACH (OUTPATIENT)
Dept: CARE COORDINATION | Facility: CLINIC | Age: 85
End: 2020-05-26

## 2020-05-26 ENCOUNTER — TELEPHONE (OUTPATIENT)
Dept: CARDIOLOGY | Facility: CLINIC | Age: 85
End: 2020-05-26

## 2020-05-26 ENCOUNTER — TELEPHONE (OUTPATIENT)
Dept: FAMILY MEDICINE | Facility: CLINIC | Age: 85
End: 2020-05-26

## 2020-05-26 VITALS
BODY MASS INDEX: 23.85 KG/M2 | OXYGEN SATURATION: 95 % | DIASTOLIC BLOOD PRESSURE: 60 MMHG | HEART RATE: 64 BPM | TEMPERATURE: 98.2 F | RESPIRATION RATE: 16 BRPM | SYSTOLIC BLOOD PRESSURE: 103 MMHG | HEIGHT: 74 IN | WEIGHT: 185.8 LBS

## 2020-05-26 DIAGNOSIS — N40.0 BENIGN PROSTATIC HYPERPLASIA WITHOUT LOWER URINARY TRACT SYMPTOMS: ICD-10-CM

## 2020-05-26 DIAGNOSIS — R53.81 DEBILITY: ICD-10-CM

## 2020-05-26 DIAGNOSIS — R60.0 BILATERAL LEG EDEMA: ICD-10-CM

## 2020-05-26 DIAGNOSIS — D61.818 PANCYTOPENIA (H): ICD-10-CM

## 2020-05-26 DIAGNOSIS — L12.0 BULLOUS PEMPHIGOID (H): ICD-10-CM

## 2020-05-26 DIAGNOSIS — R29.6 FALLS FREQUENTLY: ICD-10-CM

## 2020-05-26 DIAGNOSIS — I25.10 CORONARY ARTERY DISEASE INVOLVING NATIVE HEART WITHOUT ANGINA PECTORIS, UNSPECIFIED VESSEL OR LESION TYPE: ICD-10-CM

## 2020-05-26 DIAGNOSIS — F32.A DEPRESSION, UNSPECIFIED DEPRESSION TYPE: ICD-10-CM

## 2020-05-26 DIAGNOSIS — N18.30 CKD (CHRONIC KIDNEY DISEASE) STAGE 3, GFR 30-59 ML/MIN (H): ICD-10-CM

## 2020-05-26 DIAGNOSIS — R41.89 COGNITIVE IMPAIRMENT: ICD-10-CM

## 2020-05-26 DIAGNOSIS — I48.20 CHRONIC ATRIAL FIBRILLATION (H): ICD-10-CM

## 2020-05-26 DIAGNOSIS — I50.43 ACUTE ON CHRONIC COMBINED SYSTOLIC AND DIASTOLIC CONGESTIVE HEART FAILURE (H): Primary | ICD-10-CM

## 2020-05-26 DIAGNOSIS — Z95.0 CARDIAC PACEMAKER IN SITU: ICD-10-CM

## 2020-05-26 DIAGNOSIS — I38 VALVULAR HEART DISEASE: ICD-10-CM

## 2020-05-26 PROCEDURE — 99309 SBSQ NF CARE MODERATE MDM 30: CPT | Performed by: NURSE PRACTITIONER

## 2020-05-26 ASSESSMENT — MIFFLIN-ST. JEOR: SCORE: 1577.53

## 2020-05-26 NOTE — PROGRESS NOTES
[place] [facility]  PRIMARY CARE PROVIDER AND CLINIC:  Mg Nelson MD, [address] [facility] [address]  Chief Complaint   Patient presents with     Hospital F/U     [place] Medical Record Number:  [id number]  Place of Service where encounter took place:  [facility] [[id number]]    Bairon Foster  is a 89 year old  (2/6/1931), admitted to the above facility from  [facility]. Hospital stay 5/14/2020 through 5/22/2020..  Admitted to this facility for  rehab, medical management and nursing care.    HPI:    HPI information obtained from: facility chart records, facility staff, patient report, [facility] chart review and family/first contact wife Kristi and granddaughter Carmella report.     Brief Summary of Hospital Course: Mr. Foster was just at a TCU in February and discharged; has a h/o CHF, Mitral and tricuspid valve disease, no anticoagulation, h/o watchman devise; CKD III, BPH, pancytopenia, Bullous pemphigoid, depression and cognitive impairment.  He was presented to hospital due to worsening CHF with edema.  Seen by Cardiology and unfortunately DC summary is not done, but EMR notes indicate he was diuresed on IV Bumex and had some of his medications changed.  They noted a dry weight of 198-200.  Due to improvement he has since transitioned to the Tcu/rehab for ongoing cares and PT/OT.    We were informed over the weekend he did have a fall, RN's noted Left shoulder bruise, no other injury.      Updates on Status Since Skilled nursing Admission: In meeting Mr. Foster today for admission today, he has a clear degree of cognitive/memory impairment.  He knows he has a swelling/heart problem, know's he's on Bumex and does not like having to void all the time, but can not give me much more details.  He denies any SOB/LOYOLA, denies any pain.  He has no other complaints.      I called his wife Kristi, due to RN/TCU reports she was fixated on wanting  to bring him home today.  We tried to discuss his situation on the phone but after 10 minutes of her clearly not understanding the issues (unclear if it was a cognitive or [place] barrier) his granddaugher Carmella took over the conversation.  She seemed rather updated on his current CHF situation, she seemed to understand it was getting tenuous and she noted there has been discussion about hospice recently, but not acted upon.  She did report Kristi is official POA but her dad (Mr. Foster's son) [place] could help, but was not available today.  Carmella confirmed it would not be a good idea for Mr. Foster to DC home today, we did inform her it would be AMA, due to his tenuous status,  H/o falls and falling here.  She agreed, does not want him to leave.  We shared the below plan with her and she reported she would update wife/and son later today.  She could not speak to the code status, thus we explained to her it would remain Full Code for now until we hear otherwise.     CODE STATUS/ADVANCE DIRECTIVES DISCUSSION:   CPR/Full code   Patient's living condition: lives with family, child(ang), adult;spouse      ALLERGIES: Furosemide and Torsemide  PAST MEDICAL HISTORY:  has a past medical history of Acute, but ill-defined, cerebrovascular disease, Antiplatelet or antithrombotic long-term use, Arrhythmia, Bullous pemphigoid (8/21/2019), CKD (chronic kidney disease) stage 3, GFR 30-59 ml/min (H), Coronary atherosclerosis of unspecified type of vessel, native or graft, Epistaxis, Esophageal reflux, GI bleed (4/17/2019), Hypercholesterolemia, Ischemic cardiomyopathy, Lymphedema of both lower extremities, Mitral regurgitation, Obese, JIM on CPAP, Other lymphedema, Other pancytopenia (H), Other specified anemias, Overweight, Pacemaker, PAD (peripheral artery disease) (H), Pancytopenia (H), Persistent atrial fibrillation, Presence of Watchman left atrial appendage closure device (12/03/2019), Pulmonary hypertension (H), Status post  coronary angiogram (3/21/2018), Systolic CHF (H), TIA (transient ischemic attack), Unspecified hypothyroidism, Venous stasis dermatitis of both lower extremities, and Vitamin D deficiency. He also has no past medical history of Hypertension.  PAST SURGICAL HISTORY:   has a past surgical history that includes surgical history of - ; surgical history of -  (1990s); surgical history of -  (3/11); Eye surgery; Abdomen surgery; Implant pacemaker (8/2009); hernia repair; Herniorrhaphy inguinal (8/14/2012); orthopedic surgery; orthopedic surgery (2010); Herniorrhaphy umbilical (N/A, 10/31/2017); Heart Catheterization with Possible Intervention (N/A, 4/12/2019); Right Heart Cath (N/A, 4/12/2019); Bone marrow biopsy, bone specimen, needle/trocar (N/A, 4/19/2019); MitraClip (N/A, 5/20/2019); and Left Atrial Appendage Closure (N/A, 12/3/2019).  FAMILY HISTORY: family history includes C.A.D. in his father; Cardiovascular in his father; Lung Cancer in his sister; Unknown/Adopted in his mother.  SOCIAL HISTORY:   reports that he has never smoked. He has never used smokeless tobacco. He reports previous alcohol use. He reports that he does not use drugs.    Post Discharge Medication Reconciliation Status: discharge medications reconciled and changed, per note/orders (see AVS)    Current Outpatient Medications   Medication Sig Dispense Refill     ACE/ARB/ARNI NOT PRESCRIBED (INTENTIONAL) Please choose reason not prescribed, below       acetaminophen (TYLENOL) 500 MG tablet Take 2 tablets (1,000 mg) by mouth every 6 hours as needed for mild pain 100 tablet 0     aspirin 81 MG EC tablet Take 1 tablet (81 mg) by mouth daily       bumetanide (BUMEX) 1 MG tablet Take 2 mg by mouth every morning       bumetanide (BUMEX) 1 MG tablet Take 1 mg by mouth daily (with lunch) At Noon       bumetanide (BUMEX) 1 MG tablet Take 1 mg by mouth every evening        buPROPion (WELLBUTRIN XL) 300 MG 24 hr tablet Take 1 tablet (300 mg) by mouth every  "morning 90 tablet 1     carvedilol (COREG) 6.25 MG tablet TAKE 1 TABLET TWICE DAILY WITH MEALS 180 tablet 3     chlorthalidone (HYGROTON) 25 MG tablet Take 1 tablet (25 mg) by mouth daily       cholecalciferol (VITAMIN D3) 5000 units (125 mcg) capsule Take 1 capsule (5,000 Units) by mouth daily       COMPRESSION STOCKINGS 1 each daily 2 each 0     EPINEPHrine (EPIPEN/ADRENACLICK/OR ANY BX GENERIC EQUIV) 0.3 MG/0.3ML injection 2-pack Inject 0.3 mg into the muscle as needed for anaphylaxis       finasteride (PROSCAR) 5 MG tablet TAKE 1 TABLET EVERY DAY 90 tablet 1     levothyroxine (SYNTHROID/LEVOTHROID) 100 MCG tablet TAKE 1 TABLET EVERY DAY 90 tablet 1     Multiple Vitamins-Minerals (CENTRUM SILVER) per tablet Take 1 tablet by mouth daily       omeprazole (PRILOSEC) 40 MG DR capsule Take 1 capsule (40 mg) by mouth daily 30 capsule 5     polyethylene glycol (MIRALAX) packet Take 1 packet by mouth daily       potassium chloride ER (K-DUR/KLOR-CON M) 20 MEQ CR tablet Take 1 tablet (20 mEq) by mouth 2 times daily 180 tablet 3     simvastatin (ZOCOR) 20 MG tablet Take 20 mg by mouth daily        spironolactone (ALDACTONE) 25 MG tablet Take 1 tablet (25 mg) by mouth daily       sucralfate (CARAFATE) 1 GM tablet Take 1 tablet (1 g) by mouth 2 times daily 180 tablet 1     tamsulosin (FLOMAX) 0.4 MG capsule TAKE 1 CAPSULE EVERY DAY 90 capsule 1     triamcinolone (KENALOG) 0.025 % cream Apply topically 2 times daily as needed        ROS:  Limited secondary to cognitive impairment but today pt reports 7 point ROS done including, light headedness/dizziness, fever/chills, pain, Resp, CV, GI, and  and is negative other than noted in HPI.     Vitals:  /60   Pulse 64   Temp 98.2  F (36.8  C)   Resp 16   Ht 1.88 m (6' 2\")   Wt 84.3 kg (185 lb 12.8 oz)   SpO2 95%   BMI 23.86 kg/m       Exam:  EXAM LIMITED DUE TO [facility] social distancing recommendations:  GENERAL APPEARANCE:  Elderly male sitting up in recliner.  NAD, " non-toxic.  RESP:   Regular relaxed breathing effort.  No cough.   EXTREMITIES:  1+ bilateral lower extremity edema, no calf tenderness.  Clear degree of Bullous pemphigoid on leg's, no wound/skin breakdown.    PSYCH: Alert to self, not place, date, situation.  Clear degree of cognitive/memory impairment.  Ongoing to some degree per family, unclear if baseline.     Lab/Diagnostic data:  I have personally reviewed labs, which are in facility or EMR chart.     ASSESSMENT/PLAN:  Acute on chronic combined systolic and diastolic congestive heart failure (H)  Bilateral leg edema  Valvular heart disease  Chronic atrial fibrillation  Cardiac pacemaker in situ  Coronary artery disease involving native heart without angina pectoris, unspecified vessel or lesion type  To be followed by [facility].  Heavily diuresed, they report goal weight of 198-200, which today weight was 185.8 lbs.  Had 1+ LE edema still, do note SBP's are soft at 100's.     Has a h/o Mitraclip and watchman devise, is not on anticoagulation.   We note he has a Cardiac procedure 3 Jumana, appears to be a PPM battery replacement, but unclear.   -Has [facility] f/u 29 May, end of this week, will get lab's, 28 May.   -No changes, continue to clinically monitor.   -Continue daily weights.   --If continues to be below goal weight and SBP's get lower, may need to reduce diuretics.   --Has a h/o falling, is a high fall risk.     CKD (chronic kidney disease) stage 3, GFR 30-59 ml/min (H)  Benign prostatic hyperplasia without lower urinary tract symptoms  Creatine baseline lately 1.1-1.4 but has been erratic.  Last creatine 1.59 on 5/22 in setting of heavy diuresis.   -Repeat lab 28 May for [facility] 29 May.     Pancytopenia (H)  EMR notes hypocellular etiology and followed by [facility], did get 1 U RBC's in hospital.   -Lab repeat 28 May.     Bullous pemphigoid  No leg lesions/wounds currently.   -Continue PRN BID TMC cream.     Depression, unspecified depression type  Cognitive  "impairment  Clear degree of mod-advanced cognitive/memory impairment, how close to baseline unclear, but Granddaughter did note he has some degree of confusion and can be impulsive and fall's frequently.   -Continues PTA Bupropion.     Falls frequently  Debility  -PT/OT to eval and treat.     Advanced care plan  As noted above, Carmella/Grandjenniferughter did mention there has been recent discussion of hospice, but not sure where family is at on that.  She seemed rather knowledgeable about Mr. Foster's medical condition.  She concur's he should not DC today, did let her know it would be AMA if they do desire that.  We suggested he stay's until at least  for his [facility] f/u so we can trend labs/weight, but then he also has a PPM battery exchange 6/3 thus would be beneficial if he stay's until then.  There is a care conference planned already, thus she reports she will update rest of family.  I did mention to her that if hospice is a real desire for the family, to let us know as we could start hospice here and have it continue as home hospice when he is able to go home.  She reports she will pass that on as well to wife/son.       Orders written by provider at facility  -As noted above.     Electronically signed by:  WILLIS Olsen CNP           MEDICAL NECESSITY STATEMENT FOR DME    Demographic Information on Bairon Foster:    Bairon Foster  Gender: male  : 1931  [address] [address] [phone] (home)     Medical Record: [id number]  Social Security Number: [id number]  Primary Care Provider: Mg Nelson  Insurance: Payor: MEDICA / Plan: [employer]VANT[employer] / Product Type: HMO /       Falls frequently [R29.6]  Debility [R53.81]  Cognitive impairment [R41.89]  Acute on chronic combined systolic and diastolic congestive heart failure (H) [I50.43]    DME:  WHEELCHAIR: Standard manual 18 x 18 \" wheelchair with seat back, cushion, and padded elevated leg " "rests due to fatigue, weakness, frequent falls.  Will need seat back and cushion to prevent pressure sores, will need elevating leg rests to prevent LE edema due to CHF.       Dose patient use oxygen: No   Able to propel w/c: Intermittently independently, but someone will be around to assist.    Mobility related ADL that are affected in the home: ADL's and IADL's.    The wheelchair is suitable and necessary for use in the patient's home and the  Home/rooms can accommodate the wheelchair.     Reason why a cane or walker will not meet the patient's needs. (ie: balance,   Tolerance, level of assistance): Patient will require a wheelchair due to frequent falls, weakness, fatigue.  Patient is unsafe and inappropriate to use crutches or walker due to cognitive impairment, forgets to get DME, and is unsafe with use of crutches or walker.     The patient has expressed willingness to use the wheelchair in the home and    Does have the physical and cognitive ability to maneuver the equipment or has a    Caregiver who is available, willing, and able to provide assistance in the home    With the wheelchair.     VITAL SIGNS:  Vitals: /60   Pulse 64   Temp 98.2  F (36.8  C)   Resp 16   Ht 1.88 m (6' 2\")   Wt 84.3 kg (185 lb 12.8 oz)   SpO2 95%   BMI 23.86 kg/m    BMI= Body mass index is 23.86 kg/m .    MEDICAL NECESSITY STATEMENT:  Due to cognitive impairment, frequent falls, weakness and debility patient will require a wheel chair for mobility.  Qualify of life, safety, ability to perform ADL's and IADL will all be increased with use of a wheel chair.  Patient has demonstrated he is not safe with use of a walker or cane.      Length of use: Life long.     ELECTRONICALLY SIGNED BY [place] CERTIFIED PROVIDER:  WILLIS Olsen CNP   NPI: [id number]  [place] [facility]  [facility] [address], [address]  [place] [address]            "

## 2020-05-26 NOTE — TELEPHONE ENCOUNTER
Reason for Call:  Other call back    Detailed comments: wife states wants to get patient out of the assisted living states he does not want to be there and is not getting better    Phone Number Patient can be reached at: Home number on file [phone] (home)    Best Time:     Can we leave a detailed message on this number? YES    Call taken on 5/26/2020 at 3:21 PM by CHERISE ARROYO

## 2020-05-26 NOTE — PROGRESS NOTES
Clinic Care Coordination Contact     Clinical Data: Care Coordinator Outreach  Outreach attempted - per chart review, patient has been discharged from [facility] and is currently admitted at a TCU- [facility]..     Plan: Care Coordinator will contact patient upon discharge to do an outreach.     MARIE Price  Clinic Care Coordination  [facility]  Phone: [phone]

## 2020-05-26 NOTE — PROGRESS NOTES
Clinic Care Coordination Contact  Care Coordination Transition Communication    Referral Source: Self-patient/Caregiver    Clinical Data: Patient was hospitalized at [facility] from 5/14/20 to 5/22/20 with diagnosis of Acute combined systolic and diastolic congestive heart failure.    Transition to Facility:              Facility Name: [facility]              Contact name and phone number/fax: phone [phone], fax [phone]    Plan:  Care Coordinator will await notification from facility staff informing  Care Coordinator of patient's discharge plans/needs. SW Care Coordinator will review chart and outreach to facility staff every 4 weeks and as needed.     GOLDY Birmingham   Social Work Clinic Care Coordinator   [facility]   PH: [phone]  [email or web address]

## 2020-05-26 NOTE — TELEPHONE ENCOUNTER
"VM received from patient's wife, requesting a call back from Anushka, stating \"it is very serious.\" Contacted patient's wife to review further. Patient's wife states that the patient has been at [facility] since his recent discharge from the hospital. Patient's wife states that the patient is not happy there and he wants to go home, however [facility] feels that he needs another week there. Patient's wife wants Anushka to weigh in on this situation and help him come home sooner. Reviewed with patient's wife, that ultimately it would not be Anushka's decision whether he could go home, and also unfortunately Anushka is out of the office until 6/3. Patient's wife then asked for Dr. Hope's RN's phone number. Provided phone number for Dr. Hope's RNs.   "

## 2020-05-26 NOTE — LETTER
[facility]   To:   [facility]          Please give to facility    From:   Karla Young \[facility]\"" Care Coordinator [facility]     Patient Name:  Bairon Foster YOB: 1931   Admit date: 5/22/20      *Information Needed:  Please contact me when the patient will discharge (or if they will move to long term care)- include the discharge date, disposition, and main diagnosis   - If the patient is discharged with home care services, please provide the name of the agency    Also- Please inform me if a care conference is being held.   Phone, Fax or Email with information       Thank You,   Karla Young \[facility]\""  Clinic Care Coordinator  [facility]  Ph: [phone]  [email or web address]

## 2020-05-26 NOTE — LETTER
5/26/2020        RE: Bairon Foster  [address] [place] [facility] [address]        [facility]  PRIMARY CARE PROVIDER AND CLINIC:  Mg Nelson MD, [address] [facility] [address]  Chief Complaint   Patient presents with     Hospital F/U     [facility] Medical Record Number:  [id number]  Place of Service where encounter took place:  [facility]) [[id number]]    Bairon Foster  is a 89 year old  (2/6/1931), admitted to the above facility from  [facility]. Hospital stay 5/14/2020 through 5/22/2020..  Admitted to this facility for  rehab, medical management and nursing care.    HPI:    HPI information obtained from: facility chart records, facility staff, patient report, [facility] chart review and family/first contact wife Kristi and granddaughter Carmella report.     Brief Summary of Hospital Course: Mr. Foster was just at a TCU in February and discharged; has a h/o CHF, Mitral and tricuspid valve disease, no anticoagulation, h/o watchman devise; CKD III, BPH, pancytopenia, Bullous pemphigoid, depression and cognitive impairment.  He was presented to hospital due to worsening CHF with edema.  Seen by Cardiology and unfortunately DC summary is not done, but EMR notes indicate he was diuresed on IV Bumex and had some of his medications changed.  They noted a dry weight of 198-200.  Due to improvement he has since transitioned to the Tcu/rehab for ongoing cares and PT/OT.    We were informed over the weekend he did have a fall, RN's noted Left shoulder bruise, no other injury.      Updates on Status Since Skilled nursing Admission: In meeting Mr. Foster today for admission today, he has a clear degree of cognitive/memory impairment.  He knows he has a swelling/heart problem, know's he's on Bumex and does not like having to void all the time, but can not give me much more details.  He denies any SOB/LOYOLA, denies any pain.  He has no  other complaints.      I called his wife Kristi, due to RN/TCU reports she was fixated on wanting to bring him home today.  We tried to discuss his situation on the phone but after 10 minutes of her clearly not understanding the issues (unclear if it was a cognitive or [place] barrier) his granddaugher Carmella took over the conversation.  She seemed rather updated on his current CHF situation, she seemed to understand it was getting tenuous and she noted there has been discussion about hospice recently, but not acted upon.  She did report Kristi is official POA but her dad (Mr. Foster's son) [place] could help, but was not available today.  Carmella confirmed it would not be a good idea for Mr. Foster to DC home today, we did inform her it would be AMA, due to his tenuous status,  H/o falls and falling here.  She agreed, does not want him to leave.  We shared the below plan with her and she reported she would update wife/and son later today.  She could not speak to the code status, thus we explained to her it would remain Full Code for now until we hear otherwise.     CODE STATUS/ADVANCE DIRECTIVES DISCUSSION:   CPR/Full code   Patient's living condition: lives with family, child(ang), adult;spouse      ALLERGIES: Furosemide and Torsemide  PAST MEDICAL HISTORY:  has a past medical history of Acute, but ill-defined, cerebrovascular disease, Antiplatelet or antithrombotic long-term use, Arrhythmia, Bullous pemphigoid (8/21/2019), CKD (chronic kidney disease) stage 3, GFR 30-59 ml/min (H), Coronary atherosclerosis of unspecified type of vessel, native or graft, Epistaxis, Esophageal reflux, GI bleed (4/17/2019), Hypercholesterolemia, Ischemic cardiomyopathy, Lymphedema of both lower extremities, Mitral regurgitation, Obese, JIM on CPAP, Other lymphedema, Other pancytopenia (H), Other specified anemias, Overweight, Pacemaker, PAD (peripheral artery disease) (H), Pancytopenia (H), Persistent atrial fibrillation, Presence of  Watchman left atrial appendage closure device (12/03/2019), Pulmonary hypertension (H), Status post coronary angiogram (3/21/2018), Systolic CHF (H), TIA (transient ischemic attack), Unspecified hypothyroidism, Venous stasis dermatitis of both lower extremities, and Vitamin D deficiency. He also has no past medical history of Hypertension.  PAST SURGICAL HISTORY:   has a past surgical history that includes surgical history of - ; surgical history of -  (1990s); surgical history of -  (3/11); Eye surgery; Abdomen surgery; Implant pacemaker (8/2009); hernia repair; Herniorrhaphy inguinal (8/14/2012); orthopedic surgery; orthopedic surgery (2010); Herniorrhaphy umbilical (N/A, 10/31/2017); Heart Catheterization with Possible Intervention (N/A, 4/12/2019); Right Heart Cath (N/A, 4/12/2019); Bone marrow biopsy, bone specimen, needle/trocar (N/A, 4/19/2019); MitraClip (N/A, 5/20/2019); and Left Atrial Appendage Closure (N/A, 12/3/2019).  FAMILY HISTORY: family history includes C.A.D. in his father; Cardiovascular in his father; Lung Cancer in his sister; Unknown/Adopted in his mother.  SOCIAL HISTORY:   reports that he has never smoked. He has never used smokeless tobacco. He reports previous alcohol use. He reports that he does not use drugs.    Post Discharge Medication Reconciliation Status: discharge medications reconciled and changed, per note/orders (see AVS)    Current Outpatient Medications   Medication Sig Dispense Refill     ACE/ARB/ARNI NOT PRESCRIBED (INTENTIONAL) Please choose reason not prescribed, below       acetaminophen (TYLENOL) 500 MG tablet Take 2 tablets (1,000 mg) by mouth every 6 hours as needed for mild pain 100 tablet 0     aspirin 81 MG EC tablet Take 1 tablet (81 mg) by mouth daily       bumetanide (BUMEX) 1 MG tablet Take 2 mg by mouth every morning       bumetanide (BUMEX) 1 MG tablet Take 1 mg by mouth daily (with lunch) At Noon       bumetanide (BUMEX) 1 MG tablet Take 1 mg by mouth every  "evening        buPROPion (WELLBUTRIN XL) 300 MG 24 hr tablet Take 1 tablet (300 mg) by mouth every morning 90 tablet 1     carvedilol (COREG) 6.25 MG tablet TAKE 1 TABLET TWICE DAILY WITH MEALS 180 tablet 3     chlorthalidone (HYGROTON) 25 MG tablet Take 1 tablet (25 mg) by mouth daily       cholecalciferol (VITAMIN D3) 5000 units (125 mcg) capsule Take 1 capsule (5,000 Units) by mouth daily       COMPRESSION STOCKINGS 1 each daily 2 each 0     EPINEPHrine (EPIPEN/ADRENACLICK/OR ANY BX GENERIC EQUIV) 0.3 MG/0.3ML injection 2-pack Inject 0.3 mg into the muscle as needed for anaphylaxis       finasteride (PROSCAR) 5 MG tablet TAKE 1 TABLET EVERY DAY 90 tablet 1     levothyroxine (SYNTHROID/LEVOTHROID) 100 MCG tablet TAKE 1 TABLET EVERY DAY 90 tablet 1     Multiple Vitamins-Minerals (CENTRUM SILVER) per tablet Take 1 tablet by mouth daily       omeprazole (PRILOSEC) 40 MG DR capsule Take 1 capsule (40 mg) by mouth daily 30 capsule 5     polyethylene glycol (MIRALAX) packet Take 1 packet by mouth daily       potassium chloride ER (K-DUR/KLOR-CON M) 20 MEQ CR tablet Take 1 tablet (20 mEq) by mouth 2 times daily 180 tablet 3     simvastatin (ZOCOR) 20 MG tablet Take 20 mg by mouth daily        spironolactone (ALDACTONE) 25 MG tablet Take 1 tablet (25 mg) by mouth daily       sucralfate (CARAFATE) 1 GM tablet Take 1 tablet (1 g) by mouth 2 times daily 180 tablet 1     tamsulosin (FLOMAX) 0.4 MG capsule TAKE 1 CAPSULE EVERY DAY 90 capsule 1     triamcinolone (KENALOG) 0.025 % cream Apply topically 2 times daily as needed        ROS:  Limited secondary to cognitive impairment but today pt reports 7 point ROS done including, light headedness/dizziness, fever/chills, pain, Resp, CV, GI, and  and is negative other than noted in HPI.     Vitals:  /60   Pulse 64   Temp 98.2  F (36.8  C)   Resp 16   Ht 1.88 m (6' 2\")   Wt 84.3 kg (185 lb 12.8 oz)   SpO2 95%   BMI 23.86 kg/m       Exam:  EXAM LIMITED DUE TO CDC " social distancing recommendations:  GENERAL APPEARANCE:  Elderly male sitting up in recliner.  NAD, non-toxic.  RESP:   Regular relaxed breathing effort.  No cough.   EXTREMITIES:  1+ bilateral lower extremity edema, no calf tenderness.  Clear degree of Bullous pemphigoid on leg's, no wound/skin breakdown.    PSYCH: Alert to self, not place, date, situation.  Clear degree of cognitive/memory impairment.  Ongoing to some degree per family, unclear if baseline.     Lab/Diagnostic data:  I have personally reviewed labs, which are in facility or EMR chart.     ASSESSMENT/PLAN:  Acute on chronic combined systolic and diastolic congestive heart failure (H)  Bilateral leg edema  Valvular heart disease  Chronic atrial fibrillation  Cardiac pacemaker in situ  Coronary artery disease involving native heart without angina pectoris, unspecified vessel or lesion type  To be followed by [facility].  Heavily diuresed, they report goal weight of 198-200, which today weight was 185.8 lbs.  Had 1+ LE edema still, do note SBP's are soft at 100's.     Has a h/o Mitraclip and watchman devise, is not on anticoagulation.   We note he has a Cardiac procedure 3 Jumana, appears to be a PPM battery replacement, but unclear.   -Has [facility] f/u 29 May, end of this week, will get lab's, 28 May.   -No changes, continue to clinically monitor.   -Continue daily weights.   --If continues to be below goal weight and SBP's get lower, may need to reduce diuretics.   --Has a h/o falling, is a high fall risk.     CKD (chronic kidney disease) stage 3, GFR 30-59 ml/min (H)  Benign prostatic hyperplasia without lower urinary tract symptoms  Creatine baseline lately 1.1-1.4 but has been erratic.  Last creatine 1.59 on 5/22 in setting of heavy diuresis.   -Repeat lab 28 May for [facility] 29 May.     Pancytopenia (H)  EMR notes hypocellular etiology and followed by [facility], did get 1 U RBC's in hospital.   -Lab repeat 28 May.     Bullous pemphigoid  No leg lesions/wounds  currently.   -Continue PRN BID TMC cream.     Depression, unspecified depression type  Cognitive impairment  Clear degree of mod-advanced cognitive/memory impairment, how close to baseline unclear, but Granddaughter did note he has some degree of confusion and can be impulsive and fall's frequently.   -Continues PTA Bupropion.     Falls frequently  Debility  -PT/OT to eval and treat.     Advanced care plan  As noted above, Carmella/Calixto did mention there has been recent discussion of hospice, but not sure where family is at on that.  She seemed rather knowledgeable about Mr. Foster's medical condition.  She concur's he should not DC today, did let her know it would be AMA if they do desire that.  We suggested he stay's until at least Friday 5/29 for his [facility] f/u so we can trend labs/weight, but then he also has a PPM battery exchange 6/3 thus would be beneficial if he stay's until then.  There is a care conference planned already, thus she reports she will update rest of family.  I did mention to her that if hospice is a real desire for the family, to let us know as we could start hospice here and have it continue as home hospice when he is able to go home.  She reports she will pass that on as well to wife/son.       Orders written by provider at facility  -As noted above.     Electronically signed by:  WILLIS Olsen CNP                   Sincerely,        WILLIS Olsen CNP

## 2020-05-27 NOTE — TELEPHONE ENCOUNTER
Spoke with patient wife and daughter today.    States patient should be getting out of LTC on 6/5/2020 (potential). They would like to set up a F2F visit with PCP ONLY to discuss/assess patient needs at that time. Do they need in home care assessments? Do they need care coordination? Want to speak with PCP for a visit to discuss.     This was scheduled for patient.

## 2020-05-28 ENCOUNTER — VIRTUAL VISIT (OUTPATIENT)
Dept: CARDIOLOGY | Facility: CLINIC | Age: 85
End: 2020-05-28
Attending: INTERNAL MEDICINE
Payer: COMMERCIAL

## 2020-05-28 ENCOUNTER — HOSPITAL LABORATORY (OUTPATIENT)
Dept: OTHER | Facility: CLINIC | Age: 85
End: 2020-05-28

## 2020-05-28 VITALS
HEIGHT: 74 IN | DIASTOLIC BLOOD PRESSURE: 66 MMHG | HEART RATE: 53 BPM | WEIGHT: 179.2 LBS | SYSTOLIC BLOOD PRESSURE: 110 MMHG | BODY MASS INDEX: 23 KG/M2

## 2020-05-28 DIAGNOSIS — I34.0 MITRAL VALVE INSUFFICIENCY, UNSPECIFIED ETIOLOGY: ICD-10-CM

## 2020-05-28 DIAGNOSIS — I50.41 ACUTE COMBINED SYSTOLIC AND DIASTOLIC CONGESTIVE HEART FAILURE (H): ICD-10-CM

## 2020-05-28 DIAGNOSIS — I48.19 PERSISTENT ATRIAL FIBRILLATION (H): ICD-10-CM

## 2020-05-28 DIAGNOSIS — Z98.890 S/P MITRAL VALVE REPAIR: ICD-10-CM

## 2020-05-28 DIAGNOSIS — Z98.890 S/P LEFT ATRIAL APPENDAGE LIGATION: ICD-10-CM

## 2020-05-28 DIAGNOSIS — I87.2 VENOUS STASIS DERMATITIS OF BOTH LOWER EXTREMITIES: ICD-10-CM

## 2020-05-28 DIAGNOSIS — I25.10 CORONARY ARTERY DISEASE INVOLVING NATIVE CORONARY ARTERY OF NATIVE HEART WITHOUT ANGINA PECTORIS: ICD-10-CM

## 2020-05-28 DIAGNOSIS — Z95.0 CARDIAC PACEMAKER IN SITU: ICD-10-CM

## 2020-05-28 DIAGNOSIS — I50.42 CHRONIC COMBINED SYSTOLIC AND DIASTOLIC CONGESTIVE HEART FAILURE (H): Primary | ICD-10-CM

## 2020-05-28 DIAGNOSIS — E87.6 HYPOKALEMIA: ICD-10-CM

## 2020-05-28 LAB
ANION GAP SERPL CALCULATED.3IONS-SCNC: 6 MMOL/L (ref 3–14)
BUN SERPL-MCNC: 39 MG/DL (ref 7–30)
CALCIUM SERPL-MCNC: 10 MG/DL (ref 8.5–10.1)
CHLORIDE SERPL-SCNC: 100 MMOL/L (ref 94–109)
CO2 SERPL-SCNC: 33 MMOL/L (ref 20–32)
CREAT SERPL-MCNC: 1.8 MG/DL (ref 0.66–1.25)
ERYTHROCYTE [DISTWIDTH] IN BLOOD BY AUTOMATED COUNT: 17.3 % (ref 10–15)
GFR SERPL CREATININE-BSD FRML MDRD: 33 ML/MIN/{1.73_M2}
GLUCOSE SERPL-MCNC: 134 MG/DL (ref 70–99)
HCT VFR BLD AUTO: 30.2 % (ref 40–53)
HGB BLD-MCNC: 9.7 G/DL (ref 13.3–17.7)
MCH RBC QN AUTO: 30.8 PG (ref 26.5–33)
MCHC RBC AUTO-ENTMCNC: 32.1 G/DL (ref 31.5–36.5)
MCV RBC AUTO: 96 FL (ref 78–100)
NT-PROBNP SERPL-MCNC: 551 PG/ML (ref 0–450)
PLATELET # BLD AUTO: 115 10E9/L (ref 150–450)
POTASSIUM SERPL-SCNC: 3.3 MMOL/L (ref 3.4–5.3)
RBC # BLD AUTO: 3.15 10E12/L (ref 4.4–5.9)
SODIUM SERPL-SCNC: 139 MMOL/L (ref 133–144)
T4 FREE SERPL-MCNC: 1.51 NG/DL (ref 0.76–1.46)
TSH SERPL DL<=0.005 MIU/L-ACNC: 1.86 MU/L (ref 0.4–4)
WBC # BLD AUTO: 4.1 10E9/L (ref 4–11)

## 2020-05-28 PROCEDURE — 99214 OFFICE O/P EST MOD 30 MIN: CPT | Mod: 95 | Performed by: NURSE PRACTITIONER

## 2020-05-28 RX ORDER — POTASSIUM CHLORIDE 1500 MG/1
TABLET, EXTENDED RELEASE ORAL
Qty: 180 TABLET | Refills: 3 | Status: SHIPPED | OUTPATIENT
Start: 2020-05-28 | End: 2020-06-02

## 2020-05-28 ASSESSMENT — MIFFLIN-ST. JEOR: SCORE: 1547.6

## 2020-05-28 NOTE — PATIENT INSTRUCTIONS
Thanks for your phone visit with the [facility] today.    Today's plan:   1. Stop chlorthalidone.  2. Increase the potassium supplement from 20 mEq BID to 40 mEq in the morning and 20 mEq in the evening.  3. Repeat labs in 1 week to recheck your kidney function and electrolytes.  4. Follow up in about 2 weeks to establish with the [facility] clinic as previously planned.  5. Continue to check weights daily and call the clinic if you notice weight gain of over 3 lbs in 1 day or over 5 lbs in 1 weeks or if you feel worsening shortness of breath or worsening swelling in the legs.     If you have questions or concerns in the meantime, please call my nurse at [phone].    Scheduling phone number: [phone]    Reminder: Please bring in all current medications, any over the counter supplements, and any vitamin bottles to your next appointment.    It was a pleasure speaking with you today!     Vick Cuellar, Nurse Practitioner  05/28/2020  _____________________________________________________

## 2020-05-28 NOTE — LETTER
"5/28/2020    Mg Nelson MD  [address]  [facility] [address]    RE: Bairon HIMANSHU Foster       Dear Colleague,    I had the pleasure of seeing Bairon Foster in the [facility].    I had the pleasure of speaking with Mr. Foster today over the phone for follow up of his recent hospitalization. 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      I met him during his recent hospitalization at [facility]. He was admitted on 5/14/2020, presenting with worsening lower extremity and scrotal edema and weight gain. His weight on admission was 214 pounds. NT pro BNP was elevated at 703.  Echocardiogram 5/15/2020 showed mild left ventricular dysfunction with EF 45-50%, mild mitral regurgitation and moderate tricuspid regurgitation. The IVC was noted to be severely dilated. He was diuresed with an IV Bumex drip. Chlorthalidone was started at 25 mg once daily and spironolactone was also added at 25 mg once daily.  His weight at discharge was 201 pounds nearing a goal dry weight of around 198-200 pounds. He was discharged on his previous regimen of oral bumex 2 mg in the morning, 1 mg at lunch, and 1 mg in the evening.     Unfortunately, he seems to have had some worsening of his baseline forgetfulness and cognitive impairment since his discharge to the transitional care unit. He did have some forgetfulness noted during his hospital stay and reported feeling somewhat \"melancholy\" regarding his " situation with the prolonged hospitalization and need for discharge to TCU for rehab, particularly due to the current visitor restrictions with the COVID-19 pandemic. I see from review of his chart that he had a fall at the facility, bruising his left shoulder but otherwise was not injured. He is not on anticoagulation and has a watchman device in place for stroke prophylaxis with his chronic atrial fibrillation due to history of fairly frequent falls in the past.    Today, Alfa tells me that he has been feeling generally well since his discharge. He denies any symptoms of chest pain, shortness of breath, or dyspnea on exertion. He has not had palpitations, dizziness, presyncope, syncope, orthopnea, or PND. He feels that the swelling in his legs has improved significantly and that his scrotal edema is resolved. I suspect he may now be somewhat over diuresed with a basic metabolic panel today showing worsening in his renal function with a creatinine of 1.8, GFR of 33, BUN of 39, CO2 of 33. He is also mildly hypokalemic with a potassium of 3.3 today. There may be some variation from scale to scale, but his weight has continued to trend down rather significantly from a discharge weight of 201 pounds to 179 pounds today.  His blood pressures have reportedly been intermittently soft at the TCU but stable with a reading of 110/66 today.    ASSESSMENT AND PLAN:  1. Chronic combined systolic and diastolic congestive heart failure  - As above, I suspect he is over-diuresed and dry given his continued weight loss and worsening renal function on the basic metabolic panel today.   - Will discontinue chlorthalidone and increase his potassium supplement from 20 mEq twice daily to 40 mEq in the morning and 20 mEq in the evening.  - Continue Bumex 2 mg PO in the morning, 1 mg PO at lunch, and 1 mg PO in the evening and spironolactone 25 mg PO daily for now. If his weights continue to trend down, would consider stopping  spironolactone as well.  - Recommend a repeat basic metabolic panel in 1 week and close follow-up to establish with [facility] in 2 weeks as previously planned. If his weights continue to trend down or renal function does not improve,  would consider stopping spironolactone as well.    2. Coronary artery disease  - Mild, non-obstructive CAD noted on coronary angiogram in 04/2019.  - Appears to be stable without anginal symptoms at this time.  - Continue current regimen of aspirin 81 mg daily, simvastatin 20 mg daily, and carvedilol 6.25 mg twice daily.    3. Mitral regurgitation, status post mitral clip x2  and moderate tricuspid regurgitation  - Echocardiogram 5/18/20 showing mitral regurgitation looking much better, estimated as mild, rather than severe by CHELLE in January 2020.  - Tricuspid regurgitation also looks much better, estimated as moderate, rather than severe by CHELLE in January 2020.    4. Persistent atrial fibrillation, status post left atrial appendage ligation  - Rate controlled with carvedilol. Watchman device in place for stroke prophylaxis.    5. History of symptomatic bradycardia, status post PPM implant  - Battery near end of life; a generator change has been scheduled for Jumana 3.   - Follows Dr. Curiel as an outpatient.    6. Chronic lower extremity edema, bullous pemphigoid and venous stasis dermatitis of both lower extremities  - Reportedly improved with aggressive diuresis. Continue with leg wraps and elevation.     7. Pancytopenia, secondary to hypocellular marrow  - Received 1 unit of PRBCs during his recent admission with improvement in Hgb to 9.2.  - Follows Dr. Chavez with hematology.    8.  Chronic kidney disease  - Baseline creatinine of around 1.2-1.4. Acutely worsened at 1.8 today as noted above. Will stop chlorthalidone and repeat a BMP in 1 week as noted above.    Thank you for the opportunity to participate in this pleasant patient's care. We will recheck labs in a week and follow-up  with him in 2 weeks to establish care with [facility]. We would be happy to see him sooner if needed for any concerns in the meantime.     Provider location: Home  Patient location: Home    Phone call start time: 12:47 PM  Phone call end time: 12:55 PM    WILLIS Chandler CNP  Text Page  (8am - 5pm, M-F)    Orders this Visit:  No orders of the defined types were placed in this encounter.    No orders of the defined types were placed in this encounter.    There are no discontinued medications.  Encounter Diagnoses   Name Primary?     Chronic combined systolic and diastolic congestive heart failure (H) Yes     Coronary artery disease involving native coronary artery of native heart without angina pectoris      Mitral valve insufficiency, unspecified etiology      S/P mitral valve repair      Persistent atrial fibrillation      S/P left atrial appendage ligation      Cardiac pacemaker in situ      Venous stasis dermatitis of both lower extremities      Acute combined systolic and diastolic congestive heart failure (H)      CURRENT MEDICATIONS:  Current Outpatient Medications   Medication Sig Dispense Refill     acetaminophen (TYLENOL) 500 MG tablet Take 2 tablets (1,000 mg) by mouth every 6 hours as needed for mild pain 100 tablet 0     aspirin 81 MG EC tablet Take 1 tablet (81 mg) by mouth daily       bumetanide (BUMEX) 1 MG tablet Take 2 mg by mouth every morning       bumetanide (BUMEX) 1 MG tablet Take 1 mg by mouth daily (with lunch) At Noon       bumetanide (BUMEX) 1 MG tablet Take 1 mg by mouth every evening        buPROPion (WELLBUTRIN XL) 300 MG 24 hr tablet Take 1 tablet (300 mg) by mouth every morning 90 tablet 1     carvedilol (COREG) 6.25 MG tablet TAKE 1 TABLET TWICE DAILY WITH MEALS 180 tablet 3     chlorthalidone (HYGROTON) 25 MG tablet Take 1 tablet (25 mg) by mouth daily       cholecalciferol (VITAMIN D3) 5000 units (125 mcg) capsule Take 1 capsule (5,000 Units) by mouth daily       COMPRESSION  STOCKINGS 1 each daily 2 each 0     EPINEPHrine (EPIPEN/ADRENACLICK/OR ANY BX GENERIC EQUIV) 0.3 MG/0.3ML injection 2-pack Inject 0.3 mg into the muscle as needed for anaphylaxis       finasteride (PROSCAR) 5 MG tablet TAKE 1 TABLET EVERY DAY 90 tablet 1     levothyroxine (SYNTHROID/LEVOTHROID) 100 MCG tablet TAKE 1 TABLET EVERY DAY 90 tablet 1     Multiple Vitamins-Minerals (CENTRUM SILVER) per tablet Take 1 tablet by mouth daily       omeprazole (PRILOSEC) 40 MG DR capsule Take 1 capsule (40 mg) by mouth daily 30 capsule 5     polyethylene glycol (MIRALAX) packet Take 1 packet by mouth daily       potassium chloride ER (K-DUR/KLOR-CON M) 20 MEQ CR tablet Take 1 tablet (20 mEq) by mouth 2 times daily 180 tablet 3     simvastatin (ZOCOR) 20 MG tablet Take 20 mg by mouth daily        spironolactone (ALDACTONE) 25 MG tablet Take 1 tablet (25 mg) by mouth daily       sucralfate (CARAFATE) 1 GM tablet Take 1 tablet (1 g) by mouth 2 times daily 180 tablet 1     tamsulosin (FLOMAX) 0.4 MG capsule TAKE 1 CAPSULE EVERY DAY 90 capsule 1     triamcinolone (KENALOG) 0.025 % cream Apply topically 2 times daily as needed        ACE/ARB/ARNI NOT PRESCRIBED (INTENTIONAL) Please choose reason not prescribed, below (Patient not taking: Reported on 5/28/2020)       ALLERGIES  Allergies   Allergen Reactions     Furosemide Blisters     Bullous pemphigoid. Bumex and ethacrynic acid okay.     Torsemide Blisters     Bullous pemphigoid.  Bumex and ethacrynic acid okay.     PAST MEDICAL, SURGICAL, FAMILY HISTORY:  History was reviewed and updated as needed, see medical record.    SOCIAL HISTORY:  Social History     Socioeconomic History     Marital status:      Spouse name: Not on file     Number of children: Not on file     Years of education: Not on file     Highest education level: Not on file   Occupational History     Occupation: Teacher, author, speaker     Employer: RETIRED   Social Needs     Financial resource strain: Not on  file     Food insecurity     Worry: Not on file     Inability: Not on file     Transportation needs     Medical: Not on file     Non-medical: Not on file   Tobacco Use     Smoking status: Never Smoker     Smokeless tobacco: Never Used   Substance and Sexual Activity     Alcohol use: Not Currently     Alcohol/week: 0.0 standard drinks     Comment: 1/month     Drug use: No     Sexual activity: Not Currently     Partners: Female   Lifestyle     Physical activity     Days per week: Not on file     Minutes per session: Not on file     Stress: Not on file   Relationships     Social connections     Talks on phone: Not on file     Gets together: Not on file     Attends Christianity service: Not on file     Active member of club or organization: Not on file     Attends meetings of clubs or organizations: Not on file     Relationship status: Not on file     Intimate partner violence     Fear of current or ex partner: Not on file     Emotionally abused: Not on file     Physically abused: Not on file     Forced sexual activity: Not on file   Other Topics Concern     Parent/sibling w/ CABG, MI or angioplasty before 65F 55M? No      Service Not Asked     Blood Transfusions Not Asked     Caffeine Concern No     Comment: 1-2 cups coffee a day     Occupational Exposure Not Asked     Hobby Hazards Not Asked     Sleep Concern No     Stress Concern Yes     Comment: due to wifes health condition     Weight Concern No     Special Diet No     Back Care Not Asked     Exercise Yes     Comment: states he uses his tredmill on and off     Bike Helmet Not Asked     Seat Belt Yes     Self-Exams Not Asked   Social History Narrative     Not on file     Review of Systems:  Skin: negative  Eyes: negative, positive for glasses  Ears/Nose/Throat: negative  Respiratory: No shortness of breath, dyspnea on exertion, cough, or hemoptysis  Cardiovascular: negative  Gastrointestinal: negative  Genitourinary: negative  Musculoskeletal:  "negative  Neurologic: negative  Psychiatric: negative  Hematologic/Lymphatic/Immunologic: negative  Endocrine: negative    Patient Reported Vitals:   Spoke with nurse Audrey at [facility] on the phone; she reported the following:  BP: 110/66  Pulse: 53 bpm  Wt: 179.2 lbs    Reviewed by LAZARO Nava, 5/28/20    /66   Pulse 53   Ht 1.88 m (6' 2\")   Wt 81.3 kg (179 lb 3.2 oz)   BMI 23.01 kg/m     Wt Readings from Last 4 Encounters:   05/28/20 81.3 kg (179 lb 3.2 oz)   05/26/20 84.3 kg (185 lb 12.8 oz)   05/21/20 91.3 kg (201 lb 3.2 oz)   05/14/20 90.7 kg (200 lb)     Recent Lab Results:  LIPID RESULTS:  Lab Results   Component Value Date    CHOL 97 08/21/2019    HDL 41 08/21/2019    LDL 48 08/21/2019    TRIG 41 08/21/2019    CHOLHDLRATIO 2.1 06/15/2015     LIVER ENZYME RESULTS:  Lab Results   Component Value Date    AST 29 05/14/2020    ALT 13 05/14/2020     CBC RESULTS:  Lab Results   Component Value Date    WBC 4.1 05/28/2020    RBC 3.15 (L) 05/28/2020    HGB 9.7 (L) 05/28/2020    HCT 30.2 (L) 05/28/2020    MCV 96 05/28/2020    MCH 30.8 05/28/2020    MCHC 32.1 05/28/2020    RDW 17.3 (H) 05/28/2020     (L) 05/28/2020     BMP RESULTS:  Lab Results   Component Value Date     05/28/2020    POTASSIUM 3.3 (L) 05/28/2020    CHLORIDE 100 05/28/2020    CO2 33 (H) 05/28/2020    ANIONGAP 6 05/28/2020     (H) 05/28/2020    BUN 39 (H) 05/28/2020    CR 1.80 (H) 05/28/2020    GFRESTIMATED 33 (L) 05/28/2020    GFRESTBLACK 38 (L) 05/28/2020    TEODORO 10.0 05/28/2020      A1C RESULTS:  Lab Results   Component Value Date    A1C 5.5 05/29/2019     INR RESULTS:  Lab Results   Component Value Date    INR 1.28 (H) 12/04/2019    INR 1.44 (H) 05/20/2019       This note was completed in part using Dragon voice recognition software. Although reviewed after completion, some word and grammatical errors may occur.       Thank you for allowing me to participate in the care of your patient.    Sincerely,     Venkatesh" CLAU Cuellar     [facility]

## 2020-05-28 NOTE — PROGRESS NOTES
"  Cardiology Phone Visit Progress Note    Service Date: May 28, 2020    PRIMARY CARDIOLOGIST: Dr. Hope and Mackenzie Tejada, NP; Dr. Curiel (EP)      REASON FOR VISIT: Hospital follow up    Mr. Waddell \"Alfa\" STEVE Foster is a 89 year old male who is being evaluated via a billable telephone visit in order to minimize risk of exposure with the current COVID-19 pandemic in accordance with current CDC guidelines.     The patient has been notified of the following:     \"This telephone visit will be conducted via a call between you and your provider. We have found that certain health care needs can be provided without the need for a physical exam. This service lets us provide the care you need with a short phone conversation. If a prescription is necessary we can send it directly to your pharmacy. If lab work is needed we can place an order for that and you can then stop by our lab to have the test done at a later time.    If during the course of the call the provider feels a telephone visit is not appropriate, you will not be charged for this service.\"     Provider has received verbal consent for a Telephone Visit from the patient? Yes; please called [place] [phone] or call pt at [phone]    How would you like to obtain your AVS? Mail a copy    I had the pleasure of speaking with Mr. Foster today over the phone for follow up of his recent hospitalization. 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      I met him during his recent hospitalization at [facility]. He was admitted on 5/14/2020, presenting with worsening lower extremity and scrotal edema and weight gain. His weight on admission was 214 pounds. NT pro BNP was elevated at 703.  Echocardiogram 5/15/2020 showed mild left ventricular dysfunction with EF 45-50%, mild mitral regurgitation and moderate tricuspid regurgitation. The IVC was noted to be severely dilated. He was diuresed with an IV Bumex drip. Chlorthalidone was started at 25 mg once daily and spironolactone was also added at 25 mg once daily.  His weight at discharge was 201 pounds nearing a goal dry weight of around 198-200 pounds. He was discharged on his previous regimen of oral bumex 2 mg in the morning, 1 mg at lunch, and 1 mg in the evening.     Unfortunately, he seems to have had some worsening of his baseline forgetfulness and cognitive impairment since his discharge to the transitional care unit. He did have some forgetfulness noted during his hospital stay and reported feeling somewhat \"melancholy\" regarding his situation with the prolonged hospitalization and need for discharge to TCU for rehab, particularly due to the current visitor restrictions with the COVID-19 pandemic. I see from review of his chart that he had a fall at the facility, bruising his left shoulder but otherwise was not injured. He is not on anticoagulation and has a watchman device in place for stroke prophylaxis with his chronic atrial fibrillation due to history of fairly frequent falls in the past.    Today, Alfa tells me that he has been feeling generally well since his discharge. He denies any symptoms of chest pain, shortness of breath, or dyspnea on exertion. He has not had palpitations, dizziness, presyncope, syncope, orthopnea, or PND. He feels that the swelling in his legs has improved significantly and that his scrotal edema is resolved. I suspect he may now be somewhat over diuresed with a basic metabolic panel today showing " worsening in his renal function with a creatinine of 1.8, GFR of 33, BUN of 39, CO2 of 33. He is also mildly hypokalemic with a potassium of 3.3 today. There may be some variation from scale to scale, but his weight has continued to trend down rather significantly from a discharge weight of 201 pounds to 179 pounds today.  His blood pressures have reportedly been intermittently soft at the TCU but stable with a reading of 110/66 today.    ASSESSMENT AND PLAN:  1. Chronic combined systolic and diastolic congestive heart failure  - As above, I suspect he is over-diuresed and dry given his continued weight loss and worsening renal function on the basic metabolic panel today.   - Will discontinue chlorthalidone and increase his potassium supplement from 20 mEq twice daily to 40 mEq in the morning and 20 mEq in the evening.  - Continue Bumex 2 mg PO in the morning, 1 mg PO at lunch, and 1 mg PO in the evening and spironolactone 25 mg PO daily for now. If his weights continue to trend down, would consider stopping spironolactone as well.  - Recommend a repeat basic metabolic panel in 1 week and close follow-up to establish with [facility] in 2 weeks as previously planned. If his weights continue to trend down or renal function does not improve,  would consider stopping spironolactone as well.    2. Coronary artery disease  - Mild, non-obstructive CAD noted on coronary angiogram in 04/2019.  - Appears to be stable without anginal symptoms at this time.  - Continue current regimen of aspirin 81 mg daily, simvastatin 20 mg daily, and carvedilol 6.25 mg twice daily.    3. Mitral regurgitation, status post mitral clip x2  and moderate tricuspid regurgitation  - Echocardiogram 5/18/20 showing mitral regurgitation looking much better, estimated as mild, rather than severe by CHELLE in January 2020.  - Tricuspid regurgitation also looks much better, estimated as moderate, rather than severe by CHELLE in January 2020.    4. Persistent  atrial fibrillation, status post left atrial appendage ligation  - Rate controlled with carvedilol. Watchman device in place for stroke prophylaxis.    5. History of symptomatic bradycardia, status post PPM implant  - Battery near end of life; a generator change has been scheduled for Jumana 3.   - Follows Dr. Curiel as an outpatient.    6. Chronic lower extremity edema, bullous pemphigoid and venous stasis dermatitis of both lower extremities  - Reportedly improved with aggressive diuresis. Continue with leg wraps and elevation.     7. Pancytopenia, secondary to hypocellular marrow  - Received 1 unit of PRBCs during his recent admission with improvement in Hgb to 9.2.  - Follows Dr. Chavez with hematology.    8.  Chronic kidney disease  - Baseline creatinine of around 1.2-1.4. Acutely worsened at 1.8 today as noted above. Will stop chlorthalidone and repeat a BMP in 1 week as noted above.    Thank you for the opportunity to participate in this pleasant patient's care. We will recheck labs in a week and follow-up with him in 2 weeks to establish care with [facility]. We would be happy to see him sooner if needed for any concerns in the meantime.     Provider location: Home  Patient location: Home    Phone call start time: 12:47 PM  Phone call end time: 12:55 PM    WILLIS Chandler, CNP  Text Page  (8am - 5pm, M-F)    Orders this Visit:  No orders of the defined types were placed in this encounter.    No orders of the defined types were placed in this encounter.    There are no discontinued medications.  Encounter Diagnoses   Name Primary?     Chronic combined systolic and diastolic congestive heart failure (H) Yes     Coronary artery disease involving native coronary artery of native heart without angina pectoris      Mitral valve insufficiency, unspecified etiology      S/P mitral valve repair      Persistent atrial fibrillation      S/P left atrial appendage ligation      Cardiac pacemaker in situ      Venous stasis  dermatitis of both lower extremities      Acute combined systolic and diastolic congestive heart failure (H)      CURRENT MEDICATIONS:  Current Outpatient Medications   Medication Sig Dispense Refill     acetaminophen (TYLENOL) 500 MG tablet Take 2 tablets (1,000 mg) by mouth every 6 hours as needed for mild pain 100 tablet 0     aspirin 81 MG EC tablet Take 1 tablet (81 mg) by mouth daily       bumetanide (BUMEX) 1 MG tablet Take 2 mg by mouth every morning       bumetanide (BUMEX) 1 MG tablet Take 1 mg by mouth daily (with lunch) At Noon       bumetanide (BUMEX) 1 MG tablet Take 1 mg by mouth every evening        buPROPion (WELLBUTRIN XL) 300 MG 24 hr tablet Take 1 tablet (300 mg) by mouth every morning 90 tablet 1     carvedilol (COREG) 6.25 MG tablet TAKE 1 TABLET TWICE DAILY WITH MEALS 180 tablet 3     chlorthalidone (HYGROTON) 25 MG tablet Take 1 tablet (25 mg) by mouth daily       cholecalciferol (VITAMIN D3) 5000 units (125 mcg) capsule Take 1 capsule (5,000 Units) by mouth daily       COMPRESSION STOCKINGS 1 each daily 2 each 0     EPINEPHrine (EPIPEN/ADRENACLICK/OR ANY BX GENERIC EQUIV) 0.3 MG/0.3ML injection 2-pack Inject 0.3 mg into the muscle as needed for anaphylaxis       finasteride (PROSCAR) 5 MG tablet TAKE 1 TABLET EVERY DAY 90 tablet 1     levothyroxine (SYNTHROID/LEVOTHROID) 100 MCG tablet TAKE 1 TABLET EVERY DAY 90 tablet 1     Multiple Vitamins-Minerals (CENTRUM SILVER) per tablet Take 1 tablet by mouth daily       omeprazole (PRILOSEC) 40 MG DR capsule Take 1 capsule (40 mg) by mouth daily 30 capsule 5     polyethylene glycol (MIRALAX) packet Take 1 packet by mouth daily       potassium chloride ER (K-DUR/KLOR-CON M) 20 MEQ CR tablet Take 1 tablet (20 mEq) by mouth 2 times daily 180 tablet 3     simvastatin (ZOCOR) 20 MG tablet Take 20 mg by mouth daily        spironolactone (ALDACTONE) 25 MG tablet Take 1 tablet (25 mg) by mouth daily       sucralfate (CARAFATE) 1 GM tablet Take 1 tablet (1  g) by mouth 2 times daily 180 tablet 1     tamsulosin (FLOMAX) 0.4 MG capsule TAKE 1 CAPSULE EVERY DAY 90 capsule 1     triamcinolone (KENALOG) 0.025 % cream Apply topically 2 times daily as needed        ACE/ARB/ARNI NOT PRESCRIBED (INTENTIONAL) Please choose reason not prescribed, below (Patient not taking: Reported on 5/28/2020)       ALLERGIES  Allergies   Allergen Reactions     Furosemide Blisters     Bullous pemphigoid. Bumex and ethacrynic acid okay.     Torsemide Blisters     Bullous pemphigoid.  Bumex and ethacrynic acid okay.     PAST MEDICAL, SURGICAL, FAMILY HISTORY:  History was reviewed and updated as needed, see medical record.    SOCIAL HISTORY:  Social History     Socioeconomic History     Marital status:      Spouse name: Not on file     Number of children: Not on file     Years of education: Not on file     Highest education level: Not on file   Occupational History     Occupation: Teacher, author, speaker     Employer: RETIRED   Social Needs     Financial resource strain: Not on file     Food insecurity     Worry: Not on file     Inability: Not on file     Transportation needs     Medical: Not on file     Non-medical: Not on file   Tobacco Use     Smoking status: Never Smoker     Smokeless tobacco: Never Used   Substance and Sexual Activity     Alcohol use: Not Currently     Alcohol/week: 0.0 standard drinks     Comment: 1/month     Drug use: No     Sexual activity: Not Currently     Partners: Female   Lifestyle     Physical activity     Days per week: Not on file     Minutes per session: Not on file     Stress: Not on file   Relationships     Social connections     Talks on phone: Not on file     Gets together: Not on file     Attends Restorationist service: Not on file     Active member of club or organization: Not on file     Attends meetings of clubs or organizations: Not on file     Relationship status: Not on file     Intimate partner violence     Fear of current or ex partner: Not on  "file     Emotionally abused: Not on file     Physically abused: Not on file     Forced sexual activity: Not on file   Other Topics Concern     Parent/sibling w/ CABG, MI or angioplasty before 65F 55M? No      Service Not Asked     Blood Transfusions Not Asked     Caffeine Concern No     Comment: 1-2 cups coffee a day     Occupational Exposure Not Asked     Hobby Hazards Not Asked     Sleep Concern No     Stress Concern Yes     Comment: due to wifes health condition     Weight Concern No     Special Diet No     Back Care Not Asked     Exercise Yes     Comment: states he uses his tredmill on and off     Bike Helmet Not Asked     Seat Belt Yes     Self-Exams Not Asked   Social History Narrative     Not on file     Review of Systems:  Skin: negative  Eyes: negative, positive for glasses  Ears/Nose/Throat: negative  Respiratory: No shortness of breath, dyspnea on exertion, cough, or hemoptysis  Cardiovascular: negative  Gastrointestinal: negative  Genitourinary: negative  Musculoskeletal: negative  Neurologic: negative  Psychiatric: negative  Hematologic/Lymphatic/Immunologic: negative  Endocrine: negative    Patient Reported Vitals:   Spoke with nurse Audrey at [facility] on the phone; she reported the following:  BP: 110/66  Pulse: 53 bpm  Wt: 179.2 lbs    Reviewed by LAZARO Nava, 5/28/20    /66   Pulse 53   Ht 1.88 m (6' 2\")   Wt 81.3 kg (179 lb 3.2 oz)   BMI 23.01 kg/m     Wt Readings from Last 4 Encounters:   05/28/20 81.3 kg (179 lb 3.2 oz)   05/26/20 84.3 kg (185 lb 12.8 oz)   05/21/20 91.3 kg (201 lb 3.2 oz)   05/14/20 90.7 kg (200 lb)     Recent Lab Results:  LIPID RESULTS:  Lab Results   Component Value Date    CHOL 97 08/21/2019    HDL 41 08/21/2019    LDL 48 08/21/2019    TRIG 41 08/21/2019    CHOLHDLRATIO 2.1 06/15/2015     LIVER ENZYME RESULTS:  Lab Results   Component Value Date    AST 29 05/14/2020    ALT 13 05/14/2020     CBC RESULTS:  Lab Results   Component Value Date    WBC 4.1 " 05/28/2020    RBC 3.15 (L) 05/28/2020    HGB 9.7 (L) 05/28/2020    HCT 30.2 (L) 05/28/2020    MCV 96 05/28/2020    MCH 30.8 05/28/2020    MCHC 32.1 05/28/2020    RDW 17.3 (H) 05/28/2020     (L) 05/28/2020     BMP RESULTS:  Lab Results   Component Value Date     05/28/2020    POTASSIUM 3.3 (L) 05/28/2020    CHLORIDE 100 05/28/2020    CO2 33 (H) 05/28/2020    ANIONGAP 6 05/28/2020     (H) 05/28/2020    BUN 39 (H) 05/28/2020    CR 1.80 (H) 05/28/2020    GFRESTIMATED 33 (L) 05/28/2020    GFRESTBLACK 38 (L) 05/28/2020    TEODORO 10.0 05/28/2020      A1C RESULTS:  Lab Results   Component Value Date    A1C 5.5 05/29/2019     INR RESULTS:  Lab Results   Component Value Date    INR 1.28 (H) 12/04/2019    INR 1.44 (H) 05/20/2019     CC  Mg Nelson MD  [address] [facility] [place] [address]    This note was completed in part using Dragon voice recognition software. Although reviewed after completion, some word and grammatical errors may occur.

## 2020-05-28 NOTE — PROGRESS NOTES
"  Cardiology Phone Visit Progress Note    Service Date: May 28, 2020    PRIMARY CARDIOLOGIST: Dr. Hope and Mackenzie Tejada, NP; Dr. Curiel (EP)      REASON FOR VISIT: Hospital follow up    Mr. Bairon Foster is a 89 year old male who is being evaluated via a billable telephone visit in order to minimize risk of exposure with the current COVID-19 pandemic in accordance with current CDC guidelines.     The patient has been notified of the following:     \"This telephone visit will be conducted via a call between you and your provider. We have found that certain health care needs can be provided without the need for a physical exam. This service lets us provide the care you need with a short phone conversation. If a prescription is necessary we can send it directly to your pharmacy. If lab work is needed we can place an order for that and you can then stop by our lab to have the test done at a later time.    If during the course of the call the provider feels a telephone visit is not appropriate, you will not be charged for this service.\"     Provider has received verbal consent for a Telephone Visit from the patient? Yes; please called [facility] [phone] or call pt at [phone]    How would you like to obtain your AVS? Mail a copy    I had the pleasure of speaking with Mr. Foster today over the phone for follow up of his recent hospitalization. He is a very pleasant 89 year old male with a past medical history of ***.     Unfortunately, he seems to have some worsening of his baseline forgetfulness and cognitive impairment since his discharge to the transitional care unit. I suspect he may be slightly over diuresed with a basic metabolic panel today showing worsening in his renal function with a creatinine of 1.8, GFR of 33, BUN of 39, CO2 of 33.  He is also mildly hypokalemic with a potassium of 3.3 today. There may be some variation from scalp scale, but his weight has continued to trend down significantly " from a discharge weight of 201 pounds to 179 pounds today.    Today, he tells me that he has been feeling generally well since his discharge. He denies any symptoms of chest pain, shortness of breath, or dyspnea on exertion. He has not had palpitations, dizziness, presyncope, syncope, orthopnea, or PND. He feels that the swelling in his legs has improved significantly and that his scrotal edema is resolved.     ASSESSMENT AND PLAN:  1. Chronic combined systolic and diastolic congestive heart failure  - Suspect he is over-diuresed and dry given his continued weight loss and worsening renal function on the basic metabolic panel today.   - Will discontinue chlorthalidone and increase his potassium supplement from 20 mEq twice daily to 40 mEq in the morning and 20 mEq in the evening.  - Continue Bumex 2 mg PO in the morning, 1 mg PO at lunch, and 1 mg PO in the evening and spironolactone 25 mg PO daily.   - Recommend a repeat basic metabolic panel in 1 week and close follow-up to establish with [facility] in 2 weeks as previously planned.    2. Coronary artery disease  - ***    3. Mitral regurgitation, status post mitral clip x2   - ***    4. Moderate tricuspid regurgitation  - ***    5. Persistent atrial fibrillation, status post left atrial appendage ligation  - ***    6. History of symptomatic bradycardia, status post PPM implant  - ***    7. Chronic lower extremity edema, venous stasis dermatitis of both lower extremities  - ***    Thank you for the opportunity to participate in this pleasant patient's care. He will see *** in follow up in *** time. We would be happy to see him sooner if needed for any concerns in the meantime. ***     Provider location: Home  Patient location: Home    Phone call start time: 12:47 PM  Phone call end time: 12:55 PM    WILLIS Chandler, CNP  Text Page  (8am - 5pm, M-F)    Orders this Visit:  No orders of the defined types were placed in this encounter.    No orders of the defined  types were placed in this encounter.    There are no discontinued medications.  Encounter Diagnoses   Name Primary?     Chronic combined systolic and diastolic congestive heart failure (H) Yes     Coronary artery disease involving native coronary artery of native heart without angina pectoris      Mitral valve insufficiency, unspecified etiology      S/P mitral valve repair      Persistent atrial fibrillation      S/P left atrial appendage ligation      Cardiac pacemaker in situ      Venous stasis dermatitis of both lower extremities      Acute combined systolic and diastolic congestive heart failure (H)      CURRENT MEDICATIONS:  Current Outpatient Medications   Medication Sig Dispense Refill     acetaminophen (TYLENOL) 500 MG tablet Take 2 tablets (1,000 mg) by mouth every 6 hours as needed for mild pain 100 tablet 0     aspirin 81 MG EC tablet Take 1 tablet (81 mg) by mouth daily       bumetanide (BUMEX) 1 MG tablet Take 2 mg by mouth every morning       bumetanide (BUMEX) 1 MG tablet Take 1 mg by mouth daily (with lunch) At Noon       bumetanide (BUMEX) 1 MG tablet Take 1 mg by mouth every evening        buPROPion (WELLBUTRIN XL) 300 MG 24 hr tablet Take 1 tablet (300 mg) by mouth every morning 90 tablet 1     carvedilol (COREG) 6.25 MG tablet TAKE 1 TABLET TWICE DAILY WITH MEALS 180 tablet 3     chlorthalidone (HYGROTON) 25 MG tablet Take 1 tablet (25 mg) by mouth daily       cholecalciferol (VITAMIN D3) 5000 units (125 mcg) capsule Take 1 capsule (5,000 Units) by mouth daily       COMPRESSION STOCKINGS 1 each daily 2 each 0     EPINEPHrine (EPIPEN/ADRENACLICK/OR ANY BX GENERIC EQUIV) 0.3 MG/0.3ML injection 2-pack Inject 0.3 mg into the muscle as needed for anaphylaxis       finasteride (PROSCAR) 5 MG tablet TAKE 1 TABLET EVERY DAY 90 tablet 1     levothyroxine (SYNTHROID/LEVOTHROID) 100 MCG tablet TAKE 1 TABLET EVERY DAY 90 tablet 1     Multiple Vitamins-Minerals (CENTRUM SILVER) per tablet Take 1 tablet by  mouth daily       omeprazole (PRILOSEC) 40 MG DR capsule Take 1 capsule (40 mg) by mouth daily 30 capsule 5     polyethylene glycol (MIRALAX) packet Take 1 packet by mouth daily       potassium chloride ER (K-DUR/KLOR-CON M) 20 MEQ CR tablet Take 1 tablet (20 mEq) by mouth 2 times daily 180 tablet 3     simvastatin (ZOCOR) 20 MG tablet Take 20 mg by mouth daily        spironolactone (ALDACTONE) 25 MG tablet Take 1 tablet (25 mg) by mouth daily       sucralfate (CARAFATE) 1 GM tablet Take 1 tablet (1 g) by mouth 2 times daily 180 tablet 1     tamsulosin (FLOMAX) 0.4 MG capsule TAKE 1 CAPSULE EVERY DAY 90 capsule 1     triamcinolone (KENALOG) 0.025 % cream Apply topically 2 times daily as needed        ACE/ARB/ARNI NOT PRESCRIBED (INTENTIONAL) Please choose reason not prescribed, below (Patient not taking: Reported on 5/28/2020)       ALLERGIES  Allergies   Allergen Reactions     Furosemide Blisters     Bullous pemphigoid. Bumex and ethacrynic acid okay.     Torsemide Blisters     Bullous pemphigoid.  Bumex and ethacrynic acid okay.     PAST MEDICAL, SURGICAL, FAMILY HISTORY:  History was reviewed and updated as needed, see medical record.    SOCIAL HISTORY:  Social History     Socioeconomic History     Marital status:      Spouse name: Not on file     Number of children: Not on file     Years of education: Not on file     Highest education level: Not on file   Occupational History     Occupation: Teacher, author, speaker     Employer: RETIRED   Social Needs     Financial resource strain: Not on file     Food insecurity     Worry: Not on file     Inability: Not on file     Transportation needs     Medical: Not on file     Non-medical: Not on file   Tobacco Use     Smoking status: Never Smoker     Smokeless tobacco: Never Used   Substance and Sexual Activity     Alcohol use: Not Currently     Alcohol/week: 0.0 standard drinks     Comment: 1/month     Drug use: No     Sexual activity: Not Currently     Partners:  "Female   Lifestyle     Physical activity     Days per week: Not on file     Minutes per session: Not on file     Stress: Not on file   Relationships     Social connections     Talks on phone: Not on file     Gets together: Not on file     Attends Christian service: Not on file     Active member of club or organization: Not on file     Attends meetings of clubs or organizations: Not on file     Relationship status: Not on file     Intimate partner violence     Fear of current or ex partner: Not on file     Emotionally abused: Not on file     Physically abused: Not on file     Forced sexual activity: Not on file   Other Topics Concern     Parent/sibling w/ CABG, MI or angioplasty before 65F 55M? No      Service Not Asked     Blood Transfusions Not Asked     Caffeine Concern No     Comment: 1-2 cups coffee a day     Occupational Exposure Not Asked     Hobby Hazards Not Asked     Sleep Concern No     Stress Concern Yes     Comment: due to wifes health condition     Weight Concern No     Special Diet No     Back Care Not Asked     Exercise Yes     Comment: states he uses his tredmill on and off     Bike Helmet Not Asked     Seat Belt Yes     Self-Exams Not Asked   Social History Narrative     Not on file     Review of Systems:  Skin: negative  Eyes: negative, positive for glasses  Ears/Nose/Throat: negative  Respiratory: No shortness of breath, dyspnea on exertion, cough, or hemoptysis  Cardiovascular: negative  Gastrointestinal: negative  Genitourinary: negative  Musculoskeletal: negative  Neurologic: negative  Psychiatric: negative  Hematologic/Lymphatic/Immunologic: negative  Endocrine: negative    Patient Reported Vitals:   Spoke with nurse Audrey at [facility] on the phone; she reported the following:  BP: 110/66  Pulse: 53 bpm  Wt: 179.2 lbs    Reviewed by LAZARO Nava, 5/28/20    /66   Pulse 53   Ht 1.88 m (6' 2\")   Wt 81.3 kg (179 lb 3.2 oz)   BMI 23.01 kg/m     Wt Readings from Last 4 Encounters: "   05/28/20 81.3 kg (179 lb 3.2 oz)   05/26/20 84.3 kg (185 lb 12.8 oz)   05/21/20 91.3 kg (201 lb 3.2 oz)   05/14/20 90.7 kg (200 lb)     Recent Lab Results:  LIPID RESULTS:  Lab Results   Component Value Date    CHOL 97 08/21/2019    HDL 41 08/21/2019    LDL 48 08/21/2019    TRIG 41 08/21/2019    CHOLHDLRATIO 2.1 06/15/2015     LIVER ENZYME RESULTS:  Lab Results   Component Value Date    AST 29 05/14/2020    ALT 13 05/14/2020     CBC RESULTS:  Lab Results   Component Value Date    WBC 4.1 05/28/2020    RBC 3.15 (L) 05/28/2020    HGB 9.7 (L) 05/28/2020    HCT 30.2 (L) 05/28/2020    MCV 96 05/28/2020    MCH 30.8 05/28/2020    MCHC 32.1 05/28/2020    RDW 17.3 (H) 05/28/2020     (L) 05/28/2020     BMP RESULTS:  Lab Results   Component Value Date     05/28/2020    POTASSIUM 3.3 (L) 05/28/2020    CHLORIDE 100 05/28/2020    CO2 33 (H) 05/28/2020    ANIONGAP 6 05/28/2020     (H) 05/28/2020    BUN 39 (H) 05/28/2020    CR 1.80 (H) 05/28/2020    GFRESTIMATED 33 (L) 05/28/2020    GFRESTBLACK 38 (L) 05/28/2020    TEODORO 10.0 05/28/2020      A1C RESULTS:  Lab Results   Component Value Date    A1C 5.5 05/29/2019     INR RESULTS:  Lab Results   Component Value Date    INR 1.28 (H) 12/04/2019    INR 1.44 (H) 05/20/2019     CC  Mg Nelson MD  [address] [facility] [place] [address]    This note was completed in part using Dragon voice recognition software. Although reviewed after completion, some word and grammatical errors may occur.

## 2020-05-29 ENCOUNTER — TELEPHONE (OUTPATIENT)
Dept: CARDIOLOGY | Facility: CLINIC | Age: 85
End: 2020-05-29

## 2020-05-29 DIAGNOSIS — Z95.0 CARDIAC PACEMAKER IN SITU: Primary | ICD-10-CM

## 2020-05-29 RX ORDER — CEFAZOLIN SODIUM 2 G/100ML
2 INJECTION, SOLUTION INTRAVENOUS
Status: CANCELLED | OUTPATIENT
Start: 2020-05-29

## 2020-05-29 RX ORDER — LIDOCAINE 40 MG/G
CREAM TOPICAL
Status: CANCELLED | OUTPATIENT
Start: 2020-05-29

## 2020-05-29 RX ORDER — SODIUM CHLORIDE 450 MG/100ML
INJECTION, SOLUTION INTRAVENOUS CONTINUOUS
Status: CANCELLED | OUTPATIENT
Start: 2020-05-29

## 2020-05-29 NOTE — TELEPHONE ENCOUNTER
Called patient with pre-procedure instructions for pacemaker gen change:     Anticoagulation: None. Has watchman in place.    Oral diabetes meds: None  Insulin: None  Diuretic: Hold bumex and aldactone the morning of procedure  Contrast allergy: None  Pt informed to be NPO at midnight  May have clear liquid breakfast before 8am    Instructed pt to shower the morning of the procedure, and then put on a clean shirt in order to help prevent infection.     Pt has post-procedure transportation and 24 hours monitoring set up.   Pt aware of no driving for 24 hours post procedure due to sedation.     COVID-19 testing- Order has been placed and COVID team is aware of procedure. He is currently staying at [facility] and does not have an appointment to be tested.     I spoke with the patient's wife Kristi. She states that he is currently in TCU at [facility]. They plan to have him come here for procedure and then discharge back to his own home following the procedure. Their son (Josh) will assisted with transportation home post-procedure. She is aware that no visitors are allowed to accompany him on the day of procedure.     Left a voicemail for patient's RN at [facility] requesting call back to review instructions.     Pt aware of arrival time (11:00am) and location.

## 2020-05-29 NOTE — TELEPHONE ENCOUNTER
Could you please fax a copy of today's after visit summary for this patient to the TCU at [facility] along with orders to stop chlorthalidone, increase his potassium supplement to 40 mEq in the morning and 20 mEq in the evening, and recheck a basic metabolic panel in 1 week.     AVS and orders Faxed to [facility] [phone]. Sherri Simmons RN on 5/29/2020 at 11:18 AM

## 2020-05-30 ENCOUNTER — TELEPHONE (OUTPATIENT)
Dept: GERIATRICS | Facility: CLINIC | Age: 85
End: 2020-05-30
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
## 2020-05-30 NOTE — TELEPHONE ENCOUNTER
[facility] TELEPHONE ENCOUNTER    Chief Complaint   Patient presents with     Fall     Bairon Foster is a 89 year old  (2/6/1931),Nurse called today to report: Patient had a fall and hit a cart in front of the television. Had bloody nose and a small skin tear. Neuros intact. Blood pressure 96/57. Is otherwise at baseline.    Per staff's report, has had soft blood pressures over at least the past 48 hours    ASSESSMENT/PLAN  -Orders given to hold Carvedilol and Spironolactone for sbp < 90. If consistently held, consider dose reduction versus discontinuation.  - Follow fall protocol per facility orders    Electronically signed by:   WILLIS Coffey CNP

## 2020-06-01 ENCOUNTER — HOSPITAL LABORATORY (OUTPATIENT)
Dept: OTHER | Facility: CLINIC | Age: 85
End: 2020-06-01

## 2020-06-01 ENCOUNTER — TELEPHONE (OUTPATIENT)
Dept: FAMILY MEDICINE | Facility: CLINIC | Age: 85
End: 2020-06-01

## 2020-06-01 LAB
ANION GAP SERPL CALCULATED.3IONS-SCNC: 5 MMOL/L (ref 3–14)
BUN SERPL-MCNC: 33 MG/DL (ref 7–30)
CALCIUM SERPL-MCNC: 9.9 MG/DL (ref 8.5–10.1)
CHLORIDE SERPL-SCNC: 100 MMOL/L (ref 94–109)
CO2 SERPL-SCNC: 32 MMOL/L (ref 20–32)
CREAT SERPL-MCNC: 1.52 MG/DL (ref 0.66–1.25)
GFR SERPL CREATININE-BSD FRML MDRD: 40 ML/MIN/{1.73_M2}
GLUCOSE SERPL-MCNC: 138 MG/DL (ref 70–99)
POTASSIUM SERPL-SCNC: 3.5 MMOL/L (ref 3.4–5.3)
SODIUM SERPL-SCNC: 137 MMOL/L (ref 133–144)

## 2020-06-01 NOTE — TELEPHONE ENCOUNTER
Reason for Call:  Form, our goal is to have forms completed with 72 hours, however, some forms may require a visit or additional information.    Type of letter, form or note:  medical    Who is the form from?: Home care    Where did the form come from: form was faxed in    What clinic location was the form placed at?: [place], [place]  Providers name Mg Nelson MD  Where the form was placed: [facility]    What number is listed as a contact on the form?: [phone]   Phone Number         Additional comments: [employer] medical commode CMN     Call taken on 6/1/2020 at 3:58 PM by Maya Monterroso

## 2020-06-01 NOTE — TELEPHONE ENCOUNTER
Reason for Call:  Form, our goal is to have forms completed with 72 hours, however, some forms may require a visit or additional information.    Type of letter, form or note:  medical    Who is the form from?: Home care    Where did the form come from: form was faxed in    What clinic location was the form placed at?: [place], [place]  Providers name Mg Nelson MD  Where the form was placed: [facility]    What number is listed as a contact on the form?: [phone]  Phone Number         Additional comments: [employer] medical wheelchair F2 F enconter and other questions on form     Call taken on 6/1/2020 at 3:56 PM by Maya Monterroso

## 2020-06-02 ENCOUNTER — HOSPITAL LABORATORY (OUTPATIENT)
Dept: OTHER | Facility: CLINIC | Age: 85
End: 2020-06-02

## 2020-06-02 ENCOUNTER — TELEPHONE (OUTPATIENT)
Dept: CARDIOLOGY | Facility: CLINIC | Age: 85
End: 2020-06-02

## 2020-06-02 ENCOUNTER — DISCHARGE SUMMARY NURSING HOME (OUTPATIENT)
Dept: GERIATRICS | Facility: CLINIC | Age: 85
End: 2020-06-02
Payer: COMMERCIAL

## 2020-06-02 VITALS
BODY MASS INDEX: 21.54 KG/M2 | TEMPERATURE: 98.2 F | HEART RATE: 66 BPM | RESPIRATION RATE: 16 BRPM | DIASTOLIC BLOOD PRESSURE: 58 MMHG | WEIGHT: 167.8 LBS | SYSTOLIC BLOOD PRESSURE: 108 MMHG | OXYGEN SATURATION: 96 %

## 2020-06-02 DIAGNOSIS — F32.5 MAJOR DEPRESSION IN COMPLETE REMISSION (H): ICD-10-CM

## 2020-06-02 DIAGNOSIS — I25.5 ISCHEMIC CARDIOMYOPATHY: ICD-10-CM

## 2020-06-02 DIAGNOSIS — R29.6 FALLS FREQUENTLY: ICD-10-CM

## 2020-06-02 DIAGNOSIS — L12.0 BULLOUS PEMPHIGOID (H): ICD-10-CM

## 2020-06-02 DIAGNOSIS — D61.818 PANCYTOPENIA (H): ICD-10-CM

## 2020-06-02 DIAGNOSIS — N40.0 BENIGN PROSTATIC HYPERPLASIA WITHOUT LOWER URINARY TRACT SYMPTOMS: ICD-10-CM

## 2020-06-02 DIAGNOSIS — N40.1 BENIGN PROSTATIC HYPERPLASIA WITH URINARY HESITANCY: ICD-10-CM

## 2020-06-02 DIAGNOSIS — K21.9 GASTROESOPHAGEAL REFLUX DISEASE WITHOUT ESOPHAGITIS: ICD-10-CM

## 2020-06-02 DIAGNOSIS — I50.43 ACUTE ON CHRONIC COMBINED SYSTOLIC AND DIASTOLIC CONGESTIVE HEART FAILURE (H): Primary | ICD-10-CM

## 2020-06-02 DIAGNOSIS — Z95.0 CARDIAC PACEMAKER IN SITU: ICD-10-CM

## 2020-06-02 DIAGNOSIS — S41.112A SKIN TEAR OF LEFT UPPER EXTREMITY: ICD-10-CM

## 2020-06-02 DIAGNOSIS — E03.9 ACQUIRED HYPOTHYROIDISM: ICD-10-CM

## 2020-06-02 DIAGNOSIS — I48.20 CHRONIC ATRIAL FIBRILLATION (H): ICD-10-CM

## 2020-06-02 DIAGNOSIS — I38 VALVULAR HEART DISEASE: ICD-10-CM

## 2020-06-02 DIAGNOSIS — F32.A DEPRESSION, UNSPECIFIED DEPRESSION TYPE: ICD-10-CM

## 2020-06-02 DIAGNOSIS — R60.0 BILATERAL LEG EDEMA: ICD-10-CM

## 2020-06-02 DIAGNOSIS — E87.6 HYPOKALEMIA: ICD-10-CM

## 2020-06-02 DIAGNOSIS — N18.30 CKD (CHRONIC KIDNEY DISEASE) STAGE 3, GFR 30-59 ML/MIN (H): ICD-10-CM

## 2020-06-02 DIAGNOSIS — I25.10 CORONARY ARTERY DISEASE INVOLVING NATIVE HEART WITHOUT ANGINA PECTORIS, UNSPECIFIED VESSEL OR LESION TYPE: ICD-10-CM

## 2020-06-02 DIAGNOSIS — R39.11 BENIGN PROSTATIC HYPERPLASIA WITH URINARY HESITANCY: ICD-10-CM

## 2020-06-02 DIAGNOSIS — R41.89 COGNITIVE IMPAIRMENT: ICD-10-CM

## 2020-06-02 DIAGNOSIS — N40.0 BENIGN PROSTATIC HYPERPLASIA, UNSPECIFIED WHETHER LOWER URINARY TRACT SYMPTOMS PRESENT: ICD-10-CM

## 2020-06-02 DIAGNOSIS — I50.41 ACUTE COMBINED SYSTOLIC AND DIASTOLIC CONGESTIVE HEART FAILURE (H): ICD-10-CM

## 2020-06-02 DIAGNOSIS — R53.81 DEBILITY: ICD-10-CM

## 2020-06-02 DIAGNOSIS — K21.9 GASTROESOPHAGEAL REFLUX DISEASE, ESOPHAGITIS PRESENCE NOT SPECIFIED: ICD-10-CM

## 2020-06-02 PROCEDURE — 99316 NF DSCHRG MGMT 30 MIN+: CPT | Performed by: NURSE PRACTITIONER

## 2020-06-02 RX ORDER — BUPROPION HYDROCHLORIDE 300 MG/1
300 TABLET ORAL EVERY MORNING
Qty: 30 TABLET | Refills: 0 | Status: SHIPPED | OUTPATIENT
Start: 2020-06-02 | End: 2020-09-28

## 2020-06-02 RX ORDER — SPIRONOLACTONE 25 MG/1
25 TABLET ORAL DAILY
Qty: 30 TABLET | Refills: 0 | Status: SHIPPED | OUTPATIENT
Start: 2020-06-02 | End: 2020-07-03

## 2020-06-02 RX ORDER — SUCRALFATE 1 G/1
1 TABLET ORAL 2 TIMES DAILY
Qty: 60 TABLET | Refills: 0 | Status: SHIPPED | OUTPATIENT
Start: 2020-06-02 | End: 2020-09-28

## 2020-06-02 RX ORDER — FINASTERIDE 5 MG/1
5 TABLET, FILM COATED ORAL DAILY
Qty: 30 TABLET | Refills: 0 | Status: SHIPPED | OUTPATIENT
Start: 2020-06-02 | End: 2020-09-28

## 2020-06-02 RX ORDER — BUMETANIDE 1 MG/1
2 TABLET ORAL EVERY MORNING
Qty: 60 TABLET | Refills: 0 | Status: SHIPPED | OUTPATIENT
Start: 2020-06-02 | End: 2020-09-28

## 2020-06-02 RX ORDER — POTASSIUM CHLORIDE 1500 MG/1
20-40 TABLET, EXTENDED RELEASE ORAL SEE ADMIN INSTRUCTIONS
Qty: 90 TABLET | Refills: 0 | Status: SHIPPED | OUTPATIENT
Start: 2020-06-02 | End: 2020-09-28

## 2020-06-02 RX ORDER — CARVEDILOL 6.25 MG/1
6.25 TABLET ORAL 2 TIMES DAILY WITH MEALS
Qty: 60 TABLET | Refills: 0 | Status: SHIPPED | OUTPATIENT
Start: 2020-06-02

## 2020-06-02 RX ORDER — OMEPRAZOLE 40 MG/1
40 CAPSULE, DELAYED RELEASE ORAL DAILY
Qty: 30 CAPSULE | Refills: 0 | Status: SHIPPED | OUTPATIENT
Start: 2020-06-02 | End: 2020-09-28

## 2020-06-02 RX ORDER — TAMSULOSIN HYDROCHLORIDE 0.4 MG/1
0.4 CAPSULE ORAL DAILY
Qty: 30 CAPSULE | Refills: 0 | Status: SHIPPED | OUTPATIENT
Start: 2020-06-02 | End: 2020-09-28

## 2020-06-02 RX ORDER — BUMETANIDE 1 MG/1
1 TABLET ORAL EVERY EVENING
Qty: 30 TABLET | Refills: 0 | Status: SHIPPED | OUTPATIENT
Start: 2020-06-02 | End: 2020-09-28

## 2020-06-02 RX ORDER — SIMVASTATIN 20 MG
20 TABLET ORAL DAILY
Qty: 30 TABLET | Refills: 0 | Status: SHIPPED | OUTPATIENT
Start: 2020-06-02 | End: 2020-09-28

## 2020-06-02 RX ORDER — LEVOTHYROXINE SODIUM 100 UG/1
100 TABLET ORAL DAILY
Qty: 30 TABLET | Refills: 0 | Status: SHIPPED | OUTPATIENT
Start: 2020-06-02

## 2020-06-02 ASSESSMENT — MIFFLIN-ST. JEOR: SCORE: 1492.26

## 2020-06-02 NOTE — TELEPHONE ENCOUNTER
Called [facility] and left additional message regarding pre-procedural instruction. Left information about what medications need to be held and asked a return phone call occur to confirm my message was received. Device clinic phone number provided.

## 2020-06-02 NOTE — TELEPHONE ENCOUNTER
Patient's wife called and wanted to update [employer] that patient is having pacemaker placed tomorrow 6/3/20. Patient's wife had no other questions or concerns at this time. Just that RN send this update to [facility].

## 2020-06-02 NOTE — LETTER
6/2/2020        RE: Bairon Foster  [address] [facility] [address]        [place] [facility] DISCHARGE SUMMARY  PATIENT'S NAME: Bairon Foster  YOB: 1931  MEDICAL RECORD NUMBER:  [id number]  Place of Service where encounter took place:  [facility]) [[id number]]    PRIMARY CARE PROVIDER AND CLINIC RESPONSIBLE AFTER TRANSFER:   Mg Nelson MD, [address] [facility] [[email or web address] [place] / [facility] [address]    Home with family     Transferring providers: WILLIS Olsen CNP, Dr. Velasquez MD  Recent Hospitalization/ED:  [facility] Hospital stay 5/14 to 5/22.  Date of SNF Admission: May / 22 / 2020  Date of SNF (anticipated) Discharge: June / 03 / 2020  Discharged to: previous independent home with family.     Cognitive Scores: SLUMS: 12/30 and CLQT 2.0/4.0 (moderate deficits)  Physical Function: Balance not formally tested, but falls frequently.  50 ft with 2ww and min-mod assist.   DME: [facility]    CODE STATUS/ADVANCE DIRECTIVES DISCUSSION:  Full Code   ALLERGIES: Furosemide and Torsemide    DISCHARGE DIAGNOSIS/NURSING FACILITY COURSE:   Mr. Foster was just at a TCU in February and discharged; has a h/o CHF, Mitral and tricuspid valve disease, no anticoagulation, h/o watchman devise; CKD III, BPH, pancytopenia, Bullous pemphigoid, depression and cognitive impairment.  He was presented to hospital due to worsening CHF with edema.  Seen by Cardiology and unfortunately DC summary was not done, but EMR notes indicated he was diuresed on IV Bumex and had some of his medications changed.  They noted a dry weight of 198-200.  Due to improvement he transitioned to the Tcu/rehab for ongoing cares and PT/OT.      While at the TCU Mr. Foster had several falls.  We noted he has moderate-advanced cognitive impairment, can be impulsive and due to diuretics, needs to void a lot and would try to self ambulate and transfer which lead to falls and skin tears on  his arms.  He does have a few bruises and a few skin tears on his bilateral arms, but no other injuries.  We also note Mr. Foster did not tolerate being at the TCU well due to the social isolation/restrictions of no visitors due to pandemic, wanted to go home every day.     Other ramirez Mr. Foster did well.  We continued diuresis and we actually have lowered diuretics due to dropping weights and minimal LE edema, but he still is on a significant amount.  Continues to be followed by Cardiology.     Mr. Foster is scheduled for a PPM intervention/battery change on 3 Jumana.  Per family request, they want him to discharge from the TCU on 3 Jumana, go to his PPM intervention schedule and after recovery, go home with family.  We have spoken with his son Rich whom is aware of his cognitive impairment, frequent falls and safety concerns, reports approval/desire for the plan above.  Family reports they have 24/7 family support present, and he will get home care as well.    In meeting Mr. Foster today for discharge, he has moderate-advanced cognitive impairment but appears stable/unchanged.  He is not able to track conversation, subject changes frequently but he denies any pain, SOB/LOYOLA, no other complaints.      Acute on chronic combined systolic and diastolic congestive heart failure (H)  Bilateral leg edema  Valvular heart disease  Chronic atrial fibrillation  Coronary artery disease involving native heart without angina pectoris, unspecified vessel or lesion type  Followed by Cardiology.   Cardiology has reduced his diuretics recently due to weight loss, significant improvement in edema and soft SBP's.  Those all continue, thus we are now slightly lowering the Bumex to avoid over diuresis, and soft SBP's.  He denies light headedness, but does fall frequently.   -Weight down to 165.2 lbs on 2 June.   -Trace LE edema.   -Continues Spirolactone.   -Lowered Bumex down to BID 2 mg's in the AM, 1 mg in the afternoon.  (Was TID 2/1/1)  prior.   -Continues Coreg.   -Continues KCl, Statin.   --Cardiology f/u 12 June.      Cardiac pacemaker in situ  Mr. Foster is scheduled for a PPM intervention 3 Jumana, appears to be a battery change in the EMR.    As noted above, will DC from TCU/rehab and present to appointment.  Once recovered, plan is to transition to home.   -PPM intervention 3 Jumana planned.   -PCP to f/u with any rec's.      CKD (chronic kidney disease) stage 3, GFR 30-59 ml/min (H)  Benign prostatic hyperplasia without lower urinary tract symptoms  Creatine baseline unclear, around 1.4-1.6 recently, on high dose diuretics.   Creatine 1.52 on 6/1 when last checked.   -Cardiology/PCP to continue to monitor.     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 Chavez.   Cell lines stable when last checked 5/28.   -PCP/Dr. Chavez to continue to monitor.     Bullous pemphigoid  On LE's, doing well.   -Continues BID PRN Triamcinolone cream and Vanicream, not used much at TCU.     Skin tear of upper extremities  Secondary to multiple falls.  Mild superficial bleeding, no overt s/s of infection.   -Wound care: Clean daily with wound , cover with dressing.     Depression, unspecified depression type  Cognitive impairment  Ongoing Mod-Advanced cognitive impairment, appears stable/unchaged.   -Continues PTA Bupropion.     Falls frequently  Debility  As noted above.    Can be impulsive, forgetful and attempts to self transfer and not wait for help.   -Will DC with home care by [facility]   -See below for F2F.     Past Medical History:  has a past medical history of Acute, but ill-defined, cerebrovascular disease, Antiplatelet or antithrombotic long-term use, Arrhythmia, Bullous pemphigoid (8/21/2019), CKD (chronic kidney disease) stage 3, GFR 30-59 ml/min (H), Coronary atherosclerosis of unspecified type of vessel, native or graft, Epistaxis, Esophageal reflux, GI bleed (4/17/2019), Hypercholesterolemia,  Ischemic cardiomyopathy, Lymphedema of both lower extremities, Mitral regurgitation, Obese, JIM on CPAP, Other lymphedema, Other pancytopenia (H), Other specified anemias, Overweight, Pacemaker, PAD (peripheral artery disease) (H), Pancytopenia (H), Persistent atrial fibrillation, Presence of Watchman left atrial appendage closure device (12/03/2019), Pulmonary hypertension (H), Status post coronary angiogram (3/21/2018), Systolic CHF (H), TIA (transient ischemic attack), Unspecified hypothyroidism, Venous stasis dermatitis of both lower extremities, and Vitamin D deficiency. He also has no past medical history of Hypertension.    Discharge Medications:    Current Outpatient Medications   Medication Sig Dispense Refill     ACE/ARB/ARNI NOT PRESCRIBED (INTENTIONAL) Please choose reason not prescribed, below (Patient not taking: Reported on 5/28/2020)       acetaminophen (TYLENOL) 500 MG tablet Take 2 tablets (1,000 mg) by mouth every 6 hours as needed for mild pain 100 tablet 0     aspirin 81 MG EC tablet Take 1 tablet (81 mg) by mouth daily       bumetanide (BUMEX) 1 MG tablet Take 2 mg by mouth every morning       bumetanide (BUMEX) 1 MG tablet Take 1 mg by mouth every evening Take at 2 pm daily.        buPROPion (WELLBUTRIN XL) 300 MG 24 hr tablet Take 1 tablet (300 mg) by mouth every morning 90 tablet 1     carvedilol (COREG) 6.25 MG tablet TAKE 1 TABLET TWICE DAILY WITH MEALS 180 tablet 3     cholecalciferol (VITAMIN D3) 5000 units (125 mcg) capsule Take 1 capsule (5,000 Units) by mouth daily       COMPRESSION STOCKINGS 1 each daily 2 each 0     EPINEPHrine (EPIPEN/ADRENACLICK/OR ANY BX GENERIC EQUIV) 0.3 MG/0.3ML injection 2-pack Inject 0.3 mg into the muscle as needed for anaphylaxis       finasteride (PROSCAR) 5 MG tablet TAKE 1 TABLET EVERY DAY 90 tablet 1     levothyroxine (SYNTHROID/LEVOTHROID) 100 MCG tablet TAKE 1 TABLET EVERY DAY 90 tablet 1     Multiple Vitamins-Minerals (CENTRUM SILVER) per tablet  Take 1 tablet by mouth daily       omeprazole (PRILOSEC) 40 MG DR capsule Take 1 capsule (40 mg) by mouth daily 30 capsule 5     polyethylene glycol (MIRALAX) packet Take 1 packet by mouth daily       potassium chloride ER (KLOR-CON M) 20 MEQ CR tablet Take 2 tablets (40 mEq) by mouth in the morning and 1 tablet (20 mEq) by mouth in the evening 180 tablet 3     simvastatin (ZOCOR) 20 MG tablet Take 20 mg by mouth daily        spironolactone (ALDACTONE) 25 MG tablet Take 1 tablet (25 mg) by mouth daily       sucralfate (CARAFATE) 1 GM tablet Take 1 tablet (1 g) by mouth 2 times daily 180 tablet 1     tamsulosin (FLOMAX) 0.4 MG capsule TAKE 1 CAPSULE EVERY DAY 90 capsule 1     triamcinolone (KENALOG) 0.025 % cream Apply topically 2 times daily as needed        Medication Changes/Rationale:   -As noted above.     Controlled medications sent with patient:   Not applicable.      ROS:   Limited secondary to cognitive impairment but today pt reports 7 point ROS done including, light headedness/dizziness, fever/chills, pain, Resp, CV, GI, and  and is negative other than noted in HPI.     Physical Exam:   Vitals: /58   Pulse 66   Temp 98.2  F (36.8  C)   Resp 16   Wt 76.1 kg (167 lb 12.8 oz)   SpO2 96%   BMI 21.54 kg/m    BMI= Body mass index is 21.54 kg/m .     EXAM LIMITED DUE TO [facility] social distancing recommendations:  GENERAL APPEARANCE:  Elderly male sitting up in recliner.  NAD, non-toxic.  RESP:   Regular relaxed breathing effort.  No cough.   EXTREMITIES:  Trace bilateral lower extremity edema, no calf tenderness.  Mild degree of Bullous pemphigoid on leg's, appears improved, no wound/skin breakdown on leg's.   Both arms have several skin tears present and some ecchymosis, no overt s/s of infection.   PSYCH: Alert to self, not place, date, situation.  Clear degree of cognitive/memory impairment, stable/unchanged.  Can be impulsive at times.     SNF labs: Labs done in SNF are in [facility] EPIC. Please  refer to them using EPIC/Care Everywhere.    DISCHARGE PLAN:    Follow up labs: No labs orders/due    Medical Follow Up:      Follow up with primary care provider in 1-2 weeks   Follow up with Cardiology 12 June.    Follow up with Cardiology Surgery 3 Jumana      Current [place] scheduled appointments:  Next 5 appointments (look out 90 days)    Leonel 10, 2020  2:00 PM CDT  [facility] Visit with Elvin Ross MD  [facility] [address] [place] [id number]  [phone]           Discharge Services: Home Care:  Occupational Therapy, Physical Therapy, Speech Therapy , Registered Nurse, Home Health Aide,  and From:  [facility]        Discharge Instructions Verbalized to Patient at Discharge:     Instructed patient and son to arrange/attend above appointments.     TOTAL DISCHARGE TIME:   Greater than 30 minutes  Electronically signed by:  WILLIS Olsen CNP     Documentation of Face to Face and Certification for Home Health Services    I certify that patient: Bairon Foster is under my care and that I, or a nurse practitioner or physician's assistant working with me, had a face-to-face encounter that meets the physician face-to-face encounter requirements with this patient on: 2 June 20.    This encounter with the patient was in whole, or in part, for the following medical condition, which is the primary reason for home health care: Ongoing CHF, cognitive impairment, debility, frequent falls.    I certify that, based on my findings, the following services are medically necessary home health services: Nursing, Occupational Therapy, Physical Therapy, Speech Language Therapy, Social Work and HHA. .    My clinical findings support the need for the above services because: Nurse is needed: To provide assessment and oversight required in the home to assure adherence to the medical plan due to:  Ongoing CHF, cognitive impairment, debility, frequent falls..., Occupational Therapy Services are needed to assess and treat cognitive ability and address ADL safety due to impairment in Ongoing CHF, cognitive impairment, debility, frequent falls.., Physical Therapy Services are needed to assess and treat the following functional impairments: Ongoing CHF, cognitive impairment, debility, frequent falls.. and Speech Therapy Services are needed to assess and treat impairments in language and/or swallow functions due to Ongoing CHF, cognitive impairment, debility, frequent falls..    Further, I certify that my clinical findings support that this patient is homebound (i.e. absences from home require considerable and taxing effort and are for medical reasons or Holiness services or infrequently or of short duration when for other reasons) because: Requires assistance of another person or specialized equipment to access medical services because patient: Requires supervision of another for safe transfer...    Based on the above findings. I certify that this patient is confined to the home and needs intermittent skilled nursing care, physical therapy and/or speech therapy.  The patient is under my care, and I have initiated the establishment of the plan of care.  This patient will be followed by a physician who will periodically review the plan of care.  Physician/Provider to provide follow up care: Mg Nelson    [facility] physician (the Medicare certified PECOS provider):   Electronically signed by  WILLIS Calloway, CNP  [facility]    Physician Signature: See electronic signature associated with these discharge orders.  Date: 6/2/2020                  Sincerely,        WILLIS Olsen CNP

## 2020-06-02 NOTE — PATIENT INSTRUCTIONS
[facility] Discharge Orders    Name: Bairon Foster  : 1931  Planned Discharge Date: 3 Jumana 20  Discharged to: previous independent home with family.     MEDICAL FOLLOW UP  Follow up with primary care provider in 1-2 weeks  Follow up with Cardiology .   Follow up with Cardiology Surgery 3 Jumana  Next 5 appointments (look out 90 days)    Leonel 10, 2020  2:00 PM CDT  [facility] [address] [place] [id number]  [phone]        FUTURE LABS: No labs orders/due    ORDER CHANGES:  See formal DC summary for details.     DISCHARGE MEDICATIONS:  The patient s pharmacy is authorized to dispense a 30-day supply of non-controlled medications.   Refill requests should be directed to the primary provider, Mg Nelson.   A 30 day supply of requested medications were sent to [facility] as discussed by the TCU discharge team.     Current Outpatient Medications   Medication Sig Dispense Refill     ACE/ARB/ARNI NOT PRESCRIBED (INTENTIONAL) Please choose reason not prescribed, below (Patient not taking: Reported on 2020)       acetaminophen (TYLENOL) 500 MG tablet Take 2 tablets (1,000 mg) by mouth every 6 hours as needed for mild pain 100 tablet 0     aspirin 81 MG EC tablet Take 1 tablet (81 mg) by mouth daily       bumetanide (BUMEX) 1 MG tablet Take 2 mg by mouth every morning       bumetanide (BUMEX) 1 MG tablet Take 1 mg by mouth every evening Take at 2 pm daily.        buPROPion (WELLBUTRIN XL) 300 MG 24 hr tablet Take 1 tablet (300 mg) by mouth every morning 90 tablet 1     carvedilol (COREG) 6.25 MG tablet TAKE 1 TABLET TWICE DAILY WITH MEALS 180 tablet 3     cholecalciferol (VITAMIN D3) 5000 units (125 mcg) capsule Take 1 capsule (5,000 Units) by mouth daily       COMPRESSION STOCKINGS 1 each daily 2 each 0     EPINEPHrine  (EPIPEN/ADRENACLICK/OR ANY BX GENERIC EQUIV) 0.3 MG/0.3ML injection 2-pack Inject 0.3 mg into the muscle as needed for anaphylaxis       finasteride (PROSCAR) 5 MG tablet TAKE 1 TABLET EVERY DAY 90 tablet 1     levothyroxine (SYNTHROID/LEVOTHROID) 100 MCG tablet TAKE 1 TABLET EVERY DAY 90 tablet 1     Multiple Vitamins-Minerals (CENTRUM SILVER) per tablet Take 1 tablet by mouth daily       omeprazole (PRILOSEC) 40 MG DR capsule Take 1 capsule (40 mg) by mouth daily 30 capsule 5     polyethylene glycol (MIRALAX) packet Take 1 packet by mouth daily       potassium chloride ER (KLOR-CON M) 20 MEQ CR tablet Take 2 tablets (40 mEq) by mouth in the morning and 1 tablet (20 mEq) by mouth in the evening 180 tablet 3     simvastatin (ZOCOR) 20 MG tablet Take 20 mg by mouth daily        spironolactone (ALDACTONE) 25 MG tablet Take 1 tablet (25 mg) by mouth daily       sucralfate (CARAFATE) 1 GM tablet Take 1 tablet (1 g) by mouth 2 times daily 180 tablet 1     tamsulosin (FLOMAX) 0.4 MG capsule TAKE 1 CAPSULE EVERY DAY 90 capsule 1     triamcinolone (KENALOG) 0.025 % cream Apply topically 2 times daily as needed        SERVICES:  Home Care:  Occupational Therapy, Physical Therapy, Speech Therapy , Registered Nurse, Home Health Aide,  and From:  [facility].      ADDITIONAL INSTRUCTIONS:  Instructed patient to arrange/attend above appointments.     WILLIS Olsen CNP  This document was electronically signed on June 2, 2020

## 2020-06-03 ENCOUNTER — DOCUMENTATION ONLY (OUTPATIENT)
Dept: CARDIOLOGY | Facility: CLINIC | Age: 85
End: 2020-06-03

## 2020-06-03 ENCOUNTER — TELEPHONE (OUTPATIENT)
Dept: FAMILY MEDICINE | Facility: CLINIC | Age: 85
End: 2020-06-03

## 2020-06-03 LAB
SARS-COV-2 RNA SPEC QL NAA+PROBE: NOT DETECTED
SPECIMEN SOURCE: NORMAL

## 2020-06-03 NOTE — TELEPHONE ENCOUNTER
Reason for Call: Request for an order or referral:    Order or referral being requested: commode order with out wheels-needs to be rushed as pt is at home    Date needed: as soon as possible    Has the patient been seen by the PCP for this problem? YES    Additional comments: [facility] thru [facility]     Phone number Patient can be reached at:  Other phone number:      Best Time:      Can we leave a detailed message on this number?  YES    Call taken on 6/3/2020 at 2:34 PM by CHERISE ARROYO

## 2020-06-03 NOTE — PROGRESS NOTES
Pt has not had COVID 19 test prior to PPM generator change. Per Dr Curiel, procedure needs to be r/s. Informed Zelda in scheduling.

## 2020-06-05 ENCOUNTER — TELEPHONE (OUTPATIENT)
Dept: FAMILY MEDICINE | Facility: CLINIC | Age: 85
End: 2020-06-05

## 2020-06-05 NOTE — TELEPHONE ENCOUNTER
"Patient Contact    Called home care nurse, Ella, and relayed provider message. She states, \"He has declined a lot since we last saw him (last hospitalization). It may be the exhaustion from getting up off the chair.\" They will continue to monitor and report back with any changes.    "

## 2020-06-05 NOTE — TELEPHONE ENCOUNTER
Agree with recommendations.  I do not think that the Wellbutrin should be causing him to be sleepy.

## 2020-06-05 NOTE — TELEPHONE ENCOUNTER
Reason for Call: Request for an order or referral:    Order or referral being requested: Home Care Orders    Date needed: as soon as possible    Has the patient been seen by the PCP for this problem? YES    Additional comments: Ella with [facility] called to get verbal orders from Dr. Nelson for new wound care treatment to use a hydrogel dressing to skin care on arms. The patient has been getting very tired and sleepy after taking meds in the am-not sure if it may be his Wellbutrin but is wondering if there should be a change because of this.     Phone number Patient can be reached at:  Other phone number: [phone]    Best Time: Any    Can we leave a detailed message on this number?  YES    Call taken on 6/5/2020 at 3:23 PM by Rose Conteh

## 2020-06-10 ENCOUNTER — TELEPHONE (OUTPATIENT)
Dept: CARDIOLOGY | Facility: CLINIC | Age: 85
End: 2020-06-10

## 2020-06-10 ENCOUNTER — TELEPHONE (OUTPATIENT)
Dept: FAMILY MEDICINE | Facility: CLINIC | Age: 85
End: 2020-06-10

## 2020-06-10 NOTE — PROGRESS NOTES
This encounter was opened to enter pre-procedure orders for PPM gen change. This orders were already entered/signed and held because procedure had been rescheduled.

## 2020-06-10 NOTE — TELEPHONE ENCOUNTER
Reason for Call:  Form, our goal is to have forms completed with 72 hours, however, some forms may require a visit or additional information.    Type of letter, form or note:  medical    Who is the form from?: Home care    Where did the form come from: form was faxed in    What clinic location was the form placed at?: [facility]    Where the form was placed: [facility] inbox    What number is listed as a contact on the form?: [phone] (P) [phone]       Additional comments: [employer]: Ok for PT evail 6/8/20    Call taken on 6/10/2020 at 3:09 PM by Rose Sinha

## 2020-06-10 NOTE — TELEPHONE ENCOUNTER
Pt is scheduled for pacemaker gen change on 6/15/2020. Attempted to call pt, his wife answered and said he was still sleeping. Will try back around 10:30 per wife's request.     _____________________________________________________________________      ADDENDUM 10:45. Called back and spoke with wife again, pt is forgetfull so gave instructions to pt's wife.     Called patient with pre-procedure instructions for device implant:     Anticoagulation: NA   Oral diabetes meds: NA  Insulin: NA  Diuretic: bumex and spironolactone, hold AM of procedure  Contrast allergy: no  Pt informed to be NPO at midnight  Procedure scheduled at 1:00pm, may have clear liquid breakfast before 8am    Instructed pt to shower the morning of the procedure, and then put on a clean shirt in order to help prevent infection.     Pt has post-procedure transportation and 24 hours monitoring set up.   Pt's wife aware of no driving for 24 hours post procedure due to sedation.     Pt's wife aware of arrival time and location. Pt's wifeverbalized understanding of instructions.         Wellness Screening Tool    Symptom Screening:    Do you have one of the following NEW symptoms:      Fever (subjective or >100.0)?  No    New cough? No    Shortness of breath? No    Chills? No    New loss of taste or smell? No    Generalized body aches? No    New persistent headache? No    New sore throat? No    Nausea, vomiting or diarrhea? No    Within the past 3 weeks, have you been exposed to someone with a known positive illness below?      COVID - 19 (known or suspected) No    Chicken pox?  No    Measles? No    Pertussis? No    Patient notified of visitor restriction: Yes  Patient informed to wear a mask: Yes    Patient's appointment status: Patient will be seen in the hospital as scheduled on 6/15/2020

## 2020-06-10 NOTE — TELEPHONE ENCOUNTER
Reason for Call:  Form, our goal is to have forms completed with 72 hours, however, some forms may require a visit or additional information.    Type of letter, form or note:  medical    Who is the form from?: Home care    Where did the form come from: form was faxed in    What clinic location was the form placed at?: [facility]    Where the form was placed: B Box/Folder    What number is listed as a contact on the form?: [phone] (P) [phone]       Additional comments: [employer]: Drug Interaction PO    Call taken on 6/10/2020 at 3:03 PM by Rose Sinha

## 2020-06-11 ENCOUNTER — MEDICAL CORRESPONDENCE (OUTPATIENT)
Dept: HEALTH INFORMATION MANAGEMENT | Facility: CLINIC | Age: 85
End: 2020-06-11

## 2020-06-12 ENCOUNTER — TRANSFERRED RECORDS (OUTPATIENT)
Dept: HEALTH INFORMATION MANAGEMENT | Facility: CLINIC | Age: 85
End: 2020-06-12

## 2020-06-12 ENCOUNTER — TELEPHONE (OUTPATIENT)
Dept: FAMILY MEDICINE | Facility: CLINIC | Age: 85
End: 2020-06-12

## 2020-06-12 ENCOUNTER — TELEPHONE (OUTPATIENT)
Dept: CARDIOLOGY | Facility: CLINIC | Age: 85
End: 2020-06-12

## 2020-06-12 ENCOUNTER — MEDICAL CORRESPONDENCE (OUTPATIENT)
Dept: HEALTH INFORMATION MANAGEMENT | Facility: CLINIC | Age: 85
End: 2020-06-12

## 2020-06-12 NOTE — TELEPHONE ENCOUNTER
Reason for Call:  Form, our goal is to have forms completed with 72 hours, however, some forms may require a visit or additional information.    Type of letter, form or note:  medical    Who is the form from?: Home care    Where did the form come from: form was faxed in    What clinic location was the form placed at?: [facility]    Where the form was placed: B Box/Folder    What number is listed as a contact on the form?: [phone] *(P) [phone]       Additional comments: [employer]: Admission orders; RN/LPN; OT alvina completed    Call taken on 6/12/2020 at 9:10 AM by Rose Sinha

## 2020-06-12 NOTE — TELEPHONE ENCOUNTER
[facility]. She interrogated his device which showed that he triggered MADHAV on 4/12/20. He is scheduled to have a generator change on 6/15/20 at 1:00pm at [facility].  Will follow up with the patient on Monday to see if this procedure should be postponed.

## 2020-06-15 NOTE — TELEPHONE ENCOUNTER
I spoke with Bairon's wife this morning. She states that [facility]. She is unsure when he will discharge. She states that he may need transitional care after discharge. We will cancel his scheduled pacemaker generator change today.     I told his wife that I will call her in a week to check on Bairon and discuss rescheduling his generator change. He triggered MADHAV on 4/12/20 and needs his generator changed by 7/12/20 at the very latest.

## 2020-06-17 NOTE — PROGRESS NOTES
[facility]  INITIAL VISIT NOTE  June 18, 2020    PRIMARY CARE PROVIDER AND CLINIC:  [facility] [address] [facility] [id number]    CHIEF COMPLAINT:  Hospital follow-up/Initial visit    HPI:    Bairon Foster is a 89 year old  (2/6/1931) male who was seen at [facility] in [place] on June 18, 2020 for an initial visit.     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    He was hospitalized at [facility] from June 12 through June 17, 2020 for generalized weakness and acute kidney injury.  It was felt that his symptoms were due to overdiuresis.  Creatinine was 2.2 upon admission to the hospital.  There were no signs or symptoms of infection.  UA was unremarkable.  Patient had no leukocytosis,  electrolyte derangements, or symptoms of CHF exacerbation.  TSH was within the reference range.  Recently his chlorthalidone had been discontinued and the dose of Bumex had been decreased in the TCU.  Upon admission his weight was 161 pounds and much lower than his supposedly dry weight of 184 pounds.  In the hospital, Bumex and spironolactone were discontinued and renal function improved.  His weight upon discharge was 169 pounds.  He was also transfused with 1 unit of packed red blood cells on June 14 for hemoglobin of 6.9.  Subsequently his hemoglobin remained stable in the range of 7.4-7.9.  Notably renal ultrasound was unremarkable.  Drop in hemoglobin was partly due to hematuria secondary to traumatic  catheterization.  Patient failed the voiding trial and therefore he was discharged with a urinary catheter. Plan is to follow-up with his primary urologist as an outpatient.    Patient is admitted to this facility for medical management, nursing care, and rehab.     Patient was seen today in his room, while sitting in a chair.  He appears comfortable.  He reports no chest pain, dyspnea, palpitation, nausea, vomiting, abdominal pain, fever, chills, dizziness, or diarrhea.  He still has a urinary catheter and denies any trouble with it.    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, status post pacemaker implantation in August 2009  Coronary artery disease, status post stent to obtuse marginal artery in August 2009  Chronic anemia, baseline hemoglobin 7-9  Chronic thrombocytopenia and leukopenia  CKD stage III, baseline creatinine around 1.2-1.6  Dyslipidemia  Peripheral artery disease  Obstructive sleep apnea, on CPAP  Hypothyroidism  BPH  GERD  Drug-induced bullous pemphigoid  CVA  Bilateral lower extremity lymphedema  Depression  Vitamin D deficiency  SCC    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     CKD (chronic kidney disease) stage 3, GFR 30-59 ml/min (H)      Coronary atherosclerosis of unspecified type of vessel, native or graft     S/P PTCA, EF 50%     Epistaxis      Esophageal reflux      GI bleed 4/17/2019     Hypercholesterolemia      Ischemic cardiomyopathy       Lymphedema of both lower extremities     Wears ACE wraps     Mitral regurgitation     s/p MitraClip 5/20/19     Obese      JIM on CPAP      Other lymphedema     GROIN AND THIGHS     Other pancytopenia (H)      Other specified anemias      Overweight      Pacemaker     PACEMAKER-BS Implanted 8/5/2009      PAD (peripheral artery disease) (H)      Pancytopenia (H)      Persistent atrial fibrillation (H)     VVI PM for long pauses     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    Closure with a 33 mm Watchman device      Pulmonary hypertension (H)      Status post coronary angiogram 3/21/2018     Systolic CHF (H)      TIA (transient ischemic attack)      Unspecified hypothyroidism      Venous stasis dermatitis of both lower extremities      Vitamin D deficiency        PAST SURGICAL HISTORY:   Past Surgical History:   Procedure Laterality Date     BONE MARROW BIOPSY, BONE SPECIMEN, NEEDLE/TROCAR N/A 4/19/2019    Procedure: BONE MARROW BIOPSY;  Surgeon: Yovani Cooley MD;  Location:  OR     CV HEART CATHETERIZATION WITH POSSIBLE INTERVENTION N/A 4/12/2019    Procedure: Heart Catheterization with possible Intervention;  Surgeon: Lev Beck MD;  Location:  HEART CARDIAC CATH LAB     CV LEFT ATRIAL APPENDAGE CLOSURE N/A 12/3/2019    Procedure: Left Atrial Appendage Closure;  Surgeon: Segundo Hope MD;  Location:  HEART CARDIAC CATH LAB     CV MITRACLIP N/A 5/20/2019    Procedure: CV MITRAL CLIP;  Surgeon: Harshil Winter MD;  Location:  HEART CARDIAC CATH LAB     CV RIGHT HEART CATH N/A 4/12/2019    Procedure: Right Heart Cath;  Surgeon: Lev Beck MD;  Location:  HEART CARDIAC CATH LAB     EYE SURGERY      CATARACT/BLEPHAROPLASTY     GENITOURINARY SURGERY      TUNA     HERNIA REPAIR      RIGHT INGUINAL     HERNIORRHAPHY INGUINAL  8/14/2012    Procedure: HERNIORRHAPHY INGUINAL;  right inguinal hernia repair;  Surgeon: Kareem Torrez MD;  Location:   [facility]     HERNIORRHAPHY UMBILICAL N/A 10/31/2017    Procedure: HERNIORRHAPHY UMBILICAL;  UMBILICAL HERNIA REPAIR WITH MESH;  Surgeon: Scar Harrington MD;  Location: [facility]     IMPLANT PACEMAKER  8/2009    single chamber     ORTHOPEDIC SURGERY      [facility] RIGHT     ORTHOPEDIC SURGERY  2010    right TKR, left TKR     SURGICAL HISTORY OF -       Thumb surgery     SURGICAL HISTORY OF -   1990s    Left total knee replacement     SURGICAL HISTORY OF -   3/11    Rig total knee replacement       FAMILY HISTORY:   Family History   Problem Relation Age of Onset     Cardiovascular Father      C.A.D. Father      Lung Cancer Sister      Unknown/Adopted Mother      Cancer - colorectal No family hx of        SOCIAL HISTORY:  Patient is  and lives with his wife.  He ordinarily does use a walker for ambulation.    Social History     Tobacco Use     Smoking status: Never Smoker     Smokeless tobacco: Never Used   Substance Use Topics     Alcohol use: Not Currently     Alcohol/week: 0.0 standard drinks     Comment: 1/month       MEDICATIONS:  Current Outpatient Medications   Medication Sig Dispense Refill     ACE/ARB/ARNI NOT PRESCRIBED (INTENTIONAL) Please choose reason not prescribed, below (Patient not taking: Reported on 5/28/2020)       acetaminophen (TYLENOL) 500 MG tablet Take 2 tablets (1,000 mg) by mouth every 6 hours as needed for mild pain 100 tablet 0     aspirin 81 MG EC tablet Take 1 tablet (81 mg) by mouth daily       bumetanide (BUMEX) 1 MG tablet Take 1 tablet (1 mg) by mouth every evening Take at 2 pm daily. 30 tablet 0     bumetanide (BUMEX) 1 MG tablet Take 2 tablets (2 mg) by mouth every morning 60 tablet 0     buPROPion (WELLBUTRIN XL) 300 MG 24 hr tablet Take 1 tablet (300 mg) by mouth every morning 30 tablet 0     carvedilol (COREG) 6.25 MG tablet Take 1 tablet (6.25 mg) by mouth 2 times daily (with meals) TAKE 1 TABLET TWICE DAILY WITH MEALS 60 tablet 0     cholecalciferol (VITAMIN D3) 5000 units (125 mcg)  capsule Take 1 capsule (5,000 Units) by mouth daily       COMPRESSION STOCKINGS 1 each daily 2 each 0     EPINEPHrine (EPIPEN/ADRENACLICK/OR ANY BX GENERIC EQUIV) 0.3 MG/0.3ML injection 2-pack Inject 0.3 mg into the muscle as needed for anaphylaxis       finasteride (PROSCAR) 5 MG tablet Take 1 tablet (5 mg) by mouth daily TAKE 1 TABLET EVERY DAY 30 tablet 0     levothyroxine (SYNTHROID/LEVOTHROID) 100 MCG tablet Take 1 tablet (100 mcg) by mouth daily TAKE 1 TABLET EVERY DAY 30 tablet 0     Multiple Vitamins-Minerals (CENTRUM SILVER) per tablet Take 1 tablet by mouth daily       omeprazole (PRILOSEC) 40 MG DR capsule Take 1 capsule (40 mg) by mouth daily 30 capsule 0     polyethylene glycol (MIRALAX) packet Take 1 packet by mouth daily       potassium chloride ER (KLOR-CON M) 20 MEQ CR tablet Take 1-2 tablets (20-40 mEq) by mouth See Admin Instructions Take 2 tablets (40 mEq) by mouth in the morning and 1 tablet (20 mEq) by mouth in the evening (2 pm). 90 tablet 0     simvastatin (ZOCOR) 20 MG tablet Take 1 tablet (20 mg) by mouth daily 30 tablet 0     spironolactone (ALDACTONE) 25 MG tablet Take 1 tablet (25 mg) by mouth daily 30 tablet 0     sucralfate (CARAFATE) 1 GM tablet Take 1 tablet (1 g) by mouth 2 times daily 60 tablet 0     tamsulosin (FLOMAX) 0.4 MG capsule Take 1 capsule (0.4 mg) by mouth daily TAKE 1 CAPSULE EVERY DAY 30 capsule 0     triamcinolone (KENALOG) 0.025 % cream Apply topically 2 times daily as needed          ALLERGIES:  Allergies   Allergen Reactions     Furosemide Blisters     Bullous pemphigoid. Bumex and ethacrynic acid okay.     Torsemide Blisters     Bullous pemphigoid.  Bumex and ethacrynic acid okay.       ROS:  10 point ROS neg other than the symptoms noted above in the HPI.    PHYSICAL EXAM:  Vital signs were reviewed in the chart.  Blood pressure 115/68, heart rate 69, respirate 18, temperature 97.8  F, oxygen saturation 99%  Gen: Somewhat frail but comfortable.He is no acute  distress.  HEENT: conjunctival pallor+  Card: Normal S1, S2, irregularly irregular rhythm, normal rate  Resp: Lungs clear to auscultation bilaterally  GI: Abdomen soft, non-tender, non-distended, +BS  Ext: Trace bilateral LE edema  Neuro: CX II-XII grossly intact; ROM in all four extremities grossly intact  Psych: Alert and oriented x3; normal affect  Skin: No acute rash    LABORATORY/IMAGING DATA:  Reviewed as per [facility] summary.    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  Due to generalized weakness and acute kidney injury, his cardiac regimen was modified as outlined in HPI with discontinuation of diuretic therapy.  Patient had been scheduled for pacemaker generator change on Jumana 15 which was postponed due to recent hospitalization.  His pacemaker was interrogated in the hospital and 1 more month of battery life is left.  Today he appears stable.  Plan:  Continue to hold diuretic therapy  Continue aspirin 81 mg p.o. daily, carvedilol 6.25 mg p.o. twice daily, and simvastatin 20 mg p.o. daily mg p.o. daily  Monitor volume, weight, and cardiac status  Monitor heart rate  Follow-up with [facility] in 1 week    Acute kidney injury, superimposed on chronic kidney disease, stage III.  Baseline creatinine in the range of 1.2-1.6.  Acute kidney injury was due to overdiuresis and improved with adjustment of diuretic therapy.  Plan:  Avoid NSAIDs and nephrotoxins  Monitor renal function periodically    Acute blood loss anemia, superimposed on chronic anemia.  Baseline hemoglobin  is in the range of 7-9.  In the hospital he was transfused with 1 unit of packed red blood cells on June 14 for hemoglobin of 6.9.  Drop in hemoglobin felt to be likely due to gross hematuria secondary to traumatic urinary catheterization.  Plan:  Monitor hemoglobin periodically    Chronic thrombocytopenia and leukopenia.  Patient is followed by Dr. Chavez of [facility].  Plan:  Monitor CBC periodically    Obstructive sleep apnea.  He normally does use a CPAP at night.  He does not want to bring in his CPAP machine as it is too bulky.  Plan:  Monitor O2 sat    Bilateral lower extremity lymphedema.  Chronic.  Plan:  Continue compression stockings    BPH with urinary retention,  Traumatic gross hematuria.  A Amos catheter was placed in the hospital for urinary retention.  He failed voiding trial in the hospital and therefore he was discharged with a Amos catheter.  Plan:  Continue finasteride 5 mg p.o. daily and tamsulosin 0.4 mg p.o. daily  Continue routine urinary catheter care  Follow-up with urology in 1 week    Hypothyroidism.  Most recent TSH was 1.86 on May 28, 2020.  Plan:  Continue levothyroxine 100 mcg p.o. daily    GERD.  Plan:  Continue omeprazole 40 mg p.o. daily and sucralfate 1 g p.o. twice daily    Depression.  Plan:  Continue bupropion  mg p.o. daily    Physical deconditioning.  Plan:  Continue PT/OT evaluation and therapy    Orders written by provider at facility:  CBC and BMP on June 22, 2020  Follow-up with [facility] in 1 week  [facility]      Electronically signed by:  Iza Eng MD

## 2020-06-18 ENCOUNTER — NURSING HOME VISIT (OUTPATIENT)
Dept: GERIATRICS | Facility: CLINIC | Age: 85
End: 2020-06-18
Payer: COMMERCIAL

## 2020-06-18 ENCOUNTER — TELEPHONE (OUTPATIENT)
Dept: CARDIOLOGY | Facility: CLINIC | Age: 85
End: 2020-06-18

## 2020-06-18 VITALS
HEIGHT: 74 IN | RESPIRATION RATE: 18 BRPM | WEIGHT: 167 LBS | TEMPERATURE: 97.5 F | OXYGEN SATURATION: 98 % | DIASTOLIC BLOOD PRESSURE: 66 MMHG | HEART RATE: 63 BPM | SYSTOLIC BLOOD PRESSURE: 112 MMHG | BODY MASS INDEX: 21.43 KG/M2

## 2020-06-18 DIAGNOSIS — I34.0 MITRAL VALVE INSUFFICIENCY, UNSPECIFIED ETIOLOGY: ICD-10-CM

## 2020-06-18 DIAGNOSIS — R53.81 PHYSICAL DECONDITIONING: ICD-10-CM

## 2020-06-18 DIAGNOSIS — D62 ANEMIA DUE TO BLOOD LOSS, ACUTE: ICD-10-CM

## 2020-06-18 DIAGNOSIS — I48.21 PERMANENT ATRIAL FIBRILLATION (H): ICD-10-CM

## 2020-06-18 DIAGNOSIS — E78.5 DYSLIPIDEMIA: ICD-10-CM

## 2020-06-18 DIAGNOSIS — N17.9 ACUTE KIDNEY INJURY (H): ICD-10-CM

## 2020-06-18 DIAGNOSIS — I49.5 TACHY-BRADY SYNDROME (H): ICD-10-CM

## 2020-06-18 DIAGNOSIS — D69.6 THROMBOCYTOPENIA (H): ICD-10-CM

## 2020-06-18 DIAGNOSIS — I50.42 CHRONIC COMBINED SYSTOLIC AND DIASTOLIC HEART FAILURE (H): Primary | ICD-10-CM

## 2020-06-18 DIAGNOSIS — I89.0 LYMPHEDEMA: ICD-10-CM

## 2020-06-18 DIAGNOSIS — E03.9 HYPOTHYROIDISM, UNSPECIFIED TYPE: ICD-10-CM

## 2020-06-18 DIAGNOSIS — D64.9 CHRONIC ANEMIA: ICD-10-CM

## 2020-06-18 DIAGNOSIS — F32.A DEPRESSION, UNSPECIFIED DEPRESSION TYPE: ICD-10-CM

## 2020-06-18 DIAGNOSIS — D72.819 LEUKOPENIA, UNSPECIFIED TYPE: ICD-10-CM

## 2020-06-18 DIAGNOSIS — Z86.73 HISTORY OF CVA (CEREBROVASCULAR ACCIDENT): ICD-10-CM

## 2020-06-18 DIAGNOSIS — N40.1 BENIGN PROSTATIC HYPERPLASIA WITH URINARY RETENTION: ICD-10-CM

## 2020-06-18 DIAGNOSIS — R31.9 TRAUMATIC HEMATURIA: ICD-10-CM

## 2020-06-18 DIAGNOSIS — G47.33 OSA (OBSTRUCTIVE SLEEP APNEA): ICD-10-CM

## 2020-06-18 DIAGNOSIS — N18.30 CKD (CHRONIC KIDNEY DISEASE) STAGE 3, GFR 30-59 ML/MIN (H): ICD-10-CM

## 2020-06-18 DIAGNOSIS — R33.8 BENIGN PROSTATIC HYPERPLASIA WITH URINARY RETENTION: ICD-10-CM

## 2020-06-18 DIAGNOSIS — I73.9 PAD (PERIPHERAL ARTERY DISEASE) (H): ICD-10-CM

## 2020-06-18 DIAGNOSIS — K21.9 GASTROESOPHAGEAL REFLUX DISEASE, ESOPHAGITIS PRESENCE NOT SPECIFIED: ICD-10-CM

## 2020-06-18 DIAGNOSIS — I25.10 CORONARY ARTERY DISEASE INVOLVING NATIVE CORONARY ARTERY OF NATIVE HEART WITHOUT ANGINA PECTORIS: ICD-10-CM

## 2020-06-18 DIAGNOSIS — Z95.0 CARDIAC PACEMAKER IN SITU: ICD-10-CM

## 2020-06-18 PROCEDURE — 99207 ZZC CDG-MDM COMPONENT: MEETS MODERATE - UP CODED: CPT | Performed by: INTERNAL MEDICINE

## 2020-06-18 PROCEDURE — 99306 1ST NF CARE HIGH MDM 50: CPT | Performed by: INTERNAL MEDICINE

## 2020-06-18 NOTE — LETTER
6/18/2020        RE: Bairon Foster  [address] [facility] [address]        [facility]  INITIAL VISIT NOTE  June 18, 2020    PRIMARY CARE PROVIDER AND CLINIC:  Leslie Mg Bairon [address] [facility] [address] / [facility] [address]    CHIEF COMPLAINT:  Hospital follow-up/Initial visit    HPI:    Bairon Foster is a 89 year old  (2/6/1931) male who was seen at [facility] in [place] on June 18, 2020 for an initial visit.     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    He was hospitalized at [facility] from June 12 through June 17, 2020 for generalized weakness and acute kidney injury.  It was felt that his symptoms were due to overdiuresis.  Creatinine was 2.2 upon admission to the hospital.  There were no signs or symptoms of infection.  UA was unremarkable.  Patient had no leukocytosis,  electrolyte derangements, or symptoms of CHF exacerbation.  TSH was within the reference range.  Recently his chlorthalidone had been discontinued and the dose of Bumex had been decreased in the TCU.  Upon admission his weight was 161 pounds and much lower than his supposedly dry weight of 184 pounds.  In the hospital, Bumex and spironolactone were discontinued and renal function improved.  His weight upon discharge was 169 pounds.  He was also transfused with 1 unit of packed red blood cells on June 14 for hemoglobin of 6.9.  Subsequently his hemoglobin remained stable in the range of 7.4-7.9.  Notably renal ultrasound  was unremarkable.  Drop in hemoglobin was partly due to hematuria secondary to traumatic catheterization.  Patient failed the voiding trial and therefore he was discharged with a urinary catheter. Plan is to follow-up with his primary urologist as an outpatient.    Patient is admitted to this facility for medical management, nursing care, and rehab.     Patient was seen today in his room, while sitting in a chair.  He appears comfortable.  He reports no chest pain, dyspnea, palpitation, nausea, vomiting, abdominal pain, fever, chills, dizziness, or diarrhea.  He still has a urinary catheter and denies any trouble with it.    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, status post pacemaker implantation in August 2009  Coronary artery disease, status post stent to obtuse marginal artery in August 2009  Chronic anemia, baseline hemoglobin 7-9  Chronic thrombocytopenia and leukopenia  CKD stage III, baseline creatinine around 1.2-1.6  Dyslipidemia  Peripheral artery disease  Obstructive sleep apnea, on CPAP  Hypothyroidism  BPH  GERD  Drug-induced bullous pemphigoid  CVA  Bilateral lower extremity lymphedema  Depression  Vitamin D deficiency  SCC    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     CKD (chronic kidney disease) stage 3, GFR 30-59 ml/min (H)      Coronary atherosclerosis of unspecified type of vessel, native or graft     S/P PTCA, EF 50%     Epistaxis      Esophageal reflux      GI bleed  4/17/2019     Hypercholesterolemia      Ischemic cardiomyopathy      Lymphedema of both lower extremities     Wears ACE wraps     Mitral regurgitation     s/p MitraClip 5/20/19     Obese      JIM on CPAP      Other lymphedema     GROIN AND THIGHS     Other pancytopenia (H)      Other specified anemias      Overweight      Pacemaker     PACEMAKER-BS Implanted 8/5/2009      PAD (peripheral artery disease) (H)      Pancytopenia (H)      Persistent atrial fibrillation (H)     VVI PM for long pauses     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    Closure with a 33 mm Watchman device      Pulmonary hypertension (H)      Status post coronary angiogram 3/21/2018     Systolic CHF (H)      TIA (transient ischemic attack)      Unspecified hypothyroidism      Venous stasis dermatitis of both lower extremities      Vitamin D deficiency        PAST SURGICAL HISTORY:   Past Surgical History:   Procedure Laterality Date     BONE MARROW BIOPSY, BONE SPECIMEN, NEEDLE/TROCAR N/A 4/19/2019    Procedure: BONE MARROW BIOPSY;  Surgeon: Yovani oColey MD;  Location:  OR      HEART CATHETERIZATION WITH POSSIBLE INTERVENTION N/A 4/12/2019    Procedure: Heart Catheterization with possible Intervention;  Surgeon: Lev Beck MD;  Location:  HEART CARDIAC CATH LAB     CV LEFT ATRIAL APPENDAGE CLOSURE N/A 12/3/2019    Procedure: Left Atrial Appendage Closure;  Surgeon: Segundo Hope MD;  Location:  HEART CARDIAC CATH LAB     CV MITRACLIP N/A 5/20/2019    Procedure: CV MITRAL CLIP;  Surgeon: Harshil Winter MD;  Location:  HEART CARDIAC CATH LAB     CV RIGHT HEART CATH N/A 4/12/2019    Procedure: Right Heart Cath;  Surgeon: Lev Beck MD;  Location:  HEART CARDIAC CATH LAB     EYE SURGERY      CATARACT/BLEPHAROPLASTY     GENITOURINARY SURGERY      TUNA     HERNIA REPAIR      RIGHT INGUINAL     HERNIORRHAPHY INGUINAL  8/14/2012    Procedure: HERNIORRHAPHY INGUINAL;   right inguinal hernia repair;  Surgeon: Kareem Torrez MD;  Location: [facility]     HERNIORRHAPHY UMBILICAL N/A 10/31/2017    Procedure: HERNIORRHAPHY UMBILICAL;  UMBILICAL HERNIA REPAIR WITH MESH;  Surgeon: Scar Harrington MD;  Location: [facility]     IMPLANT PACEMAKER  8/2009    single chamber     ORTHOPEDIC SURGERY      [facility] RIGHT     ORTHOPEDIC SURGERY  2010    right TKR, left TKR     SURGICAL HISTORY OF -       Thumb surgery     SURGICAL HISTORY OF -   1990s    Left total knee replacement     SURGICAL HISTORY OF -   3/11    Righ total knee replacement       FAMILY HISTORY:   Family History   Problem Relation Age of Onset     Cardiovascular Father      C.A.D. Father      Lung Cancer Sister      Unknown/Adopted Mother      Cancer - colorectal No family hx of        SOCIAL HISTORY:  Patient is  and lives with his wife.  He ordinarily does use a walker for ambulation.    Social History     Tobacco Use     Smoking status: Never Smoker     Smokeless tobacco: Never Used   Substance Use Topics     Alcohol use: Not Currently     Alcohol/week: 0.0 standard drinks     Comment: 1/month       MEDICATIONS:  Current Outpatient Medications   Medication Sig Dispense Refill     ACE/ARB/ARNI NOT PRESCRIBED (INTENTIONAL) Please choose reason not prescribed, below (Patient not taking: Reported on 5/28/2020)       acetaminophen (TYLENOL) 500 MG tablet Take 2 tablets (1,000 mg) by mouth every 6 hours as needed for mild pain 100 tablet 0     aspirin 81 MG EC tablet Take 1 tablet (81 mg) by mouth daily       bumetanide (BUMEX) 1 MG tablet Take 1 tablet (1 mg) by mouth every evening Take at 2 pm daily. 30 tablet 0     bumetanide (BUMEX) 1 MG tablet Take 2 tablets (2 mg) by mouth every morning 60 tablet 0     buPROPion (WELLBUTRIN XL) 300 MG 24 hr tablet Take 1 tablet (300 mg) by mouth every morning 30 tablet 0     carvedilol (COREG) 6.25 MG tablet Take 1 tablet (6.25 mg) by mouth 2 times daily (with meals) TAKE 1 TABLET TWICE DAILY WITH  MEALS 60 tablet 0     cholecalciferol (VITAMIN D3) 5000 units (125 mcg) capsule Take 1 capsule (5,000 Units) by mouth daily       COMPRESSION STOCKINGS 1 each daily 2 each 0     EPINEPHrine (EPIPEN/ADRENACLICK/OR ANY BX GENERIC EQUIV) 0.3 MG/0.3ML injection 2-pack Inject 0.3 mg into the muscle as needed for anaphylaxis       finasteride (PROSCAR) 5 MG tablet Take 1 tablet (5 mg) by mouth daily TAKE 1 TABLET EVERY DAY 30 tablet 0     levothyroxine (SYNTHROID/LEVOTHROID) 100 MCG tablet Take 1 tablet (100 mcg) by mouth daily TAKE 1 TABLET EVERY DAY 30 tablet 0     Multiple Vitamins-Minerals (CENTRUM SILVER) per tablet Take 1 tablet by mouth daily       omeprazole (PRILOSEC) 40 MG DR capsule Take 1 capsule (40 mg) by mouth daily 30 capsule 0     polyethylene glycol (MIRALAX) packet Take 1 packet by mouth daily       potassium chloride ER (KLOR-CON M) 20 MEQ CR tablet Take 1-2 tablets (20-40 mEq) by mouth See Admin Instructions Take 2 tablets (40 mEq) by mouth in the morning and 1 tablet (20 mEq) by mouth in the evening (2 pm). 90 tablet 0     simvastatin (ZOCOR) 20 MG tablet Take 1 tablet (20 mg) by mouth daily 30 tablet 0     spironolactone (ALDACTONE) 25 MG tablet Take 1 tablet (25 mg) by mouth daily 30 tablet 0     sucralfate (CARAFATE) 1 GM tablet Take 1 tablet (1 g) by mouth 2 times daily 60 tablet 0     tamsulosin (FLOMAX) 0.4 MG capsule Take 1 capsule (0.4 mg) by mouth daily TAKE 1 CAPSULE EVERY DAY 30 capsule 0     triamcinolone (KENALOG) 0.025 % cream Apply topically 2 times daily as needed          ALLERGIES:  Allergies   Allergen Reactions     Furosemide Blisters     Bullous pemphigoid. Bumex and ethacrynic acid okay.     Torsemide Blisters     Bullous pemphigoid.  Bumex and ethacrynic acid okay.       ROS:  10 point ROS neg other than the symptoms noted above in the HPI.    PHYSICAL EXAM:  Vital signs were reviewed in the chart.  Blood pressure 115/68, heart rate 69, respirate 18, temperature 97.8  F, oxygen  saturation 99%  Gen: Cooperative and in no acute distress  HEENT: conjunctival pallor+  Card: Normal S1, S2, irregularly irregular rhythm, normal rate  Resp: Lungs clear to auscultation bilaterally  GI: Abdomen soft, non-tender, non-distended, +BS  Ext: Trace bilateral LE edema  Neuro: CX II-XII grossly intact; ROM in all four extremities grossly intact  Psych: Alert and oriented x3; normal affect  Skin: No acute rash    LABORATORY/IMAGING DATA:  Reviewed as per [facility] and the [facility] discharge summary.    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  Due to generalized weakness and acute kidney injury, his cardiac regimen was modified as outlined in HPI with discontinuation of diuretic therapy.  Patient had been scheduled for pacemaker generator change on Jumana 15 which was postponed due to recent hospitalization.  His pacemaker was interrogated in the hospital and 1 more month of battery life is left.  Today he appears stable.  Plan:  Continue to hold diuretic therapy  Continue aspirin 81 mg p.o. daily, carvedilol 6.25 mg p.o. twice daily, and simvastatin 20 mg p.o. daily mg p.o. daily  Monitor volume, weight, and cardiac status  Monitor heart rate  Follow-up with [facility]/EP in 1 week    Acute kidney injury, superimposed on chronic kidney disease, stage III.  Baseline creatinine in the range of 1.2-1.6.  Acute kidney injury was due to overdiuresis and improved with adjustment of diuretic therapy.  Plan:  Avoid NSAIDs and nephrotoxins  Monitor renal function periodically    Acute blood loss anemia,  superimposed on chronic anemia.  Baseline hemoglobin is in the range of 7-9.  In the hospital he was transfused with 1 unit of packed red blood cells on June 14 for hemoglobin of 6.9.  Drop in hemoglobin felt to be likely due to gross hematuria secondary to traumatic urinary catheterization.  Plan:  Monitor hemoglobin periodically    Chronic thrombocytopenia and leukopenia.  Patient is followed by Dr. Chavez of [facility].  Plan:  Monitor CBC periodically    Obstructive sleep apnea.  He normally does use a CPAP at night.  He does not want to bring in his CPAP machine as it is too bulky.  Plan:  Monitor O2 sat    Bilateral lower extremity lymphedema.  Chronic.  Plan:  Continue compression stockings    BPH with urinary retention,  Traumatic gross hematuria.  A Amos catheter was placed in the hospital for urinary retention.  He failed voiding trial in the hospital and therefore he was discharged with a Amos catheter.  Plan:  Continue finasteride 5 mg p.o. daily and tamsulosin 0.4 mg p.o. daily  Continue routine urinary catheter care  Follow-up with urology in 1 week    Hypothyroidism.  Most recent TSH was 1.86 on May 28, 2020.  Plan:  Continue levothyroxine 100 mcg p.o. daily    GERD.  Plan:  Continue omeprazole 40 mg p.o. daily and sucralfate 1 g p.o. twice daily    Depression.  Plan:  Continue bupropion  mg p.o. daily    Physical deconditioning.  Plan:  Continue PT/OT evaluation and therapy    Orders written by provider at facility:  CBC and BMP on June 22, 2020  Follow-up with [facility] in 1 week  [facility]      Electronically signed by:  Iza Eng MD                        Sincerely,        Iza Eng MD

## 2020-06-18 NOTE — TELEPHONE ENCOUNTER
Received voice message from wife, Kristi. Patient has been at [facility]. Planning on coming home in a day or so. Would like to get pacemaker generator change scheduled.   Message forwarded to device clinic for further information asking them to contact her by phone today.

## 2020-06-19 ENCOUNTER — DISCHARGE SUMMARY NURSING HOME (OUTPATIENT)
Dept: GERIATRICS | Facility: CLINIC | Age: 85
End: 2020-06-19
Payer: COMMERCIAL

## 2020-06-19 ENCOUNTER — TELEPHONE (OUTPATIENT)
Dept: FAMILY MEDICINE | Facility: CLINIC | Age: 85
End: 2020-06-19

## 2020-06-19 VITALS
TEMPERATURE: 97.7 F | HEART RATE: 67 BPM | DIASTOLIC BLOOD PRESSURE: 56 MMHG | RESPIRATION RATE: 18 BRPM | OXYGEN SATURATION: 96 % | SYSTOLIC BLOOD PRESSURE: 108 MMHG

## 2020-06-19 DIAGNOSIS — I73.9 PAD (PERIPHERAL ARTERY DISEASE) (H): ICD-10-CM

## 2020-06-19 DIAGNOSIS — I89.0 LYMPHEDEMA: ICD-10-CM

## 2020-06-19 DIAGNOSIS — D69.6 THROMBOCYTOPENIA (H): ICD-10-CM

## 2020-06-19 DIAGNOSIS — R33.8 BENIGN PROSTATIC HYPERPLASIA WITH URINARY RETENTION: ICD-10-CM

## 2020-06-19 DIAGNOSIS — I48.21 PERMANENT ATRIAL FIBRILLATION (H): ICD-10-CM

## 2020-06-19 DIAGNOSIS — R53.81 PHYSICAL DECONDITIONING: ICD-10-CM

## 2020-06-19 DIAGNOSIS — R31.9 TRAUMATIC HEMATURIA: ICD-10-CM

## 2020-06-19 DIAGNOSIS — F32.A DEPRESSION, UNSPECIFIED DEPRESSION TYPE: ICD-10-CM

## 2020-06-19 DIAGNOSIS — E03.9 HYPOTHYROIDISM, UNSPECIFIED TYPE: ICD-10-CM

## 2020-06-19 DIAGNOSIS — I34.0 MITRAL VALVE INSUFFICIENCY, UNSPECIFIED ETIOLOGY: ICD-10-CM

## 2020-06-19 DIAGNOSIS — I25.10 CORONARY ARTERY DISEASE INVOLVING NATIVE CORONARY ARTERY OF NATIVE HEART WITHOUT ANGINA PECTORIS: ICD-10-CM

## 2020-06-19 DIAGNOSIS — K21.9 GASTROESOPHAGEAL REFLUX DISEASE, ESOPHAGITIS PRESENCE NOT SPECIFIED: ICD-10-CM

## 2020-06-19 DIAGNOSIS — G47.33 OSA (OBSTRUCTIVE SLEEP APNEA): ICD-10-CM

## 2020-06-19 DIAGNOSIS — D62 ANEMIA DUE TO BLOOD LOSS, ACUTE: ICD-10-CM

## 2020-06-19 DIAGNOSIS — N17.9 ACUTE KIDNEY INJURY (H): ICD-10-CM

## 2020-06-19 DIAGNOSIS — I50.42 CHRONIC COMBINED SYSTOLIC AND DIASTOLIC HEART FAILURE (H): Primary | ICD-10-CM

## 2020-06-19 DIAGNOSIS — Z97.8 INDWELLING FOLEY CATHETER PRESENT: Primary | ICD-10-CM

## 2020-06-19 DIAGNOSIS — N18.30 CKD (CHRONIC KIDNEY DISEASE) STAGE 3, GFR 30-59 ML/MIN (H): ICD-10-CM

## 2020-06-19 DIAGNOSIS — Z86.73 HISTORY OF CVA (CEREBROVASCULAR ACCIDENT): ICD-10-CM

## 2020-06-19 DIAGNOSIS — N40.1 BENIGN PROSTATIC HYPERPLASIA WITH URINARY RETENTION: ICD-10-CM

## 2020-06-19 DIAGNOSIS — Z95.0 CARDIAC PACEMAKER IN SITU: ICD-10-CM

## 2020-06-19 PROBLEM — D64.9 CHRONIC ANEMIA: Status: ACTIVE | Noted: 2020-06-19

## 2020-06-19 PROCEDURE — 99316 NF DSCHRG MGMT 30 MIN+: CPT | Performed by: NURSE PRACTITIONER

## 2020-06-19 NOTE — TELEPHONE ENCOUNTER
Reason for Call:  Other call back    Detailed comments: The patient's wife called and stated that the patient getting discharged to come home on 6/20 and they are need a commendation or possible a referral for a urologist to take out the patient's catheter. They would like a call back with a recommendation.     Phone Number Patient can be reached at: Home number on file [phone] (home)    Best Time: Any    Can we leave a detailed message on this number? YES    Call taken on 6/19/2020 at 11:27 AM by Rose Sinha

## 2020-06-19 NOTE — PATIENT INSTRUCTIONS
[facility] Discharge Orders    Name: Bairon Foster  : 1931  Planned Discharge Date: 20--pt and wife driven  Discharged to: with family with wife    MEDICAL FOLLOW UP  Follow up with PCP in 1-2 weeks --wife to call for appt  Follow up with Specialists see below.  Needs to [facility] in 1 week as has kelvin and had retention in hospital.       Cards/EP f/u in 1 week for pacemaker change appt   Next 5 appointments (look out 90 days)    Sep 15, 2020  1:30 PM CDT  Return Visit with  LAB DRAW 1  [facility] ([facility]) [facility]  [address]  [place] [address] [phone]   Sep 15, 2020  2:40 PM CDT  Return Visit with Donald Chavez MD  [facility] ([facility]) [address]  [facility] [address] [phone]          FUTURE LABS: cbc, bmp due  or 1st visit  with HomeCare RN with results to PCP    ORDER CHANGES:  No changes from admission    DISCHARGE MEDICATIONS:  The patient s pharmacy is authorized to dispense a 30-day supply of medications. Refill requests should be directed to the primary provider, Mg Nelson .   No narcotics are prescribed at time of discharge.   Current Outpatient Medications   Medication Sig Dispense Refill     ACE/ARB/ARNI NOT PRESCRIBED (INTENTIONAL) Please choose reason not prescribed, below (Patient not taking: Reported on 2020)       acetaminophen (TYLENOL) 500 MG tablet Take 2 tablets (1,000 mg) by mouth every 6 hours as needed for mild pain 100 tablet 0     aspirin 81 MG EC tablet Take 1 tablet (81 mg) by mouth daily       bumetanide (BUMEX) 1 MG tablet Take 1 tablet (1 mg) by mouth every evening Take at 2 pm daily. 30 tablet 0     bumetanide (BUMEX) 1 MG tablet Take 2 tablets (2 mg) by mouth every morning 60 tablet 0     buPROPion (WELLBUTRIN XL) 300 MG 24 hr tablet Take 1  tablet (300 mg) by mouth every morning 30 tablet 0     carvedilol (COREG) 6.25 MG tablet Take 1 tablet (6.25 mg) by mouth 2 times daily (with meals) TAKE 1 TABLET TWICE DAILY WITH MEALS 60 tablet 0     cholecalciferol (VITAMIN D3) 5000 units (125 mcg) capsule Take 1 capsule (5,000 Units) by mouth daily       COMPRESSION STOCKINGS 1 each daily 2 each 0     EPINEPHrine (EPIPEN/ADRENACLICK/OR ANY BX GENERIC EQUIV) 0.3 MG/0.3ML injection 2-pack Inject 0.3 mg into the muscle as needed for anaphylaxis       finasteride (PROSCAR) 5 MG tablet Take 1 tablet (5 mg) by mouth daily TAKE 1 TABLET EVERY DAY 30 tablet 0     levothyroxine (SYNTHROID/LEVOTHROID) 100 MCG tablet Take 1 tablet (100 mcg) by mouth daily TAKE 1 TABLET EVERY DAY 30 tablet 0     Multiple Vitamins-Minerals (CENTRUM SILVER) per tablet Take 1 tablet by mouth daily       omeprazole (PRILOSEC) 40 MG DR capsule Take 1 capsule (40 mg) by mouth daily 30 capsule 0     polyethylene glycol (MIRALAX) packet Take 1 packet by mouth daily       potassium chloride ER (KLOR-CON M) 20 MEQ CR tablet Take 1-2 tablets (20-40 mEq) by mouth See Admin Instructions Take 2 tablets (40 mEq) by mouth in the morning and 1 tablet (20 mEq) by mouth in the evening (2 pm). 90 tablet 0     simvastatin (ZOCOR) 20 MG tablet Take 1 tablet (20 mg) by mouth daily 30 tablet 0     spironolactone (ALDACTONE) 25 MG tablet Take 1 tablet (25 mg) by mouth daily 30 tablet 0     sucralfate (CARAFATE) 1 GM tablet Take 1 tablet (1 g) by mouth 2 times daily 60 tablet 0     tamsulosin (FLOMAX) 0.4 MG capsule Take 1 capsule (0.4 mg) by mouth daily TAKE 1 CAPSULE EVERY DAY 30 capsule 0     triamcinolone (KENALOG) 0.025 % cream Apply topically 2 times daily as needed          SERVICES:  Home Care:  Occupational Therapy, Physical Therapy, Speech Therapy , Registered Nurse, Home Health Aide,  and From:  [employer]    ADDITIONAL INSTRUCTIONS:  DAILY WTS AND  teds    WILLIS Jordan  CNP  This document was electronically signed on June 19, 2020

## 2020-06-19 NOTE — TELEPHONE ENCOUNTER
Reason for Call:  Form, our goal is to have forms completed with 72 hours, however, some forms may require a visit or additional information.    Type of letter, form or note:  medical    Who is the form from?: Home care    Where did the form come from: form was faxed in    What clinic location was the form placed at?: [facility]    Where the form was placed: [facility] Box/Folder    What number is listed as a contact on the form?: [phone]       Additional comments: [employer]: Drug interaction    Call taken on 6/19/2020 at 10:39 AM by Rose Sinha

## 2020-06-19 NOTE — TELEPHONE ENCOUNTER
Left voice message asking pt to call triage back. On call back, please review providers message with pt's wife.

## 2020-06-19 NOTE — PROGRESS NOTES
[facility] DISCHARGE SUMMARY    PATIENT'S NAME: Bairon Foster  YOB: 1931    PRIMARY CARE PROVIDER AND CLINIC RESPONSIBLE AFTER TRANSFER: Mg Nelson     CODE STATUS: FULL CODE    TRANSFERRING PROVIDERS: WILLIS Jordan CNP, Dr. Velasquez Couch MD      DATE OF SNF ADMISSION:  June / 17 / 2020    DATE OF SNF DISCHARGE (including anticipating DC): June / 19 / 2020    DISCHARGE DISPOSITION: FMG Provider    Nursing Facility: [facility]  stay 6/12/20 to 6/17/20.     Condition on Discharge:  Stable.    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)  Mitral valve insufficiency, unspecified etiology  Permanent atrial fibrillation (H)  Cardiac pacemaker in situ  Coronary artery disease involving native coronary artery of native heart without angina pectoris  PAD (peripheral artery disease) (H)  History of CVA (cerebrovascular accident)  Acute kidney injury (H)  CKD (chronic kidney disease) stage 3, GFR 30-59 ml/min (H)  Anemia due to blood loss, acute  Thrombocytopenia (H)  JIM (obstructive sleep apnea)  Lymphedema  Benign prostatic hyperplasia with urinary retention  Traumatic hematuria  Hypothyroidism, unspecified type  Gastroesophageal reflux disease, esophagitis presence not specified  Depression, unspecified depression type  Physical deconditioning        HOSPITAL COURSE: Please see Dr Eng's note from 6/18/20:  Pt was at the [facility] from 6/12-6/17 for weakness and BHAKTI. His diuretics had recently been adjusted and he was found to be dry.  He also was anemic so was transfused.  He had a traumatic warren cath insertion and he failed a voiding trial so warren reinserted and sent to TCU with it.  Of note  he was to have his PPM battery change on 6/15/20 but it was cancelled as pt in the hospital.   He has 1 month left in that battery life per hospital notes.    TCU/SNF COURSE:  Pt came to TCU on 6/17--seen today for first time by me--he and his wife wish to go home in am.  Therapies feel he is fine for this as he has a full complement of home care set up to resume.   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     CKD (chronic kidney disease) stage 3, GFR 30-59 ml/min (H)      Coronary atherosclerosis of unspecified type of vessel, native or graft     S/P PTCA, EF 50%     Epistaxis      Esophageal reflux      GI bleed 4/17/2019     Hypercholesterolemia      Ischemic cardiomyopathy      Lymphedema of both lower extremities     Wears ACE wraps     Mitral regurgitation     s/p MitraClip 5/20/19     Obese      JIM on CPAP      Other lymphedema     GROIN AND THIGHS     Other pancytopenia (H)      Other specified anemias      Overweight      Pacemaker     PACEMAKER-BS Implanted 8/5/2009      PAD (peripheral artery disease) (H)      Pancytopenia (H)      Persistent atrial fibrillation (H)     VVI PM for long pauses     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    Closure with a 33 mm Watchman device      Pulmonary hypertension (H)      Status post coronary angiogram 3/21/2018     Systolic CHF (H)      TIA (transient ischemic attack)      Unspecified hypothyroidism      Venous stasis dermatitis of both lower extremities      Vitamin D deficiency        DISCHARGE MEDICATIONS:  Current Outpatient Medications   Medication Sig Dispense Refill      ACE/ARB/ARNI NOT PRESCRIBED (INTENTIONAL) Please choose reason not prescribed, below (Patient not taking: Reported on 5/28/2020)       acetaminophen (TYLENOL) 500 MG tablet Take 2 tablets (1,000 mg) by mouth every 6 hours as needed for mild pain 100 tablet 0     aspirin 81 MG EC tablet Take 1 tablet (81 mg) by mouth daily       bumetanide (BUMEX) 1 MG tablet Take 1 tablet (1 mg) by mouth every evening Take at 2 pm daily. 30 tablet 0     bumetanide (BUMEX) 1 MG tablet Take 2 tablets (2 mg) by mouth every morning 60 tablet 0     buPROPion (WELLBUTRIN XL) 300 MG 24 hr tablet Take 1 tablet (300 mg) by mouth every morning 30 tablet 0     carvedilol (COREG) 6.25 MG tablet Take 1 tablet (6.25 mg) by mouth 2 times daily (with meals) TAKE 1 TABLET TWICE DAILY WITH MEALS 60 tablet 0     cholecalciferol (VITAMIN D3) 5000 units (125 mcg) capsule Take 1 capsule (5,000 Units) by mouth daily       COMPRESSION STOCKINGS 1 each daily 2 each 0     EPINEPHrine (EPIPEN/ADRENACLICK/OR ANY BX GENERIC EQUIV) 0.3 MG/0.3ML injection 2-pack Inject 0.3 mg into the muscle as needed for anaphylaxis       finasteride (PROSCAR) 5 MG tablet Take 1 tablet (5 mg) by mouth daily TAKE 1 TABLET EVERY DAY 30 tablet 0     levothyroxine (SYNTHROID/LEVOTHROID) 100 MCG tablet Take 1 tablet (100 mcg) by mouth daily TAKE 1 TABLET EVERY DAY 30 tablet 0     Multiple Vitamins-Minerals (CENTRUM SILVER) per tablet Take 1 tablet by mouth daily       omeprazole (PRILOSEC) 40 MG DR capsule Take 1 capsule (40 mg) by mouth daily 30 capsule 0     polyethylene glycol (MIRALAX) packet Take 1 packet by mouth daily       potassium chloride ER (KLOR-CON M) 20 MEQ CR tablet Take 1-2 tablets (20-40 mEq) by mouth See Admin Instructions Take 2 tablets (40 mEq) by mouth in the morning and 1 tablet (20 mEq) by mouth in the evening (2 pm). 90 tablet 0     simvastatin (ZOCOR) 20 MG tablet Take 1 tablet (20 mg) by mouth daily 30 tablet 0     spironolactone (ALDACTONE) 25 MG tablet  Take 1 tablet (25 mg) by mouth daily 30 tablet 0     sucralfate (CARAFATE) 1 GM tablet Take 1 tablet (1 g) by mouth 2 times daily 60 tablet 0     tamsulosin (FLOMAX) 0.4 MG capsule Take 1 capsule (0.4 mg) by mouth daily TAKE 1 CAPSULE EVERY DAY 30 capsule 0     triamcinolone (KENALOG) 0.025 % cream Apply topically 2 times daily as needed          MEDICATION CHANGES/RATIONALE:   NONE  /56   Pulse 67   Temp 97.7  F (36.5  C)   Resp 18   SpO2 96%     ROS: Pt has no complaints, no cp, sob, cough N/v, dizziness, he wants to go home    PHYSICAL EXAM Today:  A & O x 3, NAD. Lungs CTA, non labored. RRR, S1/S2 w/O murmur,gallop or rub.  Trace bilat feet/ankles' edema.  Abdomen soft, nontender, +BT'S. No focal neurological deficits. HALL and up with walker. warren draining clear bridget urine.       SNF LABS:  Recent Labs   Lab Test 06/01/20  0908 05/28/20  1010    139   POTASSIUM 3.5 3.3*   CHLORIDE 100 100   CO2 32 33*   ANIONGAP 5 6   * 134*   BUN 33* 39*   CR 1.52* 1.80*   TEODORO 9.9 10.0     CBC RESULTS:   Recent Labs   Lab Test 05/28/20  1010   WBC 4.1   RBC 3.15*   HGB 9.7*   HCT 30.2*   MCV 96   MCH 30.8   MCHC 32.1   RDW 17.3*   *             DISCHARGE PLAN:  Occupational Therapy, Physical Therapy, Speech Therapy , Registered Nurse, Home Health Aide,  and [facility]  Follow-up with PCP in 7 days: 7 days.    Current Susanna or other scheduled appointments:  Future Appointments   Date Time Provider Department Center   [facility]15/2020  1:30 [facility]   9/15/2020  2:40 PM Donald Chavez MD [facility] SUSANNA [facility]         MT referral needed and placed by this provider: none    Pending labs: see discharge orders: BMP and CBC first Home RN visit     Discharge Treatments:CHF protocol, wts, charbel's, etc       TOTAL DISCHARGE TIME:   Greater than 30 minutes    WILLIS Jordan CNP    [facility]                Documentation of Face-to-Face  and Certification for Home Health Services     Patient: Bairon Foster   YOB: 1931  MR Number: [id number]  Today's Date: 6/19/2020    I certify that patient: Bairon Foster is under my care and that I, or a nurse practitioner or physician's assistant working with me, had a face-to-face encounter that meets the physician face-to-face encounter requirements with this patient on: 6/19/2020.    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)  Mitral valve insufficiency, unspecified etiology  Permanent atrial fibrillation (H)  Cardiac pacemaker in situ  Coronary artery disease involving native coronary artery of native heart without angina pectoris  PAD (peripheral artery disease) (H)  History of CVA (cerebrovascular accident)  Acute kidney injury (H)  CKD (chronic kidney disease) stage 3, GFR 30-59 ml/min (H)  Anemia due to blood loss, acute  Thrombocytopenia (H)  JIM (obstructive sleep apnea)  Lymphedema  Benign prostatic hyperplasia with urinary retention  Traumatic hematuria  Hypothyroidism, unspecified type  Gastroesophageal reflux disease, esophagitis presence not specified  Depression, unspecified depression type  Physical deconditioning  .    I certify that, based on my findings, the following services are medically necessary home health services: Nursing, Occupational Therapy, Physical Therapy, Speech Language Therapy and Social Work. And HHA    My clinical findings support the need for the above services because: Nurse is needed: To assess HF, meds, edema, lab draws after changes in medications or other medical regimen., To provide assessment and oversight required in the home to assure adherence to the medical plan due to: complexities of care. and To provide caregiver training to assist with: meds, hf management.., Occupational Therapy  Services are needed to assess and treat cognitive ability and address ADL safety due to impairment in deconditioning and weakness., Physical Therapy Services are needed to assess and treat the following functional impairments: weakness, see under OT and deconditioning. and Speech Therapy Services are needed to assess and treat impairments in language and/or swallow functions due to cognition, swallowing, hx CVA.    Further, I certify that my clinical findings support that this patient is homebound (i.e. absences from home require considerable and taxing effort and are for medical reasons or Mandaen services or infrequently or of short duration when for other reasons) because: Requires assistance of another person or specialized equipment to access medical services because patient: Requires supervision of another for safe transfer. and  pacemaker has 1 month left on its life ..    Based on the above findings. I certify that this patient is confined to the home and needs intermittent skilled nursing care, physical therapy and/or speech therapy.  The patient is under my care, and I have initiated the establishment of the plan of care.  This patient will be followed by a physician who will periodically review the plan of care.  Physician/Provider to provide follow up care: Mg Nelson    Responsible Medicare certified PECOS Physician: annmarie bryan RN, CNP  Physician Signature: See electronic signature associated with these discharge orders.  Date: 6/19/2020

## 2020-06-19 NOTE — TELEPHONE ENCOUNTER
"Call back from patient's wife. She states he had the catheter placed in the hospital. She is unsure why. She states, \"I think it was because he was having trouble urinating.\" She states that has been going on for a couple weeks.     Wife states they were told to get it removed as soon as possible. Yet, she states they are not doing anything in the TCU to assess his bladder.     Pended order for [facility]. Routing to provider to approve.           "

## 2020-06-22 ENCOUNTER — PATIENT OUTREACH (OUTPATIENT)
Dept: CARE COORDINATION | Facility: CLINIC | Age: 85
End: 2020-06-22

## 2020-06-22 NOTE — LETTER
Health Care Home - Access Care Plan    About Me:    Patient Name:  Bairon Ibarra    YOB: 1931  Age:                      89 year old   [facility] MRN:     [id number] Telephone Information:   Home Phone [phone]   Mobile [phone]       Address:  [address]  [facility] [address] Email address:  [email or web address]      Emergency Contact(s)   Name Relationship Lgl Grd Work Phone Home Phone Mobile Phone   1. KIRT IBARRA Spouse No  [phone]   2. MARIA DOLORES IBARRA Son No  [phone]   3. LON IBARRA Son No  [phone]    4. DANIAL YAHIR Friend No  [phone]              Health Maintenance: Routine Health maintenance Reviewed: Due/Overdue   Health Maintenance Due   Topic Date Due     ZOSTER IMMUNIZATION (2 of 3) 06/26/2012     MAGGY ASSESSMENT  06/15/2016     MEDICARE ANNUAL WELLNESS VISIT  10/10/2019     FALL RISK ASSESSMENT  10/10/2019     PHQ-9  10/30/2019     My Access Plan  Medical Emergency 911   Questions or concerns during clinic hours Primary Clinic Line, I will call the clinic directly: [facility] - [phone]   24 Hour Appointment Line [phone] or  [phone] [facility] [phone]) (toll free)   24 Hour Nurse Line [phone] (toll free)   Questions or concerns outside clinic hours 24 Hour Appointment Line, I will call the after-hours on-call line:   [facility] [phone] or [phone]) (toll-free)   Preferred Urgent Care [facility], [phone]   Preferred Hospital [facility]  [phone]   Preferred Pharmacy [facility] [address] [facility] [facility]     Behavioral Health Crisis Line The National Suicide Prevention Lifeline at 1-522.158.5551 or 911     My Care Team Members  Patient Care Team       Relationship Specialty Notifications Start End    Mg Nelson MD PCP - General Family  Practice  4/30/12     Phone: [phone] Fax: [phone]         [address] [facility] [address]    Mg Nelson MD PCP Family Practice  2/8/12     Phone: [phone] Fax: [phone]         [address] [facility] [address]    Sean Carrera MD MD Cardiology  2/8/12     Phone: [phone] Fax: [phone]         [address] [place] [address]    Mg Nelson MD Assigned PCP   12/31/17     Phone: [phone] Fax: [phone]         [address] [facility] [address]    [facility] ([facility] ([place])  4/22/19     Phone: [phone]                My Medical and Care Information  Problem List   Patient Active Problem List   Diagnosis     Atrial fibrillation (H)     Pulmonary hypertension (H)     CAD (coronary artery disease)     BPH (benign prostatic hyperplasia)     Ischemic cardiomyopathy     JIM (obstructive sleep apnea)- mild/moderate     Vitamin D deficiency     Gastroesophageal reflux disease without esophagitis     Hydrocele, unspecified hydrocele type     Slow transit constipation     Hypercholesterolemia     Venous stasis dermatitis of both lower extremities     Lymphedema of both lower extremities     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      Coronary artery disease due to lipid rich plaque     Acquired hypothyroidism     Onychomycosis     PAD (peripheral artery disease) (H)     Xerosis cutis     AK (actinic keratosis)     Angioma of skin     Acute systolic CHF (congestive heart failure) (H)     CHF (congestive heart failure) (H)     Non-rheumatic mitral regurgitation     Mitral regurgitation     CKD (chronic kidney disease) stage 3, GFR 30-59 ml/min (H)     Bullous pemphigoid     Chronic atrial fibrillation (H)     Presence of Watchman left atrial appendage closure device     Sick sinus syndrome (H)     Cardiac pacemaker in situ     Acute combined systolic and diastolic congestive heart failure (H)      Acute on chronic combined systolic and diastolic congestive heart failure (H)     Bilateral leg edema     Valvular heart disease     Coronary artery disease involving native heart without angina pectoris, unspecified vessel or lesion type     Benign prostatic hyperplasia without lower urinary tract symptoms     Pancytopenia (H)     Depression, unspecified depression type     Cognitive impairment     Debility     Falls frequently     Skin tear of upper extremities     Chronic combined systolic and diastolic heart failure (H)     History of CVA (cerebrovascular accident)     Acute kidney injury (H)     Anemia due to blood loss, acute     Chronic anemia     Thrombocytopenia (H)     Lymphedema     Traumatic hematuria     Hypothyroidism, unspecified type     Gastroesophageal reflux disease, esophagitis presence not specified     Physical deconditioning      Current Medications and Allergies:    Current Outpatient Medications   Medication     ACE/ARB/ARNI NOT PRESCRIBED (INTENTIONAL)     acetaminophen (TYLENOL) 500 MG tablet     aspirin 81 MG EC tablet     bumetanide (BUMEX) 1 MG tablet     bumetanide (BUMEX) 1 MG tablet     buPROPion (WELLBUTRIN XL) 300 MG 24 hr tablet     carvedilol (COREG) 6.25 MG tablet     cholecalciferol (VITAMIN D3) 5000 units (125 mcg) capsule     COMPRESSION STOCKINGS     EPINEPHrine (EPIPEN/ADRENACLICK/OR ANY BX GENERIC EQUIV) 0.3 MG/0.3ML injection 2-pack     finasteride (PROSCAR) 5 MG tablet     levothyroxine (SYNTHROID/LEVOTHROID) 100 MCG tablet     Multiple Vitamins-Minerals (CENTRUM SILVER) per tablet     omeprazole (PRILOSEC) 40 MG DR capsule     polyethylene glycol (MIRALAX) packet     potassium chloride ER (KLOR-CON M) 20 MEQ CR tablet     simvastatin (ZOCOR) 20 MG tablet     spironolactone (ALDACTONE) 25 MG tablet     sucralfate (CARAFATE) 1 GM tablet     tamsulosin (FLOMAX) 0.4 MG capsule     triamcinolone (KENALOG) 0.025 % cream     No current facility-administered medications for  this visit.

## 2020-06-22 NOTE — TELEPHONE ENCOUNTER
Spoke with patient's wife. Contact information for [facility] was given to patient.       Urology referral:  Your provider has referred you to: MABEL: [facility] - [place] [phone]   [email or web address]      No further follow up needed.

## 2020-06-22 NOTE — LETTER
[place] CARE COORDINATION  [facility]  [address], [place] [address]    June 22, 2020    Bairon Foster  [address]  [facility] [address]      Dear Bairon,    I am a clinic care coordinator who works with Mg Nelson MD at [facility]. I wanted to thank you for spending the time to talk with me.  Below is a description of clinic care coordination and how I can further assist you.      The clinic care coordination team is made up of a registered nurse,  and community health worker who understand the health care system. The goal of clinic care coordination is to help you manage your health and improve access to the health care system in the most efficient manner. The team can assist you in meeting your health care goals by providing education, coordinating services, strengthening the communication among your providers and supporting you with any resource needs.    Please feel free to contact the Community Health Worker at [phone] with any questions or concerns. We are focused on providing you with the highest-quality healthcare experience possible and that all starts with you.     Sincerely,     Karla Young KENNA   Social Work Clinic Care Coordinator   [facility], [facility], and [facility]   PH: [phone]  [email or web address]   Enclosed: I have enclosed a copy of a 24 Hour Access Plan. This has helpful phone numbers for you to call when needed. Please keep this in an easy to access place to use as needed.

## 2020-06-22 NOTE — PROGRESS NOTES
Clinic Care Coordination Contact  Care Coordination Communication    Referral Source: IP Report    Clinical Data: Patient was hospitalized at [facility] from 5/14/20 to 5/22/20 with diagnosis of acute combined systolic and diastolic congestive heart failure. Pt admitted ot [facility] TCU from 5/22/20 to 6/12/20. Pt admited to [facility] from 6/12/20 to 6/17/20. Pt admitted to [facility] TCU in [place] from 6/17/20 to 6/20/20. Pt discharged to previous home with wife with home care.      Home Care Contact:              Home Care Agency: [facility]              Contact name () and phone number: [phone]              Care Coordination contacted home care: No              Anticipated start of care date: 6/21/20, saw RN yesterday for intake     Patient Contact:               Introduced self and role of care coordination.               Discharge instructions were reviewed with patient/caregiver.               Do you have any questions about your medications? No, will schedule PCP follow up appt.               Follow up appointment is scheduled for TBD, advised to call clinic to schedule appt within 7 days per AVS.              Provided 24 Hour Nurse Line and/or 24 Hour Appointment Scheduling: Yes              Home care has contacted patient: Yes              Patient questions/concerns: SW CC outreach to patient for hospital/TCU follow up. Pt will have Nursing, Occupational Therapy, Physical Therapy, Speech Language Therapy, Social Work, and HHA. RN is coming within the hour. Reviewed follow up and advised pt to make appts/gave instructions.     Follow up:   -Follow-up with PCP in 7 days: 7 days. (not scheduled yet)  -CBC and BMP labs (have nurse do labs at visit, send to clinic)   -Follow-up with [facility] in 1 week (not scheduled yet)   -Follow-up with Urology in 1 week - (not scheduled yet); [facility] referral made 6/19/20 by PCP -  [phone] [facility], [address] [facility]     Pt reported he is doing ok, coming along. Advised to ask RN today about status of HHA, haven't heard from them yet.     Plan: SW Care Coordinator will await notification from home care staff informing  Care Coordinator of patients discharge plans/needs. No further outreaches will be made at this time unless a new referral is made or a change in the pt's status occurs. Patient was provided with this writer's contact information and encouraged to call with any questions or concerns. [facility] CC will send care coordination introduction letter and 24 hr access plan to patient via [employer]. Pt and wife will make/attend follow up appts as discussed. Pt will work with home care.     GOLDY Birmingham   Social Work Clinic Care Coordinator   [facility]   PH: [phone]  [email or web address]

## 2020-06-24 NOTE — PROGRESS NOTES
Called patient with pre-procedure instructions for device implant:      Anticoagulation: NA   Oral diabetes meds: NA  Insulin: NA  Diuretic: bumex and spironolactone, hold AM of procedure  Contrast allergy: no  Pt informed to be NPO at midnight  Procedure scheduled at 4:00pm, may have clear liquid breakfast before 8am     Instructed pt to shower the morning of the procedure, and then put on a clean shirt in order to help prevent infection.      Pt has post-procedure transportation and 24 hours monitoring set up.   Pt's wife aware of no driving for 24 hours post procedure due to sedation.      Pt's wife aware of arrival time of 2PM at [facility] location. Pt's wife verbalized understanding of instructions.

## 2020-06-25 ENCOUNTER — VIRTUAL VISIT (OUTPATIENT)
Dept: CARDIOLOGY | Facility: CLINIC | Age: 85
End: 2020-06-25
Payer: COMMERCIAL

## 2020-06-25 DIAGNOSIS — L85.3 XEROSIS CUTIS: Primary | ICD-10-CM

## 2020-06-25 DIAGNOSIS — I49.5 SICK SINUS SYNDROME (H): Primary | ICD-10-CM

## 2020-06-25 PROCEDURE — 99213 OFFICE O/P EST LOW 20 MIN: CPT | Mod: 95 | Performed by: PHYSICIAN ASSISTANT

## 2020-06-25 NOTE — PATIENT INSTRUCTIONS
Alfa (and Ursula) - it was nice to speak with you today!    1. Discussed plan for generator replacement of pacemaker that was placed back in 2009.     COVID testing set for Friday   I'll contact Scheduling to see if any morning appts are available    2. You will need to get in to see our [facility] (who specialize in fluid overload and making sure your water pills (diuretics) are not hurting the kidneys.     I will have Scheduling contact you to set this up    3. Our pacemaker nurses are @ [phone].  Pls call them if you have any questions about your pacemaker or the procedure or have questions for Dr. Curiel or myself.    Thank you!  CLAUDIA Mckeon with Dr. Curiel

## 2020-06-25 NOTE — PROGRESS NOTES
"Bairon Foster is a 89 year old male who is being evaluated via a billable telephone visit.      The patient has been notified of following:     \"This telephone visit will be conducted via a call between you and your physician/provider. We have found that certain health care needs can be provided without the need for a physical exam.  This service lets us provide the care you need with a short phone conversation.  If a prescription is necessary we can send it directly to your pharmacy.  If lab work is needed we can place an order for that and you can then stop by our lab to have the test done at a later time.    Telephone visits are billed at different rates depending on your insurance coverage. During this emergency period, for some insurers they may be billed the same as an in-person visit.  Please reach out to your insurance provider with any questions.    If during the course of the call the physician/provider feels a telephone visit is not appropriate, you will not be charged for this service.\"    Patient has given verbal consent for Telephone visit?  Yes    What phone number would you like to be contacted at? See Chart    How would you like to obtain your AVS? Mail a copy    CC:  Pre-op evaluation for generator change     VITALS:  NONE taken    BRIEF HPI:  88 yo M who sees Dr. Hope and Dr. Curiel for his h/o:    1. CAD - mild, non-obstructive on cath 4/2019  2. Severe MR -  s/p Mitral Clip x 2 5/2019 with echo 5/2020 showing mild residual MR and mild MS  3. Moderate TR  4. Mixed systolic and diastolic HF - Echo 5/2020 with EF 46%. Recent hospitalization at [facility]-6/17/2020 /for weakness and BHAKTI.Reportedly, Bumex and spironolactone stopped   5. Bullous Pemphigoid    6. Permanent AFib and sx'c bradycardia - s/p single chamber [employer] PPM 8/2009 with planned gen change 6/29/2020. Triggered MADHAV 4/12/2020. Had LAAO device (Watchman, 33 mm) placed 12/2019 for frequent falls.  7. Remote CVA -  8. Memory " "issues  9. Pancytopenia requiring 1 unit pRBCs during hospitalization 5/2020. Follows with Dr. Chavez  10. Pulmonary Nodule with CT A Heart 6/2019 with large hiatal hernia, subpleural nodule in RLL 5 mm, for which 1 year follow-up was recommended.       He saw Dr. Curiel virtually 5/7/2020 at which time pre-op evaluation was done for gen change. He triggered MADHAV on 4/18/2020.  He was set up for gen change but this was first cancelled due to COVID testing not being done prior lulú then cancelled again 6/15 [facility]    He recently saw Vick Cuellar NP (virtual) on 5/28 following hospitalization for heart failure exacerbation (5/14-5/22). Discharge weight was 201# (down from 214#) and when he saw Vick, he was down to 179#! . Dry weight was felt to be 198-200#. Chlorthalidone was stopped. KCl was increased. Bumex was continued (2 in AM, 1 at lunch and 1 in PM) and spironolactone 25 mg daily. CORE consultation was recommended.     He was in the [facility] for BHAKTI (Cr 2.2)  and weakness 6/12-6/17/2020. Weight was down to 161#!  He was transfused due to anemia (Hgb 6.9), had an indwelling Amos placed and he was noted to be \"dry\" so diuretics were adjusted. No discharge weight on discharge 6/7 was 169#. It appears that spironolactone and Bumex were both HELD during his hospitalization and upon discharge.    He went to [facility] 6/17-6/19. His Admit note indicated he was back on spironolactone and Bumex at PTA doses (though Dr. Eng's note indicate these were being held).  He was discharged from there 6/19 on CV meds of: Bumex 2 mg in AM and 1 mg @ 1400, Coreg 6.25 mg BID, KCl 2 tabs in AM and 1 tab @ 1400, simvastatin 20, spironolactone 25 daily. Of note, remains with an indwelling Amos. Sees Urology 7/1.    INTERVAL HISTORY:  Overall, Alfa is \"feeling good.\" He's much [facility].  No c/o orthopnea or PND but does not LE edema.  Can't tell me if this is any " "better since his discharge from [facility] 6/19.    No c/o CP, dizziness, palpitations, falls or faints.    Wife would like to have his procedure earlier in the AM b/c him being discharged so late at night will be \"hard.\"    DIAGNOSTICS:  Echo 5/15/2020 with EF 46%. Normal RV. Mod TR. RVSP 27+RAP. Stable mitral clip position, residual mild MR with mean diastolic gradient 4 mmHg c/w mild MS with HR 76 bpm. 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 Underlying as AF with rates 40-50s.     REVIEW OF SYSTEMS:  Negative except as noted above     PHYSICAL EXAM:  Deferred, telephone    ASSESSMENT/PLAN:    1. Permanent AFib with frequent asystole    S/p single chamber [employer] PPM 2009 with plans for gen change. Reached MADHAV 4/12/2020    Plan for gen change on 6/29.       PLAN:    We discussed the risks, benefits and indications of generator change of single chamber pacemaker, including but not limited to use of conscious sedation including need for a , discomfort, peripheral vessel injury, pneumothorax, cardiac puncture and/or tamponade, device malfunction and/or recall, and infection requiring explantation of the device and long term antibiotics. We also briefly discussed follow up expectations and restrictions following the procedure. The patient voiced understanding and is willing to proceed.  A consent form will be signed by the procedural physician.      I will reach out to Scheduling to determine if there are any appts earlier in the day. Would prefer this be done sooner rather than later given expected EOL 7/12/2020      Continue ASA only s/p Watchman      2. H/o HF (systolic and diastolic). EF 46%    Hospitalized 5/14-5/22/2020 for HF. As an outpatient, diuretics were adjusted/decreased due to weight loss and renal insufficiency with plans for [facility] initiation but [facility]    [facility], prompting discontinuation of spironolactone and Bumex per intake note. No source of ARF was found (no infection, etc). Intake note from [facility] 6/17 indicates he remained on spironolactone 25 mg and Bumex 2 in AM and 1 in PM.     No blood work done since hospitalization that I can see    No weight obtained today     PLAN:    BMP will be done when he's in for gen change    [facility] Clinic as planned    I have reviewed the note as documented above.  This accurately captures the substance of my conversation with the patient.        Phone call contact time  Call Started at 1101  Call Ended at 1116  Duration: 15 minutes       JADIEL BrunsonAS

## 2020-06-25 NOTE — TELEPHONE ENCOUNTER
Reason for Call:  Medication or medication refill:    Do you use a [facility]?  Name of the pharmacy and phone number for the current request:  [id number] - [phone] Fax [phone]    Name of the medication requested: Renae lotion, not on medication list but is on reconcile medications as Camphor 0.5%/ Menthol 0.5% lotion.  [facility] that [facility] and it had 11 refills but the pharmacy says they need a new rx    Other request: n/a    Can we leave a detailed message on this number? Not Applicable    Phone number patient can be reached at: Other phone number:  Nurse calling from [place], didn't get phone number, sorry    Best Time:     Call taken on 6/25/2020 at 2:53 PM by Shannan Mcnair CMA

## 2020-06-25 NOTE — LETTER
6/25/2020    Mg Nelson MD  [address]  [facility] [address]    RE: Bairon Foster       Dear Colleague,    I had the pleasure of seeing Bairon Foster in the [facility].    Bairon Foster is a 89 year old male who is being evaluated via a billable telephone visit.        BRIEF HPI:  89 yo M who sees Dr. Hope and Dr. Curiel for his h/o:    1. CAD - mild, non-obstructive on cath 4/2019  2. Severe MR -  s/p Mitral Clip x 2 5/2019 with echo 5/2020 showing mild residual MR and mild MS  3. Moderate TR  4. Mixed systolic and diastolic HF - Echo 5/2020 with EF 46%. Recent hospitalization at -6/17/2020 /for weakness and BHAKTI.Reportedly, Bumex and spironolactone stopped   5. Bullous Pemphigoid    6. Permanent AFib and sx'c bradycardia - s/p single chamber [employer] PPM 8/2009 with planned gen change 6/29/2020. Triggered MADHAV 4/12/2020. Had LAAO device (Watchman, 33 mm) placed 12/2019 for frequent falls.  7. Remote CVA -  8. Memory issues  9. Pancytopenia requiring 1 unit pRBCs during hospitalization 5/2020. Follows with Dr. Chavez  10. Pulmonary Nodule with CT A Heart 6/2019 with large hiatal hernia, subpleural nodule in RLL 5 mm, for which 1 year follow-up was recommended.       He saw Dr. Curiel virtually 5/7/2020 at which time pre-op evaluation was done for gen change. He triggered MADHAV on 4/18/2020.  He was set up for gen change but this was first cancelled due to COVID testing not being done prior lulú then cancelled again 6/15 [facility]    He recently saw Vick Cuellar NP (virtual) on 5/28 following hospitalization for heart failure exacerbation (5/14-5/22). Discharge weight was 201# (down from 214#) and when he saw Vick, he was down to 179#! . Dry weight was felt to be 198-200#. Chlorthalidone was stopped. KCl was increased. Bumex was continued (2 in AM, 1 at lunch and 1 in PM) and spironolactone 25 mg daily. CORE  "consultation was recommended.     He was in the [facility] for BHAKTI (Cr 2.2)  and weakness 6/12-6/17/2020. Weight was down to 161#!  He was transfused due to anemia (Hgb 6.9), had an indwelling Amos placed and he was noted to be \"dry\" so diuretics were adjusted. No discharge weight on discharge 6/7 was 169#. It appears that spironolactone and Bumex were both HELD during his hospitalization and upon discharge.    He went to [facility] 6/17-6/19. His Admit note indicated he was back on spironolactone and Bumex at PTA doses (though Dr. Eng's note indicate these were being held).  He was discharged from there 6/19 on CV meds of: Bumex 2 mg in AM and 1 mg @ 1400, Coreg 6.25 mg BID, KCl 2 tabs in AM and 1 tab @ 1400, simvastatin 20, spironolactone 25 daily. Of note, remains with an indwelling Amos. Sees Urology 7/1.    INTERVAL HISTORY:  Overall, Alfa is \"feeling good.\" He's much [facility].  No c/o orthopnea or PND but does not LE edema.  Can't tell me if this is any better since his discharge from [facility] 6/19.    No c/o CP, dizziness, palpitations, falls or faints.    Wife would like to have his procedure earlier in the AM b/c him being discharged so late at night will be \"hard.\"    DIAGNOSTICS:  Echo 5/15/2020 with EF 46%. Normal RV. Mod TR. RVSP 27+RAP. Stable mitral clip position, residual mild MR with mean diastolic gradient 4 mmHg c/w mild MS with HR 76 bpm. 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 Underlying as AF with rates 40-50s.     REVIEW OF SYSTEMS:  Negative except as noted above     PHYSICAL EXAM:  Deferred, telephone    ASSESSMENT/PLAN:    1. Permanent AFib with frequent asystole    S/p single chamber [employer] PPM 2009 with plans for gen change. Reached MADHAV 4/12/2020    Plan for gen change on 6/29.       PLAN:    We discussed the risks, benefits and " indications of generator change of single chamber pacemaker, including but not limited to use of conscious sedation including need for a , discomfort, peripheral vessel injury, pneumothorax, cardiac puncture and/or tamponade, device malfunction and/or recall, and infection requiring explantation of the device and long term antibiotics. We also briefly discussed follow up expectations and restrictions following the procedure. The patient voiced understanding and is willing to proceed.  A consent form will be signed by the procedural physician.      I will reach out to Scheduling to determine if there are any appts earlier in the day. Would prefer this be done sooner rather than later given expected EOL 7/12/2020      Continue ASA only s/p Watchman      2. H/o HF (systolic and diastolic). EF 46%    Hospitalized 5/14-5/22/2020 for HF. As an outpatient, diuretics were adjusted/decreased due to weight loss and renal insufficiency with plans for [facility]    [facility], prompting discontinuation of spironolactone and Bumex per intake note. No source of ARF was found (no infection, etc). Intake note from [facility] 6/17 indicates he remained on spironolactone 25 mg and Bumex 2 in AM and 1 in PM.     No blood work done since hospitalization that I can see    No weight obtained today     PLAN:    BMP will be done when he's in for gen change    [facility] as planned    I have reviewed the note as documented above.  This accurately captures the substance of my conversation with the patient.        Phone call contact time  Call Started at 1101  Call Ended at 1116  Duration: 15 minutes         Thank you for allowing me to participate in the care of your patient.    Sincerely,     Sarah Lofton PA-C     [facility]

## 2020-06-26 ENCOUNTER — TELEPHONE (OUTPATIENT)
Dept: FAMILY MEDICINE | Facility: CLINIC | Age: 85
End: 2020-06-26

## 2020-06-26 ENCOUNTER — MEDICAL CORRESPONDENCE (OUTPATIENT)
Dept: HEALTH INFORMATION MANAGEMENT | Facility: CLINIC | Age: 85
End: 2020-06-26

## 2020-06-26 DIAGNOSIS — Z11.59 ENCOUNTER FOR SCREENING FOR OTHER VIRAL DISEASES: ICD-10-CM

## 2020-06-26 PROCEDURE — U0003 INFECTIOUS AGENT DETECTION BY NUCLEIC ACID (DNA OR RNA); SEVERE ACUTE RESPIRATORY SYNDROME CORONAVIRUS 2 (SARS-COV-2) (CORONAVIRUS DISEASE [COVID-19]), AMPLIFIED PROBE TECHNIQUE, MAKING USE OF HIGH THROUGHPUT TECHNOLOGIES AS DESCRIBED BY CMS-2020-01-R: HCPCS | Performed by: INTERNAL MEDICINE

## 2020-06-26 NOTE — TELEPHONE ENCOUNTER
Reason for Call:  Form, our goal is to have forms completed with 72 hours, however, some forms may require a visit or additional information.    Type of letter, form or note:  medical    Who is the form from?: Home care    Where did the form come from: form was faxed in    What clinic location was the form placed at?: [facility]    Where the form was placed: [facility] Box/Folder    What number is listed as a contact on the form?: [phone], [phone]       Additional comments: [employer]: PT alvina 6/8/20    Call taken on 6/26/2020 at 8:42 AM by Rose Sinha

## 2020-06-26 NOTE — TELEPHONE ENCOUNTER
Reason for Call:  Form, our goal is to have forms completed with 72 hours, however, some forms may require a visit or additional information.    Type of letter, form or note:  Home Health Certification    Who is the form from?: Home care    Where did the form come from: form was faxed in    What clinic location was the form placed at?: [facility]    Where the form was placed: [facility] Box/Folder    What number is listed as a contact on the form?: [phone], [phone]       Additional comments: [employer]: [facility] POC6/4/20-8/2/20    Call taken on 6/26/2020 at 8:43 AM by Rose Sinha

## 2020-06-27 LAB
SARS-COV-2 RNA SPEC QL NAA+PROBE: NOT DETECTED
SPECIMEN SOURCE: NORMAL

## 2020-06-29 ENCOUNTER — TELEPHONE (OUTPATIENT)
Dept: CARDIOLOGY | Facility: CLINIC | Age: 85
End: 2020-06-29

## 2020-06-29 ENCOUNTER — SURGERY (OUTPATIENT)
Age: 85
End: 2020-06-29
Payer: COMMERCIAL

## 2020-06-29 ENCOUNTER — HOSPITAL ENCOUNTER (OUTPATIENT)
Facility: CLINIC | Age: 85
Discharge: HOME | End: 2020-06-29
Admitting: INTERNAL MEDICINE
Payer: COMMERCIAL

## 2020-06-29 VITALS
TEMPERATURE: 97.6 F | HEART RATE: 81 BPM | SYSTOLIC BLOOD PRESSURE: 106 MMHG | OXYGEN SATURATION: 95 % | BODY MASS INDEX: 23.17 KG/M2 | DIASTOLIC BLOOD PRESSURE: 60 MMHG | WEIGHT: 180.5 LBS | RESPIRATION RATE: 16 BRPM

## 2020-06-29 DIAGNOSIS — I49.5 SICK SINUS SYNDROME (H): ICD-10-CM

## 2020-06-29 DIAGNOSIS — Z95.0 CARDIAC PACEMAKER IN SITU: ICD-10-CM

## 2020-06-29 LAB
ANION GAP SERPL CALCULATED.3IONS-SCNC: 3 MMOL/L (ref 3–14)
BUN SERPL-MCNC: 26 MG/DL (ref 7–30)
CALCIUM SERPL-MCNC: 9.4 MG/DL (ref 8.5–10.1)
CHLORIDE SERPL-SCNC: 106 MMOL/L (ref 94–109)
CO2 SERPL-SCNC: 30 MMOL/L (ref 20–32)
CREAT SERPL-MCNC: 1.3 MG/DL (ref 0.66–1.25)
ERYTHROCYTE [DISTWIDTH] IN BLOOD BY AUTOMATED COUNT: 19.1 % (ref 10–15)
GFR SERPL CREATININE-BSD FRML MDRD: 48 ML/MIN/{1.73_M2}
GLUCOSE SERPL-MCNC: 101 MG/DL (ref 70–99)
HCT VFR BLD AUTO: 27.2 % (ref 40–53)
HGB BLD-MCNC: 8.7 G/DL (ref 13.3–17.7)
MCH RBC QN AUTO: 32.3 PG (ref 26.5–33)
MCHC RBC AUTO-ENTMCNC: 32 G/DL (ref 31.5–36.5)
MCV RBC AUTO: 101 FL (ref 78–100)
PLATELET # BLD AUTO: 117 10E9/L (ref 150–450)
POTASSIUM SERPL-SCNC: 3.7 MMOL/L (ref 3.4–5.3)
RBC # BLD AUTO: 2.69 10E12/L (ref 4.4–5.9)
SODIUM SERPL-SCNC: 139 MMOL/L (ref 133–144)
WBC # BLD AUTO: 4.1 10E9/L (ref 4–11)

## 2020-06-29 PROCEDURE — 27210794 ZZH OR GENERAL SUPPLY STERILE: Performed by: INTERNAL MEDICINE

## 2020-06-29 PROCEDURE — 25800029 ZZH RX IP 258 OP 250: Performed by: INTERNAL MEDICINE

## 2020-06-29 PROCEDURE — 85027 COMPLETE CBC AUTOMATED: CPT | Performed by: INTERNAL MEDICINE

## 2020-06-29 PROCEDURE — 93005 ELECTROCARDIOGRAM TRACING: CPT

## 2020-06-29 PROCEDURE — 33227 REMOVE&REPLACE PM GEN SINGL: CPT | Performed by: INTERNAL MEDICINE

## 2020-06-29 PROCEDURE — 33228 REMV&REPLC PM GEN DUAL LEAD: CPT | Performed by: INTERNAL MEDICINE

## 2020-06-29 PROCEDURE — C1786 PMKR, SINGLE, RATE-RESP: HCPCS | Performed by: INTERNAL MEDICINE

## 2020-06-29 PROCEDURE — 40000065 ZZH STATISTIC EKG NON-CHARGEABLE

## 2020-06-29 PROCEDURE — 99152 MOD SED SAME PHYS/QHP 5/>YRS: CPT | Mod: 59 | Performed by: INTERNAL MEDICINE

## 2020-06-29 PROCEDURE — 36415 COLL VENOUS BLD VENIPUNCTURE: CPT

## 2020-06-29 PROCEDURE — 40000852 ZZH STATISTIC HEART CATH LAB OR EP LAB

## 2020-06-29 PROCEDURE — 80048 BASIC METABOLIC PNL TOTAL CA: CPT | Performed by: INTERNAL MEDICINE

## 2020-06-29 PROCEDURE — 25000128 H RX IP 250 OP 636: Performed by: INTERNAL MEDICINE

## 2020-06-29 PROCEDURE — 25000125 ZZHC RX 250: Performed by: INTERNAL MEDICINE

## 2020-06-29 PROCEDURE — 93010 ELECTROCARDIOGRAM REPORT: CPT | Performed by: INTERNAL MEDICINE

## 2020-06-29 DEVICE — IMPLANTABLE DEVICE: Type: IMPLANTABLE DEVICE | Status: FUNCTIONAL

## 2020-06-29 RX ORDER — LIDOCAINE 40 MG/G
CREAM TOPICAL
Status: DISCONTINUED | OUTPATIENT
Start: 2020-06-29 | End: 2020-06-29 | Stop reason: HOSPADM

## 2020-06-29 RX ORDER — HEPARIN SODIUM 200 [USP'U]/100ML
100-600 INJECTION, SOLUTION INTRAVENOUS CONTINUOUS PRN
Status: DISCONTINUED | OUTPATIENT
Start: 2020-06-29 | End: 2020-06-29 | Stop reason: HOSPADM

## 2020-06-29 RX ORDER — CEFAZOLIN SODIUM 2 G/100ML
2 INJECTION, SOLUTION INTRAVENOUS
Status: DISCONTINUED | OUTPATIENT
Start: 2020-06-29 | End: 2020-06-29 | Stop reason: HOSPADM

## 2020-06-29 RX ORDER — SODIUM CHLORIDE 450 MG/100ML
INJECTION, SOLUTION INTRAVENOUS CONTINUOUS
Status: DISCONTINUED | OUTPATIENT
Start: 2020-06-29 | End: 2020-06-29 | Stop reason: HOSPADM

## 2020-06-29 RX ORDER — FENTANYL CITRATE 50 UG/ML
INJECTION, SOLUTION INTRAMUSCULAR; INTRAVENOUS
Status: DISCONTINUED | OUTPATIENT
Start: 2020-06-29 | End: 2020-06-29 | Stop reason: HOSPADM

## 2020-06-29 RX ORDER — OXYCODONE AND ACETAMINOPHEN 5; 325 MG/1; MG/1
1 TABLET ORAL EVERY 4 HOURS PRN
Status: DISCONTINUED | OUTPATIENT
Start: 2020-06-29 | End: 2020-06-29 | Stop reason: HOSPADM

## 2020-06-29 RX ORDER — NALOXONE HYDROCHLORIDE 0.4 MG/ML
.1-.4 INJECTION, SOLUTION INTRAMUSCULAR; INTRAVENOUS; SUBCUTANEOUS
Status: DISCONTINUED | OUTPATIENT
Start: 2020-06-29 | End: 2020-06-29 | Stop reason: HOSPADM

## 2020-06-29 RX ORDER — DOBUTAMINE HYDROCHLORIDE 200 MG/100ML
5-40 INJECTION INTRAVENOUS CONTINUOUS PRN
Status: DISCONTINUED | OUTPATIENT
Start: 2020-06-29 | End: 2020-06-29 | Stop reason: HOSPADM

## 2020-06-29 RX ORDER — CEFAZOLIN SODIUM 1 G/3ML
1 INJECTION, POWDER, FOR SOLUTION INTRAMUSCULAR; INTRAVENOUS
Status: DISCONTINUED | OUTPATIENT
Start: 2020-06-29 | End: 2020-06-29 | Stop reason: HOSPADM

## 2020-06-29 RX ORDER — HEPARIN SODIUM 200 [USP'U]/100ML
100-500 INJECTION, SOLUTION INTRAVENOUS CONTINUOUS PRN
Status: DISCONTINUED | OUTPATIENT
Start: 2020-06-29 | End: 2020-06-29 | Stop reason: HOSPADM

## 2020-06-29 RX ADMIN — MIDAZOLAM 1 MG: 1 INJECTION INTRAMUSCULAR; INTRAVENOUS at 15:55

## 2020-06-29 RX ADMIN — LIDOCAINE HYDROCHLORIDE 20 ML: 10 INJECTION, SOLUTION EPIDURAL; INFILTRATION; INTRACAUDAL; PERINEURAL at 16:04

## 2020-06-29 RX ADMIN — FENTANYL CITRATE 50 MCG: 50 INJECTION, SOLUTION INTRAMUSCULAR; INTRAVENOUS at 15:55

## 2020-06-29 RX ADMIN — SODIUM CHLORIDE: 4.5 INJECTION, SOLUTION INTRAVENOUS at 14:56

## 2020-06-29 NOTE — TELEPHONE ENCOUNTER
Patient's wife called and asked to review which medications patient needs to hold today. Reviewed that he should hold his Bumex and Spironolactone.     Patient's wife stated she understood.

## 2020-06-29 NOTE — PROGRESS NOTES
Care Suites Discharge Nursing Note    Patient Information  Name: Bairon Foster  Age: 89 year old    Discharge Education:  Discharge instructions reviewed: Yes  Additional education/resources provided: no  Patient/patient representative verbalizes understanding: Yes/at baseline--forgetful  Patient discharging on new medications: No  Medication education completed: N/A    Discharge Plans:   Discharge location: home  Discharge ride contacted: Yes  Approximate discharge time: 1745    Discharge Criteria:  Discharge criteria met and vital signs stable: Yes    Patient Belongs:  Patient belongings returned to patient: Yes    Discharged per W/C with  with belongings, at 1748    Karla Hughes RN

## 2020-06-29 NOTE — DISCHARGE INSTRUCTIONS
Pacemaker Generator Change Discharge Instructions    After you go home:      Have an adult stay with you until tomorrow.    You may resume your normal diet.       For 24 hours - due to the sedation you received:    Relax and take it easy.    Do NOT make any important or legal decisions.    Do NOT drive or operate machines at home or at work.    Do NOT drink alcohol.    Care of Chest Incision:      Keep the bandage on at least 3 days. You may remove the dressing on Thursday. Change it only if it gets loose or soaked. If you need to change it, use 4x4-inch gauze and a large clear bandage.     If there is a pressure dressing (gauze & tape) - 24 hours after your procedure you may remove ONLY the top dressing. Leave the bottom dressing on.    Leave the strips of tape on. They will fall off on their own, or we will remove them at your first check-up.    Check your wound daily for signs of infection, such as increased redness, severe swelling or draining. Fever may also be a sign of infection. Call us if you see any of these signs.    If there are no signs of infection, you may shower after the bandage comes off in 3 days. If you take a tub bath, keep the wound dry.    No soaking the incision (swimming pool, bathtub, hot tub) for 2 weeks.    You may have mild to medium pain for 3 to 5 days. Take Acetaminophen (Tylenol) or Ibuprofen (Advil) for the pain. If the pain persists or is severe, call us.    Activity:      You can begin to use your arm as it feels comfortable to you.    No driving for one day & limit to necessary driving for one week.    Bleeding:      If you start bleeding from the incision site, sit down and press firmly on the site for 10 minutes.     Once bleeding stops, call [facility] as soon as you can.       Call 911 right away if you have heavy bleeding or bleeding that does not stop.      Medicines:      Take your medications, including blood thinners, unless your provider tells you not to.    If  you have stopped any other medicines, check with your provider about when to restart them.    Follow Up Appointments:      Follow up with Device Clinic at [facility] of patient preference in 7-10 days.    Call the clinic if:      You have a large or growing hard lump around the site.    The site is red, swollen, hot or tender.    Blood or fluid is draining from the site.    You have chills or a fever greater than 101 F (38 C).    You feel dizzy or light-headed.    Questions or concerns.      Telling others about your device:      Before you leave the hospital, you will receive a temporary ID card. A permanent card will be mailed to you about 6 to 8 weeks later. Always carry the ID card with you. It has important details about your device.    You may also get a Medical Alert bracelet or tag that says you have a pacemaker or a defibrillator (ICD).  Go to [email or web address].     Always tell doctors, dentists and other care providers that you have a device implanted in you.    Let us know before you plan any surgeries. Your care team must take special steps to keep you safe during certain procedures. These steps will depend on the type of device you have. Your provider will need to see your ID card. They may need to call us for instructions.    Device Safety:      Please refer to device  s booklet for further information.        [facility] [place]:    [phone] [facility] (7 days a week)

## 2020-06-29 NOTE — PROGRESS NOTES
Care Suites Post Procedure Note    Patient Information  Name: Bairon Foster  Age: 89 year old    Post Procedure- Generator change  Time patient returned to Care Suites: 16:30  Concerns/abnormal assessment: None  Plan/Other: Continue to monitor patient.  Review discharge instructions.  Discharge to home when patient meets criteria.

## 2020-06-29 NOTE — PROGRESS NOTES
89-year-old gentleman with a history of hypertension, hyperlipidemia, previous TIA, obstructive sleep apnea, previous GI bleed, coronary artery disease, severe mitral regurgitation (percutaneous mitral clip done 05/20/2019), permanent atrial fibrillation (watchman procedure 12/3/2019) and previous pacemaker implantation for significant bradycardia (8/5/2009), who was recently found to have generator battery MADHAV.  He was referred for pacemaker battery change.    Due to development of symptoms, case was deemed to be TIER- 2 level of priority based on current government guidelines and patient was device implantation.    Criteria for Procedure  o Tier 1 - time sensitive in less than one week - schedule now  o Tier 2 - time sensitive w/in 1 month - schedule now  o Tier 3 - time sensitive w/in 2 months  o Tier 4 - Elective        Procedure was explained in details.  He understands there is 1-2% risk of complication associated with procedure. Consent was signed.    Shubham Curiel MD

## 2020-06-29 NOTE — PRE-PROCEDURE
GENERAL PRE-PROCEDURE:   Procedure:  PPM gen change  Date/Time:  6/29/2020 3:43 PM    Written consent obtained?: Yes    Risks and benefits: Risks, benefits and alternatives were discussed    Consent given by:  Patient  Patient states understanding of procedure being performed: Yes    Patient's understanding of procedure matches consent: Yes    Procedure consent matches procedure scheduled: Yes    Expected level of sedation:  Moderate  Appropriately NPO:  Yes  ASA Class:  Class 3- Severe systemic disease, definite functional limitations  Mallampati  :  Grade 2- soft palate, base of uvula, tonsillar pillars, and portion of posterior pharyngeal wall visible  Lungs:  Lungs clear with good breath sounds bilaterally  Heart:  Normal heart sounds and rate  History & Physical reviewed:  History and physical reviewed and no updates needed  Statement of review:  I have reviewed the lab findings, diagnostic data, medications, and the plan for sedation

## 2020-06-29 NOTE — PROGRESS NOTES
Care Suites Admission Nursing Note    Patient Information  Name: Bairon Foster  Age: 89 year old  Reason for admission: Pacemaker Generator change, [facility]  Care Suites arrival time: 1415    Patient Admission/Assessment   Pre-procedure assessment complete: Yes  If abnormal assessment/labs, provider notified: Yes  NPO: Yes  Medications held per instructions/orders: N/A  Consent: obtained  If applicable, pregnancy test status: deferred  Patient oriented to room: Yes  Education/questions answered: Yes  Plan/other: To EP lab with staff RN    Discharge Planning  Accompanied by: alone  Discharge name/phone number: Blanka Naranjo [phone]  Overnight post sedation caregiver: Wife Kristi [phone] home number, call this number first and she will call Blanka to come get the pt.  Discharge location: home  Discharge instructions reviewed by phone with wife    Pre-Procedure Negative COVID Test     Step 1 Patient Notification  Patient notified of the negative COVID test result    Step 2 Patient Information  Verified the patient remains symptom free   Patient informed to contact the ordering provider if any of the symptoms develop prior to the procedure    Fever/Chills   Cough   Shortness of breath   New loss of taste or smell  Sore throat  Muscle or body aches  Headaches  Fatigue  Vomiting or diarrhea    Step 3 Review Visitor Policy  Patient informed of the updated visitor policy   1 visitor allowed per patient   Visitor must screen negative for COVID symptoms   Visitor must wear a mask  Waiting rooms continue to be closed to visitors    Sofie Ross RN

## 2020-06-30 ENCOUNTER — TELEPHONE (OUTPATIENT)
Dept: CARDIOLOGY | Facility: CLINIC | Age: 85
End: 2020-06-30

## 2020-06-30 DIAGNOSIS — I44.30 AV BLOCK: ICD-10-CM

## 2020-06-30 DIAGNOSIS — Z95.0 CARDIAC PACEMAKER IN SITU: ICD-10-CM

## 2020-06-30 DIAGNOSIS — I49.5 SICK SINUS SYNDROME (H): Primary | ICD-10-CM

## 2020-06-30 NOTE — TELEPHONE ENCOUNTER
Pt had [employer] PPM gen change yesterday.     Post device implant discharge phone call.    Reviewed the following:  Remove outer dressing 3 days after implant. May shower after outer dressing removed. Leave steri-strips in place, will be removed at 1 week device check  Watch for redness, drainage, warmth, or fever. Call device clinic if any signs of infection.     1 week device check scheduled: Monday 7/6/2020, remote due to covid19    Pt and his wife state understanding of all instructions.

## 2020-07-01 ENCOUNTER — OFFICE VISIT (OUTPATIENT)
Dept: UROLOGY | Facility: CLINIC | Age: 85
End: 2020-07-01
Payer: COMMERCIAL

## 2020-07-01 VITALS
OXYGEN SATURATION: 96 % | WEIGHT: 182 LBS | SYSTOLIC BLOOD PRESSURE: 100 MMHG | HEART RATE: 63 BPM | DIASTOLIC BLOOD PRESSURE: 64 MMHG | HEIGHT: 74 IN | BODY MASS INDEX: 23.36 KG/M2

## 2020-07-01 DIAGNOSIS — R33.9 URINARY RETENTION: Primary | ICD-10-CM

## 2020-07-01 PROCEDURE — 99204 OFFICE O/P NEW MOD 45 MIN: CPT | Performed by: UROLOGY

## 2020-07-01 ASSESSMENT — PAIN SCALES - GENERAL: PAINLEVEL: NO PAIN (0)

## 2020-07-01 ASSESSMENT — MIFFLIN-ST. JEOR: SCORE: 1560.3

## 2020-07-01 NOTE — PROGRESS NOTES
Bairon Foster is a kind 89-year-old male with an indwelling Amos, history of heart disease and significant peripheral edema.  [facility]  Unfortunately, both the patient and his wife are very poor historians and no [facility] records are available.  He has been seen in the past by Dr. Chavez for BPH.  His last visit to our office was in December 2015.  At that time he saw Cheko Gallego MD and he had a 220 cc postvoid residual.  He was asked to return in a year for a urinalysis and postvoid residual and he was lost to follow-up.  There is no family history for prostate cancer.  He has had no routine digital rectal exams.  A few months ago he saw Kareem Alonzo MD for possible bilateral hydroceles when in fact he had significant scrotal edema.  Other past medical history-the list is exhaustive.  GERD, coronary artery disease, ischemic cardiomyopathy, atrial fibrillation, pulmonary hypertension, sleep apnea, constipation, elevated cholesterol, venous stasis with dermatitis, lymphedema, major depression, hypothyroidism, peripheral vascular disease, congestive heart failure, mitral regurgitation, bullous pemphigoid, watchman procedure, sick sinus syndrome, cardiac pacemaker, pancytopenia, cognitive impairment, frequent falls, CVA, anemia, thrombocytopenia, physical deconditioning, traumatic hematuria, acute kidney injury, chronic renal disease, epistaxis, GI bleed, non-smoker.  Surgeries include knee replacement surgery, thumb surgery, eye surgeries, TUNA, pacemaker, inguinal herniorrhaphy, umbilical herniorrhaphy, heart catheterization, bone marrow biopsy, MitraClip, watchman procedure, pacemaker, cystoscopy  Medications: Bumex, baby aspirin, Tylenol, Wellbutrin XL, CAM for menthol lotion, Coreg, vitamin D, EpiPen, finasteride, Flomax, Kenalog cream, Carafate, Aldactone, Zocor, potassium chloride, MiraLAX, Prilosec, multivitamin, Synthroid  Allergies: Furosemide, torsemide  Exam: With spouse,  using a walker but can stand.  Alert and oriented but poor historian.  No respiratory symptoms.  Normal sphincter tone, no rectal mass or impaction, benign and symmetric prostate, normal seminal vesicles.  Indwelling Amos with clear urine  Assessment: Assume he has BPH with urinary retention requiring indwelling Amos.  Retention is despite prior TUNA procedure, finasteride and Flomax.  Based on this I suspect he is a candidate for an indwelling Amos that is changed monthly.  Seems to be a poor candidate for TURP given his age and numerous medical problems.  Plan: We will try to obtain [facility] records-in my experience this is difficult.  If unable to get records will speak with Dr. Nelson.  He will need a catheter change before mid July.

## 2020-07-01 NOTE — LETTER
7/1/2020      RE: Bairon Foster  [address] [facility] [address]       Bairon Foster is a kind 89-year-old male with an indwelling Amos, history of heart disease and significant peripheral edema.  [facility]  Unfortunately, both the patient and his wife are very poor historians and no [facility] records are available.  He has been seen in the past by Dr. Chavez for BPH.  His last visit to our office was in December 2015.  At that time he saw Cheko Gallego MD and he had a 220 cc postvoid residual.  He was asked to return in a year for a urinalysis and postvoid residual and he was lost to follow-up.  There is no family history for prostate cancer.  He has had no routine digital rectal exams.  A few months ago he saw Kareem Alonzo MD for possible bilateral hydroceles when in fact he had significant scrotal edema.  Other past medical history-the list is exhaustive.  GERD, coronary artery disease, ischemic cardiomyopathy, atrial fibrillation, pulmonary hypertension, sleep apnea, constipation, elevated cholesterol, venous stasis with dermatitis, lymphedema, major depression, hypothyroidism, peripheral vascular disease, congestive heart failure, mitral regurgitation, bullous pemphigoid, watchman procedure, sick sinus syndrome, cardiac pacemaker, pancytopenia, cognitive impairment, frequent falls, CVA, anemia, thrombocytopenia, physical deconditioning, traumatic hematuria, acute kidney injury, chronic renal disease, epistaxis, GI bleed, non-smoker.  Surgeries include knee replacement surgery, thumb surgery, eye surgeries, TUNA, pacemaker, inguinal herniorrhaphy, umbilical herniorrhaphy, heart catheterization, bone marrow biopsy, MitraClip, watchman procedure, pacemaker, cystoscopy  Medications: Bumex, baby aspirin, Tylenol, Wellbutrin XL, CAM for menthol lotion, Coreg, vitamin D, EpiPen, finasteride, Flomax, Kenalog cream, Carafate, Aldactone, Zocor, potassium chloride, MiraLAX,  Prilosec, multivitamin, Synthroid  Allergies: Furosemide, torsemide  Exam: With spouse, using a walker but can stand.  Alert and oriented but poor historian.  No respiratory symptoms.  Normal sphincter tone, no rectal mass or impaction, benign and symmetric prostate, normal seminal vesicles.  Indwelling Amos with clear urine  Assessment: Assume he has BPH with urinary retention requiring indwelling Amos.  Retention is despite prior TUNA procedure, finasteride and Flomax.  Based on this I suspect he is a candidate for an indwelling Amos that is changed monthly.  Seems to be a poor candidate for TURP given his age and numerous medical problems.  Plan: We will try to obtain [facility] records-in my experience this is difficult.  If unable to get records will speak with Dr. Nelson.  He will need a catheter change before mid July.    Rosales White MD

## 2020-07-01 NOTE — LETTER
7/1/2020       RE: Bairon Foster  [address]  [facility] [address]     Dear Colleague,    Thank you for referring your patient, Bairon Foster, to the [facility] [place] [facility]. Please see a copy of my visit note below.    Bairon Foster is a kind 89-year-old male with an indwelling Amos, history of heart disease and significant peripheral edema.  [facility]  Unfortunately, both the patient and his wife are very poor historians and no [facility] records are available.  He has been seen in the past by Dr. Chavez for BPH.  His last visit to our office was in December 2015.  At that time he saw Cehko Gallego MD and he had a 220 cc postvoid residual.  He was asked to return in a year for a urinalysis and postvoid residual and he was lost to follow-up.  There is no family history for prostate cancer.  He has had no routine digital rectal exams.  A few months ago he saw Kareem Alonzo MD for possible bilateral hydroceles when in fact he had significant scrotal edema.  Other past medical history-the list is exhaustive.  GERD, coronary artery disease, ischemic cardiomyopathy, atrial fibrillation, pulmonary hypertension, sleep apnea, constipation, elevated cholesterol, venous stasis with dermatitis, lymphedema, major depression, hypothyroidism, peripheral vascular disease, congestive heart failure, mitral regurgitation, bullous pemphigoid, watchman procedure, sick sinus syndrome, cardiac pacemaker, pancytopenia, cognitive impairment, frequent falls, CVA, anemia, thrombocytopenia, physical deconditioning, traumatic hematuria, acute kidney injury, chronic renal disease, epistaxis, GI bleed, non-smoker.  Surgeries include knee replacement surgery, thumb surgery, eye surgeries, TUNA, pacemaker, inguinal herniorrhaphy, umbilical herniorrhaphy, heart catheterization, bone marrow biopsy, MitraClip, watchman  procedure, pacemaker, cystoscopy  Medications: Bumex, baby aspirin, Tylenol, Wellbutrin XL, CAM for menthol lotion, Coreg, vitamin D, EpiPen, finasteride, Flomax, Kenalog cream, Carafate, Aldactone, Zocor, potassium chloride, MiraLAX, Prilosec, multivitamin, Synthroid  Allergies: Furosemide, torsemide  Exam: With spouse, using a walker but can stand.  Alert and oriented but poor historian.  No respiratory symptoms.  Normal sphincter tone, no rectal mass or impaction, benign and symmetric prostate, normal seminal vesicles.  Indwelling Amos with clear urine  Assessment: Assume he has BPH with urinary retention requiring indwelling Amos.  Retention is despite prior TUNA procedure, finasteride and Flomax.  Based on this I suspect he is a candidate for an indwelling Amos that is changed monthly.  Seems to be a poor candidate for TURP given his age and numerous medical problems.  Plan: We will try to obtain [facility] records-in my experience this is difficult.  If unable to get records will speak with Dr. Nelson.  He will need a catheter change before mid July.    Again, thank you for allowing me to participate in the care of your patient.      Sincerely,    Rosales White MD

## 2020-07-01 NOTE — NURSING NOTE
Chief Complaint   Patient presents with     New Patient     patient is here for cath removal.      Alanna Downs, CMA

## 2020-07-03 ENCOUNTER — NURSE TRIAGE (OUTPATIENT)
Dept: FAMILY MEDICINE | Facility: CLINIC | Age: 85
End: 2020-07-03

## 2020-07-03 ENCOUNTER — HOSPITAL ENCOUNTER (EMERGENCY)
Facility: CLINIC | Age: 85
Discharge: HOME | End: 2020-07-03
Payer: COMMERCIAL

## 2020-07-03 ENCOUNTER — OFFICE VISIT (OUTPATIENT)
Dept: URGENT CARE | Facility: URGENT CARE | Age: 85
End: 2020-07-03
Payer: COMMERCIAL

## 2020-07-03 VITALS
OXYGEN SATURATION: 98 % | SYSTOLIC BLOOD PRESSURE: 112 MMHG | HEIGHT: 74 IN | BODY MASS INDEX: 23.74 KG/M2 | WEIGHT: 185 LBS | RESPIRATION RATE: 18 BRPM | TEMPERATURE: 97.2 F | DIASTOLIC BLOOD PRESSURE: 57 MMHG | HEART RATE: 68 BPM

## 2020-07-03 VITALS
HEART RATE: 57 BPM | WEIGHT: 185 LBS | DIASTOLIC BLOOD PRESSURE: 59 MMHG | TEMPERATURE: 98.4 F | OXYGEN SATURATION: 97 % | BODY MASS INDEX: 23.75 KG/M2 | RESPIRATION RATE: 16 BRPM | SYSTOLIC BLOOD PRESSURE: 114 MMHG

## 2020-07-03 DIAGNOSIS — I50.41 ACUTE COMBINED SYSTOLIC AND DIASTOLIC CONGESTIVE HEART FAILURE (H): ICD-10-CM

## 2020-07-03 DIAGNOSIS — M79.89 LEG SWELLING: Primary | ICD-10-CM

## 2020-07-03 DIAGNOSIS — I87.2 VENOUS STASIS DERMATITIS OF BOTH LOWER EXTREMITIES: ICD-10-CM

## 2020-07-03 LAB — INTERPRETATION ECG - MUSE: NORMAL

## 2020-07-03 PROCEDURE — 99214 OFFICE O/P EST MOD 30 MIN: CPT | Performed by: PHYSICIAN ASSISTANT

## 2020-07-03 RX ORDER — SPIRONOLACTONE 25 MG/1
25 TABLET ORAL DAILY
Qty: 30 TABLET | Refills: 0 | Status: SHIPPED | OUTPATIENT
Start: 2020-07-03 | End: 2020-09-28

## 2020-07-03 ASSESSMENT — MIFFLIN-ST. JEOR: SCORE: 1573.9

## 2020-07-03 NOTE — TELEPHONE ENCOUNTER
Reason for Call:  Medication or medication refill:  Do you use a [facility]?  Name of the pharmacy and phone number for the current request:  [facility] [address] [place] [address]  [phone]  Name of the medication requested: spironolactone (ALDACTONE) 25 MG tablet  Other request: none  Can we leave a detailed message on this number? YES  Phone number patient can be reached at: Home number on file [phone] (home)  Best Time: any  Call taken on 7/3/2020 at 4:02 PM by JENIFER THOMPSON

## 2020-07-03 NOTE — TELEPHONE ENCOUNTER
"Homecare nurse Nasra called to report leg swelling and BP update. RN reports BP reading of 96/52 pulse 64 and Oxygen 98% at room air. He is taking carvedilol and Spironolactone. Pt denied any hypotensive symptoms, fever,chest pain or breathing problems. Leg swelling is ongoing but today pt has significant more swelling in left leg compared to right leg. He has some redness and pt complains of feeling like feet are frozen.    Nurse would like to know if further directives from provider at this time. Please advice. Nurse would like a call with provider response.       Additional Information    Negative: Systolic BP < 90 and feeling dizzy, lightheaded, or weak    Negative: Started suddenly after an allergic medicine, an allergic food, or bee sting    Negative: Shock suspected (e.g., cold/pale/clammy skin, too weak to stand, low BP, rapid pulse)    Negative: Difficult to awaken or acting confused  (e.g., disoriented, slurred speech)    Negative: Fainted    Negative: Chest pain    Negative: Bleeding (e.g., vomiting blood, rectal bleeding or tarry stools, severe vaginal bleeding)    Negative: Extra heart beats or heart is beating fast  (i.e., \"palpitations\")    Negative: Sounds like a life-threatening emergency to the triager    Negative: Systolic BP < 80 and NOT dizzy, lightheaded or weak (feels normal)    Negative: Abdominal pain    Negative: Major surgery in the past month    Negative: Fever > 100.5 F (38.1 C)    Negative: Drinking very little and has signs of dehydration (e.g., no urine > 12 hours, very dry mouth, very lightheaded)    Negative: Fall in systolic BP > 20 mm Hg from normal and feeling dizzy, lightheaded, or weak    Negative: Patient sounds very sick or weak to the triager    Systolic BP  while taking blood pressure medications and NOT dizzy, lightheaded or weak    Negative: Systolic BP < 90 and NOT dizzy, lightheaded or weak    Protocols used: LOW BLOOD PRESSURE-A-OH      "

## 2020-07-03 NOTE — TELEPHONE ENCOUNTER
Routing refill request to provider for review/approval because:  Labs out of range:  creat    Creatinine   Date Value Ref Range Status   06/29/2020 1.30 (H) 0.66 - 1.25 mg/dL Final

## 2020-07-03 NOTE — TELEPHONE ENCOUNTER
Spoke with patient wife, Kristi.     Provider message was shared. Plans to get patient into  [facility] at this time.     Nasra was also updated via VM.

## 2020-07-03 NOTE — TELEPHONE ENCOUNTER
Possible leg cellulitis causing sepsis with hypotension. Needs evaluation by a provider ASAP.  Please call the Home health RN and let her know that patient should be taken to either UC or ER.    Thank you,  Nora Condon MD on 7/3/2020 at 4:58 PM

## 2020-07-04 ENCOUNTER — APPOINTMENT (OUTPATIENT)
Dept: ULTRASOUND IMAGING | Facility: CLINIC | Age: 85
End: 2020-07-04
Attending: EMERGENCY MEDICINE
Payer: COMMERCIAL

## 2020-07-04 ENCOUNTER — HOSPITAL ENCOUNTER (EMERGENCY)
Facility: CLINIC | Age: 85
Discharge: HOME | End: 2020-07-04
Attending: EMERGENCY MEDICINE | Admitting: EMERGENCY MEDICINE
Payer: COMMERCIAL

## 2020-07-04 ENCOUNTER — APPOINTMENT (OUTPATIENT)
Dept: GENERAL RADIOLOGY | Facility: CLINIC | Age: 85
End: 2020-07-04
Attending: EMERGENCY MEDICINE
Payer: COMMERCIAL

## 2020-07-04 VITALS
TEMPERATURE: 98.8 F | BODY MASS INDEX: 23.36 KG/M2 | OXYGEN SATURATION: 95 % | HEIGHT: 74 IN | SYSTOLIC BLOOD PRESSURE: 118 MMHG | RESPIRATION RATE: 16 BRPM | HEART RATE: 60 BPM | WEIGHT: 182 LBS | DIASTOLIC BLOOD PRESSURE: 62 MMHG

## 2020-07-04 DIAGNOSIS — M79.604 BILATERAL LEG PAIN: ICD-10-CM

## 2020-07-04 DIAGNOSIS — M25.511 ACUTE PAIN OF RIGHT SHOULDER: Primary | ICD-10-CM

## 2020-07-04 DIAGNOSIS — R60.0 BILATERAL LEG EDEMA: ICD-10-CM

## 2020-07-04 DIAGNOSIS — M79.605 BILATERAL LEG PAIN: ICD-10-CM

## 2020-07-04 DIAGNOSIS — Z95.0 PACEMAKER: ICD-10-CM

## 2020-07-04 DIAGNOSIS — S20.212A CONTUSION OF LEFT CHEST WALL, INITIAL ENCOUNTER: ICD-10-CM

## 2020-07-04 LAB
ALBUMIN SERPL-MCNC: 3 G/DL (ref 3.4–5)
ALP SERPL-CCNC: 96 U/L (ref 40–150)
ALT SERPL W P-5'-P-CCNC: 13 U/L (ref 0–70)
ANION GAP SERPL CALCULATED.3IONS-SCNC: 5 MMOL/L (ref 3–14)
AST SERPL W P-5'-P-CCNC: 20 U/L (ref 0–45)
BASOPHILS # BLD AUTO: 0.1 10E9/L (ref 0–0.2)
BASOPHILS NFR BLD AUTO: 1.7 %
BILIRUB SERPL-MCNC: 1.2 MG/DL (ref 0.2–1.3)
BUN SERPL-MCNC: 27 MG/DL (ref 7–30)
CALCIUM SERPL-MCNC: 9 MG/DL (ref 8.5–10.1)
CHLORIDE SERPL-SCNC: 105 MMOL/L (ref 94–109)
CO2 SERPL-SCNC: 28 MMOL/L (ref 20–32)
CREAT SERPL-MCNC: 1.4 MG/DL (ref 0.66–1.25)
DIFFERENTIAL METHOD BLD: ABNORMAL
EOSINOPHIL # BLD AUTO: 0.4 10E9/L (ref 0–0.7)
EOSINOPHIL NFR BLD AUTO: 10.3 %
ERYTHROCYTE [DISTWIDTH] IN BLOOD BY AUTOMATED COUNT: 18.6 % (ref 10–15)
GFR SERPL CREATININE-BSD FRML MDRD: 44 ML/MIN/{1.73_M2}
GLUCOSE SERPL-MCNC: 95 MG/DL (ref 70–99)
HCT VFR BLD AUTO: 26.7 % (ref 40–53)
HGB BLD-MCNC: 8.6 G/DL (ref 13.3–17.7)
IMM GRANULOCYTES # BLD: 0 10E9/L (ref 0–0.4)
IMM GRANULOCYTES NFR BLD: 0.2 %
INTERPRETATION ECG - MUSE: NORMAL
LACTATE BLD-SCNC: 1 MMOL/L (ref 0.7–2)
LYMPHOCYTES # BLD AUTO: 0.7 10E9/L (ref 0.8–5.3)
LYMPHOCYTES NFR BLD AUTO: 15.9 %
MCH RBC QN AUTO: 32.5 PG (ref 26.5–33)
MCHC RBC AUTO-ENTMCNC: 32.2 G/DL (ref 31.5–36.5)
MCV RBC AUTO: 101 FL (ref 78–100)
MONOCYTES # BLD AUTO: 0.5 10E9/L (ref 0–1.3)
MONOCYTES NFR BLD AUTO: 11.3 %
NEUTROPHILS # BLD AUTO: 2.5 10E9/L (ref 1.6–8.3)
NEUTROPHILS NFR BLD AUTO: 60.6 %
NRBC # BLD AUTO: 0 10*3/UL
NRBC BLD AUTO-RTO: 0 /100
NT-PROBNP SERPL-MCNC: 658 PG/ML (ref 0–1800)
PLATELET # BLD AUTO: 117 10E9/L (ref 150–450)
POTASSIUM SERPL-SCNC: 3.7 MMOL/L (ref 3.4–5.3)
PROT SERPL-MCNC: 6.8 G/DL (ref 6.8–8.8)
RBC # BLD AUTO: 2.65 10E12/L (ref 4.4–5.9)
SODIUM SERPL-SCNC: 138 MMOL/L (ref 133–144)
TROPONIN I SERPL-MCNC: <0.015 UG/L (ref 0–0.04)
WBC # BLD AUTO: 4.2 10E9/L (ref 4–11)

## 2020-07-04 PROCEDURE — 71046 X-RAY EXAM CHEST 2 VIEWS: CPT

## 2020-07-04 PROCEDURE — 80053 COMPREHEN METABOLIC PANEL: CPT | Performed by: EMERGENCY MEDICINE

## 2020-07-04 PROCEDURE — 93970 EXTREMITY STUDY: CPT

## 2020-07-04 PROCEDURE — 83880 ASSAY OF NATRIURETIC PEPTIDE: CPT | Performed by: EMERGENCY MEDICINE

## 2020-07-04 PROCEDURE — 93005 ELECTROCARDIOGRAM TRACING: CPT

## 2020-07-04 PROCEDURE — 84484 ASSAY OF TROPONIN QUANT: CPT | Performed by: EMERGENCY MEDICINE

## 2020-07-04 PROCEDURE — 83605 ASSAY OF LACTIC ACID: CPT | Performed by: EMERGENCY MEDICINE

## 2020-07-04 PROCEDURE — 99285 EMERGENCY DEPT VISIT HI MDM: CPT | Mod: 25

## 2020-07-04 PROCEDURE — 85025 COMPLETE CBC W/AUTO DIFF WBC: CPT | Performed by: EMERGENCY MEDICINE

## 2020-07-04 PROCEDURE — 87040 BLOOD CULTURE FOR BACTERIA: CPT | Performed by: EMERGENCY MEDICINE

## 2020-07-04 PROCEDURE — 73030 X-RAY EXAM OF SHOULDER: CPT | Mod: RT

## 2020-07-04 ASSESSMENT — MIFFLIN-ST. JEOR: SCORE: 1560.3

## 2020-07-04 NOTE — PATIENT INSTRUCTIONS
(M79.89) Leg swelling  (primary encounter diagnosis)  Comment: and pain, left greater than right, in patient s/p pacemaker placement on 6/29/20  Plan: TO ED for further evaluation now    (I87.2) Venous stasis dermatitis of both lower extremities  Comment: patient does have chronic lower extremity edema with venous stasis dermatitis, however, today the left leg swelling is greater than right, both LE are erythematous and warm to knee  Plan: TO ED for further evaluation

## 2020-07-04 NOTE — ED PROVIDER NOTES
"  History     Chief Complaint:  Leg Swelling    HPI   Bairon Foster is a 89 year old male with a history of cerebrovascular disease, CKD stage 3, hypercholesterolemia, ischemic cardiomyopathy, CAD, CHF, and TIA, among others, who presents with worsening leg swelling, abnormal feelings, and new right shoulder pain.  Yesterday he came to the ED for left leg swelling and pain and wanted a DVT evaluation but felt like the wait was too long. Overnight he developed a sensation of his right foot \"freezing up\" as well as new right shoulder pain exacerbated with movement. He still has movement and sensation in BLE. He denies any fever, cough, shortness of breath, chest pain, vomiting, diarrhea, or urinary changes (has kelvin). Notably he had his pacemaker battery changed 5 days ago. He also has a history of [facility]. He is unsure if they are more red than baseline.    Allergies:  Furosemide  Torsemide     Medications:    Aspirin  Bumex  Wellbutrin  Coreg  Prilosec  Simvastatin  Spironolactone  Carafate  Flomax  Miralax     Past Medical History:    Cerebrovascular disease  arrhythmia  Bullous pemphigoid  CKD stage 3  Coronary atherosclerosis  Epistaxis  Esophageal reflux  GI bleed   Hypercholesterolemia  Ischemic  cardiomyopathy  Lymphedema of lower extremities  Mitral regurgitation  Mumps  Obesity  JIM  Peripheral artery disease  Pancytopenia  Atrial fibrillation  Status post coronary angiogram  Systolic CHF  TIA  Hypothyroidism  Venous stasis of dermatitis  Vitamin D deficiency  Depression  Hydrocele    CAD  CVA  onychomycosis   Actinic keratosis  Hematuria    Past Surgical History:    Bone marrow biopsy  Heart catheterization  Left atrial appendage closure  mitraclip  EP pacemaker  Cataract surgery  Genitourinary surgery  Right hernia repair  Orthopedic surgery x2  Thumb surgery  Left knee replacement  Right knee replacement    Family History:    Cardiovascular - father  Lung cancer - " "sister  CAD - father    Social History:  Smoking status: never  Smokeless tobacco: never  Alcohol use: 1 drink a month  Drug use: no  Marital Status:   [2]     Review of Systems  Ten system ROS reviewed and is negative except as above    Physical Exam     Patient Vitals for the past 24 hrs:   BP Temp Temp src Pulse Heart Rate Resp SpO2 Height Weight   07/04/20 0800 118/62 -- -- 60 -- -- 95 % -- --   07/04/20 0600 107/51 98.8  F (37.1  C) Oral -- 64 16 97 % 1.88 m (6' 2\") 82.6 kg (182 lb)     Physical Exam  Eyes:  Sclera white; Pupils are equal and round  ENT:    External ears and nares normal  CV:  Rate as above with regular rhythm     Bruising over L chest    BLE cap refill normal  Resp:  Breath sounds clear and equal bilaterally    Non-labored, no retractions or accessory muscle use  GI:  Abdomen is soft, non-tender, non-distended    No rebound tenderness or peritoneal features  :  Amos present  MS:  Moves all extremities    RUE: Limited to horizontal, passive and active above that limited due to pain, no tenderness or swelling.  Skin:  Warm and dry; BLE hot, mild erythema and pitting edema to knees  Neuro:  Speech is normal and fluent. No apparent deficit.  Full movement lower extremities, sensation intact and symmetric.    Emergency Department Course   ECG (6:10:43):  Rate 69 bpm. DE interval *. QRS duration 114. QT/QTc 440/471. P-R-T axes * -21 -29. Atrial fibrillation. Incomplete right bundle branch block. No pacer spines visualized.  Abnormal ECG. Interpreted at 0636 by Priya Maurer MD.    Imaging:  Radiology findings were communicated with the patient who voiced understanding of the findings.    Chest XR, PA & LAT  IMPRESSION: Interstitial prominence may be related to early CHF.   As read by Radiology.    XR Shoulder Right G/E 3 Views  Degenerative change at the right glenohumeral joint but no evidence  for fracture or dislocation. The right acromioclavicular joint is  intact. Note is " made of interstitial infiltrate within the visualized  portion of the right lung.  As read by Radiology.    US Lower Extremity Venous Duplex Bilateral  IMPRESSION:  No deep venous thrombosis in the bilateral lower  Extremities.  As read by Radiology.    Laboratory:  Laboratory findings were communicated with the patient who voiced understanding of the findings.    CBC: HGB 8.6(L), (L), o/w WNL (WBC 4.2)    CMP: Creatinine 1.4(H), GFR Estimate 44(L), Albumin 3.0(L), o/w WNL    BNP: 658    Troponin I 0640: <0.015     Lactic acid 0639: 1.0    Blood culture: No growth after one hour    Emergency Department Course:  Past medical records, nursing notes, and vitals reviewed.  0633: I performed an exam of the patient and obtained history, as documented above.     EKG was obtained and reviewed in the emergency department.    IV inserted and blood drawn.    The patient was sent for a chest XR, PA & LAT, right shoulder XR, and lower extremity US while in the emergency department, results above.     0900: I rechecked the patient. I reviewed the results with the Patient and answered all related questions prior to discharge.     Findings and plan explained to the Patient. Patient discharged home with instructions regarding supportive care, medications, and reasons to return. The importance of close follow-up was reviewed.   Impression & Plan   Medical Decision Makin y.o. male who presents for evaluation of bilateral leg swelling and pain. US shows no evidence of DVT in this area. He has chronic bilateral edema and some mild inflammatory changes in both legs which is common with venostasis. There was no evidence for a superimposed infection. He also has pain in his right shoulder with limited ROM suggestive of rotator cuff pathology. X-ray showed no evidence for underlying dislocation or fracture. Blood work shows no anemia secondary to the bruising from his recent pacemaker placement. EKG shows that he is not paced  100% of the time and pacemaker report shows no evidence of malfunction of the new battery. He was able to ambulate well with a walker which is his baseline status. I think that an short increase for the next 3 days of his diuretic at home will likely help his leg symptoms. He will need close follow up with cardiology and [facility]  Physical therapy order was placed for his shoulder and some ROM exercises were shown to him in the room. We had also discussed the possibility of admission for diuresis and symptom control but given his stability and ability to ambulate normally, he opted for outpatient management. He understands he can return immediately if symptoms worsen.    Diagnosis:    ICD-10-CM    1. Acute pain of right shoulder  M25.511 PHYSICAL THERAPY REFERRAL   2. Bilateral leg edema  R60.0    3. Bilateral leg pain  M79.604     M79.605    4. Pacemaker  Z95.0    5. Contusion of left chest wall, initial encounter  S20.212A      Disposition:  Discharged to home.    Priya Maurer MD, MD  7/4/2020    EMERGENCY DEPARTMENT    Scribe Disclosure:  I, Lewis Martinez, am serving as a scribe at 7:24 AM on 7/4/2020 to document services personally performed by Priya Maurer MD based on my observations and the provider's statements to me.          Priya Maurer MD  07/04/20 0041

## 2020-07-04 NOTE — DISCHARGE INSTRUCTIONS
Currently you take 2mg Bumex/bumetanide in the morning and 1mg at 2pm.    For the next three days (7/4, 7/5, 7/6) increase to 2mg at 2pm.  Then on 7/7 return to your normal 1mg dosing.

## 2020-07-04 NOTE — ED AVS SNAPSHOT
Emergency Department  [address] [place] [address]  Phone:  [phone]  Fax:  [phone]                                    Bairon Foster   MRN: [id number]    Department:   Emergency Department   Date of Visit:  7/4/2020           After Visit Summary Signature Page    I have received my discharge instructions, and my questions have been answered. I have discussed any challenges I see with this plan with the nurse or doctor.    ..........................................................................................................................................  Patient/Patient Representative Signature      ..........................................................................................................................................  Patient Representative Print Name and Relationship to Patient    ..................................................               ................................................  Date                                   Time    ..........................................................................................................................................  Reviewed by Signature/Title    ...................................................              ..............................................  Date                                               Time          22EPIC Rev 08/18

## 2020-07-04 NOTE — PROGRESS NOTES
"Patient presents with:  Cellulitis      SUBJECTIVE:  Bairon Foster is a 89 year old male who presents to the clinic today for bilateral leg swelling with left leg pain.  He was seen by his home care nurse today, who noted his blood pressure to be 96/52 and was concerned about his left leg swelling and pain.  He denies any fevers, however does complain that the skin on his legs is \"hot\".    He is here with his wife today.  History is not entirely clear, as neither is a great historian.    [facility]  and at [facility] for CHF exacerbation in May.     He had a pacemaker placed on 6/29/20.    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     CKD (chronic kidney disease) stage 3, GFR 30-59 ml/min (H)      Coronary atherosclerosis of unspecified type of vessel, native or graft     S/P PTCA, EF 50%     Epistaxis      Esophageal reflux      GI bleed 4/17/2019     Hypercholesterolemia      Ischemic cardiomyopathy      Lymphedema of both lower extremities     Wears ACE wraps     Mitral regurgitation     s/p MitraClip 5/20/19     Mumps      Obese      JIM on CPAP      Other lymphedema     GROIN AND THIGHS     Other pancytopenia (H)      Other specified anemias      Overweight      Pacemaker     PACEMAKER-BS Implanted 8/5/2009      PAD (peripheral artery disease) (H)      Pancytopenia (H)      Persistent atrial fibrillation (H)     VVI PM for long pauses     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 Bumex and ethacrynic acid okay.     Torsemide Blisters     Bullous pemphigoid.  Bumex and ethacrynic acid okay.     Social History     Tobacco Use     Smoking status: Never Smoker     Smokeless tobacco: Never Used   Substance Use Topics     Alcohol use: Not Currently     Alcohol/week: 0.0 standard drinks     Comment: 1/month       ROS:  CONSTITUTIONAL:NEGATIVE for fever, chills, change in weight  INTEGUMENTARY/SKIN: as per HPI  RESP:NEGATIVE for significant cough or SOB  CV: NEGATIVE for chest pain, palpitations or peripheral edema  MUSCULOSKELETAL: as per HPI  Review of systems negative except as stated above.    EXAM:   /59 (BP Location: Left arm, Patient Position: Sitting, Cuff Size: Adult Regular)   Pulse 57   Temp 98.4  F (36.9  C) (Oral)   Resp 16   Wt 83.9 kg (185 lb)   SpO2 97%   BMI 23.75 kg/m    GENERAL: alert, no acute distress.  SKIN: erythema and warmth of both lower extremities up to knee.   EXTREMITIES: bilateral lower extremity pitting edema, left greater than right. left claf tender to palpation.      (M79.89) Leg swelling  (primary encounter diagnosis)  Comment: and pain, left greater than right, in patient s/p pacemaker placement on 6/29/20  Plan: TO ED for further evaluation now.  Concern for cellulitis as well as possible DVT    (I87.2) Venous stasis dermatitis of both lower extremities  Comment: patient does have chronic lower extremity edema with venous stasis dermatitis, however, today the left leg swelling is greater than right, both LE are erythematous and warm to knee  Plan: TO ED for further evaluation

## 2020-07-04 NOTE — ED NOTES
Bed: ED06  Expected date:   Expected time:   Means of arrival:   Comments:  516??  89M Chloe issue/Afib  [id number]

## 2020-07-04 NOTE — ED TRIAGE NOTES
"Pacemaker replaced ~ 1 week at [facility].  Patient began experiencing shortly after pacemaker replacement swollen lower extremities and describes his legs \"freezing up and thawing\" (right greater than left).  Right shoulder pain prompted EMS phone call.  Patient also concerned about dark urine in catheter bag.  Patient has history of a-fib and RBB.    "

## 2020-07-06 ENCOUNTER — TELEPHONE (OUTPATIENT)
Dept: FAMILY MEDICINE | Facility: CLINIC | Age: 85
End: 2020-07-06

## 2020-07-06 ENCOUNTER — TELEPHONE (OUTPATIENT)
Dept: CARDIOLOGY | Facility: CLINIC | Age: 85
End: 2020-07-06

## 2020-07-06 ENCOUNTER — ANCILLARY PROCEDURE (OUTPATIENT)
Dept: CARDIOLOGY | Facility: CLINIC | Age: 85
End: 2020-07-06
Attending: INTERNAL MEDICINE
Payer: COMMERCIAL

## 2020-07-06 DIAGNOSIS — Z95.0 CARDIAC PACEMAKER IN SITU: ICD-10-CM

## 2020-07-06 DIAGNOSIS — I48.91 ATRIAL FIBRILLATION (H): Primary | ICD-10-CM

## 2020-07-06 DIAGNOSIS — I49.5 SICK SINUS SYNDROME (H): ICD-10-CM

## 2020-07-06 DIAGNOSIS — I44.30 AV BLOCK: ICD-10-CM

## 2020-07-06 PROCEDURE — 93296 REM INTERROG EVL PM/IDS: CPT | Performed by: INTERNAL MEDICINE

## 2020-07-06 PROCEDURE — 93294 REM INTERROG EVL PM/LDLS PM: CPT | Performed by: INTERNAL MEDICINE

## 2020-07-06 NOTE — TELEPHONE ENCOUNTER
"Per last urology note on 07/01/20:  \"He will need a catheter change before mid July\".     RN called to urology clinic ([phone]) to discuss whether removal or change is needed.   RN at clinic notes that yes, pt needs a change and not a discontinuation.   "

## 2020-07-06 NOTE — TELEPHONE ENCOUNTER
Reason for Call:  Other appointment    Detailed comments: The patient's wife called and stated that he saw a urologist on 7/1 and was informed that the patient's PCP should be able to remove the patient's warren catheter. She is requesting to have the patient come into the clinic today and get this removed.     Phone Number Patient can be reached at: Home number on file [phone] (home)    Best Time: Any    Can we leave a detailed message on this number? YES    Call taken on 7/6/2020 at 9:59 AM by Rose Sinha

## 2020-07-06 NOTE — TELEPHONE ENCOUNTER
RN attempted x3 to call pt on home and cell.  Busy signal.    Will attempt call back later to clarify message below.

## 2020-07-06 NOTE — TELEPHONE ENCOUNTER
Pt had a PPM generator change on 6/29/20.  7 day check completed remotely due to COVID 19 (see device check for full details).  Unable to assess due to remote check.  Per patient, steri-strips are off and incision has no redness, drainage, swelling or signs of infection.  He stated there is some bruising around the incision.  Patient will check to see if he can have a picture of the incision taken and emailed into clinic for assessment, email address provided.      MINE Hale

## 2020-07-06 NOTE — TELEPHONE ENCOUNTER
RN called back to pt and wife.  Relayed info from urology.     Wife states she will make appt with urology before the 15th as advised by urology    No further action needed at this time.

## 2020-07-07 ENCOUNTER — TELEPHONE (OUTPATIENT)
Dept: FAMILY MEDICINE | Facility: CLINIC | Age: 85
End: 2020-07-07

## 2020-07-07 NOTE — TELEPHONE ENCOUNTER
Reason for Call:  Form, our goal is to have forms completed with 72 hours, however, some forms may require a visit or additional information.    Type of letter, form or note:  medical    Who is the form from?: Home care    Where did the form come from: form was faxed in    What clinic location was the form placed at?: [facility]    Where the form was placed:  [facility] inbox    What number is listed as a contact on the form?:   [phone]       Additional comments: [employer]: Ok for PT eval    Call taken on 7/7/2020 at 11:59 AM by Rose Sinha

## 2020-07-07 NOTE — TELEPHONE ENCOUNTER
Pt's wife left a VM. I called her back. She wasn't sure what she was supposed to do. I gave her the email address again. She said their caregiver will be there around 4pm today and can take a picture and email it in. I said we would give them a call probably tomorrow. She agrees with this plan.

## 2020-07-08 ENCOUNTER — MEDICAL CORRESPONDENCE (OUTPATIENT)
Dept: HEALTH INFORMATION MANAGEMENT | Facility: CLINIC | Age: 85
End: 2020-07-08

## 2020-07-08 NOTE — TELEPHONE ENCOUNTER
Spoke with patient's wife, Ursula.  They were having trouble getting the picture to come through.  She will have Alfa's caregiver take a picture the next time she comes out and email it in.  An email was sent to Ursula's email for them to respond to with the picture.  Ursula also stated that Alfa's steri-strips were still in place.  Instructed her that it was okay to remove those at this point.  Ursula will take those off and take the picture after the steri-strips have been removed.  Ursula was appreciative of the help and will call with any questions.    MINE Hale

## 2020-07-09 ENCOUNTER — TELEPHONE (OUTPATIENT)
Dept: FAMILY MEDICINE | Facility: CLINIC | Age: 85
End: 2020-07-09

## 2020-07-09 ENCOUNTER — MEDICAL CORRESPONDENCE (OUTPATIENT)
Dept: HEALTH INFORMATION MANAGEMENT | Facility: CLINIC | Age: 85
End: 2020-07-09

## 2020-07-09 NOTE — TELEPHONE ENCOUNTER
Reason for Call:  Form, our goal is to have forms completed with 72 hours, however, some forms may require a visit or additional information.    Type of letter, form or note:  medical    Who is the form from?: Home care    Where did the form come from: form was faxed in    What clinic location was the form placed at?: [facility]    Where the form was placed: [facility] Box/Folder    What number is listed as a contact on the form?: [phone]       Additional comments: [employer]: Home Care discharge summary    Call taken on 7/9/2020 at 8:44 AM by Rose Sinha

## 2020-07-10 LAB
BACTERIA SPEC CULT: NO GROWTH
BACTERIA SPEC CULT: NO GROWTH
SPECIMEN SOURCE: NORMAL
SPECIMEN SOURCE: NORMAL

## 2020-07-10 NOTE — TELEPHONE ENCOUNTER
Received email with below picture of patient's PPM incision. Incision is CDI with no redness, drainage, or signs of infection.  There is a hematoma over the device site with some purpling bruising below the incision.  Called and spoke with patient's wife and asked them to monitor incision daily for any changes (increasing hematoma or signs of infection).  Also asked if they could send in another picture the week of 7/20 so we can evaluate if hematoma is resolving.    Patient has a video visit with Shanika Lofton PA-C on 7/14 and encouraged them to also review incision with her.      MINE Hale

## 2020-07-13 ENCOUNTER — TELEPHONE (OUTPATIENT)
Dept: FAMILY MEDICINE | Facility: CLINIC | Age: 85
End: 2020-07-13

## 2020-07-13 ENCOUNTER — ALLIED HEALTH/NURSE VISIT (OUTPATIENT)
Dept: UROLOGY | Facility: CLINIC | Age: 85
End: 2020-07-13
Payer: COMMERCIAL

## 2020-07-13 DIAGNOSIS — R33.9 URINARY RETENTION: Primary | ICD-10-CM

## 2020-07-13 PROCEDURE — 99207 ZZC NO CHARGE NURSE ONLY: CPT

## 2020-07-13 NOTE — PROGRESS NOTES
"Bairon Foster is a 89 year old male who is being evaluated via a billable video visit.      The patient has been notified of following:     \"This video visit will be conducted via a call between you and your physician/provider. We have found that certain health care needs can be provided without the need for an in-person physical exam.  This service lets us provide the care you need with a video conversation.  If a prescription is necessary we can send it directly to your pharmacy.  If lab work is needed we can place an order for that and you can then stop by our lab to have the test done at a later time.    Video visits are billed at different rates depending on your insurance coverage.  Please reach out to your insurance provider with any questions.    If during the course of the call the physician/provider feels a video visit is not appropriate, you will not be charged for this service.\"    Patient has given verbal consent for Video visit? Yes  How would you like to obtain your AVS? Mail a copy  Patient would like the video invitation sent by: Text to cell phone: [phone] - this is a land line!  I sent message to [phone]  Will anyone else be joining your video visit? No         Gloria Damon MA 7/14/2020 9:12 AM    CC:  Recent PPM generator replacement    VITALS:  165# checked today by Home Health    BRIEF HPI:  88 yo M who sees Dr. Hope and Dr. Curiel for his h/o:     1. Mixed systolic and diastolic HF - Echo 5/2020 with EF 46%. [facility] for weakness and BHAKTI.   2. Severe MR -  s/p Mitral Clip x 2 5/2019 with echo 5/2020 showing mild residual MR and mild MS  3. Moderate TR  4. Lymphedema - I believe he sees L[facility]hed[facility] at [facility]  5. Bullous Pemphigoid  - from furosemide, torsemide  6. Permanent AFib and sx'c bradycardia - s/p single chamber [employer] PPM 8/2009 with planned gen change 6/29/2020. Gen change 6/29/2020 Had LAAO device (Watchman, 33 mm) placed 12/2019 " "for frequent falls.  7. Remote CVA   8. Memory issues  9. Pancytopenia requiring 1 unit pRBCs during hospitalization 5/2020. Follows with Dr. Chavez  10. Pulmonary Nodule with CT A Heart 6/2019 with large hiatal hernia, subpleural nodule in RLL 5 mm, for which 1 year follow-up was recommended.   11. CAD - mild, non-obstructive on cath 4/2019    I had a virtual visit with Bairon 6/25 and reviewed his upcoming gen change. This had been postponed a few times, with hospitalization @ [facility] for BHAKTI (Cr 2.2) and weakness 6/12-17/2020. Weight was down to 161#!  He was transfused due to anemia (Hgb 6.9), had an indwelling Amos placed and he was noted to be \"dry\" so diuretics were adjusted. Weight on discharge 6/17 was listed @ 169#. It appears that spironolactone and Bumex were both HELD during his hospitalization.     He went to [facility] 6/17-6/19. He was discharged from there 6/19 on CV meds of: Bumex 2 mg in AM and 1 mg @ 1400, Coreg 6.25 mg BID, KCl 20 mEq 2 tabs in AM and 1 tab @ 1400, simvastatin 20, spironolactone 25 daily. No weight was noted in discharge summary 6/19.    During his virtual visit 6/25, he was feeling good. I rec'd no med changes, BMP and [facility] as had previously been arranged. No weight available    He had his Gen Change 6/29 (uneventful). He was discharged home the same day. Weight listed at 180#    Then in ER 7/3 for LLE edema and R shoulder pain.  He left before he was seen b/c it was taking too long.  He then came back on 7/4 with c/o worsening LE edema, R shoulder pain. CXR showed early possible CHF but N-terminal proBNP was normal. No DVT noted. Rotator cuff injury suspected. Increased diuretic x 3 days was recommended for his lymphedema, but he doesn't recall doing this. No weight taken in ER.     INTERVAL HISTORY:  I got to do a video call with him today along with granddaughter Erin and wife Ursula. Overall they all agree he's doing well. He has a HHC RN come and she came today. " "Legs were wrapped. Weight was 165#. Erin confirmed his meds and they are correct.     He feels like from a fluid status he's doing well. Still has legs wrapped for lymphedema but nl edema above the wraps. No orthopnea or PND. Sleeps well.    Ursula notes he seems fatigued and weak. He was like this in the ER 7/4 as well, and labs are as below.     No CP. PPM pocket \"spongy\" per Erin. No drainage. Extensive bruising. I was able to take a screenshot and will get it into his chart for the Device RNs to evaluate.     DIAGNOSTICS:  Device interrogation 7/6 with 22%  in VVIR 60/130 bpm.   Echo 5/15/2020 with EF 46%. Normal RV. Mod TR. RVSP 27+RAP. Stable mitral clip position, residual mild MR with mean diastolic gradient 4 mmHg c/w mild MS with HR 76 bpm. Severely dilated IVC 5.4 cm  Cath 4/2019 with mild, non-obstructive dz  Component      Latest Ref Rng & Units 6/29/2020 7/4/2020   Sodium      133 - 144 mmol/L 139 138   Potassium      3.4 - 5.3 mmol/L 3.7 3.7   Chloride      94 - 109 mmol/L 106 105   Carbon Dioxide      20 - 32 mmol/L 30 28   Anion Gap      3 - 14 mmol/L 3 5   Glucose      70 - 99 mg/dL 101 (H) 95   Urea Nitrogen      7 - 30 mg/dL 26 27   Creatinine      0.66 - 1.25 mg/dL 1.30 (H) 1.40 (H)   GFR Estimate      >60 mL/min/1.73:m2 48 (L) 44 (L)   GFR Estimate If Black      >60 mL/min/1.73:m2 56 (L) 51 (L)   Calcium      8.5 - 10.1 mg/dL 9.4 9.0       REVIEW OF SYSTEMS:  Negative except as noted above     PHYSICAL EXAM:  GEN: NAD. Upright. Frail/kyphotic body habitus  ENT/Mouth: Atraumatic and normocephalic  Eyes: No scleral icterus; normal conjunctivae  Chest/Lungs: No audible wheezing or cough; equal chest wall expansion. Non-labored breathing  Extremities: No cyanosis or clubbing observed. Lymphedema wraps in place. No edema proximal to wraps  Skin: No visible rashes. Normal skin color. Scattered dark ecchymosis on thorax post gen change. PPM pocket with hematoma. Soft to touch per " "granddaughter  Neuro: Normal Arm motion bilaterally. No tremors. No visible evidence of focal defects  Psychiatric: A/O. Calm demeanor      ASSESSMENT/PLAN:    1. Mixed Systolic and Diastolic HF (EF 46%)    Currently on Bumex 2 mg in AM and 1 mg in afternoon and spironolactone 25 mg daily    BMP in the ER 7/4 with stable Cr 1.4    Weight was listed at 169# [facility] (after holding diuretics due to weakness and BHAKTI Cr 2.2), was 180# at time of gen change and was weighed by Home Health today and was 165#    Anemia contributing - Hgb stable but low on 7/4 @ 8.6 g/dL    He thinks he's doing well from fluid standpoint, with stable lymphedema     PLAN:    No change in diuretic dosing    Continue carvedilol 6.25 mg BID. No ACE/ARB due to tenuous renal status    I'd like to see him in person to evaluate as he's not yet been set up with [facility]    2. Permanent AFib with asystole    S/p single chamber gen change ([employer]) 6/29 after reaching MAD[facility]AV 4/12    Has significant scattered ecchymosis as well as what appears to be a significant hematoma. Granddaughter Erin notes it's \"spongy, like a cyst\" and healed - no drainiage    On ASA only s/p Watchman     PLAN:    Continue ASA    Will send image of his device today to our Device RNs to get into his chart     CURRENT MEDICATIONS:  Current Outpatient Medications   Medication Sig Dispense Refill     ACE/ARB/ARNI NOT PRESCRIBED (INTENTIONAL) Please choose reason not prescribed, below       acetaminophen (TYLENOL) 500 MG tablet Take 2 tablets (1,000 mg) by mouth every 6 hours as needed for mild pain 100 tablet 0     aspirin 81 MG EC tablet Take 1 tablet (81 mg) by mouth daily       bumetanide (BUMEX) 1 MG tablet Take 1 tablet (1 mg) by mouth every evening Take at 2 pm daily. 30 tablet 0     bumetanide (BUMEX) 1 MG tablet Take 2 tablets (2 mg) by mouth every morning 60 tablet 0     buPROPion (WELLBUTRIN XL) 300 MG 24 hr tablet Take 1 tablet (300 mg) by mouth every morning 30 " tablet 0     camphor-menthol (DERMASARRA) 0.5-0.5 % external lotion Apply to the affected area every 5 hours as needed. 1 Bottle 1     carvedilol (COREG) 6.25 MG tablet Take 1 tablet (6.25 mg) by mouth 2 times daily (with meals) TAKE 1 TABLET TWICE DAILY WITH MEALS 60 tablet 0     cholecalciferol (VITAMIN D3) 5000 units (125 mcg) capsule Take 1 capsule (5,000 Units) by mouth daily       COMPRESSION STOCKINGS 1 each daily 2 each 0     EPINEPHrine (EPIPEN/ADRENACLICK/OR ANY BX GENERIC EQUIV) 0.3 MG/0.3ML injection 2-pack Inject 0.3 mg into the muscle as needed for anaphylaxis       finasteride (PROSCAR) 5 MG tablet Take 1 tablet (5 mg) by mouth daily TAKE 1 TABLET EVERY DAY 30 tablet 0     levothyroxine (SYNTHROID/LEVOTHROID) 100 MCG tablet Take 1 tablet (100 mcg) by mouth daily TAKE 1 TABLET EVERY DAY 30 tablet 0     Multiple Vitamins-Minerals (CENTRUM SILVER) per tablet Take 1 tablet by mouth daily       omeprazole (PRILOSEC) 40 MG DR capsule Take 1 capsule (40 mg) by mouth daily 30 capsule 0     polyethylene glycol (MIRALAX) packet Take 1 packet by mouth daily       potassium chloride ER (KLOR-CON M) 20 MEQ CR tablet Take 1-2 tablets (20-40 mEq) by mouth See Admin Instructions Take 2 tablets (40 mEq) by mouth in the morning and 1 tablet (20 mEq) by mouth in the evening (2 pm). 90 tablet 0     simvastatin (ZOCOR) 20 MG tablet Take 1 tablet (20 mg) by mouth daily 30 tablet 0     spironolactone (ALDACTONE) 25 MG tablet Take 1 tablet (25 mg) by mouth daily 30 tablet 0     sucralfate (CARAFATE) 1 GM tablet Take 1 tablet (1 g) by mouth 2 times daily 60 tablet 0     tamsulosin (FLOMAX) 0.4 MG capsule Take 1 capsule (0.4 mg) by mouth daily TAKE 1 CAPSULE EVERY DAY 30 capsule 0     triamcinolone (KENALOG) 0.025 % cream Apply topically 2 times daily as needed            ORDERS PLACED:  Orders Placed This Encounter   Procedures     Follow-Up with Cardiac Advanced Practice Provider     EKG 12-lead complete w/read - Clinics (to  be scheduled)     No orders of the defined types were placed in this encounter.    There are no discontinued medications.      Encounter Diagnosis   Name Primary?     Chronic combined systolic and diastolic heart failure (H) Yes         ALLERGIES     Allergies   Allergen Reactions     Furosemide Blisters     Bullous pemphigoid. Bumex and ethacrynic acid okay.     Torsemide Blisters     Bullous pemphigoid.  Bumex and ethacrynic acid okay.       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     CKD (chronic kidney disease) stage 3, GFR 30-59 ml/min (H)      Coronary atherosclerosis of unspecified type of vessel, native or graft     S/P PTCA, EF 50%     Epistaxis      Esophageal reflux      GI bleed 4/17/2019     Hypercholesterolemia      Ischemic cardiomyopathy      Lymphedema of both lower extremities     Wears ACE wraps     Mitral regurgitation     s/p MitraClip 5/20/19     Mumps      Obese      JIM on CPAP      Other lymphedema     GROIN AND THIGHS     Other pancytopenia (H)      Other specified anemias      Overweight      Pacemaker     PACEMAKER-BS Implanted 8/5/2009      PAD (peripheral artery disease) (H)      Pancytopenia (H)      Persistent atrial fibrillation (H)     VVI PM for long pauses     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    Closure with a 33 mm Watchman device      Pulmonary hypertension (H)      Status post coronary angiogram 3/21/2018     Systolic CHF (H)      TIA (transient ischemic attack)      Unspecified hypothyroidism      Venous stasis dermatitis of both lower extremities      Vitamin D deficiency        PAST SURGICAL HISTORY:  Past Surgical History:   Procedure Laterality Date     BONE MARROW BIOPSY, BONE SPECIMEN, NEEDLE/TROCAR  N/A 4/19/2019    Procedure: BONE MARROW BIOPSY;  Surgeon: Yovani Cooley MD;  Location:  OR     CV HEART CATHETERIZATION WITH POSSIBLE INTERVENTION N/A 4/12/2019    Procedure: Heart Catheterization with possible Intervention;  Surgeon: Lev Beck MD;  Location:  HEART CARDIAC CATH LAB     CV LEFT ATRIAL APPENDAGE CLOSURE N/A 12/3/2019    Procedure: Left Atrial Appendage Closure;  Surgeon: Segundo Hope MD;  Location:  HEART CARDIAC CATH LAB     CV MITRACLIP N/A 5/20/2019    Procedure: CV MITRAL CLIP;  Surgeon: Harshil Winter MD;  [facility]: [facility]     CV RIGHT HEART CATH N/A 4/12/2019    Procedure: Right Heart Cath;  Surgeon: Lev Beck MD;  Location:  HEART CARDIAC CATH LAB     CYSTOSCOPY       EP PACEMAKER N/A 6/29/2020    Procedure: EP Pacemaker;  Surgeon: Shubham Curiel MD;  Location:  HEART CARDIAC CATH LAB     EYE SURGERY      CATARACT/BLEPHAROPLASTY     GENITOURINARY SURGERY      TUNA     HERNIA REPAIR      RIGHT INGUINAL     HERNIORRHAPHY INGUINAL  8/14/2012    Procedure: HERNIORRHAPHY INGUINAL;  right inguinal hernia repair;  Surgeon: Kareem Torrez MD;  Location:  [facility]     HERNIORRHAPHY UMBILICAL N/A 10/31/2017    Procedure: HERNIORRHAPHY UMBILICAL;  UMBILICAL HERNIA REPAIR WITH MESH;  Surgeon: Scar Harrington MD;  Location:  [facility]     IMPLANT PACEMAKER  8/2009    single chamber     [place]      [facility] [place]THOPEDIC SURGERY  2010    right TKR, left TKR     SURGICAL HISTORY OF -       Thumb surgery     SURGICAL HISTORY OF -   1990s    Left total knee replacement     SURGICAL HISTORY OF -   3/11    [facility] total knee replacement       FAMILY HISTORY:  Family History   Problem Relation Age of Onset     Cardiovascular Father      C.A.D. Father      Lung Cancer Sister      Unknown/Adopted Mother      Cancer - colorectal No family hx of        SOCIAL HISTORY:  Social History     Socioeconomic History     Marital status:       Spouse name: None     Number of children: None     Years of education: None     Highest education level: None   Occupational History     Occupation: Teacher, author, speaker     Employer: RETIRED   Social Needs     Financial resource strain: None     Food insecurity     Worry: None     Inability: None     Transportation needs     Medical: None     Non-medical: None   Tobacco Use     Smoking status: Never Smoker     Smokeless tobacco: Never Used   Substance and Sexual Activity     Alcohol use: Not Currently     Alcohol/week: 0.0 standard drinks     Comment: 1/month     Drug use: No     Sexual activity: Not Currently     Partners: Female   Lifestyle     Physical activity     Days per week: None     Minutes per session: None     Stress: None   Relationships     Social connections     Talks on phone: None     Gets together: None     Attends Mosque service: None     Active member of club or organization: None     Attends meetings of clubs or organizations: None     Relationship status: None     Intimate partner violence     Fear of current or ex partner: None     Emotionally abused: None     Physically abused: None     Forced sexual activity: None   Other Topics Concern     Parent/sibling w/ CABG, MI or angioplasty before 65F 55M? No      Service Not Asked     Blood Transfusions Not Asked     Caffeine Concern No     Comment: 1-2 cups coffee a day     Occupational Exposure Not Asked     Hobby Hazards Not Asked     Sleep Concern No     Stress Concern Yes     Comment: due to wifes health condition     Weight Concern No     Special Diet No     Back Care Not Asked     Exercise Yes     Comment: states he uses his tredmill on and off     Bike Helmet Not Asked     Seat Belt Yes     Self-Exams Not Asked   Social History Narrative     None       I have reviewed the note as documented above.  This accurately captures the substance of my conversation with the patient.          Sarah Lofton PA-C  MSPAS      Video-Visit Details    Type of service:  Video Visit    Video Start Time: 1311  Video End Time: 1342  Duration 31 minutes    Originating Location (pt. Location): Home    Distant Location (provider location):  Home Office    Platform used for Video Visit: Marcelo Lofton PA-C

## 2020-07-13 NOTE — PROGRESS NOTES
Bairon Foster comes into clinic today at the request of Dr. White ordering Provider for Catheter Change.    Patient was under impression that he was going to see provider today, and have catheter removed. I informed him he was here for catheter change. He became upset, and expressed he did not want another catheter, and would like current one removed.  I brought his wife into the room, and explained that if I took catheter out today he could end up back in emergency room. I suggested we change catheter today, and discuss future removal with . He continued to express that he did not want another catheter.     I spoke with Dr. Long regarding above, and he ok'd catheter staying in until patients follow up with Dr. White on 7/24/2020 where they could discuss removal.     Patient was informed of this. I again gave option to have catheter changed today, and he declined. He will follow up 7/24/2020.      This service provided today was under the supervising provider of the day , who was available if needed.    Alanna Downs, CMA

## 2020-07-13 NOTE — TELEPHONE ENCOUNTER
Reason for Call:  Form, our goal is to have forms completed with 72 hours, however, some forms may require a visit or additional information.    Type of letter, form or note:  medical    Who is the form from?: Home care    Where did the form come from: form was faxed in    What clinic location was the form placed at?: [place], [place]  Providers name Mg Nelson MD  Where the form was placed: [facility]    What number is listed as a contact on the form?: [phone]  Phone Number        Additional comments: home health care OT eval completed on 6/24/20 due to schedule conflicts     Call taken on 7/13/2020 at 2:44 PM by Maya Monterroso

## 2020-07-14 ENCOUNTER — TELEPHONE (OUTPATIENT)
Dept: FAMILY MEDICINE | Facility: CLINIC | Age: 85
End: 2020-07-14

## 2020-07-14 ENCOUNTER — VIRTUAL VISIT (OUTPATIENT)
Dept: CARDIOLOGY | Facility: CLINIC | Age: 85
End: 2020-07-14
Payer: COMMERCIAL

## 2020-07-14 VITALS — WEIGHT: 184 LBS | BODY MASS INDEX: 23.62 KG/M2

## 2020-07-14 DIAGNOSIS — I50.42 CHRONIC COMBINED SYSTOLIC AND DIASTOLIC HEART FAILURE (H): Primary | ICD-10-CM

## 2020-07-14 PROCEDURE — 99214 OFFICE O/P EST MOD 30 MIN: CPT | Mod: 95 | Performed by: PHYSICIAN ASSISTANT

## 2020-07-14 NOTE — LETTER
"7/14/2020    Mg Nelson MD  [address]  [facility] [address]    RE: Bairon Foster       Dear Colleague,    I had the pleasure of seeing Bairon Foster in the [facility].    Bairon Foster is a 89 year old male who is being evaluated via a billable video visit.        CC:  Recent PPM generator replacement    VITALS:  165# checked today by Home Health    BRIEF HPI:  89 yo M who sees Dr. Hope and Dr. Curiel for his h/o:     1. Mixed systolic and diastolic HF - Echo 5/2020 with EF 46%. [facility] for weakness and BHAKTI.   2. Severe MR -  s/p Mitral Clip x 2 5/2019 with echo 5/2020 showing mild residual MR and mild MS  3. Moderate TR  4. Lymphedema - I believe [facility]  5. Bullous Pemphigoid  - from furosemide, torsemide  6. Permanent AFib and sx'c bradycardia - s/p single chamber [employer] PPM 8/2009 with planned gen change 6/29/2020. Gen change 6/29/2020 Had LAAO device (Watchman, 33 mm) placed 12/2019 for frequent falls.  7. Remote CVA   8. Memory issues  9. Pancytopenia requiring 1 unit pRBCs during hospitalization 5/2020. Follows with Dr. Chavez  10. Pulmonary Nodule with CT A Heart 6/2019 with large hiatal hernia, subpleural nodule in RLL 5 mm, for which 1 year follow-up was recommended.   11. CAD - mild, non-obstructive on cath 4/2019    I had a virtual visit with Bairon 6/25 and reviewed his upcoming gen change. This had been postponed a few times, with hospitalization @ [facility] for BHAKTI (Cr 2.2) and weakness 6/12-17/2020. Weight was down to 161#!  He was transfused due to anemia (Hgb 6.9), had an indwelling Amos placed and he was noted to be \"dry\" so diuretics were adjusted. Weight on discharge 6/17 was listed @ 169#. It appears that spironolactone and Bumex were both HELD during his hospitalization.     He went to [facility] 6/17-6/19. He was discharged from there 6/19 on CV meds of: Bumex 2 mg in AM and 1 " "mg @ 1400, Coreg 6.25 mg BID, KCl 20 mEq 2 tabs in AM and 1 tab @ 1400, simvastatin 20, spironolactone 25 daily. No weight was noted in discharge summary 6/19.    During his virtual visit 6/25, he was feeling good. I rec'd no med changes, BMP and [facility] as had previously been arranged. No weight available    He had his Gen Change 6/29 (uneventful). He was discharged home the same day. Weight listed at 180#    Then in ER 7/3 for LLE edema and R shoulder pain.  He left before he was seen b/c it was taking too long.  He then came back on 7/4 with c/o worsening LE edema, R shoulder pain. CXR showed early possible CHF but N-terminal proBNP was normal. No DVT noted. Rotator cuff injury suspected. Increased diuretic x 3 days was recommended for his lymphedema, but he doesn't recall doing this. No weight taken in ER.     INTERVAL HISTORY:  I got to do a video call with him today along with granddaughter Erin and wife Ursula. Overall they all agree he's doing well. He has a HHC RN come and she came today. Legs were wrapped. Weight was 165#. Erin confirmed his meds and they are correct.     He feels like from a fluid status he's doing well. Still has legs wrapped for lymphedema but nl edema above the wraps. No orthopnea or PND. Sleeps well.    Ursula notes he seems fatigued and weak. He was like this in the ER 7/4 as well, and labs are as below.     No CP. PPM pocket \"spongy\" per Erin. No drainage. Extensive bruising. I was able to take a screenshot and will get it into his chart for the Device RNs to evaluate.     DIAGNOSTICS:  Device interrogation 7/6 with 22%  in VVIR 60/130 bpm.   Echo 5/15/2020 with EF 46%. Normal RV. Mod TR. RVSP 27+RAP. Stable mitral clip position, residual mild MR with mean diastolic gradient 4 mmHg c/w mild MS with HR 76 bpm. Severely dilated IVC 5.4 cm  Cath 4/2019 with mild, non-obstructive dz  Component      Latest Ref Rng & Units 6/29/2020 7/4/2020   Sodium      133 - 144 mmol/L 139 138 "   Potassium      3.4 - 5.3 mmol/L 3.7 3.7   Chloride      94 - 109 mmol/L 106 105   Carbon Dioxide      20 - 32 mmol/L 30 28   Anion Gap      3 - 14 mmol/L 3 5   Glucose      70 - 99 mg/dL 101 (H) 95   Urea Nitrogen      7 - 30 mg/dL 26 27   Creatinine      0.66 - 1.25 mg/dL 1.30 (H) 1.40 (H)   GFR Estimate      >60 mL/min/1.73:m2 48 (L) 44 (L)   GFR Estimate If Black      >60 mL/min/1.73:m2 56 (L) 51 (L)   Calcium      8.5 - 10.1 mg/dL 9.4 9.0       REVIEW OF SYSTEMS:  Negative except as noted above     PHYSICAL EXAM:  GEN: NAD. Upright. Frail/kyphotic body habitus  ENT/Mouth: Atraumatic and normocephalic  Eyes: No scleral icterus; normal conjunctivae  Chest/Lungs: No audible wheezing or cough; equal chest wall expansion. Non-labored breathing  Extremities: No cyanosis or clubbing observed. Lymphedema wraps in place. No edema proximal to wraps  Skin: No visible rashes. Normal skin color. Scattered dark ecchymosis on thorax post gen change. PPM pocket with hematoma. Soft to touch per granddaughter  Neuro: Normal Arm motion bilaterally. No tremors. No visible evidence of focal defects  Psychiatric: A/O. Calm demeanor      ASSESSMENT/PLAN:    1. Mixed Systolic and Diastolic HF (EF 46%)    Currently on Bumex 2 mg in AM and 1 mg in afternoon and spironolactone 25 mg daily    BMP in the ER 7/4 with stable Cr 1.4    Weight was listed at 169# [facility] (after holding diuretics due to weakness and BHAKTI Cr 2.2), was 180# at time of gen change and was weighed by Home Health today and was 165#    Anemia contributing - Hgb stable but low on 7/4 @ 8.6 g/dL    He thinks he's doing well from fluid standpoint, with stable lymphedema     PLAN:    No change in diuretic dosing    Continue carvedilol 6.25 mg BID. No ACE/ARB due to tenuous renal status    I'd like to see him in person to evaluate as he's not yet been set up with [facility]    2. Permanent AFib with asystole    S/p single chamber gen change ([place]) 6/29 after  "reaching MADHAV 4/12    Has significant scattered ecchymosis as well as what appears to be a significant hematoma. Granddaughter Erin notes it's \"spongy, like a cyst\" and healed - no drainiage    On ASA only s/p Watchman     PLAN:    Continue ASA    Will send image of his device today to our Device RNs to get into his chart     CURRENT MEDICATIONS:  Current Outpatient Medications   Medication Sig Dispense Refill     ACE/ARB/ARNI NOT PRESCRIBED (INTENTIONAL) Please choose reason not prescribed, below       acetaminophen (TYLENOL) 500 MG tablet Take 2 tablets (1,000 mg) by mouth every 6 hours as needed for mild pain 100 tablet 0     aspirin 81 MG EC tablet Take 1 tablet (81 mg) by mouth daily       bumetanide (BUMEX) 1 MG tablet Take 1 tablet (1 mg) by mouth every evening Take at 2 pm daily. 30 tablet 0     bumetanide (BUMEX) 1 MG tablet Take 2 tablets (2 mg) by mouth every morning 60 tablet 0     buPROPion (WELLBUTRIN XL) 300 MG 24 hr tablet Take 1 tablet (300 mg) by mouth every morning 30 tablet 0     camphor-menthol (DERMASARRA) 0.5-0.5 % external lotion Apply to the affected area every 5 hours as needed. 1 Bottle 1     carvedilol (COREG) 6.25 MG tablet Take 1 tablet (6.25 mg) by mouth 2 times daily (with meals) TAKE 1 TABLET TWICE DAILY WITH MEALS 60 tablet 0     cholecalciferol (VITAMIN D3) 5000 units (125 mcg) capsule Take 1 capsule (5,000 Units) by mouth daily       COMPRESSION STOCKINGS 1 each daily 2 each 0     EPINEPHrine (EPIPEN/ADRENACLICK/OR ANY BX GENERIC EQUIV) 0.3 MG/0.3ML injection 2-pack Inject 0.3 mg into the muscle as needed for anaphylaxis       finasteride (PROSCAR) 5 MG tablet Take 1 tablet (5 mg) by mouth daily TAKE 1 TABLET EVERY DAY 30 tablet 0     levothyroxine (SYNTHROID/LEVOTHROID) 100 MCG tablet Take 1 tablet (100 mcg) by mouth daily TAKE 1 TABLET EVERY DAY 30 tablet 0     Multiple Vitamins-Minerals (CENTRUM SILVER) per tablet Take 1 tablet by mouth daily       omeprazole (PRILOSEC) 40 MG " DR capsule Take 1 capsule (40 mg) by mouth daily 30 capsule 0     polyethylene glycol (MIRALAX) packet Take 1 packet by mouth daily       potassium chloride ER (KLOR-CON M) 20 MEQ CR tablet Take 1-2 tablets (20-40 mEq) by mouth See Admin Instructions Take 2 tablets (40 mEq) by mouth in the morning and 1 tablet (20 mEq) by mouth in the evening (2 pm). 90 tablet 0     simvastatin (ZOCOR) 20 MG tablet Take 1 tablet (20 mg) by mouth daily 30 tablet 0     spironolactone (ALDACTONE) 25 MG tablet Take 1 tablet (25 mg) by mouth daily 30 tablet 0     sucralfate (CARAFATE) 1 GM tablet Take 1 tablet (1 g) by mouth 2 times daily 60 tablet 0     tamsulosin (FLOMAX) 0.4 MG capsule Take 1 capsule (0.4 mg) by mouth daily TAKE 1 CAPSULE EVERY DAY 30 capsule 0     triamcinolone (KENALOG) 0.025 % cream Apply topically 2 times daily as needed            ORDERS PLACED:  Orders Placed This Encounter   Procedures     Follow-Up with Cardiac Advanced Practice Provider     EKG 12-lead complete w/read - Clinics (to be scheduled)     No orders of the defined types were placed in this encounter.    There are no discontinued medications.      Encounter Diagnosis   Name Primary?     Chronic combined systolic and diastolic heart failure (H) Yes         ALLERGIES     Allergies   Allergen Reactions     Furosemide Blisters     Bullous pemphigoid. Bumex and ethacrynic acid okay.     Torsemide Blisters     Bullous pemphigoid.  Bumex and ethacrynic acid okay.       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     CKD (chronic kidney disease) stage 3, GFR 30-59 ml/min (H)      Coronary atherosclerosis of unspecified type of vessel, native or graft     S/P PTCA, EF 50%     Epistaxis      Esophageal reflux      GI bleed  4/17/2019     Hypercholesterolemia      Ischemic cardiomyopathy      Lymphedema of both lower extremities     Wears ACE wraps     Mitral regurgitation     s/p MitraClip 5/20/19     Mumps      Obese      JIM on CPAP      Other lymphedema     GROIN AND THIGHS     Other pancytopenia (H)      Other specified anemias      Overweight      Pacemaker     PACEMAKER-BS Implanted 8/5/2009      PAD (peripheral artery disease) (H)      Pancytopenia (H)      Persistent atrial fibrillation (H)     VVI PM for long pauses     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    Closure with a 33 mm Watchman device      Pulmonary hypertension (H)      Status post coronary angiogram 3/21/2018     Systolic CHF (H)      TIA (transient ischemic attack)      Unspecified hypothyroidism      Venous stasis dermatitis of both lower extremities      Vitamin D deficiency        PAST SURGICAL HISTORY:  Past Surgical History:   Procedure Laterality Date     BONE MARROW BIOPSY, BONE SPECIMEN, NEEDLE/TROCAR N/A 4/19/2019    Procedure: BONE MARROW BIOPSY;  Surgeon: Yovani Cooley MD;  Location:  OR      HEART CATHETERIZATION WITH POSSIBLE INTERVENTION N/A 4/12/2019    Procedure: Heart Catheterization with possible Intervention;  Surgeon: Lev Beck MD;  Location:  HEART CARDIAC CATH LAB     CV LEFT ATRIAL APPENDAGE CLOSURE N/A 12/3/2019    Procedure: Left Atrial Appendage Closure;  Surgeon: Segundo Hope MD;  Location:  HEART CARDIAC CATH LAB     CV MITRACLIP N/A 5/20/2019    Procedure: CV MITRAL CLIP;  Surgeon: Harshil Winter MD;  Location:  HEART CARDIAC CATH LAB     CV RIGHT HEART CATH N/A 4/12/2019    Procedure: Right Heart Cath;  Surgeon: Lev Beck MD;  Location:  HEART CARDIAC CATH LAB     CYSTOSCOPY       EP PACEMAKER N/A 6/29/2020    Procedure: EP Pacemaker;  Surgeon: Shubham Curiel MD;  Location:  HEART CARDIAC CATH LAB     EYE SURGERY       CATARACT/BLEPHAROPLASTY     GENITOURINARY SURGERY      TUNA     HERNIA REPAIR      RIGHT INGUINAL     HERNIORRHAPHY INGUINAL  8/14/2012    Procedure: HERNIORRHAPHY INGUINAL;  right inguinal hernia repair;  Surgeon: Kareem Torrez MD;  Location: [facility]     HERNIORRHAPHY UMBILICAL N/A 10/31/2017    Procedure: HERNIORRHAPHY UMBILICAL;  UMBILICAL HERNIA REPAIR WITH MESH;  Surgeon: Scar Harrington MD;  Location: [facility]     IMPLANT PACEMAKER  8/2009    single chamber     ORTHOPEDIC SURGERY      [facility] RIGHT     ORTHOPEDIC SURGERY  2010    right TKR, left TKR     SURGICAL HISTORY OF -       Thumb surgery     SURGICAL HISTORY OF -   1990s    Left total knee replacement     SURGICAL HISTORY OF -   3/11    [facility] total knee replacement       FAMILY HISTORY:  Family History   Problem Relation Age of Onset     Cardiovascular Father      C.A.D. Father      Lung Cancer Sister      Unknown/Adopted Mother      Cancer - colorectal No family hx of        SOCIAL HISTORY:  Social History     Socioeconomic History     Marital status:      Spouse name: None     Number of children: None     Years of education: None     Highest education level: None   Occupational History     Occupation: Teacher, author, speaker     Employer: RETIRED   Social Needs     Financial resource strain: None     Food insecurity     Worry: None     Inability: None     Transportation needs     Medical: None     Non-medical: None   Tobacco Use     Smoking status: Never Smoker     Smokeless tobacco: Never Used   Substance and Sexual Activity     Alcohol use: Not Currently     Alcohol/week: 0.0 standard drinks     Comment: 1/month     Drug use: No     Sexual activity: Not Currently     Partners: Female   Lifestyle     Physical activity     Days per week: None     Minutes per session: None     Stress: None   Relationships     Social connections     Talks on phone: None     Gets together: None     Attends Mosque service: None     Active member of club or organization:  None     Attends meetings of clubs or organizations: None     Relationship status: None     Intimate partner violence     Fear of current or ex partner: None     Emotionally abused: None     Physically abused: None     Forced sexual activity: None   Other Topics Concern     Parent/sibling w/ CABG, MI or angioplasty before 65F 55M? No      Service Not Asked     Blood Transfusions Not Asked     Caffeine Concern No     Comment: 1-2 cups coffee a day     Occupational Exposure Not Asked     Hobby Hazards Not Asked     Sleep Concern No     Stress Concern Yes     Comment: due to wifes health condition     Weight Concern No     Special Diet No     Back Care Not Asked     Exercise Yes     Comment: states he uses his tredmill on and off     Bike Helmet Not Asked     Seat Belt Yes     Self-Exams Not Asked   Social History Narrative     None       I have reviewed the note as documented above.  This accurately captures the substance of my conversation with the patient.       Thank you for allowing me to participate in the care of your patient.    Sincerely,     Sarah Lofton PA-C     [facility]

## 2020-07-14 NOTE — TELEPHONE ENCOUNTER
Reason for Call:  Form, our goal is to have forms completed with 72 hours, however, some forms may require a visit or additional information.    Type of letter, form or note:  Home Health Certification    Who is the form from?: Home care    Where did the form come from: form was faxed in    What clinic location was the form placed at?: [place], [place]  Providers name Mg Nelson MD  Where the form was placed: [facility]    What number is listed as a contact on the form?: [phone]  Phone Number        Additional comments: home health care [facility] 6/4/20 to 8/2/20     Call taken on 7/14/2020 at 10:29 AM by Maya Monterroso

## 2020-07-15 ENCOUNTER — TELEPHONE (OUTPATIENT)
Dept: CARDIOLOGY | Facility: CLINIC | Age: 85
End: 2020-07-15

## 2020-07-15 DIAGNOSIS — Z95.0 CARDIAC PACEMAKER IN SITU: Primary | ICD-10-CM

## 2020-07-15 NOTE — TELEPHONE ENCOUNTER
"The photos below were taken during the patient's virtual visit with Shanika Lofton on 7/14/20.   Granddaughter says it's \"spongy\" and well healed - no drainage. He is scheduled to see Shanika again on 7/30/20.             "

## 2020-07-16 ENCOUNTER — TELEPHONE (OUTPATIENT)
Dept: FAMILY MEDICINE | Facility: CLINIC | Age: 85
End: 2020-07-16

## 2020-07-16 NOTE — TELEPHONE ENCOUNTER
Reason for Call:  Form, our goal is to have forms completed with 72 hours, however, some forms may require a visit or additional information.    Type of letter, form or note:  medical    Who is the form from?: Home care    Where did the form come from: form was faxed in    What clinic location was the form placed at?: [place], [place]  Providers name Mg Nelson MD  Where the form was placed:     What number is listed as a contact on the form?: [phone]  Phone Number        Additional comments: home health care OT eveal completed 6/24/20 due to schedule conflict  //  pt in hosp ok to hold services     Call taken on 7/16/2020 at 2:49 PM by Maya Monterroso

## 2020-07-19 ENCOUNTER — NURSE TRIAGE (OUTPATIENT)
Dept: NURSING | Facility: CLINIC | Age: 85
End: 2020-07-19

## 2020-07-19 ENCOUNTER — OFFICE VISIT (OUTPATIENT)
Dept: URGENT CARE | Facility: URGENT CARE | Age: 85
End: 2020-07-19
Payer: COMMERCIAL

## 2020-07-19 VITALS
OXYGEN SATURATION: 97 % | HEART RATE: 79 BPM | TEMPERATURE: 98 F | WEIGHT: 182 LBS | RESPIRATION RATE: 16 BRPM | DIASTOLIC BLOOD PRESSURE: 68 MMHG | BODY MASS INDEX: 23.37 KG/M2 | SYSTOLIC BLOOD PRESSURE: 116 MMHG

## 2020-07-19 DIAGNOSIS — R10.84 ABDOMINAL PAIN, GENERALIZED: Primary | ICD-10-CM

## 2020-07-19 LAB
ANION GAP SERPL CALCULATED.3IONS-SCNC: 6 MMOL/L (ref 3–14)
BASOPHILS # BLD AUTO: 0 10E9/L (ref 0–0.2)
BASOPHILS NFR BLD AUTO: 0.5 %
BUN SERPL-MCNC: 32 MG/DL (ref 7–30)
CALCIUM SERPL-MCNC: 9.2 MG/DL (ref 8.5–10.1)
CHLORIDE SERPL-SCNC: 105 MMOL/L (ref 94–109)
CO2 SERPL-SCNC: 27 MMOL/L (ref 20–32)
CREAT SERPL-MCNC: 1.48 MG/DL (ref 0.66–1.25)
DIFFERENTIAL METHOD BLD: ABNORMAL
EOSINOPHIL # BLD AUTO: 0.6 10E9/L (ref 0–0.7)
EOSINOPHIL NFR BLD AUTO: 8.8 %
ERYTHROCYTE [DISTWIDTH] IN BLOOD BY AUTOMATED COUNT: 16.3 % (ref 10–15)
GFR SERPL CREATININE-BSD FRML MDRD: 41 ML/MIN/{1.73_M2}
GLUCOSE SERPL-MCNC: 108 MG/DL (ref 70–99)
HCT VFR BLD AUTO: 29.8 % (ref 40–53)
HGB BLD-MCNC: 9.4 G/DL (ref 13.3–17.7)
LYMPHOCYTES # BLD AUTO: 0.5 10E9/L (ref 0.8–5.3)
LYMPHOCYTES NFR BLD AUTO: 7.8 %
MCH RBC QN AUTO: 32.6 PG (ref 26.5–33)
MCHC RBC AUTO-ENTMCNC: 31.5 G/DL (ref 31.5–36.5)
MCV RBC AUTO: 104 FL (ref 78–100)
MONOCYTES # BLD AUTO: 0.5 10E9/L (ref 0–1.3)
MONOCYTES NFR BLD AUTO: 7.8 %
NEUTROPHILS # BLD AUTO: 4.7 10E9/L (ref 1.6–8.3)
NEUTROPHILS NFR BLD AUTO: 75.1 %
PLATELET # BLD AUTO: 142 10E9/L (ref 150–450)
POTASSIUM SERPL-SCNC: 3.9 MMOL/L (ref 3.4–5.3)
RBC # BLD AUTO: 2.88 10E12/L (ref 4.4–5.9)
SODIUM SERPL-SCNC: 138 MMOL/L (ref 133–144)
WBC # BLD AUTO: 6.3 10E9/L (ref 4–11)

## 2020-07-19 PROCEDURE — 99214 OFFICE O/P EST MOD 30 MIN: CPT | Performed by: FAMILY MEDICINE

## 2020-07-19 PROCEDURE — 80048 BASIC METABOLIC PNL TOTAL CA: CPT | Performed by: FAMILY MEDICINE

## 2020-07-19 PROCEDURE — 36415 COLL VENOUS BLD VENIPUNCTURE: CPT | Performed by: FAMILY MEDICINE

## 2020-07-19 PROCEDURE — 85025 COMPLETE CBC W/AUTO DIFF WBC: CPT | Performed by: FAMILY MEDICINE

## 2020-07-19 NOTE — TELEPHONE ENCOUNTER
Amos catheter with moderate cramping in lower abdomin and groin.  They were going to go to the ER but they said it was too busy.  Guidelines recommend being seen within four hours.  Patient will go to .    Nayeli Pereira RN  [facility]    Reason for Disposition    Lower abdominal pain or distention    Additional Information    Negative: Shock suspected (e.g., cold/pale/clammy skin, too weak to stand, low BP, rapid pulse)    Negative: Sounds like a life-threatening emergency to the triager    Negative: [1] Catheter was accidentally pulled-out AND [2] bright red continuous bleeding    Negative: SEVERE abdominal pain    Negative: Fever > 100.5 F (38.1 C)    Negative: [1] Drinking very little AND [2] dehydration suspected (e.g., no urine > 12 hours, very dry mouth, very lightheaded)    Negative: Patient sounds very sick or weak to the triager    Negative: Catheter was accidentally pulled-out    Negative: [1] Catheter is broken AND [2] is not usable    Negative: Bleeding around catheter (e.g., from penis or female urethra)    Protocols used: URINARY CATHETER SYMPTOMS AND [id number]

## 2020-07-19 NOTE — PROGRESS NOTES
"SUBJECTIVE  HPI: Bairon Foster is a 89 year old male  who presents with the CC of abdominal/pelvic pain.   Pain is located in the generalized area, with radiation to None    The pain is characterized as \"pain\".    Pain has been present for day(s) and is slowly progressive.     EXACERBATING FACTORS: NEGATIVE.   RELIEVING FACTORS: NEGATIVE.    ASSOCIATED SX: none.     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     CKD (chronic kidney disease) stage 3, GFR 30-59 ml/min (H)      Coronary atherosclerosis of unspecified type of vessel, native or graft     S/P PTCA, EF 50%     Epistaxis      Esophageal reflux      GI bleed 4/17/2019     Hypercholesterolemia      Ischemic cardiomyopathy      Lymphedema of both lower extremities     Wears ACE wraps     Mitral regurgitation     s/p MitraClip 5/20/19     Mumps      Obese      JIM on CPAP      Other lymphedema     GROIN AND THIGHS     Other pancytopenia (H)      Other specified anemias      Overweight      Pacemaker     PACEMAKER-BS Implanted 8/5/2009      PAD (peripheral artery disease) (H)      Pancytopenia (H)      Persistent atrial fibrillation (H)     VVI PM for long pauses     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    Closure with a 33 mm Watchman device      Pulmonary hypertension (H)      Status post coronary angiogram 3/21/2018     Systolic CHF (H)      TIA (transient ischemic attack)      Unspecified hypothyroidism      Venous stasis dermatitis of both lower extremities      Vitamin D deficiency      Allergies   Allergen Reactions     Furosemide Blisters     Bullous pemphigoid. Bumex and ethacrynic acid okay.     Torsemide Blisters     Bullous pemphigoid.  Bumex and ethacrynic acid okay.     Social History     Tobacco Use     " Smoking status: Never Smoker     Smokeless tobacco: Never Used   Substance Use Topics     Alcohol use: Not Currently     Alcohol/week: 0.0 standard drinks     Comment: 1/month       ROS:CONSTITUTIONAL:NEGATIVE for fever, chills, change in weight    EXAMINATION:  /68   Pulse 79   Temp 98  F (36.7  C) (Tympanic)   Resp 16   Wt 82.6 kg (182 lb)   SpO2 97%   BMI 23.37 kg/m  GENERAL APPEARANCE: healthy, alert and no distress  ABDOMEN: tenderness moderate and marked suprapubic, RLQ and LLQ      ICD-10-CM    1. Abdominal pain, generalized  R10.84 CBC with platelets differential     Basic metabolic panel     Pt will cont w/u for bad abd pain at ED

## 2020-07-20 ENCOUNTER — TELEPHONE (OUTPATIENT)
Dept: CARDIOLOGY | Facility: CLINIC | Age: 85
End: 2020-07-20

## 2020-07-20 NOTE — TELEPHONE ENCOUNTER
Received message that patient is wanting to send additional photos of incision site for us to see. Emailed patient at preferred email, which is [email or web address] and patient's son, Josh, stated they did receive my email. Josh stated he will reply to my email with the new incision photos for us to review. Informed Josh that the photos will be reviewed and we will reach out if we need additional follow-up or information.    Josh stated that someone from hospice is coming to the patient's house today to discuss information.

## 2020-07-20 NOTE — TELEPHONE ENCOUNTER
Photos received from provided email. Will route image to Shanika Lofton to review as she was able to see patient's last incision photo. Device site looks improved from previous images on 07/15/2020. Patient is scheduled for in-clinic follow-up with Shanika Lofton on 07/30/2020.

## 2020-07-21 NOTE — TELEPHONE ENCOUNTER
Thank you for update.  I agree the PPM site looks much better.    Nothing needed from my perspective.     Shanika

## 2020-07-21 NOTE — TELEPHONE ENCOUNTER
Called patient and spoke with his wife, Kristi, and updated her regarding Shanika's opinion that device site is healing. Kristi appreciated the call and stated she would let us know in a few days if she wants to cancel the scheduled appointment on 07/30/20.

## 2020-07-21 NOTE — TELEPHONE ENCOUNTER
Spoke with patient's wife, Ursula, today and she stated that patient transitioned to hospice yesterday 7/20/20.  Remote device check for October cancelled.  She would like to speak with the hospice team and then will call back if they are going to cancel the appointment with Shanika on 7/30/20.  Will route update to Shanika Lofton PA-C.  MINE Hale

## 2020-07-22 ENCOUNTER — TELEPHONE (OUTPATIENT)
Dept: ONCOLOGY | Facility: CLINIC | Age: 85
End: 2020-07-22

## 2020-07-22 NOTE — TELEPHONE ENCOUNTER
Noted. Patient is a 6 month follow up in September with Dr. Chavez for anemia.    Writer will cancel appointment.    No further updates needed.    Harriet Guzman RN

## 2020-07-22 NOTE — TELEPHONE ENCOUNTER
Nurse Sneha with [facility] called to inform Dr. Chavez that Bairon has signed on to hospice services.    Sneha requests to be notified at [phone] if Dr. Chavez would like updates on patient.    Routing to Dr. Chavez and RNGEENA Saunders.    Chito Mcarthur, BSN, RN, OCN  Oncology Care Coordinator  [facility]

## 2020-07-24 ENCOUNTER — MEDICAL CORRESPONDENCE (OUTPATIENT)
Dept: HEALTH INFORMATION MANAGEMENT | Facility: CLINIC | Age: 85
End: 2020-07-24

## 2020-07-24 ENCOUNTER — TELEPHONE (OUTPATIENT)
Dept: FAMILY MEDICINE | Facility: CLINIC | Age: 85
End: 2020-07-24

## 2020-07-24 NOTE — TELEPHONE ENCOUNTER
Reason for Call:  Form, our goal is to have forms completed with 72 hours, however, some forms may require a visit or additional information.    Type of letter, form or note:  Home Health Certification    Who is the form from?: Home care    Where did the form come from: form was faxed in    What clinic location was the form placed at?: [facility]    Where the form was placed: Given to physician    What number is listed as a contact on the form?: (F): [phone], (P): [phone]       Additional comments: [facility] HHC & POC (cert period 06/04/20-08/02/20)    Call taken on 7/24/2020 at 4:26 PM by Naomi Pearl

## 2020-07-28 ENCOUNTER — TELEPHONE (OUTPATIENT)
Dept: FAMILY MEDICINE | Facility: CLINIC | Age: 85
End: 2020-07-28

## 2020-07-28 NOTE — TELEPHONE ENCOUNTER
Reason for Call:  Form, our goal is to have forms completed with 72 hours, however, some forms may require a visit or additional information.    Type of letter, form or note:  medical    Who is the form from?: Home care    Where did the form come from: form was faxed in    What clinic location was the form placed at?: [facility]    Where the form was placed: [facility] Box/Folder    What number is listed as a contact on the form?: [phone] (P) [phone]       Additional comments: [employer]: 3 Outstanding physician orders    Call taken on 7/28/2020 at 8:51 AM by Rose Sinha

## 2020-07-29 ENCOUNTER — TELEPHONE (OUTPATIENT)
Dept: FAMILY MEDICINE | Facility: CLINIC | Age: 85
End: 2020-07-29

## 2020-07-29 ENCOUNTER — TELEPHONE (OUTPATIENT)
Dept: CARDIOLOGY | Facility: CLINIC | Age: 85
End: 2020-07-29

## 2020-07-29 NOTE — TELEPHONE ENCOUNTER
Reason for Call:  Form, our goal is to have forms completed with 72 hours, however, some forms may require a visit or additional information.    Type of letter, form or note:  medical    Who is the form from?: Home care    Where did the form come from: form was faxed in    What clinic location was the form placed at?: [facility]    Where the form was placed: Given to physician    What number is listed as a contact on the form?: (F): [phone], (P): [phone]       Additional comments: [facility] Speech & occupational therapy discharge summary    Call taken on 7/29/2020 at 1:41 PM by Naomi Pearl

## 2020-07-30 ENCOUNTER — MEDICAL CORRESPONDENCE (OUTPATIENT)
Dept: HEALTH INFORMATION MANAGEMENT | Facility: CLINIC | Age: 85
End: 2020-07-30

## 2020-07-30 LAB
MDC_IDC_EPISODE_DTM: NORMAL
MDC_IDC_EPISODE_DTM: NORMAL
MDC_IDC_EPISODE_ID: NORMAL
MDC_IDC_EPISODE_ID: NORMAL
MDC_IDC_EPISODE_TYPE: NORMAL
MDC_IDC_EPISODE_TYPE: NORMAL
MDC_IDC_LEAD_IMPLANT_DT: NORMAL
MDC_IDC_LEAD_LOCATION: NORMAL
MDC_IDC_LEAD_MFG: NORMAL
MDC_IDC_LEAD_MODEL: NORMAL
MDC_IDC_LEAD_POLARITY_TYPE: NORMAL
MDC_IDC_LEAD_SERIAL: NORMAL
MDC_IDC_MSMT_BATTERY_DTM: NORMAL
MDC_IDC_MSMT_BATTERY_REMAINING_LONGEVITY: 96 MO
MDC_IDC_MSMT_BATTERY_REMAINING_PERCENTAGE: 100 %
MDC_IDC_MSMT_BATTERY_STATUS: NORMAL
MDC_IDC_MSMT_LEADCHNL_RV_IMPEDANCE_VALUE: 565 OHM
MDC_IDC_MSMT_LEADCHNL_RV_PACING_THRESHOLD_AMPLITUDE: 1 V
MDC_IDC_MSMT_LEADCHNL_RV_PACING_THRESHOLD_PULSEWIDTH: 0.4 MS
MDC_IDC_PG_IMPLANT_DTM: NORMAL
MDC_IDC_PG_MFG: NORMAL
MDC_IDC_PG_MODEL: NORMAL
MDC_IDC_PG_SERIAL: NORMAL
MDC_IDC_PG_TYPE: NORMAL
MDC_IDC_SESS_CLINIC_NAME: NORMAL
MDC_IDC_SESS_DTM: NORMAL
MDC_IDC_SESS_TYPE: NORMAL
MDC_IDC_SET_BRADY_LOWRATE: 60 {BEATS}/MIN
MDC_IDC_SET_BRADY_MAX_SENSOR_RATE: 130 {BEATS}/MIN
MDC_IDC_SET_BRADY_MODE: NORMAL
MDC_IDC_SET_LEADCHNL_RV_PACING_AMPLITUDE: 1.4 V
MDC_IDC_SET_LEADCHNL_RV_PACING_CAPTURE_MODE: NORMAL
MDC_IDC_SET_LEADCHNL_RV_PACING_POLARITY: NORMAL
MDC_IDC_SET_LEADCHNL_RV_PACING_PULSEWIDTH: 0.4 MS
MDC_IDC_SET_LEADCHNL_RV_SENSING_ADAPTATION_MODE: NORMAL
MDC_IDC_SET_LEADCHNL_RV_SENSING_POLARITY: NORMAL
MDC_IDC_SET_LEADCHNL_RV_SENSING_SENSITIVITY: 2.5 MV
MDC_IDC_SET_ZONE_DETECTION_INTERVAL: 375 MS
MDC_IDC_SET_ZONE_TYPE: NORMAL
MDC_IDC_SET_ZONE_VENDOR_TYPE: NORMAL
MDC_IDC_STAT_BRADY_DTM_END: NORMAL
MDC_IDC_STAT_BRADY_DTM_START: NORMAL
MDC_IDC_STAT_BRADY_RV_PERCENT_PACED: 22 %
MDC_IDC_STAT_EPISODE_RECENT_COUNT: 0
MDC_IDC_STAT_EPISODE_RECENT_COUNT: 0
MDC_IDC_STAT_EPISODE_RECENT_COUNT_DTM_END: NORMAL
MDC_IDC_STAT_EPISODE_RECENT_COUNT_DTM_END: NORMAL
MDC_IDC_STAT_EPISODE_RECENT_COUNT_DTM_START: NORMAL
MDC_IDC_STAT_EPISODE_RECENT_COUNT_DTM_START: NORMAL
MDC_IDC_STAT_EPISODE_TYPE: NORMAL
MDC_IDC_STAT_EPISODE_TYPE: NORMAL
MDC_IDC_STAT_EPISODE_VENDOR_TYPE: NORMAL
MDC_IDC_STAT_EPISODE_VENDOR_TYPE: NORMAL

## 2020-07-31 ENCOUNTER — TELEPHONE (OUTPATIENT)
Dept: FAMILY MEDICINE | Facility: CLINIC | Age: 85
End: 2020-07-31

## 2020-07-31 ENCOUNTER — MEDICAL CORRESPONDENCE (OUTPATIENT)
Dept: HEALTH INFORMATION MANAGEMENT | Facility: CLINIC | Age: 85
End: 2020-07-31

## 2020-07-31 NOTE — TELEPHONE ENCOUNTER
Reason for Call:  Form, our goal is to have forms completed with 72 hours, however, some forms may require a visit or additional information.    Type of letter, form or note:  medical    Who is the form from?: Patient    Where did the form come from: form was faxed in    What clinic location was the form placed at?: [place], [place]  Providers name Mg Nelson MD  Where the form was placed: [address]    What number is listed as a contact on the form?: [phone]  Phone Number        Additional comments: Patient admitted to hospice POC not complete so goals only partially met DC PT 7/19/20    Call taken on 7/31/2020 at 9:51 AM by Maya Monterroso

## 2020-08-04 ENCOUNTER — TELEPHONE (OUTPATIENT)
Dept: FAMILY MEDICINE | Facility: CLINIC | Age: 85
End: 2020-08-04

## 2020-08-04 NOTE — TELEPHONE ENCOUNTER
Reason for Call:  Form, our goal is to have forms completed with 72 hours, however, some forms may require a visit or additional information.    Type of letter, form or note:  medical    Who is the form from?: Home care    Where did the form come from: form was faxed in    What clinic location was the form placed at?: [place], [place]  Providers name Mg Nelson MD  Where the form was placed: [facility]    What number is listed as a contact on the form?: [phone]  Phone Number        Additional comments: home healthcare DC summary from homecare     Call taken on 8/4/2020 at 9:53 AM by Maya Monterroso

## 2020-08-06 ENCOUNTER — MEDICAL CORRESPONDENCE (OUTPATIENT)
Dept: HEALTH INFORMATION MANAGEMENT | Facility: CLINIC | Age: 85
End: 2020-08-06

## 2020-08-16 NOTE — PROGRESS NOTES
"Bairon Foster is a 89 year old male who is being evaluated via a billable telephone visit.      The patient has been notified of following:     \"This telephone visit will be conducted via a call between you and your physician/provider. We have found that certain health care needs can be provided without the need for a physical exam.  This service lets us provide the care you need with a short phone conversation.  If a prescription is necessary we can send it directly to your pharmacy.  If lab work is needed we can place an order for that and you can then stop by our lab to have the test done at a later time.    Telephone visits are billed at different rates depending on your insurance coverage. During this emergency period, for some insurers they may be billed the same as an in-person visit.  Please reach out to your insurance provider with any questions.    If during the course of the call the physician/provider feels a telephone visit is not appropriate, you will not be charged for this service.\"    Patient has given verbal consent for Telephone visit?  Yes  What phone number would you like to be contacted at? [phone]    How would you like to obtain your AVS? [facility]   Vitals:  BP on 8/16 was 99/62   Pulse 62  Weight N/A but will try to get wt before phone call      CC:  Pt now on hospice    VITALS:  99/62  HR 62 bpm    BRIEF HPI:  Bairon is an 89 year old yo M who sees Dr. Hope and Dr. Curiel for h/o:    1. Mixed systolic and diastolic HF - Echo 5/2020 with EF 46%. [facility] for weakness and BHAKTI.   2. Severe MR -  s/p Mitral Clip x 2 5/2019 with echo 5/2020 showing mild residual MR and mild MS  3. Moderate TR  4. Lymphedema - I believe he [facility]  5. Bullous Pemphigoid  - d/t furosemide, switched to torsemide  6. Permanent AFib and sx'c bradycardia - s/p single chamber [employer] PPM 8/2009 with planned gen change 6/29/2020. Gen change 6/29/2020 Had LAAO " "device (Watchman, 33 mm) placed 12/2019 for frequent falls.  7. Remote CVA   8. Memory issues  9. Pancytopenia requiring 1 unit pRBCs during hospitalization 5/2020. Follows with Dr. Chavez  10. Pulmonary Nodule with CT A Heart 6/2019 with large hiatal hernia, subpleural nodule in RLL 5 mm, for which 1 year follow-up was recommended.   11. CAD - mild, non-obstructive on cath 4/2019    I had a virtual visit with Bairon 7/14 at which time we reviewed his recent gen change, which had been postponed many times given hospitalizations for AK, weakness and anemia with Hgb to 6.9 g/dL.  He was then in the ER 7/3 and 7/4 due to LE edema. NTpBNP wnl, DVT scan negative. At our video visit 7/14, his weight [id number] lymphedema wraps were applied and his PPM pocket hematoma was improving.     I made no changes and rec'd he continue Coreg 6.25 mg BID and asked that I see him in person as he'd not yet been set up with [facility]. Unfortunately, this appt was cancelled due to his being enrolled in hospice care as of 7/20. He had presented to  7/19 with abdominal pain and fever and dark urine.     Since then, he's had multiple falls requiring non-emergent 911 calls for lifting help. On 7/29, Amos was inadvertently pulled out.  On 8/14, he required and  visit due to staph infection of face.    INTERVAL HISTORY:  Spoke with Ursula and son Chapincito. Alfa is doing \"OK\" and is stable from a cardiac perspective.  No issues with CP that Ursula is aware of. Still with LE edema but not any worse than it had been at last virtual visit.    No issues with PPM pocket at this point.    DIAGNOSTICS:  Device interrogation 7/6 with 22%  in VVIR 60/130 bpm.   Echo 5/15/2020 with EF 46%. Normal RV. Mod TR. RVSP 27+RAP. Stable mitral clip position, residual mild MR with mean diastolic gradient 4 mmHg c/w mild MS with HR 76 bpm. Severely dilated IVC 5.4 cm  Cath 4/2019 with mild, non-obstructive dz    Component      Latest Ref Rng & Units 7/4/2020 7/19/2020 "   Sodium      133 - 144 mmol/L 138 138   Potassium      3.4 - 5.3 mmol/L 3.7 3.9   Chloride      94 - 109 mmol/L 105 105   Carbon Dioxide      20 - 32 mmol/L 28 27   Anion Gap      3 - 14 mmol/L 5 6   Glucose      70 - 99 mg/dL 95 108 (H)   Urea Nitrogen      7 - 30 mg/dL 27 32 (H)   Creatinine      0.66 - 1.25 mg/dL 1.40 (H) 1.48 (H)   GFR Estimate      >60 mL/min/1.73:m2 44 (L) 41 (L)   GFR Estimate If Black      >60 mL/min/1.73:m2 51 (L) 48 (L)   Calcium      8.5 - 10.1 mg/dL 9.0 9.2       REVIEW OF SYSTEMS:  Negative except as noted above     PHYSICAL EXAM:  Deferred, telephone      ASSESSMENT/PLAN:    1. Mixed Systolic and Diastolic HF (EF 46%)    Currently on Bumex 2 mg in AM/1 in PM & spironolactone 25    BMP 7/19 as above, with stable relative stable renal function. Might be getting a little dry but as he appears stable clinically per wife    Weight 169# [facility] of [facility], 180# at time of gen change and not cheked today    Remains on Coreg 6.25 mg BID     PLAN:    No med changes    They [facility]. Asked Ursula to give Hospice RN [employer] Device RNs' # for any questions that need to come our way as he nears end of life      2. Permanent AFib with asystole    S/p single chamber gen change ([place]) 6/29 after reaching [facility] 4/12.    On ASA given Watchman in plac     PLAN:    Continue ASA             CURRENT MEDICATIONS:  Current Outpatient Medications   Medication Sig Dispense Refill     acetaminophen (TYLENOL) 500 MG tablet Take 2 tablets (1,000 mg) by mouth every 6 hours as needed for mild pain 100 tablet 0     aspirin 81 MG EC tablet Take 1 tablet (81 mg) by mouth daily       bumetanide (BUMEX) 1 MG tablet Take 1 tablet (1 mg) by mouth every evening Take at 2 pm daily. 30 tablet 0     bumetanide (BUMEX) 1 MG tablet Take 2 tablets (2 mg) by mouth every morning 60 tablet 0     buPROPion (WELLBUTRIN XL) 300 MG 24 hr tablet Take 1 tablet (300 mg) by mouth every morning 30  tablet 0     camphor-menthol (DERMASARRA) 0.5-0.5 % external lotion Apply to the affected area every 5 hours as needed. 1 Bottle 1     carvedilol (COREG) 6.25 MG tablet Take 1 tablet (6.25 mg) by mouth 2 times daily (with meals) TAKE 1 TABLET TWICE DAILY WITH MEALS 60 tablet 0     cholecalciferol (VITAMIN D3) 5000 units (125 mcg) capsule Take 1 capsule (5,000 Units) by mouth daily       COMPRESSION STOCKINGS 1 each daily 2 each 0     EPINEPHrine (EPIPEN/ADRENACLICK/OR ANY BX GENERIC EQUIV) 0.3 MG/0.3ML injection 2-pack Inject 0.3 mg into the muscle as needed for anaphylaxis       finasteride (PROSCAR) 5 MG tablet Take 1 tablet (5 mg) by mouth daily TAKE 1 TABLET EVERY DAY 30 tablet 0     levothyroxine (SYNTHROID/LEVOTHROID) 100 MCG tablet Take 1 tablet (100 mcg) by mouth daily TAKE 1 TABLET EVERY DAY 30 tablet 0     Multiple Vitamins-Minerals (CENTRUM SILVER) per tablet Take 1 tablet by mouth daily       omeprazole (PRILOSEC) 40 MG DR capsule Take 1 capsule (40 mg) by mouth daily 30 capsule 0     polyethylene glycol (MIRALAX) packet Take 1 packet by mouth daily       potassium chloride ER (KLOR-CON M) 20 MEQ CR tablet Take 1-2 tablets (20-40 mEq) by mouth See Admin Instructions Take 2 tablets (40 mEq) by mouth in the morning and 1 tablet (20 mEq) by mouth in the evening (2 pm). 90 tablet 0     simvastatin (ZOCOR) 20 MG tablet Take 1 tablet (20 mg) by mouth daily 30 tablet 0     spironolactone (ALDACTONE) 25 MG tablet Take 1 tablet (25 mg) by mouth daily 30 tablet 0     sucralfate (CARAFATE) 1 GM tablet Take 1 tablet (1 g) by mouth 2 times daily 60 tablet 0     sulfamethoxazole-trimethoprim (BACTRIM) 400-80 MG tablet Take 1 tablet by mouth 2 times daily       tamsulosin (FLOMAX) 0.4 MG capsule Take 1 capsule (0.4 mg) by mouth daily TAKE 1 CAPSULE EVERY DAY 30 capsule 0     triamcinolone (KENALOG) 0.025 % cream Apply topically 2 times daily as needed        ACE/ARB/ARNI NOT PRESCRIBED (INTENTIONAL) Please choose  reason not prescribed, below (Patient not taking: Reported on 8/17/2020)           ORDERS PLACED:  Orders Placed This Encounter   Procedures     Follow-Up with Cardiac Advanced Practice Provider     Orders Placed This Encounter   Medications     sulfamethoxazole-trimethoprim (BACTRIM) 400-80 MG tablet     Sig: Take 1 tablet by mouth 2 times daily     There are no discontinued medications.      Encounter Diagnoses   Name Primary?     Coronary artery disease, angina presence unspecified, unspecified vessel or lesion type, unspecified whether native or transplanted heart Yes     Chronic combined systolic and diastolic heart failure (H)          ALLERGIES     Allergies   Allergen Reactions     Furosemide Blisters     Bullous pemphigoid. Bumex and ethacrynic acid okay.     Torsemide Blisters     Bullous pemphigoid.  Bumex and ethacrynic acid okay.       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     CKD (chronic kidney disease) stage 3, GFR 30-59 ml/min (H)      Coronary atherosclerosis of unspecified type of vessel, native or graft     S/P PTCA, EF 50%     Epistaxis      Esophageal reflux      GI bleed 4/17/2019     Hypercholesterolemia      Ischemic cardiomyopathy      Lymphedema of both lower extremities     Wears ACE wraps     Mitral regurgitation     s/p MitraClip 5/20/19     Mumps      Obese      JIM on CPAP      Other lymphedema     GROIN AND THIGHS     Other pancytopenia (H)      Other specified anemias      Overweight      Pacemaker     PACEMAKER-BS Implanted 8/5/2009      PAD (peripheral artery disease) (H)      Pancytopenia (H)      Persistent atrial fibrillation (H)     VVI PM for long pauses     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    Closure  with a 33 mm Watchman device      Pulmonary hypertension (H)      Status post coronary angiogram 3/21/2018     Systolic CHF (H)      TIA (transient ischemic attack)      Unspecified hypothyroidism      Venous stasis dermatitis of both lower extremities      Vitamin D deficiency        PAST SURGICAL HISTORY:  Past Surgical History:   Procedure Laterality Date     BONE MARROW BIOPSY, BONE SPECIMEN, NEEDLE/TROCAR N/A 4/19/2019    Procedure: BONE MARROW BIOPSY;  Surgeon: Yovani Cooley MD;  Location:  OR     CV HEART CATHETERIZATION WITH POSSIBLE INTERVENTION N/A 4/12/2019    Procedure: Heart Catheterization with possible Intervention;  Surgeon: Lev Beck MD;  Location:  HEART CARDIAC CATH LAB     CV LEFT ATRIAL APPENDAGE CLOSURE N/A 12/3/2019    Procedure: Left Atrial Appendage Closure;  Surgeon: Segundo Hope MD;  Location:  HEART CARDIAC CATH LAB     CV MITRACLIP N/A 5/20/2019    Procedure: CV MITRAL CLIP;  Surgeon: Harshil Winter MD;  Location:  HEART CARDIAC CATH LAB     CV RIGHT HEART CATH N/A 4/12/2019    Procedure: Right Heart Cath;  Surgeon: Lev Beck MD;  Location:  HEART CARDIAC CATH LAB     CYSTOSCOPY       EP PACEMAKER N/A 6/29/2020    Procedure: EP Pacemaker;  Surgeon: Shubham Curiel MD;  Location:  HEART CARDIAC CATH LAB     EYE SURGERY      CATARACT/BLEPHAROPLASTY     GENITOURINARY SURGERY      TUNA     HERNIA REPAIR      RIGHT INGUINAL     HERNIORRHAPHY INGUINAL  8/14/2012    Procedure: HERNIORRHAPHY INGUINAL;  right inguinal hernia repair;  Surgeon: Kareem Torrez MD;  Location:  [facility]     HERNIORRHAPHY UMBILICAL N/A 10/31/2017    Procedure: HERNIORRHAPHY UMBILICAL;  UMBILICAL HERNIA REPAIR WITH MESH;  Surgeon: Scar Harrington MD;  Location: [facility]     IMPLANT PACEMAKER  8/2009    single chamber     ORTHOPEDIC SURGERY      [facility] RIGHT     ORTHOPEDIC SURGERY  2010    right TKR, left TKR     SURGICAL HISTORY OF -       Thumb surgery     SURGICAL  HISTORY OF -   1990s    Left total knee replacement     SURGICAL HISTORY OF -   3/11    [facility] total knee replacement       FAMILY HISTORY:  Family History   Problem Relation Age of Onset     Cardiovascular Father      C.A.D. Father      Lung Cancer Sister      Unknown/Adopted Mother      Cancer - colorectal No family hx of        SOCIAL HISTORY:  Social History     Socioeconomic History     Marital status:      Spouse name: None     Number of children: None     Years of education: None     Highest education level: None   Occupational History     Occupation: Teacher, author, speaker     Employer: RETIRED   Social Needs     Financial resource strain: None     Food insecurity     Worry: None     Inability: None     Transportation needs     Medical: None     Non-medical: None   Tobacco Use     Smoking status: Never Smoker     Smokeless tobacco: Never Used   Substance and Sexual Activity     Alcohol use: Not Currently     Alcohol/week: 0.0 standard drinks     Comment: 1/month     Drug use: No     Sexual activity: Not Currently     Partners: Female   Lifestyle     Physical activity     Days per week: None     Minutes per session: None     Stress: None   Relationships     Social connections     Talks on phone: None     Gets together: None     Attends Muslim service: None     Active member of club or organization: None     Attends meetings of clubs or organizations: None     Relationship status: None     Intimate partner violence     Fear of current or ex partner: None     Emotionally abused: None     Physically abused: None     Forced sexual activity: None   Other Topics Concern     Parent/sibling w/ CABG, MI or angioplasty before 65F 55M? No      Service Not Asked     Blood Transfusions Not Asked     Caffeine Concern No     Comment: 1-2 cups coffee a day     Occupational Exposure Not Asked     Hobby Hazards Not Asked     Sleep Concern No     Stress Concern Yes     Comment: due to wifes health condition      Weight Concern No     Special Diet No     Back Care Not Asked     Exercise Yes     Comment: states he uses his tredmill on and off     Bike Helmet Not Asked     Seat Belt Yes     Self-Exams Not Asked   Social History Narrative     None       I have reviewed the note as documented above.  This accurately captures the substance of my conversation with the patient.     Phone call contact time  Call Started at 1443  Call Ended at 1501  Duration 18 minutes    Sarah Lofton PA-C [facility]

## 2020-08-17 ENCOUNTER — VIRTUAL VISIT (OUTPATIENT)
Dept: CARDIOLOGY | Facility: CLINIC | Age: 85
End: 2020-08-17
Payer: COMMERCIAL

## 2020-08-17 VITALS — SYSTOLIC BLOOD PRESSURE: 99 MMHG | DIASTOLIC BLOOD PRESSURE: 62 MMHG | HEART RATE: 62 BPM

## 2020-08-17 DIAGNOSIS — I50.42 CHRONIC COMBINED SYSTOLIC AND DIASTOLIC HEART FAILURE (H): ICD-10-CM

## 2020-08-17 DIAGNOSIS — I25.10 CORONARY ARTERY DISEASE, ANGINA PRESENCE UNSPECIFIED, UNSPECIFIED VESSEL OR LESION TYPE, UNSPECIFIED WHETHER NATIVE OR TRANSPLANTED HEART: Primary | ICD-10-CM

## 2020-08-17 PROCEDURE — 99213 OFFICE O/P EST LOW 20 MIN: CPT | Mod: 95 | Performed by: PHYSICIAN ASSISTANT

## 2020-08-17 RX ORDER — SULFAMETHOXAZOLE AND TRIMETHOPRIM 400; 80 MG/1; MG/1
1 TABLET ORAL 2 TIMES DAILY
COMMUNITY

## 2020-08-17 NOTE — LETTER
8/17/2020    Mg Nelson MD  [address]  [facility] [address]    RE: Bairon Foster       Dear Colleague,    I had the pleasure of seeing Bairon Foster in the [facility].    Bairon Foster is a 89 year old male who is being evaluated via a billable telephone visit.        CC:  Pt now on hospice    VITALS:  99/62  HR 62 bpm    BRIEF HPI:  Bairon is an 89 year old yo M who sees Dr. Hope and Dr. Curiel for h/o:    1. Mixed systolic and diastolic HF - Echo 5/2020 with EF 46%. [facility] for weakness and BHAKTI.   2. Severe MR -  s/p Mitral Clip x 2 5/2019 with echo 5/2020 showing mild residual MR and mild MS  3. Moderate TR  4. Lymphedema - I believe he [facility]  5. Bullous Pemphigoid  - d/t furosemide, switched to torsemide  6. Permanent AFib and sx'c bradycardia - s/p single chamber [employer] PPM 8/2009 with planned gen change 6/29/2020. Gen change 6/29/2020 Had LAAO device (Watchman, 33 mm) placed 12/2019 for frequent falls.  7. Remote CVA   8. Memory issues  9. Pancytopenia requiring 1 unit pRBCs during hospitalization 5/2020. Follows with Dr. Chavez  10. Pulmonary Nodule with CT A Heart 6/2019 with large hiatal hernia, subpleural nodule in RLL 5 mm, for which 1 year follow-up was recommended.   11. CAD - mild, non-obstructive on cath 4/2019    I had a virtual visit with Bairon 7/14 at which time we reviewed his recent gen change, which had been postponed many times given hospitalizations for AK, weakness and anemia with Hgb to 6.9 g/dL.  He was then in the ER 7/3 and 7/4 due to LE edema. NTpBNP wnl, DVT scan negative. At our video visit 7/14, his weight [id number] lymphedema wraps were applied and his PPM pocket hematoma was improving.     I made no changes and rec'd he continue Coreg 6.25 mg BID and asked that I see him in person as he'd not yet been set up with [facility]. Unfortunately, this appt was cancelled due to his  "being enrolled in hospice care as of 7/20. He had presented to  7/19 with abdominal pain and fever and dark urine.     Since then, he's had multiple falls requiring non-emergent 911 calls for lifting help. On 7/29, Amos was inadvertently pulled out.  On 8/14, he required and  visit due to staph infection of face.    INTERVAL HISTORY:  Spoke with Ursula and son Chapincito. Alfa is doing \"OK\" and is stable from a cardiac perspective.  No issues with CP that Ursula is aware of. Still with LE edema but not any worse than it had been at last virtual visit.    No issues with PPM pocket at this point.    DIAGNOSTICS:  Device interrogation 7/6 with 22%  in VVIR 60/130 bpm.   Echo 5/15/2020 with EF 46%. Normal RV. Mod TR. RVSP 27+RAP. Stable mitral clip position, residual mild MR with mean diastolic gradient 4 mmHg c/w mild MS with HR 76 bpm. Severely dilated IVC 5.4 cm  Cath 4/2019 with mild, non-obstructive dz    Component      Latest Ref Rng & Units 7/4/2020 7/19/2020   Sodium      133 - 144 mmol/L 138 138   Potassium      3.4 - 5.3 mmol/L 3.7 3.9   Chloride      94 - 109 mmol/L 105 105   Carbon Dioxide      20 - 32 mmol/L 28 27   Anion Gap      3 - 14 mmol/L 5 6   Glucose      70 - 99 mg/dL 95 108 (H)   Urea Nitrogen      7 - 30 mg/dL 27 32 (H)   Creatinine      0.66 - 1.25 mg/dL 1.40 (H) 1.48 (H)   GFR Estimate      >60 mL/min/1.73:m2 44 (L) 41 (L)   GFR Estimate If Black      >60 mL/min/1.73:m2 51 (L) 48 (L)   Calcium      8.5 - 10.1 mg/dL 9.0 9.2       REVIEW OF SYSTEMS:  Negative except as noted above     PHYSICAL EXAM:  Deferred, telephone      ASSESSMENT/PLAN:    1. Mixed Systolic and Diastolic HF (EF 46%)    Currently on Bumex 2 mg in AM/1 in PM & spironolactone 25    BMP 7/19 as above, with stable relative stable renal function. Might be getting a little dry but as he appears stable clinically per wife    Weight 169# [facility] of [facility], 180# at time of gen change and not cheked today    Remains on Coreg 6.25 mg " BID     PLAN:    No med changes    They request one month telephone visit to [facility]. Asked Ursula to give Hospice RN Karen Device RNs' # for any questions that need to come our way as he nears end of life      2. Permanent AFib with asystole    S/p single chamber gen change ([place]) 6/29 after reaching [facility] 4/12.    On ASA given Watchman in plac     PLAN:    Continue ASA             CURRENT MEDICATIONS:  Current Outpatient Medications   Medication Sig Dispense Refill     acetaminophen (TYLENOL) 500 MG tablet Take 2 tablets (1,000 mg) by mouth every 6 hours as needed for mild pain 100 tablet 0     aspirin 81 MG EC tablet Take 1 tablet (81 mg) by mouth daily       bumetanide (BUMEX) 1 MG tablet Take 1 tablet (1 mg) by mouth every evening Take at 2 pm daily. 30 tablet 0     bumetanide (BUMEX) 1 MG tablet Take 2 tablets (2 mg) by mouth every morning 60 tablet 0     buPROPion (WELLBUTRIN XL) 300 MG 24 hr tablet Take 1 tablet (300 mg) by mouth every morning 30 tablet 0     camphor-menthol (DERMASARRA) 0.5-0.5 % external lotion Apply to the affected area every 5 hours as needed. 1 Bottle 1     carvedilol (COREG) 6.25 MG tablet Take 1 tablet (6.25 mg) by mouth 2 times daily (with meals) TAKE 1 TABLET TWICE DAILY WITH MEALS 60 tablet 0     cholecalciferol (VITAMIN D3) 5000 units (125 mcg) capsule Take 1 capsule (5,000 Units) by mouth daily       COMPRESSION STOCKINGS 1 each daily 2 each 0     EPINEPHrine (EPIPEN/ADRENACLICK/OR ANY BX GENERIC EQUIV) 0.3 MG/0.3ML injection 2-pack Inject 0.3 mg into the muscle as needed for anaphylaxis       finasteride (PROSCAR) 5 MG tablet Take 1 tablet (5 mg) by mouth daily TAKE 1 TABLET EVERY DAY 30 tablet 0     levothyroxine (SYNTHROID/LEVOTHROID) 100 MCG tablet Take 1 tablet (100 mcg) by mouth daily TAKE 1 TABLET EVERY DAY 30 tablet 0     Multiple Vitamins-Minerals (CENTRUM SILVER) per tablet Take 1 tablet by mouth daily       omeprazole (PRILOSEC) 40 MG DR capsule Take 1 capsule (40 mg)  by mouth daily 30 capsule 0     polyethylene glycol (MIRALAX) packet Take 1 packet by mouth daily       potassium chloride ER (KLOR-CON M) 20 MEQ CR tablet Take 1-2 tablets (20-40 mEq) by mouth See Admin Instructions Take 2 tablets (40 mEq) by mouth in the morning and 1 tablet (20 mEq) by mouth in the evening (2 pm). 90 tablet 0     simvastatin (ZOCOR) 20 MG tablet Take 1 tablet (20 mg) by mouth daily 30 tablet 0     spironolactone (ALDACTONE) 25 MG tablet Take 1 tablet (25 mg) by mouth daily 30 tablet 0     sucralfate (CARAFATE) 1 GM tablet Take 1 tablet (1 g) by mouth 2 times daily 60 tablet 0     sulfamethoxazole-trimethoprim (BACTRIM) 400-80 MG tablet Take 1 tablet by mouth 2 times daily       tamsulosin (FLOMAX) 0.4 MG capsule Take 1 capsule (0.4 mg) by mouth daily TAKE 1 CAPSULE EVERY DAY 30 capsule 0     triamcinolone (KENALOG) 0.025 % cream Apply topically 2 times daily as needed        ACE/ARB/ARNI NOT PRESCRIBED (INTENTIONAL) Please choose reason not prescribed, below (Patient not taking: Reported on 8/17/2020)           ORDERS PLACED:  Orders Placed This Encounter   Procedures     Follow-Up with Cardiac Advanced Practice Provider     Orders Placed This Encounter   Medications     sulfamethoxazole-trimethoprim (BACTRIM) 400-80 MG tablet     Sig: Take 1 tablet by mouth 2 times daily     There are no discontinued medications.      Encounter Diagnoses   Name Primary?     Coronary artery disease, angina presence unspecified, unspecified vessel or lesion type, unspecified whether native or transplanted heart Yes     Chronic combined systolic and diastolic heart failure (H)          ALLERGIES     Allergies   Allergen Reactions     Furosemide Blisters     Bullous pemphigoid. Bumex and ethacrynic acid okay.     Torsemide Blisters     Bullous pemphigoid.  Bumex and ethacrynic acid okay.       PAST MEDICAL HISTORY:  Past Medical History:   Diagnosis Date     Acute, but ill-defined, cerebrovascular disease     NO  RESIDUALS     Antiplatelet or antithrombotic long-term use     Hisotry of falls and severe epistaxis     Arrhythmia     a fib     Bullous pemphigoid 8/21/2019     CKD (chronic kidney disease) stage 3, GFR 30-59 ml/min (H)      Coronary atherosclerosis of unspecified type of vessel, native or graft     S/P PTCA, EF 50%     Epistaxis      Esophageal reflux      GI bleed 4/17/2019     Hypercholesterolemia      Ischemic cardiomyopathy      Lymphedema of both lower extremities     Wears ACE wraps     Mitral regurgitation     s/p MitraClip 5/20/19     Mumps      Obese      JIM on CPAP      Other lymphedema     GROIN AND THIGHS     Other pancytopenia (H)      Other specified anemias      Overweight      Pacemaker     PACEMAKER-BS Implanted 8/5/2009      PAD (peripheral artery disease) (H)      Pancytopenia (H)      Persistent atrial fibrillation (H)     VVI PM for long pauses     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    Closure with a 33 mm Watchman device      Pulmonary hypertension (H)      Status post coronary angiogram 3/21/2018     Systolic CHF (H)      TIA (transient ischemic attack)      Unspecified hypothyroidism      Venous stasis dermatitis of both lower extremities      Vitamin D deficiency        PAST SURGICAL HISTORY:  Past Surgical History:   Procedure Laterality Date     BONE MARROW BIOPSY, BONE SPECIMEN, NEEDLE/TROCAR N/A 4/19/2019    Procedure: BONE MARROW BIOPSY;  Surgeon: Yovani Cooley MD;  Location:  OR      HEART CATHETERIZATION WITH POSSIBLE INTERVENTION N/A 4/12/2019    Procedure: Heart Catheterization with possible Intervention;  Surgeon: Lev Beck MD;  Location:  HEART CARDIAC CATH LAB     CV LEFT ATRIAL APPENDAGE CLOSURE N/A 12/3/2019    Procedure: Left Atrial Appendage Closure;  Surgeon: Segundo Hope MD;  Location:  HEART CARDIAC CATH LAB     CV MITRACLIP N/A 5/20/2019    Procedure: CV MITRAL CLIP;  Surgeon: Moy  MD Harshil;  Location:  HEART CARDIAC CATH LAB     CV RIGHT HEART CATH N/A 4/12/2019    Procedure: Right Heart Cath;  Surgeon: Lev Beck MD;  Location:  HEART CARDIAC CATH LAB     CYSTOSCOPY       EP PACEMAKER N/A 6/29/2020    Procedure: EP Pacemaker;  Surgeon: Shubham Curiel MD;  Location:  HEART CARDIAC CATH LAB     EYE SURGERY      CATARACT/BLEPHAROPLASTY     GENITOURINARY SURGERY      TUNA     HERNIA REPAIR      RIGHT INGUINAL     HERNIORRHAPHY INGUINAL  8/14/2012    Procedure: HERNIORRHAPHY INGUINAL;  right inguinal hernia repair;  Surgeon: Kareem Torrez MD;  Location:  [facility]     HERNIORRHAPHY UMBILICAL N/A 10/31/2017    Procedure: HERNIORRHAPHY UMBILICAL;  UMBILICAL HERNIA REPAIR WITH MESH;  Surgeon: Scar Harrington MD;  Location: [facility]     IMPLANT PACEMAKER  8/2009    single chamber     ORTHOPEDIC SURGERY      [facility] [place] RIGHT     ORTHOPEDIC SURGERY  2010    right TKR, left TKR     SURGICAL HISTORY OF -       Thumb surgery     SURGICAL HISTORY OF -   1990s    Left total knee replacement     SURGICAL HISTORY OF -   3/11    Rig total knee replacement       FAMILY HISTORY:  Family History   Problem Relation Age of Onset     Cardiovascular Father      C.A.D. Father      Lung Cancer Sister      Unknown/Adopted Mother      Cancer - colorectal No family hx of        SOCIAL HISTORY:  Social History     Socioeconomic History     Marital status:      Spouse name: None     Number of children: None     Years of education: None     Highest education level: None   Occupational History     Occupation: Teacher, author, speaker     Employer: RETIRED   Social Needs     Financial resource strain: None     Food insecurity     Worry: None     Inability: None     Transportation needs     Medical: None     Non-medical: None   Tobacco Use     Smoking status: Never Smoker     Smokeless tobacco: Never Used   Substance and Sexual Activity     Alcohol use: Not Currently     Alcohol/week: 0.0 standard drinks      Comment: 1/month     Drug use: No     Sexual activity: Not Currently     Partners: Female   Lifestyle     Physical activity     Days per week: None     Minutes per session: None     Stress: None   Relationships     Social connections     Talks on phone: None     Gets together: None     Attends Yazidi service: None     Active member of club or organization: None     Attends meetings of clubs or organizations: None     Relationship status: None     Intimate partner violence     Fear of current or ex partner: None     Emotionally abused: None     Physically abused: None     Forced sexual activity: None   Other Topics Concern     Parent/sibling w/ CABG, MI or angioplasty before 65F 55M? No      Service Not Asked     Blood Transfusions Not Asked     Caffeine Concern No     Comment: 1-2 cups coffee a day     Occupational Exposure Not Asked     Hobby Hazards Not Asked     Sleep Concern No     Stress Concern Yes     Comment: due to wifes health condition     Weight Concern No     Special Diet No     Back Care Not Asked     Exercise Yes     Comment: states he uses his tredmill on and off     Bike Helmet Not Asked     Seat Belt Yes     Self-Exams Not Asked   Social History Narrative     None       I have reviewed the note as documented above.  This accurately captures the substance of my conversation with the patient.      Thank you for allowing me to participate in the care of your patient.    Sincerely,     Sarah Lofton PA-C     [facility]

## 2020-08-25 ENCOUNTER — MEDICAL CORRESPONDENCE (OUTPATIENT)
Dept: HEALTH INFORMATION MANAGEMENT | Facility: CLINIC | Age: 85
End: 2020-08-25

## 2020-08-25 ENCOUNTER — TELEPHONE (OUTPATIENT)
Dept: FAMILY MEDICINE | Facility: CLINIC | Age: 85
End: 2020-08-25

## 2020-08-25 NOTE — TELEPHONE ENCOUNTER
Reason for Call:  Form, our goal is to have forms completed with 72 hours, however, some forms may require a visit or additional information.    Type of letter, form or note:  medical    Who is the form from?: Home care    Where did the form come from: form was faxed in    What clinic location was the form placed at?: [facility]    Where the form was placed: [facility] Box/Folder    What number is listed as a contact on the form?: [phone]       Additional comments: [employer]: OT, PT discharge; okay to hold services due to hospitalization    Call taken on 8/25/2020 at 3:41 PM by Rose Sinha

## 2020-09-27 NOTE — PROGRESS NOTES
"Bairon Foster is a 89 year old male who is being evaluated via a billable telephone visit.      The patient has been notified of following:     \"This telephone visit will be conducted via a call between you and your physician/provider. We have found that certain health care needs can be provided without the need for a physical exam.  This service lets us provide the care you need with a short phone conversation.  If a prescription is necessary we can send it directly to your pharmacy.  If lab work is needed we can place an order for that and you can then stop by our lab to have the test done at a later time.    Telephone visits are billed at different rates depending on your insurance coverage. During this emergency period, for some insurers they may be billed the same as an in-person visit.  Please reach out to your insurance provider with any questions.    If during the course of the call the physician/provider feels a telephone visit is not appropriate, you will not be charged for this service.\"    Patient has given verbal consent for Telephone visit?  Yes    What phone number would you like to be contacted at? [phone]     How would you like to obtain your AVS? Mail a copy      CC:  Heart Failure    VITALS:  /68  Weight 204.5#  HR 79 bpm    BRIEF HPI:  Bairon is an 89 year old yo M who sees Dr. Hope and Dr. Curiel for h/o:     1. Mixed systolic and diastolic HF - Echo 5/2020 with EF 46%. [facility] for weakness and BHAKTI.   2. Severe MR -  s/p Mitral Clip x 2 5/2019 with echo 5/2020 showing mild residual MR and mild MS  3. Moderate TR  4. Lymphedema - I believe he sees [facility]hed[facility] at [facility]  5. Bullous Pemphigoid  - d/t furosemide, switched to torsemide and now Bumex  6. Permanent AFib and sx'c bradycardia - s/p single chamber [employer] PPM 8/2009 with planned gen change 6/29/2020. Gen change 6/29/2020 Had LAAO device (Watchman, 33 mm) placed 12/2019 for frequent " "falls.  7. Remote CVA   8. Memory issues  9. Pancytopenia requiring 1 unit pRBCs during hospitalization 5/2020. Follows with Dr. Chavez  10. Pulmonary Nodule with CT A Heart 6/2019 with large hiatal hernia, subpleural nodule in RLL 5 mm, for which 1 year follow-up was recommended but deferred given Hospice status.  11. CAD - mild, non-obstructive on cath 4/2019    I had a telephone visit with Alfa 8/17/2020 at which time we reviewed that he was doing \"OK\" with stable edema and no PPM issues per his wife Ursula and son Chapincito. He had been enrolled in [facility]2020 due to HFpEF and anemia. They requested a 1 m telephone visit given his hospice status. I had him continue Coreg 6.25 mg BID and Bumex 2 mg in AM and 1 in PM and spironolactone 25. Weight had been 169# at time of [facility] and 180# at time of gen change 6/2020. Ursula thought he appeared euvolemic.     INTERVAL HISTORY:  From cardiac perspective, he feels like he's doing well. No c/o pain at PPM site. No c/o dizziness or lightheadedness.  No falls/faints except for 1 week ago, when his knees gave out standing at the bathroom sink. No trauma or fall - Chapincito was there and caught him. Continues with Hospice RNs Sneha, who comes twice a week.    Pain is controlled with morphine. Many medication changes have been made by Hospice, including stopping spironolactone, simvastatin and ASA.     Notes thigh and now with scrotal swelling.  Takes Bumex 2 mg at night. No PND or orthopnea. No SOB, OLYOLA.    DIAGNOSTICS:  Device interrogation 7/6 with 22%  in VVIR 60/130 bpm.   Echo 5/15/2020 with EF 46%. Normal RV. Mod TR. RVSP 27+RAP. Stable mitral clip position, residual mild MR with mean diastolic gradient 4 mmHg c/w mild MS with HR 76 bpm. Severely dilated IVC 5.4 cm  Cath 4/2019 with mild, non-obstructive dz      REVIEW OF SYSTEMS:  Negative except as noted above     PHYSICAL EXAM:  Deferred, telephone    ASSESSMENT/PLAN:    1. Mixed Systolic and Diastolic HF (EF " 46%)    Currently on Bumex 2 in PM, which has been adjusted by his Hospice provider    BMP 7/19 as noted below.     Weight up ~25# since last time we talked.    Remains on Coreg 6.25 mg BID     PLAN:    Have asked Chapincito to have Sneha call me to get med list corrected    It doesn't appear that Alfa is having any c/o fluid overload (no JVD, orthopnea, SOB/LOYOLA) except significant LE and scrotal edema    Will defer to Hospice, but would like to know what meds he's taking.      2. Permanent AFib with asystole    S/p single chamber PPM gen change ([place]) 6/29 after reaching [facility] 4/12    On ASA. Watchman in place     PLAN:    His ASA has been stopped     CURRENT MEDICATIONS:  Current Outpatient Medications   Medication Sig Dispense Refill     acetaminophen (TYLENOL) 500 MG tablet Take 2 tablets (1,000 mg) by mouth every 6 hours as needed for mild pain 100 tablet 0     bumetanide (BUMEX) 1 MG tablet Take 2 tablets (2 mg) by mouth every evening 60 tablet 0     carvedilol (COREG) 6.25 MG tablet Take 1 tablet (6.25 mg) by mouth 2 times daily (with meals) TAKE 1 TABLET TWICE DAILY WITH MEALS 60 tablet 0     levothyroxine (SYNTHROID/LEVOTHROID) 100 MCG tablet Take 1 tablet (100 mcg) by mouth daily TAKE 1 TABLET EVERY DAY 30 tablet 0     polyethylene glycol (MIRALAX) packet Take 1 packet by mouth daily       ACE/ARB/ARNI NOT PRESCRIBED (INTENTIONAL) Please choose reason not prescribed, below (Patient not taking: Reported on 8/17/2020)       camphor-menthol (DERMASARRA) 0.5-0.5 % external lotion Apply to the affected area every 5 hours as needed. 1 Bottle 1     COMPRESSION STOCKINGS 1 each daily 2 each 0     EPINEPHrine (EPIPEN/ADRENACLICK/OR ANY BX GENERIC EQUIV) 0.3 MG/0.3ML injection 2-pack Inject 0.3 mg into the muscle as needed for anaphylaxis       Multiple Vitamins-Minerals (CENTRUM SILVER) per tablet Take 1 tablet by mouth daily       sulfamethoxazole-trimethoprim (BACTRIM) 400-80 MG tablet Take 1 tablet by mouth 2 times  daily       triamcinolone (KENALOG) 0.025 % cream Apply topically 2 times daily as needed            ORDERS PLACED:  No orders of the defined types were placed in this encounter.    Orders Placed This Encounter   Medications     bumetanide (BUMEX) 1 MG tablet     Sig: Take 2 tablets (2 mg) by mouth every evening     Dispense:  60 tablet     Refill:  0     Medications Discontinued During This Encounter   Medication Reason     bumetanide (BUMEX) 1 MG tablet Discontinued by another Health Care Provider     bumetanide (BUMEX) 1 MG tablet      spironolactone (ALDACTONE) 25 MG tablet Discontinued by another Health Care Provider     tamsulosin (FLOMAX) 0.4 MG capsule Discontinued by another Health Care Provider     finasteride (PROSCAR) 5 MG tablet Discontinued by another Health Care Provider     aspirin 81 MG EC tablet Discontinued by another Health Care Provider     cholecalciferol (VITAMIN D3) 5000 units (125 mcg) capsule Discontinued by another Health Care Provider     simvastatin (ZOCOR) 20 MG tablet Discontinued by another Health Care Provider     omeprazole (PRILOSEC) 40 MG DR capsule Discontinued by another Health Care Provider     sucralfate (CARAFATE) 1 GM tablet Discontinued by another Health Care Provider     potassium chloride ER (KLOR-CON M) 20 MEQ CR tablet Discontinued by another Health Care Provider     buPROPion (WELLBUTRIN XL) 300 MG 24 hr tablet Discontinued by another Health Care Provider         Encounter Diagnoses   Name Primary?     Coronary artery disease, angina presence unspecified, unspecified vessel or lesion type, unspecified whether native or transplanted heart      Chronic combined systolic and diastolic heart failure (H)      Acute on chronic combined systolic and diastolic congestive heart failure (H)      Acute combined systolic and diastolic congestive heart failure (H)          ALLERGIES     Allergies   Allergen Reactions     Furosemide Blisters     Bullous pemphigoid. Bumex and  ethacrynic acid okay.     Torsemide Blisters     Bullous pemphigoid.  Bumex and ethacrynic acid okay.       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     CKD (chronic kidney disease) stage 3, GFR 30-59 ml/min (H)      Coronary atherosclerosis of unspecified type of vessel, native or graft     S/P PTCA, EF 50%     Epistaxis      Esophageal reflux      GI bleed 4/17/2019     Hypercholesterolemia      Ischemic cardiomyopathy      Lymphedema of both lower extremities     Wears ACE wraps     Mitral regurgitation     s/p MitraClip 5/20/19     Mumps      Obese      JIM on CPAP      Other lymphedema     GROIN AND THIGHS     Other pancytopenia (H)      Other specified anemias      Overweight      Pacemaker     PACEMAKER-BS Implanted 8/5/2009      PAD (peripheral artery disease) (H)      Pancytopenia (H)      Persistent atrial fibrillation (H)     VVI PM for long pauses     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    Closure with a 33 mm Watchman device      Pulmonary hypertension (H)      Status post coronary angiogram 3/21/2018     Systolic CHF (H)      TIA (transient ischemic attack)      Unspecified hypothyroidism      Venous stasis dermatitis of both lower extremities      Vitamin D deficiency        PAST SURGICAL HISTORY:  Past Surgical History:   Procedure Laterality Date     BONE MARROW BIOPSY, BONE SPECIMEN, NEEDLE/TROCAR N/A 4/19/2019    Procedure: BONE MARROW BIOPSY;  Surgeon: Yovani Cooley MD;  Location:  OR     CV HEART CATHETERIZATION WITH POSSIBLE INTERVENTION N/A 4/12/2019    Procedure: Heart Catheterization with possible Intervention;  Surgeon: Lev Beck MD;  Location:  HEART CARDIAC CATH LAB     CV LEFT ATRIAL APPENDAGE  CLOSURE N/A 12/3/2019    Procedure: Left Atrial Appendage Closure;  Surgeon: Segundo Hope MD;  Location:  HEART CARDIAC CATH LAB     CV MITRACLIP N/A 5/20/2019    Procedure: CV MITRAL CLIP;  Surgeon: Harshil Winter MD;  Location:  HEART CARDIAC CATH LAB     CV RIGHT HEART CATH N/A 4/12/2019    Procedure: Right Heart Cath;  Surgeon: Lev Beck MD;  Location:  HEART CARDIAC CATH LAB     CYSTOSCOPY       EP PACEMAKER N/A 6/29/2020    Procedure: EP Pacemaker;  Surgeon: Shubham Curiel MD;  Location:  HEART CARDIAC CATH LAB     EYE SURGERY      CATARACT/BLEPHAROPLASTY     GENITOURINARY SURGERY      TUNA     HERNIA REPAIR      RIGHT INGUINAL     HERNIORRHAPHY INGUINAL  8/14/2012    Procedure: HERNIORRHAPHY INGUINAL;  right inguinal hernia repair;  Surgeon: Kareem Torrez MD;  Location:  [facility]     HERNIORRHAPHY UMBILICAL N/A 10/31/2017    Procedure: HERNIORRHAPHY UMBILICAL;  UMBILICAL HERNIA REPAIR WITH MESH;  Surgeon: Scar Harrington MD;  [facility]: [facility]     IMPLANT PACEMAKER  8/2009    single chamber     ORTHOPEDIC SURGERY      [facility] [place] [facility]     ORTHOPEDIC SURGERY  2010    right TKR, left TKR     SURGICAL HISTORY OF -       Thumb surgery     SURGICAL HISTORY OF -   1990s    Left total knee replacement     SURGICAL HISTORY OF -   3/11    [facility] total knee replacement       FAMILY HISTORY:  Family History   Problem Relation Age of Onset     Cardiovascular Father      C.A.D. Father      Lung Cancer Sister      Unknown/Adopted Mother      Cancer - colorectal No family hx of        SOCIAL HISTORY:  Social History     Socioeconomic History     Marital status:      Spouse name: Not on file     Number of children: Not on file     Years of education: Not on file     Highest education level: Not on file   Occupational History     Occupation: Teacher, author, speaker     Employer: RETIRED   Social Needs     Financial resource strain: Not on file     Food insecurity     Worry: Not on file      Inability: Not on file     Transportation needs     Medical: Not on file     Non-medical: Not on file   Tobacco Use     Smoking status: Never Smoker     Smokeless tobacco: Never Used   Substance and Sexual Activity     Alcohol use: Not Currently     Alcohol/week: 0.0 standard drinks     Comment: 1/month     Drug use: No     Sexual activity: Not Currently     Partners: Female   Lifestyle     Physical activity     Days per week: Not on file     Minutes per session: Not on file     Stress: Not on file   Relationships     Social connections     Talks on phone: Not on file     Gets together: Not on file     Attends Confucianist service: Not on file     Active member of club or organization: Not on file     Attends meetings of clubs or organizations: Not on file     Relationship status: Not on file     Intimate partner violence     Fear of current or ex partner: Not on file     Emotionally abused: Not on file     Physically abused: Not on file     Forced sexual activity: Not on file   Other Topics Concern     Parent/sibling w/ CABG, MI or angioplasty before 65F 55M? No      Service Not Asked     Blood Transfusions Not Asked     Caffeine Concern No     Comment: 1-2 cups coffee a day     Occupational Exposure Not Asked     Hobby Hazards Not Asked     Sleep Concern No     Stress Concern Yes     Comment: due to wifes health condition     Weight Concern No     Special Diet No     Back Care Not Asked     Exercise Yes     Comment: states he uses his tredmill on and off     Bike Helmet Not Asked     Seat Belt Yes     Self-Exams Not Asked   Social History Narrative     Not on file       I have reviewed the note as documented above.  This accurately captures the substance of my conversation with the patient.     Phone call contact time  Call Started at 1310  Call Ended at 1340  Duration 30 minutes    Sarah Lofton PA-C [facility]

## 2020-09-28 ENCOUNTER — VIRTUAL VISIT (OUTPATIENT)
Dept: CARDIOLOGY | Facility: CLINIC | Age: 85
End: 2020-09-28
Attending: PHYSICIAN ASSISTANT
Payer: COMMERCIAL

## 2020-09-28 DIAGNOSIS — I50.43 ACUTE ON CHRONIC COMBINED SYSTOLIC AND DIASTOLIC CONGESTIVE HEART FAILURE (H): ICD-10-CM

## 2020-09-28 DIAGNOSIS — I25.10 CORONARY ARTERY DISEASE, ANGINA PRESENCE UNSPECIFIED, UNSPECIFIED VESSEL OR LESION TYPE, UNSPECIFIED WHETHER NATIVE OR TRANSPLANTED HEART: ICD-10-CM

## 2020-09-28 DIAGNOSIS — I50.42 CHRONIC COMBINED SYSTOLIC AND DIASTOLIC HEART FAILURE (H): ICD-10-CM

## 2020-09-28 DIAGNOSIS — I50.41 ACUTE COMBINED SYSTOLIC AND DIASTOLIC CONGESTIVE HEART FAILURE (H): ICD-10-CM

## 2020-09-28 PROCEDURE — 99214 OFFICE O/P EST MOD 30 MIN: CPT | Mod: 95 | Performed by: PHYSICIAN ASSISTANT

## 2020-09-28 RX ORDER — BUMETANIDE 1 MG/1
2 TABLET ORAL EVERY EVENING
Qty: 60 TABLET | Refills: 0 | COMMUNITY
Start: 2020-09-28

## 2020-09-28 NOTE — PATIENT INSTRUCTIONS
Alfa - it was nice to speak with you, Ursula and Chapincito today!    1. Reviewed that many medication changes have taken place since we last talked  2. I'm glad you're feeling so good and the pain from the swelling is controlled despite gaining ~25# since we last talked (180# at time of generator change).    PLAN:  1. Will review medications with Sneha (she can call [phone] [phone])  2. No changes from my perspective as don't want multiple people adjusting things!

## 2020-09-28 NOTE — LETTER
"9/28/2020    Mg Nelson MD  [address]  [facility] [address]    RE: Bairon Foster       Dear Colleague,    I had the pleasure of seeing Bairon Foster in the [facility].    Bairon Foster is a 89 year old male who is being evaluated via a billable telephone visit.      The patient has been notified of following:     \"This telephone visit will be conducted via a call between you and your physician/provider. We have found that certain health care needs can be provided without the need for a physical exam.  This service lets us provide the care you need with a short phone conversation.  If a prescription is necessary we can send it directly to your pharmacy.  If lab work is needed we can place an order for that and you can then stop by our lab to have the test done at a later time.    Telephone visits are billed at different rates depending on your insurance coverage. During this emergency period, for some insurers they may be billed the same as an in-person visit.  Please reach out to your insurance provider with any questions.    If during the course of the call the physician/provider feels a telephone visit is not appropriate, you will not be charged for this service.\"    Patient has given verbal consent for Telephone visit?  Yes    What phone number would you like to be contacted at? [phone]     How would you like to obtain your AVS? Mail a copy    CC:  Heart Failure    VITALS:  /68  Weight 204.5#  HR 79 bpm    BRIEF HPI:  Bairon is an 89 year old yo M who sees Dr. Hope and Dr. Curiel for h/o:     1. Mixed systolic and diastolic HF - Echo 5/2020 with EF 46%. [facility] for weakness and BHAKTI.   2. Severe MR -  s/p Mitral Clip x 2 5/2019 with echo 5/2020 showing mild residual MR and mild MS  3. Moderate TR  4. Lymphedema - I believe he [facility] at [facility]  5. Bullous Pemphigoid  - d/t furosemide, switched to torsemide and now " "Bumex  6. Permanent AFib and sx'c bradycardia - s/p single chamber [employer] PPM 8/2009 with planned gen change 6/29/2020. Gen change 6/29/2020 Had LAAO device (Watchman, 33 mm) placed 12/2019 for frequent falls.  7. Remote CVA   8. Memory issues  9. Pancytopenia requiring 1 unit pRBCs during hospitalization 5/2020. Follows with Dr. Chavez  10. Pulmonary Nodule with CT A Heart 6/2019 with large hiatal hernia, subpleural nodule in RLL 5 mm, for which 1 year follow-up was recommended but deferred given Hospice status.  11. CAD - mild, non-obstructive on cath 4/2019    I had a telephone visit with Alfa 8/17/2020 at which time we reviewed that he was doing \"OK\" with stable edema and no PPM issues per his wife Ursula and son Chapincito. He had been enrolled in [facility]osp[facility] 7/2020 due to HFpEF and anemia. They requested a 1 m telephone visit given his hospice status. I had him continue Coreg 6.25 mg BID and Bumex 2 mg in AM and 1 in PM and spironolactone 25. Weight had been 169# [facility] and 180# at time of gen change 6/2020. Ursula thought he appeared euvolemic.     INTERVAL HISTORY:  From cardiac perspective, he feels like he's doing well. No c/o pain at PPM site. No c/o dizziness or lightheadedness.  No falls/faints except for 1 week ago, when his knees gave out standing at the bathroom sink. No trauma or fall - Chapincito was there and caught him. Continues with Hospice RNs Sneha, who comes twice a week.    Pain is controlled with morphine. Many medication changes have been made by Hospice, including stopping spironolactone, simvastatin and ASA.     Notes thigh and now with scrotal swelling.  Takes Bumex 2 mg at night. No PND or orthopnea. No SOB, LOYOLA.    DIAGNOSTICS:  Device interrogation 7/6 with 22%  in VVIR 60/130 bpm.   Echo 5/15/2020 with EF 46%. Normal RV. Mod TR. RVSP 27+RAP. Stable mitral clip position, residual mild MR with mean diastolic gradient 4 mmHg c/w mild MS with HR 76 bpm. Severely dilated " IVC 5.4 cm  Cath 4/2019 with mild, non-obstructive dz      REVIEW OF SYSTEMS:  Negative except as noted above     PHYSICAL EXAM:  Deferred, telephone    ASSESSMENT/PLAN:    1. Mixed Systolic and Diastolic HF (EF 46%)    Currently on Bumex 2 in PM, which has been adjusted by his Hospice provider    BMP 7/19 as noted below.     Weight up ~25# since last time we talked.    Remains on Coreg 6.25 mg BID     PLAN:    Have asked Chapincito to have Sneha call me to get med list corrected    It doesn't appear that Alfa is having any c/o fluid overload (no JVD, orthopnea, SOB/LOYOLA) except significant LE and scrotal edema    Will defer to Hospice, but would like to know what meds he's taking.      2. Permanent AFib with asystole    S/p single chamber PPM gen change ([employer]) 6/29 after reaching [facility] 4/12    On ASA. Watchman in place     PLAN:    His ASA has been stopped     CURRENT MEDICATIONS:  Current Outpatient Medications   Medication Sig Dispense Refill     acetaminophen (TYLENOL) 500 MG tablet Take 2 tablets (1,000 mg) by mouth every 6 hours as needed for mild pain 100 tablet 0     bumetanide (BUMEX) 1 MG tablet Take 2 tablets (2 mg) by mouth every evening 60 tablet 0     carvedilol (COREG) 6.25 MG tablet Take 1 tablet (6.25 mg) by mouth 2 times daily (with meals) TAKE 1 TABLET TWICE DAILY WITH MEALS 60 tablet 0     levothyroxine (SYNTHROID/LEVOTHROID) 100 MCG tablet Take 1 tablet (100 mcg) by mouth daily TAKE 1 TABLET EVERY DAY 30 tablet 0     polyethylene glycol (MIRALAX) packet Take 1 packet by mouth daily       ACE/ARB/ARNI NOT PRESCRIBED (INTENTIONAL) Please choose reason not prescribed, below (Patient not taking: Reported on 8/17/2020)       camphor-menthol (DERMASARRA) 0.5-0.5 % external lotion Apply to the affected area every 5 hours as needed. 1 Bottle 1     COMPRESSION STOCKINGS 1 each daily 2 each 0     EPINEPHrine (EPIPEN/ADRENACLICK/OR ANY BX GENERIC EQUIV) 0.3 MG/0.3ML injection 2-pack Inject 0.3 mg into the muscle  as needed for anaphylaxis       Multiple Vitamins-Minerals (CENTRUM SILVER) per tablet Take 1 tablet by mouth daily       sulfamethoxazole-trimethoprim (BACTRIM) 400-80 MG tablet Take 1 tablet by mouth 2 times daily       triamcinolone (KENALOG) 0.025 % cream Apply topically 2 times daily as needed            ORDERS PLACED:  No orders of the defined types were placed in this encounter.    Orders Placed This Encounter   Medications     bumetanide (BUMEX) 1 MG tablet     Sig: Take 2 tablets (2 mg) by mouth every evening     Dispense:  60 tablet     Refill:  0     Medications Discontinued During This Encounter   Medication Reason     bumetanide (BUMEX) 1 MG tablet Discontinued by another Health Care Provider     bumetanide (BUMEX) 1 MG tablet      spironolactone (ALDACTONE) 25 MG tablet Discontinued by another Health Care Provider     tamsulosin (FLOMAX) 0.4 MG capsule Discontinued by another Health Care Provider     finasteride (PROSCAR) 5 MG tablet Discontinued by another Health Care Provider     aspirin 81 MG EC tablet Discontinued by another Health Care Provider     cholecalciferol (VITAMIN D3) 5000 units (125 mcg) capsule Discontinued by another Health Care Provider     simvastatin (ZOCOR) 20 MG tablet Discontinued by another Health Care Provider     omeprazole (PRILOSEC) 40 MG DR capsule Discontinued by another Health Care Provider     sucralfate (CARAFATE) 1 GM tablet Discontinued by another Health Care Provider     potassium chloride ER (KLOR-CON M) 20 MEQ CR tablet Discontinued by another Health Care Provider     buPROPion (WELLBUTRIN XL) 300 MG 24 hr tablet Discontinued by another Health Care Provider         Encounter Diagnoses   Name Primary?     Coronary artery disease, angina presence unspecified, unspecified vessel or lesion type, unspecified whether native or transplanted heart      Chronic combined systolic and diastolic heart failure (H)      Acute on chronic combined systolic and diastolic congestive  heart failure (H)      Acute combined systolic and diastolic congestive heart failure (H)          ALLERGIES     Allergies   Allergen Reactions     Furosemide Blisters     Bullous pemphigoid. Bumex and ethacrynic acid okay.     Torsemide Blisters     Bullous pemphigoid.  Bumex and ethacrynic acid okay.       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     CKD (chronic kidney disease) stage 3, GFR 30-59 ml/min (H)      Coronary atherosclerosis of unspecified type of vessel, native or graft     S/P PTCA, EF 50%     Epistaxis      Esophageal reflux      GI bleed 4/17/2019     Hypercholesterolemia      Ischemic cardiomyopathy      Lymphedema of both lower extremities     Wears ACE wraps     Mitral regurgitation     s/p MitraClip 5/20/19     Mumps      Obese      JIM on CPAP      Other lymphedema     GROIN AND THIGHS     Other pancytopenia (H)      Other specified anemias      Overweight      Pacemaker     PACEMAKER-BS Implanted 8/5/2009      PAD (peripheral artery disease) (H)      Pancytopenia (H)      Persistent atrial fibrillation (H)     VVI PM for long pauses     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    Closure with a 33 mm Watchman device      Pulmonary hypertension (H)      Status post coronary angiogram 3/21/2018     Systolic CHF (H)      TIA (transient ischemic attack)      Unspecified hypothyroidism      Venous stasis dermatitis of both lower extremities      Vitamin D deficiency        PAST SURGICAL HISTORY:  Past Surgical History:   Procedure Laterality Date     BONE MARROW BIOPSY, BONE SPECIMEN, NEEDLE/TROCAR N/A 4/19/2019    Procedure: BONE MARROW BIOPSY;  Surgeon: Yovani Cooley MD;  Location: [facility] OR     CV HEART CATHETERIZATION WITH  POSSIBLE INTERVENTION N/A 4/12/2019    Procedure: Heart Catheterization with possible Intervention;  Surgeon: Lev Beck MD;  Location:  HEART CARDIAC CATH LAB     CV LEFT ATRIAL APPENDAGE CLOSURE N/A 12/3/2019    Procedure: Left Atrial Appendage Closure;  Surgeon: Segundo Hope MD;  Location:  HEART CARDIAC CATH LAB     CV MITRACLIP N/A 5/20/2019    Procedure: CV MITRAL CLIP;  Surgeon: Harshil Winter MD;  Location:  HEART CARDIAC CATH LAB     CV RIGHT HEART CATH N/A 4/12/2019    Procedure: Right Heart Cath;  Surgeon: Lev Beck MD;  Location:  HEART CARDIAC CATH LAB     CYSTOSCOPY       EP PACEMAKER N/A 6/29/2020    Procedure: EP Pacemaker;  Surgeon: Shubham Curiel MD;  Location:  HEART CARDIAC CATH LAB     EYE SURGERY      CATARACT/BLEPHAROPLASTY     GENITOURINARY SURGERY      TUNA     HERNIA REPAIR      RIGHT INGUINAL     HERNIORRHAPHY INGUINAL  8/14/2012    Procedure: HERNIORRHAPHY INGUINAL;  right inguinal hernia repair;  Surgeon: Kareem Torrez MD;  Location:  [facility]     HERNIORRHAPHY UMBILICAL N/A 10/31/2017    Procedure: HERNIORRHAPHY UMBILICAL;  UMBILICAL HERNIA REPAIR WITH MESH;  Surgeon: Scar Harrington MD;  Location: [facility]     IMPLANT PACEMAKER  8/2009    single chamber     ORTHOPEDIC SURGERY      [facility] RIGHT     ORTHOPEDIC SURGERY  2010    right TKR, left TKR     SURGICAL HISTORY OF -       Thumb surgery     SURGICAL HISTORY OF -   1990s    Left total knee replacement     SURGICAL HISTORY OF -   3/11    [facility] total knee replacement       FAMILY HISTORY:  Family History   Problem Relation Age of Onset     Cardiovascular Father      C.A.D. Father      Lung Cancer Sister      Unknown/Adopted Mother      Cancer - colorectal No family hx of        SOCIAL HISTORY:  Social History     Socioeconomic History     Marital status:      Spouse name: Not on file     Number of children: Not on file     Years of education: Not on file     Highest education level: Not  on file   Occupational History     Occupation: Teacher, author, speaker     Employer: RETIRED   Social Needs     Financial resource strain: Not on file     Food insecurity     Worry: Not on file     Inability: Not on file     Transportation needs     Medical: Not on file     Non-medical: Not on file   Tobacco Use     Smoking status: Never Smoker     Smokeless tobacco: Never Used   Substance and Sexual Activity     Alcohol use: Not Currently     Alcohol/week: 0.0 standard drinks     Comment: 1/month     Drug use: No     Sexual activity: Not Currently     Partners: Female   Lifestyle     Physical activity     Days per week: Not on file     Minutes per session: Not on file     Stress: Not on file   Relationships     Social connections     Talks on phone: Not on file     Gets together: Not on file     Attends Latter day service: Not on file     Active member of club or organization: Not on file     Attends meetings of clubs or organizations: Not on file     Relationship status: Not on file     Intimate partner violence     Fear of current or ex partner: Not on file     Emotionally abused: Not on file     Physically abused: Not on file     Forced sexual activity: Not on file   Other Topics Concern     Parent/sibling w/ CABG, MI or angioplasty before 65F 55M? No      Service Not Asked     Blood Transfusions Not Asked     Caffeine Concern No     Comment: 1-2 cups coffee a day     Occupational Exposure Not Asked     Hobby Hazards Not Asked     Sleep Concern No     Stress Concern Yes     Comment: due to wifes health condition     Weight Concern No     Special Diet No     Back Care Not Asked     Exercise Yes     Comment: states he uses his tredmill on and off     Bike Helmet Not Asked     Seat Belt Yes     Self-Exams Not Asked   Social History Narrative     Not on file       I have reviewed the note as documented above.  This accurately captures the substance of my conversation with the patient.     Phone call contact  time  Call Started at 1310  Call Ended at 1340  Duration 30 minutes      Thank you for allowing me to participate in the care of your patient.    Sincerely,     Sarah Lofton PA-C     [facility]

## 2020-10-13 ENCOUNTER — PATIENT OUTREACH (OUTPATIENT)
Dept: ONCOLOGY | Facility: CLINIC | Age: 85
End: 2020-10-13

## 2020-12-07 ENCOUNTER — MEDICAL CORRESPONDENCE (OUTPATIENT)
Dept: HEALTH INFORMATION MANAGEMENT | Facility: CLINIC | Age: 85
End: 2020-12-07

## 2021-01-15 ENCOUNTER — HEALTH MAINTENANCE LETTER (OUTPATIENT)
Age: 86
End: 2021-01-15

## 2021-01-23 NOTE — TELEPHONE ENCOUNTER
Patient called and left  requesting callback. RN returned patient's call and he advised that he had some medication questions and he set up an OV with KI Anushka to discuss. RN offered to discuss with patient over the phone and he advised that he would rather discuss at OV with KI Anushka. RN advised patient to call our clinic with any urgent concerns prior to OV on 2/27/19 with KI Anushka. Patient verbalized understanding and was in agreement with plan.   
Airborne precautions...

## 2021-01-27 ENCOUNTER — MEDICAL CORRESPONDENCE (OUTPATIENT)
Dept: HEALTH INFORMATION MANAGEMENT | Facility: CLINIC | Age: 86
End: 2021-01-27

## 2021-05-06 ENCOUNTER — RECORDS - HEALTHEAST (OUTPATIENT)
Dept: LAB | Facility: CLINIC | Age: 86
End: 2021-05-06

## 2021-05-10 LAB
BACTERIA SPEC CULT: ABNORMAL
BACTERIA SPEC CULT: ABNORMAL

## 2021-05-13 ENCOUNTER — RECORDS - HEALTHEAST (OUTPATIENT)
Dept: LAB | Facility: CLINIC | Age: 86
End: 2021-05-13

## 2021-05-14 LAB — POTASSIUM BLD-SCNC: 3.1 MMOL/L (ref 3.5–5)

## 2021-05-17 ENCOUNTER — RECORDS - HEALTHEAST (OUTPATIENT)
Dept: LAB | Facility: CLINIC | Age: 86
End: 2021-05-17

## 2021-05-18 ENCOUNTER — RECORDS - HEALTHEAST (OUTPATIENT)
Dept: LAB | Facility: CLINIC | Age: 86
End: 2021-05-18

## 2021-05-18 LAB
ANION GAP SERPL CALCULATED.3IONS-SCNC: 14 MMOL/L (ref 5–18)
BUN SERPL-MCNC: 36 MG/DL (ref 8–28)
CALCIUM SERPL-MCNC: 8.8 MG/DL (ref 8.5–10.5)
CHLORIDE BLD-SCNC: 96 MMOL/L (ref 98–107)
CO2 SERPL-SCNC: 29 MMOL/L (ref 22–31)
CREAT SERPL-MCNC: 1.44 MG/DL (ref 0.7–1.3)
GFR SERPL CREATININE-BSD FRML MDRD: 46 ML/MIN/1.73M2
GLUCOSE BLD-MCNC: 134 MG/DL (ref 70–125)
POTASSIUM BLD-SCNC: 2.9 MMOL/L (ref 3.5–5)
SODIUM SERPL-SCNC: 139 MMOL/L (ref 136–145)

## 2021-05-19 ENCOUNTER — RECORDS - HEALTHEAST (OUTPATIENT)
Dept: LAB | Facility: CLINIC | Age: 86
End: 2021-05-19

## 2021-05-19 LAB — POTASSIUM BLD-SCNC: 3.2 MMOL/L (ref 3.5–5)

## 2021-05-20 LAB — POTASSIUM BLD-SCNC: 3.4 MMOL/L (ref 3.5–5)

## 2021-05-23 ENCOUNTER — RECORDS - HEALTHEAST (OUTPATIENT)
Dept: LAB | Facility: CLINIC | Age: 86
End: 2021-05-23

## 2021-05-24 LAB
ANION GAP SERPL CALCULATED.3IONS-SCNC: 11 MMOL/L (ref 5–18)
BUN SERPL-MCNC: 36 MG/DL (ref 8–28)
CALCIUM SERPL-MCNC: 9.4 MG/DL (ref 8.5–10.5)
CHLORIDE BLD-SCNC: 96 MMOL/L (ref 98–107)
CO2 SERPL-SCNC: 33 MMOL/L (ref 22–31)
CREAT SERPL-MCNC: 1.38 MG/DL (ref 0.7–1.3)
GFR SERPL CREATININE-BSD FRML MDRD: 48 ML/MIN/1.73M2
GLUCOSE BLD-MCNC: 105 MG/DL (ref 70–125)
POTASSIUM BLD-SCNC: 3.1 MMOL/L (ref 3.5–5)
SODIUM SERPL-SCNC: 140 MMOL/L (ref 136–145)

## 2021-05-26 ENCOUNTER — RECORDS - HEALTHEAST (OUTPATIENT)
Dept: LAB | Facility: CLINIC | Age: 86
End: 2021-05-26

## 2021-05-27 LAB
ANION GAP SERPL CALCULATED.3IONS-SCNC: 11 MMOL/L (ref 5–18)
BUN SERPL-MCNC: 37 MG/DL (ref 8–28)
CALCIUM SERPL-MCNC: 9 MG/DL (ref 8.5–10.5)
CHLORIDE BLD-SCNC: 98 MMOL/L (ref 98–107)
CO2 SERPL-SCNC: 32 MMOL/L (ref 22–31)
CREAT SERPL-MCNC: 1.32 MG/DL (ref 0.7–1.3)
GFR SERPL CREATININE-BSD FRML MDRD: 51 ML/MIN/1.73M2
GLUCOSE BLD-MCNC: 141 MG/DL (ref 70–125)
POTASSIUM BLD-SCNC: 3.3 MMOL/L (ref 3.5–5)
SODIUM SERPL-SCNC: 141 MMOL/L (ref 136–145)

## 2021-05-31 ENCOUNTER — RECORDS - HEALTHEAST (OUTPATIENT)
Dept: LAB | Facility: CLINIC | Age: 86
End: 2021-05-31

## 2021-06-01 LAB — POTASSIUM BLD-SCNC: 3.5 MMOL/L (ref 3.5–5)

## 2021-06-25 NOTE — NURSING NOTE
"Chief Complaint   Patient presents with     RECHECK       Initial /76 (BP Location: Right arm, Patient Position: Chair, Cuff Size: Adult Regular)  Pulse 60  Ht 6' 2\" (1.88 m)  Wt 217 lb (98.4 kg)  SpO2 97%  BMI 27.86 kg/m2 Estimated body mass index is 27.86 kg/(m^2) as calculated from the following:    Height as of this encounter: 6' 2\" (1.88 m).    Weight as of this encounter: 217 lb (98.4 kg).  Medication Reconciliation: complete     Zamzam Peterson MA     "
[Time Spent: ___ minutes] : I have spent [unfilled] minutes of time on the encounter.

## 2021-09-05 ENCOUNTER — HEALTH MAINTENANCE LETTER (OUTPATIENT)
Age: 86
End: 2021-09-05

## 2022-02-17 PROBLEM — K21.9 GASTROESOPHAGEAL REFLUX DISEASE: Status: ACTIVE | Noted: 2020-06-19

## 2022-02-20 ENCOUNTER — HEALTH MAINTENANCE LETTER (OUTPATIENT)
Age: 87
End: 2022-02-20

## 2022-10-23 ENCOUNTER — HEALTH MAINTENANCE LETTER (OUTPATIENT)
Age: 87
End: 2022-10-23

## 2022-12-06 NOTE — OR NURSING
Patient appears confused as to the reason for the procedure. Had wife come down to sign consent for the BMBX.  
BX PROVEN CMN WITH CONGENITAL FEATURES AND + DEEP MARGINS. SHE HAS A CLINICAL RECURRENCE ON HER LEFT UPPER LATERAL ANTERIOR THIGH. SEE PHOTO. ADVISED OBSERVATION AND REASSURANCE.
Detail Level: Zone

## 2023-03-08 NOTE — TELEPHONE ENCOUNTER
Our goal is to have forms completed within 72 hours, however some forms may require a visit or additional information.    What clinic location was the form placed at [facility] or [facility]     Who is the form from?   Where did the form come from? Faxed to clinic   The form was placed in the inbox of Mg Nelson MD      Please fax to [phone]  Phone number: [phone]    Additional comments: Home Health  [facility] 4/23/19 to 6/21/19    Call take on 5/7/2019 at 2:28 PM by Maya Monterroso                                 show

## 2023-03-09 NOTE — PHARMACY - PREOPERATIVE ASSESSMENT CENTER
Anticoagulation Note - Preoperative Assessment Center (PAC) Pharmacist     Patient seen and interviewed during time of PAC Clinic appointment May 16, 2019.  The purpose of this note is to document the perioperative anticoagulation plan outlined by the providers caring for Bairon Foster.     Current Regimen  Anticoagulation Regimen as of May 16, 2019: Apixaban 2.5mg by mouth twice daily   Indication: afib  Expected Duration of therapy: indefinite    Perioperative plan  Bairon Foster is scheduled for CV mitral clip on 5/20/19 with Dr. Winter and the perioperative anticoagulation plan outlined by his surgical team is to take the last dose of apixaban today. Starting 5/17/19 morning he will begin taking aspirin 81mg daily up to the day of surgery. He will not take a dose of aspirin the day of surgery as it will be administered to him the day of procedure.     Resumption of anticoagulation after procedure will be based on surgery team assessment of bleeding risks and complications.  This plan may require re-assessment and modification by his primary team in the perioperative setting depending on patients clinical situation.        Kwasi Fregoso RPH  May 16, 2019  3:45 PM        
Statement Selected

## 2023-04-02 ENCOUNTER — HEALTH MAINTENANCE LETTER (OUTPATIENT)
Age: 88
End: 2023-04-02

## 2023-05-25 NOTE — TELEPHONE ENCOUNTER
"Requested Prescriptions   Pending Prescriptions Disp Refills     carvedilol (COREG) 6.25 MG tablet [Pharmacy Med Name: CARVEDILOL 6.25 MG  Last Written Prescription Date:  9/6/2018  Last Fill Quantity: 180,  # refills: 3   Last office visit: 8/21/2019 with prescribing provider:  Dr Nelson   Future Office Visit:   Next 5 appointments (look out 90 days)    Oct 30, 2019  2:30 PM CDT  PHYSICAL with Mg Nelson MD  [facility] [address]  [facility] [address]  [phone]   Nov 13, 2019  2:30 PM CST  Return Visit with WILLIS Spence CNP  [facility]) [address] [place] [id number] [phone] OPT 2   Nov 15, 2019  3:15 PM CST  Return Visit with Segundo Hope MD  [facility] ([facility]) [address] [place] [id number] [phone] OPT 2          Tablet] 180 tablet 3     Sig: TAKE 1 TABLET TWICE DAILY WITH MEALS       Beta-Blockers Protocol Passed - 10/14/2019  5:05 PM        Passed - Blood pressure under 140/90 in past 12 months     BP Readings from Last 3 Encounters:   09/26/19 110/60   08/21/19 122/64   08/15/19 108/67                 Passed - Patient is age 6 or older        Passed - Recent (12 mo) or future (30 days) visit within the authorizing provider's specialty     Patient has had an office visit with the authorizing provider or a provider within the authorizing providers department within the previous 12 mos or has a future within next 30 days. See \"Patient Info\" tab in inbasket, or \"Choose Columns\" in Meds & Orders section of the refill encounter.              Passed - Medication is active on med list        levothyroxine (SYNTHROID/LEVOTHROID) 100 MCG tablet [Pharmacy Med Name:  Last Written Prescription Date:  " "9/6/2018  Last Fill Quantity: 90,  # refills: 3   Last office visit: 8/21/2019 with prescribing provider:  Dr Nelson   Future Office Visit:   Next 5 appointments (look out 90 days)    Oct 30, 2019  2:30 PM CDT  [facility]  [facility] [address]  [facility] [address]  [phone]   Nov 13, 2019  2:30 PM CST  Return Visit with WILLIS Spence CNP  [facility] [address]  [facility] [id number] [phone] OPT 2   Nov 15, 2019  3:15 PM CST  Return Visit with Segundo Hope MD  [facility] ([facility]) [address]  [facility] [id number] [phone] OPT 2          LEVOTHYROXINE SODIUM 100 MCG Tablet] 90 tablet 3     Sig: TAKE 1 TABLET EVERY DAY       Thyroid Protocol Passed - 10/14/2019  5:05 PM        Passed - Patient is 12 years or older        Passed - Recent (12 mo) or future (30 days) visit within the authorizing provider's specialty     Patient has had an office visit with the authorizing provider or a provider within the authorizing providers department within the previous 12 mos or has a future within next 30 days. See \"Patient Info\" tab in inbasket, or \"Choose Columns\" in Meds & Orders section of the refill encounter.              Passed - Medication is active on med list        Passed - Normal TSH on file in past 12 months     Recent Labs   Lab Test 04/08/19  1218   TSH 3.09                " Slit Excision Additional Text (Leave Blank If You Do Not Want): A linear line was drawn on the skin overlying the lesion. An incision was made slowly until the lesion was visualized.  Once visualized, the lesion was removed with blunt dissection.

## 2023-07-11 NOTE — PLAN OF CARE
Pharmacy requesting refill     pantoprazole 40 MG Oral Tab EC Take 1 tablet (40 mg total) by mouth before breakfast. 90 tablet 1 Vitals: WNL  Lungs: WNL  CV: Afib BBB occasional PVCs  GI: WNL  Uro: WNL  CMS: +3/4 gabriele LE and scrotal edema. Weak/doppler pedal pulse. Red.  Uses compression wraps at home and are here in hospital.   Neuro: Pt. Forgetful. Slow to respond to month and stated June.   Skin: Red LE. Blister dressing dry and intact to RLE foot. WOC consult done.    Psych: WNL  MSK: WNL. SBA. Uses a cane at baseline.    Pain: Chronic R shoulder pain. Relief with Tylenol and stretches.   Labs: Crt 122 monitoring. Hgb 8.3 stable   Tests/Procedures: na  Other:  Started on chlorthalidone and IV Bumex gtt.

## 2023-08-09 NOTE — PATIENT INSTRUCTIONS
Chief Complaint:   Chief Complaint   Patient presents with    Knee Pain     Jaylan knee pain. Sx from prior physician 10 months Lt knee 10/28/22 ago but worstness 2wks ago.       Consulting Physician: No ref. provider found    History of present illness:    He is a pleasant 52-year-old who presents with bilateral knee pain.  He is status post ACL reconstruction in October 2022. He has also had an increase in right knee pain. Since his last visit he noticed improvement in both knees. He is working with formal therapy. He reports recently he's had an increase in pain bilaterally, right worse than left. He has clicking in the right knee. He has concerns about returning to work as a . He recently discussed with his nephrologist about starting Celebrex and wanted our opinion on it. He is trying to avoid injections or repeat surgery.       Past Medical History:   Diagnosis Date    Bone spur neck     Congenital absence of one kidney     E. coli after prostate bx, currently on abx     GERD (gastroesophageal reflux disease)     History of chicken pox     Irregular heart beat     Irritable bowel syndrome without diarrhea     Kidney stones     Nonerosive esophageal reflux disease     Personal history of colonic polyps     Personal history of colonic polyps     Prostatitis     Swallowing difficulty        Past Surgical History:   Procedure Laterality Date    abdominal ultrasound      COLONOSCOPY  04/20/2022    COLONOSCOPY  03/30/2017    ESOPHAGOGASTRODUODENOSCOPY      2021, 02/25/21, 12/29/17,    ESOPHAGOGASTRODUODENOSCOPY N/A 07/25/2022    Procedure: EGD w/ BRAVO PLACEMENT 48 HR;  Surgeon: SHELTON Schreiber MD;  Location: [facility] ENDOSCOPY;  Service: Gastroenterology;  Laterality: N/A;  Pt advised by office to hold PPI x 3 days before procedure.    INSERTION OF PH PROBE N/A 07/25/2022    Procedure: FAM;  Surgeon: SHELTON Schreiber MD;  Location: [facility] ENDOSCOPY;  Service: Gastroenterology;   In reviewing the notes from cardiology last visit on Friday, his Spironolactone was increased to 50 mg and his furosemide was decreased to 40 mg.  That was not noted on his medication sheet.  I did write him a new prescription for Spironolactone 50 mg to take 1 daily.  I also change the medication sheet to reflect his going back to the 40 mg daily of the furosemide.  He will follow-up with me in about a month.  He sees cardiology next week for an echocardiogram.  He has been feeling fair amount better even though his weight is only down 2 pounds on our scale he is not having the chafing that he was getting from his scrotal edema.  He thinks the improvement has been pretty good.  His breathing is actually been very good of late.  He is not complaining of being short of breath.  We will need to check his kidney function and electrolytes in the next week or so.   Laterality: N/A;    LITHOTRIPSY      x2    PARTIAL NEPHRECTOMY      PROSTATE BIOPSY      x2       Current Outpatient Medications   Medication Sig    ascorbic acid, vitamin C, (VITAMIN C) 100 MG tablet Take 100 mg by mouth once daily.    celecoxib (CELEBREX) 200 MG capsule Take by mouth daily as needed.    ciprofloxacin HCl (CIPRO) 500 MG tablet Take 500 mg by mouth 2 (two) times daily.    ergocalciferol, vitamin D2, (VITAMIN D ORAL) Take by mouth.    EScitalopram oxalate (LEXAPRO) 5 MG Tab Take 5 mg by mouth.    esomeprazole magnesium (NEXIUM) 10 mg suspension Take 10 mg by mouth before breakfast.    famotidine (PEPCID) 40 MG tablet Take 40 mg by mouth once daily.    pantoprazole (PROTONIX) 40 MG tablet Take 40 mg by mouth once daily.    sildenafil (REVATIO) 20 mg Tab Take 20 mg by mouth daily as needed.    zinc sulfate (ZINC-15 ORAL) Take by mouth.    glycopyrrolate (ROBINUL) 1 mg Tab Take 1 mg by mouth daily as needed.     No current facility-administered medications for this visit.       Review of patient's allergies indicates:   Allergen Reactions    Penicillins        Family History   Problem Relation Age of Onset    No Known Problems Mother     No Known Problems Father        Social History     Socioeconomic History    Marital status: Unknown   Tobacco Use    Smoking status: Never    Smokeless tobacco: Never   Substance and Sexual Activity    Alcohol use: Yes     Comment: socially    Drug use: Never    Sexual activity: Yes     Partners: Female       Review of Systems:    Constitution:   Denies chills, fever, and sweats.  HENT:   Denies headaches or blurry vision.  Cardiovascular:  Denies chest pain or irregular heart beat.  Respiratory:   Denies cough or shortness of breath.  Gastrointestinal:  Denies abdominal pain, nausea, or vomiting.  Musculoskeletal:   Denies muscle cramps.  Neurological:   Denies dizziness or focal weakness.  Psychiatric/Behavior: Normal mental status.  Hematology/Lymph:  Denies bleeding  "problem or easy bruising/bleeding.  Skin:    Denies rash or suspicious lesions.    Examination:    Vital Signs:    Vitals:    08/09/23 0939   BP: 138/86   Pulse: 70   Weight: 86.6 kg (191 lb)   Height: 5' 9" (1.753 m)   PainSc:   6         Body mass index is 28.21 kg/m².    Constitution:   Well-developed, well nourished patient in no acute distress.  Neurological:   Alert and oriented x 3 and cooperative to examination.     Psychiatric/Behavior: Normal mental status.  Respiratory:   No shortness of breath.  Cards:   Pulses palpable, brisk cap refill  Eyes:    Extraoccular muscles intact  Skin:    No scars, rash or suspicious lesions.    MSK:   Standing exam  stance: normal alignment, no significant leg-length discrepancy  gait: quad avoidance    Knee examination  - General comments:  Healed left knee incisions with quad atrophy.  - Tenderness:parapatellar    Knee                  RIGHT    LEFT  Skin:                  Intact      Intact  ROM:                 0-130      0-120  Effusion:             Neg         trace  MJL TTP:            +                +  LJL TTP:            Neg         Neg  Kam:          +               +  Pat crep:             +               +  Patella TTPs:     Neg         Neg  Patella grind:      Neg        Neg  Lachman:           Neg        Neg  Pivot shift:          Neg        Neg  Valgus stress:    Neg        Neg  Varus stress:      Neg        Neg  Posterior drawer: Neg       Neg    N-V intact intact  Hip: nml nml    Lower extremity edema:Negative        Assessment: S/P ACL reconstruction    Chondromalacia of right patella        Plan:  Agree with starting Celebrex. He will continue this as well as activity modification over the next couple weeks. We will see him back at his previously scheduled appointment on August 28.  Consider injections if his pain or swelling persists    Jj He MD personally performed the services described in this documentation, including but not limited to " patient's history, physical examination, and assessment and plan of care. All medical record entries made by VINCENT Cabrera were performed at his direction and in his presence. The medical record was reviewed and is accurate and complete.

## 2023-08-25 NOTE — PROGRESS NOTES
Neck , no lymphadenopathy Writer called the patient and spoke with patient's wife, Kristi, who informed me that patient receives HC through [facility]. A Sneha Daniels [phone] comes in twice a week .    I have called Sneha Daniels and [facility] regarding providing lab orders.     Will wait for a return call.    Harriet Guzman RN

## 2024-01-03 NOTE — PROGRESS NOTES
[place] [facility] DISCHARGE SUMMARY  PATIENT'S NAME: Bairon Foster  YOB: 1931  MEDICAL RECORD NUMBER:  [id number]  Place of Service where encounter took place:  [facility] [[id number]]    PRIMARY CARE PROVIDER AND CLINIC RESPONSIBLE AFTER TRANSFER:   Mg Nelson MD, [address] / [facility] [address]    Home with family     Transferring providers: IWLLIS Olsen CNP, Dr. Velasquez MD  Recent Hospitalization/ED:  [facility] stay 5/14 to 5/22.  Date of SNF Admission: May / 22 / 2020  Date of SNF (anticipated) Discharge: June / 03 / 2020  Discharged to: previous independent home with family.     Cognitive Scores: SLUMS: 12/30 and CLQT 2.0/4.0 (moderate deficits)  Physical Function: Balance not formally tested, but falls frequently.  50 ft with 2ww and min-mod assist.   DME: [facility]    CODE STATUS/ADVANCE DIRECTIVES DISCUSSION:  Full Code   ALLERGIES: Furosemide and Torsemide    DISCHARGE DIAGNOSIS/NURSING FACILITY COURSE:   Mr. Foster was just at a TCU in February and discharged; has a h/o CHF, Mitral and tricuspid valve disease, no anticoagulation, h/o watchman devise; CKD III, BPH, pancytopenia, Bullous pemphigoid, depression and cognitive impairment.  He was presented to hospital due to worsening CHF with edema.  Seen by Cardiology and unfortunately DC summary was not done, but EMR notes indicated he was diuresed on IV Bumex and had some of his medications changed.  They noted a dry weight of 198-200.  Due to improvement he transitioned to the Tcu/rehab for ongoing cares and PT/OT.      While at the TCU Mr. Foster had several falls.  We noted he has moderate-advanced cognitive impairment, can be impulsive and due to diuretics, needs to void a lot and would try to self ambulate and transfer which lead to falls and skin tears on his arms.  He does have a few bruises and a few skin tears on his bilateral arms, but no other  injuries.  We also note Mr. Foster did not tolerate being at the TCU well due to the social isolation/restrictions of no visitors due to pandemic, wanted to go home every day.     Other ramirez Mr. Foster did well.  We continued diuresis and we actually have lowered diuretics due to dropping weights and minimal LE edema, but he still is on a significant amount.  Continues to be followed by Cardiology.     Mr. Foster is scheduled for a PPM intervention/battery change on 3 Jumana.  Per family request, they want him to discharge from the TCU on 3 Jumana, go to his PPM intervention schedule and after recovery, go home with family.  We have spoken with his son Rich whom is aware of his cognitive impairment, frequent falls and safety concerns, reports approval/desire for the plan above.  Family reports they have 24/7 family support present, and he will get home care as well.    In meeting Mr. Foster today for discharge, he has moderate-advanced cognitive impairment but appears stable/unchanged.  He is not able to track conversation, subject changes frequently but he denies any pain, SOB/LOYOLA, no other complaints.      Acute on chronic combined systolic and diastolic congestive heart failure (H)  Bilateral leg edema  Valvular heart disease  Chronic atrial fibrillation  Coronary artery disease involving native heart without angina pectoris, unspecified vessel or lesion type  Followed by Cardiology.   Cardiology has reduced his diuretics recently due to weight loss, significant improvement in edema and soft SBP's.  Those all continue, thus we are now slightly lowering the Bumex to avoid over diuresis, and soft SBP's.  He denies light headedness, but does fall frequently.   -Weight down to 165.2 lbs on 2 June.   -Trace LE edema.   -Continues Spirolactone.   -Lowered Bumex down to BID 2 mg's in the AM, 1 mg in the afternoon.  (Was TID 2/1/1) prior.   -Continues Coreg.   -Continues KCl, Statin.   --Cardiology f/u 12 June.      Cardiac  pacemaker in situ  Mr. Foster is scheduled for a PPM intervention 3 Jumana, appears to be a battery change in the EMR.    As noted above, will DC from TCU/rehab and present to appointment.  Once recovered, plan is to transition to home.   -PPM intervention 3 Jumana planned.   -PCP to f/u with any rec's.      CKD (chronic kidney disease) stage 3, GFR 30-59 ml/min (H)  Benign prostatic hyperplasia without lower urinary tract symptoms  Creatine baseline unclear, around 1.4-1.6 recently, on high dose diuretics.   Creatine 1.52 on 6/1 when last checked.   -Cardiology/PCP to continue to monitor.     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 Chavez.   Cell lines stable when last checked 5/28.   -PCP/Dr. Chavez to continue to monitor.     Bullous pemphigoid  On LE's, doing well.   -Continues BID PRN Triamcinolone cream and Vanicream, not used much at TCU.     Skin tear of upper extremities  Secondary to multiple falls.  Mild superficial bleeding, no overt s/s of infection.   -Wound care: Clean daily with wound , cover with dressing.     Depression, unspecified depression type  Cognitive impairment  Ongoing Mod-Advanced cognitive impairment, appears stable/unchaged.   -Continues PTA Bupropion.     Falls frequently  Debility  As noted above.    Can be impulsive, forgetful and attempts to self transfer and not wait for help.   -Will DC with home care by [facility]   -See below for F2F.     Past Medical History:  has a past medical history of Acute, but ill-defined, cerebrovascular disease, Antiplatelet or antithrombotic long-term use, Arrhythmia, Bullous pemphigoid (8/21/2019), CKD (chronic kidney disease) stage 3, GFR 30-59 ml/min (H), Coronary atherosclerosis of unspecified type of vessel, native or graft, Epistaxis, Esophageal reflux, GI bleed (4/17/2019), Hypercholesterolemia, Ischemic cardiomyopathy, Lymphedema of both lower extremities, Mitral regurgitation, Obese, JIM on  CPAP, Other lymphedema, Other pancytopenia (H), Other specified anemias, Overweight, Pacemaker, PAD (peripheral artery disease) (H), Pancytopenia (H), Persistent atrial fibrillation, Presence of Watchman left atrial appendage closure device (12/03/2019), Pulmonary hypertension (H), Status post coronary angiogram (3/21/2018), Systolic CHF (H), TIA (transient ischemic attack), Unspecified hypothyroidism, Venous stasis dermatitis of both lower extremities, and Vitamin D deficiency. He also has no past medical history of Hypertension.    Discharge Medications:    Current Outpatient Medications   Medication Sig Dispense Refill     ACE/ARB/ARNI NOT PRESCRIBED (INTENTIONAL) Please choose reason not prescribed, below (Patient not taking: Reported on 5/28/2020)       acetaminophen (TYLENOL) 500 MG tablet Take 2 tablets (1,000 mg) by mouth every 6 hours as needed for mild pain 100 tablet 0     aspirin 81 MG EC tablet Take 1 tablet (81 mg) by mouth daily       bumetanide (BUMEX) 1 MG tablet Take 2 mg by mouth every morning       bumetanide (BUMEX) 1 MG tablet Take 1 mg by mouth every evening Take at 2 pm daily.        buPROPion (WELLBUTRIN XL) 300 MG 24 hr tablet Take 1 tablet (300 mg) by mouth every morning 90 tablet 1     carvedilol (COREG) 6.25 MG tablet TAKE 1 TABLET TWICE DAILY WITH MEALS 180 tablet 3     cholecalciferol (VITAMIN D3) 5000 units (125 mcg) capsule Take 1 capsule (5,000 Units) by mouth daily       COMPRESSION STOCKINGS 1 each daily 2 each 0     EPINEPHrine (EPIPEN/ADRENACLICK/OR ANY BX GENERIC EQUIV) 0.3 MG/0.3ML injection 2-pack Inject 0.3 mg into the muscle as needed for anaphylaxis       finasteride (PROSCAR) 5 MG tablet TAKE 1 TABLET EVERY DAY 90 tablet 1     levothyroxine (SYNTHROID/LEVOTHROID) 100 MCG tablet TAKE 1 TABLET EVERY DAY 90 tablet 1     Multiple Vitamins-Minerals (CENTRUM SILVER) per tablet Take 1 tablet by mouth daily       omeprazole (PRILOSEC) 40 MG DR capsule Take 1 capsule (40 mg) by  mouth daily 30 capsule 5     polyethylene glycol (MIRALAX) packet Take 1 packet by mouth daily       potassium chloride ER (KLOR-CON M) 20 MEQ CR tablet Take 2 tablets (40 mEq) by mouth in the morning and 1 tablet (20 mEq) by mouth in the evening 180 tablet 3     simvastatin (ZOCOR) 20 MG tablet Take 20 mg by mouth daily        spironolactone (ALDACTONE) 25 MG tablet Take 1 tablet (25 mg) by mouth daily       sucralfate (CARAFATE) 1 GM tablet Take 1 tablet (1 g) by mouth 2 times daily 180 tablet 1     tamsulosin (FLOMAX) 0.4 MG capsule TAKE 1 CAPSULE EVERY DAY 90 capsule 1     triamcinolone (KENALOG) 0.025 % cream Apply topically 2 times daily as needed        Medication Changes/Rationale:   -As noted above.     Controlled medications sent with patient:   Not applicable.      ROS:   Limited secondary to cognitive impairment but today pt reports 7 point ROS done including, light headedness/dizziness, fever/chills, pain, Resp, CV, GI, and  and is negative other than noted in HPI.     Physical Exam:   Vitals: /58   Pulse 66   Temp 98.2  F (36.8  C)   Resp 16   Wt 76.1 kg (167 lb 12.8 oz)   SpO2 96%   BMI 21.54 kg/m    BMI= Body mass index is 21.54 kg/m .     EXAM LIMITED DUE TO [facility] social distancing recommendations:  GENERAL APPEARANCE:  Elderly male sitting up in recliner.  NAD, non-toxic.  RESP:   Regular relaxed breathing effort.  No cough.   EXTREMITIES:  Trace bilateral lower extremity edema, no calf tenderness.  Mild degree of Bullous pemphigoid on leg's, appears improved, no wound/skin breakdown on leg's.   Both arms have several skin tears present and some ecchymosis, no overt s/s of infection.   PSYCH: Alert to self, not place, date, situation.  Clear degree of cognitive/memory impairment, stable/unchanged.  Can be impulsive at times.     SNF labs: Labs done in SNF are in [place] EPIC. Please refer to them using EPIC/Care Everywhere.    DISCHARGE PLAN:    Follow up labs: No labs  orders/due    Medical Follow Up:      Follow up with primary care provider in 1-2 weeks   Follow up with Cardiology 12 June.    Follow up with Cardiology Surgery 3 Jumana      Current [place] scheduled appointments:  Next 5 appointments (look out 90 days)    Leonel 10, 2020  2:00 PM CDT  Office Visit with Elvin Ross MD  [facility] ([facility]) [address] [place] [id number]  [phone]           Discharge Services: Home Care:  Occupational Therapy, Physical Therapy, Speech Therapy , Registered Nurse, Home Health Aide,  and From:  [facility]        Discharge Instructions Verbalized to Patient at Discharge:     Instructed patient and son to arrange/attend above appointments.     TOTAL DISCHARGE TIME:   Greater than 30 minutes  Electronically signed by:  WILLIS Olsen CNP     Documentation of Face to Face and Certification for Home Health Services    I certify that patient: Bairon Foster is under my care and that I, or a nurse practitioner or physician's assistant working with me, had a face-to-face encounter that meets the physician face-to-face encounter requirements with this patient on: 2 June 20.    This encounter with the patient was in whole, or in part, for the following medical condition, which is the primary reason for home health care: Ongoing CHF, cognitive impairment, debility, frequent falls.    I certify that, based on my findings, the following services are medically necessary home health services: Nursing, Occupational Therapy, Physical Therapy, Speech Language Therapy, Social Work and HHA. .    My clinical findings support the need for the above services because: Nurse is needed: To provide assessment and oversight required in the home to assure adherence to the medical plan due to: Ongoing CHF, cognitive impairment, debility, frequent falls..., Occupational Therapy Services  are needed to assess and treat cognitive ability and address ADL safety due to impairment in Ongoing CHF, cognitive impairment, debility, frequent falls.., Physical Therapy Services are needed to assess and treat the following functional impairments: Ongoing CHF, cognitive impairment, debility, frequent falls.. and Speech Therapy Services are needed to assess and treat impairments in language and/or swallow functions due to Ongoing CHF, cognitive impairment, debility, frequent falls..    Further, I certify that my clinical findings support that this patient is homebound (i.e. absences from home require considerable and taxing effort and are for medical reasons or Confucianist services or infrequently or of short duration when for other reasons) because: Requires assistance of another person or specialized equipment to access medical services because patient: Requires supervision of another for safe transfer...    Based on the above findings. I certify that this patient is confined to the home and needs intermittent skilled nursing care, physical therapy and/or speech therapy.  The patient is under my care, and I have initiated the establishment of the plan of care.  This patient will be followed by a physician who will periodically review the plan of care.  Physician/Provider to provide follow up care: Mg Nelson    [facility] physician (the Medicare certified PECOS provider):   Electronically signed by  WILLIS Calloway CNP  [facility]    Physician Signature: See electronic signature associated with these discharge orders.  Date: 6/2/2020               Home

## 2024-03-04 NOTE — TELEPHONE ENCOUNTER
Carvedilol 6.25 mg      Last Written Prescription Date: 3/2/16  Last Fill Quantity: 180, # refills: 3    Last Office Visit with [facility], [facility] or [facility] prescribing provider:  12/30/16   Future Office Visit:    Next 5 appointments (look out 90 days)     Mar 10, 2017  7:45 AM CST   Return Visit with Segundo Hope MD   [facility] ([facility])    [address] [place], [place] [id number]  [facility] [address]   [phone]                    BP Readings from Last 3 Encounters:   01/05/17 122/64   12/30/16 110/70   12/10/16 130/78         Finasteride 5 mg      Last Written Prescription Date: 1/6/16  Last Fill Quantity: 90,  # refills: 3   Last Office Visit with [facility], [facility] or [facility] prescribing provider: 12/30/16                                         Next 5 appointments (look out 90 days)     Mar 10, 2017  7:45 AM CST   Return Visit with Segundo Hope MD   [facility] ([facility])    [address] [place], [place] [address]  [facility] [address]   [phone]                  Levothyroxine 100 mcg     Last Written Prescription Date: 5/3/16  Last Quantity: 90, # refills: 3  Last Office Visit with [facility], [facility] or [facility] prescribing provider: 12/30/16   Next 5 appointments (look out 90 days)     Mar 10, 2017  7:45 AM CST   Return Visit with Segundo Hope MD   [facility] ([facility])    [address] [place], [place] [id number]  [facility] [address]   [phone]                   TSH   Date Value Ref Range Status   08/25/2016 1.20 0.40 - 5.00 mU/L Final       Sucralfate 1 gm      Last Written Prescription Date: 1/13/17  Last Fill Quantity: 180,  # refills: 3   Last Office Visit with [facility], [facility] or [facility] prescribing provider: 12/30/16                                         Next 5 appointments (look out 90 days)     Mar 10, 2017  7:45 AM CST   Return Visit with Segundo Hope,  MD   [facility]    [address]  [facility] [address] [phone]                         no

## 2024-12-31 NOTE — PROGRESS NOTES
Care Transition Initial Assessment - SW     Met with: Spouse and son.     Active Problems:    GI bleed    Hematemesis       DATA  Lives With: facility resident   Living Arrangements: extended care facility([facility])  Quality of Family Relationships: involved, supportive  Description of Support System: Supportive, Involved  Who is your support system?: Wife, Children  Support Assessment: Adequate family and caregiver support.   Identified issues/concerns regarding health management: Met individually with spouse and son. Both agreed pt needed to return to [facility], but neither want pt to return to [facility]. Explained pt's options are limited due to his [employer] insurance having limited options for TCU. Reviewed [employer] facilities in the area. Wife initially asked for a referral to [facility], then changed her mind. Both agreed to a referral to [facility]. SW explained to both that pt may need to return to [facility], as it may be the only option. SW did speak with Cassidy Wiley at [facility] who reported pt's bed is being held. Cassidy Wiley also reported pt will need a new prior-auth before returning. Pt will need a prior-auth from [facility] before discharging to any facility. Both wife and son expressed concern with pt discharging too soon. SW encouraged both to discuss their concerns with the MD.      Quality of Family Relationships: involved, supportive    ASSESSMENT  Cognitive Status: Forgetful, per nursing. Family involved and supportive.   Concerns to be addressed: On-going discharge planning.     PLAN  Financial costs for the patient includes: None.  Patient given options and choices for discharge: Yes.  Patient/family is agreeable to the plan? YES  Patient Goals and Preferences: TCU.  Patient anticipates discharging to: TCU.    LOVELY Petersen, LGSW  [id number]             no

## (undated) DEVICE — KIT HAND CONTROL ANGIOTOUCH ACIST 65CM AT-P65

## (undated) DEVICE — CLOSURE DEVICE 6FR VASC PROGLIDE MEDICATED SUTURE 12673-03

## (undated) DEVICE — DILATOR VASCULAR 16FRX20CM G01212

## (undated) DEVICE — MEDITRACE MULTIFUNTION ADULT RADIOTRANSPARENT ELECTRODE FOR ZOLL

## (undated) DEVICE — WIRE GLIDE 0.035"X150CM VASC GR3506

## (undated) DEVICE — SYR ANGIOGRAPHY MULTIUSE KIT ACIST 014612

## (undated) DEVICE — INTRO SHEATH AVANTI 4FRX23CM 504604T

## (undated) DEVICE — PACK HEART LEFT CUSTOM

## (undated) DEVICE — DRAPE LAP W/ARMBOARD 29410

## (undated) DEVICE — CATH JACKY 5FR 3.5 CURVE 40-5023

## (undated) DEVICE — DEFIB PRO-PADZ LVP LQD GEL ADULT 8900-2105-01

## (undated) DEVICE — Device

## (undated) DEVICE — PACK PCMKR PERM SRG PROC LF SAN32PC573

## (undated) DEVICE — MANIFOLD KIT ANGIO AUTOMATED 014613

## (undated) DEVICE — DRSG TEGADERM 4X4 3/4" 1626

## (undated) DEVICE — PREP CHLORAPREP 26ML TINTED ORANGE  260815

## (undated) DEVICE — SLEEVE TR BAND RADIAL COMPRESSION DEVICE 24CM TRB24-REG

## (undated) DEVICE — INTRO SHEATH 7FRX10CM PINNACLE RSS702

## (undated) DEVICE — ESU CORD BIPOLAR 12' E0509

## (undated) DEVICE — DILATOR VASCULAR 18FRX20CM G01328

## (undated) DEVICE — TRUSEAL ACCESS SHEATH WITH DILATOR, DOUBLE CURVE

## (undated) DEVICE — CATH ANGIO INFINITI PIGTAIL 4FRX110CM 8 SH 538450E

## (undated) DEVICE — INTRO SHEATH 6FRX25CM PINNACLE RSS606

## (undated) DEVICE — NDL TRNSEPT 18GA X 71CM 86 DEG

## (undated) DEVICE — NDL PERC ENTRY THINWALL 18GA 7.0" G00166

## (undated) DEVICE — GUIDEWIRE VASC SAFARI2 0.035X275CM H74939406XS1

## (undated) DEVICE — TOTE ANGIO CORP PC15AT SAN32CC83O

## (undated) DEVICE — WIRE GUIDE 0.035"X260CM SAFE-T-J EXCHANGE G00517

## (undated) DEVICE — DRSG STERI STRIP 1/2X4" R1547

## (undated) DEVICE — SU VICRYL 3-0 SH 27" J316H

## (undated) DEVICE — APPLICATOR COTTON TIP 6"X2 STERILE LF 6012

## (undated) DEVICE — PACK MINOR SBA15MIFSE

## (undated) DEVICE — HEMOSTAT ABSORBABLE POWDER ARISTA 1GM SM0005-USA

## (undated) DEVICE — BNDG ABDOMINAL BINDER 9X45-62" 79-89071

## (undated) DEVICE — INTRODUCER SHEATH FAST-CATH SWARTZ 8.5FRX63CM SL1 CVD 406849

## (undated) DEVICE — GLOVE PROTEXIS BLUE W/NEU-THERA 7.5  2D73EB75

## (undated) DEVICE — SU MONOCRYL 4-0 PS-2 18" UND Y496G

## (undated) DEVICE — INTRO GLIDESHEATH SLENDER 6FR 10X45CM 60-1060

## (undated) DEVICE — BLADE ESU PLASMABLADE SPATULA TIP 4MM PS200-040

## (undated) DEVICE — SU NUROLON 0 MO-7 CR 8X18" C541D

## (undated) DEVICE — SHEATH TRANSSEPTAL SOFT TIP TF85-32-63-55

## (undated) DEVICE — KIT HAND CONTROL ACIST 014644 AR-P54

## (undated) DEVICE — DILATOR VASCULAR 20FRX20CM G01471

## (undated) DEVICE — LINEN TOWEL PACK X5 5464

## (undated) DEVICE — INTRODUCER CATH VASC 5FRX10CM  MPIS-501-NT-U-SST

## (undated) DEVICE — CATH ANGIO MULTIPURPOSE AL1 SIDEHOLES 6FRX100CM 533640

## (undated) DEVICE — GLOVE PROTEXIS MICRO 7.5  2D73PM75

## (undated) DEVICE — DRSG GAUZE 4X4" 3033

## (undated) DEVICE — BLADE CLIPPER 4406

## (undated) DEVICE — INTRO SHEATH 5FRX10CM PINNACLE RSS502

## (undated) DEVICE — GW VASC 260CM .035IN SS PTFE THSF-35-260-AES

## (undated) DEVICE — SU VICRYL 4-0 PS-2 18" UND J496H

## (undated) DEVICE — CATH ANGIO INFINITI PIGTAIL 145 6 SH 6FRX110CM  534-652S

## (undated) DEVICE — DILATOR VASCULAR 12FRX20CM G00995

## (undated) DEVICE — MITRACLIP STEERABLE GUIDE CATH

## (undated) DEVICE — INTRO SHEATH 6FRX10CM PINNACLE RSS602

## (undated) DEVICE — TUBING PRESSURE 30"

## (undated) DEVICE — CATH ANGIO WEDGE PRESSURE 5FRX110CM DL AI-07124

## (undated) RX ORDER — POTASSIUM CHLORIDE 1500 MG/1
TABLET, EXTENDED RELEASE ORAL
Status: DISPENSED
Start: 2018-03-21

## (undated) RX ORDER — HEPARIN SODIUM 1000 [USP'U]/ML
INJECTION, SOLUTION INTRAVENOUS; SUBCUTANEOUS
Status: DISPENSED
Start: 2019-12-03

## (undated) RX ORDER — LIDOCAINE HYDROCHLORIDE 40 MG/ML
SOLUTION TOPICAL
Status: DISPENSED
Start: 2020-01-17

## (undated) RX ORDER — CEFAZOLIN SODIUM 1 G/3ML
INJECTION, POWDER, FOR SOLUTION INTRAMUSCULAR; INTRAVENOUS
Status: DISPENSED
Start: 2019-05-20

## (undated) RX ORDER — BUPIVACAINE HYDROCHLORIDE 2.5 MG/ML
INJECTION, SOLUTION EPIDURAL; INFILTRATION; INTRACAUDAL
Status: DISPENSED
Start: 2020-06-29

## (undated) RX ORDER — NALOXONE HYDROCHLORIDE 0.4 MG/ML
INJECTION, SOLUTION INTRAMUSCULAR; INTRAVENOUS; SUBCUTANEOUS
Status: DISPENSED
Start: 2020-01-17

## (undated) RX ORDER — PROTAMINE SULFATE 10 MG/ML
INJECTION, SOLUTION INTRAVENOUS
Status: DISPENSED
Start: 2019-12-03

## (undated) RX ORDER — LIDOCAINE HYDROCHLORIDE 10 MG/ML
INJECTION, SOLUTION EPIDURAL; INFILTRATION; INTRACAUDAL; PERINEURAL
Status: DISPENSED
Start: 2019-04-12

## (undated) RX ORDER — CEFAZOLIN SODIUM 2 G/100ML
INJECTION, SOLUTION INTRAVENOUS
Status: DISPENSED
Start: 2020-06-29

## (undated) RX ORDER — PROTAMINE SULFATE 10 MG/ML
INJECTION, SOLUTION INTRAVENOUS
Status: DISPENSED
Start: 2019-05-20

## (undated) RX ORDER — ADENOSINE 3 MG/ML
INJECTION, SOLUTION INTRAVENOUS
Status: DISPENSED
Start: 2019-05-20

## (undated) RX ORDER — LIDOCAINE HYDROCHLORIDE 10 MG/ML
INJECTION, SOLUTION EPIDURAL; INFILTRATION; INTRACAUDAL; PERINEURAL
Status: DISPENSED
Start: 2018-03-21

## (undated) RX ORDER — CEFAZOLIN SODIUM 2 G/100ML
INJECTION, SOLUTION INTRAVENOUS
Status: DISPENSED
Start: 2019-12-03

## (undated) RX ORDER — ONDANSETRON 2 MG/ML
INJECTION INTRAMUSCULAR; INTRAVENOUS
Status: DISPENSED
Start: 2017-10-31

## (undated) RX ORDER — ONDANSETRON 2 MG/ML
INJECTION INTRAMUSCULAR; INTRAVENOUS
Status: DISPENSED
Start: 2019-05-20

## (undated) RX ORDER — LIDOCAINE HYDROCHLORIDE 20 MG/ML
INJECTION, SOLUTION EPIDURAL; INFILTRATION; INTRACAUDAL; PERINEURAL
Status: DISPENSED
Start: 2017-10-31

## (undated) RX ORDER — CEFAZOLIN SODIUM 2 G/100ML
INJECTION, SOLUTION INTRAVENOUS
Status: DISPENSED
Start: 2019-05-20

## (undated) RX ORDER — FLUMAZENIL 0.1 MG/ML
INJECTION, SOLUTION INTRAVENOUS
Status: DISPENSED
Start: 2019-03-22

## (undated) RX ORDER — LIDOCAINE HYDROCHLORIDE 10 MG/ML
INJECTION, SOLUTION EPIDURAL; INFILTRATION; INTRACAUDAL; PERINEURAL
Status: DISPENSED
Start: 2020-06-29

## (undated) RX ORDER — LIDOCAINE HYDROCHLORIDE 10 MG/ML
INJECTION, SOLUTION EPIDURAL; INFILTRATION; INTRACAUDAL; PERINEURAL
Status: DISPENSED
Start: 2019-12-03

## (undated) RX ORDER — FENTANYL CITRATE 50 UG/ML
INJECTION, SOLUTION INTRAMUSCULAR; INTRAVENOUS
Status: DISPENSED
Start: 2020-01-17

## (undated) RX ORDER — PROPOFOL 10 MG/ML
INJECTION, EMULSION INTRAVENOUS
Status: DISPENSED
Start: 2017-10-31

## (undated) RX ORDER — HEPARIN SODIUM 1000 [USP'U]/ML
INJECTION, SOLUTION INTRAVENOUS; SUBCUTANEOUS
Status: DISPENSED
Start: 2019-04-12

## (undated) RX ORDER — FENTANYL CITRATE 50 UG/ML
INJECTION, SOLUTION INTRAMUSCULAR; INTRAVENOUS
Status: DISPENSED
Start: 2019-04-12

## (undated) RX ORDER — HEPARIN SODIUM 1000 [USP'U]/ML
INJECTION, SOLUTION INTRAVENOUS; SUBCUTANEOUS
Status: DISPENSED
Start: 2019-05-20

## (undated) RX ORDER — PROPOFOL 10 MG/ML
INJECTION, EMULSION INTRAVENOUS
Status: DISPENSED
Start: 2019-04-19

## (undated) RX ORDER — VERAPAMIL HYDROCHLORIDE 2.5 MG/ML
INJECTION, SOLUTION INTRAVENOUS
Status: DISPENSED
Start: 2018-03-21

## (undated) RX ORDER — FENTANYL CITRATE 50 UG/ML
INJECTION, SOLUTION INTRAMUSCULAR; INTRAVENOUS
Status: DISPENSED
Start: 2018-03-21

## (undated) RX ORDER — HEPARIN SODIUM 200 [USP'U]/100ML
INJECTION, SOLUTION INTRAVENOUS
Status: DISPENSED
Start: 2019-12-03

## (undated) RX ORDER — ASPIRIN 81 MG/1
TABLET, CHEWABLE ORAL
Status: DISPENSED
Start: 2019-05-20

## (undated) RX ORDER — ASPIRIN 325 MG
TABLET ORAL
Status: DISPENSED
Start: 2019-05-20

## (undated) RX ORDER — VERAPAMIL HYDROCHLORIDE 2.5 MG/ML
INJECTION, SOLUTION INTRAVENOUS
Status: DISPENSED
Start: 2019-04-12

## (undated) RX ORDER — FENTANYL CITRATE 50 UG/ML
INJECTION, SOLUTION INTRAMUSCULAR; INTRAVENOUS
Status: DISPENSED
Start: 2019-04-18

## (undated) RX ORDER — GLYCOPYRROLATE 0.2 MG/ML
INJECTION, SOLUTION INTRAMUSCULAR; INTRAVENOUS
Status: DISPENSED
Start: 2020-01-17

## (undated) RX ORDER — CEFAZOLIN SODIUM 2 G/100ML
INJECTION, SOLUTION INTRAVENOUS
Status: DISPENSED
Start: 2017-10-31

## (undated) RX ORDER — PHENYLEPHRINE HCL IN 0.9% NACL 1 MG/10 ML
SYRINGE (ML) INTRAVENOUS
Status: DISPENSED
Start: 2019-05-20

## (undated) RX ORDER — LIDOCAINE HYDROCHLORIDE 40 MG/ML
SOLUTION TOPICAL
Status: DISPENSED
Start: 2019-03-22

## (undated) RX ORDER — DEXAMETHASONE SODIUM PHOSPHATE 4 MG/ML
INJECTION, SOLUTION INTRA-ARTICULAR; INTRALESIONAL; INTRAMUSCULAR; INTRAVENOUS; SOFT TISSUE
Status: DISPENSED
Start: 2019-05-20

## (undated) RX ORDER — FENTANYL CITRATE 50 UG/ML
INJECTION, SOLUTION INTRAMUSCULAR; INTRAVENOUS
Status: DISPENSED
Start: 2017-10-31

## (undated) RX ORDER — REGADENOSON 0.08 MG/ML
INJECTION, SOLUTION INTRAVENOUS
Status: DISPENSED
Start: 2017-03-16

## (undated) RX ORDER — DEXTROSE MONOHYDRATE 25 G/50ML
INJECTION, SOLUTION INTRAVENOUS
Status: DISPENSED
Start: 2019-03-22

## (undated) RX ORDER — FENTANYL CITRATE 50 UG/ML
INJECTION, SOLUTION INTRAMUSCULAR; INTRAVENOUS
Status: DISPENSED
Start: 2019-12-03

## (undated) RX ORDER — NALOXONE HYDROCHLORIDE 0.4 MG/ML
INJECTION, SOLUTION INTRAMUSCULAR; INTRAVENOUS; SUBCUTANEOUS
Status: DISPENSED
Start: 2019-03-22

## (undated) RX ORDER — HYDROCODONE BITARTRATE AND ACETAMINOPHEN 5; 325 MG/1; MG/1
TABLET ORAL
Status: DISPENSED
Start: 2017-10-31

## (undated) RX ORDER — FENTANYL CITRATE 50 UG/ML
INJECTION, SOLUTION INTRAMUSCULAR; INTRAVENOUS
Status: DISPENSED
Start: 2019-03-22

## (undated) RX ORDER — FENTANYL CITRATE 50 UG/ML
INJECTION, SOLUTION INTRAMUSCULAR; INTRAVENOUS
Status: DISPENSED
Start: 2020-06-29

## (undated) RX ORDER — FENTANYL CITRATE 50 UG/ML
INJECTION, SOLUTION INTRAMUSCULAR; INTRAVENOUS
Status: DISPENSED
Start: 2019-05-20

## (undated) RX ORDER — HEPARIN SODIUM 1000 [USP'U]/ML
INJECTION, SOLUTION INTRAVENOUS; SUBCUTANEOUS
Status: DISPENSED
Start: 2018-03-21

## (undated) RX ORDER — ASPIRIN 81 MG/1
TABLET ORAL
Status: DISPENSED
Start: 2019-05-20

## (undated) RX ORDER — NITROGLYCERIN 5 MG/ML
VIAL (ML) INTRAVENOUS
Status: DISPENSED
Start: 2019-04-12

## (undated) RX ORDER — FLUMAZENIL 0.1 MG/ML
INJECTION, SOLUTION INTRAVENOUS
Status: DISPENSED
Start: 2020-01-17

## (undated) RX ORDER — GLYCOPYRROLATE 0.2 MG/ML
INJECTION, SOLUTION INTRAMUSCULAR; INTRAVENOUS
Status: DISPENSED
Start: 2019-03-22